# Patient Record
Sex: FEMALE | Race: BLACK OR AFRICAN AMERICAN | NOT HISPANIC OR LATINO | Employment: OTHER | URBAN - METROPOLITAN AREA
[De-identification: names, ages, dates, MRNs, and addresses within clinical notes are randomized per-mention and may not be internally consistent; named-entity substitution may affect disease eponyms.]

---

## 2017-01-10 ENCOUNTER — LAB VISIT (OUTPATIENT)
Dept: LAB | Facility: HOSPITAL | Age: 52
End: 2017-01-10
Attending: INTERNAL MEDICINE
Payer: MEDICARE

## 2017-01-10 DIAGNOSIS — Z94.0 KIDNEY REPLACED BY TRANSPLANT: ICD-10-CM

## 2017-01-10 LAB
ALBUMIN SERPL BCP-MCNC: 3.3 G/DL
ALBUMIN SERPL BCP-MCNC: 3.3 G/DL
ALP SERPL-CCNC: 61 U/L
ALT SERPL W/O P-5'-P-CCNC: 22 U/L
ANION GAP SERPL CALC-SCNC: 7 MMOL/L
AST SERPL-CCNC: 20 U/L
BASOPHILS # BLD AUTO: 0.01 K/UL
BASOPHILS NFR BLD: 0.3 %
BILIRUB DIRECT SERPL-MCNC: 0.1 MG/DL
BILIRUB SERPL-MCNC: 0.3 MG/DL
BUN SERPL-MCNC: 16 MG/DL
CALCIUM SERPL-MCNC: 9.5 MG/DL
CHLORIDE SERPL-SCNC: 104 MMOL/L
CO2 SERPL-SCNC: 24 MMOL/L
CREAT SERPL-MCNC: 1.6 MG/DL
DIFFERENTIAL METHOD: ABNORMAL
EOSINOPHIL # BLD AUTO: 0.1 K/UL
EOSINOPHIL NFR BLD: 3.5 %
ERYTHROCYTE [DISTWIDTH] IN BLOOD BY AUTOMATED COUNT: 14.3 %
EST. GFR  (AFRICAN AMERICAN): 42.1 ML/MIN/1.73 M^2
EST. GFR  (NON AFRICAN AMERICAN): 36.5 ML/MIN/1.73 M^2
GLUCOSE SERPL-MCNC: 174 MG/DL
HCT VFR BLD AUTO: 36.5 %
HGB BLD-MCNC: 11.7 G/DL
LYMPHOCYTES # BLD AUTO: 1.2 K/UL
LYMPHOCYTES NFR BLD: 30.5 %
MAGNESIUM SERPL-MCNC: 1.8 MG/DL
MCH RBC QN AUTO: 27.1 PG
MCHC RBC AUTO-ENTMCNC: 32.1 %
MCV RBC AUTO: 85 FL
MONOCYTES # BLD AUTO: 0.4 K/UL
MONOCYTES NFR BLD: 10.5 %
NEUTROPHILS # BLD AUTO: 2.2 K/UL
NEUTROPHILS NFR BLD: 55.2 %
PHOSPHATE SERPL-MCNC: 3 MG/DL
PLATELET # BLD AUTO: 183 K/UL
PMV BLD AUTO: 13.2 FL
POTASSIUM SERPL-SCNC: 4.4 MMOL/L
PROT SERPL-MCNC: 8.4 G/DL
RBC # BLD AUTO: 4.31 M/UL
SODIUM SERPL-SCNC: 135 MMOL/L
WBC # BLD AUTO: 4 K/UL

## 2017-01-10 PROCEDURE — 80069 RENAL FUNCTION PANEL: CPT

## 2017-01-10 PROCEDURE — 87799 DETECT AGENT NOS DNA QUANT: CPT

## 2017-01-10 PROCEDURE — 36415 COLL VENOUS BLD VENIPUNCTURE: CPT | Mod: PO

## 2017-01-10 PROCEDURE — 80197 ASSAY OF TACROLIMUS: CPT

## 2017-01-10 PROCEDURE — 84075 ASSAY ALKALINE PHOSPHATASE: CPT

## 2017-01-10 PROCEDURE — 85025 COMPLETE CBC W/AUTO DIFF WBC: CPT

## 2017-01-10 PROCEDURE — 83735 ASSAY OF MAGNESIUM: CPT

## 2017-01-11 ENCOUNTER — TELEPHONE (OUTPATIENT)
Dept: TRANSPLANT | Facility: CLINIC | Age: 52
End: 2017-01-11

## 2017-01-11 DIAGNOSIS — R80.9 PROTEINURIA, UNSPECIFIED TYPE: Primary | ICD-10-CM

## 2017-01-11 LAB — TACROLIMUS BLD-MCNC: 5.9 NG/ML

## 2017-01-11 NOTE — TELEPHONE ENCOUNTER
----- Message from Sweta Catalan MD sent at 1/10/2017  4:44 PM CST -----  Proteinuria increased --> please obtain UA with microscopy and repeat UPC; if no infection and persistent proteinuria need to obtain 24 hour urine collection for quantification of proteinuria

## 2017-01-12 ENCOUNTER — LAB VISIT (OUTPATIENT)
Dept: LAB | Facility: HOSPITAL | Age: 52
End: 2017-01-12
Attending: INTERNAL MEDICINE
Payer: MEDICARE

## 2017-01-12 DIAGNOSIS — R80.9 PROTEINURIA, UNSPECIFIED TYPE: ICD-10-CM

## 2017-01-12 LAB
BACTERIA #/AREA URNS AUTO: NORMAL /HPF
BILIRUB UR QL STRIP: NEGATIVE
BK VIRUS DNA PCR, QUANT, BLOOD: <250 COPIES/ML
BK VIRUS DNA, BLOOD: NOT DETECTED
CLARITY UR REFRACT.AUTO: CLEAR
COLOR UR AUTO: ABNORMAL
CREAT UR-MCNC: 38 MG/DL
GLUCOSE UR QL STRIP: ABNORMAL
HGB UR QL STRIP: ABNORMAL
HYALINE CASTS UR QL AUTO: 0 /LPF
KETONES UR QL STRIP: NEGATIVE
LEUKOCYTE ESTERASE UR QL STRIP: NEGATIVE
LOG BKV COPIES/ML: <2.4 LOG (10) COPIES/ML
MICROSCOPIC COMMENT: NORMAL
NITRITE UR QL STRIP: NEGATIVE
PH UR STRIP: 6 [PH] (ref 5–8)
PROT UR QL STRIP: ABNORMAL
PROT UR-MCNC: 70 MG/DL
PROT/CREAT RATIO, UR: 1.84
RBC #/AREA URNS AUTO: 1 /HPF (ref 0–4)
SP GR UR STRIP: 1 (ref 1–1.03)
SQUAMOUS #/AREA URNS AUTO: 1 /HPF
URN SPEC COLLECT METH UR: ABNORMAL
UROBILINOGEN UR STRIP-ACNC: NEGATIVE EU/DL
WBC #/AREA URNS AUTO: 1 /HPF (ref 0–5)

## 2017-01-12 PROCEDURE — 87086 URINE CULTURE/COLONY COUNT: CPT

## 2017-01-12 PROCEDURE — 84156 ASSAY OF PROTEIN URINE: CPT

## 2017-01-12 PROCEDURE — 81001 URINALYSIS AUTO W/SCOPE: CPT

## 2017-01-13 LAB
BACTERIA UR CULT: NORMAL
BACTERIA UR CULT: NORMAL

## 2017-01-13 NOTE — PROGRESS NOTES
Repeat UA and urine culture if she has symptoms of UTI; otherwise no need to repeat urine culture for now

## 2017-01-16 ENCOUNTER — OFFICE VISIT (OUTPATIENT)
Dept: TRANSPLANT | Facility: CLINIC | Age: 52
End: 2017-01-16
Payer: MEDICARE

## 2017-01-16 VITALS
DIASTOLIC BLOOD PRESSURE: 68 MMHG | HEART RATE: 98 BPM | WEIGHT: 220 LBS | TEMPERATURE: 98 F | SYSTOLIC BLOOD PRESSURE: 123 MMHG | OXYGEN SATURATION: 99 % | RESPIRATION RATE: 18 BRPM | HEIGHT: 67 IN | BODY MASS INDEX: 34.53 KG/M2

## 2017-01-16 DIAGNOSIS — Z94.0 KIDNEY TRANSPLANT STATUS, LIVING UNRELATED DONOR: Primary | Chronic | ICD-10-CM

## 2017-01-16 DIAGNOSIS — N18.9 ANEMIA IN CKD (CHRONIC KIDNEY DISEASE): ICD-10-CM

## 2017-01-16 DIAGNOSIS — R80.8 OTHER PROTEINURIA: ICD-10-CM

## 2017-01-16 DIAGNOSIS — I12.9 BENIGN HYPERTENSION WITH CHRONIC KIDNEY DISEASE, STAGE III: ICD-10-CM

## 2017-01-16 DIAGNOSIS — N18.30 BENIGN HYPERTENSION WITH CHRONIC KIDNEY DISEASE, STAGE III: ICD-10-CM

## 2017-01-16 DIAGNOSIS — Z29.89 NEED FOR PROPHYLACTIC IMMUNOTHERAPY: Chronic | ICD-10-CM

## 2017-01-16 DIAGNOSIS — N18.30 CKD (CHRONIC KIDNEY DISEASE), STAGE III: Chronic | ICD-10-CM

## 2017-01-16 DIAGNOSIS — E66.9 OBESITY, UNSPECIFIED OBESITY SEVERITY, UNSPECIFIED OBESITY TYPE: ICD-10-CM

## 2017-01-16 DIAGNOSIS — D63.1 ANEMIA IN CKD (CHRONIC KIDNEY DISEASE): ICD-10-CM

## 2017-01-16 PROBLEM — R80.9 PROTEINURIA: Status: ACTIVE | Noted: 2017-01-16

## 2017-01-16 PROCEDURE — 99214 OFFICE O/P EST MOD 30 MIN: CPT | Mod: PBBFAC | Performed by: NURSE PRACTITIONER

## 2017-01-16 PROCEDURE — 99215 OFFICE O/P EST HI 40 MIN: CPT | Mod: S$GLB,,, | Performed by: NURSE PRACTITIONER

## 2017-01-16 PROCEDURE — 99999 PR PBB SHADOW E&M-EST. PATIENT-LVL IV: CPT | Mod: PBBFAC,,, | Performed by: NURSE PRACTITIONER

## 2017-01-16 NOTE — MR AVS SNAPSHOT
[facility]  [address]  [facility] [address]  Phone: [phone]                  Elizbaeth Maldonado   2017 10:00 AM   Office Visit    Description:  Female : 1965   Provider:  Daya Leon NP   Department:  [facility]           Reason for Visit     Kidney Transplant Evaluation           Diagnoses this Visit        Comments    Kidney transplant status, living unrelated donor    -  Primary     Need for prophylactic immunotherapy         CKD (chronic kidney disease), stage III         Uncontrolled type 2 diabetes mellitus with stage 3 chronic kidney disease, with long-term current use of insulin         Benign hypertension with chronic kidney disease, stage III         Anemia in CKD (chronic kidney disease)         Other proteinuria         Obesity, unspecified obesity severity, unspecified obesity type                To Do List           Future Appointments        Provider Department Dept Phone    2017 2:30 PM MD [facility] - Ophthalmology [phone]    2017 2:30 PM Alison Quinonez MD [facility] [phone]      Goals (5 Years of Data)     None      Follow-Up and Disposition     Return in about 1 year (around 2018), or if symptoms worsen or fail to improve.      [facility]     [facility] -  Assistance  Registered nurses in the [facility] provide clinical advisement, health education, appointment booking, and other advisory services.  Call for this free service at [phone].             Medications           Message regarding Medications     Verify the changes and/or additions to your medication regime listed below are the same as discussed with your clinician today.  If any of these changes or additions are incorrect, please notify your healthcare provider.        STOP taking these medications     amoxicillin (AMOXIL) 500 MG capsule TK 4 CS PO 60 MINUTES PRIOR TO  "APPONTMENT    atorvastatin (LIPITOR) 40 MG tablet Take 1 tablet (40 mg total) by mouth every evening.    dapsone 25 MG Tab Take 100 mg by mouth.    famotidine (PEPCID) 20 MG tablet Take 20 mg by mouth.    fluticasone (FLONASE) 50 mcg/actuation nasal spray 1 spray by Nasal route.    magnesium 30 mg Tab Take 400 mg by mouth.    multivitamin (THERAGRAN) tablet Take 1 tablet by mouth once daily.    sodium bicarbonate 325 MG tablet Take 650 mg by mouth.           Verify that the below list of medications is an accurate representation of the medications you are currently taking.  If none reported, the list may be blank. If incorrect, please contact your healthcare provider. Carry this list with you in case of emergency.           Current Medications     blood sugar diagnostic Strp Checks BG ac/hs    buPROPion (WELLBUTRIN) 75 MG tablet TK 1 T PO 2 TIMES D    ferrous sulfate 325 (65 FE) MG EC tablet Take 325 mg by mouth once daily.     INSULIN DEGLUDEC (TRESIBA FLEXTOUCH U-200 SUBQ) Inject 88 Units into the skin nightly.     insulin syringe-needle U-100 (INSULIN SYRINGE) 1/2 mL 30 x 5/16" Syrg Uses 4 daily    lancets (ONE TOUCH DELICA LANCETS) 33 gauge Misc 1 lancet by Misc.(Non-Drug; Combo Route) route 4 (four) times daily before meals and nightly.    multivit-minerals-ferrous fum 9 mg iron/15 mL Liqd Take 1 tablet by mouth.    mycophenolate (CELLCEPT) 250 mg Cap Take 2 capsules (500 mg total) by mouth 2 (two) times daily.    NOVOLOG FLEXPEN 100 unit/mL InPn pen 23 Units 3 (three) times daily with meals.     SYMLINPEN 120 2,700 mcg/2.7 mL PnIj INJECT 60 MCG UNDER THE SKIN BEFORE MEALS FOR 4 WEEKS THEN INCREASE  MCG    SYRINGE & NEEDLE,INSULIN,1 ML (INSULIN SYRINGE-NEEDLE U-100) 1 mL 29 X 7/16" Syrg     tacrolimus (PROGRAF) 1 MG Cap Take 3 capsules (3 mg total) by mouth every 12 (twelve) hours. Z94.0 Kidney transplant on 12/2/2014.    KETOCONAZOLE ORAL Take 200 mg by mouth.           Clinical Reference Information    " "       Vital Signs - Last Recorded  Most recent update: 1/16/2017 10:21 AM by Malia Rosales MA    BP Pulse Temp Resp Ht Wt    123/68 (BP Location: Right arm, Patient Position: Sitting, BP Method: Automatic) 98 97.8 °F (36.6 °C) (Oral) 18 5' 7" (1.702 m) 99.8 kg (220 lb 0.3 oz)    LMP SpO2 BMI          04/29/2011 (Approximate) 99% 34.46 kg/m2        Blood Pressure          Most Recent Value    BP  123/68      Allergies as of 1/16/2017     Hydrocodone    Iodine And Iodide Containing Products    Shrimp    Oxycodone    Topamax [Topiramate]    Zoloft [Sertraline]      Immunizations Administered on Date of Encounter - 1/16/2017     None      Maintenance Dialysis History     Start End Type Comments Center    1/1/1888  Peritoneal  [facility]            Current Dialysis Center Information     [facility] Phone #:  [phone]    Contact:  N/A LANCE Mishra  [address] Fax #:  [phone]            Transplant Information        Txp Date Organ Coordinator Care Team    12/2/2014 Kidney Syd Cannon, DEEPAK Referring Physician:  Jessica Johnson MD   Current Nephrologist:  Anabelle Milan MD   Surgeon:  Divya Tijerina MD   Assisting Surgeon:  Abraham Cardenas MD         "

## 2017-01-16 NOTE — LETTER
January 16, 2017        Anabelle Milan  [address] [place] [address] [address]  [place] [address]  Phone: [phone]  Fax: [phone]             [facility]  [address]  [facility] [address]  Phone: [phone]   Patient: Elizabeth Maldonado   MR Number: [id number]   YOB: 1965   Date of Visit: 1/16/2017       Dear Dr. Anabelle Milan    Thank you for referring Elizabeth Maldonado to me for evaluation. Attached you will find relevant portions of my assessment and plan of care.    If you have questions, please do not hesitate to call me. I look forward to following Elizabeth Maldonado along with you.    Sincerely,    Daya Leon NP    Enclosure    If you would like to receive this communication electronically, please contact [email or web address] or [phone] to request [employer] access.    [employer] is a tool which provides read-only access to select patient information with whom you have a relationship. Its easy to use and provides real time access to review your patients record including encounter summaries, notes, results, and demographic information.    If you feel you have received this communication in error or would no longer like to receive these types of communications, please e-mail [email or web address]

## 2017-01-16 NOTE — PROGRESS NOTES
Kidney Post-Transplant Assessment    Referring Physician: Jessica Johnson  Current Nephrologist: Anabelle Milan    ORGAN: LEFT KIDNEY  Donor Type: living  PHS Increased Risk: no  Cold Ischemia: 53 mins  Induction Medications: campath - alemtuzumab (anti-cd52)    Subjective:     CC:  Reassessment of renal allograft function and management of chronic immunosuppression.    HPI:  Ms. Maldonado is a 52 y.o. year old Black or  female who received a living kidney transplant on 12/2/14.  She has CKD stage 3 - GFR 30-59 and her baseline creatinine is between  107-2.2, most recent sCr 1.6. She takes mycophenolate mofetil and tacrolimus for maintenance immunosuppression. She denies any recent hospitalizations or ER visits since her previous clinic visit.    Patient states she is having difficulty controlling her blood sugars. She is working closely with her endocrinologist and a dietician. She states she is an emotional eater. She has been exercising 3-4x/week at a gym doing water aerobics.   Overall feels well. No health concerns today.   Denies chest pain, SOB, leg pain, abdominal pain or LUTs.    Pertinent HX:  HX: ESRD secondary to DM/HTN who was on PD for ~4 months prior to receiving LURT from her daughter's friend on 12/2/14. Post-op course complicated by herpes zoster outbreak in mid-Sept 2015 along her left thigh.     Past Medical History   Diagnosis Date    Acidosis 7/1/2014    Allergic rhinitis 7/1/2014    Allergy     Anemia     Anemia in chronic kidney disease 7/1/2014    Anxiety     Chronic immunosuppression with Prograf and MMF 12/3/2014    CKD (chronic kidney disease) stage 5, GFR less than 15 ml/min 7/1/2014    CKD (chronic kidney disease), stage III 3/2/2015    Degenerative disc disease     Depression 7/1/2014    Diabetes mellitus, type 2 since age 18 7/1/2014    ESRD on peritoneal dialysis - august 2014 for 9 hours no peritonitis 11/24/2014    Hyperlipidemia     Hypertension  "7/1/2014    Hypomagnesemia 1/7/2015    Kidney transplant status, living unrelated donor - 12/2/14 12/3/2014    Neutropenia 1/21/2015    NS (nuclear sclerosis) 9/16/2016    Organ transplant candidate 7/1/2014    Pre-op exam 11/24/2014    Proliferative diabetic retinopathy of both eyes without macular edema associated with type 2 diabetes mellitus 9/16/2016    Tendinitis        Review of Systems   Constitutional: Negative for activity change, appetite change, chills, fatigue, fever and unexpected weight change.   HENT: Negative for congestion, facial swelling, postnasal drip, rhinorrhea, sinus pressure, sore throat and trouble swallowing.    Eyes: Negative for pain, redness and visual disturbance.   Respiratory: Negative for cough, chest tightness, shortness of breath and wheezing.    Cardiovascular: Negative.  Negative for chest pain, palpitations and leg swelling.   Gastrointestinal: Negative for abdominal pain, diarrhea, nausea and vomiting.   Genitourinary: Negative for dysuria, flank pain and urgency.   Musculoskeletal: Negative for gait problem, neck pain and neck stiffness.   Skin: Negative for rash.   Allergic/Immunologic: Positive for immunocompromised state. Negative for environmental allergies and food allergies.   Neurological: Negative for dizziness, weakness, light-headedness and headaches.   Psychiatric/Behavioral: Negative for agitation and confusion. The patient is not nervous/anxious.        Objective:     Blood pressure 123/68, pulse 98, temperature 97.8 °F (36.6 °C), temperature source Oral, resp. rate 18, height 5' 7" (1.702 m), weight 99.8 kg (220 lb 0.3 oz), last menstrual period 04/29/2011, SpO2 99 %.body mass index is 34.46 kg/(m^2).    Physical Exam   Constitutional: She is oriented to person, place, and time. She appears well-developed and well-nourished.   HENT:   Head: Normocephalic.   Mouth/Throat: Oropharynx is clear and moist. No oropharyngeal exudate.   Eyes: Conjunctivae and " EOM are normal. Pupils are equal, round, and reactive to light. No scleral icterus.   Neck: Normal range of motion. Neck supple.   Cardiovascular: Normal rate, regular rhythm and normal heart sounds.    Pulmonary/Chest: Effort normal and breath sounds normal. No respiratory distress. She has no wheezes. She has no rales.   Abdominal: Soft. Normal appearance and bowel sounds are normal. She exhibits no distension and no mass. There is no splenomegaly or hepatomegaly. There is no tenderness. There is no rebound, no guarding, no CVA tenderness, no tenderness at McBurney's point and negative Aguiar's sign.       Musculoskeletal: Normal range of motion. She exhibits no edema.   Lymphadenopathy:     She has no cervical adenopathy.   Neurological: She is alert and oriented to person, place, and time. She exhibits normal muscle tone. Coordination normal.   Skin: Skin is warm and dry.   Psychiatric: She has a normal mood and affect. Her behavior is normal.   Vitals reviewed.      Labs:  Lab Results   Component Value Date    WBC 4.00 01/10/2017    HGB 11.7 (L) 01/10/2017    HCT 36.5 (L) 01/10/2017     (L) 01/10/2017    K 4.4 01/10/2017     01/10/2017    CO2 24 01/10/2017    BUN 16 01/10/2017    CREATININE 1.6 (H) 01/10/2017    EGFRNONAA 36.5 (A) 01/10/2017    CALCIUM 9.5 01/10/2017    PHOS 3.0 01/10/2017    MG 1.8 01/10/2017    ALBUMIN 3.3 (L) 01/10/2017    ALBUMIN 3.3 (L) 01/10/2017    AST 20 01/10/2017    ALT 22 01/10/2017    UTPCR 1.84 (H) 01/12/2017    PTH 81.0 (H) 05/05/2016    TACROLIMUS 5.9 01/10/2017       No results found for: EXTANC, EXTWBC, EXTSEGS, EXTPLATELETS, EXTHEMOGLOBI, EXTHEMATOCRI, EXTCREATININ, EXTSODIUM, EXTPOTASSIUM, EXTBUN, EXTCO2, EXTCALCIUM, EXTPHOSPHORU, EXTGLUCOSE, EXTALBUMIN, EXTAST, EXTALT, EXTBILITOTAL, EXTLIPASE, EXTAMYLASE    No results found for: EXTCYCLOSLVL, EXTSIROLIMUS, EXTTACROLVL, EXTPROTCRE, EXTPTHINTACT, EXTPROTEINUA, EXTWBCUA, EXTRBCUA    Labs were reviewed with the  patient.    Assessment:     1. Kidney transplant status, living unrelated donor - 12/2/14    2. Chronic immunosuppression with Prograf    3. CKD (chronic kidney disease), stage III    4. Uncontrolled type 2 diabetes mellitus with stage 3 chronic kidney disease, with long-term current use of insulin    5. Benign hypertension with chronic kidney disease, stage III    6. Anemia in CKD (chronic kidney disease)    7. Other proteinuria    8. Obesity, unspecified obesity severity, unspecified obesity type        Plan:     Proteinuria:   If 24 hour urine confirms proteinuria -->scheduled allograft biopsy     Patient has concerns about how her Insurance plan and funding for her meds will be effected at 3 years post TXP.   She would like to speak with a  to review her plan to endure she has the correct coverage added in the next open enrollment period , p/t 2018.     Follow-up:   1. CKD stage:  3 stable    2. Immunosuppression: No change, Prograf trough 5.9, which is therapeutic. Continue  Prograf 3/2, XJO003 Mg BID,     Will continue to monitor for drug toxicities  TACROLIMUS 5.9 01/10/2017     3. Allograft Function: sCr remains within baseline . Continue good po hydration.       Lab Results   Component Value Date    CREATININE 1.6 (H) 01/10/2017     eGFR if African American >60 mL/min/1.73 m^2 42.1 (A)           01/10/2017       4. Hypertension management: controlled, advise low salt diet and home BP monitoring    No meds at this time     5. Metabolic Bone Disease/Secondary Hyperparathyroidism:stable  6. Electrolytes: Normal calcium. Bicarbonate is normal   (L) 01/10/2017   K 4.4 01/10/2017    01/10/2017   CO2 24 01/10/2017     CALCIUM 9.5 01/10/2017   PHOS 3.0 01/10/2017   MG 1.8 01/10/2017     7. Anemia: resolved. No need for intervention      Lab Results   Component Value Date    WBC 4.00 01/10/2017    HGB 11.7 (L) 01/10/2017    HCT 36.5 (L) 01/10/2017    MCV 85 01/10/2017      01/10/2017         8.  Cytopenias: no significant cytopenias will monitor as per our guidelines. Medicine list reviewed including potential causes of drug-induced cytopenias    9.Proteinuria: continue p/c ratio as per guidelines    UTPCR 1.84 (H) 01/12/2017   UA, culture, 24 hour urine for proteinuria quantification      UA 1/12/2017  Specimen UA  Urine, Clean Catch   Color, UA Yellow, Straw, Ruby Straw   Appearance, UA Clear Clear   pH, UA 5.0 - 8.0 6.0   Specific Gravity, UA 1.005 - 1.030 1.005   Protein, UA Negative 1+ (A)   Comments: Recommend a 24 hour urine protein or a urine   protein/creatinine ratio if globulin induced proteinuria is   clinically suspected.      Glucose, UA Negative Trace (A)   Ketones, UA Negative Negative   Bilirubin (UA) Negative Negative   Occult Blood UA Negative Trace (A)   Nitrite, UA Negative Negative   Urobilinogen, UA <2.0 EU/dL Negative   Leukocytes, UA Negative Negative         10. BK virus infection screening:  Not detected, will continue to monitor/ guidelines  01/10/2017  BK Virus DNA, Blood Not detected Not detected   BK Virus DNA PCR, Quant, Blood <250 Copies/mL <250   Log BKV Copies/mL <2.40 Log (10) Copies/mL <2.40       11. Weight education: provided during the clinic visit   body mass index is 34.46 kg/(m^2).        12.Patient safety education regarding immunosuppression including prophylaxis posttransplant for CMV, PCP : Education provided about vaccination and prevention of infections          Follow-up:   Clinic: return to transplant clinic weekly for the first month after transplant; every 2 weeks during months 2-3; then at 6-, 9-, 12-, 18-, 24-, and 36- months post-transplant to reassess for complications from immunosuppression toxicity and monitor for rejection.  Annually thereafter.    Labs: since patient remains at high risk for rejection and drug-related complications that warrant close monitoring, labs will be ordered as follows: continue twice weekly CBC,  renal panel, and drug level for first month; then same labs once weekly through 3rd month post-transplant.  Urine for UA and protein/creatinine ratio monthly.  Urine BK - PCR at 1-, 3-, 6-, 9-, 12-, 18-, 24-, and 36- months post-transplant.  Hepatic panel at 1-, 2-, 3-, 6-, 9-, 12-, 18-, 24-, and 36- months post-transplant.    Daya Leon NP       Education:   Material provided to the patient.  Patient reminded to call with any health changes since these can be early signs of significant complications.  Also, I advised the patient to be sure any new medications or changes of old medications are discussed with either a pharmacist or physician knowledgeable with transplant to avoid rejection/drug toxicity related to significant drug interactions.    UNOS Patient Status  Functional Status: 80% - Normal activity with effort: some symptoms of disease  Physical Capacity: No Limitations

## 2017-01-17 ENCOUNTER — OFFICE VISIT (OUTPATIENT)
Dept: OPHTHALMOLOGY | Facility: CLINIC | Age: 52
End: 2017-01-17
Payer: MEDICARE

## 2017-01-17 VITALS — SYSTOLIC BLOOD PRESSURE: 123 MMHG | DIASTOLIC BLOOD PRESSURE: 78 MMHG | HEART RATE: 105 BPM

## 2017-01-17 DIAGNOSIS — E11.3593 PROLIFERATIVE DIABETIC RETINOPATHY OF BOTH EYES WITHOUT MACULAR EDEMA ASSOCIATED WITH TYPE 2 DIABETES MELLITUS: Primary | ICD-10-CM

## 2017-01-17 DIAGNOSIS — Z94.0 KIDNEY REPLACED BY TRANSPLANT: Primary | ICD-10-CM

## 2017-01-17 PROCEDURE — 67228 TREATMENT X10SV RETINOPATHY: CPT | Mod: PBBFAC,RT | Performed by: OPHTHALMOLOGY

## 2017-01-17 PROCEDURE — 99499 UNLISTED E&M SERVICE: CPT | Mod: S$GLB,,, | Performed by: OPHTHALMOLOGY

## 2017-01-17 PROCEDURE — 99213 OFFICE O/P EST LOW 20 MIN: CPT | Mod: PBBFAC,25 | Performed by: OPHTHALMOLOGY

## 2017-01-17 PROCEDURE — 67228 TREATMENT X10SV RETINOPATHY: CPT | Mod: RT,S$GLB,, | Performed by: OPHTHALMOLOGY

## 2017-01-17 PROCEDURE — 99999 PR PBB SHADOW E&M-EST. PATIENT-LVL III: CPT | Mod: PBBFAC,,, | Performed by: OPHTHALMOLOGY

## 2017-01-17 NOTE — MR AVS SNAPSHOT
[facility]  [address]  [facility] [address]  Phone: [phone]  Fax: [phone]                  Elizabeth Maldonado   2017 2:30 PM   Office Visit    Description:  Female : 1965   Provider:  TATO Oliveira MD   Department:  [facility]           Reason for Visit     Eye Problem           Diagnoses this Visit        Comments    Proliferative diabetic retinopathy of both eyes without macular edema associated with type 2 diabetes mellitus    -  Primary            To Do List           Future Appointments        Provider Department Dept Phone    2017 2:30 PM Alison Quinonez MD [facility] [phone]    2017 10:00 AM LAB, [place] [facility] [phone]    2017 2:20 PM MD [facility] [phone]      Goals (5 Years of Data)     None      Follow-Up and Disposition     Return in about 2 weeks (around 2017).      [facility]     [facility] - 24/ Assistance  Registered nurses in the [facility] provide clinical advisement, health education, appointment booking, and other advisory services.  Call for this free service at [phone].             Medications           Message regarding Medications     Verify the changes and/or additions to your medication regime listed below are the same as discussed with your clinician today.  If any of these changes or additions are incorrect, please notify your healthcare provider.             Verify that the below list of medications is an accurate representation of the medications you are currently taking.  If none reported, the list may be blank. If incorrect, please contact your healthcare provider. Carry this list with you in case of emergency.           Current Medications     blood sugar diagnostic Strp Checks BG ac/hs    buPROPion (WELLBUTRIN) 75 MG tablet TK 1 T PO 2 TIMES D    ferrous  "sulfate 325 (65 FE) MG EC tablet Take 325 mg by mouth once daily.     INSULIN DEGLUDEC (TRESIBA FLEXTOUCH U-200 SUBQ) Inject 88 Units into the skin nightly.     insulin syringe-needle U-100 (INSULIN SYRINGE) 1/2 mL 30 x 5/16" Syrg Uses 4 daily    KETOCONAZOLE ORAL Take 200 mg by mouth.    lancets (ONE TOUCH DELICA LANCETS) 33 gauge Misc 1 lancet by Misc.(Non-Drug; Combo Route) route 4 (four) times daily before meals and nightly.    multivit-minerals-ferrous fum 9 mg iron/15 mL Liqd Take 1 tablet by mouth.    mycophenolate (CELLCEPT) 250 mg Cap Take 2 capsules (500 mg total) by mouth 2 (two) times daily.    NOVOLOG FLEXPEN 100 unit/mL InPn pen 23 Units 3 (three) times daily with meals.     SYMLINPEN 120 2,700 mcg/2.7 mL PnIj INJECT 60 MCG UNDER THE SKIN BEFORE MEALS FOR 4 WEEKS THEN INCREASE  MCG    SYRINGE & NEEDLE,INSULIN,1 ML (INSULIN SYRINGE-NEEDLE U-100) 1 mL 29 X 7/16" Syrg     tacrolimus (PROGRAF) 1 MG Cap Take 3 capsules (3 mg total) by mouth every 12 (twelve) hours. Z94.0 Kidney transplant on 12/2/2014.           Clinical Reference Information           Vital Signs - Last Recorded  Most recent update: 1/17/2017  2:55 PM by Suze Trevizo    BP Pulse LMP             123/78 (BP Location: Left arm, Patient Position: Sitting, BP Method: Automatic) 105 04/29/2011 (Approximate)         Blood Pressure          Most Recent Value    BP  123/78      Allergies as of 1/17/2017     Hydrocodone    Iodine And Iodide Containing Products    Shrimp    Oxycodone    Topamax [Topiramate]    Zoloft [Sertraline]      Immunizations Administered on Date of Encounter - 1/17/2017     None      Maintenance Dialysis History     Start End Type Comments Center    1/1/1888  Peritoneal  [facility]            Current Dialysis Center Information     [facility] Phone #:  [phone]    Contact:  N/A LANCE Mishra  [address] Fax #:  [phone]            Transplant Information        Txp Date " Organ Coordinator Care Team    12/2/2014 Kidney Syd Cannon, DEEPAK Referring Physician:  Jessica Johnson MD   Current Nephrologist:  Anabelle Milan MD   Surgeon:  Divya Tijerina MD   Assisting Surgeon:  Abraham Cardenas MD

## 2017-01-17 NOTE — PROGRESS NOTES
Prior OCT - No ME OU  NV gabriela on ONH OS      FA:  Shows significant nonperfusion and NV OU    A/P    1. PDR OU  W/o ME, T2    S/p PRP OS   Start PRP OD today  2 weeks for PRP OD # 2    2. HTN Ret OU    BS/BP/chol control    3. NS OU        Risk, benefits, and alternatives of the procedure were discussed in detail with the patient. The patient voiced understanding and wished to proceed with the procedure.    Laser Procedure Note  Dx: PDR OD  Laser: PRP OD  Argon  Spot: 200  Power: 150-180  Dur: 100ms  #:  [id number]  Complications: None  F/U as above            RTC 2 weeks for PRP OD

## 2017-01-18 ENCOUNTER — OFFICE VISIT (OUTPATIENT)
Dept: FAMILY MEDICINE | Facility: CLINIC | Age: 52
End: 2017-01-18
Payer: MEDICARE

## 2017-01-18 VITALS
DIASTOLIC BLOOD PRESSURE: 80 MMHG | SYSTOLIC BLOOD PRESSURE: 120 MMHG | HEART RATE: 94 BPM | WEIGHT: 218.94 LBS | TEMPERATURE: 98 F | OXYGEN SATURATION: 98 % | BODY MASS INDEX: 34.36 KG/M2 | HEIGHT: 67 IN

## 2017-01-18 DIAGNOSIS — N18.30 BENIGN HYPERTENSION WITH CHRONIC KIDNEY DISEASE, STAGE III: ICD-10-CM

## 2017-01-18 DIAGNOSIS — E66.9 OBESITY (BMI 30-39.9): ICD-10-CM

## 2017-01-18 DIAGNOSIS — E11.3593 PROLIFERATIVE DIABETIC RETINOPATHY OF BOTH EYES WITHOUT MACULAR EDEMA ASSOCIATED WITH TYPE 2 DIABETES MELLITUS: Primary | Chronic | ICD-10-CM

## 2017-01-18 DIAGNOSIS — Z94.0 KIDNEY TRANSPLANT STATUS, LIVING UNRELATED DONOR: Chronic | ICD-10-CM

## 2017-01-18 DIAGNOSIS — N25.81 HYPERPARATHYROIDISM, SECONDARY RENAL: ICD-10-CM

## 2017-01-18 DIAGNOSIS — Z29.89 NEED FOR PROPHYLACTIC IMMUNOTHERAPY: Chronic | ICD-10-CM

## 2017-01-18 DIAGNOSIS — G47.00 INSOMNIA, UNSPECIFIED TYPE: ICD-10-CM

## 2017-01-18 DIAGNOSIS — F32.A DEPRESSION, UNSPECIFIED DEPRESSION TYPE: ICD-10-CM

## 2017-01-18 DIAGNOSIS — I12.9 BENIGN HYPERTENSION WITH CHRONIC KIDNEY DISEASE, STAGE III: ICD-10-CM

## 2017-01-18 PROCEDURE — 99213 OFFICE O/P EST LOW 20 MIN: CPT | Mod: PBBFAC,PO | Performed by: FAMILY MEDICINE

## 2017-01-18 PROCEDURE — 99214 OFFICE O/P EST MOD 30 MIN: CPT | Mod: S$GLB,,, | Performed by: FAMILY MEDICINE

## 2017-01-18 PROCEDURE — 99999 PR PBB SHADOW E&M-EST. PATIENT-LVL III: CPT | Mod: PBBFAC,,, | Performed by: FAMILY MEDICINE

## 2017-01-18 RX ORDER — ZOLPIDEM TARTRATE 5 MG/1
5 TABLET ORAL NIGHTLY PRN
Qty: 30 TABLET | Refills: 1 | Status: SHIPPED | OUTPATIENT
Start: 2017-01-18 | End: 2017-08-08 | Stop reason: SDUPTHER

## 2017-01-18 RX ORDER — ZOLPIDEM TARTRATE 5 MG/1
5 TABLET ORAL NIGHTLY PRN
Qty: 30 TABLET | Refills: 1 | Status: SHIPPED | OUTPATIENT
Start: 2017-01-18 | End: 2017-01-18 | Stop reason: SDUPTHER

## 2017-01-18 NOTE — PATIENT INSTRUCTIONS
"  Facts About Dietary Fat     Olive oil is a good source of unsaturated fat.     Eating less saturated and trans fat is one of the best things you can do for your heart. Start by finding out which fats are better to use. Then always try to use as little "bad" fat as you can.  Why eat less fat?  · Cutting down on the fat you eat can lower your blood cholesterol levels. This may help prevent clogged arteries from buildup of plaque.  · A low-fat diet can help you lose excess weight. Doing so can lower your blood pressure and reduce your chances of getting diabetes.  · A low-fat diet reduces your risk for stroke and for some cancers.  Unsaturated fat is most healthy  · When you must add fat, use unsaturated fat.  · Unsaturated fats come from plants. They include olive, canola, peanut, corn, avocado, safflower, and sunflower oils.  · Liquid (squeezable) margarine is also mostly unsaturated fat.  · In moderate amounts, unsaturated fat can even be good for your heart.  Saturated fat is less healthy  · Avoid eating saturated fat because it raises your blood cholesterol levels.  · Most saturated fat comes from animals. Foods such as butter, lard, cheese, cream, whole milk, and fatty cuts of meat are high in saturated fat.  · Some oils, such as palm and coconut oils, are also saturated fats.  Trans fat is least healthy  · Also avoid trans fat whenever possible. Even if it's not listed on the food label, look for it in the ingredients in the form of hydrogenated or partially hydrogenated oils.  · This is found in snack foods, shortening, french fries, and stick margarines.  Add flavor without fat  · Sprinkle herbs on fish, chicken, and meat, and in soups.  · Try herbs, lemon juice, or flavored vinegar on vegetables.  · Add chopped onions, garlic, and peppers to flavor beans and rice.   © 3435-2315 [employer]. [address] [place] [address], [place] [address]. All rights reserved. This information is not intended as a " substitute for professional medical care. Always follow your healthcare professional's instructions.

## 2017-01-18 NOTE — MR AVS SNAPSHOT
[facility]  [address]  [place] [address]  Phone: [phone]  Fax: [phone]                  Elizabeth Maldonado   2017 2:30 PM   Office Visit    Description:  Female : 1965   Provider:  Alison Quinonez MD   Department:  [facility]           Reason for Visit     Insomnia     forms           Diagnoses this Visit        Comments    Proliferative diabetic retinopathy of both eyes without macular edema associated with type 2 diabetes mellitus    -  Primary            To Do List           Future Appointments        Provider Department Dept Phone    2017 10:00 AM LAB, [place] [facility] [phone]    2017 8:30 AM LAB, [place] [facility] [phone]    2017 2:20 PM TATO Oliveira MD [facility] - Ophthalmology [phone]      Goals (5 Years of Data)     None       These Medications        Disp Refills Start End    zolpidem (AMBIEN) 5 MG Tab 30 tablet 1 2017    Take 1 tablet (5 mg total) by mouth nightly as needed. - Oral    Pharmacy: [facility] [address] [place] [address] AT [facility] Ph #: [phone]         [facility]     [facility] Care [facility] -  Assistance  Registered nurses in the [facility] provide clinical advisement, health education, appointment booking, and other advisory services.  Call for this free service at [phone].             Medications           Message regarding Medications     Verify the changes and/or additions to your medication regime listed below are the same as discussed with your clinician today.  If any of these changes or additions are incorrect, please notify your healthcare provider.        START taking these NEW medications        Refills    zolpidem (AMBIEN) 5 MG Tab 1    Sig: Take 1 tablet (5 mg total) by mouth nightly as needed.    Class: Print    Route: Oral          "  Verify that the below list of medications is an accurate representation of the medications you are currently taking.  If none reported, the list may be blank. If incorrect, please contact your healthcare provider. Carry this list with you in case of emergency.           Current Medications     blood sugar diagnostic Strp Checks BG ac/hs    buPROPion (WELLBUTRIN) 75 MG tablet TK 1 T PO 2 TIMES D    ferrous sulfate 325 (65 FE) MG EC tablet Take 325 mg by mouth once daily.     INSULIN DEGLUDEC (TRESIBA FLEXTOUCH U-200 SUBQ) Inject 88 Units into the skin nightly.     insulin syringe-needle U-100 (INSULIN SYRINGE) 1/2 mL 30 x 5/16" Syrg Uses 4 daily    KETOCONAZOLE ORAL Take 200 mg by mouth.    lancets (ONE TOUCH DELICA LANCETS) 33 gauge Misc 1 lancet by Misc.(Non-Drug; Combo Route) route 4 (four) times daily before meals and nightly.    multivit-minerals-ferrous fum 9 mg iron/15 mL Liqd Take 1 tablet by mouth.    mycophenolate (CELLCEPT) 250 mg Cap Take 2 capsules (500 mg total) by mouth 2 (two) times daily.    NOVOLOG FLEXPEN 100 unit/mL InPn pen 23 Units 3 (three) times daily with meals.     SYMLINPEN 120 2,700 mcg/2.7 mL PnIj INJECT 60 MCG UNDER THE SKIN BEFORE MEALS FOR 4 WEEKS THEN INCREASE  MCG    SYRINGE & NEEDLE,INSULIN,1 ML (INSULIN SYRINGE-NEEDLE U-100) 1 mL 29 X 7/16" Syrg     tacrolimus (PROGRAF) 1 MG Cap Take 3 capsules (3 mg total) by mouth every 12 (twelve) hours. Z94.0 Kidney transplant on 12/2/2014.    zolpidem (AMBIEN) 5 MG Tab Take 1 tablet (5 mg total) by mouth nightly as needed.           Clinical Reference Information           Vital Signs - Last Recorded  Most recent update: 1/18/2017  2:40 PM by Manjinder Diggs MA    BP Pulse Temp Ht    120/80 (BP Location: Right arm, Patient Position: Sitting, BP Method: Manual) 94 98 °F (36.7 °C) (Oral) 5' 7" (1.702 m)    Wt LMP SpO2 BMI    99.3 kg (218 lb 14.7 oz) 04/29/2011 (Approximate) 98% 34.29 kg/m2      Blood Pressure          Most Recent " "Value    BP  120/80      Allergies as of 1/18/2017     Hydrocodone    Iodine And Iodide Containing Products    Shrimp    Oxycodone    Topamax [Topiramate]    Zoloft [Sertraline]      Immunizations Administered on Date of Encounter - 1/18/2017     None      Orders Placed During Today's Visit     Future Labs/Procedures Expected by Expires    Hemoglobin A1c  1/18/2017 1/18/2018    Lipid panel  1/18/2017 1/18/2018    TSH  1/18/2017 1/18/2018      Maintenance Dialysis History     Start End Type Comments Center    1/1/1888  Peritoneal  [facility]            Current Dialysis Center Information     [facility] [facility] [address] Phone #:  [phone]    Contact:  [place]  [address] Fax #:  [phone]            Transplant Information        Txp Date Organ Coordinator Care Team    12/2/2014 Kidney Syd Cannon RN Referring Physician:  Jessica Johnson MD   Current Nephrologist:  Anabelle Milan MD   Surgeon:  Divya Tijerina MD   Assisting Surgeon:  Abraham Cardenas MD         Instructions      Facts About Dietary Fat     Olive oil is a good source of unsaturated fat.     Eating less saturated and trans fat is one of the best things you can do for your heart. Start by finding out which fats are better to use. Then always try to use as little "bad" fat as you can.  Why eat less fat?  · Cutting down on the fat you eat can lower your blood cholesterol levels. This may help prevent clogged arteries from buildup of plaque.  · A low-fat diet can help you lose excess weight. Doing so can lower your blood pressure and reduce your chances of getting diabetes.  · A low-fat diet reduces your risk for stroke and for some cancers.  Unsaturated fat is most healthy  · When you must add fat, use unsaturated fat.  · Unsaturated fats come from plants. They include olive, canola, peanut, corn, avocado, safflower, and sunflower oils.  · Liquid (squeezable) margarine is also mostly unsaturated " fat.  · In moderate amounts, unsaturated fat can even be good for your heart.  Saturated fat is less healthy  · Avoid eating saturated fat because it raises your blood cholesterol levels.  · Most saturated fat comes from animals. Foods such as butter, lard, cheese, cream, whole milk, and fatty cuts of meat are high in saturated fat.  · Some oils, such as palm and coconut oils, are also saturated fats.  Trans fat is least healthy  · Also avoid trans fat whenever possible. Even if it's not listed on the food label, look for it in the ingredients in the form of hydrogenated or partially hydrogenated oils.  · This is found in snack foods, shortening, french fries, and stick margarines.  Add flavor without fat  · Sprinkle herbs on fish, chicken, and meat, and in soups.  · Try herbs, lemon juice, or flavored vinegar on vegetables.  · Add chopped onions, garlic, and peppers to flavor beans and rice.   © 4146-6032 [employer] [place]. [address] [place] [address], [place] [address]. All rights reserved. This information is not intended as a substitute for professional medical care. Always follow your healthcare professional's instructions.

## 2017-01-19 NOTE — PROGRESS NOTES
Routine Office Visit    Patient Name: Elizabeth Maldonado    : 1965  MRN: [id number]    Subjective:  Elizabeth is a 52 y.o. female who presents today for     1. Insomnia - chronic condition for patient - pt was previously on ambien - she would like a prescription refill. She tries to go to bed at 10pm each night but is sometimes unable to sleep. She has tried otc sleep aids without relief of symptoms. She has been on ambien in the past and reports no side effect from medication.   2. [employer] form completion - pt needs form completed stating she was previously on dialysis and is now no longer on dialysis      Review of Systems   Constitutional: Negative for chills and fever.   HENT: Negative for congestion.    Eyes: Negative for blurred vision.   Respiratory: Negative for cough.    Cardiovascular: Negative for chest pain.   Gastrointestinal: Negative for abdominal pain, constipation, diarrhea, heartburn, nausea and vomiting.   Genitourinary: Negative for dysuria.   Musculoskeletal: Negative for myalgias.   Skin: Negative for itching and rash.   Neurological: Negative for dizziness and headaches.   Psychiatric/Behavioral: Negative for depression. The patient has insomnia.        Active Problem List  Patient Active Problem List   Diagnosis    Uncontrolled type 2 diabetes mellitus with stage 3 chronic kidney disease    Anemia in CKD (chronic kidney disease)    Allergic rhinitis    Hyperlipidemia    Depression    Hyperparathyroidism, secondary renal    Obesity    Kidney transplant status, living unrelated donor - 14    Chronic immunosuppression with Prograf    Counseling NOS    CKD (chronic kidney disease), stage III    Benign hypertension with chronic kidney disease, stage III    Proliferative diabetic retinopathy of both eyes without macular edema associated with type 2 diabetes mellitus    Hypertensive retinopathy of both eyes    NS (nuclear sclerosis)    Proteinuria       Past Surgical  History  Past Surgical History   Procedure Laterality Date     section       x 2    Rotator cuff repair      Renal biopsy      Bone marrow biopsy N/A     Tubal ligation      Kidney transplant         Family History  Family History   Problem Relation Age of Onset    Diabetes Mother     Hypertension Mother     Diabetes Father     Kidney disease Father     Diabetes Sister     Hypertension Sister     Diabetes Brother     Diabetes Sister     Stroke Maternal Grandmother     Heart disease Maternal Grandmother      pacemaker    Cataracts Maternal Grandmother     No Known Problems Maternal Aunt     No Known Problems Maternal Uncle     No Known Problems Paternal Aunt     No Known Problems Paternal Uncle     No Known Problems Maternal Grandfather     No Known Problems Paternal Grandmother     No Known Problems Paternal Grandfather     Cancer Neg Hx     Amblyopia Neg Hx     Blindness Neg Hx     Glaucoma Neg Hx     Macular degeneration Neg Hx     Retinal detachment Neg Hx     Strabismus Neg Hx     Thyroid disease Neg Hx     Breast cancer Neg Hx     Colon cancer Neg Hx     Ovarian cancer Neg Hx        Social History  Social History     Social History    Marital status:      Spouse name: N/A    Number of children: N/A    Years of education: N/A     Occupational History     Disabled     Social History Main Topics    Smoking status: Former Smoker     Packs/day: 1.00     Years: 20.00     Types: Cigarettes     Quit date: 2013    Smokeless tobacco: Never Used      Comment: quit smoking cigarettes in 2014    Alcohol use No    Drug use: No    Sexual activity: Yes     Partners: Male     Birth control/ protection: Post-menopausal     Other Topics Concern    Not on file     Social History Narrative        Nutrition manager by education    Disabled    2 children    No blood transfusions       Medications and Allergies  Reviewed and updated.       Physical  "Exam  Visit Vitals    /80 (BP Location: Right arm, Patient Position: Sitting, BP Method: Manual)    Pulse 94    Temp 98 °F (36.7 °C) (Oral)    Ht 5' 7" (1.702 m)    Wt 99.3 kg (218 lb 14.7 oz)    LMP 04/29/2011 (Approximate)    SpO2 98%    BMI 34.29 kg/m2     Physical Exam   Constitutional: She is oriented to person, place, and time. She appears well-developed and well-nourished.   HENT:   Head: Normocephalic and atraumatic.   Eyes: Conjunctivae and EOM are normal. Pupils are equal, round, and reactive to light.   Neck: Normal range of motion. Neck supple.   Cardiovascular: Normal rate, regular rhythm and normal heart sounds.  Exam reveals no gallop and no friction rub.    No murmur heard.  Pulmonary/Chest: Breath sounds normal. No respiratory distress.   Abdominal: Soft. Bowel sounds are normal. She exhibits no distension. There is no tenderness.   Musculoskeletal: Normal range of motion.   Lymphadenopathy:     She has no cervical adenopathy.   Neurological: She is alert and oriented to person, place, and time.   Skin: Skin is warm.   Psychiatric: She has a normal mood and affect.         Assessment/Plan:  Elizabeth Maldonado is a 52 y.o. female who presents today for :    Proliferative diabetic retinopathy of both eyes without macular edema associated with type 2 diabetes mellitus / Uncontrolled type 2 diabetes mellitus with stage 3 chronic kidney disease, with long-term current use of insulin  -     Lipid panel; Future; Expected date: 1/18/17  -     Hemoglobin A1c; Future; Expected date: 1/18/17  -     TSH; Future; Expected date: 1/18/17  Followed by endocrine   Discussed importance of diabetes control  The patient is asked to make an attempt to improve diet and exercise patterns to aid in medical management of this problem.    Benign hypertension with chronic kidney disease, stage III  The current medical regimen is effective;  continue present plan and medications.    Depression, unspecified " depression type  The current medical regimen is effective;  continue present plan and medications.    Kidney transplant status, living unrelated donor - 12/2/14 / Hyperparathyroidism, secondary renal / Chronic immunosuppression with Prograf  Followed by transplant team  The current medical regimen is effective;  continue present plan and medications.    Obesity (BMI 30-39.9)  The patient is asked to make an attempt to improve diet and exercise patterns to aid in medical management of this problem.  Diet and exercise planning discussed.  Decrease portion control and carbohydrate consumption.  Recommend 30 minutes of moderate intensity exercise 5 days/week.    Insomnia, unspecified type  -     zolpidem (AMBIEN) 5 MG Tab; Take 1 tablet (5 mg total) by mouth nightly as needed.  Dispense: 30 tablet; Refill: 1  Common side effects of this medication were discussed with the patient. Questions regarding medications were discussed during this visit.   The problem of recurrent insomnia is discussed. Avoidance of caffeine sources is strongly encouraged. Sleep hygiene issues are reviewed. The use of sedative hypnotics for temporary relief is appropriate; we discussed the addictive nature of these drugs      Return in about 3 months (around 4/18/2017).

## 2017-01-23 ENCOUNTER — LAB VISIT (OUTPATIENT)
Dept: LAB | Facility: HOSPITAL | Age: 52
End: 2017-01-23
Attending: INTERNAL MEDICINE
Payer: MEDICARE

## 2017-01-23 DIAGNOSIS — Z94.0 KIDNEY REPLACED BY TRANSPLANT: ICD-10-CM

## 2017-01-23 PROCEDURE — 82575 CREATININE CLEARANCE TEST: CPT

## 2017-01-23 PROCEDURE — 84156 ASSAY OF PROTEIN URINE: CPT

## 2017-01-24 DIAGNOSIS — R80.9 PROTEINURIA, UNSPECIFIED TYPE: Primary | ICD-10-CM

## 2017-01-25 NOTE — PROGRESS NOTES
This does not seem to be a 24 hour collection? Is this a spot UPC? Please arrange for 24 hour collection to quantify proteinuria.

## 2017-01-26 ENCOUNTER — LAB VISIT (OUTPATIENT)
Dept: LAB | Facility: HOSPITAL | Age: 52
End: 2017-01-26
Attending: OPHTHALMOLOGY
Payer: MEDICARE

## 2017-01-26 DIAGNOSIS — Z76.82 ORGAN TRANSPLANT CANDIDATE: ICD-10-CM

## 2017-01-26 DIAGNOSIS — R80.9 PROTEINURIA, UNSPECIFIED TYPE: ICD-10-CM

## 2017-01-26 LAB
CREAT CL/1.73 SQ M 12H UR+SERPL-ARVRAT: 45 ML/MIN
CREAT SERPL-MCNC: 1.7 MG/DL
CREAT SERPL-MCNC: 1.7 MG/DL
CREAT UR-MCNC: 47 MG/DL
CREATININE, URINE (MG/SPEC): 1092.8 MG/SPEC
EST. GFR  (AFRICAN AMERICAN): 38.9 ML/MIN/1.73 M^2
EST. GFR  (NON AFRICAN AMERICAN): 33.7 ML/MIN/1.73 M^2
PROT 24H UR-MRATE: 1628 MG/SPEC
PROT UR-MCNC: 70 MG/DL
URINE COLLECTION DURATION: 24 HR
URINE COLLECTION DURATION: 24 HR
URINE VOLUME: 2325 ML
URINE VOLUME: 2325 ML

## 2017-01-26 PROCEDURE — 82565 ASSAY OF CREATININE: CPT

## 2017-01-26 PROCEDURE — 36415 COLL VENOUS BLD VENIPUNCTURE: CPT | Mod: PO

## 2017-01-26 RX ORDER — TACROLIMUS 1 MG/1
CAPSULE ORAL
Qty: 150 CAPSULE | Refills: 11 | Status: SHIPPED | OUTPATIENT
Start: 2017-01-26 | End: 2018-02-01 | Stop reason: SDUPTHER

## 2017-01-30 ENCOUNTER — TELEPHONE (OUTPATIENT)
Dept: TRANSPLANT | Facility: CLINIC | Age: 52
End: 2017-01-30

## 2017-01-30 NOTE — TELEPHONE ENCOUNTER
----- Message from Sweta Catalan MD sent at 1/27/2017 11:05 AM CST -----  Available results reviewed and require no immediate action.

## 2017-01-30 NOTE — TELEPHONE ENCOUNTER
[facility] Plan Kidney Transplant Biopsy for proteinuria.  _______________  Patient repeated back and voice a understanding of orders.

## 2017-02-02 DIAGNOSIS — Z94.0 KIDNEY REPLACED BY TRANSPLANT: Primary | ICD-10-CM

## 2017-02-10 ENCOUNTER — HOSPITAL ENCOUNTER (EMERGENCY)
Facility: OTHER | Age: 52
Discharge: HOME | End: 2017-02-10
Attending: EMERGENCY MEDICINE
Payer: MEDICARE

## 2017-02-10 VITALS
DIASTOLIC BLOOD PRESSURE: 85 MMHG | TEMPERATURE: 98 F | OXYGEN SATURATION: 100 % | RESPIRATION RATE: 18 BRPM | WEIGHT: 212 LBS | HEIGHT: 67 IN | HEART RATE: 95 BPM | SYSTOLIC BLOOD PRESSURE: 141 MMHG | BODY MASS INDEX: 33.27 KG/M2

## 2017-02-10 DIAGNOSIS — M79.672 LEFT FOOT PAIN: ICD-10-CM

## 2017-02-10 PROCEDURE — 99283 EMERGENCY DEPT VISIT LOW MDM: CPT

## 2017-02-10 RX ORDER — HYDROCODONE BITARTRATE AND ACETAMINOPHEN 5; 325 MG/1; MG/1
1 TABLET ORAL EVERY 6 HOURS PRN
Qty: 12 TABLET | Refills: 0 | Status: SHIPPED | OUTPATIENT
Start: 2017-02-10 | End: 2018-04-16

## 2017-02-10 NOTE — ED NOTES
Back Pain: Patient complains of lumbar spine pain which is described as aching, burning and sharp.  Patient denies numbness/tingling which does not radiate to legs.   She reports that the pain has been present for a few days.  She reports stiffness in with movement . Pain was first noted after excersing.  Symptoms are relieved by nothing.  The back problem is not work related.  She denies fever, bladder or bowel incontinence, or weakness to the hips or legs.

## 2017-02-10 NOTE — ED PROVIDER NOTES
Encounter Date: 2/10/2017       History     Chief Complaint   Patient presents with    Back Pain     lower back pain and left foot pain      Review of patient's allergies indicates:   Allergen Reactions    Hydrocodone Itching    Iodine and iodide containing products Hives    Shrimp Itching    Oxycodone Itching    Topamax [topiramate] Other (See Comments)     Vision changes    Zoloft [sertraline] Palpitations     HPI Comments: 52-year-old female with a history of renal transplant reports she was exercising in a pool the day before yesterday and does not recall any unusual activity or injury, but reports she has lower back pain yesterday which was worse with movement.  No radiation the pain, no numbness tingling burning or other neurologic findings in the legs.  She reports also that today she woke with pain to the left mid foot, worse with bearing weight or palpation.    Patient is a 52 y.o. female presenting with the following complaint: foot injury and back pain. The history is provided by the patient.   Foot Injury    There was no injury mechanism. The incident occurred today. The pain is present in the left foot. The pain is at a severity of 6/10. The pain has been constant since onset. Pertinent negatives include no numbness, no loss of motion, no muscle weakness, no loss of sensation and no tingling. She reports no foreign bodies present. The symptoms are aggravated by activity, bearing weight and palpation.   Back Pain    This is a new problem. The current episode started yesterday. The problem has been rapidly improving. The pain is associated with no known injury. The pain is present in the lumbar spine. The quality of the pain is described as aching. The pain does not radiate. The pain is at a severity of 6/10. The symptoms are aggravated by twisting and bending. Pertinent negatives include no fever, no numbness, no tingling and no weakness.     Past Medical History   Diagnosis Date    Acidosis  2014    Allergic rhinitis 2014    Allergy     Anemia     Anemia in chronic kidney disease 2014    Anxiety     Chronic immunosuppression with Prograf and MMF 12/3/2014    CKD (chronic kidney disease) stage 5, GFR less than 15 ml/min 2014    CKD (chronic kidney disease), stage III 3/2/2015    Degenerative disc disease     Depression 2014    Diabetes mellitus, type 2 since age 18 2014    ESRD on peritoneal dialysis - 2014 for 9 hours no peritonitis 2014    Hyperlipidemia     Hypertension 2014    Hypomagnesemia 2015    Kidney transplant status, living unrelated donor - 12/2/14 12/3/2014    Neutropenia 2015    NS (nuclear sclerosis) 2016    Organ transplant candidate 2014    Pre-op exam 2014    Proliferative diabetic retinopathy of both eyes without macular edema associated with type 2 diabetes mellitus 2016    Tendinitis      Past Medical History Pertinent Negatives   Diagnosis Date Noted    Amblyopia 2016    Glaucoma 2016    Macular degeneration 2016    Retinal detachment 2016    Sickle cell anemia 2016    Sickle cell trait 2016    Strabismus 2016    Uveitis 2016     Past Surgical History   Procedure Laterality Date     section       x 2    Rotator cuff repair      Renal biopsy      Bone marrow biopsy N/A     Tubal ligation      Kidney transplant       Family History   Problem Relation Age of Onset    Diabetes Mother     Hypertension Mother     Diabetes Father     Kidney disease Father     Diabetes Sister     Hypertension Sister     Diabetes Brother     Diabetes Sister     Stroke Maternal Grandmother     Heart disease Maternal Grandmother      pacemaker    Cataracts Maternal Grandmother     No Known Problems Maternal Aunt     No Known Problems Maternal Uncle     No Known Problems Paternal Aunt     No Known Problems Paternal Uncle     No Known Problems  Maternal Grandfather     No Known Problems Paternal Grandmother     No Known Problems Paternal Grandfather     Cancer Neg Hx     Amblyopia Neg Hx     Blindness Neg Hx     Glaucoma Neg Hx     Macular degeneration Neg Hx     Retinal detachment Neg Hx     Strabismus Neg Hx     Thyroid disease Neg Hx     Breast cancer Neg Hx     Colon cancer Neg Hx     Ovarian cancer Neg Hx      Social History   Substance Use Topics    Smoking status: Former Smoker     Packs/day: 1.00     Years: 20.00     Types: Cigarettes     Quit date: 9/1/2013    Smokeless tobacco: Never Used      Comment: quit smoking cigarettes in September 2014    Alcohol use No     Review of Systems   Constitutional: Negative for chills and fever.   Musculoskeletal: Positive for arthralgias, back pain and gait problem. Negative for joint swelling.   Skin: Negative for color change and wound.   Neurological: Negative for tingling, weakness and numbness.       Physical Exam   Initial Vitals   BP Pulse Resp Temp SpO2   02/10/17 1233 02/10/17 1233 02/10/17 1233 02/10/17 1233 02/10/17 1233   141/85 95 18 97.9 °F (36.6 °C) 100 %     Physical Exam    Nursing note and vitals reviewed.  Constitutional: Vital signs are normal. She appears well-developed and well-nourished. She is easily aroused.   HENT:   Head: Normocephalic and atraumatic.   Neck: Neck supple. No spinous process tenderness present.   Cardiovascular: Normal rate, regular rhythm and normal heart sounds.   Pulses:       Dorsalis pedis pulses are 2+ on the right side, and 2+ on the left side.   No pitting edema bilateral lower extremities   Pulmonary/Chest: Effort normal and breath sounds normal.   Musculoskeletal:        Right foot: Normal.        Feet:    Neurological: She is alert, oriented to person, place, and time and easily aroused. She has normal strength. No sensory deficit.   No sensory deficits bilateral lower extremities.  Strength 5 out of 5 bilateral flexors and extensors of  ankles   Skin: Skin is warm, dry and intact. No abrasion and no bruising noted. No erythema.         ED Course   Procedures  Labs Reviewed - No data to display            X-ray of the left foot  - There is mild hallux valgus with soft tissue bunion formation.  Associated degenerative changes of the first MTP joint noted.  Other joint spaces appear intact.  No acute   fracture or dislocation.  Small plantar calcaneal spur noted.  No significant soft tissue swelling.  Signed by: Jeremias Martell MD  2017-02-10 16:57:24                     ED Course     Clinical Impression:   The encounter diagnosis was Left foot pain.    Disposition:   Disposition: Discharged  Condition: Stable       Zahira Randolph MD  02/10/17 1958

## 2017-02-10 NOTE — DISCHARGE INSTRUCTIONS
Norco 5/325 mg 1 by mouth every 6-8 hours as needed for pain.  Take Benadryl 25 mg 1 tablet over-the-counter by mouth before hand or take Claritin 10 mg daily to prevent itching.  Rest ice and elevate foot for the next 1-2 days.  Follow-up the primary care provider if not improving.

## 2017-02-10 NOTE — ED AVS SNAPSHOT
[facility] EMERGENCY DEPARTMENT  [address] [address]               Elizabeth Maldonado   2/10/2017  2:26 PM   ED    Description:  Female : 1965   Department:  [facility] Emergency Department           Your Care was Coordinated By:     Provider Role From To    Zahira Randolph MD Attending Provider 02/10/17 1622 --      Reason for Visit     Back Pain           Diagnoses this Visit        Comments    Left foot pain           ED Disposition     ED Disposition Condition Comment    Discharge             To Do List           Follow-up Information     Schedule an appointment as soon as possible for a visit with Alison Quinonez MD.    Specialty:  Family Medicine    Why:  For recheck if symptoms fail to improve or resolve    Contact information:    [address] [address]  [phone]         These Medications        Disp Refills Start End    hydrocodone-acetaminophen 5-325mg (NORCO) 5-325 mg per tablet 12 tablet 0 2/10/2017     Take 1 tablet by mouth every 6 (six) hours as needed for Pain. - Oral    Pharmacy: [facility] - [place], [place] - [address] Ph #: [phone]         [facility]     [facility] Nurse Care Line -  Assistance  Registered nurses in the [facility] provide clinical advisement, health education, appointment booking, and other advisory services.  Call for this free service at [phone].             Medications           Message regarding Medications     Verify the changes and/or additions to your medication regime listed below are the same as discussed with your clinician today.  If any of these changes or additions are incorrect, please notify your healthcare provider.        START taking these NEW medications        Refills    hydrocodone-acetaminophen 5-325mg (NORCO) 5-325 mg per tablet 0    Sig: Take 1 tablet by mouth every 6 (six) hours as needed for Pain.    Class: Print     "Route: Oral           Verify that the below list of medications is an accurate representation of the medications you are currently taking.  If none reported, the list may be blank. If incorrect, please contact your healthcare provider. Carry this list with you in case of emergency.           Current Medications     blood sugar diagnostic Strp Checks BG ac/hs    buPROPion (WELLBUTRIN) 75 MG tablet TK 1 T PO 2 TIMES D    ferrous sulfate 325 (65 FE) MG EC tablet Take 325 mg by mouth once daily.     hydrocodone-acetaminophen 5-325mg (NORCO) 5-325 mg per tablet Take 1 tablet by mouth every 6 (six) hours as needed for Pain.    INSULIN DEGLUDEC (TRESIBA FLEXTOUCH U-200 SUBQ) Inject 88 Units into the skin nightly.     insulin syringe-needle U-100 (INSULIN SYRINGE) 1/2 mL 30 x 5/16" Syrg Uses 4 daily    KETOCONAZOLE ORAL Take 200 mg by mouth.    lancets (ONE TOUCH DELICA LANCETS) 33 gauge Misc 1 lancet by Misc.(Non-Drug; Combo Route) route 4 (four) times daily before meals and nightly.    multivit-minerals-ferrous fum 9 mg iron/15 mL Liqd Take 1 tablet by mouth.    mycophenolate (CELLCEPT) 250 mg Cap Take 2 capsules (500 mg total) by mouth 2 (two) times daily.    NOVOLOG FLEXPEN 100 unit/mL InPn pen 23 Units 3 (three) times daily with meals.     SYMLINPEN 120 2,700 mcg/2.7 mL PnIj INJECT 60 MCG UNDER THE SKIN BEFORE MEALS FOR 4 WEEKS THEN INCREASE  MCG    SYRINGE & NEEDLE,INSULIN,1 ML (INSULIN SYRINGE-NEEDLE U-100) 1 mL 29 X 7/16" Syrg     tacrolimus (PROGRAF) 1 MG Cap 3mg in AM PO and 2mg in PM PO .Z94.0 Kidney transplant on 12/2/2014.    zolpidem (AMBIEN) 5 MG Tab Take 1 tablet (5 mg total) by mouth nightly as needed.           Clinical Reference Information           Your Vitals Were     BP Pulse Temp Resp Height Weight    141/85 (BP Location: Left arm, Patient Position: Sitting) 95 97.9 °F (36.6 °C) (Temporal) 18 5' 7" (1.702 m) 96.2 kg (212 lb)    Last Period SpO2 BMI          04/29/2011 (Approximate) 100% 33.2 " kg/m2        Allergies as of 2/10/2017        Reactions    Hydrocodone Itching    Iodine And Iodide Containing Products Hives    Shrimp Itching    Oxycodone Itching    Topamax [Topiramate] Other (See Comments)    Vision changes    Zoloft [Sertraline] Palpitations      Immunizations Administered on Date of Encounter - 2/10/2017     None      ED Micro, Lab, POCT     None      ED Imaging Orders     Start Ordered       Status Ordering Provider    02/10/17 1632 02/10/17 1631  X-Ray Foot Complete Left  1 time imaging      Final result         Discharge Instructions       Norco 5/325 mg 1 by mouth every 6-8 hours as needed for pain.  Take Benadryl 25 mg 1 tablet over-the-counter by mouth before hand or take Claritin 10 mg daily to prevent itching.  Rest ice and elevate foot for the next 1-2 days.  Follow-up the primary care provider if not improving.      Your Scheduled Appointments     Feb 14, 2017  2:20 PM CST   Post OP with MD Roger Salgado [facility] - Ophthalmology ([facility] )    [address]  [place] [address] [phone]            Feb 16, 2017  2:45 PM CST   Us Vascu Extremity with [facility] US 11 ALL [facility] ([facility] )    [address] [facility] [address] [phone]              Smoking Cessation     If you would like to quit smoking:   You may be eligible for free services if you are a [place] resident and started smoking cigarettes before September 1, 1988.  Call the Smoking Cessation Trust (SCT) toll free at (986) 626-4974 or (897) 192-5747.   Call 9-128-QUIT-NOW if you do not meet the above criteria.             [facility] Emergency Department complies with applicable Federal civil rights laws and does not discriminate on the basis of race, color, national origin, age, disability, or sex.        Language Assistance Services     ATTENTION: Language assistance services are available, free of charge. Please call [phone].       ATENCIÓN: Si habla español, tiene a cho disposición servicios gratuitos de asistencia lingüística. Llame al [phone].     CHÚ Ý: N?u b?n nói Ti?ng Vi?t, có các d?ch v? h? tr? ngôn ng? mi?n phí dành cho b?n. G?i s? [phone].

## 2017-02-10 NOTE — ED NOTES
Joint/Muscle Pain: Patient complains of arthralgias for which has been present for a few days. Pain is located in foot, is described as aching and shooting, and is constant .  Associated symptoms include: tenderness.  The patient has has had no relief from cold, heat and rest.  Related to injury:   no.

## 2017-02-14 ENCOUNTER — OFFICE VISIT (OUTPATIENT)
Dept: OPHTHALMOLOGY | Facility: CLINIC | Age: 52
End: 2017-02-14
Payer: MEDICARE

## 2017-02-14 ENCOUNTER — TELEPHONE (OUTPATIENT)
Dept: OPHTHALMOLOGY | Facility: CLINIC | Age: 52
End: 2017-02-14

## 2017-02-14 VITALS — DIASTOLIC BLOOD PRESSURE: 84 MMHG | HEART RATE: 107 BPM | SYSTOLIC BLOOD PRESSURE: 143 MMHG

## 2017-02-14 DIAGNOSIS — E11.3593 PROLIFERATIVE DIABETIC RETINOPATHY OF BOTH EYES WITHOUT MACULAR EDEMA ASSOCIATED WITH TYPE 2 DIABETES MELLITUS: Primary | Chronic | ICD-10-CM

## 2017-02-14 PROCEDURE — 99499 UNLISTED E&M SERVICE: CPT | Mod: S$GLB,,, | Performed by: OPHTHALMOLOGY

## 2017-02-14 PROCEDURE — 67228 TREATMENT X10SV RETINOPATHY: CPT | Mod: RT,S$GLB,, | Performed by: OPHTHALMOLOGY

## 2017-02-14 PROCEDURE — 99999 PR PBB SHADOW E&M-EST. PATIENT-LVL II: CPT | Mod: PBBFAC,,, | Performed by: OPHTHALMOLOGY

## 2017-02-14 RX ORDER — FLUTICASONE PROPIONATE 50 MCG
1 SPRAY, SUSPENSION (ML) NASAL
COMMUNITY
Start: 2014-05-20 | End: 2018-04-16

## 2017-02-14 NOTE — PROGRESS NOTES
Prior OCT - No ME OU  NV gabriela on ONH OS      FA:  Shows significant nonperfusion and NV OU    A/P    1. PDR OU  W/o ME, T2    S/p PRP OS   Complete PRP OD today      2. HTN Ret OU    BS/BP/chol control    3. NS OU    RTC 6 weeks OCT    Risk, benefits, and alternatives of the procedure were discussed in detail with the patient. The patient voiced understanding and wished to proceed with the procedure.    Laser Procedure Note  Dx: PDR OD  Laser: PRP OD  Argon  Spot: 200  Power: 150-180  Dur: 100ms  #:  [id number]  Complications: None  F/U as above

## 2017-02-16 ENCOUNTER — HOSPITAL ENCOUNTER (OUTPATIENT)
Dept: RADIOLOGY | Facility: HOSPITAL | Age: 52
Discharge: HOME | End: 2017-02-16
Attending: INTERNAL MEDICINE
Payer: MEDICARE

## 2017-02-16 DIAGNOSIS — Z94.0 KIDNEY REPLACED BY TRANSPLANT: ICD-10-CM

## 2017-02-16 PROCEDURE — 76776 US EXAM K TRANSPL W/DOPPLER: CPT | Mod: TC

## 2017-02-16 PROCEDURE — 76776 US EXAM K TRANSPL W/DOPPLER: CPT | Mod: 26,,, | Performed by: RADIOLOGY

## 2017-02-16 NOTE — PROGRESS NOTES
Patient Elizabeth Maldonado, MRN [id number], was dependent on dialysis (ICD10 Z99.2) at the time of this visit on 2/14/17. This addendum is made to the medical record on 02/16/2017.

## 2017-02-17 DIAGNOSIS — Z94.0 KIDNEY REPLACED BY TRANSPLANT: Primary | ICD-10-CM

## 2017-02-20 DIAGNOSIS — N18.30 CHRONIC KIDNEY DISEASE, STAGE III (MODERATE): Primary | ICD-10-CM

## 2017-02-22 ENCOUNTER — SURGERY (OUTPATIENT)
Age: 52
End: 2017-02-22

## 2017-02-22 ENCOUNTER — HOSPITAL ENCOUNTER (OUTPATIENT)
Facility: HOSPITAL | Age: 52
Discharge: HOME | End: 2017-02-22
Attending: INTERNAL MEDICINE | Admitting: INTERNAL MEDICINE
Payer: MEDICARE

## 2017-02-22 VITALS
SYSTOLIC BLOOD PRESSURE: 142 MMHG | OXYGEN SATURATION: 99 % | HEART RATE: 85 BPM | WEIGHT: 216 LBS | HEIGHT: 67 IN | DIASTOLIC BLOOD PRESSURE: 74 MMHG | RESPIRATION RATE: 18 BRPM | TEMPERATURE: 99 F | BODY MASS INDEX: 33.9 KG/M2

## 2017-02-22 DIAGNOSIS — Z94.0 KIDNEY REPLACED BY TRANSPLANT: ICD-10-CM

## 2017-02-22 DIAGNOSIS — T86.10 COMPLICATION OF KIDNEY TRANSPLANT: ICD-10-CM

## 2017-02-22 LAB
POCT GLUCOSE: 125 MG/DL (ref 70–110)
POCT GLUCOSE: 194 MG/DL (ref 70–110)

## 2017-02-22 PROCEDURE — 82962 GLUCOSE BLOOD TEST: CPT | Mod: 91 | Performed by: INTERNAL MEDICINE

## 2017-02-22 PROCEDURE — 63600175 PHARM REV CODE 636 W HCPCS: Performed by: STUDENT IN AN ORGANIZED HEALTH CARE EDUCATION/TRAINING PROGRAM

## 2017-02-22 PROCEDURE — 25000003 PHARM REV CODE 250: Performed by: STUDENT IN AN ORGANIZED HEALTH CARE EDUCATION/TRAINING PROGRAM

## 2017-02-22 PROCEDURE — 82962 GLUCOSE BLOOD TEST: CPT | Performed by: INTERNAL MEDICINE

## 2017-02-22 RX ADMIN — DESMOPRESSIN ACETATE 14.7 MCG: 4 INJECTION INTRAVENOUS at 01:02

## 2017-02-22 NOTE — IP AVS SNAPSHOT
[facility]  [address]  [facility] [address]  Phone: [phone]           Patient Discharge Instructions     Our goal is to set you up for success. This packet includes information on your condition, medications, and your home care. It will help you to care for yourself so you don't get sicker and need to go back to the hospital.     Please ask your nurse if you have any questions.        There are many details to remember when preparing to leave the hospital. Here is what you will need to do:    1. Take your medicine. If you are prescribed medications, review your Medication List in the following pages. You may have new medications to  at the pharmacy and others that you'll need to stop taking. Review the instructions for how and when to take your medications. Talk with your doctor or nurses if you are unsure of what to do.     2. Go to your follow-up appointments. Specific follow-up information is listed in the following pages. Your may be contacted by a transition nurse or clinical provider about future appointments. Be sure we have all of the phone numbers to reach you, if needed. Please contact your provider's office if you are unable to make an appointment.     3. Watch for warning signs. Your doctor or nurse will give you detailed warning signs to watch for and when to call for assistance. These instructions may also include educational information about your condition. If you experience any of warning signs to your health, call your doctor.               [facility]  Unless otherwise directed by your provider, please contact [facility], our nurse care line that is available for 24/7 assistance.     [phone] (toll-free)    Registered nurses in the [facility] provide clinical advisement, health education, appointment booking, and other advisory services.                    ** Verify the list of medication(s) below is accurate and up  "to date. Carry this with you in case of emergency. If your medications have changed, please notify your healthcare provider.             Medication List      ASK your doctor about these medications        Additional Info                      blood sugar diagnostic Strp   Quantity:  200 strip   Refills:  12    Instructions:  Checks BG ac/hs     Begin Date    AM    Noon    PM    Bedtime       buPROPion 75 MG tablet   Commonly known as:  WELLBUTRIN   Refills:  11    Instructions:  TK 1 T PO 2 TIMES D     Begin Date    AM    Noon    PM    Bedtime       ferrous sulfate 325 (65 FE) MG EC tablet   Refills:  0   Dose:  325 mg    Instructions:  Take 325 mg by mouth once daily.     Begin Date    AM    Noon    PM    Bedtime       fluticasone 50 mcg/actuation nasal spray   Commonly known as:  FLONASE   Refills:  0   Dose:  1 spray    Instructions:  1 spray by Nasal route.     Begin Date    AM    Noon    PM    Bedtime       hydrocodone-acetaminophen 5-325mg 5-325 mg per tablet   Commonly known as:  NORCO   Quantity:  12 tablet   Refills:  0   Dose:  1 tablet    Instructions:  Take 1 tablet by mouth every 6 (six) hours as needed for Pain.     Begin Date    AM    Noon    PM    Bedtime       insulin syringe-needle U-100 1 mL 29 gauge x 7/16" Syrg   Refills:  11      Begin Date    AM    Noon    PM    Bedtime       insulin syringe-needle U-100 1/2 mL 30 gauge x 5/16 Syrg   Commonly known as:  INSULIN SYRINGE   Quantity:  200 each   Refills:  12    Instructions:  Uses 4 daily     Begin Date    AM    Noon    PM    Bedtime       KETOCONAZOLE ORAL   Refills:  0   Dose:  200 mg    Instructions:  Take 200 mg by mouth.     Begin Date    AM    Noon    PM    Bedtime       lancets 33 gauge Misc   Commonly known as:  ONETOUCH DELICA LANCETS   Quantity:  200 each   Refills:  12   Dose:  1 lancet    Instructions:  1 lancet by Misc.(Non-Drug; Combo Route) route 4 (four) times daily before meals and nightly.     Begin Date    AM    Noon    PM    " Bedtime       multivit-minerals-ferrous fum 9 mg iron/15 mL Liqd   Refills:  0   Dose:  1 tablet    Instructions:  Take 1 tablet by mouth.     Begin Date    AM    Noon    PM    Bedtime       mycophenolate 250 mg Cap   Commonly known as:  CELLCEPT   Quantity:  120 capsule   Refills:  11   Dose:  500 mg   Comments:  Kidney Transplant Z 94.0    Instructions:  Take 2 capsules (500 mg total) by mouth 2 (two) times daily.     Begin Date    AM    Noon    PM    Bedtime       NOVOLOG FLEXPEN 100 unit/mL Inpn pen   Refills:  3   Dose:  23 Units   Generic drug:  insulin aspart    Instructions:  23 Units 3 (three) times daily with meals.     Begin Date    AM    Noon    PM    Bedtime       SYMLINPEN 120 2,700 mcg/2.7 mL Pnij   Refills:  5   Generic drug:  pramlintide    Instructions:  INJECT 60 MCG UNDER THE SKIN BEFORE MEALS FOR 4 WEEKS THEN INCREASE  MCG     Begin Date    AM    Noon    PM    Bedtime       tacrolimus 1 MG Cap   Commonly known as:  PROGRAF   Quantity:  150 capsule   Refills:  11   Comments:  DOSE adjustment, please don't fill until requested by patient.    Instructions:  3mg in AM PO and 2mg in PM PO .Z94.0 Kidney transplant on 12/2/2014.     Begin Date    AM    Noon    PM    Bedtime       TRESIBA FLEXTOUCH U-200 SUBQ   Refills:  0   Dose:  88 Units    Instructions:  Inject 88 Units into the skin nightly.     Begin Date    AM    Noon    PM    Bedtime       zolpidem 5 MG Tab   Commonly known as:  AMBIEN   Quantity:  30 tablet   Refills:  1   Dose:  5 mg    Instructions:  Take 1 tablet (5 mg total) by mouth nightly as needed.     Begin Date    AM    Noon    PM    Bedtime                  Please bring to all follow up appointments:    1. A copy of your discharge instructions.  2. All medicines you are currently taking in their original bottles.  3. Identification and insurance card.    Please arrive 15 minutes ahead of scheduled appointment time.    Please call 24 hours in advance if you must reschedule your  appointment and/or time.        Your Scheduled Appointments     Mar 28, 2017  2:00 PM CDT   Post OP with MD Roger Salgado [facility] - Ophthalmology ([facility] )    [address]  [place] [address]   [phone]            Jun 02, 2017 10:15 AM CDT   Non-Fasting Lab with LAB, [place] [facility]    [address]  [place] [address]   [phone]            Jun 02, 2017 10:30 AM CDT   Urine with SPECIMEN, [place] [facility]    [address]  [place] [address]   [phone]            Jun 05, 2017  4:20 PM CDT   Established Patient Visit with MD Roger Porras - Nephrology ([facility] )    [address]  [place] [address]   [phone]                  Discharge Instructions         Discharge Instructions for Kidney Biopsy  You had a procedure called a kidney biopsy. Your doctor used a special needle to remove a small piece of tissue from your kidney to examine it for signs of damage and disease. A kidney biopsy is ordered after other tests have shown that there may be a problem with your kidney. Kidney biopsies are also performed when kidney disease is suspected and to rule out cancer.  Home care  · Rest for 24 hours to 48 hours. Get up only to use the bathroom.  · Dont drive for 24 hours to 48 hours after the procedure.  · Dont shower for 24 hours after the biopsy. If you wish, you may wash yourself with a sponge or washcloth. When you are able to shower, dont scrub the site. Gently wash the area and pat it dry.  · Remove the bandage covering the biopsy site 24 hours to 48 hours after the procedure.  · Dont lift anything heavier than 10 pounds for 3 days to 4 days after the procedure.  · Ask your doctor when you can return to work. Be sure to tell your doctor if your job involves heavy lifting.  · If you normally take blood thinner medications (anticoagulants or antiplatelet  "medications) and you stopped taking them a few days before your procedure, ask your doctor when to start taking them again.  When to seek medical care  Call your doctor right away if you have any of the following:  · Blood in your urine  · Exhaustion or extreme weakness  · Dizziness or lightheadedness  · Sudden or increased shortness of breath  · Sudden chest pain  · Fever of 100.4°F (38°C) or higher, or chills  · Increasing redness, tenderness, or swelling at the biopsy site  · Opening of or drainage or bleeding from the biopsy site  · Increasing pain, with or without activity   Date Last Reviewed: 1/6/2015  © 1212-7004 [employer]. [address] [place] [address]. All rights reserved. This information is not intended as a substitute for professional medical care. Always follow your healthcare professional's instructions.            Admission Information     Date & Time Provider Department CSN    2/22/2017 10:39 AM Sweta Catalan MD [facility] [id number]      Care Providers     Provider Role Specialty Primary office phone    Sweta Catalan MD Attending Provider Transplant [phone]    Sweta Catalan MD Surgeon  Transplant [phone]      Your Vitals Were     BP Pulse Temp Resp Height Weight    131/67 (BP Location: Right arm, BP Method: Automatic) 80 98.6 °F (37 °C) (Temporal) 18 5' 7" (1.702 m) 98 kg (216 lb)    Last Period SpO2 BMI          04/29/2011 (Approximate) 98% 33.83 kg/m2        Recent Lab Values        6/17/2004 8/30/2004 12/16/2004 3/29/2005 7/1/2014 12/2/2014 11/10/2015        12:21 PM  7:23 AM  7:15 AM  7:19 AM  7:55 AM 10:44 AM  8:44 AM     A1C 9.1 (H) 7.9 (H) 7.3 (H) 10.3 (H) 8.3 (H) 8.7 (H) 8.4 (H)           Allergies as of 2/22/2017        Reactions    Hydrocodone Itching    Iodine And Iodide Containing Products Hives    Shrimp Itching    Oxycodone Itching    Topamax [Topiramate] Other (See Comments)    Vision changes    Zoloft [Sertraline] " Palpitations      Advance Directives     An advance directive is a document which, in the event you are no longer able to make decisions for yourself, tells your healthcare team what kind of treatment you do or do not want to receive, or who you would like to make those decisions for you.  If you do not currently have an advance directive, [facility] encourages you to create one.  For more information call:  [phone] ([phone]), [phone] ([phone]),  or log on to [email or web address].        Smoking Cessation     If you would like to quit smoking:   You may be eligible for free services if you are a [place] resident and started smoking cigarettes before September 1, 1988.  Call the Smoking Cessation Trust (SCT) toll free at (199) 119-8267 or (677) 211-9507.   Call 6-678-QUIT-NOW if you do not meet the above criteria.            Language Assistance Services     ATTENTION: Language assistance services are available, free of charge. Please call [phone].      ATENCIÓN: Si habla español, tiene a cho disposición servicios gratuitos de asistencia lingüística. Llame al [phone].     CHÚ Ý: N?u b?n nói Ti?ng Vi?t, có các d?ch v? h? tr? ngôn ng? mi?n phí dành cho b?n. G?i s? [phone].        Chronic Kindey Disease Education             Diabetes Discharge Instructions                                    [facility] complies with applicable Federal civil rights laws and does not discriminate on the basis of race, color, national origin, age, disability, or sex.

## 2017-02-22 NOTE — PROGRESS NOTES
Spoke with Trina in [place]. She states patient can leave piercing's in place. She also states patient can have a small sip of water at this time.    Trina also notified that patient ate breakfast at 0630 this morning

## 2017-02-22 NOTE — H&P
Radiology History & Physical      SUBJECTIVE:     Chief Complaint: elevated Cr    History of Present Illness:  Elizabeth Maldonado is a 52 y.o. female with history of kidney transplant in   who presents for US guided kidney transplant biopsy for elevated Cr.  Past Medical History   Diagnosis Date    Acidosis 2014    Allergic rhinitis 2014    Allergy     Anemia     Anemia in chronic kidney disease 2014    Anxiety     Chronic immunosuppression with Prograf and MMF 12/3/2014    CKD (chronic kidney disease) stage 5, GFR less than 15 ml/min 2014    CKD (chronic kidney disease), stage III 3/2/2015    Degenerative disc disease     Depression 2014    Diabetes mellitus, type 2 since age 18 2014    ESRD on peritoneal dialysis - 2014 for 9 hours no peritonitis 2014    Hyperlipidemia     Hypertension 2014    Hypomagnesemia 2015    Kidney transplant status, living unrelated donor - 12/2/14 12/3/2014    Neutropenia 2015    NS (nuclear sclerosis) 2016    Organ transplant candidate 2014    Pre-op exam 2014    Proliferative diabetic retinopathy of both eyes without macular edema associated with type 2 diabetes mellitus 2016    Tendinitis      Past Surgical History   Procedure Laterality Date     section       x 2    Rotator cuff repair      Renal biopsy      Bone marrow biopsy N/A     Tubal ligation      Kidney transplant         Home Meds:   Prior to Admission medications    Medication Sig Start Date End Date Taking? Authorizing Provider   blood sugar diagnostic Strp Checks BG ac/hs 1/7/15  Yes Salima Acosta, DNP, NP   buPROPion (WELLBUTRIN) 75 MG tablet TK 1 T PO 2 TIMES D 16  Yes Historical Provider, MD   hydrocodone-acetaminophen 5-325mg (NORCO) 5-325 mg per tablet Take 1 tablet by mouth every 6 (six) hours as needed for Pain. 2/10/17  Yes Zahira Randolph MD   INSULIN DEGLUDEC (TRESIBA FLEXTOUCH U-200 SUBQ)  "Inject 88 Units into the skin nightly.    Yes Historical Provider, MD   insulin syringe-needle U-100 (INSULIN SYRINGE) 1/2 mL 30 x 5/16" Syrg Uses 4 daily 1/6/15  Yes Salima Acosta DNP, NP   lancets (ONE TOUCH DELICA LANCETS) 33 gauge Misc 1 lancet by Misc.(Non-Drug; Combo Route) route 4 (four) times daily before meals and nightly. 1/7/15  Yes Salima Acosta DNP, NP   multivit-minerals-ferrous fum 9 mg iron/15 mL Liqd Take 1 tablet by mouth.   Yes Historical Provider, MD   mycophenolate (CELLCEPT) 250 mg Cap Take 2 capsules (500 mg total) by mouth 2 (two) times daily. 10/17/16 10/17/17 Yes Kalpana Grider MD   NOVOLOG FLEXPEN 100 unit/mL InPn pen 23 Units 3 (three) times daily with meals.  12/9/15  Yes Historical Provider, MD   SYMLINPEN 120 2,700 mcg/2.7 mL PnIj INJECT 60 MCG UNDER THE SKIN BEFORE MEALS FOR 4 WEEKS THEN INCREASE  MCG 8/26/16  Yes Historical Provider, MD   SYRINGE & NEEDLE,INSULIN,1 ML (INSULIN SYRINGE-NEEDLE U-100) 1 mL 29 X 7/16" Syrg  3/25/15  Yes Historical Provider, MD   tacrolimus (PROGRAF) 1 MG Cap 3mg in AM PO and 2mg in PM PO .Z94.0 Kidney transplant on 12/2/2014. 1/26/17  Yes Sweta Catalan MD   ferrous sulfate 325 (65 FE) MG EC tablet Take 325 mg by mouth once daily.     Historical Provider, MD   fluticasone (FLONASE) 50 mcg/actuation nasal spray 1 spray by Nasal route. 5/20/14   Historical Provider, MD   KETOCONAZOLE ORAL Take 200 mg by mouth.    Historical Provider, MD   zolpidem (AMBIEN) 5 MG Tab Take 1 tablet (5 mg total) by mouth nightly as needed. 1/18/17 7/19/17  Alison Quinonez MD     Anticoagulants/Antiplatelets: no anticoagulation    Allergies:   Review of patient's allergies indicates:   Allergen Reactions    Hydrocodone Itching    Iodine and iodide containing products Hives    Shrimp Itching    Oxycodone Itching    Topamax [topiramate] Other (See Comments)     Vision changes    Zoloft [sertraline] Palpitations     Sedation History:  no adverse " reactions    Review of Systems:   Hematological: no known coagulopathies  Respiratory: no shortness of breath  Cardiovascular: no chest pain  Gastrointestinal: no abdominal pain  Genito-Urinary: no dysuria  Musculoskeletal: negative  Neurological: no TIA or stroke symptoms         OBJECTIVE:     Vital Signs (Most Recent)  Temp: 97.6 °F (36.4 °C) (02/22/17 1121)  Pulse: 96 (02/22/17 1121)  Resp: 18 (02/22/17 1121)  BP: 138/76 (02/22/17 1121)  SpO2: 99 % (02/22/17 1121)    Physical Exam:  ASA: 2  Mallampati: n/a    General: no acute distress  Mental Status: alert and oriented to person, place and time  HEENT: normocephalic, atraumatic  Chest: unlabored breathing  Heart: regular heart rate  Abdomen: nondistended  Extremity: moves all extremities    Laboratory  Lab Results   Component Value Date    INR 0.9 02/22/2017       Lab Results   Component Value Date    WBC 4.93 02/22/2017    HGB 11.7 (L) 02/22/2017    HCT 36.6 (L) 02/22/2017    MCV 87 02/22/2017     02/22/2017      Lab Results   Component Value Date     (H) 02/22/2017     02/22/2017    K 4.5 02/22/2017     02/22/2017    CO2 24 02/22/2017    BUN 23 (H) 02/22/2017    CREATININE 1.9 (H) 02/22/2017    CALCIUM 9.8 02/22/2017    MG 1.9 02/22/2017    ALT 22 01/10/2017    AST 20 01/10/2017    ALBUMIN 3.3 (L) 02/22/2017    BILITOT 0.3 01/10/2017    BILIDIR 0.1 01/10/2017       ASSESSMENT/PLAN:     Sedation Plan: local only   Patient will undergo US guided biopsy of the transplanted kidney.    Kevin Krishnan  Radiology PGY-3

## 2017-02-22 NOTE — PROCEDURES
Radiology Post-Procedure Note    Pre Op Diagnosis: Renal dysfunction    Post Op Diagnosis: Same    Procedure: Ultrasound guided transplant renal biopsy    Procedure performed by: Elliot Miller MD      Written Informed Consent Obtained: Yes    Specimen Removed: YES 3 cores    Estimated Blood Loss: Minimal    Findings: local anesthesia was used.    A 16-gauge Monopty biopsy device was used to remove 3 specimens from the transplanted kidney under ultrasound guidance.  Tissue was evaluated for adequacy and sent to pathology for further analysis.      The patient tolerated the procedure well and there were no complications.  Please see Imaging report for further details.    Kevin Krishnan  Radiology PGY-3

## 2017-02-22 NOTE — DISCHARGE INSTRUCTIONS
Discharge Instructions for Kidney Biopsy  You had a procedure called a kidney biopsy. Your doctor used a special needle to remove a small piece of tissue from your kidney to examine it for signs of damage and disease. A kidney biopsy is ordered after other tests have shown that there may be a problem with your kidney. Kidney biopsies are also performed when kidney disease is suspected and to rule out cancer.  Home care  · Rest for 24 hours to 48 hours. Get up only to use the bathroom.  · Dont drive for 24 hours to 48 hours after the procedure.  · Dont shower for 24 hours after the biopsy. If you wish, you may wash yourself with a sponge or washcloth. When you are able to shower, dont scrub the site. Gently wash the area and pat it dry.  · Remove the bandage covering the biopsy site 24 hours to 48 hours after the procedure.  · Dont lift anything heavier than 10 pounds for 3 days to 4 days after the procedure.  · Ask your doctor when you can return to work. Be sure to tell your doctor if your job involves heavy lifting.  · If you normally take blood thinner medications (anticoagulants or antiplatelet medications) and you stopped taking them a few days before your procedure, ask your doctor when to start taking them again.  When to seek medical care  Call your doctor right away if you have any of the following:  · Blood in your urine  · Exhaustion or extreme weakness  · Dizziness or lightheadedness  · Sudden or increased shortness of breath  · Sudden chest pain  · Fever of 100.4°F (38°C) or higher, or chills  · Increasing redness, tenderness, or swelling at the biopsy site  · Opening of or drainage or bleeding from the biopsy site  · Increasing pain, with or without activity   Date Last Reviewed: 1/6/2015  © 6225-5122 [employer]. [address] [place] [address], [place] [address]. All rights reserved. This information is not intended as a substitute for professional medical care. Always follow your  healthcare professional's instructions.

## 2017-02-22 NOTE — DISCHARGE SUMMARY
Radiology Discharge Summary      Hospital Course: No complications    Admit Date: 2/22/2017  Discharge Date: 02/22/2017     Instructions Given to Patient: Yes  Diet: Resume prior diet  Activity: activity as tolerated    Description of Condition on Discharge: Stable  Vital Signs (Most Recent): Temp: 98.5 °F (36.9 °C) (02/22/17 1630)  Pulse: 85 (02/22/17 1630)  Resp: 18 (02/22/17 1630)  BP: (!) 142/74 (02/22/17 1630)  SpO2: 99 % (02/22/17 1630)    Discharge Disposition: Home    Discharge Diagnosis: s/p kidney transplant biopsy    Kevin Krishnan  Radiology PGY-3

## 2017-06-02 ENCOUNTER — LAB VISIT (OUTPATIENT)
Dept: LAB | Facility: HOSPITAL | Age: 52
End: 2017-06-02
Attending: INTERNAL MEDICINE
Payer: MEDICARE

## 2017-06-02 DIAGNOSIS — N18.30 CHRONIC KIDNEY DISEASE, STAGE III (MODERATE): ICD-10-CM

## 2017-06-02 LAB
ALBUMIN SERPL BCP-MCNC: 3.2 G/DL
ANION GAP SERPL CALC-SCNC: 9 MMOL/L
BUN SERPL-MCNC: 25 MG/DL
CALCIUM SERPL-MCNC: 9.6 MG/DL
CHLORIDE SERPL-SCNC: 105 MMOL/L
CO2 SERPL-SCNC: 22 MMOL/L
CREAT SERPL-MCNC: 2 MG/DL
EST. GFR  (AFRICAN AMERICAN): 31.9 ML/MIN/1.73 M^2
EST. GFR  (NON AFRICAN AMERICAN): 27.7 ML/MIN/1.73 M^2
GLUCOSE SERPL-MCNC: 224 MG/DL
PHOSPHATE SERPL-MCNC: 3.1 MG/DL
POTASSIUM SERPL-SCNC: 4.9 MMOL/L
SODIUM SERPL-SCNC: 136 MMOL/L

## 2017-06-02 PROCEDURE — 80069 RENAL FUNCTION PANEL: CPT

## 2017-06-02 PROCEDURE — 36415 COLL VENOUS BLD VENIPUNCTURE: CPT | Mod: PO

## 2017-07-26 ENCOUNTER — TELEPHONE (OUTPATIENT)
Dept: TRANSPLANT | Facility: CLINIC | Age: 52
End: 2017-07-26

## 2017-07-26 DIAGNOSIS — Z94.0 STATUS POST KIDNEY TRANSPLANT: ICD-10-CM

## 2017-07-26 DIAGNOSIS — Z48.298 AFTERCARE FOLLOWING ORGAN TRANSPLANT: ICD-10-CM

## 2017-07-26 DIAGNOSIS — R80.9 PROTEINURIA, UNSPECIFIED TYPE: ICD-10-CM

## 2017-07-26 DIAGNOSIS — T86.10 COMPLICATION OF TRANSPLANTED KIDNEY, UNSPECIFIED COMPLICATION: ICD-10-CM

## 2017-07-26 DIAGNOSIS — N18.30 CHRONIC KIDNEY DISEASE, STAGE III (MODERATE): ICD-10-CM

## 2017-07-26 DIAGNOSIS — Z79.899 ENCOUNTER FOR LONG-TERM (CURRENT) USE OF OTHER MEDICATIONS: ICD-10-CM

## 2017-07-26 DIAGNOSIS — N25.81 HYPERPARATHYROIDISM DUE TO RENAL INSUFFICIENCY: ICD-10-CM

## 2017-07-26 DIAGNOSIS — E55.9 VITAMIN D DEFICIENCY: ICD-10-CM

## 2017-07-26 DIAGNOSIS — E83.30 DISORDER OF PHOSPHORUS METABOLISM: ICD-10-CM

## 2017-07-26 NOTE — TELEPHONE ENCOUNTER
Pt is overdue for her June Visit. Orders placed and appointment made for pt to FU in transplant clinic.

## 2017-08-07 ENCOUNTER — OFFICE VISIT (OUTPATIENT)
Dept: NEPHROLOGY | Facility: CLINIC | Age: 52
End: 2017-08-07
Payer: MEDICARE

## 2017-08-07 VITALS
OXYGEN SATURATION: 97 % | HEIGHT: 67 IN | DIASTOLIC BLOOD PRESSURE: 70 MMHG | SYSTOLIC BLOOD PRESSURE: 136 MMHG | WEIGHT: 213.75 LBS | BODY MASS INDEX: 33.55 KG/M2 | HEART RATE: 91 BPM

## 2017-08-07 DIAGNOSIS — Z94.0 KIDNEY TRANSPLANT RECIPIENT: Primary | ICD-10-CM

## 2017-08-07 DIAGNOSIS — N18.30 CHRONIC KIDNEY DISEASE (CKD), STAGE III (MODERATE): ICD-10-CM

## 2017-08-07 DIAGNOSIS — N18.30 ANEMIA OF CHRONIC RENAL FAILURE, STAGE 3 (MODERATE): ICD-10-CM

## 2017-08-07 DIAGNOSIS — E11.21 DIABETIC NEPHROPATHY ASSOCIATED WITH TYPE 2 DIABETES MELLITUS: ICD-10-CM

## 2017-08-07 DIAGNOSIS — D63.1 ANEMIA OF CHRONIC RENAL FAILURE, STAGE 3 (MODERATE): ICD-10-CM

## 2017-08-07 PROCEDURE — 99999 PR PBB SHADOW E&M-EST. PATIENT-LVL III: CPT | Mod: PBBFAC,,, | Performed by: INTERNAL MEDICINE

## 2017-08-07 PROCEDURE — 3066F NEPHROPATHY DOC TX: CPT | Mod: S$GLB,,, | Performed by: INTERNAL MEDICINE

## 2017-08-07 PROCEDURE — 99213 OFFICE O/P EST LOW 20 MIN: CPT | Mod: S$GLB,,, | Performed by: INTERNAL MEDICINE

## 2017-08-07 PROCEDURE — 3075F SYST BP GE 130 - 139MM HG: CPT | Mod: S$GLB,,, | Performed by: INTERNAL MEDICINE

## 2017-08-07 PROCEDURE — 3078F DIAST BP <80 MM HG: CPT | Mod: S$GLB,,, | Performed by: INTERNAL MEDICINE

## 2017-08-07 PROCEDURE — 99499 UNLISTED E&M SERVICE: CPT | Mod: S$GLB,,, | Performed by: INTERNAL MEDICINE

## 2017-08-07 NOTE — PROGRESS NOTES
Patient is here today for follow up evaluation of renal allograft. Last seen in renal office 16. She is s/p  donor kidney tx 14. Current immunosuppression; tacrolimus; mycophenolate. Baseline Cr 1.9-2.2 mg/dl. Her most recent lab (17) Cr 2.0 mg/dl; eGFR 32 ml/min; potassium 4.9 mmol/L There is a hx of diabetes complicated by nephropathy; retinopathy Denies fevers; chills night sweats; chest pains; hemoptysis; orthopnea; PND  Today she has no new complainta    ROS;  Mood and Affect; Appropriate  Otherwise non-contributory    Exam  Chronically ill appearing woman; no acute distress; oriented x 3  HEENT; (+)retinopathy  CHEST; Clear P&A; no rales or rhonchi  HEART; RR; S1&S2 no murmur rub gallop  ABD; BS(+); non-tender; no organomegaly  EXT; (-)Edema    Impression  Renal allograft; Stable   RFP today  Hypertension. Satisfactory control    Plan  Return Visit; 6 mo; pending above

## 2017-08-08 ENCOUNTER — OFFICE VISIT (OUTPATIENT)
Dept: FAMILY MEDICINE | Facility: CLINIC | Age: 52
End: 2017-08-08
Payer: MEDICARE

## 2017-08-08 VITALS
HEART RATE: 93 BPM | WEIGHT: 212.88 LBS | TEMPERATURE: 99 F | BODY MASS INDEX: 33.41 KG/M2 | SYSTOLIC BLOOD PRESSURE: 130 MMHG | DIASTOLIC BLOOD PRESSURE: 80 MMHG | HEIGHT: 67 IN | OXYGEN SATURATION: 98 %

## 2017-08-08 DIAGNOSIS — G47.00 INSOMNIA, UNSPECIFIED TYPE: ICD-10-CM

## 2017-08-08 DIAGNOSIS — I70.0 CALCIFICATION OF AORTA: ICD-10-CM

## 2017-08-08 DIAGNOSIS — Z29.89 NEED FOR PROPHYLACTIC IMMUNOTHERAPY: Chronic | ICD-10-CM

## 2017-08-08 DIAGNOSIS — N18.30 BENIGN HYPERTENSION WITH CHRONIC KIDNEY DISEASE, STAGE III: ICD-10-CM

## 2017-08-08 DIAGNOSIS — Z94.0 KIDNEY TRANSPLANT STATUS, LIVING UNRELATED DONOR: Chronic | ICD-10-CM

## 2017-08-08 DIAGNOSIS — N18.30 CKD (CHRONIC KIDNEY DISEASE), STAGE III: Chronic | ICD-10-CM

## 2017-08-08 DIAGNOSIS — Z00.00 ENCOUNTER FOR PREVENTIVE HEALTH EXAMINATION: Primary | ICD-10-CM

## 2017-08-08 DIAGNOSIS — N25.81 HYPERPARATHYROIDISM, SECONDARY RENAL: ICD-10-CM

## 2017-08-08 DIAGNOSIS — E78.5 HYPERLIPIDEMIA, UNSPECIFIED HYPERLIPIDEMIA TYPE: ICD-10-CM

## 2017-08-08 DIAGNOSIS — E11.3593 PROLIFERATIVE DIABETIC RETINOPATHY OF BOTH EYES WITHOUT MACULAR EDEMA ASSOCIATED WITH TYPE 2 DIABETES MELLITUS: Chronic | ICD-10-CM

## 2017-08-08 DIAGNOSIS — I12.9 BENIGN HYPERTENSION WITH CHRONIC KIDNEY DISEASE, STAGE III: ICD-10-CM

## 2017-08-08 PROCEDURE — G0439 PPPS, SUBSEQ VISIT: HCPCS | Mod: S$GLB,,, | Performed by: NURSE PRACTITIONER

## 2017-08-08 PROCEDURE — 99999 PR PBB SHADOW E&M-EST. PATIENT-LVL V: CPT | Mod: PBBFAC,,, | Performed by: NURSE PRACTITIONER

## 2017-08-08 PROCEDURE — 99499 UNLISTED E&M SERVICE: CPT | Mod: S$GLB,,, | Performed by: NURSE PRACTITIONER

## 2017-08-08 RX ORDER — ZOLPIDEM TARTRATE 5 MG/1
5 TABLET ORAL NIGHTLY PRN
Qty: 30 TABLET | Refills: 0 | Status: SHIPPED | OUTPATIENT
Start: 2017-08-08 | End: 2018-12-31

## 2017-08-08 NOTE — PROGRESS NOTES
"Elizabeth Maldonado presented for a  Medicare AWV and comprehensive Health Risk Assessment today. The following components were reviewed and updated:    · Medical history  · Family History  · Social history  · Allergies and Current Medications  · Health Risk Assessment  · Health Maintenance  · Care Team     ** See Completed Assessments for Annual Wellness Visit within the encounter summary.**       The following assessments were completed:  · Living Situation  · CAGE  · Depression Screening  · Timed Get Up and Go  · Whisper Test  · Cognitive Function Screening  · Nutrition Screening  · ADL Screening  · PAQ Screening    Vitals:    08/08/17 1016   BP: 130/80   Pulse: 93   Temp: 98.8 °F (37.1 °C)   TempSrc: Oral   SpO2: 98%   Weight: 96.6 kg (212 lb 13.7 oz)   Height: 5' 7" (1.702 m)     Body mass index is 33.34 kg/m².  Physical Exam   Constitutional: She is oriented to person, place, and time.   Cardiovascular: Normal rate, regular rhythm and normal heart sounds.    Pulses:       Dorsalis pedis pulses are 2+ on the right side, and 2+ on the left side.        Posterior tibial pulses are 2+ on the right side, and 2+ on the left side.   Pulmonary/Chest: Effort normal and breath sounds normal.   Musculoskeletal: Normal range of motion. She exhibits no edema.        Right foot: There is normal range of motion and no deformity.        Left foot: There is normal range of motion and no deformity.   Feet:   Right Foot:   Protective Sensation: 7 sites tested. 7 sites sensed.   Skin Integrity: Negative for ulcer, blister, skin breakdown, erythema, warmth, callus or dry skin.   Left Foot:   Protective Sensation: 7 sites tested. 7 sites sensed.   Skin Integrity: Negative for ulcer, blister, skin breakdown, erythema, warmth, callus or dry skin.   Neurological: She is alert and oriented to person, place, and time.   Skin: Skin is warm.   Psychiatric: She has a normal mood and affect. Her behavior is normal. Thought content normal.   Vitals " reviewed.        Diagnoses and health risks identified today and associated recommendations/orders:    1. Encounter for preventive health examination  Education provided about preventive health examinations and procedures; addressed and discussed patient's health concerns. Additionally, reviewed medical record for risk factors and documented the results during this encounter.    2. Uncontrolled type 2 diabetes mellitus with stage 3 chronic kidney disease, with long-term current use of insulin  Education provided about diabetes, management of blood glucose with diet and activities, monitoring for worsening effects of diabetes.  Reviewed most recent Ha1c and informed patient of complications associated with uncontrolled diabetes.     3. Proliferative diabetic retinopathy of both eyes without macular edema associated with type 2 diabetes mellitus  Education provided about diabetes, management of blood glucose with diet and activities, monitoring for worsening effects of diabetes.  Reviewed most recent Ha1c and informed patient of complications associated with uncontrolled diabetes.     4. Calcification of aorta  Stable, asymptomatic; monitor.     5. Hyperparathyroidism, secondary renal  Stable, followed by [facility] nephrology dept, denies worsening symptoms; continue as advised.     6. Kidney transplant status, living unrelated donor - 12/2/14  Stable, followed by [facility] transplant and nephrology depts, denies worsening symptoms; continue as advised.     7. Chronic immunosuppression with Prograf  Stable, followed by [facility] transplant dept, denies worsening symptoms; continue as advised.    8. CKD (chronic kidney disease), stage III  Stable, asymptomatic; monitor.     9. Hyperlipidemia, unspecified hyperlipidemia type  Informed of need for updated lipid panel. Advised about fasting lab.   We discussed diet, exercise, ADLs.     10. Benign hypertension with chronic kidney disease, stage III  Stable, asymptomatic;  monitor.     11. Obesity, BMI 30-34.9  She's engaged in structured fitness activities at [employer].   Continue as advised.     Provided Elizabeth with a 5-10 year written screening schedule and personal prevention plan. Recommendations were developed using the USPSTF age appropriate recommendations. Education, counseling, and referrals were provided as needed. After Visit Summary printed and given to patient which includes a list of additional screenings\tests needed.    Return in about 1 year (around 8/8/2018) for Health Risk Assessment .    Lit Hernandez Jr, NP

## 2017-08-08 NOTE — PATIENT INSTRUCTIONS
Counseling and Referral of Other Preventative  (Italic type indicates deductible and co-insurance are waived)    Patient Name: Elizabeth Maldonado  Today's Date: 8/8/2017      SERVICE LIMITATIONS RECOMMENDATION    Vaccines    · Pneumococcal (once after 65)    · Influenza (annually)    · Hepatitis B (if medium/high risk)    · Prevnar 13      Hepatitis B medium/high risk factors:       - End-stage renal disease       - Hemophiliacs who received Factor VII or         IX concentrates       - Clients of institutions for the mentally             retarded       - Persons who live in the same house as          a HepB carrier       - Homosexual men       - Illicit injectable drug abusers     Pneumococcal: Done, no repeat necessary     Influenza: Recommended to patient     Hepatitis B: Done, no repeat necessary     Prevnar 13: Done, no repeat necessary    Mammogram (biennial age 50-74)  Annually (age 40 or over)  Last done 11/2016, recommend to repeat every 1  years    Pap (up to age 70 and after 70 if unknown history or abnormal study last 10 years)      Per patient and PCP, clinical symptoms        Colorectal cancer screening (to age 75)    · Fecal occult blood test (annual)  · Flexible sigmoidoscopy (5y)  · Screening colonoscopy (10y)  · Barium enema   Last done 8/2014, recommend to repeat as advised by gastroenterology.     Diabetes self-management training (no USPSTF recommendations)  Requires referral by treating physician for patient with diabetes or renal disease. 10 hours of initial DSMT sessions of no less than 30 minutes each in a continuous 12-month period. 2 hours of follow-up DSMT in subsequent years.  Requires referral by treating physician for patient with diabetes or renal disease.    Bone mass measurements (age 65 & older, biennial)  Requires diagnosis related to osteoporosis or estrogen deficiency. Biennial benefit unless patient has history of long-term glucocorticoid  N/A    Glaucoma screening (no USPSTF  recommendation)  Diabetes mellitus, family history   , age 50 or over    American, age 65 or over  Done this year, repeat every year    Medical nutrition therapy for diabetes or renal disease (no recommended schedule)  Requires referral by treating physician for patient with diabetes or renal disease or kidney transplant within the past 3 years.  Can be provided in same year as diabetes self-management training (DSMT), and CMS recommends medical nutrition therapy take place after DSMT. Up to 3 hours for initial year and 2 hours in subsequent years.  Requires referral by treating physician for patient with diabetes or renal disease.    Cardiovascular screening blood tests (every 5 years)  · Fasting lipid panel  Order as a panel if possible  Recommended to patient    Diabetes screening tests (at least every 3 years, Medicare covers annually or at 6-month intervals for prediabetic patients)  · Fasting blood sugar (FBS) or glucose tolerance test (GTT)  Patient must be diagnosed with one of the following:       - Hypertension       - Dyslipidemia       - Obesity (BMI 30kg/m2)       - Previous elevated impaired FBS or GTT       ... or any two of the following:       - Overweight (BMI 25 but <30)       - Family history of diabetes       - Age 65 or older       - History of gestational diabetes or birth of baby weighing more than 9 pounds  Recommended to patient    HIV screening (annually for increased risk patients)  · HIV-1 and HIV-2 by EIA, or ELVIA, rapid antibody test or oral mucosa transudate  Patients must be at increased risk for HIV infection per USPSTF guidelines or pregnant. Tests covered annually for patient at increased risk or as requested by the patient. Pregnant patients may receive up to 3 tests during pregnancy.  completed November 2014    Smoking cessation counseling (up to 8 sessions per year)  Patients must be asymptomatic of tobacco-related conditions to receive as a  preventative service.  non-smoker     Subsequent annual wellness visit  At least 12 months since last AWV  Return in one year     The following information is provided to all patients.  This information is to help you find resources for any of the problems found today that may be affecting your health:                Living healthy guide: www.[facility].[place].[facility]      Understanding Diabetes: www.diabetes.org      Eating healthy: www.cdc.gov/healthyweight      CDC home safety checklist: www.cdc.gov/steadi/patient.html      Agency on Aging: www.goea.[place].[facility]      Alcoholics anonymous (AA): www.aa.org      Physical Activity: www.laura.nih.gov/xl4vmqd      Tobacco use: www.quitwithusla.org

## 2017-08-18 ENCOUNTER — LAB VISIT (OUTPATIENT)
Dept: LAB | Facility: HOSPITAL | Age: 52
End: 2017-08-18
Attending: INTERNAL MEDICINE
Payer: MEDICARE

## 2017-08-18 DIAGNOSIS — Z48.298 AFTERCARE FOLLOWING ORGAN TRANSPLANT: ICD-10-CM

## 2017-08-18 DIAGNOSIS — E55.9 VITAMIN D DEFICIENCY: ICD-10-CM

## 2017-08-18 DIAGNOSIS — N25.81 HYPERPARATHYROIDISM DUE TO RENAL INSUFFICIENCY: ICD-10-CM

## 2017-08-18 DIAGNOSIS — Z79.899 ENCOUNTER FOR LONG-TERM (CURRENT) USE OF OTHER MEDICATIONS: ICD-10-CM

## 2017-08-18 DIAGNOSIS — N18.30 CHRONIC KIDNEY DISEASE, STAGE III (MODERATE): ICD-10-CM

## 2017-08-18 DIAGNOSIS — Z94.0 STATUS POST KIDNEY TRANSPLANT: ICD-10-CM

## 2017-08-18 DIAGNOSIS — R80.9 PROTEINURIA, UNSPECIFIED TYPE: ICD-10-CM

## 2017-08-18 LAB
ALBUMIN SERPL BCP-MCNC: 3.1 G/DL
ALBUMIN SERPL BCP-MCNC: 3.1 G/DL
ALP SERPL-CCNC: 62 U/L
ALT SERPL W/O P-5'-P-CCNC: 16 U/L
ANION GAP SERPL CALC-SCNC: 8 MMOL/L
AST SERPL-CCNC: 17 U/L
BASOPHILS # BLD AUTO: 0.01 K/UL
BASOPHILS NFR BLD: 0.3 %
BILIRUB DIRECT SERPL-MCNC: 0.1 MG/DL
BILIRUB SERPL-MCNC: 0.3 MG/DL
BUN SERPL-MCNC: 22 MG/DL
CALCIUM SERPL-MCNC: 9.8 MG/DL
CHLORIDE SERPL-SCNC: 106 MMOL/L
CO2 SERPL-SCNC: 24 MMOL/L
CREAT SERPL-MCNC: 1.8 MG/DL
DIFFERENTIAL METHOD: ABNORMAL
EOSINOPHIL # BLD AUTO: 0.2 K/UL
EOSINOPHIL NFR BLD: 4.3 %
ERYTHROCYTE [DISTWIDTH] IN BLOOD BY AUTOMATED COUNT: 13.9 %
EST. GFR  (AFRICAN AMERICAN): 36.3 ML/MIN/1.73 M^2
EST. GFR  (NON AFRICAN AMERICAN): 31.5 ML/MIN/1.73 M^2
GLUCOSE SERPL-MCNC: 129 MG/DL
HCT VFR BLD AUTO: 36.8 %
HGB BLD-MCNC: 11.8 G/DL
LYMPHOCYTES # BLD AUTO: 1.2 K/UL
LYMPHOCYTES NFR BLD: 29.7 %
MAGNESIUM SERPL-MCNC: 1.7 MG/DL
MCH RBC QN AUTO: 27.1 PG
MCHC RBC AUTO-ENTMCNC: 32.1 G/DL
MCV RBC AUTO: 85 FL
MONOCYTES # BLD AUTO: 0.4 K/UL
MONOCYTES NFR BLD: 10.2 %
NEUTROPHILS # BLD AUTO: 2.2 K/UL
NEUTROPHILS NFR BLD: 55.2 %
PHOSPHATE SERPL-MCNC: 3.9 MG/DL
PLATELET # BLD AUTO: 161 K/UL
PMV BLD AUTO: 12.1 FL
POTASSIUM SERPL-SCNC: 4.5 MMOL/L
PROT SERPL-MCNC: 8.3 G/DL
PTH-INTACT SERPL-MCNC: 111 PG/ML
RBC # BLD AUTO: 4.35 M/UL
SODIUM SERPL-SCNC: 138 MMOL/L
WBC # BLD AUTO: 3.94 K/UL

## 2017-08-18 PROCEDURE — 80069 RENAL FUNCTION PANEL: CPT

## 2017-08-18 PROCEDURE — 80197 ASSAY OF TACROLIMUS: CPT

## 2017-08-18 PROCEDURE — 84075 ASSAY ALKALINE PHOSPHATASE: CPT

## 2017-08-18 PROCEDURE — 87799 DETECT AGENT NOS DNA QUANT: CPT

## 2017-08-18 PROCEDURE — 83970 ASSAY OF PARATHORMONE: CPT

## 2017-08-18 PROCEDURE — 85025 COMPLETE CBC W/AUTO DIFF WBC: CPT

## 2017-08-18 PROCEDURE — 83735 ASSAY OF MAGNESIUM: CPT

## 2017-08-19 LAB — TACROLIMUS BLD-MCNC: 3.9 NG/ML

## 2017-08-21 ENCOUNTER — OFFICE VISIT (OUTPATIENT)
Dept: TRANSPLANT | Facility: CLINIC | Age: 52
End: 2017-08-21
Payer: MEDICARE

## 2017-08-21 VITALS
BODY MASS INDEX: 33.94 KG/M2 | HEART RATE: 104 BPM | TEMPERATURE: 98 F | WEIGHT: 216.25 LBS | HEIGHT: 67 IN | RESPIRATION RATE: 18 BRPM | DIASTOLIC BLOOD PRESSURE: 79 MMHG | SYSTOLIC BLOOD PRESSURE: 140 MMHG | OXYGEN SATURATION: 100 %

## 2017-08-21 DIAGNOSIS — R80.9 PROTEINURIA, UNSPECIFIED TYPE: ICD-10-CM

## 2017-08-21 DIAGNOSIS — N25.81 HYPERPARATHYROIDISM, SECONDARY RENAL: ICD-10-CM

## 2017-08-21 DIAGNOSIS — D63.1 ANEMIA IN STAGE 5 CHRONIC KIDNEY DISEASE, NOT ON CHRONIC DIALYSIS: ICD-10-CM

## 2017-08-21 DIAGNOSIS — Z94.0 KIDNEY TRANSPLANT STATUS, LIVING UNRELATED DONOR: Primary | Chronic | ICD-10-CM

## 2017-08-21 DIAGNOSIS — Z79.899 IMMUNOSUPPRESSIVE MANAGEMENT ENCOUNTER FOLLOWING KIDNEY TRANSPLANT: ICD-10-CM

## 2017-08-21 DIAGNOSIS — Z94.0 IMMUNOSUPPRESSIVE MANAGEMENT ENCOUNTER FOLLOWING KIDNEY TRANSPLANT: ICD-10-CM

## 2017-08-21 DIAGNOSIS — E78.5 HYPERLIPIDEMIA, UNSPECIFIED HYPERLIPIDEMIA TYPE: ICD-10-CM

## 2017-08-21 DIAGNOSIS — N18.5 ANEMIA IN STAGE 5 CHRONIC KIDNEY DISEASE, NOT ON CHRONIC DIALYSIS: ICD-10-CM

## 2017-08-21 LAB
BK VIRUS DNA PCR, QUANT, BLOOD: <125 COPIES/ML
BK VIRUS DNA, BLOOD: NOT DETECTED
LOG BKV COPIES/ML: <2.1 LOG (10) COPIES/ML

## 2017-08-21 PROCEDURE — 3078F DIAST BP <80 MM HG: CPT | Mod: S$GLB,,, | Performed by: INTERNAL MEDICINE

## 2017-08-21 PROCEDURE — 99499 UNLISTED E&M SERVICE: CPT | Mod: S$GLB,,, | Performed by: INTERNAL MEDICINE

## 2017-08-21 PROCEDURE — 99215 OFFICE O/P EST HI 40 MIN: CPT | Mod: S$GLB,,, | Performed by: INTERNAL MEDICINE

## 2017-08-21 PROCEDURE — 3077F SYST BP >= 140 MM HG: CPT | Mod: S$GLB,,, | Performed by: INTERNAL MEDICINE

## 2017-08-21 PROCEDURE — 99999 PR PBB SHADOW E&M-EST. PATIENT-LVL III: CPT | Mod: PBBFAC,,, | Performed by: INTERNAL MEDICINE

## 2017-08-21 PROCEDURE — 3008F BODY MASS INDEX DOCD: CPT | Mod: S$GLB,,, | Performed by: INTERNAL MEDICINE

## 2017-08-21 RX ORDER — PSYLLIUM HUSK 0.4 G
CAPSULE ORAL
Status: ON HOLD | COMMUNITY
End: 2019-10-31 | Stop reason: HOSPADM

## 2017-08-21 NOTE — PROGRESS NOTES
Kidney Post-Transplant Assessment    Referring Physician: Jessica Johnson  Current Nephrologist: Anabelle Milan    ORGAN: LEFT KIDNEY  Donor Type: living  PHS Increased Risk: no  Cold Ischemia: 53 mins  Induction Medications: campath - alemtuzumab (anti-cd52)    Subjective:     CC:  Reassessment of renal allograft function and management of chronic immunosuppression.    Kidney History:  Ms. Maldonado is a 52 y.o. year old AAF with history of ESRD secondary to DM/HTN who was on PD for ~4 months prior to receiving LURT from her daughter's friend (who was in her mid 30s at time of donation, Campath induction, WIT 29 minutes, CIT 53 minutes, donor Hep BcAb positive, CMV D+/R+) on 12/2/14. Post-op course complicated by herpes zoster outbreak in mid-Sept 2015 along her left thigh. She had renal transplant biopsy on 2/22/17 for proteinuria which showed 9 glomeruli; glomerulosclerosis in global and segmental pattern but reported as likely related to endothelial injury and not a primary podocytopathy; no ACR (but suboptimal specimen), no AVR, C4d negative. She has CKD stage 3 - GFR 30-59 and her baseline creatinine has mostly been 1.5 to 2.0. She takes mycophenolate mofetil and tacrolimus for maintenance immunosuppression.     Interval History: Patient last seen in transplant clinic on 1/16/17. Since last visit she denies any hospitalizations or ER visits. She has started going to the gym an is trying to take better care of herself; doing water aerobics and cycling. States she is a stress eater. She checks her BP occasionally and is mostly 100-130s/70s. Blood sugars elevated at times but states they are better than what they used to be. She needs to have dental work     Review of Systems  Constitutional: +fatigue; Negative for fever, appetite change  HENT: Negative for hearing loss, sore throat and mouth sores.   Eyes: Negative for photophobia, pain and visual disturbance.   Respiratory: Negative for cough, chest tightness,  "shortness of breath and wheezing.   Cardiovascular: Negative for chest pain, palpitations and leg swelling.   Gastrointestinal: Negative for nausea, vomiting, abdominal pain, diarrhea, constipation, blood in stool and abdominal distention.   Genitourinary: Negative for dysuria, urgency, frequency, hematuria, decreased urine volume, difficulty urinating  Musculoskeletal: +clavicle strain - going to PT and takes hydrocodone  Skin: Negative for pallor, rash and wound.   Neurological: Negative for dizziness, tremors, syncope, weakness, light-headedness and headaches.   Hematological: Negative for adenopathy. Does not bruise/bleed easily.   Psychiatric/Behavioral: +stressed secondary to daughter's cary - diabetic gastroparesis; +sleep disturbance; +anxiety, "seriously lisa"; no SI/HI; Negative for confusion    Medications:   Current Outpatient Prescriptions   Medication Sig Dispense Refill    ACCU-CHEK PREM Misc USE AS DIRECTED TO CHECK BLOOD GLUCOSE BEFORE MEALS  AND AT BEDTIME 1 each 1    blood sugar diagnostic Strp Checks BG ac/hs 200 strip 7    CINNAMON BARK, BULK, MISC by Misc.(Non-Drug; Combo Route) route.      fluticasone (FLONASE) 50 mcg/actuation nasal spray 1 spray by Nasal route.      hydrocodone-acetaminophen 5-325mg (NORCO) 5-325 mg per tablet Take 1 tablet by mouth every 6 (six) hours as needed for Pain. 12 tablet 0    INSULIN DEGLUDEC (TRESIBA FLEXTOUCH U-200 SUBQ) Inject 88 Units into the skin nightly.       insulin syringe-needle U-100 (INSULIN SYRINGE) 1/2 mL 30 x 5/16" Syrg Uses 4 daily 200 each 12    lancets (ONETOUCH DELICA LANCETS) 33 gauge Misc 1 lancet by Misc.(Non-Drug; Combo Route) route 4 (four) times daily before meals and nightly. 200 each 7    mycophenolate (CELLCEPT) 250 mg Cap Take 2 capsules (500 mg total) by mouth 2 (two) times daily. 120 capsule 11    NOVOLOG FLEXPEN 100 unit/mL InPn pen 23 Units 3 (three) times daily with meals.   3    psyllium husk 0.4 gram Cap Take by " "mouth.      SYRINGE & NEEDLE,INSULIN,1 ML (INSULIN SYRINGE-NEEDLE U-100) 1 mL 29 X 7/16" Syrg   11    tacrolimus (PROGRAF) 1 MG Cap 3mg in AM PO and 2mg in PM PO .Z94.0 Kidney transplant on 12/2/2014. 150 capsule 11    zolpidem (AMBIEN) 5 MG Tab Take 1 tablet (5 mg total) by mouth nightly as needed. 30 tablet 0     No current facility-administered medications for this visit.          Objective:     Blood pressure (!) 140/79, pulse 104, temperature 98 °F (36.7 °C), temperature source Oral, resp. rate 18, height 5' 7" (1.702 m), weight 98.1 kg (216 lb 4.3 oz), last menstrual period 04/29/2011, SpO2 100 %.body mass index is 33.87 kg/m².    Physical Exam  General: No acute distress, well groomed, alert and oriented x 3  HEENT: Normocephalic, atraumatic, EOM's intact bilaterally, external inspection of ears and nose normal, moist mucous membranes, no oral ulcerations/lesions  Neck: Supple, symmetrical, trachea midline, no thyromegaly, no JVD  Respiratory: Clear to auscultation bilaterally, respirations unlabored, no rales/rhonchi/wheezing  Cardiovacular: Regular rate and rhythm, S1, S2 normal, no murmurs, rubs or gallops  Gastrointestinal: Soft, obese, non-tender, bowel sounds normal, no hepatosplenomegaly  Renal allograft exam: No tenderness, no bruits, normal exam  Musculoskeletal: No knee or ankle joint tenderness or swelling.   Extremities: No clubbing or cyanosis of bilateral upper extremities; no lower extremity edema bilaterally, radial pulses 2+ bilaterally, symmetric  Skin: warm and dry; no rash on exposed skin  Neurologic: CN grossly intact    Labs:  Lab Results   Component Value Date    WBC 3.94 08/18/2017    HGB 11.8 (L) 08/18/2017    HCT 36.8 (L) 08/18/2017     08/18/2017    K 4.5 08/18/2017     08/18/2017    CO2 24 08/18/2017    BUN 22 (H) 08/18/2017    CREATININE 1.8 (H) 08/18/2017    EGFRNONAA 31.5 (A) 08/18/2017    CALCIUM 9.8 08/18/2017    PHOS 3.9 08/18/2017    MG 1.7 08/18/2017    " ALBUMIN 3.1 (L) 08/18/2017    ALBUMIN 3.1 (L) 08/18/2017    AST 17 08/18/2017    ALT 16 08/18/2017    UTPCR 1.22 (H) 08/18/2017    .0 (H) 08/18/2017    TACROLIMUS 3.9 (L) 08/18/2017     Labs were reviewed with the patient.    Assessment/Plan:     1. Kidney transplant status, living unrelated donor - 12/2/14    2. Hyperparathyroidism, secondary renal    3. Anemia in stage 5 chronic kidney disease, not on chronic dialysis    4. Hyperlipidemia, unspecified hyperlipidemia type    5. Immunosuppressive management encounter following kidney transplant    6. Proteinuria, unspecified type        Ms. Maldonado is a 52 y.o. female with:       # History of ESRD presumed secondary to DM/HTN s/p LURT from her daughter's friend (who was in her mid 30s at time of donation, Campath induction, WIT 29 minutes, CIT 53 minutes, donor Hep BcAb positive, CMV D+/R+) on 12/2/14: baseline Cr mostly 1.5 to 2.0  - her last Cr is within her baseline at 1.8 from 8/18/17  - she had renal transplant biopsy on 2/22/17 for proteinuria which showed 9 glomeruli; glomerulosclerosis in global and segmental pattern but reported as likely related to endothelial injury and not a primary podocytopathy; no ACR (but suboptimal specimen), no AVR, C4d negative  - last UPC 1.22 gm from 8/18/17 --> unable to start ACE-I/ARB given BPs on lower end at home   - encouraged patient regarding weight loss, optimizing glycemic control  - given donor Hep BcAb positive would check recipient hepatitis panel and hep B s Ab quantitative level     # Immunosuppression:   - continue Prograf 3/2, last FK-506 level low at 3.9 from 8/18/17 but previously stable thus will arrange for repeat Prograf level on Wednesday (8/23/17)  - continue  mg BID   - will monitor closely for toxicities    # Infectious Surveillance:   - last CMV negative from 4/7/15  - last BK serum PCR negative from 8/18/17    # History of Herpes Zoster: patient with herpes zoster outbreak in mid-Sept  2015 along her left thigh  - no residual pain or lesions    # DM: last HbA1c 10.4% from 8/14/17  - management per endocrine/PCP    # Leukopenia: stable; likely drug induced   - continue to monitor     # HTN: BP on higher end in clinic but she reports lower BPs at home  - continue with home blood pressure monitoring  - low salt and healthy life discussed with the patient    # Metabolic Bone Disease/Secondary Hyperparathyroidism: last calcium/phos normal  - will monitor PTH, calcium, and phosphorus as per our center protocol    # Anemia of chronic disease: Hb stable at 11.8  - will continue monitoring as per our center guidelines. No indication for acute intervention today    # Dental Issues: patient needs dental work done on bottom teeth   - she is cleared from renal transplant perspective to have dental work performed   - would recommend penicillin (Amoxicillin) prophylaxis depending on how invasive the dental work is     # Depression/Anxiety: patient with significant stress related to her daughter's health; discussed regarding caregiver burnout and counseled her on making sure to take time for herself   - advised her to speak with her PCP regarding psych referral     Follow-up:   Clinic: return to transplant clinic weekly for the first month after transplant; every 2 weeks during months 2-3; then at 6-, 9-, 12-, 18-, 24-, and 36- months post-transplant to reassess for complications from immunosuppression toxicity and monitor for rejection.  Annually thereafter.    Labs: since patient remains at high risk for rejection and drug-related complications that warrant close monitoring, labs will be ordered as follows: continue twice weekly CBC, renal panel, and drug level for first month; then same labs once weekly through 3rd month post-transplant.  Urine for UA and protein/creatinine ratio monthly.  Serum BK - PCR at 1-, 3-, 6-, 9-, 12-, 18-, 24-, and 36- months post-transplant.  Hepatic panel at 1-, 2-, 3-, 6-, 9-,  12-, 18-, 24-, and 36- months post-transplant.    Sweta Catalan MD       Education:   Material provided to the patient.  Patient reminded to call with any health changes since these can be early signs of significant complications.  Also, I advised the patient to be sure any new medications or changes of old medications are discussed with either a pharmacist or physician knowledgeable with transplant to avoid rejection/drug toxicity related to significant drug interactions.    UNOS Patient Status  Functional Status: 90% - Able to carry on normal activity: minor symptoms of disease  Physical Capacity: No Limitations

## 2017-08-21 NOTE — PATIENT INSTRUCTIONS
1. Ask your PCP to refer you to a psychiatrist   2. Continue staying active   3. Let's repeat your Prograf level on Wednesday (8/23/17)

## 2017-08-21 NOTE — LETTER
August 21, 2017        Anabelle Milan  [address] [place] [address] [address]  [place] [address]  Phone: [phone]  Fax: [phone]             [facility]  [address]  [facility] [address]  Phone: [phone]   Patient: Elizabeth Maldonado   MR Number: [id number]   YOB: 1965   Date of Visit: 8/21/2017       Dear Dr. Anabelle Milan    Thank you for referring Elizabeth Maldonado to me for evaluation. Attached you will find relevant portions of my assessment and plan of care.    If you have questions, please do not hesitate to call me. I look forward to following Elizabeth Maldonado along with you.    Sincerely,    Sweta Catalan MD    Enclosure    If you would like to receive this communication electronically, please contact [email or web address] or [phone] to request [employer] access.    [employer] is a tool which provides read-only access to select patient information with whom you have a relationship. Its easy to use and provides real time access to review your patients record including encounter summaries, notes, results, and demographic information.    If you feel you have received this communication in error or would no longer like to receive these types of communications, please e-mail [email or web address]

## 2017-08-22 ENCOUNTER — PATIENT MESSAGE (OUTPATIENT)
Dept: TRANSPLANT | Facility: CLINIC | Age: 52
End: 2017-08-22

## 2017-08-22 DIAGNOSIS — Z91.89 AT RISK FOR INFECTION TRANSMITTED FROM DONOR: Primary | ICD-10-CM

## 2017-08-22 DIAGNOSIS — Z11.4 ENCOUNTER FOR SCREENING FOR HIV: ICD-10-CM

## 2017-08-23 ENCOUNTER — LAB VISIT (OUTPATIENT)
Dept: LAB | Facility: HOSPITAL | Age: 52
End: 2017-08-23
Attending: INTERNAL MEDICINE
Payer: MEDICARE

## 2017-08-23 DIAGNOSIS — Z48.298 AFTERCARE FOLLOWING ORGAN TRANSPLANT: ICD-10-CM

## 2017-08-23 DIAGNOSIS — Z91.89 AT RISK FOR INFECTION TRANSMITTED FROM DONOR: ICD-10-CM

## 2017-08-23 DIAGNOSIS — Z94.0 STATUS POST KIDNEY TRANSPLANT: ICD-10-CM

## 2017-08-23 DIAGNOSIS — Z79.899 ENCOUNTER FOR LONG-TERM (CURRENT) USE OF OTHER MEDICATIONS: ICD-10-CM

## 2017-08-23 LAB — HBV SURFACE AG SERPL QL IA: NEGATIVE

## 2017-08-23 PROCEDURE — 86706 HEP B SURFACE ANTIBODY: CPT

## 2017-08-23 PROCEDURE — 87517 HEPATITIS B DNA QUANT: CPT

## 2017-08-23 PROCEDURE — 87340 HEPATITIS B SURFACE AG IA: CPT

## 2017-08-23 PROCEDURE — 36415 COLL VENOUS BLD VENIPUNCTURE: CPT | Mod: PO

## 2017-08-23 PROCEDURE — 80197 ASSAY OF TACROLIMUS: CPT

## 2017-08-24 LAB — TACROLIMUS BLD-MCNC: 4.5 NG/ML

## 2017-08-25 LAB
HEP. B SURF AB, QUAL: NEGATIVE
HEP. B SURF AB, QUANT.: <3 MIU/ML
HEPATITIS B VIRAL DNA - QUANTITATIVE: <10 IU/ML
HEPATITIS B VIRUS DNA: NOT DETECTED
LOG HBV IU/ML: <1 LOG (10) IU/ML

## 2017-10-20 DIAGNOSIS — E11.9 DIABETES MELLITUS WITHOUT COMPLICATION: ICD-10-CM

## 2017-11-10 RX ORDER — MYCOPHENOLATE MOFETIL 250 MG/1
CAPSULE ORAL
Qty: 120 CAPSULE | Refills: 2 | Status: SHIPPED | OUTPATIENT
Start: 2017-11-10 | End: 2018-03-22 | Stop reason: SDUPTHER

## 2017-12-07 ENCOUNTER — TELEPHONE (OUTPATIENT)
Dept: TRANSPLANT | Facility: CLINIC | Age: 52
End: 2017-12-07

## 2017-12-07 NOTE — TELEPHONE ENCOUNTER
Pt did not get labs or come to clinic- she has not answered phone calls.  Will try to reschedule pt.

## 2017-12-19 ENCOUNTER — TELEPHONE (OUTPATIENT)
Dept: ENDOCRINOLOGY | Facility: CLINIC | Age: 52
End: 2017-12-19

## 2017-12-19 NOTE — TELEPHONE ENCOUNTER
Left message on patient's voicemail to return call to clinic regarding scheduling Diabetes management appointment with Amina Bishop NP for elevated A1c. Waiting to hear back.

## 2018-02-01 DIAGNOSIS — Z76.82 ORGAN TRANSPLANT CANDIDATE: ICD-10-CM

## 2018-02-01 RX ORDER — TACROLIMUS 1 MG/1
CAPSULE ORAL
Qty: 150 CAPSULE | Refills: 0 | Status: SHIPPED | OUTPATIENT
Start: 2018-02-01 | End: 2018-03-22 | Stop reason: SDUPTHER

## 2018-02-01 NOTE — TELEPHONE ENCOUNTER
Pt is overdue for follow up and has not gotten labs done as requested. I will ask MD if we can send in one month supply until we get pt sorted out.

## 2018-02-01 NOTE — TELEPHONE ENCOUNTER
----- Message from Anne Turner PharmD sent at 2018  1:35 PM CST -----  Regarding: Prograf Rx  Hello,     The Prograf prescription on file is now . Please send a new prescription by e-scribe as soon as you can. Patient is currently due for a refill.     Thanks,   Anne, Outpatient Pharmacy

## 2018-02-02 ENCOUNTER — PATIENT MESSAGE (OUTPATIENT)
Dept: TRANSPLANT | Facility: CLINIC | Age: 53
End: 2018-02-02

## 2018-02-20 DIAGNOSIS — Z76.82 ORGAN TRANSPLANT CANDIDATE: ICD-10-CM

## 2018-02-20 RX ORDER — TACROLIMUS 1 MG/1
CAPSULE ORAL
Qty: 150 CAPSULE | Refills: 11 | OUTPATIENT
Start: 2018-02-20

## 2018-03-14 ENCOUNTER — LAB VISIT (OUTPATIENT)
Dept: LAB | Facility: HOSPITAL | Age: 53
End: 2018-03-14
Attending: INTERNAL MEDICINE
Payer: MEDICAID

## 2018-03-14 DIAGNOSIS — R80.9 PROTEINURIA, UNSPECIFIED TYPE: ICD-10-CM

## 2018-03-14 DIAGNOSIS — N25.81 HYPERPARATHYROIDISM DUE TO RENAL INSUFFICIENCY: ICD-10-CM

## 2018-03-14 DIAGNOSIS — E55.9 VITAMIN D DEFICIENCY: ICD-10-CM

## 2018-03-14 DIAGNOSIS — N18.30 CHRONIC KIDNEY DISEASE, STAGE III (MODERATE): ICD-10-CM

## 2018-03-14 LAB
CREAT UR-MCNC: 63 MG/DL
PROT UR-MCNC: 65 MG/DL
PROT/CREAT RATIO, UR: 1.03

## 2018-03-14 PROCEDURE — 82570 ASSAY OF URINE CREATININE: CPT

## 2018-03-22 DIAGNOSIS — Z76.82 ORGAN TRANSPLANT CANDIDATE: ICD-10-CM

## 2018-03-22 RX ORDER — TACROLIMUS 1 MG/1
CAPSULE ORAL
Qty: 150 CAPSULE | Refills: 5 | Status: SHIPPED | OUTPATIENT
Start: 2018-03-22 | End: 2018-04-16 | Stop reason: SDUPTHER

## 2018-03-24 RX ORDER — MYCOPHENOLATE MOFETIL 250 MG/1
CAPSULE ORAL
Qty: 120 CAPSULE | Refills: 10 | Status: SHIPPED | OUTPATIENT
Start: 2018-03-24 | End: 2018-04-16 | Stop reason: SDUPTHER

## 2018-04-10 ENCOUNTER — TELEPHONE (OUTPATIENT)
Dept: TRANSPLANT | Facility: CLINIC | Age: 53
End: 2018-04-10

## 2018-04-10 NOTE — TELEPHONE ENCOUNTER
Transportation arrived to late to mohit pt to her appt.  She rescheduled appt and refused to get addl labs.

## 2018-04-16 ENCOUNTER — OFFICE VISIT (OUTPATIENT)
Dept: TRANSPLANT | Facility: CLINIC | Age: 53
End: 2018-04-16
Payer: MEDICAID

## 2018-04-16 VITALS
WEIGHT: 208.75 LBS | RESPIRATION RATE: 18 BRPM | BODY MASS INDEX: 32.76 KG/M2 | OXYGEN SATURATION: 97 % | TEMPERATURE: 98 F | HEART RATE: 85 BPM | DIASTOLIC BLOOD PRESSURE: 95 MMHG | HEIGHT: 67 IN | SYSTOLIC BLOOD PRESSURE: 155 MMHG

## 2018-04-16 DIAGNOSIS — I12.9 BENIGN HYPERTENSION WITH CHRONIC KIDNEY DISEASE, STAGE III: ICD-10-CM

## 2018-04-16 DIAGNOSIS — Z94.0 KIDNEY TRANSPLANT STATUS, LIVING UNRELATED DONOR: Primary | Chronic | ICD-10-CM

## 2018-04-16 DIAGNOSIS — E11.22 UNCONTROLLED TYPE 2 DIABETES MELLITUS WITH STAGE 3 CHRONIC KIDNEY DISEASE, UNSPECIFIED LONG TERM INSULIN USE STATUS: Chronic | ICD-10-CM

## 2018-04-16 DIAGNOSIS — E66.9 OBESITY (BMI 30.0-34.9): ICD-10-CM

## 2018-04-16 DIAGNOSIS — N18.30 BENIGN HYPERTENSION WITH CHRONIC KIDNEY DISEASE, STAGE III: ICD-10-CM

## 2018-04-16 DIAGNOSIS — N18.3 UNCONTROLLED TYPE 2 DIABETES MELLITUS WITH STAGE 3 CHRONIC KIDNEY DISEASE, UNSPECIFIED LONG TERM INSULIN USE STATUS: Chronic | ICD-10-CM

## 2018-04-16 DIAGNOSIS — Z76.82 ORGAN TRANSPLANT CANDIDATE: ICD-10-CM

## 2018-04-16 DIAGNOSIS — Z29.89 NEED FOR PROPHYLACTIC IMMUNOTHERAPY: Chronic | ICD-10-CM

## 2018-04-16 DIAGNOSIS — N18.30 CKD (CHRONIC KIDNEY DISEASE), STAGE III: Chronic | ICD-10-CM

## 2018-04-16 DIAGNOSIS — N25.81 HYPERPARATHYROIDISM, SECONDARY RENAL: ICD-10-CM

## 2018-04-16 DIAGNOSIS — E11.65 UNCONTROLLED TYPE 2 DIABETES MELLITUS WITH STAGE 3 CHRONIC KIDNEY DISEASE, UNSPECIFIED LONG TERM INSULIN USE STATUS: Chronic | ICD-10-CM

## 2018-04-16 PROCEDURE — 99999 PR PBB SHADOW E&M-EST. PATIENT-LVL IV: CPT | Mod: PBBFAC,,, | Performed by: NURSE PRACTITIONER

## 2018-04-16 PROCEDURE — 99214 OFFICE O/P EST MOD 30 MIN: CPT | Mod: PBBFAC | Performed by: NURSE PRACTITIONER

## 2018-04-16 PROCEDURE — 99214 OFFICE O/P EST MOD 30 MIN: CPT | Mod: S$PBB,,, | Performed by: NURSE PRACTITIONER

## 2018-04-16 RX ORDER — TACROLIMUS 1 MG/1
CAPSULE ORAL
Qty: 150 CAPSULE | Refills: 11 | Status: SHIPPED | OUTPATIENT
Start: 2018-04-16 | End: 2018-12-28

## 2018-04-16 RX ORDER — MYCOPHENOLATE MOFETIL 250 MG/1
CAPSULE ORAL
Qty: 120 CAPSULE | Refills: 11 | Status: SHIPPED | OUTPATIENT
Start: 2018-04-16 | End: 2019-05-16

## 2018-04-16 NOTE — PROGRESS NOTES
Kidney Post-Transplant Assessment    Referring Physician: Jessica Johnson  Current Nephrologist: Anabelle Milan    ORGAN: LEFT KIDNEY  Donor Type: living  PHS Increased Risk: no  Cold Ischemia: 53 mins  Induction Medications: campath - alemtuzumab (anti-cd52)    Subjective:     CC:  Reassessment of renal allograft function and management of chronic immunosuppression.    HPI:  Ms. Maldonado is a 53 y.o. year old Black or  female who received a living kidney transplant on 12/2/14.  She has CKD stage 3 - GFR 30-59 and her baseline creatinine is between 1.7-2.2. She takes mycophenolate mofetil and tacrolimus for maintenance immunosuppression. She denies any recent hospitalizations or ER visits since her previous clinic visit.    Interval HX:    BP 130s/80s   Walks daily and goes to the gym. She feels it helps with her stress at home.   Drinking adequate amount of water.   States she has been dealing w/ her adult daughter, who  Has been in and out of the [facility] hospital   She also reports weight loss and is trying to get out of the 200s lbs.     Pertinent HX:  HX: ESRD secondary to DM/HTN who was on PD for ~4 months prior to receiving LURT from her daughter's friend on 12/2/14. Post-op course complicated by herpes zoster outbreak in mid-Sept 2015 along her left thigh      Past Medical History:   Diagnosis Date    Acidosis 7/1/2014    Allergic rhinitis 7/1/2014    Allergy     Anemia     Anemia in chronic kidney disease 7/1/2014    Anxiety     Chronic immunosuppression with Prograf and MMF 12/3/2014    CKD (chronic kidney disease) stage 5, GFR less than 15 ml/min 7/1/2014    CKD (chronic kidney disease), stage III 3/2/2015    Degenerative disc disease     Depression 7/1/2014    Diabetes mellitus, type 2 since age 18 7/1/2014    ESRD on peritoneal dialysis - august 2014 for 9 hours no peritonitis 11/24/2014    Hyperlipidemia     Hypertension 7/1/2014    Hypomagnesemia 1/7/2015    Kidney  "transplant status, living unrelated donor - 12/2/14 12/3/2014    Neutropenia 1/21/2015    NS (nuclear sclerosis) 9/16/2016    Obesity     Organ transplant candidate 7/1/2014    Pre-op exam 11/24/2014    Proliferative diabetic retinopathy of both eyes without macular edema associated with type 2 diabetes mellitus 9/16/2016    Renal manifestation of secondary diabetes mellitus     Tendinitis     Trouble in sleeping        Review of Systems   Constitutional: Positive for fatigue. Negative for activity change, appetite change, chills, fever and unexpected weight change.        Weight loss   HENT: Negative for congestion, facial swelling, postnasal drip, rhinorrhea, sinus pressure, sore throat and trouble swallowing.    Eyes: Negative for pain, redness and visual disturbance.   Respiratory: Negative for cough, chest tightness, shortness of breath and wheezing.    Cardiovascular: Negative.  Negative for chest pain, palpitations and leg swelling.   Gastrointestinal: Negative for abdominal pain, diarrhea, nausea and vomiting.   Genitourinary: Negative for dysuria, flank pain and urgency.   Musculoskeletal: Negative for gait problem, neck pain and neck stiffness.   Skin: Negative for rash.   Allergic/Immunologic: Positive for immunocompromised state. Negative for environmental allergies and food allergies.   Neurological: Negative for dizziness, weakness, light-headedness and headaches.   Psychiatric/Behavioral: Negative for agitation and confusion. The patient is not nervous/anxious.        Objective:     Blood pressure (!) 155/95, pulse 85, temperature 98.2 °F (36.8 °C), temperature source Oral, resp. rate 18, height 5' 7" (1.702 m), weight 94.7 kg (208 lb 12.4 oz), last menstrual period 04/29/2011, SpO2 97 %.body mass index is 32.7 kg/m².    Physical Exam   Constitutional: She is oriented to person, place, and time. She appears well-developed and well-nourished.   HENT:   Head: Normocephalic.   Mouth/Throat: " Oropharynx is clear and moist. No oropharyngeal exudate.   Eyes: Conjunctivae and EOM are normal. Pupils are equal, round, and reactive to light. No scleral icterus.   Neck: Normal range of motion. Neck supple.   Cardiovascular: Normal rate, regular rhythm and normal heart sounds.    Pulmonary/Chest: Effort normal and breath sounds normal.   Abdominal: Soft. Normal appearance and bowel sounds are normal. She exhibits no distension and no mass. There is no splenomegaly or hepatomegaly. There is no tenderness. There is no rebound, no guarding, no CVA tenderness, no tenderness at McBurney's point and negative Aguiar's sign.       Musculoskeletal: Normal range of motion. She exhibits no edema.   Lymphadenopathy:     She has no cervical adenopathy.   Neurological: She is alert and oriented to person, place, and time. She exhibits normal muscle tone. Coordination normal.   Skin: Skin is warm and dry.   Psychiatric: She has a normal mood and affect. Her behavior is normal.   Vitals reviewed.      Labs:  Lab Results   Component Value Date    WBC 4.70 03/14/2018    HGB 12.2 03/14/2018    HCT 38.1 03/14/2018     03/14/2018    K 4.6 03/14/2018     03/14/2018    CO2 26 03/14/2018    BUN 29 (H) 03/14/2018    CREATININE 1.7 (H) 03/14/2018    EGFRNONAA 33.5 (A) 03/14/2018    CALCIUM 10.3 03/14/2018    PHOS 3.5 03/14/2018    MG 1.8 03/14/2018    ALBUMIN 3.5 03/14/2018    ALBUMIN 3.5 03/14/2018    AST 16 03/14/2018    ALT 13 03/14/2018    UTPCR 1.03 (H) 03/14/2018    .0 (H) 08/18/2017    TACROLIMUS 4.6 (L) 03/14/2018       No results found for: EXTANC, EXTWBC, EXTSEGS, EXTPLATELETS, EXTHEMOGLOBI, EXTHEMATOCRI, EXTCREATININ, EXTSODIUM, EXTPOTASSIUM, EXTBUN, EXTCO2, EXTCALCIUM, EXTPHOSPHORU, EXTGLUCOSE, EXTALBUMIN, EXTAST, EXTALT, EXTBILITOTAL, EXTLIPASE, EXTAMYLASE    No results found for: EXTCYCLOSLVL, EXTSIROLIMUS, EXTTACROLVL, EXTPROTCRE, EXTPTHINTACT, EXTPROTEINUA, EXTWBCUA, EXTRBCUA    Labs were reviewed with  the patient.    Assessment:     1. Kidney transplant status, living unrelated donor - 12/2/14    2. Chronic immunosuppression with Prograf    3. Uncontrolled type 2 diabetes mellitus with stage 3 chronic kidney disease, unspecified long term insulin use status    4. Obesity (BMI 30.0-34.9)    5. Hyperparathyroidism, secondary renal    6. CKD (chronic kidney disease), stage III    7. Benign hypertension with chronic kidney disease, stage III        Plan:     Follow-up:   1. CKD stage: 3 stable    2. Immunosuppression:   Prograf trough 4.6, which is  therapeutic target 4-6 Continue  Prograf 3/3,  Mg BID. Will continue to monitor for drug toxicities    3. Allograft Function: Stable. Continue good po hydration.       Lab Results   Component Value Date    CREATININE 1.7 (H) 03/14/2018       4. Hypertension management: advise low salt diet and home BP monitoring    No BP meds this time     5. Metabolic Bone Disease/Secondary Hyperparathyroidism:stable  Will monitor PTH, CA and Vit D/guidelines,    Lab Results   Component Value Date    .0 (H) 08/18/2017    CALCIUM 10.3 03/14/2018    PHOS 3.5 03/14/2018       6. Electrolytes:  Will monitor /guidelines  Lab Results   Component Value Date     03/14/2018    K 4.6 03/14/2018     03/14/2018    CO2 26 03/14/2018       7. Anemia: stable. No need for intervention    Will monitor /guidelines     Lab Results   Component Value Date    WBC 4.70 03/14/2018    HGB 12.2 03/14/2018    HCT 38.1 03/14/2018    MCV 85 03/14/2018     03/14/2018         8.  Cytopenias: no significant cytopenias will monitor as per our guidelines. Medicine list reviewed including potential causes of drug-induced cytopenias    9.Proteinuria: continue p/c ratio as per guidelines.   Proteinuria improved, will continue to monitor  UTPCR 1.03 (H) 03/14/2018       10. BK virus infection screening:  will continue to monitor/ guidelines      11. Weight education: provided during the  clinic visit   Body mass index is 32.7 kg/m².       12.Patient safety education regarding immunosuppression including prophylaxis posttransplant for CMV, PCP : Education provided about vaccination and prevention of infections       Follow-up:   Clinic: return to transplant clinic weekly for the first month after transplant; every 2 weeks during months 2-3; then at 6-, 9-, 12-, 18-, 24-, and 36- months post-transplant to reassess for complications from immunosuppression toxicity and monitor for rejection.  Annually thereafter.    Labs: since patient remains at high risk for rejection and drug-related complications that warrant close monitoring, labs will be ordered as follows: continue twice weekly CBC, renal panel, and drug level for first month; then same labs once weekly through 3rd month post-transplant.  Urine for UA and protein/creatinine ratio monthly.  Urine BK - PCR at 1-, 3-, 6-, 9-, 12-, 18-, 24-, and 36- months post-transplant.  Hepatic panel at 1-, 2-, 3-, 6-, 9-, 12-, 18-, 24-, and 36- months post-transplant.    Daya Leon NP       Education:   Material provided to the patient.  Patient reminded to call with any health changes since these can be early signs of significant complications.  Also, I advised the patient to be sure any new medications or changes of old medications are discussed with either a pharmacist or physician knowledgeable with transplant to avoid rejection/drug toxicity related to significant drug interactions.    UNOS Patient Status  Functional Status: 80% - Normal activity with effort: some symptoms of disease  Physical Capacity: No Limitations

## 2018-04-16 NOTE — PROGRESS NOTES
SW met with patient to assess coping. Patient presents as alert and oriented x 4, pleasant, good eye contact, well groomed, recall good, concentration/judgement good, average intelligence, calm, communicative, cooperative, asking and answering questions appropriately and reports being overwhelmed with her daughter at home. Pt reports that her daughter is having her own medical issues and some behavioral issues. Pt reports that she is trying to maintain her composure with her daughter and reports engaging in healthy activities like walking, exercising, taking time away from the home, and setting boundaries with her daughter. SW offered support, reflective listening, and normalization of patient's feelings and concerns. Pt reports that both she and her daughter are seeing Dr. Brown with [facility] and SW encouraged pt to discuss options with the psychiatrist. Patient verbalized understanding and agreement. Pt will continue to be followed for any continuity of care issues, discharge planning, and emotional support. LUCHO remains available at [phone].

## 2018-04-16 NOTE — LETTER
April 16, 2018        Anabelle Milan  [address] [place] [address] [address]  [place] [address]  Phone: [phone]  Fax: [phone]             [facility]  [address]  [facility] [address]  Phone: [phone]   Patient: Elizabeth Maldonado   MR Number: [id number]   YOB: 1965   Date of Visit: 4/16/2018       Dear Dr. Anabelle Milan    Thank you for referring Elizabeth Maldonado to me for evaluation. Attached you will find relevant portions of my assessment and plan of care.    If you have questions, please do not hesitate to call me. I look forward to following Elizabeth Maldonado along with you.    Sincerely,    Daya Leon NP    Enclosure    If you would like to receive this communication electronically, please contact [email or web address] or [phone] to request [employer] access.    [employer] is a tool which provides read-only access to select patient information with whom you have a relationship. Its easy to use and provides real time access to review your patients record including encounter summaries, notes, results, and demographic information.    If you feel you have received this communication in error or would no longer like to receive these types of communications, please e-mail [email or web address]

## 2018-05-04 ENCOUNTER — PES CALL (OUTPATIENT)
Dept: ADMINISTRATIVE | Facility: CLINIC | Age: 53
End: 2018-05-04

## 2018-06-22 DIAGNOSIS — Z12.39 BREAST CANCER SCREENING: ICD-10-CM

## 2018-08-02 ENCOUNTER — TELEPHONE (OUTPATIENT)
Dept: FAMILY MEDICINE | Facility: CLINIC | Age: 53
End: 2018-08-02

## 2018-08-02 NOTE — TELEPHONE ENCOUNTER
Please contact patient.   Patient has not been seen since 1/2017  Pt is known to have diabetes and should be seen every 6 months.    Please schedule patient an appointment. She will need labs prior to appointment, please schedule now and link labs     Please schedule appointment as pt may have difficulty obtaining an appointment     If patient is no longer with [facility], please update chart with new PCP

## 2018-10-23 DIAGNOSIS — Z29.89 NEED FOR PROPHYLACTIC IMMUNOTHERAPY: ICD-10-CM

## 2018-10-23 DIAGNOSIS — Z94.0 STATUS POST KIDNEY TRANSPLANT: ICD-10-CM

## 2018-10-23 DIAGNOSIS — Z48.298 AFTERCARE FOLLOWING ORGAN TRANSPLANT: ICD-10-CM

## 2018-12-27 ENCOUNTER — LAB VISIT (OUTPATIENT)
Dept: LAB | Facility: HOSPITAL | Age: 53
End: 2018-12-27
Attending: INTERNAL MEDICINE
Payer: MEDICAID

## 2018-12-27 DIAGNOSIS — Z29.89 NEED FOR PROPHYLACTIC IMMUNOTHERAPY: ICD-10-CM

## 2018-12-27 DIAGNOSIS — Z48.298 AFTERCARE FOLLOWING ORGAN TRANSPLANT: ICD-10-CM

## 2018-12-27 DIAGNOSIS — Z94.0 STATUS POST KIDNEY TRANSPLANT: ICD-10-CM

## 2018-12-27 LAB
ALBUMIN SERPL BCP-MCNC: 3.2 G/DL
ALBUMIN SERPL BCP-MCNC: 3.2 G/DL
ALP SERPL-CCNC: 65 U/L
ALT SERPL W/O P-5'-P-CCNC: 14 U/L
ANION GAP SERPL CALC-SCNC: 8 MMOL/L
AST SERPL-CCNC: 16 U/L
BASOPHILS # BLD AUTO: 0.01 K/UL
BASOPHILS NFR BLD: 0.2 %
BILIRUB DIRECT SERPL-MCNC: 0.2 MG/DL
BILIRUB SERPL-MCNC: 0.3 MG/DL
BUN SERPL-MCNC: 20 MG/DL
CALCIUM SERPL-MCNC: 9.6 MG/DL
CHLORIDE SERPL-SCNC: 103 MMOL/L
CO2 SERPL-SCNC: 25 MMOL/L
CREAT SERPL-MCNC: 1.7 MG/DL
DIFFERENTIAL METHOD: ABNORMAL
EOSINOPHIL # BLD AUTO: 0.2 K/UL
EOSINOPHIL NFR BLD: 3.7 %
ERYTHROCYTE [DISTWIDTH] IN BLOOD BY AUTOMATED COUNT: 14.4 %
EST. GFR  (AFRICAN AMERICAN): 38.6 ML/MIN/1.73 M^2
EST. GFR  (NON AFRICAN AMERICAN): 33.5 ML/MIN/1.73 M^2
GLUCOSE SERPL-MCNC: 163 MG/DL
HCT VFR BLD AUTO: 36.6 %
HGB BLD-MCNC: 11.4 G/DL
IMM GRANULOCYTES # BLD AUTO: 0.01 K/UL
IMM GRANULOCYTES NFR BLD AUTO: 0.2 %
LYMPHOCYTES # BLD AUTO: 1.5 K/UL
LYMPHOCYTES NFR BLD: 34.2 %
MAGNESIUM SERPL-MCNC: 1.8 MG/DL
MCH RBC QN AUTO: 26.8 PG
MCHC RBC AUTO-ENTMCNC: 31.1 G/DL
MCV RBC AUTO: 86 FL
MONOCYTES # BLD AUTO: 0.4 K/UL
MONOCYTES NFR BLD: 8.9 %
NEUTROPHILS # BLD AUTO: 2.3 K/UL
NEUTROPHILS NFR BLD: 52.8 %
NRBC BLD-RTO: 0 /100 WBC
PHOSPHATE SERPL-MCNC: 3.4 MG/DL
PLATELET # BLD AUTO: 144 K/UL
PMV BLD AUTO: 13.5 FL
POTASSIUM SERPL-SCNC: 4 MMOL/L
PROT SERPL-MCNC: 8.6 G/DL
RBC # BLD AUTO: 4.26 M/UL
SODIUM SERPL-SCNC: 136 MMOL/L
WBC # BLD AUTO: 4.38 K/UL

## 2018-12-27 PROCEDURE — 85025 COMPLETE CBC W/AUTO DIFF WBC: CPT

## 2018-12-27 PROCEDURE — 84155 ASSAY OF PROTEIN SERUM: CPT

## 2018-12-27 PROCEDURE — 87799 DETECT AGENT NOS DNA QUANT: CPT

## 2018-12-27 PROCEDURE — 80069 RENAL FUNCTION PANEL: CPT

## 2018-12-27 PROCEDURE — 83735 ASSAY OF MAGNESIUM: CPT

## 2018-12-27 PROCEDURE — 36415 COLL VENOUS BLD VENIPUNCTURE: CPT | Mod: PO

## 2018-12-27 PROCEDURE — 80197 ASSAY OF TACROLIMUS: CPT

## 2018-12-27 PROCEDURE — 84075 ASSAY ALKALINE PHOSPHATASE: CPT

## 2018-12-27 PROCEDURE — 82247 BILIRUBIN TOTAL: CPT

## 2018-12-28 DIAGNOSIS — Z76.82 ORGAN TRANSPLANT CANDIDATE: ICD-10-CM

## 2018-12-28 LAB — TACROLIMUS BLD-MCNC: 2.8 NG/ML

## 2018-12-28 RX ORDER — TACROLIMUS 1 MG/1
CAPSULE ORAL
Qty: 180 CAPSULE | Refills: 11 | Status: SHIPPED | OUTPATIENT
Start: 2018-12-28 | End: 2020-01-20

## 2018-12-28 NOTE — TELEPHONE ENCOUNTER
----- Message from Sweta Catalan MD sent at 12/28/2018  2:11 PM CST -----  Prograf level lower than goal --> if true 12 hour trough increase Prograf from 3/2 to 3 mg BID. Repeat labs in 7-10 days

## 2018-12-28 NOTE — PROGRESS NOTES
Prograf level lower than goal --> if true 12 hour trough increase Prograf from 3/2 to 3 mg BID. Repeat labs in 7-10 days

## 2018-12-28 NOTE — TELEPHONE ENCOUNTER
I left pt a detailed message to verify whether this is a true trough.  If so, pt will need to increase to 3/3 and repeat level.  Pt is due for fu this MONDAY

## 2018-12-31 ENCOUNTER — OFFICE VISIT (OUTPATIENT)
Dept: TRANSPLANT | Facility: CLINIC | Age: 53
End: 2018-12-31
Payer: MEDICAID

## 2018-12-31 VITALS
DIASTOLIC BLOOD PRESSURE: 71 MMHG | TEMPERATURE: 99 F | WEIGHT: 212.5 LBS | OXYGEN SATURATION: 96 % | RESPIRATION RATE: 18 BRPM | HEART RATE: 84 BPM | HEIGHT: 67 IN | SYSTOLIC BLOOD PRESSURE: 120 MMHG | BODY MASS INDEX: 33.35 KG/M2

## 2018-12-31 DIAGNOSIS — E66.9 OBESITY (BMI 30.0-34.9): ICD-10-CM

## 2018-12-31 DIAGNOSIS — Z94.0 KIDNEY TRANSPLANT STATUS, LIVING UNRELATED DONOR: Primary | Chronic | ICD-10-CM

## 2018-12-31 DIAGNOSIS — D63.1 ANEMIA IN STAGE 5 CHRONIC KIDNEY DISEASE, NOT ON CHRONIC DIALYSIS: ICD-10-CM

## 2018-12-31 DIAGNOSIS — N18.5 ANEMIA IN STAGE 5 CHRONIC KIDNEY DISEASE, NOT ON CHRONIC DIALYSIS: ICD-10-CM

## 2018-12-31 DIAGNOSIS — N25.81 HYPERPARATHYROIDISM, SECONDARY RENAL: ICD-10-CM

## 2018-12-31 DIAGNOSIS — Z29.89 NEED FOR PROPHYLACTIC IMMUNOTHERAPY: Chronic | ICD-10-CM

## 2018-12-31 DIAGNOSIS — N18.30 CKD (CHRONIC KIDNEY DISEASE), STAGE III: Chronic | ICD-10-CM

## 2018-12-31 PROCEDURE — 99999 PR PBB SHADOW E&M-EST. PATIENT-LVL V: CPT | Mod: PBBFAC,,, | Performed by: NURSE PRACTITIONER

## 2018-12-31 PROCEDURE — 99215 OFFICE O/P EST HI 40 MIN: CPT | Mod: S$PBB,,, | Performed by: NURSE PRACTITIONER

## 2018-12-31 PROCEDURE — 99215 OFFICE O/P EST HI 40 MIN: CPT | Mod: PBBFAC | Performed by: NURSE PRACTITIONER

## 2018-12-31 NOTE — LETTER
December 31, 2018        Anabelle Milan  [address] [place] [address] [address]  [place] [address]  Phone: [phone]  Fax: [phone]             [facility]  [address]  [facility] [address]  Phone: [phone]   Patient: Elizabeth Maldonado   MR Number: [id number]   YOB: 1965   Date of Visit: 12/31/2018       Dear Dr. Anabelle Milan    Thank you for referring Elizabeth Maldonado to me for evaluation. Attached you will find relevant portions of my assessment and plan of care.    If you have questions, please do not hesitate to call me. I look forward to following Elizabeth Maldonado along with you.    Sincerely,    Daya Leon NP    Enclosure    If you would like to receive this communication electronically, please contact [email or web address] or [phone] to request [employer] Link access.    [employer] Link is a tool which provides read-only access to select patient information with whom you have a relationship. Its easy to use and provides real time access to review your patients record including encounter summaries, notes, results, and demographic information.    If you feel you have received this communication in error or would no longer like to receive these types of communications, please e-mail [email or web address]

## 2018-12-31 NOTE — PROGRESS NOTES
Kidney Post-Transplant Assessment    Referring Physician: Jessica Johnson  Current Nephrologist: Anabelle Milan    ORGAN: LEFT KIDNEY  Donor Type: living  PHS Increased Risk: no  Cold Ischemia: 53 mins  Induction Medications: campath - alemtuzumab (anti-cd52)    Subjective:     CC:  Reassessment of renal allograft function and management of chronic immunosuppression.    HPI:  Ms. Maldonado is a 53 y.o. year old Black or  female who received a living kidney transplant on 12/2/14.  She has CKD stage 3 - GFR 30-59 and her baseline creatinine is between 1.7-2.2. She takes mycophenolate mofetil and tacrolimus for maintenance immunosuppression. She denies any recent hospitalizations or ER visits since her previous clinic visit.    Interval HX:       Walks daily and goes to the gym. She feels it helps with her stress at home taking care of her daughter.   States she has been dealing w/ her adult daughter, who  Has been in and out of the [facility] hospital     She is Drinking adequate amount of water.     Pertinent HX:  HX: ESRD secondary to DM/HTN who was on PD for ~4 months prior to receiving LURT from her daughter's friend on 12/2/14. Post-op course complicated by herpes zoster outbreak in mid-Sept 2015 along her left thigh      Past Medical History:   Diagnosis Date    Acidosis 7/1/2014    Allergic rhinitis 7/1/2014    Allergy     Anemia     Anemia in chronic kidney disease 7/1/2014    Anxiety     Chronic immunosuppression with Prograf and MMF 12/3/2014    CKD (chronic kidney disease) stage 5, GFR less than 15 ml/min 7/1/2014    CKD (chronic kidney disease), stage III 3/2/2015    Degenerative disc disease     Depression 7/1/2014    Diabetes mellitus, type 2 since age 18 7/1/2014    ESRD on peritoneal dialysis - august 2014 for 9 hours no peritonitis 11/24/2014    Hyperlipidemia     Hypertension 7/1/2014    Hypomagnesemia 1/7/2015    Kidney transplant status, living unrelated donor - 12/2/14  "12/3/2014    Neutropenia 1/21/2015    NS (nuclear sclerosis) 9/16/2016    Obesity     Organ transplant candidate 7/1/2014    Pre-op exam 11/24/2014    Proliferative diabetic retinopathy of both eyes without macular edema associated with type 2 diabetes mellitus 9/16/2016    Renal manifestation of secondary diabetes mellitus     Tendinitis     Trouble in sleeping        Review of Systems   Constitutional: Positive for fatigue. Negative for activity change, appetite change, chills, fever and unexpected weight change.        Weight loss   HENT: Negative for congestion, facial swelling, postnasal drip, rhinorrhea, sinus pressure, sore throat and trouble swallowing.    Eyes: Negative for pain, redness and visual disturbance.   Respiratory: Negative for cough, chest tightness, shortness of breath and wheezing.    Cardiovascular: Negative.  Negative for chest pain, palpitations and leg swelling.   Gastrointestinal: Negative for abdominal pain, diarrhea, nausea and vomiting.   Genitourinary: Negative for dysuria, flank pain and urgency.   Musculoskeletal: Negative for gait problem, neck pain and neck stiffness.   Skin: Negative for rash.   Allergic/Immunologic: Positive for immunocompromised state. Negative for environmental allergies and food allergies.   Neurological: Negative for dizziness, weakness, light-headedness and headaches.   Psychiatric/Behavioral: Negative for agitation and confusion. The patient is not nervous/anxious.        Objective:     Blood pressure 120/71, pulse 84, temperature 99 °F (37.2 °C), temperature source Oral, resp. rate 18, height 5' 6.73" (1.695 m), weight 96.4 kg (212 lb 8.4 oz), last menstrual period 04/29/2011, SpO2 96 %.body mass index is 33.55 kg/m².    Physical Exam   Constitutional: She is oriented to person, place, and time. She appears well-developed and well-nourished.   HENT:   Head: Normocephalic.   Mouth/Throat: Oropharynx is clear and moist. No oropharyngeal exudate. "   Eyes: Conjunctivae and EOM are normal. Pupils are equal, round, and reactive to light. No scleral icterus.   Neck: Normal range of motion. Neck supple.   Cardiovascular: Normal rate, regular rhythm and normal heart sounds.   Pulmonary/Chest: Effort normal and breath sounds normal.   Abdominal: Soft. Normal appearance and bowel sounds are normal. She exhibits no distension and no mass. There is no splenomegaly or hepatomegaly. There is no tenderness. There is no rebound, no guarding, no CVA tenderness, no tenderness at McBurney's point and negative Aguiar's sign.       Musculoskeletal: Normal range of motion. She exhibits no edema.   Lymphadenopathy:     She has no cervical adenopathy.   Neurological: She is alert and oriented to person, place, and time. She exhibits normal muscle tone. Coordination normal.   Skin: Skin is warm and dry.   Psychiatric: She has a normal mood and affect. Her behavior is normal.   Vitals reviewed.      Labs:  Lab Results   Component Value Date    WBC 4.70 03/14/2018    HGB 12.2 03/14/2018    HCT 38.1 03/14/2018     03/14/2018    K 4.6 03/14/2018     03/14/2018    CO2 26 03/14/2018    BUN 29 (H) 03/14/2018    CREATININE 1.7 (H) 03/14/2018    EGFRNONAA 33.5 (A) 03/14/2018    CALCIUM 10.3 03/14/2018    PHOS 3.5 03/14/2018    MG 1.8 03/14/2018    ALBUMIN 3.5 03/14/2018    ALBUMIN 3.5 03/14/2018    AST 16 03/14/2018    ALT 13 03/14/2018    UTPCR 1.03 (H) 03/14/2018    .0 (H) 08/18/2017    TACROLIMUS 4.6 (L) 03/14/2018       No results found for: EXTANC, EXTWBC, EXTSEGS, EXTPLATELETS, EXTHEMOGLOBI, EXTHEMATOCRI, EXTCREATININ, EXTSODIUM, EXTPOTASSIUM, EXTBUN, EXTCO2, EXTCALCIUM, EXTPHOSPHORU, EXTGLUCOSE, EXTALBUMIN, EXTAST, EXTALT, EXTBILITOTAL, EXTLIPASE, EXTAMYLASE    No results found for: EXTCYCLOSLVL, EXTSIROLIMUS, EXTTACROLVL, EXTPROTCRE, EXTPTHINTACT, EXTPROTEINUA, EXTWBCUA, EXTRBCUA    Labs were reviewed with the patient.    Assessment:     1. Kidney transplant  status, living unrelated donor - 12/2/14    2. Chronic immunosuppression with Prograf    3. Anemia in stage 5 chronic kidney disease, not on chronic dialysis    4. Hyperparathyroidism, secondary renal    5. CKD (chronic kidney disease), stage III    6. Uncontrolled type 2 diabetes mellitus with stage 3 chronic kidney disease    7. Obesity (BMI 30.0-34.9)        Plan:   Repeat prograf trough   Encouraged to f/u with a general nephrologist --referral made    Follow-up:   1. CKD stage: 3 stable    2. Immunosuppression:   Prograf trough 2.8, which is  therapeutic target 4-7  Prograf dose increased to 3/3 on 12/27,  Mg BID. Will continue to monitor for drug toxicities    3. Allograft Function: Stable. Continue good po hydration.       Lab Results   Component Value Date    CREATININE 1.7 (H) 12/27/2018 12/27/2018  4yr 0mo   eGFR if African American >60 mL/min/1.73 m^2 38.6 (A)       4. Hypertension management: advise low salt diet and home BP monitoring    No BP meds this time     5. Metabolic Bone Disease/Secondary Hyperparathyroidism:stable  Will monitor PTH, CA and Vit D/guidelines,    Lab Results   Component Value Date    .0 (H) 08/18/2017    CALCIUM 9.6 12/27/2018    PHOS 3.4 12/27/2018 12/27/2018  4yr 0mo   Magnesium 1.6 - 2.6 mg/dL 1.8       6. Electrolytes:  Will monitor /guidelines  Lab Results   Component Value Date     12/27/2018    K 4.0 12/27/2018     12/27/2018    CO2 25 12/27/2018       7. Anemia: stable. No need for intervention    Will monitor /guidelines     Lab Results   Component Value Date    WBC 4.38 12/27/2018    HGB 11.4 (L) 12/27/2018    HCT 36.6 (L) 12/27/2018    MCV 86 12/27/2018     (L) 12/27/2018         8.  Cytopenias: no significant cytopenias will monitor as per our guidelines. Medicine list reviewed including potential causes of drug-induced cytopenias    9.HX Proteinuria: continue p/c ratio as per guidelines.   Proteinuria improved, will  continue to monitor   Encourage lifestyle modifications: diet, exercise, weight loss, low sodium diet/ 2 gms/day or less. Encouraged to f/u with general nephrology for mgmt   12/27/2018  4yr 0mo   Prot/Creat Ratio, Ur 0.00 - 0.20 1.56 (A)     UTPCR 1.03 (H) 03/14/2018       10. BK virus infection screening:  will continue to monitor/ guidelines      11. Weight education: provided during the clinic visit   Body mass index is 33.55 kg/m².       12.Patient safety education regarding immunosuppression including prophylaxis posttransplant for CMV, PCP : Education provided about vaccination and prevention of infections       Follow-up:   Clinic: return to transplant clinic weekly for the first month after transplant; every 2 weeks during months 2-3; then at 6-, 9-, 12-, 18-, 24-, and 36- months post-transplant to reassess for complications from immunosuppression toxicity and monitor for rejection.  Annually thereafter.    Labs: since patient remains at high risk for rejection and drug-related complications that warrant close monitoring, labs will be ordered as follows: continue twice weekly CBC, renal panel, and drug level for first month; then same labs once weekly through 3rd month post-transplant.  Urine for UA and protein/creatinine ratio monthly.  Urine BK - PCR at 1-, 3-, 6-, 9-, 12-, 18-, 24-, and 36- months post-transplant.  Hepatic panel at 1-, 2-, 3-, 6-, 9-, 12-, 18-, 24-, and 36- months post-transplant.    Daya Leon NP       Education:   Material provided to the patient.  Patient reminded to call with any health changes since these can be early signs of significant complications.  Also, I advised the patient to be sure any new medications or changes of old medications are discussed with either a pharmacist or physician knowledgeable with transplant to avoid rejection/drug toxicity related to significant drug interactions.    UNOS Patient Status  Functional Status: 80% - Normal activity with effort: some  symptoms of disease  Physical Capacity: No Limitations

## 2019-01-02 LAB
BKV DNA SERPL NAA+PROBE-ACNC: <125 COPIES/ML
BKV DNA SERPL NAA+PROBE-LOG#: <2.1 LOG (10) COPIES/ML
BKV DNA SERPL QL NAA+PROBE: NOT DETECTED

## 2019-01-03 ENCOUNTER — TELEPHONE (OUTPATIENT)
Dept: TRANSPLANT | Facility: CLINIC | Age: 54
End: 2019-01-03

## 2019-01-03 NOTE — TELEPHONE ENCOUNTER
I spoke with pt and advised that we got an appt for her next week- states that her daughter is very ill and this is not a good date.  I advised that the MD fit her in for this date and that is very important that she make this appt.  She said she will try to make the appt.

## 2019-01-10 ENCOUNTER — LAB VISIT (OUTPATIENT)
Dept: LAB | Facility: HOSPITAL | Age: 54
End: 2019-01-10
Attending: INTERNAL MEDICINE
Payer: MEDICAID

## 2019-01-10 DIAGNOSIS — Z48.298 AFTERCARE FOLLOWING ORGAN TRANSPLANT: ICD-10-CM

## 2019-01-10 DIAGNOSIS — Z29.89 NEED FOR PROPHYLACTIC IMMUNOTHERAPY: ICD-10-CM

## 2019-01-10 DIAGNOSIS — Z94.0 STATUS POST KIDNEY TRANSPLANT: ICD-10-CM

## 2019-01-10 PROCEDURE — 80197 ASSAY OF TACROLIMUS: CPT

## 2019-01-10 PROCEDURE — 36415 COLL VENOUS BLD VENIPUNCTURE: CPT | Mod: PO

## 2019-01-11 LAB — TACROLIMUS BLD-MCNC: 6.9 NG/ML

## 2019-03-15 ENCOUNTER — TELEPHONE (OUTPATIENT)
Dept: NEPHROLOGY | Facility: CLINIC | Age: 54
End: 2019-03-15

## 2019-03-15 DIAGNOSIS — Z94.0 KIDNEY TRANSPLANT RECIPIENT: Primary | ICD-10-CM

## 2019-05-16 DIAGNOSIS — Z94.0 KIDNEY TRANSPLANT STATUS, LIVING UNRELATED DONOR: Chronic | ICD-10-CM

## 2019-05-16 DIAGNOSIS — Z29.89 NEED FOR PROPHYLACTIC IMMUNOTHERAPY: Chronic | ICD-10-CM

## 2019-05-16 RX ORDER — MYCOPHENOLATE MOFETIL 250 MG/1
CAPSULE ORAL
Qty: 120 CAPSULE | Refills: 11 | Status: SHIPPED | OUTPATIENT
Start: 2019-05-16 | End: 2020-06-12 | Stop reason: SDUPTHER

## 2019-05-30 ENCOUNTER — LAB VISIT (OUTPATIENT)
Dept: LAB | Facility: HOSPITAL | Age: 54
End: 2019-05-30
Attending: INTERNAL MEDICINE
Payer: MEDICAID

## 2019-05-30 DIAGNOSIS — Z94.0 KIDNEY TRANSPLANT RECIPIENT: ICD-10-CM

## 2019-05-30 LAB
ANION GAP SERPL CALC-SCNC: 7 MMOL/L (ref 8–16)
BASOPHILS # BLD AUTO: 0.02 K/UL (ref 0–0.2)
BASOPHILS NFR BLD: 0.4 % (ref 0–1.9)
BUN SERPL-MCNC: 25 MG/DL (ref 6–20)
CALCIUM SERPL-MCNC: 9.9 MG/DL (ref 8.7–10.5)
CHLORIDE SERPL-SCNC: 105 MMOL/L (ref 95–110)
CO2 SERPL-SCNC: 25 MMOL/L (ref 23–29)
CREAT SERPL-MCNC: 1.6 MG/DL (ref 0.5–1.4)
DIFFERENTIAL METHOD: ABNORMAL
EOSINOPHIL # BLD AUTO: 0.2 K/UL (ref 0–0.5)
EOSINOPHIL NFR BLD: 3.3 % (ref 0–8)
ERYTHROCYTE [DISTWIDTH] IN BLOOD BY AUTOMATED COUNT: 14.6 % (ref 11.5–14.5)
EST. GFR  (AFRICAN AMERICAN): 41.2 ML/MIN/1.73 M^2
EST. GFR  (NON AFRICAN AMERICAN): 35.8 ML/MIN/1.73 M^2
GLUCOSE SERPL-MCNC: 89 MG/DL (ref 70–110)
HCT VFR BLD AUTO: 37.3 % (ref 37–48.5)
HGB BLD-MCNC: 11.3 G/DL (ref 12–16)
IMM GRANULOCYTES # BLD AUTO: 0.01 K/UL (ref 0–0.04)
IMM GRANULOCYTES NFR BLD AUTO: 0.2 % (ref 0–0.5)
LYMPHOCYTES # BLD AUTO: 1.3 K/UL (ref 1–4.8)
LYMPHOCYTES NFR BLD: 28.9 % (ref 18–48)
MCH RBC QN AUTO: 26.8 PG (ref 27–31)
MCHC RBC AUTO-ENTMCNC: 30.3 G/DL (ref 32–36)
MCV RBC AUTO: 88 FL (ref 82–98)
MONOCYTES # BLD AUTO: 0.6 K/UL (ref 0.3–1)
MONOCYTES NFR BLD: 12.1 % (ref 4–15)
NEUTROPHILS # BLD AUTO: 2.5 K/UL (ref 1.8–7.7)
NEUTROPHILS NFR BLD: 55.1 % (ref 38–73)
NRBC BLD-RTO: 0 /100 WBC
PLATELET # BLD AUTO: 148 K/UL (ref 150–350)
PMV BLD AUTO: 12.7 FL (ref 9.2–12.9)
POTASSIUM SERPL-SCNC: 4.2 MMOL/L (ref 3.5–5.1)
RBC # BLD AUTO: 4.22 M/UL (ref 4–5.4)
SODIUM SERPL-SCNC: 137 MMOL/L (ref 136–145)
WBC # BLD AUTO: 4.61 K/UL (ref 3.9–12.7)

## 2019-05-30 PROCEDURE — 80048 BASIC METABOLIC PNL TOTAL CA: CPT

## 2019-05-30 PROCEDURE — 85025 COMPLETE CBC W/AUTO DIFF WBC: CPT

## 2019-05-30 PROCEDURE — 36415 COLL VENOUS BLD VENIPUNCTURE: CPT | Mod: PO

## 2019-06-05 ENCOUNTER — OFFICE VISIT (OUTPATIENT)
Dept: NEPHROLOGY | Facility: CLINIC | Age: 54
End: 2019-06-05
Payer: MEDICAID

## 2019-06-05 VITALS
DIASTOLIC BLOOD PRESSURE: 80 MMHG | WEIGHT: 211 LBS | HEART RATE: 92 BPM | BODY MASS INDEX: 33.91 KG/M2 | OXYGEN SATURATION: 97 % | SYSTOLIC BLOOD PRESSURE: 120 MMHG | HEIGHT: 66 IN

## 2019-06-05 DIAGNOSIS — N18.30 CKD (CHRONIC KIDNEY DISEASE), STAGE III: Chronic | ICD-10-CM

## 2019-06-05 DIAGNOSIS — Z94.0 KIDNEY TRANSPLANT STATUS, LIVING UNRELATED DONOR: Primary | Chronic | ICD-10-CM

## 2019-06-05 PROCEDURE — 99213 OFFICE O/P EST LOW 20 MIN: CPT | Mod: S$PBB,,, | Performed by: INTERNAL MEDICINE

## 2019-06-05 PROCEDURE — 99499 RISK ADDL DX/OHS AUDIT: ICD-10-PCS | Mod: S$PBB,,, | Performed by: INTERNAL MEDICINE

## 2019-06-05 PROCEDURE — 99999 PR PBB SHADOW E&M-EST. PATIENT-LVL IV: CPT | Mod: PBBFAC,,, | Performed by: INTERNAL MEDICINE

## 2019-06-05 PROCEDURE — 99214 OFFICE O/P EST MOD 30 MIN: CPT | Mod: PBBFAC | Performed by: INTERNAL MEDICINE

## 2019-06-05 PROCEDURE — 99999 PR PBB SHADOW E&M-EST. PATIENT-LVL IV: ICD-10-PCS | Mod: PBBFAC,,, | Performed by: INTERNAL MEDICINE

## 2019-06-05 PROCEDURE — 99499 UNLISTED E&M SERVICE: CPT | Mod: S$PBB,,, | Performed by: INTERNAL MEDICINE

## 2019-06-05 PROCEDURE — 99213 PR OFFICE/OUTPT VISIT, EST, LEVL III, 20-29 MIN: ICD-10-PCS | Mod: S$PBB,,, | Performed by: INTERNAL MEDICINE

## 2019-06-05 RX ORDER — MYCOPHENOLATE MOFETIL 500 MG/1
TABLET ORAL
Status: ON HOLD | COMMUNITY
End: 2019-10-31 | Stop reason: HOSPADM

## 2019-06-05 RX ORDER — ATORVASTATIN CALCIUM 20 MG/1
40 TABLET, FILM COATED ORAL NIGHTLY
COMMUNITY
End: 2022-04-01

## 2019-06-05 RX ORDER — INSULIN LISPRO 100 U/ML
INJECTION, SOLUTION SUBCUTANEOUS
Refills: 2 | Status: ON HOLD | COMMUNITY
Start: 2019-04-30 | End: 2019-10-31 | Stop reason: HOSPADM

## 2019-06-05 RX ORDER — ACETAMINOPHEN 500 MG
TABLET ORAL
Refills: 0 | Status: ON HOLD | COMMUNITY
Start: 2019-03-26 | End: 2019-10-31 | Stop reason: HOSPADM

## 2019-06-05 RX ORDER — INSULIN LISPRO 100 [IU]/ML
INJECTION, SOLUTION INTRAVENOUS; SUBCUTANEOUS
Status: ON HOLD | COMMUNITY
End: 2019-10-31 | Stop reason: HOSPADM

## 2019-06-05 RX ORDER — ZOLPIDEM TARTRATE 5 MG/1
10 TABLET ORAL
Status: ON HOLD | COMMUNITY
End: 2019-10-31 | Stop reason: HOSPADM

## 2019-06-05 RX ORDER — INSULIN GLARGINE 100 [IU]/ML
INJECTION, SOLUTION SUBCUTANEOUS
Refills: 1 | Status: ON HOLD | COMMUNITY
Start: 2019-03-26 | End: 2019-10-31 | Stop reason: HOSPADM

## 2019-06-05 NOTE — PROGRESS NOTES
NEPHROLOGY PROGRESS NOTE    INTERVAL HISTORY  55 yo AAF patient is here today for follow up evaluation of renal allograft. Last seen in renal office by Dr. Baird. She is s/p  donor kidney tx 14. Current immunosuppression; tacrolimus; mycophenolate. Baseline Cr 1.9 - 2.2 mg/dl.  There is a hx of diabetes complicated by nephropathy; retinopathy.  Denies fevers; chills night sweats; chest pains; hemoptysis; orthopnea; PND.  Today she has no new complaints. Reports shoulder pain from rotator cuff. She does not take NSAIDs.     MEDICATIONS reviewed    Physical Exam  /80 mmHg  Chronically ill appearing woman; no acute distress; oriented x 3  HEENT; (+)retinopathy  CHEST; Clear P&A; no rales or rhonchi  HEART; RR; S1&S2 no murmur rub gallop  ABD; BS(+); non-tender; no organomegaly  EXT; (-)Edema    LABS  Serum Cr 1.6 mg/dL  UPCR 1.4 g/g    Impression  1. Renal allograft; Stable. Currently on tacrolimus and MMF. No on prednisone. Mild proteinuria since 2017, not increasing, possible secondary FSGS lesion from CNI.   2. Hypertension. Satisfactory control    Plan  Return Visit; 12 mo

## 2019-10-20 ENCOUNTER — HOSPITAL ENCOUNTER (INPATIENT)
Facility: HOSPITAL | Age: 54
LOS: 11 days | Discharge: NURSING, REHAB OR LONG-TERM CARE | DRG: 872 | End: 2019-10-31
Attending: EMERGENCY MEDICINE | Admitting: EMERGENCY MEDICINE
Payer: MEDICAID

## 2019-10-20 DIAGNOSIS — N18.3 ACUTE RENAL FAILURE SUPERIMPOSED ON STAGE 3 CHRONIC KIDNEY DISEASE, UNSPECIFIED ACUTE RENAL FAILURE TYPE: ICD-10-CM

## 2019-10-20 DIAGNOSIS — A41.9 SEPSIS, DUE TO UNSPECIFIED ORGANISM, UNSPECIFIED WHETHER ACUTE ORGAN DYSFUNCTION PRESENT: ICD-10-CM

## 2019-10-20 DIAGNOSIS — G89.29 CHRONIC HIP PAIN, UNSPECIFIED LATERALITY: Primary | ICD-10-CM

## 2019-10-20 DIAGNOSIS — N17.9 ACUTE RENAL FAILURE SUPERIMPOSED ON STAGE 3 CHRONIC KIDNEY DISEASE, UNSPECIFIED ACUTE RENAL FAILURE TYPE: ICD-10-CM

## 2019-10-20 DIAGNOSIS — R73.9 HYPERGLYCEMIA: ICD-10-CM

## 2019-10-20 DIAGNOSIS — R78.81 BACTEREMIA DUE TO STREPTOCOCCUS: ICD-10-CM

## 2019-10-20 DIAGNOSIS — M25.559 HIP PAIN: ICD-10-CM

## 2019-10-20 DIAGNOSIS — N17.9 ACUTE RENAL FAILURE SUPERIMPOSED ON STAGE 3 CHRONIC KIDNEY DISEASE: ICD-10-CM

## 2019-10-20 DIAGNOSIS — N18.30 ACUTE RENAL FAILURE SUPERIMPOSED ON STAGE 3 CHRONIC KIDNEY DISEASE: ICD-10-CM
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
## 2019-10-20 DIAGNOSIS — R50.9 FEVER: ICD-10-CM

## 2019-10-20 DIAGNOSIS — R78.81 BACTEREMIA DUE TO GROUP B STREPTOCOCCUS: ICD-10-CM

## 2019-10-20 DIAGNOSIS — N18.30 CKD (CHRONIC KIDNEY DISEASE), STAGE III: Chronic | ICD-10-CM

## 2019-10-20 DIAGNOSIS — E86.0 DEHYDRATION: ICD-10-CM

## 2019-10-20 DIAGNOSIS — R53.1 WEAKNESS: ICD-10-CM

## 2019-10-20 DIAGNOSIS — R31.9 HEMATURIA, UNSPECIFIED TYPE: ICD-10-CM

## 2019-10-20 DIAGNOSIS — B95.5 BACTEREMIA DUE TO STREPTOCOCCUS: ICD-10-CM

## 2019-10-20 DIAGNOSIS — M25.559 CHRONIC HIP PAIN, UNSPECIFIED LATERALITY: Primary | ICD-10-CM

## 2019-10-20 DIAGNOSIS — M25.512 CHRONIC LEFT SHOULDER PAIN: ICD-10-CM

## 2019-10-20 DIAGNOSIS — N17.9 ACUTE KIDNEY INJURY: ICD-10-CM

## 2019-10-20 DIAGNOSIS — B95.1 BACTEREMIA DUE TO GROUP B STREPTOCOCCUS: ICD-10-CM

## 2019-10-20 DIAGNOSIS — G89.29 CHRONIC LEFT SHOULDER PAIN: ICD-10-CM

## 2019-10-20 PROBLEM — R80.9 PROTEINURIA: Status: RESOLVED | Noted: 2017-01-16 | Resolved: 2019-10-20

## 2019-10-20 PROBLEM — G93.49 UREMIC ENCEPHALOPATHY: Status: ACTIVE | Noted: 2019-10-20

## 2019-10-20 PROBLEM — T86.10 COMPLICATION OF KIDNEY TRANSPLANT: Status: RESOLVED | Noted: 2017-02-22 | Resolved: 2019-10-20

## 2019-10-20 PROBLEM — N19 UREMIC ENCEPHALOPATHY: Status: ACTIVE | Noted: 2019-10-20

## 2019-10-20 LAB
ALBUMIN SERPL BCP-MCNC: 2.1 G/DL (ref 3.5–5.2)
ALLENS TEST: ABNORMAL
ALP SERPL-CCNC: 126 U/L (ref 55–135)
ALT SERPL W/O P-5'-P-CCNC: 36 U/L (ref 10–44)
AMORPH CRY URNS QL MICRO: ABNORMAL
ANION GAP SERPL CALC-SCNC: 15 MMOL/L (ref 8–16)
AST SERPL-CCNC: 45 U/L (ref 10–40)
B-OH-BUTYR BLD STRIP-SCNC: 0.3 MMOL/L (ref 0–0.5)
BACTERIA #/AREA URNS HPF: ABNORMAL /HPF
BASOPHILS # BLD AUTO: 0.02 K/UL (ref 0–0.2)
BASOPHILS NFR BLD: 0.2 % (ref 0–1.9)
BILIRUB SERPL-MCNC: 0.5 MG/DL (ref 0.1–1)
BILIRUB UR QL STRIP: NEGATIVE
BUN SERPL-MCNC: 44 MG/DL (ref 6–20)
CALCIUM SERPL-MCNC: 10.2 MG/DL (ref 8.7–10.5)
CHLORIDE SERPL-SCNC: 98 MMOL/L (ref 95–110)
CLARITY UR: CLEAR
CO2 SERPL-SCNC: 18 MMOL/L (ref 23–29)
COLOR UR: YELLOW
CREAT SERPL-MCNC: 2.9 MG/DL (ref 0.5–1.4)
DELSYS: ABNORMAL
DIFFERENTIAL METHOD: ABNORMAL
EOSINOPHIL # BLD AUTO: 0 K/UL (ref 0–0.5)
EOSINOPHIL NFR BLD: 0 % (ref 0–8)
ERYTHROCYTE [DISTWIDTH] IN BLOOD BY AUTOMATED COUNT: 14.2 % (ref 11.5–14.5)
EST. GFR  (AFRICAN AMERICAN): 20 ML/MIN/1.73 M^2
EST. GFR  (NON AFRICAN AMERICAN): 17 ML/MIN/1.73 M^2
GLUCOSE SERPL-MCNC: 753 MG/DL (ref 70–110)
GLUCOSE UR QL STRIP: ABNORMAL
HCO3 UR-SCNC: 23.7 MMOL/L (ref 24–28)
HCT VFR BLD AUTO: 36.3 % (ref 37–48.5)
HGB BLD-MCNC: 11.6 G/DL (ref 12–16)
HGB UR QL STRIP: ABNORMAL
HYALINE CASTS #/AREA URNS LPF: 0 /LPF
IMM GRANULOCYTES # BLD AUTO: 0.06 K/UL (ref 0–0.04)
IMM GRANULOCYTES NFR BLD AUTO: 0.6 % (ref 0–0.5)
KETONES UR QL STRIP: NEGATIVE
LACTATE SERPL-SCNC: 2.5 MMOL/L (ref 0.5–2.2)
LACTATE SERPL-SCNC: 3.2 MMOL/L (ref 0.5–2.2)
LEUKOCYTE ESTERASE UR QL STRIP: NEGATIVE
LYMPHOCYTES # BLD AUTO: 0.4 K/UL (ref 1–4.8)
LYMPHOCYTES NFR BLD: 4 % (ref 18–48)
MCH RBC QN AUTO: 26.9 PG (ref 27–31)
MCHC RBC AUTO-ENTMCNC: 32 G/DL (ref 32–36)
MCV RBC AUTO: 84 FL (ref 82–98)
MICROSCOPIC COMMENT: ABNORMAL
MODE: ABNORMAL
MONOCYTES # BLD AUTO: 0.4 K/UL (ref 0.3–1)
MONOCYTES NFR BLD: 4.5 % (ref 4–15)
NEUTROPHILS # BLD AUTO: 8.7 K/UL (ref 1.8–7.7)
NEUTROPHILS NFR BLD: 90.7 % (ref 38–73)
NITRITE UR QL STRIP: NEGATIVE
NRBC BLD-RTO: 0 /100 WBC
PCO2 BLDA: 43.1 MMHG (ref 35–45)
PH SMN: 7.35 [PH] (ref 7.35–7.45)
PH UR STRIP: 6 [PH] (ref 5–8)
PLATELET # BLD AUTO: ABNORMAL K/UL (ref 150–350)
PMV BLD AUTO: ABNORMAL FL (ref 9.2–12.9)
PO2 BLDA: 22 MMHG (ref 40–60)
POC BE: -2 MMOL/L
POC SATURATED O2: 34 % (ref 95–100)
POC TCO2: 25 MMOL/L (ref 24–29)
POCT GLUCOSE: 420 MG/DL (ref 70–110)
POCT GLUCOSE: 468 MG/DL (ref 70–110)
POTASSIUM SERPL-SCNC: 4.8 MMOL/L (ref 3.5–5.1)
PROT SERPL-MCNC: 9.3 G/DL (ref 6–8.4)
PROT UR QL STRIP: ABNORMAL
RBC # BLD AUTO: 4.32 M/UL (ref 4–5.4)
RBC #/AREA URNS HPF: 100 /HPF (ref 0–4)
SAMPLE: ABNORMAL
SITE: ABNORMAL
SODIUM SERPL-SCNC: 131 MMOL/L (ref 136–145)
SP GR UR STRIP: 1.02 (ref 1–1.03)
TROPONIN I SERPL DL<=0.01 NG/ML-MCNC: 0.02 NG/ML (ref 0–0.03)
TSH SERPL DL<=0.005 MIU/L-ACNC: 0.86 UIU/ML (ref 0.4–4)
URN SPEC COLLECT METH UR: ABNORMAL
UROBILINOGEN UR STRIP-ACNC: NEGATIVE EU/DL
WBC # BLD AUTO: 9.64 K/UL (ref 3.9–12.7)
WBC #/AREA URNS HPF: 0 /HPF (ref 0–5)
YEAST URNS QL MICRO: ABNORMAL

## 2019-10-20 PROCEDURE — 81000 URINALYSIS NONAUTO W/SCOPE: CPT

## 2019-10-20 PROCEDURE — 93010 ELECTROCARDIOGRAM REPORT: CPT | Mod: ,,, | Performed by: INTERNAL MEDICINE

## 2019-10-20 PROCEDURE — 96365 THER/PROPH/DIAG IV INF INIT: CPT

## 2019-10-20 PROCEDURE — 63600175 PHARM REV CODE 636 W HCPCS: Performed by: EMERGENCY MEDICINE

## 2019-10-20 PROCEDURE — 99291 CRITICAL CARE FIRST HOUR: CPT | Mod: 25

## 2019-10-20 PROCEDURE — 93005 ELECTROCARDIOGRAM TRACING: CPT

## 2019-10-20 PROCEDURE — 51701 INSERT BLADDER CATHETER: CPT

## 2019-10-20 PROCEDURE — 80053 COMPREHEN METABOLIC PANEL: CPT

## 2019-10-20 PROCEDURE — 80180 DRUG SCRN QUAN MYCOPHENOLATE: CPT

## 2019-10-20 PROCEDURE — 83930 ASSAY OF BLOOD OSMOLALITY: CPT

## 2019-10-20 PROCEDURE — C9399 UNCLASSIFIED DRUGS OR BIOLOG: HCPCS | Performed by: EMERGENCY MEDICINE

## 2019-10-20 PROCEDURE — 82803 BLOOD GASES ANY COMBINATION: CPT

## 2019-10-20 PROCEDURE — 80197 ASSAY OF TACROLIMUS: CPT

## 2019-10-20 PROCEDURE — 93010 EKG 12-LEAD: ICD-10-PCS | Mod: ,,, | Performed by: INTERNAL MEDICINE

## 2019-10-20 PROCEDURE — 96372 THER/PROPH/DIAG INJ SC/IM: CPT | Mod: 59

## 2019-10-20 PROCEDURE — 63600175 PHARM REV CODE 636 W HCPCS: Performed by: INTERNAL MEDICINE

## 2019-10-20 PROCEDURE — 25000003 PHARM REV CODE 250: Performed by: EMERGENCY MEDICINE

## 2019-10-20 PROCEDURE — 99900035 HC TECH TIME PER 15 MIN (STAT)

## 2019-10-20 PROCEDURE — 87186 SC STD MICRODIL/AGAR DIL: CPT

## 2019-10-20 PROCEDURE — 82962 GLUCOSE BLOOD TEST: CPT

## 2019-10-20 PROCEDURE — 11000001 HC ACUTE MED/SURG PRIVATE ROOM

## 2019-10-20 PROCEDURE — 83605 ASSAY OF LACTIC ACID: CPT | Mod: 91

## 2019-10-20 PROCEDURE — 82010 KETONE BODYS QUAN: CPT

## 2019-10-20 PROCEDURE — 84443 ASSAY THYROID STIM HORMONE: CPT

## 2019-10-20 PROCEDURE — 87040 BLOOD CULTURE FOR BACTERIA: CPT | Mod: 59

## 2019-10-20 PROCEDURE — 96361 HYDRATE IV INFUSION ADD-ON: CPT

## 2019-10-20 PROCEDURE — 84484 ASSAY OF TROPONIN QUANT: CPT

## 2019-10-20 PROCEDURE — 85025 COMPLETE CBC W/AUTO DIFF WBC: CPT

## 2019-10-20 PROCEDURE — 87147 CULTURE TYPE IMMUNOLOGIC: CPT | Mod: 59

## 2019-10-20 RX ORDER — ACETAMINOPHEN 500 MG
1000 TABLET ORAL
Status: COMPLETED | OUTPATIENT
Start: 2019-10-20 | End: 2019-10-20

## 2019-10-20 RX ORDER — ACETAMINOPHEN 500 MG
500 TABLET ORAL EVERY 6 HOURS PRN
Status: DISCONTINUED | OUTPATIENT
Start: 2019-10-20 | End: 2019-10-21

## 2019-10-20 RX ORDER — GLUCAGON 1 MG
1 KIT INJECTION
Status: DISCONTINUED | OUTPATIENT
Start: 2019-10-20 | End: 2019-10-31 | Stop reason: HOSPADM

## 2019-10-20 RX ORDER — INSULIN ASPART 100 [IU]/ML
23 INJECTION, SOLUTION INTRAVENOUS; SUBCUTANEOUS
Status: DISCONTINUED | OUTPATIENT
Start: 2019-10-20 | End: 2019-10-20

## 2019-10-20 RX ORDER — SODIUM CHLORIDE 0.9 % (FLUSH) 0.9 %
10 SYRINGE (ML) INJECTION
Status: DISCONTINUED | OUTPATIENT
Start: 2019-10-20 | End: 2019-10-20

## 2019-10-20 RX ORDER — IBUPROFEN 200 MG
16 TABLET ORAL
Status: DISCONTINUED | OUTPATIENT
Start: 2019-10-20 | End: 2019-10-31 | Stop reason: HOSPADM

## 2019-10-20 RX ORDER — INSULIN ASPART 100 [IU]/ML
1-10 INJECTION, SOLUTION INTRAVENOUS; SUBCUTANEOUS
Status: DISCONTINUED | OUTPATIENT
Start: 2019-10-20 | End: 2019-10-31 | Stop reason: HOSPADM

## 2019-10-20 RX ORDER — INSULIN ASPART 100 [IU]/ML
30 INJECTION, SOLUTION INTRAVENOUS; SUBCUTANEOUS
Status: DISCONTINUED | OUTPATIENT
Start: 2019-10-21 | End: 2019-10-21

## 2019-10-20 RX ORDER — HEPARIN SODIUM 5000 [USP'U]/ML
5000 INJECTION, SOLUTION INTRAVENOUS; SUBCUTANEOUS EVERY 12 HOURS
Status: DISCONTINUED | OUTPATIENT
Start: 2019-10-21 | End: 2019-10-31 | Stop reason: HOSPADM

## 2019-10-20 RX ORDER — AMOXICILLIN 250 MG
1 CAPSULE ORAL 2 TIMES DAILY PRN
Status: DISCONTINUED | OUTPATIENT
Start: 2019-10-20 | End: 2019-10-31 | Stop reason: HOSPADM

## 2019-10-20 RX ORDER — RAMELTEON 8 MG/1
8 TABLET ORAL NIGHTLY PRN
Status: DISCONTINUED | OUTPATIENT
Start: 2019-10-20 | End: 2019-10-31 | Stop reason: HOSPADM

## 2019-10-20 RX ORDER — ACETAMINOPHEN 325 MG/1
650 TABLET ORAL EVERY 6 HOURS PRN
Status: DISCONTINUED | OUTPATIENT
Start: 2019-10-20 | End: 2019-10-20

## 2019-10-20 RX ORDER — ATORVASTATIN CALCIUM 10 MG/1
20 TABLET, FILM COATED ORAL DAILY
Status: DISCONTINUED | OUTPATIENT
Start: 2019-10-21 | End: 2019-10-31 | Stop reason: HOSPADM

## 2019-10-20 RX ORDER — INSULIN ASPART 100 [IU]/ML
15 INJECTION, SOLUTION INTRAVENOUS; SUBCUTANEOUS
Status: COMPLETED | OUTPATIENT
Start: 2019-10-20 | End: 2019-10-20

## 2019-10-20 RX ORDER — HEPARIN SODIUM 5000 [USP'U]/ML
5000 INJECTION, SOLUTION INTRAVENOUS; SUBCUTANEOUS EVERY 8 HOURS
Status: DISCONTINUED | OUTPATIENT
Start: 2019-10-20 | End: 2019-10-20

## 2019-10-20 RX ORDER — ONDANSETRON 2 MG/ML
8 INJECTION INTRAMUSCULAR; INTRAVENOUS EVERY 8 HOURS PRN
Status: DISCONTINUED | OUTPATIENT
Start: 2019-10-20 | End: 2019-10-31 | Stop reason: HOSPADM

## 2019-10-20 RX ORDER — AMOXICILLIN 250 MG
1 CAPSULE ORAL 2 TIMES DAILY
Status: DISCONTINUED | OUTPATIENT
Start: 2019-10-20 | End: 2019-10-20

## 2019-10-20 RX ORDER — TACROLIMUS 1 MG/1
3 CAPSULE ORAL 2 TIMES DAILY
Status: DISCONTINUED | OUTPATIENT
Start: 2019-10-20 | End: 2019-10-31 | Stop reason: HOSPADM

## 2019-10-20 RX ORDER — SODIUM CHLORIDE 9 MG/ML
INJECTION, SOLUTION INTRAVENOUS CONTINUOUS
Status: ACTIVE | OUTPATIENT
Start: 2019-10-20 | End: 2019-10-21

## 2019-10-20 RX ORDER — CLONIDINE HYDROCHLORIDE 0.1 MG/1
0.1 TABLET ORAL 3 TIMES DAILY PRN
Status: DISCONTINUED | OUTPATIENT
Start: 2019-10-20 | End: 2019-10-31 | Stop reason: HOSPADM

## 2019-10-20 RX ORDER — IBUPROFEN 200 MG
24 TABLET ORAL
Status: DISCONTINUED | OUTPATIENT
Start: 2019-10-20 | End: 2019-10-31 | Stop reason: HOSPADM

## 2019-10-20 RX ADMIN — ACETAMINOPHEN 1000 MG: 500 TABLET ORAL at 03:10

## 2019-10-20 RX ADMIN — PIPERACILLIN AND TAZOBACTAM 4.5 G: 4; .5 INJECTION, POWDER, LYOPHILIZED, FOR SOLUTION INTRAVENOUS; PARENTERAL at 06:10

## 2019-10-20 RX ADMIN — INSULIN ASPART 15 UNITS: 100 INJECTION, SOLUTION INTRAVENOUS; SUBCUTANEOUS at 05:10

## 2019-10-20 RX ADMIN — SODIUM CHLORIDE, SODIUM LACTATE, POTASSIUM CHLORIDE, AND CALCIUM CHLORIDE 1000 ML: .6; .31; .03; .02 INJECTION, SOLUTION INTRAVENOUS at 04:10

## 2019-10-20 RX ADMIN — VANCOMYCIN HYDROCHLORIDE 1500 MG: 1.5 INJECTION, POWDER, LYOPHILIZED, FOR SOLUTION INTRAVENOUS at 09:10

## 2019-10-20 RX ADMIN — INSULIN DETEMIR 50 UNITS: 100 INJECTION, SOLUTION SUBCUTANEOUS at 09:10

## 2019-10-20 RX ADMIN — TACROLIMUS 3 MG: 1 CAPSULE ORAL at 09:10

## 2019-10-20 RX ADMIN — CEFTRIAXONE 1 G: 1 INJECTION, SOLUTION INTRAVENOUS at 04:10

## 2019-10-20 RX ADMIN — HEPARIN SODIUM 5000 UNITS: 5000 INJECTION, SOLUTION INTRAVENOUS; SUBCUTANEOUS at 09:10

## 2019-10-20 RX ADMIN — SODIUM CHLORIDE: 0.9 INJECTION, SOLUTION INTRAVENOUS at 11:10

## 2019-10-20 RX ADMIN — SODIUM CHLORIDE, SODIUM LACTATE, POTASSIUM CHLORIDE, AND CALCIUM CHLORIDE 1000 ML: .6; .31; .03; .02 INJECTION, SOLUTION INTRAVENOUS at 03:10

## 2019-10-20 RX ADMIN — VANCOMYCIN HYDROCHLORIDE 1500 MG: 1.5 INJECTION, POWDER, LYOPHILIZED, FOR SOLUTION INTRAVENOUS at 07:10

## 2019-10-20 RX ADMIN — INSULIN ASPART 5 UNITS: 100 INJECTION, SOLUTION INTRAVENOUS; SUBCUTANEOUS at 11:10

## 2019-10-20 RX ADMIN — SENNOSIDES, DOCUSATE SODIUM 1 TABLET: 50; 8.6 TABLET, FILM COATED ORAL at 09:10

## 2019-10-20 NOTE — ED TRIAGE NOTES
Pt arrived to ED with c/o l arm and hip pain x 4 days. Pt complains of R finger pain and fatigue. NAD. Pt poor historian. Pt appears lethargic, pt reports taking ambien today. Pt received kidney transplant 5 years ago.

## 2019-10-20 NOTE — ED PROVIDER NOTES
Encounter Date: 10/20/2019    SCRIBE #1 NOTE: I, Maranda Arce, am scribing for, and in the presence of,  Gibson Khan MD. I have scribed the following portions of the note - Other sections scribed: HPI ROS.       History     Chief Complaint   Patient presents with    left arm pain     x 4 dy with arm pain states hx of rotator cuff issue, no hx ; right hand swollen and painful    Hip Pain     left hip pain x4 days     CC: Left Arm and Left Hip Pain    HPI:  This is a 54 y.o. female with a PMHx of type 2 DM, CKD, HTN, and HLD presenting to the Emergency Department with a cc of worsening left arm and hip pain. She fell at work a couple of weeks ago and believes this may be the cause of her pain. Her friend has stayed with her the past couple days and noticed her worsening condition so he decided to take her to the ED. She is also complaining of generalized body weakness and chills. She denies any nausea, vomiting, fever, and a cough. No prior treatments. No alleviating factors. Patient has a known allergy to Iodine.         The history is provided by a friend. No  was used.     Review of patient's allergies indicates:   Allergen Reactions    Iodine and iodide containing products Hives    Shrimp Itching    Topamax [topiramate] Other (See Comments)     Vision changes    Zoloft [sertraline] Palpitations     Past Medical History:   Diagnosis Date    Acidosis 7/1/2014    Allergic rhinitis 7/1/2014    Allergy     Anemia     Anemia in chronic kidney disease 7/1/2014    Anxiety     Chronic immunosuppression with Prograf and MMF 12/3/2014    CKD (chronic kidney disease) stage 5, GFR less than 15 ml/min 7/1/2014    CKD (chronic kidney disease), stage III 3/2/2015    Degenerative disc disease     Depression 7/1/2014    Diabetes mellitus, type 2 since age 18 7/1/2014    ESRD on peritoneal dialysis - august 2014 for 9 hours no peritonitis 11/24/2014    Hyperlipidemia      Hypertension 2014    Hypomagnesemia 2015    Kidney transplant status, living unrelated donor - 12/2/14 12/3/2014    Neutropenia 2015    NS (nuclear sclerosis) 2016    Obesity     Organ transplant candidate 2014    Pre-op exam 2014    Proliferative diabetic retinopathy of both eyes without macular edema associated with type 2 diabetes mellitus 2016    Renal manifestation of secondary diabetes mellitus     Tendinitis     Trouble in sleeping      Past Surgical History:   Procedure Laterality Date    BONE MARROW BIOPSY N/A      SECTION      x 2    KIDNEY TRANSPLANT      RENAL BIOPSY      ROTATOR CUFF REPAIR      TUBAL LIGATION       Family History   Problem Relation Age of Onset    Diabetes Mother     Hypertension Mother     Diabetes Father     Kidney disease Father     Diabetes Sister     Hypertension Sister     Kidney disease Sister     Heart disease Sister     Diabetes Brother     Diabetes Sister     Stroke Maternal Grandmother     Heart disease Maternal Grandmother         pacemaker    Cataracts Maternal Grandmother     No Known Problems Maternal Aunt     No Known Problems Maternal Uncle     No Known Problems Paternal Aunt     No Known Problems Paternal Uncle     No Known Problems Maternal Grandfather     No Known Problems Paternal Grandmother     No Known Problems Paternal Grandfather     Cancer Neg Hx     Amblyopia Neg Hx     Blindness Neg Hx     Glaucoma Neg Hx     Macular degeneration Neg Hx     Retinal detachment Neg Hx     Strabismus Neg Hx     Thyroid disease Neg Hx     Breast cancer Neg Hx     Colon cancer Neg Hx     Ovarian cancer Neg Hx      Social History     Tobacco Use    Smoking status: Former Smoker     Packs/day: 1.00     Years: 20.00     Pack years: 20.00     Types: Cigarettes     Last attempt to quit: 2013     Years since quittin.1    Smokeless tobacco: Never Used    Tobacco comment: quit smoking  cigarettes in September 2014   Substance Use Topics    Alcohol use: No    Drug use: No     Types: Marijuana     Review of Systems   Constitutional: Positive for chills.   Musculoskeletal: Positive for arthralgias (+) Left arm and hip pain.   All other systems reviewed and are negative.      Physical Exam     Initial Vitals [10/20/19 1418]   BP Pulse Resp Temp SpO2   (!) 177/91 (!) 120 (!) 22 99.5 °F (37.5 °C) 97 %      MAP       --         Physical Exam    Nursing note and vitals reviewed.  Constitutional: She is not diaphoretic. No distress.   HENT:   Head: Normocephalic and atraumatic.   Right Ear: External ear normal.   Left Ear: External ear normal.   Nose: Nose normal.   Dry mucous membranes.   Eyes: Conjunctivae and EOM are normal. Pupils are equal, round, and reactive to light. Right eye exhibits no discharge. Left eye exhibits no discharge. No scleral icterus.   Neck: Normal range of motion. Neck supple.   Neck is supple without nuchal rigidity.   Cardiovascular: Regular rhythm, normal heart sounds and intact distal pulses.   No murmur heard.  Tachycardic, regular rhythm.   Pulmonary/Chest: Breath sounds normal. No respiratory distress. She has no wheezes. She has no rhonchi. She has no rales.   Abdominal: Soft. Bowel sounds are normal. She exhibits no distension and no mass. There is no tenderness. There is no rebound and no guarding.   Abdomen is soft and nontender in all quadrants.   Musculoskeletal: Normal range of motion. She exhibits no edema or tenderness.   Neurological: She is oriented to person, place, and time. She has normal strength. No cranial nerve deficit or sensory deficit.   Patient is oriented to person, place, time, events.  She is somewhat lethargic on arrival.   Skin: Skin is warm and dry. No rash noted. No erythema. No pallor.   No evidence of cellulitis or abscess or other skin eruption.         ED Course   Critical Care  Date/Time: 10/20/2019 6:15 PM  Performed by: Gibson FOSS  Erin Vick MD  Authorized by: Gibson Khan Jr., MD   Direct patient critical care time: 10 minutes  Additional history critical care time: 10 minutes  Ordering / reviewing critical care time: 5 minutes  Documentation critical care time: 5 minutes  Consulting other physicians critical care time: 5 minutes  Total critical care time (exclusive of procedural time) : 35 minutes  Critical care was necessary to treat or prevent imminent or life-threatening deterioration of the following conditions: metabolic crisis, endocrine crisis, renal failure and sepsis.  Critical care was time spent personally by me on the following activities: development of treatment plan with patient or surrogate, discussions with consultants, interpretation of cardiac output measurements, evaluation of patient's response to treatment, examination of patient, obtaining history from patient or surrogate, ordering and performing treatments and interventions, ordering and review of laboratory studies, ordering and review of radiographic studies, pulse oximetry, re-evaluation of patient's condition and review of old charts.        Labs Reviewed   CBC W/ AUTO DIFFERENTIAL - Abnormal; Notable for the following components:       Result Value    Hemoglobin 11.6 (*)     Hematocrit 36.3 (*)     Mean Corpuscular Hemoglobin 26.9 (*)     Immature Granulocytes 0.6 (*)     Gran # (ANC) 8.7 (*)     Immature Grans (Abs) 0.06 (*)     Lymph # 0.4 (*)     Gran% 90.7 (*)     Lymph% 4.0 (*)     All other components within normal limits   COMPREHENSIVE METABOLIC PANEL - Abnormal; Notable for the following components:    Sodium 131 (*)     CO2 18 (*)     Glucose 753 (*)     BUN, Bld 44 (*)     Creatinine 2.9 (*)     Total Protein 9.3 (*)     Albumin 2.1 (*)     AST 45 (*)     eGFR if  20 (*)     eGFR if non  17 (*)     All other components within normal limits    Narrative:      Glucose  critical result(s) called and verbal readback  obtained from   Aminah Aguiar. , 10/20/2019 15:43   URINALYSIS, REFLEX TO URINE CULTURE - Abnormal; Notable for the following components:    Protein, UA 3+ (*)     Glucose, UA 3+ (*)     Occult Blood UA 2+ (*)     All other components within normal limits    Narrative:     Preferred Collection Type->Urine, Clean Catch   LACTIC ACID, PLASMA - Abnormal; Notable for the following components:    Lactate (Lactic Acid) 3.2 (*)     All other components within normal limits   URINALYSIS MICROSCOPIC - Abnormal; Notable for the following components:    RBC,  (*)     All other components within normal limits    Narrative:     Preferred Collection Type->Urine, Clean Catch   ISTAT PROCEDURE - Abnormal; Notable for the following components:    POC PH 7.348 (*)     POC PO2 22 (*)     POC HCO3 23.7 (*)     POC SATURATED O2 34 (*)     All other components within normal limits   POCT GLUCOSE - Abnormal; Notable for the following components:    POCT Glucose 468 (*)     All other components within normal limits   CULTURE, BLOOD   CULTURE, BLOOD   BETA - HYDROXYBUTYRATE, SERUM   TSH   TROPONIN I   OSMOLALITY, SERUM   TACROLIMUS LEVEL   MYCOPHENOLIC ACID   LACTIC ACID, PLASMA          Imaging Results          X-Ray Hip 2 View Left (Final result)  Result time 10/20/19 17:24:21    Final result by Shelbie Banks MD (10/20/19 17:24:21)                 Impression:      No definite acute bony abnormality detected.      Electronically signed by: Shelbie Banks  Date:    10/20/2019  Time:    17:24             Narrative:    EXAMINATION:  TWO VIEWS OF THE LEFT HIP    CLINICAL HISTORY:  Pain in unspecified hip    TECHNIQUE:  AP and lateral view of the left hip    COMPARISON:  12/15/2015    FINDINGS:  Evaluation over the sacrum is particularly limited secondary to overlying bowel gas and stool.  Two views of the left hip demonstrate no definite acute fracture or dislocation.  If pain is out of proportion to the radiological findings,  recommend CT or MR.                               X-Ray Shoulder Trauma Left (Final result)  Result time 10/20/19 17:19:28    Final result by Shelbie Banks MD (10/20/19 17:19:28)                 Impression:      No acute bony abnormality detected.      Electronically signed by: Shelbie Banks  Date:    10/20/2019  Time:    17:19             Narrative:    EXAMINATION:  XR SHOULDER TRAUMA 3 VIEW LEFT    CLINICAL HISTORY:  Unspecified fall, initial encounter    TECHNIQUE:  Three views of the left shoulder were performed.    COMPARISON  None.    FINDINGS:  Three views of the left shoulder demonstrate no acute fracture or dislocation.                               X-Ray Chest AP Portable (Final result)  Result time 10/20/19 17:20:17    Final result by Александр Ojeda MD (10/20/19 17:20:17)                 Impression:      No acute process.      Electronically signed by: Александр Ojeda MD  Date:    10/20/2019  Time:    17:20             Narrative:    EXAMINATION:  XR CHEST AP PORTABLE    CLINICAL HISTORY:  Fever, unspecified    TECHNIQUE:  Single frontal view of the chest was performed.    COMPARISON:  12/02/2014.    FINDINGS:  The right-sided internal jugular access catheter has been removed.  Monitoring EKG leads are present.  The trachea is unremarkable.  There are calcifications of the aortic knob.  The cardiomediastinal silhouette is within normal limits.  The hemidiaphragms are unremarkable.  There is no evidence of free air beneath the hemidiaphragms.  There are no pleural effusions.  There is no evidence of a pneumothorax.  There is no evidence of pneumomediastinum.  No airspace opacity is present.  The osseous structures are unremarkable.                               CT Head Without Contrast (Final result)  Result time 10/20/19 16:44:10    Final result by Robinson Richards MD (10/20/19 16:44:10)                 Impression:      No acute intracranial abnormality.      Electronically signed by: Robinson Richards  MD  Date:    10/20/2019  Time:    16:44             Narrative:    EXAMINATION:  CT HEAD WITHOUT CONTRAST    CLINICAL HISTORY:  Confusion/delirium, altered LOC, unexplained;    TECHNIQUE:  Low dose axial images were obtained through the head.  Coronal and sagittal reformations were also performed. Contrast was not administered.    FINDINGS:  The brain has a normal appearance.  The ventricular system is within normal limits of size for age and shows no distortion by mass effect.  There is no intra or extra-axial mass or hemorrhage identified.  The visualized extracranial structures are unremarkable.                              X-Rays:   Independently Interpreted Readings:   Other Readings:  Chest x-ray reveals no evidence of infiltrate or consolidation.  Shoulder and hip x-rays reveal no evidence of bony abnormality.  CT of the head reveals no evidence of intracranial hemorrhage or mass effect.    Medical Decision Making:   ED Management:  This is the emergent evaluation of a 54-year-old female presents emergency department complaining of left-sided body pain from a fall 2 weeks ago.  Patient also complains of 2 days of weakness. Differential diagnosis at the time of initial evaluation included, but was not limited to:  Dehydration, DKA, sepsis, pneumonia, urinary tract infection.  Patient found to have fever long rectal temperature.  She was given acetaminophen and lactate and cultures were sent.  A sepsis alert was called at the time of rectal temperature check.    This patient was initially given a dose of ceftriaxone.  These antibiotics will be expanded since there was not an obvious source at this time.  Patient has 100 red blood cells in her urine but no white blood cells.  Chest x-ray does not reveal definite pneumonia.  She will be tested for influenza.  Abdomen is soft and nontender without any vomiting.  There is no evidence of cellulitis.  Neck is supple and there is no evidence of nuchal rigidity.  Do not  suspect CNS infection.  After IV fluids, patient's blood glucose has significantly decreased to the 400s.  This is more likely HHS given normal beta hydroxybutyrate and no anion gap acidosis.  I suspect lactic acidosis causing the patient is serum bicarbonate level of 18.  PH is 7.34 on VBG.  She does have acute kidney injury.  This is likely prerenal in nature given BUN of 44.  However, I did contact the Renal Transplant Service at [facility].  I spoke with Dr. Catalan who agreed this patient may stay at this hospital for admission and treatment of underlying causes of bump in creatinine including sepsis and dehydration.  Advises holding Cellcept.  Patient's head CT and other imaging studies for also unremarkable.  Patient is still normotensive in tachycardia has decreased though she is still greater than 100 beats per minute.  Case with see discussed with Dr. Rose, the admitting hospitalist, who agrees with the above plan.  I have rediscussed the above with the patient.  At this time, she is much more alert.  Dr. Rose would like to put this patient back on her home insulin regimen.  We will continue IV fluids and antibiotics as above.  Patient is stable at the time of admission.            Scribe Attestation:   Scribe #1: I performed the above scribed service and the documentation accurately describes the services I performed. I attest to the accuracy of the note.               Clinical Impression:       ICD-10-CM ICD-9-CM   1. Chronic hip pain, unspecified laterality M25.559 719.45    G89.29 338.29   2. Hip pain M25.559 719.45   3. Weakness R53.1 780.79   4. Fall W19.XXXA E888.9   5. Fever R50.9 780.60   6. Chronic left shoulder pain M25.512 719.41    G89.29 338.29   7. Sepsis, due to unspecified organism, unspecified whether acute organ dysfunction present A41.9 038.9     995.91   8. Hematuria, unspecified type R31.9 599.70   9. Dehydration E86.0 276.51   10. Acute kidney injury N17.9 584.9   11.  Hyperglycemia R73.9 790.29           I, Gibson Khan MD, personally performed the services described in this documentation. All medical record entries made by the scribe were at my direction and in my presence. I have reviewed the chart and agree that the record reflects my personal performance and is accurate and complete                        Gibson Khan Jr., MD  10/20/19 5626

## 2019-10-21 PROBLEM — N19 UREMIC ENCEPHALOPATHY: Status: RESOLVED | Noted: 2019-10-20 | Resolved: 2019-10-21

## 2019-10-21 PROBLEM — G93.49 UREMIC ENCEPHALOPATHY: Status: RESOLVED | Noted: 2019-10-20 | Resolved: 2019-10-21

## 2019-10-21 PROBLEM — R78.81 GRAM-POSITIVE BACTEREMIA: Status: ACTIVE | Noted: 2019-10-20

## 2019-10-21 LAB
ALBUMIN SERPL BCP-MCNC: 1.7 G/DL (ref 3.5–5.2)
ALP SERPL-CCNC: 101 U/L (ref 55–135)
ALT SERPL W/O P-5'-P-CCNC: 37 U/L (ref 10–44)
AMORPH CRY URNS QL MICRO: ABNORMAL
ANION GAP SERPL CALC-SCNC: 8 MMOL/L (ref 8–16)
AST SERPL-CCNC: 46 U/L (ref 10–40)
BACTERIA #/AREA URNS HPF: ABNORMAL /HPF
BASOPHILS # BLD AUTO: 0.02 K/UL (ref 0–0.2)
BASOPHILS NFR BLD: 0.2 % (ref 0–1.9)
BILIRUB SERPL-MCNC: 0.3 MG/DL (ref 0.1–1)
BILIRUB UR QL STRIP: NEGATIVE
BUN SERPL-MCNC: 34 MG/DL (ref 6–20)
CALCIUM SERPL-MCNC: 9.5 MG/DL (ref 8.7–10.5)
CHLORIDE SERPL-SCNC: 104 MMOL/L (ref 95–110)
CK SERPL-CCNC: 92 U/L (ref 20–180)
CLARITY UR: ABNORMAL
CO2 SERPL-SCNC: 25 MMOL/L (ref 23–29)
COLOR UR: YELLOW
CREAT SERPL-MCNC: 2.2 MG/DL (ref 0.5–1.4)
CREAT UR-MCNC: 113.4 MG/DL (ref 15–325)
DIFFERENTIAL METHOD: ABNORMAL
DOHLE BOD BLD QL SMEAR: PRESENT
EOSINOPHIL # BLD AUTO: 0 K/UL (ref 0–0.5)
EOSINOPHIL NFR BLD: 0.3 % (ref 0–8)
EOSINOPHIL URNS QL WRIGHT STN: NORMAL
ERYTHROCYTE [DISTWIDTH] IN BLOOD BY AUTOMATED COUNT: 14.3 % (ref 11.5–14.5)
EST. GFR  (AFRICAN AMERICAN): 28 ML/MIN/1.73 M^2
EST. GFR  (NON AFRICAN AMERICAN): 24 ML/MIN/1.73 M^2
ESTIMATED AVG GLUCOSE: 252 MG/DL (ref 68–131)
GIANT PLATELETS BLD QL SMEAR: PRESENT
GLUCOSE SERPL-MCNC: 228 MG/DL (ref 70–110)
GLUCOSE UR QL STRIP: ABNORMAL
HBA1C MFR BLD HPLC: 10.4 % (ref 4–5.6)
HCT VFR BLD AUTO: 30.5 % (ref 37–48.5)
HGB BLD-MCNC: 9.8 G/DL (ref 12–16)
HGB UR QL STRIP: ABNORMAL
HYALINE CASTS #/AREA URNS LPF: 1 /LPF
IMM GRANULOCYTES # BLD AUTO: 0.15 K/UL (ref 0–0.04)
IMM GRANULOCYTES NFR BLD AUTO: 1.4 % (ref 0–0.5)
KETONES UR QL STRIP: NEGATIVE
LEUKOCYTE ESTERASE UR QL STRIP: NEGATIVE
LYMPHOCYTES # BLD AUTO: 1 K/UL (ref 1–4.8)
LYMPHOCYTES NFR BLD: 9.2 % (ref 18–48)
MAGNESIUM SERPL-MCNC: 2.3 MG/DL (ref 1.6–2.6)
MCH RBC QN AUTO: 26.7 PG (ref 27–31)
MCHC RBC AUTO-ENTMCNC: 32.1 G/DL (ref 32–36)
MCV RBC AUTO: 83 FL (ref 82–98)
MICROSCOPIC COMMENT: ABNORMAL
MONOCYTES # BLD AUTO: 1.3 K/UL (ref 0.3–1)
MONOCYTES NFR BLD: 12.5 % (ref 4–15)
NEUTROPHILS # BLD AUTO: 8.2 K/UL (ref 1.8–7.7)
NEUTROPHILS NFR BLD: 76.4 % (ref 38–73)
NITRITE UR QL STRIP: NEGATIVE
NRBC BLD-RTO: 0 /100 WBC
OSMOLALITY SERPL: 338 MOSM/KG (ref 275–295)
PH UR STRIP: 5 [PH] (ref 5–8)
PHOSPHATE SERPL-MCNC: 2.4 MG/DL (ref 2.7–4.5)
PLATELET # BLD AUTO: 127 K/UL (ref 150–350)
PLATELET BLD QL SMEAR: ABNORMAL
PMV BLD AUTO: 13.2 FL (ref 9.2–12.9)
POCT GLUCOSE: 117 MG/DL (ref 70–110)
POCT GLUCOSE: 140 MG/DL (ref 70–110)
POCT GLUCOSE: 187 MG/DL (ref 70–110)
POCT GLUCOSE: 210 MG/DL (ref 70–110)
POCT GLUCOSE: 454 MG/DL (ref 70–110)
POCT GLUCOSE: >500 MG/DL (ref 70–110)
POTASSIUM SERPL-SCNC: 3.9 MMOL/L (ref 3.5–5.1)
PROT SERPL-MCNC: 7.7 G/DL (ref 6–8.4)
PROT UR QL STRIP: ABNORMAL
PROT UR-MCNC: 643 MG/DL
PROT/CREAT UR: 5.67 MG/G{CREAT} (ref 0–0.2)
RBC # BLD AUTO: 3.67 M/UL (ref 4–5.4)
RBC #/AREA URNS HPF: 40 /HPF (ref 0–4)
SODIUM SERPL-SCNC: 137 MMOL/L (ref 136–145)
SP GR UR STRIP: 1.02 (ref 1–1.03)
SQUAMOUS #/AREA URNS HPF: 2 /HPF
TACROLIMUS BLD-MCNC: <1.5 NG/ML (ref 5–15)
TOXIC GRANULES BLD QL SMEAR: PRESENT
URN SPEC COLLECT METH UR: ABNORMAL
UROBILINOGEN UR STRIP-ACNC: NEGATIVE EU/DL
VANCOMYCIN SERPL-MCNC: 22.8 UG/ML
WBC # BLD AUTO: 10.69 K/UL (ref 3.9–12.7)
WBC #/AREA URNS HPF: 11 /HPF (ref 0–5)
WBC TOXIC VACUOLES BLD QL SMEAR: PRESENT
YEAST URNS QL MICRO: ABNORMAL

## 2019-10-21 PROCEDURE — 80053 COMPREHEN METABOLIC PANEL: CPT

## 2019-10-21 PROCEDURE — 94761 N-INVAS EAR/PLS OXIMETRY MLT: CPT

## 2019-10-21 PROCEDURE — 87205 SMEAR GRAM STAIN: CPT

## 2019-10-21 PROCEDURE — 80202 ASSAY OF VANCOMYCIN: CPT

## 2019-10-21 PROCEDURE — 25000003 PHARM REV CODE 250: Performed by: INTERNAL MEDICINE

## 2019-10-21 PROCEDURE — 99232 PR SUBSEQUENT HOSPITAL CARE,LEVL II: ICD-10-PCS | Mod: ,,, | Performed by: INTERNAL MEDICINE

## 2019-10-21 PROCEDURE — 63600175 PHARM REV CODE 636 W HCPCS: Performed by: PHYSICIAN ASSISTANT

## 2019-10-21 PROCEDURE — 84100 ASSAY OF PHOSPHORUS: CPT

## 2019-10-21 PROCEDURE — 83036 HEMOGLOBIN GLYCOSYLATED A1C: CPT

## 2019-10-21 PROCEDURE — 63600175 PHARM REV CODE 636 W HCPCS: Performed by: HOSPITALIST

## 2019-10-21 PROCEDURE — 11000001 HC ACUTE MED/SURG PRIVATE ROOM

## 2019-10-21 PROCEDURE — 85025 COMPLETE CBC W/AUTO DIFF WBC: CPT

## 2019-10-21 PROCEDURE — 99232 SBSQ HOSP IP/OBS MODERATE 35: CPT | Mod: ,,, | Performed by: INTERNAL MEDICINE

## 2019-10-21 PROCEDURE — C9399 UNCLASSIFIED DRUGS OR BIOLOG: HCPCS | Performed by: HOSPITALIST

## 2019-10-21 PROCEDURE — 25000003 PHARM REV CODE 250: Performed by: HOSPITALIST

## 2019-10-21 PROCEDURE — 63600175 PHARM REV CODE 636 W HCPCS: Performed by: INTERNAL MEDICINE

## 2019-10-21 PROCEDURE — 36415 COLL VENOUS BLD VENIPUNCTURE: CPT

## 2019-10-21 PROCEDURE — 83735 ASSAY OF MAGNESIUM: CPT

## 2019-10-21 PROCEDURE — 87040 BLOOD CULTURE FOR BACTERIA: CPT

## 2019-10-21 PROCEDURE — 82550 ASSAY OF CK (CPK): CPT

## 2019-10-21 PROCEDURE — 81000 URINALYSIS NONAUTO W/SCOPE: CPT

## 2019-10-21 PROCEDURE — 82570 ASSAY OF URINE CREATININE: CPT

## 2019-10-21 RX ORDER — OXYCODONE HYDROCHLORIDE 5 MG/1
5 TABLET ORAL EVERY 6 HOURS PRN
Status: DISCONTINUED | OUTPATIENT
Start: 2019-10-21 | End: 2019-10-21

## 2019-10-21 RX ORDER — INSULIN ASPART 100 [IU]/ML
15 INJECTION, SOLUTION INTRAVENOUS; SUBCUTANEOUS
Status: DISCONTINUED | OUTPATIENT
Start: 2019-10-21 | End: 2019-10-21

## 2019-10-21 RX ORDER — OXYCODONE AND ACETAMINOPHEN 10; 325 MG/1; MG/1
1 TABLET ORAL EVERY 4 HOURS PRN
Status: DISCONTINUED | OUTPATIENT
Start: 2019-10-21 | End: 2019-10-31 | Stop reason: HOSPADM

## 2019-10-21 RX ORDER — MORPHINE SULFATE 10 MG/ML
2 INJECTION INTRAMUSCULAR; INTRAVENOUS; SUBCUTANEOUS ONCE
Status: COMPLETED | OUTPATIENT
Start: 2019-10-21 | End: 2019-10-21

## 2019-10-21 RX ORDER — INSULIN ASPART 100 [IU]/ML
10 INJECTION, SOLUTION INTRAVENOUS; SUBCUTANEOUS
Status: DISCONTINUED | OUTPATIENT
Start: 2019-10-21 | End: 2019-10-31 | Stop reason: HOSPADM

## 2019-10-21 RX ORDER — ACETAMINOPHEN 325 MG/1
650 TABLET ORAL EVERY 6 HOURS PRN
Status: DISCONTINUED | OUTPATIENT
Start: 2019-10-21 | End: 2019-10-29

## 2019-10-21 RX ADMIN — INSULIN ASPART 10 UNITS: 100 INJECTION, SOLUTION INTRAVENOUS; SUBCUTANEOUS at 05:10

## 2019-10-21 RX ADMIN — CEFTRIAXONE SODIUM 2 G: 2 INJECTION, SOLUTION INTRAVENOUS at 10:10

## 2019-10-21 RX ADMIN — INSULIN ASPART 15 UNITS: 100 INJECTION, SOLUTION INTRAVENOUS; SUBCUTANEOUS at 12:10

## 2019-10-21 RX ADMIN — INSULIN ASPART 4 UNITS: 100 INJECTION, SOLUTION INTRAVENOUS; SUBCUTANEOUS at 08:10

## 2019-10-21 RX ADMIN — ACETAMINOPHEN 500 MG: 500 TABLET ORAL at 05:10

## 2019-10-21 RX ADMIN — TACROLIMUS 3 MG: 1 CAPSULE ORAL at 08:10

## 2019-10-21 RX ADMIN — HEPARIN SODIUM 5000 UNITS: 5000 INJECTION, SOLUTION INTRAVENOUS; SUBCUTANEOUS at 09:10

## 2019-10-21 RX ADMIN — PIPERACILLIN AND TAZOBACTAM 4.5 G: 4; .5 INJECTION, POWDER, FOR SOLUTION INTRAVENOUS at 05:10

## 2019-10-21 RX ADMIN — OXYCODONE HYDROCHLORIDE 5 MG: 5 TABLET ORAL at 06:10

## 2019-10-21 RX ADMIN — OXYCODONE HYDROCHLORIDE 5 MG: 5 TABLET ORAL at 01:10

## 2019-10-21 RX ADMIN — ATORVASTATIN CALCIUM 20 MG: 10 TABLET, FILM COATED ORAL at 08:10

## 2019-10-21 RX ADMIN — OXYCODONE HYDROCHLORIDE AND ACETAMINOPHEN 1 TABLET: 10; 325 TABLET ORAL at 05:10

## 2019-10-21 RX ADMIN — MORPHINE SULFATE 2 MG: 10 INJECTION INTRAVENOUS at 10:10

## 2019-10-21 RX ADMIN — TACROLIMUS 3 MG: 1 CAPSULE ORAL at 05:10

## 2019-10-21 RX ADMIN — SODIUM CHLORIDE: 0.9 INJECTION, SOLUTION INTRAVENOUS at 12:10

## 2019-10-21 RX ADMIN — INSULIN ASPART 30 UNITS: 100 INJECTION, SOLUTION INTRAVENOUS; SUBCUTANEOUS at 08:10

## 2019-10-21 RX ADMIN — INSULIN ASPART 2 UNITS: 100 INJECTION, SOLUTION INTRAVENOUS; SUBCUTANEOUS at 12:10

## 2019-10-21 RX ADMIN — INSULIN DETEMIR 20 UNITS: 100 INJECTION, SOLUTION SUBCUTANEOUS at 09:10

## 2019-10-21 RX ADMIN — CLONIDINE HYDROCHLORIDE 0.1 MG: 0.1 TABLET ORAL at 05:10

## 2019-10-21 RX ADMIN — HEPARIN SODIUM 5000 UNITS: 5000 INJECTION, SOLUTION INTRAVENOUS; SUBCUTANEOUS at 08:10

## 2019-10-21 RX ADMIN — RAMELTEON 8 MG: 8 TABLET ORAL at 09:10

## 2019-10-21 NOTE — SUBJECTIVE & OBJECTIVE
Interval History: Still complaining of pain to left shoulder    Review of Systems   HENT: Negative for ear discharge and ear pain.    Eyes: Negative for pain and itching.   Endocrine: Negative for polyphagia and polyuria.   Neurological: Negative for seizures and syncope.     Objective:     Vital Signs (Most Recent):  Temp: 99.3 °F (37.4 °C) (10/21/19 1616)  Pulse: 97 (10/21/19 1616)  Resp: 18 (10/21/19 1616)  BP: (!) 157/74 (10/21/19 1616)  SpO2: 96 % (10/21/19 1616) Vital Signs (24h Range):  Temp:  [98.4 °F (36.9 °C)-99.6 °F (37.6 °C)] 99.3 °F (37.4 °C)  Pulse:  [] 97  Resp:  [16-20] 18  SpO2:  [94 %-100 %] 96 %  BP: (124-176)/(66-90) 157/74     Weight: 93.8 kg (206 lb 11.2 oz)  Body mass index is 31.9 kg/m².    Intake/Output Summary (Last 24 hours) at 10/21/2019 1828  Last data filed at 10/21/2019 1443  Gross per 24 hour   Intake 1150 ml   Output 350 ml   Net 800 ml      Physical Exam   Constitutional: She is oriented to person, place, and time. She appears well-developed and well-nourished. No distress.   HENT:   Head: Normocephalic and atraumatic.   Right Ear: External ear normal.   Left Ear: External ear normal.   Nose: Nose normal.   Eyes: Right eye exhibits no discharge. Left eye exhibits no discharge.   Neck: Normal range of motion.   Cardiovascular: Normal rate, regular rhythm, normal heart sounds and intact distal pulses. Exam reveals no gallop and no friction rub.   No murmur heard.  Pulmonary/Chest: Effort normal and breath sounds normal. No stridor. No respiratory distress. She has no wheezes. She has no rales. She exhibits no tenderness.   Abdominal: Soft. Bowel sounds are normal. She exhibits no distension. There is no tenderness. There is no rebound and no guarding.   Musculoskeletal:   There is some tenderness to palpation to the left shoulder and left hip without any joint swelling nor erythema   Neurological: She is alert and oriented to person, place, and time.   Skin: Skin is warm and  dry. She is not diaphoretic. No erythema.   Psychiatric: She has a normal mood and affect. Her behavior is normal. Judgment and thought content normal.   Nursing note and vitals reviewed.      Significant Labs:   Blood Culture:   Recent Labs   Lab 10/20/19  1530 10/20/19  1600 10/21/19  1024   LABBLOO Gram stain ciera bottle: Gram positive cocci in chains resembling Strep   Positive results previously called  Gram stain aer bottle: Gram positive cocci in chains resembling Strep   Positive results previously called  STREPTOCOCCUS AGALACTIAE (GROUP B)  Susceptibility pending  Beta-hemolytic streptococci are routinely susceptible to   penicillins,cephalosporins and carbapenems.  * Gram stain ciera bottle: Gram positive cocci in chains resembling Strep   Results called to and read back by: Andrea Ackerman  Gram stain aer bottle: Gram positive cocci in chains resembling Strep   Positive results previously called  STREPTOCOCCUS AGALACTIAE (GROUP B)  Beta-hemolytic streptococci are routinely susceptible to   penicillins,cephalosporins and carbapenems.  * No Growth to date  No Growth to date     BMP:   Recent Labs   Lab 10/21/19  0440   *      K 3.9      CO2 25   BUN 34*   CREATININE 2.2*   CALCIUM 9.5   MG 2.3     CBC:   Recent Labs   Lab 10/20/19  1449 10/21/19  0440   WBC 9.64 10.69   HGB 11.6* 9.8*   HCT 36.3* 30.5*   PLT SEE COMMENT 127*       Significant Imaging: I have reviewed all pertinent imaging results/findings within the past 24 hours.

## 2019-10-21 NOTE — HPI
Ms. 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 In ED, pt febrile to 102.5, tachycardiac to 125, hypertensive 177/91 and tachypneic. W/u significant for PATRICK Cr 2.9, serum glucose 753, Na 131, lactic acid 3.2, VBG showing mild metabolic acidosis, and UA with spec grav 1.025, 3+ protein, 3+ glucose, 2+ occult blood, 100 RBCs. CT head, CXRR, Xray L shoulder and L hip all unremarkable. Pt received 1L LR bolus x3, started on NS infusion 100 ml/hr. Empirically treated for sepsis with vanc, zosyn, ceftriaxone. Dr. Catalan with transplant medicine at [facility] contacted, felt pt could appropriately be treated at [facility], advised to hold cellcept. Noted goal prograf level 4-7. BCx, Prograf and Cellcept levels drawn. Nephrology consulted 10/21/19 for PATRICK in renal transplant pt. Baseline Cr appears ~1.6 -2 with GFR 30-40. Last at baseline, 1.6, 19 on routine outpt labs. Sees nephrology outpt, last saw Dr. Bonilla 2019, no issues noted, Upr Cr 1.4 g at this time. Reports compliance with all home meds including prograf and cellcept. Good PO fluid intake at home, attempts 1 gallon per day, and endorses good UOP; denies foamy/frothy urine, hematuria, dysuria. Also denies recent n/v, rashes, confusion, hemoptysis, edema, polydipsia, polyuria, confusion, SOB. Denies any new meds or med changes. Endorses recent chills, agitation.

## 2019-10-21 NOTE — PROGRESS NOTES
Pharmacokinetic Assessment Follow Up: IV Vancomycin    Vancomycin serum concentration assessment(s):    The random level was drawn correctly and can be used to guide therapy at this time. The measurement is above the desired definitive target range of 10 to 20 mcg/mL.    Vancomycin Regimen Plan:    Re-dose when the random level is less than 20 mcg/mL, next level to be drawn at 0400 on 10/22/2019    Drug levels (last 3 results):  Recent Labs   Lab Result Units 10/21/19  0440   Vancomycin, Random ug/mL 22.8       Pharmacy will continue to follow and monitor vancomycin.    Please contact pharmacy at extension [phone] for questions regarding this assessment.    Thank you for the consult,   Chika Barron       Patient brief summary:  Elizabeth Maldonado is a 54 y.o. female initiated on antimicrobial therapy with IV Vancomycin for treatment of sepsis    Drug Allergies:   Review of patient's allergies indicates:   Allergen Reactions    Iodine and iodide containing products Hives    Shrimp Itching    Topamax [topiramate] Other (See Comments)     Vision changes    Zoloft [sertraline] Palpitations       Actual Body Weight:   93.8 kg    Renal Function:   Estimated Creatinine Clearance: 33.9 mL/min (A) (based on SCr of 2.2 mg/dL (H)).,     Dialysis Method (if applicable):  N/A    CBC (last 72 hours):  Recent Labs   Lab Result Units 10/20/19  1449 10/21/19  0440   WBC K/uL 9.64 10.69   Hemoglobin g/dL 11.6* 9.8*   Hematocrit % 36.3* 30.5*   Platelets K/uL SEE COMMENT 127*   Gran% % 90.7*  --    Lymph% % 4.0*  --    Mono% % 4.5  --    Eosinophil% % 0.0  --    Basophil% % 0.2  --    Differential Method  Automated  --        Metabolic Panel (last 72 hours):  Recent Labs   Lab Result Units 10/20/19  1449 10/20/19  1655 10/21/19  0440   Sodium mmol/L 131*  --  137   Potassium mmol/L 4.8  --  3.9   Chloride mmol/L 98  --  104   CO2 mmol/L 18*  --  25   Glucose mg/dL 753*  --  228*   Glucose, UA   --  3+*  --    BUN, Bld mg/dL 44*  --   34*   Creatinine mg/dL 2.9*  --  2.2*   Albumin g/dL 2.1*  --  1.7*   Total Bilirubin mg/dL 0.5  --  0.3   Alkaline Phosphatase U/L 126  --  101   AST U/L 45*  --  46*   ALT U/L 36  --  37   Magnesium mg/dL  --   --  2.3   Phosphorus mg/dL  --   --  2.4*       Vancomycin Administrations:  vancomycin given in the last 96 hours                   vancomycin 1.5 g in dextrose 5 % 250 mL IVPB (ready to mix) (mg) 1,500 mg New Bag 10/20/19 2134    vancomycin 1.5 g in dextrose 5 % 250 mL IVPB (ready to mix) (mg) 1,500 mg New Bag 10/20/19 1919                Microbiologic Results:  Microbiology Results (last 7 days)     Procedure Component Value Units Date/Time    Blood Culture #2 **CANNOT BE ORDERED STAT** [[id number]] Collected:  10/20/19 1600    Order Status:  Completed Specimen:  Blood from Peripheral, Antecubital, Left Updated:  10/21/19 0327     Blood Culture, Routine Gram stain ciera bottle: Gram positive cocci in chains resembling Strep       Results called to and read back by: Andrea Ackerman      Gram stain aer bottle: Gram positive cocci in chains resembling Strep       Positive results previously called    Blood Culture #1 **CANNOT BE ORDERED STAT** [[id number]] Collected:  10/20/19 1530    Order Status:  Completed Specimen:  Blood from Peripheral, Hand, Right Updated:  10/21/19 0326     Blood Culture, Routine Gram stain ciera bottle: Gram positive cocci in chains resembling Strep       Positive results previously called      Gram stain aer bottle: Gram positive cocci in chains resembling Strep       Positive results previously called

## 2019-10-21 NOTE — NURSING
Pt arrived via stretcher from ER. Pt had family at bedside.  At this time pt was a poor historian due to a sleeping pill she had taken while at home. Pt has been unable to roll or make significant weight changes. Pt has been hyperglycemic and treated per order. Pt is using bedpan due to inability to turn. Pt is normally independent at home but has been completely dependent since admit. Pt has c/o pain 10/10 to left shoulder and hip with no relief reported from po pain med. Bed is locked and low with call light within reach. Side rails are up x 2. Bed alarm is on and audible.

## 2019-10-21 NOTE — ASSESSMENT & PLAN NOTE
Etiology is yet to be determined but is likely uremic.  CT-head was negative for acute intracranial abnormalities.  I have reviewed the chest X-ray and it reveals no infiltrates or consolidations.  Patient is with fevers but no meningismus.  Urinalysis was benign for infection; serum glucose was elevated at 753 mg/dL; electrolytes are benign; and there is evidence of uremia.  There is no evidence of thyroidal disease; skin is  intact; and there are no rashes.  Patient is hemodynamically-stable and there has not been recent medication changes.  Clinical suspicion of CVA or subclinical seizures are low.  Will plan to perform neurological testing every 4 hours with frequent re-orientation.  Will also minimize medications and place fall precautions.

## 2019-10-21 NOTE — HPI
Ms. Elizabeth Maldonado is a 54 y.o. female with type 2 diabetes mellitus (HbA1c 8.7% Dec 2014), hyperlipidemia (.6 Nov 2014), CKD stage 3, anemia of chronic disease, and history of renal transplant who presents to [facility] ED with complaints of left arm and hip pain the past few days.  She reports that the pain is mainly localized to her lower back, left hip, and left shoulder.  She does recall falling at work as she was stepping down a ladder and missed the last step.  She does recall falling on her left side but denies any head trauma.  She denies any fevers, nausea, vomiting, abdominal pain, nor any diarrhea.  She does report that her appetite has been poor in the last few days but she has not lost any weight.  She denies any night sweats but does report some chills.    Of note, she was noted to be febrile in the ER but denies any coughing, dysuria, rashes, headache, nor any neck stiffness.  She denies any dyspnea, hemoptysis, lower extremity pain or swelling, recent travel, nor any sick contacts.

## 2019-10-21 NOTE — CONSULTS
[facility]  Infectious Disease  Consult Note    Patient Name: Elizabeth Maldonado  MRN: [id number]  Admission Date: 10/20/2019  Hospital Length of Stay: 1 days  Attending Physician: Osvaldo Rose MD  Primary Care Provider: Carlo Healy MD     Isolation Status: No active isolations    Patient information was obtained from patient and past medical records.      Inpatient consult to Infectious Diseases  Consult performed by: JOSE Ivory  Consult ordered by: Osvaldo Rose MD        Assessment/Plan:     Gram-positive bacteremia  54 yr old with kidney transplant (on cellcept and prograf), DM, HLD presents with left shoulder, left lower back, and left hip pain as well as decreased appetite and chills. Reports a recent fall off of a ladder at work. Pt states her left hip/back pain worsened causing her to be unable to walk. Her blood cultures returned positive for GPCs in chains resembling strep. ID is consulted for further recs.     Fever resolved. WBC 10K today. On exam, pt with left hip pain, left shoulder pain, and tenderness to palpation left lower back.  Pt with PATRICK- nephrology consulted.    Plan:  1. Continue vancomycin (pharmacy to dose); vanc trough goal: 15-20  2. D/c zosyn; re-start rocephin 2 gram IV q 24 hours  3. Will f/u on ID of cultures and tailor accordingly  4. Recommend consult to orthopedics to determine best imaging; will likely need MRI left hip, back, shoulder?  5. Recommend 2D echo  6. Will follow    Thank you for your consult. I will follow-up with patient. Please contact us if you have any additional questions.  JOSE Alvarado Pager: [phone]  Infectious Disease  [facility]    Subjective:     Principal Problem: Acute renal failure superimposed on stage 3 chronic kidney disease    HPI: This is a 54 year old female female with type 2 diabetes mellitus, HLD, CKD,  anemia of chronic disease, and history of renal transplant 2014 (on cellcept and  prograf) who presents to [facility] ED with complaints of left shoulder and left hip pain the past few days.  She reports that the pain is mainly localized to her left lower back, left hip, and left shoulder.  She does recall falling at work as she was stepping down a ladder and missed the last step.  She does recall falling on her left side but denies any head trauma.   she reports associated poor appetite and chills at home. She denies diarrhea, abd pain, or urinary symptoms.      She was found to have positive blood cultures with GPCs in chains resembling strep. ID consulted for further recs. CT left hip and left shoulder (both done without contrast) non revealing.     Past Medical History:   Diagnosis Date    Acidosis 2014    Allergic rhinitis 2014    Allergy     Anemia     Anemia in chronic kidney disease 2014    Anxiety     Chronic immunosuppression with Prograf and MMF 12/3/2014    CKD (chronic kidney disease) stage 5, GFR less than 15 ml/min 2014    CKD (chronic kidney disease), stage III 3/2/2015    Degenerative disc disease     Depression 2014    Diabetes mellitus, type 2 since age 18 2014    ESRD on peritoneal dialysis - 2014 for 9 hours no peritonitis 2014    Hyperlipidemia     Hypertension 2014    Hypomagnesemia 2015    Kidney transplant status, living unrelated donor - 12/2/14 12/3/2014    Neutropenia 2015    NS (nuclear sclerosis) 2016    Obesity     Organ transplant candidate 2014    Pre-op exam 2014    Proliferative diabetic retinopathy of both eyes without macular edema associated with type 2 diabetes mellitus 2016    Renal manifestation of secondary diabetes mellitus     Tendinitis     Trouble in sleeping        Past Surgical History:   Procedure Laterality Date    BONE MARROW BIOPSY N/A      SECTION      x 2    KIDNEY TRANSPLANT      RENAL BIOPSY      ROTATOR CUFF REPAIR      TUBAL LIGATION    "      Review of patient's allergies indicates:   Allergen Reactions    Iodine and iodide containing products Hives    Shrimp Itching    Topamax [topiramate] Other (See Comments)     Vision changes    Zoloft [sertraline] Palpitations       Medications:  Medications Prior to Admission   Medication Sig    ACCU-CHEK PREM Misc USE AS DIRECTED TO CHECK BLOOD GLUCOSE    acetaminophen (TYLENOL) 500 MG tablet TK 2 CAPLETS PO TID    ADMELOG SOLOSTAR U-100 INSULIN 100 unit/mL pen INJECT 30 UNITS UNDER THE SKIN TID WC    atorvastatin (LIPITOR) 20 MG tablet Take 20 mg by mouth.    BASAGLAR KWIKPEN U-100 INSULIN glargine 100 units/mL (3mL) SubQ pen     blood sugar diagnostic Strp Checks BG ac/hs    INSULIN GLARGINE,HUM.REC.ANLOG (BASAGLAR KWIKPEN U-100 INSULIN SUBQ) Inject 50 Units into the skin nightly.    insulin lispro (ADMELOG SOLOSTAR U-100 INSULIN SUBQ) Inject 100 Units into the skin 3 (three) times daily with meals.    insulin lispro 100 unit/mL injection Inject into the skin.    insulin syringe-needle U-100 (INSULIN SYRINGE) 1/2 mL 30 x 5/16" Syrg Uses 4 daily    lancets (ONETOUCH DELICA LANCETS) 33 gauge Misc 1 lancet by Misc.(Non-Drug; Combo Route) route 4 (four) times daily before meals and nightly.    mycophenolate (CELLCEPT) 250 mg Cap TAKE 2 CAPSULES ( 500 MG TOTAL) BY MOUTH 2 TIMES DAILY    mycophenolate (CELLCEPT) 500 mg Tab Take by mouth.    NOVOLOG FLEXPEN 100 unit/mL InPn pen 23 Units 3 (three) times daily with meals.     psyllium husk 0.4 gram Cap Take by mouth.    SYRINGE & NEEDLE,INSULIN,1 ML (INSULIN SYRINGE-NEEDLE U-100) 1 mL 29 X 7/16" Syrg     tacrolimus (PROGRAF) 1 MG Cap TAKE 3 CAPSULES ( 3 MG TOTAL) BY MOUTH IN THE MORNING & TAKE 3 CAPSULES ( 3 MG TOTAL) IN THE EVENING    zolpidem (AMBIEN) 5 MG Tab Take 10 mg by mouth.     Antibiotics (From admission, onward)    Start     Stop Route Frequency Ordered    10/21/19 1115  cefTRIAXone (ROCEPHIN) 2 g in dextrose 5 % 50 mL IVPB      -- " IV Every 24 hours (non-standard times) 10/21/19 1003        Antifungals (From admission, onward)    None        Antivirals (From admission, onward)    None           Immunization History   Administered Date(s) Administered    Hepatitis A / Hepatitis B 2014, 07/10/2014, 2014, 2015    Influenza - Quadrivalent 10/12/2015    Pneumococcal Conjugate - 13 Valent 2014    Pneumococcal Polysaccharide - 23 Valent 2014    Td (ADULT) 2005    Tdap 2014       Family History     Problem Relation (Age of Onset)    Cataracts Maternal Grandmother    Diabetes Mother, Father, Sister, Brother, Sister    Heart disease Sister, Maternal Grandmother    Hypertension Mother, Sister    Kidney disease Father, Sister    No Known Problems Maternal Aunt, Maternal Uncle, Paternal Aunt, Paternal Uncle, Maternal Grandfather, Paternal Grandmother, Paternal Grandfather    Stroke Maternal Grandmother        Social History     Socioeconomic History    Marital status:      Spouse name: Not on file    Number of children: Not on file    Years of education: Not on file    Highest education level: Not on file   Occupational History     Employer: DISABLED   Social Needs    Financial resource strain: Not on file    Food insecurity:     Worry: Not on file     Inability: Not on file    Transportation needs:     Medical: Not on file     Non-medical: Not on file   Tobacco Use    Smoking status: Former Smoker     Packs/day: 1.00     Years: 20.00     Pack years: 20.00     Types: Cigarettes     Last attempt to quit: 2013     Years since quittin.1    Smokeless tobacco: Never Used    Tobacco comment: quit smoking cigarettes in 2014   Substance and Sexual Activity    Alcohol use: No    Drug use: No     Types: Marijuana    Sexual activity: Yes     Partners: Male     Birth control/protection: Post-menopausal   Lifestyle    Physical activity:     Days per week: Not on file     Minutes per  session: Not on file    Stress: Not on file   Relationships    Social connections:     Talks on phone: Not on file     Gets together: Not on file     Attends Muslim service: Not on file     Active member of club or organization: Not on file     Attends meetings of clubs or organizations: Not on file     Relationship status: Not on file   Other Topics Concern    Not on file   Social History Narrative        Nutrition manager by education    Disabled    2 children    No blood transfusions     Review of Systems   Constitutional: Positive for activity change and chills. Negative for fever.   Respiratory: Negative for cough and shortness of breath.    Cardiovascular: Negative for chest pain and leg swelling.   Gastrointestinal: Negative for abdominal pain, constipation, diarrhea, nausea and vomiting.   Genitourinary: Positive for vaginal bleeding (occasional). Negative for dysuria, frequency and hematuria.   Musculoskeletal: Positive for arthralgias (left shoulder pain, left hip pain), back pain and gait problem. Negative for myalgias.   Skin: Negative for rash and wound.   Neurological: Positive for weakness. Negative for dizziness, light-headedness and headaches.   Psychiatric/Behavioral: Negative for agitation and behavioral problems. The patient is not nervous/anxious.      Objective:     Vital Signs (Most Recent):  Temp: 99.6 °F (37.6 °C) (10/21/19 0729)  Pulse: 88 (10/21/19 0729)  Resp: 18 (10/21/19 0729)  BP: 128/66 (10/21/19 0729)  SpO2: 96 % (10/21/19 0729) Vital Signs (24h Range):  Temp:  [98.4 °F (36.9 °C)-102.5 °F (39.2 °C)] 99.6 °F (37.6 °C)  Pulse:  [] 88  Resp:  [16-22] 18  SpO2:  [94 %-100 %] 96 %  BP: (122-190)/(65-91) 128/66     Weight: 93.8 kg (206 lb 11.2 oz)  Body mass index is 31.9 kg/m².    Estimated Creatinine Clearance: 33.9 mL/min (A) (based on SCr of 2.2 mg/dL (H)).    Physical Exam   Constitutional: She is oriented to person, place, and time. She appears well-developed and  well-nourished. No distress.   Cardiovascular: Normal rate and regular rhythm.   No murmur heard.  Pulmonary/Chest: Effort normal and breath sounds normal. No respiratory distress.   Abdominal: Soft. Bowel sounds are normal. She exhibits no distension.   Musculoskeletal: She exhibits no edema.        Left shoulder: She exhibits decreased range of motion and tenderness.        Left hip: She exhibits tenderness.        Lumbar back: She exhibits tenderness (left lumbosacral).   Neurological: She is alert and oriented to person, place, and time. She has normal strength. No sensory deficit.   Skin: Skin is warm and dry.   Psychiatric: She has a normal mood and affect. Her behavior is normal.       Significant Labs:   Blood Culture:   Recent Labs   Lab 10/20/19  1530 10/20/19  1600   LABBLOO Gram stain ciera bottle: Gram positive cocci in chains resembling Strep   Positive results previously called  Gram stain aer bottle: Gram positive cocci in chains resembling Strep   Positive results previously called Gram stain ciera bottle: Gram positive cocci in chains resembling Strep   Results called to and read back by: Andrea Ackerman  Gram stain aer bottle: Gram positive cocci in chains resembling Strep   Positive results previously called     CBC:   Recent Labs   Lab 10/20/19  1449 10/21/19  0440   WBC 9.64 10.69   HGB 11.6* 9.8*   HCT 36.3* 30.5*   PLT SEE COMMENT 127*     CMP:   Recent Labs   Lab 10/20/19  1449 10/21/19  0440   * 137   K 4.8 3.9   CL 98 104   CO2 18* 25   * 228*   BUN 44* 34*   CREATININE 2.9* 2.2*   CALCIUM 10.2 9.5   PROT 9.3* 7.7   ALBUMIN 2.1* 1.7*   BILITOT 0.5 0.3   ALKPHOS 126 101   AST 45* 46*   ALT 36 37   ANIONGAP 15 8   EGFRNONAA 17* 24*     Urine Culture: No results for input(s): LABURIN in the last 4320 hours.  Urine Studies:   Recent Labs   Lab 05/30/19  0807 10/20/19  1655   COLORU Yellow Yellow   APPEARANCEUA Hazy* Clear   PHUR 6.0 6.0   SPECGRAV 1.015 1.025   PROTEINUA 2+* 3+*    GLUCUA Negative 3+*   KETONESU Negative Negative   BILIRUBINUA Negative Negative   OCCULTUA Negative 2+*   NITRITE Negative Negative   UROBILINOGEN  --  Negative   LEUKOCYTESUR Negative Negative   RBCUA 0 100*   WBCUA 1 0   BACTERIA Rare None   SQUAMEPITHEL 5  --    HYALINECASTS 0 0     Wound Culture: No results for input(s): LABAERO in the last 4320 hours.    Significant Imaging: I have reviewed all pertinent imaging results/findings within the past 24 hours.

## 2019-10-21 NOTE — ASSESSMENT & PLAN NOTE
Patient was noted to have a fever of 102.5° F without a clear source of infection.  She also was tachycardic and tachypneic, all of which has improved.  Given that she is on tacrolimus and mycophenolate with immunosuppression, she was started on empiric antibiotic therapy pending blood cultures.    Note, she does complain of what appears to be musculoskeletal pain. I do not believe this is septic arthritis.  Will obtain a CT-scan of the left shoulder and left hip.

## 2019-10-21 NOTE — ASSESSMENT & PLAN NOTE
54 yr old with kidney transplant (on cellcept and prograf), DM, HLD presents with left shoulder, left lower back, and left hip pain as well as decreased appetite and chills. Reports a recent fall off of a ladder at work. Pt states her left hip/back pain worsened causing her to be unable to walk. Her blood cultures returned positive for GPCs in chains resembling strep. ID is consulted for further recs.     Fever resolved. WBC 10K today. On exam, pt with left hip pain, left shoulder pain, and tenderness to palpation left lower back.  Pt with PATRICK- nephrology consulted.    Plan:  1. Continue vancomycin (pharmacy to dose); vanc trough goal: 15-20  2. D/c zosyn; re-start rocephin 2 gram IV q 24 hours  3. Will f/u on ID of cultures and tailor accordingly  4. Recommend consult to orthopedics to determine best imaging; will likely need MRI left hip, back, shoulder?  5. Recommend 2D echo  6. Will follow

## 2019-10-21 NOTE — PROGRESS NOTES
[facility] Hospital Medicine  Progress Note    Patient Name: Elizabeth Maldonado  MRN: [id number]  Patient Class: IP- Inpatient   Admission Date: 10/20/2019  Length of Stay: 1 days  Attending Physician: Osvaldo Rose MD  Primary Care Provider: Richy Singleton NP        Subjective:     Principal Problem:Acute renal failure superimposed on stage 3 chronic kidney disease        HPI:  Ms. Elizabeth Maldonado is a 54 y.o. female with type 2 diabetes mellitus (HbA1c 8.7% Dec 2014), hyperlipidemia (.6 Nov 2014), CKD stage 3, anemia of chronic disease, and history of renal transplant who presents to [facility] ED with complaints of left arm and hip pain the past few days.  She reports that the pain is mainly localized to her lower back, left hip, and left shoulder.  She does recall falling at work as she was stepping down a ladder and missed the last step.  She does recall falling on her left side but denies any head trauma.  She denies any fevers, nausea, vomiting, abdominal pain, nor any diarrhea.  She does report that her appetite has been poor in the last few days but she has not lost any weight.  She denies any night sweats but does report some chills.    Of note, she was noted to be febrile in the ER but denies any coughing, dysuria, rashes, headache, nor any neck stiffness.  She denies any dyspnea, hemoptysis, lower extremity pain or swelling, recent travel, nor any sick contacts.    Overview/Hospital Course:  55 y/o female with hx of kidney transplant presented with left shoulder and hip pain.  Noted to be febrile upon presentation.  No obvious source of infection.  On immunosuppressive medications and admitted on broad spectrum ABx's.  BCx now positive for Strep.  ID consulted.  Slight ARF on presentation and Nephrology consulted.  Started on IVF hydration.    Interval History: Still complaining of pain to left shoulder    Review of Systems   HENT: Negative for ear discharge and ear pain.     Eyes: Negative for pain and itching.   Endocrine: Negative for polyphagia and polyuria.   Neurological: Negative for seizures and syncope.     Objective:     Vital Signs (Most Recent):  Temp: 99.3 °F (37.4 °C) (10/21/19 1616)  Pulse: 97 (10/21/19 1616)  Resp: 18 (10/21/19 1616)  BP: (!) 157/74 (10/21/19 1616)  SpO2: 96 % (10/21/19 1616) Vital Signs (24h Range):  Temp:  [98.4 °F (36.9 °C)-99.6 °F (37.6 °C)] 99.3 °F (37.4 °C)  Pulse:  [] 97  Resp:  [16-20] 18  SpO2:  [94 %-100 %] 96 %  BP: (124-176)/(66-90) 157/74     Weight: 93.8 kg (206 lb 11.2 oz)  Body mass index is 31.9 kg/m².    Intake/Output Summary (Last 24 hours) at 10/21/2019 1828  Last data filed at 10/21/2019 1443  Gross per 24 hour   Intake 1150 ml   Output 350 ml   Net 800 ml      Physical Exam   Constitutional: She is oriented to person, place, and time. She appears well-developed and well-nourished. No distress.   HENT:   Head: Normocephalic and atraumatic.   Right Ear: External ear normal.   Left Ear: External ear normal.   Nose: Nose normal.   Eyes: Right eye exhibits no discharge. Left eye exhibits no discharge.   Neck: Normal range of motion.   Cardiovascular: Normal rate, regular rhythm, normal heart sounds and intact distal pulses. Exam reveals no gallop and no friction rub.   No murmur heard.  Pulmonary/Chest: Effort normal and breath sounds normal. No stridor. No respiratory distress. She has no wheezes. She has no rales. She exhibits no tenderness.   Abdominal: Soft. Bowel sounds are normal. She exhibits no distension. There is no tenderness. There is no rebound and no guarding.   Musculoskeletal:   There is some tenderness to palpation to the left shoulder and left hip without any joint swelling nor erythema   Neurological: She is alert and oriented to person, place, and time.   Skin: Skin is warm and dry. She is not diaphoretic. No erythema.   Psychiatric: She has a normal mood and affect. Her behavior is normal. Judgment and thought  content normal.   Nursing note and vitals reviewed.      Significant Labs:   Blood Culture:   Recent Labs   Lab 10/20/19  1530 10/20/19  1600 10/21/19  1024   LABBLOO Gram stain ciera bottle: Gram positive cocci in chains resembling Strep   Positive results previously called  Gram stain aer bottle: Gram positive cocci in chains resembling Strep   Positive results previously called  STREPTOCOCCUS AGALACTIAE (GROUP B)  Susceptibility pending  Beta-hemolytic streptococci are routinely susceptible to   penicillins,cephalosporins and carbapenems.  * Gram stain ciera bottle: Gram positive cocci in chains resembling Strep   Results called to and read back by: Andrea Ackerman  Gram stain aer bottle: Gram positive cocci in chains resembling Strep   Positive results previously called  STREPTOCOCCUS AGALACTIAE (GROUP B)  Beta-hemolytic streptococci are routinely susceptible to   penicillins,cephalosporins and carbapenems.  * No Growth to date  No Growth to date     BMP:   Recent Labs   Lab 10/21/19  0440   *      K 3.9      CO2 25   BUN 34*   CREATININE 2.2*   CALCIUM 9.5   MG 2.3     CBC:   Recent Labs   Lab 10/20/19  1449 10/21/19  0440   WBC 9.64 10.69   HGB 11.6* 9.8*   HCT 36.3* 30.5*   PLT SEE COMMENT 127*       Significant Imaging: I have reviewed all pertinent imaging results/findings within the past 24 hours.      Assessment/Plan:      * Acute on CKD stage 3  Patient's baseline creatinine is around 1.6.  2.9 on presentation.  She does have a history of a renal transplant.    Started on IVF's and Nephrology consulted.  Creat improving with IVF's.  Avoid nephrotoxic medications.    Gram-positive bacteremia  Patient was noted to have a fever of 102.5° F without a clear source of infection.  She also was tachycardic and tachypneic, all of which has improved.  Given that she is on tacrolimus and mycophenolate with immunosuppression, she was started on empiric antibiotic therapy.  Now noted to have  Group B Bacteremia.  Consulted ID.  Unsure source.  Patient does complain of left shoulder and hip pain.  Septic joint?  Ortho consult.    CKD (chronic kidney disease), stage III  As addressed above.    Kidney transplant status, living unrelated donor - 12/2/14  As addressed above.    Hyperlipidemia  Poorly controlled; will continue her home regimen of atorvastatin.    Anemia of chronic disease  The patient's H/H is stable and consistent with previous laboratory measurements, and the patient exhibits no signs or symptoms of acute bleeding; there is no indication for transfusion.  Will continue to monitor.    Type 2 diabetes mellitus, uncontrolled, with renal complications  Uncontrolled with hyperglycemia.  Will resume home insulin at lower dose to avoid hypoglycemia.  Diabetic diet and insulin sliding scale.      VTE Risk Mitigation (From admission, onward)         Ordered     heparin (porcine) injection 5,000 Units  Every 12 hours      10/20/19 2208     IP VTE HIGH RISK PATIENT  Once      10/20/19 2017                      Osvaldo Rose MD  Department of Hospital Medicine   [facility]

## 2019-10-21 NOTE — SUBJECTIVE & OBJECTIVE
Past Medical History:   Diagnosis Date    Acidosis 2014    Allergic rhinitis 2014    Allergy     Anemia     Anemia in chronic kidney disease 2014    Anxiety     Chronic immunosuppression with Prograf and MMF 12/3/2014    CKD (chronic kidney disease) stage 5, GFR less than 15 ml/min 2014    CKD (chronic kidney disease), stage III 3/2/2015    Degenerative disc disease     Depression 2014    Diabetes mellitus, type 2 since age 18 2014    ESRD on peritoneal dialysis - 2014 for 9 hours no peritonitis 2014    Hyperlipidemia     Hypertension 2014    Hypomagnesemia 2015    Kidney transplant status, living unrelated donor - 12/2/14 12/3/2014    Neutropenia 2015    NS (nuclear sclerosis) 2016    Obesity     Organ transplant candidate 2014    Pre-op exam 2014    Proliferative diabetic retinopathy of both eyes without macular edema associated with type 2 diabetes mellitus 2016    Renal manifestation of secondary diabetes mellitus     Tendinitis     Trouble in sleeping        Past Surgical History:   Procedure Laterality Date    BONE MARROW BIOPSY N/A      SECTION      x 2    KIDNEY TRANSPLANT      RENAL BIOPSY      ROTATOR CUFF REPAIR      TUBAL LIGATION         Review of patient's allergies indicates:   Allergen Reactions    Iodine and iodide containing products Hives    Shrimp Itching    Topamax [topiramate] Other (See Comments)     Vision changes    Zoloft [sertraline] Palpitations     Current Facility-Administered Medications   Medication Frequency    0.9%  NaCl infusion Continuous    acetaminophen tablet 650 mg Q6H PRN    atorvastatin tablet 20 mg Daily    cefTRIAXone (ROCEPHIN) 2 g in dextrose 5 % 50 mL IVPB Q24H    cloNIDine tablet 0.1 mg TID PRN    dextrose 50% injection 12.5 g PRN    dextrose 50% injection 25 g PRN    glucagon (human recombinant) injection 1 mg PRN    glucose chewable tablet 16 g  PRN    glucose chewable tablet 24 g PRN    heparin (porcine) injection 5,000 Units Q12H    insulin aspart U-100 pen 1-10 Units PRN    insulin aspart U-100 pen 15 Units TID WM    insulin detemir U-100 pen 25 Units QHS    ondansetron injection 8 mg Q8H PRN    oxyCODONE-acetaminophen  mg per tablet 1 tablet Q4H PRN    promethazine (PHENERGAN) 6.25 mg in dextrose 5 % 50 mL IVPB Q6H PRN    ramelteon tablet 8 mg Nightly PRN    senna-docusate 8.6-50 mg per tablet 1 tablet BID PRN    tacrolimus capsule 3 mg BID     Family History     Problem Relation (Age of Onset)    Cataracts Maternal Grandmother    Diabetes Mother, Father, Sister, Brother, Sister    Heart disease Sister, Maternal Grandmother    Hypertension Mother, Sister    Kidney disease Father, Sister    No Known Problems Maternal Aunt, Maternal Uncle, Paternal Aunt, Paternal Uncle, Maternal Grandfather, Paternal Grandmother, Paternal Grandfather    Stroke Maternal Grandmother        Tobacco Use    Smoking status: Former Smoker     Packs/day: 1.00     Years: 20.00     Pack years: 20.00     Types: Cigarettes     Last attempt to quit: 2013     Years since quittin.1    Smokeless tobacco: Never Used    Tobacco comment: quit smoking cigarettes in 2014   Substance and Sexual Activity    Alcohol use: No    Drug use: No     Types: Marijuana    Sexual activity: Yes     Partners: Male     Birth control/protection: Post-menopausal     Review of Systems   Constitutional: Positive for appetite change.   HENT: Negative for congestion and sore throat.    Respiratory: Negative for cough and shortness of breath.    Cardiovascular: Negative for leg swelling.   Gastrointestinal: Negative for abdominal pain, nausea and vomiting.   Endocrine: Negative for polydipsia and polyuria.   Genitourinary: Negative for decreased urine volume, dysuria and hematuria.   Musculoskeletal: Positive for arthralgias and myalgias.   Skin: Negative for rash.    Neurological: Positive for weakness.   Hematological: Negative for adenopathy.   Psychiatric/Behavioral: Positive for agitation. Negative for confusion.   All other systems reviewed and are negative.    Objective:     Vital Signs (Most Recent):  Temp: 99.6 °F (37.6 °C) (10/21/19 0729)  Pulse: 88 (10/21/19 0729)  Resp: 18 (10/21/19 0729)  BP: 128/66 (10/21/19 0729)  SpO2: 96 % (10/21/19 0729)  O2 Device (Oxygen Therapy): room air (10/20/19 1931) Vital Signs (24h Range):  Temp:  [98.4 °F (36.9 °C)-102.5 °F (39.2 °C)] 99.6 °F (37.6 °C)  Pulse:  [] 88  Resp:  [16-22] 18  SpO2:  [94 %-100 %] 96 %  BP: (122-190)/(65-91) 128/66     Weight: 93.8 kg (206 lb 11.2 oz) (10/20/19 2058)  Body mass index is 31.9 kg/m².  Body surface area is 2.11 meters squared.    I/O last 3 completed shifts:  In: 3150 [IV Piggyback:3150]  Out: -     Physical Exam   Constitutional: She is oriented to person, place, and time. She appears well-developed and well-nourished. No distress.   HENT:   Head: Normocephalic and atraumatic.   Mouth/Throat: Oropharynx is clear and moist and mucous membranes are normal.   Eyes: Conjunctivae and EOM are normal.   Neck: Normal range of motion.   Cardiovascular: Normal rate and regular rhythm.   No murmur heard.  Pulmonary/Chest: Effort normal and breath sounds normal.   Abdominal: Soft. She exhibits no distension.   Musculoskeletal: She exhibits no edema or deformity.   Lymphadenopathy:     She has no cervical adenopathy.        Right: No supraclavicular adenopathy present.        Left: No supraclavicular adenopathy present.   Neurological: She is alert and oriented to person, place, and time.   - asterixis   Skin: Skin is warm and dry. No rash noted. She is not diaphoretic.   Psychiatric: She has a normal mood and affect. Her behavior is normal.   Vitals reviewed.      Significant Labs:  CBC:   Recent Labs   Lab 10/21/19  0440   WBC 10.69   RBC 3.67*   HGB 9.8*   HCT 30.5*   *   MCV 83   MCH 26.7*    MCHC 32.1     CMP:   Recent Labs   Lab 10/21/19  0440   *   CALCIUM 9.5   ALBUMIN 1.7*   PROT 7.7      K 3.9   CO2 25      BUN 34*   CREATININE 2.2*   ALKPHOS 101   ALT 37   AST 46*   BILITOT 0.3     Recent Labs   Lab 10/20/19  1655   COLORU Yellow   SPECGRAV 1.025   PHUR 6.0   PROTEINUA 3+*   BACTERIA None   NITRITE Negative   LEUKOCYTESUR Negative   UROBILINOGEN Negative   HYALINECASTS 0     All labs within the past 24 hours have been reviewed.    Significant Imaging:  Labs: Reviewed  X-Ray: Reviewed  CT: Reviewed

## 2019-10-21 NOTE — SUBJECTIVE & OBJECTIVE
Past Medical History:   Diagnosis Date    Acidosis 2014    Allergic rhinitis 2014    Allergy     Anemia     Anemia in chronic kidney disease 2014    Anxiety     Chronic immunosuppression with Prograf and MMF 12/3/2014    CKD (chronic kidney disease) stage 5, GFR less than 15 ml/min 2014    CKD (chronic kidney disease), stage III 3/2/2015    Degenerative disc disease     Depression 2014    Diabetes mellitus, type 2 since age 18 2014    ESRD on peritoneal dialysis - 2014 for 9 hours no peritonitis 2014    Hyperlipidemia     Hypertension 2014    Hypomagnesemia 2015    Kidney transplant status, living unrelated donor - 12/2/14 12/3/2014    Neutropenia 2015    NS (nuclear sclerosis) 2016    Obesity     Organ transplant candidate 2014    Pre-op exam 2014    Proliferative diabetic retinopathy of both eyes without macular edema associated with type 2 diabetes mellitus 2016    Renal manifestation of secondary diabetes mellitus     Tendinitis     Trouble in sleeping        Past Surgical History:   Procedure Laterality Date    BONE MARROW BIOPSY N/A      SECTION      x 2    KIDNEY TRANSPLANT      RENAL BIOPSY      ROTATOR CUFF REPAIR      TUBAL LIGATION         Review of patient's allergies indicates:   Allergen Reactions    Iodine and iodide containing products Hives    Shrimp Itching    Topamax [topiramate] Other (See Comments)     Vision changes    Zoloft [sertraline] Palpitations       Medications:  Medications Prior to Admission   Medication Sig    ACCU-CHEK PREM Misc USE AS DIRECTED TO CHECK BLOOD GLUCOSE    acetaminophen (TYLENOL) 500 MG tablet TK 2 CAPLETS PO TID    ADMELOG SOLOSTAR U-100 INSULIN 100 unit/mL pen INJECT 30 UNITS UNDER THE SKIN TID WC    atorvastatin (LIPITOR) 20 MG tablet Take 20 mg by mouth.    BASAGLAR KWIKPEN U-100 INSULIN glargine 100 units/mL (3mL) SubQ pen     blood sugar  "diagnostic Strp Checks BG ac/hs    INSULIN GLARGINE,HUM.REC.ANLOG (BASAGLAR KWIKPEN U-100 INSULIN SUBQ) Inject 50 Units into the skin nightly.    insulin lispro (ADMELOG SOLOSTAR U-100 INSULIN SUBQ) Inject 100 Units into the skin 3 (three) times daily with meals.    insulin lispro 100 unit/mL injection Inject into the skin.    insulin syringe-needle U-100 (INSULIN SYRINGE) 1/2 mL 30 x 5/16" Syrg Uses 4 daily    lancets (ONETOUCH DELICA LANCETS) 33 gauge Misc 1 lancet by Misc.(Non-Drug; Combo Route) route 4 (four) times daily before meals and nightly.    mycophenolate (CELLCEPT) 250 mg Cap TAKE 2 CAPSULES ( 500 MG TOTAL) BY MOUTH 2 TIMES DAILY    mycophenolate (CELLCEPT) 500 mg Tab Take by mouth.    NOVOLOG FLEXPEN 100 unit/mL InPn pen 23 Units 3 (three) times daily with meals.     psyllium husk 0.4 gram Cap Take by mouth.    SYRINGE & NEEDLE,INSULIN,1 ML (INSULIN SYRINGE-NEEDLE U-100) 1 mL 29 X 7/16" Syrg     tacrolimus (PROGRAF) 1 MG Cap TAKE 3 CAPSULES ( 3 MG TOTAL) BY MOUTH IN THE MORNING & TAKE 3 CAPSULES ( 3 MG TOTAL) IN THE EVENING    zolpidem (AMBIEN) 5 MG Tab Take 10 mg by mouth.     Antibiotics (From admission, onward)    Start     Stop Route Frequency Ordered    10/21/19 1115  cefTRIAXone (ROCEPHIN) 2 g in dextrose 5 % 50 mL IVPB      -- IV Every 24 hours (non-standard times) 10/21/19 1003        Antifungals (From admission, onward)    None        Antivirals (From admission, onward)    None           Immunization History   Administered Date(s) Administered    Hepatitis A / Hepatitis B 07/01/2014, 07/10/2014, 07/29/2014, 07/01/2015    Influenza - Quadrivalent 10/12/2015    Pneumococcal Conjugate - 13 Valent 07/01/2014    Pneumococcal Polysaccharide - 23 Valent 09/03/2014    Td (ADULT) 12/12/2005    Tdap 07/01/2014       Family History     Problem Relation (Age of Onset)    Cataracts Maternal Grandmother    Diabetes Mother, Father, Sister, Brother, Sister    Heart disease Sister, Maternal " Grandmother    Hypertension Mother, Sister    Kidney disease Father, Sister    No Known Problems Maternal Aunt, Maternal Uncle, Paternal Aunt, Paternal Uncle, Maternal Grandfather, Paternal Grandmother, Paternal Grandfather    Stroke Maternal Grandmother        Social History     Socioeconomic History    Marital status:      Spouse name: Not on file    Number of children: Not on file    Years of education: Not on file    Highest education level: Not on file   Occupational History     Employer: DISABLED   Social Needs    Financial resource strain: Not on file    Food insecurity:     Worry: Not on file     Inability: Not on file    Transportation needs:     Medical: Not on file     Non-medical: Not on file   Tobacco Use    Smoking status: Former Smoker     Packs/day: 1.00     Years: 20.00     Pack years: 20.00     Types: Cigarettes     Last attempt to quit: 2013     Years since quittin.1    Smokeless tobacco: Never Used    Tobacco comment: quit smoking cigarettes in 2014   Substance and Sexual Activity    Alcohol use: No    Drug use: No     Types: Marijuana    Sexual activity: Yes     Partners: Male     Birth control/protection: Post-menopausal   Lifestyle    Physical activity:     Days per week: Not on file     Minutes per session: Not on file    Stress: Not on file   Relationships    Social connections:     Talks on phone: Not on file     Gets together: Not on file     Attends Christianity service: Not on file     Active member of club or organization: Not on file     Attends meetings of clubs or organizations: Not on file     Relationship status: Not on file   Other Topics Concern    Not on file   Social History Narrative        Nutrition manager by education    Disabled    2 children    No blood transfusions     Review of Systems   Constitutional: Positive for activity change and chills. Negative for fever.   Respiratory: Negative for cough and shortness of breath.     Cardiovascular: Negative for chest pain and leg swelling.   Gastrointestinal: Negative for abdominal pain, constipation, diarrhea, nausea and vomiting.   Genitourinary: Positive for vaginal bleeding (occasional). Negative for dysuria, frequency and hematuria.   Musculoskeletal: Positive for arthralgias (left shoulder pain, left hip pain), back pain and gait problem. Negative for myalgias.   Skin: Negative for rash and wound.   Neurological: Positive for weakness. Negative for dizziness, light-headedness and headaches.   Psychiatric/Behavioral: Negative for agitation and behavioral problems. The patient is not nervous/anxious.      Objective:     Vital Signs (Most Recent):  Temp: 99.6 °F (37.6 °C) (10/21/19 0729)  Pulse: 88 (10/21/19 0729)  Resp: 18 (10/21/19 0729)  BP: 128/66 (10/21/19 0729)  SpO2: 96 % (10/21/19 0729) Vital Signs (24h Range):  Temp:  [98.4 °F (36.9 °C)-102.5 °F (39.2 °C)] 99.6 °F (37.6 °C)  Pulse:  [] 88  Resp:  [16-22] 18  SpO2:  [94 %-100 %] 96 %  BP: (122-190)/(65-91) 128/66     Weight: 93.8 kg (206 lb 11.2 oz)  Body mass index is 31.9 kg/m².    Estimated Creatinine Clearance: 33.9 mL/min (A) (based on SCr of 2.2 mg/dL (H)).    Physical Exam   Constitutional: She is oriented to person, place, and time. She appears well-developed and well-nourished. No distress.   Cardiovascular: Normal rate and regular rhythm.   No murmur heard.  Pulmonary/Chest: Effort normal and breath sounds normal. No respiratory distress.   Abdominal: Soft. Bowel sounds are normal. She exhibits no distension.   Musculoskeletal: She exhibits no edema.        Left shoulder: She exhibits decreased range of motion and tenderness.        Left hip: She exhibits tenderness.        Lumbar back: She exhibits tenderness (left lumbosacral).   Neurological: She is alert and oriented to person, place, and time. She has normal strength. No sensory deficit.   Skin: Skin is warm and dry.   Psychiatric: She has a normal mood and  affect. Her behavior is normal.       Significant Labs:   Blood Culture:   Recent Labs   Lab 10/20/19  1530 10/20/19  1600   LABBLOO Gram stain ciera bottle: Gram positive cocci in chains resembling Strep   Positive results previously called  Gram stain aer bottle: Gram positive cocci in chains resembling Strep   Positive results previously called Gram stain ciera bottle: Gram positive cocci in chains resembling Strep   Results called to and read back by: Andrea Ackerman  Gram stain aer bottle: Gram positive cocci in chains resembling Strep   Positive results previously called     CBC:   Recent Labs   Lab 10/20/19  1449 10/21/19  0440   WBC 9.64 10.69   HGB 11.6* 9.8*   HCT 36.3* 30.5*   PLT SEE COMMENT 127*     CMP:   Recent Labs   Lab 10/20/19  1449 10/21/19  0440   * 137   K 4.8 3.9   CL 98 104   CO2 18* 25   * 228*   BUN 44* 34*   CREATININE 2.9* 2.2*   CALCIUM 10.2 9.5   PROT 9.3* 7.7   ALBUMIN 2.1* 1.7*   BILITOT 0.5 0.3   ALKPHOS 126 101   AST 45* 46*   ALT 36 37   ANIONGAP 15 8   EGFRNONAA 17* 24*     Urine Culture: No results for input(s): LABURIN in the last 4320 hours.  Urine Studies:   Recent Labs   Lab 05/30/19  0807 10/20/19  1655   COLORU Yellow Yellow   APPEARANCEUA Hazy* Clear   PHUR 6.0 6.0   SPECGRAV 1.015 1.025   PROTEINUA 2+* 3+*   GLUCUA Negative 3+*   KETONESU Negative Negative   BILIRUBINUA Negative Negative   OCCULTUA Negative 2+*   NITRITE Negative Negative   UROBILINOGEN  --  Negative   LEUKOCYTESUR Negative Negative   RBCUA 0 100*   WBCUA 1 0   BACTERIA Rare None   SQUAMEPITHEL 5  --    HYALINECASTS 0 0     Wound Culture: No results for input(s): LABAERO in the last 4320 hours.    Significant Imaging: I have reviewed all pertinent imaging results/findings within the past 24 hours.

## 2019-10-21 NOTE — PROGRESS NOTES
MD made aware of pt's critical Osmolairty of 338 & complaints of pain and bg 210 w/scheduled Novolog of 30 units, new orders given

## 2019-10-21 NOTE — HPI
This is a 54 year old female female with type 2 diabetes mellitus, HLD, CKD,  anemia of chronic disease, and history of renal transplant 2014 (on cellcept and prograf) who presents to [facility] ED with complaints of left shoulder and left hip pain the past few days.  She reports that the pain is mainly localized to her left lower back, left hip, and left shoulder.  She does recall falling at work as she was stepping down a ladder and missed the last step.  She does recall falling on her left side but denies any head trauma.  she reports associated poor appetite and chills at home. She denies diarrhea, abd pain, or urinary symptoms.      She was found to have positive blood cultures with GPCs in chains resembling strep. ID consulted for further recs. CT left hip and left shoulder (both done without contrast) non revealing.

## 2019-10-21 NOTE — ASSESSMENT & PLAN NOTE
Poorly controlled on home regimen basal-prandial insulin therapy; will provide basal-prandial insulin therapy along with insulin sliding scale.

## 2019-10-21 NOTE — SUBJECTIVE & OBJECTIVE
Past Medical History:   Diagnosis Date    Acidosis 2014    Allergic rhinitis 2014    Allergy     Anemia     Anemia in chronic kidney disease 2014    Anxiety     Chronic immunosuppression with Prograf and MMF 12/3/2014    CKD (chronic kidney disease) stage 5, GFR less than 15 ml/min 2014    CKD (chronic kidney disease), stage III 3/2/2015    Degenerative disc disease     Depression 2014    Diabetes mellitus, type 2 since age 18 2014    ESRD on peritoneal dialysis - 2014 for 9 hours no peritonitis 2014    Hyperlipidemia     Hypertension 2014    Hypomagnesemia 2015    Kidney transplant status, living unrelated donor - 12/2/14 12/3/2014    Neutropenia 2015    NS (nuclear sclerosis) 2016    Obesity     Organ transplant candidate 2014    Pre-op exam 2014    Proliferative diabetic retinopathy of both eyes without macular edema associated with type 2 diabetes mellitus 2016    Renal manifestation of secondary diabetes mellitus     Tendinitis     Trouble in sleeping        Past Surgical History:   Procedure Laterality Date    BONE MARROW BIOPSY N/A      SECTION      x 2    KIDNEY TRANSPLANT      RENAL BIOPSY      ROTATOR CUFF REPAIR      TUBAL LIGATION         Review of patient's allergies indicates:   Allergen Reactions    Iodine and iodide containing products Hives    Shrimp Itching    Topamax [topiramate] Other (See Comments)     Vision changes    Zoloft [sertraline] Palpitations       No current facility-administered medications on file prior to encounter.      Current Outpatient Medications on File Prior to Encounter   Medication Sig    ACCU-CHEK PREM Misc USE AS DIRECTED TO CHECK BLOOD GLUCOSE    acetaminophen (TYLENOL) 500 MG tablet TK 2 CAPLETS PO TID    ADMELOG SOLOSTAR U-100 INSULIN 100 unit/mL pen INJECT 30 UNITS UNDER THE SKIN TID WC    atorvastatin (LIPITOR) 20 MG tablet Take 20 mg by mouth.     "BASAGLAR KWIKPEN U-100 INSULIN glargine 100 units/mL (3mL) SubQ pen     blood sugar diagnostic Strp Checks BG ac/hs    INSULIN GLARGINE,HUM.REC.ANLOG (BASAGLAR KWIKPEN U-100 INSULIN SUBQ) Inject 50 Units into the skin nightly.    insulin lispro (ADMELOG SOLOSTAR U-100 INSULIN SUBQ) Inject 100 Units into the skin 3 (three) times daily with meals.    insulin lispro 100 unit/mL injection Inject into the skin.    insulin syringe-needle U-100 (INSULIN SYRINGE) 1/2 mL 30 x 5/16" Syrg Uses 4 daily    lancets (ONETOUCH DELICA LANCETS) 33 gauge Misc 1 lancet by Misc.(Non-Drug; Combo Route) route 4 (four) times daily before meals and nightly.    mycophenolate (CELLCEPT) 250 mg Cap TAKE 2 CAPSULES ( 500 MG TOTAL) BY MOUTH 2 TIMES DAILY    mycophenolate (CELLCEPT) 500 mg Tab Take by mouth.    NOVOLOG FLEXPEN 100 unit/mL InPn pen 23 Units 3 (three) times daily with meals.     psyllium husk 0.4 gram Cap Take by mouth.    SYRINGE & NEEDLE,INSULIN,1 ML (INSULIN SYRINGE-NEEDLE U-100) 1 mL 29 X 7/16" Syrg     tacrolimus (PROGRAF) 1 MG Cap TAKE 3 CAPSULES ( 3 MG TOTAL) BY MOUTH IN THE MORNING & TAKE 3 CAPSULES ( 3 MG TOTAL) IN THE EVENING    zolpidem (AMBIEN) 5 MG Tab Take 10 mg by mouth.     Family History     Problem Relation (Age of Onset)    Cataracts Maternal Grandmother    Diabetes Mother, Father, Sister, Brother, Sister    Heart disease Sister, Maternal Grandmother    Hypertension Mother, Sister    Kidney disease Father, Sister    No Known Problems Maternal Aunt, Maternal Uncle, Paternal Aunt, Paternal Uncle, Maternal Grandfather, Paternal Grandmother, Paternal Grandfather    Stroke Maternal Grandmother        Tobacco Use    Smoking status: Former Smoker     Packs/day: 1.00     Years: 20.00     Pack years: 20.00     Types: Cigarettes     Last attempt to quit: 2013     Years since quittin.1    Smokeless tobacco: Never Used    Tobacco comment: quit smoking cigarettes in 2014   Substance and " Sexual Activity    Alcohol use: No    Drug use: No     Types: Marijuana    Sexual activity: Yes     Partners: Male     Birth control/protection: Post-menopausal     Review of Systems   Constitutional: Positive for activity change, appetite change, chills and fatigue. Negative for diaphoresis, fever and unexpected weight change.   HENT: Negative.    Eyes: Negative.    Respiratory: Negative for cough, chest tightness, shortness of breath and wheezing.    Cardiovascular: Negative for chest pain, palpitations and leg swelling.   Gastrointestinal: Negative for abdominal distention, abdominal pain, blood in stool, constipation, diarrhea, nausea and vomiting.   Genitourinary: Negative for dysuria and hematuria.   Musculoskeletal:        Lower back, left shoulder, left hip pain   Skin: Negative.    Neurological: Positive for weakness. Negative for dizziness, seizures, syncope and light-headedness.   Psychiatric/Behavioral: Negative.      Objective:     Vital Signs (Most Recent):  Temp: 98.7 °F (37.1 °C) (10/20/19 2058)  Pulse: 96 (10/20/19 2058)  Resp: 20 (10/20/19 2058)  BP: (!) 141/73 (10/20/19 2058)  SpO2: 96 % (10/20/19 2058) Vital Signs (24h Range):  Temp:  [98.7 °F (37.1 °C)-102.5 °F (39.2 °C)] 98.7 °F (37.1 °C)  Pulse:  [] 96  Resp:  [20-22] 20  SpO2:  [96 %-100 %] 96 %  BP: (122-190)/(65-91) 141/73     Weight: 95.7 kg (211 lb)  Body mass index is 33.05 kg/m².    Physical Exam   Constitutional: She is oriented to person, place, and time. She appears well-developed and well-nourished. No distress.   HENT:   Head: Normocephalic and atraumatic.   Right Ear: External ear normal.   Left Ear: External ear normal.   Nose: Nose normal.   Eyes: Right eye exhibits no discharge. Left eye exhibits no discharge.   Neck: Normal range of motion.   Cardiovascular: Normal rate, regular rhythm, normal heart sounds and intact distal pulses. Exam reveals no gallop and no friction rub.   No murmur heard.  Pulmonary/Chest: Effort  normal and breath sounds normal. No stridor. No respiratory distress. She has no wheezes. She has no rales. She exhibits no tenderness.   Abdominal: Soft. Bowel sounds are normal. She exhibits no distension. There is no tenderness. There is no rebound and no guarding.   Musculoskeletal:   There is some tenderness to palpation to the left shoulder and left hip without any joint swelling nor erythema   Neurological: She is alert and oriented to person, place, and time.   Skin: Skin is warm and dry. She is not diaphoretic. No erythema.   Psychiatric: She has a normal mood and affect. Her behavior is normal. Judgment and thought content normal.   Nursing note and vitals reviewed.          Significant Labs: All pertinent labs within the past 24 hours have been reviewed.    Significant Imaging: I have reviewed and interpreted all pertinent imaging results/findings within the past 24 hours.

## 2019-10-21 NOTE — PROGRESS NOTES
Pharmacokinetic Initial Assessment: IV Vancomycin    Assessment/Plan:    Initiate intravenous vancomycin with loading dose of 1500 mg once with subsequent doses of 15mg/kg when random concentrations are less than 25 mcg/mL.    Patient with acute renal failure superimposed on stage 3 chronic kidney disease. Patient's baseline creatinine is around 1.6 --> today it is 2.9. Patient with history of kidney transplant.     Desired empiric serum trough concentration is 15 to 20 mcg/mL     Draw vancomycin random level on 10/21/19 at 0400.     Pharmacy will continue to follow and monitor vancomycin.      Please contact pharmacy at extension [phone] with any questions regarding this assessment.     Thank you for the consult,   Kim Bishop       Patient brief summary:  Elizabeth Maldonado is a 54 y.o. female initiated on antimicrobial therapy with IV Vancomycin for treatment of suspected sepsis    Drug Allergies:   Review of patient's allergies indicates:   Allergen Reactions    Iodine and iodide containing products Hives    Shrimp Itching    Topamax [topiramate] Other (See Comments)     Vision changes    Zoloft [sertraline] Palpitations       Actual Body Weight:   95.7 kg     Renal Function:   Estimated Creatinine Clearance: 25.7 mL/min (A) (based on SCr of 2.9 mg/dL (H)).,     Dialysis Method (if applicable):  Nephrology has been consulted for CKD stage 3    CBC (last 72 hours):  Recent Labs   Lab Result Units 10/20/19  1449   WBC K/uL 9.64   Hemoglobin g/dL 11.6*   Hematocrit % 36.3*   Platelets K/uL SEE COMMENT   Gran% % 90.7*   Lymph% % 4.0*   Mono% % 4.5   Eosinophil% % 0.0   Basophil% % 0.2   Differential Method  Automated       Metabolic Panel (last 72 hours):  Recent Labs   Lab Result Units 10/20/19  1449 10/20/19  1655   Sodium mmol/L 131*  --    Potassium mmol/L 4.8  --    Chloride mmol/L 98  --    CO2 mmol/L 18*  --    Glucose mg/dL 753*  --    Glucose, UA   --  3+*   BUN, Bld mg/dL 44*  --    Creatinine mg/dL  2.9*  --    Albumin g/dL 2.1*  --    Total Bilirubin mg/dL 0.5  --    Alkaline Phosphatase U/L 126  --    AST U/L 45*  --    ALT U/L 36  --        Drug levels (last 3 results):  No results for input(s): VANCOMYCINRA, VANCOMYCINPE, VANCOMYCINTR in the last 72 hours.    Microbiologic Results:  Microbiology Results (last 7 days)     Procedure Component Value Units Date/Time    Blood Culture #1 **CANNOT BE ORDERED STAT** [[id number]] Collected:  10/20/19 1530    Order Status:  Completed Specimen:  Blood from Peripheral, Hand, Right Updated:  10/21/19 0252     Blood Culture, Routine Gram stain ciera bottle: Gram positive cocci in chains resembling Strep       Positive results previously called    Blood Culture #2 **CANNOT BE ORDERED STAT** [[id number]] Collected:  10/20/19 1600    Order Status:  Completed Specimen:  Blood from Peripheral, Antecubital, Left Updated:  10/21/19 0250     Blood Culture, Routine Gram stain ciera bottle: Gram positive cocci in chains resembling Strep       Results called to and read back by: Andrea Ackerman

## 2019-10-21 NOTE — ASSESSMENT & PLAN NOTE
Patient's baseline creatinine is around 1.6--today it is 2.9.  She does have a history of a renal transplant.  Her urinalysis is significant for a specific gravity of 1.025, 3+ protein, 3+ glucose, 2+ occult blood, and 100 RBCs.  Urine output has been fair.  Will obtain additional urine studies; provide aggressive IV fluid hydration; monitor the urine output; recheck the renal function in the morning; and avoid nephrotoxins.  Transplant Medicine at [facility] was consulted before admission here and apparently feels that the patient can be cared for at this facility.  Will consult Nephrology for further recommendations.

## 2019-10-21 NOTE — PLAN OF CARE
"SW met with patient to complete discharge planning assessment. Prior to beginning the discharge planning assessment, patient verified name and date of birth. SW educated patient on the discharge process and explained that discharge planning begins at admission. SW reviewed contents of the "[facility] Packet" emphasizing, "Help at home", "Managing your health", and "Your preferences". Patient stated that she has two children one in [facility] and the other in [place]. Patient stated that she suffers from anxiety and is being treated for it with medication.  Patient stated that she sees Dr. Asencio at [facility]. Patient stated SW wrote name and phone number on white communication board. Patient stated that her daughter in [place] is diagnosed with Schizoaffective Disorder, Bipolar type and lives in an independent facility. Patient stated that it really bothers her that she can't take care of her daughter. Patient stated that she had a kidney transplant at [facility] in 2014 and is taking her medication as prescribed daily.  Patient stated that she her sister Gayle, lives in [place] but they aren't close. Patient stated that her friend Justa is her help at home and can do anything she needs her to do to help her recover.     Patient wants rolling walker.        10/21/19 1104   Discharge Assessment   Assessment Type Discharge Planning Assessment   Confirmed/corrected address and phone number on facesheet? Yes   Assessment information obtained from? Patient   Expected Length of Stay (days) 3   Communicated expected length of stay with patient/caregiver yes   Prior to hospitilization cognitive status: Alert/Oriented   Prior to hospitalization functional status: Independent   Current cognitive status: Alert/Oriented   Current Functional Status: Needs Assistance   Facility Arrived From: Home   Lives With alone   Able to Return to Prior Arrangements yes   Is patient able to care for self after discharge? Yes "   Who are your caregiver(s) and their phone number(s)? Justa Mtz (Friend)    Patient's perception of discharge disposition admitted as an inpatient   Readmission Within the Last 30 Days no previous admission in last 30 days   Patient currently being followed by outpatient case management? No   Patient currently receives any other outside agency services? No   Equipment Currently Used at Home none   Do you have any problems affording any of your prescribed medications? No   Is the patient taking medications as prescribed? yes   Does the patient have transportation home? Yes   Transportation Anticipated family or friend will provide   Does the patient receive services at the [facility]? No   Discharge Plan A Home  (PCP)   Discharge Plan B Home  (PCP)   DME Needed Upon Discharge  walker, rolling   Patient/Family in Agreement with Plan yes   Does the patient have transportation to healthcare appointments? Yes   Readmission Questionnaire   Living Arrangements mobile home   Have you felt down, depressed, or hopeless? 0   Have you felt little interest or pleasure in doing things? 0   Richy Singleton NP    [facility] [address] [address]  Phone: [phone] Fax: [phone]    [facility][id number] [address] AT [address] [address]  Phone: [phone] Fax: [phone]    [facility] - [facility] [address] [facility]  [address] [facility] [address]  Phone: [phone] Fax: [phone]

## 2019-10-21 NOTE — CONSULTS
[facility] [place]  Nephrology  Consult Note    Patient Name: Elizabeth Maldonado  MRN: [id number]  Admission Date: 10/20/2019  Hospital Length of Stay: 1 days  Attending Provider: Osvaldo Rose MD   Primary Care Physician: Richy Singleton NP  Principal Problem:Acute renal failure superimposed on stage 3 chronic kidney disease    Inpatient consult to Nephrology  Consult performed by: JOSE Marquez  Consult ordered by: Kristin Chauhan MD  Reason for consult: PATRICK in renal transplant patient        Subjective:     HPI: Ms. 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 In ED, pt febrile to 102.5, tachycardiac to 125, hypertensive 177/91 and tachypneic. W/u significant for PATRICK Cr 2.9, serum glucose 753, Na 131, lactic acid 3.2, VBG showing mild metabolic acidosis, and UA with spec grav 1.025, 3+ protein, 3+ glucose, 2+ occult blood, 100 RBCs. CT head, CXRR, Xray L shoulder and L hip all unremarkable. Pt received 1L LR bolus x3, started on NS infusion 100 ml/hr. Empirically treated for sepsis with vanc, zosyn, ceftriaxone. Dr. Catalan with transplant medicine at [facility] contacted, felt pt could appropriately be treated at [facility], advised to hold cellcept. Noted goal prograf level 4-7. BCx, Prograf and Cellcept levels drawn. Nephrology consulted 10/21/19 for PATRICK in renal transplant pt. Baseline Cr appears ~1.6 -2 with GFR 30-40. Last at baseline, 1.6, 19 on routine outpt labs. Sees nephrology outpt, last saw Dr. Bonilla 2019, no issues noted, Upr Cr 1.4 g at this time. Reports compliance with all home meds including prograf and cellcept. Good PO fluid intake at home, attempts 1 gallon per day, and endorses good UOP; denies foamy/frothy urine, hematuria,  "dysuria. Also denies recent n/v, rashes, confusion, hemoptysis, edema, polydipsia, polyuria, confusion, SOB. Denies any new meds or med changes. Endorses recent chills, agitation.     Cr improved this AM 2.9 --> 2.2. Serum osmol 338 noted. HR improved this AM. UOP 1x/24 hrs (PM shift only). CK 72. This afternoon pt reports feeling "sleepy," and c/o continued L shoulder and hip pain, worse with movement. Reports poor appetite, denies metallic taste. Denies n/v, SOB, abdominal pain, confusion. ID consulted, noted recs.     Past Medical History:   Diagnosis Date    Acidosis 2014    Allergic rhinitis 2014    Allergy     Anemia     Anemia in chronic kidney disease 2014    Anxiety     Chronic immunosuppression with Prograf and MMF 12/3/2014    CKD (chronic kidney disease) stage 5, GFR less than 15 ml/min 2014    CKD (chronic kidney disease), stage III 3/2/2015    Degenerative disc disease     Depression 2014    Diabetes mellitus, type 2 since age 18 2014    ESRD on peritoneal dialysis - 2014 for 9 hours no peritonitis 2014    Hyperlipidemia     Hypertension 2014    Hypomagnesemia 2015    Kidney transplant status, living unrelated donor - 12/2/14 12/3/2014    Neutropenia 2015    NS (nuclear sclerosis) 2016    Obesity     Organ transplant candidate 2014    Pre-op exam 2014    Proliferative diabetic retinopathy of both eyes without macular edema associated with type 2 diabetes mellitus 2016    Renal manifestation of secondary diabetes mellitus     Tendinitis     Trouble in sleeping        Past Surgical History:   Procedure Laterality Date    BONE MARROW BIOPSY N/A      SECTION      x 2    KIDNEY TRANSPLANT      RENAL BIOPSY      ROTATOR CUFF REPAIR      TUBAL LIGATION         Review of patient's allergies indicates:   Allergen Reactions    Iodine and iodide containing products Hives    Shrimp Itching    Topamax " [topiramate] Other (See Comments)     Vision changes    Zoloft [sertraline] Palpitations     Current Facility-Administered Medications   Medication Frequency    0.9%  NaCl infusion Continuous    acetaminophen tablet 650 mg Q6H PRN    atorvastatin tablet 20 mg Daily    cefTRIAXone (ROCEPHIN) 2 g in dextrose 5 % 50 mL IVPB Q24H    cloNIDine tablet 0.1 mg TID PRN    dextrose 50% injection 12.5 g PRN    dextrose 50% injection 25 g PRN    glucagon (human recombinant) injection 1 mg PRN    glucose chewable tablet 16 g PRN    glucose chewable tablet 24 g PRN    heparin (porcine) injection 5,000 Units Q12H    insulin aspart U-100 pen 1-10 Units PRN    insulin aspart U-100 pen 15 Units TID WM    insulin detemir U-100 pen 25 Units QHS    ondansetron injection 8 mg Q8H PRN    oxyCODONE-acetaminophen  mg per tablet 1 tablet Q4H PRN    promethazine (PHENERGAN) 6.25 mg in dextrose 5 % 50 mL IVPB Q6H PRN    ramelteon tablet 8 mg Nightly PRN    senna-docusate 8.6-50 mg per tablet 1 tablet BID PRN    tacrolimus capsule 3 mg BID     Family History     Problem Relation (Age of Onset)    Cataracts Maternal Grandmother    Diabetes Mother, Father, Sister, Brother, Sister    Heart disease Sister, Maternal Grandmother    Hypertension Mother, Sister    Kidney disease Father, Sister    No Known Problems Maternal Aunt, Maternal Uncle, Paternal Aunt, Paternal Uncle, Maternal Grandfather, Paternal Grandmother, Paternal Grandfather    Stroke Maternal Grandmother        Tobacco Use    Smoking status: Former Smoker     Packs/day: 1.00     Years: 20.00     Pack years: 20.00     Types: Cigarettes     Last attempt to quit: 2013     Years since quittin.1    Smokeless tobacco: Never Used    Tobacco comment: quit smoking cigarettes in 2014   Substance and Sexual Activity    Alcohol use: No    Drug use: No     Types: Marijuana    Sexual activity: Yes     Partners: Male     Birth control/protection:  Post-menopausal     Review of Systems   Constitutional: Positive for appetite change.   HENT: Negative for congestion and sore throat.    Respiratory: Negative for cough and shortness of breath.    Cardiovascular: Negative for leg swelling.   Gastrointestinal: Negative for abdominal pain, nausea and vomiting.   Endocrine: Negative for polydipsia and polyuria.   Genitourinary: Negative for decreased urine volume, dysuria and hematuria.   Musculoskeletal: Positive for arthralgias and myalgias.   Skin: Negative for rash.   Neurological: Positive for weakness.   Hematological: Negative for adenopathy.   Psychiatric/Behavioral: Positive for agitation. Negative for confusion.     Objective:     Vital Signs (Most Recent):  Temp: 99.6 °F (37.6 °C) (10/21/19 0729)  Pulse: 88 (10/21/19 0729)  Resp: 18 (10/21/19 0729)  BP: 128/66 (10/21/19 0729)  SpO2: 96 % (10/21/19 0729)  O2 Device (Oxygen Therapy): room air (10/20/19 1931) Vital Signs (24h Range):  Temp:  [98.4 °F (36.9 °C)-102.5 °F (39.2 °C)] 99.6 °F (37.6 °C)  Pulse:  [] 88  Resp:  [16-22] 18  SpO2:  [94 %-100 %] 96 %  BP: (122-190)/(65-91) 128/66     Weight: 93.8 kg (206 lb 11.2 oz) (10/20/19 2058)  Body mass index is 31.9 kg/m².  Body surface area is 2.11 meters squared.    I/O last 3 completed shifts:  In: 3150 [IV Piggyback:3150]  Out: -     Physical Exam   Constitutional: She is oriented to person, place, and time. She appears well-developed and well-nourished. No distress.   HENT:   Head: Normocephalic and atraumatic.   Mouth/Throat: Oropharynx is clear and moist and mucous membranes are normal.   Eyes: Conjunctivae and EOM are normal.   Neck: Normal range of motion.   Cardiovascular: Normal rate and regular rhythm.   No murmur heard.  Pulmonary/Chest: Effort normal and breath sounds normal.   Abdominal: Soft. She exhibits no distension.   Musculoskeletal: She exhibits no edema or deformity.   Lymphadenopathy:     She has no cervical adenopathy.        Right:  No supraclavicular adenopathy present.        Left: No supraclavicular adenopathy present.   Neurological: She is alert and oriented to person, place, and time.   Skin: Skin is warm and dry. No rash noted. She is not diaphoretic.   Psychiatric: She has a normal mood and affect. Her behavior is normal.   Vitals reviewed.      Significant Labs:  CBC:   Recent Labs   Lab 10/21/19  0440   WBC 10.69   RBC 3.67*   HGB 9.8*   HCT 30.5*   *   MCV 83   MCH 26.7*   MCHC 32.1     CMP:   Recent Labs   Lab 10/21/19  0440   *   CALCIUM 9.5   ALBUMIN 1.7*   PROT 7.7      K 3.9   CO2 25      BUN 34*   CREATININE 2.2*   ALKPHOS 101   ALT 37   AST 46*   BILITOT 0.3     Recent Labs   Lab 10/20/19  1655   COLORU Yellow   SPECGRAV 1.025   PHUR 6.0   PROTEINUA 3+*   BACTERIA None   NITRITE Negative   LEUKOCYTESUR Negative   UROBILINOGEN Negative   HYALINECASTS 0     All labs within the past 24 hours have been reviewed.    Significant Imaging:  Labs: Reviewed  X-Ray: Reviewed  CT: Reviewed    Assessment/Plan:     PATRICK on CKD 3  - Baseline Cr 1.6-2 GFR 30-40; last at baseline 5/2019.   - Cr on arrival 2.9, improved to 2.2 this AM with IVF. BUN also improving 44-> 34. Questionable UOP.   - Suspect etiology pre renal in origin, likely 2/2 volume depletion/hyperglycemia. Cr Improving with IVF. Continue IVF  ml/hr. Payne's stain to r/o AIN pending. No urgent need for RRT at this time.   - Will order UPrCr  - Strict Is/Os; will closely monitor UOP  - avoid nephrotoxic medications   - renally dose medications     History of Kidney transplant/ on long term immunosuppression meds  - LURT on 12/2014  - Follows with [facility] at [facility] with Dr. Catalan, who has been made aware of pt's current admission; also follows with Dr. Bonilla, last appt 6/2019- no issues noted at this time.   - Immunosuppressant regimen includes Cellcept 500mg PO BID and prograf 1mg BID; reports compliance  - Prograf level 10/20 severely low  <1.5; dilutional? Goal Prograf level 4-7  - Continue prograf 3mg BID  - Continue to hold Cellcept, per Dr. Catalan recommendation.   - repeat Prograf level daily  - Will continue to monitor for drug toxicities    Metabolic Acidosis   - improving, Bicarb 18 --> 25 today; likely 2/2 lactic acidosis   - Lactic acid also improved after IVF 3.2 --> 2.5   - continue to trend     Proteinuria   - chronic; UprCr 1.6 6/2019; 1.4 12/2018  - UA 3+ protein 10/20  - will order UPrCr    CKD MBD   - No recent PTH on file  - CCa elevated 11.3; avoid Vit D supplements   - Phos low, 2.4; 2/2 urinary phos wasting in setting of prograf therapy, and likely contribution from secondary hyperparathyroid in setting of CKD   - will continue to monitor   - will obtain PTH with AM labs    Hyponatremia   - pseudohyponatremia 2/2 hyperglycemia   - Na normalized this AM with improvement in glucose.   - will continue to trend      JOSE Marquez   Nephrology  [facility]  [facility] [place]

## 2019-10-21 NOTE — H&P
[facility] Medicine  History & Physical    Patient Name: Elizabeth Maldonado  MRN: [id number]  Admission Date: 10/20/2019  Attending Physician: Osvaldo Rose MD   Primary Care Provider: Carlo Healy MD         Patient information was obtained from patient.     Subjective:     Principal Problem:Acute renal failure superimposed on stage 3 chronic kidney disease    Chief Complaint:  Left shoulder and hip pain for the past few days.    HPI: Ms. Elizabeth Maldonado is a 54 y.o. female with type 2 diabetes mellitus (HbA1c 8.7% Dec 2014), hyperlipidemia (.6 Nov 2014), CKD stage 3, anemia of chronic disease, and history of renal transplant who presents to [facility] ED with complaints of left arm and hip pain the past few days.  She reports that the pain is mainly localized to her lower back, left hip, and left shoulder.  She does recall falling at work as she was stepping down a ladder and missed the last step.  She does recall falling on her left side but denies any head trauma.  She denies any fevers, nausea, vomiting, abdominal pain, nor any diarrhea.  She does report that her appetite has been poor in the last few days but she has not lost any weight.  She denies any night sweats but does report some chills.    Of note, she was noted to be febrile in the ER but denies any coughing, dysuria, rashes, headache, nor any neck stiffness.  She denies any dyspnea, hemoptysis, lower extremity pain or swelling, recent travel, nor any sick contacts.    Chart Review:  Previous Hospitalizations  Date Hospital Diagnosis   Feb 2017 [facility] Biopsy of transplanted kidney   Dec 22, 2014 [facility] Biopsy of transplanted kidney   Dec 2, 2014 [facility] Renal transplant     Outpatient Follow-Up  Date of Visit Physician Service   Jun 2019 Aaron Bonilla MD Nephrology   Dec 2018 Daya Leon NP Transplant Medicine   Aug 2017 Cristy Sampson M.D Endocrinology   Mar 2016 Edilma Dallas DPM Podiatry   Oct   Mariel Harrington MD Infectious Diseases   2015 Shelby Noguera MD Gynecology     Past Medical History:   Diagnosis Date    Acidosis 2014    Allergic rhinitis 2014    Allergy     Anemia     Anemia in chronic kidney disease 2014    Anxiety     Chronic immunosuppression with Prograf and MMF 12/3/2014    CKD (chronic kidney disease) stage 5, GFR less than 15 ml/min 2014    CKD (chronic kidney disease), stage III 3/2/2015    Degenerative disc disease     Depression 2014    Diabetes mellitus, type 2 since age 18 2014    ESRD on peritoneal dialysis - 2014 for 9 hours no peritonitis 2014    Hyperlipidemia     Hypertension 2014    Hypomagnesemia 2015    Kidney transplant status, living unrelated donor - 12/2/14 12/3/2014    Neutropenia 2015    NS (nuclear sclerosis) 2016    Obesity     Organ transplant candidate 2014    Pre-op exam 2014    Proliferative diabetic retinopathy of both eyes without macular edema associated with type 2 diabetes mellitus 2016    Renal manifestation of secondary diabetes mellitus     Tendinitis     Trouble in sleeping        Past Surgical History:   Procedure Laterality Date    BONE MARROW BIOPSY N/A      SECTION      x 2    KIDNEY TRANSPLANT      RENAL BIOPSY      ROTATOR CUFF REPAIR      TUBAL LIGATION         Review of patient's allergies indicates:   Allergen Reactions    Iodine and iodide containing products Hives    Shrimp Itching    Topamax [topiramate] Other (See Comments)     Vision changes    Zoloft [sertraline] Palpitations       No current facility-administered medications on file prior to encounter.      Current Outpatient Medications on File Prior to Encounter   Medication Sig    ACCU-CHEK PREM Misc USE AS DIRECTED TO CHECK BLOOD GLUCOSE    acetaminophen (TYLENOL) 500 MG tablet TK 2 CAPLETS PO TID    ADMELOG SOLOSTAR U-100 INSULIN 100 unit/mL pen INJECT 30  "UNITS UNDER THE SKIN TID WC    atorvastatin (LIPITOR) 20 MG tablet Take 20 mg by mouth.    BASAGLAR KWIKPEN U-100 INSULIN glargine 100 units/mL (3mL) SubQ pen     blood sugar diagnostic Strp Checks BG ac/hs    INSULIN GLARGINE,HUM.REC.ANLOG (BASAGLAR KWIKPEN U-100 INSULIN SUBQ) Inject 50 Units into the skin nightly.    insulin lispro (ADMELOG SOLOSTAR U-100 INSULIN SUBQ) Inject 100 Units into the skin 3 (three) times daily with meals.    insulin lispro 100 unit/mL injection Inject into the skin.    insulin syringe-needle U-100 (INSULIN SYRINGE) 1/2 mL 30 x 5/16" Syrg Uses 4 daily    lancets (ONETOUCH DELICA LANCETS) 33 gauge Misc 1 lancet by Misc.(Non-Drug; Combo Route) route 4 (four) times daily before meals and nightly.    mycophenolate (CELLCEPT) 250 mg Cap TAKE 2 CAPSULES ( 500 MG TOTAL) BY MOUTH 2 TIMES DAILY    mycophenolate (CELLCEPT) 500 mg Tab Take by mouth.    NOVOLOG FLEXPEN 100 unit/mL InPn pen 23 Units 3 (three) times daily with meals.     psyllium husk 0.4 gram Cap Take by mouth.    SYRINGE & NEEDLE,INSULIN,1 ML (INSULIN SYRINGE-NEEDLE U-100) 1 mL 29 X 7/16" Syrg     tacrolimus (PROGRAF) 1 MG Cap TAKE 3 CAPSULES ( 3 MG TOTAL) BY MOUTH IN THE MORNING & TAKE 3 CAPSULES ( 3 MG TOTAL) IN THE EVENING    zolpidem (AMBIEN) 5 MG Tab Take 10 mg by mouth.     Family History     Problem Relation (Age of Onset)    Cataracts Maternal Grandmother    Diabetes Mother, Father, Sister, Brother, Sister    Heart disease Sister, Maternal Grandmother    Hypertension Mother, Sister    Kidney disease Father, Sister    No Known Problems Maternal Aunt, Maternal Uncle, Paternal Aunt, Paternal Uncle, Maternal Grandfather, Paternal Grandmother, Paternal Grandfather    Stroke Maternal Grandmother        Tobacco Use    Smoking status: Former Smoker     Packs/day: 1.00     Years: 20.00     Pack years: 20.00     Types: Cigarettes     Last attempt to quit: 2013     Years since quittin.1    Smokeless tobacco: " Never Used    Tobacco comment: quit smoking cigarettes in September 2014   Substance and Sexual Activity    Alcohol use: No    Drug use: No     Types: Marijuana    Sexual activity: Yes     Partners: Male     Birth control/protection: Post-menopausal     Review of Systems   Constitutional: Positive for activity change, appetite change, chills and fatigue. Negative for diaphoresis, fever and unexpected weight change.   HENT: Negative.    Eyes: Negative.    Respiratory: Negative for cough, chest tightness, shortness of breath and wheezing.    Cardiovascular: Negative for chest pain, palpitations and leg swelling.   Gastrointestinal: Negative for abdominal distention, abdominal pain, blood in stool, constipation, diarrhea, nausea and vomiting.   Genitourinary: Negative for dysuria and hematuria.   Musculoskeletal:        Lower back, left shoulder, left hip pain   Skin: Negative.    Neurological: Positive for weakness. Negative for dizziness, seizures, syncope and light-headedness.   Psychiatric/Behavioral: Negative.      Objective:     Vital Signs (Most Recent):  Temp: 98.7 °F (37.1 °C) (10/20/19 2058)  Pulse: 96 (10/20/19 2058)  Resp: 20 (10/20/19 2058)  BP: (!) 141/73 (10/20/19 2058)  SpO2: 96 % (10/20/19 2058) Vital Signs (24h Range):  Temp:  [98.7 °F (37.1 °C)-102.5 °F (39.2 °C)] 98.7 °F (37.1 °C)  Pulse:  [] 96  Resp:  [20-22] 20  SpO2:  [96 %-100 %] 96 %  BP: (122-190)/(65-91) 141/73     Weight: 95.7 kg (211 lb)  Body mass index is 33.05 kg/m².    Physical Exam   Constitutional: She is oriented to person, place, and time. She appears well-developed and well-nourished. No distress.   HENT:   Head: Normocephalic and atraumatic.   Right Ear: External ear normal.   Left Ear: External ear normal.   Nose: Nose normal.   Eyes: Right eye exhibits no discharge. Left eye exhibits no discharge.   Neck: Normal range of motion.   Cardiovascular: Normal rate, regular rhythm, normal heart sounds and intact distal  pulses. Exam reveals no gallop and no friction rub.   No murmur heard.  Pulmonary/Chest: Effort normal and breath sounds normal. No stridor. No respiratory distress. She has no wheezes. She has no rales. She exhibits no tenderness.   Abdominal: Soft. Bowel sounds are normal. She exhibits no distension. There is no tenderness. There is no rebound and no guarding.   Musculoskeletal:   There is some tenderness to palpation to the left shoulder and left hip without any joint swelling nor erythema   Neurological: She is alert and oriented to person, place, and time.   Skin: Skin is warm and dry. She is not diaphoretic. No erythema.   Psychiatric: She has a normal mood and affect. Her behavior is normal. Judgment and thought content normal.   Nursing note and vitals reviewed.          Significant Labs: All pertinent labs within the past 24 hours have been reviewed.    Significant Imaging: I have reviewed and interpreted all pertinent imaging results/findings within the past 24 hours.    Assessment/Plan:     * Acute on CKD stage 3  Patient's baseline creatinine is around 1.6--today it is 2.9.  She does have a history of a renal transplant.  Her urinalysis is significant for a specific gravity of 1.025, 3+ protein, 3+ glucose, 2+ occult blood, and 100 RBCs.  Urine output has been fair.  Will obtain additional urine studies; provide aggressive IV fluid hydration; monitor the urine output; recheck the renal function in the morning; and avoid nephrotoxins.  Transplant Medicine at [facility] was consulted before admission here and apparently feels that the patient can be cared for at this facility.  Will consult Nephrology for further recommendations.    Uremic encephalopathy  Etiology is yet to be determined but is likely uremic.  CT-head was negative for acute intracranial abnormalities.  I have reviewed the chest X-ray and it reveals no infiltrates or consolidations.  Patient is with fevers but no meningismus.  Urinalysis was  benign for infection; serum glucose was elevated at 753 mg/dL; electrolytes are benign; and there is evidence of uremia.  There is no evidence of thyroidal disease; skin is  intact; and there are no rashes.  Patient is hemodynamically-stable and there has not been recent medication changes.  Clinical suspicion of CVA or subclinical seizures are low.  Will plan to perform neurological testing every 4 hours with frequent re-orientation.  Will also minimize medications and place fall precautions.      Fever  Patient was noted to have a fever of 102.5° F without a clear source of infection.  She also was tachycardic and tachypneic, all of which has improved.  Given that she is on tacrolimus and mycophenolate with immunosuppression, she was started on empiric antibiotic therapy pending blood cultures.    Note, she does complain of what appears to be musculoskeletal pain. I do not believe this is septic arthritis.  Will obtain a CT-scan of the left shoulder and left hip.    Type 2 diabetes mellitus, uncontrolled, with renal complications  Poorly controlled on home regimen basal-prandial insulin therapy; will provide basal-prandial insulin therapy along with insulin sliding scale.    Hyperlipidemia  Poorly controlled; will continue her home regimen of atorvastatin.    CKD (chronic kidney disease), stage III  As addressed above.    Anemia of chronic disease  The patient's H/H is stable and consistent with previous laboratory measurements, and the patient exhibits no signs or symptoms of acute bleeding; there is no indication for transfusion.  Will continue to monitor.    Kidney transplant status, living unrelated donor - 12/2/14  As addressed above.    VTE Risk Mitigation (From admission, onward)         Ordered     heparin (porcine) injection 5,000 Units  Every 12 hours      10/20/19 2208     IP VTE HIGH RISK PATIENT  Once      10/20/19 2017                   Kristin Chauhan M.D.  Staff [facility]  Department of [facility]  Medicine  [facility]  Pager: [phone]          N.B.: Portions of this note was dictated using M*Modal Fluency Direct--there may be voice recognition errors occasionally missed on review.

## 2019-10-21 NOTE — PLAN OF CARE
Problem: Fall Injury Risk  Goal: Absence of Fall and Fall-Related Injury  Outcome: Ongoing, Progressing     Problem: Adult Inpatient Plan of Care  Goal: Plan of Care Review  Outcome: Ongoing, Progressing     Problem: Adult Inpatient Plan of Care  Goal: Patient-Specific Goal (Individualization)  Outcome: Ongoing, Progressing   Pt able to address needs, bg monitoring and SSI prn, safety maintained, consults to Ortho & I.D., IVF and IV abx per order, bed low locked and in position, will cont plan of care

## 2019-10-21 NOTE — HOSPITAL COURSE
53 y/o female with hx of kidney transplant presented with left shoulder and hip pain.  Noted to be febrile upon presentation.  No obvious source of infection.  On immunosuppressive medications and admitted on broad spectrum ABx's.  BCx then grew positive for Strep.  ID consulted.  Slight ARF on presentation and Nephrology consulted.  Started on IVF hydration with Creat back to baseline.  MRI CTL spine without evidence of epidural abscess/diskitis/OM; MRI pelvis with nonspecific myositis edematous change of left lumbosacral junction. Patient s/p aspiration of left SC joint 10/25/2019 - no growth on cultures.  Repeat BCXS 10/21 with NG x 5d.  GBS bacteremia source unknown. TTE - no vegetations.  L Greater trochanteric bursitis and L sacroiliitis on exam.  Stable non septic.  Patient was continued on Rocephin during hospital stay.    Nephrology and transplant at [facility] communicating about possible rejection of transplanted kidney - Dr. Catalan does not feel patient needs to be transferred at this time.  Renal function improving with IVF and thought to be volume depleted due to hyperglycemia.   ID recommending 4-6 weeks of IV ABx's.  Nephrology recommending against PICC line.  Will need central line and SNF placement for IV ABx's.  SW consulted.  Patient has remained afebrile and hemodynamically stable.  Tunneled central catheter has been placed and SNF has been arranged.  Patient to complete 4 weeks of IV Rocephin.  She will follow up with ID and PCP.  She will also follow up with transplant doctors post discharge from SNF.  Patient to be discharged to SNF.

## 2019-10-21 NOTE — ASSESSMENT & PLAN NOTE
Patient was noted to have a fever of 102.5° F without a clear source of infection.  She also was tachycardic and tachypneic, all of which has improved.  Given that she is on tacrolimus and mycophenolate with immunosuppression, she was started on empiric antibiotic therapy.  Now noted to have Group B Bacteremia.  Consulted ID.  Unsure source.  Patient does complain of left shoulder and hip pain.  Septic joint?  Ortho consult.

## 2019-10-22 ENCOUNTER — DOCUMENTATION ONLY (OUTPATIENT)
Dept: RADIOLOGY | Facility: HOSPITAL | Age: 54
End: 2019-10-22

## 2019-10-22 PROBLEM — B95.1 BACTEREMIA DUE TO GROUP B STREPTOCOCCUS: Status: ACTIVE | Noted: 2019-10-20

## 2019-10-22 LAB
ALBUMIN SERPL BCP-MCNC: 1.6 G/DL (ref 3.5–5.2)
ANION GAP SERPL CALC-SCNC: 10 MMOL/L (ref 8–16)
AORTIC ROOT ANNULUS: 2.93 CM
AORTIC VALVE CUSP SEPERATION: 1.6 CM
ASCENDING AORTA: 2.57 CM
AV INDEX (PROSTH): 0.68
AV MEAN GRADIENT: 7 MMHG
AV PEAK GRADIENT: 12 MMHG
AV VALVE AREA: 2.15 CM2
AV VELOCITY RATIO: 0.59
BACTERIA BLD CULT: ABNORMAL
BSA FOR ECHO PROCEDURE: 2.11 M2
BUN SERPL-MCNC: 27 MG/DL (ref 6–20)
CALCIUM SERPL-MCNC: 9.4 MG/DL (ref 8.7–10.5)
CHLORIDE SERPL-SCNC: 104 MMOL/L (ref 95–110)
CO2 SERPL-SCNC: 23 MMOL/L (ref 23–29)
CREAT SERPL-MCNC: 2 MG/DL (ref 0.5–1.4)
CRP SERPL-MCNC: 294.1 MG/L (ref 0–8.2)
CV ECHO LV RWT: 0.74 CM
DOP CALC AO PEAK VEL: 1.74 M/S
DOP CALC AO VTI: 32.86 CM
DOP CALC LVOT AREA: 3.2 CM2
DOP CALC LVOT DIAMETER: 2.01 CM
DOP CALC LVOT PEAK VEL: 1.03 M/S
DOP CALC LVOT STROKE VOLUME: 70.53 CM3
DOP CALCLVOT PEAK VEL VTI: 22.24 CM
E WAVE DECELERATION TIME: 217.29 MSEC
E/A RATIO: 1.18
E/E' RATIO: 15.88 M/S
ECHO LV POSTERIOR WALL: 1.45 CM (ref 0.6–1.1)
ERYTHROCYTE [SEDIMENTATION RATE] IN BLOOD BY WESTERGREN METHOD: >140 MM/HR (ref 0–20)
EST. GFR  (AFRICAN AMERICAN): 31 ML/MIN/1.73 M^2
EST. GFR  (NON AFRICAN AMERICAN): 27 ML/MIN/1.73 M^2
FRACTIONAL SHORTENING: 39 % (ref 28–44)
GLUCOSE SERPL-MCNC: 74 MG/DL (ref 70–110)
INTERVENTRICULAR SEPTUM: 1.27 CM (ref 0.6–1.1)
IVRT: 0.07 MSEC
LA MAJOR: 5.85 CM
LA MINOR: 4.75 CM
LA WIDTH: 3.25 CM
LEFT ATRIUM SIZE: 3.63 CM
LEFT ATRIUM VOLUME INDEX: 25.5 ML/M2
LEFT ATRIUM VOLUME: 52.58 CM3
LEFT INTERNAL DIMENSION IN SYSTOLE: 2.4 CM (ref 2.1–4)
LEFT VENTRICLE DIASTOLIC VOLUME INDEX: 32.27 ML/M2
LEFT VENTRICLE DIASTOLIC VOLUME: 66.52 ML
LEFT VENTRICLE MASS INDEX: 94 G/M2
LEFT VENTRICLE SYSTOLIC VOLUME INDEX: 9.8 ML/M2
LEFT VENTRICLE SYSTOLIC VOLUME: 20.19 ML
LEFT VENTRICULAR INTERNAL DIMENSION IN DIASTOLE: 3.92 CM (ref 3.5–6)
LEFT VENTRICULAR MASS: 194.06 G
LV LATERAL E/E' RATIO: 15.88 M/S
LV SEPTAL E/E' RATIO: 15.88 M/S
MV PEAK A VEL: 1.08 M/S
MV PEAK E VEL: 1.27 M/S
MYCOPHENOLATE SERPL-MCNC: <0.5 MCG/ML (ref 1–3.5)
MYCOPHENOLATE-G SERPL-MCNC: 19 MCG/ML (ref 35–100)
PHOSPHATE SERPL-MCNC: 3.5 MG/DL (ref 2.7–4.5)
PISA TR MAX VEL: 2.49 M/S
POCT GLUCOSE: 104 MG/DL (ref 70–110)
POCT GLUCOSE: 139 MG/DL (ref 70–110)
POCT GLUCOSE: 86 MG/DL (ref 70–110)
POTASSIUM SERPL-SCNC: 3.4 MMOL/L (ref 3.5–5.1)
PV PEAK VELOCITY: 1.2 CM/S
RA MAJOR: 5.7 CM
RA PRESSURE: 3 MMHG
RA WIDTH: 3.53 CM
RIGHT VENTRICULAR END-DIASTOLIC DIMENSION: 2.98 CM
RV TISSUE DOPPLER FREE WALL SYSTOLIC VELOCITY 1 (APICAL 4 CHAMBER VIEW): 14 CM/S
SINUS: 2.95 CM
SODIUM SERPL-SCNC: 137 MMOL/L (ref 136–145)
STJ: 2.39 CM
TACROLIMUS BLD-MCNC: 4.5 NG/ML (ref 5–15)
TDI LATERAL: 0.08 M/S
TDI SEPTAL: 0.08 M/S
TDI: 0.08 M/S
TR MAX PG: 25 MMHG
TRICUSPID ANNULAR PLANE SYSTOLIC EXCURSION: 2.72 CM
TV REST PULMONARY ARTERY PRESSURE: 28 MMHG
VANCOMYCIN SERPL-MCNC: 9.2 UG/ML

## 2019-10-22 PROCEDURE — 25000003 PHARM REV CODE 250: Performed by: HOSPITALIST

## 2019-10-22 PROCEDURE — 63600175 PHARM REV CODE 636 W HCPCS: Performed by: HOSPITALIST

## 2019-10-22 PROCEDURE — 80069 RENAL FUNCTION PANEL: CPT

## 2019-10-22 PROCEDURE — 99233 SBSQ HOSP IP/OBS HIGH 50: CPT | Mod: ,,, | Performed by: PHYSICIAN ASSISTANT

## 2019-10-22 PROCEDURE — 63600175 PHARM REV CODE 636 W HCPCS: Performed by: INTERNAL MEDICINE

## 2019-10-22 PROCEDURE — 63600175 PHARM REV CODE 636 W HCPCS: Performed by: PHYSICIAN ASSISTANT

## 2019-10-22 PROCEDURE — 36415 COLL VENOUS BLD VENIPUNCTURE: CPT

## 2019-10-22 PROCEDURE — 87799 DETECT AGENT NOS DNA QUANT: CPT

## 2019-10-22 PROCEDURE — 85652 RBC SED RATE AUTOMATED: CPT

## 2019-10-22 PROCEDURE — 80197 ASSAY OF TACROLIMUS: CPT

## 2019-10-22 PROCEDURE — 86832 HLA CLASS I HIGH DEFIN QUAL: CPT

## 2019-10-22 PROCEDURE — 83970 ASSAY OF PARATHORMONE: CPT

## 2019-10-22 PROCEDURE — 86977 RBC SERUM PRETX INCUBJ/INHIB: CPT | Mod: 59

## 2019-10-22 PROCEDURE — 86140 C-REACTIVE PROTEIN: CPT

## 2019-10-22 PROCEDURE — 11000001 HC ACUTE MED/SURG PRIVATE ROOM

## 2019-10-22 PROCEDURE — 86833 HLA CLASS II HIGH DEFIN QUAL: CPT

## 2019-10-22 PROCEDURE — 99233 PR SUBSEQUENT HOSPITAL CARE,LEVL III: ICD-10-PCS | Mod: ,,, | Performed by: PHYSICIAN ASSISTANT

## 2019-10-22 PROCEDURE — 25000003 PHARM REV CODE 250: Performed by: INTERNAL MEDICINE

## 2019-10-22 PROCEDURE — 80202 ASSAY OF VANCOMYCIN: CPT

## 2019-10-22 RX ADMIN — TACROLIMUS 3 MG: 1 CAPSULE ORAL at 10:10

## 2019-10-22 RX ADMIN — OXYCODONE HYDROCHLORIDE AND ACETAMINOPHEN 1 TABLET: 10; 325 TABLET ORAL at 12:10

## 2019-10-22 RX ADMIN — INSULIN ASPART 10 UNITS: 100 INJECTION, SOLUTION INTRAVENOUS; SUBCUTANEOUS at 10:10

## 2019-10-22 RX ADMIN — HEPARIN SODIUM 5000 UNITS: 5000 INJECTION, SOLUTION INTRAVENOUS; SUBCUTANEOUS at 10:10

## 2019-10-22 RX ADMIN — OXYCODONE HYDROCHLORIDE AND ACETAMINOPHEN 1 TABLET: 10; 325 TABLET ORAL at 09:10

## 2019-10-22 RX ADMIN — INSULIN ASPART 10 UNITS: 100 INJECTION, SOLUTION INTRAVENOUS; SUBCUTANEOUS at 05:10

## 2019-10-22 RX ADMIN — ATORVASTATIN CALCIUM 20 MG: 10 TABLET, FILM COATED ORAL at 10:10

## 2019-10-22 RX ADMIN — INSULIN DETEMIR 20 UNITS: 100 INJECTION, SOLUTION SUBCUTANEOUS at 09:10

## 2019-10-22 RX ADMIN — OXYCODONE HYDROCHLORIDE AND ACETAMINOPHEN 1 TABLET: 10; 325 TABLET ORAL at 01:10

## 2019-10-22 RX ADMIN — OXYCODONE HYDROCHLORIDE AND ACETAMINOPHEN 1 TABLET: 10; 325 TABLET ORAL at 05:10

## 2019-10-22 RX ADMIN — CEFTRIAXONE SODIUM 2 G: 2 INJECTION, SOLUTION INTRAVENOUS at 10:10

## 2019-10-22 RX ADMIN — HEPARIN SODIUM 5000 UNITS: 5000 INJECTION, SOLUTION INTRAVENOUS; SUBCUTANEOUS at 09:10

## 2019-10-22 RX ADMIN — RAMELTEON 8 MG: 8 TABLET ORAL at 09:10

## 2019-10-22 RX ADMIN — TACROLIMUS 3 MG: 1 CAPSULE ORAL at 05:10

## 2019-10-22 NOTE — PHYSICIAN QUERY
PT Name: Elizabeth Maldonado  MR #: [id number]    Physician Query Form -Systemic Infectious Process Clarification     CDS/: Sherie Cano RN               Contact information: [phone]  This form is a permanent document in the medical record.     Query Date: October 22, 2019     By submitting this query, we are merely seeking further clarification of documentation. Please utilize your independent clinical judgment when addressing the question(s) below.    The Medical record contains the following:     Indicators   Supporting Clinical Findings   Location in Medical Record   x HR RR BP Temp Temp= 102.5, 99.5  HR= 120, 126, 125  RR= 22, 22   10/20 Flowsheet   x Lactic Acid             Procalcitonin Lactic acid= 3.2, 2.5   10/20-21 Lab   x WBC                Bands                     CRP WBC= 10.69 10/21 Lab     x Culture(s) STREPTOCOCCUS AGALACTIAE (GROUP B)   Beta-hemolytic streptococci are routinely susceptible to   penicillins,cephalosporins and carbapenems.  10/20 blood culture    AMS, Confusion, LOC, etc.     x Organ Dysfunction / Failure Acute renal failure  Uremic encephalopathy   10/21 HP   x Bacteremia or Sepsis / Septic Gram positive bacteremia  Group B Bacteremia    Sepsis, due to unspecified organism, unspecified whether acute organ dysfunction present   10/21 Hosp med PN      10/20 ED MD PN   x Known or Suspected Source of Infection documented Unsure source.    Patient does complain of left shoulder and hip pain.  Septic joint?  Ortho consult. 10/21 Hosp med PN    (Failed) Outpatient Treatment     x Medication Tylenol 500mg, 1000mg  Ceftriaxone IVPB  Piperacillin IVPB  Vancomycin IVPB  LR 2000ml Bolus  NS @ 100cc/hr   10/20, 10/21 MAR  10/20-22 MAR  10/20-21 MAR  10/20-21 MAR       x Treatment ID consult  CT chest  CXR  Hip xray  Shoulder xray  Cardiac monitor  Pulse oximetry  Blood culture  Urine culture  Lactic acid  Input and output   10/21 NSG orders   x Other hx of kidney transplant  presented with left shoulder and hip pain.  Noted to be febrile upon presentation.  No obvious source of infection.      On immunosuppressive medications and admitted on broad spectrum ABx's.  BCx now positive for Strep 10/21 Hosp med PN     Provider, please specify diagnosis or diagnoses associated with above clinical findings.      [   ] Sepsis     [  x ] Severe Sepsis with Acute Organ Dysfunction/Failure (please specify  organ dysfunction/failure): __renal _________________     [   ] Other Infectious Disease (please specify): _________________________________     [   ] Other: __________________________________     [  ]  Clinically Undetermined         Please document in your progress notes daily for the duration of treatment until resolved and include in your discharge summary.

## 2019-10-22 NOTE — NURSING
"Dr Santana ordered a chest MRI, Natacha called from MRI and stated they did not have what was needed to perform a chest MRI. I called Dr Santana back and relayed the message. Dr Santana then asked for Natacha to make notation of why the MRI could not be done and to Give him other options. Dr Santana is suspecting a septic SC joint. I called and spoke back to Natacha in MRI and relayed the message. She stated that she would leave a note for the oncoming am shift to speak with the radiologist in the am to ask for other options. Natacha stated it was ordered routine and did not meet "call out" criteria. Pts kidney function is also to high for contrast.   "

## 2019-10-22 NOTE — PROGRESS NOTES
MRI unable to scan MRI chest as equipment and software not available to properly scan patient. Patient also cannot have MRI contrast. MRI will protocol exam and exam options in AM on 10/22/19 with radiologist on site. Ordering physician is looking to scan for a possible infected SC joint - need to image accordingly.   MRI on hold until AM on 10/22/19.

## 2019-10-22 NOTE — PHYSICIAN QUERY
PT Name: Elizabeth Maldonado  MR #: [id number]    Physician Query Form - Cause and Effect Relationship Clarification      CDS/: Sherie Cano RN               Contact information: [phone]    This form is a permanent document in the medical record.     Query Date: October 22, 2019    By submitting this query, we are merely seeking further clarification of documentation. Please utilize your independent clinical judgment when addressing the question(s) below.    The Medical record contains the following:  Supporting Clinical Findings   Location in record   Sepsis, due to unspecified organism, unspecified whether acute organ dysfunction                                                                                                                                                                                   10/20 ED MD PN   Gram positive bacteremia                                               Patient was noted to have a fever of 102.5° F without a clear source of infection.  She also was tachycardic and tachypneic                                                                                                                                         10/21 Hospital med PN         Provider, please clarify if there is any correlation between Sepsis  and Gram-positive bacteremia.           Are the conditions:      [x  ] Due to or associated with each other   [  ] Unrelated to each other   [  ] Other (Please Specify): _________________________   [  ] Clinically Undetermined

## 2019-10-22 NOTE — ASSESSMENT & PLAN NOTE
Patient's baseline creatinine is around 1.6.  2.9 on presentation.  She does have a history of a renal transplant.    Started on IVF's and Nephrology consulted.  Creat improving with IVF's.  Avoid nephrotoxic medications.

## 2019-10-22 NOTE — SUBJECTIVE & OBJECTIVE
Interval History: Overnight, no acute events. IVF stopped yesterday PM. UPrCr yesterday with NRP.  mls +3x/24 hrs. Orthopedic surgery consulted, noted plans for CT chest, without contrast, today to evaluate SC joint. Pt doing ok this afternoon, still c/o unchanged L shoulder and L hip pain, worse when lying flat or moving. Reports her appetite is much improved since yesterday. Denies SOB, n/v. ID following for bacteremia, BCx 10/20 grew GBS; repeat Cx 10/21 NGTD. Noted plans to continue rocephin 2g IV QD for now. TTE pending.      Review of patient's allergies indicates:   Allergen Reactions    Iodine and iodide containing products Hives    Shrimp Itching    Topamax [topiramate] Other (See Comments)     Vision changes    Zoloft [sertraline] Palpitations     Current Facility-Administered Medications   Medication Frequency    acetaminophen tablet 650 mg Q6H PRN    atorvastatin tablet 20 mg Daily    cefTRIAXone (ROCEPHIN) 2 g in dextrose 5 % 50 mL IVPB Q24H    cloNIDine tablet 0.1 mg TID PRN    dextrose 50% injection 12.5 g PRN    dextrose 50% injection 25 g PRN    glucagon (human recombinant) injection 1 mg PRN    glucose chewable tablet 16 g PRN    glucose chewable tablet 24 g PRN    heparin (porcine) injection 5,000 Units Q12H    insulin aspart U-100 pen 1-10 Units PRN    insulin aspart U-100 pen 10 Units TID WM    insulin detemir U-100 pen 20 Units QHS    ondansetron injection 8 mg Q8H PRN    oxyCODONE-acetaminophen  mg per tablet 1 tablet Q4H PRN    promethazine (PHENERGAN) 6.25 mg in dextrose 5 % 50 mL IVPB Q6H PRN    ramelteon tablet 8 mg Nightly PRN    senna-docusate 8.6-50 mg per tablet 1 tablet BID PRN    tacrolimus capsule 3 mg BID       Objective:     Vital Signs (Most Recent):  Temp: 98.5 °F (36.9 °C) (10/22/19 0754)  Pulse: 90 (10/22/19 0754)  Resp: 17 (10/22/19 0754)  BP: 134/64 (10/22/19 0754)  SpO2: 95 % (10/22/19 0754)  O2 Device (Oxygen Therapy): room air (10/21/19  2023) Vital Signs (24h Range):  Temp:  [98.5 °F (36.9 °C)-99.9 °F (37.7 °C)] 98.5 °F (36.9 °C)  Pulse:  [90-97] 90  Resp:  [17-18] 17  SpO2:  [95 %-96 %] 95 %  BP: (134-165)/(64-79) 134/64     Weight: 93.8 kg (206 lb 11.2 oz) (10/20/19 2058)  Body mass index is 31.9 kg/m².  Body surface area is 2.11 meters squared.    I/O last 3 completed shifts:  In: 1150 [IV Piggyback:1150]  Out: 350 [Urine:350]    Physical Exam   Constitutional: She is oriented to person, place, and time. She appears well-developed and well-nourished. No distress.   HENT:   Head: Normocephalic and atraumatic.   Mouth/Throat: Mucous membranes are normal.   Eyes: Conjunctivae and EOM are normal.   Cardiovascular: Normal rate and regular rhythm.   No murmur heard.  Pulmonary/Chest: Effort normal and breath sounds normal.   Abdominal: Soft. She exhibits no distension.   Musculoskeletal: She exhibits no edema or deformity.   Neurological: She is alert and oriented to person, place, and time.   Skin: Skin is warm and dry. No rash noted. She is not diaphoretic.   Psychiatric: She has a normal mood and affect. Her behavior is normal.       Significant Labs:  CBC:   Recent Labs   Lab 10/21/19  0440   WBC 10.69   RBC 3.67*   HGB 9.8*   HCT 30.5*   *   MCV 83   MCH 26.7*   MCHC 32.1     CMP:   Recent Labs   Lab 10/21/19  0440 10/22/19  0445   * 74   CALCIUM 9.5 9.4   ALBUMIN 1.7* 1.6*   PROT 7.7  --     137   K 3.9 3.4*   CO2 25 23    104   BUN 34* 27*   CREATININE 2.2* 2.0*   ALKPHOS 101  --    ALT 37  --    AST 46*  --    BILITOT 0.3  --      PTH: No results for input(s): PTH in the last 168 hours.  All labs within the past 24 hours have been reviewed.     Significant Imaging:  Labs: Reviewed  CT: Reviewed

## 2019-10-22 NOTE — PLAN OF CARE
Problem: Fall Injury Risk  Goal: Absence of Fall and Fall-Related Injury  Outcome: Ongoing, Progressing     Problem: Adult Inpatient Plan of Care  Goal: Plan of Care Review  Outcome: Ongoing, Progressing     Problem: Diabetes Comorbidity  Goal: Blood Glucose Level Within Desired Range  Outcome: Ongoing, Progressing     Problem: Skin Injury Risk Increased  Goal: Skin Health and Integrity  Outcome: Ongoing, Progressing

## 2019-10-22 NOTE — ASSESSMENT & PLAN NOTE
54 yr old with kidney transplant (on cellcept and prograf), DM, HLD presents with left shoulder, left lower back, and left hip pain as well as decreased appetite and chills. Reports a recent fall off of a ladder at work. Pt states her left hip/back pain worsened causing her to be unable to walk. Her blood cultures returned positive for GPCs in chains resembling strep. ID is consulted for further recs.     Blood cultures showing Group B strep.  Fever resolved. WBC 10K today. On exam, pt with left hip pain, left shoulder pain, and tenderness to palpation left lower back.  Pt with PATRICK- nephrology consulted.    Plan:  1. D/c vancomycin; continue rocephin 2 gram IV q 24 hours for Group B strep bacteremia  2. F/u repeat blood cultures  3. Recommend 2D echo  4. Recommend MRI of lumbar spine and left hip +/- aspiration of hip to rule out septic joint given severe hip pain and bacteremia; f/u on imaging results of left shoulder/chest  5. Will need long term antibiotic therapy; length to be determine based off further work up and source found

## 2019-10-22 NOTE — PROGRESS NOTES
[facility] Hospital Medicine  Progress Note    Patient Name: Elizabeth Maldonado  MRN: [id number]  Patient Class: IP- Inpatient   Admission Date: 10/20/2019  Length of Stay: 2 days  Attending Physician: Kalee Cat MD  Primary Care Provider: Richy Singleton NP        Subjective:     Principal Problem:Acute renal failure superimposed on stage 3 chronic kidney disease        HPI:  Ms. Elizabeth Maldonado is a 54 y.o. female with type 2 diabetes mellitus (HbA1c 8.7% Dec 2014), hyperlipidemia (.6 Nov 2014), CKD stage 3, anemia of chronic disease, and history of renal transplant who presents to [facility] ED with complaints of left arm and hip pain the past few days.  She reports that the pain is mainly localized to her lower back, left hip, and left shoulder.  She does recall falling at work as she was stepping down a ladder and missed the last step.  She does recall falling on her left side but denies any head trauma.  She denies any fevers, nausea, vomiting, abdominal pain, nor any diarrhea.  She does report that her appetite has been poor in the last few days but she has not lost any weight.  She denies any night sweats but does report some chills.    Of note, she was noted to be febrile in the ER but denies any coughing, dysuria, rashes, headache, nor any neck stiffness.  She denies any dyspnea, hemoptysis, lower extremity pain or swelling, recent travel, nor any sick contacts.    Overview/Hospital Course:  53 y/o female with hx of kidney transplant presented with left shoulder and hip pain.  Noted to be febrile upon presentation.  No obvious source of infection.  On immunosuppressive medications and admitted on broad spectrum ABx's.  BCx now positive for Strep.  ID consulted.  Slight ARF on presentation and Nephrology consulted.  Started on IVF hydration.    Pt is TTP over left shoulder and hip.  CT imaging concern for inflammation?infection SC joint.  Ortho to follow up. ECHO pending.  Continue rocephin. PT/OT eval to assist with dc planning     Interval History: 8/10 pain to left hip and shoulder    Review of Systems   Constitutional: Positive for activity change. Negative for chills and fever.   Respiratory: Negative for cough and shortness of breath.    Cardiovascular: Positive for chest pain. Negative for leg swelling.   Gastrointestinal: Negative for abdominal pain, constipation, diarrhea, nausea and vomiting.   Musculoskeletal: Positive for arthralgias (left shoulder pain, left hip pain), back pain and gait problem. Negative for myalgias.   Skin: Negative for rash and wound.   Neurological: Positive for weakness. Negative for dizziness, light-headedness and headaches.   Psychiatric/Behavioral: Negative for agitation and behavioral problems. The patient is not nervous/anxious.      Objective:     Vital Signs (Most Recent):  Temp: 98.7 °F (37.1 °C) (10/22/19 1242)  Pulse: 98 (10/22/19 1242)  Resp: 20 (10/22/19 1242)  BP: (!) 172/83 (10/22/19 1242)  SpO2: 95 % (10/22/19 1242) Vital Signs (24h Range):  Temp:  [98.5 °F (36.9 °C)-99.9 °F (37.7 °C)] 98.7 °F (37.1 °C)  Pulse:  [90-98] 98  Resp:  [17-20] 20  SpO2:  [95 %-96 %] 95 %  BP: (134-172)/(64-83) 172/83     Weight: 93.8 kg (206 lb 11.2 oz)  Body mass index is 31.9 kg/m².    Intake/Output Summary (Last 24 hours) at 10/22/2019 1435  Last data filed at 10/22/2019 1305  Gross per 24 hour   Intake --   Output 800 ml   Net -800 ml      Physical Exam   Constitutional: She is oriented to person, place, and time. She appears well-developed and well-nourished. No distress.   HENT:   Head: Normocephalic and atraumatic.   Right Ear: External ear normal.   Left Ear: External ear normal.   Nose: Nose normal.   Eyes: Right eye exhibits no discharge. Left eye exhibits no discharge.   Neck: Normal range of motion.   Cardiovascular: Normal rate, regular rhythm, normal heart sounds and intact distal pulses. Exam reveals no gallop and no friction rub.   No  murmur heard.  Pulmonary/Chest: Effort normal and breath sounds normal. No stridor. No respiratory distress. She has no wheezes. She has no rales. She exhibits no tenderness.   Abdominal: Soft. Bowel sounds are normal. She exhibits no distension. There is no tenderness. There is no rebound and no guarding.   Musculoskeletal:   There is some tenderness to palpation to the left shoulder and left hip without any joint swelling nor erythema   Neurological: She is alert and oriented to person, place, and time.   Skin: Skin is warm and dry. She is not diaphoretic. No erythema.   Psychiatric: She has a normal mood and affect. Her behavior is normal. Judgment and thought content normal.   Nursing note and vitals reviewed.      Significant Labs:   A1C:   Recent Labs   Lab 10/21/19  0440   HGBA1C 10.4*     Blood Culture:   Recent Labs   Lab 10/20/19  1530 10/20/19  1600 10/21/19  1024   LABBLOO Gram stain ciera bottle: Gram positive cocci in chains resembling Strep   Positive results previously called  Gram stain aer bottle: Gram positive cocci in chains resembling Strep   Positive results previously called  STREPTOCOCCUS AGALACTIAE (GROUP B)  Beta-hemolytic streptococci are routinely susceptible to   penicillins,cephalosporins and carbapenems.  * Gram stain ciera bottle: Gram positive cocci in chains resembling Strep   Results called to and read back by: Andrea Ackerman  Gram stain aer bottle: Gram positive cocci in chains resembling Strep   Positive results previously called  STREPTOCOCCUS AGALACTIAE (GROUP B)  Beta-hemolytic streptococci are routinely susceptible to   penicillins,cephalosporins and carbapenems.  For susceptibility see order #[id number]  * No Growth to date  No Growth to date  No Growth to date  No Growth to date     BMP:   Recent Labs   Lab 10/21/19  0440 10/22/19  0445   * 74    137   K 3.9 3.4*    104   CO2 25 23   BUN 34* 27*   CREATININE 2.2* 2.0*   CALCIUM 9.5 9.4   MG 2.3  --       CBC:   Recent Labs   Lab 10/20/19  1449 10/21/19  0440   WBC 9.64 10.69   HGB 11.6* 9.8*   HCT 36.3* 30.5*   PLT SEE COMMENT 127*     Lactic Acid:   Recent Labs   Lab 10/20/19  1449 10/20/19  1852   LACTATE 3.2* 2.5*     POCT Glucose:   Recent Labs   Lab 10/21/19  2053 10/22/19  0755 10/22/19  1243   POCTGLUCOSE 117* 86 139*       Significant Imaging:   Impression/Chest CT imaging        Nonspecific scarring versus interstitial edema right clavicular region discussed above.  Changes perhaps relative to previous subclavian vein catheterization for dialysis therapy..  No current active cellulitis phlegmon or abscess.    Nonspecific vague pneumonitis congestion left lower lobe posterior basilar segment CP angle region.           Assessment/Plan:      * Acute on CKD stage 3  Patient's baseline creatinine is around 1.6.  2.9 on presentation.  She does have a history of a renal transplant.    Started on IVF's and Nephrology consulted.  Creat improving with IVF's.  Avoid nephrotoxic medications.    Bacteremia due to group B Streptococcus  Patient was noted to have a fever of 102.5° F without a clear source of infection.  She also was tachycardic and tachypneic, all of which has improved.  Given that she is on tacrolimus and mycophenolate with immunosuppression, she was started on empiric antibiotic therapy.  Now noted to have Group B Bacteremia.  Consulted ID.  Unsure source.  Patient does complain of left shoulder and hip pain.  Septic joint?  Ortho consult.    See CT imaging chest 10/22 - follow up ortho recs  CRP and ESR pending   ECHO pending    CKD (chronic kidney disease), stage III  As addressed above.    Kidney transplant status, living unrelated donor - 12/2/14  As addressed above.    Hyperlipidemia  Poorly controlled; will continue her home regimen of atorvastatin.    Anemia of chronic disease  The patient's H/H is stable and consistent with previous laboratory measurements, and the patient exhibits no signs  or symptoms of acute bleeding; there is no indication for transfusion.  Will continue to monitor.    Type 2 diabetes mellitus, uncontrolled, with renal complications  Uncontrolled with hyperglycemia.  Will resume home insulin at lower dose to avoid hypoglycemia.  Diabetic diet and insulin sliding scale.  A1c 10.4%    Pt reports home insulin regimen: lantus 80u qhs and humalog 30u TID + SSI       VTE Risk Mitigation (From admission, onward)         Ordered     heparin (porcine) injection 5,000 Units  Every 12 hours      10/20/19 2208     IP VTE HIGH RISK PATIENT  Once      10/20/19 2017                      Kalee Cat MD  Department of Hospital Medicine   [facility]

## 2019-10-22 NOTE — PROGRESS NOTES
[facility]  Infectious Disease  Progress Note    Patient Name: Elizabeth Maldonado  MRN: [id number]  Admission Date: 10/20/2019  Length of Stay: 2 days  Attending Physician: Kalee Cat MD  Primary Care Provider: Richy Singleton NP    Isolation Status: No active isolations  Assessment/Plan:      Bacteremia due to group B Streptococcus  54 yr old with kidney transplant (on cellcept and prograf), DM, HLD presents with left shoulder, left lower back, and left hip pain as well as decreased appetite and chills. Reports a recent fall off of a ladder at work. Pt states her left hip/back pain worsened causing her to be unable to walk. Her blood cultures returned positive for GPCs in chains resembling strep. ID is consulted for further recs.     Blood cultures showing Group B strep.  Fever resolved. WBC 10K today. On exam, pt with left hip pain, left shoulder pain, and tenderness to palpation left lower back.  Pt with PATRICK- nephrology consulted.    CT chest ordered; no obvious infectious source seen.    Plan:  1. D/c vancomycin; continue rocephin 2 gram IV q 24 hours for Group B strep bacteremia  2. F/u repeat blood cultures  3. Recommend 2D echo  4. Recommend MRI of lumbar spine and left hip +/- aspiration of hip to rule out septic joint given severe hip pain and bacteremia  5. Will need long term antibiotic therapy; length to be determine based off further work up and source found    Anticipated Disposition: IV antibiotics  Thank you for your consult. I will follow-up with patient. Please contact us if you have any additional questions.  JOSE Alvarado Pager: [phone]    Infectious Disease  [facility]    Subjective:     Principal Problem:Acute renal failure superimposed on stage 3 chronic kidney disease    HPI: This is a 54 year old female female with type 2 diabetes mellitus, HLD, CKD,  anemia of chronic disease, and history of renal transplant 2014 (on cellcept and prograf)  who presents to [facility] ED with complaints of left shoulder and left hip pain the past few days.  She reports that the pain is mainly localized to her left lower back, left hip, and left shoulder.  She does recall falling at work as she was stepping down a ladder and missed the last step.  She does recall falling on her left side but denies any head trauma.   she reports associated poor appetite and chills at home. She denies diarrhea, abd pain, or urinary symptoms.      She was found to have positive blood cultures with GPCs in chains resembling strep. ID consulted for further recs. CT left hip and left shoulder (both done without contrast) non revealing.   Interval History: family at bedside. Blood cultures with Group B strep. Awaiting further imaging studies    Review of Systems   Constitutional: Positive for activity change. Negative for chills and fever.   Respiratory: Negative for cough and shortness of breath.    Cardiovascular: Positive for chest pain. Negative for leg swelling.   Gastrointestinal: Negative for abdominal pain, constipation, diarrhea, nausea and vomiting.   Musculoskeletal: Positive for arthralgias (left shoulder pain, left hip pain), back pain and gait problem. Negative for myalgias.   Skin: Negative for rash and wound.   Neurological: Positive for weakness. Negative for dizziness, light-headedness and headaches.   Psychiatric/Behavioral: Negative for agitation and behavioral problems. The patient is not nervous/anxious.      Objective:     Vital Signs (Most Recent):  Temp: 98.5 °F (36.9 °C) (10/22/19 0754)  Pulse: 90 (10/22/19 0754)  Resp: 17 (10/22/19 0754)  BP: 134/64 (10/22/19 0754)  SpO2: 95 % (10/22/19 0754) Vital Signs (24h Range):  Temp:  [98.5 °F (36.9 °C)-99.9 °F (37.7 °C)] 98.5 °F (36.9 °C)  Pulse:  [90-97] 90  Resp:  [17-18] 17  SpO2:  [95 %-96 %] 95 %  BP: (134-165)/(64-79) 134/64     Weight: 93.8 kg (206 lb 11.2 oz)  Body mass index is 31.9 kg/m².    Estimated Creatinine  Clearance: 37.3 mL/min (A) (based on SCr of 2 mg/dL (H)).    Physical Exam   Constitutional: She is oriented to person, place, and time. She appears well-developed and well-nourished. No distress.   Cardiovascular: Normal rate and regular rhythm.   No murmur heard.  Pulmonary/Chest: Effort normal and breath sounds normal. No respiratory distress.   Abdominal: Soft. Bowel sounds are normal. She exhibits no distension.   Musculoskeletal: She exhibits no edema.        Left shoulder: She exhibits decreased range of motion and tenderness.        Left hip: She exhibits tenderness.        Lumbar back: She exhibits tenderness (left lumbosacral).   Neurological: She is alert and oriented to person, place, and time. She has normal strength. No sensory deficit.   Skin: Skin is warm and dry.   Psychiatric: She has a normal mood and affect. Her behavior is normal.       Significant Labs:   Blood Culture:   Recent Labs   Lab 10/20/19  1530 10/20/19  1600 10/21/19  1024   LABBLOO Gram stain ciera bottle: Gram positive cocci in chains resembling Strep   Positive results previously called  Gram stain aer bottle: Gram positive cocci in chains resembling Strep   Positive results previously called  STREPTOCOCCUS AGALACTIAE (GROUP B)  Beta-hemolytic streptococci are routinely susceptible to   penicillins,cephalosporins and carbapenems.  * Gram stain ciera bottle: Gram positive cocci in chains resembling Strep   Results called to and read back by: Andrea Ackerman  Gram stain aer bottle: Gram positive cocci in chains resembling Strep   Positive results previously called  STREPTOCOCCUS AGALACTIAE (GROUP B)  Beta-hemolytic streptococci are routinely susceptible to   penicillins,cephalosporins and carbapenems.  For susceptibility see order #[id number]  * No Growth to date  No Growth to date     CBC:   Recent Labs   Lab 10/20/19  1449 10/21/19  0440   WBC 9.64 10.69   HGB 11.6* 9.8*   HCT 36.3* 30.5*   PLT SEE COMMENT 127*     CMP:    Recent Labs   Lab 10/20/19  1449 10/21/19  0440 10/22/19  0445   * 137 137   K 4.8 3.9 3.4*   CL 98 104 104   CO2 18* 25 23   * 228* 74   BUN 44* 34* 27*   CREATININE 2.9* 2.2* 2.0*   CALCIUM 10.2 9.5 9.4   PROT 9.3* 7.7  --    ALBUMIN 2.1* 1.7* 1.6*   BILITOT 0.5 0.3  --    ALKPHOS 126 101  --    AST 45* 46*  --    ALT 36 37  --    ANIONGAP 15 8 10   EGFRNONAA 17* 24* 27*       Significant Imaging: I have reviewed all pertinent imaging results/findings within the past 24 hours.

## 2019-10-22 NOTE — PROGRESS NOTES
Pharmacokinetic Assessment Follow Up: IV Vancomycin    Vancomycin serum concentration assessment(s):    The random level was drawn correctly and can be used to guide therapy at this time. The measurement is below the desired definitive target range of 15 to 20 mcg/mL.    Vancomycin Regimen Plan:    Re-dose when the random level is less than 20 mcg/mL, next level to be drawn at 0400 on 10/23/2019     Drug levels (last 3 results):  Recent Labs   Lab Result Units 10/21/19  0440 10/22/19  0445   Vancomycin, Random ug/mL 22.8 9.2       Pharmacy will continue to follow and monitor vancomycin.    Please contact pharmacy at extension [phone] for questions regarding this assessment.    Thank you for the consult,   Shane Smith Jr       Patient brief summary:  Elizabeth Maldonado is a 54 y.o. female initiated on antimicrobial therapy with IV Vancomycin for treatment of bacteremia    The patient's current regimen is to dose based on daily random level.    Drug Allergies:   Review of patient's allergies indicates:   Allergen Reactions    Iodine and iodide containing products Hives    Shrimp Itching    Topamax [topiramate] Other (See Comments)     Vision changes    Zoloft [sertraline] Palpitations       Actual Body Weight:   93.8 kg    Renal Function:   Estimated Creatinine Clearance: 37.3 mL/min (A) (based on SCr of 2 mg/dL (H)).,     Dialysis Method (if applicable):  N/A    CBC (last 72 hours):  Recent Labs   Lab Result Units 10/20/19  1449 10/21/19  0440   WBC K/uL 9.64 10.69   Hemoglobin g/dL 11.6* 9.8*   Hemoglobin A1C %  --  10.4*   Hematocrit % 36.3* 30.5*   Platelets K/uL SEE COMMENT 127*   Gran% % 90.7* 76.4*   Lymph% % 4.0* 9.2*   Mono% % 4.5 12.5   Eosinophil% % 0.0 0.3   Basophil% % 0.2 0.2   Differential Method  Automated Automated       Metabolic Panel (last 72 hours):  Recent Labs   Lab Result Units 10/20/19  1449 10/20/19  1655 10/21/19  0440 10/21/19  1614 10/22/19  0445   Sodium mmol/L 131*  --  137  --  137    Potassium mmol/L 4.8  --  3.9  --  3.4*   Chloride mmol/L 98  --  104  --  104   CO2 mmol/L 18*  --  25  --  23   Glucose mg/dL 753*  --  228*  --  74   Glucose, UA   --  3+*  --  1+*  --    BUN, Bld mg/dL 44*  --  34*  --  27*   Creatinine mg/dL 2.9*  --  2.2*  --  2.0*   Creatinine, Random Ur mg/dL  --   --   --  113.4  --    Albumin g/dL 2.1*  --  1.7*  --  1.6*   Total Bilirubin mg/dL 0.5  --  0.3  --   --    Alkaline Phosphatase U/L 126  --  101  --   --    AST U/L 45*  --  46*  --   --    ALT U/L 36  --  37  --   --    Magnesium mg/dL  --   --  2.3  --   --    Phosphorus mg/dL  --   --  2.4*  --  3.5       Vancomycin Administrations:  vancomycin given in the last 96 hours                     vancomycin 1.5 g in dextrose 5 % 250 mL IVPB (ready to mix) (mg) 1,500 mg New Bag 10/20/19 2134    vancomycin 1.5 g in dextrose 5 % 250 mL IVPB (ready to mix) (mg) 1,500 mg New Bag 10/20/19 1919                      Microbiologic Results:  Microbiology Results (last 7 days)       Procedure Component Value Units Date/Time    Blood culture [[id number]] Collected:  10/21/19 1024    Order Status:  Completed Specimen:  Blood Updated:  10/21/19 1712     Blood Culture, Routine No Growth to date    Narrative:       From 2 different sites 30 minutes apart    Blood culture [[id number]] Collected:  10/21/19 1024    Order Status:  Completed Specimen:  Blood Updated:  10/21/19 1712     Blood Culture, Routine No Growth to date    Narrative:       From 2 different sites 30 minutes apart    Blood Culture #2 **CANNOT BE ORDERED STAT** [[id number]]  (Abnormal) Collected:  10/20/19 1600    Order Status:  Completed Specimen:  Blood from Peripheral, Antecubital, Left Updated:  10/21/19 1154     Blood Culture, Routine Gram stain ciera bottle: Gram positive cocci in chains resembling Strep       Results called to and read back by: Andrea Ackerman      Gram stain aer bottle: Gram positive cocci in chains resembling Strep       Positive results  previously called      STREPTOCOCCUS AGALACTIAE (GROUP B)  Beta-hemolytic streptococci are routinely susceptible to   penicillins,cephalosporins and carbapenems.      Blood Culture #1 **CANNOT BE ORDERED STAT** [[id number]]  (Abnormal) Collected:  10/20/19 1530    Order Status:  Completed Specimen:  Blood from Peripheral, Hand, Right Updated:  10/21/19 1153     Blood Culture, Routine Gram stain ciera bottle: Gram positive cocci in chains resembling Strep       Positive results previously called      Gram stain aer bottle: Gram positive cocci in chains resembling Strep       Positive results previously called      STREPTOCOCCUS AGALACTIAE (GROUP B)  Susceptibility pending  Beta-hemolytic streptococci are routinely susceptible to   penicillins,cephalosporins and carbapenems.

## 2019-10-22 NOTE — SUBJECTIVE & OBJECTIVE
Interval History: 8/10 pain to left hip and shoulder    Review of Systems   Constitutional: Positive for activity change. Negative for chills and fever.   Respiratory: Negative for cough and shortness of breath.    Cardiovascular: Positive for chest pain. Negative for leg swelling.   Gastrointestinal: Negative for abdominal pain, constipation, diarrhea, nausea and vomiting.   Musculoskeletal: Positive for arthralgias (left shoulder pain, left hip pain), back pain and gait problem. Negative for myalgias.   Skin: Negative for rash and wound.   Neurological: Positive for weakness. Negative for dizziness, light-headedness and headaches.   Psychiatric/Behavioral: Negative for agitation and behavioral problems. The patient is not nervous/anxious.      Objective:     Vital Signs (Most Recent):  Temp: 98.7 °F (37.1 °C) (10/22/19 1242)  Pulse: 98 (10/22/19 1242)  Resp: 20 (10/22/19 1242)  BP: (!) 172/83 (10/22/19 1242)  SpO2: 95 % (10/22/19 1242) Vital Signs (24h Range):  Temp:  [98.5 °F (36.9 °C)-99.9 °F (37.7 °C)] 98.7 °F (37.1 °C)  Pulse:  [90-98] 98  Resp:  [17-20] 20  SpO2:  [95 %-96 %] 95 %  BP: (134-172)/(64-83) 172/83     Weight: 93.8 kg (206 lb 11.2 oz)  Body mass index is 31.9 kg/m².    Intake/Output Summary (Last 24 hours) at 10/22/2019 1435  Last data filed at 10/22/2019 1305  Gross per 24 hour   Intake --   Output 800 ml   Net -800 ml      Physical Exam   Constitutional: She is oriented to person, place, and time. She appears well-developed and well-nourished. No distress.   HENT:   Head: Normocephalic and atraumatic.   Right Ear: External ear normal.   Left Ear: External ear normal.   Nose: Nose normal.   Eyes: Right eye exhibits no discharge. Left eye exhibits no discharge.   Neck: Normal range of motion.   Cardiovascular: Normal rate, regular rhythm, normal heart sounds and intact distal pulses. Exam reveals no gallop and no friction rub.   No murmur heard.  Pulmonary/Chest: Effort normal and breath sounds  normal. No stridor. No respiratory distress. She has no wheezes. She has no rales. She exhibits no tenderness.   Abdominal: Soft. Bowel sounds are normal. She exhibits no distension. There is no tenderness. There is no rebound and no guarding.   Musculoskeletal:   There is some tenderness to palpation to the left shoulder and left hip without any joint swelling nor erythema   Neurological: She is alert and oriented to person, place, and time.   Skin: Skin is warm and dry. She is not diaphoretic. No erythema.   Psychiatric: She has a normal mood and affect. Her behavior is normal. Judgment and thought content normal.   Nursing note and vitals reviewed.      Significant Labs:   A1C:   Recent Labs   Lab 10/21/19  0440   HGBA1C 10.4*     Blood Culture:   Recent Labs   Lab 10/20/19  1530 10/20/19  1600 10/21/19  1024   LABBLOO Gram stain ciera bottle: Gram positive cocci in chains resembling Strep   Positive results previously called  Gram stain aer bottle: Gram positive cocci in chains resembling Strep   Positive results previously called  STREPTOCOCCUS AGALACTIAE (GROUP B)  Beta-hemolytic streptococci are routinely susceptible to   penicillins,cephalosporins and carbapenems.  * Gram stain ciera bottle: Gram positive cocci in chains resembling Strep   Results called to and read back by: Andrea Ackerman  Gram stain aer bottle: Gram positive cocci in chains resembling Strep   Positive results previously called  STREPTOCOCCUS AGALACTIAE (GROUP B)  Beta-hemolytic streptococci are routinely susceptible to   penicillins,cephalosporins and carbapenems.  For susceptibility see order #[id number]  * No Growth to date  No Growth to date  No Growth to date  No Growth to date     BMP:   Recent Labs   Lab 10/21/19  0440 10/22/19  0445   * 74    137   K 3.9 3.4*    104   CO2 25 23   BUN 34* 27*   CREATININE 2.2* 2.0*   CALCIUM 9.5 9.4   MG 2.3  --      CBC:   Recent Labs   Lab 10/20/19  1449 10/21/19  0440    WBC 9.64 10.69   HGB 11.6* 9.8*   HCT 36.3* 30.5*   PLT SEE COMMENT 127*     Lactic Acid:   Recent Labs   Lab 10/20/19  1449 10/20/19  1852   LACTATE 3.2* 2.5*     POCT Glucose:   Recent Labs   Lab 10/21/19  2053 10/22/19  0755 10/22/19  1243   POCTGLUCOSE 117* 86 139*       Significant Imaging:   Impression/Chest CT imaging        Nonspecific scarring versus interstitial edema right clavicular region discussed above.  Changes perhaps relative to previous subclavian vein catheterization for dialysis therapy..  No current active cellulitis phlegmon or abscess.    Nonspecific vague pneumonitis congestion left lower lobe posterior basilar segment CP angle region.

## 2019-10-22 NOTE — CONSULTS
C.C. Left sided chest pain at SC joint    HPI: Elizabeth Canseco54 y.o. complaining of left sided chest pain at the SC joint. Pt with swelling and pain at the left SC joint. She denies any IV drug usage. She is immunocompromised and has also suffered a recent fall at work. She has hx of organ transplant. She has not been able to get the pain to go away I the left SC joint    ROS:   Pertinent positives: left SC joint pain       PMH:   Past Medical History:   Diagnosis Date    Acidosis 2014    Allergic rhinitis 2014    Allergy     Anemia     Anemia in chronic kidney disease 2014    Anxiety     Chronic immunosuppression with Prograf and MMF 12/3/2014    CKD (chronic kidney disease) stage 5, GFR less than 15 ml/min 2014    CKD (chronic kidney disease), stage III 3/2/2015    Degenerative disc disease     Depression 2014    Diabetes mellitus, type 2 since age 18 2014    ESRD on peritoneal dialysis - 2014 for 9 hours no peritonitis 2014    Hyperlipidemia     Hypertension 2014    Hypomagnesemia 2015    Kidney transplant status, living unrelated donor - 12/2/14 12/3/2014    Neutropenia 2015    NS (nuclear sclerosis) 2016    Obesity     Organ transplant candidate 2014    Pre-op exam 2014    Proliferative diabetic retinopathy of both eyes without macular edema associated with type 2 diabetes mellitus 2016    Renal manifestation of secondary diabetes mellitus     Tendinitis     Trouble in sleeping        PSH:   Past Surgical History:   Procedure Laterality Date    BONE MARROW BIOPSY N/A      SECTION      x 2    KIDNEY TRANSPLANT      RENAL BIOPSY      ROTATOR CUFF REPAIR      TUBAL LIGATION         Social Hx:   Social History     Occupational History     Employer: DISABLED   Tobacco Use    Smoking status: Former Smoker     Packs/day: 1.00     Years: 20.00     Pack years: 20.00     Types: Cigarettes     Last attempt  "to quit: 2013     Years since quittin.1    Smokeless tobacco: Never Used    Tobacco comment: quit smoking cigarettes in 2014   Substance and Sexual Activity    Alcohol use: No    Drug use: No     Types: Marijuana    Sexual activity: Yes     Partners: Male     Birth control/protection: Post-menopausal       Medications:    No current facility-administered medications on file prior to encounter.      Current Outpatient Medications on File Prior to Encounter   Medication Sig Dispense Refill    ACCU-CHEK PREM Misc USE AS DIRECTED TO CHECK BLOOD GLUCOSE 1 each 1    acetaminophen (TYLENOL) 500 MG tablet TK 2 CAPLETS PO TID  0    ADMELOG SOLOSTAR U-100 INSULIN 100 unit/mL pen INJECT 30 UNITS UNDER THE SKIN TID WC  2    atorvastatin (LIPITOR) 20 MG tablet Take 20 mg by mouth.      BASAGLAR KWIKPEN U-100 INSULIN glargine 100 units/mL (3mL) SubQ pen   1    blood sugar diagnostic Strp Checks BG ac/hs 200 strip 7    INSULIN GLARGINE,HUM.REC.ANLOG (BASAGLAR KWIKPEN U-100 INSULIN SUBQ) Inject 50 Units into the skin nightly.      insulin lispro (ADMELOG SOLOSTAR U-100 INSULIN SUBQ) Inject 100 Units into the skin 3 (three) times daily with meals.      insulin lispro 100 unit/mL injection Inject into the skin.      insulin syringe-needle U-100 (INSULIN SYRINGE) 1/2 mL 30 x 5/16" Syrg Uses 4 daily 200 each 12    lancets (ONETOUCH DELICA LANCETS) 33 gauge Misc 1 lancet by Misc.(Non-Drug; Combo Route) route 4 (four) times daily before meals and nightly. 200 each 7    mycophenolate (CELLCEPT) 250 mg Cap TAKE 2 CAPSULES ( 500 MG TOTAL) BY MOUTH 2 TIMES DAILY 120 capsule 11    mycophenolate (CELLCEPT) 500 mg Tab Take by mouth.      NOVOLOG FLEXPEN 100 unit/mL InPn pen 23 Units 3 (three) times daily with meals.   3    psyllium husk 0.4 gram Cap Take by mouth.      SYRINGE & NEEDLE,INSULIN,1 ML (INSULIN SYRINGE-NEEDLE U-100) 1 mL 29 X 7/16" Syrg   11    tacrolimus (PROGRAF) 1 MG Cap TAKE 3 CAPSULES ( " "3 MG TOTAL) BY MOUTH IN THE MORNING & TAKE 3 CAPSULES ( 3 MG TOTAL) IN THE EVENING 180 capsule 11    zolpidem (AMBIEN) 5 MG Tab Take 10 mg by mouth.           PE:         Vitals:    10/21/19 2023   BP: (!) 141/73   Pulse: 97   Resp: 18   Temp: 99.6 °F (37.6 °C)       Estimated body mass index is 31.9 kg/m² as calculated from the following:    Height as of this encounter: 5' 7.5" (1.715 m).    Weight as of this encounter: 93.8 kg (206 lb 11.2 oz).     General obese, appears older than stated age     Extremity: left shoulder ROM painful with resisted motion  But no severe pain with passive ROM  Warmth. Swelling and pain at the SC joint  No swelling or pain at the left glenohumeral joint  NV intact distally    Labs:    Lab Results   Component Value Date    WBC 10.69 10/21/2019    HGB 9.8 (L) 10/21/2019    HCT 30.5 (L) 10/21/2019    MCV 83 10/21/2019     (L) 10/21/2019           BMP  Lab Results   Component Value Date     10/21/2019    K 3.9 10/21/2019     10/21/2019    CO2 25 10/21/2019    BUN 34 (H) 10/21/2019    CREATININE 2.2 (H) 10/21/2019    CALCIUM 9.5 10/21/2019    ANIONGAP 8 10/21/2019    ESTGFRAFRICA 28 (A) 10/21/2019    EGFRNONAA 24 (A) 10/21/2019       Lab Results   Component Value Date    INR 0.9 02/22/2017    INR 1.0 04/07/2015    INR 1.0 12/01/2014       No results found for: SEDRATE    No results found for: CRP    Radiography:  Film    Interpretation  No osseous abnormality to the left shoulder  Stranding of the tissue in the chest on shoulder CT  Left shoulder without any osseous distruction    A/P  54 y.o.female with left SC joint pain    Will order an MRI of chest to assess the SC joint  Will follow  Does not appear to have any infection att he left glenohumeral joint on exam      Wali Santana MD   "

## 2019-10-22 NOTE — PROGRESS NOTES
[facility] [place]  Nephrology  Progress Note    Patient Name: Elizabeth Maldonado  MRN: [id number]  Admission Date: 10/20/2019  Hospital Length of Stay: 2 days  Attending Provider: Kalee Cat MD   Primary Care Physician: Richy Singleton NP  Principal Problem:Acute renal failure superimposed on stage 3 chronic kidney disease    Subjective:     HPI: Ms. 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 In ED, pt febrile to 102.5, tachycardiac to 125, hypertensive 177/91 and tachypneic. W/u significant for PATRICK Cr 2.9, serum glucose 753, Na 131, lactic acid 3.2, VBG showing mild metabolic acidosis, and UA with spec grav 1.025, 3+ protein, 3+ glucose, 2+ occult blood, 100 RBCs. CT head, CXRR, Xray L shoulder and L hip all unremarkable. Pt received 1L LR bolus x3, started on NS infusion 100 ml/hr. Empirically treated for sepsis with vanc, zosyn, ceftriaxone. Dr. Catalan with transplant medicine at [facility] contacted, felt pt could appropriately be treated at [facility], advised to hold cellcept. Noted goal prograf level 4-7. BCx, Prograf and Cellcept levels drawn. Nephrology consulted 10/21/19 for PATRICK in renal transplant pt. Baseline Cr appears ~1.6 -2 with GFR 30-40. Last at baseline, 1.6, 19 on routine outpt labs. Sees nephrology outpt, last saw Dr. Bonilla 2019, no issues noted, Upr Cr 1.4 g at this time. Reports compliance with all home meds including prograf and cellcept. Good PO fluid intake at home, attempts 1 gallon per day, and endorses good UOP; denies foamy/frothy urine, hematuria, dysuria. Also denies recent n/v, rashes, confusion, hemoptysis, edema, polydipsia, polyuria, confusion, SOB. Denies any new meds or med changes. Endorses recent chills,  "agitation.     Cr improved this AM 2.9 --> 2.2. Serum osmol 338 noted. HR improved this AM. UOP 1x/24 hrs (PM shift only). CK 72. This afternoon pt reports feeling "sleepy," and c/o continued L shoulder and hip pain, worse with movement. Reports poor appetite, denies metallic taste. Denies n/v, SOB, abdominal pain, confusion. ID consulted, noted recs.     Interval History: Overnight, no acute events. IVF stopped yesterday PM. UPrCr yesterday with NRP.  mls +3x/24 hrs. Orthopedic surgery consulted, noted plans for CT chest, without contrast, today to evaluate SC joint. Pt doing ok this afternoon, still c/o unchanged L shoulder and L hip pain, worse when lying flat or moving. Reports her appetite is much improved since yesterday. Denies SOB, n/v. ID following for bacteremia, BCx 10/20 grew GBS; repeat Cx 10/21 NGTD. Noted plans to continue rocephin 2g IV QD for now. TTE pending.      Review of patient's allergies indicates:   Allergen Reactions    Iodine and iodide containing products Hives    Shrimp Itching    Topamax [topiramate] Other (See Comments)     Vision changes    Zoloft [sertraline] Palpitations     Current Facility-Administered Medications   Medication Frequency    acetaminophen tablet 650 mg Q6H PRN    atorvastatin tablet 20 mg Daily    cefTRIAXone (ROCEPHIN) 2 g in dextrose 5 % 50 mL IVPB Q24H    cloNIDine tablet 0.1 mg TID PRN    dextrose 50% injection 12.5 g PRN    dextrose 50% injection 25 g PRN    glucagon (human recombinant) injection 1 mg PRN    glucose chewable tablet 16 g PRN    glucose chewable tablet 24 g PRN    heparin (porcine) injection 5,000 Units Q12H    insulin aspart U-100 pen 1-10 Units PRN    insulin aspart U-100 pen 10 Units TID WM    insulin detemir U-100 pen 20 Units QHS    ondansetron injection 8 mg Q8H PRN    oxyCODONE-acetaminophen  mg per tablet 1 tablet Q4H PRN    promethazine (PHENERGAN) 6.25 mg in dextrose 5 % 50 mL IVPB Q6H PRN    ramelteon " tablet 8 mg Nightly PRN    senna-docusate 8.6-50 mg per tablet 1 tablet BID PRN    tacrolimus capsule 3 mg BID       Objective:     Vital Signs (Most Recent):  Temp: 98.5 °F (36.9 °C) (10/22/19 0754)  Pulse: 90 (10/22/19 0754)  Resp: 17 (10/22/19 0754)  BP: 134/64 (10/22/19 0754)  SpO2: 95 % (10/22/19 0754)  O2 Device (Oxygen Therapy): room air (10/21/19 2023) Vital Signs (24h Range):  Temp:  [98.5 °F (36.9 °C)-99.9 °F (37.7 °C)] 98.5 °F (36.9 °C)  Pulse:  [90-97] 90  Resp:  [17-18] 17  SpO2:  [95 %-96 %] 95 %  BP: (134-165)/(64-79) 134/64     Weight: 93.8 kg (206 lb 11.2 oz) (10/20/19 2058)  Body mass index is 31.9 kg/m².  Body surface area is 2.11 meters squared.    I/O last 3 completed shifts:  In: 1150 [IV Piggyback:1150]  Out: 350 [Urine:350]    Physical Exam   Constitutional: She is oriented to person, place, and time. She appears well-developed and well-nourished. No distress.   HENT:   Head: Normocephalic and atraumatic.   Mouth/Throat: Mucous membranes are normal.   Eyes: Conjunctivae and EOM are normal.   Cardiovascular: Normal rate and regular rhythm.   No murmur heard.  Pulmonary/Chest: Effort normal and breath sounds normal.   Abdominal: Soft. She exhibits no distension.   Musculoskeletal: She exhibits no edema or deformity.   Neurological: She is alert and oriented to person, place, and time.   Skin: Skin is warm and dry. No rash noted. She is not diaphoretic.   Psychiatric: She has a normal mood and affect. Her behavior is normal.       Significant Labs:  CBC:   Recent Labs   Lab 10/21/19  0440   WBC 10.69   RBC 3.67*   HGB 9.8*   HCT 30.5*   *   MCV 83   MCH 26.7*   MCHC 32.1     CMP:   Recent Labs   Lab 10/21/19  0440 10/22/19  0445   * 74   CALCIUM 9.5 9.4   ALBUMIN 1.7* 1.6*   PROT 7.7  --     137   K 3.9 3.4*   CO2 25 23    104   BUN 34* 27*   CREATININE 2.2* 2.0*   ALKPHOS 101  --    ALT 37  --    AST 46*  --    BILITOT 0.3  --      PTH: No results for input(s): PTH  in the last 168 hours.  All labs within the past 24 hours have been reviewed.     Significant Imaging:  Labs: Reviewed  CT: Reviewed    Assessment/Plan:   PATRICK on CKD 3  - Baseline Cr 1.6-2 GFR 30-40; last at baseline 5/2019.   - Cr on arrival 2.9, improved with IVF, Cr 2 this AM, GFR 31; at baseline. BUN continues to improve as well. Non-oligurc.   - Suspect etiology pre renal in origin, likely 2/2 volume depletion/hyperglycemia. Payne's stain negative.   - UPrCr 10/21 showed NRP; will order 24 hr urine collection, see below  - Strict Is/Os; will closely monitor UOP  - avoid nephrotoxic medications   - renally dose medications for current GFR  - Daily RFP     History of Kidney transplant/ on long term immunosuppression meds  - S/p LURT on 12/2014; Follows with [facility] at [facility] main with Dr. Catalan, who has been made aware of pt's current admission. Also follows with Dr. Bonilla, last appt 6/2019- no issues noted at this time.   - Immunosuppressant regimen includes Cellcept 500mg PO BID and prograf 1mg BID; reports compliance  - Prograf trough today 4.5, at goal; Goal prograf level 4-7; Continue prograf 3mg BID  - Continue holding MMF, per Dr. Catalan recommendation. CT chest negative for active cellulitis/abscess. Will restart MMF once repeat BCx clear and TTE resulted and benign.   - Prograf level daily  - Will continue to monitor for drug toxicities      Proteinuria   - chronic; UprCr 1.6 6/2019; 1.4 12/2018  - UPrCr 10/21 showed NRP of 5.7 g  - Ordered HLA DSA and BK virus PCR to r/o rejection of transplant  - 24 hr urine collection starting today 12PM     CKD MBD   - PTH pending  - CCa elevated 11.3; avoid Vit D supplements   - Phos WNL today, 3.5; can expect low phos given prograf therapy and 2/2 HPTH  - will continue to monitor       JOSE Marquez   Nephrology  [facility]  [facility]

## 2019-10-22 NOTE — SUBJECTIVE & OBJECTIVE
Interval History: family at bedside. Blood cultures with Group B strep. Awaiting further imaging studies    Review of Systems   Constitutional: Positive for activity change. Negative for chills and fever.   Respiratory: Negative for cough and shortness of breath.    Cardiovascular: Positive for chest pain. Negative for leg swelling.   Gastrointestinal: Negative for abdominal pain, constipation, diarrhea, nausea and vomiting.   Musculoskeletal: Positive for arthralgias (left shoulder pain, left hip pain), back pain and gait problem. Negative for myalgias.   Skin: Negative for rash and wound.   Neurological: Positive for weakness. Negative for dizziness, light-headedness and headaches.   Psychiatric/Behavioral: Negative for agitation and behavioral problems. The patient is not nervous/anxious.      Objective:     Vital Signs (Most Recent):  Temp: 98.5 °F (36.9 °C) (10/22/19 0754)  Pulse: 90 (10/22/19 0754)  Resp: 17 (10/22/19 0754)  BP: 134/64 (10/22/19 0754)  SpO2: 95 % (10/22/19 0754) Vital Signs (24h Range):  Temp:  [98.5 °F (36.9 °C)-99.9 °F (37.7 °C)] 98.5 °F (36.9 °C)  Pulse:  [90-97] 90  Resp:  [17-18] 17  SpO2:  [95 %-96 %] 95 %  BP: (134-165)/(64-79) 134/64     Weight: 93.8 kg (206 lb 11.2 oz)  Body mass index is 31.9 kg/m².    Estimated Creatinine Clearance: 37.3 mL/min (A) (based on SCr of 2 mg/dL (H)).    Physical Exam   Constitutional: She is oriented to person, place, and time. She appears well-developed and well-nourished. No distress.   Cardiovascular: Normal rate and regular rhythm.   No murmur heard.  Pulmonary/Chest: Effort normal and breath sounds normal. No respiratory distress.   Abdominal: Soft. Bowel sounds are normal. She exhibits no distension.   Musculoskeletal: She exhibits no edema.        Left shoulder: She exhibits decreased range of motion and tenderness.        Left hip: She exhibits tenderness.        Lumbar back: She exhibits tenderness (left lumbosacral).   Neurological: She is  alert and oriented to person, place, and time. She has normal strength. No sensory deficit.   Skin: Skin is warm and dry.   Psychiatric: She has a normal mood and affect. Her behavior is normal.       Significant Labs:   Blood Culture:   Recent Labs   Lab 10/20/19  1530 10/20/19  1600 10/21/19  1024   LABBLOO Gram stain ciera bottle: Gram positive cocci in chains resembling Strep   Positive results previously called  Gram stain aer bottle: Gram positive cocci in chains resembling Strep   Positive results previously called  STREPTOCOCCUS AGALACTIAE (GROUP B)  Beta-hemolytic streptococci are routinely susceptible to   penicillins,cephalosporins and carbapenems.  * Gram stain ciera bottle: Gram positive cocci in chains resembling Strep   Results called to and read back by: Andrea Ackerman  Gram stain aer bottle: Gram positive cocci in chains resembling Strep   Positive results previously called  STREPTOCOCCUS AGALACTIAE (GROUP B)  Beta-hemolytic streptococci are routinely susceptible to   penicillins,cephalosporins and carbapenems.  For susceptibility see order #[id number]  * No Growth to date  No Growth to date     CBC:   Recent Labs   Lab 10/20/19  1449 10/21/19  0440   WBC 9.64 10.69   HGB 11.6* 9.8*   HCT 36.3* 30.5*   PLT SEE COMMENT 127*     CMP:   Recent Labs   Lab 10/20/19  1449 10/21/19  0440 10/22/19  0445   * 137 137   K 4.8 3.9 3.4*   CL 98 104 104   CO2 18* 25 23   * 228* 74   BUN 44* 34* 27*   CREATININE 2.9* 2.2* 2.0*   CALCIUM 10.2 9.5 9.4   PROT 9.3* 7.7  --    ALBUMIN 2.1* 1.7* 1.6*   BILITOT 0.5 0.3  --    ALKPHOS 126 101  --    AST 45* 46*  --    ALT 36 37  --    ANIONGAP 15 8 10   EGFRNONAA 17* 24* 27*       Significant Imaging: I have reviewed all pertinent imaging results/findings within the past 24 hours.

## 2019-10-22 NOTE — PROGRESS NOTES
MRI of Chest ordered by the Orthopedist Dr. Santana.  Spoke to Radiologist Dr. Page who looked at the case and is recommending CT of Chest with and without contrast be done instead of the MRI Chest ordered.  Patient is in renal failure so if contrast is not able to be administered CT Chest without contrast should be done. Spoke to the patients hospitalist Dr. Pickett who is aware of the Radiologists recommendation and will place orders.

## 2019-10-22 NOTE — PHYSICIAN QUERY
PT Name: Elizabeth Maldonado  MR #: [id number]    Physician Query Form - Transplant Condition Clarification        CDS/: Sherie Cano RN              Contact information: [phone]  This form is a permanent document in the medical record.     Query Date: October 22, 2019    By submitting this query, we are merely seeking further clarification of documentation to reflect the severity of illness of your patient. Please utilize your independent clinical judgment when addressing the question(s) below.    The Medical record reflects the following:     Indicators   Supporting Clinical Findings Location in Medical Record   x History of organ transplant Kidney transplant status, living unrelated donor- 12/2/14 10/20 [facility]   x Acute/chronic condition(s) Acute renal failure superimposed on CKD 3  Uremic encephalopathy  Fever  DM 2  Anemia of chronic dx    Gram positive bacteremia   10/20 [facility]            1021 ID consult   x Lab Value(s) Tacrolimus= <1.5, 4.5    Bun/cr= 44/ 2.9   GFR20 10/20, 10/22 Lab    10/20 Lab      Radiology/ US Findings     x Treatment/Medication LR 2000ml Bolus  NS 100cc/hr    Renal consult  Daily BMP  Urinalysis     10/20-21 MAR   x Other . Maintenance IS includes Prograf 3/2 and MMF 500mg BID. Patient has h/o proteinuria s/p transplant biopsy in 2017 that was unremarkable. Will repeat UPCR today. MMF being held per Dr. Catalan; will restart once bacteremia resolved and TTE is benign. Prograf level undetectable; lab error? Will check trough in AM; she is currently receiving Prograf at 6a and 6p; will check lab at 6a prior to AM dose.    10/21 Renal consult       Provider, please specify if the Acute renal failure :      [ x  ] Affected the function of the transplanted organ, and is a complication   [   ] Did not affect the function of the transplanted organ, and is not a complication   [   ] Other explanation (please specify): _____________   [   ]  Clinically Undetermined       Please document in  your progress notes daily for the duration of treatment until resolved, and include in your discharge summary.

## 2019-10-22 NOTE — ASSESSMENT & PLAN NOTE
Uncontrolled with hyperglycemia.  Will resume home insulin at lower dose to avoid hypoglycemia.  Diabetic diet and insulin sliding scale.  A1c 10.4%    Pt reports home insulin regimen: lantus 80u qhs and humalog 30u TID + SSI

## 2019-10-23 LAB
ALBUMIN SERPL BCP-MCNC: 1.5 G/DL (ref 3.5–5.2)
ANION GAP SERPL CALC-SCNC: 11 MMOL/L (ref 8–16)
BUN SERPL-MCNC: 25 MG/DL (ref 6–20)
CALCIUM SERPL-MCNC: 9.2 MG/DL (ref 8.7–10.5)
CHLORIDE SERPL-SCNC: 102 MMOL/L (ref 95–110)
CLASS I ANTIBODY COMMENTS - LUMINEX: NORMAL
CLASS II ANTIBODIES - LUMINEX: NORMAL
CLASS II ANTIBODY COMMENTS - LUMINEX: NORMAL
CO2 SERPL-SCNC: 22 MMOL/L (ref 23–29)
CREAT SERPL-MCNC: 1.8 MG/DL (ref 0.5–1.4)
DSA1 TESTING DATE: NORMAL
DSA12 TESTING DATE: NORMAL
DSA2 TESTING DATE: NORMAL
EST. GFR  (AFRICAN AMERICAN): 36 ML/MIN/1.73 M^2
EST. GFR  (NON AFRICAN AMERICAN): 31 ML/MIN/1.73 M^2
GLUCOSE SERPL-MCNC: 112 MG/DL (ref 70–110)
PHOSPHATE SERPL-MCNC: 3.8 MG/DL (ref 2.7–4.5)
POCT GLUCOSE: 125 MG/DL (ref 70–110)
POCT GLUCOSE: 132 MG/DL (ref 70–110)
POCT GLUCOSE: 144 MG/DL (ref 70–110)
POCT GLUCOSE: 164 MG/DL (ref 70–110)
POCT GLUCOSE: 228 MG/DL (ref 70–110)
POTASSIUM SERPL-SCNC: 3.5 MMOL/L (ref 3.5–5.1)
PROCALCITONIN SERPL IA-MCNC: 2.97 NG/ML
PTH-INTACT SERPL-MCNC: 106.1 PG/ML (ref 9–77)
SERUM COLLECTION DT - LUMINEX CLASS I: NORMAL
SERUM COLLECTION DT - LUMINEX CLASS II: NORMAL
SODIUM SERPL-SCNC: 135 MMOL/L (ref 136–145)
TACROLIMUS BLD-MCNC: 5.9 NG/ML (ref 5–15)
URATE SERPL-MCNC: 6.2 MG/DL (ref 2.4–5.7)

## 2019-10-23 PROCEDURE — 84550 ASSAY OF BLOOD/URIC ACID: CPT

## 2019-10-23 PROCEDURE — 97161 PT EVAL LOW COMPLEX 20 MIN: CPT

## 2019-10-23 PROCEDURE — 63600175 PHARM REV CODE 636 W HCPCS: Performed by: INTERNAL MEDICINE

## 2019-10-23 PROCEDURE — 63600175 PHARM REV CODE 636 W HCPCS: Performed by: HOSPITALIST

## 2019-10-23 PROCEDURE — 97530 THERAPEUTIC ACTIVITIES: CPT

## 2019-10-23 PROCEDURE — 97166 OT EVAL MOD COMPLEX 45 MIN: CPT

## 2019-10-23 PROCEDURE — 25000003 PHARM REV CODE 250: Performed by: INTERNAL MEDICINE

## 2019-10-23 PROCEDURE — 97535 SELF CARE MNGMENT TRAINING: CPT

## 2019-10-23 PROCEDURE — 11000001 HC ACUTE MED/SURG PRIVATE ROOM

## 2019-10-23 PROCEDURE — 84145 PROCALCITONIN (PCT): CPT

## 2019-10-23 PROCEDURE — 80069 RENAL FUNCTION PANEL: CPT

## 2019-10-23 PROCEDURE — 99233 SBSQ HOSP IP/OBS HIGH 50: CPT | Mod: ,,, | Performed by: PHYSICIAN ASSISTANT

## 2019-10-23 PROCEDURE — 25000003 PHARM REV CODE 250: Performed by: PHYSICIAN ASSISTANT

## 2019-10-23 PROCEDURE — 36415 COLL VENOUS BLD VENIPUNCTURE: CPT

## 2019-10-23 PROCEDURE — 25000003 PHARM REV CODE 250: Performed by: HOSPITALIST

## 2019-10-23 PROCEDURE — 80197 ASSAY OF TACROLIMUS: CPT

## 2019-10-23 PROCEDURE — 99233 PR SUBSEQUENT HOSPITAL CARE,LEVL III: ICD-10-PCS | Mod: ,,, | Performed by: PHYSICIAN ASSISTANT

## 2019-10-23 PROCEDURE — 63600175 PHARM REV CODE 636 W HCPCS: Performed by: PHYSICIAN ASSISTANT

## 2019-10-23 PROCEDURE — 94761 N-INVAS EAR/PLS OXIMETRY MLT: CPT

## 2019-10-23 RX ORDER — LORAZEPAM 2 MG/ML
2 INJECTION INTRAMUSCULAR ONCE
Status: COMPLETED | OUTPATIENT
Start: 2019-10-23 | End: 2019-10-23

## 2019-10-23 RX ORDER — MORPHINE SULFATE 10 MG/ML
4 INJECTION INTRAMUSCULAR; INTRAVENOUS; SUBCUTANEOUS EVERY 6 HOURS PRN
Status: DISCONTINUED | OUTPATIENT
Start: 2019-10-23 | End: 2019-10-29

## 2019-10-23 RX ADMIN — CLONIDINE HYDROCHLORIDE 0.1 MG: 0.1 TABLET ORAL at 09:10

## 2019-10-23 RX ADMIN — POTASSIUM BICARBONATE 50 MEQ: 25 TABLET, EFFERVESCENT ORAL at 12:10

## 2019-10-23 RX ADMIN — HEPARIN SODIUM 5000 UNITS: 5000 INJECTION, SOLUTION INTRAVENOUS; SUBCUTANEOUS at 09:10

## 2019-10-23 RX ADMIN — OXYCODONE HYDROCHLORIDE AND ACETAMINOPHEN 1 TABLET: 10; 325 TABLET ORAL at 03:10

## 2019-10-23 RX ADMIN — ATORVASTATIN CALCIUM 20 MG: 10 TABLET, FILM COATED ORAL at 09:10

## 2019-10-23 RX ADMIN — SENNOSIDES, DOCUSATE SODIUM 1 TABLET: 50; 8.6 TABLET, FILM COATED ORAL at 09:10

## 2019-10-23 RX ADMIN — INSULIN ASPART 2 UNITS: 100 INJECTION, SOLUTION INTRAVENOUS; SUBCUTANEOUS at 08:10

## 2019-10-23 RX ADMIN — CEFTRIAXONE SODIUM 2 G: 2 INJECTION, SOLUTION INTRAVENOUS at 12:10

## 2019-10-23 RX ADMIN — INSULIN DETEMIR 20 UNITS: 100 INJECTION, SOLUTION SUBCUTANEOUS at 08:10

## 2019-10-23 RX ADMIN — INSULIN ASPART 10 UNITS: 100 INJECTION, SOLUTION INTRAVENOUS; SUBCUTANEOUS at 06:10

## 2019-10-23 RX ADMIN — LORAZEPAM 2 MG: 2 INJECTION INTRAMUSCULAR; INTRAVENOUS at 03:10

## 2019-10-23 RX ADMIN — INSULIN ASPART 10 UNITS: 100 INJECTION, SOLUTION INTRAVENOUS; SUBCUTANEOUS at 12:10

## 2019-10-23 RX ADMIN — HEPARIN SODIUM 5000 UNITS: 5000 INJECTION, SOLUTION INTRAVENOUS; SUBCUTANEOUS at 08:10

## 2019-10-23 RX ADMIN — OXYCODONE HYDROCHLORIDE AND ACETAMINOPHEN 1 TABLET: 10; 325 TABLET ORAL at 06:10

## 2019-10-23 RX ADMIN — OXYCODONE HYDROCHLORIDE AND ACETAMINOPHEN 1 TABLET: 10; 325 TABLET ORAL at 09:10

## 2019-10-23 RX ADMIN — OXYCODONE HYDROCHLORIDE AND ACETAMINOPHEN 1 TABLET: 10; 325 TABLET ORAL at 02:10

## 2019-10-23 RX ADMIN — TACROLIMUS 3 MG: 1 CAPSULE ORAL at 09:10

## 2019-10-23 RX ADMIN — INSULIN ASPART 10 UNITS: 100 INJECTION, SOLUTION INTRAVENOUS; SUBCUTANEOUS at 09:10

## 2019-10-23 RX ADMIN — TACROLIMUS 3 MG: 1 CAPSULE ORAL at 06:10

## 2019-10-23 NOTE — PT/OT/SLP EVAL
"Physical Therapy Evaluation    Patient Name:  Elizabeth Maldonado   MRN:  [id number]    Recommendations:     Discharge Recommendations:  (ongoing assessment)   Discharge Equipment Recommendations:     Barriers to discharge: Inaccessible home and Decreased caregiver support    Assessment:     Elizabeth Maldonado is a 54 y.o. female admitted with a medical diagnosis of Acute renal failure superimposed on stage 3 chronic kidney disease.  She presents with the following impairments/functional limitations:  impaired endurance, gait instability, impaired functional mobilty, decreased lower extremity function, pain, decreased ROM, impaired joint extensibility . Pt  with functional mobility deficits and should benefit from  PT  to maximize I and safety decrease risk of further decline of injury.    Rehab Prognosis: Good; patient would benefit from acute skilled PT services to address these deficits and reach maximum level of function.    Recent Surgery: * No surgery found *      Plan:     During this hospitalization, patient to be seen 5 x/week to address the identified rehab impairments via gait training, therapeutic activities, therapeutic exercises, neuromuscular re-education and progress toward the following goals:    · Plan of Care Expires:  11/23/19    Subjective     Chief Complaint: hip, shoulder and back pain  Patient/Family Comments/goals: decrease pain  Pain/Comfort:  · Location - Orientation 1: generalized  · Location 1: back(hip and shoulder)  · Pain Addressed 1: Reposition, Distraction, Cessation of Activity  · Pain Rating Post-Intervention 1: 10/10    Patients cultural, spiritual, Muslim conflicts given the current situation: no    Living Environment:  Pt lives in [facility] with [facility] with L HR  Prior to admission, patients level of function was I, working at [employer].  Equipment used at home: use a "patio chair" in walk in shower.  DME owned (not currently used): none.  Upon discharge, patient will have assistance " "from pt reports very limited help; "maybe someone who could check on me a few times a day".    Objective:     Communicated with Divya nurse prior to session.  Patient found supine with cryotherapy, peripheral IV, gresham catheter  upon PT entry to room.    General Precautions: Standard, fall   Orthopedic Precautions:N/A   Braces: N/A     Exams:  · Cognition:   · Patient is oriented x 4.  · Pt follows approximately 100% of multi-step commands.    · Mood: Pleasant and cooperative.   · Musculoskeletal:  · Posture:    · Rounded shoulders, forward head  ·   B LE ROM/Strength: Grossly WFL,  No MMT due to pain  · Neuromuscular:  · Sensation: Intact to light touch bilateral LEs.   · Tone/Reflexes: No impairments identified with functional mobility. No formal testing performed.   · Coordination: WFL        Balance: diminished due to pain, stable with use of RW  · Visual-vestibular: No impairments identified with functional mobility. No formal testing performed.  · Integument: Visible skin intact  · Edema: none noted      Functional Mobility:  · Bed Mobility:     · Supine to Sit: contact guard assistance  · Transfers:     · Sit to Stand:  contact guard assistance with rolling walker  · Bed to Chair: contact guard assistance with  rolling walker  using  Step Transfer  · Gait: 5-6 steps with RW decreased;step length, steppage, knee flexion during swing through and heel strike and toe off./antalgic.      Therapeutic Activities and Exercises:  Pt presented supine in bed, agreeable to PT.  Bed mobility supine>sit EOB with CGA, instruction for sequencing and hand placement. Pt c/o slight dizziness upon sitting, after several minutes pt stated it was resolved enough to stand.  Sit>stand to RW with CGA, instruction for hand placement and wt shift.  Pt took approx. 5-6 steps with RW decreased;step length, steppage, knee flexion during swing through and heel strike and toe off. To sit in BSC with instruction for positioning and hand " placement.    AM-PAC 6 CLICK MOBILITY  Total Score:14     Patient left up in chair with all lines intact, call button in reach and nursing notified.    GOALS:   Multidisciplinary Problems     Physical Therapy Goals        Problem: Physical Therapy Goal    Goal Priority Disciplines Outcome Goal Variances Interventions   Physical Therapy Goal     PT, PT/OT Ongoing, Progressing     Description:  Patient will increase functional independence with mobility by performin. Sit<>stand with SPV with RW/least restrictive device.  2. Gait x 150 feet with SPV with RW/least restrictive device.  3. Ascend/descend 5 step(s), L HR with least restrictive assistive device and SPV  4. Pt to transfer supine>sit EOB with SPV.                       History:     Past Medical History:   Diagnosis Date    Acidosis 2014    Allergic rhinitis 2014    Allergy     Anemia     Anemia in chronic kidney disease 2014    Anxiety     Chronic immunosuppression with Prograf and MMF 12/3/2014    CKD (chronic kidney disease) stage 5, GFR less than 15 ml/min 2014    CKD (chronic kidney disease), stage III 3/2/2015    Degenerative disc disease     Depression 2014    Diabetes mellitus, type 2 since age 18 2014    ESRD on peritoneal dialysis - 2014 for 9 hours no peritonitis 2014    Hyperlipidemia     Hypertension 2014    Hypomagnesemia 2015    Kidney transplant status, living unrelated donor - 12/2/14 12/3/2014    Neutropenia 2015    NS (nuclear sclerosis) 2016    Obesity     Organ transplant candidate 2014    Pre-op exam 2014    Proliferative diabetic retinopathy of both eyes without macular edema associated with type 2 diabetes mellitus 2016    Renal manifestation of secondary diabetes mellitus     Tendinitis     Trouble in sleeping        Past Surgical History:   Procedure Laterality Date    BONE MARROW BIOPSY N/A      SECTION      x 2    KIDNEY  TRANSPLANT      RENAL BIOPSY      ROTATOR CUFF REPAIR      TUBAL LIGATION         Time Tracking:     PT Received On: 10/23/19  PT Start Time: 1415     PT Stop Time: 1440  PT Total Time (min): 25 min     Billable Minutes: Evaluation 17 and Therapeutic Activity 8      Bhavesh Skinner, PT  10/23/2019

## 2019-10-23 NOTE — PROGRESS NOTES
No change from last note.  Pt reports feeling a little better.    Still has tender Left SC joint, no fluctuance.  Pain ROM left hip, no erythema, NVI  Pain left L-spine with palpation and ROM  Will not walk    No change in recs, need MRI to attempt identification and treatment of infection source.  MRIs planned for tonight.

## 2019-10-23 NOTE — SUBJECTIVE & OBJECTIVE
Interval History: 2D echo negative. Pt's bacteremia has cleared. Still with left hip pain, states worse today.     Review of Systems   Constitutional: Positive for activity change. Negative for chills and fever.   Respiratory: Negative for cough and shortness of breath.    Cardiovascular: Negative for chest pain and leg swelling.   Gastrointestinal: Negative for abdominal pain, constipation, diarrhea, nausea and vomiting.   Musculoskeletal: Positive for arthralgias (left shoulder pain, left hip pain (worse), back pain and gait problem. Negative for myalgias.   Skin: Negative for rash and wound.   Neurological: Positive for weakness. Negative for dizziness, light-headedness and headaches.   Psychiatric/Behavioral: Negative for agitation and behavioral problems. The patient is not nervous/anxious.      Objective:     Vital Signs (Most Recent):  Temp: 98.6 °F (37 °C) (10/23/19 0845)  Pulse: 91 (10/23/19 0845)  Resp: 20 (10/23/19 0845)  BP: (!) 180/87 (10/23/19 0845)  SpO2: 95 % (10/23/19 0845) Vital Signs (24h Range):  Temp:  [98.2 °F (36.8 °C)-99 °F (37.2 °C)] 98.6 °F (37 °C)  Pulse:  [91-98] 91  Resp:  [17-20] 20  SpO2:  [95 %-96 %] 95 %  BP: (136-180)/(72-92) 180/87     Weight: 93.8 kg (206 lb 11.2 oz)  Body mass index is 31.9 kg/m².    Estimated Creatinine Clearance: 41.4 mL/min (A) (based on SCr of 1.8 mg/dL (H)).    Physical Exam   Constitutional: She is oriented to person, place, and time. She appears well-developed and well-nourished. No distress.   Cardiovascular: Normal rate and regular rhythm.   No murmur heard.  Pulmonary/Chest: Effort normal and breath sounds normal. No respiratory distress.   Abdominal: Soft. Bowel sounds are normal. She exhibits no distension.   Musculoskeletal: She exhibits no edema.        Left shoulder: She exhibits decreased range of motion and tenderness.        Left hip: She exhibits tenderness.   Neurological: She is alert and oriented to person, place, and time. She has normal  strength. No sensory deficit.   Skin: Skin is warm and dry.   Psychiatric: She has a normal mood and affect. Her behavior is normal.       Significant Labs:   Blood Culture:   Recent Labs   Lab 10/20/19  1530 10/20/19  1600 10/21/19  1024   LABBLOO Gram stain ciera bottle: Gram positive cocci in chains resembling Strep   Positive results previously called  Gram stain aer bottle: Gram positive cocci in chains resembling Strep   Positive results previously called  STREPTOCOCCUS AGALACTIAE (GROUP B)  Beta-hemolytic streptococci are routinely susceptible to   penicillins,cephalosporins and carbapenems.  * Gram stain ciera bottle: Gram positive cocci in chains resembling Strep   Results called to and read back by: Andrea Ackerman  Gram stain aer bottle: Gram positive cocci in chains resembling Strep   Positive results previously called  STREPTOCOCCUS AGALACTIAE (GROUP B)  Beta-hemolytic streptococci are routinely susceptible to   penicillins,cephalosporins and carbapenems.  For susceptibility see order #[id number]  * No Growth to date  No Growth to date  No Growth to date  No Growth to date  No Growth to date  No Growth to date     CBC: No results for input(s): WBC, HGB, HCT, PLT in the last 48 hours.  CMP:   Recent Labs   Lab 10/22/19  0445 10/23/19  0505    135*   K 3.4* 3.5    102   CO2 23 22*   GLU 74 112*   BUN 27* 25*   CREATININE 2.0* 1.8*   CALCIUM 9.4 9.2   ALBUMIN 1.6* 1.5*   ANIONGAP 10 11   EGFRNONAA 27* 31*     Wound Culture: No results for input(s): LABAERO in the last 4320 hours.    Significant Imaging: I have reviewed all pertinent imaging results/findings within the past 24 hours.

## 2019-10-23 NOTE — PROGRESS NOTES
[facility]  Infectious Disease  Progress Note    Patient Name: Elizabeth Maldonado  MRN: [id number]  Admission Date: 10/20/2019  Length of Stay: 3 days  Attending Physician: Kalee Cat MD  Primary Care Provider: Richy Singleton NP    Isolation Status: No active isolations  Assessment/Plan:      Bacteremia due to group B Streptococcus  54 yr old with kidney transplant (on cellcept and prograf), DM, HLD presents with left shoulder, left lower back, and left hip pain as well as decreased appetite and chills. Reports a recent fall off of a ladder at work. Pt states her left hip/back pain worsened causing her to be unable to walk. Her blood cultures returned positive for GPCs in chains resembling strep. ID is consulted for further recs.     Blood cultures with Group B strep; repeat blood cultures cleared.   Fever resolved.  Pt still with left hip/back pain- unable to walk. CT chest negative for infectious source.  PATRICK resolving.  2D echo without vegetation    Still without source? Need to evaluate left hip further given worsening pain.    Plan:  1. Continue rocephin 2 gram IV q 24 hours for Group B strep bacteremia  2. F/u repeat blood cultures  3. Recommend MRI of lumbar spine and left hip +/- aspiration of hip to rule out septic joint given severe hip pain and bacteremia  4. Will need long term antibiotic therapy; length to be determine based off further work up and source found      Anticipated Disposition: IV abx  Thank you for your consult. I will follow-up with patient. Please contact us if you have any additional questions.  JOSE Alvarado Pager: [phone]    Infectious Disease  [facility]    Subjective:     Principal Problem:Acute renal failure superimposed on stage 3 chronic kidney disease    HPI: This is a 54 year old female female with type 2 diabetes mellitus, HLD, CKD,  anemia of chronic disease, and history of renal transplant 2014 (on cellcept and prograf) who  presents to [facility] ED with complaints of left shoulder and left hip pain the past few days.  She reports that the pain is mainly localized to her left lower back, left hip, and left shoulder.  She does recall falling at work as she was stepping down a ladder and missed the last step.  She does recall falling on her left side but denies any head trauma.   she reports associated poor appetite and chills at home. She denies diarrhea, abd pain, or urinary symptoms.      She was found to have positive blood cultures with GPCs in chains resembling strep. ID consulted for further recs. CT left hip and left shoulder (both done without contrast) non revealing.   Interval History: 2D echo negative. Pt's bacteremia has cleared. Still with left hip pain, states worse today.     Review of Systems   Constitutional: Positive for activity change. Negative for chills and fever.   Respiratory: Negative for cough and shortness of breath.    Cardiovascular: Negative for chest pain and leg swelling.   Gastrointestinal: Negative for abdominal pain, constipation, diarrhea, nausea and vomiting.   Musculoskeletal: Positive for arthralgias (left shoulder pain, left hip pain (worse), back pain and gait problem. Negative for myalgias.   Skin: Negative for rash and wound.   Neurological: Positive for weakness. Negative for dizziness, light-headedness and headaches.   Psychiatric/Behavioral: Negative for agitation and behavioral problems. The patient is not nervous/anxious.      Objective:     Vital Signs (Most Recent):  Temp: 98.6 °F (37 °C) (10/23/19 0845)  Pulse: 91 (10/23/19 0845)  Resp: 20 (10/23/19 0845)  BP: (!) 180/87 (10/23/19 0845)  SpO2: 95 % (10/23/19 0845) Vital Signs (24h Range):  Temp:  [98.2 °F (36.8 °C)-99 °F (37.2 °C)] 98.6 °F (37 °C)  Pulse:  [91-98] 91  Resp:  [17-20] 20  SpO2:  [95 %-96 %] 95 %  BP: (136-180)/(72-92) 180/87     Weight: 93.8 kg (206 lb 11.2 oz)  Body mass index is 31.9 kg/m².    Estimated Creatinine  Clearance: 41.4 mL/min (A) (based on SCr of 1.8 mg/dL (H)).    Physical Exam   Constitutional: She is oriented to person, place, and time. She appears well-developed and well-nourished. No distress.   Cardiovascular: Normal rate and regular rhythm.   No murmur heard.  Pulmonary/Chest: Effort normal and breath sounds normal. No respiratory distress.   Abdominal: Soft. Bowel sounds are normal. She exhibits no distension.   Musculoskeletal: She exhibits no edema.        Left shoulder: She exhibits decreased range of motion and tenderness.        Left hip: She exhibits tenderness.   Neurological: She is alert and oriented to person, place, and time. She has normal strength. No sensory deficit.   Skin: Skin is warm and dry.   Psychiatric: She has a normal mood and affect. Her behavior is normal.       Significant Labs:   Blood Culture:   Recent Labs   Lab 10/20/19  1530 10/20/19  1600 10/21/19  1024   LABBLOO Gram stain ciera bottle: Gram positive cocci in chains resembling Strep   Positive results previously called  Gram stain aer bottle: Gram positive cocci in chains resembling Strep   Positive results previously called  STREPTOCOCCUS AGALACTIAE (GROUP B)  Beta-hemolytic streptococci are routinely susceptible to   penicillins,cephalosporins and carbapenems.  * Gram stain ciera bottle: Gram positive cocci in chains resembling Strep   Results called to and read back by: Andrea Ackerman  Gram stain aer bottle: Gram positive cocci in chains resembling Strep   Positive results previously called  STREPTOCOCCUS AGALACTIAE (GROUP B)  Beta-hemolytic streptococci are routinely susceptible to   penicillins,cephalosporins and carbapenems.  For susceptibility see order #[id number]  * No Growth to date  No Growth to date  No Growth to date  No Growth to date  No Growth to date  No Growth to date     CBC: No results for input(s): WBC, HGB, HCT, PLT in the last 48 hours.  CMP:   Recent Labs   Lab 10/22/19  0445 10/23/19  1140     135*   K 3.4* 3.5    102   CO2 23 22*   GLU 74 112*   BUN 27* 25*   CREATININE 2.0* 1.8*   CALCIUM 9.4 9.2   ALBUMIN 1.6* 1.5*   ANIONGAP 10 11   EGFRNONAA 27* 31*     Wound Culture: No results for input(s): LABAERO in the last 4320 hours.    Significant Imaging: I have reviewed all pertinent imaging results/findings within the past 24 hours.

## 2019-10-23 NOTE — SUBJECTIVE & OBJECTIVE
Interval History: pt has new c/o pain in right index finger with swelling and decrease ROM     Review of Systems   Constitutional: Positive for activity change. Negative for chills and fever.   Respiratory: Negative for cough and shortness of breath.    Cardiovascular: Positive for chest pain. Negative for leg swelling.   Gastrointestinal: Negative for abdominal pain, constipation, diarrhea, nausea and vomiting.   Musculoskeletal: Positive for arthralgias (left shoulder pain, left hip pain), back pain and gait problem. Negative for myalgias.   Skin: Negative for rash and wound.   Neurological: Positive for weakness. Negative for dizziness, light-headedness and headaches.   Psychiatric/Behavioral: Negative for agitation and behavioral problems. The patient is not nervous/anxious.      Objective:     Vital Signs (Most Recent):  Temp: 97.2 °F (36.2 °C) (10/23/19 1200)  Pulse: 87 (10/23/19 1200)  Resp: 17 (10/23/19 1200)  BP: (!) 141/74 (10/23/19 1200)  SpO2: 99 % (10/23/19 1200) Vital Signs (24h Range):  Temp:  [97.2 °F (36.2 °C)-99 °F (37.2 °C)] 97.2 °F (36.2 °C)  Pulse:  [87-96] 87  Resp:  [17-20] 17  SpO2:  [95 %-99 %] 99 %  BP: (136-180)/(72-92) 141/74     Weight: 93.8 kg (206 lb 11.2 oz)  Body mass index is 31.9 kg/m².    Intake/Output Summary (Last 24 hours) at 10/23/2019 1422  Last data filed at 10/23/2019 1203  Gross per 24 hour   Intake 360 ml   Output 1250 ml   Net -890 ml      Physical Exam   Constitutional: She is oriented to person, place, and time. She appears well-developed and well-nourished. No distress.   HENT:   Head: Normocephalic and atraumatic.   Right Ear: External ear normal.   Left Ear: External ear normal.   Nose: Nose normal.   Eyes: Right eye exhibits no discharge. Left eye exhibits no discharge.   Neck: Normal range of motion.   Cardiovascular: Normal rate, regular rhythm, normal heart sounds and intact distal pulses. Exam reveals no gallop and no friction rub.   No murmur  heard.  Pulmonary/Chest: Effort normal and breath sounds normal. No stridor. No respiratory distress. She has no wheezes. She has no rales. She exhibits no tenderness.   Abdominal: Soft. Bowel sounds are normal. She exhibits no distension. There is no tenderness. There is no rebound and no guarding.   Musculoskeletal:   There is some tenderness to palpation to the left shoulder and left hip without any joint swelling nor erythema   Neurological: She is alert and oriented to person, place, and time.   Skin: Skin is warm and dry. She is not diaphoretic. No erythema.   Psychiatric: She has a normal mood and affect. Her behavior is normal. Judgment and thought content normal.   Nursing note and vitals reviewed.      Significant Labs: All pertinent labs within the past 24 hours have been reviewed.    Significant Imaging: I have reviewed and interpreted all pertinent imaging results/findings within the past 24 hours.

## 2019-10-23 NOTE — PROGRESS NOTES
Pt has elevated ESR and CRP with positive blood cultures. Source of infection not yet identified.  MRI chest ordered by Dr Yates not done, no good reason seen in this chart, CT done instead. CT definitely not as useful looking for a source of infection.    Pt has SC joint pain, hip pain and lumbar pain. Source of infection can be from any of these sites or all 3 could be infected.  Strongly recommend MRI of all 3 areas. If not possible, rec triple phase bone scan, consider WBC labelled scan.  MRIs ordered again.

## 2019-10-23 NOTE — PT/OT/SLP EVAL
Occupational Therapy   Evaluation/Treatment    Name: Elizabeth Maldonado  MRN: [id number]  Admitting Diagnosis:  Acute renal failure superimposed on stage 3 chronic kidney disease      Recommendations:     Discharge Recommendations: (TBD pending progress)  Discharge Equipment Recommendations:  (TBD)  Barriers to discharge:  Decreased caregiver support    Assessment:     Elizabeth Maldonado is a 54 y.o. female with a medical diagnosis of Acute renal failure superimposed on stage 3 chronic kidney disease.  She presents with self care and functional mobility deficits R/T pain. The patient reports her pain remain a 10/10 but reported feeling a little better with movement. OT recs will be made pending progress in OT. The patient lives alone and does not have assistance available.. Performance deficits affecting function: weakness, impaired endurance, impaired self care skills, gait instability, impaired functional mobilty, impaired balance, decreased upper extremity function, decreased safety awareness, pain, decreased ROM, decreased coordination, decreased lower extremity function.      Rehab Prognosis: Good; patient would benefit from acute skilled OT services to address these deficits and reach maximum level of function.       Plan:     Patient to be seen 5 x/week to address the above listed problems via self-care/home management, therapeutic activities, therapeutic exercises  · Plan of Care Expires: 11/06/19  · Plan of Care Reviewed with: patient    Subjective     Chief Complaint: pain that is releived only 15 min with pain meds  Patient/Family Comments/goals: agreeable to OT eval     Occupational Profile:  Living Environment: The patient lives alone in a HH house with 5 JULIO with a left ascending HR  Previous level of function: (I) self care and amb without AD  Roles and Routines: The patient started working at [employer] in the [employer] dept in the beginning of the month. The patient reports falling from the bottom step of  a ladder at work. The patient drives.   Equipment Used at Home:  none(has a cane)  Assistance upon Discharge: no help reported    Pain/Comfort:  · Pain Rating 1: 10/10  · Location - Side 1: Left  · Location 1: (hip, back and shoulder)  · Pain Addressed 1: Pre-medicate for activity, Cessation of Activity, Distraction, Nurse notified  · Pain Rating Post-Intervention 1: 10/10    Patients cultural, spiritual, Hindu conflicts given the current situation:      Objective:     Communicated with: Cortney barrow prior to session.  Patient found HOB elevated with cryotherapy, peripheral IV, gresham catheter upon OT entry to room.  The patient was agreeable to participate in OT eval after education re: PT/OT role and potential benefits/pain relif with movement.    General Precautions: Standard, fall   Orthopedic Precautions:N/A   Braces: N/A     Occupational Performance:    Bed Mobility:    · Patient completed Scooting/Bridging with stand by assistance  · Patient completed Supine to Sit with contact guard assistance, with leg lift and HOB elevated    Functional Mobility/Transfers:  · Patient completed Sit <> Stand Transfer with contact guard assistance  with  rolling walker   · Patient completed Bed <> Chair Transfer using Step Transfer technique with contact guard assistance with rolling walker  · Functional Mobility: The patient stepped to the chair with CGA, using a RW    Activities of Daily Living:  · Grooming: supervision to brush her teeth while seated in the chair  · Upper Body Dressing: contact guard assistance    · Lower Body Dressing: dependence to don B socks in bed. The patient was able to assist by holding up her RLE while in bed.  · Toileting: Gresham Catheter      Cognitive/Visual Perceptual:  Cognitive/Psychosocial Skills:     -       Oriented to: Person, Place and Situation   -       Follows Commands/attention:Follows two-step commands  -       Communication: clear/fluent  -       Memory: No Deficits noted  -        Safety awareness/insight to disability: impaired   -       Mood/Affect/Coping skills/emotional control: Appropriate to situation and reprts feeling fearful of falling    Physical Exam:  Balance: -       fair  Postural examination/scapula alignment:    -       Forward head  Skin integrity: Visible skin intact  Edema:  None noted  Dominant hand: -       right  Upper Extremity Range of Motion:     -       Right Upper Extremity: WFL  -       Left Upper Extremity: WFL except shldr limited ~90* (Patient reports having a rot cuff tear)  Upper Extremity Strength: -       Right Upper Extremity: WFL  -       Left Upper Extremity: N/T 2* c/o shoulder pain   Strength:    -       Right Upper Extremity: WFL  -       Left Upper Extremity: WFL  Fine Motor Coordination:    -       Intact for B finger to thumb. The patient was able to open toothpaste box and tube.    AMPAC 6 Click ADL:  AMPAC Total Score: 15    Treatment & Education:  The patient participated in OT eval and was educated re: OT role and POC. The patient was able to perform grooming task while seated in the chair. The patient was encouraged to sit in the chair for meals. The patient's whitw board was updated and the patient was encouraged to call the nurse to assist with transfer to the bed.  Education:    Patient left up in chair with all lines intact, call button in reach and nurse notified    GOALS:   Multidisciplinary Problems     Occupational Therapy Goals        Problem: Occupational Therapy Goal    Goal Priority Disciplines Outcome Interventions   Occupational Therapy Goal     OT, PT/OT Ongoing, Progressing    Description:  Goals to be met by: 11/6/19    Patient will increase functional independence with ADLs by performing:    UE Dressing with Modified [place].  LE Dressing with Modified [place].  Grooming while standing at sink with Modified [place] and Supervision.  Toileting from toilet with Modified [place] for hygiene and  clothing management.   Supine to sit with Modified [place] and Supervision.  Step transfer with Modified [place] and Supervision  Toilet transfer to toilet with Stand-by Assistance.  Upper extremity exercise program x15 reps per handout, with assistance as needed.                      History:     Past Medical History:   Diagnosis Date    Acidosis 2014    Allergic rhinitis 2014    Allergy     Anemia     Anemia in chronic kidney disease 2014    Anxiety     Chronic immunosuppression with Prograf and MMF 12/3/2014    CKD (chronic kidney disease) stage 5, GFR less than 15 ml/min 2014    CKD (chronic kidney disease), stage III 3/2/2015    Degenerative disc disease     Depression 2014    Diabetes mellitus, type 2 since age 18 2014    ESRD on peritoneal dialysis - 2014 for 9 hours no peritonitis 2014    Hyperlipidemia     Hypertension 2014    Hypomagnesemia 2015    Kidney transplant status, living unrelated donor - 12/2/14 12/3/2014    Neutropenia 2015    NS (nuclear sclerosis) 2016    Obesity     Organ transplant candidate 2014    Pre-op exam 2014    Proliferative diabetic retinopathy of both eyes without macular edema associated with type 2 diabetes mellitus 2016    Renal manifestation of secondary diabetes mellitus     Tendinitis     Trouble in sleeping        Past Surgical History:   Procedure Laterality Date    BONE MARROW BIOPSY N/A      SECTION      x 2    KIDNEY TRANSPLANT      RENAL BIOPSY      ROTATOR CUFF REPAIR      TUBAL LIGATION         Time Tracking:     OT Date of Treatment: 10/23/19  OT Start Time: 1415  OT Stop Time: 1500  OT Total Time (min): 45 min    Billable Minutes:Evaluation 17 (with PT)  Self Care/Home Management 15  Total Time 45    Selena Meyer OT  10/23/2019

## 2019-10-23 NOTE — PT/OT/SLP PROGRESS
Occupational Therapy      Patient Name:  Elizabeth Maldonado   MRN:  [id number]    Patient not seen today secondary to Other (Comment)(OT eval per nurseCortney 2* patient s/p receiving pain meds and patient is requesting to rest). Will follow-up later.    Selena Meyer OT  10/23/2019

## 2019-10-23 NOTE — ASSESSMENT & PLAN NOTE
54 yr old with kidney transplant (on cellcept and prograf), DM, HLD presents with left shoulder, left lower back, and left hip pain as well as decreased appetite and chills. Reports a recent fall off of a ladder at work. Pt states her left hip/back pain worsened causing her to be unable to walk. Her blood cultures returned positive for GPCs in chains resembling strep. ID is consulted for further recs.     Blood cultures with Group B strep; repeat blood cultures cleared.   Fever resolved.  Pt still with left hip/back pain- unable to walk. CT chest negative for infectious source.  PATRICK resolving.  2D echo without vegetation    Plan:  1. Continue rocephin 2 gram IV q 24 hours for Group B strep bacteremia  2. F/u repeat blood cultures  3. Recommend MRI of lumbar spine and left hip +/- aspiration of hip to rule out septic joint given severe hip pain and bacteremia  4. Will need long term antibiotic therapy; length to be determine based off further work up and source found

## 2019-10-23 NOTE — PROGRESS NOTES
[facility] [place]  Nephrology  Progress Note    Patient Name: Elizabeth Maldonado  MRN: [id number]  Admission Date: 10/20/2019  Hospital Length of Stay: 3 days  Attending Provider: Kalee Cat MD   Primary Care Physician: Richy Singleton NP  Principal Problem:Acute renal failure superimposed on stage 3 chronic kidney disease    Subjective:     HPI: Ms. 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 In ED, pt febrile to 102.5, tachycardiac to 125, hypertensive 177/91 and tachypneic. W/u significant for PATRICK Cr 2.9, serum glucose 753, Na 131, lactic acid 3.2, VBG showing mild metabolic acidosis, and UA with spec grav 1.025, 3+ protein, 3+ glucose, 2+ occult blood, 100 RBCs. CT head, CXRR, Xray L shoulder and L hip all unremarkable. Pt received 1L LR bolus x3, started on NS infusion 100 ml/hr. Empirically treated for sepsis with vanc, zosyn, ceftriaxone. Dr. Catalan with transplant medicine at [facility] contacted, felt pt could appropriately be treated at [facility], advised to hold cellcept. Noted goal prograf level 4-7. BCx, Prograf and Cellcept levels drawn. Nephrology consulted 10/21/19 for PATRICK in renal transplant pt. Baseline Cr appears ~1.6 -2 with GFR 30-40. Last at baseline, 1.6, 19 on routine outpt labs. Sees nephrology outpt, last saw Dr. Bonilla 2019, no issues noted, Upr Cr 1.4 g at this time. Reports compliance with all home meds including prograf and cellcept. Good PO fluid intake at home, attempts 1 gallon per day, and endorses good UOP; denies foamy/frothy urine, hematuria, dysuria. Also denies recent n/v, rashes, confusion, hemoptysis, edema, polydipsia, polyuria, confusion, SOB. Denies any new meds or med changes. Endorses recent chills,  "agitation.     Cr improved this AM 2.9 --> 2.2. Serum osmol 338 noted. HR improved this AM. UOP 1x/24 hrs (PM shift only). CK 72. This afternoon pt reports feeling "sleepy," and c/o continued L shoulder and hip pain, worse with movement. Reports poor appetite, denies metallic taste. Denies n/v, SOB, abdominal pain, confusion. ID consulted, noted recs.     Interval History: Overnight, no acute events. UOP 1.3L +1x / 24 hrs. Meyer placed and 24 hr urine collection restarted last night at 2300 2/2 wasted UOP in bed. Pt doing well this AM, is eating breakfast in bed. +good appetite. Denies SOB, n/v, or diarrhea. Has not had BM since admit, states she received constipation meds this AM. C/o generalized swelling and "throbbing" pain of R 2nd digit worse w flexion/extension x several days, unsure of exact onset. Noted plans for orthopedic surgery to proceed with MRIs as no clear source of infection has been identified and inflammatory markers markedly elevated today. No other complaints at this time.     Review of patient's allergies indicates:   Allergen Reactions    Iodine and iodide containing products Hives    Shrimp Itching    Topamax [topiramate] Other (See Comments)     Vision changes    Zoloft [sertraline] Palpitations     Current Facility-Administered Medications   Medication Frequency    acetaminophen tablet 650 mg Q6H PRN    atorvastatin tablet 20 mg Daily    cefTRIAXone (ROCEPHIN) 2 g in dextrose 5 % 50 mL IVPB Q24H    cloNIDine tablet 0.1 mg TID PRN    dextrose 50% injection 12.5 g PRN    dextrose 50% injection 25 g PRN    glucagon (human recombinant) injection 1 mg PRN    glucose chewable tablet 16 g PRN    glucose chewable tablet 24 g PRN    heparin (porcine) injection 5,000 Units Q12H    insulin aspart U-100 pen 1-10 Units PRN    insulin aspart U-100 pen 10 Units TID WM    insulin detemir U-100 pen 20 Units QHS    ondansetron injection 8 mg Q8H PRN    oxyCODONE-acetaminophen  mg per " tablet 1 tablet Q4H PRN    promethazine (PHENERGAN) 6.25 mg in dextrose 5 % 50 mL IVPB Q6H PRN    ramelteon tablet 8 mg Nightly PRN    senna-docusate 8.6-50 mg per tablet 1 tablet BID PRN    tacrolimus capsule 3 mg BID       Objective:     Vital Signs (Most Recent):  Temp: 98.6 °F (37 °C) (10/23/19 0845)  Pulse: 91 (10/23/19 0845)  Resp: 20 (10/23/19 0845)  BP: (!) 180/87 (10/23/19 0845)  SpO2: 95 % (10/23/19 0845)  O2 Device (Oxygen Therapy): room air (10/21/19 2023) Vital Signs (24h Range):  Temp:  [98.2 °F (36.8 °C)-99 °F (37.2 °C)] 98.6 °F (37 °C)  Pulse:  [91-98] 91  Resp:  [17-20] 20  SpO2:  [95 %-96 %] 95 %  BP: (136-180)/(72-92) 180/87     Weight: 93.8 kg (206 lb 11.2 oz) (10/20/19 2058)  Body mass index is 31.9 kg/m².  Body surface area is 2.11 meters squared.    I/O last 3 completed shifts:  In: 600 [P.O.:600]  Out: 1250 [Urine:1250]    Physical Exam   Constitutional: She is oriented to person, place, and time. She appears well-developed and well-nourished. No distress.   HENT:   Head: Normocephalic and atraumatic.   Mouth/Throat: Mucous membranes are normal.   Eyes: Conjunctivae and EOM are normal.   Cardiovascular: Normal rate and regular rhythm.   No murmur heard.  Pulmonary/Chest: Effort normal and breath sounds normal.   Abdominal: Soft. She exhibits no distension.   Musculoskeletal: She exhibits no edema (no LE edema) or deformity.   Mild edema noted to R 2nd digit, no erythema or increased warmth appreciated. + tenderness and decreased ROM with flexion/extension    Neurological: She is alert and oriented to person, place, and time.   Skin: Skin is warm and dry. No rash noted. She is not diaphoretic.   Psychiatric: She has a normal mood and affect. Her behavior is normal.       Significant Labs:  CBC:   Recent Labs   Lab 10/21/19  0440   WBC 10.69   RBC 3.67*   HGB 9.8*   HCT 30.5*   *   MCV 83   MCH 26.7*   MCHC 32.1     CMP:   Recent Labs   Lab 10/21/19  0440  10/23/19  0505   *    < > 112*   CALCIUM 9.5   < > 9.2   ALBUMIN 1.7*   < > 1.5*   PROT 7.7  --   --       < > 135*   K 3.9   < > 3.5   CO2 25   < > 22*      < > 102   BUN 34*   < > 25*   CREATININE 2.2*   < > 1.8*   ALKPHOS 101  --   --    ALT 37  --   --    AST 46*  --   --    BILITOT 0.3  --   --     < > = values in this interval not displayed.     Microbiology Results (last 7 days)     Procedure Component Value Units Date/Time    Blood culture [[id number]] Collected:  10/21/19 1024    Order Status:  Completed Specimen:  Blood Updated:  10/22/19 1103     Blood Culture, Routine No Growth to date      No Growth to date    Narrative:       From 2 different sites 30 minutes apart    Blood culture [[id number]] Collected:  10/21/19 1024    Order Status:  Completed Specimen:  Blood Updated:  10/22/19 1103     Blood Culture, Routine No Growth to date      No Growth to date    Narrative:       From 2 different sites 30 minutes apart    Blood Culture #2 **CANNOT BE ORDERED STAT** [[id number]]  (Abnormal) Collected:  10/20/19 1600    Order Status:  Completed Specimen:  Blood from Peripheral, Antecubital, Left Updated:  10/22/19 0832     Blood Culture, Routine Gram stain ciera bottle: Gram positive cocci in chains resembling Strep       Results called to and read back by: Andrea Ackerman      Gram stain aer bottle: Gram positive cocci in chains resembling Strep       Positive results previously called      STREPTOCOCCUS AGALACTIAE (GROUP B)  Beta-hemolytic streptococci are routinely susceptible to   penicillins,cephalosporins and carbapenems.  For susceptibility see order #[id number]      Blood Culture #1 **CANNOT BE ORDERED STAT** [[id number]]  (Abnormal)  (Susceptibility) Collected:  10/20/19 1530    Order Status:  Completed Specimen:  Blood from Peripheral, Hand, Right Updated:  10/22/19 0832     Blood Culture, Routine Gram stain ciera bottle: Gram positive cocci in chains resembling Strep       Positive results previously called      Gram  stain aer bottle: Gram positive cocci in chains resembling Strep       Positive results previously called      STREPTOCOCCUS AGALACTIAE (GROUP B)  Beta-hemolytic streptococci are routinely susceptible to   penicillins,cephalosporins and carbapenems.          All labs within the past 24 hours have been reviewed.     Significant Imaging:  Labs: Reviewed  CT: Reviewed    Assessment/Plan:   PATRICK on CKD 3  - Baseline Cr 1.6-2 GFR 30-40; last at baseline 5/2019.   - Cr on arrival 2.9, improved with IVF, Cr 1.8 this AM, GFR 36 at baseline. BUN continues to improve as well. Good UOP 1.3 L +1/24 hrs.   - Etiology pre renal in origin, likely 2/2 volume depletion/hyperglycemia. Payne's stain negative.   - UPrCr 10/21 showed NRP; 24 hr urine collection in process, see below  - Strict Is/Os; will closely monitor UOP  - recommend avoid nephrotoxic medications including IV contrast  - renally dose medications for current GFR  - Daily RFP     History of Kidney transplant/ on long term immunosuppression meds  - S/p LURT on 12/2014; [place] [facility] [place] [facility] [place] Catalan, who has been made aware of pt's current admission. Also follows with Dr. Bonilla, last appt 6/2019- no issues noted at this time.   - Immunosuppressant regimen includes Cellcept 500mg PO BID and prograf 1mg BID; reports compliance  - Yesterdays prograf trough 4.5, at goal; Goal prograf level 4-7; Continue prograf 3mg BID  - Continue holding MMF, per Dr. Catalan recommendation. MRIs ordered today by ortho, as no clear source of bacteremia has been identified and there is concern for possible septic joint. Will hold off on restarting MMF until MRIs resulted. TTE with no evidence vegetation   - Prograf level daily  - Will continue to monitor for drug toxicities      Proteinuria   - chronic; UprCr 1.6 6/2019; 1.4 12/2018  - UPrCr 10/21 showed NRP of 5.7 g  - HLA DSA and BK virus PCR to r/o rejection of transplant - pending  - 24 hr urine collection restarted 10/22  @2300. Will be completed today 2300.  - f/u 24 hr urine results     Hypokalemia  - associated with metabolic acidosis  - Will order K bicarb 40 meq PO x1 today  - f/u rfp tomorrow    CKD MBD   - PTH still pending  - CCa stable but elevated 11.2; avoid Vit D supplements   - Phos WNL today, 3.8; can expect low phos given prograf therapy and 2/2 HPTH  - will continue to monitor     Case discussed with primary team, Dr. Emory MD. Thank you for allowing me to participate in the care of your patient.  Please call with any questions.      JOSE Marquez   Nephrology  [facility]  Office: [phone]  [facility] [place]

## 2019-10-23 NOTE — NURSING
Pt had a 24 hr urine restarted due to wasted output in the bed. Ordered obtained for gresham. Restarted 24 hr urine on 10/22 at 2300. Pt continues to c/o a 10/10 pain to hip and shoulder. Pt has remained free from falls or injuries but has had a low grade temp. Pt is turning better this am. Bed locked and low with call light within reach. Side rails up x 2.

## 2019-10-23 NOTE — SUBJECTIVE & OBJECTIVE
"Interval History: Overnight, no acute events. UOP 1.3L +1x / 24 hrs. Meyer placed and 24 hr urine collection restarted last night at 2300 2/2 wasted UOP in bed. Pt doing well this AM, is eating breakfast in bed. +good appetite. Denies SOB, n/v, or diarrhea. Has not had BM since admit, states she received constipation meds this AM. C/o generalized swelling and "throbbing" pain of R 2nd digit worse w flexion/extension x several days, unsure of exact onset. Noted plans for orthopedic surgery to proceed with MRIs as no clear source of infection has been identified and inflammatory markers markedly elevated today. No other complaints at this time.     Review of patient's allergies indicates:   Allergen Reactions    Iodine and iodide containing products Hives    Shrimp Itching    Topamax [topiramate] Other (See Comments)     Vision changes    Zoloft [sertraline] Palpitations     Current Facility-Administered Medications   Medication Frequency    acetaminophen tablet 650 mg Q6H PRN    atorvastatin tablet 20 mg Daily    cefTRIAXone (ROCEPHIN) 2 g in dextrose 5 % 50 mL IVPB Q24H    cloNIDine tablet 0.1 mg TID PRN    dextrose 50% injection 12.5 g PRN    dextrose 50% injection 25 g PRN    glucagon (human recombinant) injection 1 mg PRN    glucose chewable tablet 16 g PRN    glucose chewable tablet 24 g PRN    heparin (porcine) injection 5,000 Units Q12H    insulin aspart U-100 pen 1-10 Units PRN    insulin aspart U-100 pen 10 Units TID WM    insulin detemir U-100 pen 20 Units QHS    ondansetron injection 8 mg Q8H PRN    oxyCODONE-acetaminophen  mg per tablet 1 tablet Q4H PRN    promethazine (PHENERGAN) 6.25 mg in dextrose 5 % 50 mL IVPB Q6H PRN    ramelteon tablet 8 mg Nightly PRN    senna-docusate 8.6-50 mg per tablet 1 tablet BID PRN    tacrolimus capsule 3 mg BID       Objective:     Vital Signs (Most Recent):  Temp: 98.6 °F (37 °C) (10/23/19 0845)  Pulse: 91 (10/23/19 0845)  Resp: 20 (10/23/19 " 0845)  BP: (!) 180/87 (10/23/19 0845)  SpO2: 95 % (10/23/19 0845)  O2 Device (Oxygen Therapy): room air (10/21/19 2023) Vital Signs (24h Range):  Temp:  [98.2 °F (36.8 °C)-99 °F (37.2 °C)] 98.6 °F (37 °C)  Pulse:  [91-98] 91  Resp:  [17-20] 20  SpO2:  [95 %-96 %] 95 %  BP: (136-180)/(72-92) 180/87     Weight: 93.8 kg (206 lb 11.2 oz) (10/20/19 2058)  Body mass index is 31.9 kg/m².  Body surface area is 2.11 meters squared.    I/O last 3 completed shifts:  In: 600 [P.O.:600]  Out: 1250 [Urine:1250]    Physical Exam   Constitutional: She is oriented to person, place, and time. She appears well-developed and well-nourished. No distress.   HENT:   Head: Normocephalic and atraumatic.   Mouth/Throat: Mucous membranes are normal.   Eyes: Conjunctivae and EOM are normal.   Cardiovascular: Normal rate and regular rhythm.   No murmur heard.  Pulmonary/Chest: Effort normal and breath sounds normal.   Abdominal: Soft. She exhibits no distension.   Musculoskeletal: She exhibits no edema (no LE edema) or deformity.   Mild edema noted to R 2nd digit, no erythema or increased warmth appreciated. + tenderness and decreased ROM with flexion/extension    Neurological: She is alert and oriented to person, place, and time.   Skin: Skin is warm and dry. No rash noted. She is not diaphoretic.   Psychiatric: She has a normal mood and affect. Her behavior is normal.       Significant Labs:  CBC:   Recent Labs   Lab 10/21/19  0440   WBC 10.69   RBC 3.67*   HGB 9.8*   HCT 30.5*   *   MCV 83   MCH 26.7*   MCHC 32.1     CMP:   Recent Labs   Lab 10/21/19  0440  10/23/19  0505   *   < > 112*   CALCIUM 9.5   < > 9.2   ALBUMIN 1.7*   < > 1.5*   PROT 7.7  --   --       < > 135*   K 3.9   < > 3.5   CO2 25   < > 22*      < > 102   BUN 34*   < > 25*   CREATININE 2.2*   < > 1.8*   ALKPHOS 101  --   --    ALT 37  --   --    AST 46*  --   --    BILITOT 0.3  --   --     < > = values in this interval not displayed.     Microbiology  Results (last 7 days)     Procedure Component Value Units Date/Time    Blood culture [[id number]] Collected:  10/21/19 1024    Order Status:  Completed Specimen:  Blood Updated:  10/22/19 1103     Blood Culture, Routine No Growth to date      No Growth to date    Narrative:       From 2 different sites 30 minutes apart    Blood culture [[id number]] Collected:  10/21/19 1024    Order Status:  Completed Specimen:  Blood Updated:  10/22/19 1103     Blood Culture, Routine No Growth to date      No Growth to date    Narrative:       From 2 different sites 30 minutes apart    Blood Culture #2 **CANNOT BE ORDERED STAT** [[id number]]  (Abnormal) Collected:  10/20/19 1600    Order Status:  Completed Specimen:  Blood from Peripheral, Antecubital, Left Updated:  10/22/19 0832     Blood Culture, Routine Gram stain ciera bottle: Gram positive cocci in chains resembling Strep       Results called to and read back by: Andrea Ackerman      Gram stain aer bottle: Gram positive cocci in chains resembling Strep       Positive results previously called      STREPTOCOCCUS AGALACTIAE (GROUP B)  Beta-hemolytic streptococci are routinely susceptible to   penicillins,cephalosporins and carbapenems.  For susceptibility see order #[id number]      Blood Culture #1 **CANNOT BE ORDERED STAT** [[id number]]  (Abnormal)  (Susceptibility) Collected:  10/20/19 1530    Order Status:  Completed Specimen:  Blood from Peripheral, Hand, Right Updated:  10/22/19 0832     Blood Culture, Routine Gram stain ciera bottle: Gram positive cocci in chains resembling Strep       Positive results previously called      Gram stain aer bottle: Gram positive cocci in chains resembling Strep       Positive results previously called      STREPTOCOCCUS AGALACTIAE (GROUP B)  Beta-hemolytic streptococci are routinely susceptible to   penicillins,cephalosporins and carbapenems.          All labs within the past 24 hours have been reviewed.     Significant Imaging:  Labs:  Reviewed  CT: Reviewed

## 2019-10-23 NOTE — NURSING
Pt transferred to MRI via bed. Ativan administered per orders. Pt in no distress. Will cont to monitor.

## 2019-10-23 NOTE — PLAN OF CARE
Problem: Occupational Therapy Goal  Goal: Occupational Therapy Goal  Description  Goals to be met by: 11/6/19    Patient will increase functional independence with ADLs by performing:    UE Dressing with Modified [place].  LE Dressing with Modified [place].  Grooming while standing at sink with Modified [place] and Supervision.  Toileting from toilet with Modified [place] for hygiene and clothing management.   Supine to sit with Modified [place] and Supervision.  Step transfer with Modified [place] and Supervision  Toilet transfer to toilet with Stand-by Assistance.  Upper extremity exercise program x15 reps per handout, with assistance as needed.     Outcome: Ongoing, Progressing   The patient participated in OT eval despite c/o 10/10 pain in left hip, back and left shoulder. The patient was able to step to the chair using a RW and CGA.  D/C recommendations will be made pending progress in OT.

## 2019-10-23 NOTE — PLAN OF CARE
Pt presented supine in bed, agreeable to PT.  Bed mobility supine>sit EOB with CGA, instruction for sequencing and hand placement. Pt c/o slight dizziness upon sitting, after several minutes pt stated it was resolved enough to stand.  Sit>stand to RW with CGA, instruction for hand placement and wt shift.  Pt took approx. 5-6 steps with RW decreased;step length, steppage, knee flexion during swing through and heel strike and toe off. To sit in BSC with instruction for positioning and hand placement.

## 2019-10-23 NOTE — NURSING
Pt had quite a bit of vaginal bleeding prior to catheter insertion. Pt states she has had postmenopausal bleeding for two years. Pt also had a purulent plug that was wiped away while cleaning for cath insertion.

## 2019-10-23 NOTE — PLAN OF CARE
10/23/19 1500   Discharge Reassessment   Assessment Type Final Discharge Note   Discharge Plan A Home Health;Home   Discharge Plan B Rehab   DME Needed Upon Discharge  other (see comments)  (TBD)   Patient choice form signed by patient/caregiver N/A   Anticipated Discharge Disposition Home-Health   Can the patient answer the patient profile reliably? Yes, cognitively intact   How does the patient rate their overall health at the present time? Fair   Describe the patient's ability to walk at the present time. Minor restrictions or changes   How often would a person be available to care for the patient? Often   Number of comorbid conditions (as recorded on the chart) Three   During the past month, has the patient often been bothered by feeling down, depressed or hopeless? No   During the past month, has the patient often been bothered by little interest or pleasure in doing things? No   Post-Acute Status   Post-Acute Authorization Other   Discharge Delays None known at this time

## 2019-10-23 NOTE — PROGRESS NOTES
[facility] Hospital Medicine  Progress Note    Patient Name: Elizabeth Maldonado  MRN: [id number]  Patient Class: IP- Inpatient   Admission Date: 10/20/2019  Length of Stay: 3 days  Attending Physician: Kalee Cat MD  Primary Care Provider: Richy Singleton NP        Subjective:     Principal Problem:Acute renal failure superimposed on stage 3 chronic kidney disease        HPI:  Ms. Elizabeth Maldonado is a 54 y.o. female with type 2 diabetes mellitus (HbA1c 8.7% Dec 2014), hyperlipidemia (.6 Nov 2014), CKD stage 3, anemia of chronic disease, and history of renal transplant who presents to [facility] ED with complaints of left arm and hip pain the past few days.  She reports that the pain is mainly localized to her lower back, left hip, and left shoulder.  She does recall falling at work as she was stepping down a ladder and missed the last step.  She does recall falling on her left side but denies any head trauma.  She denies any fevers, nausea, vomiting, abdominal pain, nor any diarrhea.  She does report that her appetite has been poor in the last few days but she has not lost any weight.  She denies any night sweats but does report some chills.    Of note, she was noted to be febrile in the ER but denies any coughing, dysuria, rashes, headache, nor any neck stiffness.  She denies any dyspnea, hemoptysis, lower extremity pain or swelling, recent travel, nor any sick contacts.    Overview/Hospital Course:  53 y/o female with hx of kidney transplant presented with left shoulder and hip pain.  Noted to be febrile upon presentation.  No obvious source of infection.  On immunosuppressive medications and admitted on broad spectrum ABx's.  BCx now positive for Strep.  ID consulted.  Slight ARF on presentation and Nephrology consulted.  Started on IVF hydration.    Pt is TTP over left shoulder and hip.  CT imaging concern for inflammation?infection SC joint.  Ortho to follow up. ECHO pending.  Continue rocephin. PT/OT eval to assist with dc planning     Of note, initial MRI ordered was not done per Radiology decision. Agree with proceeding to MRI as CT scan inconclusive.   ECHO reviewed - no vegetations       Interval History: pt has new c/o pain in right index finger with swelling and decrease ROM     Review of Systems   Constitutional: Positive for activity change. Negative for chills and fever.   Respiratory: Negative for cough and shortness of breath.    Cardiovascular: Positive for chest pain. Negative for leg swelling.   Gastrointestinal: Negative for abdominal pain, constipation, diarrhea, nausea and vomiting.   Musculoskeletal: Positive for arthralgias (left shoulder pain, left hip pain), back pain and gait problem. Negative for myalgias.   Skin: Negative for rash and wound.   Neurological: Positive for weakness. Negative for dizziness, light-headedness and headaches.   Psychiatric/Behavioral: Negative for agitation and behavioral problems. The patient is not nervous/anxious.      Objective:     Vital Signs (Most Recent):  Temp: 97.2 °F (36.2 °C) (10/23/19 1200)  Pulse: 87 (10/23/19 1200)  Resp: 17 (10/23/19 1200)  BP: (!) 141/74 (10/23/19 1200)  SpO2: 99 % (10/23/19 1200) Vital Signs (24h Range):  Temp:  [97.2 °F (36.2 °C)-99 °F (37.2 °C)] 97.2 °F (36.2 °C)  Pulse:  [87-96] 87  Resp:  [17-20] 17  SpO2:  [95 %-99 %] 99 %  BP: (136-180)/(72-92) 141/74     Weight: 93.8 kg (206 lb 11.2 oz)  Body mass index is 31.9 kg/m².    Intake/Output Summary (Last 24 hours) at 10/23/2019 1422  Last data filed at 10/23/2019 1203  Gross per 24 hour   Intake 360 ml   Output 1250 ml   Net -890 ml      Physical Exam   Constitutional: She is oriented to person, place, and time. She appears well-developed and well-nourished. No distress.   HENT:   Head: Normocephalic and atraumatic.   Right Ear: External ear normal.   Left Ear: External ear normal.   Nose: Nose normal.   Eyes: Right eye exhibits no discharge. Left eye  exhibits no discharge.   Neck: Normal range of motion.   Cardiovascular: Normal rate, regular rhythm, normal heart sounds and intact distal pulses. Exam reveals no gallop and no friction rub.   No murmur heard.  Pulmonary/Chest: Effort normal and breath sounds normal. No stridor. No respiratory distress. She has no wheezes. She has no rales. She exhibits no tenderness.   Abdominal: Soft. Bowel sounds are normal. She exhibits no distension. There is no tenderness. There is no rebound and no guarding.   Musculoskeletal:   There is some tenderness to palpation to the left shoulder and left hip without any joint swelling nor erythema   Neurological: She is alert and oriented to person, place, and time.   Skin: Skin is warm and dry. She is not diaphoretic. No erythema.   Psychiatric: She has a normal mood and affect. Her behavior is normal. Judgment and thought content normal.   Nursing note and vitals reviewed.      Significant Labs: All pertinent labs within the past 24 hours have been reviewed.    Significant Imaging: I have reviewed and interpreted all pertinent imaging results/findings within the past 24 hours.      Assessment/Plan:      * Acute on CKD stage 3  Patient's baseline creatinine is around 1.6.  2.9 on presentation.  She does have a history of a renal transplant.    Started on IVF's and Nephrology consulted.  Creat improving with IVF's.  Avoid nephrotoxic medications.    Bacteremia due to group B Streptococcus  Patient was noted to have a fever of 102.5° F without a clear source of infection.  She also was tachycardic and tachypneic, all of which has improved.  Given that she is on tacrolimus and mycophenolate with immunosuppression, she was started on empiric antibiotic therapy.  Now noted to have Group B Bacteremia.  Consulted ID.  Unsure source.  Patient does complain of left shoulder and hip pain.  Septic joint?  Ortho consult.    See CT imaging chest 10/22 - follow up ortho recs - proceed with MRI    CRP and ESR - elevated   ECHO - reviewed     CKD (chronic kidney disease), stage III  As addressed above.    Kidney transplant status, living unrelated donor - 12/2/14  As addressed above.    Hyperlipidemia  Poorly controlled; will continue her home regimen of atorvastatin.    Anemia of chronic disease  The patient's H/H is stable and consistent with previous laboratory measurements, and the patient exhibits no signs or symptoms of acute bleeding; there is no indication for transfusion.  Will continue to monitor.    Type 2 diabetes mellitus, uncontrolled, with renal complications  Uncontrolled with hyperglycemia.  Will resume home insulin at lower dose to avoid hypoglycemia.  Diabetic diet and insulin sliding scale.  A1c 10.4%    Pt reports home insulin regimen: lantus 80u qhs and humalog 30u TID + SSI       VTE Risk Mitigation (From admission, onward)         Ordered     heparin (porcine) injection 5,000 Units  Every 12 hours      10/20/19 2208     IP VTE HIGH RISK PATIENT  Once      10/20/19 2017                      Kalee Cat MD  Department of Hospital Medicine   [facility]

## 2019-10-23 NOTE — ASSESSMENT & PLAN NOTE
Patient was noted to have a fever of 102.5° F without a clear source of infection.  She also was tachycardic and tachypneic, all of which has improved.  Given that she is on tacrolimus and mycophenolate with immunosuppression, she was started on empiric antibiotic therapy.  Now noted to have Group B Bacteremia.  Consulted ID.  Unsure source.  Patient does complain of left shoulder and hip pain.  Septic joint?  Ortho consult.    See CT imaging chest 10/22 - follow up ortho recs - proceed with MRI   CRP and ESR - elevated   ECHO - reviewed

## 2019-10-24 LAB
ALBUMIN SERPL BCP-MCNC: 1.5 G/DL (ref 3.5–5.2)
ANION GAP SERPL CALC-SCNC: 10 MMOL/L (ref 8–16)
BASOPHILS # BLD AUTO: ABNORMAL K/UL (ref 0–0.2)
BASOPHILS NFR BLD: 0 % (ref 0–1.9)
BKV DNA SERPL NAA+PROBE-ACNC: <125 COPIES/ML
BKV DNA SERPL NAA+PROBE-LOG#: <2.1 LOG (10) COPIES/ML
BKV DNA SERPL QL NAA+PROBE: NOT DETECTED
BUN SERPL-MCNC: 26 MG/DL (ref 6–20)
CALCIUM SERPL-MCNC: 9.5 MG/DL (ref 8.7–10.5)
CHLORIDE SERPL-SCNC: 100 MMOL/L (ref 95–110)
CO2 SERPL-SCNC: 24 MMOL/L (ref 23–29)
CREAT 24H UR-MRATE: 63.3 MG/HR (ref 40–75)
CREAT SERPL-MCNC: 1.8 MG/DL (ref 0.5–1.4)
CREAT UR-MCNC: 79.9 MG/DL (ref 15–325)
CREATININE, URINE (MG/SPEC): 1518.1 MG/SPEC
DIFFERENTIAL METHOD: ABNORMAL
EOSINOPHIL # BLD AUTO: ABNORMAL K/UL (ref 0–0.5)
EOSINOPHIL NFR BLD: 0 % (ref 0–8)
ERYTHROCYTE [DISTWIDTH] IN BLOOD BY AUTOMATED COUNT: 14.6 % (ref 11.5–14.5)
EST. GFR  (AFRICAN AMERICAN): 36 ML/MIN/1.73 M^2
EST. GFR  (NON AFRICAN AMERICAN): 31 ML/MIN/1.73 M^2
GLUCOSE SERPL-MCNC: 179 MG/DL (ref 70–110)
HCT VFR BLD AUTO: 27.9 % (ref 37–48.5)
HGB BLD-MCNC: 9 G/DL (ref 12–16)
IMM GRANULOCYTES # BLD AUTO: ABNORMAL K/UL (ref 0–0.04)
IMM GRANULOCYTES NFR BLD AUTO: ABNORMAL % (ref 0–0.5)
LYMPHOCYTES # BLD AUTO: ABNORMAL K/UL (ref 1–4.8)
LYMPHOCYTES NFR BLD: 17 % (ref 18–48)
MCH RBC QN AUTO: 26.9 PG (ref 27–31)
MCHC RBC AUTO-ENTMCNC: 32.3 G/DL (ref 32–36)
MCV RBC AUTO: 83 FL (ref 82–98)
MONOCYTES # BLD AUTO: ABNORMAL K/UL (ref 0.3–1)
MONOCYTES NFR BLD: 8 % (ref 4–15)
NEUTROPHILS NFR BLD: 69 % (ref 38–73)
NEUTS BAND NFR BLD MANUAL: 6 %
NRBC BLD-RTO: 0 /100 WBC
PHOSPHATE SERPL-MCNC: 3.8 MG/DL (ref 2.7–4.5)
PLATELET # BLD AUTO: 204 K/UL (ref 150–350)
PLATELET BLD QL SMEAR: ABNORMAL
PMV BLD AUTO: 13 FL (ref 9.2–12.9)
POCT GLUCOSE: 157 MG/DL (ref 70–110)
POCT GLUCOSE: 159 MG/DL (ref 70–110)
POCT GLUCOSE: 167 MG/DL (ref 70–110)
POCT GLUCOSE: 256 MG/DL (ref 70–110)
POTASSIUM SERPL-SCNC: 4 MMOL/L (ref 3.5–5.1)
PROT 24H UR-MRATE: 7163 MG/SPEC (ref 0–100)
PROT UR-MCNC: 377 MG/DL (ref 0–15)
RBC # BLD AUTO: 3.35 M/UL (ref 4–5.4)
SODIUM SERPL-SCNC: 134 MMOL/L (ref 136–145)
TACROLIMUS BLD-MCNC: 5.5 NG/ML (ref 5–15)
URINE COLLECTION DURATION: 24 HR
URINE COLLECTION DURATION: 24 HR
URINE VOLUME: 1900 ML
URINE VOLUME: 1900 ML
WBC # BLD AUTO: 10.87 K/UL (ref 3.9–12.7)

## 2019-10-24 PROCEDURE — 94761 N-INVAS EAR/PLS OXIMETRY MLT: CPT

## 2019-10-24 PROCEDURE — 63600175 PHARM REV CODE 636 W HCPCS: Performed by: INTERNAL MEDICINE

## 2019-10-24 PROCEDURE — 97530 THERAPEUTIC ACTIVITIES: CPT

## 2019-10-24 PROCEDURE — 80069 RENAL FUNCTION PANEL: CPT

## 2019-10-24 PROCEDURE — 85027 COMPLETE CBC AUTOMATED: CPT

## 2019-10-24 PROCEDURE — 97535 SELF CARE MNGMENT TRAINING: CPT

## 2019-10-24 PROCEDURE — 85007 BL SMEAR W/DIFF WBC COUNT: CPT

## 2019-10-24 PROCEDURE — 97116 GAIT TRAINING THERAPY: CPT

## 2019-10-24 PROCEDURE — 97110 THERAPEUTIC EXERCISES: CPT

## 2019-10-24 PROCEDURE — 63600175 PHARM REV CODE 636 W HCPCS: Performed by: PHYSICIAN ASSISTANT

## 2019-10-24 PROCEDURE — 82570 ASSAY OF URINE CREATININE: CPT

## 2019-10-24 PROCEDURE — 25000003 PHARM REV CODE 250: Performed by: INTERNAL MEDICINE

## 2019-10-24 PROCEDURE — 99233 PR SUBSEQUENT HOSPITAL CARE,LEVL III: ICD-10-PCS | Mod: ,,, | Performed by: PHYSICIAN ASSISTANT

## 2019-10-24 PROCEDURE — 36415 COLL VENOUS BLD VENIPUNCTURE: CPT

## 2019-10-24 PROCEDURE — 80197 ASSAY OF TACROLIMUS: CPT

## 2019-10-24 PROCEDURE — 11000001 HC ACUTE MED/SURG PRIVATE ROOM

## 2019-10-24 PROCEDURE — 99233 SBSQ HOSP IP/OBS HIGH 50: CPT | Mod: ,,, | Performed by: PHYSICIAN ASSISTANT

## 2019-10-24 PROCEDURE — 63600175 PHARM REV CODE 636 W HCPCS: Performed by: HOSPITALIST

## 2019-10-24 PROCEDURE — 84156 ASSAY OF PROTEIN URINE: CPT

## 2019-10-24 PROCEDURE — 25000003 PHARM REV CODE 250: Performed by: HOSPITALIST

## 2019-10-24 RX ADMIN — CEFTRIAXONE SODIUM 2 G: 2 INJECTION, SOLUTION INTRAVENOUS at 10:10

## 2019-10-24 RX ADMIN — INSULIN ASPART 3 UNITS: 100 INJECTION, SOLUTION INTRAVENOUS; SUBCUTANEOUS at 08:10

## 2019-10-24 RX ADMIN — CLONIDINE HYDROCHLORIDE 0.1 MG: 0.1 TABLET ORAL at 08:10

## 2019-10-24 RX ADMIN — INSULIN ASPART 10 UNITS: 100 INJECTION, SOLUTION INTRAVENOUS; SUBCUTANEOUS at 12:10

## 2019-10-24 RX ADMIN — INSULIN ASPART 10 UNITS: 100 INJECTION, SOLUTION INTRAVENOUS; SUBCUTANEOUS at 08:10

## 2019-10-24 RX ADMIN — OXYCODONE HYDROCHLORIDE AND ACETAMINOPHEN 1 TABLET: 10; 325 TABLET ORAL at 09:10

## 2019-10-24 RX ADMIN — CLONIDINE HYDROCHLORIDE 0.1 MG: 0.1 TABLET ORAL at 12:10

## 2019-10-24 RX ADMIN — HEPARIN SODIUM 5000 UNITS: 5000 INJECTION, SOLUTION INTRAVENOUS; SUBCUTANEOUS at 09:10

## 2019-10-24 RX ADMIN — INSULIN ASPART 2 UNITS: 100 INJECTION, SOLUTION INTRAVENOUS; SUBCUTANEOUS at 08:10

## 2019-10-24 RX ADMIN — TACROLIMUS 3 MG: 1 CAPSULE ORAL at 05:10

## 2019-10-24 RX ADMIN — OXYCODONE HYDROCHLORIDE AND ACETAMINOPHEN 1 TABLET: 10; 325 TABLET ORAL at 04:10

## 2019-10-24 RX ADMIN — OXYCODONE HYDROCHLORIDE AND ACETAMINOPHEN 1 TABLET: 10; 325 TABLET ORAL at 12:10

## 2019-10-24 RX ADMIN — ATORVASTATIN CALCIUM 20 MG: 10 TABLET, FILM COATED ORAL at 08:10

## 2019-10-24 RX ADMIN — INSULIN DETEMIR 20 UNITS: 100 INJECTION, SOLUTION SUBCUTANEOUS at 08:10

## 2019-10-24 RX ADMIN — INSULIN ASPART 2 UNITS: 100 INJECTION, SOLUTION INTRAVENOUS; SUBCUTANEOUS at 04:10

## 2019-10-24 RX ADMIN — MORPHINE SULFATE 4 MG: 10 INJECTION INTRAVENOUS at 10:10

## 2019-10-24 RX ADMIN — MORPHINE SULFATE 4 MG: 10 INJECTION INTRAVENOUS at 07:10

## 2019-10-24 RX ADMIN — TACROLIMUS 3 MG: 1 CAPSULE ORAL at 08:10

## 2019-10-24 RX ADMIN — CLONIDINE HYDROCHLORIDE 0.1 MG: 0.1 TABLET ORAL at 09:10

## 2019-10-24 RX ADMIN — HEPARIN SODIUM 5000 UNITS: 5000 INJECTION, SOLUTION INTRAVENOUS; SUBCUTANEOUS at 08:10

## 2019-10-24 RX ADMIN — INSULIN ASPART 10 UNITS: 100 INJECTION, SOLUTION INTRAVENOUS; SUBCUTANEOUS at 04:10

## 2019-10-24 RX ADMIN — INSULIN ASPART 2 UNITS: 100 INJECTION, SOLUTION INTRAVENOUS; SUBCUTANEOUS at 12:10

## 2019-10-24 RX ADMIN — OXYCODONE HYDROCHLORIDE AND ACETAMINOPHEN 1 TABLET: 10; 325 TABLET ORAL at 08:10

## 2019-10-24 NOTE — PROGRESS NOTES
Ortho Daily Progress Note    Elizabeth Maldonado is a 54 y.o. female admitted on 10/20/2019      Chief Complaint/Reason for admission: left arm pain (x 4 dy with arm pain states hx of rotator cuff issue, no hx ; right hand swollen and painful) and Hip Pain (left hip pain x4 days)       Hospital Day: 4  Post Op Day: * No surgery found *     The patient was seen and examined this morning at the bedside.  Symptoms largely unchanged.   Some pain relief since admission    _______________    Vitals:    10/24/19 0500 10/24/19 0741 10/24/19 1128 10/24/19 1551   BP: (!) 142/78 (!) 170/84 138/71 (!) 123/59   Pulse: 95 95 84 94   Resp: 18 19 18 18   Temp: 97.8 °F (36.6 °C) 99.9 °F (37.7 °C) 96.6 °F (35.9 °C) 98.6 °F (37 °C)   TempSrc: Oral Oral Oral Oral   SpO2: 96% 99% 97% 100%   Weight:       Height:           Vital Signs (Most Recent)  Temp: 98.6 °F (37 °C) (10/24/19 1551)  Pulse: 94 (10/24/19 1551)  Resp: 18 (10/24/19 1551)  BP: (!) 123/59 (10/24/19 1551)  SpO2: 100 % (10/24/19 1551)    Vital Signs Range (Last 24H):  Temp:  [96.6 °F (35.9 °C)-99.9 °F (37.7 °C)]   Pulse:  []   Resp:  [17-19]   BP: (123-170)/(59-85)   SpO2:  [95 %-100 %]       Physical:    AAOX3    Mild pain with ROM of left hip  Pain with palpation over left SC joint        Recent Labs     10/22/19  0445 10/23/19  0505 10/24/19  0452   K 3.4* 3.5 4.0   PHOS 3.5 3.8 3.8   CALCIUM 9.4 9.2 9.5   WBC  --   --  10.87   HGB  --   --  9.0*   HCT  --   --  27.9*   PLT  --   --  204       I/O last 3 completed shifts:  In: 480 [P.O.:480]  Out: 1800 [Urine:1800]          Assessment:  A/P fever of unknown origin with pos blood cx          Plan:    No clear source on MRI  Left SC joint most painful. Recommend IR aspiration of left SC joint         Wali Santana MD  [facility]

## 2019-10-24 NOTE — PROGRESS NOTES
[facility]  Infectious Disease  Progress Note    Patient Name: Elizabeth Maldonado  MRN: [id number]  Admission Date: 10/20/2019  Length of Stay: 4 days  Attending Physician: Kalee Cat MD  Primary Care Provider: Richy Singleton NP    Isolation Status: No active isolations  Assessment/Plan:      Bacteremia due to group B Streptococcus  54 yr old with kidney transplant (on cellcept and prograf), DM, HLD presents with left shoulder, left lower back, and left hip pain as well as decreased appetite and chills. Reports a recent fall off of a ladder at work. Pt states her left hip/back pain worsened causing her to be unable to walk. Her blood cultures returned positive for GPCs in chains resembling strep. ID is consulted for further recs.     Blood cultures with Group B strep; repeat blood cultures cleared.   Fever resolved.  Pt still with left hip/back pain- unable to walk. CT chest negative for infectious source.  PATRICK resolving.  2D echo without vegetation  MRI cervicothoraciclumbar without evidence of epidural abscess/diskitis/OM; awaiting results of MRI pelvis    Pt still with left hip and left shoulder pain, states worse today    Plan:  1. Continue rocephin 2 gram IV q 24 hours for Group B strep bacteremia  2. If concerned for septic shoulder or hip, recommend aspiration sent for cell count, cultures; if there is septic joint, pt will need washout for source control  3. F/u on MRI pelvis  4. Will follow      Anticipated Disposition: IV abx  Thank you for your consult. I will follow-up with patient. Please contact us if you have any additional questions.  JOSE Alvarado Pager: [phone]    Infectious Disease  [facility]    Subjective:     Principal Problem:Acute renal failure superimposed on stage 3 chronic kidney disease    HPI: This is a 54 year old female female with type 2 diabetes mellitus, HLD, CKD,  anemia of chronic disease, and history of renal transplant 2014 (on  cellcept and prograf) who presents to [facility] ED with complaints of left shoulder and left hip pain the past few days.  She reports that the pain is mainly localized to her left lower back, left hip, and left shoulder.  She does recall falling at work as she was stepping down a ladder and missed the last step.  She does recall falling on her left side but denies any head trauma.   she reports associated poor appetite and chills at home. She denies diarrhea, abd pain, or urinary symptoms.      She was found to have positive blood cultures with GPCs in chains resembling strep. ID consulted for further recs. CT left hip and left shoulder (both done without contrast) non revealing.   Interval History: still with left shoulder and left hip pain. Awaiting MRI reads. Ortho following.     Review of Systems   Constitutional: Positive for activity change. Negative for chills and fever.   Respiratory: Negative for cough and shortness of breath.    Cardiovascular: Negative for chest pain and leg swelling.   Gastrointestinal: Negative for abdominal pain, constipation, diarrhea, nausea and vomiting.   Musculoskeletal: Positive for arthralgias (left shoulder pain, left hip pain), back pain and gait problem. Negative for myalgias.   Skin: Negative for rash and wound.   Neurological: Positive for weakness. Negative for dizziness, light-headedness and headaches.   Psychiatric/Behavioral: Negative for agitation and behavioral problems. The patient is not nervous/anxious.      Objective:     Vital Signs (Most Recent):  Temp: 99.9 °F (37.7 °C) (10/24/19 0741)  Pulse: 95 (10/24/19 0741)  Resp: 19 (10/24/19 0741)  BP: (!) 170/84 (10/24/19 0741)  SpO2: 99 % (10/24/19 0741) Vital Signs (24h Range):  Temp:  [97.2 °F (36.2 °C)-99.9 °F (37.7 °C)] 99.9 °F (37.7 °C)  Pulse:  [] 95  Resp:  [17-19] 19  SpO2:  [95 %-99 %] 99 %  BP: (141-170)/(74-85) 170/84     Weight: 93.8 kg (206 lb 11.2 oz)  Body mass index is 31.9 kg/m².    Estimated  Creatinine Clearance: 41.4 mL/min (A) (based on SCr of 1.8 mg/dL (H)).    Physical Exam   Constitutional: She is oriented to person, place, and time. She appears well-developed and well-nourished. No distress.   Cardiovascular: Normal rate and regular rhythm.   No murmur heard.  Pulmonary/Chest: Effort normal and breath sounds normal. No respiratory distress.   Abdominal: Soft. Bowel sounds are normal. She exhibits no distension.   Musculoskeletal: She exhibits no edema.        Left shoulder: She exhibits decreased range of motion and tenderness.        Left hip: She exhibits decreased range of motion.   Neurological: She is alert and oriented to person, place, and time. She has normal strength. No sensory deficit.   Skin: Skin is warm and dry.   Psychiatric: She has a normal mood and affect. Her behavior is normal.       Significant Labs:   Blood Culture:   Recent Labs   Lab 10/20/19  1530 10/20/19  1600 10/21/19  1024   LABBLOO Gram stain ciera bottle: Gram positive cocci in chains resembling Strep   Positive results previously called  Gram stain aer bottle: Gram positive cocci in chains resembling Strep   Positive results previously called  STREPTOCOCCUS AGALACTIAE (GROUP B)  Beta-hemolytic streptococci are routinely susceptible to   penicillins,cephalosporins and carbapenems.  * Gram stain ciera bottle: Gram positive cocci in chains resembling Strep   Results called to and read back by: Andrea Ackerman  Gram stain aer bottle: Gram positive cocci in chains resembling Strep   Positive results previously called  STREPTOCOCCUS AGALACTIAE (GROUP B)  Beta-hemolytic streptococci are routinely susceptible to   penicillins,cephalosporins and carbapenems.  For susceptibility see order #[id number]  * No Growth to date  No Growth to date  No Growth to date  No Growth to date  No Growth to date  No Growth to date  No Growth to date  No Growth to date     CBC:   Recent Labs   Lab 10/24/19  0452   WBC 10.87   HGB  9.0*   HCT 27.9*        CMP:   Recent Labs   Lab 10/23/19  0505 10/24/19  0452   * 134*   K 3.5 4.0    100   CO2 22* 24   * 179*   BUN 25* 26*   CREATININE 1.8* 1.8*   CALCIUM 9.2 9.5   ALBUMIN 1.5* 1.5*   ANIONGAP 11 10   EGFRNONAA 31* 31*       Significant Imaging: I have reviewed all pertinent imaging results/findings within the past 24 hours.

## 2019-10-24 NOTE — SUBJECTIVE & OBJECTIVE
Interval History: Overnight, no acute events. Received Kbicarb 50 mEq PO x1 yesterday. UOP 1.4L/24 hrs. MRI abdomen/pelvis and cerv/thoracic/lumbar spine without contrast performed yesterday PM, official reads pending. Pt doing well this AM, family at bedside, states she just received morphine for L shoulder pain. Good PO intake. Denies n/v, SOB. No other issues or complaints at this time.     Review of patient's allergies indicates:   Allergen Reactions    Iodine and iodide containing products Hives    Shrimp Itching    Topamax [topiramate] Other (See Comments)     Vision changes    Zoloft [sertraline] Palpitations     Current Facility-Administered Medications   Medication Frequency    acetaminophen tablet 650 mg Q6H PRN    atorvastatin tablet 20 mg Daily    cefTRIAXone (ROCEPHIN) 2 g in dextrose 5 % 50 mL IVPB Q24H    cloNIDine tablet 0.1 mg TID PRN    dextrose 50% injection 12.5 g PRN    dextrose 50% injection 25 g PRN    glucagon (human recombinant) injection 1 mg PRN    glucose chewable tablet 16 g PRN    glucose chewable tablet 24 g PRN    heparin (porcine) injection 5,000 Units Q12H    insulin aspart U-100 pen 1-10 Units PRN    insulin aspart U-100 pen 10 Units TID WM    insulin detemir U-100 pen 20 Units QHS    morphine injection 4 mg Q6H PRN    ondansetron injection 8 mg Q8H PRN    oxyCODONE-acetaminophen  mg per tablet 1 tablet Q4H PRN    promethazine (PHENERGAN) 6.25 mg in dextrose 5 % 50 mL IVPB Q6H PRN    ramelteon tablet 8 mg Nightly PRN    senna-docusate 8.6-50 mg per tablet 1 tablet BID PRN    tacrolimus capsule 3 mg BID       Objective:     Vital Signs (Most Recent):  Temp: 96.6 °F (35.9 °C) (10/24/19 1128)  Pulse: 84 (10/24/19 1128)  Resp: 18 (10/24/19 1128)  BP: 138/71 (10/24/19 1128)  SpO2: 97 % (10/24/19 1128)  O2 Device (Oxygen Therapy): room air (10/24/19 0705) Vital Signs (24h Range):  Temp:  [96.6 °F (35.9 °C)-99.9 °F (37.7 °C)] 96.6 °F (35.9 °C)  Pulse:   [] 84  Resp:  [17-19] 18  SpO2:  [95 %-99 %] 97 %  BP: (138-170)/(71-85) 138/71     Weight: 93.8 kg (206 lb 11.2 oz) (10/20/19 2058)  Body mass index is 31.9 kg/m².  Body surface area is 2.11 meters squared.    I/O last 3 completed shifts:  In: 480 [P.O.:480]  Out: 1800 [Urine:1800]    Physical Exam   Constitutional: She is oriented to person, place, and time. She appears well-developed and well-nourished. No distress.   HENT:   Head: Normocephalic and atraumatic.   Mouth/Throat: Mucous membranes are normal.   Eyes: Conjunctivae and EOM are normal.   Cardiovascular: Normal rate and regular rhythm.   No murmur heard.  Pulmonary/Chest: Effort normal and breath sounds normal.   Abdominal: Soft. She exhibits no distension.   Musculoskeletal: She exhibits no edema or deformity.   Neurological: She is alert and oriented to person, place, and time.   Skin: Skin is warm and dry. No rash noted. She is not diaphoretic.   Psychiatric: She has a normal mood and affect. Her behavior is normal.       Significant Labs:  CBC:   Recent Labs   Lab 10/24/19  0452   WBC 10.87   RBC 3.35*   HGB 9.0*   HCT 27.9*      MCV 83   MCH 26.9*   MCHC 32.3     CMP:   Recent Labs   Lab 10/21/19  0440  10/24/19  0452   *   < > 179*   CALCIUM 9.5   < > 9.5   ALBUMIN 1.7*   < > 1.5*   PROT 7.7  --   --       < > 134*   K 3.9   < > 4.0   CO2 25   < > 24      < > 100   BUN 34*   < > 26*   CREATININE 2.2*   < > 1.8*   ALKPHOS 101  --   --    ALT 37  --   --    AST 46*  --   --    BILITOT 0.3  --   --     < > = values in this interval not displayed.     PTH:   Recent Labs   Lab 10/22/19  0445   .1*     All labs within the past 24 hours have been reviewed.     Significant Imaging:  Labs: Reviewed

## 2019-10-24 NOTE — PT/OT/SLP PROGRESS
"Physical Therapy Treatment    Patient Name:  Elizabeth Maldonado   MRN:  [id number]    Recommendations:     Discharge Recommendations:  (ongoing assessment)   Discharge Equipment Recommendations:   TBD   Barriers to discharge: Inaccessible home and Decreased caregiver support    Assessment:     Elizabeth Maldonado is a 54 y.o. female admitted with a medical diagnosis of Acute renal failure superimposed on stage 3 chronic kidney disease.  She presents with the following impairments/functional limitations:  weakness, impaired endurance, impaired balance, gait instability, decreased upper extremity function, decreased lower extremity function, decreased ROM, edema, pain, decreased safety awareness .    Rehab Prognosis: Good; patient would benefit from acute skilled PT services to address these deficits and reach maximum level of function.    Recent Surgery: * No surgery found *      Plan:     During this hospitalization, patient to be seen 5 x/week to address the identified rehab impairments via gait training, therapeutic activities, therapeutic exercises, neuromuscular re-education and progress toward the following goals:    · Plan of Care Expires:  11/23/19    Subjective     Chief Complaint: L hip pain   Patient/Family Comments/goals: " I need to use the bathroom"   Pain/Comfort:  · Pain Rating 1: 10/10  · Location - Side 1: Left  · Location 1: hip  · Pain Addressed 1: Pre-medicate for activity, Nurse notified, Reposition  · Pain Rating Post-Intervention 1: 10/10      Objective:     Communicated with nurse Rivera prior to session.  Patient found up in chair with telemetry, gresham catheter upon PT entry to room.     General Precautions: Standard, fall   Orthopedic Precautions:N/A   Braces: N/A     Functional Mobility: pt with max slow movement required extra time and frequent rest breaks to complete tasks. Pt moans with movements.   · Transfers:     · Sit to Stand: 2 trials from chair and 1 trial from toilet seat  " contact guard assistance and minimum assistance with rolling walker . V/T cues for safety, proper technique and walker management.  · Toilet Transfer: contact guard assistance with  rolling walker  using  Step Transfer (pt ambulated to the bathroom)  · Gait:   Pt ambulated ~ 10 ft x 2 from chair <> the bathroom with RW, CGA. Pt demonstrated an antalgic gait, max slow kellee, decreased step length , decreased weight shifting ability , decreased swing to stance phase.  V/T cues for safety, proper technique and walker management.  Limited with gait training 2* pt with c/o L hip  Pain , nurse Miguel notified.   · Balance: fair        AM-PAC 6 CLICK MOBILITY  Turning over in bed (including adjusting bedclothes, sheets and blankets)?: 4  Sitting down on and standing up from a chair with arms (e.g., wheelchair, bedside commode, etc.): 3  Moving from lying on back to sitting on the side of the bed?: 3  Moving to and from a bed to a chair (including a wheelchair)?: 3  Need to walk in hospital room?: 3  Climbing 3-5 steps with a railing?: 2  Basic Mobility Total Score: 18       Therapeutic Activities and Exercises:   pt performed transfer and gait training as above  Pt able to perform pericare in sitting with S after using the bathroom.  Pt performed seated BLE x 10 reps x 2 : AP (10 reps x 2) , GS, QS . VC's for sequence. Encouraged pt to perform BLE AP, GS ,QS while in bed or chair throughout the day. Pt verbalized understanding.      Patient left up in reclined chair with all lines intact, call button in reach and nurse notified..    GOALS:   Multidisciplinary Problems     Physical Therapy Goals        Problem: Physical Therapy Goal    Goal Priority Disciplines Outcome Goal Variances Interventions   Physical Therapy Goal     PT, PT/OT Ongoing, Progressing     Description:  Patient will increase functional independence with mobility by performin. Sit<>stand with SPV with RW/least restrictive device.  2. Gait x 150  feet with SPV with RW/least restrictive device.  3. Ascend/descend 5 step(s), L HR with least restrictive assistive device and SPV  4. Pt to transfer supine>sit EOB with SPV.                       Time Tracking:     PT Received On: 10/24/19  PT Start Time: 1555     PT Stop Time: 1635  PT Total Time (min): 40 min     Billable Minutes: Gait Training 12 and Therapeutic Activity 28    Treatment Type: Treatment  PT/PTA: PTA     PTA Visit Number: 1     Tram JUAREZ Mabry, PTA  10/24/2019

## 2019-10-24 NOTE — ASSESSMENT & PLAN NOTE
54 yr old with kidney transplant (on cellcept and prograf), DM, HLD presents with left shoulder, left lower back, and left hip pain as well as decreased appetite and chills. Reports a recent fall off of a ladder at work. Pt states her left hip/back pain worsened causing her to be unable to walk. Her blood cultures returned positive for GPCs in chains resembling strep. ID is consulted for further recs.     Blood cultures with Group B strep; repeat blood cultures cleared.   Fever resolved.  Pt still with left hip/back pain- unable to walk. CT chest negative for infectious source.  PATRICK resolving.  2D echo without vegetation  MRI cervicothoraciclumbar without evidence of epidural abscess/diskitis/OM; awaiting results of MRI pelvis    Pt still with left hip and left shoulder pain, states worse today    Plan:  1. Continue rocephin 2 gram IV q 24 hours for Group B strep bacteremia  2. If concerned for septic shoulder or hip, recommend aspiration sent for cell count, cultures; if there is septic joint, pt will need washout for source control  3. F/u on MRI pelvis  4. Will follow

## 2019-10-24 NOTE — PLAN OF CARE
Problem: Occupational Therapy Goal  Goal: Occupational Therapy Goal  Description  Goals to be met by: 11/6/19    Patient will increase functional independence with ADLs by performing:    UE Dressing with Modified [place].  LE Dressing with Modified [place].  Grooming while standing at sink with Modified [place] and Supervision.  Toileting from toilet with Modified [place] for hygiene and clothing management.   Supine to sit with Modified [place] and Supervision.  Step transfer with Modified [place] and Supervision  Toilet transfer to toilet with Stand-by Assistance.  Upper extremity exercise program x15 reps per handout, with assistance as needed.     Outcome: Ongoing, Progressing   The patient c/o 10/10 back and left hip pain. The patient participated in OT with encouragement. The patient required min/mod assist for bed mobility with verbal cues for log roll technique. The patient reports some pain relief while sitting in the chair.

## 2019-10-24 NOTE — PT/OT/SLP PROGRESS
Occupational Therapy   Treatment    Name: Elizabeth Maldonado  MRN: [id number]  Admitting Diagnosis:  Acute renal failure superimposed on stage 3 chronic kidney disease       Recommendations:     Discharge Recommendations: (TBD)  Discharge Equipment Recommendations:  (TBD)  Barriers to discharge:  Decreased caregiver support    Assessment:     Elizabeth Maldonado is a 54 y.o. female with a medical diagnosis of Acute renal failure superimposed on stage 3 chronic kidney disease.  She presents with continued c/o pain, 10/10 in back and left hip. The patient participated in OT with encouragement and was able to amb with RW and CGA ~6' to the sink. The patient was unable to tolerate standing at the sink 2* left leg pain and weakness.    Performance deficits affecting function are weakness, impaired endurance, impaired self care skills, gait instability, impaired functional mobilty, impaired balance, decreased lower extremity function, decreased upper extremity function, pain, decreased ROM, impaired fine motor, edema.     Rehab Prognosis:  Good; patient would benefit from acute skilled OT services to address these deficits and reach maximum level of function.       Plan:     Patient to be seen 5 x/week to address the above listed problems via self-care/home management, therapeutic activities, therapeutic exercises  · Plan of Care Expires: 11/06/19  · Plan of Care Reviewed with: patient    Subjective     Pain/Comfort:  · Pain Rating 1: 10/10  · Location - Side 1: Left  · Location 1: hip(back and shldr)  · Pain Addressed 1: Pre-medicate for activity, Reposition, Cessation of Activity, Distraction, Nurse notified  · Pain Rating Post-Intervention 1: 10/10(but reports some pain relief while seated in the chair )    Objective:      Patient found HOB elevated with peripheral IV, gresham catheter, bed alarm upon OT entry to room.  The patient was found in dark room with c/o back pain and inability to sleep. The patient agreed to  participate in OT with education and encouragement.    General Precautions: Standard, fall   Orthopedic Precautions:N/A   Braces: N/A     Occupational Performance:     Bed Mobility:    · Patient completed Rolling/Turning to Right with contact guard assistance and with side rail  · Patient completed Scooting/Bridging with stand by assistance  · Patient completed Supine to Sit with minimum assistance, with side rail, with leg lift and and verbal cues for log roll technique     Functional Mobility/Transfers:  · Patient completed Sit <> Stand Transfer with contact guard assistance  with  rolling walker and from the elevated bed   · Functional Mobility: The patient amb using a RW ~6' from the bed to the sink. The patient stood briefly at the sink and requested to sit 2* c/o leg weakness and back pain    Activities of Daily Living:  · Grooming: stand by assistance to brush her teeth while seated in the chair  · Upper Body Dressing: moderate assistance to don back gown  · Lower Body Dressing: dependence to don B socks in bed      [facility] 6 Click ADL: 15    Treatment & Education:  The patient was educated re; OT role and POC.  The patient participated in bed mobility and seated grooming tasks. The patient was able to perform AROM to BUE x5 reps while seated in the chair. The patient was educated re: Log roll and use of pillows for side ;michael in bed for back relief. The patient was given a handout re: Log roll. The patient was also educated re: pain schedule and availability of pain meds.     Patient left up in chair with all lines intact, call button in reach and nurseMiguel notifiedEducation:      GOALS:   Multidisciplinary Problems     Occupational Therapy Goals        Problem: Occupational Therapy Goal    Goal Priority Disciplines Outcome Interventions   Occupational Therapy Goal     OT, PT/OT Ongoing, Progressing    Description:  Goals to be met by: 11/6/19    Patient will increase functional independence with ADLs by  performing:    UE Dressing with Modified [place].  LE Dressing with Modified [place].  Grooming while standing at sink with Modified [place] and Supervision.  Toileting from toilet with Modified [place] for hygiene and clothing management.   Supine to sit with Modified [place] and Supervision.  Step transfer with Modified [place] and Supervision  Toilet transfer to toilet with Stand-by Assistance.  Upper extremity exercise program x15 reps per handout, with assistance as needed.                      Time Tracking:     OT Date of Treatment: 10/24/19  OT Start Time: 1413  OT Stop Time: 1452  OT Total Time (min): 39 min    Billable Minutes:Self Care/Home Management 30  Therapeutic Exercise 9  Total Time 39    Selena Meyer OT  10/24/2019

## 2019-10-24 NOTE — PLAN OF CARE
Problem: Physical Therapy Goal  Goal: Physical Therapy Goal  Description  Patient will increase functional independence with mobility by performin. Sit<>stand with SPV with RW/least restrictive device.  2. Gait x 150 feet with SPV with RW/least restrictive device.  3. Ascend/descend 5 step(s), L HR with least restrictive assistive device and SPV  4. Pt to transfer supine>sit EOB with SPV.      Outcome: Ongoing, Progressing   Pt ambulated ~ 10 ft x 2 from chair <> the bathroom with RW, CGA. Limited with gait training 2* pt with c/o pain. Pt will benefit from further therapy in order to return to PLOF.

## 2019-10-24 NOTE — PROGRESS NOTES
[facility]  Nephrology  Progress Note    Patient Name: Elizabeth Maldonado  MRN: [id number]  Admission Date: 10/20/2019  Hospital Length of Stay: 4 days  Attending Provider: Kalee Cat MD   Primary Care Physician: Richy Singleton NP  Principal Problem:Acute renal failure superimposed on stage 3 chronic kidney disease    Subjective:     HPI: Ms. 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 In ED, pt febrile to 102.5, tachycardiac to 125, hypertensive 177/91 and tachypneic. W/u significant for PATRICK Cr 2.9, serum glucose 753, Na 131, lactic acid 3.2, VBG showing mild metabolic acidosis, and UA with spec grav 1.025, 3+ protein, 3+ glucose, 2+ occult blood, 100 RBCs. CT head, CXRR, Xray L shoulder and L hip all unremarkable. Pt received 1L LR bolus x3, started on NS infusion 100 ml/hr. Empirically treated for sepsis with vanc, zosyn, ceftriaxone. Dr. Catalan with transplant medicine at [facility] contacted, felt pt could appropriately be treated at [facility], advised to hold cellcept. Noted goal prograf level 4-7. BCx, Prograf and Cellcept levels drawn. Nephrology consulted 10/21/19 for PATRICK in renal transplant pt. Baseline Cr appears ~1.6 -2 with GFR 30-40. Last at baseline, 1.6, 19 on routine outpt labs. Sees nephrology outpt, last saw Dr. Bonilla 2019, no issues noted, Upr Cr 1.4 g at this time. Reports compliance with all home meds including prograf and cellcept. Good PO fluid intake at home, attempts 1 gallon per day, and endorses good UOP; denies foamy/frothy urine, hematuria, dysuria. Also denies recent n/v, rashes, confusion, hemoptysis, edema, polydipsia, polyuria, confusion, SOB. Denies any new meds or med changes. Endorses recent chills,  "agitation.     Cr improved this AM 2.9 --> 2.2. Serum osmol 338 noted. HR improved this AM. UOP 1x/24 hrs (PM shift only). CK 72. This afternoon pt reports feeling "sleepy," and c/o continued L shoulder and hip pain, worse with movement. Reports poor appetite, denies metallic taste. Denies n/v, SOB, abdominal pain, confusion. ID consulted, noted recs.     Interval History: Overnight, no acute events. Received Kbicarb 50 mEq PO x1 yesterday. UOP 1.4L/24 hrs. MRI abdomen/pelvis and cerv/thoracic/lumbar spine without contrast performed yesterday PM, official reads pending. Pt doing well this AM, family at bedside, states she just received morphine for L shoulder pain. Good PO intake. Denies n/v, SOB. No other issues or complaints at this time.     Review of patient's allergies indicates:   Allergen Reactions    Iodine and iodide containing products Hives    Shrimp Itching    Topamax [topiramate] Other (See Comments)     Vision changes    Zoloft [sertraline] Palpitations     Current Facility-Administered Medications   Medication Frequency    acetaminophen tablet 650 mg Q6H PRN    atorvastatin tablet 20 mg Daily    cefTRIAXone (ROCEPHIN) 2 g in dextrose 5 % 50 mL IVPB Q24H    cloNIDine tablet 0.1 mg TID PRN    dextrose 50% injection 12.5 g PRN    dextrose 50% injection 25 g PRN    glucagon (human recombinant) injection 1 mg PRN    glucose chewable tablet 16 g PRN    glucose chewable tablet 24 g PRN    heparin (porcine) injection 5,000 Units Q12H    insulin aspart U-100 pen 1-10 Units PRN    insulin aspart U-100 pen 10 Units TID WM    insulin detemir U-100 pen 20 Units QHS    morphine injection 4 mg Q6H PRN    ondansetron injection 8 mg Q8H PRN    oxyCODONE-acetaminophen  mg per tablet 1 tablet Q4H PRN    promethazine (PHENERGAN) 6.25 mg in dextrose 5 % 50 mL IVPB Q6H PRN    ramelteon tablet 8 mg Nightly PRN    senna-docusate 8.6-50 mg per tablet 1 tablet BID PRN    tacrolimus capsule 3 mg " BID       Objective:     Vital Signs (Most Recent):  Temp: 96.6 °F (35.9 °C) (10/24/19 1128)  Pulse: 84 (10/24/19 1128)  Resp: 18 (10/24/19 1128)  BP: 138/71 (10/24/19 1128)  SpO2: 97 % (10/24/19 1128)  O2 Device (Oxygen Therapy): room air (10/24/19 0705) Vital Signs (24h Range):  Temp:  [96.6 °F (35.9 °C)-99.9 °F (37.7 °C)] 96.6 °F (35.9 °C)  Pulse:  [] 84  Resp:  [17-19] 18  SpO2:  [95 %-99 %] 97 %  BP: (138-170)/(71-85) 138/71     Weight: 93.8 kg (206 lb 11.2 oz) (10/20/19 2058)  Body mass index is 31.9 kg/m².  Body surface area is 2.11 meters squared.    I/O last 3 completed shifts:  In: 480 [P.O.:480]  Out: 1800 [Urine:1800]    Physical Exam   Constitutional: She is oriented to person, place, and time. She appears well-developed and well-nourished. No distress.   HENT:   Head: Normocephalic and atraumatic.   Mouth/Throat: Mucous membranes are normal.   Eyes: Conjunctivae and EOM are normal.   Cardiovascular: Normal rate and regular rhythm.   No murmur heard.  Pulmonary/Chest: Effort normal and breath sounds normal.   Abdominal: Soft. She exhibits no distension.   Musculoskeletal: She exhibits no edema or deformity.   Neurological: She is alert and oriented to person, place, and time.   Skin: Skin is warm and dry. No rash noted. She is not diaphoretic.   Psychiatric: She has a normal mood and affect. Her behavior is normal.       Significant Labs:  CBC:   Recent Labs   Lab 10/24/19  0452   WBC 10.87   RBC 3.35*   HGB 9.0*   HCT 27.9*      MCV 83   MCH 26.9*   MCHC 32.3     CMP:   Recent Labs   Lab 10/21/19  0440  10/24/19  0452   *   < > 179*   CALCIUM 9.5   < > 9.5   ALBUMIN 1.7*   < > 1.5*   PROT 7.7  --   --       < > 134*   K 3.9   < > 4.0   CO2 25   < > 24      < > 100   BUN 34*   < > 26*   CREATININE 2.2*   < > 1.8*   ALKPHOS 101  --   --    ALT 37  --   --    AST 46*  --   --    BILITOT 0.3  --   --     < > = values in this interval not displayed.     PTH:   Recent Labs    Lab 10/22/19  0445   .1*     All labs within the past 24 hours have been reviewed.     Significant Imaging:  Labs: Reviewed    Assessment/Plan:     PATRICK on CKD 3  - Baseline Cr 1.6-2 GFR 30-40; last at baseline 5/2019.   - Cr on arrival 2.9, improved with IVF. Cr stable, 1.8 this AM with GFR 36; renal function at baseline. BUN stable. Good UOP 1.4 L +1/24 hrs.   - Etiology pre renal in origin, likely 2/2 volume depletion/hyperglycemia. Payne's stain negative.   - 24 hr UPrCr 10/23 confirmed NRP, 4.7 g/g; see below  - Strict Is/Os; will closely monitor UOP  - recommend avoid nephrotoxic medications including IV contrast  - renally dose medications for current GFR  - Daily RFP     History of Kidney transplant/ on long term immunosuppression meds  - S/p LURT on 12/2014; Follows with KTM at [facility] with Dr. Catalan, who has been made aware of pt's current admission. Also follows with Dr. Bonilla, last appt 6/2019- no issues noted at this time.   - Immunosuppressant regimen includes Cellcept 500mg PO BID and prograf 1mg BID; reports compliance  - Todays prograf trough 5.9, at goal; Goal prograf level 4-7; Continue prograf 3mg BID  - Continue holding MMF, per Dr. Catalan recommendation. Concern for possible septic joint, MRIs pending. Will hold off on restarting MMF until MRIs resulted. TTE with no evidence vegetation   - Prograf level daily  - Will continue to monitor for drug toxicities      Proteinuria   - chronic; UprCr 1.6 6/2019; 1.4 12/2018  - UPrCr 10/21 showed NRP of 5.7 g/g  - 24 hr UPrCr 10/23 confirmed NRP, 4.7 g/g; Possibly attributed to diabetic nephropathy as HbA1c recently 10.4   - DSA Class II Ab positive but also present in 2015 and 2014. BK pending. Dr. Nash communicating with KTM at [facility].      Hypokalemia  - stable; resovled  - K and Bicarb improved today s/p K bicarb 40 meq PO x1 yesterday  - Will continue to monitor; replace as needed  - f/u rfp tomorrow     CKD MBD   - PTH 106, stable  compared to  in 2017; Phos stable at 3.8 today as well.  - CCa continues to be elevated, 11.5 today; will consider starting sensipar if CCa increases; Avoid Vit D supplements   - will continue to monitor      Thank you for allowing me to participate in the care of your patient.  Please call with any questions.        JOSE Marquez   Nephrology  [facility]  Office: [phone]  [facility]

## 2019-10-24 NOTE — SUBJECTIVE & OBJECTIVE
Interval History: still with left shoulder and left hip pain. Awaiting MRI reads. Ortho following.     Review of Systems   Constitutional: Positive for activity change. Negative for chills and fever.   Respiratory: Negative for cough and shortness of breath.    Cardiovascular: Negative for chest pain and leg swelling.   Gastrointestinal: Negative for abdominal pain, constipation, diarrhea, nausea and vomiting.   Musculoskeletal: Positive for arthralgias (left shoulder pain, left hip pain), back pain and gait problem. Negative for myalgias.   Skin: Negative for rash and wound.   Neurological: Positive for weakness. Negative for dizziness, light-headedness and headaches.   Psychiatric/Behavioral: Negative for agitation and behavioral problems. The patient is not nervous/anxious.      Objective:     Vital Signs (Most Recent):  Temp: 99.9 °F (37.7 °C) (10/24/19 0741)  Pulse: 95 (10/24/19 0741)  Resp: 19 (10/24/19 0741)  BP: (!) 170/84 (10/24/19 0741)  SpO2: 99 % (10/24/19 0741) Vital Signs (24h Range):  Temp:  [97.2 °F (36.2 °C)-99.9 °F (37.7 °C)] 99.9 °F (37.7 °C)  Pulse:  [] 95  Resp:  [17-19] 19  SpO2:  [95 %-99 %] 99 %  BP: (141-170)/(74-85) 170/84     Weight: 93.8 kg (206 lb 11.2 oz)  Body mass index is 31.9 kg/m².    Estimated Creatinine Clearance: 41.4 mL/min (A) (based on SCr of 1.8 mg/dL (H)).    Physical Exam   Constitutional: She is oriented to person, place, and time. She appears well-developed and well-nourished. No distress.   Cardiovascular: Normal rate and regular rhythm.   No murmur heard.  Pulmonary/Chest: Effort normal and breath sounds normal. No respiratory distress.   Abdominal: Soft. Bowel sounds are normal. She exhibits no distension.   Musculoskeletal: She exhibits no edema.        Left shoulder: She exhibits decreased range of motion and tenderness.        Left hip: She exhibits decreased range of motion.   Neurological: She is alert and oriented to person, place, and time. She has normal  strength. No sensory deficit.   Skin: Skin is warm and dry.   Psychiatric: She has a normal mood and affect. Her behavior is normal.       Significant Labs:   Blood Culture:   Recent Labs   Lab 10/20/19  1530 10/20/19  1600 10/21/19  1024   LABBLOO Gram stain ciera bottle: Gram positive cocci in chains resembling Strep   Positive results previously called  Gram stain aer bottle: Gram positive cocci in chains resembling Strep   Positive results previously called  STREPTOCOCCUS AGALACTIAE (GROUP B)  Beta-hemolytic streptococci are routinely susceptible to   penicillins,cephalosporins and carbapenems.  * Gram stain ciera bottle: Gram positive cocci in chains resembling Strep   Results called to and read back by: Andrea Ackerman  Gram stain aer bottle: Gram positive cocci in chains resembling Strep   Positive results previously called  STREPTOCOCCUS AGALACTIAE (GROUP B)  Beta-hemolytic streptococci are routinely susceptible to   penicillins,cephalosporins and carbapenems.  For susceptibility see order #[id number]  * No Growth to date  No Growth to date  No Growth to date  No Growth to date  No Growth to date  No Growth to date  No Growth to date  No Growth to date     CBC:   Recent Labs   Lab 10/24/19  0452   WBC 10.87   HGB 9.0*   HCT 27.9*        CMP:   Recent Labs   Lab 10/23/19  0505 10/24/19  0452   * 134*   K 3.5 4.0    100   CO2 22* 24   * 179*   BUN 25* 26*   CREATININE 1.8* 1.8*   CALCIUM 9.2 9.5   ALBUMIN 1.5* 1.5*   ANIONGAP 11 10   EGFRNONAA 31* 31*       Significant Imaging: I have reviewed all pertinent imaging results/findings within the past 24 hours.

## 2019-10-24 NOTE — NURSING
Report received from TATO Smith LPN. Patient resting comfortably, but easily aroused. No acute cardiac or respiratory distress noted. Safety measures maintained; wheels locked, bed in lowest position, bed alarm active and engaged, and call light in reach. Will continue to monitor.

## 2019-10-25 PROBLEM — M54.42 CHRONIC BILATERAL LOW BACK PAIN WITH BILATERAL SCIATICA: Status: ACTIVE | Noted: 2019-10-25

## 2019-10-25 PROBLEM — G89.29 CHRONIC BILATERAL LOW BACK PAIN WITH BILATERAL SCIATICA: Status: ACTIVE | Noted: 2019-10-25

## 2019-10-25 PROBLEM — M54.41 CHRONIC BILATERAL LOW BACK PAIN WITH BILATERAL SCIATICA: Status: ACTIVE | Noted: 2019-10-25

## 2019-10-25 PROBLEM — M25.512 PAIN OF LEFT STERNOCLAVICULAR JOINT: Status: ACTIVE | Noted: 2019-10-25

## 2019-10-25 LAB
ALBUMIN SERPL BCP-MCNC: 1.5 G/DL (ref 3.5–5.2)
ANION GAP SERPL CALC-SCNC: 10 MMOL/L (ref 8–16)
BUN SERPL-MCNC: 23 MG/DL (ref 6–20)
CALCIUM SERPL-MCNC: 9.5 MG/DL (ref 8.7–10.5)
CHLORIDE SERPL-SCNC: 98 MMOL/L (ref 95–110)
CO2 SERPL-SCNC: 24 MMOL/L (ref 23–29)
CREAT SERPL-MCNC: 1.8 MG/DL (ref 0.5–1.4)
EST. GFR  (AFRICAN AMERICAN): 36 ML/MIN/1.73 M^2
EST. GFR  (NON AFRICAN AMERICAN): 31 ML/MIN/1.73 M^2
GLUCOSE SERPL-MCNC: 220 MG/DL (ref 70–110)
PHOSPHATE SERPL-MCNC: 3.8 MG/DL (ref 2.7–4.5)
POCT GLUCOSE: 239 MG/DL (ref 70–110)
POCT GLUCOSE: 288 MG/DL (ref 70–110)
POCT GLUCOSE: 289 MG/DL (ref 70–110)
POCT GLUCOSE: 376 MG/DL (ref 70–110)
POTASSIUM SERPL-SCNC: 4.2 MMOL/L (ref 3.5–5.1)
SODIUM SERPL-SCNC: 132 MMOL/L (ref 136–145)

## 2019-10-25 PROCEDURE — 87070 CULTURE OTHR SPECIMN AEROBIC: CPT

## 2019-10-25 PROCEDURE — 80069 RENAL FUNCTION PANEL: CPT

## 2019-10-25 PROCEDURE — 63600175 PHARM REV CODE 636 W HCPCS: Performed by: PHYSICIAN ASSISTANT

## 2019-10-25 PROCEDURE — 63600175 PHARM REV CODE 636 W HCPCS: Performed by: INTERNAL MEDICINE

## 2019-10-25 PROCEDURE — 25000003 PHARM REV CODE 250: Performed by: INTERNAL MEDICINE

## 2019-10-25 PROCEDURE — 99233 PR SUBSEQUENT HOSPITAL CARE,LEVL III: ICD-10-PCS | Mod: ,,, | Performed by: INTERNAL MEDICINE

## 2019-10-25 PROCEDURE — 87205 SMEAR GRAM STAIN: CPT

## 2019-10-25 PROCEDURE — 94761 N-INVAS EAR/PLS OXIMETRY MLT: CPT

## 2019-10-25 PROCEDURE — 63600175 PHARM REV CODE 636 W HCPCS: Performed by: HOSPITALIST

## 2019-10-25 PROCEDURE — 99233 SBSQ HOSP IP/OBS HIGH 50: CPT | Mod: ,,, | Performed by: INTERNAL MEDICINE

## 2019-10-25 PROCEDURE — 11000001 HC ACUTE MED/SURG PRIVATE ROOM

## 2019-10-25 PROCEDURE — 80197 ASSAY OF TACROLIMUS: CPT

## 2019-10-25 PROCEDURE — 87086 URINE CULTURE/COLONY COUNT: CPT

## 2019-10-25 PROCEDURE — 25000003 PHARM REV CODE 250: Performed by: HOSPITALIST

## 2019-10-25 PROCEDURE — 87075 CULTR BACTERIA EXCEPT BLOOD: CPT

## 2019-10-25 RX ORDER — COLCHICINE 0.6 MG/1
0.6 TABLET, FILM COATED ORAL ONCE
Status: COMPLETED | OUTPATIENT
Start: 2019-10-25 | End: 2019-10-25

## 2019-10-25 RX ORDER — CARVEDILOL 6.25 MG/1
6.25 TABLET ORAL 2 TIMES DAILY
Status: DISCONTINUED | OUTPATIENT
Start: 2019-10-25 | End: 2019-10-31 | Stop reason: HOSPADM

## 2019-10-25 RX ADMIN — HEPARIN SODIUM 5000 UNITS: 5000 INJECTION, SOLUTION INTRAVENOUS; SUBCUTANEOUS at 08:10

## 2019-10-25 RX ADMIN — TACROLIMUS 3 MG: 1 CAPSULE ORAL at 08:10

## 2019-10-25 RX ADMIN — CEFTRIAXONE SODIUM 2 G: 2 INJECTION, SOLUTION INTRAVENOUS at 12:10

## 2019-10-25 RX ADMIN — ATORVASTATIN CALCIUM 20 MG: 10 TABLET, FILM COATED ORAL at 08:10

## 2019-10-25 RX ADMIN — OXYCODONE HYDROCHLORIDE AND ACETAMINOPHEN 1 TABLET: 10; 325 TABLET ORAL at 08:10

## 2019-10-25 RX ADMIN — INSULIN ASPART 10 UNITS: 100 INJECTION, SOLUTION INTRAVENOUS; SUBCUTANEOUS at 12:10

## 2019-10-25 RX ADMIN — INSULIN ASPART 10 UNITS: 100 INJECTION, SOLUTION INTRAVENOUS; SUBCUTANEOUS at 05:10

## 2019-10-25 RX ADMIN — OXYCODONE HYDROCHLORIDE AND ACETAMINOPHEN 1 TABLET: 10; 325 TABLET ORAL at 01:10

## 2019-10-25 RX ADMIN — CARVEDILOL 6.25 MG: 6.25 TABLET, FILM COATED ORAL at 08:10

## 2019-10-25 RX ADMIN — COLCHICINE 0.6 MG: 0.6 TABLET, FILM COATED ORAL at 12:10

## 2019-10-25 RX ADMIN — OXYCODONE HYDROCHLORIDE AND ACETAMINOPHEN 1 TABLET: 10; 325 TABLET ORAL at 03:10

## 2019-10-25 RX ADMIN — MORPHINE SULFATE 4 MG: 10 INJECTION INTRAVENOUS at 11:10

## 2019-10-25 RX ADMIN — MORPHINE SULFATE 4 MG: 10 INJECTION INTRAVENOUS at 05:10

## 2019-10-25 RX ADMIN — TACROLIMUS 3 MG: 1 CAPSULE ORAL at 05:10

## 2019-10-25 RX ADMIN — INSULIN ASPART 10 UNITS: 100 INJECTION, SOLUTION INTRAVENOUS; SUBCUTANEOUS at 08:10

## 2019-10-25 RX ADMIN — CLONIDINE HYDROCHLORIDE 0.1 MG: 0.1 TABLET ORAL at 05:10

## 2019-10-25 RX ADMIN — INSULIN ASPART 2 UNITS: 100 INJECTION, SOLUTION INTRAVENOUS; SUBCUTANEOUS at 08:10

## 2019-10-25 RX ADMIN — INSULIN ASPART 4 UNITS: 100 INJECTION, SOLUTION INTRAVENOUS; SUBCUTANEOUS at 08:10

## 2019-10-25 RX ADMIN — INSULIN ASPART 6 UNITS: 100 INJECTION, SOLUTION INTRAVENOUS; SUBCUTANEOUS at 12:10

## 2019-10-25 RX ADMIN — MORPHINE SULFATE 4 MG: 10 INJECTION INTRAVENOUS at 01:10

## 2019-10-25 RX ADMIN — CARVEDILOL 6.25 MG: 6.25 TABLET, FILM COATED ORAL at 12:10

## 2019-10-25 NOTE — PROGRESS NOTES
[facility] Hospital Medicine  Progress Note    Patient Name: Elizabeth Maldonado  MRN: [id number]  Patient Class: IP- Inpatient   Admission Date: 10/20/2019  Length of Stay: 5 days  Attending Physician: Kalee Cat MD  Primary Care Provider: Richy Singleton NP        Subjective:     Principal Problem:Acute renal failure superimposed on stage 3 chronic kidney disease        HPI:  Ms. Elizabeth Maldonado is a 54 y.o. female with type 2 diabetes mellitus (HbA1c 8.7% Dec 2014), hyperlipidemia (.6 Nov 2014), CKD stage 3, anemia of chronic disease, and history of renal transplant who presents to [facility] ED with complaints of left arm and hip pain the past few days.  She reports that the pain is mainly localized to her lower back, left hip, and left shoulder.  She does recall falling at work as she was stepping down a ladder and missed the last step.  She does recall falling on her left side but denies any head trauma.  She denies any fevers, nausea, vomiting, abdominal pain, nor any diarrhea.  She does report that her appetite has been poor in the last few days but she has not lost any weight.  She denies any night sweats but does report some chills.    Of note, she was noted to be febrile in the ER but denies any coughing, dysuria, rashes, headache, nor any neck stiffness.  She denies any dyspnea, hemoptysis, lower extremity pain or swelling, recent travel, nor any sick contacts.    Overview/Hospital Course:  55 y/o female with hx of kidney transplant presented with left shoulder and hip pain.  Noted to be febrile upon presentation.  No obvious source of infection.  On immunosuppressive medications and admitted on broad spectrum ABx's.  BCx now positive for Strep.  ID consulted.  Slight ARF on presentation and Nephrology consulted.  Started on IVF hydration.    Pt is TTP over left shoulder and hip.  CT imaging concern for inflammation?infection SC joint.  Ortho to follow up. ECHO pending.  Continue rocephin. PT/OT eval to assist with dc planning     Of note, initial MRI ordered was not done per Radiology decision. Agree with proceeding to MRI as CT scan inconclusive.   ECHO reviewed - no vegetations   Nephrology and transplant at [facility] [place] communicating about possible rejection of transplanted kidney - Dr. Catalan does not feel patient needs to be transferred at this time. Renal function improving with IVF and thought to be volume depleted due to hyperglycemia.     Plan for aspiration of left SC joint. Await cultures. D/w nephrology- will need kelly cath and not piccline on dc home for IV antibiotics.     Interval History: plan for left SC joint aspiration    Review of Systems   Constitutional: Positive for activity change. Negative for chills and fever.   Respiratory: Negative for cough and shortness of breath.    Cardiovascular: Positive for chest pain. Negative for leg swelling.   Gastrointestinal: Negative for abdominal pain, constipation, diarrhea, nausea and vomiting.   Musculoskeletal: Positive for arthralgias (left shoulder pain, left hip pain), back pain and gait problem. Negative for myalgias.   Skin: Negative for rash and wound.   Neurological: Positive for weakness. Negative for dizziness, light-headedness and headaches.   Psychiatric/Behavioral: Negative for agitation and behavioral problems. The patient is not nervous/anxious.      Objective:     Vital Signs (Most Recent):  Temp: 99 °F (37.2 °C) (10/25/19 1716)  Pulse: 91 (10/25/19 1716)  Resp: 18 (10/25/19 1716)  BP: (!) 151/71 (10/25/19 1716)  SpO2: 99 % (10/25/19 1716) Vital Signs (24h Range):  Temp:  [98.2 °F (36.8 °C)-99.5 °F (37.5 °C)] 99 °F (37.2 °C)  Pulse:  [82-94] 91  Resp:  [17-18] 18  SpO2:  [98 %-100 %] 99 %  BP: (142-179)/(71-90) 151/71     Weight: 99.4 kg (219 lb 1.6 oz)  Body mass index is 33.81 kg/m².    Intake/Output Summary (Last 24 hours) at 10/25/2019 1818  Last data filed at 10/25/2019 1800  Gross per 24 hour   Intake 240  ml   Output 2650 ml   Net -2410 ml      Physical Exam   Constitutional: She appears well-developed and well-nourished. No distress.   HENT:   Head: Normocephalic and atraumatic.   Eyes: Conjunctivae and EOM are normal.   Cardiovascular: Normal rate and regular rhythm.   Pulmonary/Chest: Effort normal and breath sounds normal.   Abdominal: Soft. She exhibits no distension.   Musculoskeletal: She exhibits no edema or deformity.   Skin: Skin is warm and dry. She is not diaphoretic.   Psychiatric: She has a normal mood and affect. Her behavior is normal.       Significant Labs: All pertinent labs within the past 24 hours have been reviewed.    Significant Imaging: I have reviewed and interpreted all pertinent imaging results/findings within the past 24 hours.      Assessment/Plan:      * Acute on CKD stage 3  Patient's baseline creatinine is around 1.6.  2.9 on presentation.  She does have a history of a renal transplant.    Started on IVF's and Nephrology consulted.  Creat improving with IVF's.  Avoid nephrotoxic medications.    Pain of left sternoclavicular joint  S/p aspiration - IR  Await studies        Chronic bilateral low back pain with bilateral sciatica  Pt has l-spine disc disease at L5  Pain control  PT/OT  No signs of infection       Bacteremia due to group B Streptococcus  Patient was noted to have a fever of 102.5° F without a clear source of infection.  She also was tachycardic and tachypneic, all of which has improved.  Given that she is on tacrolimus and mycophenolate with immunosuppression, she was started on empiric antibiotic therapy.  Now noted to have Group B Bacteremia.  Consulted ID.  Unsure source.  Patient does complain of left shoulder and hip pain.  Septic joint?  Ortho consult.    See CT imaging chest 10/22 - follow up ortho recs - proceed with MRI   CRP and ESR - elevated   ECHO - reviewed     CKD (chronic kidney disease), stage III  As addressed above.    Kidney transplant status, living  unrelated donor - 12/2/14  As addressed above.    Hyperlipidemia  Poorly controlled; will continue her home regimen of atorvastatin.    Anemia of chronic disease  The patient's H/H is stable and consistent with previous laboratory measurements, and the patient exhibits no signs or symptoms of acute bleeding; there is no indication for transfusion.  Will continue to monitor.    Type 2 diabetes mellitus, uncontrolled, with renal complications  Uncontrolled with hyperglycemia.  Will resume home insulin at lower dose to avoid hypoglycemia.  Diabetic diet and insulin sliding scale.  A1c 10.4%    Pt reports home insulin regimen: lantus 80u qhs and humalog 30u TID + SSI   Increase basal insulin       VTE Risk Mitigation (From admission, onward)         Ordered     heparin (porcine) injection 5,000 Units  Every 12 hours      10/20/19 2208     IP VTE HIGH RISK PATIENT  Once      10/20/19 2017                      Kalee Cat MD  Department of Hospital Medicine   [facility]

## 2019-10-25 NOTE — PROCEDURES
Radiology Post-Procedure Note    Pre Op Diagnosis: Lt SC jt fluid    Post Op Diagnosis: Same    Procedure: Aspiration    Procedure performed by: Aaron Herrera MD    Written Informed Consent Obtained: Yes  Specimen Removed: YES 0.1 cc of serosanguinous fluid  Estimated Blood Loss: Minimal    Findings:   Under US guidance, an 18 gauge was used to aspirate left SC jt. No complications. Sample sent to lab for analysis.    Patient tolerated procedure well.    Aaron Herrera M.D.  Diagnostic and Interventional Radiologist  Department of Radiology  Pager: [phone]

## 2019-10-25 NOTE — SUBJECTIVE & OBJECTIVE
Interval History: pt reports 8/10 back pain     Review of Systems   Constitutional: Positive for activity change. Negative for chills and fever.   Respiratory: Negative for cough and shortness of breath.    Cardiovascular: Positive for chest pain. Negative for leg swelling.   Gastrointestinal: Negative for abdominal pain, constipation, diarrhea, nausea and vomiting.   Musculoskeletal: Positive for arthralgias (left shoulder pain, left hip pain), back pain and gait problem. Negative for myalgias.   Skin: Negative for rash and wound.   Neurological: Positive for weakness. Negative for dizziness, light-headedness and headaches.   Psychiatric/Behavioral: Negative for agitation and behavioral problems. The patient is not nervous/anxious.      Objective:     Vital Signs (Most Recent):  Temp: 99.5 °F (37.5 °C) (10/24/19 2308)  Pulse: 94 (10/24/19 2308)  Resp: 18 (10/24/19 2308)  BP: (!) 177/90 (10/25/19 0518)  SpO2: 100 % (10/24/19 2308) Vital Signs (24h Range):  Temp:  [96.6 °F (35.9 °C)-99.9 °F (37.7 °C)] 99.5 °F (37.5 °C)  Pulse:  [84-95] 94  Resp:  [18-19] 18  SpO2:  [97 %-100 %] 100 %  BP: (123-179)/(59-90) 177/90     Weight: 99.4 kg (219 lb 1.6 oz)  Body mass index is 33.81 kg/m².    Intake/Output Summary (Last 24 hours) at 10/25/2019 0614  Last data filed at 10/25/2019 0300  Gross per 24 hour   Intake 600 ml   Output 3000 ml   Net -2400 ml      Physical Exam   Constitutional: She is oriented to person, place, and time. She appears well-developed and well-nourished. No distress.   HENT:   Head: Normocephalic and atraumatic.   Right Ear: External ear normal.   Left Ear: External ear normal.   Nose: Nose normal.   Eyes: Right eye exhibits no discharge. Left eye exhibits no discharge.   Neck: Normal range of motion.   Cardiovascular: Normal rate, regular rhythm, normal heart sounds and intact distal pulses. Exam reveals no gallop and no friction rub.   No murmur heard.  Pulmonary/Chest: Effort normal and breath sounds  normal. No stridor. No respiratory distress. She has no wheezes. She has no rales. She exhibits no tenderness.   Abdominal: Soft. Bowel sounds are normal. She exhibits no distension. There is no tenderness. There is no rebound and no guarding.   Musculoskeletal:   There is some tenderness to palpation to the left shoulder and left hip without any joint swelling nor erythema   Neurological: She is alert and oriented to person, place, and time.   Skin: Skin is warm and dry. She is not diaphoretic. No erythema.   Psychiatric: She has a normal mood and affect. Her behavior is normal. Judgment and thought content normal.   Nursing note and vitals reviewed.      Significant Labs: All pertinent labs within the past 24 hours have been reviewed.    Significant Imaging: I have reviewed and interpreted all pertinent imaging results/findings within the past 24 hours.

## 2019-10-25 NOTE — PROGRESS NOTES
[facility] Hospital Medicine  Progress Note    Patient Name: Elizabeth Maldonado  MRN: [id number]  Patient Class: IP- Inpatient   Admission Date: 10/20/2019  Length of Stay: 5 days  Attending Physician: Kalee Cat MD  Primary Care Provider: Richy Singleton NP        Subjective:     Principal Problem:Acute renal failure superimposed on stage 3 chronic kidney disease        HPI:  Ms. Elizabeth aMldonado is a 54 y.o. female with type 2 diabetes mellitus (HbA1c 8.7% Dec 2014), hyperlipidemia (.6 Nov 2014), CKD stage 3, anemia of chronic disease, and history of renal transplant who presents to [facility] ED with complaints of left arm and hip pain the past few days.  She reports that the pain is mainly localized to her lower back, left hip, and left shoulder.  She does recall falling at work as she was stepping down a ladder and missed the last step.  She does recall falling on her left side but denies any head trauma.  She denies any fevers, nausea, vomiting, abdominal pain, nor any diarrhea.  She does report that her appetite has been poor in the last few days but she has not lost any weight.  She denies any night sweats but does report some chills.    Of note, she was noted to be febrile in the ER but denies any coughing, dysuria, rashes, headache, nor any neck stiffness.  She denies any dyspnea, hemoptysis, lower extremity pain or swelling, recent travel, nor any sick contacts.    Overview/Hospital Course:  53 y/o female with hx of kidney transplant presented with left shoulder and hip pain.  Noted to be febrile upon presentation.  No obvious source of infection.  On immunosuppressive medications and admitted on broad spectrum ABx's.  BCx now positive for Strep.  ID consulted.  Slight ARF on presentation and Nephrology consulted.  Started on IVF hydration.    Pt is TTP over left shoulder and hip.  CT imaging concern for inflammation?infection SC joint.  Ortho to follow up. ECHO pending.  Continue rocephin. PT/OT eval to assist with dc planning     Of note, initial MRI ordered was not done per Radiology decision. Agree with proceeding to MRI as CT scan inconclusive.   ECHO reviewed - no vegetations   Nephrology and transplant at [facility] [place] communicating about possible rejection of transplanted kidney - Dr. Catalan does not feel patient needs to be transferred at this time. Renal function improving with IVF and thought to be volume depleted due to hyperglycemia.     Interval History: pt reports 8/10 back pain     Review of Systems   Constitutional: Positive for activity change. Negative for chills and fever.   Respiratory: Negative for cough and shortness of breath.    Cardiovascular: Positive for chest pain. Negative for leg swelling.   Gastrointestinal: Negative for abdominal pain, constipation, diarrhea, nausea and vomiting.   Musculoskeletal: Positive for arthralgias (left shoulder pain, left hip pain), back pain and gait problem. Negative for myalgias.   Skin: Negative for rash and wound.   Neurological: Positive for weakness. Negative for dizziness, light-headedness and headaches.   Psychiatric/Behavioral: Negative for agitation and behavioral problems. The patient is not nervous/anxious.      Objective:     Vital Signs (Most Recent):  Temp: 99.5 °F (37.5 °C) (10/24/19 2308)  Pulse: 94 (10/24/19 2308)  Resp: 18 (10/24/19 2308)  BP: (!) 177/90 (10/25/19 0518)  SpO2: 100 % (10/24/19 2308) Vital Signs (24h Range):  Temp:  [96.6 °F (35.9 °C)-99.9 °F (37.7 °C)] 99.5 °F (37.5 °C)  Pulse:  [84-95] 94  Resp:  [18-19] 18  SpO2:  [97 %-100 %] 100 %  BP: (123-179)/(59-90) 177/90     Weight: 99.4 kg (219 lb 1.6 oz)  Body mass index is 33.81 kg/m².    Intake/Output Summary (Last 24 hours) at 10/25/2019 0614  Last data filed at 10/25/2019 0300  Gross per 24 hour   Intake 600 ml   Output 3000 ml   Net -2400 ml      Physical Exam   Constitutional: She is oriented to person, place, and time. She appears well-developed  and well-nourished. No distress.   HENT:   Head: Normocephalic and atraumatic.   Right Ear: External ear normal.   Left Ear: External ear normal.   Nose: Nose normal.   Eyes: Right eye exhibits no discharge. Left eye exhibits no discharge.   Neck: Normal range of motion.   Cardiovascular: Normal rate, regular rhythm, normal heart sounds and intact distal pulses. Exam reveals no gallop and no friction rub.   No murmur heard.  Pulmonary/Chest: Effort normal and breath sounds normal. No stridor. No respiratory distress. She has no wheezes. She has no rales. She exhibits no tenderness.   Abdominal: Soft. Bowel sounds are normal. She exhibits no distension. There is no tenderness. There is no rebound and no guarding.   Musculoskeletal:   There is some tenderness to palpation to the left shoulder and left hip without any joint swelling nor erythema   Neurological: She is alert and oriented to person, place, and time.   Skin: Skin is warm and dry. She is not diaphoretic. No erythema.   Psychiatric: She has a normal mood and affect. Her behavior is normal. Judgment and thought content normal.   Nursing note and vitals reviewed.      Significant Labs: All pertinent labs within the past 24 hours have been reviewed.    Significant Imaging: I have reviewed and interpreted all pertinent imaging results/findings within the past 24 hours.      Assessment/Plan:      * Acute on CKD stage 3  Patient's baseline creatinine is around 1.6.  2.9 on presentation.  She does have a history of a renal transplant.    Started on IVF's and Nephrology consulted.  Creat improving with IVF's.  Avoid nephrotoxic medications.    Bacteremia due to group B Streptococcus  Patient was noted to have a fever of 102.5° F without a clear source of infection.  She also was tachycardic and tachypneic, all of which has improved.  Given that she is on tacrolimus and mycophenolate with immunosuppression, she was started on empiric antibiotic therapy.  Now noted  to have Group B Bacteremia.  Consulted ID.  Unsure source.  Patient does complain of left shoulder and hip pain.  Septic joint?  Ortho consult.    See CT imaging chest 10/22 - follow up ortho recs - proceed with MRI   CRP and ESR - elevated   ECHO - reviewed     CKD (chronic kidney disease), stage III  As addressed above.    Kidney transplant status, living unrelated donor - 12/2/14  As addressed above.    Hyperlipidemia  Poorly controlled; will continue her home regimen of atorvastatin.    Anemia of chronic disease  The patient's H/H is stable and consistent with previous laboratory measurements, and the patient exhibits no signs or symptoms of acute bleeding; there is no indication for transfusion.  Will continue to monitor.    Type 2 diabetes mellitus, uncontrolled, with renal complications  Uncontrolled with hyperglycemia.  Will resume home insulin at lower dose to avoid hypoglycemia.  Diabetic diet and insulin sliding scale.  A1c 10.4%    Pt reports home insulin regimen: lantus 80u qhs and humalog 30u TID + SSI       VTE Risk Mitigation (From admission, onward)         Ordered     heparin (porcine) injection 5,000 Units  Every 12 hours      10/20/19 2208     IP VTE HIGH RISK PATIENT  Once      10/20/19 2017                      Kalee Cat MD  Department of Hospital Medicine   [facility]

## 2019-10-25 NOTE — PROGRESS NOTES
[facility] [place]  Nephrology  Progress Note    Patient Name: Elizabeth Maldonado  MRN: [id number]  Admission Date: 10/20/2019  Hospital Length of Stay: 5 days  Attending Provider: Kalee Cat MD   Primary Care Physician: Richy Singleton NP  Principal Problem:Acute renal failure superimposed on stage 3 chronic kidney disease    Subjective:     HPI: Ms. 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 In ED, pt febrile to 102.5, tachycardiac to 125, hypertensive 177/91 and tachypneic. W/u significant for PATRICK Cr 2.9, serum glucose 753, Na 131, lactic acid 3.2, VBG showing mild metabolic acidosis, and UA with spec grav 1.025, 3+ protein, 3+ glucose, 2+ occult blood, 100 RBCs. CT head, CXRR, Xray L shoulder and L hip all unremarkable. Pt received 1L LR bolus x3, started on NS infusion 100 ml/hr. Empirically treated for sepsis with vanc, zosyn, ceftriaxone. Dr. Catalan with transplant medicine at [facility] contacted, felt pt could appropriately be treated at [facility], advised to hold cellcept. Noted goal prograf level 4-7. BCx, Prograf and Cellcept levels drawn. Nephrology consulted 10/21/19 for PATRICK in renal transplant pt. Baseline Cr appears ~1.6 -2 with GFR 30-40. Last at baseline, 1.6, 19 on routine outpt labs. Sees nephrology outpt, last saw Dr. Bonilla 2019, no issues noted, Upr Cr 1.4 g at this time. Reports compliance with all home meds including prograf and cellcept. Good PO fluid intake at home, attempts 1 gallon per day, and endorses good UOP; denies foamy/frothy urine, hematuria, dysuria. Also denies recent n/v, rashes, confusion, hemoptysis, edema, polydipsia, polyuria, confusion, SOB. Denies any new meds or med changes. Endorses recent chills,  agitation.     Interval History: no events overnight. IR consulted for L SC joint aspiration. Patient doing okay this morning; remains with shoulder pain. No SOB. UOP 3L yesterday; enjoying the gresham catheter because she doesn't have to get out of bed as often.     Review of patient's allergies indicates:   Allergen Reactions    Iodine and iodide containing products Hives    Shrimp Itching    Topamax [topiramate] Other (See Comments)     Vision changes    Zoloft [sertraline] Palpitations     Current Facility-Administered Medications   Medication Frequency    acetaminophen tablet 650 mg Q6H PRN    atorvastatin tablet 20 mg Daily    carvedilol tablet 6.25 mg BID    cefTRIAXone (ROCEPHIN) 2 g in dextrose 5 % 50 mL IVPB Q24H    cloNIDine tablet 0.1 mg TID PRN    dextrose 50% injection 12.5 g PRN    dextrose 50% injection 25 g PRN    glucagon (human recombinant) injection 1 mg PRN    glucose chewable tablet 16 g PRN    glucose chewable tablet 24 g PRN    heparin (porcine) injection 5,000 Units Q12H    insulin aspart U-100 pen 1-10 Units PRN    insulin aspart U-100 pen 10 Units TID WM    insulin detemir U-100 pen 40 Units QHS    morphine injection 4 mg Q6H PRN    ondansetron injection 8 mg Q8H PRN    oxyCODONE-acetaminophen  mg per tablet 1 tablet Q4H PRN    promethazine (PHENERGAN) 6.25 mg in dextrose 5 % 50 mL IVPB Q6H PRN    ramelteon tablet 8 mg Nightly PRN    senna-docusate 8.6-50 mg per tablet 1 tablet BID PRN    tacrolimus capsule 3 mg BID       Objective:     Vital Signs (Most Recent):  Temp: 98.2 °F (36.8 °C) (10/25/19 1125)  Pulse: 82 (10/25/19 1125)  Resp: 18 (10/25/19 1125)  BP: (!) 142/73 (10/25/19 1125)  SpO2: 98 % (10/25/19 1125)  O2 Device (Oxygen Therapy): room air (10/25/19 0900) Vital Signs (24h Range):  Temp:  [98.2 °F (36.8 °C)-99.5 °F (37.5 °C)] 98.2 °F (36.8 °C)  Pulse:  [82-94] 82  Resp:  [17-18] 18  SpO2:  [98 %-100 %] 98 %  BP: (123-179)/(59-90) 142/73     Weight:  99.4 kg (219 lb 1.6 oz) (10/25/19 0400)  Body mass index is 33.81 kg/m².  Body surface area is 2.18 meters squared.    I/O last 3 completed shifts:  In: 600 [P.O.:600]  Out: 3200 [Urine:3200]    Physical Exam   Constitutional: She appears well-developed and well-nourished. No distress.   HENT:   Head: Normocephalic and atraumatic.   Eyes: Conjunctivae and EOM are normal.   Cardiovascular: Normal rate and regular rhythm.   Pulmonary/Chest: Effort normal and breath sounds normal.   Abdominal: Soft. She exhibits no distension.   Musculoskeletal: She exhibits no edema or deformity.   Skin: Skin is warm and dry. She is not diaphoretic.   Psychiatric: She has a normal mood and affect. Her behavior is normal.       Significant Labs:  CBC:   Recent Labs   Lab 10/24/19  0452   WBC 10.87   RBC 3.35*   HGB 9.0*   HCT 27.9*      MCV 83   MCH 26.9*   MCHC 32.3     CMP:   Recent Labs   Lab 10/21/19  0440  10/25/19  0505   *   < > 220*   CALCIUM 9.5   < > 9.5   ALBUMIN 1.7*   < > 1.5*   PROT 7.7  --   --       < > 132*   K 3.9   < > 4.2   CO2 25   < > 24      < > 98   BUN 34*   < > 23*   CREATININE 2.2*   < > 1.8*   ALKPHOS 101  --   --    ALT 37  --   --    AST 46*  --   --    BILITOT 0.3  --   --     < > = values in this interval not displayed.     All labs within the past 24 hours have been reviewed.     Significant Imaging:  Labs: Reviewed  MRI: Reviewed    Assessment/Plan:     PATRICK on CKD stage 3  - baseline Cr 1.6-2.0 with GFR 30-40s; last at baseline 5/2019. Followed by Dr. Bonilla.   - Cr 2.9 on arrival. Likely pre-renal PATRICK from hyperglycemia/volume depletion. 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   - 24 hour urine  protein: 7.2 grams  - DSA positive but unchanged compared to 2015. BK negative. PATRICK resolved. A1c > 10% since 2017.   - discussed case with KTM. No need for acute intervention at this time; possibly related diabetic nephropathy. Will need close f/u with KTM upon discharge  - continue Prograf as below; avoid nephrotoxic agents  - recommend Endocrine referral upon discharge    H/o kidney transplant; on long term immunosuppressive medications  - s/p LURT 12/2014; followed by Dr. Catalan.   - home regimen includes MMF 500mg BID and Prograf 2/3  - MMF being held due to bacteremia; appreciate ID assistance. Continue holding.  - Prograf trough at goal of 4-7 on current regimen; continue. Continue to trend daily troughs for now given questionable compliance  - will continue to monitor for drug toxicities     Hypokalemia  - improved s/p replacement  - will trend    CKD-MBD  - ; stable compared to 2017  - CCa remains high; stable at 11.5. Will continue to trend. Avoid calcium and vitamin D supplementation  - encouraged PO hydration    Hyperuricemia  - uric acid mildly elevated  - now with possible gout flare  - okay to try colchicine 0.6mg x1. Will f/u response. GFR > 30, okay to dose colchicine normally for gout treatment but will need to avoid prophylactic dosing. Consider starting allopurinol upon resolution of acute symptoms.     Case discussed with Dr. Cat.   Thank you for your consult. I will follow-up with patient. Please contact us if you have any additional questions.    Dana Nash MD  Nephrology  [facility], [facility]  Office: [phone]  [facility]  Date of service: 10/25/2019

## 2019-10-25 NOTE — SUBJECTIVE & OBJECTIVE
Interval History: no events overnight. IR consulted for L SC joint aspiration. Patient doing okay this morning; remains with shoulder pain. No SOB. UOP 3L yesterday; enjoying the gresham catheter because she doesn't have to get out of bed as often.     Review of patient's allergies indicates:   Allergen Reactions    Iodine and iodide containing products Hives    Shrimp Itching    Topamax [topiramate] Other (See Comments)     Vision changes    Zoloft [sertraline] Palpitations     Current Facility-Administered Medications   Medication Frequency    acetaminophen tablet 650 mg Q6H PRN    atorvastatin tablet 20 mg Daily    carvedilol tablet 6.25 mg BID    cefTRIAXone (ROCEPHIN) 2 g in dextrose 5 % 50 mL IVPB Q24H    cloNIDine tablet 0.1 mg TID PRN    dextrose 50% injection 12.5 g PRN    dextrose 50% injection 25 g PRN    glucagon (human recombinant) injection 1 mg PRN    glucose chewable tablet 16 g PRN    glucose chewable tablet 24 g PRN    heparin (porcine) injection 5,000 Units Q12H    insulin aspart U-100 pen 1-10 Units PRN    insulin aspart U-100 pen 10 Units TID WM    insulin detemir U-100 pen 40 Units QHS    morphine injection 4 mg Q6H PRN    ondansetron injection 8 mg Q8H PRN    oxyCODONE-acetaminophen  mg per tablet 1 tablet Q4H PRN    promethazine (PHENERGAN) 6.25 mg in dextrose 5 % 50 mL IVPB Q6H PRN    ramelteon tablet 8 mg Nightly PRN    senna-docusate 8.6-50 mg per tablet 1 tablet BID PRN    tacrolimus capsule 3 mg BID       Objective:     Vital Signs (Most Recent):  Temp: 98.2 °F (36.8 °C) (10/25/19 1125)  Pulse: 82 (10/25/19 1125)  Resp: 18 (10/25/19 1125)  BP: (!) 142/73 (10/25/19 1125)  SpO2: 98 % (10/25/19 1125)  O2 Device (Oxygen Therapy): room air (10/25/19 0900) Vital Signs (24h Range):  Temp:  [98.2 °F (36.8 °C)-99.5 °F (37.5 °C)] 98.2 °F (36.8 °C)  Pulse:  [82-94] 82  Resp:  [17-18] 18  SpO2:  [98 %-100 %] 98 %  BP: (123-179)/(59-90) 142/73     Weight: 99.4 kg (219 lb  1.6 oz) (10/25/19 0400)  Body mass index is 33.81 kg/m².  Body surface area is 2.18 meters squared.    I/O last 3 completed shifts:  In: 600 [P.O.:600]  Out: 3200 [Urine:3200]    Physical Exam   Constitutional: She appears well-developed and well-nourished. No distress.   HENT:   Head: Normocephalic and atraumatic.   Eyes: Conjunctivae and EOM are normal.   Cardiovascular: Normal rate and regular rhythm.   Pulmonary/Chest: Effort normal and breath sounds normal.   Abdominal: Soft. She exhibits no distension.   Musculoskeletal: She exhibits no edema or deformity.   Skin: Skin is warm and dry. She is not diaphoretic.   Psychiatric: She has a normal mood and affect. Her behavior is normal.       Significant Labs:  CBC:   Recent Labs   Lab 10/24/19  0452   WBC 10.87   RBC 3.35*   HGB 9.0*   HCT 27.9*      MCV 83   MCH 26.9*   MCHC 32.3     CMP:   Recent Labs   Lab 10/21/19  0440  10/25/19  0505   *   < > 220*   CALCIUM 9.5   < > 9.5   ALBUMIN 1.7*   < > 1.5*   PROT 7.7  --   --       < > 132*   K 3.9   < > 4.2   CO2 25   < > 24      < > 98   BUN 34*   < > 23*   CREATININE 2.2*   < > 1.8*   ALKPHOS 101  --   --    ALT 37  --   --    AST 46*  --   --    BILITOT 0.3  --   --     < > = values in this interval not displayed.     All labs within the past 24 hours have been reviewed.     Significant Imaging:  Labs: Reviewed  MRI: Reviewed

## 2019-10-25 NOTE — SUBJECTIVE & OBJECTIVE
Interval History: plan for left SC joint aspiration    Review of Systems   Constitutional: Positive for activity change. Negative for chills and fever.   Respiratory: Negative for cough and shortness of breath.    Cardiovascular: Positive for chest pain. Negative for leg swelling.   Gastrointestinal: Negative for abdominal pain, constipation, diarrhea, nausea and vomiting.   Musculoskeletal: Positive for arthralgias (left shoulder pain, left hip pain), back pain and gait problem. Negative for myalgias.   Skin: Negative for rash and wound.   Neurological: Positive for weakness. Negative for dizziness, light-headedness and headaches.   Psychiatric/Behavioral: Negative for agitation and behavioral problems. The patient is not nervous/anxious.      Objective:     Vital Signs (Most Recent):  Temp: 99 °F (37.2 °C) (10/25/19 1716)  Pulse: 91 (10/25/19 1716)  Resp: 18 (10/25/19 1716)  BP: (!) 151/71 (10/25/19 1716)  SpO2: 99 % (10/25/19 1716) Vital Signs (24h Range):  Temp:  [98.2 °F (36.8 °C)-99.5 °F (37.5 °C)] 99 °F (37.2 °C)  Pulse:  [82-94] 91  Resp:  [17-18] 18  SpO2:  [98 %-100 %] 99 %  BP: (142-179)/(71-90) 151/71     Weight: 99.4 kg (219 lb 1.6 oz)  Body mass index is 33.81 kg/m².    Intake/Output Summary (Last 24 hours) at 10/25/2019 1818  Last data filed at 10/25/2019 1800  Gross per 24 hour   Intake 240 ml   Output 2650 ml   Net -2410 ml      Physical Exam   Constitutional: She appears well-developed and well-nourished. No distress.   HENT:   Head: Normocephalic and atraumatic.   Eyes: Conjunctivae and EOM are normal.   Cardiovascular: Normal rate and regular rhythm.   Pulmonary/Chest: Effort normal and breath sounds normal.   Abdominal: Soft. She exhibits no distension.   Musculoskeletal: She exhibits no edema or deformity.   Skin: Skin is warm and dry. She is not diaphoretic.   Psychiatric: She has a normal mood and affect. Her behavior is normal.       Significant Labs: All pertinent labs within the past 24  hours have been reviewed.    Significant Imaging: I have reviewed and interpreted all pertinent imaging results/findings within the past 24 hours.

## 2019-10-25 NOTE — PLAN OF CARE
Problem: Adult Inpatient Plan of Care  Goal: Plan of Care Review  Outcome: Ongoing, Not Progressing  Flowsheets (Taken 10/25/2019 0310)  Plan of Care Reviewed With: patient    Patient remains free from injury and falls. Complains of pain to left shoulder and left hip mostly. Pain rating is a 10/10 throughout shift with minimal relief with controlled current PRN analgesics. Ice packs to left shoulder and hip applied throughout shift. Frequently assisted with repositioning patient for comfort. Meyer catheter patent and draining yellow urine. Current plan of care reviewed with patient and continued.

## 2019-10-25 NOTE — ASSESSMENT & PLAN NOTE
Uncontrolled with hyperglycemia.  Will resume home insulin at lower dose to avoid hypoglycemia.  Diabetic diet and insulin sliding scale.  A1c 10.4%    Pt reports home insulin regimen: lantus 80u qhs and humalog 30u TID + SSI   Increase basal insulin

## 2019-10-25 NOTE — PT/OT/SLP PROGRESS
"Physical Therapy      Patient Name:  Elizabeth Maldonado   MRN:  [id number]    Patient not seen today secondary to Pain, Patient fatigue, Patient unwilling to participate(pt reports she just got up with the nurse to go to the bathroom and is in a great deal of pain.  Pt states " I can't do anything today. This pain is killing me. I need more drugs.  It is not working").  Despite encouragement pt unwilling to perform due to increased pain and fatigue.  Pt's nurse, Savi, notified. Will follow-up as able.    Danii Ceballos PTA    "

## 2019-10-25 NOTE — CONSULTS
Inpatient Radiology Pre-procedure Note    History of Present Illness:  Elizabeth Maldonado is a 54 y.o. female who presents for Left SC jt aspiration.  Admission H&P reviewed.  Past Medical History:   Diagnosis Date    Acidosis 2014    Allergic rhinitis 2014    Allergy     Anemia     Anemia in chronic kidney disease 2014    Anxiety     Chronic immunosuppression with Prograf and MMF 12/3/2014    CKD (chronic kidney disease) stage 5, GFR less than 15 ml/min 2014    CKD (chronic kidney disease), stage III 3/2/2015    Degenerative disc disease     Depression 2014    Diabetes mellitus, type 2 since age 18 2014    ESRD on peritoneal dialysis - 2014 for 9 hours no peritonitis 2014    Hyperlipidemia     Hypertension 2014    Hypomagnesemia 2015    Kidney transplant status, living unrelated donor - 12/2/14 12/3/2014    Neutropenia 2015    NS (nuclear sclerosis) 2016    Obesity     Organ transplant candidate 2014    Pre-op exam 2014    Proliferative diabetic retinopathy of both eyes without macular edema associated with type 2 diabetes mellitus 2016    Renal manifestation of secondary diabetes mellitus     Tendinitis     Trouble in sleeping      Past Surgical History:   Procedure Laterality Date    BONE MARROW BIOPSY N/A      SECTION      x 2    KIDNEY TRANSPLANT      RENAL BIOPSY      ROTATOR CUFF REPAIR      TUBAL LIGATION         Review of Systems:   As documented in primary team H&P    Home Meds:   Prior to Admission medications    Medication Sig Start Date End Date Taking? Authorizing Provider   ACCU-CHEK PREM Misc USE AS DIRECTED TO CHECK BLOOD GLUCOSE 17   Cristy Sampson MD   acetaminophen (TYLENOL) 500 MG tablet TK 2 CAPLETS PO TID 3/26/19   Historical Provider, MD   ADMELOG SOLOSTAR U-100 INSULIN 100 unit/mL pen INJECT 30 UNITS UNDER THE SKIN TID WC 19   Historical Provider, MD   atorvastatin  "(LIPITOR) 20 MG tablet Take 20 mg by mouth.    Historical Provider, MD   BASSANDRA SENIORIKPEN U-100 INSULIN glargine 100 units/mL (3mL) SubQ pen  3/26/19   Historical Provider, MD   blood sugar diagnostic Strp Checks BG ac/hs 6/5/17   Cristy Sampson MD   INSULIN GLARGINE,HUM.REC.ANLOG (BASAGLAR KWIKPEN U-100 INSULIN SUBQ) Inject 50 Units into the skin nightly.    Historical Provider, MD   insulin lispro (ADMELOG SOLOSTAR U-100 INSULIN SUBQ) Inject 100 Units into the skin 3 (three) times daily with meals.    Historical Provider, MD   insulin lispro 100 unit/mL injection Inject into the skin.    Historical Provider, MD   insulin syringe-needle U-100 (INSULIN SYRINGE) 1/2 mL 30 x 5/16" Syrg Uses 4 daily 1/6/15   Salima Acosta, DNP, NP   lancets (ONETOUCH DELICA LANCETS) 33 gauge Misc 1 lancet by Misc.(Non-Drug; Combo Route) route 4 (four) times daily before meals and nightly. 6/5/17   Cristy Sampson MD   mycophenolate (CELLCEPT) 250 mg Cap TAKE 2 CAPSULES ( 500 MG TOTAL) BY MOUTH 2 TIMES DAILY 5/16/19   Kalpana Grider MD   mycophenolate (CELLCEPT) 500 mg Tab Take by mouth.    Historical Provider, MD   NOVOLOG FLEXPEN 100 unit/mL InPn pen 23 Units 3 (three) times daily with meals.  12/9/15   Historical Provider, MD   psyllium husk 0.4 gram Cap Take by mouth.    Historical Provider, MD   SYRINGE & NEEDLE,INSULIN,1 ML (INSULIN SYRINGE-NEEDLE U-100) 1 mL 29 X 7/16" Syrg  3/25/15   Historical Provider, MD   tacrolimus (PROGRAF) 1 MG Cap TAKE 3 CAPSULES ( 3 MG TOTAL) BY MOUTH IN THE MORNING & TAKE 3 CAPSULES ( 3 MG TOTAL) IN THE EVENING 12/28/18   Sweta Catalan MD   zolpidem (AMBIEN) 5 MG Tab Take 10 mg by mouth.    Historical Provider, MD     Scheduled Meds:    atorvastatin  20 mg Oral Daily    carvedilol  6.25 mg Oral BID    cefTRIAXone (ROCEPHIN) IVPB  2 g Intravenous Q24H    heparin (porcine)  5,000 Units Subcutaneous Q12H    insulin aspart U-100  10 Units Subcutaneous TID WM    insulin detemir U-100  " 40 Units Subcutaneous QHS    tacrolimus  3 mg Oral BID     Continuous Infusions:   PRN Meds:acetaminophen, cloNIDine, dextrose 50%, dextrose 50%, glucagon (human recombinant), glucose, glucose, insulin aspart U-100, morphine, ondansetron, oxyCODONE-acetaminophen, promethazine (PHENERGAN) IVPB, ramelteon, senna-docusate 8.6-50 mg  Anticoagulants/Antiplatelets: no anticoagulation    Allergies:   Review of patient's allergies indicates:   Allergen Reactions    Iodine and iodide containing products Hives    Shrimp Itching    Topamax [topiramate] Other (See Comments)     Vision changes    Zoloft [sertraline] Palpitations     Sedation Hx: have not been any systemic reactions    Labs:  No results for input(s): INR in the last 168 hours.    Invalid input(s):  PT,  PTT    Recent Labs   Lab 10/24/19  0452   WBC 10.87   HGB 9.0*   HCT 27.9*   MCV 83         Recent Labs   Lab 10/21/19  0440  10/25/19  0505   *   < > 220*      < > 132*   K 3.9   < > 4.2      < > 98   CO2 25   < > 24   BUN 34*   < > 23*   CREATININE 2.2*   < > 1.8*   CALCIUM 9.5   < > 9.5   MG 2.3  --   --    ALT 37  --   --    AST 46*  --   --    ALBUMIN 1.7*   < > 1.5*   BILITOT 0.3  --   --     < > = values in this interval not displayed.         Vitals:  Temp: 98.2 °F (36.8 °C) (10/25/19 1125)  Pulse: 82 (10/25/19 1125)  Resp: 18 (10/25/19 1125)  BP: (!) 142/73 (10/25/19 1125)  SpO2: 98 % (10/25/19 1125)     Physical Exam:  ASA: 3  Mallampati: 2    General: no acute distress  Mental Status: alert and oriented to person, place and time  HEENT: normocephalic, atraumatic  Chest: unlabored breathing  Heart: regular heart rate  Abdomen: nondistended  Extremity: moves all extremities    Plan: US vs CT guided left SC joint aspiration.  Sedation Plan: Up to moderate    Babak. Sharon, M.D.  Diagnostic and Interventional Radiologist  Department of Radiology  Pager: [phone]

## 2019-10-26 LAB
ALBUMIN SERPL BCP-MCNC: 1.4 G/DL (ref 3.5–5.2)
ANION GAP SERPL CALC-SCNC: 7 MMOL/L (ref 8–16)
BACTERIA BLD CULT: NORMAL
BACTERIA BLD CULT: NORMAL
BUN SERPL-MCNC: 22 MG/DL (ref 6–20)
CALCIUM SERPL-MCNC: 9.7 MG/DL (ref 8.7–10.5)
CHLORIDE SERPL-SCNC: 99 MMOL/L (ref 95–110)
CHOLEST SERPL-MCNC: 114 MG/DL (ref 120–199)
CHOLEST/HDLC SERPL: 12.7 {RATIO} (ref 2–5)
CO2 SERPL-SCNC: 25 MMOL/L (ref 23–29)
CREAT SERPL-MCNC: 1.9 MG/DL (ref 0.5–1.4)
EST. GFR  (AFRICAN AMERICAN): 33 ML/MIN/1.73 M^2
EST. GFR  (NON AFRICAN AMERICAN): 29 ML/MIN/1.73 M^2
GLUCOSE SERPL-MCNC: 177 MG/DL (ref 70–110)
GRAM STN SPEC: NORMAL
GRAM STN SPEC: NORMAL
HDLC SERPL-MCNC: 9 MG/DL (ref 40–75)
HDLC SERPL: 7.9 % (ref 20–50)
LDLC SERPL CALC-MCNC: 65.8 MG/DL (ref 63–159)
NONHDLC SERPL-MCNC: 105 MG/DL
PHOSPHATE SERPL-MCNC: 3.6 MG/DL (ref 2.7–4.5)
POCT GLUCOSE: 115 MG/DL (ref 70–110)
POCT GLUCOSE: 191 MG/DL (ref 70–110)
POCT GLUCOSE: 210 MG/DL (ref 70–110)
POCT GLUCOSE: 234 MG/DL (ref 70–110)
POCT GLUCOSE: 289 MG/DL (ref 70–110)
POTASSIUM SERPL-SCNC: 4.3 MMOL/L (ref 3.5–5.1)
SODIUM SERPL-SCNC: 131 MMOL/L (ref 136–145)
TACROLIMUS BLD-MCNC: 5.8 NG/ML (ref 5–15)
TRIGL SERPL-MCNC: 196 MG/DL (ref 30–150)

## 2019-10-26 PROCEDURE — 94761 N-INVAS EAR/PLS OXIMETRY MLT: CPT

## 2019-10-26 PROCEDURE — 25000003 PHARM REV CODE 250: Performed by: HOSPITALIST

## 2019-10-26 PROCEDURE — 11000001 HC ACUTE MED/SURG PRIVATE ROOM

## 2019-10-26 PROCEDURE — 36415 COLL VENOUS BLD VENIPUNCTURE: CPT

## 2019-10-26 PROCEDURE — 80061 LIPID PANEL: CPT

## 2019-10-26 PROCEDURE — 97530 THERAPEUTIC ACTIVITIES: CPT

## 2019-10-26 PROCEDURE — 97116 GAIT TRAINING THERAPY: CPT

## 2019-10-26 PROCEDURE — 25000003 PHARM REV CODE 250: Performed by: INTERNAL MEDICINE

## 2019-10-26 PROCEDURE — 63600175 PHARM REV CODE 636 W HCPCS: Performed by: INTERNAL MEDICINE

## 2019-10-26 PROCEDURE — 80197 ASSAY OF TACROLIMUS: CPT

## 2019-10-26 PROCEDURE — 63600175 PHARM REV CODE 636 W HCPCS: Performed by: HOSPITALIST

## 2019-10-26 PROCEDURE — 80069 RENAL FUNCTION PANEL: CPT

## 2019-10-26 PROCEDURE — 63600175 PHARM REV CODE 636 W HCPCS: Performed by: PHYSICIAN ASSISTANT

## 2019-10-26 PROCEDURE — 97110 THERAPEUTIC EXERCISES: CPT

## 2019-10-26 RX ADMIN — TACROLIMUS 3 MG: 1 CAPSULE ORAL at 09:10

## 2019-10-26 RX ADMIN — ACETAMINOPHEN 650 MG: 325 TABLET, FILM COATED ORAL at 06:10

## 2019-10-26 RX ADMIN — CEFTRIAXONE SODIUM 2 G: 2 INJECTION, SOLUTION INTRAVENOUS at 12:10

## 2019-10-26 RX ADMIN — MORPHINE SULFATE 4 MG: 10 INJECTION INTRAVENOUS at 08:10

## 2019-10-26 RX ADMIN — OXYCODONE HYDROCHLORIDE AND ACETAMINOPHEN 1 TABLET: 10; 325 TABLET ORAL at 11:10

## 2019-10-26 RX ADMIN — HEPARIN SODIUM 5000 UNITS: 5000 INJECTION, SOLUTION INTRAVENOUS; SUBCUTANEOUS at 08:10

## 2019-10-26 RX ADMIN — CARVEDILOL 6.25 MG: 6.25 TABLET, FILM COATED ORAL at 08:10

## 2019-10-26 RX ADMIN — TACROLIMUS 3 MG: 1 CAPSULE ORAL at 06:10

## 2019-10-26 RX ADMIN — HEPARIN SODIUM 5000 UNITS: 5000 INJECTION, SOLUTION INTRAVENOUS; SUBCUTANEOUS at 09:10

## 2019-10-26 RX ADMIN — INSULIN ASPART 4 UNITS: 100 INJECTION, SOLUTION INTRAVENOUS; SUBCUTANEOUS at 05:10

## 2019-10-26 RX ADMIN — OXYCODONE HYDROCHLORIDE AND ACETAMINOPHEN 1 TABLET: 10; 325 TABLET ORAL at 09:10

## 2019-10-26 RX ADMIN — CARVEDILOL 6.25 MG: 6.25 TABLET, FILM COATED ORAL at 09:10

## 2019-10-26 RX ADMIN — INSULIN ASPART 10 UNITS: 100 INJECTION, SOLUTION INTRAVENOUS; SUBCUTANEOUS at 07:10

## 2019-10-26 RX ADMIN — OXYCODONE HYDROCHLORIDE AND ACETAMINOPHEN 1 TABLET: 10; 325 TABLET ORAL at 06:10

## 2019-10-26 RX ADMIN — OXYCODONE HYDROCHLORIDE AND ACETAMINOPHEN 1 TABLET: 10; 325 TABLET ORAL at 04:10

## 2019-10-26 RX ADMIN — MORPHINE SULFATE 4 MG: 10 INJECTION INTRAVENOUS at 01:10

## 2019-10-26 RX ADMIN — ATORVASTATIN CALCIUM 20 MG: 10 TABLET, FILM COATED ORAL at 09:10

## 2019-10-26 RX ADMIN — INSULIN ASPART 2 UNITS: 100 INJECTION, SOLUTION INTRAVENOUS; SUBCUTANEOUS at 10:10

## 2019-10-26 RX ADMIN — INSULIN ASPART 10 UNITS: 100 INJECTION, SOLUTION INTRAVENOUS; SUBCUTANEOUS at 12:10

## 2019-10-26 RX ADMIN — INSULIN ASPART 10 UNITS: 100 INJECTION, SOLUTION INTRAVENOUS; SUBCUTANEOUS at 05:10

## 2019-10-26 NOTE — PROGRESS NOTES
The patient is in bed she reports no complaints at this time.    Temp:  [98.4 °F (36.9 °C)-100.7 °F (38.2 °C)] 98.5 °F (36.9 °C)  Pulse:  [83-91] 83  Resp:  [17-18] 18  SpO2:  [97 %-100 %] 97 %  BP: (141-167)/(70-86) 141/70    Physical examination:    Sternoclavicular joints have no erythema or drainage.    No results for input(s): WBC, RBC, HGB, HCT, PLT, MCV, MCH, MCHC in the last 24 hours.      Current Facility-Administered Medications:     acetaminophen tablet 650 mg, 650 mg, Oral, Q6H PRN, Osvaldo Rose MD    atorvastatin tablet 20 mg, 20 mg, Oral, Daily, Kristin Chauhan MD, 20 mg at 10/26/19 0910    carvedilol tablet 6.25 mg, 6.25 mg, Oral, BID, Kalee Cat MD, 6.25 mg at 10/26/19 0911    cefTRIAXone (ROCEPHIN) 2 g in dextrose 5 % 50 mL IVPB, 2 g, Intravenous, Q24H, JOSE Ivory, 2 g at 10/26/19 1224    cloNIDine tablet 0.1 mg, 0.1 mg, Oral, TID PRN, Kristin Chauhan MD, 0.1 mg at 10/25/19 0518    dextrose 50% injection 12.5 g, 12.5 g, Intravenous, PRN, Kristin Chauhan MD    dextrose 50% injection 25 g, 25 g, Intravenous, PRN, Kristin Chauhan MD    glucagon (human recombinant) injection 1 mg, 1 mg, Intramuscular, PRN, Kristin Chauhan MD    glucose chewable tablet 16 g, 16 g, Oral, PRN, Kristin Chauhan MD    glucose chewable tablet 24 g, 24 g, Oral, PRN, Kristin Chauhan MD    heparin (porcine) injection 5,000 Units, 5,000 Units, Subcutaneous, Q12H, Kristin Chauhan MD, 5,000 Units at 10/26/19 0910    insulin aspart U-100 pen 1-10 Units, 1-10 Units, Subcutaneous, PRN, Kristin Chauhan MD, 2 Units at 10/25/19 2026    insulin aspart U-100 pen 10 Units, 10 Units, Subcutaneous, TID WM, Osvaldo Rose MD, 10 Units at 10/26/19 1200    insulin detemir U-100 pen 40 Units, 40 Units, Subcutaneous, QHS, Kalee Cat MD, 40 Units at 10/25/19 2026    morphine injection 4 mg, 4 mg, Intravenous, Q6H PRN, Kalee Cat MD, 4 mg at 10/26/19 1355    ondansetron injection 8 mg, 8 mg, Intravenous, Q8H PRN,  Kristin Chauhan MD    oxyCODONE-acetaminophen  mg per tablet 1 tablet, 1 tablet, Oral, Q4H PRN, Osvaldo Rose MD, 1 tablet at 10/26/19 0911    promethazine (PHENERGAN) 6.25 mg in dextrose 5 % 50 mL IVPB, 6.25 mg, Intravenous, Q6H PRN, Kristin Chauhan MD    ramelteon tablet 8 mg, 8 mg, Oral, Nightly PRN, Kristin Chauhan MD, 8 mg at 10/22/19 2142    senna-docusate 8.6-50 mg per tablet 1 tablet, 1 tablet, Oral, BID PRN, Kristin Chauhan MD, 1 tablet at 10/23/19 0906    tacrolimus capsule 3 mg, 3 mg, Oral, BID, Kristin Chauhan MD, 3 mg at 10/26/19 0910    Assessment and Plan:    Status post IR aspiration of SC joint. G stain and cultures no growth so far.  Continue antibiotics.  Will follow cultures.    Jose Caro MD  [facility]  this note has been transcribed with voice recognition software and may contain unrecognized errors

## 2019-10-26 NOTE — PT/OT/SLP PROGRESS
Physical Therapy Treatment    Patient Name:  Elizabeth Maldonado   MRN:  [id number]    Recommendations:     Discharge Recommendations:  (ongoing assessment)   Discharge Equipment Recommendations:     Barriers to discharge: Inaccessible home and Decreased caregiver support    Assessment:     Elizabeth Maldonado is a 54 y.o. female admitted with a medical diagnosis of Acute renal failure superimposed on stage 3 chronic kidney disease.  She presents with the following impairments/functional limitations:  weakness, impaired endurance, gait instability, pain, decreased lower extremity function, decreased upper extremity function, decreased ROM, decreased safety awareness, edema .    Rehab Prognosis: Good; patient would benefit from acute skilled PT services to address these deficits and reach maximum level of function.    Recent Surgery: * No surgery found *      Plan:     During this hospitalization, patient to be seen 5 x/week to address the identified rehab impairments via gait training, therapeutic activities, therapeutic exercises, neuromuscular re-education and progress toward the following goals:    · Plan of Care Expires:  11/23/19    Subjective     Chief Complaint: L shoulder pain and feeling like something wrong with her L ear. Nurse notified    Patient/Family Comments/goals: o  Pain/Comfort:  · Pain Rating 1: 10/10  · Location - Side 1: Right  · Location 1: shoulder  · Pain Addressed 1: Pre-medicate for activity, Cessation of Activity, Nurse notified  · Pain Rating Post-Intervention 1: 10/10      Objective:     Communicated with nurse Matthews prior to session.  Patient found HOB elevated with telemetry, gresham catheter upon PT entry to room.     General Precautions: Standard, fall   Orthopedic Precautions:N/A   Braces: N/A     Functional Mobility:  · Bed Mobility:     · Rolling Right: supervision  · Scooting: supervision and stand by assistance  · Bridging: stand by assistance  · Supine to Sit: stand by  assistance, HOB elevated ,bedside rail   · Transfers:     · Sit to Stand:  stand by assistance with rolling walker. VC's for safety technique and walker management   · Gait: Pt ambulated ~ 10 ft from bed to the bathroom with RW, SBA. Pt demonstrated an antalgic gait, max slow kellee, decreased step length , decreased weight shifting ability , decreased swing to stance phase.  V/T cues for safety, proper technique and walker management.  Limited with gait training 2* pt stated that she will need more time in the bathroom and requested for a sponge bath right after using the bathroom, nurse Cassie and PCT notified.     · Balance: FAIR+        AM-PAC 6 CLICK MOBILITY  Turning over in bed (including adjusting bedclothes, sheets and blankets)?: 4  Sitting down on and standing up from a chair with arms (e.g., wheelchair, bedside commode, etc.): 3  Moving from lying on back to sitting on the side of the bed?: 4  Moving to and from a bed to a chair (including a wheelchair)?: 3  Need to walk in hospital room?: 3  Climbing 3-5 steps with a railing?: 3  Basic Mobility Total Score: 20       Therapeutic Activities and Exercises:   Pt performed seated BLE 10 reps x 2 trials:   AP, LAQ, HS,  Hip abd/add  And modified  fwd fold stretch for lower back in sitting 30 s hold x 2 .  V/T's cues for technique and sequence. Pt tolerated well.   Educated pt on safety awareness with all OOB mobility , transfer and gait training.     Patient left up in toilet seat with all lines intact, call button in reach and nurse and PCT  present..    GOALS:   Multidisciplinary Problems     Physical Therapy Goals        Problem: Physical Therapy Goal    Goal Priority Disciplines Outcome Goal Variances Interventions   Physical Therapy Goal     PT, PT/OT Ongoing, Progressing     Description:  Patient will increase functional independence with mobility by performin. Sit<>stand with SPV with RW/least restrictive device.  2. Gait x 150 feet with SPV  with RW/least restrictive device.  3. Ascend/descend 5 step(s), L HR with least restrictive assistive device and SPV  4. Pt to transfer supine>sit EOB with SPV.                       Time Tracking:     PT Received On: 10/26/19  PT Start Time: 1533     PT Stop Time: 1557  PT Total Time (min): 24 min     Billable Minutes: Therapeutic Activity 12 and Therapeutic Exercise 12    Treatment Type: Treatment  PT/PTA: PTA     PTA Visit Number: 2     Rach Mabry, PTA  10/26/2019

## 2019-10-26 NOTE — PROGRESS NOTES
[facility]  Infectious Disease  Progress Note    Patient Name: Elizabeth Maldonado  MRN: [id number]  Admission Date: 10/20/2019  Length of Stay: 5 days  Attending Physician: Kalee Cat MD  Primary Care Provider: Richy Singleton NP    Isolation Status: No active isolations  Assessment/Plan:      Bacteremia due to group B Streptococcus  54-year-old female with history of kidney transplant (on cellcept and prograf), DM2, HLD, recent fall off ladder, presents with left sternoclavicular joint, left lower back, and left hip pain, found to have group B strep bacteremia.  MRI CTL spine without evidence of epidural abscess/diskitis/OM; MRI pelvis with nonspecific myositis edematous change of left lumbosacral junction. Patient s/p aspiration of left SC joint 10/25/2019.    Recommenations:  - Continue ceftriaxone 2 gram IV q 24 hours for Group B strep bacteremia  - Follow-up joint SC joint fluid cultures          Thank you for your consult. I will follow-up with patient. Please contact us if you have any additional questions.    Katy Mabry MD  Infectious Disease  [facility]    Subjective:     Principal Problem:Acute renal failure superimposed on stage 3 chronic kidney disease    HPI: This is a 54 year old female female with type 2 diabetes mellitus, HLD, CKD,  anemia of chronic disease, and history of renal transplant 2014 (on cellcept and prograf) who presents to [facility] ED with complaints of left shoulder and left hip pain the past few days.  She reports that the pain is mainly localized to her left lower back, left hip, and left shoulder.  She does recall falling at work as she was stepping down a ladder and missed the last step.  She does recall falling on her left side but denies any head trauma.   she reports associated poor appetite and chills at home. She denies diarrhea, abd pain, or urinary symptoms.      She was found to have positive blood cultures with GPCs in chains  resembling strep. ID consulted for further recs. CT left hip and left shoulder (both done without contrast) non revealing.   Interval History:    Patient still complaining of left hip pain    Review of Systems   Constitutional: Positive for activity change. Negative for chills and fever.   Respiratory: Negative for cough and shortness of breath.    Cardiovascular: Negative for chest pain and leg swelling.   Gastrointestinal: Negative for abdominal pain, constipation, diarrhea, nausea and vomiting.   Musculoskeletal: Positive for arthralgias (left shoulder pain, left hip pain), back pain and gait problem. Negative for myalgias.   Skin: Negative for rash and wound.   Neurological: Positive for weakness. Negative for dizziness, light-headedness and headaches.   Psychiatric/Behavioral: Negative for agitation and behavioral problems. The patient is not nervous/anxious.      Objective:     Vital Signs (Most Recent):  Temp: 99.4 °F (37.4 °C) (10/25/19 2012)  Pulse: 91 (10/25/19 2012)  Resp: 18 (10/25/19 2012)  BP: (!) 167/79 (10/25/19 2012)  SpO2: 100 % (10/25/19 2012) Vital Signs (24h Range):  Temp:  [98.2 °F (36.8 °C)-100.7 °F (38.2 °C)] 99.4 °F (37.4 °C)  Pulse:  [82-94] 91  Resp:  [17-18] 18  SpO2:  [98 %-100 %] 100 %  BP: (142-177)/(71-90) 167/79     Weight: 99.4 kg (219 lb 1.6 oz)  Body mass index is 33.81 kg/m².    Estimated Creatinine Clearance: 42.6 mL/min (A) (based on SCr of 1.8 mg/dL (H)).    Physical Exam   Constitutional: She is oriented to person, place, and time. She appears well-developed and well-nourished. No distress.   HENT:   Head: Normocephalic and atraumatic.   Eyes: Conjunctivae and EOM are normal.   Neck: Normal range of motion. Neck supple.   Pulmonary/Chest: Effort normal. No respiratory distress.   Abdominal: Soft. She exhibits no distension.   Musculoskeletal: Normal range of motion. She exhibits no edema.   Neurological: She is alert and oriented to person, place, and time.   Skin: Skin is  warm and dry. No rash noted. She is not diaphoretic. No erythema.   Psychiatric: She has a normal mood and affect. Her behavior is normal.   Vitals reviewed.      Significant Labs:   Blood Culture:   Recent Labs   Lab 10/20/19  1530 10/20/19  1600 10/21/19  1024   LABBLOO Gram stain ciera bottle: Gram positive cocci in chains resembling Strep   Positive results previously called  Gram stain aer bottle: Gram positive cocci in chains resembling Strep   Positive results previously called  STREPTOCOCCUS AGALACTIAE (GROUP B)  Beta-hemolytic streptococci are routinely susceptible to   penicillins,cephalosporins and carbapenems.  * Gram stain ciera bottle: Gram positive cocci in chains resembling Strep   Results called to and read back by: Andrea Ackerman  Gram stain aer bottle: Gram positive cocci in chains resembling Strep   Positive results previously called  STREPTOCOCCUS AGALACTIAE (GROUP B)  Beta-hemolytic streptococci are routinely susceptible to   penicillins,cephalosporins and carbapenems.  For susceptibility see order #[id number]  * No Growth to date  No Growth to date  No Growth to date  No Growth to date  No Growth to date  No Growth to date  No Growth to date  No Growth to date  No Growth to date  No Growth to date     CBC:   Recent Labs   Lab 10/24/19  0452   WBC 10.87   HGB 9.0*   HCT 27.9*        CMP:   Recent Labs   Lab 10/24/19  0452 10/25/19  0505   * 132*   K 4.0 4.2    98   CO2 24 24   * 220*   BUN 26* 23*   CREATININE 1.8* 1.8*   CALCIUM 9.5 9.5   ALBUMIN 1.5* 1.5*   ANIONGAP 10 10   EGFRNONAA 31* 31*       Significant Imaging: I have reviewed all pertinent imaging results/findings within the past 24 hours.

## 2019-10-26 NOTE — PLAN OF CARE
Problem: Physical Therapy Goal  Goal: Physical Therapy Goal  Description  Patient will increase functional independence with mobility by performin. Sit<>stand with SPV with RW/least restrictive device.  2. Gait x 150 feet with SPV with RW/least restrictive device.  3. Ascend/descend 5 step(s), L HR with least restrictive assistive device and SPV  4. Pt to transfer supine>sit EOB with SPV.      Outcome: Ongoing, Progressing   Pt ambulated ~ 10 ft from bed to the bathroom with RW, SBA.

## 2019-10-26 NOTE — ASSESSMENT & PLAN NOTE
54-year-old female with history of kidney transplant (on cellcept and prograf), DM2, HLD, recent fall off ladder, presents with left sternoclavicular joint, left lower back, and left hip pain, found to have group B strep bacteremia.  MRI CTL spine without evidence of epidural abscess/diskitis/OM; MRI pelvis with nonspecific myositis edematous change of left lumbosacral junction. Patient s/p aspiration of left SC joint 10/25/2019.    Recommenations:  - Continue ceftriaxone 2 gram IV q 24 hours for Group B strep bacteremia  - Follow-up joint SC joint fluid cultures

## 2019-10-26 NOTE — NURSING
Patient complained of feeling feverish. Temp 100.7. Room hot and 2 large blankets on patient. Air conditioner adjusted and blankets removed. Temp rechecked 99.6.

## 2019-10-26 NOTE — SUBJECTIVE & OBJECTIVE
Interval History:    Patient still complaining of left hip pain    Review of Systems   Constitutional: Positive for activity change. Negative for chills and fever.   Respiratory: Negative for cough and shortness of breath.    Cardiovascular: Negative for chest pain and leg swelling.   Gastrointestinal: Negative for abdominal pain, constipation, diarrhea, nausea and vomiting.   Musculoskeletal: Positive for arthralgias (left shoulder pain, left hip pain), back pain and gait problem. Negative for myalgias.   Skin: Negative for rash and wound.   Neurological: Positive for weakness. Negative for dizziness, light-headedness and headaches.   Psychiatric/Behavioral: Negative for agitation and behavioral problems. The patient is not nervous/anxious.      Objective:     Vital Signs (Most Recent):  Temp: 99.4 °F (37.4 °C) (10/25/19 2012)  Pulse: 91 (10/25/19 2012)  Resp: 18 (10/25/19 2012)  BP: (!) 167/79 (10/25/19 2012)  SpO2: 100 % (10/25/19 2012) Vital Signs (24h Range):  Temp:  [98.2 °F (36.8 °C)-100.7 °F (38.2 °C)] 99.4 °F (37.4 °C)  Pulse:  [82-94] 91  Resp:  [17-18] 18  SpO2:  [98 %-100 %] 100 %  BP: (142-177)/(71-90) 167/79     Weight: 99.4 kg (219 lb 1.6 oz)  Body mass index is 33.81 kg/m².    Estimated Creatinine Clearance: 42.6 mL/min (A) (based on SCr of 1.8 mg/dL (H)).    Physical Exam   Constitutional: She is oriented to person, place, and time. She appears well-developed and well-nourished. No distress.   HENT:   Head: Normocephalic and atraumatic.   Eyes: Conjunctivae and EOM are normal.   Neck: Normal range of motion. Neck supple.   Pulmonary/Chest: Effort normal. No respiratory distress.   Abdominal: Soft. She exhibits no distension.   Musculoskeletal: Normal range of motion. She exhibits no edema.   Neurological: She is alert and oriented to person, place, and time.   Skin: Skin is warm and dry. No rash noted. She is not diaphoretic. No erythema.   Psychiatric: She has a normal mood and affect. Her behavior  is normal.   Vitals reviewed.      Significant Labs:   Blood Culture:   Recent Labs   Lab 10/20/19  1530 10/20/19  1600 10/21/19  1024   LABBLOO Gram stain ciera bottle: Gram positive cocci in chains resembling Strep   Positive results previously called  Gram stain aer bottle: Gram positive cocci in chains resembling Strep   Positive results previously called  STREPTOCOCCUS AGALACTIAE (GROUP B)  Beta-hemolytic streptococci are routinely susceptible to   penicillins,cephalosporins and carbapenems.  * Gram stain ciera bottle: Gram positive cocci in chains resembling Strep   Results called to and read back by: Andrea Ackerman  Gram stain aer bottle: Gram positive cocci in chains resembling Strep   Positive results previously called  STREPTOCOCCUS AGALACTIAE (GROUP B)  Beta-hemolytic streptococci are routinely susceptible to   penicillins,cephalosporins and carbapenems.  For susceptibility see order #[id number]  * No Growth to date  No Growth to date  No Growth to date  No Growth to date  No Growth to date  No Growth to date  No Growth to date  No Growth to date  No Growth to date  No Growth to date     CBC:   Recent Labs   Lab 10/24/19  0452   WBC 10.87   HGB 9.0*   HCT 27.9*        CMP:   Recent Labs   Lab 10/24/19  0452 10/25/19  0505   * 132*   K 4.0 4.2    98   CO2 24 24   * 220*   BUN 26* 23*   CREATININE 1.8* 1.8*   CALCIUM 9.5 9.5   ALBUMIN 1.5* 1.5*   ANIONGAP 10 10   EGFRNONAA 31* 31*       Significant Imaging: I have reviewed all pertinent imaging results/findings within the past 24 hours.

## 2019-10-26 NOTE — NURSING
Patient up to restroom with rolling walker. Gait but steady. Pain increased. Medication administered.

## 2019-10-26 NOTE — PROGRESS NOTES
[facility]  Nephrology  Progress Note    Patient Name: Elizabeth Maldonado  MRN: [id number]  Admission Date: 10/20/2019  Hospital Length of Stay: 6 days  Attending Provider: Kalee Cat MD   Primary Care Physician: Richy Singleton NP  Principal Problem:Acute renal failure superimposed on stage 3 chronic kidney disease  Date of service 10/26/2019  Consults  Reason for consult: PATRICK  Subjective:     Interval History: underwent SC joint aspiration yesterday.  No difficulty urinating, good uop, good appetite, no n/v, sob    Review of patient's allergies indicates:   Allergen Reactions    Iodine and iodide containing products Hives    Shrimp Itching    Topamax [topiramate] Other (See Comments)     Vision changes    Zoloft [sertraline] Palpitations     Current Facility-Administered Medications   Medication Frequency    acetaminophen tablet 650 mg Q6H PRN    atorvastatin tablet 20 mg Daily    carvedilol tablet 6.25 mg BID    cefTRIAXone (ROCEPHIN) 2 g in dextrose 5 % 50 mL IVPB Q24H    cloNIDine tablet 0.1 mg TID PRN    dextrose 50% injection 12.5 g PRN    dextrose 50% injection 25 g PRN    glucagon (human recombinant) injection 1 mg PRN    glucose chewable tablet 16 g PRN    glucose chewable tablet 24 g PRN    heparin (porcine) injection 5,000 Units Q12H    insulin aspart U-100 pen 1-10 Units PRN    insulin aspart U-100 pen 10 Units TID WM    insulin detemir U-100 pen 40 Units QHS    morphine injection 4 mg Q6H PRN    ondansetron injection 8 mg Q8H PRN    oxyCODONE-acetaminophen  mg per tablet 1 tablet Q4H PRN    promethazine (PHENERGAN) 6.25 mg in dextrose 5 % 50 mL IVPB Q6H PRN    ramelteon tablet 8 mg Nightly PRN    senna-docusate 8.6-50 mg per tablet 1 tablet BID PRN    tacrolimus capsule 3 mg BID       Objective:     Vital Signs (Most Recent):  Temp: 98.5 °F (36.9 °C) (10/26/19 1110)  Pulse: 83 (10/26/19 1110)  Resp: 18 (10/26/19 1110)  BP: (!) 141/70 (10/26/19  1110)  SpO2: 97 % (10/26/19 1110)  O2 Device (Oxygen Therapy): room air (10/26/19 0730) Vital Signs (24h Range):  Temp:  [98.4 °F (36.9 °C)-100.7 °F (38.2 °C)] 98.5 °F (36.9 °C)  Pulse:  [83-91] 83  Resp:  [17-18] 18  SpO2:  [97 %-100 %] 97 %  BP: (141-167)/(70-86) 141/70     Weight: 98.2 kg (216 lb 9.6 oz) (10/26/19 0400)  Body mass index is 33.42 kg/m².  Body surface area is 2.16 meters squared.    I/O last 3 completed shifts:  In: 600 [P.O.:600]  Out: 4850 [Urine:4850]    Physical Exam   Constitutional: She is oriented to person, place, and time. She appears well-developed and well-nourished. No distress.   HENT:   Head: Normocephalic and atraumatic.   Eyes: EOM are normal. No scleral icterus.   Cardiovascular: Normal rate, regular rhythm and normal heart sounds. Exam reveals no friction rub.   No murmur heard.  Pulmonary/Chest: Effort normal and breath sounds normal. No respiratory distress. She has no wheezes. She has no rales.   Abdominal: Soft. She exhibits no distension. There is no tenderness.   Musculoskeletal: She exhibits no edema or deformity.   Neurological: She is alert and oriented to person, place, and time.   Skin: Skin is warm and dry. No rash noted. She is not diaphoretic.   Psychiatric: She has a normal mood and affect. Her behavior is normal.   Nursing note and vitals reviewed.       Significant Labs:  CBC:   Recent Labs   Lab 10/24/19  0452   WBC 10.87   RBC 3.35*   HGB 9.0*   HCT 27.9*      MCV 83   MCH 26.9*   MCHC 32.3     CMP:   Recent Labs   Lab 10/21/19  0440  10/26/19  0409   *   < > 177*   CALCIUM 9.5   < > 9.7   ALBUMIN 1.7*   < > 1.4*   PROT 7.7  --   --       < > 131*   K 3.9   < > 4.3   CO2 25   < > 25      < > 99   BUN 34*   < > 22*   CREATININE 2.2*   < > 1.9*   ALKPHOS 101  --   --    ALT 37  --   --    AST 46*  --   --    BILITOT 0.3  --   --     < > = values in this interval not displayed.     All labs within the past 24 hours have been  reviewed.    Significant Imaging:  MRI: Reviewed    Assessment/Plan:     Active Diagnoses:    Diagnosis Date Noted POA    PRINCIPAL PROBLEM:  Acute on CKD stage 3 [N17.9, N18.3] 10/20/2019 Yes    Chronic bilateral low back pain with bilateral sciatica [M54.42, M54.41, G89.29] 10/25/2019 Yes    Pain of left sternoclavicular joint [M25.512] 10/25/2019 Yes    Bacteremia due to group B Streptococcus [R78.81] 10/20/2019 Yes    CKD (chronic kidney disease), stage III [N18.3] 03/02/2015 Yes     Chronic    Kidney transplant status, living unrelated donor - 12/2/14 [Z94.0] 12/03/2014 Not Applicable     Chronic    Type 2 diabetes mellitus, uncontrolled, with renal complications [E11.29, E11.65] 07/01/2014 Yes     Chronic    Hyperlipidemia [E78.5] 07/01/2014 Yes     Chronic    Anemia of chronic disease [D63.8] 07/01/2014 Yes     Chronic      Problems Resolved During this Admission:    Diagnosis Date Noted Date Resolved POA    Chronic hip pain [M25.559, G89.29] 10/20/2019 10/20/2019 Yes    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 Followed by Dr. Bonilla.   - Cr stable at baseline  - monitor BMP  - renally dose medications    Hyponatremia  - Recommend to convert IVPB to NS when possible, monitor daily levels    H/o kidney transplant; on long term immunosuppressive medications  - s/p LURT 12/2014; followed by Dr. Catalan.   - home regimen includes MMF 500mg BID and Prograf 2/3  - MMF being held due to bacteremia; appreciate ID assistance. Continue holding.  - Prograf trough at goal of 4-7 on current regimen; continue. Continue to trend daily troughs for now given questionable compliance  - will continue to monitor for drug toxicities    CKD-MBD  - hypercalcemia - avoid calcium and vit d supplementation    Hypoalbuminemia  - on novasource, monitor levels    Bacteremia  Joint pain - s/p joint aspiration  DM    Thank you  for allowing me to participate in the care of your patient.  Please call with any questions.    Date of service: 10/26/2019  Emmy Carney MD   Nephrology  [facility]  Office [phone]   [facility]

## 2019-10-26 NOTE — PLAN OF CARE
Patient remains free from injury and falls. Pain improving compared to previous night. Patient moving around a little better.Resting in intervals. Up to restroom with rolling walker. Pain much improved to right index finger. Plan of care continued.

## 2019-10-27 LAB
ALBUMIN SERPL BCP-MCNC: 1.5 G/DL (ref 3.5–5.2)
ANION GAP SERPL CALC-SCNC: 8 MMOL/L (ref 8–16)
BACTERIA UR CULT: NO GROWTH
BUN SERPL-MCNC: 19 MG/DL (ref 6–20)
CALCIUM SERPL-MCNC: 9.7 MG/DL (ref 8.7–10.5)
CHLORIDE SERPL-SCNC: 99 MMOL/L (ref 95–110)
CO2 SERPL-SCNC: 24 MMOL/L (ref 23–29)
CREAT SERPL-MCNC: 1.9 MG/DL (ref 0.5–1.4)
EST. GFR  (AFRICAN AMERICAN): 33 ML/MIN/1.73 M^2
EST. GFR  (NON AFRICAN AMERICAN): 29 ML/MIN/1.73 M^2
GLUCOSE SERPL-MCNC: 196 MG/DL (ref 70–110)
PHOSPHATE SERPL-MCNC: 4.4 MG/DL (ref 2.7–4.5)
POCT GLUCOSE: 158 MG/DL (ref 70–110)
POCT GLUCOSE: 168 MG/DL (ref 70–110)
POCT GLUCOSE: 182 MG/DL (ref 70–110)
POTASSIUM SERPL-SCNC: 4 MMOL/L (ref 3.5–5.1)
SODIUM SERPL-SCNC: 131 MMOL/L (ref 136–145)
TACROLIMUS BLD-MCNC: 6.3 NG/ML (ref 5–15)

## 2019-10-27 PROCEDURE — 63600175 PHARM REV CODE 636 W HCPCS: Performed by: HOSPITALIST

## 2019-10-27 PROCEDURE — 36415 COLL VENOUS BLD VENIPUNCTURE: CPT

## 2019-10-27 PROCEDURE — 63600175 PHARM REV CODE 636 W HCPCS: Performed by: PHYSICIAN ASSISTANT

## 2019-10-27 PROCEDURE — 11000001 HC ACUTE MED/SURG PRIVATE ROOM

## 2019-10-27 PROCEDURE — 94761 N-INVAS EAR/PLS OXIMETRY MLT: CPT

## 2019-10-27 PROCEDURE — 97116 GAIT TRAINING THERAPY: CPT

## 2019-10-27 PROCEDURE — 97110 THERAPEUTIC EXERCISES: CPT

## 2019-10-27 PROCEDURE — 63600175 PHARM REV CODE 636 W HCPCS: Performed by: INTERNAL MEDICINE

## 2019-10-27 PROCEDURE — 25000003 PHARM REV CODE 250: Performed by: HOSPITALIST

## 2019-10-27 PROCEDURE — 25000003 PHARM REV CODE 250: Performed by: INTERNAL MEDICINE

## 2019-10-27 PROCEDURE — 80197 ASSAY OF TACROLIMUS: CPT

## 2019-10-27 PROCEDURE — 80069 RENAL FUNCTION PANEL: CPT

## 2019-10-27 RX ADMIN — MORPHINE SULFATE 4 MG: 10 INJECTION INTRAVENOUS at 11:10

## 2019-10-27 RX ADMIN — OXYCODONE HYDROCHLORIDE AND ACETAMINOPHEN 1 TABLET: 10; 325 TABLET ORAL at 09:10

## 2019-10-27 RX ADMIN — INSULIN ASPART 2 UNITS: 100 INJECTION, SOLUTION INTRAVENOUS; SUBCUTANEOUS at 05:10

## 2019-10-27 RX ADMIN — OXYCODONE HYDROCHLORIDE AND ACETAMINOPHEN 1 TABLET: 10; 325 TABLET ORAL at 05:10

## 2019-10-27 RX ADMIN — TACROLIMUS 3 MG: 1 CAPSULE ORAL at 08:10

## 2019-10-27 RX ADMIN — ATORVASTATIN CALCIUM 20 MG: 10 TABLET, FILM COATED ORAL at 08:10

## 2019-10-27 RX ADMIN — MORPHINE SULFATE 4 MG: 10 INJECTION INTRAVENOUS at 02:10

## 2019-10-27 RX ADMIN — CEFTRIAXONE SODIUM 2 G: 2 INJECTION, SOLUTION INTRAVENOUS at 11:10

## 2019-10-27 RX ADMIN — CARVEDILOL 6.25 MG: 6.25 TABLET, FILM COATED ORAL at 08:10

## 2019-10-27 RX ADMIN — INSULIN ASPART 10 UNITS: 100 INJECTION, SOLUTION INTRAVENOUS; SUBCUTANEOUS at 12:10

## 2019-10-27 RX ADMIN — TACROLIMUS 3 MG: 1 CAPSULE ORAL at 06:10

## 2019-10-27 RX ADMIN — INSULIN ASPART 2 UNITS: 100 INJECTION, SOLUTION INTRAVENOUS; SUBCUTANEOUS at 08:10

## 2019-10-27 RX ADMIN — INSULIN ASPART 10 UNITS: 100 INJECTION, SOLUTION INTRAVENOUS; SUBCUTANEOUS at 08:10

## 2019-10-27 RX ADMIN — INSULIN ASPART 10 UNITS: 100 INJECTION, SOLUTION INTRAVENOUS; SUBCUTANEOUS at 05:10

## 2019-10-27 RX ADMIN — CLONIDINE HYDROCHLORIDE 0.1 MG: 0.1 TABLET ORAL at 05:10

## 2019-10-27 RX ADMIN — HEPARIN SODIUM 5000 UNITS: 5000 INJECTION, SOLUTION INTRAVENOUS; SUBCUTANEOUS at 08:10

## 2019-10-27 RX ADMIN — HEPARIN SODIUM 5000 UNITS: 5000 INJECTION, SOLUTION INTRAVENOUS; SUBCUTANEOUS at 09:10

## 2019-10-27 RX ADMIN — MORPHINE SULFATE 4 MG: 10 INJECTION INTRAVENOUS at 08:10

## 2019-10-27 RX ADMIN — INSULIN ASPART 2 UNITS: 100 INJECTION, SOLUTION INTRAVENOUS; SUBCUTANEOUS at 12:10

## 2019-10-27 NOTE — PLAN OF CARE
Problem: Physical Therapy Goal  Goal: Physical Therapy Goal  Description  Patient will increase functional independence with mobility by performin. Sit<>stand with SPV with RW/least restrictive device.  2. Gait x 150 feet with SPV with RW/least restrictive device.  3. Ascend/descend 5 step(s), L HR with least restrictive assistive device and SPV  4. Pt to transfer supine>sit EOB with SPV.      Outcome: Ongoing, Progressing    Pt ambulated ~80 ft with CGA-SBA using RW.

## 2019-10-27 NOTE — SUBJECTIVE & OBJECTIVE
Interval History: pt not having a good day and disgruntled with being in hospital, no new complaints     Review of Systems   Constitutional: Positive for activity change. Negative for chills and fever.   Respiratory: Negative for cough and shortness of breath.    Cardiovascular: Positive for chest pain. Negative for leg swelling.   Gastrointestinal: Negative for abdominal pain, constipation, diarrhea, nausea and vomiting.   Musculoskeletal: Positive for arthralgias (left shoulder pain, left hip pain), back pain and gait problem. Negative for myalgias.   Skin: Negative for rash and wound.   Neurological: Positive for weakness. Negative for dizziness, light-headedness and headaches.   Psychiatric/Behavioral: Negative for agitation and behavioral problems. The patient is not nervous/anxious.      Objective:     Vital Signs (Most Recent):  Temp: 98.2 °F (36.8 °C) (10/27/19 0439)  Pulse: 86 (10/27/19 0439)  Resp: 18 (10/27/19 0439)  BP: (!) 164/79 (10/27/19 0439)  SpO2: 99 % (10/27/19 0800) Vital Signs (24h Range):  Temp:  [98.2 °F (36.8 °C)-100.2 °F (37.9 °C)] 98.2 °F (36.8 °C)  Pulse:  [76-89] 86  Resp:  [18] 18  SpO2:  [97 %-100 %] 99 %  BP: (141-172)/(70-84) 164/79     Weight: 98.2 kg (216 lb 9.6 oz)  Body mass index is 33.42 kg/m².    Intake/Output Summary (Last 24 hours) at 10/27/2019 0834  Last data filed at 10/27/2019 0500  Gross per 24 hour   Intake 240 ml   Output 3400 ml   Net -3160 ml      Physical Exam   Constitutional: She appears well-developed and well-nourished. No distress.   HENT:   Head: Normocephalic and atraumatic.   Eyes: Conjunctivae and EOM are normal.   Cardiovascular: Normal rate and regular rhythm.   Pulmonary/Chest: Effort normal and breath sounds normal.   Abdominal: Soft. She exhibits no distension.   Musculoskeletal: She exhibits no edema or deformity.   Skin: Skin is warm and dry. She is not diaphoretic.   Psychiatric: She has a normal mood and affect. Her behavior is normal.        Significant Labs: All pertinent labs within the past 24 hours have been reviewed.    Significant Imaging: I have reviewed and interpreted all pertinent imaging results/findings within the past 24 hours.

## 2019-10-27 NOTE — PT/OT/SLP PROGRESS
Physical Therapy Treatment    Patient Name:  Elizabeth Maldonado   MRN:  [id number]    Recommendations:     Discharge Recommendations:  home health PT(with family assistance; Pt requesting PCA services.)   Discharge Equipment Recommendations: walker, rolling, bedside commode, bath bench   Barriers to discharge home: Inaccessible home and Decreased caregiver support    Assessment:     Elizabeth Maldonado is a 54 y.o. female admitted with a medical diagnosis of Acute renal failure superimposed on stage 3 chronic kidney disease.  She presents with the following impairments/functional limitations:  weakness, impaired functional mobilty, impaired balance, gait instability, decreased lower extremity function, decreased upper extremity function, pain.    Rehab Prognosis: Good; patient would benefit from acute skilled PT services to address these deficits and reach maximum level of function.    Recent Surgery: * No surgery found *      Plan:     During this hospitalization, patient to be seen 5 x/week to address the identified rehab impairments via gait training, therapeutic activities, therapeutic exercises, neuromuscular re-education and progress toward the following goals:    · Plan of Care Expires:  11/23/19    Subjective     Chief Complaint: pain  Patient/Family Comments/goals: Pt would like PCA services for home.   Pain/Comfort:  · Pain Rating 1: 10/10  · Location - Side 1: Left  · Location 1: shoulder(hip and back)  · Pain Addressed 1: Pre-medicate for activity      Objective:     Patient found up in chair reclined with gresham catheter, peripheral IV upon PT entry to room.     General Precautions: Standard, fall, diabetic   Orthopedic Precautions:N/A   Braces: N/A     Functional Mobility: Pt found up in recliner upon PT arrival, still with c/o L shoulder/hip and back pain.  Pt was cooperative with therapy, able to ambulate in the hallway today.    · Transfers:     · Sit to Stand:  stand by assistance and contact guard  assistance with rolling walker  · Bed to Chair: stand by assistance and contact guard assistance with  rolling walker  using  Step Transfer  · Gait: Pt ambulated ~80 ft with CGA-SBA using RW.  Pt with decreased step length and kellee, increased L hip pain during ambulation.    · Balance: Pt with fair+ balance.       AM-PAC 6 CLICK MOBILITY  Turning over in bed (including adjusting bedclothes, sheets and blankets)?: 4  Sitting down on and standing up from a chair with arms (e.g., wheelchair, bedside commode, etc.): 4  Moving from lying on back to sitting on the side of the bed?: 4  Moving to and from a bed to a chair (including a wheelchair)?: 3  Need to walk in hospital room?: 3  Climbing 3-5 steps with a railing?: 3  Basic Mobility Total Score: 21       Therapeutic Activities and Exercises:  BLE seated therex 10 reps: hip flex, pillow squeezes, LAQ, and AP    BLE supine therex 10 reps: QS, GS, HS (AAROM LLE 2* pain), and hip abd/add (AAROM LLE 2* pain)    Pt issued HEP for seated/supine LE therex.  Pt encouraged to perform LE therex while in the hospital and at home.  Pt verbalized good understanding.     Patient left up in chair reclined sitting on pillows and BLE elevated on pillow with all lines intact and call button in reach.    GOALS:   Multidisciplinary Problems     Physical Therapy Goals        Problem: Physical Therapy Goal    Goal Priority Disciplines Outcome Goal Variances Interventions   Physical Therapy Goal     PT, PT/OT Ongoing, Progressing     Description:  Patient will increase functional independence with mobility by performin. Sit<>stand with SPV with RW/least restrictive device.  2. Gait x 150 feet with SPV with RW/least restrictive device.  3. Ascend/descend 5 step(s), L HR with least restrictive assistive device and SPV  4. Pt to transfer supine>sit EOB with SPV.                       Time Tracking:     PT Received On: 10/27/19  PT Start Time: 1228     PT Stop Time: 1254  PT Total Time  (min): 26 min     Billable Minutes: Gait Training 16 min and Therapeutic Exercise 10 min    Treatment Type: Treatment  PT/PTA: PT     PTA Visit Number: 0     Argelia Guzmán, PT  10/27/2019

## 2019-10-27 NOTE — PROGRESS NOTES
The patient is in her chair.  She reports that she was feeling better following the aspiration of her SC joint now starting to have some discomfort.soap      Temp:  [98.2 °F (36.8 °C)-100.2 °F (37.9 °C)] 98.3 °F (36.8 °C)  Pulse:  [76-89] 85  Resp:  [18] 18  SpO2:  [96 %-100 %] 96 %  BP: (103-172)/(57-84) 103/59    Physical examination:    Both sternoclavicular joints a little swollen more so on the left.  There is no drainage. There is no erythema.  She does have some tenderness on palpation.      No results for input(s): WBC, RBC, HGB, HCT, PLT, MCV, MCH, MCHC in the last 24 hours.      Current Facility-Administered Medications:     acetaminophen tablet 650 mg, 650 mg, Oral, Q6H PRN, Osvaldo Rose MD, 650 mg at 10/26/19 1828    atorvastatin tablet 20 mg, 20 mg, Oral, Daily, Kristin Chauhan MD, 20 mg at 10/27/19 0823    carvedilol tablet 6.25 mg, 6.25 mg, Oral, BID, Kalee Cat MD, 6.25 mg at 10/27/19 0823    cefTRIAXone (ROCEPHIN) 2 g in dextrose 5 % 50 mL IVPB, 2 g, Intravenous, Q24H, JOSE Ivory, 2 g at 10/27/19 1115    cloNIDine tablet 0.1 mg, 0.1 mg, Oral, TID PRN, Kristin Chauhan MD, 0.1 mg at 10/27/19 0511    dextrose 50% injection 12.5 g, 12.5 g, Intravenous, PRN, Kristin Chauhan MD    dextrose 50% injection 25 g, 25 g, Intravenous, PRN, Kristin Chauhan MD    glucagon (human recombinant) injection 1 mg, 1 mg, Intramuscular, PRN, Kristin Chauhan MD    glucose chewable tablet 16 g, 16 g, Oral, PRN, Kristin Chauhan MD    glucose chewable tablet 24 g, 24 g, Oral, PRN, Kristin Chauhan MD    heparin (porcine) injection 5,000 Units, 5,000 Units, Subcutaneous, Q12H, Kristin Chauhan MD, 5,000 Units at 10/27/19 0900    insulin aspart U-100 pen 1-10 Units, 1-10 Units, Subcutaneous, PRN, Kristin Chauhan MD, 2 Units at 10/27/19 0832    insulin aspart U-100 pen 10 Units, 10 Units, Subcutaneous, TID WM, Osvaldo Rose MD, 10 Units at 10/27/19 0831    insulin detemir U-100 pen 40 Units, 40 Units,  Subcutaneous, QHS, Kalee Cat MD, 40 Units at 10/26/19 2209    morphine injection 4 mg, 4 mg, Intravenous, Q6H PRN, Kalee Cat MD, 4 mg at 10/27/19 1107    ondansetron injection 8 mg, 8 mg, Intravenous, Q8H PRN, Kristin Chauhan MD    oxyCODONE-acetaminophen  mg per tablet 1 tablet, 1 tablet, Oral, Q4H PRN, Osvaldo Rose MD, 1 tablet at 10/27/19 0951    promethazine (PHENERGAN) 6.25 mg in dextrose 5 % 50 mL IVPB, 6.25 mg, Intravenous, Q6H PRN, Kristin Chauhan MD    ramelteon tablet 8 mg, 8 mg, Oral, Nightly PRN, Kristin Chauhan MD, 8 mg at 10/22/19 2142    senna-docusate 8.6-50 mg per tablet 1 tablet, 1 tablet, Oral, BID PRN, Kristin Chauhan MD, 1 tablet at 10/23/19 0906    tacrolimus capsule 3 mg, 3 mg, Oral, BID, Kristin Chauhan MD, 3 mg at 10/27/19 0823    Assessment and Plan:    Fever and bacteremia    SC joint swelling-CT scan demonstrates degenerative changes of both SC joints with spurring and small cystic changes with surrounding sclerosis consistent with chronic degenerative changes or arthritis.    Status post IR aspiration of SC joint. G stain and cultures no growth so far.  Continue antibiotics.  Will follow cultures.    Jose Caro MD  [facility]  this note has been transcribed with voice recognition software and may contain unrecognized errors

## 2019-10-27 NOTE — PROGRESS NOTES
[facility]  Nephrology  Progress Note    Patient Name: Elizabeth Maldonado  MRN: [id number]  Admission Date: 10/20/2019  Hospital Length of Stay: 7 days  Attending Provider: Kalee Cat MD   Primary Care Physician: Richy Singleton NP  Principal Problem:Acute renal failure superimposed on stage 3 chronic kidney disease  Date of service 10/27/2019  Consults    Reason for consult: PATRICK  Subjective:     Interval History: she c/o hip pain, severe, just received pain medication and awaiting relief.  No SOB    Review of patient's allergies indicates:   Allergen Reactions    Iodine and iodide containing products Hives    Shrimp Itching    Topamax [topiramate] Other (See Comments)     Vision changes    Zoloft [sertraline] Palpitations     Current Facility-Administered Medications   Medication Frequency    acetaminophen tablet 650 mg Q6H PRN    atorvastatin tablet 20 mg Daily    carvedilol tablet 6.25 mg BID    cefTRIAXone (ROCEPHIN) 2 g in dextrose 5 % 50 mL IVPB Q24H    cloNIDine tablet 0.1 mg TID PRN    dextrose 50% injection 12.5 g PRN    dextrose 50% injection 25 g PRN    glucagon (human recombinant) injection 1 mg PRN    glucose chewable tablet 16 g PRN    glucose chewable tablet 24 g PRN    heparin (porcine) injection 5,000 Units Q12H    insulin aspart U-100 pen 1-10 Units PRN    insulin aspart U-100 pen 10 Units TID WM    insulin detemir U-100 pen 40 Units QHS    morphine injection 4 mg Q6H PRN    ondansetron injection 8 mg Q8H PRN    oxyCODONE-acetaminophen  mg per tablet 1 tablet Q4H PRN    promethazine (PHENERGAN) 6.25 mg in dextrose 5 % 50 mL IVPB Q6H PRN    ramelteon tablet 8 mg Nightly PRN    senna-docusate 8.6-50 mg per tablet 1 tablet BID PRN    tacrolimus capsule 3 mg BID       Objective:     Vital Signs (Most Recent):  Temp: 99 °F (37.2 °C) (10/27/19 1620)  Pulse: 92 (10/27/19 1620)  Resp: 18 (10/27/19 1620)  BP: 124/61 (10/27/19 1620)  SpO2: 99 %  (10/27/19 1620)  O2 Device (Oxygen Therapy): room air (10/27/19 0800) Vital Signs (24h Range):  Temp:  [98.2 °F (36.8 °C)-100 °F (37.8 °C)] 99 °F (37.2 °C)  Pulse:  [76-92] 92  Resp:  [18] 18  SpO2:  [96 %-100 %] 99 %  BP: (103-164)/(57-81) 124/61     Weight: 98.2 kg (216 lb 9.6 oz) (10/26/19 0400)  Body mass index is 33.42 kg/m².  Body surface area is 2.16 meters squared.    I/O last 3 completed shifts:  In: 600 [P.O.:600]  Out: 4900 [Urine:4900]    Physical Exam   Constitutional: She is oriented to person, place, and time. She appears well-developed and well-nourished. No distress.   HENT:   Head: Normocephalic and atraumatic.   Eyes: EOM are normal. No scleral icterus.   Cardiovascular: Normal rate, regular rhythm and normal heart sounds. Exam reveals no friction rub.   No murmur heard.  Pulmonary/Chest: Effort normal and breath sounds normal. No respiratory distress. She has no wheezes. She has no rales.   Musculoskeletal: She exhibits no edema or deformity.   Neurological: She is alert and oriented to person, place, and time.   Skin: Skin is warm and dry. No rash noted. She is not diaphoretic.   Psychiatric: She has a normal mood and affect. Her behavior is normal.   Nursing note and vitals reviewed.       Significant Labs:  CBC:   Recent Labs   Lab 10/24/19  0452   WBC 10.87   RBC 3.35*   HGB 9.0*   HCT 27.9*      MCV 83   MCH 26.9*   MCHC 32.3     CMP:   Recent Labs   Lab 10/21/19  0440  10/27/19  0411   *   < > 196*   CALCIUM 9.5   < > 9.7   ALBUMIN 1.7*   < > 1.5*   PROT 7.7  --   --       < > 131*   K 3.9   < > 4.0   CO2 25   < > 24      < > 99   BUN 34*   < > 19   CREATININE 2.2*   < > 1.9*   ALKPHOS 101  --   --    ALT 37  --   --    AST 46*  --   --    BILITOT 0.3  --   --     < > = values in this interval not displayed.     All labs within the past 24 hours have been reviewed.    Significant Imaging:  MRI: Reviewed    Assessment/Plan:     Active Diagnoses:    Diagnosis Date  Noted POA    PRINCIPAL PROBLEM:  Acute on CKD stage 3 [N17.9, N18.3] 10/20/2019 Yes    Chronic bilateral low back pain with bilateral sciatica [M54.42, M54.41, G89.29] 10/25/2019 Yes    Pain of left sternoclavicular joint [M25.512] 10/25/2019 Yes    Bacteremia due to group B Streptococcus [R78.81] 10/20/2019 Yes    CKD (chronic kidney disease), stage III [N18.3] 03/02/2015 Yes     Chronic    Kidney transplant status, living unrelated donor - 12/2/14 [Z94.0] 12/03/2014 Not Applicable     Chronic    Type 2 diabetes mellitus, uncontrolled, with renal complications [E11.29, E11.65] 07/01/2014 Yes     Chronic    Hyperlipidemia [E78.5] 07/01/2014 Yes     Chronic    Anemia of chronic disease [D63.8] 07/01/2014 Yes     Chronic      Problems Resolved During this Admission:    Diagnosis Date Noted Date Resolved POA    Chronic hip pain [M25.559, G89.29] 10/20/2019 10/20/2019 Yes    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 Followed by Dr. Bonilla.   - Cr stable at baseline  - monitor BMP  - renally dose medications    Hyponatremia  - stable, Recommend to convert IVPB to NS when possible, monitor daily levels    H/o kidney transplant; on long term immunosuppressive medications  - s/p LURT 12/2014; followed by Dr. Catalan.   - home regimen includes MMF 500mg BID and Prograf 2/3  - MMF being held due to bacteremia; appreciate ID assistance. Continue holding.  - Prograf trough at goal of 4-7 on current regimen; continue. Continue to trend daily troughs for now given questionable compliance  - will continue to monitor for drug toxicities    CKD-MBD  - hypercalcemia - stable, avoid calcium and vit d supplementation    Hypoalbuminemia  - on novasource, monitor levels    Bacteremia  Joint pain - s/p joint aspiration  DM    Thank you for allowing me to participate in the care of your patient.  Please call with any  questions.    Date of service: 10/27/2019  Emmy Carney MD   Nephrology  [facility]  Office [phone]   [facility]

## 2019-10-27 NOTE — PROGRESS NOTES
[facility] Hospital Medicine  Progress Note    Patient Name: Elizabeth Maldonado  MRN: [id number]  Patient Class: IP- Inpatient   Admission Date: 10/20/2019  Length of Stay: 7 days  Attending Physician: Kalee Cat MD  Primary Care Provider: Richy Singleton NP        Subjective:     Principal Problem:Acute renal failure superimposed on stage 3 chronic kidney disease        HPI:  Ms. Elizabeth Maldonado is a 54 y.o. female with type 2 diabetes mellitus (HbA1c 8.7% Dec 2014), hyperlipidemia (.6 Nov 2014), CKD stage 3, anemia of chronic disease, and history of renal transplant who presents to [facility] ED with complaints of left arm and hip pain the past few days.  She reports that the pain is mainly localized to her lower back, left hip, and left shoulder.  She does recall falling at work as she was stepping down a ladder and missed the last step.  She does recall falling on her left side but denies any head trauma.  She denies any fevers, nausea, vomiting, abdominal pain, nor any diarrhea.  She does report that her appetite has been poor in the last few days but she has not lost any weight.  She denies any night sweats but does report some chills.    Of note, she was noted to be febrile in the ER but denies any coughing, dysuria, rashes, headache, nor any neck stiffness.  She denies any dyspnea, hemoptysis, lower extremity pain or swelling, recent travel, nor any sick contacts.    Overview/Hospital Course:  55 y/o female with hx of kidney transplant presented with left shoulder and hip pain.  Noted to be febrile upon presentation.  No obvious source of infection.  On immunosuppressive medications and admitted on broad spectrum ABx's.  BCx now positive for Strep.  ID consulted.  Slight ARF on presentation and Nephrology consulted.  Started on IVF hydration.    Pt is TTP over left shoulder and hip.  CT imaging concern for inflammation?infection SC joint.  Ortho to follow up. ECHO pending.  Continue rocephin. PT/OT eval to assist with dc planning     Of note, initial MRI ordered was not done per Radiology decision. Agree with proceeding to MRI as CT scan inconclusive.   ECHO reviewed - no vegetations   Nephrology and transplant at [facility] communicating about possible rejection of transplanted kidney - Dr. Catalan does not feel patient needs to be transferred at this time. Renal function improving with IVF and thought to be volume depleted due to hyperglycemia.     Plan for aspiration of left SC joint. Await cultures. D/w nephrology- will need kelly cath and not piccline on dc home for IV antibiotics.   Await cultures from aspiration     Interval History: pt not having a good day and disgruntled with being in hospital, no new complaints     Review of Systems   Constitutional: Positive for activity change. Negative for chills and fever.   Respiratory: Negative for cough and shortness of breath.    Cardiovascular: Positive for chest pain. Negative for leg swelling.   Gastrointestinal: Negative for abdominal pain, constipation, diarrhea, nausea and vomiting.   Musculoskeletal: Positive for arthralgias (left shoulder pain, left hip pain), back pain and gait problem. Negative for myalgias.   Skin: Negative for rash and wound.   Neurological: Positive for weakness. Negative for dizziness, light-headedness and headaches.   Psychiatric/Behavioral: Negative for agitation and behavioral problems. The patient is not nervous/anxious.      Objective:     Vital Signs (Most Recent):  Temp: 98.2 °F (36.8 °C) (10/27/19 0439)  Pulse: 86 (10/27/19 0439)  Resp: 18 (10/27/19 0439)  BP: (!) 164/79 (10/27/19 0439)  SpO2: 99 % (10/27/19 0800) Vital Signs (24h Range):  Temp:  [98.2 °F (36.8 °C)-100.2 °F (37.9 °C)] 98.2 °F (36.8 °C)  Pulse:  [76-89] 86  Resp:  [18] 18  SpO2:  [97 %-100 %] 99 %  BP: (141-172)/(70-84) 164/79     Weight: 98.2 kg (216 lb 9.6 oz)  Body mass index is 33.42 kg/m².    Intake/Output Summary (Last 24 hours)  at 10/27/2019 0834  Last data filed at 10/27/2019 0500  Gross per 24 hour   Intake 240 ml   Output 3400 ml   Net -3160 ml      Physical Exam   Constitutional: She appears well-developed and well-nourished. No distress.   HENT:   Head: Normocephalic and atraumatic.   Eyes: Conjunctivae and EOM are normal.   Cardiovascular: Normal rate and regular rhythm.   Pulmonary/Chest: Effort normal and breath sounds normal.   Abdominal: Soft. She exhibits no distension.   Musculoskeletal: She exhibits no edema or deformity.   Skin: Skin is warm and dry. She is not diaphoretic.   Psychiatric: She has a normal mood and affect. Her behavior is normal.       Significant Labs: All pertinent labs within the past 24 hours have been reviewed.    Significant Imaging: I have reviewed and interpreted all pertinent imaging results/findings within the past 24 hours.      Assessment/Plan:      * Acute on CKD stage 3  Patient's baseline creatinine is around 1.6.  2.9 on presentation.  She does have a history of a renal transplant.    Started on IVF's and Nephrology consulted.  Creat improving with IVF's.  Avoid nephrotoxic medications.    Pain of left sternoclavicular joint  S/p aspiration - IR  Await studies        Chronic bilateral low back pain with bilateral sciatica  Pt has l-spine disc disease at L5  Pain control  PT/OT  No signs of infection       Bacteremia due to group B Streptococcus  Patient was noted to have a fever of 102.5° F without a clear source of infection.  She also was tachycardic and tachypneic, all of which has improved.  Given that she is on tacrolimus and mycophenolate with immunosuppression, she was started on empiric antibiotic therapy.  Now noted to have Group B Bacteremia.  Consulted ID.  Unsure source.  Patient does complain of left shoulder and hip pain.  Septic joint?  Ortho consult.    See CT imaging chest 10/22 - follow up ortho recs - proceed with MRI   CRP and ESR - elevated   ECHO - reviewed     CKD  (chronic kidney disease), stage III  As addressed above.    Kidney transplant status, living unrelated donor - 12/2/14  As addressed above.    Hyperlipidemia  Poorly controlled; will continue her home regimen of atorvastatin.    Anemia of chronic disease  The patient's H/H is stable and consistent with previous laboratory measurements, and the patient exhibits no signs or symptoms of acute bleeding; there is no indication for transfusion.  Will continue to monitor.    Type 2 diabetes mellitus, uncontrolled, with renal complications  Uncontrolled with hyperglycemia.  Will resume home insulin at lower dose to avoid hypoglycemia.  Diabetic diet and insulin sliding scale.  A1c 10.4%    Pt reports home insulin regimen: lantus 80u qhs and humalog 30u TID + SSI   Increase basal insulin       VTE Risk Mitigation (From admission, onward)         Ordered     heparin (porcine) injection 5,000 Units  Every 12 hours      10/20/19 2208     IP VTE HIGH RISK PATIENT  Once      10/20/19 2017                      Kalee Cat MD  Department of Hospital Medicine   [facility]

## 2019-10-27 NOTE — PLAN OF CARE
Problem: Adult Inpatient Plan of Care  Goal: Plan of Care Review  Outcome: Ongoing, Progressing    Patient remains free from injury and falls. ROM  improved to left shoulder, but patient now complaining about severe pain to her neck post aspiration on Friday. Pain somewhat more controlled. Still receiving PO and IV medication. Current plan of care continued.

## 2019-10-28 LAB
ALBUMIN SERPL BCP-MCNC: 1.5 G/DL (ref 3.5–5.2)
ANION GAP SERPL CALC-SCNC: 8 MMOL/L (ref 8–16)
BACTERIA SPEC AEROBE CULT: NO GROWTH
BUN SERPL-MCNC: 22 MG/DL (ref 6–20)
CALCIUM SERPL-MCNC: 10.2 MG/DL (ref 8.7–10.5)
CHLORIDE SERPL-SCNC: 102 MMOL/L (ref 95–110)
CO2 SERPL-SCNC: 24 MMOL/L (ref 23–29)
CREAT SERPL-MCNC: 1.8 MG/DL (ref 0.5–1.4)
EST. GFR  (AFRICAN AMERICAN): 36 ML/MIN/1.73 M^2
EST. GFR  (NON AFRICAN AMERICAN): 31 ML/MIN/1.73 M^2
GLUCOSE SERPL-MCNC: 173 MG/DL (ref 70–110)
PHOSPHATE SERPL-MCNC: 4.3 MG/DL (ref 2.7–4.5)
POCT GLUCOSE: 140 MG/DL (ref 70–110)
POCT GLUCOSE: 162 MG/DL (ref 70–110)
POCT GLUCOSE: 170 MG/DL (ref 70–110)
POCT GLUCOSE: 181 MG/DL (ref 70–110)
POCT GLUCOSE: 282 MG/DL (ref 70–110)
POTASSIUM SERPL-SCNC: 4.2 MMOL/L (ref 3.5–5.1)
SODIUM SERPL-SCNC: 134 MMOL/L (ref 136–145)
TACROLIMUS BLD-MCNC: 5.1 NG/ML (ref 5–15)

## 2019-10-28 PROCEDURE — 97110 THERAPEUTIC EXERCISES: CPT

## 2019-10-28 PROCEDURE — 80069 RENAL FUNCTION PANEL: CPT

## 2019-10-28 PROCEDURE — 94761 N-INVAS EAR/PLS OXIMETRY MLT: CPT

## 2019-10-28 PROCEDURE — 25000003 PHARM REV CODE 250: Performed by: HOSPITALIST

## 2019-10-28 PROCEDURE — 63600175 PHARM REV CODE 636 W HCPCS: Performed by: INTERNAL MEDICINE

## 2019-10-28 PROCEDURE — 25000003 PHARM REV CODE 250: Performed by: INTERNAL MEDICINE

## 2019-10-28 PROCEDURE — 97530 THERAPEUTIC ACTIVITIES: CPT

## 2019-10-28 PROCEDURE — 36415 COLL VENOUS BLD VENIPUNCTURE: CPT

## 2019-10-28 PROCEDURE — 63600175 PHARM REV CODE 636 W HCPCS: Performed by: HOSPITALIST

## 2019-10-28 PROCEDURE — 97535 SELF CARE MNGMENT TRAINING: CPT

## 2019-10-28 PROCEDURE — 80197 ASSAY OF TACROLIMUS: CPT

## 2019-10-28 PROCEDURE — 99233 PR SUBSEQUENT HOSPITAL CARE,LEVL III: ICD-10-PCS | Mod: ,,, | Performed by: INTERNAL MEDICINE

## 2019-10-28 PROCEDURE — 99233 SBSQ HOSP IP/OBS HIGH 50: CPT | Mod: ,,, | Performed by: INTERNAL MEDICINE

## 2019-10-28 PROCEDURE — 63600175 PHARM REV CODE 636 W HCPCS: Performed by: PHYSICIAN ASSISTANT

## 2019-10-28 PROCEDURE — 97116 GAIT TRAINING THERAPY: CPT

## 2019-10-28 PROCEDURE — 11000001 HC ACUTE MED/SURG PRIVATE ROOM

## 2019-10-28 RX ORDER — COLCHICINE 0.6 MG/1
0.6 TABLET, FILM COATED ORAL DAILY
Status: COMPLETED | OUTPATIENT
Start: 2019-10-29 | End: 2019-10-29

## 2019-10-28 RX ADMIN — TACROLIMUS 3 MG: 1 CAPSULE ORAL at 06:10

## 2019-10-28 RX ADMIN — MORPHINE SULFATE 4 MG: 10 INJECTION INTRAVENOUS at 02:10

## 2019-10-28 RX ADMIN — CARVEDILOL 6.25 MG: 6.25 TABLET, FILM COATED ORAL at 10:10

## 2019-10-28 RX ADMIN — CEFTRIAXONE SODIUM 2 G: 2 INJECTION, SOLUTION INTRAVENOUS at 10:10

## 2019-10-28 RX ADMIN — OXYCODONE HYDROCHLORIDE AND ACETAMINOPHEN 1 TABLET: 10; 325 TABLET ORAL at 06:10

## 2019-10-28 RX ADMIN — TACROLIMUS 3 MG: 1 CAPSULE ORAL at 09:10

## 2019-10-28 RX ADMIN — OXYCODONE HYDROCHLORIDE AND ACETAMINOPHEN 1 TABLET: 10; 325 TABLET ORAL at 11:10

## 2019-10-28 RX ADMIN — INSULIN ASPART 10 UNITS: 100 INJECTION, SOLUTION INTRAVENOUS; SUBCUTANEOUS at 12:10

## 2019-10-28 RX ADMIN — OXYCODONE HYDROCHLORIDE AND ACETAMINOPHEN 1 TABLET: 10; 325 TABLET ORAL at 10:10

## 2019-10-28 RX ADMIN — INSULIN ASPART 10 UNITS: 100 INJECTION, SOLUTION INTRAVENOUS; SUBCUTANEOUS at 05:10

## 2019-10-28 RX ADMIN — MORPHINE SULFATE 4 MG: 10 INJECTION INTRAVENOUS at 10:10

## 2019-10-28 RX ADMIN — CARVEDILOL 6.25 MG: 6.25 TABLET, FILM COATED ORAL at 09:10

## 2019-10-28 RX ADMIN — HEPARIN SODIUM 5000 UNITS: 5000 INJECTION, SOLUTION INTRAVENOUS; SUBCUTANEOUS at 10:10

## 2019-10-28 RX ADMIN — HEPARIN SODIUM 5000 UNITS: 5000 INJECTION, SOLUTION INTRAVENOUS; SUBCUTANEOUS at 09:10

## 2019-10-28 RX ADMIN — INSULIN ASPART 10 UNITS: 100 INJECTION, SOLUTION INTRAVENOUS; SUBCUTANEOUS at 09:10

## 2019-10-28 RX ADMIN — INSULIN ASPART 3 UNITS: 100 INJECTION, SOLUTION INTRAVENOUS; SUBCUTANEOUS at 10:10

## 2019-10-28 RX ADMIN — ATORVASTATIN CALCIUM 20 MG: 10 TABLET, FILM COATED ORAL at 09:10

## 2019-10-28 NOTE — PROGRESS NOTES
[facility] Hospital Medicine  Progress Note    Patient Name: Elizabeth Maldonado  MRN: [id number]  Patient Class: IP- Inpatient   Admission Date: 10/20/2019  Length of Stay: 8 days  Attending Physician: Kalee Cat MD  Primary Care Provider: Richy Singleton NP        Subjective:     Principal Problem:Acute renal failure superimposed on stage 3 chronic kidney disease        HPI:  Ms. Elizabeth Maldonado is a 54 y.o. female with type 2 diabetes mellitus (HbA1c 8.7% Dec 2014), hyperlipidemia (.6 Nov 2014), CKD stage 3, anemia of chronic disease, and history of renal transplant who presents to [facility] ED with complaints of left arm and hip pain the past few days.  She reports that the pain is mainly localized to her lower back, left hip, and left shoulder.  She does recall falling at work as she was stepping down a ladder and missed the last step.  She does recall falling on her left side but denies any head trauma.  She denies any fevers, nausea, vomiting, abdominal pain, nor any diarrhea.  She does report that her appetite has been poor in the last few days but she has not lost any weight.  She denies any night sweats but does report some chills.    Of note, she was noted to be febrile in the ER but denies any coughing, dysuria, rashes, headache, nor any neck stiffness.  She denies any dyspnea, hemoptysis, lower extremity pain or swelling, recent travel, nor any sick contacts.    Overview/Hospital Course:  55 y/o female with hx of kidney transplant presented with left shoulder and hip pain.  Noted to be febrile upon presentation.  No obvious source of infection.  On immunosuppressive medications and admitted on broad spectrum ABx's.  BCx now positive for Strep.  ID consulted.  Slight ARF on presentation and Nephrology consulted.  Started on IVF hydration.    Pt is TTP over left shoulder and hip.  CT imaging concern for inflammation?infection SC joint.  Ortho to follow up. ECHO pending.  Continue rocephin. PT/OT eval to assist with dc planning     Of note, initial MRI ordered was not done per Radiology decision. Agree with proceeding to MRI as CT scan inconclusive.   ECHO reviewed - no vegetations   Nephrology and transplant at [facility] communicating about possible rejection of transplanted kidney - Dr. Catalan does not feel patient needs to be transferred at this time. Renal function improving with IVF and thought to be volume depleted due to hyperglycemia.     Plan for aspiration of left SC joint. Await cultures. D/w nephrology- will need kelly cath and not piccline on dc home for IV antibiotics.   Await cultures from aspiration     No new subjective & objective note has been filed under this hospital service since the last note was generated.      Assessment/Plan:      * Acute on CKD stage 3  Patient's baseline creatinine is around 1.6.  2.9 on presentation.  She does have a history of a renal transplant.    Started on IVF's and Nephrology consulted.  Creat improving with IVF's.  Avoid nephrotoxic medications.    Pain of left sternoclavicular joint  S/p aspiration - IR  Await studies        Chronic bilateral low back pain with bilateral sciatica  Pt has l-spine disc disease at L5  Pain control  PT/OT  No signs of infection       Bacteremia due to group B Streptococcus  Patient was noted to have a fever of 102.5° F without a clear source of infection.  She also was tachycardic and tachypneic, all of which has improved.  Given that she is on tacrolimus and mycophenolate with immunosuppression, she was started on empiric antibiotic therapy.  Now noted to have Group B Bacteremia.  Consulted ID.  Unsure source.  Patient does complain of left shoulder and hip pain.  Septic joint?  Ortho consult.    See CT imaging chest 10/22 - follow up ortho recs - proceed with MRI   CRP and ESR - elevated   ECHO - reviewed     CKD (chronic kidney disease), stage III  As addressed above.    Kidney transplant status, living  unrelated donor - 12/2/14  As addressed above.    Hyperlipidemia  Poorly controlled; will continue her home regimen of atorvastatin.    Anemia of chronic disease  The patient's H/H is stable and consistent with previous laboratory measurements, and the patient exhibits no signs or symptoms of acute bleeding; there is no indication for transfusion.  Will continue to monitor.    Type 2 diabetes mellitus, uncontrolled, with renal complications  Uncontrolled with hyperglycemia.  Will resume home insulin at lower dose to avoid hypoglycemia.  Diabetic diet and insulin sliding scale.  A1c 10.4%    Pt reports home insulin regimen: lantus 80u qhs and humalog 30u TID + SSI   Increase basal insulin     VTE Risk Mitigation (From admission, onward)         Ordered     heparin (porcine) injection 5,000 Units  Every 12 hours      10/20/19 2208     IP VTE HIGH RISK PATIENT  Once      10/20/19 2017                      Kalee Cat MD  Department of Hospital Medicine   [facility]

## 2019-10-28 NOTE — PROGRESS NOTES
[facility]  Infectious Disease  Progress Note    Patient Name: Elizabeth Maldonado  MRN: [id number]  Admission Date: 10/20/2019  Length of Stay: 8 days  Attending Physician: Kalee Cat MD  Primary Care Provider: Richy Singleton NP    Isolation Status: No active isolations  Assessment/Plan:      Bacteremia due to group B Streptococcus  54-year-old female with history of kidney transplant (on cellcept and prograf), DM2, HLD, recent fall off ladder, presents with left sternoclavicular joint, left lower back, and left hip pain, found to have group B strep bacteremia.  MRI CTL spine without evidence of epidural abscess/diskitis/OM; MRI pelvis with nonspecific myositis edematous change of left lumbosacral junction. Patient s/p aspiration of left SC joint 10/25/2019 - no growth on cultures.  BCXS 10/21 - NG x 5d.  GBS bacteremia - source unknown.  TTE - no vegetations.  L Greater trochanteric bursitis and L sacroiliitis on exam.  Stable non septic.     Recommenations:  - Continue ceftriaxone 2 gram IV q 24 hours for Group B strep bacteremia  - Follow-up joint SC joint fluid cultures  - PICC placement   - plan for definite 4 weeks of ceftriaxone from 10/21 given bacteremia of unkown origin/possbile septic joint but most likely will treat 6 as there is some suspicion this may be bone related infection  - weekly ESR, CRP, CMP, CBC on mondays faxed to ID dept at [phone]  - FU with Dr. Mabry in ID in 2 weeks  -  The patient was encouraged to call the office for further concerns or complaints.           Anticipated Disposition: tbd    Thank you for your consult. I will sign off. Please contact us if you have any additional questions.    JOSE Real  Infectious Disease  [facility]    Subjective:     Principal Problem:Acute renal failure superimposed on stage 3 chronic kidney disease    HPI: This is a 54 year old female female with type 2 diabetes mellitus, HLD, CKD,   anemia of chronic disease, and history of renal transplant 2014 (on cellcept and prograf) who presents to [facility] ED with complaints of left shoulder and left hip pain the past few days.  She reports that the pain is mainly localized to her left lower back, left hip, and left shoulder.  She does recall falling at work as she was stepping down a ladder and missed the last step.  She does recall falling on her left side but denies any head trauma.   she reports associated poor appetite and chills at home. She denies diarrhea, abd pain, or urinary symptoms.      She was found to have positive blood cultures with GPCs in chains resembling strep. ID consulted for further recs. CT left hip and left shoulder (both done without contrast) non revealing.   Interval History: No AEON.  Afebrile.  Patient still complaining of left hip pain and back pain.  Reprots intermittent subjective fever and chills.      Review of Systems   Constitutional: Positive for activity change. Negative for chills and fever.   Respiratory: Negative for cough and shortness of breath.    Cardiovascular: Negative for chest pain and leg swelling.   Gastrointestinal: Negative for abdominal pain, constipation, diarrhea, nausea and vomiting.   Musculoskeletal: Positive for arthralgias (left shoulder pain, left hip pain), back pain and gait problem. Negative for myalgias.   Skin: Negative for rash and wound.   Neurological: Positive for weakness. Negative for dizziness, light-headedness and headaches.     Objective:     Vital Signs (Most Recent):  Temp: 99.2 °F (37.3 °C) (10/28/19 0818)  Pulse: 90 (10/28/19 0818)  Resp: 18 (10/28/19 0818)  BP: (!) 160/81 (10/28/19 0818)  SpO2: 98 % (10/28/19 0818) Vital Signs (24h Range):  Temp:  [98.3 °F (36.8 °C)-99.7 °F (37.6 °C)] 99.2 °F (37.3 °C)  Pulse:  [84-92] 90  Resp:  [18] 18  SpO2:  [96 %-99 %] 98 %  BP: (103-174)/(59-86) 160/81     Weight: 98.2 kg (216 lb 9.6 oz)  Body mass index is 33.42 kg/m².    Estimated  Creatinine Clearance: 42.4 mL/min (A) (based on SCr of 1.8 mg/dL (H)).    Physical Exam   Constitutional: She is oriented to person, place, and time. She appears well-developed and well-nourished. No distress.       HENT:   Head: Normocephalic and atraumatic.   Eyes: Conjunctivae and EOM are normal.   Neck: Normal range of motion. Neck supple.   Cardiovascular: Normal rate, regular rhythm and normal heart sounds.   Pulmonary/Chest: Effort normal. No stridor. No respiratory distress. She has no wheezes. She has no rales.   Abdominal: Soft. She exhibits no distension and no mass. There is no tenderness. There is no guarding.   Musculoskeletal: Normal range of motion. She exhibits no edema.   Neurological: She is alert and oriented to person, place, and time.   Skin: Skin is warm and dry. No rash noted. She is not diaphoretic. No erythema.   Psychiatric: She has a normal mood and affect. Her behavior is normal.   Vitals reviewed.      Significant Labs:   Blood Culture:   Recent Labs   Lab 10/20/19  1530 10/20/19  1600 10/21/19  1024   LABBLOO Gram stain ciera bottle: Gram positive cocci in chains resembling Strep   Positive results previously called  Gram stain aer bottle: Gram positive cocci in chains resembling Strep   Positive results previously called  STREPTOCOCCUS AGALACTIAE (GROUP B)  Beta-hemolytic streptococci are routinely susceptible to   penicillins,cephalosporins and carbapenems.  * Gram stain ciera bottle: Gram positive cocci in chains resembling Strep   Results called to and read back by: Andrea Ackerman  Gram stain aer bottle: Gram positive cocci in chains resembling Strep   Positive results previously called  STREPTOCOCCUS AGALACTIAE (GROUP B)  Beta-hemolytic streptococci are routinely susceptible to   penicillins,cephalosporins and carbapenems.  For susceptibility see order #[id number]  * No growth after 5 days.  No growth after 5 days.     CBC:   No results for input(s): WBC, HGB, HCT, PLT in the  last 48 hours.  CMP:   Recent Labs   Lab 10/27/19  0411 10/28/19  0659   * 134*   K 4.0 4.2   CL 99 102   CO2 24 24   * 173*   BUN 19 22*   CREATININE 1.9* 1.8*   CALCIUM 9.7 10.2   ALBUMIN 1.5* 1.5*   ANIONGAP 8 8   EGFRNONAA 29* 31*       Significant Imaging: I have reviewed all pertinent imaging results/findings within the past 24 hours.   IR Aspiration Injection Large Joint with Fluoro [[id number]] Resulted: 10/25/19 1843   Order Status: Completed Updated: 10/25/19 1845   Narrative:     EXAMINATION:  Fluid collection aspiration    Procedural Personnel    Attending physician(s): Aaron Herrera MD    Fellow physician(s): None    Resident physician(s): None    Advanced practice provider(s): None    Pre-procedure diagnosis: Sepsis    Post-procedure diagnosis: Same    Indication: Leukocytosis with fluid collection    Additional clinical history: None    Complications: No immediate complications.    TECHNIQUE:  - Aspiration of joint under ultrasound guidance    FINDINGS:  Pre-procedure    Consent: Informed consent for the procedure was obtained and time-out was performed prior to the procedure.    Preparation: The site was prepared and draped using maximal sterile barrier technique including cutaneous antisepsis.    Antibiotic administered: Prophylactic dose within 1 hour of procedure start time or 2 hours for vancomycin or fluoroquinolones    Anesthesia/sedation    Level of anesthesia/sedation: Moderate sedation (conscious sedation)    Anesthesia/sedation administered by: Not applicable    Total intra-service sedation time (minutes): 0    Fluid collection aspiration    The patient was positioned supine. Initial imaging was performed. Local anesthesia was administered. The left sternoclavicular joint was accessed using an access needle. Position within the fluid collection was confirmed, and fluid aspiration was performed. All instruments were then removed.    - Initial imaging findings: Heterogeneous  appearance of left SC joint    - Aspiration needle/catheter: 18 gauge needle    - Post-aspiration imaging findings: No significant change    Contrast    Contrast agent: None    Contrast volume (mL): 0    Radiation Dose    CT dose length product ( mGy-cm ):    Fluoroscopy time (  ):    Reference air kerma (  ):    Kerma area product (  ):    Additional Details    Additional description of procedure: None    Equipment details: None    Specimens removed: Aspirated fluid was sent for analysis.    Estimated blood loss (mL): Less than 10    Standardized report: [email or web address]    Attestation    Signer name: Aaron Herrera MD    I attest that I was present for the entire procedure. I reviewed the stored images and agree with the report as written.   Impression:       Percutaneous aspiration of left SC joint, yielding 0.1 mL of serosanguinous fluid. No drainage catheter was left in place.    Plan:    Resume care by clinical team.    ______________________________________________________________________      Electronically signed by: Aaron Herrera MD  Date: 10/25/2019  Time: 18:43   IR Ultrasound Guidance [[id number]] Resulted: 10/25/19 1550   Order Status: Sent Updated: 10/25/19 1551   MRI Abdomen Pelvis Without Contrast (XPD) [[id number]] Resulted: 10/24/19 1246   Order Status: Completed Updated: 10/24/19 1249   Narrative:     EXAMINATION:  MRI ABDOMEN PELVIS WITHOUT CONTRAST (XPD)    CLINICAL HISTORY:  Infection, abdomen-pelvis;    TECHNIQUE:  MRI without contrast of abdomen pelvis.    COMPARISON:  CT abdomen pelvis 2014 renal transplant ultrasound 2017    FINDINGS:  Liver spleen gallbladder adrenal glands pancreas normal.  Limited pleural reaction question left posterior CP angle.    Bilateral small kidneys, chronic renal change with normal-appearing right pelvic renal transplant.    Urinary bladder contains Meyer catheter.  Uterine fundus retroflexed right.  No adnexal mass.  GI tract appears normal on MRI  imaging.  No ascites or adenopathy.  Bony structures appear intact on MRI imaging.    Incidental localized edematous change left L4-L5 level, 2.5 x 2 by 5 cm size involving posterior paraspinal muscles, no definite cyst or abscess or unusual vascularity.   Impression:       1. Renal atrophic chronic change and normal appearing right pelvic transplant.  2. Limited pleural reaction left posterior CP angle question.  3. Nonspecific myositis edematous change left lumbosacral junction discussed above.  4. No corresponding abnormality relative to history of infection of abdomen pelvis and clinical correlation requested.      Electronically signed by: Emmett Page MD  Date: 10/24/2019  Time: 12:46   MRI Spine Cerival-Thoracic-Lumbar Without Contrast (XPD) [[id number]] Resulted: 10/24/19 1050   Order Status: Completed Updated: 10/24/19 1053   Narrative:     EXAMINATION:  MRI SPINE CERVICAL-THORACIC-LUMBAR WITHOUT CONTRAST (XPD)    CLINICAL HISTORY:  Ped, back pain, infection suspected;    TECHNIQUE:  Multiplanar multisequence MRI of the cervical, thoracic and lumbar spine are performed.    COMPARISON:  There are no prior studies for comparison at this time.    FINDINGS:  MRI of the cervical spine:    The study is degraded due to patient motion artifacts.  Grossly there is mild reversal of the normal cervical curvature, felt to be related to spondylotic changes.  Prevertebral soft tissues are within normal limits.  There is no marrow abnormalities to suggest acute fractures or neoplastic processes.  Craniovertebral alignment is within normal limits.  There is no Chiari type malformations.    C2-3: No gross disc herniations or spinal stenosis.  No gross narrowing of the neural foramina.    C3-4: Disc dehydration.  Mild circumferential annular disc bulge without cord compression.  No significant foraminal stenosis.    C4-5: Spondylotic changes with disc space narrowing and marginal anterior spondylotic osteophyte.  There  is a broad-based posterior osteophyte and disc bulge complex with dorsal displacement of the cervical cord.  There is mild central canal spinal stenosis.  Neural foramina are not evaluated well due to patient motion artifacts.    C5-6: Spondylosis with disc space narrowing and marginal anterior spondylotic osteophytes.  There is a broad-based posterior osteophyte and disc bulge complex.  Mild to moderate right foraminal stenosis and mild left foraminal stenosis.    C6-C7: Milder degenerative disc disease with mild circumferential annular disc bulge.  No significant cord compression or spinal stenosis.  Neural foramina are not evaluated well.    C7-T1: There is a right paracentral osteophyte and a disc protrusion complex with effacement of the anterior subarachnoid space.  There is at least a moderate right foraminal stenosis and moderate left foraminal stenosis.    T1-T2: No significant spinal stenosis or cord compression.    MRI of the dorsal spine:    Images are degraded due to patient motion artifacts.  There is normal kyphotic curvature of the dorsal spine.  Mild levo scoliotic curvature of the mid dorsal spine.  Seen best on the sagittal images there is no significant spinal stenosis or cord compression.  Multilevel mild degenerative disc disease noted.  There is no neoplastic processes of the dorsal spine.  Paraspinal soft tissues are unremarkable.  No gross abnormalities of the dorsal cord.    MRI of the lumbar spine:    There is a mild levoscoliosis of the lumbar spine.  No spondylolisthesis or spondylolysis.  There is a hemangioma in the L1 vertebral body.  No acute fractures.    L5-S1: Dehydration of the nucleus pulposis.  There is a broad-based disc protrusion with slight craniad extension behind the inferior endplate of L5 there is ventral compression of the thecal sac.  Mild bilateral foraminal narrowing.  There is a lentiform shaped T1 hypointense and T2 hyperintense intradural approximately 2.8 by 0.9  cm fluid like signal intensity along the dorsal aspect of the thecal sac with anterior displacement of the roots of the cauda equina.  Findings suggestive of a lesion like a arachnoid cyst.  The AP diameter of the free CSF space is significantly compromised.    L4-5: No disc herniations or spinal stenosis or foraminal stenosis.  There is mild facet arthropathy.    L2-3 4: No disc herniations or spinal stenosis or foraminal stenosis.    L2-3: No disc herniations or spinal stenosis or foraminal stenosis.    L1-2: No disc herniations or spinal stenosis or foraminal stenosis.   Impression:       1. Cervical spine: Multilevel spondylotic changes in the cervical spine from C4 through to C7 as above.  2. MRI of the dorsal spine: No significant central canal spinal stenosis or cord compression or significant disc herniations.  3. MRI of the lumbar spine: Broad-based disc protrusion/disc bulge with slight craniad extension behind the inferior endplate of L5 as above.  In addition in the lumbar spine there is an intradural cystic lesion along the dorsal aspect with anterior displacement and compression of the roots of the cauda equina behind the L5 vertebral body, likely representing a lesion like a arachnoid cyst.  The fluid collection has similar signal intensity to the CSF..      Electronically signed by: Juan A Catalan MD  Date: 10/24/2019  Time: 10:50   CT Chest Without Contrast [[id number]] Resulted: 10/22/19 1316   Order Status: Completed Updated: 10/22/19 1319   Narrative:     EXAMINATION:  CT CHEST WITHOUT CONTRAST    CLINICAL HISTORY:  Sepsis;possible SC joint infection, h/o kidney transplant on immunosupressants;    TECHNIQUE:  Low dose axial images, sagittal and coronal reformations were obtained from the thoracic inlet to the lung bases. Contrast was not administered.    COMPARISON:  Chest x-ray 10/20/2019, CT scan left shoulder 10/21/2019 and shoulder clavicle radiographs 10/20/2019    FINDINGS:  Again vague  subcutaneous and supraclavicular adipose tissue scarring and/or edema noted left supraclavicular and infraclavicular regions.  No unusual mediastinal edema, mass or adenopathy.    Limited chronic scarring pararenal space.  Scattered calcified plaque aorta coronary arteries great vessels.    Trachea bronchial tree normal.  Dependent congestion, atelectasis, posterior CP angles particularly on left and lingular segment left upper lobe.  Postop changes right shoulder.   Impression:       Nonspecific scarring versus interstitial edema right clavicular region discussed above.  Changes perhaps relative to previous subclavian vein catheterization for dialysis therapy..  No current active cellulitis phlegmon or abscess.    Nonspecific vague pneumonitis congestion left lower lobe posterior basilar segment CP angle region.      Electronically signed by: Emmett Page MD  Date: 10/22/2019  Time: 13:16   Imaging History     2019  Date Procedure Name PACS Link Status Accession Number Location   10/25/19 03:50 PM IR Aspiration Injection Large Joint with Fluoro  Images Final [id number] [facility]   10/25/19 03:50 PM IR Ultrasound Guidance  Images Exam Ended [id number] [facility]   10/23/19 05:58 PM MRI Abdomen Pelvis Without Contrast (XPD)  Images Final [id number] [facility]   10/23/19 05:29 PM MRI Spine Cerival-Thoracic-Lumbar Without Contrast (XPD)  Images Final [id number] [facility]   10/22/19 11:57 AM CT Chest Without Contrast  Images Final [id number] [facility]   10/21/19 08:29 AM CT Hip Without Contrast Left  Images Final [id number] [facility]   10/21/19 08:29 AM CT Shoulder Without Contrast Left  Images Final [id number] [facility]   10/20/19 05:16 PM X-Ray Hip 2 View Left  Images Final [id number] [facility]   10/20/19 05:16 PM X-Ray Shoulder Trauma Left  Images Final [id number] [facility]   10/20/19 05:16 PM X-Ray Chest AP Portable  Images Final [id number] [facility]   10/20/19 04:40 PM CT Head Without Contrast  [facility] [id number] [facility]   10/20/19 12:00 AM CARDIAC MONITORING STRIPS   Final     10/22/19 12:17 PM Echo Color Flow Doppler? Yes  Final [id number] [facility]

## 2019-10-28 NOTE — PROGRESS NOTES
Elizabeth Maldonado is a 54 y.o. female patient.  Doing well today  Scheduled Meds:   atorvastatin  20 mg Oral Daily    carvedilol  6.25 mg Oral BID    cefTRIAXone (ROCEPHIN) IVPB  2 g Intravenous Q24H    [START ON 10/29/2019] colchicine  0.6 mg Oral Daily    heparin (porcine)  5,000 Units Subcutaneous Q12H    insulin aspart U-100  10 Units Subcutaneous TID WM    insulin detemir U-100  40 Units Subcutaneous QHS    tacrolimus  3 mg Oral BID       Review of patient's allergies indicates:   Allergen Reactions    Iodine and iodide containing products Hives    Shrimp Itching    Topamax [topiramate] Other (See Comments)     Vision changes    Zoloft [sertraline] Palpitations         Vital Signs Range (Last 24H):  Temp:  [98.6 °F (37 °C)-99.7 °F (37.6 °C)]   Pulse:  [84-90]   Resp:  [18]   BP: (145-174)/(76-86)   SpO2:  [96 %-98 %]     I & O (Last 24H):    Intake/Output Summary (Last 24 hours) at 10/28/2019 1722  Last data filed at 10/28/2019 0600  Gross per 24 hour   Intake --   Output 1500 ml   Net -1500 ml           Physical Exam:  Constitutional: She is oriented to person, place, and time. She appears well-developed and well-nourished. No distress.   HENT:   Head: Normocephalic and atraumatic.   Eyes: EOM are normal. No scleral icterus.   Cardiovascular: Normal rate, regular rhythm and normal heart sounds. Exam reveals no friction rub.   No murmur heard.  Pulmonary/Chest: Effort normal and breath sounds normal. No respiratory distress. She has no wheezes. She has no rales.   Musculoskeletal: She exhibits no edema or deformity.   Neurological: She is alert and oriented to person, place, and time.   Skin: Skin is warm and dry. No rash noted. She is not diaphoretic.   Psychiatric: She has a normal mood and affect. Her behavior is normal.   Nursing note and vitals reviewed.         Laboratory:  CBC:   Recent Labs   Lab 10/24/19  0452   WBC 10.87   RBC 3.35*   HGB 9.0*   HCT 27.9*      MCV 83   MCH  26.9*   MCHC 32.3     BMP:   Recent Labs   Lab 10/28/19  0659   *   *   K 4.2      CO2 24   BUN 22*   CREATININE 1.8*   CALCIUM 10.2         Diagnostic Results:    PATRICK on CKD3  - baseline Cr 1.6-2.0 with GFR 30-40s; last at baseline 5/2019. Followed by Dr. Bonilla.   - Cr stable at baseline  - monitor BMP  - renally dose medications     Hyponatremia  - stable, Recommend to convert IVPB to NS when possible, monitor daily levels     H/o kidney transplant; on long term immunosuppressive medications  - s/p LURT 12/2014; followed by Dr. Catalan.   - home regimen includes MMF 500mg BID and Prograf 2/3  - MMF being held due to bacteremia; appreciate ID assistance. Continue holding.  - Prograf trough at goal of 4-7 on current regimen; continue. Continue to trend daily troughs for now given questionable compliance  - will continue to monitor for drug toxicities     CKD-MBD  - hypercalcemia - stable, avoid calcium and vit d supplementation     Hypoalbuminemia  - on novasource, monitor levels     Bacteremia  Joint pain - s/p joint aspiration  DM    If pt needs line for OP abx, avoid PICC line            Ravinder Saeed  10/28/2019

## 2019-10-28 NOTE — PLAN OF CARE
Problem: Fall Injury Risk  Goal: Absence of Fall and Fall-Related Injury  Outcome: Ongoing, Progressing     Problem: Adult Inpatient Plan of Care  Goal: Plan of Care Review  Outcome: Ongoing, Progressing  Goal: Patient-Specific Goal (Individualization)  Outcome: Ongoing, Progressing  Goal: Absence of Hospital-Acquired Illness or Injury  Outcome: Ongoing, Progressing  Goal: Optimal Comfort and Wellbeing  Outcome: Ongoing, Progressing  Goal: Readiness for Transition of Care  Outcome: Ongoing, Progressing  Goal: Rounds/Family Conference  Outcome: Ongoing, Progressing     Problem: Diabetes Comorbidity  Goal: Blood Glucose Level Within Desired Range  Outcome: Ongoing, Progressing     Problem: Skin Injury Risk Increased  Goal: Skin Health and Integrity  Outcome: Ongoing, Progressing     Problem: Infection  Goal: Infection Symptom Resolution  Outcome: Ongoing, Progressing

## 2019-10-28 NOTE — PLAN OF CARE
Problem: Occupational Therapy Goal  Goal: Occupational Therapy Goal  Description  Goals to be met by: 11/6/19    Patient will increase functional independence with ADLs by performing:    UE Dressing with Modified [place].  LE Dressing with Modified [place].  Grooming while standing at sink with Modified [place] and Supervision.  Toileting from toilet with Modified [place] for hygiene and clothing management.   Supine to sit with Modified [place] and Supervision.  Step transfer with Modified [place] and Supervision    Toilet transfer to toilet with Stand-by Assistance. Met  Upper extremity exercise program x15 reps per handout, with assistance as needed.      Outcome: Ongoing, Progressing   The patient was able to amb using a RW and transfer to the toilet with SBA.  The patient was able to perform AROM to B shoulder and elbow x10 reps.

## 2019-10-28 NOTE — PT/OT/SLP PROGRESS
Physical Therapy Treatment    Patient Name:  Elizabeth Maldonado   MRN:  [id number]    Recommendations:     Discharge Recommendations:  home health PT(with family assistance; Pt requesting PCA services.)   Discharge Equipment Recommendations: walker, rolling, bedside commode, bath bench   Barriers to discharge home: Inaccessible home and Decreased caregiver support    Assessment:     Elizabeth Maldonado is a 54 y.o. female admitted with a medical diagnosis of Acute renal failure superimposed on stage 3 chronic kidney disease.  She presents with the following impairments/functional limitations:  weakness, impaired functional mobilty, impaired balance, gait instability, decreased lower extremity function, decreased upper extremity function, pain.    Rehab Prognosis: Good; patient would benefit from acute skilled PT services to address these deficits and reach maximum level of function.    Recent Surgery: * No surgery found *      Plan:     During this hospitalization, patient to be seen 5 x/week to address the identified rehab impairments via gait training, therapeutic activities, therapeutic exercises, neuromuscular re-education and progress toward the following goals:    · Plan of Care Expires:  11/23/19    Subjective     Chief Complaint: L hip pain  Patient/Family Comments/goals: Pt agreeable to therapy with encouragement.   Pain/Comfort:  · Pain Rating 1: (Pt c/o L hip pain during ambulation.)  · Pain Addressed 1: Pre-medicate for activity      Objective:     Patient found HOB elevated with gresham catheter, peripheral IV upon PT entry to room.     General Precautions: Standard, fall, diabetic   Orthopedic Precautions:N/A   Braces: N/A    Functional Mobility:  Pt required extra time to complete tasks 2* pain.    · Bed Mobility:     · Scooting: stand by assistance  · Supine to Sit: stand by assistance with HOB elevated   · Transfers:  Sit to Stand:  stand by assistance and contact guard assistance with rolling  walker  · Bed to Chair: stand by assistance and contact guard assistance with  rolling walker  using  Step Transfer  · Gait: Pt ambulated ~140 ft with CGA-SBA using RW.  Pt with decreased step length and kellee, limited by L hip pain and c/o weakness.    · Balance: Pt with fair+ balance.       AM-PAC 6 CLICK MOBILITY  Turning over in bed (including adjusting bedclothes, sheets and blankets)?: 4  Sitting down on and standing up from a chair with arms (e.g., wheelchair, bedside commode, etc.): 4  Moving from lying on back to sitting on the side of the bed?: 4  Moving to and from a bed to a chair (including a wheelchair)?: 3  Need to walk in hospital room?: 3  Climbing 3-5 steps with a railing?: 3  Basic Mobility Total Score: 21       Therapeutic Activities and Exercises:  BLE seated therex 10 reps: hip flex, LAQ, and AP    Patient left up in chair on pillows with all lines intact and call button in reach.  Tray table in front.      GOALS:   Multidisciplinary Problems     Physical Therapy Goals        Problem: Physical Therapy Goal    Goal Priority Disciplines Outcome Goal Variances Interventions   Physical Therapy Goal     PT, PT/OT Ongoing, Progressing     Description:  Patient will increase functional independence with mobility by performin. Sit<>stand with SPV with RW/least restrictive device.  2. Gait x 150 feet with SPV with RW/least restrictive device.  3. Ascend/descend 5 step(s), L HR with least restrictive assistive device and SPV  4. Pt to transfer supine>sit EOB with SPV.                       Time Tracking:     PT Received On: 10/28/19  PT Start Time: 1112     PT Stop Time: 1138  PT Total Time (min): 26 min     Billable Minutes: Gait Training 16 min and Therapeutic Exercise 10 min    Treatment Type: Treatment  PT/PTA: PT     PTA Visit Number: 0     Argelia Guzmán, PT  10/28/2019

## 2019-10-28 NOTE — PLAN OF CARE
Problem: Physical Therapy Goal  Goal: Physical Therapy Goal  Description  Patient will increase functional independence with mobility by performin. Sit<>stand with SPV with RW/least restrictive device.  2. Gait x 150 feet with SPV with RW/least restrictive device.  3. Ascend/descend 5 step(s), L HR with least restrictive assistive device and SPV  4. Pt to transfer supine>sit EOB with SPV.      Outcome: Ongoing, Progressing     Pt ambulated ~140 ft with CGA-SBA using RW.

## 2019-10-28 NOTE — NURSING
Patient is A&Ox4 with peripheral IV in right arm.  Patient has been complaining of pain and has been getting PRN pain medications upon request as ordered.

## 2019-10-28 NOTE — PT/OT/SLP PROGRESS
"Occupational Therapy      Patient Name:  Elizabeth Maldonado   MRN:  [id number]    Patient not seen today secondary to Pain(The patient reports "more than 10/10" pain. The nurse, Divya was present with pain meds. ). Will follow-up later.    Selena Meyer OT  10/28/2019  "

## 2019-10-28 NOTE — ASSESSMENT & PLAN NOTE
54-year-old female with history of kidney transplant (on cellcept and prograf), DM2, HLD, recent fall off ladder, presents with left sternoclavicular joint, left lower back, and left hip pain, found to have group B strep bacteremia.  MRI CTL spine without evidence of epidural abscess/diskitis/OM; MRI pelvis with nonspecific myositis edematous change of left lumbosacral junction. Patient s/p aspiration of left SC joint 10/25/2019 - no growth on cultures.  BCXS 10/21 - NG x 5d.  GBS bacteremia - source unknown.  TTE - no vegetations.  L Greater trochanteric bursitis and L sacroiliitis on exam.  Stable non septic.     Recommenations:  - Continue ceftriaxone 2 gram IV q 24 hours for Group B strep bacteremia  - Follow-up joint SC joint fluid cultures  - PICC placement   - plan for definite 4 weeks of ceftriaxone from 10/21 given bacteremia of unkown origin/possbile septic joint but most likely will treat 6 as there is some suspicion this may be bone related infection  - weekly ESR, CRP, CMP, CBC on mondays faxed to ID dept at [phone]  - FU with Dr. Mabry in ID in 2 weeks  -  The patient was encouraged to call the office for further concerns or complaints.

## 2019-10-28 NOTE — SUBJECTIVE & OBJECTIVE
Interval History: No AEON.  Afebrile.  Patient still complaining of left hip pain and back pain.  Reprots intermittent subjective fever and chills.      Review of Systems   Constitutional: Positive for activity change. Negative for chills and fever.   Respiratory: Negative for cough and shortness of breath.    Cardiovascular: Negative for chest pain and leg swelling.   Gastrointestinal: Negative for abdominal pain, constipation, diarrhea, nausea and vomiting.   Musculoskeletal: Positive for arthralgias (left shoulder pain, left hip pain), back pain and gait problem. Negative for myalgias.   Skin: Negative for rash and wound.   Neurological: Positive for weakness. Negative for dizziness, light-headedness and headaches.     Objective:     Vital Signs (Most Recent):  Temp: 99.2 °F (37.3 °C) (10/28/19 0818)  Pulse: 90 (10/28/19 0818)  Resp: 18 (10/28/19 0818)  BP: (!) 160/81 (10/28/19 0818)  SpO2: 98 % (10/28/19 0818) Vital Signs (24h Range):  Temp:  [98.3 °F (36.8 °C)-99.7 °F (37.6 °C)] 99.2 °F (37.3 °C)  Pulse:  [84-92] 90  Resp:  [18] 18  SpO2:  [96 %-99 %] 98 %  BP: (103-174)/(59-86) 160/81     Weight: 98.2 kg (216 lb 9.6 oz)  Body mass index is 33.42 kg/m².    Estimated Creatinine Clearance: 42.4 mL/min (A) (based on SCr of 1.8 mg/dL (H)).    Physical Exam   Constitutional: She is oriented to person, place, and time. She appears well-developed and well-nourished. No distress.       HENT:   Head: Normocephalic and atraumatic.   Eyes: Conjunctivae and EOM are normal.   Neck: Normal range of motion. Neck supple.   Cardiovascular: Normal rate, regular rhythm and normal heart sounds.   Pulmonary/Chest: Effort normal. No stridor. No respiratory distress. She has no wheezes. She has no rales.   Abdominal: Soft. She exhibits no distension and no mass. There is no tenderness. There is no guarding.   Musculoskeletal: Normal range of motion. She exhibits no edema.   Neurological: She is alert and oriented to person, place,  and time.   Skin: Skin is warm and dry. No rash noted. She is not diaphoretic. No erythema.   Psychiatric: She has a normal mood and affect. Her behavior is normal.   Vitals reviewed.      Significant Labs:   Blood Culture:   Recent Labs   Lab 10/20/19  1530 10/20/19  1600 10/21/19  1024   LABBLOO Gram stain ciera bottle: Gram positive cocci in chains resembling Strep   Positive results previously called  Gram stain aer bottle: Gram positive cocci in chains resembling Strep   Positive results previously called  STREPTOCOCCUS AGALACTIAE (GROUP B)  Beta-hemolytic streptococci are routinely susceptible to   penicillins,cephalosporins and carbapenems.  * Gram stain ciera bottle: Gram positive cocci in chains resembling Strep   Results called to and read back by: Andrea Ackerman  Gram stain aer bottle: Gram positive cocci in chains resembling Strep   Positive results previously called  STREPTOCOCCUS AGALACTIAE (GROUP B)  Beta-hemolytic streptococci are routinely susceptible to   penicillins,cephalosporins and carbapenems.  For susceptibility see order #[id number]  * No growth after 5 days.  No growth after 5 days.     CBC:   No results for input(s): WBC, HGB, HCT, PLT in the last 48 hours.  CMP:   Recent Labs   Lab 10/27/19  0411 10/28/19  0659   * 134*   K 4.0 4.2   CL 99 102   CO2 24 24   * 173*   BUN 19 22*   CREATININE 1.9* 1.8*   CALCIUM 9.7 10.2   ALBUMIN 1.5* 1.5*   ANIONGAP 8 8   EGFRNONAA 29* 31*       Significant Imaging: I have reviewed all pertinent imaging results/findings within the past 24 hours.   IR Aspiration Injection Large Joint with Fluoro [[id number]] Resulted: 10/25/19 1843   Order Status: Completed Updated: 10/25/19 1845   Narrative:     EXAMINATION:  Fluid collection aspiration    Procedural Personnel    Attending physician(s): Aaron Herrera MD    Fellow physician(s): None    Resident physician(s): None    Advanced practice provider(s): None    Pre-procedure diagnosis:  Sepsis    Post-procedure diagnosis: Same    Indication: Leukocytosis with fluid collection    Additional clinical history: None    Complications: No immediate complications.    TECHNIQUE:  - Aspiration of joint under ultrasound guidance    FINDINGS:  Pre-procedure    Consent: Informed consent for the procedure was obtained and time-out was performed prior to the procedure.    Preparation: The site was prepared and draped using maximal sterile barrier technique including cutaneous antisepsis.    Antibiotic administered: Prophylactic dose within 1 hour of procedure start time or 2 hours for vancomycin or fluoroquinolones    Anesthesia/sedation    Level of anesthesia/sedation: Moderate sedation (conscious sedation)    Anesthesia/sedation administered by: Not applicable    Total intra-service sedation time (minutes): 0    Fluid collection aspiration    The patient was positioned supine. Initial imaging was performed. Local anesthesia was administered. The left sternoclavicular joint was accessed using an access needle. Position within the fluid collection was confirmed, and fluid aspiration was performed. All instruments were then removed.    - Initial imaging findings: Heterogeneous appearance of left SC joint    - Aspiration needle/catheter: 18 gauge needle    - Post-aspiration imaging findings: No significant change    Contrast    Contrast agent: None    Contrast volume (mL): 0    Radiation Dose    CT dose length product ( mGy-cm ):    Fluoroscopy time (  ):    Reference air kerma (  ):    Kerma area product (  ):    Additional Details    Additional description of procedure: None    Equipment details: None    Specimens removed: Aspirated fluid was sent for analysis.    Estimated blood loss (mL): Less than 10    Standardized report: [email or web address]    Attestation    Signer name: Aaron Herrera MD    I attest that I was present for the entire procedure. I reviewed the stored images and agree with the report  as written.   Impression:       Percutaneous aspiration of left SC joint, yielding 0.1 mL of serosanguinous fluid. No drainage catheter was left in place.    Plan:    Resume care by clinical team.    ______________________________________________________________________      Electronically signed by: Aaron Herrera MD  Date: 10/25/2019  Time: 18:43   IR Ultrasound Guidance [[id number]] Resulted: 10/25/19 1550   Order Status: Sent Updated: 10/25/19 1551   MRI Abdomen Pelvis Without Contrast (XPD) [[id number]] Resulted: 10/24/19 1246   Order Status: Completed Updated: 10/24/19 1249   Narrative:     EXAMINATION:  MRI ABDOMEN PELVIS WITHOUT CONTRAST (XPD)    CLINICAL HISTORY:  Infection, abdomen-pelvis;    TECHNIQUE:  MRI without contrast of abdomen pelvis.    COMPARISON:  CT abdomen pelvis 2014 renal transplant ultrasound 2017    FINDINGS:  Liver spleen gallbladder adrenal glands pancreas normal.  Limited pleural reaction question left posterior CP angle.    Bilateral small kidneys, chronic renal change with normal-appearing right pelvic renal transplant.    Urinary bladder contains Meyer catheter.  Uterine fundus retroflexed right.  No adnexal mass.  GI tract appears normal on MRI imaging.  No ascites or adenopathy.  Bony structures appear intact on MRI imaging.    Incidental localized edematous change left L4-L5 level, 2.5 x 2 by 5 cm size involving posterior paraspinal muscles, no definite cyst or abscess or unusual vascularity.   Impression:       1. Renal atrophic chronic change and normal appearing right pelvic transplant.  2. Limited pleural reaction left posterior CP angle question.  3. Nonspecific myositis edematous change left lumbosacral junction discussed above.  4. No corresponding abnormality relative to history of infection of abdomen pelvis and clinical correlation requested.      Electronically signed by: Emmett Page MD  Date: 10/24/2019  Time: 12:46   MRI Spine Cerival-Thoracic-Lumbar Without  Contrast (XPD) [[id number]] Resulted: 10/24/19 1050   Order Status: Completed Updated: 10/24/19 1053   Narrative:     EXAMINATION:  MRI SPINE CERVICAL-THORACIC-LUMBAR WITHOUT CONTRAST (XPD)    CLINICAL HISTORY:  Ped, back pain, infection suspected;    TECHNIQUE:  Multiplanar multisequence MRI of the cervical, thoracic and lumbar spine are performed.    COMPARISON:  There are no prior studies for comparison at this time.    FINDINGS:  MRI of the cervical spine:    The study is degraded due to patient motion artifacts.  Grossly there is mild reversal of the normal cervical curvature, felt to be related to spondylotic changes.  Prevertebral soft tissues are within normal limits.  There is no marrow abnormalities to suggest acute fractures or neoplastic processes.  Craniovertebral alignment is within normal limits.  There is no Chiari type malformations.    C2-3: No gross disc herniations or spinal stenosis.  No gross narrowing of the neural foramina.    C3-4: Disc dehydration.  Mild circumferential annular disc bulge without cord compression.  No significant foraminal stenosis.    C4-5: Spondylotic changes with disc space narrowing and marginal anterior spondylotic osteophyte.  There is a broad-based posterior osteophyte and disc bulge complex with dorsal displacement of the cervical cord.  There is mild central canal spinal stenosis.  Neural foramina are not evaluated well due to patient motion artifacts.    C5-6: Spondylosis with disc space narrowing and marginal anterior spondylotic osteophytes.  There is a broad-based posterior osteophyte and disc bulge complex.  Mild to moderate right foraminal stenosis and mild left foraminal stenosis.    C6-C7: Milder degenerative disc disease with mild circumferential annular disc bulge.  No significant cord compression or spinal stenosis.  Neural foramina are not evaluated well.    C7-T1: There is a right paracentral osteophyte and a disc protrusion complex with effacement of  the anterior subarachnoid space.  There is at least a moderate right foraminal stenosis and moderate left foraminal stenosis.    T1-T2: No significant spinal stenosis or cord compression.    MRI of the dorsal spine:    Images are degraded due to patient motion artifacts.  There is normal kyphotic curvature of the dorsal spine.  Mild levo scoliotic curvature of the mid dorsal spine.  Seen best on the sagittal images there is no significant spinal stenosis or cord compression.  Multilevel mild degenerative disc disease noted.  There is no neoplastic processes of the dorsal spine.  Paraspinal soft tissues are unremarkable.  No gross abnormalities of the dorsal cord.    MRI of the lumbar spine:    There is a mild levoscoliosis of the lumbar spine.  No spondylolisthesis or spondylolysis.  There is a hemangioma in the L1 vertebral body.  No acute fractures.    L5-S1: Dehydration of the nucleus pulposis.  There is a broad-based disc protrusion with slight craniad extension behind the inferior endplate of L5 there is ventral compression of the thecal sac.  Mild bilateral foraminal narrowing.  There is a lentiform shaped T1 hypointense and T2 hyperintense intradural approximately 2.8 by 0.9 cm fluid like signal intensity along the dorsal aspect of the thecal sac with anterior displacement of the roots of the cauda equina.  Findings suggestive of a lesion like a arachnoid cyst.  The AP diameter of the free CSF space is significantly compromised.    L4-5: No disc herniations or spinal stenosis or foraminal stenosis.  There is mild facet arthropathy.    L2-3 4: No disc herniations or spinal stenosis or foraminal stenosis.    L2-3: No disc herniations or spinal stenosis or foraminal stenosis.    L1-2: No disc herniations or spinal stenosis or foraminal stenosis.   Impression:       1. Cervical spine: Multilevel spondylotic changes in the cervical spine from C4 through to C7 as above.  2. MRI of the dorsal spine: No significant  central canal spinal stenosis or cord compression or significant disc herniations.  3. MRI of the lumbar spine: Broad-based disc protrusion/disc bulge with slight craniad extension behind the inferior endplate of L5 as above.  In addition in the lumbar spine there is an intradural cystic lesion along the dorsal aspect with anterior displacement and compression of the roots of the cauda equina behind the L5 vertebral body, likely representing a lesion like a arachnoid cyst.  The fluid collection has similar signal intensity to the CSF..      Electronically signed by: Juan A Catalan MD  Date: 10/24/2019  Time: 10:50   CT Chest Without Contrast [[id number]] Resulted: 10/22/19 1316   Order Status: Completed Updated: 10/22/19 1319   Narrative:     EXAMINATION:  CT CHEST WITHOUT CONTRAST    CLINICAL HISTORY:  Sepsis;possible SC joint infection, h/o kidney transplant on immunosupressants;    TECHNIQUE:  Low dose axial images, sagittal and coronal reformations were obtained from the thoracic inlet to the lung bases. Contrast was not administered.    COMPARISON:  Chest x-ray 10/20/2019, CT scan left shoulder 10/21/2019 and shoulder clavicle radiographs 10/20/2019    FINDINGS:  Again vague subcutaneous and supraclavicular adipose tissue scarring and/or edema noted left supraclavicular and infraclavicular regions.  No unusual mediastinal edema, mass or adenopathy.    Limited chronic scarring pararenal space.  Scattered calcified plaque aorta coronary arteries great vessels.    Trachea bronchial tree normal.  Dependent congestion, atelectasis, posterior CP angles particularly on left and lingular segment left upper lobe.  Postop changes right shoulder.   Impression:       Nonspecific scarring versus interstitial edema right clavicular region discussed above.  Changes perhaps relative to previous subclavian vein catheterization for dialysis therapy..  No current active cellulitis phlegmon or abscess.    Nonspecific vague pneumonitis  congestion left lower lobe posterior basilar segment CP angle region.      Electronically signed by: Emmett Page MD  Date: 10/22/2019  Time: 13:16   Imaging History     2019  Date Procedure Name PACS Link Status Accession Number Location   10/25/19 03:50 PM IR Aspiration Injection Large Joint with Fluoro  Images Final [id number]   10/25/19 03:50 PM IR Ultrasound Guidance  Images Exam Ended [id number]   10/23/19 05:58 PM MRI Abdomen Pelvis Without Contrast (XPD)  Images Final [id number] [id number]   10/23/19 05:29 PM MRI Spine Cerival-Thoracic-Lumbar Without Contrast (XPD)  Images Final [id number] [facility]   10/22/19 11:57 AM CT Chest Without Contrast  Images Final [id number] [facility]   10/21/19 08:29 AM CT Hip Without Contrast Left  Images Final [id number] [facility]   10/21/19 08:29 AM CT Shoulder Without Contrast Left  Images Final [id number] [facility]   10/20/19 05:16 PM X-Ray Hip 2 View Left  Images Final [id number] [facility]   10/20/19 05:16 PM X-Ray Shoulder Trauma Left  Images Final [id number] [facility]   10/20/19 05:16 PM X-Ray Chest AP Portable  Images Final [id number] [facility]   10/20/19 04:40 PM CT Head Without Contrast  Images Final [id number] [facility]   10/20/19 12:00 AM CARDIAC MONITORING STRIPS  Final     10/22/19 12:17 PM Echo Color Flow Doppler? Yes  Final [id number] [facility]

## 2019-10-28 NOTE — PT/OT/SLP PROGRESS
"Occupational Therapy   Treatment    Name: Elizabeth Maldonado  MRN: [id number]  Admitting Diagnosis:  Acute renal failure superimposed on stage 3 chronic kidney disease       Recommendations:     Discharge Recommendations: home health OT(with family assist)  Discharge Equipment Recommendations:  walker, rolling, bedside commode, bath bench  Barriers to discharge:  Decreased caregiver support    Assessment:     Elizabeth Maldonado is a 54 y.o. female with a medical diagnosis of Acute renal failure superimposed on stage 3 chronic kidney disease. The patient was able to amb using a RW to the toilet, sink and bed with SBA. The patient tolerated AROM to BUE with minimal c/o pain. The patient [id number] minutes of functional activity with minimal c./o pain, able to smile and laugh during OT but requested Morphine at the end of treatment. The patient is progressing in OT. Performance deficits affecting function are weakness, impaired endurance, impaired self care skills, gait instability, impaired functional mobilty, decreased upper extremity function, impaired balance, decreased lower extremity function, edema.     Rehab Prognosis:  Good; patient would benefit from acute skilled OT services to address these deficits and reach maximum level of function.       Plan:     Patient to be seen 5 x/week to address the above listed problems via self-care/home management, therapeutic activities, therapeutic exercises  · Plan of Care Expires: 11/06/19  · Plan of Care Reviewed with: patient    Subjective     Pain/Comfort:  · Pain Rating 1: (c/o back pain and shoulder pain but did not rate)  · Pain Addressed 1: Cessation of Activity, Distraction, Reposition, Nurse notified  · Pain Rating Post-Intervention 1: (The patient requested "Morphine" at the end of tX)    Objective:     Communicated with: nurse prior to session.  Patient found up in chair, reclined with gresham catheter, peripheral IV upon OT entry to room.    General Precautions: " Standard, fall, diabetic   Orthopedic Precautions:N/A   Braces: N/A     Occupational Performance:     Bed Mobility:    · Patient completed Scooting/Bridging with stand by assistance  · Patient completed Sit to Supine with stand by assistance     Functional Mobility/Transfers:  · Patient completed Sit <> Stand Transfer with stand by assistance  with  rolling walker   · Patient completed Bed <> Chair Transfer using Step Transfer technique with stand by assistance with rolling walker  · Patient completed Toilet Transfer Step Transfer technique with modified independence and stand by assistance with  rolling walker The patient sat on the toilet with SBA but got up from the toilet Mod I.  · Functional Mobility: The patient amb using a RW from the chair to the toilet, sink > bed with SBA. The patient sat on the EOB ~10 min to clean off her tray table and night stand.  The patient was able to reach forward/dynamic reaching to retrieve items from the table with no c/o pain. The patient amb using a RW around the bed to get into bed with SBA. The patient was able to manage the bathroom door and RW. OT managed the Meyer catheter,.    Activities of Daily Living:  · Grooming: supervision and stand by assistance to stand at the sink to wash her hands and face    · Upper Body Dressing: contact guard assistance to doff back gown while standing   · Toileting: modified independence to wipe self with wipes and use femminine spray      [facility] 6 Click ADL: 19    Treatment & Education:  The patient participated in toileting and grooming tasks. The patient was educated re: need to reposition in the chair 2* patient was found sliding down in the chair. The patient was able to perform AROM to B shoulder flexion and abduction and B elbow flexion and ext x10 reps.    Patient left HOB elevated with all lines intact and call button in reachEducation:   The patient used her call light to request pain meds.    GOALS:   Multidisciplinary Problems      Occupational Therapy Goals        Problem: Occupational Therapy Goal    Goal Priority Disciplines Outcome Interventions   Occupational Therapy Goal     OT, PT/OT Ongoing, Progressing    Description:  Goals to be met by: 11/6/19    Patient will increase functional independence with ADLs by performing:    UE Dressing with Modified [place].  LE Dressing with Modified [place].  Grooming while standing at sink with Modified [place] and Supervision.  Toileting from toilet with Modified [place] for hygiene and clothing management.   Supine to sit with Modified [place] and Supervision.  Step transfer with Modified [place] and Supervision    Toilet transfer to toilet with Stand-by Assistance. Met  Upper extremity exercise program x15 reps per handout, with assistance as needed.                       Time Tracking:     OT Date of Treatment: 10/28/19  OT Start Time: 1339  OT Stop Time: 1422  OT Total Time (min): 43 min    Billable Minutes:Self Care/Home Management 20  Therapeutic Activity 13  Therapeutic Exercise 10  Total Time 43    Selena Meyer OT  10/28/2019

## 2019-10-28 NOTE — PLAN OF CARE
Problem: Adult Inpatient Plan of Care  Goal: Plan of Care Review  Outcome: Ongoing, Not Progressing  Flowsheets (Taken 10/28/2019 0430)  Plan of Care Reviewed With: patient    Patient remains free from injury and falls. Requesting pain medication more frequently then the night before. Patient requires assistance with most activities. Plan of care continued.

## 2019-10-29 LAB
ALBUMIN SERPL BCP-MCNC: 1.5 G/DL (ref 3.5–5.2)
ANION GAP SERPL CALC-SCNC: 7 MMOL/L (ref 8–16)
BACTERIA SPEC ANAEROBE CULT: NORMAL
BUN SERPL-MCNC: 24 MG/DL (ref 6–20)
CALCIUM SERPL-MCNC: 10 MG/DL (ref 8.7–10.5)
CHLORIDE SERPL-SCNC: 101 MMOL/L (ref 95–110)
CO2 SERPL-SCNC: 25 MMOL/L (ref 23–29)
CREAT SERPL-MCNC: 1.7 MG/DL (ref 0.5–1.4)
EST. GFR  (AFRICAN AMERICAN): 38 ML/MIN/1.73 M^2
EST. GFR  (NON AFRICAN AMERICAN): 33 ML/MIN/1.73 M^2
GLUCOSE SERPL-MCNC: 230 MG/DL (ref 70–110)
PHOSPHATE SERPL-MCNC: 4.3 MG/DL (ref 2.7–4.5)
POCT GLUCOSE: 210 MG/DL (ref 70–110)
POCT GLUCOSE: 223 MG/DL (ref 70–110)
POCT GLUCOSE: 279 MG/DL (ref 70–110)
POTASSIUM SERPL-SCNC: 4.4 MMOL/L (ref 3.5–5.1)
SODIUM SERPL-SCNC: 133 MMOL/L (ref 136–145)
TACROLIMUS BLD-MCNC: 4.5 NG/ML (ref 5–15)
TACROLIMUS BLD-MCNC: 5.3 NG/ML (ref 5–15)

## 2019-10-29 PROCEDURE — 63600175 PHARM REV CODE 636 W HCPCS: Performed by: INTERNAL MEDICINE

## 2019-10-29 PROCEDURE — 36415 COLL VENOUS BLD VENIPUNCTURE: CPT

## 2019-10-29 PROCEDURE — 94761 N-INVAS EAR/PLS OXIMETRY MLT: CPT

## 2019-10-29 PROCEDURE — 97530 THERAPEUTIC ACTIVITIES: CPT

## 2019-10-29 PROCEDURE — 97802 MEDICAL NUTRITION INDIV IN: CPT

## 2019-10-29 PROCEDURE — 25000003 PHARM REV CODE 250: Performed by: HOSPITALIST

## 2019-10-29 PROCEDURE — 63600175 PHARM REV CODE 636 W HCPCS: Performed by: HOSPITALIST

## 2019-10-29 PROCEDURE — 11000001 HC ACUTE MED/SURG PRIVATE ROOM

## 2019-10-29 PROCEDURE — 97116 GAIT TRAINING THERAPY: CPT

## 2019-10-29 PROCEDURE — 86580 TB INTRADERMAL TEST: CPT | Performed by: HOSPITALIST

## 2019-10-29 PROCEDURE — 30200315 PPD INTRADERMAL TEST REV CODE 302: Performed by: HOSPITALIST

## 2019-10-29 PROCEDURE — 63600175 PHARM REV CODE 636 W HCPCS: Performed by: PHYSICIAN ASSISTANT

## 2019-10-29 PROCEDURE — 80069 RENAL FUNCTION PANEL: CPT

## 2019-10-29 PROCEDURE — 80197 ASSAY OF TACROLIMUS: CPT

## 2019-10-29 PROCEDURE — 25000003 PHARM REV CODE 250: Performed by: INTERNAL MEDICINE

## 2019-10-29 RX ORDER — MORPHINE SULFATE 10 MG/ML
4 INJECTION INTRAMUSCULAR; INTRAVENOUS; SUBCUTANEOUS EVERY 4 HOURS PRN
Status: DISCONTINUED | OUTPATIENT
Start: 2019-10-29 | End: 2019-10-31 | Stop reason: HOSPADM

## 2019-10-29 RX ORDER — ACETAMINOPHEN 325 MG/1
650 TABLET ORAL EVERY 4 HOURS PRN
Status: DISCONTINUED | OUTPATIENT
Start: 2019-10-29 | End: 2019-10-31 | Stop reason: HOSPADM

## 2019-10-29 RX ADMIN — INSULIN ASPART 6 UNITS: 100 INJECTION, SOLUTION INTRAVENOUS; SUBCUTANEOUS at 11:10

## 2019-10-29 RX ADMIN — HEPARIN SODIUM 5000 UNITS: 5000 INJECTION, SOLUTION INTRAVENOUS; SUBCUTANEOUS at 09:10

## 2019-10-29 RX ADMIN — TUBERCULIN PURIFIED PROTEIN DERIVATIVE 5 UNITS: 5 INJECTION, SOLUTION INTRADERMAL at 12:10

## 2019-10-29 RX ADMIN — MORPHINE SULFATE 4 MG: 10 INJECTION INTRAVENOUS at 02:10

## 2019-10-29 RX ADMIN — TACROLIMUS 3 MG: 1 CAPSULE ORAL at 05:10

## 2019-10-29 RX ADMIN — INSULIN ASPART 4 UNITS: 100 INJECTION, SOLUTION INTRAVENOUS; SUBCUTANEOUS at 05:10

## 2019-10-29 RX ADMIN — INSULIN ASPART 10 UNITS: 100 INJECTION, SOLUTION INTRAVENOUS; SUBCUTANEOUS at 05:10

## 2019-10-29 RX ADMIN — CARVEDILOL 6.25 MG: 6.25 TABLET, FILM COATED ORAL at 08:10

## 2019-10-29 RX ADMIN — ATORVASTATIN CALCIUM 20 MG: 10 TABLET, FILM COATED ORAL at 08:10

## 2019-10-29 RX ADMIN — MORPHINE SULFATE 4 MG: 10 INJECTION INTRAVENOUS at 07:10

## 2019-10-29 RX ADMIN — INSULIN ASPART 10 UNITS: 100 INJECTION, SOLUTION INTRAVENOUS; SUBCUTANEOUS at 08:10

## 2019-10-29 RX ADMIN — CEFTRIAXONE SODIUM 2 G: 2 INJECTION, SOLUTION INTRAVENOUS at 11:10

## 2019-10-29 RX ADMIN — TACROLIMUS 3 MG: 1 CAPSULE ORAL at 08:10

## 2019-10-29 RX ADMIN — INSULIN ASPART 1 UNITS: 100 INJECTION, SOLUTION INTRAVENOUS; SUBCUTANEOUS at 09:10

## 2019-10-29 RX ADMIN — OXYCODONE HYDROCHLORIDE AND ACETAMINOPHEN 1 TABLET: 10; 325 TABLET ORAL at 09:10

## 2019-10-29 RX ADMIN — INSULIN ASPART 10 UNITS: 100 INJECTION, SOLUTION INTRAVENOUS; SUBCUTANEOUS at 11:10

## 2019-10-29 RX ADMIN — HEPARIN SODIUM 5000 UNITS: 5000 INJECTION, SOLUTION INTRAVENOUS; SUBCUTANEOUS at 08:10

## 2019-10-29 RX ADMIN — COLCHICINE 0.6 MG: 0.6 TABLET, FILM COATED ORAL at 08:10

## 2019-10-29 RX ADMIN — MORPHINE SULFATE 4 MG: 10 INJECTION INTRAVENOUS at 06:10

## 2019-10-29 RX ADMIN — OXYCODONE HYDROCHLORIDE AND ACETAMINOPHEN 1 TABLET: 10; 325 TABLET ORAL at 11:10

## 2019-10-29 RX ADMIN — INSULIN ASPART 4 UNITS: 100 INJECTION, SOLUTION INTRAVENOUS; SUBCUTANEOUS at 08:10

## 2019-10-29 RX ADMIN — CARVEDILOL 6.25 MG: 6.25 TABLET, FILM COATED ORAL at 09:10

## 2019-10-29 NOTE — PROGRESS NOTES
[facility] Hospital Medicine  Progress Note    Patient Name: Elizabeth Maldonado  MRN: [id number]  Patient Class: IP- Inpatient   Admission Date: 10/20/2019  Length of Stay: 9 days  Attending Physician: Osvaldo Rose MD  Primary Care Provider: Richy Singleton NP        Subjective:     Principal Problem:Acute renal failure superimposed on stage 3 chronic kidney disease        HPI:  Ms. Elizabeth Maldonado is a 54 y.o. female with type 2 diabetes mellitus (HbA1c 8.7% Dec 2014), hyperlipidemia (.6 Nov 2014), CKD stage 3, anemia of chronic disease, and history of renal transplant who presents to [facility] ED with complaints of left arm and hip pain the past few days.  She reports that the pain is mainly localized to her lower back, left hip, and left shoulder.  She does recall falling at work as she was stepping down a ladder and missed the last step.  She does recall falling on her left side but denies any head trauma.  She denies any fevers, nausea, vomiting, abdominal pain, nor any diarrhea.  She does report that her appetite has been poor in the last few days but she has not lost any weight.  She denies any night sweats but does report some chills.    Of note, she was noted to be febrile in the ER but denies any coughing, dysuria, rashes, headache, nor any neck stiffness.  She denies any dyspnea, hemoptysis, lower extremity pain or swelling, recent travel, nor any sick contacts.    Overview/Hospital Course:  55 y/o female with hx of kidney transplant presented with left shoulder and hip pain.  Noted to be febrile upon presentation.  No obvious source of infection.  On immunosuppressive medications and admitted on broad spectrum ABx's.  BCx now positive for Strep.  ID consulted.  Slight ARF on presentation and Nephrology consulted.  Started on IVF hydration.    Pt is TTP over left shoulder and hip.  CT imaging concern for inflammation?infection SC joint.  Ortho to follow up. ECHO pending.  Continue rocephin. PT/OT eval to assist with dc planning     Of note, initial MRI ordered was not done per Radiology decision. Agree with proceeding to MRI as CT scan inconclusive.   ECHO reviewed - no vegetations   Nephrology and transplant at [facility] communicating about possible rejection of transplanted kidney - Dr. Catalan does not feel patient needs to be transferred at this time. Renal function improving with IVF and thought to be volume depleted due to hyperglycemia.     Plan for aspiration of left SC joint. Await cultures. D/w nephrology- will need kelly cath and not piccline on dc home for IV antibiotics.   Await cultures from aspiration - negative to date    Plan for IV abx x 4-6 weeks  No picc line - ?kelly- will need placement    Interval History: Still complaining of left hip pain.    Review of Systems   HENT: Negative for ear discharge and ear pain.    Eyes: Negative for pain and itching.   Endocrine: Negative for polyphagia and polyuria.   Neurological: Negative for seizures and syncope.     Objective:     Vital Signs (Most Recent):  Temp: 98.6 °F (37 °C) (10/29/19 1116)  Pulse: 93 (10/29/19 1116)  Resp: 18 (10/29/19 1116)  BP: 132/70 (10/29/19 1116)  SpO2: 98 % (10/29/19 1116) Vital Signs (24h Range):  Temp:  [98.6 °F (37 °C)-99.2 °F (37.3 °C)] 98.6 °F (37 °C)  Pulse:  [85-93] 93  Resp:  [18-19] 18  SpO2:  [97 %-98 %] 98 %  BP: (132-166)/(70-82) 132/70     Weight: 98.2 kg (216 lb 9.6 oz)  Body mass index is 33.42 kg/m².    Intake/Output Summary (Last 24 hours) at 10/29/2019 1339  Last data filed at 10/29/2019 0500  Gross per 24 hour   Intake 240 ml   Output 3900 ml   Net -3660 ml      Physical Exam   Constitutional: She appears well-developed and well-nourished. No distress.   HENT:   Head: Normocephalic and atraumatic.   Eyes: Conjunctivae and EOM are normal.   Cardiovascular: Normal rate and regular rhythm.   Pulmonary/Chest: Effort normal and breath sounds normal.   Abdominal: Soft. She exhibits no  distension.   Musculoskeletal: She exhibits no edema or deformity.   Skin: Skin is warm and dry. She is not diaphoretic.   Psychiatric: She has a normal mood and affect. Her behavior is normal.       Significant Labs:   BMP:   Recent Labs   Lab 10/29/19  0427   *   *   K 4.4      CO2 25   BUN 24*   CREATININE 1.7*   CALCIUM 10.0     CBC: No results for input(s): WBC, HGB, HCT, PLT in the last 48 hours.    Significant Imaging: I have reviewed all pertinent imaging results/findings within the past 24 hours.      Assessment/Plan:      * Acute on CKD stage 3  Patient's baseline creatinine is around 1.6.  2.9 on presentation.  She does have a history of a renal transplant.    Started on IVF's and Nephrology consulted.  Creat improving with IVF's.  Avoid nephrotoxic medications.    Bacteremia due to group B Streptococcus  Patient was noted to have a fever of 102.5° F without a clear source of infection.  She also was tachycardic and tachypneic, all of which has improved.  Given that she is on tacrolimus and mycophenolate with immunosuppression, she was started on empiric antibiotic therapy.  Now noted to have Group B Bacteremia.  Consulted ID.  Unsure source.  Patient does complain of left shoulder and hip pain.  Septic joint?  Ortho consult.  CRP and ESR - elevated   ECHO - reviewed   Plan 4-6 weeks on rocephin -kelly line placement - pt will need placement, patient cannot use herself     Pain of left sternoclavicular joint  S/p aspiration - IR  Studies negative.        Chronic bilateral low back pain with bilateral sciatica  Pt has l-spine disc disease at L5  Pain control  PT/OT  No signs of infection       CKD (chronic kidney disease), stage III  As addressed above.    Kidney transplant status, living unrelated donor - 12/2/14  As addressed above.    Hyperlipidemia  Poorly controlled; will continue her home regimen of atorvastatin.    Anemia of chronic disease  The patient's H/H is stable and  consistent with previous laboratory measurements, and the patient exhibits no signs or symptoms of acute bleeding; there is no indication for transfusion.  Will continue to monitor.    Type 2 diabetes mellitus, uncontrolled, with renal complications  Uncontrolled with hyperglycemia.  Will resume home insulin at lower dose to avoid hypoglycemia.  Diabetic diet and insulin sliding scale.  A1c 10.4%  Pt reports home insulin regimen: lantus 80u qhs and humalog 30u TID + SSI   Increase insulin dosage      VTE Risk Mitigation (From admission, onward)         Ordered     heparin (porcine) injection 5,000 Units  Every 12 hours      10/20/19 2208     IP VTE HIGH RISK PATIENT  Once      10/20/19 2017                      Osvaldo Rose MD  Department of Hospital Medicine   [facility]

## 2019-10-29 NOTE — PLAN OF CARE
Jules, admissions from [facility] informed SW that patient is approved financially and an auth will be submitted.      10/29/19 1522   Post-Acute Status   Post-Acute Authorization Placement  (SNF)   Post-Acute Placement Status Pending Payor Review

## 2019-10-29 NOTE — SUBJECTIVE & OBJECTIVE
Interval History: Still complaining of left hip pain.    Review of Systems   HENT: Negative for ear discharge and ear pain.    Eyes: Negative for pain and itching.   Endocrine: Negative for polyphagia and polyuria.   Neurological: Negative for seizures and syncope.     Objective:     Vital Signs (Most Recent):  Temp: 98.6 °F (37 °C) (10/29/19 1116)  Pulse: 93 (10/29/19 1116)  Resp: 18 (10/29/19 1116)  BP: 132/70 (10/29/19 1116)  SpO2: 98 % (10/29/19 1116) Vital Signs (24h Range):  Temp:  [98.6 °F (37 °C)-99.2 °F (37.3 °C)] 98.6 °F (37 °C)  Pulse:  [85-93] 93  Resp:  [18-19] 18  SpO2:  [97 %-98 %] 98 %  BP: (132-166)/(70-82) 132/70     Weight: 98.2 kg (216 lb 9.6 oz)  Body mass index is 33.42 kg/m².    Intake/Output Summary (Last 24 hours) at 10/29/2019 1339  Last data filed at 10/29/2019 0500  Gross per 24 hour   Intake 240 ml   Output 3900 ml   Net -3660 ml      Physical Exam   Constitutional: She appears well-developed and well-nourished. No distress.   HENT:   Head: Normocephalic and atraumatic.   Eyes: Conjunctivae and EOM are normal.   Cardiovascular: Normal rate and regular rhythm.   Pulmonary/Chest: Effort normal and breath sounds normal.   Abdominal: Soft. She exhibits no distension.   Musculoskeletal: She exhibits no edema or deformity.   Skin: Skin is warm and dry. She is not diaphoretic.   Psychiatric: She has a normal mood and affect. Her behavior is normal.       Significant Labs:   BMP:   Recent Labs   Lab 10/29/19  0427   *   *   K 4.4      CO2 25   BUN 24*   CREATININE 1.7*   CALCIUM 10.0     CBC: No results for input(s): WBC, HGB, HCT, PLT in the last 48 hours.    Significant Imaging: I have reviewed all pertinent imaging results/findings within the past 24 hours.

## 2019-10-29 NOTE — ASSESSMENT & PLAN NOTE
Patient was noted to have a fever of 102.5° F without a clear source of infection.  She also was tachycardic and tachypneic, all of which has improved.  Given that she is on tacrolimus and mycophenolate with immunosuppression, she was started on empiric antibiotic therapy.  Now noted to have Group B Bacteremia.  Consulted ID.  Unsure source.  Patient does complain of left shoulder and hip pain.  Septic joint?  Ortho consult.  CRP and ESR - elevated   ECHO - reviewed   Plan 4-6 weeks on rocephin -kelly line placement - pt will need placement, patient cannot use herself

## 2019-10-29 NOTE — PROGRESS NOTES
"  [facility]  Adult Nutrition  Consult Note    SUMMARY     Recommendations    Recommendation/Intervention:   1. Discontinue Novasource renal and provide Boost Glucose Strawberry TID since P&K within range and pt continues with flutuating appetite. Pt will not drink Novasource Renal anymore.  2. Encourage DM diet compliance upon d/c.     Goals: PO intake > 50% EEN, EPN by next RD visit  Nutrition Goal Status: new  Communication of RD Recs: other (comment)(POC)    Reason for Assessment    Reason For Assessment: length of stay  Diagnosis: renal disease  Relevant Medical History: hx of renal transplant (2014), HLD, DM2, CKD3  Interdisciplinary Rounds: did not attend  General Information Comments: Pt admitted for acute CKD3; pt reports fluctuations in appetite. Pt ate ~ 25% of her lunch but yesterday she ate 100% of lunch. Pt does not like the Novasource Renal. Phosphorus and Potassium within range: pt would like to try Boost Glucose Strawberry to supplement her meals when she does not have an appetite. Wt stable since 2018. Pt states at home she tries to follow a DM diet but has difficulty at times. Pt has been educated on diet before. Provided reinforcement education on cardiac/diabtetic diet and encouraged pt to continue moderate protein and low sodium restrictions. Denies N/V/D/C. NFPE complete 10/29, no s/s of malnutrition.   Nutrition Discharge Planning: d/c on diabetic diet     Nutrition Risk Screen    Nutrition Risk Screen: no indicators present    Nutrition/Diet History    Patient Reported Diet/Restrictions/Preferences: diabetic diet, other (see comments)(moderate protein )  Spiritual, Cultural Beliefs, Amish Practices, Values that Affect Care: no  Factors Affecting Nutritional Intake: decreased appetite    Anthropometrics    Temp: 98.6 °F (37 °C)  Height Method: Measured  Height: 5' 7.5" (171.5 cm)  Height (inches): 67.5 in  Weight Method: Bed Scale  Weight: 98.2 kg (216 lb 9.6 " oz)  Weight (lb): 216.6 lb  Ideal Body Weight (IBW), Female: 137.5 lb  % Ideal Body Weight, Female (lb): 150.33 lb  BMI (Calculated): 32  BMI Grade: 30 - 34.9- obesity - grade I       Lab/Procedures/Meds    Pertinent Labs Reviewed: reviewed  Pertinent Labs Comments: Na 133, BUN 24, Crt 1.7, Glu 230, A1C 10.4   Pertinent Medications Reviewed: reviewed  Pertinent Medications Comments:   Scheduled Meds:  atorvastatin   carvedilol   heparin (porcine)   insulin aspart U-100   insulin detemir U-100     Estimated/Assessed Needs    Weight Used For Calorie Calculations: 98.2 kg (216 lb 7.9 oz)  Energy Calorie Requirements (kcal): 1934 kcal/day  Energy Need Method: [place]-St Jeor(PAL 1.2)  Protein Requirements: 78 g/day(0.8 g/kg)  Weight Used For Protein Calculations: 98.2 kg (216 lb 7.9 oz)  Fluid Requirements (mL): 1 mL/kcal or PER MD   Estimated Fluid Requirement Method: RDA Method  RDA Method (mL): 1934  CHO Requirement: 284 g/day      Nutrition Prescription Ordered    Current Diet Order: diabetic 2000kcal  Oral Nutrition Supplement: Novasource Renal TID    Evaluation of Received Nutrient/Fluid Intake    I/O: 480/3900  Energy Calories Required: not meeting needs  Protein Required: not meeting needs  Fluid Required: meeting needs  Comments: LBM: 10/28  Tolerance: tolerating  % Intake of Estimated Energy Needs: 25 - 50 %   % Meal Intake: 25 - 50 %     Nutrition Risk    Level of Risk/Frequency of Follow-up: high(1x/week)     Assessment and Plan    Nutrition Problem  Inadequate energy intake     Related to (etiology):   Poor appetite     Signs and Symptoms (as evidenced by):   PO intake < 25 % of meals this AM & PM    Interventions/Recommendations (treatment strategy):  Collaboration of care with other providers  [employer] - Boost Glucose Strawberry TID to provide calories and protein     Nutrition Diagnosis Status:   New    Monitor and Evaluation    Food and Nutrient Intake: energy intake, food and beverage  intake  Food and Nutrient Adminstration: diet order  Anthropometric Measurements: weight, weight change, body mass index  Biochemical Data, Medical Tests and Procedures: electrolyte and renal panel, gastrointestinal profile, glucose/endocrine profile, inflammatory profile, lipid profile  Nutrition-Focused Physical Findings: overall appearance     Malnutrition Assessment    Subcutaneous Fat Loss (Final Summary): well nourished  Muscle Loss Evaluation (Final Summary): well nourished       Nutrition Follow-Up    RD Follow-up?: Yes

## 2019-10-29 NOTE — PLAN OF CARE
Patient accepted at [facility] pending financials. LUCHO sent message to Jules at [facility] informing that someone from facility may need to come to hospital to complete paperwork as patient does not have close family. LUCHO waiting for response.      10/29/19 1240   Discharge Reassessment   Assessment Type Discharge Planning Reassessment   Provided patient/caregiver education on the expected discharge date and the discharge plan Yes   Discharge Plan A Skilled Nursing Facility   Discharge Plan B Home   DME Needed Upon Discharge  none   Patient choice form signed by patient/caregiver N/A   Anticipated Discharge Disposition SNF   Can the patient answer the patient profile reliably? Yes, cognitively intact   How does the patient rate their overall health at the present time? Fair   Describe the patient's ability to walk at the present time. No restrictions   How often would a person be available to care for the patient? Occasionally   Number of comorbid conditions (as recorded on the chart) Three   During the past month, has the patient often been bothered by feeling down, depressed or hopeless? No   During the past month, has the patient often been bothered by little interest or pleasure in doing things? No   Post-Acute Status   Post-Acute Authorization Placement  (SNF)   Post-Acute Placement Status Pending Payor Review   Discharge Delays None known at this time

## 2019-10-29 NOTE — PLAN OF CARE
Patient was declined by [facility]. [facility] informed SW that patient is better suited for SNF. LUCHO sent referral to [facility] and [facility] which are all in network with patient's insurance. Patient informed LUCHO that she does not want to go to [facility] because she used to work there.      10/29/19 1134   Post-Acute Status   Post-Acute Authorization Placement  (SNF)   Post-Acute Placement Status Referrals Sent

## 2019-10-29 NOTE — NURSING
Patient refusing to have gresham removed tonight, states she will allow staff to remove it in the morning. Patient states when we do remove gresham, she will not be drinking a lot due to having to get up and the pain she has. Patient educated infection risk with gresham catheter. Patient verbalizes understanding and is aware.

## 2019-10-29 NOTE — PROGRESS NOTES
[facility] Hospital Medicine  Progress Note    Patient Name: Elizabeth Maldonado  MRN: [id number]  Patient Class: IP- Inpatient   Admission Date: 10/20/2019  Length of Stay: 8 days  Attending Physician: Kalee Cat MD  Primary Care Provider: Richy Singleton NP        Subjective:     Principal Problem:Acute renal failure superimposed on stage 3 chronic kidney disease        HPI:  Ms. Elizabeth Maldonado is a 54 y.o. female with type 2 diabetes mellitus (HbA1c 8.7% Dec 2014), hyperlipidemia (.6 Nov 2014), CKD stage 3, anemia of chronic disease, and history of renal transplant who presents to [facility] ED with complaints of left arm and hip pain the past few days.  She reports that the pain is mainly localized to her lower back, left hip, and left shoulder.  She does recall falling at work as she was stepping down a ladder and missed the last step.  She does recall falling on her left side but denies any head trauma.  She denies any fevers, nausea, vomiting, abdominal pain, nor any diarrhea.  She does report that her appetite has been poor in the last few days but she has not lost any weight.  She denies any night sweats but does report some chills.    Of note, she was noted to be febrile in the ER but denies any coughing, dysuria, rashes, headache, nor any neck stiffness.  She denies any dyspnea, hemoptysis, lower extremity pain or swelling, recent travel, nor any sick contacts.    Overview/Hospital Course:  55 y/o female with hx of kidney transplant presented with left shoulder and hip pain.  Noted to be febrile upon presentation.  No obvious source of infection.  On immunosuppressive medications and admitted on broad spectrum ABx's.  BCx now positive for Strep.  ID consulted.  Slight ARF on presentation and Nephrology consulted.  Started on IVF hydration.    Pt is TTP over left shoulder and hip.  CT imaging concern for inflammation?infection SC joint.  Ortho to follow up. ECHO pending.  Continue rocephin. PT/OT eval to assist with dc planning     Of note, initial MRI ordered was not done per Radiology decision. Agree with proceeding to MRI as CT scan inconclusive.   ECHO reviewed - no vegetations   Nephrology and transplant at [facility] communicating about possible rejection of transplanted kidney - Dr. Catalan does not feel patient needs to be transferred at this time. Renal function improving with IVF and thought to be volume depleted due to hyperglycemia.     Plan for aspiration of left SC joint. Await cultures. D/w nephrology- will need kelly cath and not piccline on dc home for IV antibiotics.   Await cultures from aspiration - negative to date    Plan for IV abx x 4-6 weeks  No picc line - ?kelly- will need placement    Interval History: refusing gresham removal, not happy about placement for IV abx - may be difficult due to insurance   Pt cannot use kelly herself at home     Review of Systems   Constitutional: Positive for activity change. Negative for chills and fever.   Respiratory: Negative for cough and shortness of breath.    Cardiovascular: Positive for chest pain. Negative for leg swelling.   Gastrointestinal: Negative for abdominal pain, constipation, diarrhea, nausea and vomiting.   Musculoskeletal: Positive for arthralgias (left shoulder pain, left hip pain), back pain and gait problem. Negative for myalgias.   Skin: Negative for rash and wound.   Neurological: Positive for weakness. Negative for dizziness, light-headedness and headaches.   Psychiatric/Behavioral: Negative for agitation and behavioral problems. The patient is not nervous/anxious.      Objective:     Vital Signs (Most Recent):  Temp: 99.2 °F (37.3 °C) (10/28/19 1918)  Pulse: 87 (10/28/19 1918)  Resp: 19 (10/28/19 1918)  BP: (!) 166/81 (10/28/19 1918)  SpO2: 98 % (10/28/19 1918) Vital Signs (24h Range):  Temp:  [98.6 °F (37 °C)-99.2 °F (37.3 °C)] 99.2 °F (37.3 °C)  Pulse:  [84-90] 87  Resp:  [18-19] 19  SpO2:  [96 %-98  %] 98 %  BP: (145-173)/(76-86) 166/81     Weight: 98.2 kg (216 lb 9.6 oz)  Body mass index is 33.42 kg/m².    Intake/Output Summary (Last 24 hours) at 10/28/2019 2123  Last data filed at 10/28/2019 1826  Gross per 24 hour   Intake 480 ml   Output 4000 ml   Net -3520 ml      Physical Exam   Constitutional: She appears well-developed and well-nourished. No distress.   HENT:   Head: Normocephalic and atraumatic.   Eyes: Conjunctivae and EOM are normal.   Cardiovascular: Normal rate and regular rhythm.   Pulmonary/Chest: Effort normal and breath sounds normal.   Abdominal: Soft. She exhibits no distension.   Musculoskeletal: She exhibits no edema or deformity.   Skin: Skin is warm and dry. She is not diaphoretic.   Psychiatric: She has a normal mood and affect. Her behavior is normal.       Significant Labs: All pertinent labs within the past 24 hours have been reviewed.    Significant Imaging:   I have reviewed and interpreted all pertinent imaging results/findings within the past 24 hours.      Assessment/Plan:      * Acute on CKD stage 3  Patient's baseline creatinine is around 1.6.  2.9 on presentation.  She does have a history of a renal transplant.    Started on IVF's and Nephrology consulted.  Creat improving with IVF's.  Avoid nephrotoxic medications.    Pain of left sternoclavicular joint  S/p aspiration - IR  Await studies        Chronic bilateral low back pain with bilateral sciatica  Pt has l-spine disc disease at L5  Pain control  PT/OT  No signs of infection       Bacteremia due to group B Streptococcus  Patient was noted to have a fever of 102.5° F without a clear source of infection.  She also was tachycardic and tachypneic, all of which has improved.  Given that she is on tacrolimus and mycophenolate with immunosuppression, she was started on empiric antibiotic therapy.  Now noted to have Group B Bacteremia.  Consulted ID.  Unsure source.  Patient does complain of left shoulder and hip pain.  Septic  joint?  Ortho consult.    See CT imaging chest 10/22 - follow up ortho recs - proceed with MRI   CRP and ESR - elevated   ECHO - reviewed   Plan 4-6 weeks on rocephin -kelly line placement - pt will need placement, patient cannot use herself     CKD (chronic kidney disease), stage III  As addressed above.    Kidney transplant status, living unrelated donor - 12/2/14  As addressed above.    Hyperlipidemia  Poorly controlled; will continue her home regimen of atorvastatin.    Anemia of chronic disease  The patient's H/H is stable and consistent with previous laboratory measurements, and the patient exhibits no signs or symptoms of acute bleeding; there is no indication for transfusion.  Will continue to monitor.    Type 2 diabetes mellitus, uncontrolled, with renal complications  Uncontrolled with hyperglycemia.  Will resume home insulin at lower dose to avoid hypoglycemia.  Diabetic diet and insulin sliding scale.  A1c 10.4%    Pt reports home insulin regimen: lantus 80u qhs and humalog 30u TID + SSI   Increase basal insulin       VTE Risk Mitigation (From admission, onward)         Ordered     heparin (porcine) injection 5,000 Units  Every 12 hours      10/20/19 2208     IP VTE HIGH RISK PATIENT  Once      10/20/19 2017                      Kalee Cat MD  Department of Hospital Medicine   [facility]

## 2019-10-29 NOTE — PLAN OF CARE
Problem: Physical Therapy Goal  Goal: Physical Therapy Goal  Description  Patient will increase functional independence with mobility by performin. Sit<>stand with SPV with RW/least restrictive device.  2. Gait x 150 feet with SPV with RW/least restrictive device.  3. Ascend/descend 5 step(s), L HR with least restrictive assistive device and SPV  4. Pt to transfer supine>sit EOB with SPV.      Outcome: Ongoing, Progressing   Pt ambulated ~ 144 ft with RW, SBA.

## 2019-10-29 NOTE — PLAN OF CARE
Problem: Adult Inpatient Plan of Care  Goal: Plan of Care Review  Outcome: Ongoing, Progressing  Flowsheets (Taken 10/29/2019 0450)  Plan of Care Reviewed With: patient   Patient remains free from injury and falls. Rates pain 8/10. Patient resting in intervals not as restless as previous shifts. Medicated for pain as needed. Refused gresham to be removed last night. Patient remains afebrile this shift. Plan of care continued.

## 2019-10-29 NOTE — PT/OT/SLP PROGRESS
Physical Therapy Treatment    Patient Name:  Elizabeth Maldonado   MRN:  [id number]    Recommendations:     Discharge Recommendations:  home health PT(with family assistance; Pt requesting PCA services.)   Discharge Equipment Recommendations: walker, rolling, bedside commode, bath bench   Barriers to discharge: Decreased caregiver support    Assessment:     Elizabeth Maldonado is a 54 y.o. female admitted with a medical diagnosis of Acute renal failure superimposed on stage 3 chronic kidney disease.  She presents with the following impairments/functional limitations:  weakness, impaired endurance, impaired self care skills, gait instability, impaired balance, decreased upper extremity function, decreased lower extremity function, decreased ROM, pain, edema, decreased safety awareness .    Rehab Prognosis: Good; patient would benefit from acute skilled PT services to address these deficits and reach maximum level of function.    Recent Surgery: * No surgery found *      Plan:     During this hospitalization, patient to be seen 5 x/week to address the identified rehab impairments via gait training, therapeutic activities, therapeutic exercises, neuromuscular re-education and progress toward the following goals:    · Plan of Care Expires:  11/23/19    Subjective     Chief Complaint: L hip pain and the oral pain med is not helping . Nurse notified   Patient/Family Comments/goals: pt agreeable to treatment   Pain/Comfort:  · Pain Rating 1: 10/10  · Location - Side 1: Left  · Location 1: hip  · Pain Rating Post-Intervention 1: 10/10      Objective:     Communicated with nurse  prior to session.  Patient found up in chair with telemetry, gresham catheter upon PT entry to room.     General Precautions: Standard, fall   Orthopedic Precautions:N/A   Braces: N/A     Functional Mobility:  · Bed Mobility:     · Scooting: stand by assistance  · Bridging: stand by assistance  · Sit to Supine: stand by assistance and contact guard  assistance  · Transfers:     · Sit to Stand:  contact guard assistance and minimum assistance with rolling walker  · Gait:Pt ambulated ~ 144 ft with RW, SBA. Pt demonstrated an antalgic gait, max slow kellee, decreased step length , decreased weight shifting ability , decreased swing to stance phase.  V/T cues for safety, proper technique and walker management.  · Balance:  Fair +       AM-PAC 6 CLICK MOBILITY  Turning over in bed (including adjusting bedclothes, sheets and blankets)?: 4  Sitting down on and standing up from a chair with arms (e.g., wheelchair, bedside commode, etc.): 3  Moving from lying on back to sitting on the side of the bed?: 4  Moving to and from a bed to a chair (including a wheelchair)?: 3  Need to walk in hospital room?: 3  Climbing 3-5 steps with a railing?: 3  Basic Mobility Total Score: 20       Therapeutic Activities and Exercises:   pt performed supine BLE x 10 reps : AP. Encouraged pt to perform BLE AP, QS ,GS while in bed or chair throughout the day. Pt verbalized understanding.     Patient left HOB elevated with all lines intact, call button in reach and nurse notified..    GOALS:   Multidisciplinary Problems     Physical Therapy Goals        Problem: Physical Therapy Goal    Goal Priority Disciplines Outcome Goal Variances Interventions   Physical Therapy Goal     PT, PT/OT Ongoing, Progressing     Description:  Patient will increase functional independence with mobility by performin. Sit<>stand with SPV with RW/least restrictive device.  2. Gait x 150 feet with SPV with RW/least restrictive device.  3. Ascend/descend 5 step(s), L HR with least restrictive assistive device and SPV  4. Pt to transfer supine>sit EOB with SPV.                       Time Tracking:     PT Received On: 10/29/19  PT Start Time: 1250     PT Stop Time: 1314  PT Total Time (min): 24 min     Billable Minutes: Gait Training 15 and Therapeutic Activity 9    Treatment Type: Treatment  PT/PTA: PTA     PTA  Visit Number: 1     Rach Mabry, PTA  10/29/2019

## 2019-10-29 NOTE — ASSESSMENT & PLAN NOTE
Patient was noted to have a fever of 102.5° F without a clear source of infection.  She also was tachycardic and tachypneic, all of which has improved.  Given that she is on tacrolimus and mycophenolate with immunosuppression, she was started on empiric antibiotic therapy.  Now noted to have Group B Bacteremia.  Consulted ID.  Unsure source.  Patient does complain of left shoulder and hip pain.  Septic joint?  Ortho consult.    See CT imaging chest 10/22 - follow up ortho recs - proceed with MRI   CRP and ESR - elevated   ECHO - reviewed   Plan 4-6 weeks on rocephin -kelly line placement - pt will need placement, patient cannot use herself

## 2019-10-29 NOTE — ASSESSMENT & PLAN NOTE
Uncontrolled with hyperglycemia.  Will resume home insulin at lower dose to avoid hypoglycemia.  Diabetic diet and insulin sliding scale.  A1c 10.4%  Pt reports home insulin regimen: lantus 80u qhs and humalog 30u TID + SSI   Increase insulin dosage

## 2019-10-29 NOTE — PROGRESS NOTES
[facility]  Nephrology  Progress Note    Patient Name: Elizabeth Maldonado  MRN: [id number]  Admission Date: 10/20/2019  Hospital Length of Stay: 9 days  Attending Provider: Osvaldo Rose MD   Primary Care Physician: Richy Singleton NP  Principal Problem:Acute renal failure superimposed on stage 3 chronic kidney disease  Date of service 10/29/2019  Consults    Reason for consult: PATRICK  Subjective:     Interval History: she c/o L hip pain, severe, poorly relieved with pain medications.  No SOB, no n/v/d, good uop    Review of patient's allergies indicates:   Allergen Reactions    Iodine and iodide containing products Hives    Shrimp Itching    Topamax [topiramate] Other (See Comments)     Vision changes    Zoloft [sertraline] Palpitations     Current Facility-Administered Medications   Medication Frequency    acetaminophen tablet 650 mg Q4H PRN    atorvastatin tablet 20 mg Daily    carvedilol tablet 6.25 mg BID    cefTRIAXone (ROCEPHIN) 2 g in dextrose 5 % 50 mL IVPB Q24H    cloNIDine tablet 0.1 mg TID PRN    dextrose 50% injection 12.5 g PRN    dextrose 50% injection 25 g PRN    glucagon (human recombinant) injection 1 mg PRN    glucose chewable tablet 16 g PRN    glucose chewable tablet 24 g PRN    heparin (porcine) injection 5,000 Units Q12H    insulin aspart U-100 pen 1-10 Units PRN    insulin aspart U-100 pen 10 Units TID WM    insulin detemir U-100 pen 50 Units QHS    morphine injection 4 mg Q4H PRN    ondansetron injection 8 mg Q8H PRN    oxyCODONE-acetaminophen  mg per tablet 1 tablet Q4H PRN    promethazine (PHENERGAN) 6.25 mg in dextrose 5 % 50 mL IVPB Q6H PRN    ramelteon tablet 8 mg Nightly PRN    senna-docusate 8.6-50 mg per tablet 1 tablet BID PRN    tacrolimus capsule 3 mg BID       Objective:     Vital Signs (Most Recent):  Temp: 98.6 °F (37 °C) (10/29/19 1116)  Pulse: 93 (10/29/19 1116)  Resp: 18 (10/29/19 1116)  BP: 132/70 (10/29/19 1116)  SpO2: 98  % (10/29/19 1116)  O2 Device (Oxygen Therapy): room air (10/28/19 0805) Vital Signs (24h Range):  Temp:  [98.6 °F (37 °C)-99.2 °F (37.3 °C)] 98.6 °F (37 °C)  Pulse:  [85-93] 93  Resp:  [18-19] 18  SpO2:  [97 %-98 %] 98 %  BP: (132-166)/(70-82) 132/70     Weight: 98.2 kg (216 lb 9.6 oz) (10/26/19 0400)  Body mass index is 33.42 kg/m².  Body surface area is 2.16 meters squared.    I/O last 3 completed shifts:  In: 480 [P.O.:480]  Out: 5400 [Urine:5400]    Physical Exam   Constitutional: She is oriented to person, place, and time. She appears well-developed and well-nourished. No distress.   HENT:   Head: Normocephalic and atraumatic.   Eyes: EOM are normal. No scleral icterus.   Cardiovascular: Normal rate, regular rhythm and normal heart sounds. Exam reveals no friction rub.   No murmur heard.  Pulmonary/Chest: Effort normal and breath sounds normal. No respiratory distress. She has no wheezes. She has no rales.   Musculoskeletal: She exhibits no edema or deformity.   Neurological: She is alert and oriented to person, place, and time.   Skin: Skin is warm and dry. No rash noted. She is not diaphoretic.   Psychiatric: She has a normal mood and affect. Her behavior is normal.   Nursing note and vitals reviewed.       Significant Labs:  CBC:   Recent Labs   Lab 10/24/19  0452   WBC 10.87   RBC 3.35*   HGB 9.0*   HCT 27.9*      MCV 83   MCH 26.9*   MCHC 32.3     CMP:   Recent Labs   Lab 10/29/19  0427   *   CALCIUM 10.0   ALBUMIN 1.5*   *   K 4.4   CO2 25      BUN 24*   CREATININE 1.7*     All labs within the past 24 hours have been reviewed.    Significant Imaging:  MRI: Reviewed    Assessment/Plan:     Active Diagnoses:    Diagnosis Date Noted POA    PRINCIPAL PROBLEM:  Acute on CKD stage 3 [N17.9, N18.3] 10/20/2019 Yes    Chronic bilateral low back pain with bilateral sciatica [M54.42, M54.41, G89.29] 10/25/2019 Yes    Pain of left sternoclavicular joint [M25.512] 10/25/2019 Yes     Bacteremia due to group B Streptococcus [R78.81] 10/20/2019 Yes    CKD (chronic kidney disease), stage III [N18.3] 03/02/2015 Yes     Chronic    Kidney transplant status, living unrelated donor - 12/2/14 [Z94.0] 12/03/2014 Not Applicable     Chronic    Type 2 diabetes mellitus, uncontrolled, with renal complications [E11.29, E11.65] 07/01/2014 Yes     Chronic    Hyperlipidemia [E78.5] 07/01/2014 Yes     Chronic    Anemia of chronic disease [D63.8] 07/01/2014 Yes     Chronic      Problems Resolved During this Admission:    Diagnosis Date Noted Date Resolved POA    Chronic hip pain [M25.559, G89.29] 10/20/2019 10/20/2019 Yes    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 Followed by Dr. Bonilla.   - Cr stable at baseline  - monitor BMP  - renally dose medications  - recommend avoiding PICC/midlines    Hyponatremia  - stable, Recommend to convert IVPB to NS when possible, monitor daily levels    H/o kidney transplant; on long term immunosuppressive medications  - s/p LURT 12/2014; followed by Dr. Catalan.   - home regimen includes MMF 500mg BID and Prograf 2/3  - MMF being held due to bacteremia; appreciate ID assistance. Continue holding.  - Prograf trough at goal of 4-7 on current regimen; continue. Continue to trend daily troughs for now given questionable compliance  - will continue to monitor for drug toxicities    CKD-MBD  - hypercalcemia - stable, avoid calcium and vit d supplementation    Hypoalbuminemia  - on novasource, monitor levels    Bacteremia  Joint pain - s/p joint aspiration  DM    Thank you for allowing me to participate in the care of your patient.  Please call with any questions.    Date of service: 10/29/2019  Emmy Carney MD   Nephrology  [facility]  Office [phone]   [facility]

## 2019-10-29 NOTE — PT/OT/SLP PROGRESS
"Occupational Therapy      Patient Name:  Elizabeth Maldonado   MRN:  [id number]    Patient not seen today secondary to Patient unwilling to participate(The patient refused to participate in OT stating "I just got my Morphine" ). Will follow-up later as able.    Selena Meyer, OT  10/29/2019  "

## 2019-10-29 NOTE — PLAN OF CARE
SW spoke with patient about SNF and LTAC placement. SW explained that patient could go to SNF if LTAC was not available. [facility] sent referral to [facility] and [facility].      10/29/19 1030   Post-Acute Status   Post-Acute Authorization Placement  (LTAC)   Post-Acute Placement Status Referrals Sent

## 2019-10-29 NOTE — PLAN OF CARE
Recommendations    Recommendation/Intervention:   1. Discontinue Novasource renal and provide Boost Glucose Strawberry TID since P&K within range and pt continues with flutuating appetite. Pt will not drink Novasource Renal anymore.  2. Encourage DM diet compliance upon d/c.     Goals: PO intake > 50% EEN, EPN by next RD visit  Nutrition Goal Status: new  Communication of RD Recs: other (comment)(POC)

## 2019-10-29 NOTE — SUBJECTIVE & OBJECTIVE
Interval History: refusing gresham removal, not happy about placement for IV abx - may be difficult due to insurance   Pt cannot use kelly herself at home     Review of Systems   Constitutional: Positive for activity change. Negative for chills and fever.   Respiratory: Negative for cough and shortness of breath.    Cardiovascular: Positive for chest pain. Negative for leg swelling.   Gastrointestinal: Negative for abdominal pain, constipation, diarrhea, nausea and vomiting.   Musculoskeletal: Positive for arthralgias (left shoulder pain, left hip pain), back pain and gait problem. Negative for myalgias.   Skin: Negative for rash and wound.   Neurological: Positive for weakness. Negative for dizziness, light-headedness and headaches.   Psychiatric/Behavioral: Negative for agitation and behavioral problems. The patient is not nervous/anxious.      Objective:     Vital Signs (Most Recent):  Temp: 99.2 °F (37.3 °C) (10/28/19 1918)  Pulse: 87 (10/28/19 1918)  Resp: 19 (10/28/19 1918)  BP: (!) 166/81 (10/28/19 1918)  SpO2: 98 % (10/28/19 1918) Vital Signs (24h Range):  Temp:  [98.6 °F (37 °C)-99.2 °F (37.3 °C)] 99.2 °F (37.3 °C)  Pulse:  [84-90] 87  Resp:  [18-19] 19  SpO2:  [96 %-98 %] 98 %  BP: (145-173)/(76-86) 166/81     Weight: 98.2 kg (216 lb 9.6 oz)  Body mass index is 33.42 kg/m².    Intake/Output Summary (Last 24 hours) at 10/28/2019 2123  Last data filed at 10/28/2019 1826  Gross per 24 hour   Intake 480 ml   Output 4000 ml   Net -3520 ml      Physical Exam   Constitutional: She appears well-developed and well-nourished. No distress.   HENT:   Head: Normocephalic and atraumatic.   Eyes: Conjunctivae and EOM are normal.   Cardiovascular: Normal rate and regular rhythm.   Pulmonary/Chest: Effort normal and breath sounds normal.   Abdominal: Soft. She exhibits no distension.   Musculoskeletal: She exhibits no edema or deformity.   Skin: Skin is warm and dry. She is not diaphoretic.   Psychiatric: She has a normal  mood and affect. Her behavior is normal.       Significant Labs: All pertinent labs within the past 24 hours have been reviewed.    Significant Imaging:   I have reviewed and interpreted all pertinent imaging results/findings within the past 24 hours.

## 2019-10-30 LAB
ALBUMIN SERPL BCP-MCNC: 1.5 G/DL (ref 3.5–5.2)
ANION GAP SERPL CALC-SCNC: 8 MMOL/L (ref 8–16)
BUN SERPL-MCNC: 25 MG/DL (ref 6–20)
CALCIUM SERPL-MCNC: 10.1 MG/DL (ref 8.7–10.5)
CHLORIDE SERPL-SCNC: 101 MMOL/L (ref 95–110)
CO2 SERPL-SCNC: 24 MMOL/L (ref 23–29)
CREAT SERPL-MCNC: 1.9 MG/DL (ref 0.5–1.4)
EST. GFR  (AFRICAN AMERICAN): 33 ML/MIN/1.73 M^2
EST. GFR  (NON AFRICAN AMERICAN): 29 ML/MIN/1.73 M^2
GLUCOSE SERPL-MCNC: 196 MG/DL (ref 70–110)
PHOSPHATE SERPL-MCNC: 4.4 MG/DL (ref 2.7–4.5)
POCT GLUCOSE: 132 MG/DL (ref 70–110)
POCT GLUCOSE: 162 MG/DL (ref 70–110)
POCT GLUCOSE: 186 MG/DL (ref 70–110)
POCT GLUCOSE: 188 MG/DL (ref 70–110)
POCT GLUCOSE: 227 MG/DL (ref 70–110)
POTASSIUM SERPL-SCNC: 4.4 MMOL/L (ref 3.5–5.1)
SODIUM SERPL-SCNC: 133 MMOL/L (ref 136–145)

## 2019-10-30 PROCEDURE — 11000001 HC ACUTE MED/SURG PRIVATE ROOM

## 2019-10-30 PROCEDURE — 63600175 PHARM REV CODE 636 W HCPCS: Performed by: PHYSICIAN ASSISTANT

## 2019-10-30 PROCEDURE — 80069 RENAL FUNCTION PANEL: CPT

## 2019-10-30 PROCEDURE — 80197 ASSAY OF TACROLIMUS: CPT

## 2019-10-30 PROCEDURE — 25000003 PHARM REV CODE 250: Performed by: HOSPITALIST

## 2019-10-30 PROCEDURE — 63600175 PHARM REV CODE 636 W HCPCS: Performed by: HOSPITALIST

## 2019-10-30 PROCEDURE — 25000003 PHARM REV CODE 250: Performed by: INTERNAL MEDICINE

## 2019-10-30 PROCEDURE — 97116 GAIT TRAINING THERAPY: CPT

## 2019-10-30 PROCEDURE — 63600175 PHARM REV CODE 636 W HCPCS: Performed by: INTERNAL MEDICINE

## 2019-10-30 PROCEDURE — 36415 COLL VENOUS BLD VENIPUNCTURE: CPT

## 2019-10-30 PROCEDURE — 97110 THERAPEUTIC EXERCISES: CPT

## 2019-10-30 RX ORDER — GABAPENTIN 100 MG/1
100 CAPSULE ORAL 3 TIMES DAILY
Status: DISCONTINUED | OUTPATIENT
Start: 2019-10-30 | End: 2019-10-31 | Stop reason: HOSPADM

## 2019-10-30 RX ADMIN — INSULIN ASPART 2 UNITS: 100 INJECTION, SOLUTION INTRAVENOUS; SUBCUTANEOUS at 12:10

## 2019-10-30 RX ADMIN — MORPHINE SULFATE 4 MG: 10 INJECTION INTRAVENOUS at 04:10

## 2019-10-30 RX ADMIN — HEPARIN SODIUM 5000 UNITS: 5000 INJECTION, SOLUTION INTRAVENOUS; SUBCUTANEOUS at 08:10

## 2019-10-30 RX ADMIN — TACROLIMUS 3 MG: 1 CAPSULE ORAL at 09:10

## 2019-10-30 RX ADMIN — INSULIN ASPART 10 UNITS: 100 INJECTION, SOLUTION INTRAVENOUS; SUBCUTANEOUS at 12:10

## 2019-10-30 RX ADMIN — CEFTRIAXONE SODIUM 2 G: 2 INJECTION, SOLUTION INTRAVENOUS at 11:10

## 2019-10-30 RX ADMIN — CARVEDILOL 6.25 MG: 6.25 TABLET, FILM COATED ORAL at 08:10

## 2019-10-30 RX ADMIN — GABAPENTIN 100 MG: 100 CAPSULE ORAL at 08:10

## 2019-10-30 RX ADMIN — OXYCODONE HYDROCHLORIDE AND ACETAMINOPHEN 1 TABLET: 10; 325 TABLET ORAL at 06:10

## 2019-10-30 RX ADMIN — OXYCODONE HYDROCHLORIDE AND ACETAMINOPHEN 1 TABLET: 10; 325 TABLET ORAL at 02:10

## 2019-10-30 RX ADMIN — INSULIN ASPART 2 UNITS: 100 INJECTION, SOLUTION INTRAVENOUS; SUBCUTANEOUS at 08:10

## 2019-10-30 RX ADMIN — MORPHINE SULFATE 4 MG: 10 INJECTION INTRAVENOUS at 10:10

## 2019-10-30 RX ADMIN — OXYCODONE HYDROCHLORIDE AND ACETAMINOPHEN 1 TABLET: 10; 325 TABLET ORAL at 01:10

## 2019-10-30 RX ADMIN — HEPARIN SODIUM 5000 UNITS: 5000 INJECTION, SOLUTION INTRAVENOUS; SUBCUTANEOUS at 09:10

## 2019-10-30 RX ADMIN — CARVEDILOL 6.25 MG: 6.25 TABLET, FILM COATED ORAL at 09:10

## 2019-10-30 RX ADMIN — INSULIN ASPART 10 UNITS: 100 INJECTION, SOLUTION INTRAVENOUS; SUBCUTANEOUS at 07:10

## 2019-10-30 RX ADMIN — INSULIN ASPART 10 UNITS: 100 INJECTION, SOLUTION INTRAVENOUS; SUBCUTANEOUS at 05:10

## 2019-10-30 RX ADMIN — OXYCODONE HYDROCHLORIDE AND ACETAMINOPHEN 1 TABLET: 10; 325 TABLET ORAL at 09:10

## 2019-10-30 RX ADMIN — ATORVASTATIN CALCIUM 20 MG: 10 TABLET, FILM COATED ORAL at 09:10

## 2019-10-30 RX ADMIN — TACROLIMUS 3 MG: 1 CAPSULE ORAL at 05:10

## 2019-10-30 NOTE — PROGRESS NOTES
[facility]  Nephrology  Progress Note    Patient Name: Elizabeth Maldonado  MRN: [id number]  Admission Date: 10/20/2019  Hospital Length of Stay: 10 days  Attending Provider: Osvaldo Rose MD   Primary Care Physician: Richy Singleton NP  Principal Problem:Acute renal failure superimposed on stage 3 chronic kidney disease  Date of service 10/30/2019  Consults    Reason for consult: PATRICK  Subjective:     Interval History: noted potential discharge to SNF soon.  No c/o at this time.  Excellent UOP    Review of patient's allergies indicates:   Allergen Reactions    Iodine and iodide containing products Hives    Shrimp Itching    Topamax [topiramate] Other (See Comments)     Vision changes    Zoloft [sertraline] Palpitations     Current Facility-Administered Medications   Medication Frequency    acetaminophen tablet 650 mg Q4H PRN    atorvastatin tablet 20 mg Daily    carvedilol tablet 6.25 mg BID    cefTRIAXone (ROCEPHIN) 2 g in dextrose 5 % 50 mL IVPB Q24H    cloNIDine tablet 0.1 mg TID PRN    dextrose 50% injection 12.5 g PRN    dextrose 50% injection 25 g PRN    glucagon (human recombinant) injection 1 mg PRN    glucose chewable tablet 16 g PRN    glucose chewable tablet 24 g PRN    heparin (porcine) injection 5,000 Units Q12H    insulin aspart U-100 pen 1-10 Units PRN    insulin aspart U-100 pen 10 Units TID WM    insulin detemir U-100 pen 50 Units QHS    morphine injection 4 mg Q4H PRN    ondansetron injection 8 mg Q8H PRN    oxyCODONE-acetaminophen  mg per tablet 1 tablet Q4H PRN    promethazine (PHENERGAN) 6.25 mg in dextrose 5 % 50 mL IVPB Q6H PRN    ramelteon tablet 8 mg Nightly PRN    senna-docusate 8.6-50 mg per tablet 1 tablet BID PRN    tacrolimus capsule 3 mg BID       Objective:     Vital Signs (Most Recent):  Temp: 98.3 °F (36.8 °C) (10/30/19 1640)  Pulse: 86 (10/30/19 1640)  Resp: 18 (10/30/19 1640)  BP: (!) 140/67 (10/30/19 1640)  SpO2: 98 % (10/30/19  1640)  O2 Device (Oxygen Therapy): room air (10/30/19 0800) Vital Signs (24h Range):  Temp:  [98.2 °F (36.8 °C)-99.2 °F (37.3 °C)] 98.3 °F (36.8 °C)  Pulse:  [86-91] 86  Resp:  [16-18] 18  SpO2:  [98 %-100 %] 98 %  BP: (135-166)/(64-88) 140/67     Weight: 98.2 kg (216 lb 9.6 oz) (10/26/19 0400)  Body mass index is 33.42 kg/m².  Body surface area is 2.16 meters squared.    I/O last 3 completed shifts:  In: 1010 [P.O.:960; IV Piggyback:50]  Out: 4650 [Urine:4650]    Physical Exam   Constitutional: She is oriented to person, place, and time. She appears well-developed and well-nourished. No distress.   HENT:   Head: Normocephalic and atraumatic.   Eyes: EOM are normal. No scleral icterus.   Cardiovascular: Normal rate, regular rhythm and normal heart sounds. Exam reveals no friction rub.   No murmur heard.  Pulmonary/Chest: Effort normal and breath sounds normal. No respiratory distress. She has no wheezes. She has no rales.   Musculoskeletal: She exhibits no edema or deformity.   Neurological: She is alert and oriented to person, place, and time.   Skin: Skin is warm and dry. No rash noted. She is not diaphoretic.   Psychiatric: She has a normal mood and affect. Her behavior is normal.   Nursing note and vitals reviewed.       Significant Labs:  CBC:   Recent Labs   Lab 10/24/19  0452   WBC 10.87   RBC 3.35*   HGB 9.0*   HCT 27.9*      MCV 83   MCH 26.9*   MCHC 32.3     CMP:   Recent Labs   Lab 10/30/19  0411   *   CALCIUM 10.1   ALBUMIN 1.5*   *   K 4.4   CO2 24      BUN 25*   CREATININE 1.9*     All labs within the past 24 hours have been reviewed.    Significant Imaging:  MRI: Reviewed    Assessment/Plan:     Active Diagnoses:    Diagnosis Date Noted POA    PRINCIPAL PROBLEM:  Acute on CKD stage 3 [N17.9, N18.3] 10/20/2019 Yes    Chronic bilateral low back pain with bilateral sciatica [M54.42, M54.41, G89.29] 10/25/2019 Yes    Pain of left sternoclavicular joint [M25.512] 10/25/2019 Yes     Bacteremia due to group B Streptococcus [R78.81] 10/20/2019 Yes    CKD (chronic kidney disease), stage III [N18.3] 03/02/2015 Yes     Chronic    Kidney transplant status, living unrelated donor - 12/2/14 [Z94.0] 12/03/2014 Not Applicable     Chronic    Type 2 diabetes mellitus, uncontrolled, with renal complications [E11.29, E11.65] 07/01/2014 Yes     Chronic    Hyperlipidemia [E78.5] 07/01/2014 Yes     Chronic    Anemia of chronic disease [D63.8] 07/01/2014 Yes     Chronic      Problems Resolved During this Admission:    Diagnosis Date Noted Date Resolved POA    Chronic hip pain [M25.559, G89.29] 10/20/2019 10/20/2019 Yes    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 Followed by Dr. Bonilla.   - Cr stable at baseline  - monitor BMP  - renally dose medications  - recommend avoiding PICC/midlines    Hyponatremia  - stable, Recommend to convert IVPB to NS when possible, monitor daily levels    H/o kidney transplant; on long term immunosuppressive medications  - s/p LURT 12/2014; followed by Dr. Catalan.   - home regimen includes MMF 500mg BID and Prograf 2/3  - MMF being held due to bacteremia; appreciate ID assistance. Continue holding.  - Prograf trough at goal of 4-7 on current regimen; continue. Continue to trend daily troughs for now given questionable compliance  - will continue to monitor for drug toxicities    CKD-MBD  - hypercalcemia - stable, avoid calcium and vit d supplementation    Hypoalbuminemia  - on novasource, monitor levels    Bacteremia  Joint pain - s/p joint aspiration  DM    Thank you for allowing me to participate in the care of your patient.  Please call with any questions.    Date of service: 10/30/2019  Emmy Carney MD   Nephrology  [facility]  Office [phone]   [facility]

## 2019-10-30 NOTE — PLAN OF CARE
SW received message from Jules, bart from [facility] via [facility] stating that a confirmation was faxed to insurance company stating that all documentation is received and should receive auth today.     10/30/19 1010   Post-Acute Status   Post-Acute Authorization Placement  (SNF)   Post-Acute Placement Status Pending Payor Review

## 2019-10-30 NOTE — ASSESSMENT & PLAN NOTE
Patient was noted to have a fever of 102.5° F without a clear source of infection.  She also was tachycardic and tachypneic, all of which has improved.  Given that she is on tacrolimus and mycophenolate with immunosuppression, she was started on empiric antibiotic therapy.  Now noted to have Group B Bacteremia.  Consulted ID.  Unsure source.  Patient does complain of left shoulder and hip pain.  Septic joint?  Ortho consult.  CRP and ESR - elevated   ECHO - reviewed   Plan 4-6 weeks on rocephin -kelly line placement - pt will need placement, patient cannot use herself   SW working on SNF placement.

## 2019-10-30 NOTE — PLAN OF CARE
Problem: Physical Therapy Goal  Goal: Physical Therapy Goal  Description  Patient will increase functional independence with mobility by performin. Sit<>stand with SPV with RW/least restrictive device.  2. Gait x 150 feet with SPV with RW/least restrictive device.  3. Ascend/descend 5 step(s), L HR with least restrictive assistive device and SPV  4. Pt to transfer supine>sit EOB with SPV.      Outcome: Ongoing, Progressing   Pt ambulated ~ 200 ft with RW, SBA/S.

## 2019-10-30 NOTE — PLAN OF CARE
Problem: Fall Injury Risk  Goal: Absence of Fall and Fall-Related Injury  Outcome: Ongoing, Progressing  Intervention: Identify and Manage Contributors to Fall Injury Risk  Flowsheets (Taken 10/30/2019 0412)  Self-Care Promotion: BADL personal objects within reach  Medication Review/Management: medications reviewed; high risk medications identified     Problem: Adult Inpatient Plan of Care  Goal: Plan of Care Review  Outcome: Ongoing, Progressing  Goal: Optimal Comfort and Wellbeing  Outcome: Ongoing, Progressing     Pt with no injuries or complaints this shift. PRN pain medication provided per pt request. Meyer in place draining clear, yellow urine to be removed this AM. Bed in locked and low position with bed alarm set and call bell within reach, will continue with current plan of care.

## 2019-10-30 NOTE — PLAN OF CARE
Problem: Occupational Therapy Goal  Goal: Occupational Therapy Goal  Description  Goals to be met by: 11/6/19    Patient will increase functional independence with ADLs by performing:    UE Dressing with Modified [place].  LE Dressing with Modified [place].  Grooming while standing at sink with Modified [place] and Supervision.  met  Toileting from toilet with Modified [place] for hygiene and clothing management.   Supine to sit with Modified [place] and Supervision.  met  Step transfer with Modified [place] and Supervision  met  Toilet transfer to toilet with Stand-by Assistance. Met  Upper extremity exercise program x15 reps per handout, with assistance as needed.  met       Outcome: Ongoing, Progressing   The patient is progressing in OT. The patient is able to perform functional mobility with SBA. The patient tolerated UE therex using red theraband x15 reps.

## 2019-10-30 NOTE — PLAN OF CARE
LUCHO contacted Jules, admissions at [facility] to inquire if they would accept patient with a central line instead of picc line for IV abx. Jules stated that they would accept the patient with central line and it wouldn't be a problem. LUCHO  Informed that central line has not been placed and ID still has not given an end date for IV abx. LUCHO informed Jules that she will update her when catheter is placed and end date is given. LUCHO notified Dr. Rose via secure chat.      10/30/19 1112   Post-Acute Status   Post-Acute Authorization Placement  (SNF )   Post-Acute Placement Status Pending Payor Medical Review   Discharge Delays None known at this time

## 2019-10-30 NOTE — SUBJECTIVE & OBJECTIVE
Interval History: Complaining of pain to clavicle area.    Review of Systems   HENT: Negative for ear discharge and ear pain.    Eyes: Negative for pain and itching.   Endocrine: Negative for polyphagia and polyuria.   Neurological: Negative for seizures and syncope.     Objective:     Vital Signs (Most Recent):  Temp: 98.3 °F (36.8 °C) (10/30/19 1640)  Pulse: 86 (10/30/19 1640)  Resp: 18 (10/30/19 1640)  BP: (!) 140/67 (10/30/19 1640)  SpO2: 98 % (10/30/19 1640) Vital Signs (24h Range):  Temp:  [98.2 °F (36.8 °C)-99.2 °F (37.3 °C)] 98.3 °F (36.8 °C)  Pulse:  [86-91] 86  Resp:  [16-18] 18  SpO2:  [98 %-100 %] 98 %  BP: (135-166)/(64-88) 140/67     Weight: 98.2 kg (216 lb 9.6 oz)  Body mass index is 33.42 kg/m².    Intake/Output Summary (Last 24 hours) at 10/30/2019 1816  Last data filed at 10/30/2019 0816  Gross per 24 hour   Intake 720 ml   Output 3250 ml   Net -2530 ml      Physical Exam   Constitutional: She appears well-developed and well-nourished. No distress.   HENT:   Head: Normocephalic and atraumatic.   Eyes: Conjunctivae and EOM are normal.   Cardiovascular: Normal rate and regular rhythm.   Pulmonary/Chest: Effort normal and breath sounds normal.   Abdominal: Soft. She exhibits no distension.   Musculoskeletal: She exhibits no edema or deformity.   Skin: Skin is warm and dry. She is not diaphoretic.   Psychiatric: She has a normal mood and affect. Her behavior is normal.       Significant Labs:   BMP:   Recent Labs   Lab 10/30/19  0411   *   *   K 4.4      CO2 24   BUN 25*   CREATININE 1.9*   CALCIUM 10.1     CBC: No results for input(s): WBC, HGB, HCT, PLT in the last 48 hours.    Significant Imaging: I have reviewed all pertinent imaging results/findings within the past 24 hours.

## 2019-10-30 NOTE — PT/OT/SLP PROGRESS
Physical Therapy Treatment    Patient Name:  Elizabeth Maldonado   MRN:  [id number]    Recommendations:     Discharge Recommendations:  home health PT(with family assistance; Pt requesting PCA services.)   Discharge Equipment Recommendations: walker, rolling, bedside commode, bath bench   Barriers to discharge: Decreased caregiver support    Assessment:     Elizabeth Maldonado is a 54 y.o. female admitted with a medical diagnosis of Acute renal failure superimposed on stage 3 chronic kidney disease.  She presents with the following impairments/functional limitations:  weakness, impaired endurance, gait instability, impaired balance, decreased lower extremity function, decreased upper extremity function, pain, decreased safety awareness, decreased ROM .    Rehab Prognosis: Good; patient would benefit from acute skilled PT services to address these deficits and reach maximum level of function.    Recent Surgery: * No surgery found *      Plan:     During this hospitalization, patient to be seen 5 x/week to address the identified rehab impairments via gait training, therapeutic activities, therapeutic exercises, neuromuscular re-education and progress toward the following goals:    · Plan of Care Expires:  11/23/19    Subjective     Chief Complaint: L hip pain   Patient/Family Comments/goals: to return to PLOF.   Pain/Comfort:  · Pain Rating 1: 10/10  · Location - Side 1: Left  · Location 1: hip  · Pain Addressed 1: Pre-medicate for activity, Cessation of Activity, Reposition, Nurse notified  · Pain Rating Post-Intervention 1: 10/10      Objective:     Communicated with nurse Edge  prior to session.  Patient found up in chair with telemetry, gresham catheter upon PT entry to room.     General Precautions: Standard, fall   Orthopedic Precautions:N/A   Braces:  n/a     Functional Mobility: pt with max slow movement,  required extra time to complete tasks.   · Transfers:     · Sit to Stand:  stand by assistance with  rolling walker  · Gait:  Patient ambulated ~  200 ft on level tile with Rolling Walker with SBA/S .  Pt with max decreased kellee, increased time in double stance, decreased velocity of limb motion, decreased step length, decreased swing-to-stance ratio,  and decreased weight-shifting ability.Impairments contributing to gait deviations include impaired balance, decreased flexibility, pain,decreased ROM and decreased strength. Pt required 2 standing rest breaks during gait training 2* c/o L hip pain .  V/T cues for safety technique and walker management.   · Balance:  Fair +      AM-PAC 6 CLICK MOBILITY  Turning over in bed (including adjusting bedclothes, sheets and blankets)?: 4  Sitting down on and standing up from a chair with arms (e.g., wheelchair, bedside commode, etc.): 4  Moving from lying on back to sitting on the side of the bed?: 4  Moving to and from a bed to a chair (including a wheelchair)?: 3  Need to walk in hospital room?: 3  Climbing 3-5 steps with a railing?: 3  Basic Mobility Total Score: 21       Therapeutic Activities and Exercises:   Pt performed seated BLE in reclined chair 10 rep x 2 : AP, GS, QS and seated BLE 10 reps x 2 trials:   AP, LAQ, HS,  Hip abd/add with pillow , Hip flexion. V/T's cues for technique and sequence. Pt tolerated well.   Educated pt on safety awareness with all OOB mobility , transfer and gait training.     Patient left up in chair with on green air cushion  all lines intact, call button in reach, nurse notified and MD present..    GOALS:   Multidisciplinary Problems     Physical Therapy Goals        Problem: Physical Therapy Goal    Goal Priority Disciplines Outcome Goal Variances Interventions   Physical Therapy Goal     PT, PT/OT Ongoing, Progressing     Description:  Patient will increase functional independence with mobility by performin. Sit<>stand with SPV with RW/least restrictive device.  2. Gait x 150 feet with SPV with RW/least restrictive device.  3.  Ascend/descend 5 step(s), L HR with least restrictive assistive device and SPV  4. Pt to transfer supine>sit EOB with SPV.                       Time Tracking:     PT Received On: 10/30/19  PT Start Time: 1349     PT Stop Time: 1427  PT Total Time (min): 38 min     Billable Minutes: Gait Training 23 and Therapeutic Exercise 15    Treatment Type: Treatment  PT/PTA: PTA     PTA Visit Number: 2     Stuart JUAREZ Millerh, PTA  10/30/2019

## 2019-10-30 NOTE — PROGRESS NOTES
Pt requesting I leave gresham catheter in place r/t weakness, education provided on effects of continuous catheter use

## 2019-10-30 NOTE — PT/OT/SLP PROGRESS
Occupational Therapy   Treatment    Name: Elizabeth Maldonado  MRN: [id number]  Admitting Diagnosis:  Acute renal failure superimposed on stage 3 chronic kidney disease       Recommendations:     Discharge Recommendations: (Per chart, SNF for abx. )  Discharge Equipment Recommendations:  walker, rolling, bedside commode, bath bench  Barriers to discharge:  Decreased caregiver support    Assessment:     Elizabeth Maldonado is a 54 y.o. female with a medical diagnosis of Acute renal failure superimposed on stage 3 chronic kidney disease.  The patient is able to perform functional mobility with SBA/(S). The patient is progressing in OT. Performance deficits affecting function are impaired endurance, impaired self care skills, impaired balance, gait instability, impaired functional mobilty, pain, decreased upper extremity function, decreased lower extremity function, impaired cardiopulmonary response to activity.     Rehab Prognosis:  Good; patient would benefit from acute skilled OT services to address these deficits and reach maximum level of function.       Plan:     Patient to be seen 3 x/week to address the above listed problems via self-care/home management, therapeutic activities, therapeutic exercises  · Plan of Care Expires: 11/06/19  · Plan of Care Reviewed with: patient    Subjective     Pain/Comfort:  · Pain Rating 1: (yes-did not rate)  · Location - Side 1: Left  · Location - Orientation 1: generalized  · Location 1: (back, shoulders and hip)  · Pain Addressed 1: Pre-medicate for activity, Reposition, Distraction, Cessation of Activity    Objective:     Communicated with: nurse prior to session.  Patient found HOB elevated with telemetry upon OT entry to room.    General Precautions: Standard, fall   Orthopedic Precautions:N/A   Braces: N/A     Occupational Performance:     Bed Mobility:    · Patient completed Scooting/Bridging with supervision and stand by assistance  · Patient completed Supine to Sit with  supervision and stand by assistance     Functional Mobility/Transfers:  · Patient completed Sit <> Stand Transfer with stand by assistance  with  rolling walker   · Patient completed Toilet Transfer Step Transfer technique with modified independence and supervision with  rolling walker and grab bars  · Functional Mobility: The patient amb using a RW with SBA/(S) from the bed to the toilet>sink>chair with no LOB or c/o pain.    Activities of Daily Living:  · Grooming: modified independence and supervision to stand at the sink to wash her hands  · Upper Body Dressing: modified independence to don the back gown  · Toileting: modified independence        [facility] 6 Click ADL: 21    Treatment & Education:  The patient participated in toileting and grooming at the sink. The patient was able to perform UE therex using red theraband, x15 reps per handout with demo.    Patient left up in chair with all lines intact, call button in reach and with soft air cushionEducation:      GOALS:   Multidisciplinary Problems     Occupational Therapy Goals        Problem: Occupational Therapy Goal    Goal Priority Disciplines Outcome Interventions   Occupational Therapy Goal     OT, PT/OT Ongoing, Progressing    Description:  Goals to be met by: 11/6/19    Patient will increase functional independence with ADLs by performing:    UE Dressing with Modified Holden.  LE Dressing with Modified Holden.  Grooming while standing at sink with Modified Holden and Supervision.  met  Toileting from toilet with Modified Holden for hygiene and clothing management.   Supine to sit with Modified Holden and Supervision.  met  Step transfer with Modified Holden and Supervision  met  Toilet transfer to toilet with Stand-by Assistance. Met  Upper extremity exercise program x15 reps per handout, with assistance as needed.  met                        Time Tracking:     OT Date of Treatment: 10/30/19  OT Start Time: 1325  OT  Stop Time: 1344  OT Total Time (min): 19 min    Billable Minutes:Therapeutic Exercise 19    Selena Meyer OT  10/30/2019

## 2019-10-30 NOTE — PROGRESS NOTES
[facility] Hospital Medicine  Progress Note    Patient Name: Elizabeth Maldonado  MRN: [id number]  Patient Class: IP- Inpatient   Admission Date: 10/20/2019  Length of Stay: 10 days  Attending Physician: Osvaldo Rose MD  Primary Care Provider: Richy Singleton NP        Subjective:     Principal Problem:Acute renal failure superimposed on stage 3 chronic kidney disease        HPI:  Ms. Elizabeth Maldonado is a 54 y.o. female with type 2 diabetes mellitus (HbA1c 8.7% Dec 2014), hyperlipidemia (.6 Nov 2014), CKD stage 3, anemia of chronic disease, and history of renal transplant who presents to [facility] ED with complaints of left arm and hip pain the past few days.  She reports that the pain is mainly localized to her lower back, left hip, and left shoulder.  She does recall falling at work as she was stepping down a ladder and missed the last step.  She does recall falling on her left side but denies any head trauma.  She denies any fevers, nausea, vomiting, abdominal pain, nor any diarrhea.  She does report that her appetite has been poor in the last few days but she has not lost any weight.  She denies any night sweats but does report some chills.    Of note, she was noted to be febrile in the ER but denies any coughing, dysuria, rashes, headache, nor any neck stiffness.  She denies any dyspnea, hemoptysis, lower extremity pain or swelling, recent travel, nor any sick contacts.    Overview/Hospital Course:  53 y/o female with hx of kidney transplant presented with left shoulder and hip pain.  Noted to be febrile upon presentation.  No obvious source of infection.  On immunosuppressive medications and admitted on broad spectrum ABx's.  BCx now positive for Strep.  ID consulted.  Slight ARF on presentation and Nephrology consulted.  Started on IVF hydration.    Pt is TTP over left shoulder and hip.  CT imaging concern for inflammation?infection SC joint.  Ortho to follow up. ECHO pending.  Continue rocephin. PT/OT eval to assist with dc planning     Of note, initial MRI ordered was not done per Radiology decision. Agree with proceeding to MRI as CT scan inconclusive.   ECHO reviewed - no vegetations   Nephrology and transplant at [facility] communicating about possible rejection of transplanted kidney - Dr. Catalan does not feel patient needs to be transferred at this time. Renal function improving with IVF and thought to be volume depleted due to hyperglycemia.     Plan for aspiration of left SC joint. Await cultures. D/w nephrology- will need kelly cath and not piccline on dc home for IV antibiotics.   Await cultures from aspiration - negative to date    Plan for IV abx x 4-6 weeks  No picc line - ?kelly- will need placement.  SW working on SNF.    Interval History: Complaining of pain to clavicle area.    Review of Systems   HENT: Negative for ear discharge and ear pain.    Eyes: Negative for pain and itching.   Endocrine: Negative for polyphagia and polyuria.   Neurological: Negative for seizures and syncope.     Objective:     Vital Signs (Most Recent):  Temp: 98.3 °F (36.8 °C) (10/30/19 1640)  Pulse: 86 (10/30/19 1640)  Resp: 18 (10/30/19 1640)  BP: (!) 140/67 (10/30/19 1640)  SpO2: 98 % (10/30/19 1640) Vital Signs (24h Range):  Temp:  [98.2 °F (36.8 °C)-99.2 °F (37.3 °C)] 98.3 °F (36.8 °C)  Pulse:  [86-91] 86  Resp:  [16-18] 18  SpO2:  [98 %-100 %] 98 %  BP: (135-166)/(64-88) 140/67     Weight: 98.2 kg (216 lb 9.6 oz)  Body mass index is 33.42 kg/m².    Intake/Output Summary (Last 24 hours) at 10/30/2019 1816  Last data filed at 10/30/2019 0816  Gross per 24 hour   Intake 720 ml   Output 3250 ml   Net -2530 ml      Physical Exam   Constitutional: She appears well-developed and well-nourished. No distress.   HENT:   Head: Normocephalic and atraumatic.   Eyes: Conjunctivae and EOM are normal.   Cardiovascular: Normal rate and regular rhythm.   Pulmonary/Chest: Effort normal and breath sounds normal.    Abdominal: Soft. She exhibits no distension.   Musculoskeletal: She exhibits no edema or deformity.   Skin: Skin is warm and dry. She is not diaphoretic.   Psychiatric: She has a normal mood and affect. Her behavior is normal.       Significant Labs:   BMP:   Recent Labs   Lab 10/30/19  0411   *   *   K 4.4      CO2 24   BUN 25*   CREATININE 1.9*   CALCIUM 10.1     CBC: No results for input(s): WBC, HGB, HCT, PLT in the last 48 hours.    Significant Imaging: I have reviewed all pertinent imaging results/findings within the past 24 hours.      Assessment/Plan:      * Acute on CKD stage 3  Patient's baseline creatinine is around 1.6.  2.9 on presentation.  She does have a history of a renal transplant.    Started on IVF's and Nephrology consulted.  Creat improving with IVF's.  Avoid nephrotoxic medications.    Bacteremia due to group B Streptococcus  Patient was noted to have a fever of 102.5° F without a clear source of infection.  She also was tachycardic and tachypneic, all of which has improved.  Given that she is on tacrolimus and mycophenolate with immunosuppression, she was started on empiric antibiotic therapy.  Now noted to have Group B Bacteremia.  Consulted ID.  Unsure source.  Patient does complain of left shoulder and hip pain.  Septic joint?  Ortho consult.  CRP and ESR - elevated   ECHO - reviewed   Plan 4-6 weeks on rocephin -kelly line placement - pt will need placement, patient cannot use herself   SW working on SNF placement.    Pain of left sternoclavicular joint  S/p aspiration - IR  Studies negative.        Chronic bilateral low back pain with bilateral sciatica  Pt has l-spine disc disease at L5  Pain control  PT/OT  No signs of infection       CKD (chronic kidney disease), stage III  As addressed above.    Kidney transplant status, living unrelated donor - 12/2/14  As addressed above.    Hyperlipidemia  Poorly controlled; will continue her home regimen of  atorvastatin.    Anemia of chronic disease  The patient's H/H is stable and consistent with previous laboratory measurements, and the patient exhibits no signs or symptoms of acute bleeding; there is no indication for transfusion.  Will continue to monitor.    Type 2 diabetes mellitus, uncontrolled, with renal complications  Uncontrolled with hyperglycemia.  Will resume home insulin at lower dose to avoid hypoglycemia.  Diabetic diet and insulin sliding scale.  A1c 10.4%  Pt reports home insulin regimen: lantus 80u qhs and humalog 30u TID + SSI   Increase insulin dosage      VTE Risk Mitigation (From admission, onward)         Ordered     heparin (porcine) injection 5,000 Units  Every 12 hours      10/20/19 2208     IP VTE HIGH RISK PATIENT  Once      10/20/19 2017                      Osvaldo Rose MD  Department of Hospital Medicine   [facility]

## 2019-10-30 NOTE — PLAN OF CARE
LUCHO contacted bart Vicente at [facility] to inquire if patient can be admitted for SNF for IV abx with a central line. There was no answer. LUCHO left a message.      10/30/19 1016   Post-Acute Status   Post-Acute Authorization Placement  (SNF)   Post-Acute Placement Status Pending Payor Medical Review

## 2019-10-31 VITALS
BODY MASS INDEX: 32.83 KG/M2 | TEMPERATURE: 98 F | RESPIRATION RATE: 18 BRPM | SYSTOLIC BLOOD PRESSURE: 137 MMHG | OXYGEN SATURATION: 97 % | HEIGHT: 68 IN | HEART RATE: 91 BPM | WEIGHT: 216.63 LBS | DIASTOLIC BLOOD PRESSURE: 76 MMHG

## 2019-10-31 PROBLEM — N18.30 ACUTE RENAL FAILURE SUPERIMPOSED ON STAGE 3 CHRONIC KIDNEY DISEASE: Status: RESOLVED | Noted: 2019-10-20 | Resolved: 2019-10-31

## 2019-10-31 PROBLEM — N17.9 ACUTE RENAL FAILURE SUPERIMPOSED ON STAGE 3 CHRONIC KIDNEY DISEASE: Status: RESOLVED | Noted: 2019-10-20 | Resolved: 2019-10-31

## 2019-10-31 LAB
INR PPP: 1.1 (ref 0.8–1.2)
POCT GLUCOSE: 102 MG/DL (ref 70–110)
POCT GLUCOSE: 115 MG/DL (ref 70–110)
POCT GLUCOSE: 116 MG/DL (ref 70–110)
POCT GLUCOSE: 226 MG/DL (ref 70–110)
PROTHROMBIN TIME: 11.1 SEC (ref 9–12.5)
TACROLIMUS BLD-MCNC: 4.7 NG/ML (ref 5–15)

## 2019-10-31 PROCEDURE — 25000003 PHARM REV CODE 250: Performed by: HOSPITALIST

## 2019-10-31 PROCEDURE — 97110 THERAPEUTIC EXERCISES: CPT

## 2019-10-31 PROCEDURE — 63600175 PHARM REV CODE 636 W HCPCS: Performed by: RADIOLOGY

## 2019-10-31 PROCEDURE — 63600175 PHARM REV CODE 636 W HCPCS: Performed by: INTERNAL MEDICINE

## 2019-10-31 PROCEDURE — 25000003 PHARM REV CODE 250: Performed by: INTERNAL MEDICINE

## 2019-10-31 PROCEDURE — 85610 PROTHROMBIN TIME: CPT

## 2019-10-31 PROCEDURE — 63600175 PHARM REV CODE 636 W HCPCS: Performed by: PHYSICIAN ASSISTANT

## 2019-10-31 PROCEDURE — 36415 COLL VENOUS BLD VENIPUNCTURE: CPT

## 2019-10-31 RX ORDER — RAMELTEON 8 MG/1
8 TABLET ORAL NIGHTLY PRN
Start: 2019-10-31 | End: 2021-05-03

## 2019-10-31 RX ORDER — GABAPENTIN 100 MG/1
100 CAPSULE ORAL 3 TIMES DAILY
Qty: 90 CAPSULE | Refills: 11 | Status: ON HOLD
Start: 2019-10-31 | End: 2020-02-26 | Stop reason: CLARIF

## 2019-10-31 RX ORDER — INSULIN ASPART 100 [IU]/ML
10 INJECTION, SOLUTION INTRAVENOUS; SUBCUTANEOUS
Refills: 3 | Status: ON HOLD
Start: 2019-10-31 | End: 2020-02-26 | Stop reason: CLARIF

## 2019-10-31 RX ORDER — ACETAMINOPHEN 325 MG/1
650 TABLET ORAL EVERY 4 HOURS PRN
Refills: 0 | COMMUNITY
Start: 2019-10-31 | End: 2021-05-03

## 2019-10-31 RX ORDER — MIDAZOLAM HYDROCHLORIDE 1 MG/ML
INJECTION INTRAMUSCULAR; INTRAVENOUS CODE/TRAUMA/SEDATION MEDICATION
Status: COMPLETED | OUTPATIENT
Start: 2019-10-31 | End: 2019-10-31

## 2019-10-31 RX ORDER — OXYCODONE AND ACETAMINOPHEN 10; 325 MG/1; MG/1
1 TABLET ORAL EVERY 8 HOURS PRN
Qty: 15 TABLET | Refills: 0 | Status: SHIPPED | OUTPATIENT
Start: 2019-10-31 | End: 2021-05-03

## 2019-10-31 RX ORDER — FENTANYL CITRATE 50 UG/ML
INJECTION, SOLUTION INTRAMUSCULAR; INTRAVENOUS CODE/TRAUMA/SEDATION MEDICATION
Status: COMPLETED | OUTPATIENT
Start: 2019-10-31 | End: 2019-10-31

## 2019-10-31 RX ORDER — CARVEDILOL 6.25 MG/1
6.25 TABLET ORAL 2 TIMES DAILY
Qty: 60 TABLET | Refills: 11
Start: 2019-10-31 | End: 2021-04-22

## 2019-10-31 RX ADMIN — GABAPENTIN 100 MG: 100 CAPSULE ORAL at 08:10

## 2019-10-31 RX ADMIN — TACROLIMUS 3 MG: 1 CAPSULE ORAL at 09:10

## 2019-10-31 RX ADMIN — ATORVASTATIN CALCIUM 20 MG: 10 TABLET, FILM COATED ORAL at 09:10

## 2019-10-31 RX ADMIN — CEFTRIAXONE SODIUM 2 G: 2 INJECTION, SOLUTION INTRAVENOUS at 01:10

## 2019-10-31 RX ADMIN — OXYCODONE HYDROCHLORIDE AND ACETAMINOPHEN 1 TABLET: 10; 325 TABLET ORAL at 05:10

## 2019-10-31 RX ADMIN — GABAPENTIN 100 MG: 100 CAPSULE ORAL at 05:10

## 2019-10-31 RX ADMIN — TACROLIMUS 3 MG: 1 CAPSULE ORAL at 05:10

## 2019-10-31 RX ADMIN — OXYCODONE HYDROCHLORIDE AND ACETAMINOPHEN 1 TABLET: 10; 325 TABLET ORAL at 07:10

## 2019-10-31 RX ADMIN — CARVEDILOL 6.25 MG: 6.25 TABLET, FILM COATED ORAL at 08:10

## 2019-10-31 RX ADMIN — HEPARIN SODIUM 5000 UNITS: 5000 INJECTION, SOLUTION INTRAVENOUS; SUBCUTANEOUS at 08:10

## 2019-10-31 RX ADMIN — GABAPENTIN 100 MG: 100 CAPSULE ORAL at 09:10

## 2019-10-31 RX ADMIN — CARVEDILOL 6.25 MG: 6.25 TABLET, FILM COATED ORAL at 09:10

## 2019-10-31 RX ADMIN — MIDAZOLAM HYDROCHLORIDE 1 MG: 1 INJECTION, SOLUTION INTRAMUSCULAR; INTRAVENOUS at 03:10

## 2019-10-31 RX ADMIN — FENTANYL CITRATE 50 MCG: 50 INJECTION INTRAMUSCULAR; INTRAVENOUS at 03:10

## 2019-10-31 RX ADMIN — INSULIN ASPART 10 UNITS: 100 INJECTION, SOLUTION INTRAVENOUS; SUBCUTANEOUS at 05:10

## 2019-10-31 NOTE — SEDATION DOCUMENTATION
Report called to DEEPAK Bills. Tunnel cath placed to R IJ. Site dressed with biopatch, gauze and tegaderm, CDI; no redness, swelling or hematoma noted. MARCELA TRAN.

## 2019-10-31 NOTE — PT/OT/SLP PROGRESS
Occupational Therapy   Treatment/Discharge    Name: Elizabeth Maldonado  MRN: [id number]  Admitting Diagnosis:  Acute renal failure superimposed on stage 3 chronic kidney disease       Recommendations:     Discharge Recommendations: nursing facility, skilled  Discharge Equipment Recommendations:  bath bench, walker, rolling, bedside commode  Barriers to discharge:  Decreased caregiver support    Assessment:     Elizabeth Maldonado is a 54 y.o. female with a medical diagnosis of Acute renal failure superimposed on stage 3 chronic kidney disease. The patient participated in OT with minimal c/o left shoulder pain.The patient will benefit from continued OT at SNF.. Performance deficits affecting function are weakness, impaired self care skills, impaired functional mobilty, impaired balance, gait instability, decreased upper extremity function, pain.     Rehab Prognosis:  Good; patient would benefit from acute skilled OT services to address these deficits and reach maximum level of function.       Plan:     Patient to be seen 3 x/week to address the above listed problems via self-care/home management, therapeutic exercises, therapeutic activities  · Plan of Care Expires: 11/06/19  · Plan of Care Reviewed with: patient    Subjective     Pain/Comfort:  · Pain Rating 1: (yes-did not rate)  · Location - Side 1: Left  · Location 1: shoulder(and back)  · Pain Addressed 1: Pre-medicate for activity, Cessation of Activity    Objective:     Communicated with: patient prior to session.  Patient found HOB elevated with telemetry upon OT entry to room.    General Precautions: Standard, fall(NPO for procedure)   Orthopedic Precautions:N/A   Braces: N/A     Occupational Performance:     Bed Mobility:    · Patient completed Scooting/Bridging with stand by assistance  · Patient completed Supine to Sit with stand by assistance and HOB elevated  · Patient completed Sit to Supine with contact guard assistance and with leg lift     Functional  Mobility/Transfers:  · Patient completed Sit <> Stand Transfer with stand by assistance  with  rolling walker   · Functional Mobility: The patient amb using a RW from the chair, around the room and then to the bed with SBA.    Activities of Daily Living:  · N/T      [facility] 6 Click ADL: 21    Treatment & Education:  The patient tolerated UE therex using red theraband x10 reps per handout. The patient c/o left shoulder pain but was able to perform therex. The patient was educated re: ROM to neck and shoulders.    Patient left HOB elevated with all lines intact and call button in reachEducation:      GOALS:   Multidisciplinary Problems     Occupational Therapy Goals        Problem: Occupational Therapy Goal    Goal Priority Disciplines Outcome Interventions   Occupational Therapy Goal     OT, PT/OT Ongoing, Progressing    Description:  Goals to be met by: 11/6/19    Patient will increase functional independence with ADLs by performing:    UE Dressing with Modified Sebastian.  LE Dressing with Modified Sebastian.  Grooming while standing at sink with Modified Sebastian and Supervision.  met  Toileting from toilet with Modified Sebastian for hygiene and clothing management.   Supine to sit with Modified Sebastian and Supervision.  met  Step transfer with Modified Sebastian and Supervision  met  Toilet transfer to toilet with Stand-by Assistance. Met  Upper extremity exercise program x15 reps per handout, with assistance as needed.  met                        Time Tracking:     OT Date of Treatment: 10/31/19  OT Start Time: 1221  OT Stop Time: 1239  OT Total Time (min): 18 min    Billable Minutes:Therapeutic Exercise 18    Selena Meyer OT  10/31/2019

## 2019-10-31 NOTE — DISCHARGE SUMMARY
[facility] Hospital Medicine  Discharge Summary      Patient Name: Elizabeth Maldonado  MRN: [id number]  Admission Date: 10/20/2019  Hospital Length of Stay: 11 days  Discharge Date and Time:  10/31/2019 3:54 PM  Attending Physician: Osvaldo Rose MD   Discharging Provider: Osvaldo Rose MD  Primary Care Provider: Richy Singleton NP      HPI:   Ms. Elizabeth Maldonado is a 54 y.o. female with type 2 diabetes mellitus (HbA1c 8.7% Dec 2014), hyperlipidemia (.6 Nov 2014), CKD stage 3, anemia of chronic disease, and history of renal transplant who presents to [facility] ED with complaints of left arm and hip pain the past few days.  She reports that the pain is mainly localized to her lower back, left hip, and left shoulder.  She does recall falling at work as she was stepping down a ladder and missed the last step.  She does recall falling on her left side but denies any head trauma.  She denies any fevers, nausea, vomiting, abdominal pain, nor any diarrhea.  She does report that her appetite has been poor in the last few days but she has not lost any weight.  She denies any night sweats but does report some chills.    Of note, she was noted to be febrile in the ER but denies any coughing, dysuria, rashes, headache, nor any neck stiffness.  She denies any dyspnea, hemoptysis, lower extremity pain or swelling, recent travel, nor any sick contacts.    * No surgery found *      Hospital Course:   53 y/o female with hx of kidney transplant presented with left shoulder and hip pain.  Noted to be febrile upon presentation.  No obvious source of infection.  On immunosuppressive medications and admitted on broad spectrum ABx's.  BCx then grew positive for Strep.  ID consulted.  Slight ARF on presentation and Nephrology consulted.  Started on IVF hydration with Creat back to baseline.  MRI CTL spine without evidence of epidural abscess/diskitis/OM; MRI pelvis with nonspecific myositis edematous  change of left lumbosacral junction. Patient s/p aspiration of left SC joint 10/25/2019 - no growth on cultures.  Repeat BCXS 10/21 with NG x 5d.  GBS bacteremia source unknown. TTE - no vegetations.  L Greater trochanteric bursitis and L sacroiliitis on exam.  Stable non septic.  Patient was continued on Rocephin during hospital stay.    Nephrology and transplant at [facility] communicating about possible rejection of transplanted kidney - Dr. Catalan does not feel patient needs to be transferred at this time.  Renal function improving with IVF and thought to be volume depleted due to hyperglycemia.   ID recommending 4-6 weeks of IV ABx's.  Nephrology recommending against PICC line.  Will need central line and SNF placement for IV ABx's.  SW consulted.  Patient has remained afebrile and hemodynamically stable.  Tunneled central catheter has been placed and SNF has been arranged.  Patient to complete 4 weeks of IV Rocephin.  She will follow up with ID and PCP.  She will also follow up with transplant doctors post discharge from SNF.  Patient to be discharged to SNF.     Consults:   Consults (From admission, onward)        Status Ordering Provider     Inpatient consult to Infectious Diseases  Once     Provider:  Bk Colin MD    Completed STACY STYLES     Inpatient consult to Interventional Radiology  Once     Provider:  Aaron Herrera MD    Completed WARREN PACKER     Inpatient consult to Interventional Radiology  Once     Provider:  Ralph Pablo MD    Completed STACY STYLES     Inpatient consult to Nephrology  Once     Provider:  Emmy Carney MD    Completed JUSTIN EVANS     Inpatient consult to Orthopedic Surgery  Once     Provider:  Nikky Sousa MD    Completed STACY STYLES          No new Assessment & Plan notes have been filed under this hospital service since the last note was generated.  Service: Hospital Medicine    Final Active Diagnoses:    Diagnosis Date Noted POA     Bacteremia due to group B Streptococcus [R78.81] 10/20/2019 Yes    Chronic bilateral low back pain with bilateral sciatica [M54.42, M54.41, G89.29] 10/25/2019 Yes    Pain of left sternoclavicular joint [M25.512] 10/25/2019 Yes    CKD (chronic kidney disease), stage III [N18.3] 03/02/2015 Yes     Chronic    Kidney transplant status, living unrelated donor - 12/2/14 [Z94.0] 12/03/2014 Not Applicable     Chronic    Type 2 diabetes mellitus, uncontrolled, with renal complications [E11.29, E11.65] 07/01/2014 Yes     Chronic    Hyperlipidemia [E78.5] 07/01/2014 Yes     Chronic    Anemia of chronic disease [D63.8] 07/01/2014 Yes     Chronic      Problems Resolved During this Admission:    Diagnosis Date Noted Date Resolved POA    PRINCIPAL PROBLEM:  Acute on CKD stage 3 [N17.9, N18.3] 10/20/2019 10/31/2019 Yes    Chronic hip pain [M25.559, G89.29] 10/20/2019 10/20/2019 Yes    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    Specialty:  Infectious Diseases  Contact information:  [address] [facility] [address] [phone]             Richy Singleton NP In 3 weeks.    Specialty:  Family Medicine  Contact information:  [address]  [address] [address] [phone]                 Patient Instructions:      Diet diabetic     Diet Cardiac     Notify your health care provider if you experience any of the following:  temperature >100.4     Notify your health care provider if you experience any of the following:  persistent nausea and vomiting or diarrhea     Notify your health care provider if you experience any of the following:  severe uncontrolled pain     Notify your health care provider if you experience any of the following:  difficulty breathing or increased cough     Notify your health care provider if you experience  any of the following:  persistent dizziness, light-headedness, or visual disturbances     Notify your health care provider if you experience any of the following:  increased confusion or weakness     Activity as tolerated       Pending Diagnostic Studies:     Procedure Component Value Units Date/Time    IR Fluoroscopy Guided Central Venous Access Device Placement [[id number]] Resulted:  10/31/19 1456    Order Status:  Sent Lab Status:  In process Updated:  10/31/19 1531    IR Tunneled Catheter Insert w/o Port [[id number]] Resulted:  10/31/19 1456    Order Status:  Sent Lab Status:  In process Updated:  10/31/19 1531    IR Ultrasound Guidance [[id number]] Resulted:  10/31/19 1456    Order Status:  Sent Lab Status:  In process Updated:  10/31/19 1531    IR Ultrasound Guidance [[id number]] Resulted:  10/25/19 1550    Order Status:  Sent Lab Status:  In process Updated:  10/25/19 1551         Medications:  Reconciled Home Medications:      Medication List      START taking these medications    carvedilol 6.25 MG tablet  Commonly known as:  COREG  Take 1 tablet (6.25 mg total) by mouth 2 (two) times daily.     cefTRIAXone 2 g in dextrose 5 % 50 mL 2 g/50 mL Pgbk IVPB  Commonly known as:  ROCEPHIN  Inject 50 mLs (2 g total) into the vein once daily.     gabapentin 100 MG capsule  Commonly known as:  NEURONTIN  Take 1 capsule (100 mg total) by mouth 3 (three) times daily.     oxyCODONE-acetaminophen  mg per tablet  Commonly known as:  PERCOCET  Take 1 tablet by mouth every 8 (eight) hours as needed for Pain.     ramelteon 8 mg tablet  Commonly known as:  ROZEREM  Take 1 tablet (8 mg total) by mouth nightly as needed for Insomnia.        CHANGE how you take these medications    acetaminophen 325 MG tablet  Commonly known as:  TYLENOL  Take 2 tablets (650 mg total) by mouth every 4 (four) hours as needed.  What changed:    · medication strength  · See the new instructions.     BASAGLAR KWIKPEN U-100 INSULIN SUBQ  Inject 50  "Units into the skin nightly.  What changed:  Another medication with the same name was removed. Continue taking this medication, and follow the directions you see here.     mycophenolate 250 mg Cap  Commonly known as:  CELLCEPT  TAKE 2 CAPSULES ( 500 MG TOTAL) BY MOUTH 2 TIMES DAILY  What changed:  Another medication with the same name was removed. Continue taking this medication, and follow the directions you see here.     NovoLOG Flexpen U-100 Insulin 100 unit/mL (3 mL) Inpn pen  Generic drug:  insulin aspart U-100  Inject 10 Units into the skin 3 (three) times daily with meals.  What changed:    · how much to take  · how to take this        CONTINUE taking these medications    ACCU-CHEK PREM Misc  Generic drug:  blood-glucose meter  USE AS DIRECTED TO CHECK BLOOD GLUCOSE     atorvastatin 20 MG tablet  Commonly known as:  LIPITOR  Take 20 mg by mouth.     blood sugar diagnostic Strp  Checks BG ac/hs     insulin syringe-needle U-100 0.5 mL 30 gauge x 5/16" Syrg  Commonly known as:  INSULIN SYRINGE  Uses 4 daily     insulin syringe-needle U-100 1 mL 29 gauge x 7/16" Syrg     lancets 33 gauge Misc  Commonly known as:  ONETOUCH DELICA LANCETS  1 lancet by Misc.(Non-Drug; Combo Route) route 4 (four) times daily before meals and nightly.     tacrolimus 1 MG Cap  Commonly known as:  PROGRAF  TAKE 3 CAPSULES ( 3 MG TOTAL) BY MOUTH IN THE MORNING & TAKE 3 CAPSULES ( 3 MG TOTAL) IN THE EVENING        STOP taking these medications    ADMELOG SOLOSTAR U-100 INSULIN 100 unit/mL pen  Generic drug:  insulin lispro     ADMELOG SOLOSTAR U-100 INSULIN SUBQ     insulin lispro 100 unit/mL injection     psyllium husk 0.4 gram Cap     zolpidem 5 MG Tab  Commonly known as:  AMBIEN            Indwelling Lines/Drains at time of discharge:   Lines/Drains/Airways     Central Venous Catheter Line                 Percutaneous Central Line Insertion/Assessment - double lumen  10/31/19 1515 right internal jugular less than 1 day          "       Time spent on the discharge of patient: >30 minutes  Patient was seen and examined on the date of discharge and determined to be suitable for discharge.         Osvaldo Rose MD  Department of Hospital Medicine  [facility]

## 2019-10-31 NOTE — NURSING
Transportation center/[facility] called to say [employer] will pick patient up via wheelchair between 9:30pm -10:00pm

## 2019-10-31 NOTE — PLAN OF CARE
[facility] [address] [place] [address]  [phone]        Facility Transfer Orders                        10/31/2019    Admit to: SNF    Diagnoses:  Active Hospital Problems    Diagnosis  POA    *Acute on CKD stage 3 [N17.9, N18.3]  Yes     Priority: 1 - High    Bacteremia due to group B Streptococcus [R78.81]  Yes     Priority: 2     Chronic bilateral low back pain with bilateral sciatica [M54.42, M54.41, G89.29]  Yes    Pain of left sternoclavicular joint [M25.512]  Yes    CKD (chronic kidney disease), stage III [N18.3]  Yes     Chronic    Kidney transplant status, living unrelated donor - 12/2/14 [Z94.0]  Not Applicable     Chronic     Induction with campath 30mg and IV solumedrol to total 875mg      Type 2 diabetes mellitus, uncontrolled, with renal complications [E11.29, E11.65]  Yes     Chronic    Hyperlipidemia [E78.5]  Yes     Chronic    Anemia of chronic disease [D63.8]  Yes     Chronic      Resolved Hospital Problems    Diagnosis Date Resolved POA    Chronic hip pain [M25.559, G89.29] 10/20/2019 Yes    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.41, 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] 10/21/2019 Yes       Allergies:  Review of patient's allergies indicates:   Allergen Reactions    Iodine and iodide containing products Hives    Shrimp Itching    Topamax [topiramate] Other (See Comments)     Vision changes    Zoloft [sertraline] Palpitations       Vitals:     Every shift (Skilled Nursing patients)    Diet: 2000 qi ADA Cardiac diet    Activity:    - Up in a chair each morning as tolerated   - Ambulate with assistance to bathroom   - May ambulate independently    Nursing Precautions:   - Aspiration precautions:             -  Upright 90 degrees befor during and after meals    - Fall precautions   - Decubitus precautions:        -  for positioning   - Pressure reducing foam mattress   - Turn patient every two hours. Use wedge pillows to anchor patient          CONSULTS:     PT to evaluate and  treat - five times a week     OT to evaluate and treat - five times a week         LABS:  weekly ESR, CRP, CMP, CBC on mondays faxed to ID dept at [phone]        DIABETES CARE:      Check blood sugar: Fingerstick blood sugar AC and HS        Report CBG < 60 or > 400 to physician.                                          Insulin Sliding Scale          Glucose  Novolog Insulin Subcutaneous        0 - 60   Orange juice or glucose tablet, hold insulin      No insulin   201-250  2 units   251-300  4 units   301-350  6 units   351-400  8 units   >400   10 units then call physician      Medications: Discontinue all previous medication orders, if any. See new list below.       Elizabeth Maldonado   Home Medication Instructions NIKKI:[id number]    Printed on:10/31/19 4225   Medication Information                                   acetaminophen (TYLENOL) 325 MG tablet  Take 2 tablets (650 mg total) by mouth every 4 (four) hours as needed for pain and temp>100             atorvastatin (LIPITOR) 20 MG tablet  Take 20 mg by mouth daily                        carvedilol (COREG) 6.25 MG tablet  Take 1 tablet (6.25 mg total) by mouth 2 (two) times daily.             cefTRIAXone 2 g in dextrose 5 % 50 mL (ROCEPHIN) 2 g/50 mL PgBk IVPB  Inject 50 mLs (2 g total) into the vein once daily for 28 days.             gabapentin (NEURONTIN) 100 MG capsule  Take 1 capsule (100 mg total) by mouth 3 (three) times daily.             INSULIN GLARGINE,HUM.REC.ANLOG (BASAGLAR KWIKPEN U-100 INSULIN SUBQ)  Inject 50 Units into the skin nightly.                                   mycophenolate (CELLCEPT) 250 mg Cap  TAKE 2 CAPSULES ( 500 MG TOTAL) BY MOUTH 2 TIMES DAILY             NOVOLOG FLEXPEN U-100 INSULIN 100 unit/mL (3 mL) InPn pen  Inject 10 Units into the skin 3 (three) times daily with meals.             oxyCODONE-acetaminophen (PERCOCET)  mg per tablet  Take 1 tablet by mouth every 8 (eight) hours as needed for Pain.              ramelteon (ROZEREM) 8 mg tablet  Take 1 tablet (8 mg total) by mouth nightly as needed for Insomnia.                          tacrolimus (PROGRAF) 1 MG Cap  TAKE 3 CAPSULES ( 3 MG TOTAL) BY MOUTH IN THE MORNING & TAKE 3 CAPSULES ( 3 MG TOTAL) IN THE EVENING                       _________________________________  Osvaldo Rose MD  10/31/2019

## 2019-10-31 NOTE — CONSULTS
Inpatient Radiology Pre-procedure Note    History of Present Illness:  Elizabeth Maldonado is a 54 y.o. female with group B Strep bacteremia with recommendations for 4-6 wk IV Abx therapy as outpatient requiring long-term central venous access.     A new inpatient IR consult placed for image-guided palcement of a jugular approach TCVC.      Admission H&P reviewed.  Past Medical History:   Diagnosis Date    Acidosis 2014    Allergic rhinitis 2014    Allergy     Anemia     Anemia in chronic kidney disease 2014    Anxiety     Chronic bilateral low back pain with bilateral sciatica 10/25/2019    Chronic immunosuppression with Prograf and MMF 12/3/2014    CKD (chronic kidney disease) stage 5, GFR less than 15 ml/min 2014    CKD (chronic kidney disease), stage III 3/2/2015    Degenerative disc disease     Depression 2014    Diabetes mellitus, type 2 since age 18 2014    ESRD on peritoneal dialysis - 2014 for 9 hours no peritonitis 2014    Hyperlipidemia     Hypertension 2014    Hypomagnesemia 2015    Kidney transplant status, living unrelated donor - 12/2/14 12/3/2014    Neutropenia 2015    NS (nuclear sclerosis) 2016    Obesity     Organ transplant candidate 2014    Pre-op exam 2014    Proliferative diabetic retinopathy of both eyes without macular edema associated with type 2 diabetes mellitus 2016    Renal manifestation of secondary diabetes mellitus     Tendinitis     Trouble in sleeping      Past Surgical History:   Procedure Laterality Date    BONE MARROW BIOPSY N/A      SECTION      x 2    KIDNEY TRANSPLANT      RENAL BIOPSY      ROTATOR CUFF REPAIR      TUBAL LIGATION         Review of Systems:   As documented in primary team H&P    Home Meds:   Prior to Admission medications    Medication Sig Start Date End Date Taking? Authorizing Provider   ACCU-CHEK PREM Misc USE AS DIRECTED TO CHECK BLOOD GLUCOSE  "12/21/17   Cristy Sampson MD   acetaminophen (TYLENOL) 500 MG tablet TK 2 CAPLETS PO TID 3/26/19   Historical Provider, MD RODRIGUEZOG SOLOSTAR U-100 INSULIN 100 unit/mL pen INJECT 30 UNITS UNDER THE SKIN TID WC 4/30/19   Historical Provider, MD   atorvastatin (LIPITOR) 20 MG tablet Take 20 mg by mouth.    Historical Provider, MD   BASAGLAR KWIKPEN U-100 INSULIN glargine 100 units/mL (3mL) SubQ pen  3/26/19   Historical Provider, MD   blood sugar diagnostic Strp Checks BG ac/hs 6/5/17   Cristy Sampson MD   INSULIN GLARGINE,HUM.REC.ANLOG (BASAGLAR KWIKPEN U-100 INSULIN SUBQ) Inject 50 Units into the skin nightly.    Historical Provider, MD   insulin lispro (ADMELOG SOLOSTAR U-100 INSULIN SUBQ) Inject 100 Units into the skin 3 (three) times daily with meals.    Historical Provider, MD   insulin lispro 100 unit/mL injection Inject into the skin.    Historical Provider, MD   insulin syringe-needle U-100 (INSULIN SYRINGE) 1/2 mL 30 x 5/16" Syrg Uses 4 daily 1/6/15   Salima Acosta, KARO, NP   lancets (ONETOUCH DELICA LANCETS) 33 gauge Misc 1 lancet by Misc.(Non-Drug; Combo Route) route 4 (four) times daily before meals and nightly. 6/5/17   Cristy aSmpson MD   mycophenolate (CELLCEPT) 250 mg Cap TAKE 2 CAPSULES ( 500 MG TOTAL) BY MOUTH 2 TIMES DAILY 5/16/19   Kalpana Grider MD   mycophenolate (CELLCEPT) 500 mg Tab Take by mouth.    Historical Provider, MD   NOVOLOG FLEXPEN 100 unit/mL InPn pen 23 Units 3 (three) times daily with meals.  12/9/15   Historical Provider, MD   psyllium husk 0.4 gram Cap Take by mouth.    Historical Provider, MD   SYRINGE & NEEDLE,INSULIN,1 ML (INSULIN SYRINGE-NEEDLE U-100) 1 mL 29 X 7/16" Syrg  3/25/15   Historical Provider, MD   tacrolimus (PROGRAF) 1 MG Cap TAKE 3 CAPSULES ( 3 MG TOTAL) BY MOUTH IN THE MORNING & TAKE 3 CAPSULES ( 3 MG TOTAL) IN THE EVENING 12/28/18   Sweta Catalan MD   zolpidem (AMBIEN) 5 MG Tab Take 10 mg by mouth.    Historical Provider, MD     Scheduled " Meds:    atorvastatin  20 mg Oral Daily    carvedilol  6.25 mg Oral BID    cefTRIAXone (ROCEPHIN) IVPB  2 g Intravenous Q24H    gabapentin  100 mg Oral TID    heparin (porcine)  5,000 Units Subcutaneous Q12H    insulin aspart U-100  10 Units Subcutaneous TID WM    insulin detemir U-100  50 Units Subcutaneous QHS    tacrolimus  3 mg Oral BID     Continuous Infusions:   PRN Meds:acetaminophen, cloNIDine, dextrose 50%, dextrose 50%, glucagon (human recombinant), glucose, glucose, insulin aspart U-100, morphine, ondansetron, oxyCODONE-acetaminophen, promethazine (PHENERGAN) IVPB, ramelteon, senna-docusate 8.6-50 mg  Anticoagulants/Antiplatelets: no anticoagulation    Allergies:   Review of patient's allergies indicates:   Allergen Reactions    Iodine and iodide containing products Hives    Shrimp Itching    Topamax [topiramate] Other (See Comments)     Vision changes    Zoloft [sertraline] Palpitations     Sedation Hx: have not been any systemic reactions    Labs:  Recent Labs   Lab 10/31/19  0753   INR 1.1     No results for input(s): WBC, HGB, HCT, MCV, PLT in the last 168 hours.   Recent Labs   Lab 10/30/19  0411   *   *   K 4.4      CO2 24   BUN 25*   CREATININE 1.9*   CALCIUM 10.1   ALBUMIN 1.5*     Vitals:  Temp: 99 °F (37.2 °C) (10/31/19 0725)  Pulse: 88 (10/31/19 0725)  Resp: 18 (10/31/19 0725)  BP: (!) 161/81 (10/31/19 0725)  SpO2: 98 % (10/31/19 0725)     Physical Exam:  ASA: II  Mallampati: III    General: no acute distress  Mental Status: alert and oriented to person, place and time  HEENT: normocephalic, atraumatic  Chest: unlabored breathing  Heart: regular heart rate  Abdomen: nondistended  Extremity: moves all extremities      A/P:  54 y.o. female with group B Strep bacteremia with recommendations for 4-6 wk IV Abx therapy as outpatient requiring long-term central venous access.     1. Group B Strep bacteremia - Will attempt US and fluoroscopic-guided palcement of a jugular  approach TCVC under moderate conscious sedation assuming jugular veins are patent.    Risks (including, but not limited to, pain, bleeding, infection, damage to nearby structures, failure to obtain sufficient material for a diagnosis, the need for additional procedures, and death), benefits, and alternatives were discussed with the patient. All questions were answered to the best of my abilities. The patient wishes to proceed with the procedure. Written informed consent was obtained.    Thank you for considering IR for the care of your patient.     Ralph Pablo MD  Interventional Radiology

## 2019-10-31 NOTE — PLAN OF CARE
Problem: Occupational Therapy Goal  Goal: Occupational Therapy Goal  Description  Goals to be met by: 11/6/19    Patient will increase functional independence with ADLs by performing:    UE Dressing with Modified [place].  LE Dressing with Modified [place].  Grooming while standing at sink with Modified [place] and Supervision.  met  Toileting from toilet with Modified [place] for hygiene and clothing management.   Supine to sit with Modified [place] and Supervision.  met  Step transfer with Modified [place] and Supervision  met  Toilet transfer to toilet with Stand-by Assistance. Met  Upper extremity exercise program x15 reps per handout, with assistance as needed.  met       Outcome: Ongoing, Progressing   The patient was able to transfer to the chair with (S). The patient tolerated UE therex using red theraband x10 reps with minimal c/o left shoulder pain.

## 2019-10-31 NOTE — NURSING
Pt remained free from fall/injury. Amb to bathroom w/walker. Complaint of pain, prn pain med given. Scheduled meds given w/out difficulty. Accu check monitored. Safety measures maintained will cont to monitor.

## 2019-10-31 NOTE — PT/OT/SLP PROGRESS
Physical Therapy      Patient Name:  Elizabeth Maldonado   MRN:  [id number]    Patient not seen today secondary to Patient unwilling to participate, pt stated she is hungry and has been waiting for her test all day . Will follow-up as able .    Rach Mabry, PTA

## 2019-10-31 NOTE — NURSING
Called report to Kadi Stoddard/[phone]/Mabel Calderon. Patient going to room 147, transportation between 9:30p-10:00p. Verbalized understanding.

## 2019-10-31 NOTE — PLAN OF CARE
Julse, admissions for [facility], contacted [facility] and informed that authorization was received today. Jules stated authorization was dated for yesterday and if patient does not discharge today, she will have to resubmit for authorization. SW informed Jules that patient is getting catheter placed today and should be able to discharge after. SW also informed that updated clinicals will be sent.        10/31/19 1006   Post-Acute Status   Post-Acute Authorization Placement  (SNF)   Post-Acute Placement Status Authorization Obtained

## 2019-10-31 NOTE — PLAN OF CARE
LUCHO contacted Jules, admissions at [facility] to inform that patient had catheter placed at 2:30 pm and will be ready for discharge.  informed LUCHO that Jules was gone for the day. LUCHO asked to speak with nurse Lindsay. LUCHO explained to Lindsay that patient had catheter placed but will not be able to make it facility by 4:00pm. Lindsay informed that a nurse won't be available from 6:00 pm to 10:00 pm. Jules stated that Jules had arranged for someone to stay until 4:00 pm to check patient in. LUCHO notified Perri, Director of Case Management of the issue and explained that if patient did not discharge to [facility] today, the authorization will have to resubmitted. Perri instructed LUCHO to schedule transportation for patient at 9:30 pm. LUCHO informed Lindsay that transportation will be set for 9:30 pm. LUCHO informed nurse Bills that patient will be discharging and time of transportation . LUCHO also informed Yanet, Director of Utilization.     ADT 30 order placed for  Transportation.  Requested  time: 9:30 pm (Wheelchair)  If transportation does not arrive at ETA time nurse will be instructed to follow protocol for transportation below:   How can I get in touch directly with dispatch, if needed?                 Non-emergent dispatch: [phone]                                    Escalation Needs (PFC Lead): [phone]     10/31/19 1556   Final Note   Assessment Type Final Discharge Note   Anticipated Discharge Disposition SNF   Hospital Follow Up  Appt(s) scheduled? No   Discharge plans and expectations educations in teach back method with documentation complete? No   Right Care Referral Info   Post Acute Recommendation SNF / Sub-Acute Rehab   Referral Type SNF   Facility Name [place]

## 2019-10-31 NOTE — PROCEDURES
Radiology Post-Procedure Note    Pre Op Diagnosis: Group B Strep bacteremia   Post Op Diagnosis: Same    Procedure: US and fluoroscopic-guided placement of a RIJV-approach TCVC trimmed to 25-cm    Procedure performed by: Ralph Pablo MD    Written Informed Consent Obtained: Yes  Specimen Removed: NO  Estimated Blood Loss: none    Findings:   Successful placement of a RIJV-approach TCVC trimmed to 25-cm under moderate conscious sedation. Patient tolerated the procedure well. No immediate post-procedural complications noted.     Thank you for considering IR for the care of your patient.     Ralph Pablo MD  Interventional Radiology

## 2019-11-22 ENCOUNTER — OFFICE VISIT (OUTPATIENT)
Dept: INFECTIOUS DISEASES | Facility: CLINIC | Age: 54
End: 2019-11-22
Payer: MEDICAID

## 2019-11-22 VITALS
HEART RATE: 90 BPM | HEIGHT: 68 IN | BODY MASS INDEX: 33.42 KG/M2 | SYSTOLIC BLOOD PRESSURE: 124 MMHG | TEMPERATURE: 99 F | DIASTOLIC BLOOD PRESSURE: 74 MMHG

## 2019-11-22 DIAGNOSIS — Z94.0 KIDNEY TRANSPLANT STATUS, LIVING UNRELATED DONOR: ICD-10-CM

## 2019-11-22 DIAGNOSIS — R78.81 BACTEREMIA DUE TO GROUP B STREPTOCOCCUS: Primary | ICD-10-CM

## 2019-11-22 DIAGNOSIS — Z29.89 NEED FOR PROPHYLACTIC IMMUNOTHERAPY: ICD-10-CM

## 2019-11-22 DIAGNOSIS — B95.1 BACTEREMIA DUE TO GROUP B STREPTOCOCCUS: Primary | ICD-10-CM

## 2019-11-22 DIAGNOSIS — M46.37 PYOGENIC INFECTION OF LUMBOSACRAL INTERVERTEBRAL DISC: ICD-10-CM

## 2019-11-22 PROCEDURE — 99215 OFFICE O/P EST HI 40 MIN: CPT | Mod: S$PBB,,, | Performed by: INTERNAL MEDICINE

## 2019-11-22 PROCEDURE — 99213 OFFICE O/P EST LOW 20 MIN: CPT | Mod: PBBFAC | Performed by: INTERNAL MEDICINE

## 2019-11-22 PROCEDURE — 99215 PR OFFICE/OUTPT VISIT, EST, LEVL V, 40-54 MIN: ICD-10-PCS | Mod: S$PBB,,, | Performed by: INTERNAL MEDICINE

## 2019-11-22 PROCEDURE — 99999 PR PBB SHADOW E&M-EST. PATIENT-LVL III: ICD-10-PCS | Mod: PBBFAC,,, | Performed by: INTERNAL MEDICINE

## 2019-11-22 PROCEDURE — 99999 PR PBB SHADOW E&M-EST. PATIENT-LVL III: CPT | Mod: PBBFAC,,, | Performed by: INTERNAL MEDICINE

## 2019-11-22 RX ORDER — AMITRIPTYLINE HYDROCHLORIDE 25 MG/1
25 TABLET, FILM COATED ORAL NIGHTLY PRN
Status: ON HOLD | COMMUNITY
End: 2020-02-26 | Stop reason: CLARIF

## 2019-11-22 NOTE — PROGRESS NOTES
Subjective:      Patient ID: Elizabeth Maldonado is a 54 y.o. female.    Chief Complaint: Follow-up for osteomyelitis of pelvis    History of Present Illness  54-year-old female with IDDM2, HLD, renal transplant 2014 (on MMF and tacro) presents for follow-up of osteomyelitis of pelvis.     Patient presented to [facility] ED with left lower back, left hip, and left shoulder pain.  She had a recent at work as she was stepping down a ladder and missed the last step.  She had associated poor appetite and chills at home. She denied diarrhea, abd pain, or urinary symptoms. Blood cultures were positive for GBS.  MRI CTL spine without evidence of epidural abscess/diskitis/OM; MRI pelvis with nonspecific myositis edematous change of left lumbosacral junction. Patient s/p aspiration of left SC joint 10/25/2019, cultures with no growth.  Patient was discharged on SNF on 4 week course of ceftriaxone.  Patient reports resolution of left hip pain.  Notes ongoing left shoulder pain.  She feels back to her baseline.       Review of Systems   Constitution: Negative for chills, decreased appetite, fever, malaise/fatigue, night sweats, weight gain and weight loss.   HENT: Negative for congestion, ear pain, hearing loss, hoarse voice, sore throat and tinnitus.    Eyes: Negative for blurred vision, redness and visual disturbance.   Cardiovascular: Negative for chest pain, leg swelling and palpitations.   Respiratory: Negative for cough, hemoptysis, shortness of breath and sputum production.    Hematologic/Lymphatic: Negative for adenopathy. Does not bruise/bleed easily.   Skin: Negative for dry skin, itching, rash and suspicious lesions.   Musculoskeletal: Positive for joint pain and neck pain. Negative for back pain and myalgias.   Gastrointestinal: Negative for abdominal pain, constipation, diarrhea, heartburn, nausea and vomiting.   Genitourinary: Negative for dysuria, flank pain, frequency, hematuria, hesitancy and urgency.    Neurological: Positive for focal weakness, headaches and weakness. Negative for dizziness, numbness and paresthesias.   Psychiatric/Behavioral: Negative for depression and memory loss. The patient does not have insomnia and is not nervous/anxious.      Objective:   Physical Exam   Constitutional: She is oriented to person, place, and time. She appears well-developed and well-nourished. No distress.   HENT:   Head: Normocephalic and atraumatic.   Eyes: Conjunctivae and EOM are normal.   Neck: Normal range of motion. Neck supple.   Pulmonary/Chest: Effort normal. No respiratory distress.   Abdominal: Soft. She exhibits no distension.   Musculoskeletal: Normal range of motion. She exhibits no edema.   Neurological: She is alert and oriented to person, place, and time.   Skin: Skin is warm and dry. No rash noted. She is not diaphoretic. No erythema.   Psychiatric: She has a normal mood and affect. Her behavior is normal.   Vitals reviewed.                Assessment:   54-year-old female with history of kidney transplant (on cellcept and prograf), DM2, HLD, recent fall off ladder, presents with left sternoclavicular joint, left lower back, and left hip pain, found to have group B strep bacteremia.  MRI CTL spine without evidence of epidural abscess/diskitis/OM; MRI pelvis with nonspecific myositis edematous change of left lumbosacral junction.     Patient s/p aspiration of left SC joint 10/25/2019, cultures with no growth.  Patient was treated empirically for GBS infection of lumbosacral junction, now clinically improved after 4 weeks of ceftriaxone.      Plan:   - Discontinue ceftriaxone  - Remove tunneled line  - RTC prn    Katy Mabry MD MPH  Infectious Diseases [facility]

## 2019-11-25 ENCOUNTER — HOSPITAL ENCOUNTER (OUTPATIENT)
Dept: PREADMISSION TESTING | Facility: HOSPITAL | Age: 54
Discharge: HOME | End: 2019-11-25
Attending: UROLOGY
Payer: MEDICAID

## 2019-11-25 ENCOUNTER — HOSPITAL ENCOUNTER (OUTPATIENT)
Dept: INTERVENTIONAL RADIOLOGY/VASCULAR | Facility: HOSPITAL | Age: 54
Discharge: HOME | End: 2019-11-25
Attending: INTERNAL MEDICINE
Payer: MEDICAID

## 2019-11-25 ENCOUNTER — HOSPITAL ENCOUNTER (OUTPATIENT)
Facility: HOSPITAL | Age: 54
Discharge: HOME | End: 2019-11-25
Attending: RADIOLOGY | Admitting: RADIOLOGY
Payer: MEDICAID

## 2019-11-25 VITALS
HEART RATE: 87 BPM | RESPIRATION RATE: 18 BRPM | OXYGEN SATURATION: 100 % | SYSTOLIC BLOOD PRESSURE: 127 MMHG | DIASTOLIC BLOOD PRESSURE: 73 MMHG | TEMPERATURE: 98 F

## 2019-11-25 VITALS
HEART RATE: 86 BPM | SYSTOLIC BLOOD PRESSURE: 123 MMHG | BODY MASS INDEX: 31 KG/M2 | TEMPERATURE: 99 F | HEIGHT: 68 IN | RESPIRATION RATE: 17 BRPM | DIASTOLIC BLOOD PRESSURE: 74 MMHG | OXYGEN SATURATION: 100 % | WEIGHT: 204.56 LBS

## 2019-11-25 DIAGNOSIS — B95.1 BACTEREMIA DUE TO GROUP B STREPTOCOCCUS: ICD-10-CM

## 2019-11-25 DIAGNOSIS — R78.81 BACTEREMIA DUE TO GROUP B STREPTOCOCCUS: ICD-10-CM

## 2019-11-25 PROCEDURE — 36589 REMOVAL TUNNELED CV CATH: CPT | Mod: RT,,, | Performed by: RADIOLOGY

## 2019-11-25 PROCEDURE — 36589 IR TUNNELED CATH REMOVAL W/O PORT: ICD-10-PCS | Mod: RT,,, | Performed by: RADIOLOGY

## 2019-11-25 PROCEDURE — 82962 GLUCOSE BLOOD TEST: CPT

## 2019-11-25 PROCEDURE — 36589 REMOVAL TUNNELED CV CATH: CPT

## 2019-11-25 NOTE — PROCEDURES
Radiology Post-Procedure Note    Pre Op Diagnosis: GBS bacteremia, resolved  Post Op Diagnosis: Same    Procedure: Fluoroscopic-guided removal of a RIJV-approach TCVC    Procedure performed by: Ralph Pablo MD    Written Informed Consent Obtained: Yes  Specimen Removed: YES, TCVC trimmed to 25-cm  Estimated Blood Loss: none    Findings:   Successful removal of RIJV-approach TCVC with local anesthetic only. Patient tolerated the procedure well. No immediate post-procedural complications noted.     Thank you for considering IR for the care of your patient.     Ralph Pablo MD  Interventional Radiology

## 2019-11-25 NOTE — PROGRESS NOTES
Report called to DEEPAK Watts in [facility]. Tunneled central line removed. No difficulties & catheter tip intact. Pressure held to site post removal. Site dressed with gauze and tegaderm, CDI; no redness, swelling or hematoma noted. Pt tolerated procedure well. Per MD, ok to discharge once back in [facility]. NADN. Transported to [facility] per RN.

## 2019-11-25 NOTE — DISCHARGE INSTRUCTIONS
BATHING:  ? You may shower tomorrow.  DRESSING:  ? Remove dressing tomorrow.        ACTIVITY LEVEL: If you have received sedation or an anesthetic, you may feel sleepy for several hours. Rest until you are more awake. Gradually resume your normal activities    Do not drive, drink alcohol, or sign legal documents for 24 hours, or if taking narcotic pain medication.      DIET: You may resume your home diet. If nausea is present, increase your diet gradually with fluids and bland foods.    Medications: Pain medication should be taken only if needed and as directed. If antibiotics are prescribed, the medication should be taken until completed. You will be given an updated list of you medications.  ? No driving, alcoholic beverages or signing legal documents for next 24 hours if you have had sedation, or while taking pain medication    CALL THE DOCTOR:   For any obvious bleeding (some dried blood over the incision is normal).     Redness, swelling, foul smell around incision or fever over 101.  Shortness of breath.  Persistent pain or nausea not relieved by medication.  Call  [phone]     to speak with an Interventional Radiologist    If any unusual problems or difficulties occur contact your doctor. If you cannot contact your doctor but feel your signs and symptoms warrant a physicians attention return to the emergency room.    Fall Prevention  Millions of people fall every year and injure themselves. You may have had anesthesia or sedation which may increase your risk of falling. You may have health issues that put you at an increased risk of falling.     Here are ways to reduce your risk of falling.  ·   · Make your home safe by keeping walkways clear of objects you may trip over.  · Use non-slip pads under rugs. Do not use area rugs or small throw rugs.  · Use non-slip mats in bathtubs and showers.  · Install handrails and lights on staircases.  · Do not walk in poorly lit areas.  · Do not stand on chairs or wobbly  ladders.  · Use caution when reaching overhead or looking upward. This position can cause a loss of balance.  · Be sure your shoes fit properly, have non-slip bottoms and are in good condition.   · Wear shoes both inside and out. Avoid going barefoot or wearing slippers.  · Be cautious when going up and down stairs, curbs, and when walking on uneven sidewalks.  · If your balance is poor, consider using a cane or walker.  · If your fall was related to alcohol use, stop or limit alcohol intake.   · If your fall was related to use of sleeping medicines, talk to your doctor about this. You may need to reduce your dosage at bedtime if you awaken during the night to go to the bathroom.    · To reduce the need for nighttime bathroom trips:  ¨ Avoid drinking fluids for several hours before going to bed  ¨ Empty your bladder before going to bed  ¨ Men can keep a urinal at the bedside  · Stay as active as you can. Balance, flexibility, strength, and endurance all come from exercise. They all play a role in preventing falls. Ask your healthcare provider which types of activity are right for you.  · Get your vision checked on a regular basis.  · If you have pets, know where they are before you stand up or walk so you don't trip over them.  · Use night lights.

## 2019-11-25 NOTE — DISCHARGE SUMMARY
Radiology Discharge Summary      Hospital Course: No complications    Admit Date: 11/25/2019  Discharge Date: 11/25/2019     Instructions Given to Patient: Yes  Diet: Resume prior diet  Activity: activity as tolerated    Description of Condition on Discharge: Stable  Vital Signs (Most Recent):      Discharge Disposition: Home    Discharge Diagnosis:   55 y/o F with h/o GBS bacteremia s/p completion of recommended regiment of IV Abx. Pt is now s/p removal of RIJV-approach TCVC with local anesthetic only. Patient tolerated the procedure well. No immediate post-procedural complications noted.     Thank you for considering IR for the care of your patient.     Ralph Pablo MD  Interventional Radiology

## 2019-11-25 NOTE — H&P
Radiology History & Physical      SUBJECTIVE:     Chief Complaint: GBS bacteremia with outpatient IV Abx therapy completed    History of Present Illness:  Elizabeth Maldonado is a 54 y.o. female with group B Strep bacteremia with recommendations for 4-6 wk IV Abx therapy as outpatient s/p placement of RIJV-approach TCVC and completion of recommended duration of IV Abx, no longer requiring RIJV-approach TCVC.    A new outpatient IR consult placed for removal of the RIJV-approach TCVC.       Past Medical History:   Diagnosis Date    Acidosis 2014    Allergic rhinitis 2014    Allergy     Anemia     Anemia in chronic kidney disease 2014    Anxiety     Chronic bilateral low back pain with bilateral sciatica 10/25/2019    Chronic immunosuppression with Prograf and MMF 12/3/2014    CKD (chronic kidney disease) stage 5, GFR less than 15 ml/min 2014    CKD (chronic kidney disease), stage III 3/2/2015    Degenerative disc disease     Depression 2014    Diabetes mellitus, type 2 since age 18 2014    ESRD on peritoneal dialysis - 2014 for 9 hours no peritonitis 2014    Hyperlipidemia     Hypertension 2014    Hypomagnesemia 2015    Kidney transplant status, living unrelated donor - 12/2/14 12/3/2014    Neutropenia 2015    NS (nuclear sclerosis) 2016    Obesity     Organ transplant candidate 2014    Pre-op exam 2014    Proliferative diabetic retinopathy of both eyes without macular edema associated with type 2 diabetes mellitus 2016    Renal manifestation of secondary diabetes mellitus     Tendinitis     Trouble in sleeping      Past Surgical History:   Procedure Laterality Date    BONE MARROW BIOPSY N/A      SECTION      x 2    KIDNEY TRANSPLANT      RENAL BIOPSY      ROTATOR CUFF REPAIR      TUBAL LIGATION         Home Meds:   Prior to Admission medications    Medication Sig Start Date End Date Taking? Authorizing  "Provider   ACCU-CHEK PREM [facility] USE AS DIRECTED TO CHECK BLOOD GLUCOSE 12/21/17   Cristy Sampson MD   acetaminophen (TYLENOL) 325 MG tablet Take 2 tablets (650 mg total) by mouth every 4 (four) hours as needed. 10/31/19   Osvaldo Rose MD   amitriptyline (ELAVIL) 25 MG tablet Take 25 mg by mouth nightly as needed for Insomnia.    Historical Provider, MD   atorvastatin (LIPITOR) 20 MG tablet Take 20 mg by mouth.    Historical Provider, MD   blood sugar diagnostic Strp Checks BG ac/hs 6/5/17   Cristy Sampson MD   carvedilol (COREG) 6.25 MG tablet Take 1 tablet (6.25 mg total) by mouth 2 (two) times daily. 10/31/19 10/30/20  Osvaldo Rose MD   gabapentin (NEURONTIN) 100 MG capsule Take 1 capsule (100 mg total) by mouth 3 (three) times daily. 10/31/19 10/30/20  Osvaldo Rose MD   INSULIN GLARGINE,HUM.REC.ANLOG (BASAGLAR KWIKPEN U-100 INSULIN SUBQ) Inject 50 Units into the skin nightly.    Historical Provider, MD   insulin syringe-needle U-100 (INSULIN SYRINGE) 1/2 mL 30 x 5/16" Syrg Uses 4 daily 1/6/15   Salima Acosta, DNP, NP   lancets (ONETOUCH DELICA LANCETS) 33 gauge Misc 1 lancet by Misc.(Non-Drug; Combo Route) route 4 (four) times daily before meals and nightly. 6/5/17   Cristy Sampson MD   mycophenolate (CELLCEPT) 250 mg Cap TAKE 2 CAPSULES ( 500 MG TOTAL) BY MOUTH 2 TIMES DAILY 5/16/19   Kalpana Grider MD   NOVOLOG FLEXPEN U-100 INSULIN 100 unit/mL (3 mL) InPn pen Inject 10 Units into the skin 3 (three) times daily with meals. 10/31/19   Osvaldo Rose MD   oxyCODONE-acetaminophen (PERCOCET)  mg per tablet Take 1 tablet by mouth every 8 (eight) hours as needed for Pain. 10/31/19   Osvaldo Rose MD   ramelteon (ROZEREM) 8 mg tablet Take 1 tablet (8 mg total) by mouth nightly as needed for Insomnia. 10/31/19   Osvaldo Rose MD   SYRINGE & NEEDLE,INSULIN,1 ML (INSULIN SYRINGE-NEEDLE U-100) 1 mL 29 X 7/16" Syrg  3/25/15   Historical Provider, MD   tacrolimus (PROGRAF) " 1 MG Cap TAKE 3 CAPSULES ( 3 MG TOTAL) BY MOUTH IN THE MORNING & TAKE 3 CAPSULES ( 3 MG TOTAL) IN THE EVENING 12/28/18   Sweta Catalan MD   cefTRIAXone 2 g in dextrose 5 % 50 mL (ROCEPHIN) 2 g/50 mL PgBk IVPB Inject 50 mLs (2 g total) into the vein once daily. 10/31/19 11/25/19  Osvaldo Rose MD     Anticoagulants/Antiplatelets: no anticoagulation    Allergies:   Review of patient's allergies indicates:   Allergen Reactions    Iodine and iodide containing products Hives    Shrimp Itching    Topamax [topiramate] Other (See Comments)     Vision changes    Zoloft [sertraline] Palpitations     Sedation History:  no adverse reactions    Review of Systems:   Hematological: no known coagulopathies  Respiratory: no cough, shortness of breath, or wheezing  Cardiovascular: no chest pain or dyspnea on exertion  Gastrointestinal: no abdominal pain, change in bowel habits, or black or bloody stools  Genito-Urinary: no dysuria, trouble voiding, or hematuria  Musculoskeletal: negative  Neurological: no TIA or stroke symptoms         OBJECTIVE:     Vital Signs (Most Recent)       Physical Exam:  ASA: II  Mallampati: III    General: no acute distress  Mental Status: alert and oriented to person, place and time  HEENT: normocephalic, atraumatic  Chest: unlabored breathing  Heart: regular heart rate  Abdomen: nondistended  Extremity: moves all extremities    Laboratory  Lab Results   Component Value Date    INR 1.1 10/31/2019       Lab Results   Component Value Date    WBC 10.87 10/24/2019    HGB 9.0 (L) 10/24/2019    HCT 27.9 (L) 10/24/2019    MCV 83 10/24/2019     10/24/2019      Lab Results   Component Value Date     (H) 10/30/2019     (L) 10/30/2019    K 4.4 10/30/2019     10/30/2019    CO2 24 10/30/2019    BUN 25 (H) 10/30/2019    CREATININE 1.9 (H) 10/30/2019    CALCIUM 10.1 10/30/2019    MG 2.3 10/21/2019    ALT 37 10/21/2019    AST 46 (H) 10/21/2019    ALBUMIN 1.5 (L) 10/30/2019    BILITOT 0.3  10/21/2019    BILIDIR 0.2 12/27/2018       ASSESSMENT/PLAN:     54 y.o. female with group B Strep bacteremia with recommendations for 4-6 wk IV Abx therapy as outpatient s/p placement of RIJV-approach TCVC and completion of recommended duration of IV Abx, no longer requiring RIJV-approach TCVC.    1. GBS bacteremia, resolved - Will attempt removal of the RIJV-approach TCVC with local anesthetic only.     Risks (including, but not limited to, pain, bleeding, infection, damage to nearby structures, failure to obtain sufficient material for a diagnosis, the need for additional procedures, and death), benefits, and alternatives were discussed with the patient. All questions were answered to the best of my abilities. The patient wishes to proceed with the procedure. Written informed consent was obtained.    Thank you for considering IR for the care of your patient.     Ralph Pablo MD  Interventional Radiology

## 2019-12-23 ENCOUNTER — TELEPHONE (OUTPATIENT)
Dept: TRANSPLANT | Facility: CLINIC | Age: 54
End: 2019-12-23

## 2019-12-23 NOTE — TELEPHONE ENCOUNTER
Followed up with pt as requested by Dr. Lester Sims.  Txp wanting her follow up with transplant doctors for proteinuria and positive DSA's s/p DC from [facility].    Pt states that she has been D/C'd from [facility] and is done with Abx and line was removed.   Requested pt to have labs for us and to get her in with Dr. Catalan.  Pt states that she is available to go have labs done on January 10 th at [facility] and can do 10 AM with Dr. Catalan on 01/24/20.

## 2020-01-15 ENCOUNTER — LAB VISIT (OUTPATIENT)
Dept: LAB | Facility: HOSPITAL | Age: 55
End: 2020-01-15
Attending: INTERNAL MEDICINE
Payer: MEDICAID

## 2020-01-15 DIAGNOSIS — Z48.298 AFTERCARE FOLLOWING ORGAN TRANSPLANT: ICD-10-CM

## 2020-01-15 DIAGNOSIS — Z94.0 STATUS POST KIDNEY TRANSPLANT: ICD-10-CM

## 2020-01-15 DIAGNOSIS — Z29.89 NEED FOR PROPHYLACTIC IMMUNOTHERAPY: ICD-10-CM

## 2020-01-15 LAB
ALBUMIN SERPL BCP-MCNC: 3 G/DL (ref 3.5–5.2)
ALBUMIN SERPL BCP-MCNC: 3 G/DL (ref 3.5–5.2)
ALP SERPL-CCNC: 84 U/L (ref 55–135)
ALT SERPL W/O P-5'-P-CCNC: <5 U/L (ref 10–44)
ANION GAP SERPL CALC-SCNC: 9 MMOL/L (ref 8–16)
AST SERPL-CCNC: 13 U/L (ref 10–40)
BASOPHILS # BLD AUTO: 0.02 K/UL (ref 0–0.2)
BASOPHILS NFR BLD: 0.4 % (ref 0–1.9)
BILIRUB DIRECT SERPL-MCNC: 0.1 MG/DL (ref 0.1–0.3)
BILIRUB SERPL-MCNC: 0.2 MG/DL (ref 0.1–1)
BUN SERPL-MCNC: 30 MG/DL (ref 6–20)
CALCIUM SERPL-MCNC: 9.6 MG/DL (ref 8.7–10.5)
CHLORIDE SERPL-SCNC: 110 MMOL/L (ref 95–110)
CO2 SERPL-SCNC: 23 MMOL/L (ref 23–29)
CREAT SERPL-MCNC: 2 MG/DL (ref 0.5–1.4)
DIFFERENTIAL METHOD: ABNORMAL
EOSINOPHIL # BLD AUTO: 0.3 K/UL (ref 0–0.5)
EOSINOPHIL NFR BLD: 5.6 % (ref 0–8)
ERYTHROCYTE [DISTWIDTH] IN BLOOD BY AUTOMATED COUNT: 17.1 % (ref 11.5–14.5)
EST. GFR  (AFRICAN AMERICAN): 31.3 ML/MIN/1.73 M^2
EST. GFR  (NON AFRICAN AMERICAN): 27.1 ML/MIN/1.73 M^2
GLUCOSE SERPL-MCNC: 69 MG/DL (ref 70–110)
HCT VFR BLD AUTO: 36.1 % (ref 37–48.5)
HGB BLD-MCNC: 10.4 G/DL (ref 12–16)
IMM GRANULOCYTES # BLD AUTO: 0.02 K/UL (ref 0–0.04)
IMM GRANULOCYTES NFR BLD AUTO: 0.4 % (ref 0–0.5)
LYMPHOCYTES # BLD AUTO: 2.1 K/UL (ref 1–4.8)
LYMPHOCYTES NFR BLD: 41.4 % (ref 18–48)
MAGNESIUM SERPL-MCNC: 2 MG/DL (ref 1.6–2.6)
MCH RBC QN AUTO: 25.2 PG (ref 27–31)
MCHC RBC AUTO-ENTMCNC: 28.8 G/DL (ref 32–36)
MCV RBC AUTO: 88 FL (ref 82–98)
MONOCYTES # BLD AUTO: 0.5 K/UL (ref 0.3–1)
MONOCYTES NFR BLD: 9.1 % (ref 4–15)
NEUTROPHILS # BLD AUTO: 2.2 K/UL (ref 1.8–7.7)
NEUTROPHILS NFR BLD: 43.1 % (ref 38–73)
NRBC BLD-RTO: 0 /100 WBC
PHOSPHATE SERPL-MCNC: 3.7 MG/DL (ref 2.7–4.5)
PLATELET # BLD AUTO: 200 K/UL (ref 150–350)
PMV BLD AUTO: 12.8 FL (ref 9.2–12.9)
POTASSIUM SERPL-SCNC: 4.3 MMOL/L (ref 3.5–5.1)
PROT SERPL-MCNC: 8.2 G/DL (ref 6–8.4)
RBC # BLD AUTO: 4.12 M/UL (ref 4–5.4)
SODIUM SERPL-SCNC: 142 MMOL/L (ref 136–145)
WBC # BLD AUTO: 5.17 K/UL (ref 3.9–12.7)

## 2020-01-15 PROCEDURE — 80197 ASSAY OF TACROLIMUS: CPT

## 2020-01-15 PROCEDURE — 86833 HLA CLASS II HIGH DEFIN QUAL: CPT | Mod: PO

## 2020-01-15 PROCEDURE — 86832 HLA CLASS I HIGH DEFIN QUAL: CPT | Mod: PO

## 2020-01-15 PROCEDURE — 36415 COLL VENOUS BLD VENIPUNCTURE: CPT | Mod: PO

## 2020-01-15 PROCEDURE — 85025 COMPLETE CBC W/AUTO DIFF WBC: CPT

## 2020-01-15 PROCEDURE — 87799 DETECT AGENT NOS DNA QUANT: CPT

## 2020-01-15 PROCEDURE — 82247 BILIRUBIN TOTAL: CPT

## 2020-01-15 PROCEDURE — 86977 RBC SERUM PRETX INCUBJ/INHIB: CPT | Mod: PO

## 2020-01-15 PROCEDURE — 80069 RENAL FUNCTION PANEL: CPT

## 2020-01-15 PROCEDURE — 83735 ASSAY OF MAGNESIUM: CPT

## 2020-01-15 PROCEDURE — 84075 ASSAY ALKALINE PHOSPHATASE: CPT

## 2020-01-16 LAB — TACROLIMUS BLD-MCNC: 5.5 NG/ML (ref 5–15)

## 2020-01-17 DIAGNOSIS — Z94.0 KIDNEY REPLACED BY TRANSPLANT: Primary | ICD-10-CM

## 2020-01-17 DIAGNOSIS — R80.9 PROTEINURIA, UNSPECIFIED TYPE: ICD-10-CM

## 2020-01-17 LAB
CLASS I ANTIBODY COMMENTS - LUMINEX: NORMAL
CLASS II ANTIBODIES - LUMINEX: NORMAL
CLASS II ANTIBODY COMMENTS - LUMINEX: NORMAL
DSA1 TESTING DATE: NORMAL
DSA12 TESTING DATE: NORMAL
DSA2 TESTING DATE: NORMAL
SERUM COLLECTION DT - LUMINEX CLASS I: NORMAL
SERUM COLLECTION DT - LUMINEX CLASS II: NORMAL

## 2020-01-20 DIAGNOSIS — Z76.82 ORGAN TRANSPLANT CANDIDATE: ICD-10-CM

## 2020-01-20 RX ORDER — TACROLIMUS 1 MG/1
CAPSULE ORAL
Qty: 180 CAPSULE | Refills: 11 | Status: SHIPPED | OUTPATIENT
Start: 2020-01-20 | End: 2021-01-26

## 2020-01-20 NOTE — PROGRESS NOTES
Positive DSA - will need to assess the patient and discuss suitability to perform biopsy at time of clinic visit

## 2020-01-24 ENCOUNTER — OFFICE VISIT (OUTPATIENT)
Dept: TRANSPLANT | Facility: CLINIC | Age: 55
End: 2020-01-24
Payer: MEDICAID

## 2020-01-24 VITALS
TEMPERATURE: 98 F | OXYGEN SATURATION: 100 % | WEIGHT: 192.69 LBS | RESPIRATION RATE: 16 BRPM | HEIGHT: 67 IN | SYSTOLIC BLOOD PRESSURE: 107 MMHG | BODY MASS INDEX: 30.24 KG/M2 | HEART RATE: 83 BPM | DIASTOLIC BLOOD PRESSURE: 63 MMHG

## 2020-01-24 DIAGNOSIS — Z94.0 KIDNEY TRANSPLANT STATUS, LIVING UNRELATED DONOR: Primary | Chronic | ICD-10-CM

## 2020-01-24 DIAGNOSIS — E78.5 HYPERLIPIDEMIA, UNSPECIFIED HYPERLIPIDEMIA TYPE: Chronic | ICD-10-CM

## 2020-01-24 DIAGNOSIS — Z29.89 NEED FOR PROPHYLACTIC IMMUNOTHERAPY: Chronic | ICD-10-CM

## 2020-01-24 DIAGNOSIS — D63.8 ANEMIA OF CHRONIC DISEASE: Chronic | ICD-10-CM

## 2020-01-24 DIAGNOSIS — N18.30 CKD (CHRONIC KIDNEY DISEASE), STAGE III: Chronic | ICD-10-CM

## 2020-01-24 DIAGNOSIS — N25.81 HYPERPARATHYROIDISM, SECONDARY RENAL: ICD-10-CM

## 2020-01-24 PROCEDURE — 99215 PR OFFICE/OUTPT VISIT, EST, LEVL V, 40-54 MIN: ICD-10-PCS | Mod: S$PBB,,, | Performed by: INTERNAL MEDICINE

## 2020-01-24 PROCEDURE — 99999 PR PBB SHADOW E&M-EST. PATIENT-LVL IV: ICD-10-PCS | Mod: PBBFAC,,, | Performed by: INTERNAL MEDICINE

## 2020-01-24 PROCEDURE — 99999 PR PBB SHADOW E&M-EST. PATIENT-LVL IV: CPT | Mod: PBBFAC,,, | Performed by: INTERNAL MEDICINE

## 2020-01-24 PROCEDURE — 99215 OFFICE O/P EST HI 40 MIN: CPT | Mod: S$PBB,,, | Performed by: INTERNAL MEDICINE

## 2020-01-24 PROCEDURE — 99214 OFFICE O/P EST MOD 30 MIN: CPT | Mod: PBBFAC | Performed by: INTERNAL MEDICINE

## 2020-01-24 NOTE — PROGRESS NOTES
LUCHO met with pt at the request of MD Chucky due to pt's daughter recently passing away (11/29/2019).    SW assessed pt's frame of mind and pt seems to be experiencing normal grief, however reports that she feels she has been managing pretty well. She reports that sometime she is doing really well and other times she cries. Pt reports that when she feels sad she allows herself to cry until she is able to feel better. Pt reports that she will often think of funny times or experiences that they have had together to pull her out of her sadness. Pt reports that she still gets out, but at times just wants to be alone. Pt reports future plans and wants to visit her son in [place] later this year. Pt did not endorse suicidal ideations. Pt reports knowing how to contact transplant team if necessary. LUCHO remains available to pt, pt's family, and transplant team at [phone].

## 2020-01-24 NOTE — LETTER
January 24, 2020        Anabelle Milan  [address] [place] [address] [address]  [place] [address]  Phone: [phone]  Fax: [phone]             [facility]  [address]  [facility] [address]  Phone: [phone]   Patient: Elizabeth Maldonado   MR Number: [id number]   YOB: 1965   Date of Visit: 1/24/2020       Dear Dr. Anabelle Milan    Thank you for referring Elizabeth Maldonado to me for evaluation. Attached you will find relevant portions of my assessment and plan of care.    If you have questions, please do not hesitate to call me. I look forward to following Elizabeth Maldonado along with you.    Sincerely,    Sweta Catalan MD    Enclosure    If you would like to receive this communication electronically, please contact [email or web address] or [phone] to request [employer] access.    [employer] is a tool which provides read-only access to select patient information with whom you have a relationship. Its easy to use and provides real time access to review your patients record including encounter summaries, notes, results, and demographic information.    If you feel you have received this communication in error or would no longer like to receive these types of communications, please e-mail [email or web address]       
need to admit

## 2020-01-24 NOTE — PROGRESS NOTES
"   Kidney Post-Transplant Assessment    Referring Physician: Jessica Johnson  Current Nephrologist: Anabelle Milan    ORGAN: LEFT KIDNEY  Donor Type: living  PHS Increased Risk: no  Cold Ischemia: 53 mins  Induction Medications: campath - alemtuzumab (anti-cd52)    Subjective:     CC:  Reassessment of renal allograft function and management of chronic immunosuppression.    Kidney History:  Ms. Maldonado is a 55 y.o. year old Black or  female with history of ESRD secondary to DM/HTN who was on PD for ~4 months prior to receiving LURT from her daughter's friend (who was in her mid 30s at time of donation, Campath induction, WIT 29 minutes, CIT 53 minutes, donor Hep BcAb positive, CMV D+/R+) on 12/2/14. Post-op course complicated by herpes zoster outbreak in mid-Sept 2015 along her left thigh. She had renal transplant biopsy on 2/22/17 for proteinuria which showed 9 glomeruli; glomerulosclerosis in global and segmental pattern but reported as likely related to endothelial injury and not a primary podocytopathy; no ACR (but suboptimal specimen), no AVR, C4d negative.  She has CKD stage 3 - GFR 30-59 and her baseline creatinine is between 1.6 to 2.0. She takes mycophenolate mofetil and tacrolimus for maintenance immunosuppression.     Interval History: Patient last seen in transplant clinic on 12/31/18. Since last visit she was hospitalized from 10/20/19 to 10/31/19 with left shoulder and hip pain found to have GBS bacteremia. Per Transplant ID note from Dr. Mabry dated 11/22/19: "MRI CTL spine without evidence of epidural abscess/diskitis/OM; MRI pelvis with nonspecific myositis edematous change of left lumbosacral junction. Patient s/p aspiration of left SC joint 10/25/2019, cultures with no growth.  Patient was discharged on SNF on 4 week course of ceftriaxone" She had PICC line removed on 11/25/19. She lost 20 lbs since last transplant nephrology clinic visit. She is here at clinic alone. She states she is " not doing so good. She shared with this provider that her daughter passed away on 11/29/19! This writer offered emotional support. She admits to feeling dizziness/lightheadedness thus not taking Coreg as prescribed - takes it only once a day. She states she lives alone. States her appetite is improving. States she is trying to drink more water. States she hasn't had to use the cane since beginning of Dec 2019.     Review of Systems  Constitutional: +appetite improving; Negative for fever and fatigue.   HENT: Negative for hearing loss, sore throat and mouth sores.   Eyes: Negative for photophobia, pain and visual disturbance.   Respiratory: Negative for cough, chest tightness, shortness of breath and wheezing.   Cardiovascular: Negative for chest pain, palpitations and leg swelling.   Gastrointestinal: Negative for nausea, vomiting, abdominal pain, diarrhea, constipation, blood in stool and abdominal distention.   Genitourinary: +occasional foamy urine; Negative for dysuria, urgency, frequency, hematuria, decreased urine volume, difficulty urinating.   Musculoskeletal: +hip, neck, shoulder pain but states things are much improved; states she feels it in the cold; Denies back pain  Skin: Negative for pallor, rash and wound.   Neurological: +occasional dizziness/lightheadedness - maybe occurring once a week; states she is taking Coreg 6.25 mg once a day; Negative for tremors, syncope, and headaches.   Hematological: Negative for adenopathy. Does not bruise/bleed easily.   Psychiatric/Behavioral: +dysphoric mood/depression - denies SI/HI; +anxiety    Medications:  Current Outpatient Medications   Medication Sig Dispense Refill    ACCU-CHEK PREM [facility] USE AS DIRECTED TO CHECK BLOOD GLUCOSE 1 each 1    acetaminophen (TYLENOL) 325 MG tablet Take 2 tablets (650 mg total) by mouth every 4 (four) hours as needed.  0    atorvastatin (LIPITOR) 20 MG tablet Take 20 mg by mouth.      blood sugar diagnostic Strp Checks BG ac/hs  "200 strip 7    carvedilol (COREG) 6.25 MG tablet Take 1 tablet (6.25 mg total) by mouth 2 (two) times daily. 60 tablet 11    insulin syringe-needle U-100 (INSULIN SYRINGE) 1/2 mL 30 x 5/16" Syrg Uses 4 daily 200 each 12    lancets (ONETOUCH DELICA LANCETS) 33 gauge Misc 1 lancet by Misc.(Non-Drug; Combo Route) route 4 (four) times daily before meals and nightly. 200 each 7    mycophenolate (CELLCEPT) 250 mg Cap TAKE 2 CAPSULES ( 500 MG TOTAL) BY MOUTH 2 TIMES DAILY 120 capsule 11    NOVOLOG FLEXPEN U-100 INSULIN 100 unit/mL (3 mL) InPn pen Inject 10 Units into the skin 3 (three) times daily with meals.  3    oxyCODONE-acetaminophen (PERCOCET)  mg per tablet Take 1 tablet by mouth every 8 (eight) hours as needed for Pain. 15 tablet 0    ramelteon (ROZEREM) 8 mg tablet Take 1 tablet (8 mg total) by mouth nightly as needed for Insomnia.      SYRINGE & NEEDLE,INSULIN,1 ML (INSULIN SYRINGE-NEEDLE U-100) 1 mL 29 X 7/16" Syrg   11    tacrolimus (PROGRAF) 1 MG Cap TAKE 3 CAPSULES ( 3 MG TOTAL) BY MOUTH IN THE MORNING & TAKE 3 CAPSULES ( 3 MG TOTAL) IN THE EVENING 180 capsule 11    amitriptyline (ELAVIL) 25 MG tablet Take 25 mg by mouth nightly as needed for Insomnia.      gabapentin (NEURONTIN) 100 MG capsule Take 1 capsule (100 mg total) by mouth 3 (three) times daily. (Patient not taking: Reported on 1/24/2020) 90 capsule 11    INSULIN GLARGINE,HUM.REC.ANLOG (BASAGLAR KWIKPEN U-100 INSULIN SUBQ) Inject 50 Units into the skin nightly.       No current facility-administered medications for this visit.          Objective:   Blood pressure 107/63, pulse 83, temperature 97.6 °F (36.4 °C), temperature source Oral, resp. rate 16, height 5' 7" (1.702 m), weight 87.4 kg (192 lb 10.9 oz), last menstrual period 04/29/2011, SpO2 100 %.body mass index is 30.18 kg/m².    Physical Exam  General: No acute distress, well groomed, alert and oriented x 3  HEENT: Normocephalic, atraumatic, EOM's intact bilaterally, external " inspection of ears and nose normal, moist mucous membranes, no oral ulcerations/lesions  Neck: Supple, symmetrical, trachea midline, no thyromegaly, no JVD  Respiratory: Clear to auscultation bilaterally, respirations unlabored, no rales/rhonchi/wheezing  Cardiovacular: Regular rate and rhythm, S1, S2 normal, no murmurs, rubs or gallops  Gastrointestinal: Soft, non-tender, bowel sounds normal, no hepatosplenomegaly  Renal allograft exam: No tenderness, no bruits, normal exam  Musculoskeletal: No knee or ankle joint tenderness or swelling.   Extremities: No clubbing or cyanosis of bilateral upper extremities; no lower extremity edema bilaterally, radial pulses 2+ bilaterally, symmetric  Skin: warm and dry; no rash on exposed skin  Neurologic: CN grossly intact      Labs:  Lab Results   Component Value Date    WBC 5.17 01/15/2020    HGB 10.4 (L) 01/15/2020    HCT 36.1 (L) 01/15/2020     01/15/2020    K 4.3 01/15/2020     01/15/2020    CO2 23 01/15/2020    BUN 30 (H) 01/15/2020    CREATININE 2.0 (H) 01/15/2020    EGFRNONAA 27.1 (A) 01/15/2020    CALCIUM 9.6 01/15/2020    PHOS 3.7 01/15/2020    MG 2.0 01/15/2020    ALBUMIN 3.0 (L) 01/15/2020    ALBUMIN 3.0 (L) 01/15/2020    AST 13 01/15/2020    ALT <5 (L) 01/15/2020       No results found for: EXTANC, EXTWBC, EXTSEGS, EXTPLATELETS, EXTHEMOGLOBI, EXTHEMATOCRI, EXTCREATININ, EXTSODIUM, EXTPOTASSIUM, EXTBUN, EXTCO2, EXTCALCIUM, EXTPHOSPHORU, EXTGLUCOSE, EXTALBUMIN, EXTAST, EXTALT, EXTBILITOTAL, EXTLIPASE, EXTAMYLASE    No results found for: EXTCYCLOSLVL, EXTSIROLIMUS, EXTTACROLVL, EXTPROTCRE, EXTPTHINTACT, EXTPROTEINUA, EXTWBCUA, EXTRBCUA    Labs were reviewed with the patient.    Assessment/Plan:     1. Kidney transplant status, living unrelated donor - 12/2/14    2. Hyperparathyroidism, secondary renal    3. Chronic immunosuppression with Prograf    4. Anemia of chronic disease    5. CKD (chronic kidney disease), stage III    6. Hyperlipidemia, unspecified  hyperlipidemia type        Ms. Maldonado is a 55 y.o. female with:     # History of ESRD presumed secondary to DM/HTN s/p LURT from her daughter's friend (who was in her mid 30s at time of donation, Campath induction, WIT 29 minutes, CIT 53 minutes, donor Hep BcAb positive, CMV D+/R+) on 12/2/14: baseline Cr mostly 1.6 to 2.0  - her last Cr is within her baseline at 2.0 from 1/15/20  - she had renal transplant biopsy on 2/22/17 for proteinuria which showed 9 glomeruli; glomerulosclerosis in global and segmental pattern but reported as likely related to endothelial injury and not a primary podocytopathy; no ACR (but suboptimal specimen), no AVR, C4d negative  - last UPC 2.41 from 1/15/20 --> unable to start ACE-I/ARB given BPs on lower end and patient symptomatic  - 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 with MFI trend of 11,548 (10/22/19) --> 12,281 (1/15/20)  - discussed with patient at length regarding the concern for rise in proteinuria above her previous 1-1.5 gm proteinuria as well as the presence of the DSAs and how we would want to perform renal allograft biopsy to determine if there was anything treatable   - she has recovered medically from her hospitalization in Oct 2019 and I feel that if indicated she would be able to tolerate rejection treatment   - given she is still in the process of grieving from the passing away of her daughter on 11/29/19 and requested not to have biopsy scheduled for another month - will ask transplant coordinator to arrange for biopsy at the end of Feb 2020   - explained to patient that the longer rejection is left untreated the less effective subsequently trying to treat it may be and she verbalized understanding and will notify transplant coordinator with any questions/concerns/changes in plan   - briefly went over the different possibilities and treatments that we could offer her depending on what the biopsy shows     # Immunosuppression:   - continue Prograf 3 mg BID, last  FK-506 level 5.5 from 1/15/20  - continue  mg BID - advised patient to look at the size of the pills she has at home since she is taking 1 pill twice a day but unsure if the pills are 250 mg or 500 mg each - counseled her on need to be on 500 mg BID   - continue to monitor for toxicities from immunosuppressive medications     # Infectious Surveillance:   - last CMV negative from 4/7/15  - last BK serum PCR negative from 1/15/20     # History of Herpes Zoster: patient with herpes zoster outbreak in mid-Sept 2015 along her left thigh  - no residual pain or lesions     # DM: last HbA1c 10.4% from 10/21/19  - management per endocrine/PCP     # Depression/Bereavement  - this writer offered her condolences and will have SW speak with patient as well    # HTN: BP on lower end in clinic and patient reports dizziness  - advised her to check and record home BP monitoring   - advised her to decrease Coreg from 6.25 mg BID to 3.125 mg BID with hold parameters for SBP <120  - low salt and healthy life discussed with the patient     # Metabolic Bone Disease/Secondary Hyperparathyroidism: last calcium/phos normal  - will monitor PTH, calcium, and phosphorus as per our center protocol     # Anemia of chronic disease: Hb stable at 10.4 from 1/15/20  - will continue monitoring as per our center guidelines. No indication for acute intervention today     # Dental Issues: patient needs dental work done on bottom teeth   - she is cleared from renal transplant perspective to have dental work performed   - would recommend penicillin (Amoxicillin) prophylaxis depending on how invasive the dental work is      Follow-up:   Annual follow-up with kidney transplant clinic with repeat labs, including renal function panel with UA, urine protein/creatinine ratio, and drug trough level q3 months.  Patient also reminded to follow-up with general nephrologist.    Sweta Catalan MD       Education:   Material provided to the patient.  Patient  reminded to call with any health changes since these can be early signs of significant complications.  Also, I advised the patient to be sure any new medications or changes of old medications are discussed with either a pharmacist or physician knowledgeable with transplant to avoid rejection/drug toxicity related to significant drug interactions.    UNOS Patient Status  Functional Status: 100% - Normal, no complaints, no evidence of disease  Physical Capacity: No Limitations

## 2020-01-30 ENCOUNTER — TELEPHONE (OUTPATIENT)
Dept: TRANSPLANT | Facility: CLINIC | Age: 55
End: 2020-01-30

## 2020-01-30 NOTE — TELEPHONE ENCOUNTER
Message left on pt VM requesting that pt call me back tomorrow to let me know a day and time of day that would work best for her to have a kidney biopsy. Also informed her that Dr. Catalan would like to get a 24 hour P/C ratio. Awaiting pt call return.

## 2020-02-13 ENCOUNTER — TELEPHONE (OUTPATIENT)
Dept: TRANSPLANT | Facility: CLINIC | Age: 55
End: 2020-02-13

## 2020-02-13 NOTE — TELEPHONE ENCOUNTER
"Contacted pt to see if would be up for having a kidney biopsy at the end of the month as discussed with Dr. Catalan at her recent clinic appt. Pt stated, "probably so".   Also asked pt when she could do a 24 hour urine collection. Pt states that she could do it next week. .  Will see if [facility] has materials that pt needs to do collection and if she could pick it up from there instead of coming here to main campus.  Will also reach out to biopsy office to see availability at end of month.  Of note- pt did sound either depressed or tired.    ----- Message from Sweta Catalan MD sent at 1/16/2020  3:19 PM CST -----  Proteinuria improved but still higher than previous. Please arrange for 24 hour urine protein quantification.    "

## 2020-02-19 ENCOUNTER — TELEPHONE (OUTPATIENT)
Dept: TRANSPLANT | Facility: CLINIC | Age: 55
End: 2020-02-19

## 2020-02-19 DIAGNOSIS — Z94.0 KIDNEY REPLACED BY TRANSPLANT: Primary | ICD-10-CM

## 2020-02-19 NOTE — TELEPHONE ENCOUNTER
Received call from Bx dept with available biopsy day/time for next wed jan 26   labs @10am   us @10:15am  dosc @ 11am  1pm biopsy    VM left for pt to call me back ASAP to let me know if appt time/day would work for her before they give appt away to someone else.

## 2020-02-20 ENCOUNTER — TELEPHONE (OUTPATIENT)
Dept: TRANSPLANT | Facility: CLINIC | Age: 55
End: 2020-02-20

## 2020-02-20 NOTE — LETTER
February 20, 2020    Elizabeth Maldonado  [address] [place] [address] [address] [address]             [facility]- [facility]  [address]  [facility] [address]  Phone: [phone] Dear Mrs. Maldonado:    Your kidney transplant biopsy is scheduled for Wednesday 2/26/20.    Appointment times are as follows:  10 AM Labs ( 2nd floor )  10:15 AM Kidney Ultra Sound ( 2nd floor )  11 AM Day of Surgery ([facility]OS[facility]) check in  1 PM Biopsy    They will want to keep you for a good bit of time after biopsy to monitor you before letting you go home.    I am told that you can check in for both labs and ultra sound at the same desk at the same time. Lab check in desk is located on the second floor when you get off of the escalator.     On the morning of your biopsy you may eat a light meal and take your medications as you normally would.    Be sure to have someone with you to drive you home.        If you have any questions or concerns, please don't hesitate to call.    Sincerely,      Valeria Rocha RN  Post Kidney Transplant Coordinator  [phone]

## 2020-02-20 NOTE — TELEPHONE ENCOUNTER
Attempted to call pt again to see if appt day/time for biopsy would work for her but she again did not answer.   Left VM this time informing her that since I had not heard from her that I will have to let them give that appt day/time away to someone else. Requested that she call me back ASAP to let me now when would be good for her to have bx.

## 2020-02-20 NOTE — TELEPHONE ENCOUNTER
Pt agreed to have biopsy wed 26.  Informed her of check in times and locations.  Will type up letter with detailed instructions and send in the mail to pt.

## 2020-02-26 ENCOUNTER — HOSPITAL ENCOUNTER (OUTPATIENT)
Facility: HOSPITAL | Age: 55
Discharge: HOME | End: 2020-02-26
Attending: INTERNAL MEDICINE | Admitting: INTERNAL MEDICINE
Payer: MEDICAID

## 2020-02-26 ENCOUNTER — HOSPITAL ENCOUNTER (OUTPATIENT)
Dept: RADIOLOGY | Facility: HOSPITAL | Age: 55
Discharge: HOME | End: 2020-02-26
Attending: INTERNAL MEDICINE | Admitting: INTERNAL MEDICINE
Payer: MEDICAID

## 2020-02-26 VITALS
HEART RATE: 75 BPM | DIASTOLIC BLOOD PRESSURE: 80 MMHG | BODY MASS INDEX: 30.18 KG/M2 | TEMPERATURE: 99 F | SYSTOLIC BLOOD PRESSURE: 145 MMHG | RESPIRATION RATE: 18 BRPM | WEIGHT: 192.69 LBS | OXYGEN SATURATION: 98 %

## 2020-02-26 DIAGNOSIS — Z94.0 KIDNEY REPLACED BY TRANSPLANT: ICD-10-CM

## 2020-02-26 DIAGNOSIS — N28.9 KIDNEY DYSFUNCTION: ICD-10-CM

## 2020-02-26 LAB — POCT GLUCOSE: 202 MG/DL (ref 70–110)

## 2020-02-26 PROCEDURE — 88305 TISSUE EXAM BY PATHOLOGIST: CPT | Performed by: PATHOLOGY

## 2020-02-26 PROCEDURE — 76776 US EXAM K TRANSPL W/DOPPLER: CPT | Mod: TC

## 2020-02-26 PROCEDURE — 88313 SPECIAL STAINS GROUP 2: CPT | Performed by: PATHOLOGY

## 2020-02-26 PROCEDURE — 76776 US TRANSPLANT KIDNEY WITH DOPPLER: ICD-10-PCS | Mod: 26,,, | Performed by: RADIOLOGY

## 2020-02-26 PROCEDURE — 88346 IMFLUOR 1ST 1ANTB STAIN PX: CPT | Performed by: PATHOLOGY

## 2020-02-26 PROCEDURE — 63600175 PHARM REV CODE 636 W HCPCS: Mod: JG | Performed by: STUDENT IN AN ORGANIZED HEALTH CARE EDUCATION/TRAINING PROGRAM

## 2020-02-26 PROCEDURE — 76776 US EXAM K TRANSPL W/DOPPLER: CPT | Mod: 26,,, | Performed by: RADIOLOGY

## 2020-02-26 PROCEDURE — 88350 IMFLUOR EA ADDL 1ANTB STN PX: CPT | Mod: 59 | Performed by: PATHOLOGY

## 2020-02-26 PROCEDURE — 82962 GLUCOSE BLOOD TEST: CPT | Performed by: INTERNAL MEDICINE

## 2020-02-26 RX ORDER — LIDOCAINE HYDROCHLORIDE 10 MG/ML
1 INJECTION, SOLUTION EPIDURAL; INFILTRATION; INTRACAUDAL; PERINEURAL ONCE
Status: DISCONTINUED | OUTPATIENT
Start: 2020-02-26 | End: 2020-02-26 | Stop reason: HOSPADM

## 2020-02-26 RX ORDER — SODIUM CHLORIDE 0.9 % (FLUSH) 0.9 %
3 SYRINGE (ML) INJECTION
Status: DISCONTINUED | OUTPATIENT
Start: 2020-02-26 | End: 2020-02-26 | Stop reason: HOSPADM

## 2020-02-26 RX ORDER — HYDROCODONE BITARTRATE AND ACETAMINOPHEN 5; 325 MG/1; MG/1
1 TABLET ORAL EVERY 4 HOURS PRN
Status: DISCONTINUED | OUTPATIENT
Start: 2020-02-26 | End: 2020-02-26 | Stop reason: HOSPADM

## 2020-02-26 RX ADMIN — DESMOPRESSIN ACETATE 13.05 MCG: 4 SOLUTION INTRAVENOUS at 12:02

## 2020-02-26 NOTE — DISCHARGE SUMMARY
Radiology Discharge Summary      Hospital Course: No complications    Admit Date: 2/26/2020  Discharge Date: 02/26/2020     Instructions Given to Patient: Yes  Diet: Resume prior diet  Activity: activity as tolerated    Description of Condition on Discharge: Stable  Vital Signs (Most Recent): Temp: 98.6 °F (37 °C) (02/26/20 1419)  Pulse: 73 (02/26/20 1419)  Resp: 18 (02/26/20 1419)  BP: (!) 144/78 (02/26/20 1419)  SpO2: 99 % (02/26/20 1419)    Discharge Disposition: Home    Discharge Diagnosis: kidney dysfunction     Follow-up: with referring provider    Mack Pierce MD  PGY-II Radiology

## 2020-02-26 NOTE — PLAN OF CARE
Pt discharged to home.  Discharge instructions given, pt stated understanding.  Dressing to abdomen dry and intact, IV removed.  Pt left via wheelchair with friend to home.

## 2020-02-26 NOTE — DISCHARGE INSTRUCTIONS
Discharge Instructions for Kidney Biopsy  You had a procedure called a kidney biopsy. Your healthcare provider used a special needle to remove a small piece of tissue from your kidney to examine it for signs of damage and disease. A kidney biopsy is ordered after other tests have shown that there may be a problem with your kidney. Kidney biopsies are also performed when kidney disease is suspected and to rule out cancer.  Home care  · Rest for 24 hours to 48 hours. Get up only to use the bathroom.  · Dont drive for 24 hours to 48 hours after the procedure.  · Dont shower for 24 hours after the biopsy. If you wish, you may wash yourself with a sponge or washcloth. When you are able to shower, dont scrub the site. Gently wash the area and pat it dry.  · Remove the bandage covering the biopsy site 24 hours to 48 hours after the procedure.  · Dont lift anything heavier than 10 pounds for 3 days to 4 days after the procedure.  · Ask your healthcare provider when you can return to work. Be sure to tell your healthcare provider if your job involves heavy lifting.  · If you normally take blood thinner medicines (anticoagulants or antiplatelet medicines) and you stopped taking them a few days before your procedure, ask your healthcare provider when to start taking them again.  When to seek medical care  Call your healthcare provider right away if you have any of the following:  · Blood in your urine  · Exhaustion or extreme weakness  · Dizziness or lightheadedness  · Sudden or increased shortness of breath  · Sudden chest pain  · Fever of 100.4°F (38°C) or higher, or chills  · Increasing redness, tenderness, or swelling at the biopsy site  · Opening of or drainage or bleeding from the biopsy site  · Increasing pain, with or without activity   Date Last Reviewed: 2/1/2017  © 7546-7522 [employer]. [address] [place] [address], [place] [address]. All rights reserved. This information is not intended as a  substitute for professional medical care. Always follow your healthcare professional's instructions.      For scheduling: Call Stephenie at [phone]    For questions or concerns call: JENNY MON-FRI 8 AM- 5PM [phone]. Radiology resident on call [phone].    For immediate concerns that are not emergent, you may call our radiology clinic at: [phone]

## 2020-02-26 NOTE — PROCEDURES
Radiology Post-Procedure Note    Pre Op Diagnosis: Renal dysfunction    Post Op Diagnosis: Same    Procedure: Ultrasound guided renal biopsy    Procedure performed by:   MD Marito Felix MD    Written Informed Consent Obtained: Yes    Specimen Removed: YES     Estimated Blood Loss: Minimal    Findings: Moderate sedation and local anesthesia were used.    A 18-gauge Bard MIssion device was used to remove 3 specimens from the allograft kidney under ultrasound guidance.  Tissue was evaluated for adequacy and sent to pathology for further analysis.      The patient tolerated the procedure well and there were no complications.  Please see Imaging report for further details.    Mack Pierce MD  PGY-II Radiology

## 2020-02-26 NOTE — H&P
Radiology History & Physical      SUBJECTIVE:     Chief Complaint: kidney dysfunction    History of Present Illness:  Elizabeth Maldonado is a 55 y.o. female who presents for US guided biopsy of kidney allograft.    Past Medical History:   Diagnosis Date    Acidosis 2014    Allergic rhinitis 2014    Allergy     Anemia     Anemia in chronic kidney disease 2014    Anxiety     Chronic bilateral low back pain with bilateral sciatica 10/25/2019    Chronic immunosuppression with Prograf and MMF 12/3/2014    CKD (chronic kidney disease) stage 5, GFR less than 15 ml/min 2014    CKD (chronic kidney disease), stage III 3/2/2015    Degenerative disc disease     Depression 2014    Diabetes mellitus, type 2 since age 18 2014    ESRD on peritoneal dialysis - 2014 for 9 hours no peritonitis 2014    Hyperlipidemia     Hypertension 2014    Hypomagnesemia 2015    Kidney transplant status, living unrelated donor - 12/2/14 12/3/2014    Neutropenia 2015    NS (nuclear sclerosis) 2016    Obesity     Organ transplant candidate 2014    Pre-op exam 2014    Proliferative diabetic retinopathy of both eyes without macular edema associated with type 2 diabetes mellitus 2016    Renal manifestation of secondary diabetes mellitus     Tendinitis     Trouble in sleeping      Past Surgical History:   Procedure Laterality Date    BONE MARROW BIOPSY N/A      SECTION      x 2    KIDNEY TRANSPLANT      MEDIPORT REMOVAL N/A 2019    Procedure: REMOVAL, CATHETER, CENTRAL VENOUS, TUNNELED, WITH PORT;  Surgeon: Eusebia Diagnostic Provider;  Location: [facility];  Service: Radiology;  Laterality: N/A;    RENAL BIOPSY      ROTATOR CUFF REPAIR      TUBAL LIGATION         Home Meds:   Prior to Admission medications    Medication Sig Start Date End Date Taking? Authorizing Provider   ACCU-CHEK PREM Misc USE AS DIRECTED TO CHECK BLOOD GLUCOSE 17    "Cristy Sampson MD   acetaminophen (TYLENOL) 325 MG tablet Take 2 tablets (650 mg total) by mouth every 4 (four) hours as needed. 10/31/19   Osvaldo Rose MD   amitriptyline (ELAVIL) 25 MG tablet Take 25 mg by mouth nightly as needed for Insomnia.    Historical Provider, MD   atorvastatin (LIPITOR) 20 MG tablet Take 20 mg by mouth.    Historical Provider, MD   blood sugar diagnostic Strp Checks BG ac/hs 6/5/17   Cristy Sampson MD   carvedilol (COREG) 6.25 MG tablet Take 1 tablet (6.25 mg total) by mouth 2 (two) times daily. 10/31/19 10/30/20  Osvaldo Rose MD   gabapentin (NEURONTIN) 100 MG capsule Take 1 capsule (100 mg total) by mouth 3 (three) times daily.  Patient not taking: Reported on 1/24/2020 10/31/19 10/30/20  Osvaldo Rose MD   INSULIN GLARGINE,HUM.REC.ANLOG (BASAGLAR KWIKPEN U-100 INSULIN SUBQ) Inject 50 Units into the skin nightly.    Historical Provider, MD   insulin syringe-needle U-100 (INSULIN SYRINGE) 1/2 mL 30 x 5/16" Syrg Uses 4 daily 1/6/15   Salima Acosta, KARO, NP   lancets (ONETOUCH DELICA LANCETS) 33 gauge Misc 1 lancet by Misc.(Non-Drug; Combo Route) route 4 (four) times daily before meals and nightly. 6/5/17   Cristy Sampson MD   mycophenolate (CELLCEPT) 250 mg Cap TAKE 2 CAPSULES ( 500 MG TOTAL) BY MOUTH 2 TIMES DAILY 5/16/19   Kalpana Grider MD   NOVOLOG FLEXPEN U-100 INSULIN 100 unit/mL (3 mL) InPn pen Inject 10 Units into the skin 3 (three) times daily with meals. 10/31/19   Osvaldo Rose MD   oxyCODONE-acetaminophen (PERCOCET)  mg per tablet Take 1 tablet by mouth every 8 (eight) hours as needed for Pain. 10/31/19   Osvaldo Rose MD   ramelteon (ROZEREM) 8 mg tablet Take 1 tablet (8 mg total) by mouth nightly as needed for Insomnia. 10/31/19   Osvaldo Rose MD   SYRINGE & NEEDLE,INSULIN,1 ML (INSULIN SYRINGE-NEEDLE U-100) 1 mL 29 X 7/16" Syrg  3/25/15   Historical Provider, MD   tacrolimus (PROGRAF) 1 MG Cap TAKE 3 CAPSULES ( 3 MG " TOTAL) BY MOUTH IN THE MORNING & TAKE 3 CAPSULES ( 3 MG TOTAL) IN THE EVENING 1/20/20   Swtea Catalan MD     Anticoagulants/Antiplatelets: no anticoagulation    Allergies:   Review of patient's allergies indicates:   Allergen Reactions    Iodine and iodide containing products Hives    Shrimp Itching    Topamax [topiramate] Other (See Comments)     Vision changes    Zoloft [sertraline] Palpitations     Sedation History:  no adverse reactions    Review of Systems:   Hematological: no known coagulopathies  Respiratory: no shortness of breath  Cardiovascular: no chest pain  Gastrointestinal: no abdominal pain  Genito-Urinary: no dysuria  Musculoskeletal: negative  Neurological: no TIA or stroke symptoms         OBJECTIVE:     Vital Signs (Most Recent)       Physical Exam:  ASA: 2  Mallampati: 2    General: no acute distress  Mental Status: alert and oriented to person, place and time  HEENT: normocephalic, atraumatic  Chest: unlabored breathing  Heart: regular heart rate  Abdomen: nondistended  Extremity: moves all extremities    Laboratory  Lab Results   Component Value Date    INR 1.0 02/26/2020       Lab Results   Component Value Date    WBC 5.08 02/26/2020    HGB 11.5 (L) 02/26/2020    HCT 39.4 02/26/2020    MCV 87 02/26/2020     02/26/2020      Lab Results   Component Value Date     (H) 02/26/2020     02/26/2020    K 4.9 02/26/2020     02/26/2020    CO2 23 02/26/2020    BUN 32 (H) 02/26/2020    CREATININE 2.0 (H) 02/26/2020    CALCIUM 9.5 02/26/2020    MG 2.0 01/15/2020    ALT 8 (L) 02/26/2020    AST 13 02/26/2020    ALBUMIN 3.2 (L) 02/26/2020    BILITOT 0.3 02/26/2020    BILIDIR 0.1 01/15/2020       ASSESSMENT/PLAN:     Sedation Plan: local  Patient will undergo US guided biopsy of kidney allograft.    Mack Pierce M.D.  PGY-II Radiology  Pager: [phone]

## 2020-03-02 ENCOUNTER — TELEPHONE (OUTPATIENT)
Dept: TRANSPLANT | Facility: CLINIC | Age: 55
End: 2020-03-02

## 2020-03-02 NOTE — TELEPHONE ENCOUNTER
Pt call returned. Informed her that bx showed no rejection.   Had bone marrow biopsy [facility] before starting dialysis and was dx MGUS- protein in the blood.  Wondering if could have significance to her proteinuria.   Going to email copy of path report.     ----- Message from Leandro Mccall sent at 3/2/2020 10:10 AM CST -----  Contact: Pt  Pt was calling to speak with nurse about biopsy.    Pt# [phone]

## 2020-03-06 ENCOUNTER — CLINICAL SUPPORT (OUTPATIENT)
Dept: REHABILITATION | Facility: HOSPITAL | Age: 55
End: 2020-03-06
Attending: NURSE PRACTITIONER
Payer: MEDICAID

## 2020-03-06 DIAGNOSIS — R26.9 GAIT ABNORMALITY: ICD-10-CM

## 2020-03-06 DIAGNOSIS — R29.898 DECREASED STRENGTH OF LOWER EXTREMITY: ICD-10-CM

## 2020-03-06 DIAGNOSIS — M25.662 DECREASED RANGE OF MOTION OF LEFT LOWER EXTREMITY: ICD-10-CM

## 2020-03-06 DIAGNOSIS — R68.89 DECREASED FUNCTIONAL ACTIVITY TOLERANCE: ICD-10-CM

## 2020-03-06 PROCEDURE — 97161 PT EVAL LOW COMPLEX 20 MIN: CPT | Mod: PN

## 2020-03-06 PROCEDURE — 97110 THERAPEUTIC EXERCISES: CPT | Mod: PN

## 2020-03-06 NOTE — PROGRESS NOTES
[facility]  Physical Therapy Initial Evaluation    Name: Elizabeth Maldonado  Clinic Number: [id number]    Therapy Diagnosis:   Encounter Diagnoses   Name Primary?    Decreased strength of lower extremity     Decreased range of motion of left lower extremity     Gait abnormality     Decreased functional activity tolerance      Physician: Beryl Lugo CRNP    Physician Orders: PT Eval and Treat   Medical Diagnosis from Referral: M25.559 (ICD-10-CM) - Hip pain  Evaluation Date: 3/6/2020  Authorization Period Expiration: 3/8/2020  Plan of Care Expiration: 6/6/2020  Visit # / Visits authorized: 1/ 1    Time In: 15:36  Time Out: 16:16  Total Billable Time: 40 minutes    Precautions: Standard, Diabetes and s/p Kidney transplant    Subjective   Date of onset: 10/2019   History of current condition - Elizabeth reports: Woke up in October, couldn't walk and went to the ER. Gave her patches and sent her home. Then she was in hospital for 3 weeks, 4 weeks for inpatient rehab. They stated it was septic arthritis in her hip.Pain in her hip has been bearable over the last 2 weeks. Before then, I could barely walk. States that she was walking with her rollator after leaving rehab, presents today without any AD. I'm afraid to get up sometimes, need something to hold onto. More stiff in the morning. If I do too much, I have increased pain.      Medical History:   Past Medical History:   Diagnosis Date    Acidosis 7/1/2014    Allergic rhinitis 7/1/2014    Allergy     Anemia     Anemia in chronic kidney disease 7/1/2014    Anxiety     Chronic bilateral low back pain with bilateral sciatica 10/25/2019    Chronic immunosuppression with Prograf and MMF 12/3/2014    CKD (chronic kidney disease) stage 5, GFR less than 15 ml/min 7/1/2014    CKD (chronic kidney disease), stage III 3/2/2015    Degenerative disc disease     Depression 7/1/2014    Diabetes mellitus, type 2 since age 18 7/1/2014     "ESRD on peritoneal dialysis - 2014 for 9 hours no peritonitis 2014    Hyperlipidemia     Hypertension 2014    Hypomagnesemia 2015    Kidney transplant status, living unrelated donor - 12/2/14 12/3/2014    Neutropenia 2015    NS (nuclear sclerosis) 2016    Obesity     Organ transplant candidate 2014    Pre-op exam 2014    Proliferative diabetic retinopathy of both eyes without macular edema associated with type 2 diabetes mellitus 2016    Renal manifestation of secondary diabetes mellitus     Tendinitis     Trouble in sleeping        Surgical History:   Elizabeth Maldonado  has a past surgical history that includes  section; Rotator cuff repair; Renal biopsy; Bone marrow biopsy (N/A); Tubal ligation; Kidney transplant; Mediport removal (N/A, 2019); and Biopsy (N/A, 2020).    Medications:   Elizabeth has a current medication list which includes the following prescription(s): accu-chek elza, acetaminophen, atorvastatin, blood sugar diagnostic, carvedilol, insulin glargine,hum.rec.anlog, insulin syringe-needle u-100, lancets, mycophenolate, oxycodone-acetaminophen, ramelteon, insulin syringe-needle u-100, and tacrolimus.    Allergies:   Review of patient's allergies indicates:   Allergen Reactions    Iodine and iodide containing products Hives    Shrimp Itching    Topamax [topiramate] Other (See Comments)     Vision changes    Zoloft [sertraline] Palpitations        Imaging, CT scan films: L Hip 10/21/2019      Prior Therapy: yes, shoulder  Social History:  lives alone  Occupation: retired  Prior Level of Function: not limited due to pain in L hip   Current Level of Function: cant walk like I used to, I cant put too much weight on it    Pain:  Current 2/10, worst 10/10, best 0/10   Location: left hip   Description: Deep and Sharp  Aggravating Factors: walking,   Easing Factors: acetemenophin    Pts goals: "I just want it to be better, more " "consistency of relief and deal with it better"    Objective     Observation: antalgic gait on L LE, slow gait speed    Posture Alignment: no significant postural deviations    GAIT DEVIATIONS: Elizabeth displays antalgic gait on L LE, decreased weight bearing on R LE, decreased step length R LE, slow gait speed    Hip Range of Motion:   Left active Left Passive Right active  Right Passive   Flexion 90 95 WFL WFL   Abduction NT NT NT NT   Extension WFL WFL WFL WFL   Ext. Rotation 20 30 30 35   Int. Rotation 30 30 30 30       Lower Extremity Strength  Right LE  Left LE    Knee extension: 5/5 Knee extension: 5/5   Knee flexion: 4+/5 Knee flexion: 4+/5   Hip flexion: 4+/5 Hip flexion: 3/5   Hip Internal Rotation:  4-/5    Hip Internal Rotation: 4-/5      Hip External Rotation: 5/5    Hip External Rotation: 3+/5      Hip extension:  4-/5 Hip extension: 3/5   Hip abduction: 3+/5 Hip abduction: 3/5   Hip adduction: 3+/5 Hip adduction: 3+/5   Ankle dorsiflexion: 5/5 Ankle dorsiflexion: 5/5   Ankle plantarflexion: 5/5 Ankle plantarflexion: 5/5     Special Tests:  Bridge Test:negative  BRINA: negative  FADIR: positive  Scour: positive    Flexibility: Decreased flexibility to L hip flexor   Ely's test: R = positive ; L = positive, greater than R   Hamstring length 90/90: R = 35 degrees ; L = 35 degrees    Palpation:        TREATMENT   Treatment Time In: 16:06  Treatment Time Out: 16:16  Total Treatment time separate from Evaluation: 10 minutes    Elizabeth received therapeutic exercises to develop strength, endurance, ROM and flexibility for 10 minutes including:  +Hamstring Stretch supine 3x30 L  +Prone quadriceps stretch 3x30 ea    Home Exercises and Patient Education Provided    Education provided:   - role of PT, plan of care, goals, insurance limitations, scheduling/cancellation policies    Written Home Exercises Provided: yes.  Exercises were reviewed and Elizabeth was able to demonstrate them prior to the end of the session.  Elizabeth " demonstrated good  understanding of the education provided.     See EMR under Patient Instructions for exercises provided 3/6/2020.    Assessment   Elizabeth is a 55 y.o. female referred to outpatient Physical Therapy with a medical diagnosis of hip pain. Pt presents with signs and symptoms consistent with medical diagnoses. Pt with tests and measures during evaluation pointing to pain of intraarticular origin. Pt with positive FADIR and Scour testing. Pt with decreased flexibility noted to L hip quadriceps, hip flexors, and hamstrings. Pt also with decreased strength of L hip girdle musculature and posterior chain musculature. Pt would benefit from skilled PT services in order to address listed deficits, establish HEP, improve endurance, improve tolerance to activities, and maximize return to PLOF. Pt is motivated to participate in therapy and is in agreement with POC.     Pt prognosis is Good.   Pt will benefit from skilled outpatient Physical Therapy to address the deficits stated above and in the chart below, provide pt/family education, and to maximize pt's level of independence.     Plan of care discussed with patient: Yes  Pt's spiritual, cultural and educational needs considered and patient is agreeable to the plan of care and goals as stated below:     Anticipated Barriers for therapy: none    Medical Necessity is demonstrated by the following  History  Co-morbidities and personal factors that may impact the plan of care Co-morbidities:   depression, T2DM, diabetic retinopathy, CKD, Kidney transplant    Personal Factors:   no deficits     low   Examination  Body Structures and Functions, activity limitations and participation restrictions that may impact the plan of care Body Regions:   lower extremities    Body Systems:    ROM  strength  gross coordinated movement  balance  gait  transfers  motor control    Participation Restrictions:   Ability to walk in community confidently    Activity limitations:    Learning and applying knowledge  no deficits    General Tasks and Commands  no deficits    Communication  no deficits    Mobility  lifting and carrying objects  walking    Self care  no deficits    Domestic Life  shopping  cooking  doing house work (cleaning house, washing dishes, laundry)  assisting others    Interactions/Relationships  no deficits    Life Areas  no deficits    Community and Social Life  no deficits         Complexity: low  See FOTO outcome assessment    Clinical Presentation stable and uncomplicated low   Decision Making/ Complexity Score: low     GOALS: Short Term Goals:  2-3 weeks  1.Report decreased in pain at worse less than <   / =  7 /10  to increase tolerance for functional activities. On going  2. Pt to improve L hip range of motion by 25% to allow for improved functional mobility to allow for improvement in IADLs. On going  3. Increased L LE  MMT 1/2  to increase tolerance for ADL and work activities.On going  4. Pt to demonstrate ability to walk for 10 minutes with symptoms < / = 5/10 in order to improve tolerance to daily activities.  5. Pt to tolerate HEP to improve ROM and independence with ADL's.On going    Long Term Goals:  6-8 weeks  1.Report decreased in pain at worse less than  <   / =  3  /10  to increase tolerance for functional mobility  2.Pt to improve range of motion by 75% to allow for improved functional mobility to allow for improvement in IADLs.   3.Increased L LE MMT 1 grade  to increase tolerance for ADL and work activities.  4. Pt will scores report at CJ level (20-40% impaired) on LEFS  to demonstrate increase in LE function with every day tasks.   5. Pt to be Independent with HEP to improve ROM and independence with ADL's  6. Pt to demonstrate ability to walk for 10 minutes with pain < / = 3/10 in order to improve tolerance to activities.     Plan   Plan of care Certification: 3/6/2020 to 6/6/2020.    Outpatient Physical Therapy 2 times weekly for 8 weeks to  include the following interventions: Gait Training, Manual Therapy, Moist Heat/ Ice, Neuromuscular Re-ed, Patient Education, Self Care, Therapeutic Activites and Therapeutic Exercise.       Page Murillo, PT, DPT  3/6/2020

## 2020-03-06 NOTE — PROGRESS NOTES
Chronic changes on biopsy. Would encourage her to follow-up with her general nephrologist to optimize proteinuria management as well as CKD care.

## 2020-03-08 PROBLEM — R26.9 GAIT ABNORMALITY: Status: ACTIVE | Noted: 2020-03-08

## 2020-03-08 PROBLEM — R29.898 DECREASED STRENGTH OF LOWER EXTREMITY: Status: ACTIVE | Noted: 2020-03-08

## 2020-03-08 PROBLEM — M25.662 DECREASED RANGE OF MOTION OF LEFT LOWER EXTREMITY: Status: ACTIVE | Noted: 2020-03-08

## 2020-03-08 PROBLEM — R68.89 DECREASED FUNCTIONAL ACTIVITY TOLERANCE: Status: ACTIVE | Noted: 2020-03-08

## 2020-03-08 NOTE — PLAN OF CARE
[facility]  Physical Therapy Initial Evaluation    Name: Elizabeth Maldonado  Clinic Number: [id number]    Therapy Diagnosis:   Encounter Diagnoses   Name Primary?    Decreased strength of lower extremity     Decreased range of motion of left lower extremity     Gait abnormality     Decreased functional activity tolerance      Physician: Beryl Lugo CRNP    Physician Orders: PT Eval and Treat   Medical Diagnosis from Referral: M25.559 (ICD-10-CM) - Hip pain  Evaluation Date: 3/6/2020  Authorization Period Expiration: 3/8/2020  Plan of Care Expiration: 6/6/2020  Visit # / Visits authorized: 1/ 1    Time In: 15:36  Time Out: 16:16  Total Billable Time: 40 minutes    Precautions: Standard, Diabetes and s/p Kidney transplant    Subjective   Date of onset: 10/2019   History of current condition - Elizabeth reports: Woke up in October, couldn't walk and went to the ER. Gave her patches and sent her home. Then she was in hospital for 3 weeks, 4 weeks for inpatient rehab. They stated it was septic arthritis in her hip.Pain in her hip has been bearable over the last 2 weeks. Before then, I could barely walk. States that she was walking with her rollator after leaving rehab, presents today without any AD. I'm afraid to get up sometimes, need something to hold onto. More stiff in the morning. If I do too much, I have increased pain.      Medical History:   Past Medical History:   Diagnosis Date    Acidosis 7/1/2014    Allergic rhinitis 7/1/2014    Allergy     Anemia     Anemia in chronic kidney disease 7/1/2014    Anxiety     Chronic bilateral low back pain with bilateral sciatica 10/25/2019    Chronic immunosuppression with Prograf and MMF 12/3/2014    CKD (chronic kidney disease) stage 5, GFR less than 15 ml/min 7/1/2014    CKD (chronic kidney disease), stage III 3/2/2015    Degenerative disc disease     Depression 7/1/2014    Diabetes mellitus, type 2 since age 18 7/1/2014     "ESRD on peritoneal dialysis - 2014 for 9 hours no peritonitis 2014    Hyperlipidemia     Hypertension 2014    Hypomagnesemia 2015    Kidney transplant status, living unrelated donor - 12/2/14 12/3/2014    Neutropenia 2015    NS (nuclear sclerosis) 2016    Obesity     Organ transplant candidate 2014    Pre-op exam 2014    Proliferative diabetic retinopathy of both eyes without macular edema associated with type 2 diabetes mellitus 2016    Renal manifestation of secondary diabetes mellitus     Tendinitis     Trouble in sleeping        Surgical History:   Elizabeth Maldonado  has a past surgical history that includes  section; Rotator cuff repair; Renal biopsy; Bone marrow biopsy (N/A); Tubal ligation; Kidney transplant; Mediport removal (N/A, 2019); and Biopsy (N/A, 2020).    Medications:   Elizabeth has a current medication list which includes the following prescription(s): accu-chek elza, acetaminophen, atorvastatin, blood sugar diagnostic, carvedilol, insulin glargine,hum.rec.anlog, insulin syringe-needle u-100, lancets, mycophenolate, oxycodone-acetaminophen, ramelteon, insulin syringe-needle u-100, and tacrolimus.    Allergies:   Review of patient's allergies indicates:   Allergen Reactions    Iodine and iodide containing products Hives    Shrimp Itching    Topamax [topiramate] Other (See Comments)     Vision changes    Zoloft [sertraline] Palpitations        Imaging, CT scan films: L Hip 10/21/2019      Prior Therapy: yes, shoulder  Social History:  lives alone  Occupation: retired  Prior Level of Function: not limited due to pain in L hip   Current Level of Function: cant walk like I used to, I cant put too much weight on it    Pain:  Current 2/10, worst 10/10, best 0/10   Location: left hip   Description: Deep and Sharp  Aggravating Factors: walking,   Easing Factors: acetemenophin    Pts goals: "I just want it to be better, more " "consistency of relief and deal with it better"    Objective     Observation: antalgic gait on L LE, slow gait speed    Posture Alignment: no significant postural deviations    GAIT DEVIATIONS: Elizabeth displays antalgic gait on L LE, decreased weight bearing on R LE, decreased step length R LE, slow gait speed    Hip Range of Motion:   Left active Left Passive Right active  Right Passive   Flexion 90 95 WFL WFL   Abduction NT NT NT NT   Extension WFL WFL WFL WFL   Ext. Rotation 20 30 30 35   Int. Rotation 30 30 30 30       Lower Extremity Strength  Right LE  Left LE    Knee extension: 5/5 Knee extension: 5/5   Knee flexion: 4+/5 Knee flexion: 4+/5   Hip flexion: 4+/5 Hip flexion: 3/5   Hip Internal Rotation:  4-/5    Hip Internal Rotation: 4-/5      Hip External Rotation: 5/5    Hip External Rotation: 3+/5      Hip extension:  4-/5 Hip extension: 3/5   Hip abduction: 3+/5 Hip abduction: 3/5   Hip adduction: 3+/5 Hip adduction: 3+/5   Ankle dorsiflexion: 5/5 Ankle dorsiflexion: 5/5   Ankle plantarflexion: 5/5 Ankle plantarflexion: 5/5     Special Tests:  Bridge Test:negative  BRINA: negative  FADIR: positive  Scour: positive    Flexibility: Decreased flexibility to L hip flexor   Ely's test: R = positive ; L = positive, greater than R   Hamstring length 90/90: R = 35 degrees ; L = 35 degrees    Palpation:        TREATMENT   Treatment Time In: 16:06  Treatment Time Out: 16:16  Total Treatment time separate from Evaluation: 10 minutes    Elizabeth received therapeutic exercises to develop strength, endurance, ROM and flexibility for 10 minutes including:  +Hamstring Stretch supine 3x30 L  +Prone quadriceps stretch 3x30 ea    Home Exercises and Patient Education Provided    Education provided:   - role of PT, plan of care, goals, insurance limitations, scheduling/cancellation policies    Written Home Exercises Provided: yes.  Exercises were reviewed and Elizabeth was able to demonstrate them prior to the end of the session.  Elizabeth " demonstrated good  understanding of the education provided.     See EMR under Patient Instructions for exercises provided 3/6/2020.    Assessment   Elizabeth is a 55 y.o. female referred to outpatient Physical Therapy with a medical diagnosis of hip pain. Pt presents with signs and symptoms consistent with medical diagnoses. Pt with tests and measures during evaluation pointing to pain of intraarticular origin. Pt with positive FADIR and Scour testing. Pt with decreased flexibility noted to L hip quadriceps, hip flexors, and hamstrings. Pt also with decreased strength of L hip girdle musculature and posterior chain musculature. Pt would benefit from skilled PT services in order to address listed deficits, establish HEP, improve endurance, improve tolerance to activities, and maximize return to PLOF. Pt is motivated to participate in therapy and is in agreement with POC.     Pt prognosis is Good.   Pt will benefit from skilled outpatient Physical Therapy to address the deficits stated above and in the chart below, provide pt/family education, and to maximize pt's level of independence.     Plan of care discussed with patient: Yes  Pt's spiritual, cultural and educational needs considered and patient is agreeable to the plan of care and goals as stated below:     Anticipated Barriers for therapy: none    Medical Necessity is demonstrated by the following  History  Co-morbidities and personal factors that may impact the plan of care Co-morbidities:   depression, T2DM, diabetic retinopathy, CKD, Kidney transplant    Personal Factors:   no deficits     low   Examination  Body Structures and Functions, activity limitations and participation restrictions that may impact the plan of care Body Regions:   lower extremities    Body Systems:    ROM  strength  gross coordinated movement  balance  gait  transfers  motor control    Participation Restrictions:   Ability to walk in community confidently    Activity limitations:    Learning and applying knowledge  no deficits    General Tasks and Commands  no deficits    Communication  no deficits    Mobility  lifting and carrying objects  walking    Self care  no deficits    Domestic Life  shopping  cooking  doing house work (cleaning house, washing dishes, laundry)  assisting others    Interactions/Relationships  no deficits    Life Areas  no deficits    Community and Social Life  no deficits         Complexity: low  See FOTO outcome assessment    Clinical Presentation stable and uncomplicated low   Decision Making/ Complexity Score: low     GOALS: Short Term Goals:  2-3 weeks  1.Report decreased in pain at worse less than <   / =  7 /10  to increase tolerance for functional activities. On going  2. Pt to improve L hip range of motion by 25% to allow for improved functional mobility to allow for improvement in IADLs. On going  3. Increased L LE  MMT 1/2  to increase tolerance for ADL and work activities.On going  4. Pt to demonstrate ability to walk for 10 minutes with symptoms < / = 5/10 in order to improve tolerance to daily activities.  5. Pt to tolerate HEP to improve ROM and independence with ADL's.On going    Long Term Goals:  6-8 weeks  1.Report decreased in pain at worse less than  <   / =  3  /10  to increase tolerance for functional mobility  2.Pt to improve range of motion by 75% to allow for improved functional mobility to allow for improvement in IADLs.   3.Increased L LE MMT 1 grade  to increase tolerance for ADL and work activities.  4. Pt will scores report at CJ level (20-40% impaired) on LEFS  to demonstrate increase in LE function with every day tasks.   5. Pt to be Independent with HEP to improve ROM and independence with ADL's  6. Pt to demonstrate ability to walk for 10 minutes with pain < / = 3/10 in order to improve tolerance to activities.     Plan   Plan of care Certification: 3/6/2020 to 6/6/2020.    Outpatient Physical Therapy 2 times weekly for 8 weeks to  include the following interventions: Gait Training, Manual Therapy, Moist Heat/ Ice, Neuromuscular Re-ed, Patient Education, Self Care, Therapeutic Activites and Therapeutic Exercise.       Page Murillo, PT, DPT  3/6/2020

## 2020-03-19 ENCOUNTER — DOCUMENTATION ONLY (OUTPATIENT)
Dept: REHABILITATION | Facility: HOSPITAL | Age: 55
End: 2020-03-19

## 2020-03-19 NOTE — PROGRESS NOTES
Patient: Elizabeth Maldonado  Date: 3/19/2020  Diagnosis:   MRN: [id number]    Spoke with patient due to therapy following updates regarding COVID-19 closely and taking every precaution to ensure the safety of our patients, staff and community.  In an abundance of caution and in an effort to help reduce risk and limit community spread, we have decided to temporarily postpone appointments for patients who may be at increased risk to attend in-person therapy or where therapy is not critically needed at this time. Plan of care and home exercise program were reviewed, exercises added to HEP and emailed to patient. Patient has what they need to continue therapy at home. All patient questions were answered. Also stated to patient that we are exploring virtual methods of providing care and will be in touch over the next few weeks. Patient verbalized understanding to all.    E-mail to Elizabeth Maldonado containing updated exercises at ([email or web address])    Chloe Johnson is an updated home program containing some exercises for strengthening of the hip. You can complete all of these on both legs. No more than once a day. If you have any further questions, feel free to email me here or call our clinic at [phone].     Page Shelton, PT            Page Murillo, PT, DPT  3/19/2020

## 2020-03-26 PROBLEM — R26.9 GAIT ABNORMALITY: Status: RESOLVED | Noted: 2020-03-08 | Resolved: 2020-03-26

## 2020-03-26 PROBLEM — R29.898 DECREASED STRENGTH OF LOWER EXTREMITY: Status: RESOLVED | Noted: 2020-03-08 | Resolved: 2020-03-26

## 2020-03-26 PROBLEM — R68.89 DECREASED FUNCTIONAL ACTIVITY TOLERANCE: Status: RESOLVED | Noted: 2020-03-08 | Resolved: 2020-03-26

## 2020-03-26 PROBLEM — M25.662 DECREASED RANGE OF MOTION OF LEFT LOWER EXTREMITY: Status: RESOLVED | Noted: 2020-03-08 | Resolved: 2020-03-26

## 2020-06-08 ENCOUNTER — TELEPHONE (OUTPATIENT)
Dept: NEPHROLOGY | Facility: CLINIC | Age: 55
End: 2020-06-08

## 2020-06-10 DIAGNOSIS — Z29.89 NEED FOR PROPHYLACTIC IMMUNOTHERAPY: Chronic | ICD-10-CM

## 2020-06-10 DIAGNOSIS — Z94.0 KIDNEY TRANSPLANT STATUS, LIVING UNRELATED DONOR: Chronic | ICD-10-CM

## 2020-06-10 RX ORDER — MYCOPHENOLATE MOFETIL 250 MG/1
CAPSULE ORAL
Qty: 120 CAPSULE | Refills: 11 | Status: CANCELLED | OUTPATIENT
Start: 2020-06-10

## 2020-06-11 DIAGNOSIS — Z94.0 KIDNEY TRANSPLANT STATUS, LIVING UNRELATED DONOR: Chronic | ICD-10-CM

## 2020-06-11 DIAGNOSIS — Z29.89 NEED FOR PROPHYLACTIC IMMUNOTHERAPY: Chronic | ICD-10-CM

## 2020-06-12 DIAGNOSIS — Z94.0 KIDNEY TRANSPLANT STATUS, LIVING UNRELATED DONOR: Chronic | ICD-10-CM

## 2020-06-12 DIAGNOSIS — Z29.89 NEED FOR PROPHYLACTIC IMMUNOTHERAPY: Chronic | ICD-10-CM

## 2020-06-12 RX ORDER — MYCOPHENOLATE MOFETIL 250 MG/1
CAPSULE ORAL
Qty: 120 CAPSULE | Refills: 11 | Status: SHIPPED | OUTPATIENT
Start: 2020-06-12 | End: 2021-07-16 | Stop reason: SDUPTHER

## 2020-06-12 RX ORDER — MYCOPHENOLATE MOFETIL 250 MG/1
CAPSULE ORAL
Qty: 120 CAPSULE | Refills: 11 | Status: CANCELLED | OUTPATIENT
Start: 2020-06-12

## 2020-06-16 ENCOUNTER — TELEPHONE (OUTPATIENT)
Dept: NEPHROLOGY | Facility: CLINIC | Age: 55
End: 2020-06-16

## 2020-06-16 DIAGNOSIS — Z94.0 KIDNEY TRANSPLANT RECIPIENT: Primary | ICD-10-CM

## 2020-07-09 ENCOUNTER — LAB VISIT (OUTPATIENT)
Dept: LAB | Facility: HOSPITAL | Age: 55
End: 2020-07-09
Attending: INTERNAL MEDICINE
Payer: MEDICAID

## 2020-07-09 DIAGNOSIS — N18.30 CKD (CHRONIC KIDNEY DISEASE), STAGE III: Chronic | ICD-10-CM

## 2020-07-09 LAB
ANION GAP SERPL CALC-SCNC: 9 MMOL/L (ref 8–16)
BASOPHILS # BLD AUTO: 0.02 K/UL (ref 0–0.2)
BASOPHILS NFR BLD: 0.3 % (ref 0–1.9)
BUN SERPL-MCNC: 32 MG/DL (ref 6–20)
CALCIUM SERPL-MCNC: 9.2 MG/DL (ref 8.7–10.5)
CHLORIDE SERPL-SCNC: 109 MMOL/L (ref 95–110)
CO2 SERPL-SCNC: 20 MMOL/L (ref 23–29)
CREAT SERPL-MCNC: 2.4 MG/DL (ref 0.5–1.4)
DIFFERENTIAL METHOD: ABNORMAL
EOSINOPHIL # BLD AUTO: 0.1 K/UL (ref 0–0.5)
EOSINOPHIL NFR BLD: 2.3 % (ref 0–8)
ERYTHROCYTE [DISTWIDTH] IN BLOOD BY AUTOMATED COUNT: 14.1 % (ref 11.5–14.5)
EST. GFR  (AFRICAN AMERICAN): 25.1 ML/MIN/1.73 M^2
EST. GFR  (NON AFRICAN AMERICAN): 21.8 ML/MIN/1.73 M^2
GLUCOSE SERPL-MCNC: 152 MG/DL (ref 70–110)
HCT VFR BLD AUTO: 33.6 % (ref 37–48.5)
HGB BLD-MCNC: 10.5 G/DL (ref 12–16)
IMM GRANULOCYTES # BLD AUTO: 0.04 K/UL (ref 0–0.04)
IMM GRANULOCYTES NFR BLD AUTO: 0.7 % (ref 0–0.5)
LYMPHOCYTES # BLD AUTO: 1.7 K/UL (ref 1–4.8)
LYMPHOCYTES NFR BLD: 29 % (ref 18–48)
MCH RBC QN AUTO: 26.7 PG (ref 27–31)
MCHC RBC AUTO-ENTMCNC: 31.3 G/DL (ref 32–36)
MCV RBC AUTO: 86 FL (ref 82–98)
MONOCYTES # BLD AUTO: 0.5 K/UL (ref 0.3–1)
MONOCYTES NFR BLD: 9 % (ref 4–15)
NEUTROPHILS # BLD AUTO: 3.5 K/UL (ref 1.8–7.7)
NEUTROPHILS NFR BLD: 58.7 % (ref 38–73)
NRBC BLD-RTO: 0 /100 WBC
PLATELET # BLD AUTO: 135 K/UL (ref 150–350)
PMV BLD AUTO: 13.5 FL (ref 9.2–12.9)
POTASSIUM SERPL-SCNC: 4.5 MMOL/L (ref 3.5–5.1)
RBC # BLD AUTO: 3.93 M/UL (ref 4–5.4)
SODIUM SERPL-SCNC: 138 MMOL/L (ref 136–145)
WBC # BLD AUTO: 5.99 K/UL (ref 3.9–12.7)

## 2020-07-09 PROCEDURE — 85025 COMPLETE CBC W/AUTO DIFF WBC: CPT

## 2020-07-09 PROCEDURE — 80048 BASIC METABOLIC PNL TOTAL CA: CPT

## 2020-07-09 PROCEDURE — 36415 COLL VENOUS BLD VENIPUNCTURE: CPT | Mod: PO

## 2020-07-16 ENCOUNTER — OFFICE VISIT (OUTPATIENT)
Dept: NEPHROLOGY | Facility: CLINIC | Age: 55
End: 2020-07-16
Payer: MEDICAID

## 2020-07-16 ENCOUNTER — TELEPHONE (OUTPATIENT)
Dept: NEPHROLOGY | Facility: CLINIC | Age: 55
End: 2020-07-16

## 2020-07-16 DIAGNOSIS — R80.9 PROTEINURIA, UNSPECIFIED TYPE: ICD-10-CM

## 2020-07-16 DIAGNOSIS — N28.9 KIDNEY DYSFUNCTION: ICD-10-CM

## 2020-07-16 DIAGNOSIS — N18.30 CKD (CHRONIC KIDNEY DISEASE), STAGE III: Chronic | ICD-10-CM

## 2020-07-16 DIAGNOSIS — Z94.0 KIDNEY TRANSPLANT STATUS, LIVING UNRELATED DONOR: Primary | Chronic | ICD-10-CM

## 2020-07-16 PROCEDURE — 99214 PR OFFICE/OUTPT VISIT, EST, LEVL IV, 30-39 MIN: ICD-10-PCS | Mod: 95,,, | Performed by: INTERNAL MEDICINE

## 2020-07-16 PROCEDURE — 99214 OFFICE O/P EST MOD 30 MIN: CPT | Mod: 95,,, | Performed by: INTERNAL MEDICINE

## 2020-07-16 NOTE — PROGRESS NOTES
VIRTUAL VISIT - NEPHROLOGY PROGRESS NOTE    The patient location is: HOME  The chief complaint leading to consultation is: CKD    Visit type: AUDIOVISUAL    Face to Face time with patient: 16 minutes of total time spent on the encounter, which includes face to face time and non-face to face time preparing to see the patient (eg, review of tests), Obtaining and/or reviewing separately obtained history, Documenting clinical information in the electronic or other health record, Independently interpreting results (not separately reported) and communicating results to the patient/family/caregiver, or Care coordination (not separately reported).         Each patient to whom he or she provides medical services by telemedicine is:  (1) informed of the relationship between the physician and patient and the respective role of any other health care provider with respect to management of the patient; and (2) notified that he or she may decline to receive medical services by telemedicine and may withdraw from such care at any time.    Notes:     NEPHROLOGY PROGRESS NOTE    INTERVAL HISTORY  55 yo AAF patient is here today for follow up evaluation of renal allograft. Last seen in renal office by Dr. Baird. She is s/p  donor kidney tx 14. Current immunosuppression; tacrolimus; mycophenolate. Baseline Cr 1.9 - 2.2 mg/dl.  There is a hx of diabetes complicated by nephropathy; retinopathy.  Denies fevers; chills night sweats; chest pains; hemoptysis; orthopnea; PND.  Today she has no new complaints. Reports shoulder pain from rotator cuff. She does not take NSAIDs.     MEDICATIONS reviewed    Physical Exam  TELEMEDICINE-BASED PHYSICAL EXAM  Pt alert and oriented x3, no apparent distress, no visible abnormality (neither observed nor pointed out by the patient)  Otherwise limited by nature of visit    Previously:  /80 mmHg  Chronically ill appearing woman; no acute distress; oriented x 3  HEENT;  (+)retinopathy  CHEST; Clear P&A; no rales or rhonchi  HEART; RR; S1&S2 no murmur rub gallop  ABD; BS(+); non-tender; no organomegaly  EXT; (-)Edema    LABS  Serum Cr 2.4 mg/dL  UPCR 1.5 g/g    Impression  1. 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 Not on prednisone. 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 Cr slightly increasing. Has h/o MGUS at [facility]. Will order fLC and SPEP. Moderate risk for allograft failure. Will discuss with transplant nephrology  2. Hypertension. Satisfactory control    Plan  Return Visit; 4-6 mo

## 2020-07-16 NOTE — TELEPHONE ENCOUNTER
Spoke to pt, clarified appt info      ----- Message from Jordi Conway sent at 7/16/2020  9:51 AM CDT -----  Regarding: Appointment Adjustment  Pt called stating she received a missed call from Stephenie Solano ) in regards to appointment scheduled today for 1pm.     [phone] (home)

## 2020-07-21 ENCOUNTER — LAB VISIT (OUTPATIENT)
Dept: LAB | Facility: HOSPITAL | Age: 55
End: 2020-07-21
Attending: INTERNAL MEDICINE
Payer: MEDICAID

## 2020-07-21 DIAGNOSIS — Z94.0 KIDNEY TRANSPLANT STATUS, LIVING UNRELATED DONOR: Chronic | ICD-10-CM

## 2020-07-21 LAB
ALBUMIN SERPL BCP-MCNC: 3.3 G/DL (ref 3.5–5.2)
ANION GAP SERPL CALC-SCNC: 8 MMOL/L (ref 8–16)
BASOPHILS # BLD AUTO: 0.02 K/UL (ref 0–0.2)
BASOPHILS NFR BLD: 0.4 % (ref 0–1.9)
BUN SERPL-MCNC: 39 MG/DL (ref 6–20)
CALCIUM SERPL-MCNC: 9.1 MG/DL (ref 8.7–10.5)
CHLORIDE SERPL-SCNC: 108 MMOL/L (ref 95–110)
CO2 SERPL-SCNC: 21 MMOL/L (ref 23–29)
CREAT SERPL-MCNC: 2.2 MG/DL (ref 0.5–1.4)
DIFFERENTIAL METHOD: ABNORMAL
EOSINOPHIL # BLD AUTO: 0.1 K/UL (ref 0–0.5)
EOSINOPHIL NFR BLD: 2.4 % (ref 0–8)
ERYTHROCYTE [DISTWIDTH] IN BLOOD BY AUTOMATED COUNT: 14 % (ref 11.5–14.5)
EST. GFR  (AFRICAN AMERICAN): 27.9 ML/MIN/1.73 M^2
EST. GFR  (NON AFRICAN AMERICAN): 24.2 ML/MIN/1.73 M^2
GLUCOSE SERPL-MCNC: 262 MG/DL (ref 70–110)
HCT VFR BLD AUTO: 36.6 % (ref 37–48.5)
HGB BLD-MCNC: 11 G/DL (ref 12–16)
IMM GRANULOCYTES # BLD AUTO: 0.02 K/UL (ref 0–0.04)
IMM GRANULOCYTES NFR BLD AUTO: 0.4 % (ref 0–0.5)
LYMPHOCYTES # BLD AUTO: 1.6 K/UL (ref 1–4.8)
LYMPHOCYTES NFR BLD: 30.6 % (ref 18–48)
MCH RBC QN AUTO: 26.4 PG (ref 27–31)
MCHC RBC AUTO-ENTMCNC: 30.1 G/DL (ref 32–36)
MCV RBC AUTO: 88 FL (ref 82–98)
MONOCYTES # BLD AUTO: 0.5 K/UL (ref 0.3–1)
MONOCYTES NFR BLD: 10.2 % (ref 4–15)
NEUTROPHILS # BLD AUTO: 2.9 K/UL (ref 1.8–7.7)
NEUTROPHILS NFR BLD: 56 % (ref 38–73)
NRBC BLD-RTO: 0 /100 WBC
PHOSPHATE SERPL-MCNC: 3.3 MG/DL (ref 2.7–4.5)
PLATELET # BLD AUTO: 132 K/UL (ref 150–350)
PMV BLD AUTO: 13.3 FL (ref 9.2–12.9)
POTASSIUM SERPL-SCNC: 4.6 MMOL/L (ref 3.5–5.1)
PTH-INTACT SERPL-MCNC: 301 PG/ML (ref 9–77)
RBC # BLD AUTO: 4.16 M/UL (ref 4–5.4)
SODIUM SERPL-SCNC: 137 MMOL/L (ref 136–145)
WBC # BLD AUTO: 5.1 K/UL (ref 3.9–12.7)

## 2020-07-21 PROCEDURE — 83970 ASSAY OF PARATHORMONE: CPT

## 2020-07-21 PROCEDURE — 84165 PATHOLOGIST INTERPRETATION SPE: ICD-10-PCS | Mod: 26,,, | Performed by: PATHOLOGY

## 2020-07-21 PROCEDURE — 85025 COMPLETE CBC W/AUTO DIFF WBC: CPT

## 2020-07-21 PROCEDURE — 80069 RENAL FUNCTION PANEL: CPT

## 2020-07-21 PROCEDURE — 84165 PROTEIN E-PHORESIS SERUM: CPT

## 2020-07-21 PROCEDURE — 36415 COLL VENOUS BLD VENIPUNCTURE: CPT | Mod: PO

## 2020-07-21 PROCEDURE — 84165 PROTEIN E-PHORESIS SERUM: CPT | Mod: 26,,, | Performed by: PATHOLOGY

## 2020-07-21 PROCEDURE — 80197 ASSAY OF TACROLIMUS: CPT

## 2020-07-21 PROCEDURE — 83520 IMMUNOASSAY QUANT NOS NONAB: CPT | Mod: 59

## 2020-07-22 ENCOUNTER — HOSPITAL ENCOUNTER (EMERGENCY)
Facility: HOSPITAL | Age: 55
Discharge: HOME | End: 2020-07-22
Attending: EMERGENCY MEDICINE
Payer: MEDICAID

## 2020-07-22 VITALS
HEART RATE: 89 BPM | DIASTOLIC BLOOD PRESSURE: 83 MMHG | HEIGHT: 67 IN | SYSTOLIC BLOOD PRESSURE: 170 MMHG | WEIGHT: 203 LBS | OXYGEN SATURATION: 100 % | RESPIRATION RATE: 16 BRPM | BODY MASS INDEX: 31.86 KG/M2 | TEMPERATURE: 98 F

## 2020-07-22 DIAGNOSIS — K02.9 DENTAL CARIES: Primary | ICD-10-CM

## 2020-07-22 DIAGNOSIS — G89.29 CHRONIC DENTAL PAIN: ICD-10-CM

## 2020-07-22 DIAGNOSIS — K08.9 CHRONIC DENTAL PAIN: ICD-10-CM

## 2020-07-22 LAB
ALBUMIN SERPL ELPH-MCNC: 3.56 G/DL (ref 3.35–5.55)
ALPHA1 GLOB SERPL ELPH-MCNC: 0.33 G/DL (ref 0.17–0.41)
ALPHA2 GLOB SERPL ELPH-MCNC: 1.13 G/DL (ref 0.43–0.99)
B-GLOBULIN SERPL ELPH-MCNC: 0.6 G/DL (ref 0.5–1.1)
GAMMA GLOB SERPL ELPH-MCNC: 1.68 G/DL (ref 0.67–1.58)
KAPPA LC SER QL IA: 6.79 MG/DL (ref 0.33–1.94)
KAPPA LC/LAMBDA SER IA: 3.26 (ref 0.26–1.65)
LAMBDA LC SER QL IA: 2.08 MG/DL (ref 0.57–2.63)
PATHOLOGIST INTERPRETATION SPE: NORMAL
PROT SERPL-MCNC: 7.3 G/DL (ref 6–8.4)
TACROLIMUS BLD-MCNC: 13.4 NG/ML (ref 5–15)

## 2020-07-22 PROCEDURE — 99283 EMERGENCY DEPT VISIT LOW MDM: CPT | Mod: ER

## 2020-07-22 RX ORDER — CLINDAMYCIN HYDROCHLORIDE 150 MG/1
150 CAPSULE ORAL 4 TIMES DAILY
Qty: 28 CAPSULE | Refills: 0 | Status: SHIPPED | OUTPATIENT
Start: 2020-07-22 | End: 2020-07-29

## 2020-07-22 NOTE — ED PROVIDER NOTES
Encounter Date: 7/22/2020       History     Chief Complaint   Patient presents with    Dental Pain     complains of lower front dental pain for 1 month. reports loose feeling in teeth.     This patient presents to emergency department chronic recurrent dental pain secondary to extremely poor dentition which patient has ignored for several years.  She presents requesting antibiotic therapy.    The history is provided by the patient.     Review of patient's allergies indicates:   Allergen Reactions    Iodine and iodide containing products Hives    Shrimp Itching    Topamax [topiramate] Other (See Comments)     Vision changes    Zoloft [sertraline] Palpitations     Past Medical History:   Diagnosis Date    Acidosis 7/1/2014    Allergic rhinitis 7/1/2014    Allergy     Anemia     Anemia in chronic kidney disease 7/1/2014    Anxiety     Chronic bilateral low back pain with bilateral sciatica 10/25/2019    Chronic immunosuppression with Prograf and MMF 12/3/2014    CKD (chronic kidney disease) stage 5, GFR less than 15 ml/min 7/1/2014    CKD (chronic kidney disease), stage III 3/2/2015    Degenerative disc disease     Depression 7/1/2014    Diabetes mellitus, type 2 since age 18 7/1/2014    ESRD on peritoneal dialysis - august 2014 for 9 hours no peritonitis 11/24/2014    Hyperlipidemia     Hypertension 7/1/2014    Hypomagnesemia 1/7/2015    Kidney transplant status, living unrelated donor - 12/2/14 12/3/2014    Neutropenia 1/21/2015    NS (nuclear sclerosis) 9/16/2016    Obesity     Organ transplant candidate 7/1/2014    Pre-op exam 11/24/2014    Proliferative diabetic retinopathy of both eyes without macular edema associated with type 2 diabetes mellitus 9/16/2016    Renal manifestation of secondary diabetes mellitus     Tendinitis     Trouble in sleeping      Past Surgical History:   Procedure Laterality Date    BIOPSY N/A 2/26/2020    Procedure: Biopsy;  Surgeon: Sweta Catalan MD;   [facility] OR [facility] FLR;  Service: Transplant;  Laterality: N/A;    BONE MARROW BIOPSY N/A      SECTION      x 2    KIDNEY TRANSPLANT      MEDIPORT REMOVAL N/A 2019    Procedure: REMOVAL, CATHETER, CENTRAL VENOUS, TUNNELED, WITH PORT;  Surgeon: Eusebia Diagnostic Provider;  Location: [facility] OR;  Service: Radiology;  Laterality: N/A;    RENAL BIOPSY      ROTATOR CUFF REPAIR      TUBAL LIGATION       Family History   Problem Relation Age of Onset    Diabetes Mother     Hypertension Mother     Diabetes Father     Kidney disease Father     Diabetes Sister     Hypertension Sister     Kidney disease Sister     Heart disease Sister     Diabetes Brother     Diabetes Sister     Stroke Maternal Grandmother     Heart disease Maternal Grandmother         pacemaker    Cataracts Maternal Grandmother     No Known Problems Maternal Aunt     No Known Problems Maternal Uncle     No Known Problems Paternal Aunt     No Known Problems Paternal Uncle     No Known Problems Maternal Grandfather     No Known Problems Paternal Grandmother     No Known Problems Paternal Grandfather     Cancer Neg Hx     Amblyopia Neg Hx     Blindness Neg Hx     Glaucoma Neg Hx     Macular degeneration Neg Hx     Retinal detachment Neg Hx     Strabismus Neg Hx     Thyroid disease Neg Hx     Breast cancer Neg Hx     Colon cancer Neg Hx     Ovarian cancer Neg Hx      Social History     Tobacco Use    Smoking status: Former Smoker     Packs/day: 1.00     Years: 20.00     Pack years: 20.00     Types: Cigarettes     Quit date: 2013     Years since quittin.8    Smokeless tobacco: Never Used    Tobacco comment: quit smoking cigarettes in 2014   Substance Use Topics    Alcohol use: No    Drug use: No     Review of Systems   Constitutional: Negative.    HENT: Positive for dental problem.    Eyes: Negative.    Respiratory: Negative.    Cardiovascular: Negative.    Gastrointestinal: Negative.     Endocrine: Negative.    Genitourinary: Negative.    Musculoskeletal: Negative.    Skin: Negative.    Allergic/Immunologic: Negative.    Neurological: Negative.    Hematological: Negative.    Psychiatric/Behavioral: Negative.    All other systems reviewed and are negative.      Physical Exam     Initial Vitals [07/22/20 0341]   BP Pulse Resp Temp SpO2   (!) 170/83 89 16 97.8 °F (36.6 °C) 100 %      MAP       --         Physical Exam    Nursing note and vitals reviewed.  Constitutional: Vital signs are normal. She appears well-developed. She is active and cooperative.   HENT:   Head: Normocephalic and atraumatic.   Mouth/Throat: Oropharynx is clear and moist and mucous membranes are normal. No trismus in the jaw. Dental caries present.       Eyes: Conjunctivae, EOM and lids are normal. Pupils are equal, round, and reactive to light.   Neck: Trachea normal, normal range of motion, full passive range of motion without pain and phonation normal. Neck supple. No thyroid mass present. No spinous process tenderness and no muscular tenderness present. No edema, no erythema and normal range of motion present. No neck rigidity.   Cardiovascular: Normal rate, regular rhythm, S1 normal, S2 normal, normal heart sounds, intact distal pulses and normal pulses.   Pulmonary/Chest: Effort normal and breath sounds normal.   Abdominal: Soft. Normal appearance, normal aorta and bowel sounds are normal.   Musculoskeletal: Normal range of motion.   Lymphadenopathy:     She has no axillary adenopathy.   Neurological: She is alert and oriented to person, place, and time.   Skin: Skin is warm, dry and intact.   Psychiatric: She has a normal mood and affect. Her speech is normal and behavior is normal. Judgment and thought content normal. Cognition and memory are normal.         ED Course   Procedures  Labs Reviewed - No data to display       Imaging Results    None                                          Clinical Impression:        ICD-10-CM ICD-9-CM   1. Dental caries  K02.9 521.00   2. Chronic dental pain  K08.9 525.9    G89.29 338.29             ED Disposition Condition    Discharge Stable        ED Prescriptions     Medication Sig Dispense Start Date End Date Auth. Provider    clindamycin (CLEOCIN) 150 MG capsule Take 1 capsule (150 mg total) by mouth 4 (four) times daily. for 7 days 28 capsule 7/22/2020 7/29/2020 Carlo Castillo MD        Follow-up Information     Follow up With Specialties Details Why Contact Info    Dentist ASAP  Schedule an appointment as soon as possible for a visit in 1 day                                       Carlo Castillo MD  07/22/20 0656

## 2020-07-22 NOTE — PATIENT INSTRUCTIONS
1. Make sure you are taking Cellcept 500 mg twice a day - look at what size pills you have at home   2. Would take Coreg 3.125 mg twice a day but hold for SBP (top blood pressure number) <120  3. Stay active   4. Stay well hydrated   5. Make sure to check your blood pressure at home   6. We will have your coordinator set you up for a kidney biopsy in ~1 month as per your request - if anything changes please contact us  7. Continue following up with your PCP and general nephrologist    

## 2021-01-08 ENCOUNTER — PATIENT MESSAGE (OUTPATIENT)
Dept: TRANSPLANT | Facility: CLINIC | Age: 56
End: 2021-01-08

## 2021-01-26 ENCOUNTER — TELEPHONE (OUTPATIENT)
Dept: NEPHROLOGY | Facility: CLINIC | Age: 56
End: 2021-01-26

## 2021-03-22 ENCOUNTER — TELEPHONE (OUTPATIENT)
Dept: NEPHROLOGY | Facility: CLINIC | Age: 56
End: 2021-03-22

## 2021-03-24 ENCOUNTER — TELEPHONE (OUTPATIENT)
Dept: NEPHROLOGY | Facility: CLINIC | Age: 56
End: 2021-03-24

## 2021-03-24 DIAGNOSIS — N18.30 STAGE 3 CHRONIC KIDNEY DISEASE, UNSPECIFIED WHETHER STAGE 3A OR 3B CKD: ICD-10-CM

## 2021-03-24 DIAGNOSIS — Z94.0 KIDNEY TRANSPLANT STATUS, LIVING UNRELATED DONOR: Primary | ICD-10-CM

## 2021-04-14 DIAGNOSIS — Z94.0 KIDNEY REPLACED BY TRANSPLANT: Primary | ICD-10-CM

## 2021-04-16 ENCOUNTER — TELEPHONE (OUTPATIENT)
Dept: TRANSPLANT | Facility: CLINIC | Age: 56
End: 2021-04-16

## 2021-04-16 ENCOUNTER — LAB VISIT (OUTPATIENT)
Dept: LAB | Facility: HOSPITAL | Age: 56
End: 2021-04-16
Attending: INTERNAL MEDICINE
Payer: MEDICAID

## 2021-04-16 DIAGNOSIS — N18.30 STAGE 3 CHRONIC KIDNEY DISEASE, UNSPECIFIED WHETHER STAGE 3A OR 3B CKD: ICD-10-CM

## 2021-04-16 DIAGNOSIS — Z94.0 KIDNEY TRANSPLANT STATUS, LIVING UNRELATED DONOR: ICD-10-CM

## 2021-04-16 DIAGNOSIS — Z94.0 KIDNEY REPLACED BY TRANSPLANT: ICD-10-CM

## 2021-04-16 LAB
ALBUMIN SERPL BCP-MCNC: 3.2 G/DL (ref 3.5–5.2)
ALBUMIN SERPL BCP-MCNC: 3.2 G/DL (ref 3.5–5.2)
ALP SERPL-CCNC: 83 U/L (ref 55–135)
ALT SERPL W/O P-5'-P-CCNC: 11 U/L (ref 10–44)
ANION GAP SERPL CALC-SCNC: 7 MMOL/L (ref 8–16)
ANION GAP SERPL CALC-SCNC: 7 MMOL/L (ref 8–16)
AST SERPL-CCNC: 15 U/L (ref 10–40)
BASOPHILS # BLD AUTO: 0.02 K/UL (ref 0–0.2)
BASOPHILS # BLD AUTO: 0.02 K/UL (ref 0–0.2)
BASOPHILS NFR BLD: 0.4 % (ref 0–1.9)
BASOPHILS NFR BLD: 0.4 % (ref 0–1.9)
BILIRUB DIRECT SERPL-MCNC: 0.1 MG/DL (ref 0.1–0.3)
BILIRUB SERPL-MCNC: 0.3 MG/DL (ref 0.1–1)
BUN SERPL-MCNC: 31 MG/DL (ref 6–20)
BUN SERPL-MCNC: 31 MG/DL (ref 6–20)
CALCIUM SERPL-MCNC: 9.5 MG/DL (ref 8.7–10.5)
CALCIUM SERPL-MCNC: 9.5 MG/DL (ref 8.7–10.5)
CHLORIDE SERPL-SCNC: 111 MMOL/L (ref 95–110)
CHLORIDE SERPL-SCNC: 111 MMOL/L (ref 95–110)
CO2 SERPL-SCNC: 22 MMOL/L (ref 23–29)
CO2 SERPL-SCNC: 22 MMOL/L (ref 23–29)
CREAT SERPL-MCNC: 2.8 MG/DL (ref 0.5–1.4)
CREAT SERPL-MCNC: 2.8 MG/DL (ref 0.5–1.4)
DIFFERENTIAL METHOD: ABNORMAL
DIFFERENTIAL METHOD: ABNORMAL
EOSINOPHIL # BLD AUTO: 0.1 K/UL (ref 0–0.5)
EOSINOPHIL # BLD AUTO: 0.1 K/UL (ref 0–0.5)
EOSINOPHIL NFR BLD: 2.6 % (ref 0–8)
EOSINOPHIL NFR BLD: 2.6 % (ref 0–8)
ERYTHROCYTE [DISTWIDTH] IN BLOOD BY AUTOMATED COUNT: 14.1 % (ref 11.5–14.5)
ERYTHROCYTE [DISTWIDTH] IN BLOOD BY AUTOMATED COUNT: 14.1 % (ref 11.5–14.5)
EST. GFR  (AFRICAN AMERICAN): 20.7 ML/MIN/1.73 M^2
EST. GFR  (AFRICAN AMERICAN): 20.7 ML/MIN/1.73 M^2
EST. GFR  (NON AFRICAN AMERICAN): 17.9 ML/MIN/1.73 M^2
EST. GFR  (NON AFRICAN AMERICAN): 17.9 ML/MIN/1.73 M^2
GLUCOSE SERPL-MCNC: 188 MG/DL (ref 70–110)
GLUCOSE SERPL-MCNC: 188 MG/DL (ref 70–110)
HCT VFR BLD AUTO: 32.8 % (ref 37–48.5)
HCT VFR BLD AUTO: 32.8 % (ref 37–48.5)
HGB BLD-MCNC: 10 G/DL (ref 12–16)
HGB BLD-MCNC: 10 G/DL (ref 12–16)
IMM GRANULOCYTES # BLD AUTO: 0.02 K/UL (ref 0–0.04)
IMM GRANULOCYTES # BLD AUTO: 0.02 K/UL (ref 0–0.04)
IMM GRANULOCYTES NFR BLD AUTO: 0.4 % (ref 0–0.5)
IMM GRANULOCYTES NFR BLD AUTO: 0.4 % (ref 0–0.5)
LYMPHOCYTES # BLD AUTO: 1.3 K/UL (ref 1–4.8)
LYMPHOCYTES # BLD AUTO: 1.3 K/UL (ref 1–4.8)
LYMPHOCYTES NFR BLD: 25.6 % (ref 18–48)
LYMPHOCYTES NFR BLD: 25.6 % (ref 18–48)
MAGNESIUM SERPL-MCNC: 1.7 MG/DL (ref 1.6–2.6)
MCH RBC QN AUTO: 26 PG (ref 27–31)
MCH RBC QN AUTO: 26 PG (ref 27–31)
MCHC RBC AUTO-ENTMCNC: 30.5 G/DL (ref 32–36)
MCHC RBC AUTO-ENTMCNC: 30.5 G/DL (ref 32–36)
MCV RBC AUTO: 85 FL (ref 82–98)
MCV RBC AUTO: 85 FL (ref 82–98)
MONOCYTES # BLD AUTO: 0.6 K/UL (ref 0.3–1)
MONOCYTES # BLD AUTO: 0.6 K/UL (ref 0.3–1)
MONOCYTES NFR BLD: 12 % (ref 4–15)
MONOCYTES NFR BLD: 12 % (ref 4–15)
NEUTROPHILS # BLD AUTO: 3 K/UL (ref 1.8–7.7)
NEUTROPHILS # BLD AUTO: 3 K/UL (ref 1.8–7.7)
NEUTROPHILS NFR BLD: 59 % (ref 38–73)
NEUTROPHILS NFR BLD: 59 % (ref 38–73)
NRBC BLD-RTO: 0 /100 WBC
NRBC BLD-RTO: 0 /100 WBC
PHOSPHATE SERPL-MCNC: 2.9 MG/DL (ref 2.7–4.5)
PLATELET # BLD AUTO: 138 K/UL (ref 150–450)
PLATELET # BLD AUTO: 138 K/UL (ref 150–450)
PMV BLD AUTO: 13.4 FL (ref 9.2–12.9)
PMV BLD AUTO: 13.4 FL (ref 9.2–12.9)
POTASSIUM SERPL-SCNC: 4.8 MMOL/L (ref 3.5–5.1)
POTASSIUM SERPL-SCNC: 4.8 MMOL/L (ref 3.5–5.1)
PROT SERPL-MCNC: 8 G/DL (ref 6–8.4)
RBC # BLD AUTO: 3.84 M/UL (ref 4–5.4)
RBC # BLD AUTO: 3.84 M/UL (ref 4–5.4)
SODIUM SERPL-SCNC: 140 MMOL/L (ref 136–145)
SODIUM SERPL-SCNC: 140 MMOL/L (ref 136–145)
WBC # BLD AUTO: 5.08 K/UL (ref 3.9–12.7)
WBC # BLD AUTO: 5.08 K/UL (ref 3.9–12.7)

## 2021-04-16 PROCEDURE — 85025 COMPLETE CBC W/AUTO DIFF WBC: CPT | Performed by: INTERNAL MEDICINE

## 2021-04-16 PROCEDURE — 84460 ALANINE AMINO (ALT) (SGPT): CPT | Performed by: INTERNAL MEDICINE

## 2021-04-16 PROCEDURE — 80069 RENAL FUNCTION PANEL: CPT | Performed by: INTERNAL MEDICINE

## 2021-04-16 PROCEDURE — 80197 ASSAY OF TACROLIMUS: CPT | Performed by: INTERNAL MEDICINE

## 2021-04-16 PROCEDURE — 82247 BILIRUBIN TOTAL: CPT | Performed by: INTERNAL MEDICINE

## 2021-04-16 PROCEDURE — 36415 COLL VENOUS BLD VENIPUNCTURE: CPT | Mod: PO | Performed by: INTERNAL MEDICINE

## 2021-04-16 PROCEDURE — 83735 ASSAY OF MAGNESIUM: CPT | Performed by: INTERNAL MEDICINE

## 2021-04-16 PROCEDURE — 84075 ASSAY ALKALINE PHOSPHATASE: CPT | Performed by: INTERNAL MEDICINE

## 2021-04-17 LAB — TACROLIMUS BLD-MCNC: 10.1 NG/ML (ref 5–15)

## 2021-04-19 ENCOUNTER — TELEPHONE (OUTPATIENT)
Dept: TRANSPLANT | Facility: CLINIC | Age: 56
End: 2021-04-19

## 2021-04-20 ENCOUNTER — LAB VISIT (OUTPATIENT)
Dept: LAB | Facility: HOSPITAL | Age: 56
End: 2021-04-20
Attending: INTERNAL MEDICINE
Payer: MEDICAID

## 2021-04-20 DIAGNOSIS — Z94.0 KIDNEY REPLACED BY TRANSPLANT: ICD-10-CM

## 2021-04-20 PROCEDURE — 80197 ASSAY OF TACROLIMUS: CPT | Performed by: INTERNAL MEDICINE

## 2021-04-20 PROCEDURE — 36415 COLL VENOUS BLD VENIPUNCTURE: CPT | Mod: PO | Performed by: INTERNAL MEDICINE

## 2021-04-21 LAB — TACROLIMUS BLD-MCNC: 8 NG/ML (ref 5–15)

## 2021-04-22 ENCOUNTER — OFFICE VISIT (OUTPATIENT)
Dept: NEPHROLOGY | Facility: CLINIC | Age: 56
End: 2021-04-22
Payer: MEDICAID

## 2021-04-22 VITALS
BODY MASS INDEX: 32.28 KG/M2 | HEART RATE: 88 BPM | HEIGHT: 67 IN | OXYGEN SATURATION: 99 % | DIASTOLIC BLOOD PRESSURE: 70 MMHG | WEIGHT: 205.69 LBS | SYSTOLIC BLOOD PRESSURE: 112 MMHG

## 2021-04-22 DIAGNOSIS — R80.9 PROTEINURIA, UNSPECIFIED TYPE: ICD-10-CM

## 2021-04-22 DIAGNOSIS — N18.4 CKD (CHRONIC KIDNEY DISEASE) STAGE 4, GFR 15-29 ML/MIN: ICD-10-CM

## 2021-04-22 DIAGNOSIS — Z94.0 KIDNEY TRANSPLANT STATUS, LIVING UNRELATED DONOR: Primary | Chronic | ICD-10-CM

## 2021-04-22 PROCEDURE — 99999 PR PBB SHADOW E&M-EST. PATIENT-LVL III: CPT | Mod: PBBFAC,,, | Performed by: INTERNAL MEDICINE

## 2021-04-22 PROCEDURE — 99999 PR PBB SHADOW E&M-EST. PATIENT-LVL III: ICD-10-PCS | Mod: PBBFAC,,, | Performed by: INTERNAL MEDICINE

## 2021-04-22 PROCEDURE — 99213 OFFICE O/P EST LOW 20 MIN: CPT | Mod: PBBFAC | Performed by: INTERNAL MEDICINE

## 2021-04-22 PROCEDURE — 99214 OFFICE O/P EST MOD 30 MIN: CPT | Mod: S$PBB,,, | Performed by: INTERNAL MEDICINE

## 2021-04-22 PROCEDURE — 99214 PR OFFICE/OUTPT VISIT, EST, LEVL IV, 30-39 MIN: ICD-10-PCS | Mod: S$PBB,,, | Performed by: INTERNAL MEDICINE

## 2021-05-03 ENCOUNTER — OFFICE VISIT (OUTPATIENT)
Dept: TRANSPLANT | Facility: CLINIC | Age: 56
End: 2021-05-03
Payer: MEDICAID

## 2021-05-03 VITALS
OXYGEN SATURATION: 99 % | SYSTOLIC BLOOD PRESSURE: 138 MMHG | RESPIRATION RATE: 16 BRPM | TEMPERATURE: 99 F | HEIGHT: 67 IN | BODY MASS INDEX: 32.15 KG/M2 | WEIGHT: 204.81 LBS | DIASTOLIC BLOOD PRESSURE: 83 MMHG | HEART RATE: 77 BPM

## 2021-05-03 DIAGNOSIS — R80.8 OTHER PROTEINURIA: ICD-10-CM

## 2021-05-03 DIAGNOSIS — D47.2 MGUS (MONOCLONAL GAMMOPATHY OF UNKNOWN SIGNIFICANCE): Chronic | ICD-10-CM

## 2021-05-03 DIAGNOSIS — Z76.82 ORGAN TRANSPLANT CANDIDATE: ICD-10-CM

## 2021-05-03 DIAGNOSIS — Z94.0 KIDNEY TRANSPLANT STATUS, LIVING UNRELATED DONOR: Primary | Chronic | ICD-10-CM

## 2021-05-03 DIAGNOSIS — I15.0 RENOVASCULAR HYPERTENSION: ICD-10-CM

## 2021-05-03 DIAGNOSIS — Z29.89 NEED FOR PROPHYLACTIC IMMUNOTHERAPY: Chronic | ICD-10-CM

## 2021-05-03 DIAGNOSIS — D63.8 ANEMIA OF CHRONIC DISEASE: Chronic | ICD-10-CM

## 2021-05-03 PROCEDURE — 99215 PR OFFICE/OUTPT VISIT, EST, LEVL V, 40-54 MIN: ICD-10-PCS | Mod: S$PBB,,, | Performed by: NURSE PRACTITIONER

## 2021-05-03 PROCEDURE — 99999 PR PBB SHADOW E&M-EST. PATIENT-LVL V: CPT | Mod: PBBFAC,,, | Performed by: NURSE PRACTITIONER

## 2021-05-03 PROCEDURE — 99215 OFFICE O/P EST HI 40 MIN: CPT | Mod: S$PBB,,, | Performed by: NURSE PRACTITIONER

## 2021-05-03 PROCEDURE — 99999 PR PBB SHADOW E&M-EST. PATIENT-LVL V: ICD-10-PCS | Mod: PBBFAC,,, | Performed by: NURSE PRACTITIONER

## 2021-05-03 PROCEDURE — 99215 OFFICE O/P EST HI 40 MIN: CPT | Mod: PBBFAC | Performed by: NURSE PRACTITIONER

## 2021-05-03 RX ORDER — TACROLIMUS 1 MG/1
CAPSULE ORAL
Qty: 180 CAPSULE | Refills: 11 | Status: SHIPPED | OUTPATIENT
Start: 2021-05-03 | End: 2021-05-07 | Stop reason: SDUPTHER

## 2021-05-07 DIAGNOSIS — Z76.82 ORGAN TRANSPLANT CANDIDATE: ICD-10-CM

## 2021-05-07 DIAGNOSIS — Z94.0 KIDNEY REPLACED BY TRANSPLANT: Primary | ICD-10-CM

## 2021-05-10 RX ORDER — TACROLIMUS 1 MG/1
CAPSULE ORAL
Qty: 150 CAPSULE | Refills: 11 | Status: SHIPPED | OUTPATIENT
Start: 2021-05-10 | End: 2021-12-15 | Stop reason: SDUPTHER

## 2021-05-18 ENCOUNTER — TELEPHONE (OUTPATIENT)
Dept: TRANSPLANT | Facility: CLINIC | Age: 56
End: 2021-05-18

## 2021-05-19 ENCOUNTER — LAB VISIT (OUTPATIENT)
Dept: LAB | Facility: HOSPITAL | Age: 56
End: 2021-05-19
Attending: INTERNAL MEDICINE
Payer: MEDICAID

## 2021-05-19 DIAGNOSIS — Z94.0 KIDNEY REPLACED BY TRANSPLANT: ICD-10-CM

## 2021-05-19 LAB
TACROLIMUS BLD-MCNC: 4.6 NG/ML (ref 5–15)
TACROLIMUS, NORMALIZED: 4.3 NG/ML (ref 5–15)

## 2021-05-19 PROCEDURE — 80197 ASSAY OF TACROLIMUS: CPT | Performed by: INTERNAL MEDICINE

## 2021-05-19 PROCEDURE — 36415 COLL VENOUS BLD VENIPUNCTURE: CPT | Mod: PO | Performed by: INTERNAL MEDICINE

## 2021-07-16 DIAGNOSIS — Z94.0 KIDNEY TRANSPLANT STATUS, LIVING UNRELATED DONOR: Chronic | ICD-10-CM

## 2021-07-16 DIAGNOSIS — Z29.89 NEED FOR PROPHYLACTIC IMMUNOTHERAPY: Chronic | ICD-10-CM

## 2021-07-16 RX ORDER — MYCOPHENOLATE MOFETIL 250 MG/1
CAPSULE ORAL
Qty: 120 CAPSULE | Refills: 11 | Status: SHIPPED | OUTPATIENT
Start: 2021-07-16 | End: 2021-12-15 | Stop reason: SDUPTHER

## 2021-08-24 ENCOUNTER — TELEPHONE (OUTPATIENT)
Dept: NEPHROLOGY | Facility: CLINIC | Age: 56
End: 2021-08-24

## 2021-08-24 DIAGNOSIS — Z94.0 KIDNEY TRANSPLANT RECIPIENT: Primary | ICD-10-CM

## 2021-09-15 ENCOUNTER — TELEPHONE (OUTPATIENT)
Dept: NEPHROLOGY | Facility: CLINIC | Age: 56
End: 2021-09-15

## 2021-09-15 ENCOUNTER — PATIENT MESSAGE (OUTPATIENT)
Dept: NEPHROLOGY | Facility: CLINIC | Age: 56
End: 2021-09-15

## 2021-10-01 ENCOUNTER — LAB VISIT (OUTPATIENT)
Dept: LAB | Facility: HOSPITAL | Age: 56
End: 2021-10-01
Payer: MEDICAID

## 2021-10-01 DIAGNOSIS — Z94.0 KIDNEY TRANSPLANT RECIPIENT: ICD-10-CM

## 2021-10-01 LAB
ANION GAP SERPL CALC-SCNC: 13 MMOL/L (ref 8–16)
BASOPHILS # BLD AUTO: 0.02 K/UL (ref 0–0.2)
BASOPHILS NFR BLD: 0.5 % (ref 0–1.9)
BUN SERPL-MCNC: 69 MG/DL (ref 6–20)
CALCIUM SERPL-MCNC: 9.6 MG/DL (ref 8.7–10.5)
CHLORIDE SERPL-SCNC: 107 MMOL/L (ref 95–110)
CO2 SERPL-SCNC: 16 MMOL/L (ref 23–29)
CREAT SERPL-MCNC: 4.4 MG/DL (ref 0.5–1.4)
DIFFERENTIAL METHOD: ABNORMAL
EOSINOPHIL # BLD AUTO: 0.1 K/UL (ref 0–0.5)
EOSINOPHIL NFR BLD: 3 % (ref 0–8)
ERYTHROCYTE [DISTWIDTH] IN BLOOD BY AUTOMATED COUNT: 13.6 % (ref 11.5–14.5)
EST. GFR  (AFRICAN AMERICAN): 12 ML/MIN/1.73 M^2
EST. GFR  (NON AFRICAN AMERICAN): 10.4 ML/MIN/1.73 M^2
GLUCOSE SERPL-MCNC: 137 MG/DL (ref 70–110)
HCT VFR BLD AUTO: 29 % (ref 37–48.5)
HGB BLD-MCNC: 8.8 G/DL (ref 12–16)
IMM GRANULOCYTES # BLD AUTO: 0.06 K/UL (ref 0–0.04)
IMM GRANULOCYTES NFR BLD AUTO: 1.4 % (ref 0–0.5)
LYMPHOCYTES # BLD AUTO: 0.8 K/UL (ref 1–4.8)
LYMPHOCYTES NFR BLD: 19.4 % (ref 18–48)
MAGNESIUM SERPL-MCNC: 1.8 MG/DL (ref 1.6–2.6)
MCH RBC QN AUTO: 26.3 PG (ref 27–31)
MCHC RBC AUTO-ENTMCNC: 30.3 G/DL (ref 32–36)
MCV RBC AUTO: 87 FL (ref 82–98)
MONOCYTES # BLD AUTO: 0.4 K/UL (ref 0.3–1)
MONOCYTES NFR BLD: 9.8 % (ref 4–15)
NEUTROPHILS # BLD AUTO: 2.8 K/UL (ref 1.8–7.7)
NEUTROPHILS NFR BLD: 65.9 % (ref 38–73)
NRBC BLD-RTO: 0 /100 WBC
PLATELET # BLD AUTO: 258 K/UL (ref 150–450)
PMV BLD AUTO: 11.6 FL (ref 9.2–12.9)
POTASSIUM SERPL-SCNC: 4.9 MMOL/L (ref 3.5–5.1)
PTH-INTACT SERPL-MCNC: 454 PG/ML (ref 9–77)
RBC # BLD AUTO: 3.34 M/UL (ref 4–5.4)
SODIUM SERPL-SCNC: 136 MMOL/L (ref 136–145)
WBC # BLD AUTO: 4.28 K/UL (ref 3.9–12.7)

## 2021-10-01 PROCEDURE — 83735 ASSAY OF MAGNESIUM: CPT | Performed by: INTERNAL MEDICINE

## 2021-10-01 PROCEDURE — 85025 COMPLETE CBC W/AUTO DIFF WBC: CPT | Performed by: INTERNAL MEDICINE

## 2021-10-01 PROCEDURE — 80197 ASSAY OF TACROLIMUS: CPT | Performed by: INTERNAL MEDICINE

## 2021-10-01 PROCEDURE — 36415 COLL VENOUS BLD VENIPUNCTURE: CPT | Mod: PO | Performed by: INTERNAL MEDICINE

## 2021-10-01 PROCEDURE — 80048 BASIC METABOLIC PNL TOTAL CA: CPT | Performed by: INTERNAL MEDICINE

## 2021-10-01 PROCEDURE — 83970 ASSAY OF PARATHORMONE: CPT | Performed by: INTERNAL MEDICINE

## 2021-10-02 LAB
TACROLIMUS BLD-MCNC: 3.8 NG/ML (ref 5–15)
TACROLIMUS, NORMALIZED: 3.4 NG/ML (ref 5–15)

## 2021-10-13 ENCOUNTER — OFFICE VISIT (OUTPATIENT)
Dept: NEPHROLOGY | Facility: CLINIC | Age: 56
End: 2021-10-13
Payer: MEDICAID

## 2021-10-13 DIAGNOSIS — Z94.0 KIDNEY TRANSPLANT STATUS, LIVING UNRELATED DONOR: Primary | Chronic | ICD-10-CM

## 2021-10-13 DIAGNOSIS — N18.4 CKD (CHRONIC KIDNEY DISEASE), STAGE IV: Chronic | ICD-10-CM

## 2021-10-13 DIAGNOSIS — N39.0 UTI (URINARY TRACT INFECTION), UNCOMPLICATED: ICD-10-CM

## 2021-10-13 DIAGNOSIS — I15.0 RENOVASCULAR HYPERTENSION: ICD-10-CM

## 2021-10-13 PROCEDURE — 99214 PR OFFICE/OUTPT VISIT, EST, LEVL IV, 30-39 MIN: ICD-10-PCS | Mod: 95,,, | Performed by: INTERNAL MEDICINE

## 2021-10-13 PROCEDURE — 99214 OFFICE O/P EST MOD 30 MIN: CPT | Mod: 95,,, | Performed by: INTERNAL MEDICINE

## 2021-10-14 ENCOUNTER — LAB VISIT (OUTPATIENT)
Dept: LAB | Facility: HOSPITAL | Age: 56
End: 2021-10-14
Attending: INTERNAL MEDICINE
Payer: MEDICAID

## 2021-10-14 DIAGNOSIS — N39.0 UTI (URINARY TRACT INFECTION), UNCOMPLICATED: ICD-10-CM

## 2021-10-14 DIAGNOSIS — Z94.0 KIDNEY TRANSPLANT STATUS, LIVING UNRELATED DONOR: Chronic | ICD-10-CM

## 2021-10-14 LAB
ANION GAP SERPL CALC-SCNC: 13 MMOL/L (ref 8–16)
BUN SERPL-MCNC: 59 MG/DL (ref 6–20)
CALCIUM SERPL-MCNC: 9.7 MG/DL (ref 8.7–10.5)
CHLORIDE SERPL-SCNC: 108 MMOL/L (ref 95–110)
CO2 SERPL-SCNC: 14 MMOL/L (ref 23–29)
CREAT SERPL-MCNC: 3.3 MG/DL (ref 0.5–1.4)
EST. GFR  (AFRICAN AMERICAN): 16.9 ML/MIN/1.73 M^2
EST. GFR  (NON AFRICAN AMERICAN): 14.7 ML/MIN/1.73 M^2
GLUCOSE SERPL-MCNC: 127 MG/DL (ref 70–110)
POTASSIUM SERPL-SCNC: 5.2 MMOL/L (ref 3.5–5.1)
SODIUM SERPL-SCNC: 135 MMOL/L (ref 136–145)

## 2021-10-14 PROCEDURE — 36415 COLL VENOUS BLD VENIPUNCTURE: CPT | Mod: PO | Performed by: INTERNAL MEDICINE

## 2021-10-14 PROCEDURE — 80048 BASIC METABOLIC PNL TOTAL CA: CPT | Performed by: INTERNAL MEDICINE

## 2021-10-18 ENCOUNTER — PATIENT MESSAGE (OUTPATIENT)
Dept: NEPHROLOGY | Facility: CLINIC | Age: 56
End: 2021-10-18
Payer: MEDICAID

## 2021-10-18 RX ORDER — CEFDINIR 300 MG/1
300 CAPSULE ORAL 2 TIMES DAILY
Qty: 20 CAPSULE | Refills: 0 | Status: SHIPPED | OUTPATIENT
Start: 2021-10-18 | End: 2021-10-28

## 2021-10-26 ENCOUNTER — TELEPHONE (OUTPATIENT)
Dept: TRANSPLANT | Facility: CLINIC | Age: 56
End: 2021-10-26
Payer: MEDICAID

## 2021-10-28 ENCOUNTER — PATIENT MESSAGE (OUTPATIENT)
Dept: TRANSPLANT | Facility: CLINIC | Age: 56
End: 2021-10-28
Payer: MEDICAID

## 2021-10-28 DIAGNOSIS — Z76.82 ORGAN TRANSPLANT CANDIDATE: Primary | ICD-10-CM

## 2021-10-29 ENCOUNTER — HOSPITAL ENCOUNTER (OUTPATIENT)
Dept: RADIOLOGY | Facility: HOSPITAL | Age: 56
Discharge: HOME | End: 2021-10-29
Attending: NURSE PRACTITIONER
Payer: MEDICAID

## 2021-10-29 DIAGNOSIS — Z76.82 ORGAN TRANSPLANT CANDIDATE: ICD-10-CM

## 2021-10-29 PROCEDURE — 71046 XR CHEST PA AND LATERAL: ICD-10-PCS | Mod: 26,TXP,, | Performed by: RADIOLOGY

## 2021-10-29 PROCEDURE — 72170 XR PELVIS ROUTINE AP: ICD-10-PCS | Mod: 26,TXP,, | Performed by: RADIOLOGY

## 2021-10-29 PROCEDURE — 71046 X-RAY EXAM CHEST 2 VIEWS: CPT | Mod: TC,FY,PO,TXP

## 2021-10-29 PROCEDURE — 72170 X-RAY EXAM OF PELVIS: CPT | Mod: TC,FY,PO,TXP

## 2021-10-29 PROCEDURE — 72170 X-RAY EXAM OF PELVIS: CPT | Mod: 26,TXP,, | Performed by: RADIOLOGY

## 2021-10-29 PROCEDURE — 71046 X-RAY EXAM CHEST 2 VIEWS: CPT | Mod: 26,TXP,, | Performed by: RADIOLOGY

## 2021-11-01 ENCOUNTER — TELEPHONE (OUTPATIENT)
Dept: TRANSPLANT | Facility: CLINIC | Age: 56
End: 2021-11-01
Payer: MEDICAID

## 2021-11-04 ENCOUNTER — TELEPHONE (OUTPATIENT)
Dept: TRANSPLANT | Facility: CLINIC | Age: 56
End: 2021-11-04
Payer: MEDICAID

## 2021-11-10 ENCOUNTER — TELEPHONE (OUTPATIENT)
Dept: TRANSPLANT | Facility: CLINIC | Age: 56
End: 2021-11-10
Payer: MEDICAID

## 2021-11-11 DIAGNOSIS — Z76.82 ORGAN TRANSPLANT CANDIDATE: Primary | ICD-10-CM

## 2021-11-15 DIAGNOSIS — Z94.0 KIDNEY REPLACED BY TRANSPLANT: Primary | ICD-10-CM

## 2021-11-24 NOTE — ASSESSMENT & PLAN NOTE
Abdomen , soft, nontender, nondistended , no guarding or rigidity , no masses palpable , normal bowel sounds , Liver and Spleen , no hepatomegaly present , no hepatosplenomegaly , liver nontender , spleen not palpable , Abdomen , soft, nontender, nondistended , no guarding or rigidity , no masses palpable , normal bowel sounds , Liver and Spleen , no hepatomegaly present , no hepatosplenomegaly , liver nontender , spleen not palpable Patient's baseline creatinine is around 1.6.  2.9 on presentation.  She does have a history of a renal transplant.    Started on IVF's and Nephrology consulted.  Creat improving with IVF's.  Avoid nephrotoxic medications.

## 2021-12-01 ENCOUNTER — HOSPITAL ENCOUNTER (INPATIENT)
Facility: HOSPITAL | Age: 56
LOS: 7 days | Discharge: HOME | DRG: 699 | End: 2021-12-08
Attending: EMERGENCY MEDICINE | Admitting: STUDENT IN AN ORGANIZED HEALTH CARE EDUCATION/TRAINING PROGRAM
Payer: MEDICAID

## 2021-12-01 DIAGNOSIS — N17.9 ACUTE KIDNEY INJURY: ICD-10-CM

## 2021-12-01 DIAGNOSIS — D64.9 SYMPTOMATIC ANEMIA: ICD-10-CM

## 2021-12-01 DIAGNOSIS — E87.5 HYPERKALEMIA: ICD-10-CM

## 2021-12-01 DIAGNOSIS — E87.1 HYPONATREMIA: ICD-10-CM

## 2021-12-01 DIAGNOSIS — N30.01 ACUTE CYSTITIS WITH HEMATURIA: Primary | ICD-10-CM

## 2021-12-01 DIAGNOSIS — E86.0 DEHYDRATION: ICD-10-CM

## 2021-12-01 DIAGNOSIS — N18.9 CHRONIC RENAL FAILURE, UNSPECIFIED CKD STAGE: ICD-10-CM

## 2021-12-01 PROCEDURE — 87086 URINE CULTURE/COLONY COUNT: CPT | Mod: NTX | Performed by: EMERGENCY MEDICINE

## 2021-12-01 PROCEDURE — 87077 CULTURE AEROBIC IDENTIFY: CPT | Mod: NTX | Performed by: EMERGENCY MEDICINE

## 2021-12-01 PROCEDURE — 80053 COMPREHEN METABOLIC PANEL: CPT | Mod: NTX | Performed by: EMERGENCY MEDICINE

## 2021-12-01 PROCEDURE — 81000 URINALYSIS NONAUTO W/SCOPE: CPT | Mod: NTX | Performed by: EMERGENCY MEDICINE

## 2021-12-01 PROCEDURE — 96365 THER/PROPH/DIAG IV INF INIT: CPT | Mod: NTX

## 2021-12-01 PROCEDURE — 87088 URINE BACTERIA CULTURE: CPT | Mod: NTX | Performed by: EMERGENCY MEDICINE

## 2021-12-01 PROCEDURE — 87186 SC STD MICRODIL/AGAR DIL: CPT | Mod: NTX | Performed by: EMERGENCY MEDICINE

## 2021-12-01 PROCEDURE — 12000002 HC ACUTE/MED SURGE SEMI-PRIVATE ROOM: Mod: NTX

## 2021-12-01 PROCEDURE — 99291 CRITICAL CARE FIRST HOUR: CPT | Mod: 25,NTX

## 2021-12-01 PROCEDURE — 85025 COMPLETE CBC W/AUTO DIFF WBC: CPT | Mod: NTX | Performed by: EMERGENCY MEDICINE

## 2021-12-01 RX ORDER — CARVEDILOL 3.12 MG/1
3.12 TABLET ORAL 2 TIMES DAILY WITH MEALS
Status: ON HOLD | COMMUNITY
End: 2022-04-05 | Stop reason: HOSPADM

## 2021-12-01 RX ORDER — INSULIN GLARGINE 100 [IU]/ML
INJECTION, SOLUTION SUBCUTANEOUS
Status: ON HOLD | COMMUNITY
Start: 2021-11-08 | End: 2021-12-21 | Stop reason: SDUPTHER

## 2021-12-01 RX ORDER — ALPRAZOLAM 0.25 MG/1
0.25 TABLET ORAL 2 TIMES DAILY PRN
COMMUNITY
Start: 2021-10-10 | End: 2022-04-01

## 2021-12-01 RX ORDER — DULOXETIN HYDROCHLORIDE 20 MG/1
40 CAPSULE, DELAYED RELEASE ORAL
Status: ON HOLD | COMMUNITY
End: 2021-12-08 | Stop reason: HOSPADM

## 2021-12-01 RX ORDER — DULAGLUTIDE 0.75 MG/.5ML
0.75 INJECTION, SOLUTION SUBCUTANEOUS
COMMUNITY
Start: 2021-11-19 | End: 2022-05-03

## 2021-12-01 RX ORDER — INSULIN LISPRO 100 [IU]/ML
3 INJECTION, SOLUTION INTRAVENOUS; SUBCUTANEOUS
Status: ON HOLD | COMMUNITY
Start: 2021-10-13 | End: 2023-03-13 | Stop reason: SDUPTHER

## 2021-12-01 RX ORDER — ZOLPIDEM TARTRATE 10 MG/1
10 TABLET ORAL NIGHTLY PRN
Status: ON HOLD | COMMUNITY
Start: 2021-08-15 | End: 2023-03-13 | Stop reason: HOSPADM

## 2021-12-01 RX ORDER — TIMOLOL MALEATE 5 MG/ML
SOLUTION/ DROPS OPHTHALMIC
Status: ON HOLD | COMMUNITY
End: 2023-03-13 | Stop reason: SDUPTHER

## 2021-12-02 PROBLEM — E87.6 HYPOKALEMIA: Status: ACTIVE | Noted: 2021-12-02

## 2021-12-02 PROBLEM — N30.01 ACUTE CYSTITIS WITH HEMATURIA: Status: ACTIVE | Noted: 2021-12-02

## 2021-12-02 PROBLEM — E87.1 HYPONATREMIA: Status: ACTIVE | Noted: 2021-12-02

## 2021-12-02 LAB
ABO + RH BLD: NORMAL
ALBUMIN SERPL BCP-MCNC: 2.5 G/DL (ref 3.5–5.2)
ALP SERPL-CCNC: 185 U/L (ref 55–135)
ALT SERPL W/O P-5'-P-CCNC: 47 U/L (ref 10–44)
ANION GAP SERPL CALC-SCNC: 14 MMOL/L (ref 8–16)
ANION GAP SERPL CALC-SCNC: 15 MMOL/L (ref 8–16)
ANION GAP SERPL CALC-SCNC: 15 MMOL/L (ref 8–16)
ANION GAP SERPL CALC-SCNC: 16 MMOL/L (ref 8–16)
ANION GAP SERPL CALC-SCNC: 17 MMOL/L (ref 8–16)
ANION GAP SERPL CALC-SCNC: 17 MMOL/L (ref 8–16)
AST SERPL-CCNC: 29 U/L (ref 10–40)
BACTERIA #/AREA URNS HPF: ABNORMAL /HPF
BASOPHILS # BLD AUTO: 0.01 K/UL (ref 0–0.2)
BASOPHILS NFR BLD: 0.1 % (ref 0–1.9)
BILIRUB SERPL-MCNC: 0.4 MG/DL (ref 0.1–1)
BILIRUB UR QL STRIP: NEGATIVE
BLD GP AB SCN CELLS X3 SERPL QL: NORMAL
BUN SERPL-MCNC: 123 MG/DL (ref 6–20)
BUN SERPL-MCNC: 125 MG/DL (ref 6–20)
BUN SERPL-MCNC: 126 MG/DL (ref 6–20)
BUN SERPL-MCNC: 127 MG/DL (ref 6–20)
BUN SERPL-MCNC: 128 MG/DL (ref 6–20)
BUN SERPL-MCNC: 132 MG/DL (ref 6–20)
CALCIUM SERPL-MCNC: 8.6 MG/DL (ref 8.7–10.5)
CALCIUM SERPL-MCNC: 9.1 MG/DL (ref 8.7–10.5)
CALCIUM SERPL-MCNC: 9.5 MG/DL (ref 8.7–10.5)
CALCIUM SERPL-MCNC: 9.7 MG/DL (ref 8.7–10.5)
CALCIUM SERPL-MCNC: 9.8 MG/DL (ref 8.7–10.5)
CALCIUM SERPL-MCNC: 9.8 MG/DL (ref 8.7–10.5)
CHLORIDE SERPL-SCNC: 100 MMOL/L (ref 95–110)
CHLORIDE SERPL-SCNC: 100 MMOL/L (ref 95–110)
CHLORIDE SERPL-SCNC: 101 MMOL/L (ref 95–110)
CHLORIDE SERPL-SCNC: 101 MMOL/L (ref 95–110)
CHLORIDE SERPL-SCNC: 98 MMOL/L (ref 95–110)
CHLORIDE SERPL-SCNC: 99 MMOL/L (ref 95–110)
CHOLEST SERPL-MCNC: 191 MG/DL (ref 120–199)
CHOLEST/HDLC SERPL: 10.6 {RATIO} (ref 2–5)
CK SERPL-CCNC: 20 U/L (ref 20–180)
CLARITY UR: ABNORMAL
CO2 SERPL-SCNC: 10 MMOL/L (ref 23–29)
CO2 SERPL-SCNC: 10 MMOL/L (ref 23–29)
CO2 SERPL-SCNC: 11 MMOL/L (ref 23–29)
CO2 SERPL-SCNC: 11 MMOL/L (ref 23–29)
CO2 SERPL-SCNC: 12 MMOL/L (ref 23–29)
CO2 SERPL-SCNC: 12 MMOL/L (ref 23–29)
COLOR UR: ABNORMAL
CREAT SERPL-MCNC: 8.8 MG/DL (ref 0.5–1.4)
CREAT SERPL-MCNC: 8.9 MG/DL (ref 0.5–1.4)
CREAT SERPL-MCNC: 9.2 MG/DL (ref 0.5–1.4)
CREAT SERPL-MCNC: 9.3 MG/DL (ref 0.5–1.4)
CREAT SERPL-MCNC: 9.7 MG/DL (ref 0.5–1.4)
CREAT SERPL-MCNC: 9.8 MG/DL (ref 0.5–1.4)
CREAT UR-MCNC: 79.4 MG/DL (ref 15–325)
CRP SERPL-MCNC: 263.8 MG/L (ref 0–8.2)
CTP QC/QA: YES
DIFFERENTIAL METHOD: ABNORMAL
EOSINOPHIL # BLD AUTO: 0 K/UL (ref 0–0.5)
EOSINOPHIL NFR BLD: 0.2 % (ref 0–8)
EOSINOPHIL URNS QL WRIGHT STN: ABNORMAL
ERYTHROCYTE [DISTWIDTH] IN BLOOD BY AUTOMATED COUNT: 14.6 % (ref 11.5–14.5)
ERYTHROCYTE [SEDIMENTATION RATE] IN BLOOD BY WESTERGREN METHOD: >140 MM/HR (ref 0–20)
EST. GFR  (AFRICAN AMERICAN): 5 ML/MIN/1.73 M^2
EST. GFR  (NON AFRICAN AMERICAN): 4 ML/MIN/1.73 M^2
ESTIMATED AVG GLUCOSE: 183 MG/DL (ref 68–131)
FERRITIN SERPL-MCNC: 4529 NG/ML (ref 20–300)
FOLATE SERPL-MCNC: 9 NG/ML (ref 4–24)
GLUCOSE SERPL-MCNC: 179 MG/DL (ref 70–110)
GLUCOSE SERPL-MCNC: 201 MG/DL (ref 70–110)
GLUCOSE SERPL-MCNC: 217 MG/DL (ref 70–110)
GLUCOSE SERPL-MCNC: 235 MG/DL (ref 70–110)
GLUCOSE SERPL-MCNC: 239 MG/DL (ref 70–110)
GLUCOSE SERPL-MCNC: 319 MG/DL (ref 70–110)
GLUCOSE UR QL STRIP: NEGATIVE
HBA1C MFR BLD: 8 % (ref 4–5.6)
HCT VFR BLD AUTO: 22.7 % (ref 37–48.5)
HDLC SERPL-MCNC: 18 MG/DL (ref 40–75)
HDLC SERPL: 9.4 % (ref 20–50)
HGB BLD-MCNC: 7.1 G/DL (ref 12–16)
HGB UR QL STRIP: ABNORMAL
HYALINE CASTS #/AREA URNS LPF: 0 /LPF
IMM GRANULOCYTES # BLD AUTO: 0.09 K/UL (ref 0–0.04)
IMM GRANULOCYTES NFR BLD AUTO: 1 % (ref 0–0.5)
IRON SERPL-MCNC: 27 UG/DL (ref 30–160)
KETONES UR QL STRIP: NEGATIVE
LACTATE SERPL-SCNC: 0.7 MMOL/L (ref 0.5–2.2)
LDLC SERPL CALC-MCNC: 129 MG/DL (ref 63–159)
LEUKOCYTE ESTERASE UR QL STRIP: ABNORMAL
LYMPHOCYTES # BLD AUTO: 0.7 K/UL (ref 1–4.8)
LYMPHOCYTES NFR BLD: 7 % (ref 18–48)
MAGNESIUM SERPL-MCNC: 2 MG/DL (ref 1.6–2.6)
MCH RBC QN AUTO: 26.1 PG (ref 27–31)
MCHC RBC AUTO-ENTMCNC: 31.3 G/DL (ref 32–36)
MCV RBC AUTO: 84 FL (ref 82–98)
MICROSCOPIC COMMENT: ABNORMAL
MONOCYTES # BLD AUTO: 0.6 K/UL (ref 0.3–1)
MONOCYTES NFR BLD: 6.6 % (ref 4–15)
NEUTROPHILS # BLD AUTO: 8 K/UL (ref 1.8–7.7)
NEUTROPHILS NFR BLD: 85.1 % (ref 38–73)
NITRITE UR QL STRIP: NEGATIVE
NONHDLC SERPL-MCNC: 173 MG/DL
NRBC BLD-RTO: 0 /100 WBC
OSMOLALITY SERPL: 332 MOSM/KG (ref 275–295)
OSMOLALITY UR: 294 MOSM/KG (ref 50–1200)
PH UR STRIP: 6 [PH] (ref 5–8)
PHOSPHATE SERPL-MCNC: 8.2 MG/DL (ref 2.7–4.5)
PLATELET # BLD AUTO: 270 K/UL (ref 150–450)
PMV BLD AUTO: 11.5 FL (ref 9.2–12.9)
POCT GLUCOSE: 199 MG/DL (ref 70–110)
POCT GLUCOSE: 226 MG/DL (ref 70–110)
POCT GLUCOSE: 264 MG/DL (ref 70–110)
POCT GLUCOSE: 272 MG/DL (ref 70–110)
POCT GLUCOSE: 278 MG/DL (ref 70–110)
POCT GLUCOSE: 337 MG/DL (ref 70–110)
POTASSIUM SERPL-SCNC: 5 MMOL/L (ref 3.5–5.1)
POTASSIUM SERPL-SCNC: 5 MMOL/L (ref 3.5–5.1)
POTASSIUM SERPL-SCNC: 5.1 MMOL/L (ref 3.5–5.1)
POTASSIUM SERPL-SCNC: 5.2 MMOL/L (ref 3.5–5.1)
POTASSIUM SERPL-SCNC: 5.3 MMOL/L (ref 3.5–5.1)
POTASSIUM SERPL-SCNC: 6.2 MMOL/L (ref 3.5–5.1)
PROCALCITONIN SERPL IA-MCNC: 4.73 NG/ML
PROT SERPL-MCNC: 8.5 G/DL (ref 6–8.4)
PROT UR QL STRIP: ABNORMAL
PTH-INTACT SERPL-MCNC: 317.6 PG/ML (ref 9–77)
RBC # BLD AUTO: 2.72 M/UL (ref 4–5.4)
RBC #/AREA URNS HPF: >100 /HPF (ref 0–4)
SARS-COV-2 RDRP RESP QL NAA+PROBE: NEGATIVE
SATURATED IRON: 16 % (ref 20–50)
SODIUM SERPL-SCNC: 126 MMOL/L (ref 136–145)
SODIUM SERPL-SCNC: 127 MMOL/L (ref 136–145)
SODIUM SERPL-SCNC: 128 MMOL/L (ref 136–145)
SODIUM UR-SCNC: 58 MMOL/L (ref 20–250)
SP GR UR STRIP: 1.01 (ref 1–1.03)
SQUAMOUS #/AREA URNS HPF: 5 /HPF
TOTAL IRON BINDING CAPACITY: 170 UG/DL (ref 250–450)
TRANSFERRIN SERPL-MCNC: 115 MG/DL (ref 200–375)
TRANSFERRIN SERPL-MCNC: 115 MG/DL (ref 200–375)
TRIGL SERPL-MCNC: 220 MG/DL (ref 30–150)
TSH SERPL DL<=0.005 MIU/L-ACNC: 1.08 UIU/ML (ref 0.4–4)
URN SPEC COLLECT METH UR: ABNORMAL
UROBILINOGEN UR STRIP-ACNC: NEGATIVE EU/DL
VIT B12 SERPL-MCNC: 1216 PG/ML (ref 210–950)
WBC # BLD AUTO: 9.38 K/UL (ref 3.9–12.7)
WBC #/AREA URNS HPF: >100 /HPF (ref 0–5)
YEAST URNS QL MICRO: ABNORMAL

## 2021-12-02 PROCEDURE — 36415 COLL VENOUS BLD VENIPUNCTURE: CPT | Mod: NTX | Performed by: EMERGENCY MEDICINE

## 2021-12-02 PROCEDURE — 83935 ASSAY OF URINE OSMOLALITY: CPT | Mod: NTX | Performed by: STUDENT IN AN ORGANIZED HEALTH CARE EDUCATION/TRAINING PROGRAM

## 2021-12-02 PROCEDURE — 94761 N-INVAS EAR/PLS OXIMETRY MLT: CPT | Mod: NTX

## 2021-12-02 PROCEDURE — 82607 VITAMIN B-12: CPT | Mod: NTX | Performed by: HOSPITALIST

## 2021-12-02 PROCEDURE — 87040 BLOOD CULTURE FOR BACTERIA: CPT | Mod: 59,NTX | Performed by: EMERGENCY MEDICINE

## 2021-12-02 PROCEDURE — 25000003 PHARM REV CODE 250: Mod: NTX | Performed by: EMERGENCY MEDICINE

## 2021-12-02 PROCEDURE — 82728 ASSAY OF FERRITIN: CPT | Mod: NTX | Performed by: HOSPITALIST

## 2021-12-02 PROCEDURE — 94644 CONT INHLJ TX 1ST HOUR: CPT | Mod: NTX

## 2021-12-02 PROCEDURE — 84466 ASSAY OF TRANSFERRIN: CPT | Mod: NTX | Performed by: HOSPITALIST

## 2021-12-02 PROCEDURE — 82746 ASSAY OF FOLIC ACID SERUM: CPT | Mod: NTX | Performed by: HOSPITALIST

## 2021-12-02 PROCEDURE — C9399 UNCLASSIFIED DRUGS OR BIOLOG: HCPCS | Mod: NTX | Performed by: HOSPITALIST

## 2021-12-02 PROCEDURE — 80061 LIPID PANEL: CPT | Mod: NTX | Performed by: HOSPITALIST

## 2021-12-02 PROCEDURE — 86920 COMPATIBILITY TEST SPIN: CPT | Mod: NTX | Performed by: EMERGENCY MEDICINE

## 2021-12-02 PROCEDURE — 80197 ASSAY OF TACROLIMUS: CPT | Mod: NTX | Performed by: HOSPITALIST

## 2021-12-02 PROCEDURE — 94640 AIRWAY INHALATION TREATMENT: CPT | Mod: NTX

## 2021-12-02 PROCEDURE — 83036 HEMOGLOBIN GLYCOSYLATED A1C: CPT | Mod: NTX | Performed by: INTERNAL MEDICINE

## 2021-12-02 PROCEDURE — U0002 COVID-19 LAB TEST NON-CDC: HCPCS | Mod: NTX | Performed by: EMERGENCY MEDICINE

## 2021-12-02 PROCEDURE — 82570 ASSAY OF URINE CREATININE: CPT | Mod: NTX | Performed by: STUDENT IN AN ORGANIZED HEALTH CARE EDUCATION/TRAINING PROGRAM

## 2021-12-02 PROCEDURE — 63600175 PHARM REV CODE 636 W HCPCS: Mod: NTX | Performed by: HOSPITALIST

## 2021-12-02 PROCEDURE — 25000003 PHARM REV CODE 250: Mod: NTX | Performed by: HOSPITALIST

## 2021-12-02 PROCEDURE — 86900 BLOOD TYPING SEROLOGIC ABO: CPT | Mod: NTX | Performed by: EMERGENCY MEDICINE

## 2021-12-02 PROCEDURE — 84300 ASSAY OF URINE SODIUM: CPT | Mod: NTX | Performed by: STUDENT IN AN ORGANIZED HEALTH CARE EDUCATION/TRAINING PROGRAM

## 2021-12-02 PROCEDURE — A4217 STERILE WATER/SALINE, 500 ML: HCPCS | Mod: NTX | Performed by: INTERNAL MEDICINE

## 2021-12-02 PROCEDURE — 63600175 PHARM REV CODE 636 W HCPCS: Mod: NTX | Performed by: EMERGENCY MEDICINE

## 2021-12-02 PROCEDURE — 87205 SMEAR GRAM STAIN: CPT | Mod: NTX | Performed by: INTERNAL MEDICINE

## 2021-12-02 PROCEDURE — 25000003 PHARM REV CODE 250: Mod: NTX | Performed by: INTERNAL MEDICINE

## 2021-12-02 PROCEDURE — 25000242 PHARM REV CODE 250 ALT 637 W/ HCPCS: Mod: NTX | Performed by: EMERGENCY MEDICINE

## 2021-12-02 PROCEDURE — 82550 ASSAY OF CK (CPK): CPT | Mod: NTX | Performed by: INTERNAL MEDICINE

## 2021-12-02 PROCEDURE — 11000001 HC ACUTE MED/SURG PRIVATE ROOM: Mod: NTX

## 2021-12-02 PROCEDURE — 36415 COLL VENOUS BLD VENIPUNCTURE: CPT | Mod: NTX | Performed by: HOSPITALIST

## 2021-12-02 PROCEDURE — 86140 C-REACTIVE PROTEIN: CPT | Mod: NTX | Performed by: HOSPITALIST

## 2021-12-02 PROCEDURE — 85652 RBC SED RATE AUTOMATED: CPT | Mod: NTX | Performed by: HOSPITALIST

## 2021-12-02 PROCEDURE — 93010 EKG 12-LEAD: ICD-10-PCS | Mod: NTX,,, | Performed by: INTERNAL MEDICINE

## 2021-12-02 PROCEDURE — 83970 ASSAY OF PARATHORMONE: CPT | Mod: NTX | Performed by: INTERNAL MEDICINE

## 2021-12-02 PROCEDURE — 84443 ASSAY THYROID STIM HORMONE: CPT | Mod: NTX | Performed by: HOSPITALIST

## 2021-12-02 PROCEDURE — 84100 ASSAY OF PHOSPHORUS: CPT | Mod: NTX | Performed by: HOSPITALIST

## 2021-12-02 PROCEDURE — 63600175 PHARM REV CODE 636 W HCPCS: Mod: NTX | Performed by: INTERNAL MEDICINE

## 2021-12-02 PROCEDURE — 83605 ASSAY OF LACTIC ACID: CPT | Mod: NTX | Performed by: EMERGENCY MEDICINE

## 2021-12-02 PROCEDURE — 84145 PROCALCITONIN (PCT): CPT | Mod: NTX | Performed by: HOSPITALIST

## 2021-12-02 PROCEDURE — 25500020 PHARM REV CODE 255: Mod: NTX | Performed by: HOSPITALIST

## 2021-12-02 PROCEDURE — 63600175 PHARM REV CODE 636 W HCPCS: Mod: JG,NTX | Performed by: INTERNAL MEDICINE

## 2021-12-02 PROCEDURE — 80048 BASIC METABOLIC PNL TOTAL CA: CPT | Mod: 91,NTX | Performed by: EMERGENCY MEDICINE

## 2021-12-02 PROCEDURE — 93010 ELECTROCARDIOGRAM REPORT: CPT | Mod: NTX,,, | Performed by: INTERNAL MEDICINE

## 2021-12-02 PROCEDURE — 93005 ELECTROCARDIOGRAM TRACING: CPT | Mod: NTX

## 2021-12-02 PROCEDURE — 83735 ASSAY OF MAGNESIUM: CPT | Mod: NTX | Performed by: HOSPITALIST

## 2021-12-02 PROCEDURE — 83930 ASSAY OF BLOOD OSMOLALITY: CPT | Mod: NTX | Performed by: HOSPITALIST

## 2021-12-02 RX ORDER — DEXTROSE 50 % IN WATER (D50W) INTRAVENOUS SYRINGE
25
Status: COMPLETED | OUTPATIENT
Start: 2021-12-02 | End: 2021-12-02

## 2021-12-02 RX ORDER — TACROLIMUS 1 MG/1
2 CAPSULE ORAL EVERY EVENING
Status: DISCONTINUED | OUTPATIENT
Start: 2021-12-02 | End: 2021-12-08 | Stop reason: HOSPADM

## 2021-12-02 RX ORDER — OXYCODONE AND ACETAMINOPHEN 5; 325 MG/1; MG/1
1 TABLET ORAL EVERY 6 HOURS PRN
Status: DISCONTINUED | OUTPATIENT
Start: 2021-12-02 | End: 2021-12-08 | Stop reason: HOSPADM

## 2021-12-02 RX ORDER — TACROLIMUS 1 MG/1
3 CAPSULE ORAL EVERY MORNING
Status: DISCONTINUED | OUTPATIENT
Start: 2021-12-02 | End: 2021-12-08 | Stop reason: HOSPADM

## 2021-12-02 RX ORDER — CALCITRIOL 0.25 UG/1
0.5 CAPSULE ORAL DAILY
Status: DISCONTINUED | OUTPATIENT
Start: 2021-12-02 | End: 2021-12-08 | Stop reason: HOSPADM

## 2021-12-02 RX ORDER — GLUCAGON 1 MG
1 KIT INJECTION
Status: DISCONTINUED | OUTPATIENT
Start: 2021-12-02 | End: 2021-12-08 | Stop reason: HOSPADM

## 2021-12-02 RX ORDER — SODIUM CHLORIDE 0.9 % (FLUSH) 0.9 %
10 SYRINGE (ML) INJECTION
Status: DISCONTINUED | OUTPATIENT
Start: 2021-12-02 | End: 2021-12-08 | Stop reason: HOSPADM

## 2021-12-02 RX ORDER — ONDANSETRON 2 MG/ML
4 INJECTION INTRAMUSCULAR; INTRAVENOUS EVERY 8 HOURS PRN
Status: DISCONTINUED | OUTPATIENT
Start: 2021-12-02 | End: 2021-12-08 | Stop reason: HOSPADM

## 2021-12-02 RX ORDER — TALC
6 POWDER (GRAM) TOPICAL NIGHTLY PRN
Status: DISCONTINUED | OUTPATIENT
Start: 2021-12-02 | End: 2021-12-08 | Stop reason: HOSPADM

## 2021-12-02 RX ORDER — INSULIN ASPART 100 [IU]/ML
0-5 INJECTION, SOLUTION INTRAVENOUS; SUBCUTANEOUS EVERY 6 HOURS PRN
Status: DISCONTINUED | OUTPATIENT
Start: 2021-12-02 | End: 2021-12-08 | Stop reason: HOSPADM

## 2021-12-02 RX ORDER — MYCOPHENOLATE MOFETIL 250 MG/1
500 CAPSULE ORAL 2 TIMES DAILY
Status: DISCONTINUED | OUTPATIENT
Start: 2021-12-02 | End: 2021-12-08 | Stop reason: HOSPADM

## 2021-12-02 RX ORDER — SODIUM BICARBONATE 325 MG/1
650 TABLET ORAL 2 TIMES DAILY
Status: COMPLETED | OUTPATIENT
Start: 2021-12-02 | End: 2021-12-04

## 2021-12-02 RX ORDER — ALBUTEROL SULFATE 2.5 MG/.5ML
10 SOLUTION RESPIRATORY (INHALATION)
Status: COMPLETED | OUTPATIENT
Start: 2021-12-02 | End: 2021-12-02

## 2021-12-02 RX ORDER — SODIUM CHLORIDE 9 MG/ML
INJECTION, SOLUTION INTRAVENOUS CONTINUOUS
Status: DISCONTINUED | OUTPATIENT
Start: 2021-12-02 | End: 2021-12-02

## 2021-12-02 RX ORDER — SEVELAMER CARBONATE 800 MG/1
1600 TABLET, FILM COATED ORAL
Status: DISCONTINUED | OUTPATIENT
Start: 2021-12-02 | End: 2021-12-08 | Stop reason: HOSPADM

## 2021-12-02 RX ORDER — HYDROCODONE BITARTRATE AND ACETAMINOPHEN 500; 5 MG/1; MG/1
TABLET ORAL
Status: DISCONTINUED | OUTPATIENT
Start: 2021-12-02 | End: 2021-12-08 | Stop reason: HOSPADM

## 2021-12-02 RX ORDER — FLUCONAZOLE 150 MG/1
150 TABLET ORAL ONCE
Status: COMPLETED | OUTPATIENT
Start: 2021-12-02 | End: 2021-12-02

## 2021-12-02 RX ORDER — HEPARIN SODIUM 5000 [USP'U]/ML
5000 INJECTION, SOLUTION INTRAVENOUS; SUBCUTANEOUS EVERY 8 HOURS
Status: DISCONTINUED | OUTPATIENT
Start: 2021-12-02 | End: 2021-12-08 | Stop reason: HOSPADM

## 2021-12-02 RX ADMIN — INSULIN HUMAN 10 UNITS: 100 INJECTION, SOLUTION PARENTERAL at 01:12

## 2021-12-02 RX ADMIN — TACROLIMUS 2 MG: 1 CAPSULE ORAL at 05:12

## 2021-12-02 RX ADMIN — DEXTROSE MONOHYDRATE 25 G: 25 INJECTION, SOLUTION INTRAVENOUS at 01:12

## 2021-12-02 RX ADMIN — SODIUM BICARBONATE 650 MG: 325 TABLET ORAL at 08:12

## 2021-12-02 RX ADMIN — ALBUTEROL SULFATE 10 MG: 2.5 SOLUTION RESPIRATORY (INHALATION) at 01:12

## 2021-12-02 RX ADMIN — IOHEXOL 15 ML: 300 INJECTION, SOLUTION INTRAVENOUS at 01:12

## 2021-12-02 RX ADMIN — MYCOPHENOLATE MOFETIL 500 MG: 250 CAPSULE ORAL at 08:12

## 2021-12-02 RX ADMIN — INSULIN ASPART 4 UNITS: 100 INJECTION, SOLUTION INTRAVENOUS; SUBCUTANEOUS at 04:12

## 2021-12-02 RX ADMIN — FLUCONAZOLE 150 MG: 150 TABLET ORAL at 08:12

## 2021-12-02 RX ADMIN — SODIUM BICARBONATE: 84 INJECTION, SOLUTION INTRAVENOUS at 05:12

## 2021-12-02 RX ADMIN — SODIUM BICARBONATE: 84 INJECTION, SOLUTION INTRAVENOUS at 02:12

## 2021-12-02 RX ADMIN — CALCIUM GLUCONATE 1 G: 98 INJECTION, SOLUTION INTRAVENOUS at 02:12

## 2021-12-02 RX ADMIN — HEPARIN SODIUM 5000 UNITS: 5000 INJECTION INTRAVENOUS; SUBCUTANEOUS at 09:12

## 2021-12-02 RX ADMIN — EPOETIN ALFA-EPBX 10000 UNITS: 10000 INJECTION, SOLUTION INTRAVENOUS; SUBCUTANEOUS at 01:12

## 2021-12-02 RX ADMIN — CALCITRIOL CAPSULES 0.25 MCG 0.5 MCG: 0.25 CAPSULE ORAL at 01:12

## 2021-12-02 RX ADMIN — INSULIN ASPART 1 UNITS: 100 INJECTION, SOLUTION INTRAVENOUS; SUBCUTANEOUS at 08:12

## 2021-12-02 RX ADMIN — SEVELAMER CARBONATE 1600 MG: 800 TABLET, FILM COATED ORAL at 05:12

## 2021-12-02 RX ADMIN — INSULIN ASPART 2 UNITS: 100 INJECTION, SOLUTION INTRAVENOUS; SUBCUTANEOUS at 01:12

## 2021-12-02 RX ADMIN — TACROLIMUS 3 MG: 1 CAPSULE ORAL at 08:12

## 2021-12-02 RX ADMIN — CEFTRIAXONE 1 G: 1 INJECTION, SOLUTION INTRAVENOUS at 01:12

## 2021-12-02 RX ADMIN — CEFTRIAXONE 1 G: 1 INJECTION, SOLUTION INTRAVENOUS at 10:12

## 2021-12-02 RX ADMIN — INSULIN DETEMIR 25 UNITS: 100 INJECTION, SOLUTION SUBCUTANEOUS at 08:12

## 2021-12-02 RX ADMIN — SODIUM CHLORIDE 500 ML: 0.9 INJECTION, SOLUTION INTRAVENOUS at 05:12

## 2021-12-02 RX ADMIN — SEVELAMER CARBONATE 1600 MG: 800 TABLET, FILM COATED ORAL at 12:12

## 2021-12-02 RX ADMIN — SODIUM ZIRCONIUM CYCLOSILICATE 10 G: 10 POWDER, FOR SUSPENSION ORAL at 02:12

## 2021-12-02 RX ADMIN — SODIUM CHLORIDE 500 ML: 0.9 INJECTION, SOLUTION INTRAVENOUS at 12:12

## 2021-12-02 RX ADMIN — SODIUM CHLORIDE: 0.9 INJECTION, SOLUTION INTRAVENOUS at 08:12

## 2021-12-02 RX ADMIN — INSULIN ASPART 3 UNITS: 100 INJECTION, SOLUTION INTRAVENOUS; SUBCUTANEOUS at 09:12

## 2021-12-02 RX ADMIN — SODIUM BICARBONATE 650 MG: 325 TABLET ORAL at 12:12

## 2021-12-03 PROBLEM — N10 ACUTE PYELONEPHRITIS: Status: ACTIVE | Noted: 2021-12-02

## 2021-12-03 PROBLEM — E87.5 HYPERKALEMIA: Status: ACTIVE | Noted: 2021-12-02

## 2021-12-03 LAB
25(OH)D3+25(OH)D2 SERPL-MCNC: 50 NG/ML (ref 30–96)
ANION GAP SERPL CALC-SCNC: 14 MMOL/L (ref 8–16)
ANION GAP SERPL CALC-SCNC: 17 MMOL/L (ref 8–16)
BACTERIA UR CULT: ABNORMAL
BUN SERPL-MCNC: 114 MG/DL (ref 6–20)
BUN SERPL-MCNC: 120 MG/DL (ref 6–20)
CALCIUM SERPL-MCNC: 9.1 MG/DL (ref 8.7–10.5)
CALCIUM SERPL-MCNC: 9.6 MG/DL (ref 8.7–10.5)
CHLORIDE SERPL-SCNC: 97 MMOL/L (ref 95–110)
CHLORIDE SERPL-SCNC: 99 MMOL/L (ref 95–110)
CO2 SERPL-SCNC: 14 MMOL/L (ref 23–29)
CO2 SERPL-SCNC: 17 MMOL/L (ref 23–29)
CREAT SERPL-MCNC: 8.6 MG/DL (ref 0.5–1.4)
CREAT SERPL-MCNC: 8.7 MG/DL (ref 0.5–1.4)
EST. GFR  (AFRICAN AMERICAN): 5 ML/MIN/1.73 M^2
EST. GFR  (AFRICAN AMERICAN): 5 ML/MIN/1.73 M^2
EST. GFR  (NON AFRICAN AMERICAN): 5 ML/MIN/1.73 M^2
EST. GFR  (NON AFRICAN AMERICAN): 5 ML/MIN/1.73 M^2
GLUCOSE SERPL-MCNC: 218 MG/DL (ref 70–110)
GLUCOSE SERPL-MCNC: 245 MG/DL (ref 70–110)
POCT GLUCOSE: 173 MG/DL (ref 70–110)
POCT GLUCOSE: 215 MG/DL (ref 70–110)
POCT GLUCOSE: 218 MG/DL (ref 70–110)
POCT GLUCOSE: 275 MG/DL (ref 70–110)
POTASSIUM SERPL-SCNC: 4.5 MMOL/L (ref 3.5–5.1)
POTASSIUM SERPL-SCNC: 4.7 MMOL/L (ref 3.5–5.1)
SODIUM SERPL-SCNC: 128 MMOL/L (ref 136–145)
SODIUM SERPL-SCNC: 130 MMOL/L (ref 136–145)
TACROLIMUS BLD-MCNC: 4.8 NG/ML (ref 5–15)

## 2021-12-03 PROCEDURE — 25000003 PHARM REV CODE 250: Mod: NTX | Performed by: HOSPITALIST

## 2021-12-03 PROCEDURE — 82306 VITAMIN D 25 HYDROXY: CPT | Mod: NTX | Performed by: INTERNAL MEDICINE

## 2021-12-03 PROCEDURE — 25000003 PHARM REV CODE 250: Mod: NTX | Performed by: INTERNAL MEDICINE

## 2021-12-03 PROCEDURE — 11000001 HC ACUTE MED/SURG PRIVATE ROOM: Mod: NTX

## 2021-12-03 PROCEDURE — 80048 BASIC METABOLIC PNL TOTAL CA: CPT | Mod: NTX | Performed by: EMERGENCY MEDICINE

## 2021-12-03 PROCEDURE — 63600175 PHARM REV CODE 636 W HCPCS: Mod: NTX | Performed by: HOSPITALIST

## 2021-12-03 RX ORDER — CARVEDILOL 3.12 MG/1
3.12 TABLET ORAL 2 TIMES DAILY
Status: DISCONTINUED | OUTPATIENT
Start: 2021-12-03 | End: 2021-12-08 | Stop reason: HOSPADM

## 2021-12-03 RX ORDER — ACETAMINOPHEN 325 MG/1
650 TABLET ORAL EVERY 6 HOURS PRN
Status: DISCONTINUED | OUTPATIENT
Start: 2021-12-03 | End: 2021-12-08 | Stop reason: HOSPADM

## 2021-12-03 RX ADMIN — MYCOPHENOLATE MOFETIL 500 MG: 250 CAPSULE ORAL at 09:12

## 2021-12-03 RX ADMIN — TACROLIMUS 2 MG: 1 CAPSULE ORAL at 05:12

## 2021-12-03 RX ADMIN — TACROLIMUS 3 MG: 1 CAPSULE ORAL at 09:12

## 2021-12-03 RX ADMIN — SODIUM BICARBONATE 650 MG: 325 TABLET ORAL at 08:12

## 2021-12-03 RX ADMIN — HEPARIN SODIUM 5000 UNITS: 5000 INJECTION INTRAVENOUS; SUBCUTANEOUS at 06:12

## 2021-12-03 RX ADMIN — CARVEDILOL 3.12 MG: 3.12 TABLET, FILM COATED ORAL at 08:12

## 2021-12-03 RX ADMIN — CEFTRIAXONE 1 G: 1 INJECTION, SOLUTION INTRAVENOUS at 08:12

## 2021-12-03 RX ADMIN — SEVELAMER CARBONATE 1600 MG: 800 TABLET, FILM COATED ORAL at 05:12

## 2021-12-03 RX ADMIN — INSULIN ASPART 2 UNITS: 100 INJECTION, SOLUTION INTRAVENOUS; SUBCUTANEOUS at 07:12

## 2021-12-03 RX ADMIN — CALCITRIOL CAPSULES 0.25 MCG 0.5 MCG: 0.25 CAPSULE ORAL at 09:12

## 2021-12-03 RX ADMIN — SODIUM BICARBONATE 650 MG: 325 TABLET ORAL at 09:12

## 2021-12-03 RX ADMIN — SEVELAMER CARBONATE 1600 MG: 800 TABLET, FILM COATED ORAL at 09:12

## 2021-12-03 RX ADMIN — SODIUM BICARBONATE: 84 INJECTION, SOLUTION INTRAVENOUS at 08:12

## 2021-12-03 RX ADMIN — SEVELAMER CARBONATE 1600 MG: 800 TABLET, FILM COATED ORAL at 12:12

## 2021-12-03 RX ADMIN — HEPARIN SODIUM 5000 UNITS: 5000 INJECTION INTRAVENOUS; SUBCUTANEOUS at 08:12

## 2021-12-03 RX ADMIN — MYCOPHENOLATE MOFETIL 500 MG: 250 CAPSULE ORAL at 08:12

## 2021-12-03 RX ADMIN — INSULIN ASPART 3 UNITS: 100 INJECTION, SOLUTION INTRAVENOUS; SUBCUTANEOUS at 12:12

## 2021-12-03 RX ADMIN — HEPARIN SODIUM 5000 UNITS: 5000 INJECTION INTRAVENOUS; SUBCUTANEOUS at 03:12

## 2021-12-04 LAB
ANION GAP SERPL CALC-SCNC: 15 MMOL/L (ref 8–16)
BUN SERPL-MCNC: 111 MG/DL (ref 6–20)
CALCIUM SERPL-MCNC: 8.1 MG/DL (ref 8.7–10.5)
CHLORIDE SERPL-SCNC: 93 MMOL/L (ref 95–110)
CO2 SERPL-SCNC: 24 MMOL/L (ref 23–29)
CREAT SERPL-MCNC: 7.3 MG/DL (ref 0.5–1.4)
EST. GFR  (AFRICAN AMERICAN): 6 ML/MIN/1.73 M^2
EST. GFR  (NON AFRICAN AMERICAN): 6 ML/MIN/1.73 M^2
GLUCOSE SERPL-MCNC: 201 MG/DL (ref 70–110)
POCT GLUCOSE: 198 MG/DL (ref 70–110)
POCT GLUCOSE: 233 MG/DL (ref 70–110)
POCT GLUCOSE: 239 MG/DL (ref 70–110)
POCT GLUCOSE: 263 MG/DL (ref 70–110)
POTASSIUM SERPL-SCNC: 4.6 MMOL/L (ref 3.5–5.1)
SODIUM SERPL-SCNC: 132 MMOL/L (ref 136–145)

## 2021-12-04 PROCEDURE — 63600175 PHARM REV CODE 636 W HCPCS: Mod: JG,NTX | Performed by: INTERNAL MEDICINE

## 2021-12-04 PROCEDURE — 63600175 PHARM REV CODE 636 W HCPCS: Mod: NTX | Performed by: HOSPITALIST

## 2021-12-04 PROCEDURE — 36415 COLL VENOUS BLD VENIPUNCTURE: CPT | Mod: NTX | Performed by: HOSPITALIST

## 2021-12-04 PROCEDURE — 25000003 PHARM REV CODE 250: Mod: NTX | Performed by: EMERGENCY MEDICINE

## 2021-12-04 PROCEDURE — 25000003 PHARM REV CODE 250: Mod: NTX | Performed by: HOSPITALIST

## 2021-12-04 PROCEDURE — 25000003 PHARM REV CODE 250: Mod: NTX | Performed by: INTERNAL MEDICINE

## 2021-12-04 PROCEDURE — 11000001 HC ACUTE MED/SURG PRIVATE ROOM: Mod: NTX

## 2021-12-04 PROCEDURE — 80048 BASIC METABOLIC PNL TOTAL CA: CPT | Mod: NTX | Performed by: HOSPITALIST

## 2021-12-04 RX ORDER — SODIUM CHLORIDE 9 MG/ML
INJECTION, SOLUTION INTRAVENOUS CONTINUOUS
Status: DISCONTINUED | OUTPATIENT
Start: 2021-12-04 | End: 2021-12-08 | Stop reason: HOSPADM

## 2021-12-04 RX ADMIN — CALCITRIOL CAPSULES 0.25 MCG 0.5 MCG: 0.25 CAPSULE ORAL at 09:12

## 2021-12-04 RX ADMIN — Medication 6 MG: at 09:12

## 2021-12-04 RX ADMIN — SEVELAMER CARBONATE 1600 MG: 800 TABLET, FILM COATED ORAL at 09:12

## 2021-12-04 RX ADMIN — SEVELAMER CARBONATE 1600 MG: 800 TABLET, FILM COATED ORAL at 06:12

## 2021-12-04 RX ADMIN — MYCOPHENOLATE MOFETIL 500 MG: 250 CAPSULE ORAL at 09:12

## 2021-12-04 RX ADMIN — SEVELAMER CARBONATE 1600 MG: 800 TABLET, FILM COATED ORAL at 02:12

## 2021-12-04 RX ADMIN — SODIUM BICARBONATE 650 MG: 325 TABLET ORAL at 09:12

## 2021-12-04 RX ADMIN — Medication 6 MG: at 01:12

## 2021-12-04 RX ADMIN — HEPARIN SODIUM 5000 UNITS: 5000 INJECTION INTRAVENOUS; SUBCUTANEOUS at 09:12

## 2021-12-04 RX ADMIN — HEPARIN SODIUM 5000 UNITS: 5000 INJECTION INTRAVENOUS; SUBCUTANEOUS at 02:12

## 2021-12-04 RX ADMIN — TACROLIMUS 3 MG: 1 CAPSULE ORAL at 09:12

## 2021-12-04 RX ADMIN — CEFTRIAXONE 1 G: 1 INJECTION, SOLUTION INTRAVENOUS at 09:12

## 2021-12-04 RX ADMIN — TACROLIMUS 2 MG: 1 CAPSULE ORAL at 06:12

## 2021-12-04 RX ADMIN — EPOETIN ALFA-EPBX 10000 UNITS: 10000 INJECTION, SOLUTION INTRAVENOUS; SUBCUTANEOUS at 09:12

## 2021-12-04 RX ADMIN — HEPARIN SODIUM 5000 UNITS: 5000 INJECTION INTRAVENOUS; SUBCUTANEOUS at 04:12

## 2021-12-04 RX ADMIN — CARVEDILOL 3.12 MG: 3.12 TABLET, FILM COATED ORAL at 09:12

## 2021-12-04 RX ADMIN — SODIUM CHLORIDE: 0.9 INJECTION, SOLUTION INTRAVENOUS at 09:12

## 2021-12-05 LAB
ANION GAP SERPL CALC-SCNC: 14 MMOL/L (ref 8–16)
BUN SERPL-MCNC: 100 MG/DL (ref 6–20)
CALCIUM SERPL-MCNC: 8.4 MG/DL (ref 8.7–10.5)
CHLORIDE SERPL-SCNC: 93 MMOL/L (ref 95–110)
CO2 SERPL-SCNC: 25 MMOL/L (ref 23–29)
CREAT SERPL-MCNC: 6.9 MG/DL (ref 0.5–1.4)
EST. GFR  (AFRICAN AMERICAN): 7 ML/MIN/1.73 M^2
EST. GFR  (NON AFRICAN AMERICAN): 6 ML/MIN/1.73 M^2
GLUCOSE SERPL-MCNC: 198 MG/DL (ref 70–110)
POCT GLUCOSE: 169 MG/DL (ref 70–110)
POCT GLUCOSE: 185 MG/DL (ref 70–110)
POCT GLUCOSE: 210 MG/DL (ref 70–110)
POCT GLUCOSE: 223 MG/DL (ref 70–110)
POCT GLUCOSE: 277 MG/DL (ref 70–110)
POTASSIUM SERPL-SCNC: 4.4 MMOL/L (ref 3.5–5.1)
SODIUM SERPL-SCNC: 132 MMOL/L (ref 136–145)

## 2021-12-05 PROCEDURE — 25000003 PHARM REV CODE 250: Mod: NTX | Performed by: INTERNAL MEDICINE

## 2021-12-05 PROCEDURE — 11000001 HC ACUTE MED/SURG PRIVATE ROOM: Mod: NTX

## 2021-12-05 PROCEDURE — 25000003 PHARM REV CODE 250: Mod: NTX | Performed by: HOSPITALIST

## 2021-12-05 PROCEDURE — 80048 BASIC METABOLIC PNL TOTAL CA: CPT | Mod: NTX | Performed by: HOSPITALIST

## 2021-12-05 PROCEDURE — 63600175 PHARM REV CODE 636 W HCPCS: Mod: NTX | Performed by: HOSPITALIST

## 2021-12-05 PROCEDURE — 36415 COLL VENOUS BLD VENIPUNCTURE: CPT | Mod: NTX | Performed by: HOSPITALIST

## 2021-12-05 RX ADMIN — MYCOPHENOLATE MOFETIL 500 MG: 250 CAPSULE ORAL at 08:12

## 2021-12-05 RX ADMIN — SEVELAMER CARBONATE 1600 MG: 800 TABLET, FILM COATED ORAL at 08:12

## 2021-12-05 RX ADMIN — SODIUM CHLORIDE: 0.9 INJECTION, SOLUTION INTRAVENOUS at 11:12

## 2021-12-05 RX ADMIN — SEVELAMER CARBONATE 1600 MG: 800 TABLET, FILM COATED ORAL at 12:12

## 2021-12-05 RX ADMIN — CARVEDILOL 3.12 MG: 3.12 TABLET, FILM COATED ORAL at 08:12

## 2021-12-05 RX ADMIN — HEPARIN SODIUM 5000 UNITS: 5000 INJECTION INTRAVENOUS; SUBCUTANEOUS at 06:12

## 2021-12-05 RX ADMIN — ACETAMINOPHEN 650 MG: 325 TABLET ORAL at 03:12

## 2021-12-05 RX ADMIN — CALCITRIOL CAPSULES 0.25 MCG 0.5 MCG: 0.25 CAPSULE ORAL at 08:12

## 2021-12-05 RX ADMIN — SEVELAMER CARBONATE 1600 MG: 800 TABLET, FILM COATED ORAL at 05:12

## 2021-12-05 RX ADMIN — HEPARIN SODIUM 5000 UNITS: 5000 INJECTION INTRAVENOUS; SUBCUTANEOUS at 09:12

## 2021-12-05 RX ADMIN — HEPARIN SODIUM 5000 UNITS: 5000 INJECTION INTRAVENOUS; SUBCUTANEOUS at 03:12

## 2021-12-05 RX ADMIN — INSULIN ASPART 2 UNITS: 100 INJECTION, SOLUTION INTRAVENOUS; SUBCUTANEOUS at 09:12

## 2021-12-05 RX ADMIN — CEFTRIAXONE 1 G: 1 INJECTION, SOLUTION INTRAVENOUS at 09:12

## 2021-12-05 RX ADMIN — TACROLIMUS 3 MG: 1 CAPSULE ORAL at 08:12

## 2021-12-05 RX ADMIN — MYCOPHENOLATE MOFETIL 500 MG: 250 CAPSULE ORAL at 09:12

## 2021-12-05 RX ADMIN — TACROLIMUS 2 MG: 1 CAPSULE ORAL at 05:12

## 2021-12-05 RX ADMIN — CARVEDILOL 3.12 MG: 3.12 TABLET, FILM COATED ORAL at 09:12

## 2021-12-06 LAB
ANION GAP SERPL CALC-SCNC: 14 MMOL/L (ref 8–16)
BACTERIA BLD CULT: NORMAL
BACTERIA BLD CULT: NORMAL
BLD PROD TYP BPU: NORMAL
BLD PROD TYP BPU: NORMAL
BLOOD UNIT EXPIRATION DATE: NORMAL
BLOOD UNIT EXPIRATION DATE: NORMAL
BLOOD UNIT TYPE CODE: 6200
BLOOD UNIT TYPE CODE: 6200
BLOOD UNIT TYPE: NORMAL
BLOOD UNIT TYPE: NORMAL
BUN SERPL-MCNC: 88 MG/DL (ref 6–20)
CALCIUM SERPL-MCNC: 8.4 MG/DL (ref 8.7–10.5)
CHLORIDE SERPL-SCNC: 99 MMOL/L (ref 95–110)
CO2 SERPL-SCNC: 23 MMOL/L (ref 23–29)
CODING SYSTEM: NORMAL
CODING SYSTEM: NORMAL
CREAT SERPL-MCNC: 6.1 MG/DL (ref 0.5–1.4)
DISPENSE STATUS: NORMAL
DISPENSE STATUS: NORMAL
EST. GFR  (AFRICAN AMERICAN): 8 ML/MIN/1.73 M^2
EST. GFR  (NON AFRICAN AMERICAN): 7 ML/MIN/1.73 M^2
GLUCOSE SERPL-MCNC: 139 MG/DL (ref 70–110)
POCT GLUCOSE: 141 MG/DL (ref 70–110)
POCT GLUCOSE: 219 MG/DL (ref 70–110)
POCT GLUCOSE: 248 MG/DL (ref 70–110)
POCT GLUCOSE: 302 MG/DL (ref 70–110)
POTASSIUM SERPL-SCNC: 4.8 MMOL/L (ref 3.5–5.1)
SODIUM SERPL-SCNC: 136 MMOL/L (ref 136–145)
TRANS ERYTHROCYTES VOL PATIENT: NORMAL ML
TRANS ERYTHROCYTES VOL PATIENT: NORMAL ML

## 2021-12-06 PROCEDURE — 25000003 PHARM REV CODE 250: Mod: NTX | Performed by: INTERNAL MEDICINE

## 2021-12-06 PROCEDURE — 63600175 PHARM REV CODE 636 W HCPCS: Mod: NTX | Performed by: HOSPITALIST

## 2021-12-06 PROCEDURE — 99223 PR INITIAL HOSPITAL CARE,LEVL III: ICD-10-PCS | Mod: NTX,,, | Performed by: INTERNAL MEDICINE

## 2021-12-06 PROCEDURE — 36415 COLL VENOUS BLD VENIPUNCTURE: CPT | Mod: NTX | Performed by: HOSPITALIST

## 2021-12-06 PROCEDURE — 11000001 HC ACUTE MED/SURG PRIVATE ROOM: Mod: NTX

## 2021-12-06 PROCEDURE — 99223 1ST HOSP IP/OBS HIGH 75: CPT | Mod: NTX,,, | Performed by: INTERNAL MEDICINE

## 2021-12-06 PROCEDURE — 80048 BASIC METABOLIC PNL TOTAL CA: CPT | Mod: NTX | Performed by: HOSPITALIST

## 2021-12-06 PROCEDURE — 25000003 PHARM REV CODE 250: Mod: NTX | Performed by: HOSPITALIST

## 2021-12-06 RX ORDER — AMLODIPINE BESYLATE 5 MG/1
10 TABLET ORAL DAILY
Status: DISCONTINUED | OUTPATIENT
Start: 2021-12-06 | End: 2021-12-08 | Stop reason: HOSPADM

## 2021-12-06 RX ADMIN — INSULIN ASPART 5 UNITS: 100 INJECTION, SOLUTION INTRAVENOUS; SUBCUTANEOUS at 06:12

## 2021-12-06 RX ADMIN — CARVEDILOL 3.12 MG: 3.12 TABLET, FILM COATED ORAL at 09:12

## 2021-12-06 RX ADMIN — SODIUM CHLORIDE: 0.9 INJECTION, SOLUTION INTRAVENOUS at 03:12

## 2021-12-06 RX ADMIN — TACROLIMUS 2 MG: 1 CAPSULE ORAL at 06:12

## 2021-12-06 RX ADMIN — HEPARIN SODIUM 5000 UNITS: 5000 INJECTION INTRAVENOUS; SUBCUTANEOUS at 01:12

## 2021-12-06 RX ADMIN — MYCOPHENOLATE MOFETIL 500 MG: 250 CAPSULE ORAL at 09:12

## 2021-12-06 RX ADMIN — SEVELAMER CARBONATE 1600 MG: 800 TABLET, FILM COATED ORAL at 12:12

## 2021-12-06 RX ADMIN — SEVELAMER CARBONATE 1600 MG: 800 TABLET, FILM COATED ORAL at 04:12

## 2021-12-06 RX ADMIN — SEVELAMER CARBONATE 1600 MG: 800 TABLET, FILM COATED ORAL at 09:12

## 2021-12-06 RX ADMIN — SODIUM CHLORIDE: 0.9 INJECTION, SOLUTION INTRAVENOUS at 07:12

## 2021-12-06 RX ADMIN — HEPARIN SODIUM 5000 UNITS: 5000 INJECTION INTRAVENOUS; SUBCUTANEOUS at 06:12

## 2021-12-06 RX ADMIN — TACROLIMUS 3 MG: 1 CAPSULE ORAL at 09:12

## 2021-12-06 RX ADMIN — INSULIN ASPART 1 UNITS: 100 INJECTION, SOLUTION INTRAVENOUS; SUBCUTANEOUS at 09:12

## 2021-12-06 RX ADMIN — CALCITRIOL CAPSULES 0.25 MCG 0.5 MCG: 0.25 CAPSULE ORAL at 09:12

## 2021-12-06 RX ADMIN — AMLODIPINE BESYLATE 10 MG: 5 TABLET ORAL at 12:12

## 2021-12-07 LAB
ANION GAP SERPL CALC-SCNC: 9 MMOL/L (ref 8–16)
BUN SERPL-MCNC: 75 MG/DL (ref 6–20)
CALCIUM SERPL-MCNC: 8.7 MG/DL (ref 8.7–10.5)
CHLORIDE SERPL-SCNC: 103 MMOL/L (ref 95–110)
CO2 SERPL-SCNC: 22 MMOL/L (ref 23–29)
CREAT SERPL-MCNC: 5.5 MG/DL (ref 0.5–1.4)
EST. GFR  (AFRICAN AMERICAN): 9 ML/MIN/1.73 M^2
EST. GFR  (NON AFRICAN AMERICAN): 8 ML/MIN/1.73 M^2
GLUCOSE SERPL-MCNC: 175 MG/DL (ref 70–110)
POCT GLUCOSE: 200 MG/DL (ref 70–110)
POCT GLUCOSE: 222 MG/DL (ref 70–110)
POCT GLUCOSE: 277 MG/DL (ref 70–110)
POCT GLUCOSE: 299 MG/DL (ref 70–110)
POTASSIUM SERPL-SCNC: 4.7 MMOL/L (ref 3.5–5.1)
SODIUM SERPL-SCNC: 134 MMOL/L (ref 136–145)

## 2021-12-07 PROCEDURE — 80048 BASIC METABOLIC PNL TOTAL CA: CPT | Mod: NTX | Performed by: HOSPITALIST

## 2021-12-07 PROCEDURE — 36415 COLL VENOUS BLD VENIPUNCTURE: CPT | Mod: NTX | Performed by: HOSPITALIST

## 2021-12-07 PROCEDURE — 25000003 PHARM REV CODE 250: Mod: NTX | Performed by: HOSPITALIST

## 2021-12-07 PROCEDURE — 63600175 PHARM REV CODE 636 W HCPCS: Mod: JG,NTX | Performed by: INTERNAL MEDICINE

## 2021-12-07 PROCEDURE — 63600175 PHARM REV CODE 636 W HCPCS: Mod: NTX | Performed by: HOSPITALIST

## 2021-12-07 PROCEDURE — 11000001 HC ACUTE MED/SURG PRIVATE ROOM: Mod: NTX

## 2021-12-07 PROCEDURE — 25000003 PHARM REV CODE 250: Mod: NTX | Performed by: INTERNAL MEDICINE

## 2021-12-07 RX ADMIN — HEPARIN SODIUM 5000 UNITS: 5000 INJECTION INTRAVENOUS; SUBCUTANEOUS at 12:12

## 2021-12-07 RX ADMIN — CALCITRIOL CAPSULES 0.25 MCG 0.5 MCG: 0.25 CAPSULE ORAL at 08:12

## 2021-12-07 RX ADMIN — CEFTRIAXONE 1 G: 1 INJECTION, SOLUTION INTRAVENOUS at 09:12

## 2021-12-07 RX ADMIN — SEVELAMER CARBONATE 1600 MG: 800 TABLET, FILM COATED ORAL at 12:12

## 2021-12-07 RX ADMIN — AMLODIPINE BESYLATE 10 MG: 5 TABLET ORAL at 08:12

## 2021-12-07 RX ADMIN — SEVELAMER CARBONATE 1600 MG: 800 TABLET, FILM COATED ORAL at 08:12

## 2021-12-07 RX ADMIN — CARVEDILOL 3.12 MG: 3.12 TABLET, FILM COATED ORAL at 08:12

## 2021-12-07 RX ADMIN — INSULIN ASPART 1 UNITS: 100 INJECTION, SOLUTION INTRAVENOUS; SUBCUTANEOUS at 08:12

## 2021-12-07 RX ADMIN — SEVELAMER CARBONATE 1600 MG: 800 TABLET, FILM COATED ORAL at 04:12

## 2021-12-07 RX ADMIN — MYCOPHENOLATE MOFETIL 500 MG: 250 CAPSULE ORAL at 08:12

## 2021-12-07 RX ADMIN — HEPARIN SODIUM 5000 UNITS: 5000 INJECTION INTRAVENOUS; SUBCUTANEOUS at 06:12

## 2021-12-07 RX ADMIN — MYCOPHENOLATE MOFETIL 500 MG: 250 CAPSULE ORAL at 10:12

## 2021-12-07 RX ADMIN — HEPARIN SODIUM 5000 UNITS: 5000 INJECTION INTRAVENOUS; SUBCUTANEOUS at 04:12

## 2021-12-07 RX ADMIN — CEFTRIAXONE 1 G: 1 INJECTION, SOLUTION INTRAVENOUS at 12:12

## 2021-12-07 RX ADMIN — EPOETIN ALFA-EPBX 10000 UNITS: 10000 INJECTION, SOLUTION INTRAVENOUS; SUBCUTANEOUS at 08:12

## 2021-12-07 RX ADMIN — INSULIN ASPART 3 UNITS: 100 INJECTION, SOLUTION INTRAVENOUS; SUBCUTANEOUS at 05:12

## 2021-12-07 RX ADMIN — TACROLIMUS 2 MG: 1 CAPSULE ORAL at 10:12

## 2021-12-07 RX ADMIN — TACROLIMUS 3 MG: 1 CAPSULE ORAL at 08:12

## 2021-12-08 VITALS
HEIGHT: 67 IN | OXYGEN SATURATION: 99 % | DIASTOLIC BLOOD PRESSURE: 72 MMHG | SYSTOLIC BLOOD PRESSURE: 144 MMHG | TEMPERATURE: 98 F | BODY MASS INDEX: 28.96 KG/M2 | WEIGHT: 184.5 LBS | RESPIRATION RATE: 20 BRPM | HEART RATE: 95 BPM

## 2021-12-08 LAB
ANION GAP SERPL CALC-SCNC: 10 MMOL/L (ref 8–16)
BUN SERPL-MCNC: 63 MG/DL (ref 6–20)
CALCIUM SERPL-MCNC: 8.6 MG/DL (ref 8.7–10.5)
CHLORIDE SERPL-SCNC: 104 MMOL/L (ref 95–110)
CO2 SERPL-SCNC: 21 MMOL/L (ref 23–29)
CREAT SERPL-MCNC: 5.3 MG/DL (ref 0.5–1.4)
EST. GFR  (AFRICAN AMERICAN): 10 ML/MIN/1.73 M^2
EST. GFR  (NON AFRICAN AMERICAN): 8 ML/MIN/1.73 M^2
GLUCOSE SERPL-MCNC: 230 MG/DL (ref 70–110)
POCT GLUCOSE: 191 MG/DL (ref 70–110)
POCT GLUCOSE: 283 MG/DL (ref 70–110)
POTASSIUM SERPL-SCNC: 4.8 MMOL/L (ref 3.5–5.1)
SODIUM SERPL-SCNC: 135 MMOL/L (ref 136–145)

## 2021-12-08 PROCEDURE — 36415 COLL VENOUS BLD VENIPUNCTURE: CPT | Mod: NTX | Performed by: HOSPITALIST

## 2021-12-08 PROCEDURE — 63600175 PHARM REV CODE 636 W HCPCS: Mod: NTX | Performed by: HOSPITALIST

## 2021-12-08 PROCEDURE — 25000003 PHARM REV CODE 250: Mod: NTX | Performed by: INTERNAL MEDICINE

## 2021-12-08 PROCEDURE — 25000003 PHARM REV CODE 250: Mod: NTX | Performed by: HOSPITALIST

## 2021-12-08 PROCEDURE — 80048 BASIC METABOLIC PNL TOTAL CA: CPT | Mod: NTX | Performed by: HOSPITALIST

## 2021-12-08 RX ADMIN — SEVELAMER CARBONATE 1600 MG: 800 TABLET, FILM COATED ORAL at 11:12

## 2021-12-08 RX ADMIN — SEVELAMER CARBONATE 1600 MG: 800 TABLET, FILM COATED ORAL at 08:12

## 2021-12-08 RX ADMIN — CARVEDILOL 3.12 MG: 3.12 TABLET, FILM COATED ORAL at 08:12

## 2021-12-08 RX ADMIN — HEPARIN SODIUM 5000 UNITS: 5000 INJECTION INTRAVENOUS; SUBCUTANEOUS at 01:12

## 2021-12-08 RX ADMIN — TACROLIMUS 3 MG: 1 CAPSULE ORAL at 08:12

## 2021-12-08 RX ADMIN — INSULIN ASPART 3 UNITS: 100 INJECTION, SOLUTION INTRAVENOUS; SUBCUTANEOUS at 11:12

## 2021-12-08 RX ADMIN — CALCITRIOL CAPSULES 0.25 MCG 0.5 MCG: 0.25 CAPSULE ORAL at 08:12

## 2021-12-08 RX ADMIN — MYCOPHENOLATE MOFETIL 500 MG: 250 CAPSULE ORAL at 08:12

## 2021-12-08 RX ADMIN — AMLODIPINE BESYLATE 10 MG: 5 TABLET ORAL at 08:12

## 2021-12-15 ENCOUNTER — OFFICE VISIT (OUTPATIENT)
Dept: TRANSPLANT | Facility: CLINIC | Age: 56
End: 2021-12-15
Payer: MEDICAID

## 2021-12-15 VITALS
TEMPERATURE: 97 F | DIASTOLIC BLOOD PRESSURE: 64 MMHG | BODY MASS INDEX: 28.89 KG/M2 | OXYGEN SATURATION: 100 % | SYSTOLIC BLOOD PRESSURE: 133 MMHG | WEIGHT: 184.06 LBS | HEIGHT: 67 IN | HEART RATE: 94 BPM | RESPIRATION RATE: 16 BRPM

## 2021-12-15 DIAGNOSIS — E66.9 OBESITY (BMI 30.0-34.9): ICD-10-CM

## 2021-12-15 DIAGNOSIS — D47.2 MGUS (MONOCLONAL GAMMOPATHY OF UNKNOWN SIGNIFICANCE): Chronic | ICD-10-CM

## 2021-12-15 DIAGNOSIS — N18.4 ACUTE RENAL FAILURE SUPERIMPOSED ON STAGE 4 CHRONIC KIDNEY DISEASE, UNSPECIFIED ACUTE RENAL FAILURE TYPE: ICD-10-CM

## 2021-12-15 DIAGNOSIS — Z94.0 KIDNEY TRANSPLANT STATUS, LIVING UNRELATED DONOR: Primary | Chronic | ICD-10-CM

## 2021-12-15 DIAGNOSIS — Z94.0 KIDNEY REPLACED BY TRANSPLANT: ICD-10-CM

## 2021-12-15 DIAGNOSIS — Z29.89 NEED FOR PROPHYLACTIC IMMUNOTHERAPY: Chronic | ICD-10-CM

## 2021-12-15 DIAGNOSIS — D63.8 ANEMIA OF CHRONIC DISEASE: Chronic | ICD-10-CM

## 2021-12-15 DIAGNOSIS — N17.9 ACUTE RENAL FAILURE SUPERIMPOSED ON STAGE 4 CHRONIC KIDNEY DISEASE, UNSPECIFIED ACUTE RENAL FAILURE TYPE: ICD-10-CM

## 2021-12-15 PROCEDURE — 99999 PR PBB SHADOW E&M-EST. PATIENT-LVL IV: CPT | Mod: PBBFAC,TXP,, | Performed by: INTERNAL MEDICINE

## 2021-12-15 PROCEDURE — 3066F PR DOCUMENTATION OF TREATMENT FOR NEPHROPATHY: ICD-10-PCS | Mod: CPTII,NTX,, | Performed by: INTERNAL MEDICINE

## 2021-12-15 PROCEDURE — 99215 OFFICE O/P EST HI 40 MIN: CPT | Mod: S$PBB,NTX,, | Performed by: INTERNAL MEDICINE

## 2021-12-15 PROCEDURE — 3066F NEPHROPATHY DOC TX: CPT | Mod: CPTII,NTX,, | Performed by: INTERNAL MEDICINE

## 2021-12-15 PROCEDURE — 99214 OFFICE O/P EST MOD 30 MIN: CPT | Mod: PBBFAC,TXP | Performed by: INTERNAL MEDICINE

## 2021-12-15 PROCEDURE — 99999 PR PBB SHADOW E&M-EST. PATIENT-LVL IV: ICD-10-PCS | Mod: PBBFAC,TXP,, | Performed by: INTERNAL MEDICINE

## 2021-12-15 PROCEDURE — 99215 PR OFFICE/OUTPT VISIT, EST, LEVL V, 40-54 MIN: ICD-10-PCS | Mod: S$PBB,NTX,, | Performed by: INTERNAL MEDICINE

## 2021-12-15 RX ORDER — TACROLIMUS 1 MG/1
CAPSULE ORAL
Qty: 150 CAPSULE | Refills: 11 | Status: SHIPPED | OUTPATIENT
Start: 2021-12-15 | End: 2022-04-26 | Stop reason: DRUGHIGH

## 2021-12-15 RX ORDER — MYCOPHENOLATE MOFETIL 250 MG/1
CAPSULE ORAL
Qty: 120 CAPSULE | Refills: 11 | Status: SHIPPED | OUTPATIENT
Start: 2021-12-15 | End: 2022-04-26 | Stop reason: ALTCHOICE

## 2021-12-21 ENCOUNTER — HOSPITAL ENCOUNTER (OUTPATIENT)
Facility: HOSPITAL | Age: 56
Discharge: HOME | End: 2021-12-21
Attending: EMERGENCY MEDICINE | Admitting: EMERGENCY MEDICINE
Payer: MEDICAID

## 2021-12-21 VITALS
WEIGHT: 181 LBS | BODY MASS INDEX: 28.41 KG/M2 | SYSTOLIC BLOOD PRESSURE: 160 MMHG | DIASTOLIC BLOOD PRESSURE: 78 MMHG | HEART RATE: 93 BPM | RESPIRATION RATE: 18 BRPM | HEIGHT: 67 IN | OXYGEN SATURATION: 100 % | TEMPERATURE: 98 F

## 2021-12-21 DIAGNOSIS — D64.9 SYMPTOMATIC ANEMIA: ICD-10-CM

## 2021-12-21 DIAGNOSIS — R53.83 FATIGUE: ICD-10-CM

## 2021-12-21 LAB
ABO GROUP BLD: NORMAL
ALBUMIN SERPL BCP-MCNC: 3.1 G/DL (ref 3.5–5.2)
ALP SERPL-CCNC: 67 U/L (ref 55–135)
ALT SERPL W/O P-5'-P-CCNC: 8 U/L (ref 10–44)
ANION GAP SERPL CALC-SCNC: 8 MMOL/L (ref 8–16)
APTT BLDCRRT: 22.8 SEC (ref 21–32)
AST SERPL-CCNC: 12 U/L (ref 10–40)
BASOPHILS # BLD AUTO: 0.01 K/UL (ref 0–0.2)
BASOPHILS # BLD AUTO: 0.03 K/UL (ref 0–0.2)
BASOPHILS NFR BLD: 0.3 % (ref 0–1.9)
BASOPHILS NFR BLD: 0.3 % (ref 0–1.9)
BILIRUB SERPL-MCNC: 0.3 MG/DL (ref 0.1–1)
BLD GP AB SCN CELLS X3 SERPL QL: NORMAL
BLD PROD TYP BPU: NORMAL
BLOOD UNIT EXPIRATION DATE: NORMAL
BLOOD UNIT TYPE CODE: 8400
BLOOD UNIT TYPE: NORMAL
BNP SERPL-MCNC: 59 PG/ML (ref 0–99)
BUN SERPL-MCNC: 50 MG/DL (ref 6–20)
CALCIUM SERPL-MCNC: 9.1 MG/DL (ref 8.7–10.5)
CHLORIDE SERPL-SCNC: 116 MMOL/L (ref 95–110)
CO2 SERPL-SCNC: 17 MMOL/L (ref 23–29)
CODING SYSTEM: NORMAL
CREAT SERPL-MCNC: 4.8 MG/DL (ref 0.5–1.4)
CTP QC/QA: YES
DIFFERENTIAL METHOD: ABNORMAL
DIFFERENTIAL METHOD: ABNORMAL
DISPENSE STATUS: NORMAL
EOSINOPHIL # BLD AUTO: 0.1 K/UL (ref 0–0.5)
EOSINOPHIL # BLD AUTO: 0.1 K/UL (ref 0–0.5)
EOSINOPHIL NFR BLD: 1.2 % (ref 0–8)
EOSINOPHIL NFR BLD: 1.9 % (ref 0–8)
ERYTHROCYTE [DISTWIDTH] IN BLOOD BY AUTOMATED COUNT: 15.8 % (ref 11.5–14.5)
ERYTHROCYTE [DISTWIDTH] IN BLOOD BY AUTOMATED COUNT: 16.1 % (ref 11.5–14.5)
EST. GFR  (AFRICAN AMERICAN): 11 ML/MIN/1.73 M^2
EST. GFR  (NON AFRICAN AMERICAN): 9 ML/MIN/1.73 M^2
FOLATE SERPL-MCNC: 7.1 NG/ML (ref 4–24)
GLUCOSE SERPL-MCNC: 102 MG/DL (ref 70–110)
HCT VFR BLD AUTO: 22.6 % (ref 37–48.5)
HCT VFR BLD AUTO: 24.4 % (ref 37–48.5)
HGB BLD-MCNC: 6.4 G/DL (ref 12–16)
HGB BLD-MCNC: 7.2 G/DL (ref 12–16)
IMM GRANULOCYTES # BLD AUTO: 0.02 K/UL (ref 0–0.04)
IMM GRANULOCYTES # BLD AUTO: 0.03 K/UL (ref 0–0.04)
IMM GRANULOCYTES NFR BLD AUTO: 0.3 % (ref 0–0.5)
IMM GRANULOCYTES NFR BLD AUTO: 0.5 % (ref 0–0.5)
INR PPP: 1.6 (ref 0.8–1.2)
IRON SERPL-MCNC: 75 UG/DL (ref 30–160)
LYMPHOCYTES # BLD AUTO: 1 K/UL (ref 1–4.8)
LYMPHOCYTES # BLD AUTO: 1.3 K/UL (ref 1–4.8)
LYMPHOCYTES NFR BLD: 15.4 % (ref 18–48)
LYMPHOCYTES NFR BLD: 27.3 % (ref 18–48)
MCH RBC QN AUTO: 25.8 PG (ref 27–31)
MCH RBC QN AUTO: 26.7 PG (ref 27–31)
MCHC RBC AUTO-ENTMCNC: 28.3 G/DL (ref 32–36)
MCHC RBC AUTO-ENTMCNC: 29.5 G/DL (ref 32–36)
MCV RBC AUTO: 90 FL (ref 82–98)
MCV RBC AUTO: 91 FL (ref 82–98)
MONOCYTES # BLD AUTO: 0.4 K/UL (ref 0.3–1)
MONOCYTES # BLD AUTO: 0.7 K/UL (ref 0.3–1)
MONOCYTES NFR BLD: 11.7 % (ref 4–15)
MONOCYTES NFR BLD: 8.4 % (ref 4–15)
NEUTROPHILS # BLD AUTO: 2.1 K/UL (ref 1.8–7.7)
NEUTROPHILS # BLD AUTO: 6.4 K/UL (ref 1.8–7.7)
NEUTROPHILS NFR BLD: 58.3 % (ref 38–73)
NEUTROPHILS NFR BLD: 74.4 % (ref 38–73)
NRBC BLD-RTO: 0 /100 WBC
NRBC BLD-RTO: 0 /100 WBC
PLATELET # BLD AUTO: 220 K/UL (ref 150–450)
PLATELET # BLD AUTO: 232 K/UL (ref 150–450)
PMV BLD AUTO: 10.5 FL (ref 9.2–12.9)
PMV BLD AUTO: 10.8 FL (ref 9.2–12.9)
POCT GLUCOSE: 118 MG/DL (ref 70–110)
POTASSIUM SERPL-SCNC: 5.4 MMOL/L (ref 3.5–5.1)
PROT SERPL-MCNC: 8.7 G/DL (ref 6–8.4)
PROTHROMBIN TIME: 11.4 SEC (ref 9–12.5)
RBC # BLD AUTO: 2.48 M/UL (ref 4–5.4)
RBC # BLD AUTO: 2.7 M/UL (ref 4–5.4)
RETICS/RBC NFR AUTO: 2.5 % (ref 0.5–2.5)
RH BLD: NORMAL
SARS-COV-2 RDRP RESP QL NAA+PROBE: NEGATIVE
SATURATED IRON: 25 % (ref 20–50)
SODIUM SERPL-SCNC: 141 MMOL/L (ref 136–145)
TOTAL IRON BINDING CAPACITY: 303 UG/DL (ref 250–450)
TRANS ERYTHROCYTES VOL PATIENT: NORMAL ML
TRANSFERRIN SERPL-MCNC: 205 MG/DL (ref 200–375)
TROPONIN I SERPL DL<=0.01 NG/ML-MCNC: 0.01 NG/ML (ref 0–0.03)
TROPONIN I SERPL DL<=0.01 NG/ML-MCNC: 0.03 NG/ML (ref 0–0.03)
VIT B12 SERPL-MCNC: 836 PG/ML (ref 210–950)
WBC # BLD AUTO: 3.66 K/UL (ref 3.9–12.7)
WBC # BLD AUTO: 8.59 K/UL (ref 3.9–12.7)

## 2021-12-21 PROCEDURE — 85045 AUTOMATED RETICULOCYTE COUNT: CPT | Mod: NTX | Performed by: EMERGENCY MEDICINE

## 2021-12-21 PROCEDURE — 86901 BLOOD TYPING SEROLOGIC RH(D): CPT | Mod: NTX | Performed by: EMERGENCY MEDICINE

## 2021-12-21 PROCEDURE — 86850 RBC ANTIBODY SCREEN: CPT | Mod: NTX | Performed by: EMERGENCY MEDICINE

## 2021-12-21 PROCEDURE — 82746 ASSAY OF FOLIC ACID SERUM: CPT | Mod: NTX | Performed by: EMERGENCY MEDICINE

## 2021-12-21 PROCEDURE — 86900 BLOOD TYPING SEROLOGIC ABO: CPT | Mod: NTX | Performed by: EMERGENCY MEDICINE

## 2021-12-21 PROCEDURE — 93010 EKG 12-LEAD: ICD-10-PCS | Mod: NTX,,, | Performed by: INTERNAL MEDICINE

## 2021-12-21 PROCEDURE — 85025 COMPLETE CBC W/AUTO DIFF WBC: CPT | Mod: 91,NTX | Performed by: STUDENT IN AN ORGANIZED HEALTH CARE EDUCATION/TRAINING PROGRAM

## 2021-12-21 PROCEDURE — U0002 COVID-19 LAB TEST NON-CDC: HCPCS | Mod: NTX | Performed by: EMERGENCY MEDICINE

## 2021-12-21 PROCEDURE — 83880 ASSAY OF NATRIURETIC PEPTIDE: CPT | Mod: NTX | Performed by: EMERGENCY MEDICINE

## 2021-12-21 PROCEDURE — 63600175 PHARM REV CODE 636 W HCPCS: Mod: NTX | Performed by: NURSE PRACTITIONER

## 2021-12-21 PROCEDURE — 93005 ELECTROCARDIOGRAM TRACING: CPT | Mod: NTX

## 2021-12-21 PROCEDURE — 84466 ASSAY OF TRANSFERRIN: CPT | Mod: NTX | Performed by: EMERGENCY MEDICINE

## 2021-12-21 PROCEDURE — 85730 THROMBOPLASTIN TIME PARTIAL: CPT | Mod: NTX | Performed by: EMERGENCY MEDICINE

## 2021-12-21 PROCEDURE — 27201040 HC RC 50 FILTER: Mod: NTX | Performed by: EMERGENCY MEDICINE

## 2021-12-21 PROCEDURE — G0378 HOSPITAL OBSERVATION PER HR: HCPCS | Mod: NTX

## 2021-12-21 PROCEDURE — 99285 EMERGENCY DEPT VISIT HI MDM: CPT | Mod: 25,NTX

## 2021-12-21 PROCEDURE — 36415 COLL VENOUS BLD VENIPUNCTURE: CPT | Mod: NTX | Performed by: STUDENT IN AN ORGANIZED HEALTH CARE EDUCATION/TRAINING PROGRAM

## 2021-12-21 PROCEDURE — 85610 PROTHROMBIN TIME: CPT | Mod: NTX | Performed by: EMERGENCY MEDICINE

## 2021-12-21 PROCEDURE — 82607 VITAMIN B-12: CPT | Mod: NTX | Performed by: EMERGENCY MEDICINE

## 2021-12-21 PROCEDURE — 80053 COMPREHEN METABOLIC PANEL: CPT | Mod: NTX | Performed by: EMERGENCY MEDICINE

## 2021-12-21 PROCEDURE — 86920 COMPATIBILITY TEST SPIN: CPT | Mod: NTX | Performed by: EMERGENCY MEDICINE

## 2021-12-21 PROCEDURE — 25000003 PHARM REV CODE 250: Mod: NTX | Performed by: NURSE PRACTITIONER

## 2021-12-21 PROCEDURE — P9021 RED BLOOD CELLS UNIT: HCPCS | Mod: NTX | Performed by: EMERGENCY MEDICINE

## 2021-12-21 PROCEDURE — 80197 ASSAY OF TACROLIMUS: CPT | Mod: NTX | Performed by: EMERGENCY MEDICINE

## 2021-12-21 PROCEDURE — 84484 ASSAY OF TROPONIN QUANT: CPT | Mod: NTX | Performed by: EMERGENCY MEDICINE

## 2021-12-21 PROCEDURE — 85025 COMPLETE CBC W/AUTO DIFF WBC: CPT | Mod: NTX | Performed by: EMERGENCY MEDICINE

## 2021-12-21 PROCEDURE — 93010 ELECTROCARDIOGRAM REPORT: CPT | Mod: NTX,,, | Performed by: INTERNAL MEDICINE

## 2021-12-21 RX ORDER — ALPRAZOLAM 0.25 MG/1
0.25 TABLET ORAL 2 TIMES DAILY PRN
Status: DISCONTINUED | OUTPATIENT
Start: 2021-12-21 | End: 2021-12-21 | Stop reason: HOSPADM

## 2021-12-21 RX ORDER — MYCOPHENOLATE MOFETIL 250 MG/1
500 CAPSULE ORAL 2 TIMES DAILY
Status: DISCONTINUED | OUTPATIENT
Start: 2021-12-21 | End: 2021-12-21 | Stop reason: HOSPADM

## 2021-12-21 RX ORDER — ATORVASTATIN CALCIUM 10 MG/1
20 TABLET, FILM COATED ORAL NIGHTLY
Status: DISCONTINUED | OUTPATIENT
Start: 2021-12-21 | End: 2021-12-21 | Stop reason: HOSPADM

## 2021-12-21 RX ORDER — INSULIN GLARGINE 100 [IU]/ML
50 INJECTION, SOLUTION SUBCUTANEOUS DAILY
Refills: 0 | Status: ON HOLD
Start: 2021-12-21 | End: 2023-03-13 | Stop reason: SDUPTHER

## 2021-12-21 RX ORDER — HYDROCODONE BITARTRATE AND ACETAMINOPHEN 500; 5 MG/1; MG/1
TABLET ORAL
Status: DISCONTINUED | OUTPATIENT
Start: 2021-12-21 | End: 2021-12-21 | Stop reason: HOSPADM

## 2021-12-21 RX ORDER — ACETAMINOPHEN 500 MG
500 TABLET ORAL EVERY 6 HOURS PRN
COMMUNITY
End: 2022-10-26

## 2021-12-21 RX ORDER — TACROLIMUS 1 MG/1
2 CAPSULE ORAL EVERY EVENING
Status: DISCONTINUED | OUTPATIENT
Start: 2021-12-21 | End: 2021-12-21 | Stop reason: HOSPADM

## 2021-12-21 RX ORDER — TACROLIMUS 1 MG/1
3 CAPSULE ORAL EVERY MORNING
Status: DISCONTINUED | OUTPATIENT
Start: 2021-12-22 | End: 2021-12-21 | Stop reason: HOSPADM

## 2021-12-21 RX ORDER — CARVEDILOL 3.12 MG/1
3.12 TABLET ORAL 2 TIMES DAILY
Status: DISCONTINUED | OUTPATIENT
Start: 2021-12-21 | End: 2021-12-21 | Stop reason: HOSPADM

## 2021-12-21 RX ADMIN — MYCOPHENOLATE MOFETIL 500 MG: 250 CAPSULE ORAL at 08:12

## 2021-12-21 RX ADMIN — TACROLIMUS 2 MG: 1 CAPSULE ORAL at 07:12

## 2021-12-21 RX ADMIN — ATORVASTATIN CALCIUM 20 MG: 10 TABLET, FILM COATED ORAL at 08:12

## 2021-12-21 RX ADMIN — CARVEDILOL 3.12 MG: 3.12 TABLET, FILM COATED ORAL at 08:12

## 2021-12-22 LAB — TACROLIMUS BLD-MCNC: 22.4 NG/ML (ref 5–15)

## 2021-12-23 ENCOUNTER — HOSPITAL ENCOUNTER (OUTPATIENT)
Dept: RADIOLOGY | Facility: HOSPITAL | Age: 56
Discharge: HOME | End: 2021-12-23
Attending: NURSE PRACTITIONER
Payer: MEDICAID

## 2021-12-23 ENCOUNTER — OFFICE VISIT (OUTPATIENT)
Dept: TRANSPLANT | Facility: CLINIC | Age: 56
End: 2021-12-23
Payer: MEDICAID

## 2021-12-23 ENCOUNTER — DOCUMENTATION ONLY (OUTPATIENT)
Dept: TRANSPLANT | Facility: CLINIC | Age: 56
End: 2021-12-23

## 2021-12-23 VITALS
RESPIRATION RATE: 16 BRPM | SYSTOLIC BLOOD PRESSURE: 171 MMHG | HEIGHT: 65 IN | OXYGEN SATURATION: 99 % | TEMPERATURE: 98 F | HEART RATE: 95 BPM | DIASTOLIC BLOOD PRESSURE: 83 MMHG | BODY MASS INDEX: 30.42 KG/M2 | WEIGHT: 182.56 LBS

## 2021-12-23 DIAGNOSIS — Z76.82 ORGAN TRANSPLANT CANDIDATE: ICD-10-CM

## 2021-12-23 DIAGNOSIS — D47.2 MGUS (MONOCLONAL GAMMOPATHY OF UNKNOWN SIGNIFICANCE): Chronic | ICD-10-CM

## 2021-12-23 DIAGNOSIS — N18.5 CKD (CHRONIC KIDNEY DISEASE) STAGE 5, GFR LESS THAN 15 ML/MIN: ICD-10-CM

## 2021-12-23 DIAGNOSIS — Z01.818 PRE-TRANSPLANT EVALUATION FOR KIDNEY TRANSPLANT: Primary | ICD-10-CM

## 2021-12-23 DIAGNOSIS — Z94.0 KIDNEY TRANSPLANT STATUS, LIVING UNRELATED DONOR: Primary | ICD-10-CM

## 2021-12-23 DIAGNOSIS — E87.5 HYPERKALEMIA: Primary | ICD-10-CM

## 2021-12-23 DIAGNOSIS — I15.0 RENOVASCULAR HYPERTENSION: ICD-10-CM

## 2021-12-23 DIAGNOSIS — Z94.0 KIDNEY REPLACED BY TRANSPLANT: ICD-10-CM

## 2021-12-23 DIAGNOSIS — N25.81 HYPERPARATHYROIDISM, SECONDARY RENAL: ICD-10-CM

## 2021-12-23 PROCEDURE — 99214 OFFICE O/P EST MOD 30 MIN: CPT | Mod: PBBFAC,TXP,25 | Performed by: NURSE PRACTITIONER

## 2021-12-23 PROCEDURE — 97802 MEDICAL NUTRITION INDIV IN: CPT | Mod: PBBFAC,TXP | Performed by: DIETITIAN, REGISTERED

## 2021-12-23 PROCEDURE — 99205 OFFICE O/P NEW HI 60 MIN: CPT | Mod: S$PBB,TXP,, | Performed by: NURSE PRACTITIONER

## 2021-12-23 PROCEDURE — 3051F PR MOST RECENT HEMOGLOBIN A1C LEVEL 7.0 - < 8.0%: ICD-10-PCS | Mod: CPTII,TXP,, | Performed by: NURSE PRACTITIONER

## 2021-12-23 PROCEDURE — 3051F HG A1C>EQUAL 7.0%<8.0%: CPT | Mod: CPTII,TXP,, | Performed by: NURSE PRACTITIONER

## 2021-12-23 PROCEDURE — 99205 PR OFFICE/OUTPT VISIT, NEW, LEVL V, 60-74 MIN: ICD-10-PCS | Mod: S$PBB,TXP,, | Performed by: NURSE PRACTITIONER

## 2021-12-23 PROCEDURE — 93978 US DOPP ILIACS BILATERAL: ICD-10-PCS | Mod: 26,TXP,, | Performed by: STUDENT IN AN ORGANIZED HEALTH CARE EDUCATION/TRAINING PROGRAM

## 2021-12-23 PROCEDURE — 76770 US RETROPERITONEAL COMPLETE: ICD-10-PCS | Mod: 26,TXP,, | Performed by: STUDENT IN AN ORGANIZED HEALTH CARE EDUCATION/TRAINING PROGRAM

## 2021-12-23 PROCEDURE — 99203 PR OFFICE/OUTPT VISIT, NEW, LEVL III, 30-44 MIN: ICD-10-PCS | Mod: S$PBB,TXP,, | Performed by: TRANSPLANT SURGERY

## 2021-12-23 PROCEDURE — 99999 PR PBB SHADOW E&M-EST. PATIENT-LVL IV: ICD-10-PCS | Mod: PBBFAC,TXP,, | Performed by: NURSE PRACTITIONER

## 2021-12-23 PROCEDURE — 99203 OFFICE O/P NEW LOW 30 MIN: CPT | Mod: S$PBB,TXP,, | Performed by: TRANSPLANT SURGERY

## 2021-12-23 PROCEDURE — 93978 VASCULAR STUDY: CPT | Mod: TC,TXP

## 2021-12-23 PROCEDURE — 76776 US EXAM K TRANSPL W/DOPPLER: CPT | Mod: TC,TXP

## 2021-12-23 PROCEDURE — 76776 US TRANSPLANT KIDNEY WITH DOPPLER: ICD-10-PCS | Mod: 26,TXP,, | Performed by: STUDENT IN AN ORGANIZED HEALTH CARE EDUCATION/TRAINING PROGRAM

## 2021-12-23 PROCEDURE — 76770 US EXAM ABDO BACK WALL COMP: CPT | Mod: TC,TXP

## 2021-12-23 PROCEDURE — 1111F PR DISCHARGE MEDS RECONCILED W/ CURRENT OUTPATIENT MED LIST: ICD-10-PCS | Mod: CPTII,TXP,, | Performed by: NURSE PRACTITIONER

## 2021-12-23 PROCEDURE — 93978 VASCULAR STUDY: CPT | Mod: 26,TXP,, | Performed by: STUDENT IN AN ORGANIZED HEALTH CARE EDUCATION/TRAINING PROGRAM

## 2021-12-23 PROCEDURE — 76770 US EXAM ABDO BACK WALL COMP: CPT | Mod: 26,TXP,, | Performed by: STUDENT IN AN ORGANIZED HEALTH CARE EDUCATION/TRAINING PROGRAM

## 2021-12-23 PROCEDURE — 99999 PR PBB SHADOW E&M-EST. PATIENT-LVL IV: CPT | Mod: PBBFAC,TXP,, | Performed by: NURSE PRACTITIONER

## 2021-12-23 PROCEDURE — 76776 US EXAM K TRANSPL W/DOPPLER: CPT | Mod: 26,TXP,, | Performed by: STUDENT IN AN ORGANIZED HEALTH CARE EDUCATION/TRAINING PROGRAM

## 2021-12-23 PROCEDURE — 1111F DSCHRG MED/CURRENT MED MERGE: CPT | Mod: CPTII,TXP,, | Performed by: NURSE PRACTITIONER

## 2021-12-23 RX ORDER — SODIUM BICARBONATE 650 MG/1
1300 TABLET ORAL 3 TIMES DAILY
Qty: 180 TABLET | Refills: 5 | Status: SHIPPED | OUTPATIENT
Start: 2021-12-23 | End: 2022-03-07 | Stop reason: CLARIF

## 2021-12-23 RX ORDER — SODIUM POLYSTYRENE SULFONATE 4.1 MEQ/G
15 POWDER, FOR SUSPENSION ORAL; RECTAL 2 TIMES DAILY
Qty: 60 G | Refills: 0 | Status: SHIPPED | OUTPATIENT
Start: 2021-12-23 | End: 2021-12-26

## 2021-12-23 NOTE — TELEPHONE ENCOUNTER
Spoke to patient and explained the need for sodium bicarb and kayexalate.  Instructions reviewed and will send to [facility] on [address].  Patient will have labs on Monday.  Patient verbalized understanding and did not have any questions.     ----- Message from Cristi Benitez MD sent at 12/23/2021 12:57 PM CST -----  Lets advise low K diet  Start sodium bicarbonate 1300 tid.  Proceed with lokelma 10 gm po bid for 2 days and then 5 gm po bid for 2 days. If lokelma is not a choice due to pre auth. Will do Kayexalate 15 gm po bid x 2 doses today and tomorrow  Repeat labs next week. (Monday) Will cc Dr Bonilla for follow up.

## 2021-12-28 ENCOUNTER — TELEPHONE (OUTPATIENT)
Dept: TRANSPLANT | Facility: CLINIC | Age: 56
End: 2021-12-28
Payer: MEDICAID

## 2021-12-30 ENCOUNTER — LAB VISIT (OUTPATIENT)
Dept: LAB | Facility: HOSPITAL | Age: 56
End: 2021-12-30
Attending: INTERNAL MEDICINE
Payer: MEDICAID

## 2021-12-30 ENCOUNTER — DOCUMENTATION ONLY (OUTPATIENT)
Dept: TRANSPLANT | Facility: CLINIC | Age: 56
End: 2021-12-30
Payer: MEDICAID

## 2021-12-30 DIAGNOSIS — Z94.0 KIDNEY REPLACED BY TRANSPLANT: ICD-10-CM

## 2021-12-30 DIAGNOSIS — Z76.82 ORGAN TRANSPLANT CANDIDATE: Primary | ICD-10-CM

## 2021-12-30 LAB
ALBUMIN SERPL BCP-MCNC: 2.8 G/DL (ref 3.5–5.2)
ANION GAP SERPL CALC-SCNC: 10 MMOL/L (ref 8–16)
BASOPHILS # BLD AUTO: 0.02 K/UL (ref 0–0.2)
BASOPHILS NFR BLD: 0.2 % (ref 0–1.9)
BUN SERPL-MCNC: 54 MG/DL (ref 6–20)
CALCIUM SERPL-MCNC: 9 MG/DL (ref 8.7–10.5)
CHLORIDE SERPL-SCNC: 110 MMOL/L (ref 95–110)
CO2 SERPL-SCNC: 18 MMOL/L (ref 23–29)
CREAT SERPL-MCNC: 4.9 MG/DL (ref 0.5–1.4)
DIFFERENTIAL METHOD: ABNORMAL
EOSINOPHIL # BLD AUTO: 0.1 K/UL (ref 0–0.5)
EOSINOPHIL NFR BLD: 0.9 % (ref 0–8)
ERYTHROCYTE [DISTWIDTH] IN BLOOD BY AUTOMATED COUNT: 14.9 % (ref 11.5–14.5)
EST. GFR  (AFRICAN AMERICAN): 10.5 ML/MIN/1.73 M^2
EST. GFR  (NON AFRICAN AMERICAN): 9.1 ML/MIN/1.73 M^2
GLUCOSE SERPL-MCNC: 119 MG/DL (ref 70–110)
HCT VFR BLD AUTO: 24.8 % (ref 37–48.5)
HGB BLD-MCNC: 7.3 G/DL (ref 12–16)
IMM GRANULOCYTES # BLD AUTO: 0.08 K/UL (ref 0–0.04)
IMM GRANULOCYTES NFR BLD AUTO: 0.9 % (ref 0–0.5)
LYMPHOCYTES # BLD AUTO: 1.4 K/UL (ref 1–4.8)
LYMPHOCYTES NFR BLD: 16.3 % (ref 18–48)
MCH RBC QN AUTO: 26.7 PG (ref 27–31)
MCHC RBC AUTO-ENTMCNC: 29.4 G/DL (ref 32–36)
MCV RBC AUTO: 91 FL (ref 82–98)
MONOCYTES # BLD AUTO: 0.8 K/UL (ref 0.3–1)
MONOCYTES NFR BLD: 9.5 % (ref 4–15)
NEUTROPHILS # BLD AUTO: 6.2 K/UL (ref 1.8–7.7)
NEUTROPHILS NFR BLD: 72.2 % (ref 38–73)
NRBC BLD-RTO: 0 /100 WBC
PHOSPHATE SERPL-MCNC: 3.6 MG/DL (ref 2.7–4.5)
PLATELET # BLD AUTO: 182 K/UL (ref 150–450)
PMV BLD AUTO: 13 FL (ref 9.2–12.9)
POTASSIUM SERPL-SCNC: 5.1 MMOL/L (ref 3.5–5.1)
RBC # BLD AUTO: 2.73 M/UL (ref 4–5.4)
SODIUM SERPL-SCNC: 138 MMOL/L (ref 136–145)
WBC # BLD AUTO: 8.53 K/UL (ref 3.9–12.7)

## 2021-12-30 PROCEDURE — 85025 COMPLETE CBC W/AUTO DIFF WBC: CPT | Mod: NTX | Performed by: INTERNAL MEDICINE

## 2021-12-30 PROCEDURE — 36415 COLL VENOUS BLD VENIPUNCTURE: CPT | Mod: PO,NTX | Performed by: INTERNAL MEDICINE

## 2021-12-30 PROCEDURE — 80069 RENAL FUNCTION PANEL: CPT | Mod: TXP | Performed by: INTERNAL MEDICINE

## 2022-01-03 ENCOUNTER — TELEPHONE (OUTPATIENT)
Dept: TRANSPLANT | Facility: CLINIC | Age: 57
End: 2022-01-03
Payer: MEDICAID

## 2022-01-03 ENCOUNTER — TELEPHONE (OUTPATIENT)
Dept: CARDIOLOGY | Facility: HOSPITAL | Age: 57
End: 2022-01-03
Payer: MEDICAID

## 2022-01-03 DIAGNOSIS — Z01.818 PRE-TRANSPLANT EVALUATION FOR KIDNEY TRANSPLANT: Primary | ICD-10-CM

## 2022-01-03 NOTE — TELEPHONE ENCOUNTER
----- Message from Samina Xavier LCSW sent at 1/3/2022  8:36 AM CST -----  Regarding: Needs Psych Clearance  Good Morning Jodie and Happy New Year,    I saw Ms Maldonado last week and believe she should be cleared by psych.  Apparently I forgot to ask for an order.  Could you please order this so we can get it scheduled?      Samina

## 2022-01-04 ENCOUNTER — HOSPITAL ENCOUNTER (OUTPATIENT)
Dept: CARDIOLOGY | Facility: HOSPITAL | Age: 57
Discharge: HOME | End: 2022-01-04
Attending: NURSE PRACTITIONER
Payer: MEDICAID

## 2022-01-04 ENCOUNTER — OFFICE VISIT (OUTPATIENT)
Dept: CARDIOLOGY | Facility: CLINIC | Age: 57
End: 2022-01-04
Payer: MEDICAID

## 2022-01-04 VITALS
SYSTOLIC BLOOD PRESSURE: 151 MMHG | BODY MASS INDEX: 29.27 KG/M2 | HEART RATE: 99 BPM | WEIGHT: 186.5 LBS | HEIGHT: 67 IN | DIASTOLIC BLOOD PRESSURE: 72 MMHG | SYSTOLIC BLOOD PRESSURE: 128 MMHG | HEART RATE: 84 BPM | BODY MASS INDEX: 31.24 KG/M2 | DIASTOLIC BLOOD PRESSURE: 64 MMHG | WEIGHT: 183 LBS | HEIGHT: 64 IN

## 2022-01-04 VITALS
HEIGHT: 64 IN | BODY MASS INDEX: 31.24 KG/M2 | SYSTOLIC BLOOD PRESSURE: 127 MMHG | DIASTOLIC BLOOD PRESSURE: 64 MMHG | HEART RATE: 95 BPM | WEIGHT: 183 LBS

## 2022-01-04 DIAGNOSIS — Z01.818 PRE-TRANSPLANT EVALUATION FOR KIDNEY TRANSPLANT: Primary | ICD-10-CM

## 2022-01-04 DIAGNOSIS — E78.5 HYPERLIPIDEMIA, UNSPECIFIED HYPERLIPIDEMIA TYPE: Chronic | ICD-10-CM

## 2022-01-04 DIAGNOSIS — Z76.82 ORGAN TRANSPLANT CANDIDATE: ICD-10-CM

## 2022-01-04 DIAGNOSIS — I15.0 RENOVASCULAR HYPERTENSION: ICD-10-CM

## 2022-01-04 DIAGNOSIS — N18.5 CKD (CHRONIC KIDNEY DISEASE) STAGE 5, GFR LESS THAN 15 ML/MIN: ICD-10-CM

## 2022-01-04 DIAGNOSIS — I70.0 CALCIFICATION OF AORTA: ICD-10-CM

## 2022-01-04 DIAGNOSIS — Z94.0 KIDNEY TRANSPLANT STATUS, LIVING UNRELATED DONOR: Chronic | ICD-10-CM

## 2022-01-04 LAB
ASCENDING AORTA: 2.58 CM
AV INDEX (PROSTH): 0.7
AV MEAN GRADIENT: 6 MMHG
AV PEAK GRADIENT: 11 MMHG
AV VALVE AREA: 2.21 CM2
AV VELOCITY RATIO: 0.64
BSA FOR ECHO PROCEDURE: 1.94 M2
CV ECHO LV RWT: 0.6 CM
CV PHARM DOSE: 0.4 MG
CV STRESS BASE HR: 88 BPM
DIASTOLIC BLOOD PRESSURE: 77 MMHG
DOP CALC AO PEAK VEL: 1.69 M/S
DOP CALC AO VTI: 33.81 CM
DOP CALC LVOT AREA: 3.1 CM2
DOP CALC LVOT DIAMETER: 2 CM
DOP CALC LVOT PEAK VEL: 1.09 M/S
DOP CALC LVOT STROKE VOLUME: 74.61 CM3
DOP CALCLVOT PEAK VEL VTI: 23.76 CM
E WAVE DECELERATION TIME: 100.02 MSEC
E/A RATIO: 0.78
E/E' RATIO: 14.73 M/S
ECHO LV POSTERIOR WALL: 1.22 CM (ref 0.6–1.1)
EJECTION FRACTION- HIGH: 65 %
EJECTION FRACTION: 70 %
END DIASTOLIC INDEX-HIGH: 153 ML/M2
END DIASTOLIC INDEX-LOW: 93 ML/M2
END SYSTOLIC INDEX-HIGH: 71 ML/M2
END SYSTOLIC INDEX-LOW: 31 ML/M2
FRACTIONAL SHORTENING: 37 % (ref 28–44)
HR MV ECHO: 93 BPM
INTERVENTRICULAR SEPTUM: 1.27 CM (ref 0.6–1.1)
IVRT: 91.34 MSEC
LA MAJOR: 5.44 CM
LA MINOR: 5.05 CM
LA WIDTH: 3.93 CM
LEFT ATRIUM SIZE: 3.67 CM
LEFT ATRIUM VOLUME INDEX MOD: 34 ML/M2
LEFT ATRIUM VOLUME INDEX: 34.2 ML/M2
LEFT ATRIUM VOLUME MOD: 63.95 CM3
LEFT ATRIUM VOLUME: 64.21 CM3
LEFT INTERNAL DIMENSION IN SYSTOLE: 2.6 CM (ref 2.1–4)
LEFT VENTRICLE DIASTOLIC VOLUME INDEX: 30.5 ML/M2
LEFT VENTRICLE DIASTOLIC VOLUME: 57.34 ML
LEFT VENTRICLE MASS INDEX: 96 G/M2
LEFT VENTRICLE SYSTOLIC VOLUME INDEX: 10.5 ML/M2
LEFT VENTRICLE SYSTOLIC VOLUME: 19.7 ML
LEFT VENTRICULAR INTERNAL DIMENSION IN DIASTOLE: 4.1 CM (ref 3.5–6)
LEFT VENTRICULAR MASS: 181.37 G
LV LATERAL E/E' RATIO: 13.5 M/S
LV SEPTAL E/E' RATIO: 16.2 M/S
MV A" WAVE DURATION": 7.14 MSEC
MV MEAN GRADIENT: 3 MMHG
MV PEAK A VEL: 1.04 M/S
MV PEAK E VEL: 0.81 M/S
MV STENOSIS PRESSURE HALF TIME: 29.01 MS
MV VALVE AREA P 1/2 METHOD: 7.58 CM2
NUC REST DIASTOLIC VOLUME INDEX: 78
NUC REST EJECTION FRACTION: 68
NUC REST SYSTOLIC VOLUME INDEX: 25
NUC STRESS DIASTOLIC VOLUME INDEX: 75
NUC STRESS EJECTION FRACTION: 64 %
NUC STRESS SYSTOLIC VOLUME INDEX: 27
OHS CV CPX 85 PERCENT MAX PREDICTED HEART RATE MALE: 132
OHS CV CPX MAX PREDICTED HEART RATE: 155
OHS CV CPX PATIENT IS FEMALE: 1
OHS CV CPX PATIENT IS MALE: 0
OHS CV CPX PEAK DIASTOLIC BLOOD PRESSURE: 69 MMHG
OHS CV CPX PEAK HEAR RATE: 95 BPM
OHS CV CPX PEAK RATE PRESSURE PRODUCT: NORMAL
OHS CV CPX PEAK SYSTOLIC BLOOD PRESSURE: 146 MMHG
OHS CV CPX PERCENT MAX PREDICTED HEART RATE ACHIEVED: 61
OHS CV CPX RATE PRESSURE PRODUCT PRESENTING: NORMAL
PISA TR MAX VEL: 2.16 M/S
PULM VEIN S/D RATIO: 1.28
PV PEAK D VEL: 0.4 M/S
PV PEAK S VEL: 0.51 M/S
RA MAJOR: 4.19 CM
RA PRESSURE: 3 MMHG
RA WIDTH: 3.74 CM
RETIRED EF AND QEF - SEE NOTES: 53 %
RIGHT VENTRICULAR END-DIASTOLIC DIMENSION: 3.51 CM
RV TISSUE DOPPLER FREE WALL SYSTOLIC VELOCITY 1 (APICAL 4 CHAMBER VIEW): 15.85 CM/S
SINUS: 3.04 CM
STJ: 2.15 CM
SYSTOLIC BLOOD PRESSURE: 141 MMHG
TDI LATERAL: 0.06 M/S
TDI SEPTAL: 0.05 M/S
TDI: 0.06 M/S
TR MAX PG: 19 MMHG
TRICUSPID ANNULAR PLANE SYSTOLIC EXCURSION: 2.24 CM
TV REST PULMONARY ARTERY PRESSURE: 22 MMHG

## 2022-01-04 PROCEDURE — 1160F PR REVIEW ALL MEDS BY PRESCRIBER/CLIN PHARMACIST DOCUMENTED: ICD-10-PCS | Mod: CPTII,TXP,, | Performed by: INTERNAL MEDICINE

## 2022-01-04 PROCEDURE — 3008F BODY MASS INDEX DOCD: CPT | Mod: CPTII,TXP,, | Performed by: INTERNAL MEDICINE

## 2022-01-04 PROCEDURE — 1111F DSCHRG MED/CURRENT MED MERGE: CPT | Mod: CPTII,TXP,, | Performed by: INTERNAL MEDICINE

## 2022-01-04 PROCEDURE — 3051F PR MOST RECENT HEMOGLOBIN A1C LEVEL 7.0 - < 8.0%: ICD-10-PCS | Mod: CPTII,TXP,, | Performed by: INTERNAL MEDICINE

## 2022-01-04 PROCEDURE — 93306 TTE W/DOPPLER COMPLETE: CPT | Mod: TXP

## 2022-01-04 PROCEDURE — 93306 ECHO (CUPID ONLY): ICD-10-PCS | Mod: 26,TXP,, | Performed by: INTERNAL MEDICINE

## 2022-01-04 PROCEDURE — 93306 TTE W/DOPPLER COMPLETE: CPT | Mod: 26,TXP,, | Performed by: INTERNAL MEDICINE

## 2022-01-04 PROCEDURE — 1159F PR MEDICATION LIST DOCUMENTED IN MEDICAL RECORD: ICD-10-PCS | Mod: CPTII,TXP,, | Performed by: INTERNAL MEDICINE

## 2022-01-04 PROCEDURE — A9502 TC99M TETROFOSMIN: HCPCS | Mod: TXP

## 2022-01-04 PROCEDURE — 3078F DIAST BP <80 MM HG: CPT | Mod: CPTII,TXP,, | Performed by: INTERNAL MEDICINE

## 2022-01-04 PROCEDURE — 99214 OFFICE O/P EST MOD 30 MIN: CPT | Mod: PBBFAC,25,TXP | Performed by: INTERNAL MEDICINE

## 2022-01-04 PROCEDURE — 1111F PR DISCHARGE MEDS RECONCILED W/ CURRENT OUTPATIENT MED LIST: ICD-10-PCS | Mod: CPTII,TXP,, | Performed by: INTERNAL MEDICINE

## 2022-01-04 PROCEDURE — 3077F PR MOST RECENT SYSTOLIC BLOOD PRESSURE >= 140 MM HG: ICD-10-PCS | Mod: CPTII,TXP,, | Performed by: INTERNAL MEDICINE

## 2022-01-04 PROCEDURE — 1160F RVW MEDS BY RX/DR IN RCRD: CPT | Mod: CPTII,TXP,, | Performed by: INTERNAL MEDICINE

## 2022-01-04 PROCEDURE — 78452 STRESS TEST WITH MYOCARDIAL PERFUSION (CUPID ONLY): ICD-10-PCS | Mod: 26,TXP,, | Performed by: INTERNAL MEDICINE

## 2022-01-04 PROCEDURE — 78452 HT MUSCLE IMAGE SPECT MULT: CPT | Mod: 26,TXP,, | Performed by: INTERNAL MEDICINE

## 2022-01-04 PROCEDURE — 3078F PR MOST RECENT DIASTOLIC BLOOD PRESSURE < 80 MM HG: ICD-10-PCS | Mod: CPTII,TXP,, | Performed by: INTERNAL MEDICINE

## 2022-01-04 PROCEDURE — 93018 CV STRESS TEST I&R ONLY: CPT | Mod: TXP,,, | Performed by: INTERNAL MEDICINE

## 2022-01-04 PROCEDURE — 3008F PR BODY MASS INDEX (BMI) DOCUMENTED: ICD-10-PCS | Mod: CPTII,TXP,, | Performed by: INTERNAL MEDICINE

## 2022-01-04 PROCEDURE — 63600175 PHARM REV CODE 636 W HCPCS: Mod: TXP | Performed by: NURSE PRACTITIONER

## 2022-01-04 PROCEDURE — 1159F MED LIST DOCD IN RCRD: CPT | Mod: CPTII,TXP,, | Performed by: INTERNAL MEDICINE

## 2022-01-04 PROCEDURE — 93016 STRESS TEST WITH MYOCARDIAL PERFUSION (CUPID ONLY): ICD-10-PCS | Mod: TXP,,, | Performed by: INTERNAL MEDICINE

## 2022-01-04 PROCEDURE — 99999 PR PBB SHADOW E&M-EST. PATIENT-LVL IV: ICD-10-PCS | Mod: PBBFAC,TXP,, | Performed by: INTERNAL MEDICINE

## 2022-01-04 PROCEDURE — 3051F HG A1C>EQUAL 7.0%<8.0%: CPT | Mod: CPTII,TXP,, | Performed by: INTERNAL MEDICINE

## 2022-01-04 PROCEDURE — 99204 PR OFFICE/OUTPT VISIT, NEW, LEVL IV, 45-59 MIN: ICD-10-PCS | Mod: S$PBB,TXP,, | Performed by: INTERNAL MEDICINE

## 2022-01-04 PROCEDURE — 93016 CV STRESS TEST SUPVJ ONLY: CPT | Mod: TXP,,, | Performed by: INTERNAL MEDICINE

## 2022-01-04 PROCEDURE — 93018 STRESS TEST WITH MYOCARDIAL PERFUSION (CUPID ONLY): ICD-10-PCS | Mod: TXP,,, | Performed by: INTERNAL MEDICINE

## 2022-01-04 PROCEDURE — 3077F SYST BP >= 140 MM HG: CPT | Mod: CPTII,TXP,, | Performed by: INTERNAL MEDICINE

## 2022-01-04 PROCEDURE — 99204 OFFICE O/P NEW MOD 45 MIN: CPT | Mod: S$PBB,TXP,, | Performed by: INTERNAL MEDICINE

## 2022-01-04 PROCEDURE — 99999 PR PBB SHADOW E&M-EST. PATIENT-LVL IV: CPT | Mod: PBBFAC,TXP,, | Performed by: INTERNAL MEDICINE

## 2022-01-04 RX ORDER — REGADENOSON 0.08 MG/ML
0.4 INJECTION, SOLUTION INTRAVENOUS ONCE
Status: COMPLETED | OUTPATIENT
Start: 2022-01-04 | End: 2022-01-04

## 2022-01-04 RX ADMIN — REGADENOSON 0.4 MG: 0.08 INJECTION, SOLUTION INTRAVENOUS at 10:01

## 2022-01-05 ENCOUNTER — OFFICE VISIT (OUTPATIENT)
Dept: NEPHROLOGY | Facility: CLINIC | Age: 57
End: 2022-01-05
Payer: MEDICAID

## 2022-01-05 VITALS
DIASTOLIC BLOOD PRESSURE: 78 MMHG | BODY MASS INDEX: 29.13 KG/M2 | WEIGHT: 185.63 LBS | HEIGHT: 67 IN | SYSTOLIC BLOOD PRESSURE: 130 MMHG | HEART RATE: 94 BPM | OXYGEN SATURATION: 100 %

## 2022-01-05 DIAGNOSIS — N18.5 CKD (CHRONIC KIDNEY DISEASE) STAGE 5, GFR LESS THAN 15 ML/MIN: Primary | ICD-10-CM

## 2022-01-05 DIAGNOSIS — Z94.0 KIDNEY TRANSPLANT STATUS, LIVING UNRELATED DONOR: Chronic | ICD-10-CM

## 2022-01-05 DIAGNOSIS — E11.3593 PROLIFERATIVE DIABETIC RETINOPATHY OF BOTH EYES WITHOUT MACULAR EDEMA ASSOCIATED WITH TYPE 2 DIABETES MELLITUS: Chronic | ICD-10-CM

## 2022-01-05 DIAGNOSIS — E87.5 HYPERKALEMIA: ICD-10-CM

## 2022-01-05 DIAGNOSIS — D64.9 SYMPTOMATIC ANEMIA: ICD-10-CM

## 2022-01-05 DIAGNOSIS — I15.0 RENOVASCULAR HYPERTENSION: ICD-10-CM

## 2022-01-05 DIAGNOSIS — Z29.89 NEED FOR PROPHYLACTIC IMMUNOTHERAPY: Chronic | ICD-10-CM

## 2022-01-05 DIAGNOSIS — R80.8 OTHER PROTEINURIA: ICD-10-CM

## 2022-01-05 DIAGNOSIS — D47.2 MGUS (MONOCLONAL GAMMOPATHY OF UNKNOWN SIGNIFICANCE): Chronic | ICD-10-CM

## 2022-01-05 PROCEDURE — 3008F BODY MASS INDEX DOCD: CPT | Mod: CPTII,,, | Performed by: INTERNAL MEDICINE

## 2022-01-05 PROCEDURE — 1160F PR REVIEW ALL MEDS BY PRESCRIBER/CLIN PHARMACIST DOCUMENTED: ICD-10-PCS | Mod: CPTII,,, | Performed by: INTERNAL MEDICINE

## 2022-01-05 PROCEDURE — 99215 OFFICE O/P EST HI 40 MIN: CPT | Mod: S$PBB,,, | Performed by: INTERNAL MEDICINE

## 2022-01-05 PROCEDURE — 99215 PR OFFICE/OUTPT VISIT, EST, LEVL V, 40-54 MIN: ICD-10-PCS | Mod: S$PBB,,, | Performed by: INTERNAL MEDICINE

## 2022-01-05 PROCEDURE — 1111F PR DISCHARGE MEDS RECONCILED W/ CURRENT OUTPATIENT MED LIST: ICD-10-PCS | Mod: CPTII,,, | Performed by: INTERNAL MEDICINE

## 2022-01-05 PROCEDURE — 3051F PR MOST RECENT HEMOGLOBIN A1C LEVEL 7.0 - < 8.0%: ICD-10-PCS | Mod: CPTII,,, | Performed by: INTERNAL MEDICINE

## 2022-01-05 PROCEDURE — 99999 PR PBB SHADOW E&M-EST. PATIENT-LVL IV: CPT | Mod: PBBFAC,,, | Performed by: INTERNAL MEDICINE

## 2022-01-05 PROCEDURE — 1159F PR MEDICATION LIST DOCUMENTED IN MEDICAL RECORD: ICD-10-PCS | Mod: CPTII,,, | Performed by: INTERNAL MEDICINE

## 2022-01-05 PROCEDURE — 1160F RVW MEDS BY RX/DR IN RCRD: CPT | Mod: CPTII,,, | Performed by: INTERNAL MEDICINE

## 2022-01-05 PROCEDURE — 3051F HG A1C>EQUAL 7.0%<8.0%: CPT | Mod: CPTII,,, | Performed by: INTERNAL MEDICINE

## 2022-01-05 PROCEDURE — 3075F PR MOST RECENT SYSTOLIC BLOOD PRESS GE 130-139MM HG: ICD-10-PCS | Mod: CPTII,,, | Performed by: INTERNAL MEDICINE

## 2022-01-05 PROCEDURE — 3078F PR MOST RECENT DIASTOLIC BLOOD PRESSURE < 80 MM HG: ICD-10-PCS | Mod: CPTII,,, | Performed by: INTERNAL MEDICINE

## 2022-01-05 PROCEDURE — 3066F PR DOCUMENTATION OF TREATMENT FOR NEPHROPATHY: ICD-10-PCS | Mod: CPTII,,, | Performed by: INTERNAL MEDICINE

## 2022-01-05 PROCEDURE — 3075F SYST BP GE 130 - 139MM HG: CPT | Mod: CPTII,,, | Performed by: INTERNAL MEDICINE

## 2022-01-05 PROCEDURE — 99214 OFFICE O/P EST MOD 30 MIN: CPT | Mod: PBBFAC | Performed by: INTERNAL MEDICINE

## 2022-01-05 PROCEDURE — 1159F MED LIST DOCD IN RCRD: CPT | Mod: CPTII,,, | Performed by: INTERNAL MEDICINE

## 2022-01-05 PROCEDURE — 3078F DIAST BP <80 MM HG: CPT | Mod: CPTII,,, | Performed by: INTERNAL MEDICINE

## 2022-01-05 PROCEDURE — 3008F PR BODY MASS INDEX (BMI) DOCUMENTED: ICD-10-PCS | Mod: CPTII,,, | Performed by: INTERNAL MEDICINE

## 2022-01-05 PROCEDURE — 99999 PR PBB SHADOW E&M-EST. PATIENT-LVL IV: ICD-10-PCS | Mod: PBBFAC,,, | Performed by: INTERNAL MEDICINE

## 2022-01-05 PROCEDURE — 1111F DSCHRG MED/CURRENT MED MERGE: CPT | Mod: CPTII,,, | Performed by: INTERNAL MEDICINE

## 2022-01-05 PROCEDURE — 3066F NEPHROPATHY DOC TX: CPT | Mod: CPTII,,, | Performed by: INTERNAL MEDICINE

## 2022-01-05 RX ORDER — CHLORTHALIDONE 25 MG/1
25 TABLET ORAL DAILY
Qty: 90 TABLET | Refills: 3 | Status: ON HOLD | OUTPATIENT
Start: 2022-01-05 | End: 2022-01-27 | Stop reason: HOSPADM

## 2022-01-05 NOTE — PROGRESS NOTES
NEPHROLOGY PROGRESS NOTE    INTERVAL HISTORY  57 yo AAF patient is here today for follow up evaluation of renal allograft. Last seen in renal office by Dr. Biard. She is s/p  donor kidney tx 14. Current immunosuppression; tacrolimus; mycophenolate. Baseline Cr 1.9 - 2.2 mg/dl.  There is a hx of diabetes complicated by nephropathy; retinopathy.  Denies fevers; chills night sweats; chest pains; hemoptysis; orthopnea; PND.      Today she has no new complaints. Being evaluated for a re-transplant. She does not take NSAIDs. Received Lokelma for 2 days for hyperkalemia.    MEDICATIONS reviewed    Last Physical Exam  /80 mmHg  Chronically ill appearing woman; no acute distress; oriented x 3  HEENT; (+)retinopathy  CHEST; Clear P&A; no rales or rhonchi  HEART; RR; S1&S2 no murmur rub gallop  ABD; BS(+); non-tender; no organomegaly  EXT; (-)Edema    LABS  Serum Cr 4.4 mg/dL (from 4.9)  UPCR 2.2 - 3.0 g/g  K 5.1 mmol/L  Hgb 7.3 g/dL    Impression  1. Kidney transplant status / Renal allograft / CKD stage 4, eGFR 11 ml/min. Currently on tacrolimus and MMF. Tacrolimus level adequate. Not on prednisone. 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 Concerning that sCr is progressively rising, being evaluated for a second kidney transplant. Reminded to avoid NSAIDs/Bactrim/IV dye.   2. Hypertension. Satisfactory control on carvedilol.   3. 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 Will add chlorthalidone.   4. Type 2 diabetes mellitus, on insulin, managed by PCP  5. Anemia. 2/2 CKD, MGUS and iron deficiency. Followed by Hematology-Oncology. Asked to discuss EPO Rx with them.     Plan  Continue MMF/   Start chlorthalidone; continue NaHCO3  F/u with KT reg re-transplant  F/u with Hem-Onc re anemia  Return Visit in 2 mo

## 2022-01-10 ENCOUNTER — TELEPHONE (OUTPATIENT)
Dept: TRANSPLANT | Facility: HOSPITAL | Age: 57
End: 2022-01-10
Payer: MEDICAID

## 2022-01-13 ENCOUNTER — SOCIAL WORK (OUTPATIENT)
Dept: TRANSPLANT | Facility: CLINIC | Age: 57
End: 2022-01-13
Payer: MEDICAID

## 2022-01-26 ENCOUNTER — HOSPITAL ENCOUNTER (OUTPATIENT)
Facility: HOSPITAL | Age: 57
Discharge: HOME | End: 2022-01-27
Attending: EMERGENCY MEDICINE | Admitting: EMERGENCY MEDICINE
Payer: MEDICAID

## 2022-01-26 DIAGNOSIS — D64.9 ANEMIA: ICD-10-CM

## 2022-01-26 DIAGNOSIS — R07.9 CHEST PAIN: ICD-10-CM

## 2022-01-26 DIAGNOSIS — N18.5 CKD (CHRONIC KIDNEY DISEASE) STAGE 5, GFR LESS THAN 15 ML/MIN: ICD-10-CM

## 2022-01-26 DIAGNOSIS — R53.1 WEAKNESS: ICD-10-CM

## 2022-01-26 DIAGNOSIS — D64.9 SYMPTOMATIC ANEMIA: Primary | ICD-10-CM

## 2022-01-26 LAB
ABO + RH BLD: NORMAL
ALBUMIN SERPL BCP-MCNC: 3 G/DL (ref 3.5–5.2)
ALP SERPL-CCNC: 64 U/L (ref 55–135)
ALT SERPL W/O P-5'-P-CCNC: 7 U/L (ref 10–44)
ANION GAP SERPL CALC-SCNC: 15 MMOL/L (ref 8–16)
AST SERPL-CCNC: 8 U/L (ref 10–40)
BASOPHILS # BLD AUTO: 0.02 K/UL (ref 0–0.2)
BASOPHILS NFR BLD: 0.2 % (ref 0–1.9)
BILIRUB SERPL-MCNC: 0.3 MG/DL (ref 0.1–1)
BLD GP AB SCN CELLS X3 SERPL QL: NORMAL
BLD PROD TYP BPU: NORMAL
BLOOD UNIT EXPIRATION DATE: NORMAL
BLOOD UNIT TYPE CODE: 6200
BLOOD UNIT TYPE: NORMAL
BUN SERPL-MCNC: 101 MG/DL (ref 6–20)
CALCIUM SERPL-MCNC: 9.6 MG/DL (ref 8.7–10.5)
CHLORIDE SERPL-SCNC: 100 MMOL/L (ref 95–110)
CO2 SERPL-SCNC: 17 MMOL/L (ref 23–29)
CODING SYSTEM: NORMAL
CREAT SERPL-MCNC: 6.5 MG/DL (ref 0.5–1.4)
CTP QC/QA: YES
DIFFERENTIAL METHOD: ABNORMAL
DISPENSE STATUS: NORMAL
EOSINOPHIL # BLD AUTO: 0 K/UL (ref 0–0.5)
EOSINOPHIL NFR BLD: 0.2 % (ref 0–8)
ERYTHROCYTE [DISTWIDTH] IN BLOOD BY AUTOMATED COUNT: 13.9 % (ref 11.5–14.5)
EST. GFR  (AFRICAN AMERICAN): 7 ML/MIN/1.73 M^2
EST. GFR  (NON AFRICAN AMERICAN): 6 ML/MIN/1.73 M^2
GLUCOSE SERPL-MCNC: 71 MG/DL (ref 70–110)
HCT VFR BLD AUTO: 21.4 % (ref 37–48.5)
HGB BLD-MCNC: 6.6 G/DL (ref 12–16)
IMM GRANULOCYTES # BLD AUTO: 0.08 K/UL (ref 0–0.04)
IMM GRANULOCYTES NFR BLD AUTO: 0.7 % (ref 0–0.5)
LYMPHOCYTES # BLD AUTO: 1.1 K/UL (ref 1–4.8)
LYMPHOCYTES NFR BLD: 8.7 % (ref 18–48)
MAGNESIUM SERPL-MCNC: 1.4 MG/DL (ref 1.6–2.6)
MCH RBC QN AUTO: 26.7 PG (ref 27–31)
MCHC RBC AUTO-ENTMCNC: 30.8 G/DL (ref 32–36)
MCV RBC AUTO: 87 FL (ref 82–98)
MONOCYTES # BLD AUTO: 0.7 K/UL (ref 0.3–1)
MONOCYTES NFR BLD: 5.8 % (ref 4–15)
NEUTROPHILS # BLD AUTO: 10.2 K/UL (ref 1.8–7.7)
NEUTROPHILS NFR BLD: 84.4 % (ref 38–73)
NRBC BLD-RTO: 0 /100 WBC
NUM UNITS TRANS PACKED RBC: NORMAL
PHOSPHATE SERPL-MCNC: 6.2 MG/DL (ref 2.7–4.5)
PLATELET # BLD AUTO: 210 K/UL (ref 150–450)
PMV BLD AUTO: 11.4 FL (ref 9.2–12.9)
POCT GLUCOSE: 177 MG/DL (ref 70–110)
POCT GLUCOSE: 191 MG/DL (ref 70–110)
POTASSIUM SERPL-SCNC: 4 MMOL/L (ref 3.5–5.1)
PROT SERPL-MCNC: 10 G/DL (ref 6–8.4)
RBC # BLD AUTO: 2.47 M/UL (ref 4–5.4)
SARS-COV-2 RDRP RESP QL NAA+PROBE: NEGATIVE
SODIUM SERPL-SCNC: 132 MMOL/L (ref 136–145)
WBC # BLD AUTO: 12.08 K/UL (ref 3.9–12.7)

## 2022-01-26 PROCEDURE — G0378 HOSPITAL OBSERVATION PER HR: HCPCS | Mod: NTX

## 2022-01-26 PROCEDURE — 25000003 PHARM REV CODE 250: Mod: NTX | Performed by: EMERGENCY MEDICINE

## 2022-01-26 PROCEDURE — 93010 ELECTROCARDIOGRAM REPORT: CPT | Mod: NTX,,, | Performed by: INTERNAL MEDICINE

## 2022-01-26 PROCEDURE — 93010 EKG 12-LEAD: ICD-10-PCS | Mod: NTX,,, | Performed by: INTERNAL MEDICINE

## 2022-01-26 PROCEDURE — 86901 BLOOD TYPING SEROLOGIC RH(D): CPT | Mod: NTX | Performed by: EMERGENCY MEDICINE

## 2022-01-26 PROCEDURE — 86920 COMPATIBILITY TEST SPIN: CPT | Mod: NTX | Performed by: PHYSICIAN ASSISTANT

## 2022-01-26 PROCEDURE — 83735 ASSAY OF MAGNESIUM: CPT | Mod: NTX | Performed by: EMERGENCY MEDICINE

## 2022-01-26 PROCEDURE — 63600175 PHARM REV CODE 636 W HCPCS: Mod: NTX | Performed by: PHYSICIAN ASSISTANT

## 2022-01-26 PROCEDURE — 96365 THER/PROPH/DIAG IV INF INIT: CPT | Mod: NTX

## 2022-01-26 PROCEDURE — 84100 ASSAY OF PHOSPHORUS: CPT | Mod: NTX | Performed by: EMERGENCY MEDICINE

## 2022-01-26 PROCEDURE — 96361 HYDRATE IV INFUSION ADD-ON: CPT | Mod: NTX

## 2022-01-26 PROCEDURE — 93005 ELECTROCARDIOGRAM TRACING: CPT | Mod: NTX

## 2022-01-26 PROCEDURE — 82962 GLUCOSE BLOOD TEST: CPT | Mod: NTX

## 2022-01-26 PROCEDURE — 25000003 PHARM REV CODE 250: Mod: NTX | Performed by: PHYSICIAN ASSISTANT

## 2022-01-26 PROCEDURE — 36430 TRANSFUSION BLD/BLD COMPNT: CPT | Mod: NTX

## 2022-01-26 PROCEDURE — 99285 EMERGENCY DEPT VISIT HI MDM: CPT | Mod: 25,NTX

## 2022-01-26 PROCEDURE — 86900 BLOOD TYPING SEROLOGIC ABO: CPT | Mod: NTX | Performed by: EMERGENCY MEDICINE

## 2022-01-26 PROCEDURE — U0002 COVID-19 LAB TEST NON-CDC: HCPCS | Mod: NTX | Performed by: EMERGENCY MEDICINE

## 2022-01-26 PROCEDURE — 85025 COMPLETE CBC W/AUTO DIFF WBC: CPT | Mod: NTX | Performed by: EMERGENCY MEDICINE

## 2022-01-26 PROCEDURE — 80053 COMPREHEN METABOLIC PANEL: CPT | Mod: NTX | Performed by: EMERGENCY MEDICINE

## 2022-01-26 PROCEDURE — 63600175 PHARM REV CODE 636 W HCPCS: Mod: NTX | Performed by: NURSE PRACTITIONER

## 2022-01-26 PROCEDURE — P9016 RBC LEUKOCYTES REDUCED: HCPCS | Mod: NTX | Performed by: PHYSICIAN ASSISTANT

## 2022-01-26 RX ORDER — NALOXONE HCL 0.4 MG/ML
0.02 VIAL (ML) INJECTION
Status: DISCONTINUED | OUTPATIENT
Start: 2022-01-26 | End: 2022-01-27 | Stop reason: HOSPADM

## 2022-01-26 RX ORDER — SODIUM CHLORIDE, SODIUM LACTATE, POTASSIUM CHLORIDE, CALCIUM CHLORIDE 600; 310; 30; 20 MG/100ML; MG/100ML; MG/100ML; MG/100ML
INJECTION, SOLUTION INTRAVENOUS CONTINUOUS
Status: DISCONTINUED | OUTPATIENT
Start: 2022-01-26 | End: 2022-01-27 | Stop reason: HOSPADM

## 2022-01-26 RX ORDER — AMOXICILLIN 250 MG
1 CAPSULE ORAL DAILY PRN
Status: DISCONTINUED | OUTPATIENT
Start: 2022-01-26 | End: 2022-01-27 | Stop reason: HOSPADM

## 2022-01-26 RX ORDER — SODIUM CHLORIDE 0.9 % (FLUSH) 0.9 %
10 SYRINGE (ML) INJECTION
Status: DISCONTINUED | OUTPATIENT
Start: 2022-01-26 | End: 2022-01-27 | Stop reason: HOSPADM

## 2022-01-26 RX ORDER — SODIUM BICARBONATE 325 MG/1
1300 TABLET ORAL 3 TIMES DAILY
Status: DISCONTINUED | OUTPATIENT
Start: 2022-01-26 | End: 2022-01-27 | Stop reason: HOSPADM

## 2022-01-26 RX ORDER — LANOLIN ALCOHOL/MO/W.PET/CERES
800 CREAM (GRAM) TOPICAL
Status: DISCONTINUED | OUTPATIENT
Start: 2022-01-26 | End: 2022-01-27 | Stop reason: HOSPADM

## 2022-01-26 RX ORDER — TACROLIMUS 1 MG/1
3 CAPSULE ORAL EVERY MORNING
Status: DISCONTINUED | OUTPATIENT
Start: 2022-01-27 | End: 2022-01-27 | Stop reason: HOSPADM

## 2022-01-26 RX ORDER — INSULIN ASPART 100 [IU]/ML
0-5 INJECTION, SOLUTION INTRAVENOUS; SUBCUTANEOUS
Status: DISCONTINUED | OUTPATIENT
Start: 2022-01-26 | End: 2022-01-27 | Stop reason: HOSPADM

## 2022-01-26 RX ORDER — ACETAMINOPHEN 325 MG/1
650 TABLET ORAL EVERY 4 HOURS PRN
Status: DISCONTINUED | OUTPATIENT
Start: 2022-01-26 | End: 2022-01-27 | Stop reason: HOSPADM

## 2022-01-26 RX ORDER — ATORVASTATIN CALCIUM 10 MG/1
20 TABLET, FILM COATED ORAL NIGHTLY
Status: DISCONTINUED | OUTPATIENT
Start: 2022-01-26 | End: 2022-01-27 | Stop reason: HOSPADM

## 2022-01-26 RX ORDER — TACROLIMUS 1 MG/1
2 CAPSULE ORAL EVERY EVENING
Status: DISCONTINUED | OUTPATIENT
Start: 2022-01-26 | End: 2022-01-27 | Stop reason: HOSPADM

## 2022-01-26 RX ORDER — MAGNESIUM SULFATE 1 G/100ML
1 INJECTION INTRAVENOUS ONCE
Status: COMPLETED | OUTPATIENT
Start: 2022-01-26 | End: 2022-01-26

## 2022-01-26 RX ORDER — GLUCAGON 1 MG
1 KIT INJECTION
Status: DISCONTINUED | OUTPATIENT
Start: 2022-01-26 | End: 2022-01-27 | Stop reason: HOSPADM

## 2022-01-26 RX ORDER — CARVEDILOL 3.12 MG/1
3.12 TABLET ORAL 2 TIMES DAILY WITH MEALS
Status: DISCONTINUED | OUTPATIENT
Start: 2022-01-26 | End: 2022-01-27 | Stop reason: HOSPADM

## 2022-01-26 RX ORDER — SODIUM CHLORIDE 0.9 % (FLUSH) 0.9 %
10 SYRINGE (ML) INJECTION EVERY 8 HOURS
Status: DISCONTINUED | OUTPATIENT
Start: 2022-01-26 | End: 2022-01-26

## 2022-01-26 RX ORDER — MYCOPHENOLATE MOFETIL 250 MG/1
500 CAPSULE ORAL 2 TIMES DAILY
Status: DISCONTINUED | OUTPATIENT
Start: 2022-01-26 | End: 2022-01-27 | Stop reason: HOSPADM

## 2022-01-26 RX ORDER — TALC
6 POWDER (GRAM) TOPICAL NIGHTLY PRN
Status: DISCONTINUED | OUTPATIENT
Start: 2022-01-26 | End: 2022-01-27 | Stop reason: HOSPADM

## 2022-01-26 RX ORDER — IBUPROFEN 200 MG
16 TABLET ORAL
Status: DISCONTINUED | OUTPATIENT
Start: 2022-01-26 | End: 2022-01-27 | Stop reason: HOSPADM

## 2022-01-26 RX ORDER — IBUPROFEN 200 MG
24 TABLET ORAL
Status: DISCONTINUED | OUTPATIENT
Start: 2022-01-26 | End: 2022-01-27 | Stop reason: HOSPADM

## 2022-01-26 RX ORDER — HYDROCODONE BITARTRATE AND ACETAMINOPHEN 500; 5 MG/1; MG/1
TABLET ORAL
Status: DISCONTINUED | OUTPATIENT
Start: 2022-01-26 | End: 2022-01-27 | Stop reason: HOSPADM

## 2022-01-26 RX ADMIN — SODIUM CHLORIDE, SODIUM LACTATE, POTASSIUM CHLORIDE, AND CALCIUM CHLORIDE: .6; .31; .03; .02 INJECTION, SOLUTION INTRAVENOUS at 02:01

## 2022-01-26 RX ADMIN — SODIUM BICARBONATE 1300 MG: 325 TABLET ORAL at 05:01

## 2022-01-26 RX ADMIN — CARVEDILOL 3.12 MG: 3.12 TABLET, FILM COATED ORAL at 08:01

## 2022-01-26 RX ADMIN — TACROLIMUS 2 MG: 1 CAPSULE ORAL at 07:01

## 2022-01-26 RX ADMIN — SODIUM CHLORIDE 1000 ML: 0.9 INJECTION, SOLUTION INTRAVENOUS at 11:01

## 2022-01-26 RX ADMIN — MAGNESIUM SULFATE 1 G: 1 INJECTION INTRAVENOUS at 06:01

## 2022-01-26 NOTE — SUBJECTIVE & OBJECTIVE
Past Medical History:   Diagnosis Date    Acidosis 2014    Allergic rhinitis 2014    Allergy     Anemia     Anemia in chronic kidney disease 2014    Anxiety     Cataract     Chronic bilateral low back pain with bilateral sciatica 10/25/2019    Chronic immunosuppression with Prograf and MMF 12/3/2014    CKD (chronic kidney disease) stage 5, GFR less than 15 ml/min 2014    CKD (chronic kidney disease), stage III 3/2/2015    Degenerative disc disease     Depression 2014    Diabetes mellitus, type 2 since age 18 2014    ESRD on peritoneal dialysis - 2014 for 9 hours no peritonitis 2014    Hyperlipidemia     Hypertension 2014    Hypomagnesemia 2015    Kidney transplant status, living unrelated donor - 12/2/14 12/3/2014    Neutropenia 2015    NS (nuclear sclerosis) 2016    Obesity     Organ transplant candidate 2014    Pre-op exam 2014    Proliferative diabetic retinopathy of both eyes without macular edema associated with type 2 diabetes mellitus 2016    Renal manifestation of secondary diabetes mellitus     Tendinitis     Trouble in sleeping        Past Surgical History:   Procedure Laterality Date    BIOPSY N/A 2020    Procedure: Biopsy;  Surgeon: Sweta Catalan MD;  Location: [address];  Service: Transplant;  Laterality: N/A;    BONE MARROW BIOPSY N/A      SECTION      x 2    KIDNEY TRANSPLANT  2014    MEDIPORT REMOVAL N/A 2019    Procedure: REMOVAL, CATHETER, CENTRAL VENOUS, TUNNELED, WITH PORT;  Surgeon: Eusebia Diagnostic Provider;  Location: [facility] OR;  Service: Radiology;  Laterality: N/A;    RENAL BIOPSY      ROTATOR CUFF REPAIR      TUBAL LIGATION         Review of patient's allergies indicates:   Allergen Reactions    Shrimp Hives    Topamax [topiramate] Other (See Comments)     Vision changes    Zoloft [sertraline] Palpitations       No current facility-administered medications on  "file prior to encounter.     Current Outpatient Medications on File Prior to Encounter   Medication Sig    acetaminophen (TYLENOL) 500 MG tablet Take 500 mg by mouth every 6 (six) hours as needed for Pain.    atorvastatin (LIPITOR) 20 MG tablet Take 20 mg by mouth every evening.    carvediloL (COREG) 3.125 MG tablet Take 3.125 mg by mouth 2 (two) times daily with meals.    chlorthalidone (HYGROTEN) 25 MG Tab Take 1 tablet (25 mg total) by mouth once daily.    HUMALOG KWIKPEN INSULIN 100 unit/mL pen Inject 10 Units into the skin 3 (three) times daily with meals.    LANTUS SOLOSTAR U-100 INSULIN glargine 100 units/mL (3mL) SubQ pen Inject 50 Units into the skin once daily. (Patient taking differently: Inject 50 Units into the skin every evening.)    mycophenolate (CELLCEPT) 250 mg Cap TAKE 2 CAPSULES ( 500 MG TOTAL) BY MOUTH 2 TIMES DAILY    sodium bicarbonate 650 MG tablet Take 2 tablets (1,300 mg total) by mouth 3 (three) times daily.    tacrolimus (PROGRAF) 1 MG Cap Take 3 capsules (3 mg total) by mouth every morning AND 2 capsules (2 mg total) every evening. Z94.0.    timolol maleate 0.5% (TIMOPTIC) 0.5 % Drop Place 1 drop into both eyes once daily.    TRULICITY 0.75 mg/0.5 mL pen injector Inject 0.75 mg into the skin every 7 days. Wednesday    zolpidem (AMBIEN) 10 mg Tab Take 10 mg by mouth nightly as needed.    ALPRAZolam (XANAX) 0.25 MG tablet Take 0.25 mg by mouth 2 (two) times daily as needed.    blood sugar diagnostic Strp Checks BG ac/hs    insulin syringe-needle U-100 (INSULIN SYRINGE) 1/2 mL 30 x 5/16" Syrg Uses 4 daily    lancets (ONETOUCH DELICA LANCETS) 33 gauge Misc 1 lancet by Misc.(Non-Drug; Combo Route) route 4 (four) times daily before meals and nightly.    SYRINGE & NEEDLE,INSULIN,1 ML (INSULIN SYRINGE-NEEDLE U-100) 1 mL 29 X 7/16" Syrg      Family History     Problem Relation (Age of Onset)    Cataracts Maternal Grandmother    Diabetes Mother, Father, Sister, Brother, Sister    " Heart disease Sister, Maternal Grandmother    Heart failure Sister    Hypertension Mother, Sister    Kidney disease Father, Sister    No Known Problems Maternal Aunt, Maternal Uncle, Paternal Aunt, Paternal Uncle, Maternal Grandfather, Paternal Grandmother, Paternal Grandfather    Stroke Maternal Grandmother        Tobacco Use    Smoking status: Former Smoker     Packs/day: 1.00     Years: 20.00     Pack years: 20.00     Types: Cigarettes     Quit date: 2013     Years since quittin.4    Smokeless tobacco: Never Used    Tobacco comment: quit smoking cigarettes in 2014   Substance and Sexual Activity    Alcohol use: No    Drug use: No    Sexual activity: Yes     Partners: Male     Birth control/protection: Post-menopausal     Review of Systems   Constitutional: Positive for fatigue. Negative for chills and fever.   HENT: Negative for nosebleeds and tinnitus.    Eyes: Negative for photophobia and visual disturbance.   Respiratory: Positive for shortness of breath (with exertion). Negative for wheezing.    Cardiovascular: Negative for chest pain, palpitations and leg swelling.   Gastrointestinal: Negative for abdominal distention, nausea and vomiting.   Genitourinary: Negative for dysuria, flank pain and hematuria.   Musculoskeletal: Negative for gait problem and joint swelling.   Skin: Negative for rash and wound.   Neurological: Positive for weakness. Negative for seizures and syncope.     Objective:     Vital Signs (Most Recent):  Temp: 99.4 °F (37.4 °C) (22 0925)  Pulse: 92 (22 1403)  Resp: 20 (22 1403)  BP: (!) 110/59 (22 1403)  SpO2: 100 % (22 1403) Vital Signs (24h Range):  Temp:  [99.4 °F (37.4 °C)] 99.4 °F (37.4 °C)  Pulse:  [] 92  Resp:  [16-20] 20  SpO2:  [100 %] 100 %  BP: (108-125)/(59-69) 110/59     Weight: 81.6 kg (180 lb)  Body mass index is 28.19 kg/m².    Physical Exam  Vitals and nursing note reviewed.   Constitutional:       General: She is  not in acute distress.     Appearance: She is well-developed and well-nourished. She is not diaphoretic.   HENT:      Head: Normocephalic and atraumatic.      Right Ear: External ear normal.      Left Ear: External ear normal.   Eyes:      General:         Right eye: No discharge.         Left eye: No discharge.      Extraocular Movements: EOM normal.      Conjunctiva/sclera: Conjunctivae normal.   Neck:      Thyroid: No thyromegaly.   Cardiovascular:      Rate and Rhythm: Normal rate and regular rhythm.      Heart sounds: No murmur heard.      Pulmonary:      Effort: Pulmonary effort is normal. No respiratory distress.      Breath sounds: Normal breath sounds.      Comments: On RA  Abdominal:      General: Bowel sounds are normal. There is no distension.      Palpations: Abdomen is soft. There is no mass.      Tenderness: There is no abdominal tenderness.   Musculoskeletal:         General: No deformity or edema.      Cervical back: Normal range of motion and neck supple.      Right lower leg: No edema.      Left lower leg: No edema.   Skin:     General: Skin is warm and dry.      Coloration: Skin is pale.   Neurological:      Mental Status: She is alert and oriented to person, place, and time.   Psychiatric:         Mood and Affect: Mood and affect and mood normal.         Behavior: Behavior normal.           CRANIAL NERVES     CN III, IV, VI   Extraocular motions are normal.        Significant Labs:   CBC:   Recent Labs   Lab 01/26/22  1007   WBC 12.08   HGB 6.6*   HCT 21.4*        CMP:   Recent Labs   Lab 01/26/22  1007   *   K 4.0      CO2 17*   GLU 71   *   CREATININE 6.5*   CALCIUM 9.6   PROT 10.0*   ALBUMIN 3.0*   BILITOT 0.3   ALKPHOS 64   AST 8*   ALT 7*   ANIONGAP 15   EGFRNONAA 6*       Significant Imaging:   Imaging Results          X-Ray Chest AP Portable (Final result)  Result time 01/26/22 10:32:58    Final result by Elliot Cesar MD (01/26/22 10:32:58)                  Impression:      No convincing evidence of acute cardiopulmonary disease.      Electronically signed by: Elliot Cesar  Date:    01/26/2022  Time:    10:32             Narrative:    EXAMINATION:  XR CHEST AP PORTABLE    CLINICAL HISTORY:  Weakness    TECHNIQUE:  Single frontal view of the chest was performed.    COMPARISON:  Chest radiograph performed 12/21/2021    FINDINGS:  Monitoring leads overlie the chest.  The cardiomediastinal contour appears grossly unchanged atherosclerotic calcification of the aorta, as before.  Lungs appear grossly clear.  No definite pneumothorax or pleural effusion.  No acute findings are identified in the visualized abdomen.  Osseous and soft tissue structures appear without definite acute change.  Suture anchor again projects over the right humeral head.

## 2022-01-26 NOTE — ED NOTES
Pt a&ox3, calm and cooperative, denies any medical discomfort at the time. Respirations even/unlabored, NAD noted. Sinus on cardiac monitor

## 2022-01-26 NOTE — ASSESSMENT & PLAN NOTE
Baseline Cr of 4.7-4.9 though variable. Cr today of 6.5. started on chlorthalidone 3 weeks ago. Denies NSAID or antibiotic use.   Continue home sodium bicarb  Will check UA, Fena unreliable in diuretic use  Started on IVF  Nephrology consulted  Home chlorthalidone held

## 2022-01-26 NOTE — ED PROVIDER NOTES
Encounter Date: 1/26/2022       History     Chief Complaint   Patient presents with    Abnormal Lab     Patient reports that she was by her PCP and had blood work done. The provider called her this morning and told her that her blood count was low and to go to the ED for a transfusion. Denies blood in urine or stool. Denies use of blood thinners. Reports hx of anemia.      Patient is a 57-year-old woman who presented to the emergency department today after her primary care physician called her stating that her blood counts were low.  She states that she has had previous blood transfusions for known anemia for which she has had a significant workup in the past.  She states that she is anemic but denies any bloody bowel movements, dark stools, or any other concerning symptoms.  She does state that she feels fatigued at this time but denies any other symptoms.  She states that the last time that her blood counts were low she was sent to the emergency department where she received a blood transfusion then was discharged home.        Review of patient's allergies indicates:   Allergen Reactions    Shrimp Hives    Topamax [topiramate] Other (See Comments)     Vision changes    Zoloft [sertraline] Palpitations     Past Medical History:   Diagnosis Date    Acidosis 7/1/2014    Allergic rhinitis 7/1/2014    Allergy     Anemia     Anemia in chronic kidney disease 7/1/2014    Anxiety     Cataract     Chronic bilateral low back pain with bilateral sciatica 10/25/2019    Chronic immunosuppression with Prograf and MMF 12/3/2014    CKD (chronic kidney disease) stage 5, GFR less than 15 ml/min 7/1/2014    CKD (chronic kidney disease), stage III 3/2/2015    Degenerative disc disease     Depression 7/1/2014    Diabetes mellitus, type 2 since age 18 7/1/2014    ESRD on peritoneal dialysis - august 2014 for 9 hours no peritonitis 11/24/2014    Hyperlipidemia     Hypertension 7/1/2014    Hypomagnesemia 1/7/2015     Kidney transplant status, living unrelated donor - 12/2/14 12/3/2014    Neutropenia 2015    NS (nuclear sclerosis) 2016    Obesity     Organ transplant candidate 2014    Pre-op exam 2014    Proliferative diabetic retinopathy of both eyes without macular edema associated with type 2 diabetes mellitus 2016    Renal manifestation of secondary diabetes mellitus     Tendinitis     Trouble in sleeping      Past Surgical History:   Procedure Laterality Date    BIOPSY N/A 2020    Procedure: Biopsy;  Surgeon: Sweta Catalan MD;  Location: [facility] OR [address] [place] [address];  Service: Transplant;  Laterality: N/A;    BONE MARROW BIOPSY N/A      SECTION      x 2    KIDNEY TRANSPLANT  2014    MEDIPORT REMOVAL N/A 2019    Procedure: REMOVAL, CATHETER, CENTRAL VENOUS, TUNNELED, WITH PORT;  Surgeon: Eusebia Diagnostic Provider;  Location: [facility] OR;  Service: Radiology;  Laterality: N/A;    RENAL BIOPSY      ROTATOR CUFF REPAIR      TUBAL LIGATION       Family History   Problem Relation Age of Onset    Diabetes Mother     Hypertension Mother     Diabetes Father     Kidney disease Father     Diabetes Sister     Hypertension Sister     Kidney disease Sister     Heart disease Sister     Heart failure Sister     Diabetes Brother     Diabetes Sister     Stroke Maternal Grandmother     Heart disease Maternal Grandmother         pacemaker    Cataracts Maternal Grandmother     No Known Problems Maternal Aunt     No Known Problems Maternal Uncle     No Known Problems Paternal Aunt     No Known Problems Paternal Uncle     No Known Problems Maternal Grandfather     No Known Problems Paternal Grandmother     No Known Problems Paternal Grandfather     Cancer Neg Hx     Amblyopia Neg Hx     Blindness Neg Hx     Glaucoma Neg Hx     Macular degeneration Neg Hx     Retinal detachment Neg Hx     Strabismus Neg Hx     Thyroid disease Neg Hx     Breast cancer Neg Hx     Colon cancer  Neg Hx     Ovarian cancer Neg Hx      Social History     Tobacco Use    Smoking status: Former Smoker     Packs/day: 1.00     Years: 20.00     Pack years: 20.00     Types: Cigarettes     Quit date: 2013     Years since quittin.4    Smokeless tobacco: Never Used    Tobacco comment: quit smoking cigarettes in 2014   Substance Use Topics    Alcohol use: No    Drug use: No     Review of Systems   Constitutional: Positive for fatigue. Negative for chills and fever.   HENT: Negative for congestion.    Eyes: Negative for pain.   Respiratory: Negative for shortness of breath.    Cardiovascular: Negative for chest pain.   Gastrointestinal: Negative for abdominal pain and blood in stool.   Genitourinary: Negative for flank pain.   Musculoskeletal: Negative for myalgias.   Skin: Negative for color change.   Neurological: Negative for headaches.   Hematological: Does not bruise/bleed easily.   All other systems reviewed and are negative.      Physical Exam     Initial Vitals [22 0925]   BP Pulse Resp Temp SpO2   122/68 100 16 99.4 °F (37.4 °C) 100 %      MAP       --         Physical Exam    Nursing note and vitals reviewed.  Constitutional: She appears well-developed.   HENT:   Head: Normocephalic.   Mouth/Throat: Oropharynx is clear and moist.   Eyes: Conjunctivae are normal.   Neck:   Normal range of motion.  Cardiovascular: Regular rhythm. Exam reveals no decreased pulses.    No murmur heard.  Pulmonary/Chest: No respiratory distress.   Abdominal: Abdomen is soft. She exhibits no distension.   Musculoskeletal:         General: Normal range of motion.      Cervical back: Normal range of motion.     Neurological: She is alert.   Skin: Skin is warm and dry. There is pallor.   Psychiatric: She has a normal mood and affect.         ED Course   Procedures  Labs Reviewed   CBC W/ AUTO DIFFERENTIAL - Abnormal; Notable for the following components:       Result Value    RBC 2.47 (*)     Hemoglobin 6.6  (*)     Hematocrit 21.4 (*)     MCH 26.7 (*)     MCHC 30.8 (*)     Immature Granulocytes 0.7 (*)     Gran # (ANC) 10.2 (*)     Immature Grans (Abs) 0.08 (*)     Gran % 84.4 (*)     Lymph % 8.7 (*)     All other components within normal limits   COMPREHENSIVE METABOLIC PANEL - Abnormal; Notable for the following components:    Sodium 132 (*)     CO2 17 (*)      (*)     Creatinine 6.5 (*)     Total Protein 10.0 (*)     Albumin 3.0 (*)     AST 8 (*)     ALT 7 (*)     eGFR if  7 (*)     eGFR if non  6 (*)     All other components within normal limits   MAGNESIUM - Abnormal; Notable for the following components:    Magnesium 1.4 (*)     All other components within normal limits   PHOSPHORUS - Abnormal; Notable for the following components:    Phosphorus 6.2 (*)     All other components within normal limits   URINALYSIS   URINALYSIS, REFLEX TO URINE CULTURE   SARS-COV-2 RDRP GENE    Narrative:     This test utilizes isothermal nucleic acid amplification   technology to detect the SARS-CoV-2 RdRp nucleic acid segment.   The analytical sensitivity (limit of detection) is 125 genome   equivalents/mL.   A POSITIVE result implies infection with the SARS-CoV-2 virus;   the patient is presumed to be contagious.     A NEGATIVE result means that SARS-CoV-2 nucleic acids are not   present above the limit of detection. A NEGATIVE result should be   treated as presumptive. It does not rule out the possibility of   COVID-19 and should not be the sole basis for treatment decisions.   If COVID-19 is strongly suspected based on clinical and exposure   history, re-testing using an alternate molecular assay should be   considered.   This test is only for use under the Food and Drug   Administration s Emergency Use Authorization (EUA).   Commercial kits are provided by [employer].   Performance characteristics of the EUA have been independently   verified by [facility] Department of    Pathology and Laboratory Medicine.   _________________________________________________________________   The authorized Fact Sheet for Healthcare Providers and the authorized Fact   Sheet for Patients of the ID NOW COVID-19 are available on the FDA   website:     https://www.fda.gov/media/525908/download  https://www.fda.gov/media/484356/download       TYPE & SCREEN   POCT GLUCOSE MONITORING CONTINUOUS   PREPARE RBC SOFT     EKG Readings: (Independently Interpreted)   Initial Reading: No STEMI. Rhythm: Normal Sinus Rhythm. Heart Rate: 93. Ectopy: No Ectopy.       Imaging Results          X-Ray Chest AP Portable (Final result)  Result time 01/26/22 10:32:58    Final result by Elliot Cesar MD (01/26/22 10:32:58)                 Impression:      No convincing evidence of acute cardiopulmonary disease.      Electronically signed by: Elliot Cesar  Date:    01/26/2022  Time:    10:32             Narrative:    EXAMINATION:  XR CHEST AP PORTABLE    CLINICAL HISTORY:  Weakness    TECHNIQUE:  Single frontal view of the chest was performed.    COMPARISON:  Chest radiograph performed 12/21/2021    FINDINGS:  Monitoring leads overlie the chest.  The cardiomediastinal contour appears grossly unchanged atherosclerotic calcification of the aorta, as before.  Lungs appear grossly clear.  No definite pneumothorax or pleural effusion.  No acute findings are identified in the visualized abdomen.  Osseous and soft tissue structures appear without definite acute change.  Suture anchor again projects over the right humeral head.                                 Medications   lactated ringers infusion ( Intravenous New Bag 1/26/22 1424)   magnesium sulfate in dextrose IVPB (premix) 1 g (has no administration in time range)   0.9%  NaCl infusion (for blood administration) (has no administration in time range)   glucose chewable tablet 16 g (has no administration in time range)   glucose chewable tablet 24 g (has no administration  in time range)   dextrose 50% injection 12.5 g (has no administration in time range)   dextrose 50% injection 25 g (has no administration in time range)   glucagon (human recombinant) injection 1 mg (has no administration in time range)   insulin aspart U-100 pen 0-5 Units (has no administration in time range)   melatonin tablet 6 mg (has no administration in time range)   senna-docusate 8.6-50 mg per tablet 1 tablet (has no administration in time range)   acetaminophen tablet 650 mg (has no administration in time range)   naloxone 0.4 mg/mL injection 0.02 mg (has no administration in time range)   magnesium oxide tablet 800 mg (has no administration in time range)   magnesium oxide tablet 800 mg (has no administration in time range)   sodium chloride 0.9% flush 10 mL (has no administration in time range)   atorvastatin tablet 20 mg (has no administration in time range)   carvediloL tablet 3.125 mg (has no administration in time range)   mycophenolate capsule 500 mg (has no administration in time range)   sodium bicarbonate tablet 1,300 mg (has no administration in time range)   tacrolimus capsule 3 mg (has no administration in time range)   tacrolimus capsule 2 mg (has no administration in time range)   sodium chloride 0.9% bolus 1,000 mL (0 mLs Intravenous Stopped 1/26/22 2985)     Medical Decision Making:   History:   Old Medical Records: I decided to obtain old medical records.  Initial Assessment:   This is an emergent evaluation of a 57-year-old woman who presented to the emergency department today secondary to low blood counts.  Differential Diagnosis:   Worsening anemia, acute on chronic kidney failure, amongst others  Clinical Tests:   Lab Tests: Ordered  Radiological Study: Ordered  ED Management:  On physical examination, patient is in no acute distress however does appear fatigued and has pale conjunctiva.  Vital signs however show a pulse that is mildly tachycardic but otherwise within normal limits.   Lung sounds are clear to auscultation bilaterally.  I will obtain repeat labs then transfuse if necessary.  Disposition is pending.    Sara Salcedo MD  9:47 AM  1/26/2022    Labs returned and were concerning for not only a low H&H but also worsening kidney function.  I consulted the renal transplant service at [facility] and discussed her care with them.  They advised hydration and blood transfusion as well as Nephrology recommendations.  I have consulted our hospitalist team to admit her at this time and have ordered IV fluids.  I will defer blood transfusion to them.    Sara Salcedo MD  3:07 PM  1/26/2022                         Clinical Impression:   Final diagnoses:  [D64.9] Anemia  [R53.1] Weakness  [D64.9] Symptomatic anemia (Primary)  [R07.9] Chest pain          ED Disposition Condition    Observation               Sara Salcedo MD  01/26/22 1507

## 2022-01-26 NOTE — HPI
Elizabeth Maldonado 57 y.o. female with CKD S/P transplant, HTN, HLD, DM, chronic immuosuppression had presents to hospital with a chief complaint of abnormal lab.  She reports she has been feeling weak and fatigued with dyspnea on exertion for the last 4 days.  Her labs were checked outpatient, and she was found to have low hemoglobin and direct the emergency room.  She reports she has received blood transfusion in the past.  She is followed by Nephrology and under consideration for possible repeat renal transplant.  She reports her normal p.o. intake at home, she has not had no recent NSAID use no antibiotic use or seek ED T contrast exposure.  She was started on chlorthalidone by her outpatient nephrologist 2 weeks ago.  She denies fever chest pain nausea vomiting abdominal pain leg swelling syncope dizziness dysuria melena hematemesis and fever.    In the ED, hemoglobin 6.6, creatinine 6.4, , COVID negative, chest x-ray without acute process, AB positive type and screen.

## 2022-01-26 NOTE — ASSESSMENT & PLAN NOTE
Lab Results   Component Value Date    HGBA1C 7.2 (H) 12/23/2021     Will start sliding scale insulin, diabetic/renal diet, goal -180

## 2022-01-26 NOTE — ED NOTES
Pt a&ox3, calm and cooperative, respirations even/unlabred, NAD noted. Pt educated of s&s of blood transfusion, verbalizes understanding. Pt states to have had previous blood transfusions. Consent signed and uploaded in chart.

## 2022-01-26 NOTE — H&P
[facility]  [facility]  History & Physical    Patient Name: Elizabeth Maldonado  MRN: [id number]  Patient Class: OP- Observation  Admission Date: 1/26/2022  Attending Physician: Jeremias Aguiar MD   Primary Care Provider: Richy Singleton NP         Patient information was obtained from patient, past medical records and ER records.     Subjective:     Principal Problem:Symptomatic anemia    Chief Complaint:   Chief Complaint   Patient presents with    Abnormal Lab     Patient reports that she was by her PCP and had blood work done. The provider called her this morning and told her that her blood count was low and to go to the ED for a transfusion. Denies blood in urine or stool. Denies use of blood thinners. Reports hx of anemia.         HPI: Elizabeth Maldonado 57 y.o. female with CKD S/P transplant, HTN, HLD, DM, chronic immuosuppression had presents to hospital with a chief complaint of abnormal lab.  She reports she has been feeling weak and fatigued with dyspnea on exertion for the last 4 days.  Her labs were checked outpatient, and she was found to have low hemoglobin and direct the emergency room.  She reports she has received blood transfusion in the past.  She is followed by Nephrology and under consideration for possible repeat renal transplant.  She reports her normal p.o. intake at home, she has not had no recent NSAID use no antibiotic use or seek ED T contrast exposure.  She was started on chlorthalidone by her outpatient nephrologist 2 weeks ago.  She denies fever chest pain nausea vomiting abdominal pain leg swelling syncope dizziness dysuria melena hematemesis and fever.    In the ED, hemoglobin 6.6, creatinine 6.4, , COVID negative, chest x-ray without acute process, AB positive type and screen.      Past Medical History:   Diagnosis Date    Acidosis 7/1/2014    Allergic rhinitis 7/1/2014    Allergy     Anemia     Anemia in chronic kidney disease 7/1/2014     Anxiety     Cataract     Chronic bilateral low back pain with bilateral sciatica 10/25/2019    Chronic immunosuppression with Prograf and MMF 12/3/2014    CKD (chronic kidney disease) stage 5, GFR less than 15 ml/min 2014    CKD (chronic kidney disease), stage III 3/2/2015    Degenerative disc disease     Depression 2014    Diabetes mellitus, type 2 since age 18 2014    ESRD on peritoneal dialysis - 2014 for 9 hours no peritonitis 2014    Hyperlipidemia     Hypertension 2014    Hypomagnesemia 2015    Kidney transplant status, living unrelated donor - 12/2/14 12/3/2014    Neutropenia 2015    NS (nuclear sclerosis) 2016    Obesity     Organ transplant candidate 2014    Pre-op exam 2014    Proliferative diabetic retinopathy of both eyes without macular edema associated with type 2 diabetes mellitus 2016    Renal manifestation of secondary diabetes mellitus     Tendinitis     Trouble in sleeping        Past Surgical History:   Procedure Laterality Date    BIOPSY N/A 2020    Procedure: Biopsy;  Surgeon: Sweta Catalan MD;  Location: [address];  Service: Transplant;  Laterality: N/A;    BONE MARROW BIOPSY N/A      SECTION      x 2    KIDNEY TRANSPLANT  2014    MEDIPORT REMOVAL N/A 2019    Procedure: REMOVAL, CATHETER, CENTRAL VENOUS, TUNNELED, WITH PORT;  Surgeon: Eusebia Diagnostic Provider;  [facility];  Service: Radiology;  Laterality: N/A;    RENAL BIOPSY      ROTATOR CUFF REPAIR      TUBAL LIGATION         Review of patient's allergies indicates:   Allergen Reactions    Shrimp Hives    Topamax [topiramate] Other (See Comments)     Vision changes    Zoloft [sertraline] Palpitations       No current facility-administered medications on file prior to encounter.     Current Outpatient Medications on File Prior to Encounter   Medication Sig    acetaminophen (TYLENOL) 500 MG tablet Take 500 mg by mouth  "every 6 (six) hours as needed for Pain.    atorvastatin (LIPITOR) 20 MG tablet Take 20 mg by mouth every evening.    carvediloL (COREG) 3.125 MG tablet Take 3.125 mg by mouth 2 (two) times daily with meals.    chlorthalidone (HYGROTEN) 25 MG Tab Take 1 tablet (25 mg total) by mouth once daily.    HUMALOG KWIKPEN INSULIN 100 unit/mL pen Inject 10 Units into the skin 3 (three) times daily with meals.    LANTUS SOLOSTAR U-100 INSULIN glargine 100 units/mL (3mL) SubQ pen Inject 50 Units into the skin once daily. (Patient taking differently: Inject 50 Units into the skin every evening.)    mycophenolate (CELLCEPT) 250 mg Cap TAKE 2 CAPSULES ( 500 MG TOTAL) BY MOUTH 2 TIMES DAILY    sodium bicarbonate 650 MG tablet Take 2 tablets (1,300 mg total) by mouth 3 (three) times daily.    tacrolimus (PROGRAF) 1 MG Cap Take 3 capsules (3 mg total) by mouth every morning AND 2 capsules (2 mg total) every evening. Z94.0.    timolol maleate 0.5% (TIMOPTIC) 0.5 % Drop Place 1 drop into both eyes once daily.    TRULICITY 0.75 mg/0.5 mL pen injector Inject 0.75 mg into the skin every 7 days. Wednesday    zolpidem (AMBIEN) 10 mg Tab Take 10 mg by mouth nightly as needed.    ALPRAZolam (XANAX) 0.25 MG tablet Take 0.25 mg by mouth 2 (two) times daily as needed.    blood sugar diagnostic Strp Checks BG ac/hs    insulin syringe-needle U-100 (INSULIN SYRINGE) 1/2 mL 30 x 5/16" Syrg Uses 4 daily    lancets (ONETOUCH DELICA LANCETS) 33 gauge Misc 1 lancet by Misc.(Non-Drug; Combo Route) route 4 (four) times daily before meals and nightly.    SYRINGE & NEEDLE,INSULIN,1 ML (INSULIN SYRINGE-NEEDLE U-100) 1 mL 29 X 7/16" Syrg      Family History     Problem Relation (Age of Onset)    Cataracts Maternal Grandmother    Diabetes Mother, Father, Sister, Brother, Sister    Heart disease Sister, Maternal Grandmother    Heart failure Sister    Hypertension Mother, Sister    Kidney disease Father, Sister    No Known Problems Maternal Aunt, " Maternal Uncle, Paternal Aunt, Paternal Uncle, Maternal Grandfather, Paternal Grandmother, Paternal Grandfather    Stroke Maternal Grandmother        Tobacco Use    Smoking status: Former Smoker     Packs/day: 1.00     Years: 20.00     Pack years: 20.00     Types: Cigarettes     Quit date: 2013     Years since quittin.4    Smokeless tobacco: Never Used    Tobacco comment: quit smoking cigarettes in 2014   Substance and Sexual Activity    Alcohol use: No    Drug use: No    Sexual activity: Yes     Partners: Male     Birth control/protection: Post-menopausal     Review of Systems   Constitutional: Positive for fatigue. Negative for chills and fever.   HENT: Negative for nosebleeds and tinnitus.    Eyes: Negative for photophobia and visual disturbance.   Respiratory: Positive for shortness of breath (with exertion). Negative for wheezing.    Cardiovascular: Negative for chest pain, palpitations and leg swelling.   Gastrointestinal: Negative for abdominal distention, nausea and vomiting.   Genitourinary: Negative for dysuria, flank pain and hematuria.   Musculoskeletal: Negative for gait problem and joint swelling.   Skin: Negative for rash and wound.   Neurological: Positive for weakness. Negative for seizures and syncope.     Objective:     Vital Signs (Most Recent):  Temp: 99.4 °F (37.4 °C) (22 0925)  Pulse: 92 (22 1403)  Resp: 20 (22 1403)  BP: (!) 110/59 (22 1403)  SpO2: 100 % (22 1403) Vital Signs (24h Range):  Temp:  [99.4 °F (37.4 °C)] 99.4 °F (37.4 °C)  Pulse:  [] 92  Resp:  [16-20] 20  SpO2:  [100 %] 100 %  BP: (108-125)/(59-69) 110/59     Weight: 81.6 kg (180 lb)  Body mass index is 28.19 kg/m².    Physical Exam  Vitals and nursing note reviewed.   Constitutional:       General: She is not in acute distress.     Appearance: She is well-developed and well-nourished. She is not diaphoretic.   HENT:      Head: Normocephalic and atraumatic.      Right  Ear: External ear normal.      Left Ear: External ear normal.   Eyes:      General:         Right eye: No discharge.         Left eye: No discharge.      Extraocular Movements: EOM normal.      Conjunctiva/sclera: Conjunctivae normal.   Neck:      Thyroid: No thyromegaly.   Cardiovascular:      Rate and Rhythm: Normal rate and regular rhythm.      Heart sounds: No murmur heard.      Pulmonary:      Effort: Pulmonary effort is normal. No respiratory distress.      Breath sounds: Normal breath sounds.      Comments: On RA  Abdominal:      General: Bowel sounds are normal. There is no distension.      Palpations: Abdomen is soft. There is no mass.      Tenderness: There is no abdominal tenderness.   Musculoskeletal:         General: No deformity or edema.      Cervical back: Normal range of motion and neck supple.      Right lower leg: No edema.      Left lower leg: No edema.   Skin:     General: Skin is warm and dry.      Coloration: Skin is pale.   Neurological:      Mental Status: She is alert and oriented to person, place, and time.   Psychiatric:         Mood and Affect: Mood and affect and mood normal.         Behavior: Behavior normal.           CRANIAL NERVES     CN III, IV, VI   Extraocular motions are normal.        Significant Labs:   CBC:   Recent Labs   Lab 01/26/22  1007   WBC 12.08   HGB 6.6*   HCT 21.4*        CMP:   Recent Labs   Lab 01/26/22  1007   *   K 4.0      CO2 17*   GLU 71   *   CREATININE 6.5*   CALCIUM 9.6   PROT 10.0*   ALBUMIN 3.0*   BILITOT 0.3   ALKPHOS 64   AST 8*   ALT 7*   ANIONGAP 15   EGFRNONAA 6*       Significant Imaging:   Imaging Results          X-Ray Chest AP Portable (Final result)  Result time 01/26/22 10:32:58    Final result by Elliot Cesar MD (01/26/22 10:32:58)                 Impression:      No convincing evidence of acute cardiopulmonary disease.      Electronically signed by: Elliot Cesar  Date:    01/26/2022  Time:    10:32              Narrative:    EXAMINATION:  XR CHEST AP PORTABLE    CLINICAL HISTORY:  Weakness    TECHNIQUE:  Single frontal view of the chest was performed.    COMPARISON:  Chest radiograph performed 12/21/2021    FINDINGS:  Monitoring leads overlie the chest.  The cardiomediastinal contour appears grossly unchanged atherosclerotic calcification of the aorta, as before.  Lungs appear grossly clear.  No definite pneumothorax or pleural effusion.  No acute findings are identified in the visualized abdomen.  Osseous and soft tissue structures appear without definite acute change.  Suture anchor again projects over the right humeral head.                                  Assessment/Plan:     * Symptomatic anemia  hemoglobin of 6.6 denies any active bleeding. Suspect related to CKD, MGUS and iron deficiency anemia. Has been receiving Epo with Heme/Onc per outpatient nephrology note. With symptoms of SOB and weakness.   Type and screen  Transfuse 1 unit  Repeat CBC    Acute renal failure superimposed on stage 4 chronic kidney disease  Baseline Cr of 4.7-4.9 though variable. Cr today of 6.5. started on chlorthalidone 3 weeks ago. Denies NSAID or antibiotic use.   Continue home sodium bicarb  Will check UA, Fena unreliable in diuretic use  Started on IVF  Nephrology consulted  Home chlorthalidone held    Chronic immunosuppression with Prograf  Continue home prograf    Kidney transplant status, living unrelated donor - 12/2/14  See above. Continue home cellcept and prograf  Nephrology consulted    Hyperlipidemia  Continue home statin    Type 2 diabetes mellitus, uncontrolled, with renal complications  Lab Results   Component Value Date    HGBA1C 7.2 (H) 12/23/2021     Will start sliding scale insulin, diabetic/renal diet, goal -180      VTE Risk Mitigation (From admission, onward)         Ordered     IP VTE HIGH RISK PATIENT  Once         01/26/22 1437     Place sequential compression device  Until discontinued         01/26/22  1437              VTE: SCD  Code: Full  Diet: diabetic/renal  Dispo: pending repeat H&H and nephrology eval  As clarification, on 1/26/2022, patient should be admitted for hospital observation services under my care in collaboration with Jeremias Aguiar MD. Venkatesh Smith PA-C  Department of Hospital Medicine   [facility] - Emergency Dept

## 2022-01-26 NOTE — ASSESSMENT & PLAN NOTE
hemoglobin of 6.6 denies any active bleeding. Suspect related to CKD, MGUS and iron deficiency anemia. Has been receiving Epo with Heme/Onc per outpatient nephrology note. With symptoms of SOB and weakness.   Type and screen  Transfuse 1 unit  Repeat CBC

## 2022-01-26 NOTE — PHARMACY MED REC
"Admission Medication History     The home medication history was taken by Мария Chavez CPhT.      You may go to "Admission" then "Reconcile Home Medications" tabs to review and/or act upon these items.      The home medication list has been updated by the Pharmacy department.    Please read ALL comments highlighted in yellow.    Please address this information as you see fit.     Feel free to contact us if you have any questions or require assistance.      The medications listed below were removed from the home medication list. Please reorder if appropriate:  Patient reports no longer taking the following medication(s):   Xanax                 Мария Chavez CPhT.  [phone]                    .          "

## 2022-01-26 NOTE — ED NOTES
Pt moved from ER room #4 to EDX room #26.  Awaiting  admit bed.  AAOx3 eating meal served w c/o of food being served cold.  Blood transfusion in process.  No distress noted CB in easy reach

## 2022-01-26 NOTE — ED TRIAGE NOTES
Patient presents to ED from home after receiving a call from her PCP stating she had low blood count and needs to be evaluated in the ED. Patient has a history of anemia.

## 2022-01-27 ENCOUNTER — TELEPHONE (OUTPATIENT)
Dept: NEPHROLOGY | Facility: CLINIC | Age: 57
End: 2022-01-27
Payer: MEDICAID

## 2022-01-27 VITALS
SYSTOLIC BLOOD PRESSURE: 126 MMHG | DIASTOLIC BLOOD PRESSURE: 72 MMHG | HEIGHT: 67 IN | BODY MASS INDEX: 28.25 KG/M2 | HEART RATE: 90 BPM | TEMPERATURE: 98 F | OXYGEN SATURATION: 99 % | WEIGHT: 180 LBS | RESPIRATION RATE: 18 BRPM

## 2022-01-27 DIAGNOSIS — N18.5 CKD (CHRONIC KIDNEY DISEASE) STAGE 5, GFR LESS THAN 15 ML/MIN: Primary | ICD-10-CM

## 2022-01-27 LAB
ALBUMIN SERPL BCP-MCNC: 2.5 G/DL (ref 3.5–5.2)
ALP SERPL-CCNC: 57 U/L (ref 55–135)
ALT SERPL W/O P-5'-P-CCNC: 9 U/L (ref 10–44)
ANION GAP SERPL CALC-SCNC: 13 MMOL/L (ref 8–16)
AST SERPL-CCNC: 8 U/L (ref 10–40)
BASOPHILS # BLD AUTO: 0.01 K/UL (ref 0–0.2)
BASOPHILS NFR BLD: 0.1 % (ref 0–1.9)
BILIRUB SERPL-MCNC: 0.5 MG/DL (ref 0.1–1)
BUN SERPL-MCNC: 97 MG/DL (ref 6–20)
CALCIUM SERPL-MCNC: 9.2 MG/DL (ref 8.7–10.5)
CHLORIDE SERPL-SCNC: 108 MMOL/L (ref 95–110)
CO2 SERPL-SCNC: 17 MMOL/L (ref 23–29)
CREAT SERPL-MCNC: 5.9 MG/DL (ref 0.5–1.4)
DIFFERENTIAL METHOD: ABNORMAL
EOSINOPHIL # BLD AUTO: 0.1 K/UL (ref 0–0.5)
EOSINOPHIL NFR BLD: 0.6 % (ref 0–8)
ERYTHROCYTE [DISTWIDTH] IN BLOOD BY AUTOMATED COUNT: 13.9 % (ref 11.5–14.5)
EST. GFR  (AFRICAN AMERICAN): 8 ML/MIN/1.73 M^2
EST. GFR  (NON AFRICAN AMERICAN): 7 ML/MIN/1.73 M^2
GLUCOSE SERPL-MCNC: 88 MG/DL (ref 70–110)
HCT VFR BLD AUTO: 22.5 % (ref 37–48.5)
HGB BLD-MCNC: 7.3 G/DL (ref 12–16)
IMM GRANULOCYTES # BLD AUTO: 0.06 K/UL (ref 0–0.04)
IMM GRANULOCYTES NFR BLD AUTO: 0.7 % (ref 0–0.5)
LYMPHOCYTES # BLD AUTO: 1 K/UL (ref 1–4.8)
LYMPHOCYTES NFR BLD: 12.1 % (ref 18–48)
MAGNESIUM SERPL-MCNC: 1.7 MG/DL (ref 1.6–2.6)
MCH RBC QN AUTO: 27.1 PG (ref 27–31)
MCHC RBC AUTO-ENTMCNC: 32.4 G/DL (ref 32–36)
MCV RBC AUTO: 84 FL (ref 82–98)
MONOCYTES # BLD AUTO: 0.6 K/UL (ref 0.3–1)
MONOCYTES NFR BLD: 6.7 % (ref 4–15)
NEUTROPHILS # BLD AUTO: 6.7 K/UL (ref 1.8–7.7)
NEUTROPHILS NFR BLD: 79.8 % (ref 38–73)
NRBC BLD-RTO: 0 /100 WBC
PLATELET # BLD AUTO: 192 K/UL (ref 150–450)
PMV BLD AUTO: 10.7 FL (ref 9.2–12.9)
POCT GLUCOSE: 185 MG/DL (ref 70–110)
POCT GLUCOSE: 94 MG/DL (ref 70–110)
POTASSIUM SERPL-SCNC: 4 MMOL/L (ref 3.5–5.1)
PROT SERPL-MCNC: 8.5 G/DL (ref 6–8.4)
RBC # BLD AUTO: 2.69 M/UL (ref 4–5.4)
SODIUM SERPL-SCNC: 138 MMOL/L (ref 136–145)
WBC # BLD AUTO: 8.42 K/UL (ref 3.9–12.7)

## 2022-01-27 PROCEDURE — 63600175 PHARM REV CODE 636 W HCPCS: Mod: NTX | Performed by: NURSE PRACTITIONER

## 2022-01-27 PROCEDURE — 36415 COLL VENOUS BLD VENIPUNCTURE: CPT | Mod: NTX | Performed by: PHYSICIAN ASSISTANT

## 2022-01-27 PROCEDURE — 85025 COMPLETE CBC W/AUTO DIFF WBC: CPT | Mod: NTX | Performed by: PHYSICIAN ASSISTANT

## 2022-01-27 PROCEDURE — 63600175 PHARM REV CODE 636 W HCPCS: Mod: NTX | Performed by: PHYSICIAN ASSISTANT

## 2022-01-27 PROCEDURE — 80053 COMPREHEN METABOLIC PANEL: CPT | Mod: NTX | Performed by: PHYSICIAN ASSISTANT

## 2022-01-27 PROCEDURE — 80197 ASSAY OF TACROLIMUS: CPT | Mod: NTX | Performed by: NURSE PRACTITIONER

## 2022-01-27 PROCEDURE — 83735 ASSAY OF MAGNESIUM: CPT | Mod: NTX | Performed by: PHYSICIAN ASSISTANT

## 2022-01-27 PROCEDURE — 96361 HYDRATE IV INFUSION ADD-ON: CPT

## 2022-01-27 PROCEDURE — 25000003 PHARM REV CODE 250: Mod: NTX | Performed by: INTERNAL MEDICINE

## 2022-01-27 PROCEDURE — 25000003 PHARM REV CODE 250: Mod: NTX | Performed by: PHYSICIAN ASSISTANT

## 2022-01-27 PROCEDURE — G0378 HOSPITAL OBSERVATION PER HR: HCPCS | Mod: NTX

## 2022-01-27 RX ORDER — SEVELAMER CARBONATE 800 MG/1
800 TABLET, FILM COATED ORAL
Status: DISCONTINUED | OUTPATIENT
Start: 2022-01-27 | End: 2022-01-27 | Stop reason: HOSPADM

## 2022-01-27 RX ORDER — SEVELAMER CARBONATE 800 MG/1
800 TABLET, FILM COATED ORAL
Qty: 90 TABLET | Refills: 3 | Status: ON HOLD | OUTPATIENT
Start: 2022-01-27 | End: 2023-03-13 | Stop reason: HOSPADM

## 2022-01-27 RX ADMIN — TACROLIMUS 3 MG: 1 CAPSULE ORAL at 09:01

## 2022-01-27 RX ADMIN — SODIUM CHLORIDE, SODIUM LACTATE, POTASSIUM CHLORIDE, AND CALCIUM CHLORIDE: .6; .31; .03; .02 INJECTION, SOLUTION INTRAVENOUS at 10:01

## 2022-01-27 RX ADMIN — SODIUM BICARBONATE 1300 MG: 325 TABLET ORAL at 02:01

## 2022-01-27 RX ADMIN — MYCOPHENOLATE MOFETIL 500 MG: 250 CAPSULE ORAL at 02:01

## 2022-01-27 RX ADMIN — SODIUM CHLORIDE, SODIUM LACTATE, POTASSIUM CHLORIDE, AND CALCIUM CHLORIDE: .6; .31; .03; .02 INJECTION, SOLUTION INTRAVENOUS at 12:01

## 2022-01-27 RX ADMIN — CARVEDILOL 3.12 MG: 3.12 TABLET, FILM COATED ORAL at 09:01

## 2022-01-27 RX ADMIN — SEVELAMER CARBONATE 800 MG: 800 TABLET, FILM COATED ORAL at 11:01

## 2022-01-27 RX ADMIN — ATORVASTATIN CALCIUM 20 MG: 10 TABLET, FILM COATED ORAL at 02:01

## 2022-01-27 RX ADMIN — MYCOPHENOLATE MOFETIL 500 MG: 250 CAPSULE ORAL at 09:01

## 2022-01-27 RX ADMIN — SODIUM BICARBONATE 1300 MG: 325 TABLET ORAL at 09:01

## 2022-01-27 NOTE — HOSPITAL COURSE
Placed in observation for symptomatic anemia-fatigue and dyspnea on exertion time 4 days.  Patient received 1 unit of blood last night hemoglobin 6.6-7.4.  Previous iron studies showed ferritin greater than and 4000. Recent folic acid and vitamin B12 normal range.  This a.m. on day of discharge patient reports feeling back to baseline and denies headache dizziness or shortness of breath.  Serum creatinine 6.4-5.9.  Patient is making plenty of urine.  Continue to hold chlorthalidone at this time.  Lungs are clear and no peripheral edema.  Nephrologist discussed with patient dialysis however patient would like to follow-up with her primary nephrologist.  Patient wished for PD.  Patient hemodynamically stable for discharge home with close follow-up with Hematology and Nephrology.  Repeat lab CBC and renal function panel on Monday January 31, 2022.

## 2022-01-27 NOTE — PROGRESS NOTES
Message sent to Dr Bonilla staff, pts nephrologist to request an appointment in 1-2 weeks post hospital discharge. Provided TN name and contact information. Requested that the pt be contacted with followup appointment

## 2022-01-27 NOTE — PLAN OF CARE
01/27/22 1109   Discharge Planning   Assessment Type Discharge Planning Brief Assessment   Resource/Environmental Concerns none   Support Systems Parent   Equipment Currently Used at Home glucometer   Current Living Arrangements home/apartment/condo   Patient/Family Anticipates Transition to home with family   Patient/Family Anticipated Services at Transition none   DME Needed Upon Discharge  none   Discharge Plan A Home with family  (with follow up)     [facility]  [address]  [facility] [address]  Phone: [phone] Fax: [phone]    [id number]  [place] [address] AT [address]  [place] [address]  Phone: [phone] Fax: [phone]

## 2022-01-27 NOTE — NURSING
Patient remained free from falls, trauma, and injuries throughout shift. No complaints of pain, nausea, or vomiting. IV fluids and medications administered as prescribed. Patient ambulated to and from bathroom independently. IV removed; catheter intact. Discharge instructions, future appointments and prescriptions given to patient. Educated on reasons to return to hospital and provided handouts on anemia and blood transfusion reaction; patient verbalized understanding. Awaiting transport for discharge.

## 2022-01-27 NOTE — DISCHARGE SUMMARY
[facility] [place] [facility] Medicine  Discharge Summary      Patient Name: Elizabeth Maldonado  MRN: [id number]  Patient Class: OP- Observation  Admission Date: 1/26/2022  Hospital Length of Stay: 0 days  Discharge Date and Time:  01/27/2022 11:15 AM  Attending Physician: Jeremias Aguiar MD   Discharging Provider: Noemí Bishop NP  Primary Care Provider: Richy Singleton NP      HPI:   Elizabeth Maldonado 57 y.o. female with CKD S/P transplant, HTN, HLD, DM, chronic immuosuppression had presents to hospital with a chief complaint of abnormal lab.  She reports she has been feeling weak and fatigued with dyspnea on exertion for the last 4 days.  Her labs were checked outpatient, and she was found to have low hemoglobin and direct the emergency room.  She reports she has received blood transfusion in the past.  She is followed by Nephrology and under consideration for possible repeat renal transplant.  She reports her normal p.o. intake at home, she has not had no recent NSAID use no antibiotic use or seek ED T contrast exposure.  She was started on chlorthalidone by her outpatient nephrologist 2 weeks ago.  She denies fever chest pain nausea vomiting abdominal pain leg swelling syncope dizziness dysuria melena hematemesis and fever.    In the ED, hemoglobin 6.6, creatinine 6.4, , COVID negative, chest x-ray without acute process, AB positive type and screen.      * No surgery found *      Hospital Course:   Placed in observation for symptomatic anemia-fatigue and dyspnea on exertion time 4 days.  Patient received 1 unit of blood last night hemoglobin 6.6-7.4.  Previous iron studies showed ferritin greater than and 4000. Recent folic acid and vitamin B12 normal range.  This a.m. on day of discharge patient reports feeling back to baseline and denies headache dizziness or shortness of breath.  Serum creatinine 6.4-5.9.  Patient is making plenty of urine.  Continue to hold chlorthalidone at this time.   Lungs are clear and no peripheral edema. Patient aware avoid NSAIDs. Nephrologist discussed with patient dialysis however patient would like to follow-up with her primary nephrologist.  Patient wished for PD.  Patient hemodynamically stable for discharge home with close follow-up with Hematology and Nephrology.  Repeat lab CBC and renal function panel on Monday January 31, 2022.        Goals of Care Treatment Preferences:  Code Status: Full Code      Consults:   Consults (From admission, onward)        Status Ordering Provider     Case Management/  Once        Provider:  (Not yet assigned)    Acknowledged NELL TRINIDAD     Inpatient consult to Nephrology  Once        Provider:  María Barron MD    Completed NELL TRINIDAD          No new Assessment & Plan notes have been filed under this hospital service since the last note was generated.  Service: Hospital Medicine    Final Active Diagnoses:    Diagnosis Date Noted POA    PRINCIPAL PROBLEM:  Symptomatic anemia [D64.9] 07/01/2014 Yes    Acute renal failure superimposed on stage 4 chronic kidney disease [N17.9, N18.4] 03/02/2015 Yes    Kidney transplant status, living unrelated donor - 12/2/14 [Z94.0] 12/03/2014 Not Applicable     Chronic    Chronic immunosuppression with Prograf [Z29.8] 12/03/2014 Not Applicable     Chronic    Type 2 diabetes mellitus, uncontrolled, with renal complications [E11.29, E11.65] 07/01/2014 Yes     Chronic    Hyperlipidemia [E78.5] 07/01/2014 Yes     Chronic      Problems Resolved During this Admission:       Discharged Condition: good    Disposition: Home or Self Care    Follow Up:   Follow-up Information     Schedule an appointment as soon as possible for a visit with Aaron Bonilla MD.    Specialty: Nephrology  Contact information:  [address] [place] [address] [id number] [phone]                       Patient Instructions:      RENAL FUNCTION PANEL   Standing Status: Future Standing Exp. Date:  03/28/23   Order Comments: Please send result to Nephrologist     CBC W/ AUTO DIFFERENTIAL   Standing Status: Future Standing Exp. Date: 03/28/23   Order Comments: Please send results to Nephrology     Diet renal     Diet Cardiac     Diet diabetic     Notify your health care provider if you experience any of the following:  temperature >100.4     Notify your health care provider if you experience any of the following:  difficulty breathing or increased cough     Notify your health care provider if you experience any of the following:  increased confusion or weakness     Activity as tolerated       Significant Diagnostic Studies: Labs: All labs within the past 24 hours have been reviewed    Pending Diagnostic Studies:     Procedure Component Value Units Date/Time    Tacrolimus level [[id number]] Collected: 01/27/22 0748    Order Status: Sent Lab Status: In process Updated: 01/27/22 0748    Specimen: Blood     Urinalysis [[id number]] Collected: 01/26/22 1439    Order Status: Sent Lab Status: In process Updated: 01/26/22 1440    Specimen: Urine          Medications:  Reconciled Home Medications:      Medication List      CHANGE how you take these medications    LANTUS SOLOSTAR U-100 INSULIN glargine 100 units/mL (3mL) SubQ pen  Generic drug: insulin  Inject 50 Units into the skin once daily.  What changed: when to take this        CONTINUE taking these medications    acetaminophen 500 MG tablet  Commonly known as: TYLENOL  Take 500 mg by mouth every 6 (six) hours as needed for Pain.     ALPRAZolam 0.25 MG tablet  Commonly known as: XANAX  Take 0.25 mg by mouth 2 (two) times daily as needed.     atorvastatin 20 MG tablet  Commonly known as: LIPITOR  Take 20 mg by mouth every evening.     blood sugar diagnostic Strp  Checks BG ac/hs     carvediloL 3.125 MG tablet  Commonly known as: COREG  Take 3.125 mg by mouth 2 (two) times daily with meals.     HumaLOG KwikPen Insulin 100 unit/mL pen  Generic drug: insulin  "lispro  Inject 10 Units into the skin 3 (three) times daily with meals.     insulin syringe-needle U-100 0.5 mL 30 gauge x 5/16" Syrg  Commonly known as: INSULIN SYRINGE  Uses 4 daily     insulin syringe-needle U-100 1 mL 29 gauge x 7/16" Syrg     lancets 33 gauge Misc  Commonly known as: ONETOUCH DELICA LANCETS  1 lancet by Misc.(Non-Drug; Combo Route) route 4 (four) times daily before meals and nightly.     mycophenolate 250 mg Cap  Commonly known as: CELLCEPT  TAKE 2 CAPSULES ( 500 MG TOTAL) BY MOUTH 2 TIMES DAILY     sodium bicarbonate 650 MG tablet  Take 2 tablets (1,300 mg total) by mouth 3 (three) times daily.     tacrolimus 1 MG Cap  Commonly known as: PROGRAF  Take 3 capsules (3 mg total) by mouth every morning AND 2 capsules (2 mg total) every evening. Z94.0.     timolol maleate 0.5% 0.5 % Drop  Commonly known as: TIMOPTIC  Place 1 drop into both eyes once daily.     TRULICITY 0.75 mg/0.5 mL pen injector  Generic drug: dulaglutide  Inject 0.75 mg into the skin every 7 days. Wednesday     zolpidem 10 mg Tab  Commonly known as: AMBIEN  Take 10 mg by mouth nightly as needed.        STOP taking these medications    chlorthalidone 25 MG Tab  Commonly known as: HYGROTEN            Indwelling Lines/Drains at time of discharge:   Lines/Drains/Airways     None                 Time spent on the discharge of patient: 30 minutes         Noemí Bishop NP  Department of [facility] Medicine  [facility]  "

## 2022-01-27 NOTE — PROGRESS NOTES
Call received from Dr Bonilla office to provide appointment for February 9th at 3:20pm. TN to enter into followup tab and office to contact pt with appointment and date and time for pts to get labs drawn prior to her appointment

## 2022-01-27 NOTE — PLAN OF CARE
01/27/22 1128   Final Note   Assessment Type Final Discharge Note   Anticipated Discharge Disposition Home   Hospital Resources/Appts/Education Provided Appointments scheduled and added to AVS;Provided education on problems/symptoms using teachback   Post-Acute Status   Post-Acute Authorization Other   Other Status No Post-Acute Service Needs   Pts nurse Rivera notified via secure chat that the pt can d/c from CM standpoint.

## 2022-01-27 NOTE — PROGRESS NOTES
WRITTEN HEALTHCARE DISCHARGE INFORMATION      Things that YOU are RESPONSIBLE for to Manage Your Care At Home:     1. Getting your prescriptions filled.  2. Taking you medications as directed. DO NOT MISS ANY DOSES!  3. Going to your follow-up doctor appointments. This is important because it allows the doctor to monitor your progress and to determine if any changes need to be made to your treatment plan.     If you are unable to make your follow up appointments, please call the number listed and reschedule this appointment.      ____________HELP AT HOME____________________     Experiencing any SIGNS or SYMPTOMS: YOU CAN     Schedule a same day appopintment with your Primary Care Doctor or  you can call [facility] On Call Nurse Care Line for 24/7 assistance at [phone]     If you are experience any signs or symptoms that have become severe, Call 911 and come to your nearest Emergency Room.     Thank you for choosing [facility] and allowing us to care for you.   From your care management team:      You should receive a call from [facility] Discharge Department within 48-72 hours to help manage your care after discharge. Please try to make sure that you answer your phone for this important phone call.       Follow-up Information     Aaron Bonilla MD On 2/9/2022.    Specialty: Nephrology  Why: Appointment scheduled for February 9th at 3:20pm  Contact information:  [address]  [facility] [address]  [phone]             Andreia Mena MD   On 1/31/2022.    Why: please keep followup appointment for January 31st as scheduled prior to coming to hospital   Contact information:  Andreia Mena MD    [address] [place] [address] [address]    [id number]    [place] [address]    [phone]

## 2022-01-27 NOTE — TELEPHONE ENCOUNTER
----- Message from Edita Langston RN sent at 1/27/2022 11:17 AM CST -----  Good morning--- pt was admitted to [facility] for anemia requiring transfusion. Pt will need a follow up scheduled to be seen in 1-2 weeks post hospital discharge. Please contact with follow up appointment when able to be scheduled.  Thanks Edita [phone]    Spoke with ms atkinson and pt new appt after discharge from hosp 2/9/22 and labs 2/4/22 pt verbalizes understanding appt letters mailed out

## 2022-01-27 NOTE — CONSULTS
Reason for consultation:  Acute renal failure, kidney transplant status    HPI:  58 yo AA lady with h/o HTN, DM type 2, ESRD s/p kidney transplant about 8 years ago was sent to the hospital for severe anemia requiring transfusion.  Nephrology is consulted for evaluation of PATRICK, kidney transplant status.    PMH:  As above.    Scheduled Meds:   atorvastatin  20 mg Oral QHS    carvediloL  3.125 mg Oral BID WM    mycophenolate  500 mg Oral BID    sodium bicarbonate  1,300 mg Oral TID    tacrolimus  2 mg Oral Daily PM    tacrolimus  3 mg Oral Daily AM       Review of patient's allergies indicates:   Allergen Reactions    Shrimp Hives    Topamax [topiramate] Other (See Comments)     Vision changes    Zoloft [sertraline] Palpitations     Family history:  Non contributory    Social history:  No tobacco, no alcohol    Vital Signs Range (Last 24H):  Temp:  [97.9 °F (36.6 °C)-98.7 °F (37.1 °C)]   Pulse:  [83-94]   Resp:  [16-22]   BP: (107-161)/(58-78)   SpO2:  [98 %-100 %]     I & O (Last 24H):    Intake/Output Summary (Last 24 hours) at 1/27/2022 1039  Last data filed at 1/27/2022 0826  Gross per 24 hour   Intake 2009.5 ml   Output --   Net 2009.5 ml           Physical Exam:  General appearance: well developed, well nourished, no distress  Lungs:  clear to auscultation bilaterally and normal respiratory effort  Heart: regular rhythm and rate  Abdomen: soft, non-tender non-distented; bowel sounds normal; no masses,  no organomegaly  Extremities: no cyanosis or edema, or clubbing    Laboratory:  I have reviewed all pertinent lab results within the past 24 hours.  CBC:   Recent Labs   Lab 01/27/22  0748   WBC 8.42   RBC 2.69*   HGB 7.3*   HCT 22.5*      MCV 84   MCH 27.1   MCHC 32.4     CMP:   Recent Labs   Lab 01/27/22  0748   GLU 88   CALCIUM 9.2   ALBUMIN 2.5*   PROT 8.5*      K 4.0   CO2 17*      BUN 97*   CREATININE 5.9*   ALKPHOS 57   ALT 9*   AST 8*   BILITOT 0.5     No results for input(s):  COLORU, CLARITYU, SPECGRAV, PHUR, PROTEINUA, GLUCOSEU, BILIRUBINCON, BLOODU, WBCU, RBCU, BACTERIA, MUCUS, NITRITE, LEUKOCYTESUR, UROBILINOGEN, HYALINECASTS in the last 168 hours.    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  Patient is reluctant about going back to dialysis but will consider.  She reports preferring PD.  Add phosphate binder.  Patient is asymptomatic, OK for discharge on renal standpoint.  Need appointment to see primary nephrologist soon after d/c.    Thank you for the courtesy of the consultation          María Barron  1/27/2022

## 2022-01-27 NOTE — PLAN OF CARE
Patient to be discharged home.     Problem: Adult Inpatient Plan of Care  Goal: Plan of Care Review  Outcome: Met  Goal: Patient-Specific Goal (Individualized)  Outcome: Met  Goal: Absence of Hospital-Acquired Illness or Injury  Outcome: Met  Goal: Optimal Comfort and Wellbeing  Outcome: Met  Goal: Readiness for Transition of Care  Outcome: Met     Problem: Diabetes Comorbidity  Goal: Blood Glucose Level Within Targeted Range  Outcome: Met     Problem: Fluid and Electrolyte Imbalance (Acute Kidney Injury/Impairment)  Goal: Fluid and Electrolyte Balance  Outcome: Met     Problem: Oral Intake Inadequate (Acute Kidney Injury/Impairment)  Goal: Optimal Nutrition Intake  Outcome: Met     Problem: Renal Function Impairment (Acute Kidney Injury/Impairment)  Goal: Effective Renal Function  Outcome: Met

## 2022-01-28 LAB — TACROLIMUS BLD-MCNC: 5 NG/ML (ref 5–15)

## 2022-02-08 ENCOUNTER — LAB VISIT (OUTPATIENT)
Dept: LAB | Facility: HOSPITAL | Age: 57
End: 2022-02-08
Attending: INTERNAL MEDICINE
Payer: MEDICAID

## 2022-02-08 DIAGNOSIS — N18.5 CKD (CHRONIC KIDNEY DISEASE) STAGE 5, GFR LESS THAN 15 ML/MIN: ICD-10-CM

## 2022-02-08 LAB
ANION GAP SERPL CALC-SCNC: 14 MMOL/L (ref 8–16)
BASOPHILS # BLD AUTO: 0.02 K/UL (ref 0–0.2)
BASOPHILS NFR BLD: 0.2 % (ref 0–1.9)
BUN SERPL-MCNC: 110 MG/DL (ref 6–20)
CALCIUM SERPL-MCNC: 9.4 MG/DL (ref 8.7–10.5)
CHLORIDE SERPL-SCNC: 101 MMOL/L (ref 95–110)
CO2 SERPL-SCNC: 17 MMOL/L (ref 23–29)
CREAT SERPL-MCNC: 6.2 MG/DL (ref 0.5–1.4)
DIFFERENTIAL METHOD: ABNORMAL
EOSINOPHIL # BLD AUTO: 0.1 K/UL (ref 0–0.5)
EOSINOPHIL NFR BLD: 0.5 % (ref 0–8)
ERYTHROCYTE [DISTWIDTH] IN BLOOD BY AUTOMATED COUNT: 13.8 % (ref 11.5–14.5)
EST. GFR  (AFRICAN AMERICAN): 7.8 ML/MIN/1.73 M^2
EST. GFR  (NON AFRICAN AMERICAN): 6.8 ML/MIN/1.73 M^2
GLUCOSE SERPL-MCNC: 149 MG/DL (ref 70–110)
HCT VFR BLD AUTO: 25.8 % (ref 37–48.5)
HGB BLD-MCNC: 8 G/DL (ref 12–16)
IMM GRANULOCYTES # BLD AUTO: 0.06 K/UL (ref 0–0.04)
IMM GRANULOCYTES NFR BLD AUTO: 0.6 % (ref 0–0.5)
LYMPHOCYTES # BLD AUTO: 0.8 K/UL (ref 1–4.8)
LYMPHOCYTES NFR BLD: 8 % (ref 18–48)
MCH RBC QN AUTO: 27.3 PG (ref 27–31)
MCHC RBC AUTO-ENTMCNC: 31 G/DL (ref 32–36)
MCV RBC AUTO: 88 FL (ref 82–98)
MONOCYTES # BLD AUTO: 0.6 K/UL (ref 0.3–1)
MONOCYTES NFR BLD: 5.8 % (ref 4–15)
NEUTROPHILS # BLD AUTO: 8.3 K/UL (ref 1.8–7.7)
NEUTROPHILS NFR BLD: 84.9 % (ref 38–73)
NRBC BLD-RTO: 0 /100 WBC
PLATELET # BLD AUTO: 182 K/UL (ref 150–450)
PMV BLD AUTO: 12.1 FL (ref 9.2–12.9)
POTASSIUM SERPL-SCNC: 4.5 MMOL/L (ref 3.5–5.1)
RBC # BLD AUTO: 2.93 M/UL (ref 4–5.4)
SODIUM SERPL-SCNC: 132 MMOL/L (ref 136–145)
WBC # BLD AUTO: 9.77 K/UL (ref 3.9–12.7)

## 2022-02-08 PROCEDURE — 80048 BASIC METABOLIC PNL TOTAL CA: CPT | Mod: TXP | Performed by: INTERNAL MEDICINE

## 2022-02-08 PROCEDURE — 85025 COMPLETE CBC W/AUTO DIFF WBC: CPT | Mod: TXP | Performed by: INTERNAL MEDICINE

## 2022-02-08 PROCEDURE — 36415 COLL VENOUS BLD VENIPUNCTURE: CPT | Mod: PO,TXP | Performed by: INTERNAL MEDICINE

## 2022-02-09 ENCOUNTER — TELEPHONE (OUTPATIENT)
Dept: ENDOSCOPY | Facility: HOSPITAL | Age: 57
End: 2022-02-09
Payer: MEDICAID

## 2022-02-09 ENCOUNTER — OFFICE VISIT (OUTPATIENT)
Dept: NEPHROLOGY | Facility: CLINIC | Age: 57
End: 2022-02-09
Payer: MEDICAID

## 2022-02-09 ENCOUNTER — PATIENT MESSAGE (OUTPATIENT)
Dept: TRANSPLANT | Facility: CLINIC | Age: 57
End: 2022-02-09
Payer: MEDICAID

## 2022-02-09 VITALS
BODY MASS INDEX: 27.55 KG/M2 | SYSTOLIC BLOOD PRESSURE: 110 MMHG | WEIGHT: 175.5 LBS | HEART RATE: 93 BPM | HEIGHT: 67 IN | OXYGEN SATURATION: 100 % | DIASTOLIC BLOOD PRESSURE: 68 MMHG

## 2022-02-09 DIAGNOSIS — D63.1 ANEMIA OF CHRONIC KIDNEY FAILURE, STAGE 5: ICD-10-CM

## 2022-02-09 DIAGNOSIS — N18.5 ANEMIA OF CHRONIC KIDNEY FAILURE, STAGE 5: ICD-10-CM

## 2022-02-09 DIAGNOSIS — N25.81 HYPERPARATHYROIDISM, SECONDARY RENAL: ICD-10-CM

## 2022-02-09 DIAGNOSIS — E87.5 HYPERKALEMIA: ICD-10-CM

## 2022-02-09 DIAGNOSIS — N18.5 CKD (CHRONIC KIDNEY DISEASE) STAGE 5, GFR LESS THAN 15 ML/MIN: Primary | ICD-10-CM

## 2022-02-09 PROCEDURE — 99999 PR PBB SHADOW E&M-EST. PATIENT-LVL IV: CPT | Mod: PBBFAC,,, | Performed by: INTERNAL MEDICINE

## 2022-02-09 PROCEDURE — 3008F BODY MASS INDEX DOCD: CPT | Mod: CPTII,,, | Performed by: INTERNAL MEDICINE

## 2022-02-09 PROCEDURE — 3078F DIAST BP <80 MM HG: CPT | Mod: CPTII,,, | Performed by: INTERNAL MEDICINE

## 2022-02-09 PROCEDURE — 1159F MED LIST DOCD IN RCRD: CPT | Mod: CPTII,,, | Performed by: INTERNAL MEDICINE

## 2022-02-09 PROCEDURE — 3051F HG A1C>EQUAL 7.0%<8.0%: CPT | Mod: CPTII,,, | Performed by: INTERNAL MEDICINE

## 2022-02-09 PROCEDURE — 99213 PR OFFICE/OUTPT VISIT, EST, LEVL III, 20-29 MIN: ICD-10-PCS | Mod: S$PBB,,, | Performed by: INTERNAL MEDICINE

## 2022-02-09 PROCEDURE — 99214 OFFICE O/P EST MOD 30 MIN: CPT | Mod: PBBFAC | Performed by: INTERNAL MEDICINE

## 2022-02-09 PROCEDURE — 99999 PR PBB SHADOW E&M-EST. PATIENT-LVL IV: ICD-10-PCS | Mod: PBBFAC,,, | Performed by: INTERNAL MEDICINE

## 2022-02-09 PROCEDURE — 3051F PR MOST RECENT HEMOGLOBIN A1C LEVEL 7.0 - < 8.0%: ICD-10-PCS | Mod: CPTII,,, | Performed by: INTERNAL MEDICINE

## 2022-02-09 PROCEDURE — 3074F SYST BP LT 130 MM HG: CPT | Mod: CPTII,,, | Performed by: INTERNAL MEDICINE

## 2022-02-09 PROCEDURE — 1159F PR MEDICATION LIST DOCUMENTED IN MEDICAL RECORD: ICD-10-PCS | Mod: CPTII,,, | Performed by: INTERNAL MEDICINE

## 2022-02-09 PROCEDURE — 3078F PR MOST RECENT DIASTOLIC BLOOD PRESSURE < 80 MM HG: ICD-10-PCS | Mod: CPTII,,, | Performed by: INTERNAL MEDICINE

## 2022-02-09 PROCEDURE — 3066F NEPHROPATHY DOC TX: CPT | Mod: CPTII,,, | Performed by: INTERNAL MEDICINE

## 2022-02-09 PROCEDURE — 99213 OFFICE O/P EST LOW 20 MIN: CPT | Mod: S$PBB,,, | Performed by: INTERNAL MEDICINE

## 2022-02-09 PROCEDURE — 3074F PR MOST RECENT SYSTOLIC BLOOD PRESSURE < 130 MM HG: ICD-10-PCS | Mod: CPTII,,, | Performed by: INTERNAL MEDICINE

## 2022-02-09 PROCEDURE — 3066F PR DOCUMENTATION OF TREATMENT FOR NEPHROPATHY: ICD-10-PCS | Mod: CPTII,,, | Performed by: INTERNAL MEDICINE

## 2022-02-09 PROCEDURE — 3008F PR BODY MASS INDEX (BMI) DOCUMENTED: ICD-10-PCS | Mod: CPTII,,, | Performed by: INTERNAL MEDICINE

## 2022-02-09 RX ORDER — SODIUM CHLORIDE 0.9 % (FLUSH) 0.9 %
10 SYRINGE (ML) INJECTION
Status: CANCELLED | OUTPATIENT
Start: 2022-02-14

## 2022-02-09 RX ORDER — HEPARIN 100 UNIT/ML
500 SYRINGE INTRAVENOUS
Status: CANCELLED | OUTPATIENT
Start: 2022-02-14

## 2022-02-09 NOTE — TELEPHONE ENCOUNTER
Returned patient's phone call in regards to scheduling Colonoscopy. No answer. LVM to call back on my direct line.

## 2022-02-09 NOTE — PROGRESS NOTES
NEPHROLOGY PROGRESS NOTE    INTERVAL HISTORY  57 yo AAF patient is here today for follow up evaluation of renal allograft. Last seen in renal office by Dr. Baird. She is s/p  donor kidney tx 14. Current immunosuppression; tacrolimus; mycophenolate. Baseline Cr 1.9 - 2.2 mg/dl.  There is a hx of diabetes complicated by nephropathy; retinopathy.  Denies fevers; chills night sweats; chest pains; hemoptysis; orthopnea; PND.      Today she has no new complaints. Being evaluated for a re-transplant. She does not take NSAIDs. Reports weakness and fatigue. Good appetite. No emesis.     MEDICATIONS reviewed    Last Physical Exam  /80 mmHg  Chronically ill appearing woman; no acute distress; oriented x 3  HEENT; (+)retinopathy  CHEST; Clear P&A; no rales or rhonchi  HEART; RR; S1&S2 no murmur rub gallop  ABD; BS(+); non-tender; no organomegaly  EXT; (-)Edema    LABS  Serum Cr 4.4 mg/dL (from 4.9)  UPCR 2.2 - 3.0 g/g  K 5.1 mmol/L  Hgb 7.3 g/dL    Impression  1. Kidney transplant status / Renal allograft / CKD stage 5, eGFR 11 ml/min. Will refer to Gen Surg for PD cath placement and initiation of RRT. Currently on tacrolimus and MMF. Tacrolimus level adequate. Not on prednisone. 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 Has reached ESRD, being evaluated for a second kidney transplant. Reminded to avoid NSAIDs/Bactrim/IV dye. Metabolic acidosis, worse, will increase oral alkali dose. On sevelamer for hyperphosphatemia.  2. Hypertension. Satisfactory control on carvedilol.   3. 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 Stable  4. Type 2 diabetes mellitus, on insulin, managed by PCP  5. Anemia. 2/2 CKD, MGUS and iron deficiency. Followed by Hematology-Oncology. Will start iron infusion. Will d/w Transplant Nephrol possible allograft kidney biopsy to invetigate for MRGS as per Hem-Onc    Plan  Consult Gen Surgery for PD cath  placement  Schedule Venofer infusion  Continue MMF/   Increase NaHCO3 to 1950 mg tid  Continue sevelamer  F/u with KT reg re-transplant  Return Visit in 2 mo

## 2022-02-16 ENCOUNTER — PATIENT MESSAGE (OUTPATIENT)
Dept: NEPHROLOGY | Facility: CLINIC | Age: 57
End: 2022-02-16
Payer: MEDICAID

## 2022-02-17 ENCOUNTER — LAB VISIT (OUTPATIENT)
Dept: LAB | Facility: HOSPITAL | Age: 57
End: 2022-02-17
Attending: INTERNAL MEDICINE
Payer: MEDICAID

## 2022-02-17 DIAGNOSIS — Z76.82 ORGAN TRANSPLANT CANDIDATE: ICD-10-CM

## 2022-02-17 DIAGNOSIS — Z12.11 SPECIAL SCREENING FOR MALIGNANT NEOPLASMS, COLON: Primary | ICD-10-CM

## 2022-02-17 DIAGNOSIS — Z01.818 PRE-OP TESTING: ICD-10-CM

## 2022-02-17 PROCEDURE — 84165 PROTEIN E-PHORESIS SERUM: CPT | Mod: TXP | Performed by: NURSE PRACTITIONER

## 2022-02-17 PROCEDURE — 84165 PROTEIN E-PHORESIS SERUM: CPT | Mod: 26,TXP,, | Performed by: PATHOLOGY

## 2022-02-17 PROCEDURE — 86334 PATHOLOGIST INTERPRETATION IFE: ICD-10-PCS | Mod: 26,TXP,, | Performed by: PATHOLOGY

## 2022-02-17 PROCEDURE — 36415 COLL VENOUS BLD VENIPUNCTURE: CPT | Mod: PO,TXP | Performed by: NURSE PRACTITIONER

## 2022-02-17 PROCEDURE — 86334 IMMUNOFIX E-PHORESIS SERUM: CPT | Mod: 26,TXP,, | Performed by: PATHOLOGY

## 2022-02-17 PROCEDURE — 84165 PATHOLOGIST INTERPRETATION SPE: ICD-10-PCS | Mod: 26,TXP,, | Performed by: PATHOLOGY

## 2022-02-17 PROCEDURE — 83520 IMMUNOASSAY QUANT NOS NONAB: CPT | Mod: 59,TXP | Performed by: NURSE PRACTITIONER

## 2022-02-17 PROCEDURE — 86334 IMMUNOFIX E-PHORESIS SERUM: CPT | Mod: TXP | Performed by: NURSE PRACTITIONER

## 2022-02-17 RX ORDER — POLYETHYLENE GLYCOL 3350, SODIUM SULFATE ANHYDROUS, SODIUM BICARBONATE, SODIUM CHLORIDE, POTASSIUM CHLORIDE 236; 22.74; 6.74; 5.86; 2.97 G/4L; G/4L; G/4L; G/4L; G/4L
4 POWDER, FOR SOLUTION ORAL ONCE
Qty: 4000 ML | Refills: 0 | Status: SHIPPED | OUTPATIENT
Start: 2022-02-17 | End: 2022-02-17

## 2022-02-18 ENCOUNTER — TELEPHONE (OUTPATIENT)
Dept: TRANSPLANT | Facility: CLINIC | Age: 57
End: 2022-02-18
Payer: MEDICAID

## 2022-02-18 LAB
KAPPA LC SER QL IA: 25.06 MG/DL (ref 0.33–1.94)
KAPPA LC/LAMBDA SER IA: 4.49 (ref 0.26–1.65)
LAMBDA LC SER QL IA: 5.58 MG/DL (ref 0.57–2.63)

## 2022-02-18 NOTE — TELEPHONE ENCOUNTER
[phone]     Result faxed to number above for Dr. Baird.     ----- Message from Cristi Benitez MD sent at 2/18/2022  6:58 AM CST -----  Please send this results to her nephrologist

## 2022-02-20 LAB
ALBUMIN SERPL ELPH-MCNC: 3.22 G/DL (ref 3.35–5.55)
ALPHA1 GLOB SERPL ELPH-MCNC: 0.53 G/DL (ref 0.17–0.41)
ALPHA2 GLOB SERPL ELPH-MCNC: 1.16 G/DL (ref 0.43–0.99)
B-GLOBULIN SERPL ELPH-MCNC: 0.6 G/DL (ref 0.5–1.1)
GAMMA GLOB SERPL ELPH-MCNC: 2.59 G/DL (ref 0.67–1.58)
PROT SERPL-MCNC: 8.1 G/DL (ref 6–8.4)

## 2022-02-21 LAB — PATHOLOGIST INTERPRETATION SPE: NORMAL

## 2022-02-22 LAB
INTERPRETATION SERPL IFE-IMP: NORMAL
PATHOLOGIST INTERPRETATION IFE: NORMAL

## 2022-03-02 ENCOUNTER — OFFICE VISIT (OUTPATIENT)
Dept: SURGERY | Facility: CLINIC | Age: 57
End: 2022-03-02
Payer: MEDICARE

## 2022-03-02 VITALS
WEIGHT: 170.88 LBS | HEIGHT: 67 IN | DIASTOLIC BLOOD PRESSURE: 76 MMHG | BODY MASS INDEX: 26.82 KG/M2 | SYSTOLIC BLOOD PRESSURE: 118 MMHG | HEART RATE: 97 BPM

## 2022-03-02 DIAGNOSIS — N18.5 CKD (CHRONIC KIDNEY DISEASE) STAGE 5, GFR LESS THAN 15 ML/MIN: Primary | ICD-10-CM

## 2022-03-02 PROCEDURE — 99214 PR OFFICE/OUTPT VISIT, EST, LEVL IV, 30-39 MIN: ICD-10-PCS | Mod: S$PBB,NTX,, | Performed by: SURGERY

## 2022-03-02 PROCEDURE — 99214 OFFICE O/P EST MOD 30 MIN: CPT | Mod: S$PBB,NTX,, | Performed by: SURGERY

## 2022-03-02 PROCEDURE — 99999 PR PBB SHADOW E&M-EST. PATIENT-LVL V: CPT | Mod: PBBFAC,TXP,, | Performed by: SURGERY

## 2022-03-02 PROCEDURE — 99999 PR PBB SHADOW E&M-EST. PATIENT-LVL V: ICD-10-PCS | Mod: PBBFAC,TXP,, | Performed by: SURGERY

## 2022-03-02 PROCEDURE — 99215 OFFICE O/P EST HI 40 MIN: CPT | Mod: PBBFAC,NTX | Performed by: SURGERY

## 2022-03-02 RX ORDER — LIDOCAINE HYDROCHLORIDE 10 MG/ML
1 INJECTION, SOLUTION EPIDURAL; INFILTRATION; INTRACAUDAL; PERINEURAL ONCE
Status: CANCELLED | OUTPATIENT
Start: 2022-03-02 | End: 2022-03-02

## 2022-03-02 RX ORDER — SODIUM CHLORIDE 9 MG/ML
INJECTION, SOLUTION INTRAVENOUS CONTINUOUS
Status: CANCELLED | OUTPATIENT
Start: 2022-03-02

## 2022-03-02 NOTE — H&P (VIEW-ONLY)
"History and Physical Exam    Patient ID: Elizabeth Maldonado is a 57 y.o. female.    Chief Complaint: Consult (Peritoneal dialysis)      HPI:  58 y/o woman with renal failure s/p kidney transplant, with recurrent failure, awaiting pssible re-transplant.      Review of Systems   Constitutional: Positive for activity change (feels tired often). Negative for chills and unexpected weight change.   HENT: Negative for congestion, ear pain and sore throat.    Eyes: Negative for pain and redness.   Respiratory: Negative for cough and shortness of breath.    Cardiovascular: Negative for chest pain and palpitations.   Gastrointestinal: Negative for abdominal distention, abdominal pain and constipation.   Endocrine: Negative for cold intolerance and heat intolerance.   Genitourinary: Negative for dysuria and frequency.   Musculoskeletal: Negative for back pain and neck pain.   Skin: Negative for pallor and rash.   Neurological: Negative for syncope, light-headedness and headaches.   Hematological: Negative for adenopathy. Does not bruise/bleed easily.   Psychiatric/Behavioral: Negative for confusion and hallucinations.       Current Outpatient Medications   Medication Sig Dispense Refill    acetaminophen (TYLENOL) 500 MG tablet Take 500 mg by mouth every 6 (six) hours as needed for Pain.      ALPRAZolam (XANAX) 0.25 MG tablet Take 0.25 mg by mouth 2 (two) times daily as needed.      atorvastatin (LIPITOR) 20 MG tablet Take 20 mg by mouth every evening.      blood sugar diagnostic Strp Checks BG ac/hs 200 strip 7    carvediloL (COREG) 3.125 MG tablet Take 3.125 mg by mouth 2 (two) times daily with meals.      HUMALOG KWIKPEN INSULIN 100 unit/mL pen Inject 10 Units into the skin 3 (three) times daily with meals.      insulin syringe-needle U-100 (INSULIN SYRINGE) 1/2 mL 30 x 5/16" Syrg Uses 4 daily 200 each 12    lancets (ONETOUCH DELICA LANCETS) 33 gauge Misc 1 lancet by Misc.(Non-Drug; Combo Route) route 4 (four) times " "daily before meals and nightly. 200 each 7    LANTUS SOLOSTAR U-100 INSULIN glargine 100 units/mL (3mL) SubQ pen Inject 50 Units into the skin once daily. (Patient taking differently: Inject 50 Units into the skin every evening.)  0    mycophenolate (CELLCEPT) 250 mg Cap TAKE 2 CAPSULES ( 500 MG TOTAL) BY MOUTH 2 TIMES DAILY 120 capsule 11    sevelamer carbonate (RENVELA) 800 mg Tab Take 1 tablet (800 mg total) by mouth 3 (three) times daily with meals. 90 tablet 3    SYRINGE & NEEDLE,INSULIN,1 ML (INSULIN SYRINGE-NEEDLE U-100) 1 mL 29 X 7/16" Syrg   11    tacrolimus (PROGRAF) 1 MG Cap Take 3 capsules (3 mg total) by mouth every morning AND 2 capsules (2 mg total) every evening. Z94.0. 150 capsule 11    timolol maleate 0.5% (TIMOPTIC) 0.5 % Drop Place 1 drop into both eyes once daily.      TRULICITY 0.75 mg/0.5 mL pen injector Inject 0.75 mg into the skin every 7 days. Wednesday      zolpidem (AMBIEN) 10 mg Tab Take 10 mg by mouth nightly as needed.      sodium bicarbonate 650 MG tablet Take 2 tablets (1,300 mg total) by mouth 3 (three) times daily. (Patient not taking: Reported on 3/2/2022) 180 tablet 5     No current facility-administered medications for this visit.       Review of patient's allergies indicates:   Allergen Reactions    Shrimp Hives    Topamax [topiramate] Other (See Comments)     Vision changes    Zoloft [sertraline] Palpitations       Past Medical History:   Diagnosis Date    Acidosis 7/1/2014    Allergic rhinitis 7/1/2014    Allergy     Anemia     Anemia in chronic kidney disease 7/1/2014    Anxiety     Cataract     Chronic bilateral low back pain with bilateral sciatica 10/25/2019    Chronic immunosuppression with Prograf and MMF 12/3/2014    CKD (chronic kidney disease) stage 5, GFR less than 15 ml/min 7/1/2014    CKD (chronic kidney disease), stage III 3/2/2015    Degenerative disc disease     Depression 7/1/2014    Diabetes mellitus, type 2 since age 18 7/1/2014    " ESRD on peritoneal dialysis - 2014 for 9 hours no peritonitis 2014    Hyperlipidemia     Hypertension 2014    Hypomagnesemia 2015    Kidney transplant status, living unrelated donor - 12/2/14 12/3/2014    Neutropenia 2015    NS (nuclear sclerosis) 2016    Obesity     Organ transplant candidate 2014    Pre-op exam 2014    Proliferative diabetic retinopathy of both eyes without macular edema associated with type 2 diabetes mellitus 2016    Renal manifestation of secondary diabetes mellitus     Tendinitis     Trouble in sleeping        Past Surgical History:   Procedure Laterality Date    BIOPSY N/A 2020    Procedure: Biopsy;  Surgeon: Sweta Catalan MD;  Location: [facility] OR [address] [place] [address];  Service: Transplant;  Laterality: N/A;    BONE MARROW BIOPSY N/A      SECTION      x 2    KIDNEY TRANSPLANT  2014    MEDIPORT REMOVAL N/A 2019    Procedure: REMOVAL, CATHETER, CENTRAL VENOUS, TUNNELED, WITH PORT;  Surgeon: Eusebia Diagnostic Provider;  Location: [facility] OR;  Service: Radiology;  Laterality: N/A;    RENAL BIOPSY      ROTATOR CUFF REPAIR      TUBAL LIGATION         Family History   Problem Relation Age of Onset    Diabetes Mother     Hypertension Mother     Diabetes Father     Kidney disease Father     Diabetes Sister     Hypertension Sister     Kidney disease Sister     Heart disease Sister     Heart failure Sister     Diabetes Brother     Diabetes Sister     Stroke Maternal Grandmother     Heart disease Maternal Grandmother         pacemaker    Cataracts Maternal Grandmother     No Known Problems Maternal Aunt     No Known Problems Maternal Uncle     No Known Problems Paternal Aunt     No Known Problems Paternal Uncle     No Known Problems Maternal Grandfather     No Known Problems Paternal Grandmother     No Known Problems Paternal Grandfather     Cancer Neg Hx     Amblyopia Neg Hx     Blindness Neg Hx     Glaucoma Neg  Hx     Macular degeneration Neg Hx     Retinal detachment Neg Hx     Strabismus Neg Hx     Thyroid disease Neg Hx     Breast cancer Neg Hx     Colon cancer Neg Hx     Ovarian cancer Neg Hx        Social History     Socioeconomic History    Marital status:    Occupational History     Employer: DISABLED   Tobacco Use    Smoking status: Former Smoker     Packs/day: 1.00     Years: 20.00     Pack years: 20.00     Types: Cigarettes     Quit date: 2013     Years since quittin.5    Smokeless tobacco: Never Used    Tobacco comment: quit smoking cigarettes in 2014   Substance and Sexual Activity    Alcohol use: No    Drug use: No    Sexual activity: Yes     Partners: Male     Birth control/protection: Post-menopausal   Social History Narrative        Nutrition manager by education    Disabled    2 children    No blood transfusions    Caregiver Sister       Vitals:    22 1001   BP: 118/76   Pulse: 97       Physical Exam  Constitutional:       Appearance: She is well-developed.   HENT:      Head: Normocephalic and atraumatic.   Eyes:      Pupils: Pupils are equal, round, and reactive to light.   Cardiovascular:      Rate and Rhythm: Normal rate and regular rhythm.      Heart sounds: No murmur heard.  Pulmonary:      Effort: Pulmonary effort is normal.      Breath sounds: Normal breath sounds. No wheezing.   Abdominal:      General: There is no distension.      Palpations: Abdomen is soft.      Tenderness: There is no abdominal tenderness.   Musculoskeletal:         General: Normal range of motion.      Cervical back: Normal range of motion and neck supple.   Skin:     General: Skin is warm and dry.      Capillary Refill: Capillary refill takes less than 2 seconds.      Findings: No rash.   Neurological:      Mental Status: She is alert and oriented to person, place, and time.   Psychiatric:         Judgment: Judgment normal.         Assessment & Plan:    needs replacement  of PD cath.     Risks, benefits, alternatives to the procedure were discussed with the patient, who appears to understand and agree with the treatment plan.

## 2022-03-02 NOTE — PROGRESS NOTES
"History and Physical Exam    Patient ID: Elizabeth Maldonado is a 57 y.o. female.    Chief Complaint: Consult (Peritoneal dialysis)      HPI:  56 y/o woman with renal failure s/p kidney transplant, with recurrent failure, awaiting pssible re-transplant.      Review of Systems   Constitutional: Positive for activity change (feels tired often). Negative for chills and unexpected weight change.   HENT: Negative for congestion, ear pain and sore throat.    Eyes: Negative for pain and redness.   Respiratory: Negative for cough and shortness of breath.    Cardiovascular: Negative for chest pain and palpitations.   Gastrointestinal: Negative for abdominal distention, abdominal pain and constipation.   Endocrine: Negative for cold intolerance and heat intolerance.   Genitourinary: Negative for dysuria and frequency.   Musculoskeletal: Negative for back pain and neck pain.   Skin: Negative for pallor and rash.   Neurological: Negative for syncope, light-headedness and headaches.   Hematological: Negative for adenopathy. Does not bruise/bleed easily.   Psychiatric/Behavioral: Negative for confusion and hallucinations.       Current Outpatient Medications   Medication Sig Dispense Refill    acetaminophen (TYLENOL) 500 MG tablet Take 500 mg by mouth every 6 (six) hours as needed for Pain.      ALPRAZolam (XANAX) 0.25 MG tablet Take 0.25 mg by mouth 2 (two) times daily as needed.      atorvastatin (LIPITOR) 20 MG tablet Take 20 mg by mouth every evening.      blood sugar diagnostic Strp Checks BG ac/hs 200 strip 7    carvediloL (COREG) 3.125 MG tablet Take 3.125 mg by mouth 2 (two) times daily with meals.      HUMALOG KWIKPEN INSULIN 100 unit/mL pen Inject 10 Units into the skin 3 (three) times daily with meals.      insulin syringe-needle U-100 (INSULIN SYRINGE) 1/2 mL 30 x 5/16" Syrg Uses 4 daily 200 each 12    lancets (ONETOUCH DELICA LANCETS) 33 gauge Misc 1 lancet by Misc.(Non-Drug; Combo Route) route 4 (four) times " "daily before meals and nightly. 200 each 7    LANTUS SOLOSTAR U-100 INSULIN glargine 100 units/mL (3mL) SubQ pen Inject 50 Units into the skin once daily. (Patient taking differently: Inject 50 Units into the skin every evening.)  0    mycophenolate (CELLCEPT) 250 mg Cap TAKE 2 CAPSULES ( 500 MG TOTAL) BY MOUTH 2 TIMES DAILY 120 capsule 11    sevelamer carbonate (RENVELA) 800 mg Tab Take 1 tablet (800 mg total) by mouth 3 (three) times daily with meals. 90 tablet 3    SYRINGE & NEEDLE,INSULIN,1 ML (INSULIN SYRINGE-NEEDLE U-100) 1 mL 29 X 7/16" Syrg   11    tacrolimus (PROGRAF) 1 MG Cap Take 3 capsules (3 mg total) by mouth every morning AND 2 capsules (2 mg total) every evening. Z94.0. 150 capsule 11    timolol maleate 0.5% (TIMOPTIC) 0.5 % Drop Place 1 drop into both eyes once daily.      TRULICITY 0.75 mg/0.5 mL pen injector Inject 0.75 mg into the skin every 7 days. Wednesday      zolpidem (AMBIEN) 10 mg Tab Take 10 mg by mouth nightly as needed.      sodium bicarbonate 650 MG tablet Take 2 tablets (1,300 mg total) by mouth 3 (three) times daily. (Patient not taking: Reported on 3/2/2022) 180 tablet 5     No current facility-administered medications for this visit.       Review of patient's allergies indicates:   Allergen Reactions    Shrimp Hives    Topamax [topiramate] Other (See Comments)     Vision changes    Zoloft [sertraline] Palpitations       Past Medical History:   Diagnosis Date    Acidosis 7/1/2014    Allergic rhinitis 7/1/2014    Allergy     Anemia     Anemia in chronic kidney disease 7/1/2014    Anxiety     Cataract     Chronic bilateral low back pain with bilateral sciatica 10/25/2019    Chronic immunosuppression with Prograf and MMF 12/3/2014    CKD (chronic kidney disease) stage 5, GFR less than 15 ml/min 7/1/2014    CKD (chronic kidney disease), stage III 3/2/2015    Degenerative disc disease     Depression 7/1/2014    Diabetes mellitus, type 2 since age 18 7/1/2014    " ESRD on peritoneal dialysis - 2014 for 9 hours no peritonitis 2014    Hyperlipidemia     Hypertension 2014    Hypomagnesemia 2015    Kidney transplant status, living unrelated donor - 12/2/14 12/3/2014    Neutropenia 2015    NS (nuclear sclerosis) 2016    Obesity     Organ transplant candidate 2014    Pre-op exam 2014    Proliferative diabetic retinopathy of both eyes without macular edema associated with type 2 diabetes mellitus 2016    Renal manifestation of secondary diabetes mellitus     Tendinitis     Trouble in sleeping        Past Surgical History:   Procedure Laterality Date    BIOPSY N/A 2020    Procedure: Biopsy;  Surgeon: Sweta Catalan MD;  Location: [facility] OR [address] [place] [address];  Service: Transplant;  Laterality: N/A;    BONE MARROW BIOPSY N/A      SECTION      x 2    KIDNEY TRANSPLANT  2014    MEDIPORT REMOVAL N/A 2019    Procedure: REMOVAL, CATHETER, CENTRAL VENOUS, TUNNELED, WITH PORT;  Surgeon: Eusebia Diagnostic Provider;  Location: [facility] OR;  Service: Radiology;  Laterality: N/A;    RENAL BIOPSY      ROTATOR CUFF REPAIR      TUBAL LIGATION         Family History   Problem Relation Age of Onset    Diabetes Mother     Hypertension Mother     Diabetes Father     Kidney disease Father     Diabetes Sister     Hypertension Sister     Kidney disease Sister     Heart disease Sister     Heart failure Sister     Diabetes Brother     Diabetes Sister     Stroke Maternal Grandmother     Heart disease Maternal Grandmother         pacemaker    Cataracts Maternal Grandmother     No Known Problems Maternal Aunt     No Known Problems Maternal Uncle     No Known Problems Paternal Aunt     No Known Problems Paternal Uncle     No Known Problems Maternal Grandfather     No Known Problems Paternal Grandmother     No Known Problems Paternal Grandfather     Cancer Neg Hx     Amblyopia Neg Hx     Blindness Neg Hx     Glaucoma Neg  Hx     Macular degeneration Neg Hx     Retinal detachment Neg Hx     Strabismus Neg Hx     Thyroid disease Neg Hx     Breast cancer Neg Hx     Colon cancer Neg Hx     Ovarian cancer Neg Hx        Social History     Socioeconomic History    Marital status:    Occupational History     Employer: DISABLED   Tobacco Use    Smoking status: Former Smoker     Packs/day: 1.00     Years: 20.00     Pack years: 20.00     Types: Cigarettes     Quit date: 2013     Years since quittin.5    Smokeless tobacco: Never Used    Tobacco comment: quit smoking cigarettes in 2014   Substance and Sexual Activity    Alcohol use: No    Drug use: No    Sexual activity: Yes     Partners: Male     Birth control/protection: Post-menopausal   Social History Narrative        Nutrition manager by education    Disabled    2 children    No blood transfusions    Caregiver Sister       Vitals:    22 1001   BP: 118/76   Pulse: 97       Physical Exam  Constitutional:       Appearance: She is well-developed.   HENT:      Head: Normocephalic and atraumatic.   Eyes:      Pupils: Pupils are equal, round, and reactive to light.   Cardiovascular:      Rate and Rhythm: Normal rate and regular rhythm.      Heart sounds: No murmur heard.  Pulmonary:      Effort: Pulmonary effort is normal.      Breath sounds: Normal breath sounds. No wheezing.   Abdominal:      General: There is no distension.      Palpations: Abdomen is soft.      Tenderness: There is no abdominal tenderness.   Musculoskeletal:         General: Normal range of motion.      Cervical back: Normal range of motion and neck supple.   Skin:     General: Skin is warm and dry.      Capillary Refill: Capillary refill takes less than 2 seconds.      Findings: No rash.   Neurological:      Mental Status: She is alert and oriented to person, place, and time.   Psychiatric:         Judgment: Judgment normal.         Assessment & Plan:    needs replacement  of PD cath.     Risks, benefits, alternatives to the procedure were discussed with the patient, who appears to understand and agree with the treatment plan.

## 2022-03-03 ENCOUNTER — PATIENT MESSAGE (OUTPATIENT)
Dept: NEPHROLOGY | Facility: CLINIC | Age: 57
End: 2022-03-03
Payer: MEDICAID

## 2022-03-03 DIAGNOSIS — N18.5 CKD (CHRONIC KIDNEY DISEASE) STAGE 5, GFR LESS THAN 15 ML/MIN: Primary | ICD-10-CM

## 2022-03-07 ENCOUNTER — INFUSION (OUTPATIENT)
Dept: INFECTIOUS DISEASES | Facility: HOSPITAL | Age: 57
End: 2022-03-07
Attending: INTERNAL MEDICINE
Payer: MEDICARE

## 2022-03-07 ENCOUNTER — HOSPITAL ENCOUNTER (OUTPATIENT)
Dept: PREADMISSION TESTING | Facility: HOSPITAL | Age: 57
Discharge: HOME | End: 2022-03-07
Attending: SURGERY
Payer: MEDICARE

## 2022-03-07 ENCOUNTER — ANESTHESIA EVENT (OUTPATIENT)
Dept: SURGERY | Facility: HOSPITAL | Age: 57
End: 2022-03-07
Payer: MEDICAID

## 2022-03-07 VITALS
HEIGHT: 67 IN | HEART RATE: 94 BPM | SYSTOLIC BLOOD PRESSURE: 131 MMHG | BODY MASS INDEX: 26.64 KG/M2 | TEMPERATURE: 98 F | RESPIRATION RATE: 17 BRPM | WEIGHT: 169.75 LBS | OXYGEN SATURATION: 100 % | DIASTOLIC BLOOD PRESSURE: 79 MMHG

## 2022-03-07 VITALS
RESPIRATION RATE: 18 BRPM | BODY MASS INDEX: 26.41 KG/M2 | HEART RATE: 92 BPM | TEMPERATURE: 98 F | SYSTOLIC BLOOD PRESSURE: 123 MMHG | DIASTOLIC BLOOD PRESSURE: 75 MMHG | OXYGEN SATURATION: 97 % | WEIGHT: 168.63 LBS

## 2022-03-07 DIAGNOSIS — N18.5 CKD (CHRONIC KIDNEY DISEASE) STAGE 5, GFR LESS THAN 15 ML/MIN: Primary | ICD-10-CM

## 2022-03-07 DIAGNOSIS — D63.1 ANEMIA OF CHRONIC KIDNEY FAILURE, STAGE 5: Primary | ICD-10-CM

## 2022-03-07 DIAGNOSIS — N18.5 CKD (CHRONIC KIDNEY DISEASE) STAGE 5, GFR LESS THAN 15 ML/MIN: ICD-10-CM

## 2022-03-07 DIAGNOSIS — N18.5 ANEMIA OF CHRONIC KIDNEY FAILURE, STAGE 5: Primary | ICD-10-CM

## 2022-03-07 LAB
AMORPH CRY URNS QL MICRO: ABNORMAL
ANION GAP SERPL CALC-SCNC: 10 MMOL/L (ref 8–16)
BACTERIA #/AREA URNS HPF: ABNORMAL /HPF
BASOPHILS # BLD AUTO: 0.02 K/UL (ref 0–0.2)
BASOPHILS NFR BLD: 0.3 % (ref 0–1.9)
BILIRUB UR QL STRIP: NEGATIVE
BUN SERPL-MCNC: 81 MG/DL (ref 6–20)
CALCIUM SERPL-MCNC: 9.4 MG/DL (ref 8.7–10.5)
CHLORIDE SERPL-SCNC: 115 MMOL/L (ref 95–110)
CLARITY UR: ABNORMAL
CO2 SERPL-SCNC: 11 MMOL/L (ref 23–29)
COLOR UR: YELLOW
CREAT SERPL-MCNC: 6.4 MG/DL (ref 0.5–1.4)
CREAT UR-MCNC: 121.7 MG/DL (ref 15–325)
DIFFERENTIAL METHOD: ABNORMAL
EOSINOPHIL # BLD AUTO: 0 K/UL (ref 0–0.5)
EOSINOPHIL NFR BLD: 0.6 % (ref 0–8)
ERYTHROCYTE [DISTWIDTH] IN BLOOD BY AUTOMATED COUNT: 15.1 % (ref 11.5–14.5)
EST. GFR  (AFRICAN AMERICAN): 8 ML/MIN/1.73 M^2
EST. GFR  (NON AFRICAN AMERICAN): 7 ML/MIN/1.73 M^2
GLUCOSE SERPL-MCNC: 121 MG/DL (ref 70–110)
GLUCOSE UR QL STRIP: NEGATIVE
HCT VFR BLD AUTO: 24.6 % (ref 37–48.5)
HGB BLD-MCNC: 7.4 G/DL (ref 12–16)
HGB UR QL STRIP: ABNORMAL
HYALINE CASTS #/AREA URNS LPF: 2 /LPF
IMM GRANULOCYTES # BLD AUTO: 0.06 K/UL (ref 0–0.04)
IMM GRANULOCYTES NFR BLD AUTO: 0.9 % (ref 0–0.5)
KETONES UR QL STRIP: NEGATIVE
LEUKOCYTE ESTERASE UR QL STRIP: ABNORMAL
LYMPHOCYTES # BLD AUTO: 1.3 K/UL (ref 1–4.8)
LYMPHOCYTES NFR BLD: 20.8 % (ref 18–48)
MCH RBC QN AUTO: 26.8 PG (ref 27–31)
MCHC RBC AUTO-ENTMCNC: 30.1 G/DL (ref 32–36)
MCV RBC AUTO: 89 FL (ref 82–98)
MICROSCOPIC COMMENT: ABNORMAL
MONOCYTES # BLD AUTO: 0.6 K/UL (ref 0.3–1)
MONOCYTES NFR BLD: 9.1 % (ref 4–15)
NEUTROPHILS # BLD AUTO: 4.3 K/UL (ref 1.8–7.7)
NEUTROPHILS NFR BLD: 68.3 % (ref 38–73)
NITRITE UR QL STRIP: NEGATIVE
NON-SQ EPI CELLS #/AREA URNS HPF: 0 /HPF
NRBC BLD-RTO: 0 /100 WBC
PH UR STRIP: 6 [PH] (ref 5–8)
PLATELET # BLD AUTO: 180 K/UL (ref 150–450)
PMV BLD AUTO: 11.5 FL (ref 9.2–12.9)
POTASSIUM SERPL-SCNC: 5 MMOL/L (ref 3.5–5.1)
PROT UR QL STRIP: ABNORMAL
PROT UR-MCNC: 190 MG/DL
PROT/CREAT UR: 1.56 MG/G{CREAT} (ref 0–0.2)
RBC # BLD AUTO: 2.76 M/UL (ref 4–5.4)
RBC #/AREA URNS HPF: 7 /HPF (ref 0–4)
SODIUM SERPL-SCNC: 136 MMOL/L (ref 136–145)
SP GR UR STRIP: 1.01 (ref 1–1.03)
SQUAMOUS #/AREA URNS HPF: 11 /HPF
URN SPEC COLLECT METH UR: ABNORMAL
UROBILINOGEN UR STRIP-ACNC: NEGATIVE EU/DL
WBC # BLD AUTO: 6.36 K/UL (ref 3.9–12.7)
WBC #/AREA URNS HPF: 21 /HPF (ref 0–5)
WBC CLUMPS URNS QL MICRO: ABNORMAL

## 2022-03-07 PROCEDURE — 63600175 PHARM REV CODE 636 W HCPCS: Mod: NTX | Performed by: INTERNAL MEDICINE

## 2022-03-07 PROCEDURE — 25000003 PHARM REV CODE 250: Mod: NTX | Performed by: INTERNAL MEDICINE

## 2022-03-07 PROCEDURE — 96365 THER/PROPH/DIAG IV INF INIT: CPT | Mod: TXP

## 2022-03-07 PROCEDURE — 85025 COMPLETE CBC W/AUTO DIFF WBC: CPT | Mod: NTX | Performed by: SURGERY

## 2022-03-07 PROCEDURE — 80048 BASIC METABOLIC PNL TOTAL CA: CPT | Mod: NTX | Performed by: SURGERY

## 2022-03-07 PROCEDURE — 84156 ASSAY OF PROTEIN URINE: CPT | Mod: NTX | Performed by: SURGERY

## 2022-03-07 PROCEDURE — 87086 URINE CULTURE/COLONY COUNT: CPT | Mod: NTX | Performed by: SURGERY

## 2022-03-07 PROCEDURE — 81000 URINALYSIS NONAUTO W/SCOPE: CPT | Mod: NTX | Performed by: SURGERY

## 2022-03-07 RX ORDER — HEPARIN 100 UNIT/ML
500 SYRINGE INTRAVENOUS
Status: CANCELLED | OUTPATIENT
Start: 2022-03-14

## 2022-03-07 RX ORDER — SODIUM CHLORIDE 0.9 % (FLUSH) 0.9 %
10 SYRINGE (ML) INJECTION
Status: DISCONTINUED | OUTPATIENT
Start: 2022-03-07 | End: 2022-03-07 | Stop reason: HOSPADM

## 2022-03-07 RX ORDER — HEPARIN 100 UNIT/ML
500 SYRINGE INTRAVENOUS
Status: DISCONTINUED | OUTPATIENT
Start: 2022-03-07 | End: 2022-03-07 | Stop reason: HOSPADM

## 2022-03-07 RX ORDER — SODIUM CHLORIDE 0.9 % (FLUSH) 0.9 %
10 SYRINGE (ML) INJECTION
Status: CANCELLED | OUTPATIENT
Start: 2022-03-14

## 2022-03-07 RX ADMIN — IRON SUCROSE 100 MG: 20 INJECTION, SOLUTION INTRAVENOUS at 11:03

## 2022-03-07 RX ADMIN — SODIUM CHLORIDE: 0.9 INJECTION, SOLUTION INTRAVENOUS at 11:03

## 2022-03-07 NOTE — DISCHARGE INSTRUCTIONS
Your surgery is scheduled for _Friday March 11, 2022_.    Call [phone] between 2 p.m. and 5 p.m. on   __Thursday_ to find out your arrival time for the day of your surgery.      Please report to SAME DAY SURGERY UNIT on the 2nd FLOOR at _______ a.m.  Use front door entrance. The doors open at 0530 am.          INSTRUCTIONS IMPORTANT!!!  ¨ Do not eat  after 12 midnight-. OK to brush teeth, no   gum, candy or mints!    MAY DRINK WATER UNTIL HOSPITAL ARRIVAL TIME    ¨ Take only these medicines with a small swallow of water-morning of surgery.  Take med checked on MED LIST      _x___  Return to Hospital Lab on Thursday March 10, 2022 at 8:20 am_for Covid test.    __x__  Prep instructions  SHOWER     __x__  Please shower using Hibiclens soap the night before AND  the morning of your surgery/procedure. Do not use Hibiclens on your face or genitals               If your surgery is around your belly button (Navel) be sure to wash inside your belly button also. Rinse hibiclens off completely.  __x__  No shaving of procedural area at least 4-5 days before surgery due to increased risk of skin irritation and/or possible infection.  __x__  Do not wear makeup, including mascara. WEARING EYE MAKEUP MAY  LEAD TO SERIOUS EYE INJURY during surgery.  __x__  No powder, lotions or creams to your body.  __x__  You may wear only deodorant on the day of surgery.  __x__  Please remove all jewelry, including piercings and leave at home.  __x__  No money or valuables needed. Please leave at home.  You may bring your cell phone.  ____  Please bring any documents given by your doctor.  __x__  If going home the same day, arrange for a ride home. You will not be able to   drive if Anesthesia was used.  ____  Children under 18 years require a parent / guardian present the entire time   they are in surgery / recovery.  __x__  Wear loose fitting clothing. Allow for dressings, bandages.  _x___  Stop Aspirin, Ibuprofen, Motrin and Aleve at least  3-5 days before surgery, unless otherwise instructed by your doctor, or the nurse.              You MAY use Tylenol/acetaminophen until day       of surgery.  __x__  If you take diabetic medication, do not take am of surgery   __x__  Call MD for temperature above 101 degrees.        __x__ Stop taking any Fish Oil supplement or                   Vitamin E at least 5 days prior to surgery.          I have read or had read and explained to me, and understand the above information.  Additional comments or instructions:Please call   [phone] if you have any questions regarding the instructions above.

## 2022-03-07 NOTE — ANESTHESIA PREPROCEDURE EVALUATION
2022  Elizabeth Maldonado is a 57 y.o., female scheduled for INSERTION, CATHETER, DIALYSIS, PERITONEAL, LAPAROSCOPIC (N/A Abdomen) on 3/11/2022.      Past Medical History:   Diagnosis Date    Acidosis 2014    Allergic rhinitis 2014    Allergy     Anemia     Anemia in chronic kidney disease 2014    Anxiety     Cataract     Chronic bilateral low back pain with bilateral sciatica 10/25/2019    Chronic immunosuppression with Prograf and MMF 12/3/2014    CKD (chronic kidney disease) stage 5, GFR less than 15 ml/min 2014    CKD (chronic kidney disease), stage III 3/2/2015    Degenerative disc disease     Depression 2014    Diabetes mellitus, type 2 since age 18 2014    ESRD on peritoneal dialysis - 2014 for 9 hours no peritonitis 2014    Hyperlipidemia     Hypertension 2014    Hypomagnesemia 2015    Kidney transplant status, living unrelated donor - 12/2/14 12/3/2014    Neutropenia 2015    NS (nuclear sclerosis) 2016    Obesity     Organ transplant candidate 2014    Pre-op exam 2014    Proliferative diabetic retinopathy of both eyes without macular edema associated with type 2 diabetes mellitus 2016    Renal manifestation of secondary diabetes mellitus     Tendinitis     Trouble in sleeping        Past Surgical History:   Procedure Laterality Date    BIOPSY N/A 2020    Procedure: Biopsy;  Surgeon: Sweta Catalan MD;  Location: [address];  Service: Transplant;  Laterality: N/A;    BONE MARROW BIOPSY N/A      SECTION      x 2    KIDNEY TRANSPLANT  2014    MEDIPORT REMOVAL N/A 2019    Procedure: REMOVAL, CATHETER, CENTRAL VENOUS, TUNNELED, WITH PORT;  Surgeon: Eusebia Diagnostic Provider;  Location: [facility] OR;  Service: Radiology;  Laterality: N/A;    RENAL BIOPSY      ROTATOR CUFF REPAIR       TUBAL LIGATION           CXR 1/26/2022:  Impression:     No convincing evidence of acute cardiopulmonary disease.        Electronically signed by: Elliot Cesar  Date:                                            01/26/2022  Time:                                           10:32    Pre-op Assessment    I have reviewed the Patient Summary Reports.     I have reviewed the Nursing Notes. I have reviewed the NPO Status.   I have reviewed the Medications.     Review of Systems  Anesthesia Hx:  No problems with previous Anesthesia  Denies Family Hx of Anesthesia complications.   Denies Personal Hx of Anesthesia complications.   Social:  No Alcohol Use, Former Smoker    Hematology/Oncology:     Oncology Normal    -- Anemia:   EENT/Dental:EENT/Dental Normal   Cardiovascular:   Hypertension hyperlipidemia EKG 1/26/22:  Normal sinus rhythm   Normal ECG   When compared with ECG of 04-JAN-2022 10:21,   No significant change was found   Confirmed by Lamberto Glasgow MD ([id number]) on 1/26/2022 6:21:35 PM  Functional Capacity good / => 4 METS    Pulmonary:  Pulmonary Normal    Renal/:   Chronic Renal Disease, CRI    Hepatic/GI:  Hepatic/GI Normal    Musculoskeletal:   Arthritis     Neurological:   Neuromuscular Disease,    Endocrine:   Diabetes, type 2    Dermatological:  Skin Normal        Physical Exam  General: Well nourished, Cooperative and Alert    Airway:  Mallampati: III   Mouth Opening: Normal  TM Distance: Normal  Tongue: Normal  Neck ROM: Normal ROM        Anesthesia Plan  Type of Anesthesia, risks & benefits discussed:    Anesthesia Type: Gen ETT  Intra-op Monitoring Plan: Standard ASA Monitors  Post Op Pain Control Plan: multimodal analgesia  Induction:  IV  Informed Consent: Informed consent signed with the Patient and all parties understand the risks and agree with anesthesia plan.  All questions answered.   ASA Score: 3  Day of Surgery Review of History & Physical: H&P Update referred to the  surgeon/provider.    Ready For Surgery From Anesthesia Perspective.     .

## 2022-03-07 NOTE — PROGRESS NOTES
Patient arrived for Venofer 1/10 infusion. Observed patient for one hour post infusion. No signs/symptoms of adverse reaction. Tolerated without difficulty and left unit in NAD.

## 2022-03-08 ENCOUNTER — PATIENT MESSAGE (OUTPATIENT)
Dept: NEPHROLOGY | Facility: CLINIC | Age: 57
End: 2022-03-08
Payer: MEDICAID

## 2022-03-09 ENCOUNTER — OFFICE VISIT (OUTPATIENT)
Dept: NEPHROLOGY | Facility: CLINIC | Age: 57
End: 2022-03-09
Payer: MEDICARE

## 2022-03-09 DIAGNOSIS — I15.0 RENOVASCULAR HYPERTENSION: ICD-10-CM

## 2022-03-09 DIAGNOSIS — R80.8 OTHER PROTEINURIA: ICD-10-CM

## 2022-03-09 DIAGNOSIS — N18.5 CKD (CHRONIC KIDNEY DISEASE) STAGE 5, GFR LESS THAN 15 ML/MIN: Primary | ICD-10-CM

## 2022-03-09 DIAGNOSIS — E87.20 METABOLIC ACIDOSIS: ICD-10-CM

## 2022-03-09 DIAGNOSIS — Z94.0 KIDNEY TRANSPLANT STATUS, LIVING UNRELATED DONOR: Chronic | ICD-10-CM

## 2022-03-09 LAB — BACTERIA UR CULT: NORMAL

## 2022-03-09 PROCEDURE — 99215 PR OFFICE/OUTPT VISIT, EST, LEVL V, 40-54 MIN: ICD-10-PCS | Mod: 95,,, | Performed by: INTERNAL MEDICINE

## 2022-03-09 PROCEDURE — 99215 OFFICE O/P EST HI 40 MIN: CPT | Mod: 95,,, | Performed by: INTERNAL MEDICINE

## 2022-03-09 NOTE — PROGRESS NOTES
The patient location is: home  The chief complaint leading to consultation is: CKD    Visit type: audiovisual    Face to Face time with patient: 25 minutes of total time spent on the encounter, which includes face to face time and non-face to face time preparing to see the patient (eg, review of tests), Obtaining and/or reviewing separately obtained history, Documenting clinical information in the electronic or other health record, Independently interpreting results (not separately reported) and communicating results to the patient/family/caregiver, or Care coordination (not separately reported).         Each patient to whom he or she provides medical services by telemedicine is:  (1) informed of the relationship between the physician and patient and the respective role of any other health care provider with respect to management of the patient; and (2) notified that he or she may decline to receive medical services by telemedicine and may withdraw from such care at any time.    Notes:   NEPHROLOGY PROGRESS NOTE    INTERVAL HISTORY  55 yo AAF patient is here today for follow up evaluation of renal allograft. Last seen in renal office by Dr. Baird. She is s/p  donor kidney tx 14. Current immunosuppression; tacrolimus; mycophenolate. Baseline Cr 1.9 - 2.2 mg/dl.  There is a hx of diabetes complicated by nephropathy; retinopathy.  Denies fevers; chills night sweats; chest pains; hemoptysis; orthopnea; PND.      Today she has no new complaints. Being evaluated for a re-transplant. She does not take NSAIDs. Reports weakness and fatigue. Good appetite. No emesis. Scheduled.     MEDICATIONS reviewed    Last Physical Exam  /80 mmHg  Chronically ill appearing woman; no acute distress; oriented x 3  HEENT; (+)retinopathy  CHEST; Clear P&A; no rales or rhonchi  HEART; RR; S1&S2 no murmur rub gallop  ABD; BS(+); non-tender; no organomegaly  EXT; (-)Edema    LABS  Serum Cr 4.4 mg/dL (from 4.9)  UPCR 2.2 - 3.0  g/g  K 5.1 mmol/L  Hgb 7.3 g/dL    Impression  1. Kidney transplant status / Renal allograft / CKD stage 5, eGFR 11 ml/min. Will see Gen Surg for PD cath placement and initiation of RRT next week. Will contact our SW. Currently on tacrolimus and MMF. Tacrolimus level adequate. Not on prednisone. 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 Has reached ESRD, being evaluated for a second kidney transplant. Reminded to avoid NSAIDs/Bactrim/IV dye. Metabolic acidosis, worse, will restart oral alkali. On sevelamer for hyperphosphatemia.  2. Hypertension. Satisfactory control on carvedilol.   3. 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 As above  4. Type 2 diabetes mellitus, on insulin, managed by PCP  5. Anemia. 2/2 CKD, MGUS and iron deficiency. Followed by Hematology-Oncology. Just started iron infusions. Will d/w Transplant Nephrol possible allograft kidney biopsy to invetigate for MRGS as per Hem-Onc    Plan  Visit with Gen Surgery for PD cath placement  Initiate PD  Continue Venofer infusion  Continue MMF/   Resume NaHCO3 to 1950 mg tid  Continue sevelamer  F/u with KT reg re-transplant  Return Visit in 2 mo

## 2022-03-10 ENCOUNTER — HOSPITAL ENCOUNTER (OUTPATIENT)
Dept: PREADMISSION TESTING | Facility: HOSPITAL | Age: 57
Discharge: HOME | End: 2022-03-10
Attending: INTERNAL MEDICINE
Payer: MEDICARE

## 2022-03-10 DIAGNOSIS — Z01.812 ENCOUNTER FOR PREOPERATIVE SCREENING LABORATORY TESTING FOR COVID-19 VIRUS: ICD-10-CM

## 2022-03-10 DIAGNOSIS — Z11.52 ENCOUNTER FOR PREOPERATIVE SCREENING LABORATORY TESTING FOR COVID-19 VIRUS: ICD-10-CM

## 2022-03-10 LAB — SARS-COV-2 RDRP RESP QL NAA+PROBE: NEGATIVE

## 2022-03-10 PROCEDURE — U0002 COVID-19 LAB TEST NON-CDC: HCPCS | Mod: TXP | Performed by: SURGERY

## 2022-03-10 RX ORDER — LIDOCAINE HYDROCHLORIDE 10 MG/ML
1 INJECTION, SOLUTION EPIDURAL; INFILTRATION; INTRACAUDAL; PERINEURAL ONCE
Status: CANCELLED | OUTPATIENT
Start: 2022-03-10 | End: 2022-03-10

## 2022-03-10 RX ORDER — SODIUM CHLORIDE 9 MG/ML
INJECTION, SOLUTION INTRAVENOUS CONTINUOUS
Status: CANCELLED | OUTPATIENT
Start: 2022-03-10

## 2022-03-10 RX ORDER — ACETAMINOPHEN 500 MG
1000 TABLET ORAL
Status: CANCELLED | OUTPATIENT
Start: 2022-03-10 | End: 2022-03-10

## 2022-03-11 ENCOUNTER — HOSPITAL ENCOUNTER (OUTPATIENT)
Facility: HOSPITAL | Age: 57
Discharge: HOME | End: 2022-03-11
Attending: SURGERY | Admitting: SURGERY
Payer: MEDICAID

## 2022-03-11 ENCOUNTER — ANESTHESIA (OUTPATIENT)
Dept: SURGERY | Facility: HOSPITAL | Age: 57
End: 2022-03-11
Payer: MEDICAID

## 2022-03-11 VITALS
BODY MASS INDEX: 26.59 KG/M2 | TEMPERATURE: 98 F | WEIGHT: 169.75 LBS | DIASTOLIC BLOOD PRESSURE: 83 MMHG | OXYGEN SATURATION: 100 % | SYSTOLIC BLOOD PRESSURE: 164 MMHG | RESPIRATION RATE: 18 BRPM | HEART RATE: 95 BPM

## 2022-03-11 DIAGNOSIS — Z01.812 ENCOUNTER FOR PREOPERATIVE SCREENING LABORATORY TESTING FOR COVID-19 VIRUS: Primary | ICD-10-CM

## 2022-03-11 DIAGNOSIS — Z11.52 ENCOUNTER FOR PREOPERATIVE SCREENING LABORATORY TESTING FOR COVID-19 VIRUS: Primary | ICD-10-CM

## 2022-03-11 DIAGNOSIS — N18.5 CKD (CHRONIC KIDNEY DISEASE) STAGE 5, GFR LESS THAN 15 ML/MIN: ICD-10-CM

## 2022-03-11 LAB
ABO + RH BLD: NORMAL
BLD GP AB SCN CELLS X3 SERPL QL: NORMAL
POCT GLUCOSE: 103 MG/DL (ref 70–110)
POCT GLUCOSE: 147 MG/DL (ref 70–110)

## 2022-03-11 PROCEDURE — 36415 COLL VENOUS BLD VENIPUNCTURE: CPT | Mod: TXP | Performed by: ANESTHESIOLOGY

## 2022-03-11 PROCEDURE — 25000003 PHARM REV CODE 250: Mod: NTX | Performed by: REGISTERED NURSE

## 2022-03-11 PROCEDURE — 00840 ANES IPER PX LOWER ABD NOS: CPT | Mod: NTX | Performed by: SURGERY

## 2022-03-11 PROCEDURE — 25000003 PHARM REV CODE 250: Mod: TXP | Performed by: REGISTERED NURSE

## 2022-03-11 PROCEDURE — D9220A PRA ANESTHESIA: ICD-10-PCS | Mod: CRNA,NTX,, | Performed by: REGISTERED NURSE

## 2022-03-11 PROCEDURE — 86850 RBC ANTIBODY SCREEN: CPT | Mod: TXP | Performed by: ANESTHESIOLOGY

## 2022-03-11 PROCEDURE — 36000708 HC OR TIME LEV III 1ST 15 MIN: Mod: TXP | Performed by: SURGERY

## 2022-03-11 PROCEDURE — 82962 GLUCOSE BLOOD TEST: CPT | Mod: TXP | Performed by: SURGERY

## 2022-03-11 PROCEDURE — D9220A PRA ANESTHESIA: ICD-10-PCS | Mod: ANES,NTX,, | Performed by: ANESTHESIOLOGY

## 2022-03-11 PROCEDURE — 63600175 PHARM REV CODE 636 W HCPCS: Mod: TXP | Performed by: REGISTERED NURSE

## 2022-03-11 PROCEDURE — 37000008 HC ANESTHESIA 1ST 15 MINUTES: Mod: NTX | Performed by: SURGERY

## 2022-03-11 PROCEDURE — 71000015 HC POSTOP RECOV 1ST HR: Mod: TXP | Performed by: SURGERY

## 2022-03-11 PROCEDURE — 49324 PR LAP INSERTION TUNNELED INTRAPERITONEAL CATHETER: ICD-10-PCS | Mod: NTX,,, | Performed by: SURGERY

## 2022-03-11 PROCEDURE — 36000709 HC OR TIME LEV III EA ADD 15 MIN: Mod: NTX | Performed by: SURGERY

## 2022-03-11 PROCEDURE — 63600175 PHARM REV CODE 636 W HCPCS: Mod: NTX | Performed by: REGISTERED NURSE

## 2022-03-11 PROCEDURE — D9220A PRA ANESTHESIA: Mod: CRNA,NTX,, | Performed by: REGISTERED NURSE

## 2022-03-11 PROCEDURE — 25000003 PHARM REV CODE 250: Mod: TXP | Performed by: SURGERY

## 2022-03-11 PROCEDURE — 71000016 HC POSTOP RECOV ADDL HR: Mod: NTX | Performed by: SURGERY

## 2022-03-11 PROCEDURE — D9220A PRA ANESTHESIA: Mod: ANES,NTX,, | Performed by: ANESTHESIOLOGY

## 2022-03-11 PROCEDURE — 37000009 HC ANESTHESIA EA ADD 15 MINS: Mod: TXP | Performed by: SURGERY

## 2022-03-11 PROCEDURE — C1750 CATH, HEMODIALYSIS,LONG-TERM: HCPCS | Mod: TXP | Performed by: SURGERY

## 2022-03-11 PROCEDURE — 49324 LAP INSERT TUNNEL IP CATH: CPT | Mod: NTX,,, | Performed by: SURGERY

## 2022-03-11 PROCEDURE — 71000033 HC RECOVERY, INTIAL HOUR: Mod: TXP | Performed by: SURGERY

## 2022-03-11 DEVICE — KIT PERITONEAL DIALYS 62.0CM: Type: IMPLANTABLE DEVICE | Site: ABDOMEN | Status: FUNCTIONAL

## 2022-03-11 RX ORDER — ROCURONIUM BROMIDE 10 MG/ML
INJECTION, SOLUTION INTRAVENOUS
Status: DISCONTINUED | OUTPATIENT
Start: 2022-03-11 | End: 2022-03-11

## 2022-03-11 RX ORDER — DEXAMETHASONE SODIUM PHOSPHATE 4 MG/ML
INJECTION, SOLUTION INTRA-ARTICULAR; INTRALESIONAL; INTRAMUSCULAR; INTRAVENOUS; SOFT TISSUE
Status: DISCONTINUED | OUTPATIENT
Start: 2022-03-11 | End: 2022-03-11

## 2022-03-11 RX ORDER — HYDROMORPHONE HYDROCHLORIDE 2 MG/ML
0.2 INJECTION, SOLUTION INTRAMUSCULAR; INTRAVENOUS; SUBCUTANEOUS EVERY 5 MIN PRN
Status: DISCONTINUED | OUTPATIENT
Start: 2022-03-11 | End: 2022-03-11 | Stop reason: HOSPADM

## 2022-03-11 RX ORDER — LIDOCAINE HYDROCHLORIDE 20 MG/ML
INJECTION INTRAVENOUS
Status: DISCONTINUED | OUTPATIENT
Start: 2022-03-11 | End: 2022-03-11

## 2022-03-11 RX ORDER — PHENYLEPHRINE HYDROCHLORIDE 10 MG/ML
INJECTION INTRAVENOUS
Status: DISCONTINUED | OUTPATIENT
Start: 2022-03-11 | End: 2022-03-11

## 2022-03-11 RX ORDER — OXYCODONE AND ACETAMINOPHEN 5; 325 MG/1; MG/1
1 TABLET ORAL ONCE
Status: COMPLETED | OUTPATIENT
Start: 2022-03-11 | End: 2022-03-11

## 2022-03-11 RX ORDER — SODIUM CHLORIDE 0.9 % (FLUSH) 0.9 %
3 SYRINGE (ML) INJECTION
Status: DISCONTINUED | OUTPATIENT
Start: 2022-03-11 | End: 2022-03-11 | Stop reason: HOSPADM

## 2022-03-11 RX ORDER — ONDANSETRON 2 MG/ML
INJECTION INTRAMUSCULAR; INTRAVENOUS
Status: DISCONTINUED | OUTPATIENT
Start: 2022-03-11 | End: 2022-03-11

## 2022-03-11 RX ORDER — CEFAZOLIN SODIUM 1 G/3ML
INJECTION, POWDER, FOR SOLUTION INTRAMUSCULAR; INTRAVENOUS
Status: DISCONTINUED | OUTPATIENT
Start: 2022-03-11 | End: 2022-03-11

## 2022-03-11 RX ORDER — PROPOFOL 10 MG/ML
VIAL (ML) INTRAVENOUS
Status: DISCONTINUED | OUTPATIENT
Start: 2022-03-11 | End: 2022-03-11

## 2022-03-11 RX ORDER — FENTANYL CITRATE 50 UG/ML
INJECTION, SOLUTION INTRAMUSCULAR; INTRAVENOUS
Status: DISCONTINUED | OUTPATIENT
Start: 2022-03-11 | End: 2022-03-11

## 2022-03-11 RX ORDER — FENTANYL CITRATE 50 UG/ML
25 INJECTION, SOLUTION INTRAMUSCULAR; INTRAVENOUS EVERY 5 MIN PRN
Status: DISCONTINUED | OUTPATIENT
Start: 2022-03-11 | End: 2022-03-11 | Stop reason: HOSPADM

## 2022-03-11 RX ORDER — BUPIVACAINE HYDROCHLORIDE 2.5 MG/ML
INJECTION, SOLUTION INFILTRATION; PERINEURAL
Status: DISCONTINUED | OUTPATIENT
Start: 2022-03-11 | End: 2022-03-11 | Stop reason: HOSPADM

## 2022-03-11 RX ORDER — OXYCODONE AND ACETAMINOPHEN 5; 325 MG/1; MG/1
1 TABLET ORAL EVERY 4 HOURS PRN
Qty: 30 TABLET | Refills: 0 | Status: SHIPPED | OUTPATIENT
Start: 2022-03-11 | End: 2022-04-01

## 2022-03-11 RX ADMIN — LIDOCAINE HYDROCHLORIDE 100 MG: 20 INJECTION, SOLUTION INTRAVENOUS at 12:03

## 2022-03-11 RX ADMIN — FENTANYL CITRATE 25 MCG: 50 INJECTION, SOLUTION INTRAMUSCULAR; INTRAVENOUS at 12:03

## 2022-03-11 RX ADMIN — DEXAMETHASONE SODIUM PHOSPHATE 4 MG: 4 INJECTION, SOLUTION INTRAMUSCULAR; INTRAVENOUS at 12:03

## 2022-03-11 RX ADMIN — OXYCODONE HYDROCHLORIDE AND ACETAMINOPHEN 1 TABLET: 5; 325 TABLET ORAL at 03:03

## 2022-03-11 RX ADMIN — PHENYLEPHRINE HYDROCHLORIDE 200 MCG: 10 INJECTION INTRAVENOUS at 12:03

## 2022-03-11 RX ADMIN — ROCURONIUM BROMIDE 30 MG: 10 INJECTION, SOLUTION INTRAVENOUS at 12:03

## 2022-03-11 RX ADMIN — ONDANSETRON 4 MG: 2 INJECTION, SOLUTION INTRAMUSCULAR; INTRAVENOUS at 12:03

## 2022-03-11 RX ADMIN — PROPOFOL 120 MG: 10 INJECTION, EMULSION INTRAVENOUS at 12:03

## 2022-03-11 RX ADMIN — PHENYLEPHRINE HYDROCHLORIDE 100 MCG: 10 INJECTION INTRAVENOUS at 12:03

## 2022-03-11 RX ADMIN — SODIUM CHLORIDE: 0.9 INJECTION, SOLUTION INTRAVENOUS at 11:03

## 2022-03-11 RX ADMIN — SUGAMMADEX 150 MG: 100 INJECTION, SOLUTION INTRAVENOUS at 12:03

## 2022-03-11 RX ADMIN — CEFAZOLIN SODIUM 2 G: 1 POWDER, FOR SOLUTION INTRAMUSCULAR; INTRAVENOUS at 12:03

## 2022-03-11 NOTE — TRANSFER OF CARE
Anesthesia Transfer of Care Note    Patient: Elizabeth Maldonado    Procedure(s) Performed: Procedure(s) (LRB):  INSERTION, CATHETER, DIALYSIS, PERITONEAL, LAPAROSCOPIC (N/A)    Patient location: PACU    Anesthesia Type: general    Transport from OR: Transported from OR on 6-10 L/min O2 by face mask with adequate spontaneous ventilation    Post pain: adequate analgesia    Post assessment: no apparent anesthetic complications and tolerated procedure well    Post vital signs: stable    Level of consciousness: awake, alert and oriented    Nausea/Vomiting: no nausea/vomiting    Complications: none    Transfer of care protocol was followed      Last vitals:   Visit Vitals  BP (!) 159/77 (BP Location: Right arm, Patient Position: Lying)   Pulse 93   Temp 36.7 °C (98 °F) (Oral)   Resp 16   Wt 77 kg (169 lb 12 oz)   LMP 04/29/2011 (Approximate)   SpO2 100%   Breastfeeding No   BMI 26.59 kg/m²

## 2022-03-11 NOTE — ANESTHESIA PROCEDURE NOTES
Intubation    Date/Time: 3/11/2022 12:17 PM  Performed by: Emmie Camejo CRNA  Authorized by: Renny Alex MD     Intubation:     Induction:  Intravenous    Intubated:  Postinduction    Mask Ventilation:  Easy mask    Attempts:  1    Attempted By:  CRNA    Method of Intubation:  Direct    Blade:  Bertha 3    Laryngeal View Grade: Grade I - full view of cords      Difficult Airway Encountered?: No      Complications:  None    Airway Device:  Oral endotracheal tube    Airway Device Size:  7.0    Style/Cuff Inflation:  Cuffed (inflated to minimal occlusive pressure)    Tube secured:  20    Secured at:  The lips    Placement Verified By:  Capnometry    Complicating Factors:  None    Findings Post-Intubation:  BS equal bilateral and atraumatic/condition of teeth unchanged

## 2022-03-11 NOTE — DISCHARGE INSTRUCTIONS
[facility]  Office # [phone]    Discharge Instructions for Same Day Surgery     Call the office for and appointment if one has not already been made.     Diet: Drink plenty of fluids the first 48 hours and you may resume your   usual diet.     Activity: No heavy lifting (over 10 pounds), pushing or pulling until your   post op visit. Your doctor's office may have told you to limit your lifting to less weight, or even no weight.  Be sure to follow those instructions.    Note: You may ride in a car and you may drive when comfortable.     Do not drive, drink alcohol, or sign legal documents for 24 hours, or if taking narcotic pain medication. Last narcotic pain pain was given at 3:05pm.    Dressings: Remove the dressing 24 hours after surgery. You may shower  24 hours after surgery and you may wash your hair.     If you have steri strips ( appears to be strips of white tape) on   your incision, leave them on. In 5-7 days they will begin to fall off    Medical: Call the doctor for any of the following problems: fever above 101,   severe pain, bleeding, or abdominal distention (swelling).   If constipated you may take any stool softener you choose.     Occasionally small areas of skin numbness or an unpleasant skin sensation can result. Also, you may find that your incision is swollen and tender for a few days.  Some redness around sutures and staples is a normal reaction, but if the discomfort persists or worsens, call you doctor.             Fall Prevention  Millions of people fall every year and injure themselves. You may have had anesthesia or sedation which may increase your risk of falling. You may have health issues that put you at an increased risk of falling.     Here are ways to reduce your risk of falling.    Make your home safe by keeping walkways clear of objects you may trip over.  Use non-slip pads under rugs. Do not use area rugs or small throw rugs.  Use non-slip mats in  bathtubs and showers.  Install handrails and lights on staircases.  Do not walk in poorly lit areas.  Do not stand on chairs or wobbly ladders.  Use caution when reaching overhead or looking upward. This position can cause a loss of balance.  Be sure your shoes fit properly, have non-slip bottoms and are in good condition.   Wear shoes both inside and out. Avoid going barefoot or wearing slippers.  Be cautious when going up and down stairs, curbs, and when walking on uneven sidewalks.  If your balance is poor, consider using a cane or walker.  If your fall was related to alcohol use, stop or limit alcohol intake.   If your fall was related to use of sleeping medicines, talk to your doctor about this. You may need to reduce your dosage at bedtime if you awaken during the night to go to the bathroom.    To reduce the need for nighttime bathroom trips:  Avoid drinking fluids for several hours before going to bed  Empty your bladder before going to bed  Men can keep a urinal at the bedside  Stay as active as you can. Balance, flexibility, strength, and endurance all come from exercise. They all play a role in preventing falls. Ask your healthcare provider which types of activity are right for you.  Get your vision checked on a regular basis.  If you have pets, know where they are before you stand up or walk so you don't trip over them.  Use night lights.

## 2022-03-11 NOTE — DISCHARGE SUMMARY
[facility] [place] - Surgery  Discharge Note  Short Stay    Procedure(s) (LRB):  INSERTION, CATHETER, DIALYSIS, PERITONEAL, LAPAROSCOPIC (N/A)    OUTCOME: Patient tolerated treatment/procedure well without complication and is now ready for discharge.    DISPOSITION: Home or Self Care    FINAL DIAGNOSIS: renal failure  FOLLOWUP: In clinic    DISCHARGE INSTRUCTIONS:    Discharge Procedure Orders   Diet Adult Regular     Other restrictions (specify):   Order Comments: Continue walking  Avoid lifting anything heavier than 10lbs until further discussed at your follow up appointment  Do not get your PD catheter wet until further discussed in clinic  OK to sponge bath     Notify your health care provider if you experience any of the following:  temperature >100.4     Notify your health care provider if you experience any of the following:  persistent nausea and vomiting or diarrhea     Notify your health care provider if you experience any of the following:  severe uncontrolled pain     Notify your health care provider if you experience any of the following:  redness, tenderness, or signs of infection (pain, swelling, redness, odor or green/yellow discharge around incision site)     Notify your health care provider if you experience any of the following:  difficulty breathing or increased cough     Leave dressing on - Keep it clean, dry, and intact until clinic visit        TIME SPENT ON DISCHARGE: 3 minutes

## 2022-03-11 NOTE — PLAN OF CARE
Eric 10/10. VSS per flow sheet. Abdominal incision Pain level 3/10.   Dressings to abdomen gauze and island with small ss drainage to dialysis catheter dressing.  Post op CBG = 147.  No n/v present. See chart for full assessment.   Hand off report to DEEPAK Rayo.

## 2022-03-11 NOTE — OP NOTE
.  [facility] - Surgery  General Surgery  Operative Note    SUMMARY     Date of Procedure: 3/11/2022     Procedure: Procedure(s) (LRB):  INSERTION, CATHETER, DIALYSIS, PERITONEAL, LAPAROSCOPIC (N/A)       Surgeon(s) and Role:     * Richy Guerrero MD - Primary    Assisting Surgeon: Dr. Marquise Mohr    Pre-Operative Diagnosis: CKD (chronic kidney disease) stage 5, GFR less than 15 ml/min [N18.5]    Post-Operative Diagnosis: Post-Op Diagnosis Codes:     * CKD (chronic kidney disease) stage 5, GFR less than 15 ml/min [N18.5]    Anesthesia: Choice    Indications for Procedure: 58 y/o woman with hisotry of renal failure and need for dialysis    Procedure in Detail:  After informed consent was obtained, the patient was taken to the operating room and laid supine on the operating room table.  After induction of anesthesia, the patient was prepped and draped in standard surgical fashion.  After a preprocedure pause was performed correctly identify the patient and the procedure to be performed, incision was made above the patient's umbilicus.  A Veress needle was then placed through the incision and gas was insufflated establishing pneumoperitoneum.  Once 15 mm of pressure-achieved the Veress needle was removed and a Optiview trocar was used to access the patient's abdomen.  A 30 degree 5 mm scope was placed through the trocar abdominal cavity was inspected.  There was no evidence of injury upon entry.  A 2nd trocar was placed to allow for placement of the peritoneal dialysis catheter.  Catheter was placed the cuff was then tunneled into the rectus sheath.  And the secondary cuff was tunneled to just inside the patient's skin.  Fluid was seen to flow freely into the catheter and had returned.  Trocars removed under direct visualization skin edges reapproximated using 4-0 Vicryl subcuticular stitches.  Sterile dressing was applied.  Patient tolerated the procedure well transferred to recovery room in good  condition.    Significant Surgical Tasks Conducted by the Assistant(s), if Applicable: skin closure    Estimated Blood Loss (EBL): min           Implants:   Implant Name Type Inv. Item Serial No.  Lot No. LRB No. Used Action   KIT PERITONEAL DIALYS 62.0CM - [id number]  KIT PERITONEAL DIALYS 62.0CM  [employer]  N/A 1 Implanted       Specimens:   Specimen (24h ago, onward)            None                  Condition: Good    Disposition: PACU - hemodynamically stable.    Attestation: I was present and scrubbed for the entire procedure.

## 2022-03-11 NOTE — PLAN OF CARE
Spoke with Dr Guerrero's resident and was informed that its okay for patient to be discharged without voiding .

## 2022-03-14 ENCOUNTER — PATIENT MESSAGE (OUTPATIENT)
Dept: NEPHROLOGY | Facility: CLINIC | Age: 57
End: 2022-03-14
Payer: MEDICAID

## 2022-03-14 ENCOUNTER — PATIENT MESSAGE (OUTPATIENT)
Dept: SURGERY | Facility: CLINIC | Age: 57
End: 2022-03-14
Payer: MEDICAID

## 2022-03-14 ENCOUNTER — INFUSION (OUTPATIENT)
Dept: INFECTIOUS DISEASES | Facility: HOSPITAL | Age: 57
End: 2022-03-14
Attending: INTERNAL MEDICINE
Payer: MEDICARE

## 2022-03-14 VITALS
WEIGHT: 168.13 LBS | RESPIRATION RATE: 18 BRPM | OXYGEN SATURATION: 100 % | BODY MASS INDEX: 26.33 KG/M2 | DIASTOLIC BLOOD PRESSURE: 70 MMHG | TEMPERATURE: 97 F | SYSTOLIC BLOOD PRESSURE: 147 MMHG | HEART RATE: 93 BPM

## 2022-03-14 DIAGNOSIS — D63.1 ANEMIA OF CHRONIC KIDNEY FAILURE, STAGE 5: Primary | ICD-10-CM

## 2022-03-14 DIAGNOSIS — N18.5 ANEMIA OF CHRONIC KIDNEY FAILURE, STAGE 5: Primary | ICD-10-CM

## 2022-03-14 DIAGNOSIS — N18.5 CHRONIC KIDNEY DISEASE, STAGE V: Primary | ICD-10-CM

## 2022-03-14 DIAGNOSIS — N18.5 CKD (CHRONIC KIDNEY DISEASE) STAGE 5, GFR LESS THAN 15 ML/MIN: ICD-10-CM

## 2022-03-14 LAB
FINAL PATHOLOGIC DIAGNOSIS: NORMAL
GROSS: NORMAL
SUPPLEMENTAL DIAGNOSIS: NORMAL

## 2022-03-14 PROCEDURE — 25000003 PHARM REV CODE 250: Mod: TXP | Performed by: INTERNAL MEDICINE

## 2022-03-14 PROCEDURE — 63600175 PHARM REV CODE 636 W HCPCS: Mod: NTX | Performed by: INTERNAL MEDICINE

## 2022-03-14 PROCEDURE — 96365 THER/PROPH/DIAG IV INF INIT: CPT | Mod: NTX

## 2022-03-14 RX ORDER — SODIUM CHLORIDE 0.9 % (FLUSH) 0.9 %
10 SYRINGE (ML) INJECTION
Status: CANCELLED | OUTPATIENT
Start: 2022-03-21

## 2022-03-14 RX ORDER — HEPARIN 100 UNIT/ML
500 SYRINGE INTRAVENOUS
Status: DISCONTINUED | OUTPATIENT
Start: 2022-03-14 | End: 2022-03-14 | Stop reason: HOSPADM

## 2022-03-14 RX ORDER — HEPARIN 100 UNIT/ML
500 SYRINGE INTRAVENOUS
Status: CANCELLED | OUTPATIENT
Start: 2022-03-21

## 2022-03-14 RX ORDER — SODIUM CHLORIDE 0.9 % (FLUSH) 0.9 %
10 SYRINGE (ML) INJECTION
Status: DISCONTINUED | OUTPATIENT
Start: 2022-03-14 | End: 2022-03-14 | Stop reason: HOSPADM

## 2022-03-14 RX ADMIN — IRON SUCROSE 100 MG: 20 INJECTION, SOLUTION INTRAVENOUS at 12:03

## 2022-03-14 RX ADMIN — SODIUM CHLORIDE: 0.9 INJECTION, SOLUTION INTRAVENOUS at 11:03

## 2022-03-14 NOTE — PROGRESS NOTES
Patient arrived for Venofer 2/10 infusion. Observed patient for 30 mins post infusion. No signs/symptoms of adverse reaction. Tolerated without difficulty and left unit in NAD.

## 2022-03-18 ENCOUNTER — LAB VISIT (OUTPATIENT)
Dept: LAB | Facility: HOSPITAL | Age: 57
End: 2022-03-18
Attending: INTERNAL MEDICINE
Payer: MEDICARE

## 2022-03-18 DIAGNOSIS — N18.5 CHRONIC KIDNEY DISEASE, STAGE V: ICD-10-CM

## 2022-03-18 DIAGNOSIS — N18.5 CKD (CHRONIC KIDNEY DISEASE) STAGE 5, GFR LESS THAN 15 ML/MIN: ICD-10-CM

## 2022-03-18 LAB
ALBUMIN SERPL BCP-MCNC: 3 G/DL (ref 3.5–5.2)
ALP SERPL-CCNC: 53 U/L (ref 55–135)
ALT SERPL W/O P-5'-P-CCNC: <5 U/L (ref 10–44)
ANION GAP SERPL CALC-SCNC: 10 MMOL/L (ref 8–16)
AST SERPL-CCNC: 11 U/L (ref 10–40)
BASOPHILS # BLD AUTO: 0.02 K/UL (ref 0–0.2)
BASOPHILS NFR BLD: 0.3 % (ref 0–1.9)
BILIRUB DIRECT SERPL-MCNC: 0.1 MG/DL (ref 0.1–0.3)
BILIRUB SERPL-MCNC: 0.3 MG/DL (ref 0.1–1)
BUN SERPL-MCNC: 56 MG/DL (ref 6–20)
CALCIUM SERPL-MCNC: 8.7 MG/DL (ref 8.7–10.5)
CHLORIDE SERPL-SCNC: 114 MMOL/L (ref 95–110)
CO2 SERPL-SCNC: 17 MMOL/L (ref 23–29)
CREAT SERPL-MCNC: 4.6 MG/DL (ref 0.5–1.4)
DIFFERENTIAL METHOD: ABNORMAL
EOSINOPHIL # BLD AUTO: 0.1 K/UL (ref 0–0.5)
EOSINOPHIL NFR BLD: 1.7 % (ref 0–8)
ERYTHROCYTE [DISTWIDTH] IN BLOOD BY AUTOMATED COUNT: 15.5 % (ref 11.5–14.5)
EST. GFR  (AFRICAN AMERICAN): 11.3 ML/MIN/1.73 M^2
EST. GFR  (NON AFRICAN AMERICAN): 9.8 ML/MIN/1.73 M^2
GLUCOSE SERPL-MCNC: 52 MG/DL (ref 70–110)
HCT VFR BLD AUTO: 22 % (ref 37–48.5)
HGB BLD-MCNC: 6.3 G/DL (ref 12–16)
IMM GRANULOCYTES # BLD AUTO: 0.07 K/UL (ref 0–0.04)
IMM GRANULOCYTES NFR BLD AUTO: 1.2 % (ref 0–0.5)
LYMPHOCYTES # BLD AUTO: 1.3 K/UL (ref 1–4.8)
LYMPHOCYTES NFR BLD: 22.4 % (ref 18–48)
MCH RBC QN AUTO: 26.7 PG (ref 27–31)
MCHC RBC AUTO-ENTMCNC: 28.6 G/DL (ref 32–36)
MCV RBC AUTO: 93 FL (ref 82–98)
MONOCYTES # BLD AUTO: 0.6 K/UL (ref 0.3–1)
MONOCYTES NFR BLD: 10.8 % (ref 4–15)
NEUTROPHILS # BLD AUTO: 3.7 K/UL (ref 1.8–7.7)
NEUTROPHILS NFR BLD: 63.6 % (ref 38–73)
NRBC BLD-RTO: 0 /100 WBC
PLATELET # BLD AUTO: 129 K/UL (ref 150–450)
PMV BLD AUTO: 12.7 FL (ref 9.2–12.9)
POTASSIUM SERPL-SCNC: 4.1 MMOL/L (ref 3.5–5.1)
PROT SERPL-MCNC: 7.9 G/DL (ref 6–8.4)
RBC # BLD AUTO: 2.36 M/UL (ref 4–5.4)
SODIUM SERPL-SCNC: 141 MMOL/L (ref 136–145)
WBC # BLD AUTO: 5.75 K/UL (ref 3.9–12.7)

## 2022-03-18 PROCEDURE — 87340 HEPATITIS B SURFACE AG IA: CPT | Mod: NTX | Performed by: INTERNAL MEDICINE

## 2022-03-18 PROCEDURE — 85025 COMPLETE CBC W/AUTO DIFF WBC: CPT | Mod: TXP | Performed by: INTERNAL MEDICINE

## 2022-03-18 PROCEDURE — 86706 HEP B SURFACE ANTIBODY: CPT | Mod: NTX | Performed by: INTERNAL MEDICINE

## 2022-03-18 PROCEDURE — 80076 HEPATIC FUNCTION PANEL: CPT | Mod: TXP | Performed by: INTERNAL MEDICINE

## 2022-03-18 PROCEDURE — 80048 BASIC METABOLIC PNL TOTAL CA: CPT | Mod: NTX | Performed by: INTERNAL MEDICINE

## 2022-03-18 PROCEDURE — 86803 HEPATITIS C AB TEST: CPT | Mod: NTX | Performed by: INTERNAL MEDICINE

## 2022-03-18 PROCEDURE — 36415 COLL VENOUS BLD VENIPUNCTURE: CPT | Mod: PO,TXP | Performed by: INTERNAL MEDICINE

## 2022-03-18 PROCEDURE — 86704 HEP B CORE ANTIBODY TOTAL: CPT | Mod: NTX | Performed by: INTERNAL MEDICINE

## 2022-03-21 LAB
HBV CORE AB SERPL QL IA: NEGATIVE
HBV SURFACE AB SER-ACNC: NEGATIVE M[IU]/ML
HBV SURFACE AG SERPL QL IA: NEGATIVE
HCV AB SERPL QL IA: NEGATIVE

## 2022-03-23 ENCOUNTER — OFFICE VISIT (OUTPATIENT)
Dept: SURGERY | Facility: CLINIC | Age: 57
End: 2022-03-23
Payer: MEDICAID

## 2022-03-23 ENCOUNTER — TELEPHONE (OUTPATIENT)
Dept: TRANSPLANT | Facility: CLINIC | Age: 57
End: 2022-03-23
Payer: MEDICAID

## 2022-03-23 VITALS
DIASTOLIC BLOOD PRESSURE: 72 MMHG | BODY MASS INDEX: 26.37 KG/M2 | WEIGHT: 168 LBS | HEART RATE: 98 BPM | HEIGHT: 67 IN | SYSTOLIC BLOOD PRESSURE: 129 MMHG

## 2022-03-23 DIAGNOSIS — N18.5 CKD (CHRONIC KIDNEY DISEASE) STAGE 5, GFR LESS THAN 15 ML/MIN: Primary | ICD-10-CM

## 2022-03-23 PROCEDURE — 99213 OFFICE O/P EST LOW 20 MIN: CPT | Mod: PBBFAC,TXP | Performed by: SURGERY

## 2022-03-23 PROCEDURE — 3074F SYST BP LT 130 MM HG: CPT | Mod: CPTII,NTX,, | Performed by: SURGERY

## 2022-03-23 PROCEDURE — 1159F MED LIST DOCD IN RCRD: CPT | Mod: CPTII,NTX,, | Performed by: SURGERY

## 2022-03-23 PROCEDURE — 3074F PR MOST RECENT SYSTOLIC BLOOD PRESSURE < 130 MM HG: ICD-10-PCS | Mod: CPTII,NTX,, | Performed by: SURGERY

## 2022-03-23 PROCEDURE — 3078F DIAST BP <80 MM HG: CPT | Mod: CPTII,NTX,, | Performed by: SURGERY

## 2022-03-23 PROCEDURE — 3008F BODY MASS INDEX DOCD: CPT | Mod: CPTII,NTX,, | Performed by: SURGERY

## 2022-03-23 PROCEDURE — 1159F PR MEDICATION LIST DOCUMENTED IN MEDICAL RECORD: ICD-10-PCS | Mod: CPTII,NTX,, | Performed by: SURGERY

## 2022-03-23 PROCEDURE — 3066F NEPHROPATHY DOC TX: CPT | Mod: CPTII,NTX,, | Performed by: SURGERY

## 2022-03-23 PROCEDURE — 99999 PR PBB SHADOW E&M-EST. PATIENT-LVL III: CPT | Mod: PBBFAC,TXP,, | Performed by: SURGERY

## 2022-03-23 PROCEDURE — 99999 PR PBB SHADOW E&M-EST. PATIENT-LVL III: ICD-10-PCS | Mod: PBBFAC,TXP,, | Performed by: SURGERY

## 2022-03-23 PROCEDURE — 3008F PR BODY MASS INDEX (BMI) DOCUMENTED: ICD-10-PCS | Mod: CPTII,NTX,, | Performed by: SURGERY

## 2022-03-23 PROCEDURE — 99024 POSTOP FOLLOW-UP VISIT: CPT | Mod: NTX,,, | Performed by: SURGERY

## 2022-03-23 PROCEDURE — 3078F PR MOST RECENT DIASTOLIC BLOOD PRESSURE < 80 MM HG: ICD-10-PCS | Mod: CPTII,NTX,, | Performed by: SURGERY

## 2022-03-23 PROCEDURE — 99024 PR POST-OP FOLLOW-UP VISIT: ICD-10-PCS | Mod: NTX,,, | Performed by: SURGERY

## 2022-03-23 PROCEDURE — 3066F PR DOCUMENTATION OF TREATMENT FOR NEPHROPATHY: ICD-10-PCS | Mod: CPTII,NTX,, | Performed by: SURGERY

## 2022-03-23 NOTE — PROGRESS NOTES
53 y/o woman with history of lap peritoneal dialysis catheter placement, now about 2 weeks out.  No complaints this am, reports feeling well.    PE: incision c/d/i no evidence of infection or hernia    Impression: post op, doing well    Plan: gradually resume normal activity, normal diet, and follow up as needed.

## 2022-03-23 NOTE — TELEPHONE ENCOUNTER
Chart reviewed:  Patient pending psych clearance (4/5/22), colonoscopy (4/4/22), colposcopy (4/1/22 local) and formal clearance from [facility] (local), letter resent.   Patient informed of the above and voiced understanding.

## 2022-03-24 ENCOUNTER — TELEPHONE (OUTPATIENT)
Dept: SURGERY | Facility: CLINIC | Age: 57
End: 2022-03-24
Payer: MEDICAID

## 2022-03-24 NOTE — TELEPHONE ENCOUNTER
Spoke with Nikky in regards to pt Ms Maldonado, scheduled her to see Dr. Guerrero for 3/28 for 8:30 to get suture removed to start dialysis.      ----- Message from Joao Hess sent at 3/24/2022 11:57 AM CDT -----  Regarding: Nikky from [facility]  Type: Patient Call Back    Who called:Nikky from [facility] in [place]     What is the request in detail:Nikky from [facility] in [place] is requesting a call back from the nurse in regards to the patient's PD catheter. Please return call at earliest convenience.    Can the clinic reply by [facility]?no     Would the patient rather a call back or a response [facility]? Call back     Best call back number: [phone]

## 2022-03-27 NOTE — ANESTHESIA POSTPROCEDURE EVALUATION
Anesthesia Post Evaluation    Patient: Elizabeth Maldonado    Procedure(s) Performed: Procedure(s) (LRB):  INSERTION, CATHETER, DIALYSIS, PERITONEAL, LAPAROSCOPIC (N/A)    Final Anesthesia Type: general      Patient location during evaluation: PACU  Patient participation: Yes- Able to Participate  Level of consciousness: awake and alert  Post-procedure vital signs: reviewed and stable  Pain management: adequate  Airway patency: patent    PONV status at discharge: No PONV  Anesthetic complications: no      Cardiovascular status: blood pressure returned to baseline and hemodynamically stable  Respiratory status: unassisted and spontaneous ventilation  Hydration status: euvolemic  Follow-up not needed.          Vitals Value Taken Time   /83 03/11/22 1554   Temp 36.6 °C (97.8 °F) 03/11/22 1554   Pulse 95 03/11/22 1554   Resp 18 03/11/22 1554   SpO2 100 % 03/11/22 1554         Event Time   Out of Recovery 13:55:07         Pain/Eric Score: No data recorded

## 2022-03-28 ENCOUNTER — TELEPHONE (OUTPATIENT)
Dept: NEPHROLOGY | Facility: CLINIC | Age: 57
End: 2022-03-28
Payer: MEDICAID

## 2022-03-28 ENCOUNTER — OFFICE VISIT (OUTPATIENT)
Dept: SURGERY | Facility: CLINIC | Age: 57
End: 2022-03-28
Payer: MEDICARE

## 2022-03-28 ENCOUNTER — LAB VISIT (OUTPATIENT)
Dept: LAB | Facility: HOSPITAL | Age: 57
End: 2022-03-28
Attending: INTERNAL MEDICINE
Payer: MEDICAID

## 2022-03-28 VITALS
BODY MASS INDEX: 26.37 KG/M2 | SYSTOLIC BLOOD PRESSURE: 126 MMHG | HEIGHT: 67 IN | HEART RATE: 103 BPM | DIASTOLIC BLOOD PRESSURE: 77 MMHG | WEIGHT: 168 LBS

## 2022-03-28 DIAGNOSIS — N18.6 ESRD (END STAGE RENAL DISEASE): ICD-10-CM

## 2022-03-28 DIAGNOSIS — N18.6 ESRD (END STAGE RENAL DISEASE): Primary | ICD-10-CM

## 2022-03-28 DIAGNOSIS — N18.5 CKD (CHRONIC KIDNEY DISEASE) STAGE 5, GFR LESS THAN 15 ML/MIN: Primary | ICD-10-CM

## 2022-03-28 LAB
ALBUMIN SERPL BCP-MCNC: 3.1 G/DL (ref 3.5–5.2)
ANION GAP SERPL CALC-SCNC: 12 MMOL/L (ref 8–16)
BASOPHILS # BLD AUTO: 0.01 K/UL (ref 0–0.2)
BASOPHILS NFR BLD: 0.2 % (ref 0–1.9)
BUN SERPL-MCNC: 51 MG/DL (ref 6–20)
CALCIUM SERPL-MCNC: 9.2 MG/DL (ref 8.7–10.5)
CHLORIDE SERPL-SCNC: 115 MMOL/L (ref 95–110)
CO2 SERPL-SCNC: 14 MMOL/L (ref 23–29)
CREAT SERPL-MCNC: 5.2 MG/DL (ref 0.5–1.4)
DIFFERENTIAL METHOD: ABNORMAL
EOSINOPHIL # BLD AUTO: 0.1 K/UL (ref 0–0.5)
EOSINOPHIL NFR BLD: 2.5 % (ref 0–8)
ERYTHROCYTE [DISTWIDTH] IN BLOOD BY AUTOMATED COUNT: 14.6 % (ref 11.5–14.5)
EST. GFR  (AFRICAN AMERICAN): 9.7 ML/MIN/1.73 M^2
EST. GFR  (NON AFRICAN AMERICAN): 8.4 ML/MIN/1.73 M^2
FERRITIN SERPL-MCNC: 1473 NG/ML (ref 20–300)
GLUCOSE SERPL-MCNC: 108 MG/DL (ref 70–110)
HCT VFR BLD AUTO: 23.2 % (ref 37–48.5)
HGB BLD-MCNC: 6.7 G/DL (ref 12–16)
IMM GRANULOCYTES # BLD AUTO: 0.04 K/UL (ref 0–0.04)
IMM GRANULOCYTES NFR BLD AUTO: 0.9 % (ref 0–0.5)
IRON SERPL-MCNC: 95 UG/DL (ref 30–160)
LYMPHOCYTES # BLD AUTO: 1 K/UL (ref 1–4.8)
LYMPHOCYTES NFR BLD: 23.1 % (ref 18–48)
MCH RBC QN AUTO: 26.9 PG (ref 27–31)
MCHC RBC AUTO-ENTMCNC: 28.9 G/DL (ref 32–36)
MCV RBC AUTO: 93 FL (ref 82–98)
MONOCYTES # BLD AUTO: 0.6 K/UL (ref 0.3–1)
MONOCYTES NFR BLD: 12.7 % (ref 4–15)
NEUTROPHILS # BLD AUTO: 2.6 K/UL (ref 1.8–7.7)
NEUTROPHILS NFR BLD: 60.6 % (ref 38–73)
NRBC BLD-RTO: 0 /100 WBC
PHOSPHATE SERPL-MCNC: 4.2 MG/DL (ref 2.7–4.5)
PLATELET # BLD AUTO: 158 K/UL (ref 150–450)
PMV BLD AUTO: 12 FL (ref 9.2–12.9)
POTASSIUM SERPL-SCNC: 5 MMOL/L (ref 3.5–5.1)
RBC # BLD AUTO: 2.49 M/UL (ref 4–5.4)
SATURATED IRON: 38 % (ref 20–50)
SODIUM SERPL-SCNC: 141 MMOL/L (ref 136–145)
TOTAL IRON BINDING CAPACITY: 249 UG/DL (ref 250–450)
TRANSFERRIN SERPL-MCNC: 168 MG/DL (ref 200–375)
WBC # BLD AUTO: 4.33 K/UL (ref 3.9–12.7)

## 2022-03-28 PROCEDURE — 3066F NEPHROPATHY DOC TX: CPT | Mod: CPTII,NTX,, | Performed by: SURGERY

## 2022-03-28 PROCEDURE — 3008F BODY MASS INDEX DOCD: CPT | Mod: CPTII,NTX,, | Performed by: SURGERY

## 2022-03-28 PROCEDURE — 3074F SYST BP LT 130 MM HG: CPT | Mod: CPTII,NTX,, | Performed by: SURGERY

## 2022-03-28 PROCEDURE — 84466 ASSAY OF TRANSFERRIN: CPT | Mod: NTX | Performed by: INTERNAL MEDICINE

## 2022-03-28 PROCEDURE — 3008F PR BODY MASS INDEX (BMI) DOCUMENTED: ICD-10-PCS | Mod: CPTII,NTX,, | Performed by: SURGERY

## 2022-03-28 PROCEDURE — 99024 PR POST-OP FOLLOW-UP VISIT: ICD-10-PCS | Mod: NTX,,, | Performed by: SURGERY

## 2022-03-28 PROCEDURE — 99999 PR PBB SHADOW E&M-EST. PATIENT-LVL II: CPT | Mod: PBBFAC,TXP,, | Performed by: SURGERY

## 2022-03-28 PROCEDURE — 85025 COMPLETE CBC W/AUTO DIFF WBC: CPT | Mod: NTX | Performed by: INTERNAL MEDICINE

## 2022-03-28 PROCEDURE — 3078F PR MOST RECENT DIASTOLIC BLOOD PRESSURE < 80 MM HG: ICD-10-PCS | Mod: CPTII,NTX,, | Performed by: SURGERY

## 2022-03-28 PROCEDURE — 80069 RENAL FUNCTION PANEL: CPT | Mod: NTX | Performed by: INTERNAL MEDICINE

## 2022-03-28 PROCEDURE — 99999 PR PBB SHADOW E&M-EST. PATIENT-LVL II: ICD-10-PCS | Mod: PBBFAC,TXP,, | Performed by: SURGERY

## 2022-03-28 PROCEDURE — 3074F PR MOST RECENT SYSTOLIC BLOOD PRESSURE < 130 MM HG: ICD-10-PCS | Mod: CPTII,NTX,, | Performed by: SURGERY

## 2022-03-28 PROCEDURE — 82728 ASSAY OF FERRITIN: CPT | Mod: NTX | Performed by: INTERNAL MEDICINE

## 2022-03-28 PROCEDURE — 36415 COLL VENOUS BLD VENIPUNCTURE: CPT | Mod: PO,NTX | Performed by: INTERNAL MEDICINE

## 2022-03-28 PROCEDURE — 3078F DIAST BP <80 MM HG: CPT | Mod: CPTII,NTX,, | Performed by: SURGERY

## 2022-03-28 PROCEDURE — 99024 POSTOP FOLLOW-UP VISIT: CPT | Mod: NTX,,, | Performed by: SURGERY

## 2022-03-28 PROCEDURE — 99212 OFFICE O/P EST SF 10 MIN: CPT | Mod: PBBFAC,NTX | Performed by: SURGERY

## 2022-03-28 PROCEDURE — 3066F PR DOCUMENTATION OF TREATMENT FOR NEPHROPATHY: ICD-10-PCS | Mod: CPTII,NTX,, | Performed by: SURGERY

## 2022-03-28 NOTE — PROGRESS NOTES
Suture removed.    Suture does not have an affect on dialysis and removing suture does not impact when she can have dialysis.

## 2022-03-29 ENCOUNTER — HOSPITAL ENCOUNTER (EMERGENCY)
Facility: HOSPITAL | Age: 57
Discharge: HOME | End: 2022-03-29
Attending: EMERGENCY MEDICINE
Payer: MEDICARE

## 2022-03-29 VITALS
DIASTOLIC BLOOD PRESSURE: 61 MMHG | SYSTOLIC BLOOD PRESSURE: 141 MMHG | TEMPERATURE: 99 F | OXYGEN SATURATION: 100 % | RESPIRATION RATE: 17 BRPM | BODY MASS INDEX: 26.78 KG/M2 | HEART RATE: 91 BPM | WEIGHT: 171 LBS

## 2022-03-29 DIAGNOSIS — R42 LIGHT HEADEDNESS: Primary | ICD-10-CM

## 2022-03-29 LAB
ALBUMIN SERPL-MCNC: 3 G/DL (ref 3.3–5.5)
ALP SERPL-CCNC: 56 U/L (ref 42–141)
BILIRUB SERPL-MCNC: 0.4 MG/DL (ref 0.2–1.6)
BUN SERPL-MCNC: 42 MG/DL (ref 7–22)
CALCIUM SERPL-MCNC: 9.5 MG/DL (ref 8–10.3)
CHLORIDE SERPL-SCNC: 111 MMOL/L (ref 98–108)
CREAT SERPL-MCNC: 4.8 MG/DL (ref 0.6–1.2)
GLUCOSE SERPL-MCNC: 122 MG/DL (ref 73–118)
POC ALT (SGPT): 7 U/L (ref 10–47)
POC AST (SGOT): 19 U/L (ref 11–38)
POC CARDIAC TROPONIN I: 0.02 NG/ML
POC TCO2: 20 MMOL/L (ref 18–33)
POCT GLUCOSE: 134 MG/DL (ref 70–110)
POTASSIUM BLD-SCNC: 4.3 MMOL/L (ref 3.6–5.1)
PROTEIN, POC: 8.3 G/DL (ref 6.4–8.1)
SAMPLE: NORMAL
SODIUM BLD-SCNC: 142 MMOL/L (ref 128–145)

## 2022-03-29 PROCEDURE — 80053 COMPREHEN METABOLIC PANEL: CPT | Mod: ER,NTX

## 2022-03-29 PROCEDURE — 93010 ELECTROCARDIOGRAM REPORT: CPT | Mod: NTX,,, | Performed by: INTERNAL MEDICINE

## 2022-03-29 PROCEDURE — 25000003 PHARM REV CODE 250: Mod: ER,NTX | Performed by: PHYSICIAN ASSISTANT

## 2022-03-29 PROCEDURE — 93005 ELECTROCARDIOGRAM TRACING: CPT | Mod: ER,NTX

## 2022-03-29 PROCEDURE — 82803 BLOOD GASES ANY COMBINATION: CPT | Mod: ER,NTX

## 2022-03-29 PROCEDURE — 82962 GLUCOSE BLOOD TEST: CPT | Mod: ER,NTX

## 2022-03-29 PROCEDURE — 93010 EKG 12-LEAD: ICD-10-PCS | Mod: NTX,,, | Performed by: INTERNAL MEDICINE

## 2022-03-29 PROCEDURE — 85025 COMPLETE CBC W/AUTO DIFF WBC: CPT | Mod: ER,NTX

## 2022-03-29 PROCEDURE — 99285 EMERGENCY DEPT VISIT HI MDM: CPT | Mod: 25,ER,NTX

## 2022-03-29 PROCEDURE — 84484 ASSAY OF TROPONIN QUANT: CPT | Mod: ER,NTX

## 2022-03-29 PROCEDURE — 96360 HYDRATION IV INFUSION INIT: CPT | Mod: ER,NTX

## 2022-03-29 RX ADMIN — SODIUM CHLORIDE 1000 ML: 0.9 INJECTION, SOLUTION INTRAVENOUS at 05:03

## 2022-03-29 NOTE — FIRST PROVIDER EVALUATION
" Emergency Department TeleTriage Encounter Note      CHIEF COMPLAINT    Chief Complaint   Patient presents with    Dizziness     SENT FROM NEPHROLOGIST STATING SHE IS "ORTHOSTATIC AND NEEDS FLUIDS"; ORTHOSTATIC VITALS AT APPT X 45 MIN AGO (SITTING 137/70 HR 97, STANDING 101/92 HR 89); PT STATES SHE IS DIZZY AND TIRED; DENIES SOB AND CHEST PAIN        VITAL SIGNS   Initial Vitals [03/29/22 1723]   BP Pulse Resp Temp SpO2   128/78 95 18 98.8 °F (37.1 °C) 100 %      MAP       --            ALLERGIES    Review of patient's allergies indicates:   Allergen Reactions    Shrimp Hives    Topamax [topiramate] Other (See Comments)     Vision changes    Zoloft [sertraline] Palpitations       PROVIDER TRIAGE NOTE  This is a teletriage evaluation of a 57 y.o. female presenting to the ED complaining of dizziness and fatigue.  She was sent from nephrology for IV fluids.      Initial orders will be placed and care will be transferred to an alternate provider when patient is roomed for a full evaluation. Any additional orders and the final disposition will be determined by that provider.           ORDERS  Labs Reviewed   POCT GLUCOSE, HAND-HELD DEVICE   POCT CBC   POCT CMP       ED Orders (720h ago, onward)    Start Ordered     Status Ordering Provider    03/29/22 1745 03/29/22 1732  sodium chloride 0.9% bolus 1,000 mL  ED 1 Time         Acknowledged NIKOLAS QUINONEZ    03/29/22 1733 03/29/22 1732  Saline lock IV  Once         Acknowledged NIKOLAS QUINONEZ    03/29/22 1733 03/29/22 1732  POCT CBC  Once         Acknowledged NIKOLAS QUINONEZ    03/29/22 1733 03/29/22 1732  POCT CMP  Once         Acknowledged NIKOLAS QUINONEZ    03/29/22 1726 03/29/22 1725  POCT Glucose, Hand-Held Device  Once         Acknowledged THIAGO MURRAY            Virtual Visit Note: The provider triage portion of this emergency department evaluation and documentation was performed via [email or web address], a HIPAA-compliant telemedicine " application, in concert with a tele-presenter in the room. A face to face patient evaluation with one of my colleagues will occur once the patient is placed in an emergency department room.      DISCLAIMER: This note was prepared with [employer] voice recognition transcription software. Garbled syntax, mangled pronouns, and other bizarre constructions may be attributed to that software system.

## 2022-03-29 NOTE — ED PROVIDER NOTES
"Encounter Date: 3/29/2022       History     Chief Complaint   Patient presents with    Dizziness     SENT FROM NEPHROLOGIST STATING SHE IS "ORTHOSTATIC AND NEEDS FLUIDS"; ORTHOSTATIC VITALS AT APPT X 45 MIN AGO (SITTING 137/70 HR 97, STANDING 101/92 HR 89); PT STATES SHE IS DIZZY AND TIRED; DENIES SOB AND CHEST PAIN      57-year-old  female presents to the emergency department due to lightheadedness.  Patient states that she was feeling lightheaded when she was at her nephrologist office earlier, she had just finished peritoneal dialysis this was her 1st time getting dialysis and after getting it she started feeling some lightheadedness, she was noted to be hypotensive and so she is recommended to come to the emergency department for evaluation.  She states that she feels better now.  Denies any chest pain, shortness of breath, nausea, vomiting, lightheadedness or any dizziness at this current time.  She states that she never had room spinning sensation but did feel fatigued and lightheaded.        Review of patient's allergies indicates:   Allergen Reactions    Shrimp Hives    Topamax [topiramate] Other (See Comments)     Vision changes    Zoloft [sertraline] Palpitations     Past Medical History:   Diagnosis Date    Acidosis 7/1/2014    Allergic rhinitis 7/1/2014    Allergy     Anemia     Anemia in chronic kidney disease 7/1/2014    Anxiety     Cataract     Chronic bilateral low back pain with bilateral sciatica 10/25/2019    Chronic immunosuppression with Prograf and MMF 12/3/2014    CKD (chronic kidney disease) stage 5, GFR less than 15 ml/min 7/1/2014    CKD (chronic kidney disease), stage III 3/2/2015    Degenerative disc disease     Depression 7/1/2014    Diabetes mellitus, type 2 since age 18 7/1/2014    ESRD on peritoneal dialysis - august 2014 for 9 hours no peritonitis 11/24/2014    Hyperlipidemia     Hypertension 7/1/2014    Hypomagnesemia 1/7/2015    Kidney " transplant status, living unrelated donor - 12/2/14 12/3/2014    Neutropenia 2015    NS (nuclear sclerosis) 2016    Obesity     Organ transplant candidate 2014    Pre-op exam 2014    Proliferative diabetic retinopathy of both eyes without macular edema associated with type 2 diabetes mellitus 2016    Renal manifestation of secondary diabetes mellitus     Tendinitis     Trouble in sleeping      Past Surgical History:   Procedure Laterality Date    BIOPSY N/A 2020    Procedure: Biopsy;  Surgeon: Sweta Catalan MD;  Location: [facility] OR [address] [place] [address];  Service: Transplant;  Laterality: N/A;    BONE MARROW BIOPSY N/A      SECTION      x 2    KIDNEY TRANSPLANT  2014    MEDIPORT REMOVAL N/A 2019    Procedure: REMOVAL, CATHETER, CENTRAL VENOUS, TUNNELED, WITH PORT;  Surgeon: Eusebia Diagnostic Provider;  Location: [facility] OR;  Service: Radiology;  Laterality: N/A;    RENAL BIOPSY      ROTATOR CUFF REPAIR      TUBAL LIGATION       Family History   Problem Relation Age of Onset    Diabetes Mother     Hypertension Mother     Diabetes Father     Kidney disease Father     Diabetes Sister     Hypertension Sister     Kidney disease Sister     Heart disease Sister     Heart failure Sister     Diabetes Brother     Diabetes Sister     Stroke Maternal Grandmother     Heart disease Maternal Grandmother         pacemaker    Cataracts Maternal Grandmother     No Known Problems Maternal Aunt     No Known Problems Maternal Uncle     No Known Problems Paternal Aunt     No Known Problems Paternal Uncle     No Known Problems Maternal Grandfather     No Known Problems Paternal Grandmother     No Known Problems Paternal Grandfather     Cancer Neg Hx     Amblyopia Neg Hx     Blindness Neg Hx     Glaucoma Neg Hx     Macular degeneration Neg Hx     Retinal detachment Neg Hx     Strabismus Neg Hx     Thyroid disease Neg Hx     Breast cancer Neg Hx     Colon cancer Neg Hx      Ovarian cancer Neg Hx      Social History     Tobacco Use    Smoking status: Former Smoker     Packs/day: 1.00     Years: 20.00     Pack years: 20.00     Types: Cigarettes     Quit date: 2013     Years since quittin.5    Smokeless tobacco: Never Used    Tobacco comment: quit smoking cigarettes in 2014   Substance Use Topics    Alcohol use: No    Drug use: No     Review of Systems   Constitutional: Positive for fatigue. Negative for activity change and appetite change.   HENT: Negative for congestion and ear pain.    Eyes: Negative for pain and redness.   Respiratory: Negative for cough and shortness of breath.    Cardiovascular: Negative for chest pain and leg swelling.   Gastrointestinal: Negative for abdominal distention and abdominal pain.   Genitourinary: Negative for difficulty urinating and dysuria.   Musculoskeletal: Negative for arthralgias and back pain.   Skin: Negative for color change and pallor.   Neurological: Positive for light-headedness. Negative for headaches.   All other systems reviewed and are negative.      Physical Exam     Initial Vitals [22 1723]   BP Pulse Resp Temp SpO2   128/78 95 18 98.8 °F (37.1 °C) 100 %      MAP       --         Physical Exam    Nursing note and vitals reviewed.  Constitutional: She appears well-developed and well-nourished. She is not diaphoretic. No distress.   HENT:   Head: Normocephalic and atraumatic.   Right Ear: External ear normal.   Left Ear: External ear normal.   Mouth/Throat: No oropharyngeal exudate.   Eyes: Conjunctivae and EOM are normal. Pupils are equal, round, and reactive to light.   Neck: Neck supple. No JVD present.   Normal range of motion.  Cardiovascular: Normal rate, regular rhythm, normal heart sounds and intact distal pulses. Exam reveals no friction rub.    No murmur heard.  Pulmonary/Chest: Breath sounds normal. No stridor. No respiratory distress. She has no wheezes. She has no rales.   Abdominal: Abdomen  is soft. She exhibits no distension. There is no abdominal tenderness. There is no rebound.   Musculoskeletal:         General: No tenderness or edema. Normal range of motion.      Cervical back: Normal range of motion and neck supple.     Neurological: She is alert and oriented to person, place, and time. She has normal strength.   Skin: No rash and no abscess noted. No erythema. No pallor.         ED Course   Procedures  Labs Reviewed   POCT GLUCOSE - Abnormal; Notable for the following components:       Result Value    POCT Glucose 134 (*)     All other components within normal limits   POCT CMP - Abnormal; Notable for the following components:    ALT (SGPT), POC 7 (*)     POC BUN 42 (*)     POC Chloride 111 (*)     POC Creatinine 4.8 (*)     POC Glucose 122 (*)     Protein, POC 8.3 (*)     All other components within normal limits   TROPONIN ISTAT   POCT CBC   POCT GLUCOSE, HAND-HELD DEVICE   POCT CMP   POCT TROPONIN     EKG Readings: (Independently Interpreted)   Initial Reading: No STEMI.   EKG has been independently interpreted without the assistance of a cardiologist, EKG shows no signs of any acute STEMI.  No signs of any ST elevation, depression, or ischemic changes, sinus rhythm at a rate 80 beats per minute, , QRS 70, .        Imaging Results          CT Head Without Contrast (Final result)  Result time 03/29/22 18:35:45    Final result by Shelbie Banks MD (03/29/22 18:35:45)                 Impression:      No acute intracranial abnormality detected.  No significant change.      Electronically signed by: Shelbie Banks  Date:    03/29/2022  Time:    18:35             Narrative:    EXAMINATION:  CT OF THE HEAD WITHOUT    CLINICAL HISTORY:  Dizziness and tired.    TECHNIQUE:  5 mm unenhanced axial images were obtained from the skull base to the vertex.    COMPARISON:  10/20/2019    FINDINGS:  The ventricles, basal cisterns, and cortical sulci are within normal limits for patient's  stated age.  The right lateral ventricle is slightly more prominent than the left lateral ventricle, which is unchanged and normal in overall size.  There is no acute intracranial hemorrhage, territorial infarct or mass effect, or midline shift. The visualized paranasal sinuses and mastoid air cells are clear. There is hyperostosis frontalis.  Prominent calcifications are seen along the falx.                                 Medications   sodium chloride 0.9% bolus 1,000 mL (1,000 mLs Intravenous New Bag 3/29/22 7939)     Medical Decision Making:   Initial Assessment:   57-year-old with lightheadedness.  Differential diagnosis includes but is not limited to anemia, electrolyte abnormality, intracranial abnormality.  Workup-CBC, CMP, troponin, CT head.  Results thus far significant for no leukocytosis, H/H 7.2/22.1, this is improved from previous yesterday H/H 6.7/23.2.  Creatinine 4.8, improved from previous yesterday as well.  Currently awaiting completion of lab workup and CT scan.  Will continue monitor.  [facility]  03/29/2022 6:16 PM    Update-  CT head shows no signs of any acute intracranial abnormalities, troponin within normal limits.  Patient feels improved after fluids.  No longer having any symptoms.  Will discharge with return precautions and PCP follow-up.  [facility] Emergency Medicine  03/29/2022 6:55 PM                        Clinical Impression:   Final diagnoses:  [R42] Light headedness (Primary)          ED Disposition Condition    Discharge Stable        ED Prescriptions     None        Follow-up Information     Follow up With Specialties Details Why Contact Info    Richy Singleton NP Family Medicine Schedule an appointment as soon as possible for a visit  continued care [address] [address]  [phone]      [facility] ED Emergency Medicine Go to  If symptoms worsen [address] [facility]  [address]  [phone]           Jason Bauer MD  03/29/22 5526

## 2022-03-31 ENCOUNTER — HOSPITAL ENCOUNTER (OUTPATIENT)
Facility: HOSPITAL | Age: 57
Discharge: HOME | End: 2022-04-05
Attending: EMERGENCY MEDICINE | Admitting: HOSPITALIST
Payer: MEDICARE

## 2022-03-31 DIAGNOSIS — R07.9 CHEST PAIN: ICD-10-CM

## 2022-03-31 DIAGNOSIS — R10.9 ABDOMINAL PAIN: ICD-10-CM

## 2022-03-31 DIAGNOSIS — R74.8 ELEVATED LIPASE: ICD-10-CM

## 2022-03-31 DIAGNOSIS — N18.6 ESRD ON PERITONEAL DIALYSIS: ICD-10-CM

## 2022-03-31 DIAGNOSIS — T82.41XA HEMODIALYSIS CATHETER DYSFUNCTION, INITIAL ENCOUNTER: ICD-10-CM

## 2022-03-31 DIAGNOSIS — D64.9 ANEMIA, UNSPECIFIED TYPE: ICD-10-CM

## 2022-03-31 DIAGNOSIS — N18.6 ESRD (END STAGE RENAL DISEASE): Primary | ICD-10-CM

## 2022-03-31 DIAGNOSIS — R10.9 ABDOMINAL PAIN, UNSPECIFIED ABDOMINAL LOCATION: ICD-10-CM

## 2022-03-31 DIAGNOSIS — R68.83 CHILLS (WITHOUT FEVER): ICD-10-CM

## 2022-03-31 DIAGNOSIS — T80.90XA COMPLICATION OF PERITONEAL DIALYSIS, INITIAL ENCOUNTER: Primary | ICD-10-CM

## 2022-03-31 DIAGNOSIS — T82.41XA HEMODIALYSIS CATHETER MALFUNCTION: ICD-10-CM

## 2022-03-31 DIAGNOSIS — Z79.899 LONG TERM CURRENT USE OF IMMUNOSUPPRESSIVE DRUG: ICD-10-CM

## 2022-03-31 DIAGNOSIS — Z99.2 ESRD ON PERITONEAL DIALYSIS: ICD-10-CM

## 2022-03-31 LAB
25(OH)D3 SERPL-MCNC: 37 NG/ML (ref 30–100)
ABO + RH BLD: NORMAL
ALBUMIN SERPL BCP-MCNC: 3.2 G/DL (ref 3.5–5.2)
ALBUMIN SERPL-MCNC: 4 G/DL (ref 3.5–5.2)
ALP SERPL-CCNC: 61 U/L (ref 35–104)
ALP SERPL-CCNC: 66 U/L (ref 55–135)
ALT SERPL W P-5'-P-CCNC: 8 U/L (ref 7–52)
ALT SERPL W/O P-5'-P-CCNC: 5 U/L (ref 10–44)
ANION GAP SERPL CALC-SCNC: 10 MMOL/L (ref 8–16)
ANISOCYTOSIS BLD QL SMEAR: SLIGHT
AST SERPL-CCNC: 12 U/L (ref 10–40)
B-OH-BUTYR BLD STRIP-SCNC: 0 MMOL/L (ref 0–0.5)
BACTERIA #/AREA URNS AUTO: ABNORMAL /HPF
BASOPHILS NFR BLD: 0 % (ref 0–1.9)
BASOPHILS NFR BLD: 0.6 % (ref 0–1.5)
BICARBONATE: 18 MEQ/L (ref 20–31)
BILIRUB SERPL-MCNC: 0.3 MG/DL (ref 0.1–1)
BILIRUB UR QL STRIP: NEGATIVE
BLD GP AB SCN CELLS X3 SERPL QL: NORMAL
BUN SERPL-MCNC: 36 MG/DL (ref 6–20)
BUN, PRE: 50 MG/DL (ref 6–19)
BUN/CREAT SERPL: 10.7 (ref 10–20)
CALCIUM PHOS PRODUCT: 41 (ref 0–54)
CALCIUM SERPL-MCNC: 8.6 MG/DL (ref 8.7–10.4)
CALCIUM SERPL-MCNC: 8.9 MG/DL (ref 8.7–10.5)
CHLORIDE SERPL-SCNC: 108 MMOL/L (ref 95–110)
CHLORIDE: 113 MEQ/L (ref 96–108)
CHOLEST SERPL-MSCNC: 139 MG/DL (ref 0–199)
CLARITY UR REFRACT.AUTO: ABNORMAL
CO2 SERPL-SCNC: 19 MMOL/L (ref 23–29)
COLOR UR AUTO: YELLOW
CREAT SERPL-MCNC: 4.5 MG/DL (ref 0.5–1.4)
CREAT SERPL-MCNC: 4.68 MG/DL (ref 0.6–1.3)
DIFFERENTIAL METHOD: ABNORMAL
EOSINOPHIL NFR BLD: 2.5 % (ref 0–7)
EOSINOPHIL NFR BLD: 5 % (ref 0–8)
ERYTHROCYTE [DISTWIDTH] IN BLOOD BY AUTOMATED COUNT: 14.7 % (ref 11.5–14.5)
ERYTHROCYTE [DISTWIDTH] IN BLOOD BY AUTOMATED COUNT: 15.7 % (ref 11.5–14.5)
EST. GFR  (AFRICAN AMERICAN): 11.6 ML/MIN/1.73 M^2
EST. GFR  (NON AFRICAN AMERICAN): 10 ML/MIN/1.73 M^2
FERRITIN SERPL-MCNC: 890 NG/ML (ref 10–291)
FOLATE: 6.1 NG/ML
GLUCOSE SERPL-MCNC: 129 MG/DL (ref 70–100)
GLUCOSE SERPL-MCNC: 161 MG/DL (ref 70–110)
GLUCOSE UR QL STRIP: NEGATIVE
HBA1C MFR BLD: 6.5 % (ref 4.8–5.9)
HBV SURFACE AB SER-ACNC: <10 MIU/ML
HBV SURFACE AG SERPL QL IA: NEGATIVE
HCT VFR BLD AUTO: 20.7 % (ref 37–48.5)
HCT VFR BLD AUTO: 22.1 % (ref 37–47)
HCV AB SERPL QL IA: NONREACTIVE
HCV S/CO RATIO(INDEX): 0.07 (ref 0–0.79)
HDL/CHOLESTEROL RATIO: 4.8 (ref 0–4.5)
HDLC SERPL-MCNC: 29 MG/DL
HEMOGLOBIN X 3: 18.9 % (ref 36–48)
HGB BLD-MCNC: 6.1 G/DL (ref 12–16)
HGB BLD-MCNC: 6.3 G/DL (ref 12–16)
HGB UR QL STRIP: ABNORMAL
HYALINE CASTS UR QL AUTO: 0 /LPF
HYPOCHROMIA BLD QL SMEAR: ABNORMAL
IMM GRANULOCYTES # BLD AUTO: ABNORMAL K/UL (ref 0–0.04)
IMM GRANULOCYTES NFR BLD AUTO: ABNORMAL % (ref 0–0.5)
IRON: 71 MCG/DL (ref 30–160)
KETONES UR QL STRIP: NEGATIVE
LACTATE SERPL-SCNC: 1.2 MMOL/L (ref 0.5–2.2)
LDLC SERPL CALC-MCNC: 80 MG/DL (ref 0–99)
LEUKOCYTE ESTERASE UR QL STRIP: ABNORMAL
LIPASE SERPL-CCNC: 178 U/L (ref 4–60)
LUC: 2.1 % (ref 0–4)
LYMPH%: 15 % (ref 19–48)
LYMPHOCYTES NFR BLD: 20 % (ref 18–48)
MAGNESIUM SERPL-MCNC: 1.3 MG/DL (ref 1.6–2.6)
MCH RBC QN AUTO: 26.3 PG (ref 27–31)
MCH RBC QN AUTO: 27.1 PG (ref 27–31)
MCHC RBC AUTO-ENTMCNC: 28.6 G/DL (ref 30–36)
MCHC RBC AUTO-ENTMCNC: 29.5 G/DL (ref 32–36)
MCV RBC AUTO: 92 FL (ref 80–100)
MCV RBC AUTO: 92 FL (ref 82–98)
MICROSCOPIC COMMENT: ABNORMAL
MONO%: 8.6 % (ref 3–10)
MONOCYTES NFR BLD: 12 % (ref 4–15)
NEUTROPHILS NFR BLD: 62 % (ref 38–73)
NEUTROPHILS NFR BLD: 71.3 % (ref 40–75)
NEUTS BAND NFR BLD MANUAL: 1 %
NITRITE UR QL STRIP: NEGATIVE
NRBC BLD-RTO: 0 /100 WBC
OVALOCYTES BLD QL SMEAR: ABNORMAL
PH UR STRIP: 6 [PH] (ref 5–8)
PHOSPHATE FLD-MCNC: 4.8 MG/DL (ref 2.6–4.5)
PHOSPHATE SERPL-MCNC: 3.8 MG/DL (ref 2.7–4.5)
PLATELET # BLD AUTO: 152 1000/MCL (ref 130–400)
PLATELET # BLD AUTO: 166 K/UL (ref 150–450)
PLATELET BLD QL SMEAR: ABNORMAL
PMV BLD AUTO: 12.4 FL (ref 9.2–12.9)
POIKILOCYTOSIS BLD QL SMEAR: SLIGHT
POTASSIUM SERPL-SCNC: 4.3 MMOL/L (ref 3.5–5.1)
POTASSIUM SERPL-SCNC: 4.6 MEQ/L (ref 3.5–5.1)
PROCALCITONIN SERPL IA-MCNC: 0.1 NG/ML
PROT SERPL-MCNC: 8.4 G/DL (ref 6–8.4)
PROT UR QL STRIP: ABNORMAL
PTH, INTACT: 466 PG/ML (ref 16–80)
RBC # BLD AUTO: 2.25 M/UL (ref 4–5.4)
RBC # BLD AUTO: 2.41 MILL/MCL (ref 4.2–5.4)
RBC #/AREA URNS AUTO: 28 /HPF (ref 0–4)
SODIUM BLD-SCNC: 140 MEQ/L (ref 136–145)
SODIUM SERPL-SCNC: 137 MMOL/L (ref 136–145)
SP GR UR STRIP: 1.01 (ref 1–1.03)
SQUAMOUS #/AREA URNS AUTO: 26 /HPF
TOTAL IRON BINDING CAPACITY: 236 MCG/DL (ref 185–515)
TRANSFERRIN SATURATION: 30 % (ref 20–55)
TRIGL SERPL-MCNC: 149 MG/DL (ref 0–149)
UIBC SERPL-MCNC: 165 MCG/DL (ref 155–355)
URN SPEC COLLECT METH UR: ABNORMAL
VLDL CHOLESTEROL: 30 MG/DL (ref 10–30)
WBC # BLD AUTO: 3.53 1000/MCL (ref 4.8–10.8)
WBC # BLD AUTO: 3.66 K/UL (ref 3.9–12.7)
WBC #/AREA URNS AUTO: >100 /HPF (ref 0–5)
WBC CLUMPS UR QL AUTO: ABNORMAL

## 2022-03-31 PROCEDURE — 86920 COMPATIBILITY TEST SPIN: CPT | Mod: NTX | Performed by: PHYSICIAN ASSISTANT

## 2022-03-31 PROCEDURE — 87040 BLOOD CULTURE FOR BACTERIA: CPT | Mod: NTX | Performed by: PHYSICIAN ASSISTANT

## 2022-03-31 PROCEDURE — 81001 URINALYSIS AUTO W/SCOPE: CPT | Mod: NTX | Performed by: NURSE PRACTITIONER

## 2022-03-31 PROCEDURE — 84145 PROCALCITONIN (PCT): CPT | Mod: NTX | Performed by: PHYSICIAN ASSISTANT

## 2022-03-31 PROCEDURE — 80053 COMPREHEN METABOLIC PANEL: CPT | Mod: NTX | Performed by: NURSE PRACTITIONER

## 2022-03-31 PROCEDURE — 86850 RBC ANTIBODY SCREEN: CPT | Mod: NTX | Performed by: PHYSICIAN ASSISTANT

## 2022-03-31 PROCEDURE — 83735 ASSAY OF MAGNESIUM: CPT | Mod: NTX | Performed by: PHYSICIAN ASSISTANT

## 2022-03-31 PROCEDURE — 99285 PR EMERGENCY DEPT VISIT,LEVEL V: ICD-10-PCS | Mod: NTX,,, | Performed by: EMERGENCY MEDICINE

## 2022-03-31 PROCEDURE — 99285 EMERGENCY DEPT VISIT HI MDM: CPT | Mod: NTX,,, | Performed by: EMERGENCY MEDICINE

## 2022-03-31 PROCEDURE — 82010 KETONE BODYS QUAN: CPT | Mod: NTX | Performed by: PHYSICIAN ASSISTANT

## 2022-03-31 PROCEDURE — 85007 BL SMEAR W/DIFF WBC COUNT: CPT | Mod: NTX | Performed by: NURSE PRACTITIONER

## 2022-03-31 PROCEDURE — G0378 HOSPITAL OBSERVATION PER HR: HCPCS | Mod: NTX

## 2022-03-31 PROCEDURE — 87077 CULTURE AEROBIC IDENTIFY: CPT | Mod: 59,NTX | Performed by: NURSE PRACTITIONER

## 2022-03-31 PROCEDURE — 27201040 HC RC 50 FILTER: Mod: NTX | Performed by: PHYSICIAN ASSISTANT

## 2022-03-31 PROCEDURE — 63600175 PHARM REV CODE 636 W HCPCS: Mod: NTX | Performed by: STUDENT IN AN ORGANIZED HEALTH CARE EDUCATION/TRAINING PROGRAM

## 2022-03-31 PROCEDURE — 25000003 PHARM REV CODE 250: Mod: NTX | Performed by: STUDENT IN AN ORGANIZED HEALTH CARE EDUCATION/TRAINING PROGRAM

## 2022-03-31 PROCEDURE — 83690 ASSAY OF LIPASE: CPT | Mod: NTX | Performed by: NURSE PRACTITIONER

## 2022-03-31 PROCEDURE — 87086 URINE CULTURE/COLONY COUNT: CPT | Mod: NTX | Performed by: NURSE PRACTITIONER

## 2022-03-31 PROCEDURE — 87186 SC STD MICRODIL/AGAR DIL: CPT | Mod: NTX | Performed by: NURSE PRACTITIONER

## 2022-03-31 PROCEDURE — 85027 COMPLETE CBC AUTOMATED: CPT | Mod: NTX | Performed by: NURSE PRACTITIONER

## 2022-03-31 PROCEDURE — 80197 ASSAY OF TACROLIMUS: CPT | Mod: NTX | Performed by: NURSE PRACTITIONER

## 2022-03-31 PROCEDURE — 99285 EMERGENCY DEPT VISIT HI MDM: CPT | Mod: 25,NTX

## 2022-03-31 PROCEDURE — 87088 URINE BACTERIA CULTURE: CPT | Mod: NTX | Performed by: NURSE PRACTITIONER

## 2022-03-31 PROCEDURE — 84100 ASSAY OF PHOSPHORUS: CPT | Mod: NTX | Performed by: PHYSICIAN ASSISTANT

## 2022-03-31 PROCEDURE — 83605 ASSAY OF LACTIC ACID: CPT | Mod: NTX | Performed by: PHYSICIAN ASSISTANT

## 2022-03-31 RX ORDER — TACROLIMUS 1 MG/1
3 CAPSULE ORAL EVERY MORNING
Status: DISCONTINUED | OUTPATIENT
Start: 2022-04-01 | End: 2022-04-05 | Stop reason: HOSPADM

## 2022-03-31 RX ORDER — TACROLIMUS 1 MG/1
2 CAPSULE ORAL EVERY EVENING
Status: DISCONTINUED | OUTPATIENT
Start: 2022-04-01 | End: 2022-04-05 | Stop reason: HOSPADM

## 2022-03-31 RX ORDER — MYCOPHENOLATE MOFETIL 250 MG/1
500 CAPSULE ORAL 2 TIMES DAILY
Status: DISCONTINUED | OUTPATIENT
Start: 2022-03-31 | End: 2022-04-05 | Stop reason: HOSPADM

## 2022-03-31 RX ORDER — LANOLIN ALCOHOL/MO/W.PET/CERES
800 CREAM (GRAM) TOPICAL 2 TIMES DAILY
Status: DISCONTINUED | OUTPATIENT
Start: 2022-03-31 | End: 2022-04-05 | Stop reason: HOSPADM

## 2022-03-31 RX ADMIN — Medication 800 MG: at 11:03

## 2022-03-31 RX ADMIN — MYCOPHENOLATE MOFETIL 500 MG: 250 CAPSULE ORAL at 11:03

## 2022-03-31 NOTE — FIRST PROVIDER EVALUATION
Emergency Department TeleTriage Encounter Note      CHIEF COMPLAINT    Chief Complaint   Patient presents with    Abdominal Pain     Started peritoneal dialysis training and not able to drain it all the way , having cramping and pain       VITAL SIGNS   Initial Vitals [03/31/22 1617]   BP Pulse Resp Temp SpO2   137/68 100 18 98.6 °F (37 °C) 100 %      MAP       --            ALLERGIES    Review of patient's allergies indicates:   Allergen Reactions    Shrimp Hives    Topamax [topiramate] Other (See Comments)     Vision changes    Zoloft [sertraline] Palpitations       PROVIDER TRIAGE NOTE  This is a teletriage evaluation of a 57 y.o. female presenting to the ED with c/o abdominal pain/cramping with instilling and removing fluid from PD. Not able to drain all the fluid. Reports no abdominal pain at present time. Limited physical exam via telehealth: The patient is awake, alert, answering questions appropriately and is not in respiratory distress. Initial orders will be placed and care will be transferred to an alternate provider when patient is roomed for a full evaluation. Any additional orders and the final disposition will be determined by that provider.         ORDERS  Labs Reviewed - No data to display    ED Orders (720h ago, onward)    Start Ordered     Status Ordering Provider    03/31/22 1704 03/31/22 1703  Vital signs  Every 2 hours         Ordered ESTRELLA WATSON    03/31/22 1704 03/31/22 1703  Tacrolimus level  Once         Ordered ESTRELLA WATSON    03/31/22 1703 03/31/22 1703  Diet NPO  Diet effective now         Ordered ESTRELLA WATSON    03/31/22 1703 03/31/22 1703  Insert peripheral IV  Once         Ordered ESTRELLA WATSON    03/31/22 1703 03/31/22 1703  CBC W/ AUTO DIFFERENTIAL  STAT         Ordered ESTRELLA WATSON    03/31/22 1703 03/31/22 1703  Comp. Metabolic Panel  STAT         Ordered ESTRELLA WATSON    03/31/22 1703 03/31/22 1703  Lipase  STAT         Ordered ESTRELLA WATSON     03/31/22 1703 03/31/22 1703  Urinalysis, Reflex to Urine Culture Urine, Clean Catch  STAT         Ordered ESTRELLA WATSON            Virtual Visit Note: The provider triage portion of this emergency department evaluation and documentation was performed via Table[employer], a HIPAA-compliant telemedicine application, in concert with a tele-presenter in the room. A face to face patient evaluation with one of my colleagues will occur once the patient is placed in an emergency department room.      DISCLAIMER: This note was prepared with [employer] voice recognition transcription software. Garbled syntax, mangled pronouns, and other bizarre constructions may be attributed to that software system.

## 2022-04-01 PROBLEM — N30.01 ACUTE CYSTITIS WITH HEMATURIA: Status: ACTIVE | Noted: 2022-04-01

## 2022-04-01 PROBLEM — R10.9 ABDOMINAL PAIN: Status: ACTIVE | Noted: 2022-04-01

## 2022-04-01 LAB
ALBUMIN SERPL BCP-MCNC: 2.9 G/DL (ref 3.5–5.2)
ALP SERPL-CCNC: 59 U/L (ref 55–135)
ALT SERPL W/O P-5'-P-CCNC: <5 U/L (ref 10–44)
ALUMINUM SERPL-MCNC: <5 MCG/L (ref 0–10)
ANION GAP SERPL CALC-SCNC: 12 MMOL/L (ref 8–16)
ANISOCYTOSIS BLD QL SMEAR: SLIGHT
APPEARANCE FLD: CLEAR
AST SERPL-CCNC: 10 U/L (ref 10–40)
BACTERIA #/AREA URNS AUTO: ABNORMAL /HPF
BASOPHILS # BLD AUTO: 0.01 K/UL (ref 0–0.2)
BASOPHILS NFR BLD: 0 % (ref 0–1.9)
BASOPHILS NFR BLD: 0.2 % (ref 0–1.9)
BILIRUB SERPL-MCNC: 0.3 MG/DL (ref 0.1–1)
BILIRUB UR QL STRIP: NEGATIVE
BODY FLD TYPE: NORMAL
BUN SERPL-MCNC: 33 MG/DL (ref 6–20)
CALCIUM SERPL-MCNC: 8.8 MG/DL (ref 8.7–10.5)
CHLORIDE SERPL-SCNC: 111 MMOL/L (ref 95–110)
CLARITY UR REFRACT.AUTO: ABNORMAL
CO2 SERPL-SCNC: 18 MMOL/L (ref 23–29)
COLOR FLD: COLORLESS
COLOR UR AUTO: YELLOW
CREAT SERPL-MCNC: 4.4 MG/DL (ref 0.5–1.4)
DACRYOCYTES BLD QL SMEAR: ABNORMAL
DIFFERENTIAL METHOD: ABNORMAL
DIFFERENTIAL METHOD: ABNORMAL
EOSINOPHIL # BLD AUTO: 0.1 K/UL (ref 0–0.5)
EOSINOPHIL NFR BLD: 2 % (ref 0–8)
EOSINOPHIL NFR BLD: 6 % (ref 0–8)
ERYTHROCYTE [DISTWIDTH] IN BLOOD BY AUTOMATED COUNT: 14.3 % (ref 11.5–14.5)
ERYTHROCYTE [DISTWIDTH] IN BLOOD BY AUTOMATED COUNT: 14.6 % (ref 11.5–14.5)
EST. GFR  (AFRICAN AMERICAN): 11.9 ML/MIN/1.73 M^2
EST. GFR  (NON AFRICAN AMERICAN): 10.3 ML/MIN/1.73 M^2
FIBRINOGEN PPP-MCNC: 399 MG/DL (ref 182–400)
GLUCOSE SERPL-MCNC: 122 MG/DL (ref 70–110)
GLUCOSE UR QL STRIP: NEGATIVE
GRAM STN SPEC: NORMAL
GRAM STN SPEC: NORMAL
HAPTOGLOB SERPL-MCNC: 316 MG/DL (ref 30–250)
HCT VFR BLD AUTO: 20.8 % (ref 37–48.5)
HCT VFR BLD AUTO: 21.3 % (ref 37–48.5)
HGB BLD-MCNC: 5.9 G/DL (ref 12–16)
HGB BLD-MCNC: 6.1 G/DL (ref 12–16)
HGB UR QL STRIP: ABNORMAL
HYALINE CASTS UR QL AUTO: 0 /LPF
HYPOCHROMIA BLD QL SMEAR: ABNORMAL
IMM GRANULOCYTES # BLD AUTO: 0.08 K/UL (ref 0–0.04)
IMM GRANULOCYTES # BLD AUTO: ABNORMAL K/UL (ref 0–0.04)
IMM GRANULOCYTES NFR BLD AUTO: 2 % (ref 0–0.5)
IMM GRANULOCYTES NFR BLD AUTO: ABNORMAL % (ref 0–0.5)
KETONES UR QL STRIP: NEGATIVE
LDH SERPL L TO P-CCNC: 325 U/L (ref 110–260)
LEUKOCYTE ESTERASE UR QL STRIP: ABNORMAL
LYMPHOCYTES # BLD AUTO: 0.9 K/UL (ref 1–4.8)
LYMPHOCYTES NFR BLD: 20 % (ref 18–48)
LYMPHOCYTES NFR BLD: 21.3 % (ref 18–48)
LYMPHOCYTES NFR FLD MANUAL: 42 %
MAGNESIUM SERPL-MCNC: 1.2 MG/DL (ref 1.6–2.6)
MCH RBC QN AUTO: 26.1 PG (ref 27–31)
MCH RBC QN AUTO: 27 PG (ref 27–31)
MCHC RBC AUTO-ENTMCNC: 27.7 G/DL (ref 32–36)
MCHC RBC AUTO-ENTMCNC: 29.3 G/DL (ref 32–36)
MCV RBC AUTO: 92 FL (ref 82–98)
MCV RBC AUTO: 94 FL (ref 82–98)
MICROSCOPIC COMMENT: ABNORMAL
MONOCYTES # BLD AUTO: 0.5 K/UL (ref 0.3–1)
MONOCYTES NFR BLD: 11 % (ref 4–15)
MONOCYTES NFR BLD: 13.2 % (ref 4–15)
MONOS+MACROS NFR FLD MANUAL: 46 %
NEUTROPHILS # BLD AUTO: 2.5 K/UL (ref 1.8–7.7)
NEUTROPHILS NFR BLD: 61.3 % (ref 38–73)
NEUTROPHILS NFR BLD: 63 % (ref 38–73)
NEUTROPHILS NFR FLD MANUAL: 12 %
NITRITE UR QL STRIP: NEGATIVE
NRBC BLD-RTO: 0 /100 WBC
NRBC BLD-RTO: 0 /100 WBC
PH UR STRIP: 6 [PH] (ref 5–8)
PHOSPHATE SERPL-MCNC: 3.8 MG/DL (ref 2.7–4.5)
PLATELET # BLD AUTO: 161 K/UL (ref 150–450)
PLATELET # BLD AUTO: 164 K/UL (ref 150–450)
PLATELET BLD QL SMEAR: ABNORMAL
PLATELET BLD QL SMEAR: ABNORMAL
PMV BLD AUTO: 11 FL (ref 9.2–12.9)
PMV BLD AUTO: 11.2 FL (ref 9.2–12.9)
POCT GLUCOSE: 126 MG/DL (ref 70–110)
POCT GLUCOSE: 188 MG/DL (ref 70–110)
POCT GLUCOSE: 219 MG/DL (ref 70–110)
POCT GLUCOSE: 95 MG/DL (ref 70–110)
POIKILOCYTOSIS BLD QL SMEAR: SLIGHT
POTASSIUM SERPL-SCNC: 3.7 MMOL/L (ref 3.5–5.1)
PROT SERPL-MCNC: 7.5 G/DL (ref 6–8.4)
PROT UR QL STRIP: ABNORMAL
RBC # BLD AUTO: 2.26 M/UL (ref 4–5.4)
RBC # BLD AUTO: 2.26 M/UL (ref 4–5.4)
RBC #/AREA URNS AUTO: 46 /HPF (ref 0–4)
RETICS/RBC NFR AUTO: 2.1 % (ref 0.5–2.5)
SODIUM SERPL-SCNC: 141 MMOL/L (ref 136–145)
SP GR UR STRIP: 1 (ref 1–1.03)
SQUAMOUS #/AREA URNS AUTO: 7 /HPF
TACROLIMUS BLD-MCNC: 3.3 NG/ML (ref 5–15)
TACROLIMUS BLD-MCNC: 6.3 NG/ML (ref 5–15)
URN SPEC COLLECT METH UR: ABNORMAL
WBC # BLD AUTO: 3.69 K/UL (ref 3.9–12.7)
WBC # BLD AUTO: 4.03 K/UL (ref 3.9–12.7)
WBC #/AREA URNS AUTO: >100 /HPF (ref 0–5)
WBC CLUMPS UR QL AUTO: ABNORMAL

## 2022-04-01 PROCEDURE — 87088 URINE BACTERIA CULTURE: CPT | Mod: NTX | Performed by: NURSE PRACTITIONER

## 2022-04-01 PROCEDURE — 63600175 PHARM REV CODE 636 W HCPCS: Mod: NTX | Performed by: NURSE PRACTITIONER

## 2022-04-01 PROCEDURE — 89051 BODY FLUID CELL COUNT: CPT | Mod: NTX | Performed by: NURSE PRACTITIONER

## 2022-04-01 PROCEDURE — 87070 CULTURE OTHR SPECIMN AEROBIC: CPT | Mod: NTX,59 | Performed by: NURSE PRACTITIONER

## 2022-04-01 PROCEDURE — 25000003 PHARM REV CODE 250: Mod: NTX | Performed by: NURSE PRACTITIONER

## 2022-04-01 PROCEDURE — 86334 IMMUNOFIX E-PHORESIS SERUM: CPT | Mod: 26,NTX,, | Performed by: PATHOLOGY

## 2022-04-01 PROCEDURE — 99499 UNLISTED E&M SERVICE: CPT | Mod: NTX,,, | Performed by: INTERNAL MEDICINE

## 2022-04-01 PROCEDURE — 87086 URINE CULTURE/COLONY COUNT: CPT | Mod: NTX | Performed by: NURSE PRACTITIONER

## 2022-04-01 PROCEDURE — 27200950 HC CAPD SUPPORT: Mod: NTX

## 2022-04-01 PROCEDURE — G0378 HOSPITAL OBSERVATION PER HR: HCPCS | Mod: NTX

## 2022-04-01 PROCEDURE — 96367 TX/PROPH/DG ADDL SEQ IV INF: CPT | Mod: NTX

## 2022-04-01 PROCEDURE — 99218 PR INITIAL OBSERVATION CARE,LEVL I: ICD-10-PCS | Mod: NTX,,, | Performed by: NURSE PRACTITIONER

## 2022-04-01 PROCEDURE — 96372 THER/PROPH/DIAG INJ SC/IM: CPT | Performed by: NURSE PRACTITIONER

## 2022-04-01 PROCEDURE — 81001 URINALYSIS AUTO W/SCOPE: CPT | Mod: NTX | Performed by: NURSE PRACTITIONER

## 2022-04-01 PROCEDURE — 83010 ASSAY OF HAPTOGLOBIN QUANT: CPT | Mod: NTX | Performed by: STUDENT IN AN ORGANIZED HEALTH CARE EDUCATION/TRAINING PROGRAM

## 2022-04-01 PROCEDURE — 84165 PROTEIN E-PHORESIS SERUM: CPT | Mod: NTX | Performed by: STUDENT IN AN ORGANIZED HEALTH CARE EDUCATION/TRAINING PROGRAM

## 2022-04-01 PROCEDURE — 84165 PROTEIN E-PHORESIS SERUM: CPT | Mod: 26,NTX,, | Performed by: PATHOLOGY

## 2022-04-01 PROCEDURE — 87077 CULTURE AEROBIC IDENTIFY: CPT | Mod: 59,NTX | Performed by: NURSE PRACTITIONER

## 2022-04-01 PROCEDURE — 87205 SMEAR GRAM STAIN: CPT | Mod: NTX | Performed by: NURSE PRACTITIONER

## 2022-04-01 PROCEDURE — 85025 COMPLETE CBC W/AUTO DIFF WBC: CPT | Mod: NTX | Performed by: NURSE PRACTITIONER

## 2022-04-01 PROCEDURE — 83690 ASSAY OF LIPASE: CPT | Mod: NTX | Performed by: NURSE PRACTITIONER

## 2022-04-01 PROCEDURE — 85027 COMPLETE CBC AUTOMATED: CPT | Mod: NTX | Performed by: STUDENT IN AN ORGANIZED HEALTH CARE EDUCATION/TRAINING PROGRAM

## 2022-04-01 PROCEDURE — 96365 THER/PROPH/DIAG IV INF INIT: CPT | Mod: NTX,59

## 2022-04-01 PROCEDURE — 99218 PR INITIAL OBSERVATION CARE,LEVL I: CPT | Mod: NTX,,, | Performed by: NURSE PRACTITIONER

## 2022-04-01 PROCEDURE — 90945 DIALYSIS ONE EVALUATION: CPT | Mod: NTX

## 2022-04-01 PROCEDURE — 87102 FUNGUS ISOLATION CULTURE: CPT | Mod: NTX | Performed by: NURSE PRACTITIONER

## 2022-04-01 PROCEDURE — 85045 AUTOMATED RETICULOCYTE COUNT: CPT | Mod: NTX | Performed by: STUDENT IN AN ORGANIZED HEALTH CARE EDUCATION/TRAINING PROGRAM

## 2022-04-01 PROCEDURE — 82525 ASSAY OF COPPER: CPT | Mod: NTX | Performed by: STUDENT IN AN ORGANIZED HEALTH CARE EDUCATION/TRAINING PROGRAM

## 2022-04-01 PROCEDURE — 87075 CULTR BACTERIA EXCEPT BLOOD: CPT | Mod: NTX,59 | Performed by: NURSE PRACTITIONER

## 2022-04-01 PROCEDURE — 99499 NO LOS: ICD-10-PCS | Mod: NTX,,, | Performed by: INTERNAL MEDICINE

## 2022-04-01 PROCEDURE — 83520 IMMUNOASSAY QUANT NOS NONAB: CPT | Mod: NTX | Performed by: STUDENT IN AN ORGANIZED HEALTH CARE EDUCATION/TRAINING PROGRAM

## 2022-04-01 PROCEDURE — 87186 SC STD MICRODIL/AGAR DIL: CPT | Mod: NTX | Performed by: NURSE PRACTITIONER

## 2022-04-01 PROCEDURE — 85384 FIBRINOGEN ACTIVITY: CPT | Mod: NTX | Performed by: STUDENT IN AN ORGANIZED HEALTH CARE EDUCATION/TRAINING PROGRAM

## 2022-04-01 PROCEDURE — 84165 PATHOLOGIST INTERPRETATION SPE: ICD-10-PCS | Mod: 26,NTX,, | Performed by: PATHOLOGY

## 2022-04-01 PROCEDURE — 99214 PR OFFICE/OUTPT VISIT, EST, LEVL IV, 30-39 MIN: ICD-10-PCS | Mod: NTX,,, | Performed by: INTERNAL MEDICINE

## 2022-04-01 PROCEDURE — 83735 ASSAY OF MAGNESIUM: CPT | Mod: NTX | Performed by: NURSE PRACTITIONER

## 2022-04-01 PROCEDURE — 86334 IMMUNOFIX E-PHORESIS SERUM: CPT | Mod: NTX | Performed by: STUDENT IN AN ORGANIZED HEALTH CARE EDUCATION/TRAINING PROGRAM

## 2022-04-01 PROCEDURE — 99214 OFFICE O/P EST MOD 30 MIN: CPT | Mod: NTX,,, | Performed by: INTERNAL MEDICINE

## 2022-04-01 PROCEDURE — 84100 ASSAY OF PHOSPHORUS: CPT | Mod: NTX | Performed by: NURSE PRACTITIONER

## 2022-04-01 PROCEDURE — 86334 PATHOLOGIST INTERPRETATION IFE: ICD-10-PCS | Mod: 26,NTX,, | Performed by: PATHOLOGY

## 2022-04-01 PROCEDURE — 83615 LACTATE (LD) (LDH) ENZYME: CPT | Mod: NTX | Performed by: STUDENT IN AN ORGANIZED HEALTH CARE EDUCATION/TRAINING PROGRAM

## 2022-04-01 PROCEDURE — 63600175 PHARM REV CODE 636 W HCPCS: Mod: NTX

## 2022-04-01 PROCEDURE — 85007 BL SMEAR W/DIFF WBC COUNT: CPT | Mod: NTX | Performed by: STUDENT IN AN ORGANIZED HEALTH CARE EDUCATION/TRAINING PROGRAM

## 2022-04-01 PROCEDURE — C9399 UNCLASSIFIED DRUGS OR BIOLOG: HCPCS | Mod: NTX | Performed by: NURSE PRACTITIONER

## 2022-04-01 PROCEDURE — 80197 ASSAY OF TACROLIMUS: CPT | Mod: NTX | Performed by: NURSE PRACTITIONER

## 2022-04-01 PROCEDURE — 82962 GLUCOSE BLOOD TEST: CPT | Mod: NTX

## 2022-04-01 PROCEDURE — 80053 COMPREHEN METABOLIC PANEL: CPT | Mod: NTX | Performed by: NURSE PRACTITIONER

## 2022-04-01 PROCEDURE — 96366 THER/PROPH/DIAG IV INF ADDON: CPT | Mod: NTX,59

## 2022-04-01 RX ORDER — SEVELAMER CARBONATE 800 MG/1
800 TABLET, FILM COATED ORAL
Status: DISCONTINUED | OUTPATIENT
Start: 2022-04-01 | End: 2022-04-05 | Stop reason: HOSPADM

## 2022-04-01 RX ORDER — INSULIN ASPART 100 [IU]/ML
0-5 INJECTION, SOLUTION INTRAVENOUS; SUBCUTANEOUS
Status: DISCONTINUED | OUTPATIENT
Start: 2022-04-01 | End: 2022-04-05 | Stop reason: HOSPADM

## 2022-04-01 RX ORDER — ZOLPIDEM TARTRATE 5 MG/1
5 TABLET ORAL NIGHTLY PRN
Status: DISCONTINUED | OUTPATIENT
Start: 2022-04-01 | End: 2022-04-05 | Stop reason: HOSPADM

## 2022-04-01 RX ORDER — IBUPROFEN 200 MG
24 TABLET ORAL
Status: DISCONTINUED | OUTPATIENT
Start: 2022-04-01 | End: 2022-04-05 | Stop reason: HOSPADM

## 2022-04-01 RX ORDER — CARVEDILOL 6.25 MG/1
6.25 TABLET ORAL EVERY MORNING
Status: DISCONTINUED | OUTPATIENT
Start: 2022-04-01 | End: 2022-04-05 | Stop reason: HOSPADM

## 2022-04-01 RX ORDER — ATORVASTATIN CALCIUM 40 MG/1
40 TABLET, FILM COATED ORAL NIGHTLY
Status: ON HOLD | COMMUNITY
End: 2023-03-13 | Stop reason: SDUPTHER

## 2022-04-01 RX ORDER — GLUCAGON 1 MG
1 KIT INJECTION
Status: DISCONTINUED | OUTPATIENT
Start: 2022-04-01 | End: 2022-04-05 | Stop reason: HOSPADM

## 2022-04-01 RX ORDER — SODIUM BICARBONATE 650 MG/1
TABLET ORAL
COMMUNITY
End: 2022-08-10 | Stop reason: ALTCHOICE

## 2022-04-01 RX ORDER — MAGNESIUM SULFATE HEPTAHYDRATE 40 MG/ML
2 INJECTION, SOLUTION INTRAVENOUS ONCE
Status: COMPLETED | OUTPATIENT
Start: 2022-04-01 | End: 2022-04-01

## 2022-04-01 RX ORDER — SODIUM CHLORIDE 0.9 % (FLUSH) 0.9 %
10 SYRINGE (ML) INJECTION EVERY 12 HOURS PRN
Status: DISCONTINUED | OUTPATIENT
Start: 2022-04-01 | End: 2022-04-05 | Stop reason: HOSPADM

## 2022-04-01 RX ORDER — IBUPROFEN 200 MG
16 TABLET ORAL
Status: DISCONTINUED | OUTPATIENT
Start: 2022-04-01 | End: 2022-04-05 | Stop reason: HOSPADM

## 2022-04-01 RX ORDER — NALOXONE HCL 0.4 MG/ML
0.02 VIAL (ML) INJECTION
Status: DISCONTINUED | OUTPATIENT
Start: 2022-04-01 | End: 2022-04-05 | Stop reason: HOSPADM

## 2022-04-01 RX ORDER — TIMOLOL MALEATE 5 MG/ML
1 SOLUTION/ DROPS OPHTHALMIC DAILY
Status: DISCONTINUED | OUTPATIENT
Start: 2022-04-01 | End: 2022-04-05 | Stop reason: HOSPADM

## 2022-04-01 RX ORDER — ONDANSETRON 2 MG/ML
4 INJECTION INTRAMUSCULAR; INTRAVENOUS EVERY 8 HOURS PRN
Status: DISCONTINUED | OUTPATIENT
Start: 2022-04-01 | End: 2022-04-05 | Stop reason: HOSPADM

## 2022-04-01 RX ORDER — ATORVASTATIN CALCIUM 20 MG/1
20 TABLET, FILM COATED ORAL NIGHTLY
Status: DISCONTINUED | OUTPATIENT
Start: 2022-04-01 | End: 2022-04-05 | Stop reason: HOSPADM

## 2022-04-01 RX ORDER — ACETAMINOPHEN 325 MG/1
650 TABLET ORAL EVERY 6 HOURS PRN
Status: DISCONTINUED | OUTPATIENT
Start: 2022-04-01 | End: 2022-04-05 | Stop reason: HOSPADM

## 2022-04-01 RX ORDER — CARVEDILOL 3.12 MG/1
3.12 TABLET ORAL NIGHTLY
Status: DISCONTINUED | OUTPATIENT
Start: 2022-04-01 | End: 2022-04-05 | Stop reason: HOSPADM

## 2022-04-01 RX ADMIN — SEVELAMER CARBONATE 800 MG: 800 TABLET, FILM COATED ORAL at 12:04

## 2022-04-01 RX ADMIN — MAGNESIUM SULFATE 2 G: 2 INJECTION INTRAVENOUS at 08:04

## 2022-04-01 RX ADMIN — Medication 800 MG: at 08:04

## 2022-04-01 RX ADMIN — ZOLPIDEM TARTRATE 5 MG: 5 TABLET ORAL at 01:04

## 2022-04-01 RX ADMIN — TACROLIMUS 2 MG: 1 CAPSULE ORAL at 06:04

## 2022-04-01 RX ADMIN — CEFTRIAXONE 1 G: 1 INJECTION, SOLUTION INTRAVENOUS at 04:04

## 2022-04-01 RX ADMIN — TIMOLOL MALEATE 1 DROP: 5 SOLUTION/ DROPS OPHTHALMIC at 08:04

## 2022-04-01 RX ADMIN — ZOLPIDEM TARTRATE 5 MG: 5 TABLET ORAL at 09:04

## 2022-04-01 RX ADMIN — TACROLIMUS 3 MG: 1 CAPSULE ORAL at 08:04

## 2022-04-01 RX ADMIN — ATORVASTATIN CALCIUM 20 MG: 20 TABLET, FILM COATED ORAL at 01:04

## 2022-04-01 RX ADMIN — ATORVASTATIN CALCIUM 20 MG: 20 TABLET, FILM COATED ORAL at 08:04

## 2022-04-01 RX ADMIN — MYCOPHENOLATE MOFETIL 500 MG: 250 CAPSULE ORAL at 08:04

## 2022-04-01 RX ADMIN — CARVEDILOL 3.12 MG: 3.12 TABLET, FILM COATED ORAL at 08:04

## 2022-04-01 RX ADMIN — SEVELAMER CARBONATE 800 MG: 800 TABLET, FILM COATED ORAL at 08:04

## 2022-04-01 RX ADMIN — INSULIN ASPART 1 UNITS: 100 INJECTION, SOLUTION INTRAVENOUS; SUBCUTANEOUS at 08:04

## 2022-04-01 RX ADMIN — INSULIN DETEMIR 16 UNITS: 100 INJECTION, SOLUTION SUBCUTANEOUS at 08:04

## 2022-04-01 RX ADMIN — SEVELAMER CARBONATE 800 MG: 800 TABLET, FILM COATED ORAL at 06:04

## 2022-04-01 RX ADMIN — CARVEDILOL 6.25 MG: 6.25 TABLET, FILM COATED ORAL at 08:04

## 2022-04-01 NOTE — CONSULTS
[facility] - Emergency Dept  Nephrology  Consult Note    Patient Name: Elizabeth Maldonado  MRN: [id number]  Admission Date: 3/31/2022  Hospital Length of Stay: 0 days  Attending Provider: Sari Byrd MD   Primary Care Physician: Richy Singleton NP  Principal Problem:<principal problem not specified>    Inpatient consult to Nephrology  Consult performed by: Vincent Meraz MD  Consult ordered by: Johnathon Hassan PA-C  Reason for consult: pd catheter pain  Assessment/Recommendations: Ct, pd labs        Subjective:     HPI: 57 year old with ESKD and failed Ktx now on PD who just started 3 days ago and had abdominal pain with filling and drainage during last session. Sent here for evaluation. No other complaints.  Nephrology consulted for ESKD      Past Medical History:   Diagnosis Date    Acidosis 7/1/2014    Allergic rhinitis 7/1/2014    Allergy     Anemia     Anemia in chronic kidney disease 7/1/2014    Anxiety     Cataract     Chronic bilateral low back pain with bilateral sciatica 10/25/2019    Chronic immunosuppression with Prograf and MMF 12/3/2014    CKD (chronic kidney disease) stage 5, GFR less than 15 ml/min 7/1/2014    CKD (chronic kidney disease), stage III 3/2/2015    Degenerative disc disease     Depression 7/1/2014    Diabetes mellitus, type 2 since age 18 7/1/2014    ESRD on peritoneal dialysis - august 2014 for 9 hours no peritonitis 11/24/2014    Hyperlipidemia     Hypertension 7/1/2014    Hypomagnesemia 1/7/2015    Kidney transplant status, living unrelated donor - 12/2/14 12/3/2014    Neutropenia 1/21/2015    NS (nuclear sclerosis) 9/16/2016    Obesity     Organ transplant candidate 7/1/2014    Pre-op exam 11/24/2014    Proliferative diabetic retinopathy of both eyes without macular edema associated with type 2 diabetes mellitus 9/16/2016    Renal manifestation of secondary diabetes mellitus     Tendinitis     Trouble in sleeping        Past Surgical  "History:   Procedure Laterality Date    BIOPSY N/A 2020    Procedure: Biopsy;  Surgeon: Sweta Catalan MD;  Location: [facility] OR [address] [place] [address];  Service: Transplant;  Laterality: N/A;    BONE MARROW BIOPSY N/A      SECTION      x 2    KIDNEY TRANSPLANT  2014    MEDIPORT REMOVAL N/A 2019    Procedure: REMOVAL, CATHETER, CENTRAL VENOUS, TUNNELED, WITH PORT;  Surgeon: Eusebia Diagnostic Provider;  Location: [facility] OR;  Service: Radiology;  Laterality: N/A;    RENAL BIOPSY      ROTATOR CUFF REPAIR      TUBAL LIGATION         Review of patient's allergies indicates:   Allergen Reactions    Shrimp Hives    Topamax [topiramate] Other (See Comments)     Vision changes    Zoloft [sertraline] Palpitations     No current facility-administered medications for this encounter.     Current Outpatient Medications   Medication    acetaminophen (TYLENOL) 500 MG tablet    ALPRAZolam (XANAX) 0.25 MG tablet    atorvastatin (LIPITOR) 20 MG tablet    blood sugar diagnostic Strp    carvediloL (COREG) 3.125 MG tablet    HUMALOG KWIKPEN INSULIN 100 unit/mL pen    insulin syringe-needle U-100 (INSULIN SYRINGE) 1/2 mL 30 x 5/16" Syrg    lancets (ONETOUCH DELICA LANCETS) 33 gauge Misc    LANTUS SOLOSTAR U-100 INSULIN glargine 100 units/mL (3mL) SubQ pen    mycophenolate (CELLCEPT) 250 mg Cap    oxyCODONE-acetaminophen (PERCOCET) 5-325 mg per tablet    sevelamer carbonate (RENVELA) 800 mg Tab    SYRINGE & NEEDLE,INSULIN,1 ML (INSULIN SYRINGE-NEEDLE U-100) 1 mL 29 X 7/16" Syrg    tacrolimus (PROGRAF) 1 MG Cap    timolol maleate 0.5% (TIMOPTIC) 0.5 % Drop    TRULICITY 0.75 mg/0.5 mL pen injector    zolpidem (AMBIEN) 10 mg Tab     Family History       Problem Relation (Age of Onset)    Cataracts Maternal Grandmother    Diabetes Mother, Father, Sister, Brother, Sister    Heart disease Sister, Maternal Grandmother    Heart failure Sister    Hypertension Mother, Sister    Kidney disease Father, Sister    No Known " Problems Maternal Aunt, Maternal Uncle, Paternal Aunt, Paternal Uncle, Maternal Grandfather, Paternal Grandmother, Paternal Grandfather    Stroke Maternal Grandmother          Tobacco Use    Smoking status: Former Smoker     Packs/day: 1.00     Years: 20.00     Pack years: 20.00     Types: Cigarettes     Quit date: 2013     Years since quittin.5    Smokeless tobacco: Never Used    Tobacco comment: quit smoking cigarettes in 2014   Substance and Sexual Activity    Alcohol use: No    Drug use: No    Sexual activity: Yes     Partners: Male     Birth control/protection: Post-menopausal     Review of Systems   Constitutional: Negative.    HENT: Negative.     Eyes: Negative.    Respiratory: Negative.     Cardiovascular: Negative.    Gastrointestinal: Negative.    Endocrine: Negative.    Genitourinary: Negative.    Musculoskeletal: Negative.    Skin: Negative.    Neurological: Negative.    Psychiatric/Behavioral: Negative.     Objective:     Vital Signs (Most Recent):  Temp: 98.6 °F (37 °C) (22)  Pulse: 100 (22)  Resp: 18 (22)  BP: 137/68 (22)  SpO2: 100 % (22)  O2 Device (Oxygen Therapy): room air (22) Vital Signs (24h Range):  Temp:  [98.4 °F (36.9 °C)-98.6 °F (37 °C)] 98.6 °F (37 °C)  Pulse:  [] 100  Resp:  [18] 18  SpO2:  [100 %] 100 %  BP: (123-150)/(66-80) 137/68     Weight: 78.9 kg (174 lb) (22)  Body mass index is 28.08 kg/m².  Body surface area is 1.92 meters squared.    No intake/output data recorded.    Physical Exam  Constitutional:       General: She is not in acute distress.     Appearance: She is obese.   HENT:      Mouth/Throat:      Mouth: Mucous membranes are moist.   Eyes:      General: No scleral icterus.     Extraocular Movements: Extraocular movements intact.      Pupils: Pupils are equal, round, and reactive to light.   Cardiovascular:      Rate and Rhythm: Normal rate and regular rhythm.    Pulmonary:      Effort: Pulmonary effort is normal. No respiratory distress.   Abdominal:      General: There is no distension.      Palpations: Abdomen is soft.   Musculoskeletal:         General: Deformity (PD catheter with clean exit site, no purulence or erythema and no tenderness of tunnel) present.      Right lower leg: No edema.      Left lower leg: No edema.   Skin:     Coloration: Skin is not jaundiced.   Neurological:      General: No focal deficit present.      Mental Status: She is alert.       Significant Labs:  All labs within the past 24 hours have been reviewed.    Significant Imaging:  Labs: Reviewed    Assessment/Plan:     Anemia of chronic kidney failure, stage 5  -ferritin elevated so IV iron not ideal also patient has smoldering myeloma which makes epo not ideal  -in addition patient is being evaluated for kidney transplant so would not recommend giving blood  -her degree of anemia is significant for ESKD and chronic kidney disease  -recommend evaluation for alternate causes of anemia    End stage kidney disease  -just starting PD and PD catheter placed 3/11/22 per patient, urinates 1/2 coke can per day  -here for evaluation of pain with filling and draining of dialysate  -no signs of infection or abdominal pain currently  -has 2 or more soft BMs per day  -recommend CT AP to assess for catheter location  -ordered PD labs to assess for infection  -hold ABX for now    Chronic immunosuppression with Prograf  -am tacro trough    Kidney transplant status, living unrelated donor - 12/2/14  -now failed  -continue home tacro 3/2 and mycophenolate 500/500  -am tacro trough    Chronic kidney disease-mineral and bone disorder  -continue home sevelamer 800 tid wm          Vincent Meraz MD  Nephrology  [facility] - Emergency Dept

## 2022-04-01 NOTE — ASSESSMENT & PLAN NOTE
-ferritin elevated so IV iron not ideal also patient has smoldering myeloma which makes epo not ideal  -in addition patient is being evaluated for kidney transplant so would not recommend giving blood  -her degree of anemia is significant for ESKD and chronic kidney disease  -recommend evaluation for alternate causes of anemia

## 2022-04-01 NOTE — HPI
Elizabeth Maldonado is a 57 y.o. female with a PMHx of failed renal transplant, ESRD on PD ( placed on 3/11), HTN, HLD, and MGUS who presents to the ED with complaints of lower abdominal cramping. The patient resumed PD on 3/29/22. Today was day #3 of PD. She was sent to the ER from PD clinic for an emergent evaluation due to pt c/o chills and diffuse lower abdominal cramping with filling and draining of dialysate. She reports the dialysate was draining very slowly, and she felt like it wasn't drained completely before filling again. The patient states the pain resolved by the time she arrived to the ED and she has had no additional episodes. The patient denies any fever. She does report noting urinary urgency, frequency, and mild dysuria x2 days. She also reports some ongoing fatigue. The patient denies any nausea, vomiting, chest pain, shortness of breath, cough, lightheadedness, or dizziness.    In the ED, pt mildly tachycardic but otherwise VSSAF. CBC with WBC 3.66 and H/H 6.1/20.7. CMP with BUN/Cr 36/4.5, consistent with ESRD. Magnesium 1.3. Glucose 161. Lipase 178. Procal WNL. Lactate WNL. Blood cxs in process. UA 3+ leukocytes, >100 WBCs, many bacteria, and 26 squam- will repeat UA. CT abd/pelvis with presence of peritoneal dialysis catheter and small volume abdominopelvic free fluid.  Minimal pneumoperitoneum in the upper abdomen could be related to peritoneal dialysis catheter, though correlation with physical exam findings is recommended. Right lower quadrant renal transplant with ureteral wall thickening and mild diffuse urinary bladder wall thickening.  Similar findings were present on the prior study in 2021. Per ED provider note general surgery reviewed imaging and found small amount of pneumoperitoneum to be expected given postoperative period. The patient was evaluated in the ED by nephrology and PD was ordered along with PD fluid samples.

## 2022-04-01 NOTE — ASSESSMENT & PLAN NOTE
Patient reports abdominal cramping with filling and draining of dialysate  -Resolved once patient arrived to the ED and has not re occurred.   -CT abd/pelvis reviewed.  -Nephrology following. Ordered PD labs but holding off on abx for now.

## 2022-04-01 NOTE — ASSESSMENT & PLAN NOTE
Patient's FSGs are controlled on current hypoglycemics.   Last A1c reviewed-   Lab Results   Component Value Date    HGBA1C 7.2 (H) 12/23/2021     Most recent fingerstick glucose reviewed- No results for input(s): POCTGLUCOSE in the last 24 hours.  Current correctional scale  Low  Maintain anti-hyperglycemic dose as follows-   Antihyperglycemics (From admission, onward)            Start     Stop Route Frequency Ordered    04/01/22 2100  insulin detemir U-100 pen 16 Units         -- SubQ Nightly 04/01/22 0214    04/01/22 0103  insulin aspart U-100 pen 0-5 Units         -- SubQ Before meals & nightly PRN 04/01/22 0005      -Will start low dose sliding scale insulin, decrease long acting insulin to 16U qhs.   -Diabetic/renal diet, goal -180  -Accuchecks AC/HS

## 2022-04-01 NOTE — HPI
"Ms. Elizabeth Maldonado is a 57 year old female with ESRD on PD, MGUS, HTN, failed kidney transplant and still on immunosuppression. She was admitted for abdominal pain and chills.     Ms. Maldonado recently had PD catheter placed on 3/11 and has started peritoneal dialysis. She presented to the ED last night from PD clinic with complaints of chills and lower abdominal cramping. There was concern for possible peritonitis. Her hemoglobin was also noted to be 5.9 (previous baseline 7-8). She denied any bloody or black stools. She has not noted any other sources of bleeding.     She follows with Dr. Lau for her MGUS. Most recently, she had IgG kappa M spike of 2.04 (which increased from 1.13 at last check). She previously had no lesions on PET scan, and bone marrow biopsy only noted "mild kappa-skewed plasmacytosis" but was an insufficient core sample. Recent anemia workup included ferritin of >800, normal B12 and folate, reticulocyte count 2.5%.   "

## 2022-04-01 NOTE — PROVIDER PROGRESS NOTES - EMERGENCY DEPT.
Encounter Date: 3/31/2022    ED Physician Progress Notes        Physician Note:   ED Physician Hand-off Note:    ED Course: I assumed care of patient from off-going ED physician team. Briefly, Patient is a 57 F hx of failed renal transplant now on PD ( placed on 3/11) here for abdominal pain and chills during PD today.     Labs reviewed, chronic anemia, acute pancreatitis, leukopenia.      Nephrology consulted and seen at bedside, will collect peritoneal sample.       At the time of signout plan was pending CT ab/pelvis  Medications given in the ED:    Medications  tacrolimus capsule 3 mg (has no administration in time range)    And  tacrolimus capsule 2 mg (has no administration in time range)  mycophenolate capsule 500 mg (has no administration in time range)  magnesium oxide tablet 800 mg (has no administration in time range)    11:00 PM  CT scan reviewed with no evidence of catheter migration.  There is a small amount of pneumoperitoneum which is expected given postoperative period.  This was discussed with [facility] surgery who agrees.  Patient will be placed in observation under hospital medicine.    Disposition: observation    Patient comfortable with plan. Patient counseled regarding exam, results, diagnosis, treatment, and plan.    Impression: abdominal pain, recent PD catheter placement    JAQUELINE Martinez MD

## 2022-04-01 NOTE — ASSESSMENT & PLAN NOTE
Current CBC reviewed-   Lab Results   Component Value Date    HGB 6.1 (L) 03/31/2022    HCT 20.7 (L) 03/31/2022     -ferritin elevated so IV iron not ideal also patient has smoldering myeloma which makes epo not ideal  -in addition patient is being evaluated for kidney transplant so would not recommend giving blood  -her degree of anemia is significant for ESKD and chronic kidney disease  -Nephrology recommends evaluation for alternate causes of anemia  -Consult hematology, appreciate recs.

## 2022-04-01 NOTE — ASSESSMENT & PLAN NOTE
"- she is followed for her monoclonal gammopathy of uncertain significance at [facility] by Dr. Lau. She states she had an appointment with Dr. Lau in January 2022  -Per hematology note 1/20/22:  "- MGUS labs overall appear stable (M-spike historically had ranged between 1.4-1.8 with recent downtrend, IgG without large increase) with the exception of kappa and lambda free light chains which have increased but this is likely due to renal insufficiency. LDH wnl, B2M elevated, IgM low, IgA/M wnl. B2M can be elevated in the setting of renal insufficiency.  - BM asp/bx notes mild kappa-skewed plasmacytosis but unfortunately was insufficient core sample. No abnormalities detected by FISH, cytogen wnl.  - Will need 24 hour UPEP  - PET/CT without evidence for lytic lesions  - Continues on immunosuppressives post renal transplant. Discussed with her nephrologist Dr. Bonilla the possibility of renal biopsy, he will pass along the information to his transplant colleagues."  "

## 2022-04-01 NOTE — SUBJECTIVE & OBJECTIVE
Oncology Treatment Plan:   [Could not find a treatment plan. This SmartLink may be configured incorrectly. Contact a  for help.]    Medications:  Continuous Infusions:  Scheduled Meds:   atorvastatin  20 mg Oral QHS    carvediloL  6.25 mg Oral QAM    And    carvediloL  3.125 mg Oral QHS    cefTRIAXone (ROCEPHIN) IVPB  1 g Intravenous Q24H    insulin detemir U-100  16 Units Subcutaneous QHS    magnesium oxide  800 mg Oral BID    mycophenolate  500 mg Oral BID    sevelamer carbonate  800 mg Oral TID WM    tacrolimus  3 mg Oral Daily AM    And    tacrolimus  2 mg Oral Daily PM    timolol maleate 0.5%  1 drop Both Eyes Daily     PRN Meds:acetaminophen, dextrose 10%, dextrose 10%, glucagon (human recombinant), glucose, glucose, insulin aspart U-100, naloxone, ondansetron, sodium chloride 0.9%, zolpidem     Review of patient's allergies indicates:   Allergen Reactions    Shrimp Hives    Topamax [topiramate] Other (See Comments)     Vision changes    Zoloft [sertraline] Palpitations        Past Medical History:   Diagnosis Date    Acidosis 7/1/2014    Allergic rhinitis 7/1/2014    Allergy     Anemia     Anemia in chronic kidney disease 7/1/2014    Anxiety     Cataract     Chronic bilateral low back pain with bilateral sciatica 10/25/2019    Chronic immunosuppression with Prograf and MMF 12/3/2014    CKD (chronic kidney disease) stage 5, GFR less than 15 ml/min 7/1/2014    CKD (chronic kidney disease), stage III 3/2/2015    Degenerative disc disease     Depression 7/1/2014    Diabetes mellitus, type 2 since age 18 7/1/2014    ESRD on peritoneal dialysis - august 2014 for 9 hours no peritonitis 11/24/2014    Hyperlipidemia     Hypertension 7/1/2014    Hypomagnesemia 1/7/2015    Kidney transplant status, living unrelated donor - 12/2/14 12/3/2014    Neutropenia 1/21/2015    NS (nuclear sclerosis) 9/16/2016    Obesity     Organ transplant candidate 7/1/2014    Pre-op exam 11/24/2014    Proliferative  diabetic retinopathy of both eyes without macular edema associated with type 2 diabetes mellitus 2016    Renal manifestation of secondary diabetes mellitus     Tendinitis     Trouble in sleeping      Past Surgical History:   Procedure Laterality Date    BIOPSY N/A 2020    Procedure: Biopsy;  Surgeon: Sweta Catalan MD;  Location: [facility] OR [address] [place] [address];  Service: Transplant;  Laterality: N/A;    BONE MARROW BIOPSY N/A      SECTION      x 2    KIDNEY TRANSPLANT  2014    MEDIPORT REMOVAL N/A 2019    Procedure: REMOVAL, CATHETER, CENTRAL VENOUS, TUNNELED, WITH PORT;  Surgeon: Eusebia Diagnostic Provider;  Location: [facility] OR;  Service: Radiology;  Laterality: N/A;    RENAL BIOPSY      ROTATOR CUFF REPAIR      TUBAL LIGATION       Family History       Problem Relation (Age of Onset)    Cataracts Maternal Grandmother    Diabetes Mother, Father, Sister, Brother, Sister    Heart disease Sister, Maternal Grandmother    Heart failure Sister    Hypertension Mother, Sister    Kidney disease Father, Sister    No Known Problems Maternal Aunt, Maternal Uncle, Paternal Aunt, Paternal Uncle, Maternal Grandfather, Paternal Grandmother, Paternal Grandfather    Stroke Maternal Grandmother          Tobacco Use    Smoking status: Former Smoker     Packs/day: 1.00     Years: 20.00     Pack years: 20.00     Types: Cigarettes     Quit date: 2013     Years since quittin.5    Smokeless tobacco: Never Used    Tobacco comment: quit smoking cigarettes in 2014   Substance and Sexual Activity    Alcohol use: No    Drug use: No    Sexual activity: Yes     Partners: Male     Birth control/protection: Post-menopausal       Review of Systems   Constitutional:  Positive for chills and fatigue. Negative for fever.   HENT:  Negative for rhinorrhea and sore throat.    Eyes:  Negative for pain and redness.   Respiratory:  Negative for cough and shortness of breath.    Cardiovascular:  Negative for chest pain, palpitations  and leg swelling.   Gastrointestinal:  Positive for abdominal pain. Negative for constipation, diarrhea, nausea and vomiting.   Endocrine: Negative for polydipsia and polyuria.   Genitourinary:  Negative for dysuria and hematuria.   Musculoskeletal:  Negative for back pain and neck pain.   Skin:  Negative for color change and rash.   Neurological:  Negative for syncope, light-headedness and headaches.   Hematological:  Negative for adenopathy. Does not bruise/bleed easily.   Psychiatric/Behavioral:  Negative for confusion. The patient is not nervous/anxious.    Objective:     Vital Signs (Most Recent):  Temp: 98.3 °F (36.8 °C) (04/01/22 1236)  Pulse: 97 (04/01/22 1236)  Resp: 16 (04/01/22 1236)  BP: (!) 152/79 (04/01/22 1236)  SpO2: 98 % (04/01/22 1236)   Vital Signs (24h Range):  Temp:  [98.3 °F (36.8 °C)-99.6 °F (37.6 °C)] 98.3 °F (36.8 °C)  Pulse:  [] 97  Resp:  [15-20] 16  SpO2:  [98 %-100 %] 98 %  BP: (137-156)/(68-81) 152/79     Weight: 78.9 kg (174 lb)  Body mass index is 28.08 kg/m².  Body surface area is 1.92 meters squared.      Intake/Output Summary (Last 24 hours) at 4/1/2022 1340  Last data filed at 4/1/2022 1112  Gross per 24 hour   Intake 100 ml   Output --   Net 100 ml       Physical Exam  Constitutional:       General: She is not in acute distress.     Appearance: She is well-developed. She is not diaphoretic.   HENT:      Head: Normocephalic and atraumatic.   Eyes:      General: No scleral icterus.     Conjunctiva/sclera: Conjunctivae normal.   Cardiovascular:      Rate and Rhythm: Normal rate and regular rhythm.      Heart sounds: Normal heart sounds. No murmur heard.    No friction rub. No gallop.   Pulmonary:      Effort: Pulmonary effort is normal. No respiratory distress.      Breath sounds: Normal breath sounds. No wheezing or rales.   Abdominal:      General: Bowel sounds are normal. There is no distension.      Palpations: Abdomen is soft.      Tenderness: There is no abdominal  tenderness. There is no guarding.      Comments: PD catheter in place   Musculoskeletal:         General: No tenderness. Normal range of motion.      Cervical back: Normal range of motion and neck supple.   Skin:     General: Skin is warm and dry.      Findings: No rash.   Neurological:      Mental Status: She is alert and oriented to person, place, and time.   Psychiatric:         Behavior: Behavior normal.       Significant Labs:   CBC:   Recent Labs   Lab 03/31/22  1726 04/01/22  0323   WBC 3.66* 4.03   HGB 6.1* 5.9*   HCT 20.7* 21.3*    164    and CMP:   Recent Labs   Lab 03/31/22  1726 04/01/22  0323    141   K 4.3 3.7    111*   CO2 19* 18*   * 122*   BUN 36* 33*   CREATININE 4.5* 4.4*   CALCIUM 8.9 8.8   PROT 8.4 7.5   ALBUMIN 3.2* 2.9*   BILITOT 0.3 0.3   ALKPHOS 66 59   AST 12 10   ALT 5* <5*   ANIONGAP 10 12   EGFRNONAA 10.0* 10.3*       Diagnostic Results:  I have reviewed all pertinent imaging results/findings within the past 24 hours.

## 2022-04-01 NOTE — ASSESSMENT & PLAN NOTE
"Ms. Elizabeth Maldonado is a 57 year old female with failed renal transplant now on PD, as well as IgG kappa MGUS. She follows with Dr. Lau for her MGUS. She has high-intermediate risk MGUS but has not yet met [facility] CRAB criteria for multiple myeloma. Bone marrow biopsy noted "mild kappa-skewed plasmacytosis" with 5.5% plasma cells but did not have a sufficient core biopsy.     Hematology was consulted for worsened anemia. Recent anemia workup notable as in HPI.     - copper ordered to complete nutritional workup  - LDH, haptoglobin, fibrinogen ordered and not concerning for hemolytic anemia  - will repeat myeloma labs. Noted that her M spike has been increasing and she may be progressing to multiple myeloma which would require repeat bone marrow biopsy  - procrit may be given at the discretion of the nephrology service. There is no contraindication for procrit from our perspective.   - recommend usual transfusion goals of hemoglobin <7   "

## 2022-04-01 NOTE — H&P
[facility] - Emergency Dept  [facility]  History & Physical    Patient Name: Elizabeth Maldonado  MRN: [id number]  Patient Class: OP- Observation  Admission Date: 3/31/2022  Attending Physician: Jamaal Steinberg MD   Primary Care Provider: Richy Singleton NP         Patient information was obtained from patient, past medical records and ER records.     Subjective:     Principal Problem:Abdominal pain    Chief Complaint:   Chief Complaint   Patient presents with    Abdominal Pain     Started peritoneal dialysis training and not able to drain it all the way , having cramping and pain        HPI: Elizabeth Maldonado is a 57 y.o. female with a PMHx of failed renal transplant, ESRD on PD ( placed on 3/11), HTN, HLD, and MGUS who presents to the ED with complaints of lower abdominal cramping. The patient resumed PD on 3/29/22. Today was day #3 of PD. She was sent to the ER from PD clinic for an emergent evaluation due to pt c/o chills and diffuse lower abdominal cramping with filling and draining of dialysate. She reports the dialysate was draining very slowly, and she felt like it wasn't drained completely before filling again. The patient states the pain resolved by the time she arrived to the ED and she has had no additional episodes. The patient denies any fever. She does report noting urinary urgency, frequency, and mild dysuria x2 days. She also reports some ongoing fatigue. The patient denies any nausea, vomiting, chest pain, shortness of breath, cough, lightheadedness, or dizziness.    In the ED, pt mildly tachycardic but otherwise VSSAF. CBC with WBC 3.66 and H/H 6.1/20.7. CMP with BUN/Cr 36/4.5, consistent with ESRD. Magnesium 1.3. Glucose 161. Lipase 178. Procal WNL. Lactate WNL. Blood cxs in process. UA 3+ leukocytes, >100 WBCs, many bacteria, and 26 squam- will repeat UA. CT abd/pelvis with presence of peritoneal dialysis catheter and small volume abdominopelvic free fluid.  Minimal pneumoperitoneum in  the upper abdomen could be related to peritoneal dialysis catheter, though correlation with physical exam findings is recommended. Right lower quadrant renal transplant with ureteral wall thickening and mild diffuse urinary bladder wall thickening.  Similar findings were present on the prior study in . Per ED provider note general surgery reviewed imaging and found small amount of pneumoperitoneum to be expected given postoperative period. The patient was evaluated in the ED by nephrology and PD was ordered along with PD fluid samples.       Past Medical History:   Diagnosis Date    Acidosis 2014    Allergic rhinitis 2014    Allergy     Anemia     Anemia in chronic kidney disease 2014    Anxiety     Cataract     Chronic bilateral low back pain with bilateral sciatica 10/25/2019    Chronic immunosuppression with Prograf and MMF 12/3/2014    CKD (chronic kidney disease) stage 5, GFR less than 15 ml/min 2014    CKD (chronic kidney disease), stage III 3/2/2015    Degenerative disc disease     Depression 2014    Diabetes mellitus, type 2 since age 18 2014    ESRD on peritoneal dialysis - 2014 for 9 hours no peritonitis 2014    Hyperlipidemia     Hypertension 2014    Hypomagnesemia 2015    Kidney transplant status, living unrelated donor - 12/2/14 12/3/2014    Neutropenia 2015    NS (nuclear sclerosis) 2016    Obesity     Organ transplant candidate 2014    Pre-op exam 2014    Proliferative diabetic retinopathy of both eyes without macular edema associated with type 2 diabetes mellitus 2016    Renal manifestation of secondary diabetes mellitus     Tendinitis     Trouble in sleeping        Past Surgical History:   Procedure Laterality Date    BIOPSY N/A 2020    Procedure: Biopsy;  Surgeon: Sweta Catalan MD;  Location: [facility] OR [address] [place] [address];  Service: Transplant;  Laterality: N/A;    BONE MARROW BIOPSY N/A       "SECTION      x 2    KIDNEY TRANSPLANT  02/2014    MEDIPORT REMOVAL N/A 11/25/2019    Procedure: REMOVAL, CATHETER, CENTRAL VENOUS, TUNNELED, WITH PORT;  Surgeon: Eusebia Diagnostic Provider;  Location: [facility] OR;  Service: Radiology;  Laterality: N/A;    RENAL BIOPSY      ROTATOR CUFF REPAIR      TUBAL LIGATION         Review of patient's allergies indicates:   Allergen Reactions    Shrimp Hives    Topamax [topiramate] Other (See Comments)     Vision changes    Zoloft [sertraline] Palpitations       No current facility-administered medications on file prior to encounter.     Current Outpatient Medications on File Prior to Encounter   Medication Sig    acetaminophen (TYLENOL) 500 MG tablet Take 500 mg by mouth every 6 (six) hours as needed for Pain.    ALPRAZolam (XANAX) 0.25 MG tablet Take 0.25 mg by mouth 2 (two) times daily as needed.    atorvastatin (LIPITOR) 20 MG tablet Take 20 mg by mouth every evening.    blood sugar diagnostic Strp Checks BG ac/hs    carvediloL (COREG) 3.125 MG tablet Take 3.125 mg by mouth 2 (two) times daily with meals.    HUMALOG KWIKPEN INSULIN 100 unit/mL pen Inject 10 Units into the skin 3 (three) times daily with meals.    insulin syringe-needle U-100 (INSULIN SYRINGE) 1/2 mL 30 x 5/16" Syrg Uses 4 daily    lancets (ONETOUCH DELICA LANCETS) 33 gauge Misc 1 lancet by Misc.(Non-Drug; Combo Route) route 4 (four) times daily before meals and nightly.    LANTUS SOLOSTAR U-100 INSULIN glargine 100 units/mL (3mL) SubQ pen Inject 50 Units into the skin once daily. (Patient taking differently: Inject 50 Units into the skin every evening.)    mycophenolate (CELLCEPT) 250 mg Cap TAKE 2 CAPSULES ( 500 MG TOTAL) BY MOUTH 2 TIMES DAILY    oxyCODONE-acetaminophen (PERCOCET) 5-325 mg per tablet Take 1 tablet by mouth every 4 (four) hours as needed for Pain. (Patient not taking: Reported on 3/23/2022)    sevelamer carbonate (RENVELA) 800 mg Tab Take 1 tablet (800 mg total) by mouth 3 " "(three) times daily with meals.    SYRINGE & NEEDLE,INSULIN,1 ML (INSULIN SYRINGE-NEEDLE U-100) 1 mL 29 X 7/16" Syrg     tacrolimus (PROGRAF) 1 MG Cap Take 3 capsules (3 mg total) by mouth every morning AND 2 capsules (2 mg total) every evening. Z94.0.    timolol maleate 0.5% (TIMOPTIC) 0.5 % Drop Place 1 drop into both eyes once daily.    TRULICITY 0.75 mg/0.5 mL pen injector Inject 0.75 mg into the skin every 7 days. Wednesday    zolpidem (AMBIEN) 10 mg Tab Take 10 mg by mouth nightly as needed.     Family History       Problem Relation (Age of Onset)    Cataracts Maternal Grandmother    Diabetes Mother, Father, Sister, Brother, Sister    Heart disease Sister, Maternal Grandmother    Heart failure Sister    Hypertension Mother, Sister    Kidney disease Father, Sister    No Known Problems Maternal Aunt, Maternal Uncle, Paternal Aunt, Paternal Uncle, Maternal Grandfather, Paternal Grandmother, Paternal Grandfather    Stroke Maternal Grandmother          Tobacco Use    Smoking status: Former Smoker     Packs/day: 1.00     Years: 20.00     Pack years: 20.00     Types: Cigarettes     Quit date: 2013     Years since quittin.5    Smokeless tobacco: Never Used    Tobacco comment: quit smoking cigarettes in 2014   Substance and Sexual Activity    Alcohol use: No    Drug use: No    Sexual activity: Yes     Partners: Male     Birth control/protection: Post-menopausal     Review of Systems   Constitutional:  Positive for chills and fatigue. Negative for appetite change and fever.   HENT:  Negative for congestion, rhinorrhea, sneezing and sore throat.    Eyes:  Negative for photophobia and visual disturbance.   Respiratory:  Negative for cough, shortness of breath and wheezing.    Cardiovascular:  Negative for chest pain and leg swelling.   Gastrointestinal:  Positive for abdominal pain (with PD, now resolved) and diarrhea (intermittent). Negative for nausea and vomiting.   Genitourinary:  Positive " for dysuria (mild), frequency and urgency. Negative for difficulty urinating and flank pain.   Musculoskeletal:  Negative for arthralgias, back pain, myalgias and neck pain.   Skin:  Negative for color change, pallor, rash and wound.   Allergic/Immunologic: Positive for immunocompromised state.   Neurological:  Negative for dizziness, syncope, weakness, light-headedness and headaches.   Psychiatric/Behavioral:  Negative for agitation, confusion and hallucinations. The patient is not nervous/anxious.    Objective:     Vital Signs (Most Recent):  Temp: 98.6 °F (37 °C) (03/31/22 1617)  Pulse: 97 (03/31/22 2252)  Resp: 18 (03/31/22 2252)  BP: (!) 156/81 (03/31/22 2252)  SpO2: 100 % (03/31/22 2252)   Vital Signs (24h Range):  Temp:  [98.4 °F (36.9 °C)-98.6 °F (37 °C)] 98.6 °F (37 °C)  Pulse:  [] 97  Resp:  [18] 18  SpO2:  [100 %] 100 %  BP: (123-156)/(66-81) 156/81     Weight: 78.9 kg (174 lb)  Body mass index is 28.08 kg/m².    Physical Exam  Vitals and nursing note reviewed.   Constitutional:       General: She is not in acute distress.     Appearance: She is obese. She is not ill-appearing, toxic-appearing or diaphoretic.   HENT:      Head: Normocephalic and atraumatic.      Nose: Nose normal.      Mouth/Throat:      Mouth: Mucous membranes are moist.   Eyes:      Pupils: Pupils are equal, round, and reactive to light.   Cardiovascular:      Rate and Rhythm: Normal rate and regular rhythm.      Heart sounds: No murmur heard.  Pulmonary:      Effort: Pulmonary effort is normal. No respiratory distress.      Breath sounds: No stridor. No wheezing or rales.      Comments: Currently on room air  Abdominal:      General: Bowel sounds are normal. There is no distension.      Palpations: Abdomen is soft.      Tenderness: There is no abdominal tenderness. There is no guarding.      Comments: PD catheter noted, no drainage, erythema, or tenderness on palpation.   Genitourinary:     Comments: deferred  Musculoskeletal:          General: No swelling, tenderness or deformity. Normal range of motion.      Cervical back: Normal range of motion. No tenderness.   Skin:     General: Skin is warm and dry.      Capillary Refill: Capillary refill takes less than 2 seconds.   Neurological:      General: No focal deficit present.      Mental Status: She is alert and oriented to person, place, and time.      Sensory: No sensory deficit.      Motor: No weakness.   Psychiatric:         Mood and Affect: Mood normal.         Behavior: Behavior normal.         Thought Content: Thought content normal.         Judgment: Judgment normal.         CRANIAL NERVES     CN III, IV, VI   Pupils are equal, round, and reactive to light.     Significant Labs: All pertinent labs within the past 24 hours have been reviewed.  CBC:   Recent Labs   Lab 03/31/22  1726   WBC 3.66*   HGB 6.1*   HCT 20.7*        CMP:   Recent Labs   Lab 03/31/22  1726      K 4.3      CO2 19*   *   BUN 36*   CREATININE 4.5*   CALCIUM 8.9   PROT 8.4   ALBUMIN 3.2*   BILITOT 0.3   ALKPHOS 66   AST 12   ALT 5*   ANIONGAP 10   EGFRNONAA 10.0*     Lactic Acid:   Recent Labs   Lab 03/31/22  1840   LACTATE 1.2     Urine Studies:   Recent Labs   Lab 03/31/22  2134   COLORU Yellow   APPEARANCEUA Cloudy*   PHUR 6.0   SPECGRAV 1.010   PROTEINUA 2+*   GLUCUA Negative   KETONESU Negative   BILIRUBINUA Negative   OCCULTUA 1+*   NITRITE Negative   LEUKOCYTESUR 3+*   RBCUA 28*   WBCUA >100*   BACTERIA Many*   SQUAMEPITHEL 26   HYALINECASTS 0       Significant Imaging: I have reviewed all pertinent imaging results/findings within the past 24 hours.    Imaging Results               CT Abdomen Pelvis  Without Contrast (Final result)  Result time 03/31/22 22:55:27      Final result by Rubin Craig MD (03/31/22 22:55:27)                   Impression:      Presence of peritoneal dialysis catheter and small volume abdominopelvic free fluid.  Minimal pneumoperitoneum in the upper  abdomen could be related to peritoneal dialysis catheter, though correlation with physical exam findings is recommended.    Right lower quadrant renal transplant with ureteral wall thickening and mild diffuse urinary bladder wall thickening.  Similar findings were present on the prior study in 2021.  Suggest correlation with urinalysis if there is concern for urinary tract infection.    Additional findings discussed in the body of the report.    This report was flagged in Epic as abnormal.      Electronically signed by: Rubin Craig MD  Date:    03/31/2022  Time:    22:55               Narrative:    EXAMINATION:  CT ABDOMEN PELVIS WITHOUT CONTRAST    CLINICAL HISTORY:  Peritonitis or perforation suspected;Increased abd pain/swelling s/p recent PD;    TECHNIQUE:  Low dose axial images, sagittal and coronal reformations were obtained from the lung bases to the pubic symphysis.  Oral contrast was not administered.    COMPARISON:  CT without contrast, 12/02/2021.    FINDINGS:  Lower chest: Heart size is normal. Lung bases are essentially clear. No pleural or pericardial effusion.    Abdomen:    Evaluation of the solid abdominal organs and bowel is limited in the absence of IV contrast.    Liver is normal in size and contour without focal contour deforming lesion. Probable biliary sludge in the gallbladder.  No calcified gallstones.  No intra-or extrahepatic biliary ductal dilatation.    Spleen is not enlarged.  Adrenal glands and pancreas are stable in appearance.    Severe native renal atrophy.  No hydronephrosis.  No renal or ureteral stones.    No small bowel obstruction.  Evaluation for bowel inflammation is limited by presence of ascites and mesenteric edema.  Peritoneal dialysis catheter is present.  The appendix is normal.  There is small volume abdominopelvic free fluid.  Trace pneumoperitoneum in the upper abdomen.    Abdominal aorta is normal in course and caliber with moderate calcific  atherosclerosis.    No bulky retroperitoneal lymphadenopathy.  There are grossly stable mildly enlarged lymph nodes adjacent to the right lower quadrant renal transplant.    Pelvis: Mild urinary bladder wall prominence, potentially exaggerated by decompressed state.  There is a right lower quadrant renal transplant with mild ureteral wall thickening.  Say probable simple cyst in the superior pole of the renal transplant.  Evaluation for inflammation is limited by the presence of ascites.  Uterus is unremarkable.  Small to moderate volume pelvic free fluid.    Bones and soft tissues: No aggressive osseous lesions.  There are mild degenerative changes in the spine.  Peritoneal dialysis catheter is present.  No significant fat stranding surrounding the catheter tract.  There is mild body wall edema.  Minimal subcutaneous emphysema in the left abdominal wall.  Diastasis recti.                                        Assessment/Plan:     * Abdominal pain  Patient reports abdominal cramping with filling and draining of dialysate  -Resolved once patient arrived to the ED and has not re occurred.   -CT abd/pelvis reviewed.  -Nephrology following. Ordered PD labs but holding off on abx for now.    End stage kidney disease  -Nephrology consulted, appreciate recs.  -just starting PD and PD catheter placed 3/11/22 per patient  -here for evaluation of pain with filling and draining of dialysate  -no abdominal pain currently  -has 2 or more soft BMs per day  -CT AP with no evidence of catheter migration. There is a small amount of pneumoperitoneum which is expected given postoperative period. This was discussed with General surgery by ED provider, who agrees that this is an expected finding.  -PD labs to assess for infection  -Hold off on abx per nephrology recs.    Acute cystitis with hematuria  UA with 26 squams, will repeat. Patient c/o of urinary urgency, frequency, and mild dysuria.   -Will treat with rocephin while awaiting  "repeat.  -2/4 SIRS (WBC 3.66, )  -Follow urine cx.    Anemia of chronic kidney failure, stage 5  Current CBC reviewed-   Lab Results   Component Value Date    HGB 6.1 (L) 03/31/2022    HCT 20.7 (L) 03/31/2022     -ferritin elevated so IV iron not ideal also patient has smoldering myeloma which makes epo not ideal  -in addition patient is being evaluated for kidney transplant so would not recommend giving blood  -her degree of anemia is significant for ESKD and chronic kidney disease  -Nephrology recommends evaluation for alternate causes of anemia  -Consult hematology, appreciate recs.    MGUS (monoclonal gammopathy of unknown significance)  - she is followed for her monoclonal gammopathy of uncertain significance at [facility] by Dr. Lau. She states she had an appointment with Dr. Lau in January 2022  -Per hematology note 1/20/22:  "- MGUS labs overall appear stable (M-spike historically had ranged between 1.4-1.8 with recent downtrend, IgG without large increase) with the exception of kappa and lambda free light chains which have increased but this is likely due to renal insufficiency. LDH wnl, B2M elevated, IgM low, IgA/M wnl. B2M can be elevated in the setting of renal insufficiency.  - BM asp/bx notes mild kappa-skewed plasmacytosis but unfortunately was insufficient core sample. No abnormalities detected by FISH, cytogen wnl.  - Will need 24 hour UPEP  - PET/CT without evidence for lytic lesions  - Continues on immunosuppressives post renal transplant. Discussed with her nephrologist Dr. Bonilla the possibility of renal biopsy, he will pass along the information to his transplant colleagues."    Hypomagnesemia  Magnesium reviewed- Recent Labs   Lab 03/31/22  1840   MG 1.3*    Will replace electrolytes and continue to monitor closely.    Chronic immunosuppression with Prograf  -am tacro trough  -Continue home dosage    Kidney transplant status, living unrelated donor - 12/2/14  -now " failed  -continue home tacro 3/2 and mycophenolate 500/500  -am tacro trough    Obesity (BMI 30.0-34.9)  Body mass index is 28.08 kg/m². Obesity complicates all aspects of disease management from diagnostic modalities to treatment.     Chronic kidney disease-mineral and bone disorder  -continue home sevelamer 800 tid wm    Hyperlipidemia   Patient is chronically on statin.will continue for now. Monitor clinically. Last LDL was   Lab Results   Component Value Date    LDLCALC 118.4 12/23/2021       Type 2 diabetes mellitus, uncontrolled, with renal complications  Patient's FSGs are controlled on current hypoglycemics.   Last A1c reviewed-   Lab Results   Component Value Date    HGBA1C 7.2 (H) 12/23/2021     Most recent fingerstick glucose reviewed- No results for input(s): POCTGLUCOSE in the last 24 hours.  Current correctional scale  Low  Maintain anti-hyperglycemic dose as follows-   Antihyperglycemics (From admission, onward)            Start     Stop Route Frequency Ordered    04/01/22 2100  insulin detemir U-100 pen 16 Units         -- SubQ Nightly 04/01/22 0214    04/01/22 0103  insulin aspart U-100 pen 0-5 Units         -- SubQ Before meals & nightly PRN 04/01/22 0005      -Will start low dose sliding scale insulin, decrease long acting insulin to 16U qhs.   -Diabetic/renal diet, goal -180  -Accuchecks AC/HS    VTE Risk Mitigation (From admission, onward)         Ordered     Place sequential compression device  Until discontinued         04/01/22 0005     IP VTE HIGH RISK PATIENT  Once         04/01/22 0005                   Veda Tierney NP  Department of Hospital Medicine   [facility] - Emergency Dept

## 2022-04-01 NOTE — ED NOTES
Pt arrives to [facility] from ED via stretcher. Pt ambulatory to bed, provide warm blankets. Call light within reach.

## 2022-04-01 NOTE — CONSULTS
"[facility] - [facility] Dept  Hematology/Oncology  Consult Note    Patient Name: Elizabeth Maldonado  MRN: [id number]  Admission Date: 3/31/2022  Hospital Length of Stay: 0 days  Code Status: Full Code   Attending Provider: Jamaal Steinberg MD  Consulting Provider: Jordyn Ignacio DO  Primary Care Physician: Richy Singleton NP  Principal Problem:Abdominal pain    Inpatient consult to Hematology/Oncology  Consult performed by: Jordyn Ignacio DO  Consult ordered by: Veda Tierney NP        Subjective:     HPI:  Ms. Elizabeth Maldonado is a 57 year old female with ESRD on PD, MGUS, HTN, failed kidney transplant and still on immunosuppression. She was admitted for abdominal pain and chills.     Ms. Maldonado recently had PD catheter placed on 3/11 and has started peritoneal dialysis. She presented to the ED last night from PD clinic with complaints of chills and lower abdominal cramping. There was concern for possible peritonitis. Her hemoglobin was also noted to be 5.9 (previous baseline 7-8). She denied any bloody or black stools. She has not noted any other sources of bleeding.     She follows with Dr. Lau for her MGUS. Most recently, she had IgG kappa M spike of 2.04 (which increased from 1.13 at last check). She previously had no lesions on PET scan, and bone marrow biopsy only noted "mild kappa-skewed plasmacytosis" but was an insufficient core sample. Recent anemia workup included ferritin of >800, normal B12 and folate, reticulocyte count 2.5%.       Oncology Treatment Plan:   [Could not find a treatment plan. This SmartLink may be configured incorrectly. Contact a  for help.]    Medications:  Continuous Infusions:  Scheduled Meds:   atorvastatin  20 mg Oral QHS    carvediloL  6.25 mg Oral QAM    And    carvediloL  3.125 mg Oral QHS    cefTRIAXone (ROCEPHIN) IVPB  1 g Intravenous Q24H    insulin detemir U-100  16 Units Subcutaneous QHS    magnesium oxide  800 mg Oral BID    mycophenolate  " 500 mg Oral BID    sevelamer carbonate  800 mg Oral TID WM    tacrolimus  3 mg Oral Daily AM    And    tacrolimus  2 mg Oral Daily PM    timolol maleate 0.5%  1 drop Both Eyes Daily     PRN Meds:acetaminophen, dextrose 10%, dextrose 10%, glucagon (human recombinant), glucose, glucose, insulin aspart U-100, naloxone, ondansetron, sodium chloride 0.9%, zolpidem     Review of patient's allergies indicates:   Allergen Reactions    Shrimp Hives    Topamax [topiramate] Other (See Comments)     Vision changes    Zoloft [sertraline] Palpitations        Past Medical History:   Diagnosis Date    Acidosis 2014    Allergic rhinitis 2014    Allergy     Anemia     Anemia in chronic kidney disease 2014    Anxiety     Cataract     Chronic bilateral low back pain with bilateral sciatica 10/25/2019    Chronic immunosuppression with Prograf and MMF 12/3/2014    CKD (chronic kidney disease) stage 5, GFR less than 15 ml/min 2014    CKD (chronic kidney disease), stage III 3/2/2015    Degenerative disc disease     Depression 2014    Diabetes mellitus, type 2 since age 18 2014    ESRD on peritoneal dialysis - 2014 for 9 hours no peritonitis 2014    Hyperlipidemia     Hypertension 2014    Hypomagnesemia 2015    Kidney transplant status, living unrelated donor - 12/2/14 12/3/2014    Neutropenia 2015    NS (nuclear sclerosis) 2016    Obesity     Organ transplant candidate 2014    Pre-op exam 2014    Proliferative diabetic retinopathy of both eyes without macular edema associated with type 2 diabetes mellitus 2016    Renal manifestation of secondary diabetes mellitus     Tendinitis     Trouble in sleeping      Past Surgical History:   Procedure Laterality Date    BIOPSY N/A 2020    Procedure: Biopsy;  Surgeon: Sweta Catalan MD;  Location: [facility] OR [address] [place] [address];  Service: Transplant;  Laterality: N/A;    BONE MARROW BIOPSY N/A       SECTION      x 2    KIDNEY TRANSPLANT  2014    MEDIPORT REMOVAL N/A 2019    Procedure: REMOVAL, CATHETER, CENTRAL VENOUS, TUNNELED, WITH PORT;  Surgeon: Eusebia Diagnostic Provider;  Location: [facility] OR;  Service: Radiology;  Laterality: N/A;    RENAL BIOPSY      ROTATOR CUFF REPAIR      TUBAL LIGATION       Family History       Problem Relation (Age of Onset)    Cataracts Maternal Grandmother    Diabetes Mother, Father, Sister, Brother, Sister    Heart disease Sister, Maternal Grandmother    Heart failure Sister    Hypertension Mother, Sister    Kidney disease Father, Sister    No Known Problems Maternal Aunt, Maternal Uncle, Paternal Aunt, Paternal Uncle, Maternal Grandfather, Paternal Grandmother, Paternal Grandfather    Stroke Maternal Grandmother          Tobacco Use    Smoking status: Former Smoker     Packs/day: 1.00     Years: 20.00     Pack years: 20.00     Types: Cigarettes     Quit date: 2013     Years since quittin.5    Smokeless tobacco: Never Used    Tobacco comment: quit smoking cigarettes in 2014   Substance and Sexual Activity    Alcohol use: No    Drug use: No    Sexual activity: Yes     Partners: Male     Birth control/protection: Post-menopausal       Review of Systems   Constitutional:  Positive for chills and fatigue. Negative for fever.   HENT:  Negative for rhinorrhea and sore throat.    Eyes:  Negative for pain and redness.   Respiratory:  Negative for cough and shortness of breath.    Cardiovascular:  Negative for chest pain, palpitations and leg swelling.   Gastrointestinal:  Positive for abdominal pain. Negative for constipation, diarrhea, nausea and vomiting.   Endocrine: Negative for polydipsia and polyuria.   Genitourinary:  Negative for dysuria and hematuria.   Musculoskeletal:  Negative for back pain and neck pain.   Skin:  Negative for color change and rash.   Neurological:  Negative for syncope, light-headedness and headaches.   Hematological:   Negative for adenopathy. Does not bruise/bleed easily.   Psychiatric/Behavioral:  Negative for confusion. The patient is not nervous/anxious.    Objective:     Vital Signs (Most Recent):  Temp: 98.3 °F (36.8 °C) (04/01/22 1236)  Pulse: 97 (04/01/22 1236)  Resp: 16 (04/01/22 1236)  BP: (!) 152/79 (04/01/22 1236)  SpO2: 98 % (04/01/22 1236)   Vital Signs (24h Range):  Temp:  [98.3 °F (36.8 °C)-99.6 °F (37.6 °C)] 98.3 °F (36.8 °C)  Pulse:  [] 97  Resp:  [15-20] 16  SpO2:  [98 %-100 %] 98 %  BP: (137-156)/(68-81) 152/79     Weight: 78.9 kg (174 lb)  Body mass index is 28.08 kg/m².  Body surface area is 1.92 meters squared.      Intake/Output Summary (Last 24 hours) at 4/1/2022 1340  Last data filed at 4/1/2022 1112  Gross per 24 hour   Intake 100 ml   Output --   Net 100 ml       Physical Exam  Constitutional:       General: She is not in acute distress.     Appearance: She is well-developed. She is not diaphoretic.   HENT:      Head: Normocephalic and atraumatic.   Eyes:      General: No scleral icterus.     Conjunctiva/sclera: Conjunctivae normal.   Cardiovascular:      Rate and Rhythm: Normal rate and regular rhythm.      Heart sounds: Normal heart sounds. No murmur heard.    No friction rub. No gallop.   Pulmonary:      Effort: Pulmonary effort is normal. No respiratory distress.      Breath sounds: Normal breath sounds. No wheezing or rales.   Abdominal:      General: Bowel sounds are normal. There is no distension.      Palpations: Abdomen is soft.      Tenderness: There is no abdominal tenderness. There is no guarding.      Comments: PD catheter in place   Musculoskeletal:         General: No tenderness. Normal range of motion.      Cervical back: Normal range of motion and neck supple.   Skin:     General: Skin is warm and dry.      Findings: No rash.   Neurological:      Mental Status: She is alert and oriented to person, place, and time.   Psychiatric:         Behavior: Behavior normal.       Significant  "Labs:   CBC:   Recent Labs   Lab 03/31/22  1726 04/01/22  0323   WBC 3.66* 4.03   HGB 6.1* 5.9*   HCT 20.7* 21.3*    164    and CMP:   Recent Labs   Lab 03/31/22  1726 04/01/22  0323    141   K 4.3 3.7    111*   CO2 19* 18*   * 122*   BUN 36* 33*   CREATININE 4.5* 4.4*   CALCIUM 8.9 8.8   PROT 8.4 7.5   ALBUMIN 3.2* 2.9*   BILITOT 0.3 0.3   ALKPHOS 66 59   AST 12 10   ALT 5* <5*   ANIONGAP 10 12   EGFRNONAA 10.0* 10.3*       Diagnostic Results:  I have reviewed all pertinent imaging results/findings within the past 24 hours.    Assessment/Plan:     MGUS (monoclonal gammopathy of unknown significance)  Ms. Elizabeth Maldonado is a 57 year old female with failed renal transplant now on PD, as well as IgG kappa MGUS. She follows with Dr. Lau for her MGUS. She has high-intermediate risk MGUS but has not yet met SLiM CRAB criteria for multiple myeloma. Bone marrow biopsy noted "mild kappa-skewed plasmacytosis" with 5.5% plasma cells but did not have a sufficient core biopsy.     Hematology was consulted for worsened anemia. Recent anemia workup notable as in HPI.     - copper ordered to complete nutritional workup  - LDH, haptoglobin, fibrinogen ordered and not concerning for hemolytic anemia  - will repeat myeloma labs. Noted that her M spike has been increasing and she may be progressing to multiple myeloma which would require repeat bone marrow biopsy  - procrit may be given at the discretion of the nephrology service. There is no contraindication for procrit from our perspective.   - recommend usual transfusion goals of hemoglobin <7         Thank you for your consult. I will follow-up with patient. Please contact us if you have any additional questions.    Jordyn Ignacio,   Hematology/Oncology  [facility]    Agree with dr. Ignacio's  history, physical, assessment, and plan with no further  addendums.      Silvino Barron MD  Hematology & Stem Cell Transplant     "

## 2022-04-01 NOTE — ASSESSMENT & PLAN NOTE
Ferritin elevated so IV iron not ideal also patient has smoldering myeloma which makes epo not ideal  Her degree of anemia is significant for ESKD and chronic kidney disease  Recommend evaluation for alternate causes of anemia

## 2022-04-01 NOTE — ASSESSMENT & PLAN NOTE
UA with 26 squams, will repeat. Patient c/o of urinary urgency, frequency, and mild dysuria.   -Will treat with rocephin while awaiting repeat.  -2/4 SIRS (WBC 3.66, )  -Follow urine cx.

## 2022-04-01 NOTE — ASSESSMENT & PLAN NOTE
Patient is chronically on statin.will continue for now. Monitor clinically. Last LDL was   Lab Results   Component Value Date    LDLCALC 118.4 12/23/2021

## 2022-04-01 NOTE — SUBJECTIVE & OBJECTIVE
Past Medical History:   Diagnosis Date    Acidosis 2014    Allergic rhinitis 2014    Allergy     Anemia     Anemia in chronic kidney disease 2014    Anxiety     Cataract     Chronic bilateral low back pain with bilateral sciatica 10/25/2019    Chronic immunosuppression with Prograf and MMF 12/3/2014    CKD (chronic kidney disease) stage 5, GFR less than 15 ml/min 2014    CKD (chronic kidney disease), stage III 3/2/2015    Degenerative disc disease     Depression 2014    Diabetes mellitus, type 2 since age 18 2014    ESRD on peritoneal dialysis - 2014 for 9 hours no peritonitis 2014    Hyperlipidemia     Hypertension 2014    Hypomagnesemia 2015    Kidney transplant status, living unrelated donor - 12/2/14 12/3/2014    Neutropenia 2015    NS (nuclear sclerosis) 2016    Obesity     Organ transplant candidate 2014    Pre-op exam 2014    Proliferative diabetic retinopathy of both eyes without macular edema associated with type 2 diabetes mellitus 2016    Renal manifestation of secondary diabetes mellitus     Tendinitis     Trouble in sleeping        Past Surgical History:   Procedure Laterality Date    BIOPSY N/A 2020    Procedure: Biopsy;  Surgeon: Sweta Catalan MD;  Location: [address];  Service: Transplant;  Laterality: N/A;    BONE MARROW BIOPSY N/A      SECTION      x 2    KIDNEY TRANSPLANT  2014    MEDIPORT REMOVAL N/A 2019    Procedure: REMOVAL, CATHETER, CENTRAL VENOUS, TUNNELED, WITH PORT;  Surgeon: Eusebia Diagnostic Provider;  Location: [facility] OR;  Service: Radiology;  Laterality: N/A;    RENAL BIOPSY      ROTATOR CUFF REPAIR      TUBAL LIGATION         Review of patient's allergies indicates:   Allergen Reactions    Shrimp Hives    Topamax [topiramate] Other (See Comments)     Vision changes    Zoloft [sertraline] Palpitations       No current facility-administered medications on file prior to encounter.     Current  "Outpatient Medications on File Prior to Encounter   Medication Sig    acetaminophen (TYLENOL) 500 MG tablet Take 500 mg by mouth every 6 (six) hours as needed for Pain.    ALPRAZolam (XANAX) 0.25 MG tablet Take 0.25 mg by mouth 2 (two) times daily as needed.    atorvastatin (LIPITOR) 20 MG tablet Take 20 mg by mouth every evening.    blood sugar diagnostic Strp Checks BG ac/hs    carvediloL (COREG) 3.125 MG tablet Take 3.125 mg by mouth 2 (two) times daily with meals.    HUMALOG KWIKPEN INSULIN 100 unit/mL pen Inject 10 Units into the skin 3 (three) times daily with meals.    insulin syringe-needle U-100 (INSULIN SYRINGE) 1/2 mL 30 x 5/16" Syrg Uses 4 daily    lancets (ONETOUCH DELICA LANCETS) 33 gauge Misc 1 lancet by Misc.(Non-Drug; Combo Route) route 4 (four) times daily before meals and nightly.    LANTUS SOLOSTAR U-100 INSULIN glargine 100 units/mL (3mL) SubQ pen Inject 50 Units into the skin once daily. (Patient taking differently: Inject 50 Units into the skin every evening.)    mycophenolate (CELLCEPT) 250 mg Cap TAKE 2 CAPSULES ( 500 MG TOTAL) BY MOUTH 2 TIMES DAILY    oxyCODONE-acetaminophen (PERCOCET) 5-325 mg per tablet Take 1 tablet by mouth every 4 (four) hours as needed for Pain. (Patient not taking: Reported on 3/23/2022)    sevelamer carbonate (RENVELA) 800 mg Tab Take 1 tablet (800 mg total) by mouth 3 (three) times daily with meals.    SYRINGE & NEEDLE,INSULIN,1 ML (INSULIN SYRINGE-NEEDLE U-100) 1 mL 29 X 7/16" Syrg     tacrolimus (PROGRAF) 1 MG Cap Take 3 capsules (3 mg total) by mouth every morning AND 2 capsules (2 mg total) every evening. Z94.0.    timolol maleate 0.5% (TIMOPTIC) 0.5 % Drop Place 1 drop into both eyes once daily.    TRULICITY 0.75 mg/0.5 mL pen injector Inject 0.75 mg into the skin every 7 days. Wednesday    zolpidem (AMBIEN) 10 mg Tab Take 10 mg by mouth nightly as needed.     Family History       Problem Relation (Age of Onset)    Cataracts Maternal Grandmother    Diabetes " Mother, Father, Sister, Brother, Sister    Heart disease Sister, Maternal Grandmother    Heart failure Sister    Hypertension Mother, Sister    Kidney disease Father, Sister    No Known Problems Maternal Aunt, Maternal Uncle, Paternal Aunt, Paternal Uncle, Maternal Grandfather, Paternal Grandmother, Paternal Grandfather    Stroke Maternal Grandmother          Tobacco Use    Smoking status: Former Smoker     Packs/day: 1.00     Years: 20.00     Pack years: 20.00     Types: Cigarettes     Quit date: 2013     Years since quittin.5    Smokeless tobacco: Never Used    Tobacco comment: quit smoking cigarettes in 2014   Substance and Sexual Activity    Alcohol use: No    Drug use: No    Sexual activity: Yes     Partners: Male     Birth control/protection: Post-menopausal     Review of Systems   Constitutional:  Positive for chills and fatigue. Negative for appetite change and fever.   HENT:  Negative for congestion, rhinorrhea, sneezing and sore throat.    Eyes:  Negative for photophobia and visual disturbance.   Respiratory:  Negative for cough, shortness of breath and wheezing.    Cardiovascular:  Negative for chest pain and leg swelling.   Gastrointestinal:  Positive for abdominal pain (with PD, now resolved) and diarrhea (intermittent). Negative for nausea and vomiting.   Genitourinary:  Positive for dysuria (mild), frequency and urgency. Negative for difficulty urinating and flank pain.   Musculoskeletal:  Negative for arthralgias, back pain, myalgias and neck pain.   Skin:  Negative for color change, pallor, rash and wound.   Allergic/Immunologic: Positive for immunocompromised state.   Neurological:  Negative for dizziness, syncope, weakness, light-headedness and headaches.   Psychiatric/Behavioral:  Negative for agitation, confusion and hallucinations. The patient is not nervous/anxious.    Objective:     Vital Signs (Most Recent):  Temp: 98.6 °F (37 °C) (22 1617)  Pulse: 97 (22  2252)  Resp: 18 (03/31/22 2252)  BP: (!) 156/81 (03/31/22 2252)  SpO2: 100 % (03/31/22 2252)   Vital Signs (24h Range):  Temp:  [98.4 °F (36.9 °C)-98.6 °F (37 °C)] 98.6 °F (37 °C)  Pulse:  [] 97  Resp:  [18] 18  SpO2:  [100 %] 100 %  BP: (123-156)/(66-81) 156/81     Weight: 78.9 kg (174 lb)  Body mass index is 28.08 kg/m².    Physical Exam  Vitals and nursing note reviewed.   Constitutional:       General: She is not in acute distress.     Appearance: She is obese. She is not ill-appearing, toxic-appearing or diaphoretic.   HENT:      Head: Normocephalic and atraumatic.      Nose: Nose normal.      Mouth/Throat:      Mouth: Mucous membranes are moist.   Eyes:      Pupils: Pupils are equal, round, and reactive to light.   Cardiovascular:      Rate and Rhythm: Normal rate and regular rhythm.      Heart sounds: No murmur heard.  Pulmonary:      Effort: Pulmonary effort is normal. No respiratory distress.      Breath sounds: No stridor. No wheezing or rales.      Comments: Currently on room air  Abdominal:      General: Bowel sounds are normal. There is no distension.      Palpations: Abdomen is soft.      Tenderness: There is no abdominal tenderness. There is no guarding.      Comments: PD catheter noted, no drainage, erythema, or tenderness on palpation.   Genitourinary:     Comments: deferred  Musculoskeletal:         General: No swelling, tenderness or deformity. Normal range of motion.      Cervical back: Normal range of motion. No tenderness.   Skin:     General: Skin is warm and dry.      Capillary Refill: Capillary refill takes less than 2 seconds.   Neurological:      General: No focal deficit present.      Mental Status: She is alert and oriented to person, place, and time.      Sensory: No sensory deficit.      Motor: No weakness.   Psychiatric:         Mood and Affect: Mood normal.         Behavior: Behavior normal.         Thought Content: Thought content normal.         Judgment: Judgment normal.          CRANIAL NERVES     CN III, IV, VI   Pupils are equal, round, and reactive to light.     Significant Labs: All pertinent labs within the past 24 hours have been reviewed.  CBC:   Recent Labs   Lab 03/31/22  1726   WBC 3.66*   HGB 6.1*   HCT 20.7*        CMP:   Recent Labs   Lab 03/31/22  1726      K 4.3      CO2 19*   *   BUN 36*   CREATININE 4.5*   CALCIUM 8.9   PROT 8.4   ALBUMIN 3.2*   BILITOT 0.3   ALKPHOS 66   AST 12   ALT 5*   ANIONGAP 10   EGFRNONAA 10.0*     Lactic Acid:   Recent Labs   Lab 03/31/22  1840   LACTATE 1.2     Urine Studies:   Recent Labs   Lab 03/31/22  2134   COLORU Yellow   APPEARANCEUA Cloudy*   PHUR 6.0   SPECGRAV 1.010   PROTEINUA 2+*   GLUCUA Negative   KETONESU Negative   BILIRUBINUA Negative   OCCULTUA 1+*   NITRITE Negative   LEUKOCYTESUR 3+*   RBCUA 28*   WBCUA >100*   BACTERIA Many*   SQUAMEPITHEL 26   HYALINECASTS 0       Significant Imaging: I have reviewed all pertinent imaging results/findings within the past 24 hours.    Imaging Results               CT Abdomen Pelvis  Without Contrast (Final result)  Result time 03/31/22 22:55:27      Final result by Rubin Craig MD (03/31/22 22:55:27)                   Impression:      Presence of peritoneal dialysis catheter and small volume abdominopelvic free fluid.  Minimal pneumoperitoneum in the upper abdomen could be related to peritoneal dialysis catheter, though correlation with physical exam findings is recommended.    Right lower quadrant renal transplant with ureteral wall thickening and mild diffuse urinary bladder wall thickening.  Similar findings were present on the prior study in 2021.  Suggest correlation with urinalysis if there is concern for urinary tract infection.    Additional findings discussed in the body of the report.    This report was flagged in Epic as abnormal.      Electronically signed by: Rubin Craig MD  Date:    03/31/2022  Time:    22:55               Narrative:     EXAMINATION:  CT ABDOMEN PELVIS WITHOUT CONTRAST    CLINICAL HISTORY:  Peritonitis or perforation suspected;Increased abd pain/swelling s/p recent PD;    TECHNIQUE:  Low dose axial images, sagittal and coronal reformations were obtained from the lung bases to the pubic symphysis.  Oral contrast was not administered.    COMPARISON:  CT without contrast, 12/02/2021.    FINDINGS:  Lower chest: Heart size is normal. Lung bases are essentially clear. No pleural or pericardial effusion.    Abdomen:    Evaluation of the solid abdominal organs and bowel is limited in the absence of IV contrast.    Liver is normal in size and contour without focal contour deforming lesion. Probable biliary sludge in the gallbladder.  No calcified gallstones.  No intra-or extrahepatic biliary ductal dilatation.    Spleen is not enlarged.  Adrenal glands and pancreas are stable in appearance.    Severe native renal atrophy.  No hydronephrosis.  No renal or ureteral stones.    No small bowel obstruction.  Evaluation for bowel inflammation is limited by presence of ascites and mesenteric edema.  Peritoneal dialysis catheter is present.  The appendix is normal.  There is small volume abdominopelvic free fluid.  Trace pneumoperitoneum in the upper abdomen.    Abdominal aorta is normal in course and caliber with moderate calcific atherosclerosis.    No bulky retroperitoneal lymphadenopathy.  There are grossly stable mildly enlarged lymph nodes adjacent to the right lower quadrant renal transplant.    Pelvis: Mild urinary bladder wall prominence, potentially exaggerated by decompressed state.  There is a right lower quadrant renal transplant with mild ureteral wall thickening.  Say probable simple cyst in the superior pole of the renal transplant.  Evaluation for inflammation is limited by the presence of ascites.  Uterus is unremarkable.  Small to moderate volume pelvic free fluid.    Bones and soft tissues: No aggressive osseous lesions.  There  are mild degenerative changes in the spine.  Peritoneal dialysis catheter is present.  No significant fat stranding surrounding the catheter tract.  There is mild body wall edema.  Minimal subcutaneous emphysema in the left abdominal wall.  Diastasis recti.

## 2022-04-01 NOTE — SUBJECTIVE & OBJECTIVE
Past Medical History:   Diagnosis Date    Acidosis 2014    Allergic rhinitis 2014    Allergy     Anemia     Anemia in chronic kidney disease 2014    Anxiety     Cataract     Chronic bilateral low back pain with bilateral sciatica 10/25/2019    Chronic immunosuppression with Prograf and MMF 12/3/2014    CKD (chronic kidney disease) stage 5, GFR less than 15 ml/min 2014    CKD (chronic kidney disease), stage III 3/2/2015    Degenerative disc disease     Depression 2014    Diabetes mellitus, type 2 since age 18 2014    ESRD on peritoneal dialysis - 2014 for 9 hours no peritonitis 2014    Hyperlipidemia     Hypertension 2014    Hypomagnesemia 2015    Kidney transplant status, living unrelated donor - 12/2/14 12/3/2014    Neutropenia 2015    NS (nuclear sclerosis) 2016    Obesity     Organ transplant candidate 2014    Pre-op exam 2014    Proliferative diabetic retinopathy of both eyes without macular edema associated with type 2 diabetes mellitus 2016    Renal manifestation of secondary diabetes mellitus     Tendinitis     Trouble in sleeping        Past Surgical History:   Procedure Laterality Date    BIOPSY N/A 2020    Procedure: Biopsy;  Surgeon: Sweta Catalan MD;  Location: [address];  Service: Transplant;  Laterality: N/A;    BONE MARROW BIOPSY N/A      SECTION      x 2    KIDNEY TRANSPLANT  2014    MEDIPORT REMOVAL N/A 2019    Procedure: REMOVAL, CATHETER, CENTRAL VENOUS, TUNNELED, WITH PORT;  Surgeon: Eusebia Diagnostic Provider;  Location: [facility] OR;  Service: Radiology;  Laterality: N/A;    RENAL BIOPSY      ROTATOR CUFF REPAIR      TUBAL LIGATION         Review of patient's allergies indicates:   Allergen Reactions    Shrimp Hives    Topamax [topiramate] Other (See Comments)     Vision changes    Zoloft [sertraline] Palpitations     No current facility-administered medications for this encounter.     Current Outpatient  "Medications   Medication    acetaminophen (TYLENOL) 500 MG tablet    ALPRAZolam (XANAX) 0.25 MG tablet    atorvastatin (LIPITOR) 20 MG tablet    blood sugar diagnostic Strp    carvediloL (COREG) 3.125 MG tablet    HUMALOG KWIKPEN INSULIN 100 unit/mL pen    insulin syringe-needle U-100 (INSULIN SYRINGE) 1/2 mL 30 x 5/16" Syrg    lancets (ONETOUCH DELICA LANCETS) 33 gauge Misc    LANTUS SOLOSTAR U-100 INSULIN glargine 100 units/mL (3mL) SubQ pen    mycophenolate (CELLCEPT) 250 mg Cap    oxyCODONE-acetaminophen (PERCOCET) 5-325 mg per tablet    sevelamer carbonate (RENVELA) 800 mg Tab    SYRINGE & NEEDLE,INSULIN,1 ML (INSULIN SYRINGE-NEEDLE U-100) 1 mL 29 X 7/16" Syrg    tacrolimus (PROGRAF) 1 MG Cap    timolol maleate 0.5% (TIMOPTIC) 0.5 % Drop    TRULICITY 0.75 mg/0.5 mL pen injector    zolpidem (AMBIEN) 10 mg Tab     Family History       Problem Relation (Age of Onset)    Cataracts Maternal Grandmother    Diabetes Mother, Father, Sister, Brother, Sister    Heart disease Sister, Maternal Grandmother    Heart failure Sister    Hypertension Mother, Sister    Kidney disease Father, Sister    No Known Problems Maternal Aunt, Maternal Uncle, Paternal Aunt, Paternal Uncle, Maternal Grandfather, Paternal Grandmother, Paternal Grandfather    Stroke Maternal Grandmother          Tobacco Use    Smoking status: Former Smoker     Packs/day: 1.00     Years: 20.00     Pack years: 20.00     Types: Cigarettes     Quit date: 2013     Years since quittin.5    Smokeless tobacco: Never Used    Tobacco comment: quit smoking cigarettes in 2014   Substance and Sexual Activity    Alcohol use: No    Drug use: No    Sexual activity: Yes     Partners: Male     Birth control/protection: Post-menopausal     Review of Systems   Constitutional: Negative.    HENT: Negative.     Eyes: Negative.    Respiratory: Negative.     Cardiovascular: Negative.    Gastrointestinal: Negative.    Endocrine: Negative.    Genitourinary: Negative.  "   Musculoskeletal: Negative.    Skin: Negative.    Neurological: Negative.    Psychiatric/Behavioral: Negative.     Objective:     Vital Signs (Most Recent):  Temp: 98.6 °F (37 °C) (03/31/22 1617)  Pulse: 100 (03/31/22 1617)  Resp: 18 (03/31/22 1617)  BP: 137/68 (03/31/22 1617)  SpO2: 100 % (03/31/22 1617)  O2 Device (Oxygen Therapy): room air (03/31/22 1617) Vital Signs (24h Range):  Temp:  [98.4 °F (36.9 °C)-98.6 °F (37 °C)] 98.6 °F (37 °C)  Pulse:  [] 100  Resp:  [18] 18  SpO2:  [100 %] 100 %  BP: (123-150)/(66-80) 137/68     Weight: 78.9 kg (174 lb) (03/31/22 1617)  Body mass index is 28.08 kg/m².  Body surface area is 1.92 meters squared.    No intake/output data recorded.    Physical Exam  Constitutional:       General: She is not in acute distress.     Appearance: She is obese.   HENT:      Mouth/Throat:      Mouth: Mucous membranes are moist.   Eyes:      General: No scleral icterus.     Extraocular Movements: Extraocular movements intact.      Pupils: Pupils are equal, round, and reactive to light.   Cardiovascular:      Rate and Rhythm: Normal rate and regular rhythm.   Pulmonary:      Effort: Pulmonary effort is normal. No respiratory distress.   Abdominal:      General: There is no distension.      Palpations: Abdomen is soft.   Musculoskeletal:         General: Deformity (PD catheter with clean exit site, no purulence or erythema and no tenderness of tunnel) present.      Right lower leg: No edema.      Left lower leg: No edema.   Skin:     Coloration: Skin is not jaundiced.   Neurological:      General: No focal deficit present.      Mental Status: She is alert.       Significant Labs:  All labs within the past 24 hours have been reviewed.    Significant Imaging:  Labs: Reviewed

## 2022-04-01 NOTE — ASSESSMENT & PLAN NOTE
Magnesium reviewed- Recent Labs   Lab 03/31/22  1840   MG 1.3*    Will replace electrolytes and continue to monitor closely.

## 2022-04-01 NOTE — PHARMACY MED REC
"    Admission Medication History     The home medication history was taken by Magalie Phillip.    You may go to "Admission" then "Reconcile Home Medications" tabs to review and/or act upon these items.      The home medication list has been updated by the Pharmacy department.    Please read ALL comments highlighted in yellow.    Please address this information as you see fit.     Feel free to contact us if you have any questions or require assistance.      The medications listed below were removed from the home medication list. Please reorder if appropriate:  Patient reports no longer taking the following medication(s):   ALPRAZOLAM 0.25MG   OXYCODONE-ACETAMINOPHEN 5-325MG    Medications listed below were obtained from: Patient/family    Medication Sig    acetaminophen (TYLENOL) 500 MG tablet   Take 500 mg by mouth every 6 (six) hours as needed for Pain.    atorvastatin (LIPITOR) 40 MG tablet   Take 40 mg by mouth once daily.    carvediloL (COREG) 3.125 MG tablet   Take 3.125 mg by mouth 2 (two) times daily with meals.    HUMALOG KWIKPEN INSULIN 100 unit/mL pen   Inject 10 Units into the skin 3 (three) times daily with meals.    LANTUS SOLOSTAR U-100 INSULIN glargine 100 units/mL (3mL) SubQ pen   Inject 50 Units into the skin every evening.    mycophenolate (CELLCEPT) 250 mg Cap   TAKE 2 CAPSULES ( 500 MG TOTAL) BY MOUTH 2 TIMES DAILY    sevelamer carbonate (RENVELA) 800 mg Tab   Take 1 tablet (800 mg total) by mouth 3 (three) times daily with meals.    sodium bicarbonate 650 MG tablet   Take 2 tablets by mouth three times a day    tacrolimus (PROGRAF) 1 MG Cap   Take 3 capsules (3 mg total) by mouth every morning AND 2 capsules (2 mg total) every evening.     timolol maleate 0.5% (TIMOPTIC) 0.5 % Drop   Place 2 drops in both eyes twice daily    TRULICITY 0.75 mg/0.5 mL pen injector   Inject 0.75 mg into the skin every 7 days. Wednesday    zolpidem (AMBIEN) 10 mg Tab   Take 10 mg by mouth nightly as " "needed.    blood sugar diagnostic Strp   Checks BG ac/hs    insulin syringe-needle U-100 (INSULIN SYRINGE) 1/2 mL 30 x 5/16" Syrg   Uses 4 daily    lancets (ONETOUCH DELICA LANCETS) 33 gauge Misc   1 lancet by Misc.(Non-Drug; Combo Route) route 4 (four) times daily before meals and nightly.    SYRINGE & NEEDLE,INSULIN,1 ML (INSULIN SYRINGE-NEEDLE U-100) 1 mL 29 X 7/16" Filiberto Phillip  EXT [id number]                .          "

## 2022-04-01 NOTE — SUBJECTIVE & OBJECTIVE
Interval History:   No acute events overnight.   Tolerated manual exchange last night with no issues.     Review of patient's allergies indicates:   Allergen Reactions    Shrimp Hives    Topamax [topiramate] Other (See Comments)     Vision changes    Zoloft [sertraline] Palpitations     Current Facility-Administered Medications   Medication Frequency    acetaminophen tablet 650 mg Q6H PRN    atorvastatin tablet 20 mg QHS    carvediloL tablet 6.25 mg QAM    And    carvediloL tablet 3.125 mg QHS    cefTRIAXone (ROCEPHIN) 1 g/50 mL D5W IVPB Q24H    dextrose 10% bolus 125 mL PRN    dextrose 10% bolus 250 mL PRN    glucagon (human recombinant) injection 1 mg PRN    glucose chewable tablet 16 g PRN    glucose chewable tablet 24 g PRN    insulin aspart U-100 pen 0-5 Units QID (AC + HS) PRN    insulin detemir U-100 pen 16 Units QHS    magnesium oxide tablet 800 mg BID    magnesium sulfate 2g in water 50mL IVPB (premix) Once    mycophenolate capsule 500 mg BID    naloxone 0.4 mg/mL injection 0.02 mg PRN    ondansetron injection 4 mg Q8H PRN    sevelamer carbonate tablet 800 mg TID WM    sodium chloride 0.9% flush 10 mL Q12H PRN    tacrolimus capsule 3 mg Daily AM    And    tacrolimus capsule 2 mg Daily PM    timolol maleate 0.5% ophthalmic solution 1 drop Daily    zolpidem tablet 5 mg Nightly PRN     Current Outpatient Medications   Medication    acetaminophen (TYLENOL) 500 MG tablet    atorvastatin (LIPITOR) 40 MG tablet    carvediloL (COREG) 3.125 MG tablet    HUMALOG KWIKPEN INSULIN 100 unit/mL pen    LANTUS SOLOSTAR U-100 INSULIN glargine 100 units/mL (3mL) SubQ pen    mycophenolate (CELLCEPT) 250 mg Cap    sevelamer carbonate (RENVELA) 800 mg Tab    sodium bicarbonate 650 MG tablet    tacrolimus (PROGRAF) 1 MG Cap    timolol maleate 0.5% (TIMOPTIC) 0.5 % Drop    TRULICITY 0.75 mg/0.5 mL pen injector    zolpidem (AMBIEN) 10 mg Tab    blood sugar diagnostic Strp    insulin syringe-needle U-100 (INSULIN SYRINGE) 1/2 mL 30  "x 5/16" Syrg    lancets (ONETOUCH DELICA LANCETS) 33 gauge Misc    SYRINGE & NEEDLE,INSULIN,1 ML (INSULIN SYRINGE-NEEDLE U-100) 1 mL 29 X 7/16" Syrg       Objective:     Vital Signs (Most Recent):  Temp: 99.6 °F (37.6 °C) (04/01/22 0726)  Pulse: 101 (04/01/22 0726)  Resp: 16 (04/01/22 0726)  BP: (!) 149/81 (04/01/22 0726)  SpO2: 100 % (04/01/22 0726)  O2 Device (Oxygen Therapy): room air (04/01/22 0726)   Vital Signs (24h Range):  Temp:  [98.6 °F (37 °C)-99.6 °F (37.6 °C)] 99.6 °F (37.6 °C)  Pulse:  [] 101  Resp:  [15-20] 16  SpO2:  [100 %] 100 %  BP: (137-156)/(68-81) 149/81     Weight: 78.9 kg (174 lb) (03/31/22 1617)  Body mass index is 28.08 kg/m².  Body surface area is 1.92 meters squared.    I/O last 3 completed shifts:  In: 50 [IV Piggyback:50]  Out: -     Physical Exam  Constitutional:       General: She is not in acute distress.     Appearance: She is obese.   HENT:      Mouth/Throat:      Mouth: Mucous membranes are moist.   Eyes:      General: No scleral icterus.     Extraocular Movements: Extraocular movements intact.      Pupils: Pupils are equal, round, and reactive to light.   Cardiovascular:      Rate and Rhythm: Normal rate and regular rhythm.   Pulmonary:      Effort: Pulmonary effort is normal. No respiratory distress.   Abdominal:      General: There is no distension.      Palpations: Abdomen is soft.   Musculoskeletal:      Right lower leg: No edema.      Left lower leg: No edema.      Comments: PD catheter with clean exit site, no purulence or erythema and no tenderness of tunnel   Skin:     Coloration: Skin is not jaundiced.   Neurological:      General: No focal deficit present.      Mental Status: She is alert.       Significant Labs:  CBC:   Recent Labs   Lab 04/01/22  0323   WBC 4.03   RBC 2.26*   HGB 5.9*   HCT 21.3*      MCV 94   MCH 26.1*   MCHC 27.7*     CMP:   Recent Labs   Lab 04/01/22  0323   *   CALCIUM 8.8   ALBUMIN 2.9*   PROT 7.5      K 3.7   CO2 18* "   *   BUN 33*   CREATININE 4.4*   ALKPHOS 59   ALT <5*   AST 10   BILITOT 0.3     All labs within the past 24 hours have been reviewed.

## 2022-04-01 NOTE — ASSESSMENT & PLAN NOTE
-just starting PD and PD catheter placed 3/11/22 per patient, urinates 1/2 coke can per day  -here for evaluation of pain with filling and draining of dialysate  -no signs of infection or abdominal pain currently  -has 2 or more soft BMs per day  -recommend CT AP to assess for catheter location  -ordered PD labs to assess for infection

## 2022-04-01 NOTE — PROGRESS NOTES
[facility] - Emergency Dept  Nephrology  Progress Note    Patient Name: Elizabeth Maldonado  MRN: [id number]  Admission Date: 3/31/2022  Hospital Length of Stay: 0 days  Attending Provider: Jamaal Steinberg MD   Primary Care Physician: Richy Singleton NP  Principal Problem:Abdominal pain    Subjective:     HPI: 57 year old with ESKD and failed Ktx now on PD who just started 3 days ago and had abdominal pain with filling and drainage during last session. Sent here for evaluation. No other complaints.  Nephrology consulted for ESKD      Interval History:   No acute events overnight.   Tolerated manual exchange last night with no issues.     Review of patient's allergies indicates:   Allergen Reactions    Shrimp Hives    Topamax [topiramate] Other (See Comments)     Vision changes    Zoloft [sertraline] Palpitations     Current Facility-Administered Medications   Medication Frequency    acetaminophen tablet 650 mg Q6H PRN    atorvastatin tablet 20 mg QHS    carvediloL tablet 6.25 mg QAM    And    carvediloL tablet 3.125 mg QHS    cefTRIAXone (ROCEPHIN) 1 g/50 mL D5W IVPB Q24H    dextrose 10% bolus 125 mL PRN    dextrose 10% bolus 250 mL PRN    glucagon (human recombinant) injection 1 mg PRN    glucose chewable tablet 16 g PRN    glucose chewable tablet 24 g PRN    insulin aspart U-100 pen 0-5 Units QID (AC + HS) PRN    insulin detemir U-100 pen 16 Units QHS    magnesium oxide tablet 800 mg BID    magnesium sulfate 2g in water 50mL IVPB (premix) Once    mycophenolate capsule 500 mg BID    naloxone 0.4 mg/mL injection 0.02 mg PRN    ondansetron injection 4 mg Q8H PRN    sevelamer carbonate tablet 800 mg TID WM    sodium chloride 0.9% flush 10 mL Q12H PRN    tacrolimus capsule 3 mg Daily AM    And    tacrolimus capsule 2 mg Daily PM    timolol maleate 0.5% ophthalmic solution 1 drop Daily    zolpidem tablet 5 mg Nightly PRN     Current Outpatient Medications   Medication    acetaminophen (TYLENOL)  "500 MG tablet    atorvastatin (LIPITOR) 40 MG tablet    carvediloL (COREG) 3.125 MG tablet    HUMALOG KWIKPEN INSULIN 100 unit/mL pen    LANTUS SOLOSTAR U-100 INSULIN glargine 100 units/mL (3mL) SubQ pen    mycophenolate (CELLCEPT) 250 mg Cap    sevelamer carbonate (RENVELA) 800 mg Tab    sodium bicarbonate 650 MG tablet    tacrolimus (PROGRAF) 1 MG Cap    timolol maleate 0.5% (TIMOPTIC) 0.5 % Drop    TRULICITY 0.75 mg/0.5 mL pen injector    zolpidem (AMBIEN) 10 mg Tab    blood sugar diagnostic Strp    insulin syringe-needle U-100 (INSULIN SYRINGE) 1/2 mL 30 x 5/16" Syrg    lancets (ONETOUCH DELICA LANCETS) 33 gauge Misc    SYRINGE & NEEDLE,INSULIN,1 ML (INSULIN SYRINGE-NEEDLE U-100) 1 mL 29 X 7/16" Syrg       Objective:     Vital Signs (Most Recent):  Temp: 99.6 °F (37.6 °C) (04/01/22 0726)  Pulse: 101 (04/01/22 0726)  Resp: 16 (04/01/22 0726)  BP: (!) 149/81 (04/01/22 0726)  SpO2: 100 % (04/01/22 0726)  O2 Device (Oxygen Therapy): room air (04/01/22 0726)   Vital Signs (24h Range):  Temp:  [98.6 °F (37 °C)-99.6 °F (37.6 °C)] 99.6 °F (37.6 °C)  Pulse:  [] 101  Resp:  [15-20] 16  SpO2:  [100 %] 100 %  BP: (137-156)/(68-81) 149/81     Weight: 78.9 kg (174 lb) (03/31/22 1617)  Body mass index is 28.08 kg/m².  Body surface area is 1.92 meters squared.    I/O last 3 completed shifts:  In: 50 [IV Piggyback:50]  Out: -     Physical Exam  Constitutional:       General: She is not in acute distress.     Appearance: She is obese.   HENT:      Mouth/Throat:      Mouth: Mucous membranes are moist.   Eyes:      General: No scleral icterus.     Extraocular Movements: Extraocular movements intact.      Pupils: Pupils are equal, round, and reactive to light.   Cardiovascular:      Rate and Rhythm: Normal rate and regular rhythm.   Pulmonary:      Effort: Pulmonary effort is normal. No respiratory distress.   Abdominal:      General: There is no distension.      Palpations: Abdomen is soft.   Musculoskeletal: "      Right lower leg: No edema.      Left lower leg: No edema.      Comments: PD catheter with clean exit site, no purulence or erythema and no tenderness of tunnel   Skin:     Coloration: Skin is not jaundiced.   Neurological:      General: No focal deficit present.      Mental Status: She is alert.       Significant Labs:  CBC:   Recent Labs   Lab 04/01/22  0323   WBC 4.03   RBC 2.26*   HGB 5.9*   HCT 21.3*      MCV 94   MCH 26.1*   MCHC 27.7*     CMP:   Recent Labs   Lab 04/01/22  0323   *   CALCIUM 8.8   ALBUMIN 2.9*   PROT 7.5      K 3.7   CO2 18*   *   BUN 33*   CREATININE 4.4*   ALKPHOS 59   ALT <5*   AST 10   BILITOT 0.3     All labs within the past 24 hours have been reviewed.         Assessment/Plan:     Anemia of chronic kidney failure, stage 5  Ferritin elevated so IV iron not ideal also patient has smoldering myeloma which makes epo not ideal  Her degree of anemia is significant for ESKD and chronic kidney disease  Recommend evaluation for alternate causes of anemia    End stage kidney disease  Recently started on PD  PD catheter placed 3/11/22 per patient, urinates 1/2 coke can per day  Here for evaluation of pain with filling and draining of dialysate  No signs of infection or abdominal pain currently; peritoneal fluid negative for infection   No constipation; has 2 or more soft BMs per day  Tolerated manual exchange last night with no issues   CT scan noted     Chronic immunosuppression with Prograf  Daily tacro trough at 0600    Kidney transplant status, living unrelated donor - 12/2/14  Now failed  Continue home tacro 3mg in AM and 2mg in PM  Mycophenolate 500mg PO BID       Chronic kidney disease-mineral and bone disorder  Continue home sevelamer 800 tid         Rico Serrano MD  Nephrology  [facility] - Emergency Dept

## 2022-04-01 NOTE — NURSING
Patient is awake and oriented X4.  She stated that this is her 3rd day doing PD training.  There was pain in the abdomen while PD training and she was told that the dialysis catheter may be positioned wrong. It is position in the lower right quadrant of the abdomen.

## 2022-04-01 NOTE — ED PROVIDER NOTES
"Encounter Date: 3/31/2022       History     Chief Complaint   Patient presents with    Abdominal Pain     Started peritoneal dialysis training and not able to drain it all the way , having cramping and pain     The patient is a 57 year old female, who has a past medical history significant for renal failure s/p kidney transplant with recurrent failure awaiting possible re-transplant who is s/p laparoscopic peritoneal dialysis catheter insertion on 3/11/22. She resumed PD on 3/29/22. Today was day #3 of PD. She was sent to the ER from PD clinic for an emergent evaluation due to pt c/o chills and diffuse lower abdominal pain. From dialysis RN note: "Spoke w/ Dr Taylor via telephone to notify of the above. Per MD, send pt to [facility] ED for eval to R/O catheter migration vs. Peritoneal leak vs. Tunnel/Peritonitis Infection. MD reports he will pace order for abd xray, but pt would likely benefit from CT of abdomen and consult by PD cath insertion specialist w/ Gen Surgery"           Review of patient's allergies indicates:   Allergen Reactions    Shrimp Hives    Topamax [topiramate] Other (See Comments)     Vision changes    Zoloft [sertraline] Palpitations     Past Medical History:   Diagnosis Date    Acidosis 7/1/2014    Allergic rhinitis 7/1/2014    Allergy     Anemia     Anemia in chronic kidney disease 7/1/2014    Anxiety     Cataract     Chronic bilateral low back pain with bilateral sciatica 10/25/2019    Chronic immunosuppression with Prograf and MMF 12/3/2014    CKD (chronic kidney disease) stage 5, GFR less than 15 ml/min 7/1/2014    CKD (chronic kidney disease), stage III 3/2/2015    Degenerative disc disease     Depression 7/1/2014    Diabetes mellitus, type 2 since age 18 7/1/2014    ESRD on peritoneal dialysis - august 2014 for 9 hours no peritonitis 11/24/2014    Hyperlipidemia     Hypertension 7/1/2014    Hypomagnesemia 1/7/2015    Kidney transplant status, living " unrelated donor - 12/2/14 12/3/2014    Neutropenia 2015    NS (nuclear sclerosis) 2016    Obesity     Organ transplant candidate 2014    Pre-op exam 2014    Proliferative diabetic retinopathy of both eyes without macular edema associated with type 2 diabetes mellitus 2016    Renal manifestation of secondary diabetes mellitus     Tendinitis     Trouble in sleeping      Past Surgical History:   Procedure Laterality Date    BIOPSY N/A 2020    Procedure: Biopsy;  Surgeon: Sweta Catalan MD;  Location: [facility] OR [address] [place] [address];  Service: Transplant;  Laterality: N/A;    BONE MARROW BIOPSY N/A      SECTION      x 2    KIDNEY TRANSPLANT  2014    MEDIPORT REMOVAL N/A 2019    Procedure: REMOVAL, CATHETER, CENTRAL VENOUS, TUNNELED, WITH PORT;  Surgeon: Eusebia Diagnostic Provider;  Location: [facility] OR;  Service: Radiology;  Laterality: N/A;    RENAL BIOPSY      ROTATOR CUFF REPAIR      TUBAL LIGATION       Family History   Problem Relation Age of Onset    Diabetes Mother     Hypertension Mother     Diabetes Father     Kidney disease Father     Diabetes Sister     Hypertension Sister     Kidney disease Sister     Heart disease Sister     Heart failure Sister     Diabetes Brother     Diabetes Sister     Stroke Maternal Grandmother     Heart disease Maternal Grandmother         pacemaker    Cataracts Maternal Grandmother     No Known Problems Maternal Aunt     No Known Problems Maternal Uncle     No Known Problems Paternal Aunt     No Known Problems Paternal Uncle     No Known Problems Maternal Grandfather     No Known Problems Paternal Grandmother     No Known Problems Paternal Grandfather     Cancer Neg Hx     Amblyopia Neg Hx     Blindness Neg Hx     Glaucoma Neg Hx     Macular degeneration Neg Hx     Retinal detachment Neg Hx     Strabismus Neg Hx     Thyroid disease Neg Hx     Breast cancer Neg Hx     Colon cancer Neg Hx     Ovarian cancer Neg  Hx      Social History     Tobacco Use    Smoking status: Former Smoker     Packs/day: 1.00     Years: 20.00     Pack years: 20.00     Types: Cigarettes     Quit date: 2013     Years since quittin.5    Smokeless tobacco: Never Used    Tobacco comment: quit smoking cigarettes in 2014   Substance Use Topics    Alcohol use: No    Drug use: No     Review of Systems   Constitutional: Positive for chills. Negative for fever.   HENT: Negative for congestion, rhinorrhea and sore throat.    Eyes: Negative for visual disturbance.   Respiratory: Negative for cough and shortness of breath.    Cardiovascular: Negative for chest pain and palpitations.   Gastrointestinal: Positive for abdominal pain. Negative for diarrhea, nausea and vomiting.   Musculoskeletal: Negative for gait problem and myalgias.   Skin: Negative for rash.   Allergic/Immunologic: Positive for immunocompromised state.   Neurological: Negative for dizziness, syncope, weakness, light-headedness and headaches.   Psychiatric/Behavioral: Negative for confusion.       Physical Exam     Initial Vitals [22 1617]   BP Pulse Resp Temp SpO2   137/68 100 18 98.6 °F (37 °C) 100 %      MAP       --         Physical Exam    Nursing note and vitals reviewed.  Constitutional: She appears well-developed and well-nourished. She is not diaphoretic. No distress.   She is alert and ambulatory. She is well appearing. She is comfortable appearing.    HENT:   Moist mucous membranes    Eyes: Conjunctivae are normal. No scleral icterus.   Neck: Neck supple.   Cardiovascular: Normal rate.   Pulmonary/Chest: No respiratory distress.   Abdominal: Abdomen is soft. There is no abdominal tenderness.   Soft and non-tender. No erythema, induration, or pain to palpation. Pt states that she is not having any abdominal pain presently.  There is no rebound and no guarding.   Musculoskeletal:         General: Normal range of motion.      Cervical back: Neck supple.      Neurological: She is alert and oriented to person, place, and time. She has normal strength. No sensory deficit.   Skin: Skin is warm and dry.   Psychiatric: She has a normal mood and affect. Her behavior is normal.         ED Course   Procedures  Labs Reviewed   CBC W/ AUTO DIFFERENTIAL - Abnormal; Notable for the following components:       Result Value    WBC 3.66 (*)     RBC 2.25 (*)     Hemoglobin 6.1 (*)     Hematocrit 20.7 (*)     MCHC 29.5 (*)     RDW 14.7 (*)     All other components within normal limits   COMPREHENSIVE METABOLIC PANEL - Abnormal; Notable for the following components:    CO2 19 (*)     Glucose 161 (*)     BUN 36 (*)     Creatinine 4.5 (*)     Albumin 3.2 (*)     ALT 5 (*)     eGFR if  11.6 (*)     eGFR if non  10.0 (*)     All other components within normal limits   LIPASE - Abnormal; Notable for the following components:    Lipase 178 (*)     All other components within normal limits   MAGNESIUM - Abnormal; Notable for the following components:    Magnesium 1.3 (*)     All other components within normal limits   CULTURE, BLOOD   CULTURE, BLOOD   LACTIC ACID, PLASMA   PHOSPHORUS   PROCALCITONIN   BETA - HYDROXYBUTYRATE, SERUM   URINALYSIS, REFLEX TO URINE CULTURE   TACROLIMUS LEVEL   TYPE & SCREEN     Results for orders placed or performed during the hospital encounter of 03/31/22   CBC W/ AUTO DIFFERENTIAL   Result Value Ref Range    WBC 3.66 (L) 3.90 - 12.70 K/uL    RBC 2.25 (L) 4.00 - 5.40 M/uL    Hemoglobin 6.1 (L) 12.0 - 16.0 g/dL    Hematocrit 20.7 (L) 37.0 - 48.5 %    MCV 92 82 - 98 fL    MCH 27.1 27.0 - 31.0 pg    MCHC 29.5 (L) 32.0 - 36.0 g/dL    RDW 14.7 (H) 11.5 - 14.5 %    Platelets 166 150 - 450 K/uL    MPV 12.4 9.2 - 12.9 fL    Immature Granulocytes CANCELED 0.0 - 0.5 %    Immature Grans (Abs) CANCELED 0.00 - 0.04 K/uL    nRBC 0 0 /100 WBC    Gran % 62.0 38.0 - 73.0 %    Lymph % 20.0 18.0 - 48.0 %    Mono % 12.0 4.0 - 15.0 %    Eosinophil %  5.0 0.0 - 8.0 %    Basophil % 0.0 0.0 - 1.9 %    Bands 1.0 %    Platelet Estimate Appears normal     Aniso Slight     Poik Slight     Hypo Occasional     Ovalocytes Occasional     Differential Method Manual    Comp. Metabolic Panel   Result Value Ref Range    Sodium 137 136 - 145 mmol/L    Potassium 4.3 3.5 - 5.1 mmol/L    Chloride 108 95 - 110 mmol/L    CO2 19 (L) 23 - 29 mmol/L    Glucose 161 (H) 70 - 110 mg/dL    BUN 36 (H) 6 - 20 mg/dL    Creatinine 4.5 (H) 0.5 - 1.4 mg/dL    Calcium 8.9 8.7 - 10.5 mg/dL    Total Protein 8.4 6.0 - 8.4 g/dL    Albumin 3.2 (L) 3.5 - 5.2 g/dL    Total Bilirubin 0.3 0.1 - 1.0 mg/dL    Alkaline Phosphatase 66 55 - 135 U/L    AST 12 10 - 40 U/L    ALT 5 (L) 10 - 44 U/L    Anion Gap 10 8 - 16 mmol/L    eGFR if African American 11.6 (A) >60 mL/min/1.73 m^2    eGFR if non African American 10.0 (A) >60 mL/min/1.73 m^2   Lipase   Result Value Ref Range    Lipase 178 (H) 4 - 60 U/L   Lactic acid, plasma   Result Value Ref Range    Lactate (Lactic Acid) 1.2 0.5 - 2.2 mmol/L   Magnesium   Result Value Ref Range    Magnesium 1.3 (L) 1.6 - 2.6 mg/dL   Phosphorus   Result Value Ref Range    Phosphorus 3.8 2.7 - 4.5 mg/dL   Procalcitonin   Result Value Ref Range    Procalcitonin 0.10 <0.25 ng/mL   Beta - Hydroxybutyrate, Serum   Result Value Ref Range    Beta-Hydroxybutyrate 0.0 0.0 - 0.5 mmol/L     *Note: Due to a large number of results and/or encounters for the requested time period, some results have not been displayed. A complete set of results can be found in Results Review.            Imaging Results    None          Medications - No data to display  Medical Decision Making:   History:   Old Medical Records: I decided to obtain old medical records.  Initial Assessment:   58 yo female, hx of failed renal transplant, on immunosuppressant meds, s/p PD cath placement on 3/11, who resumed PD x 3 days, sent from clinicto the ER for an emergent evaluation due to patient complaint of chills and  diffuse lower abdominal pain during PD today. Currently pt denies any abdominal pain.   Differential Diagnosis:   Peritoneal leak, PD Catheter migraition, SBP, Tunnel infection, etc   Clinical Tests:   Lab Tests: Ordered and Reviewed  Radiological Study: Ordered  ED Management:  Vital signs reviewed   Records reviewed   Lipase elevated at 178  H & H down trending, lower today at 6 & 20   Pt afebrile in the ER and currently denies having any abdominal pain. Her abdomen is soft and non-tender on exam.   Nephrology consulted - will see pt in the ER - will collect & send peritoneal fluid - will address anemia    I discussed the case in detail with the ER attending physician, who will follow due to pending CT and pending nephrology consult                 Attending Attestation:     Physician Attestation Statement for NP/PA:   I discussed this assessment and plan of this patient with the NP/PA, but I did not personally examine the patient. The face to face encounter was performed by the NP/PA.                       Clinical Impression:   Final diagnoses:  [T80.90XA] Complication of peritoneal dialysis, initial encounter (Primary)       Anemia          Sari Byrd MD  04/01/22 5738

## 2022-04-01 NOTE — ASSESSMENT & PLAN NOTE
Body mass index is 28.08 kg/m². Obesity complicates all aspects of disease management from diagnostic modalities to treatment.

## 2022-04-01 NOTE — ASSESSMENT & PLAN NOTE
-Nephrology consulted, appreciate recs.  -just starting PD and PD catheter placed 3/11/22 per patient  -here for evaluation of pain with filling and draining of dialysate  -no abdominal pain currently  -has 2 or more soft BMs per day  -CT AP with no evidence of catheter migration. There is a small amount of pneumoperitoneum which is expected given postoperative period. This was discussed with General surgery by ED provider, who agrees that this is an expected finding.  -PD labs to assess for infection  -Hold off on abx per nephrology recs.

## 2022-04-01 NOTE — HOSPITAL COURSE
Ms. Maldonado was placed in observation for abdominal pain following an issue with her PD catheter. Nephrology consulted, CT abdomen ordered. Based on CT interpretation, nephrology concerned for malposition of the PD catheter. General surgery consulted, revision done 4/4. PD fluid gram stain with no organisms seen, peritoneal fluid cultures NGTD. Blood cultures NGTD.  assistance provided for organizing follow up with dialysis clinic 4/6. Patient also with significant asymptomatic chronic anemia on admit, Hgb 5.9.  Heme onc consulted for workup of worsening anemia, recommend addition of daily folate and follow up in clinic. Patient received 1U PRBC on 4/2 per heme/onc recs, with Hgb improved and stable at 7.1 -> 7.5. UA infectious. Urine culture >100K pan-sensitive klebsiella. Patient given 1 dose of rocephin in the ED, no further abx admin per nephro recs.     Discharge home with dialysis clinic follow up, heme/onc clinic follow up.

## 2022-04-01 NOTE — CARE UPDATE
Ms. Maldonado was placed in observation for abdominal pain with\ filling and drainage during last PD session. Nephrology consulted, PD sample obtained. PD fluid gram stain with no organisms seen. Fluid cultures pending. CT abdomen ordered. Based on CT interpretation, nephrology concerned for malposition of the PD catheter. General surgery consulted, appreciate recs. Labs reveal significant asymptomatic chronic anemia with Hgb 5.9. Anemia workup consistent with anemia of chronic disease, but patient is not a candidate for intervention at this time, per nephrology. UA infectious. Patient given 1 dose of rocephin in the ED, will hold off on continuing abx for now per nephro recs. Magnesium 1.2, replaced. Daily renal function panel.

## 2022-04-01 NOTE — PROGRESS NOTES
2L of Dialysate 2.5 Dextrose and low calcium instilled on patient after initial drain. Samples to be obtained in 2 hours.

## 2022-04-01 NOTE — PROGRESS NOTES
04/01/22 1812   Peritoneal Dialysis   Exchange Type Manual   Peritoneal Treatment Status Started   Dianeal Solution Dextrose 2.5% in 2000 mL       Manual PD tx started, to be drained 2030. Pt tolerated instilling of 2L fluid with no pain.

## 2022-04-01 NOTE — HPI
57 year old with ESKD and failed Ktx now on PD who just started 3 days ago and had abdominal pain with filling and drainage during last session. Sent here for evaluation. No other complaints.  Nephrology consulted for ESKD

## 2022-04-01 NOTE — ASSESSMENT & PLAN NOTE
Recently started on PD  PD catheter placed 3/11/22 per patient, urinates 1/2 coke can per day  Here for evaluation of pain with filling and draining of dialysate  No signs of infection or abdominal pain currently; peritoneal fluid negative for infection   No constipation; has 2 or more soft BMs per day  Tolerated manual exchange last night with no issues   CT scan noted; PD catheter high in the abdomen; please obtain general surgery consult for evaluation of mispositioned catheter

## 2022-04-01 NOTE — CONSULTS
[facility] - Telemetry Stepdown ([facility]-)  General Surgery  Consult Note    Consults  Subjective:     Chief Complaint/Reason for Admission: PD issues    History of Present Illness: Ms. Maldonado is our 58yo female with [place] [facility] [place] with rejection, HLD who presented to the ED with complaints of abdominal pain with PD use.  She began using the PD catheter recently with teachings.  On the trial before coming to the ED she was having abdominal cramping/pain with the infusion of the dialysate and states that the PD was also draining very slowly.  She is not having any fevers or chills.  Denies cloudy output from the PD and no skin changes.  No nausea or vomiting.  No diarrhea but having some mild constipation.  Labs in the ED didn't show a leukocytosis.  UA consistent with a UTI.  Analysis of peritoneal fluid without concerns for peritoneal infection.  CT abd/pel however showing the tip of the PD catheter not in the correct position in the RUQ of her abdomen near the liver and gallbladder.    She states that since being admitted she had another run of PD and states that it went smoothly.  Had no pain with infusion and that it drained easily.    No current facility-administered medications on file prior to encounter.     Current Outpatient Medications on File Prior to Encounter   Medication Sig    acetaminophen (TYLENOL) 500 MG tablet Take 500 mg by mouth every 6 (six) hours as needed for Pain.    atorvastatin (LIPITOR) 40 MG tablet Take 40 mg by mouth once daily.    carvediloL (COREG) 3.125 MG tablet Take 3.125 mg by mouth 2 (two) times daily with meals.    HUMALOG KWIKPEN INSULIN 100 unit/mL pen Inject 10 Units into the skin 3 (three) times daily with meals.    LANTUS SOLOSTAR U-100 INSULIN glargine 100 units/mL (3mL) SubQ pen Inject 50 Units into the skin once daily. (Patient taking differently: Inject 50 Units into the skin every evening.)    mycophenolate  "(CELLCEPT) 250 mg Cap TAKE 2 CAPSULES ( 500 MG TOTAL) BY MOUTH 2 TIMES DAILY    sevelamer carbonate (RENVELA) 800 mg Tab Take 1 tablet (800 mg total) by mouth 3 (three) times daily with meals.    sodium bicarbonate 650 MG tablet Take 2 tablets by mouth three times a day    tacrolimus (PROGRAF) 1 MG Cap Take 3 capsules (3 mg total) by mouth every morning AND 2 capsules (2 mg total) every evening. Z94.0.    timolol maleate 0.5% (TIMOPTIC) 0.5 % Drop Place 2 drops in both eyes twice daily    TRULICITY 0.75 mg/0.5 mL pen injector Inject 0.75 mg into the skin every 7 days. Wednesday    zolpidem (AMBIEN) 10 mg Tab Take 10 mg by mouth nightly as needed.    [DISCONTINUED] atorvastatin (LIPITOR) 20 MG tablet Take 40 mg by mouth every evening.    blood sugar diagnostic Strp Checks BG ac/hs    insulin syringe-needle U-100 (INSULIN SYRINGE) 1/2 mL 30 x 5/16" Syrg Uses 4 daily    lancets (ONETOUCH DELICA LANCETS) 33 gauge Misc 1 lancet by Misc.(Non-Drug; Combo Route) route 4 (four) times daily before meals and nightly.    SYRINGE & NEEDLE,INSULIN,1 ML (INSULIN SYRINGE-NEEDLE U-100) 1 mL 29 X 7/16" Syrg     [DISCONTINUED] ALPRAZolam (XANAX) 0.25 MG tablet Take 0.25 mg by mouth 2 (two) times daily as needed.    [DISCONTINUED] oxyCODONE-acetaminophen (PERCOCET) 5-325 mg per tablet Take 1 tablet by mouth every 4 (four) hours as needed for Pain. (Patient not taking: Reported on 3/23/2022)       Review of patient's allergies indicates:   Allergen Reactions    Shrimp Hives    Topamax [topiramate] Other (See Comments)     Vision changes    Zoloft [sertraline] Palpitations       Past Medical History:   Diagnosis Date    Acidosis 7/1/2014    Allergic rhinitis 7/1/2014    Allergy     Anemia     Anemia in chronic kidney disease 7/1/2014    Anxiety     Cataract     Chronic bilateral low back pain with bilateral sciatica 10/25/2019    Chronic immunosuppression with Prograf and MMF 12/3/2014    CKD (chronic kidney " disease) stage 5, GFR less than 15 ml/min 2014    CKD (chronic kidney disease), stage III 3/2/2015    Degenerative disc disease     Depression 2014    Diabetes mellitus, type 2 since age 18 2014    ESRD on peritoneal dialysis - 2014 for 9 hours no peritonitis 2014    Hyperlipidemia     Hypertension 2014    Hypomagnesemia 2015    Kidney transplant status, living unrelated donor - 12/2/14 12/3/2014    Neutropenia 2015    NS (nuclear sclerosis) 2016    Obesity     Organ transplant candidate 2014    Pre-op exam 2014    Proliferative diabetic retinopathy of both eyes without macular edema associated with type 2 diabetes mellitus 2016    Renal manifestation of secondary diabetes mellitus     Tendinitis     Trouble in sleeping      Past Surgical History:   Procedure Laterality Date    BIOPSY N/A 2020    Procedure: Biopsy;  Surgeon: Sweta Catalan MD;  Location: [facility] OR [address] [place] [address];  Service: Transplant;  Laterality: N/A;    BONE MARROW BIOPSY N/A      SECTION      x 2    KIDNEY TRANSPLANT  2014    MEDIPORT REMOVAL N/A 2019    Procedure: REMOVAL, CATHETER, CENTRAL VENOUS, TUNNELED, WITH PORT;  Surgeon: Eusebia Diagnostic Provider;  Location: [facility] OR;  Service: Radiology;  Laterality: N/A;    RENAL BIOPSY      ROTATOR CUFF REPAIR      TUBAL LIGATION       Family History     Problem Relation (Age of Onset)    Cataracts Maternal Grandmother    Diabetes Mother, Father, Sister, Brother, Sister    Heart disease Sister, Maternal Grandmother    Heart failure Sister    Hypertension Mother, Sister    Kidney disease Father, Sister    No Known Problems Maternal Aunt, Maternal Uncle, Paternal Aunt, Paternal Uncle, Maternal Grandfather, Paternal Grandmother, Paternal Grandfather    Stroke Maternal Grandmother        Tobacco Use    Smoking status: Former Smoker     Packs/day: 1.00     Years: 20.00     Pack years: 20.00     Types: Cigarettes      Quit date: 2013     Years since quittin.5    Smokeless tobacco: Never Used    Tobacco comment: quit smoking cigarettes in 2014   Substance and Sexual Activity    Alcohol use: No    Drug use: No    Sexual activity: Yes     Partners: Male     Birth control/protection: Post-menopausal     Review of Systems   Constitutional: Negative for chills and fever.   HENT: Negative.    Respiratory: Negative for cough and shortness of breath.    Cardiovascular: Negative for chest pain and palpitations.   Gastrointestinal: Positive for abdominal pain and constipation. Negative for diarrhea, nausea and vomiting.   Genitourinary: Positive for dysuria. Negative for hematuria.   Musculoskeletal: Negative.    Skin: Negative for color change and wound.   Neurological: Negative.    Psychiatric/Behavioral: Negative for confusion.     Objective:     Vital Signs (Most Recent):  Temp: 98.5 °F (36.9 °C) (22)  Pulse: 100 (22)  Resp: 16 (22)  BP: (!) 161/78 (22)  SpO2: 100 % (22) Vital Signs (24h Range):  Temp:  [98.3 °F (36.8 °C)-99.6 °F (37.6 °C)] 98.5 °F (36.9 °C)  Pulse:  [] 100  Resp:  [15-20] 16  SpO2:  [98 %-100 %] 100 %  BP: (149-161)/(76-81) 161/78     Weight: 78.9 kg (174 lb)  Body mass index is 28.08 kg/m².      Intake/Output Summary (Last 24 hours) at 2022 1742  Last data filed at 2022 1112  Gross per 24 hour   Intake 100 ml   Output --   Net 100 ml       Physical Exam  Constitutional:       General: She is not in acute distress.     Appearance: Normal appearance. She is not ill-appearing or toxic-appearing.   HENT:      Head: Normocephalic and atraumatic.   Eyes:      Extraocular Movements: Extraocular movements intact.   Cardiovascular:      Rate and Rhythm: Normal rate and regular rhythm.   Pulmonary:      Effort: Pulmonary effort is normal. No respiratory distress.   Abdominal:      General: There is no distension.      Palpations:  Abdomen is soft.      Tenderness: There is no abdominal tenderness.      Comments: PD catheter exiting in RLQ of abdomen, sterile dressing in place   Musculoskeletal:         General: Normal range of motion.   Skin:     General: Skin is warm and dry.   Neurological:      General: No focal deficit present.      Mental Status: She is alert and oriented to person, place, and time.   Psychiatric:         Mood and Affect: Mood normal.         Behavior: Behavior normal.         Significant Labs:  Recent Lab Results  (Last 5 results in the past 24 hours)      04/01/22  1726   04/01/22  1411   04/01/22  1229   04/01/22  0756   04/01/22  0323        Albumin         2.9       Alkaline Phosphatase         59       ALT         <5       Anion Gap         12       Aniso   Slight             AST         10       Baso #         0.01       Basophil %   0.0       0.2       BILIRUBIN TOTAL         0.3  Comment: For infants and newborns, interpretation of results should be based  on gestational age, weight and in agreement with clinical  observations.    Premature Infant recommended reference ranges:  Up to 24 hours.............<8.0 mg/dL  Up to 48 hours............<12.0 mg/dL  3-5 days..................<15.0 mg/dL  6-29 days.................<15.0 mg/dL         BUN         33       Calcium         8.8       Chloride         111       CO2         18       Creatinine         4.4       Differential Method   Manual       Automated       eGFR if          11.9       eGFR if non          10.3  Comment: Calculation used to obtain the estimated glomerular filtration  rate (eGFR) is the CKD-EPI equation.          Eos #         0.1       Eosinophil %   6.0       2.0       Fibrinogen   399             Glucose         122       Gram Stain Result               Gran # (ANC)         2.5       Gran %   63.0       61.3       Hematocrit   20.8       21.3       Hemoglobin   6.1       5.9  Comment: HGB  critical result(s)  called and verbal readback obtained from   ALPHONSE SUMI ER RN by [email or web address] 04/01/2022 04:11         Hypo   Occasional             Immature Grans (Abs)   CANCELED  Comment: Mild elevation in immature granulocytes is non specific and   can be seen in a variety of conditions including stress response,   acute inflammation, trauma and pregnancy. Correlation with other   laboratory and clinical findings is essential.    Result canceled by the ancillary.         0.08  Comment: Mild elevation in immature granulocytes is non specific and   can be seen in a variety of conditions including stress response,   acute inflammation, trauma and pregnancy. Correlation with other   laboratory and clinical findings is essential.         Immature Granulocytes   CANCELED  Comment: Result canceled by the ancillary.       2.0       LD   325  Comment: Results are increased in hemolyzed samples.             Lymph #         0.9       Lymph %   20.0       21.3       Magnesium         1.2       MCH   27.0       26.1       MCHC   29.3       27.7       MCV   92       94       Mono #         0.5       Mono %   11.0       13.2       MPV   11.2       11.0       nRBC   0       0       Phosphorus         3.8       Platelet Estimate   Appears normal       Appears normal       Platelets   161       164       POCT Glucose 188     126   95         Poik   Slight             Potassium         3.7       PROTEIN TOTAL         7.5       RBC   2.26       2.26       RDW   14.6       14.3       Retic   2.1             Sodium         141       Tacrolimus Lvl         3.3  Comment: Testing performed by a chemiluminescent microparticle   immunoassay on the [employer] i System.         Tear Drop Cells   Occasional             WBC   3.69       4.03                            Significant Diagnostics:  CT: I have reviewed all pertinent results/findings within the past 24 hours. Malpositioned PD catheter with tip in RUQ of abdomen    Assessment/Plan:     Active  Diagnoses:    Diagnosis Date Noted POA    PRINCIPAL PROBLEM:  Abdominal pain [R10.9] 04/01/2022 Yes    Acute cystitis with hematuria [N30.01] 04/01/2022 Yes    Anemia of chronic kidney failure, stage 5 [N18.5, D63.1] 02/09/2022 Yes    End stage kidney disease [N18.6] 12/23/2021 Yes    MGUS (monoclonal gammopathy of unknown significance) [D47.2] 05/03/2021 Yes     Chronic    Hypomagnesemia [E83.42] 01/07/2015 Yes    Kidney transplant status, living unrelated donor - 12/2/14 [Z94.0] 12/03/2014 Not Applicable     Chronic    Chronic immunosuppression with Prograf [Z29.8] 12/03/2014 Not Applicable     Chronic    Obesity (BMI 30.0-34.9) [E66.9] 12/03/2014 Yes    Chronic kidney disease-mineral and bone disorder [N18.9, E83.9, M89.9] 07/01/2014 Yes    Hyperlipidemia [E78.5] 07/01/2014 Yes     Chronic    Type 2 diabetes mellitus, uncontrolled, with renal complications [E11.29, E11.65] 07/01/2014 Yes     Chronic      Problems Resolved During this Admission:     Ms. Maldonado is our 56yo female with ESRD s/p failed kidney transplant on peritoneal dialysis with a misplaced PD catheter.    - Will plan to take to the OR on Monday for peritoneal dialysis catheter revision vs. Removal vs. Replacement  - Will obtain consent before then  - Discussed with radiology about attempting wire adjustment of PD cath however they did not have the equipment necessary to do it  - Ok to use PD catheter in the interim so long as patient is tolerating it  - NPO at midnight Sunday in preparation for OR    Thank you for your consult. I will follow-up with patient. Please contact us if you have any additional questions.    Arthur Traore MD  General Surgery  [facility] - Telemetry Stepdown ([place]-)

## 2022-04-02 PROBLEM — T82.41XA HEMODIALYSIS CATHETER MALFUNCTION: Status: ACTIVE | Noted: 2022-04-02

## 2022-04-02 LAB
ALBUMIN SERPL BCP-MCNC: 2.7 G/DL (ref 3.5–5.2)
ALP SERPL-CCNC: 58 U/L (ref 55–135)
ALT SERPL W/O P-5'-P-CCNC: <5 U/L (ref 10–44)
ANION GAP SERPL CALC-SCNC: 8 MMOL/L (ref 8–16)
AST SERPL-CCNC: 11 U/L (ref 10–40)
BASOPHILS # BLD AUTO: 0.02 K/UL (ref 0–0.2)
BASOPHILS NFR BLD: 0.5 % (ref 0–1.9)
BILIRUB SERPL-MCNC: 0.3 MG/DL (ref 0.1–1)
BLD PROD TYP BPU: NORMAL
BLOOD UNIT EXPIRATION DATE: NORMAL
BLOOD UNIT TYPE CODE: 1700
BLOOD UNIT TYPE: NORMAL
BUN SERPL-MCNC: 33 MG/DL (ref 6–20)
CALCIUM SERPL-MCNC: 8.7 MG/DL (ref 8.7–10.5)
CHLORIDE SERPL-SCNC: 110 MMOL/L (ref 95–110)
CO2 SERPL-SCNC: 21 MMOL/L (ref 23–29)
CODING SYSTEM: NORMAL
CREAT SERPL-MCNC: 4.4 MG/DL (ref 0.5–1.4)
DIFFERENTIAL METHOD: ABNORMAL
DISPENSE STATUS: NORMAL
EOSINOPHIL # BLD AUTO: 0.1 K/UL (ref 0–0.5)
EOSINOPHIL NFR BLD: 1.8 % (ref 0–8)
ERYTHROCYTE [DISTWIDTH] IN BLOOD BY AUTOMATED COUNT: 14.6 % (ref 11.5–14.5)
EST. GFR  (AFRICAN AMERICAN): 11.9 ML/MIN/1.73 M^2
EST. GFR  (NON AFRICAN AMERICAN): 10.3 ML/MIN/1.73 M^2
FERRITIN SERPL-MCNC: 1655 NG/ML (ref 20–300)
FOLATE SERPL-MCNC: 4.2 NG/ML (ref 4–24)
GLUCOSE SERPL-MCNC: 110 MG/DL (ref 70–110)
HCT VFR BLD AUTO: 20.5 % (ref 37–48.5)
HGB BLD-MCNC: 6 G/DL (ref 12–16)
IGA SERPL-MCNC: 59 MG/DL (ref 40–350)
IGG SERPL-MCNC: 2316 MG/DL (ref 650–1600)
IGM SERPL-MCNC: 9 MG/DL (ref 50–300)
IMM GRANULOCYTES # BLD AUTO: 0.06 K/UL (ref 0–0.04)
IMM GRANULOCYTES NFR BLD AUTO: 1.6 % (ref 0–0.5)
IRON SERPL-MCNC: 193 UG/DL (ref 30–160)
LIPASE FLD-CCNC: 4 U/L
LYMPHOCYTES # BLD AUTO: 0.9 K/UL (ref 1–4.8)
LYMPHOCYTES NFR BLD: 23.6 % (ref 18–48)
MAGNESIUM SERPL-MCNC: 1.8 MG/DL (ref 1.6–2.6)
MCH RBC QN AUTO: 26.7 PG (ref 27–31)
MCHC RBC AUTO-ENTMCNC: 29.3 G/DL (ref 32–36)
MCV RBC AUTO: 91 FL (ref 82–98)
MONOCYTES # BLD AUTO: 0.6 K/UL (ref 0.3–1)
MONOCYTES NFR BLD: 14.8 % (ref 4–15)
NEUTROPHILS # BLD AUTO: 2.2 K/UL (ref 1.8–7.7)
NEUTROPHILS NFR BLD: 57.7 % (ref 38–73)
NRBC BLD-RTO: 0 /100 WBC
PHOSPHATE SERPL-MCNC: 3.6 MG/DL (ref 2.7–4.5)
PLATELET # BLD AUTO: 163 K/UL (ref 150–450)
PMV BLD AUTO: 11.4 FL (ref 9.2–12.9)
POCT GLUCOSE: 114 MG/DL (ref 70–110)
POCT GLUCOSE: 189 MG/DL (ref 70–110)
POTASSIUM SERPL-SCNC: 3.8 MMOL/L (ref 3.5–5.1)
PROT SERPL-MCNC: 7.2 G/DL (ref 6–8.4)
RBC # BLD AUTO: 2.25 M/UL (ref 4–5.4)
SATURATED IRON: 89 % (ref 20–50)
SODIUM SERPL-SCNC: 139 MMOL/L (ref 136–145)
TOTAL IRON BINDING CAPACITY: 216 UG/DL (ref 250–450)
TRANS ERYTHROCYTES VOL PATIENT: NORMAL ML
TRANSFERRIN SERPL-MCNC: 146 MG/DL (ref 200–375)
VIT B12 SERPL-MCNC: 1145 PG/ML (ref 210–950)
WBC # BLD AUTO: 3.85 K/UL (ref 3.9–12.7)

## 2022-04-02 PROCEDURE — 84100 ASSAY OF PHOSPHORUS: CPT | Mod: NTX | Performed by: NURSE PRACTITIONER

## 2022-04-02 PROCEDURE — P9021 RED BLOOD CELLS UNIT: HCPCS | Mod: NTX | Performed by: PHYSICIAN ASSISTANT

## 2022-04-02 PROCEDURE — 99226 PR SUBSEQUENT OBSERVATION CARE,LEVEL III: ICD-10-PCS | Mod: NTX,,, | Performed by: PHYSICIAN ASSISTANT

## 2022-04-02 PROCEDURE — 82607 VITAMIN B-12: CPT | Mod: NTX | Performed by: PHYSICIAN ASSISTANT

## 2022-04-02 PROCEDURE — 82784 ASSAY IGA/IGD/IGG/IGM EACH: CPT | Mod: NTX | Performed by: NURSE PRACTITIONER

## 2022-04-02 PROCEDURE — 82746 ASSAY OF FOLIC ACID SERUM: CPT | Mod: NTX | Performed by: PHYSICIAN ASSISTANT

## 2022-04-02 PROCEDURE — 36430 TRANSFUSION BLD/BLD COMPNT: CPT

## 2022-04-02 PROCEDURE — 80053 COMPREHEN METABOLIC PANEL: CPT | Mod: NTX | Performed by: NURSE PRACTITIONER

## 2022-04-02 PROCEDURE — 99226 PR SUBSEQUENT OBSERVATION CARE,LEVEL III: CPT | Mod: NTX,,, | Performed by: PHYSICIAN ASSISTANT

## 2022-04-02 PROCEDURE — 25000003 PHARM REV CODE 250: Mod: NTX | Performed by: NURSE PRACTITIONER

## 2022-04-02 PROCEDURE — 83735 ASSAY OF MAGNESIUM: CPT | Mod: NTX | Performed by: NURSE PRACTITIONER

## 2022-04-02 PROCEDURE — 82728 ASSAY OF FERRITIN: CPT | Mod: NTX | Performed by: PHYSICIAN ASSISTANT

## 2022-04-02 PROCEDURE — 63600175 PHARM REV CODE 636 W HCPCS: Mod: NTX | Performed by: NURSE PRACTITIONER

## 2022-04-02 PROCEDURE — 99215 OFFICE O/P EST HI 40 MIN: CPT | Mod: NTX,,, | Performed by: NURSE PRACTITIONER

## 2022-04-02 PROCEDURE — G0378 HOSPITAL OBSERVATION PER HR: HCPCS | Mod: NTX

## 2022-04-02 PROCEDURE — 90945 DIALYSIS ONE EVALUATION: CPT | Mod: NTX

## 2022-04-02 PROCEDURE — 99215 PR OFFICE/OUTPT VISIT, EST, LEVL V, 40-54 MIN: ICD-10-PCS | Mod: NTX,,, | Performed by: NURSE PRACTITIONER

## 2022-04-02 PROCEDURE — 84466 ASSAY OF TRANSFERRIN: CPT | Mod: NTX | Performed by: PHYSICIAN ASSISTANT

## 2022-04-02 PROCEDURE — 85025 COMPLETE CBC W/AUTO DIFF WBC: CPT | Mod: NTX | Performed by: NURSE PRACTITIONER

## 2022-04-02 PROCEDURE — 36415 COLL VENOUS BLD VENIPUNCTURE: CPT | Mod: NTX | Performed by: NURSE PRACTITIONER

## 2022-04-02 PROCEDURE — 36415 COLL VENOUS BLD VENIPUNCTURE: CPT | Mod: NTX | Performed by: PHYSICIAN ASSISTANT

## 2022-04-02 PROCEDURE — 27200950 HC CAPD SUPPORT: Mod: NTX

## 2022-04-02 RX ORDER — HYDROCODONE BITARTRATE AND ACETAMINOPHEN 500; 5 MG/1; MG/1
TABLET ORAL
Status: DISCONTINUED | OUTPATIENT
Start: 2022-04-02 | End: 2022-04-05 | Stop reason: HOSPADM

## 2022-04-02 RX ADMIN — INSULIN DETEMIR 16 UNITS: 100 INJECTION, SOLUTION SUBCUTANEOUS at 10:04

## 2022-04-02 RX ADMIN — SEVELAMER CARBONATE 800 MG: 800 TABLET, FILM COATED ORAL at 08:04

## 2022-04-02 RX ADMIN — MYCOPHENOLATE MOFETIL 500 MG: 250 CAPSULE ORAL at 08:04

## 2022-04-02 RX ADMIN — SEVELAMER CARBONATE 800 MG: 800 TABLET, FILM COATED ORAL at 04:04

## 2022-04-02 RX ADMIN — Medication 800 MG: at 08:04

## 2022-04-02 RX ADMIN — MYCOPHENOLATE MOFETIL 500 MG: 250 CAPSULE ORAL at 09:04

## 2022-04-02 RX ADMIN — TACROLIMUS 2 MG: 1 CAPSULE ORAL at 04:04

## 2022-04-02 RX ADMIN — Medication 800 MG: at 09:04

## 2022-04-02 RX ADMIN — TACROLIMUS 3 MG: 1 CAPSULE ORAL at 08:04

## 2022-04-02 RX ADMIN — ATORVASTATIN CALCIUM 20 MG: 20 TABLET, FILM COATED ORAL at 09:04

## 2022-04-02 RX ADMIN — ZOLPIDEM TARTRATE 5 MG: 5 TABLET ORAL at 09:04

## 2022-04-02 RX ADMIN — SEVELAMER CARBONATE 800 MG: 800 TABLET, FILM COATED ORAL at 12:04

## 2022-04-02 RX ADMIN — CARVEDILOL 6.25 MG: 6.25 TABLET, FILM COATED ORAL at 08:04

## 2022-04-02 RX ADMIN — CARVEDILOL 3.12 MG: 3.12 TABLET, FILM COATED ORAL at 09:04

## 2022-04-02 NOTE — PROGRESS NOTES
Peritoneal Dialysis:  Manual exchange performed using aseptic technique with 2000 ml of 2.5% Dianeal solution instilled. Patient denied any complaints of pain or discomfort upon filling.

## 2022-04-02 NOTE — SUBJECTIVE & OBJECTIVE
Interval History: No acute events overnight. Tolerated manual exchange overnight.     Review of patient's allergies indicates:   Allergen Reactions    Shrimp Hives    Topamax [topiramate] Other (See Comments)     Vision changes    Zoloft [sertraline] Palpitations     Current Facility-Administered Medications   Medication Frequency    0.9%  NaCl infusion (for blood administration) Q24H PRN    acetaminophen tablet 650 mg Q6H PRN    atorvastatin tablet 20 mg QHS    carvediloL tablet 6.25 mg QAM    And    carvediloL tablet 3.125 mg QHS    dextrose 10% bolus 125 mL PRN    dextrose 10% bolus 250 mL PRN    glucagon (human recombinant) injection 1 mg PRN    glucose chewable tablet 16 g PRN    glucose chewable tablet 24 g PRN    insulin aspart U-100 pen 0-5 Units QID (AC + HS) PRN    insulin detemir U-100 pen 16 Units QHS    magnesium oxide tablet 800 mg BID    mycophenolate capsule 500 mg BID    naloxone 0.4 mg/mL injection 0.02 mg PRN    ondansetron injection 4 mg Q8H PRN    sevelamer carbonate tablet 800 mg TID WM    sodium chloride 0.9% flush 10 mL Q12H PRN    tacrolimus capsule 3 mg Daily AM    And    tacrolimus capsule 2 mg Daily PM    timolol maleate 0.5% ophthalmic solution 1 drop Daily    zolpidem tablet 5 mg Nightly PRN       Objective:     Vital Signs (Most Recent):  Temp: 98.7 °F (37.1 °C) (04/02/22 1334)  Pulse: 91 (04/02/22 1334)  Resp: 14 (04/02/22 1334)  BP: (!) 145/72 (04/02/22 1334)  SpO2: 100 % (04/02/22 1334)  O2 Device (Oxygen Therapy): room air (04/01/22 0726)   Vital Signs (24h Range):  Temp:  [98.2 °F (36.8 °C)-98.9 °F (37.2 °C)] 98.7 °F (37.1 °C)  Pulse:  [] 91  Resp:  [12-17] 14  SpO2:  [98 %-100 %] 100 %  BP: (145-171)/(72-88) 145/72     Weight: 78 kg (171 lb 15.3 oz) (04/01/22 1700)  Body mass index is 27.75 kg/m².  Body surface area is 1.91 meters squared.    I/O last 3 completed shifts:  In: 100 [I.V.:50; IV Piggyback:50]  Out: -     Physical Exam  Vitals reviewed.   HENT:       Mouth/Throat:      Pharynx: Oropharynx is clear.   Eyes:      Conjunctiva/sclera: Conjunctivae normal.   Cardiovascular:      Rate and Rhythm: Normal rate.   Pulmonary:      Effort: Pulmonary effort is normal.   Abdominal:      Palpations: Abdomen is soft.      Comments: PD cath without erythema, swelling, or drainage around site    Musculoskeletal:      Cervical back: Neck supple.      Right lower leg: No edema.      Left lower leg: No edema.   Neurological:      Mental Status: She is alert and oriented to person, place, and time.   Psychiatric:         Mood and Affect: Mood normal.       Significant Labs:  CBC:   Recent Labs   Lab 04/02/22  0546   WBC 3.85*   RBC 2.25*   HGB 6.0*   HCT 20.5*      MCV 91   MCH 26.7*   MCHC 29.3*     CMP:   Recent Labs   Lab 04/02/22  0546      CALCIUM 8.7   ALBUMIN 2.7*   PROT 7.2      K 3.8   CO2 21*      BUN 33*   CREATININE 4.4*   ALKPHOS 58   ALT <5*   AST 11   BILITOT 0.3     All labs within the past 24 hours have been reviewed.     Significant Imaging:

## 2022-04-02 NOTE — PROGRESS NOTES
Drained patient after a 2L manual exchange. PD fluid clear yellow. Patient tolerated well manual exchange and do not report any discomfort or pain.

## 2022-04-02 NOTE — PLAN OF CARE
Patient alert and orient x 4. Underwent peritoneal dialysis without distress.  Denies pain , SOB or dizziness.  Awake until 0200 then slept without distress    Problem: Diabetes Comorbidity  Goal: Blood Glucose Level Within Targeted Range  Outcome: Ongoing, Progressing     Problem: Adult Inpatient Plan of Care  Goal: Optimal Comfort and Wellbeing  Outcome: Ongoing, Progressing

## 2022-04-02 NOTE — ASSESSMENT & PLAN NOTE
Recently started on PD  PD catheter placed 3/11/22 per patient, urinates 1/2 coke can per day  Here for evaluation of pain with filling and draining of dialysate  No signs of infection or abdominal pain currently; peritoneal fluid negative for infection   No constipation; has 2 or more soft BMs per day  Tolerated manual exchange last night with no issues   CT scan noted; PD catheter high in the abdomen; plan for OR on Monday for PD catheter revision.   Plan for one cycle every night with 2 Li, 1.5%, 2 hrs every night over the weekend.

## 2022-04-02 NOTE — SUBJECTIVE & OBJECTIVE
Interval History: NAEON. Patient without complaints. Hem/onc recommending transfusion per standard guidelines, patient agreeable. General surgery to take patient to OR on Monday for PD catheter displacement.     Review of Systems   Constitutional:  Positive for chills and fatigue. Negative for appetite change and fever.   HENT:  Negative for congestion, rhinorrhea, sneezing and sore throat.    Eyes:  Negative for photophobia and visual disturbance.   Respiratory:  Negative for cough, shortness of breath and wheezing.    Cardiovascular:  Negative for chest pain and leg swelling.   Gastrointestinal:  Positive for abdominal pain (with PD, now resolved) and diarrhea (intermittent). Negative for nausea and vomiting.   Genitourinary:  Positive for dysuria (mild), frequency and urgency. Negative for difficulty urinating and flank pain.   Musculoskeletal:  Negative for arthralgias, back pain, myalgias and neck pain.   Skin:  Negative for color change, pallor, rash and wound.   Allergic/Immunologic: Positive for immunocompromised state.   Neurological:  Negative for dizziness, syncope, weakness, light-headedness and headaches.   Psychiatric/Behavioral:  Negative for agitation, confusion and hallucinations. The patient is not nervous/anxious.    Objective:     Vital Signs (Most Recent):  Temp: 98.7 °F (37.1 °C) (04/02/22 1334)  Pulse: 91 (04/02/22 1334)  Resp: 14 (04/02/22 1334)  BP: (!) 145/72 (04/02/22 1334)  SpO2: 100 % (04/02/22 1334)   Vital Signs (24h Range):  Temp:  [98.2 °F (36.8 °C)-98.9 °F (37.2 °C)] 98.7 °F (37.1 °C)  Pulse:  [] 91  Resp:  [12-17] 14  SpO2:  [98 %-100 %] 100 %  BP: (145-171)/(72-88) 145/72     Weight: 78 kg (171 lb 15.3 oz)  Body mass index is 27.75 kg/m².  No intake or output data in the 24 hours ending 04/02/22 1540   Physical Exam  Vitals and nursing note reviewed.   Constitutional:       General: She is not in acute distress.     Appearance: She is obese.   HENT:      Mouth/Throat:       Mouth: Mucous membranes are moist.   Eyes:      General: No scleral icterus.     Extraocular Movements: Extraocular movements intact.      Pupils: Pupils are equal, round, and reactive to light.   Cardiovascular:      Rate and Rhythm: Normal rate and regular rhythm.   Pulmonary:      Effort: Pulmonary effort is normal. No respiratory distress.   Abdominal:      General: There is no distension.      Palpations: Abdomen is soft.   Musculoskeletal:      Right lower leg: No edema.      Left lower leg: No edema.      Comments: PD catheter with clean exit site, no purulence or erythema and no tenderness of tunnel   Skin:     Coloration: Skin is not jaundiced.   Neurological:      General: No focal deficit present.      Mental Status: She is alert.   Psychiatric:         Mood and Affect: Affect is flat.       Significant Labs: All pertinent labs within the past 24 hours have been reviewed.    Significant Imaging: I have reviewed all pertinent imaging results/findings within the past 24 hours.

## 2022-04-02 NOTE — PROGRESS NOTES
[facility] - Telemetry Stepdown ([place] [address])  Nephrology  Progress Note    Patient Name: Elizabeth Maldonado  MRN: [id number]  Admission Date: 3/31/2022  Hospital Length of Stay: 0 days  Attending Provider: Jamaal Steinberg MD   Primary Care Physician: Richy Singleton NP  Principal Problem:Abdominal pain    Subjective:     HPI: 57 year old with ESKD and failed Ktx now on PD who just started 3 days ago and had abdominal pain with filling and drainage during last session. Sent here for evaluation. No other complaints.  Nephrology consulted for ESKD      Interval History: No acute events overnight. Tolerated manual exchange overnight.     Review of patient's allergies indicates:   Allergen Reactions    Shrimp Hives    Topamax [topiramate] Other (See Comments)     Vision changes    Zoloft [sertraline] Palpitations     Current Facility-Administered Medications   Medication Frequency    0.9%  NaCl infusion (for blood administration) Q24H PRN    acetaminophen tablet 650 mg Q6H PRN    atorvastatin tablet 20 mg QHS    carvediloL tablet 6.25 mg QAM    And    carvediloL tablet 3.125 mg QHS    dextrose 10% bolus 125 mL PRN    dextrose 10% bolus 250 mL PRN    glucagon (human recombinant) injection 1 mg PRN    glucose chewable tablet 16 g PRN    glucose chewable tablet 24 g PRN    insulin aspart U-100 pen 0-5 Units QID (AC + HS) PRN    insulin detemir U-100 pen 16 Units QHS    magnesium oxide tablet 800 mg BID    mycophenolate capsule 500 mg BID    naloxone 0.4 mg/mL injection 0.02 mg PRN    ondansetron injection 4 mg Q8H PRN    sevelamer carbonate tablet 800 mg TID WM    sodium chloride 0.9% flush 10 mL Q12H PRN    tacrolimus capsule 3 mg Daily AM    And    tacrolimus capsule 2 mg Daily PM    timolol maleate 0.5% ophthalmic solution 1 drop Daily    zolpidem tablet 5 mg Nightly PRN       Objective:     Vital Signs (Most Recent):  Temp: 98.7 °F (37.1 °C) (04/02/22 1334)  Pulse: 91 (04/02/22 1334)  Resp: 14  (04/02/22 1334)  BP: (!) 145/72 (04/02/22 1334)  SpO2: 100 % (04/02/22 1334)  O2 Device (Oxygen Therapy): room air (04/01/22 0726)   Vital Signs (24h Range):  Temp:  [98.2 °F (36.8 °C)-98.9 °F (37.2 °C)] 98.7 °F (37.1 °C)  Pulse:  [] 91  Resp:  [12-17] 14  SpO2:  [98 %-100 %] 100 %  BP: (145-171)/(72-88) 145/72     Weight: 78 kg (171 lb 15.3 oz) (04/01/22 1700)  Body mass index is 27.75 kg/m².  Body surface area is 1.91 meters squared.    I/O last 3 completed shifts:  In: 100 [I.V.:50; IV Piggyback:50]  Out: -     Physical Exam  Vitals reviewed.   HENT:      Mouth/Throat:      Pharynx: Oropharynx is clear.   Eyes:      Conjunctiva/sclera: Conjunctivae normal.   Cardiovascular:      Rate and Rhythm: Normal rate.   Pulmonary:      Effort: Pulmonary effort is normal.   Abdominal:      Palpations: Abdomen is soft.      Comments: PD cath without erythema, swelling, or drainage around site    Musculoskeletal:      Cervical back: Neck supple.      Right lower leg: No edema.      Left lower leg: No edema.   Neurological:      Mental Status: She is alert and oriented to person, place, and time.   Psychiatric:         Mood and Affect: Mood normal.       Significant Labs:  CBC:   Recent Labs   Lab 04/02/22  0546   WBC 3.85*   RBC 2.25*   HGB 6.0*   HCT 20.5*      MCV 91   MCH 26.7*   MCHC 29.3*     CMP:   Recent Labs   Lab 04/02/22  0546      CALCIUM 8.7   ALBUMIN 2.7*   PROT 7.2      K 3.8   CO2 21*      BUN 33*   CREATININE 4.4*   ALKPHOS 58   ALT <5*   AST 11   BILITOT 0.3     All labs within the past 24 hours have been reviewed.     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  Her degree of anemia is significant for ESKD and chronic kidney disease  Recommend evaluation for alternate causes of anemia    End stage kidney disease  Recently started on  PD  PD catheter placed 3/11/22 per patient, urinates 1/2 coke can per day  Here for evaluation of pain with filling and draining of dialysate  No signs of infection or abdominal pain currently; peritoneal fluid negative for infection   No constipation; has 2 or more soft BMs per day  Tolerated manual exchange last night with no issues   CT scan noted; PD catheter high in the abdomen; plan for OR on Monday for PD catheter revision.   Plan for one cycle every night with 2 Li, 1.5%, 2 hrs every night over the weekend.         Chronic immunosuppression with Prograf  Daily tacro trough at 0600    Kidney transplant status, living unrelated donor - 12/2/14  Now failed  Continue home tacro 3mg in AM and 2mg in PM  Mycophenolate 500mg PO BID       Chronic kidney disease-mineral and bone disorder  Continue home sevelamer 800 tid wm        Thank you for your consult. I will follow-up with patient. Please contact us if you have any additional questions.    Thu Kennedy, KARO  Nephrology  [facility] - Tele[facility] ([place])

## 2022-04-02 NOTE — PROGRESS NOTES
[facility] [address]  [facility]  Progress Note    Patient Name: Elizabeth Maldonado  MRN: [id number]  Patient Class: OP- Observation   Admission Date: 3/31/2022  Length of Stay: 0 days  Attending Physician: Jamaal Steinberg MD  Primary Care Provider: Richy Singleton NP        Subjective:     Principal Problem:Abdominal pain        HPI:  Elizabeth Maldonado is a 57 y.o. female with a PMHx of failed renal transplant, ESRD on PD ( placed on 3/11), HTN, HLD, and MGUS who presents to the ED with complaints of lower abdominal cramping. The patient resumed PD on 3/29/22. Today was day #3 of PD. She was sent to the ER from PD clinic for an emergent evaluation due to pt c/o chills and diffuse lower abdominal cramping with filling and draining of dialysate. She reports the dialysate was draining very slowly, and she felt like it wasn't drained completely before filling again. The patient states the pain resolved by the time she arrived to the ED and she has had no additional episodes. The patient denies any fever. She does report noting urinary urgency, frequency, and mild dysuria x2 days. She also reports some ongoing fatigue. The patient denies any nausea, vomiting, chest pain, shortness of breath, cough, lightheadedness, or dizziness.    In the ED, pt mildly tachycardic but otherwise VSSAF. CBC with WBC 3.66 and H/H 6.1/20.7. CMP with BUN/Cr 36/4.5, consistent with ESRD. Magnesium 1.3. Glucose 161. Lipase 178. Procal WNL. Lactate WNL. Blood cxs in process. UA 3+ leukocytes, >100 WBCs, many bacteria, and 26 squam- will repeat UA. CT abd/pelvis with presence of peritoneal dialysis catheter and small volume abdominopelvic free fluid.  Minimal pneumoperitoneum in the upper abdomen could be related to peritoneal dialysis catheter, though correlation with physical exam findings is recommended. Right lower quadrant renal transplant with ureteral wall thickening and mild diffuse urinary bladder wall  thickening.  Similar findings were present on the prior study in 2021. Per ED provider note general surgery reviewed imaging and found small amount of pneumoperitoneum to be expected given postoperative period. The patient was evaluated in the ED by nephrology and PD was ordered along with PD fluid samples.       Overview/Hospital Course:  Ms. Maldonado was placed in observation for abdominal pain following an issue with her PD catheter. Nephrology consulted, CT abdomen ordered. Based on CT interpretation, nephrology concerned for malposition of the PD catheter. General surgery consulted, appreciate recs. PD fluid gram stain with no organisms seen. Fluid cultures pending. Significant asymptomatic chronic anemia with Hgb 5.9, though intervention not appropriate per nephrology -- will monitor. UA infectious, but patient is not c/o symptoms at this time. Patient given 1 dose of rocephin in the ED, will hold for now per nephro recs.        Interval History: NAEON. Patient without complaints. Hem/onc recommending transfusion per standard guidelines, patient agreeable. General surgery to take patient to OR on Monday for PD catheter displacement.     Review of Systems   Constitutional:  Positive for chills and fatigue. Negative for appetite change and fever.   HENT:  Negative for congestion, rhinorrhea, sneezing and sore throat.    Eyes:  Negative for photophobia and visual disturbance.   Respiratory:  Negative for cough, shortness of breath and wheezing.    Cardiovascular:  Negative for chest pain and leg swelling.   Gastrointestinal:  Positive for abdominal pain (with PD, now resolved) and diarrhea (intermittent). Negative for nausea and vomiting.   Genitourinary:  Positive for dysuria (mild), frequency and urgency. Negative for difficulty urinating and flank pain.   Musculoskeletal:  Negative for arthralgias, back pain, myalgias and neck pain.   Skin:  Negative for color change, pallor, rash and wound.    Allergic/Immunologic: Positive for immunocompromised state.   Neurological:  Negative for dizziness, syncope, weakness, light-headedness and headaches.   Psychiatric/Behavioral:  Negative for agitation, confusion and hallucinations. The patient is not nervous/anxious.    Objective:     Vital Signs (Most Recent):  Temp: 98.7 °F (37.1 °C) (04/02/22 1334)  Pulse: 91 (04/02/22 1334)  Resp: 14 (04/02/22 1334)  BP: (!) 145/72 (04/02/22 1334)  SpO2: 100 % (04/02/22 1334)   Vital Signs (24h Range):  Temp:  [98.2 °F (36.8 °C)-98.9 °F (37.2 °C)] 98.7 °F (37.1 °C)  Pulse:  [] 91  Resp:  [12-17] 14  SpO2:  [98 %-100 %] 100 %  BP: (145-171)/(72-88) 145/72     Weight: 78 kg (171 lb 15.3 oz)  Body mass index is 27.75 kg/m².  No intake or output data in the 24 hours ending 04/02/22 1540   Physical Exam  Vitals and nursing note reviewed.   Constitutional:       General: She is not in acute distress.     Appearance: She is obese.   HENT:      Mouth/Throat:      Mouth: Mucous membranes are moist.   Eyes:      General: No scleral icterus.     Extraocular Movements: Extraocular movements intact.      Pupils: Pupils are equal, round, and reactive to light.   Cardiovascular:      Rate and Rhythm: Normal rate and regular rhythm.   Pulmonary:      Effort: Pulmonary effort is normal. No respiratory distress.   Abdominal:      General: There is no distension.      Palpations: Abdomen is soft.   Musculoskeletal:      Right lower leg: No edema.      Left lower leg: No edema.      Comments: PD catheter with clean exit site, no purulence or erythema and no tenderness of tunnel   Skin:     Coloration: Skin is not jaundiced.   Neurological:      General: No focal deficit present.      Mental Status: She is alert.   Psychiatric:         Mood and Affect: Affect is flat.       Significant Labs: All pertinent labs within the past 24 hours have been reviewed.    Significant Imaging: I have reviewed all pertinent imaging results/findings within  "the past 24 hours.      Assessment/Plan:      * Abdominal pain  Patient reports abdominal cramping with filling and draining of dialysate  -Resolved once patient arrived to the ED and has not re occurred.   -CT abd/pelvis reviewed.  -Nephrology following. Ordered PD labs but holding off on abx for now.    Acute cystitis with hematuria  UA with 26 squams, will repeat. Patient c/o of urinary urgency, frequency, and mild dysuria.   -Will treat with rocephin while awaiting repeat.  -2/4 SIRS (WBC 3.66, )  -Follow urine cx.    Anemia of chronic kidney failure, stage 5  Current CBC reviewed-   Lab Results   Component Value Date    HGB 6.1 (L) 03/31/2022    HCT 20.7 (L) 03/31/2022     -ferritin elevated so IV iron not ideal also patient has smoldering myeloma which makes epo not ideal  -in addition patient is being evaluated for kidney transplant so would not recommend giving blood  -her degree of anemia is significant for ESKD and chronic kidney disease  -Nephrology recommends evaluation for alternate causes of anemia  -Consult hematology, appreciate recs.    End stage kidney disease  -Nephrology consulted, appreciate recs.  -just starting PD and PD catheter placed 3/11/22 per patient  -here for evaluation of pain with filling and draining of dialysate  -no abdominal pain currently  -has 2 or more soft BMs per day  -CT AP with no evidence of catheter migration. There is a small amount of pneumoperitoneum which is expected given postoperative period. This was discussed with General surgery by ED provider, who agrees that this is an expected finding.  -PD labs to assess for infection  -Hold off on abx per nephrology recs.    MGUS (monoclonal gammopathy of unknown significance)  - she is followed for her monoclonal gammopathy of uncertain significance at [facility] by Dr. Lau. She states she had an appointment with Dr. Lau in January 2022  -Per hematology note 1/20/22:  "- MGUS labs overall " "appear stable (M-spike historically had ranged between 1.4-1.8 with recent downtrend, IgG without large increase) with the exception of kappa and lambda free light chains which have increased but this is likely due to renal insufficiency. LDH wnl, B2M elevated, IgM low, IgA/M wnl. B2M can be elevated in the setting of renal insufficiency.  - BM asp/bx notes mild kappa-skewed plasmacytosis but unfortunately was insufficient core sample. No abnormalities detected by FISH, cytogen wnl.  - Will need 24 hour UPEP  - PET/CT without evidence for lytic lesions  - Continues on immunosuppressives post renal transplant. Discussed with her nephrologist Dr. Bonilla the possibility of renal biopsy, he will pass along the information to his transplant colleagues."    Hypomagnesemia  Magnesium reviewed- Recent Labs   Lab 03/31/22  1840   MG 1.3*    Will replace electrolytes and continue to monitor closely.    Chronic immunosuppression with Prograf  -am tacro trough  -Continue home dosage    Kidney transplant status, living unrelated donor - 12/2/14  -now failed  -continue home tacro 3/2 and mycophenolate 500/500  -am tacro trough    Obesity (BMI 30.0-34.9)  Body mass index is 28.08 kg/m². Obesity complicates all aspects of disease management from diagnostic modalities to treatment.     Chronic kidney disease-mineral and bone disorder  -continue home sevelamer 800 tid wm    Hyperlipidemia   Patient is chronically on statin.will continue for now. Monitor clinically. Last LDL was   Lab Results   Component Value Date    LDLCALC 118.4 12/23/2021       Type 2 diabetes mellitus, uncontrolled, with renal complications  Patient's FSGs are controlled on current hypoglycemics.   Last A1c reviewed-   Lab Results   Component Value Date    HGBA1C 7.2 (H) 12/23/2021     Most recent fingerstick glucose reviewed- No results for input(s): POCTGLUCOSE in the last 24 hours.  Current correctional scale  Low  Maintain anti-hyperglycemic dose as follows- "   Antihyperglycemics (From admission, onward)            Start     Stop Route Frequency Ordered    04/01/22 2100  insulin detemir U-100 pen 16 Units         -- SubQ Nightly 04/01/22 0214    04/01/22 0103  insulin aspart U-100 pen 0-5 Units         -- SubQ Before meals & nightly PRN 04/01/22 0005      -Will start low dose sliding scale insulin, decrease long acting insulin to 16U qhs.   -Diabetic/renal diet, goal -180  -Accuchecks AC/HS      VTE Risk Mitigation (From admission, onward)         Ordered     Place sequential compression device  Until discontinued         04/01/22 0005     IP VTE HIGH RISK PATIENT  Once         04/01/22 0005                Discharge Planning   KRISTEN: 4/5/2022     Code Status: Full Code   Is the patient medically ready for discharge?: No    Reason for patient still in hospital (select all that apply): Patient trending condition, Consult recommendations and Pending disposition                     Cliff Arroyo PA-C  Department of Hospital Medicine   [facility] - Telemetry Stepdown ([place] [address])

## 2022-04-03 ENCOUNTER — ANESTHESIA EVENT (OUTPATIENT)
Dept: SURGERY | Facility: HOSPITAL | Age: 57
End: 2022-04-03
Payer: MEDICAID

## 2022-04-03 PROBLEM — T82.41XA HEMODIALYSIS CATHETER MALFUNCTION: Status: ACTIVE | Noted: 2022-04-01

## 2022-04-03 LAB
ALBUMIN SERPL BCP-MCNC: 2.8 G/DL (ref 3.5–5.2)
ALP SERPL-CCNC: 61 U/L (ref 55–135)
ALT SERPL W/O P-5'-P-CCNC: 5 U/L (ref 10–44)
ANION GAP SERPL CALC-SCNC: 12 MMOL/L (ref 8–16)
AST SERPL-CCNC: 11 U/L (ref 10–40)
BACTERIA UR CULT: ABNORMAL
BASOPHILS # BLD AUTO: 0.01 K/UL (ref 0–0.2)
BASOPHILS NFR BLD: 0.2 % (ref 0–1.9)
BILIRUB SERPL-MCNC: 0.5 MG/DL (ref 0.1–1)
BUN SERPL-MCNC: 37 MG/DL (ref 6–20)
CALCIUM SERPL-MCNC: 8.9 MG/DL (ref 8.7–10.5)
CHLORIDE SERPL-SCNC: 109 MMOL/L (ref 95–110)
CO2 SERPL-SCNC: 21 MMOL/L (ref 23–29)
CREAT SERPL-MCNC: 4.3 MG/DL (ref 0.5–1.4)
DIFFERENTIAL METHOD: ABNORMAL
EOSINOPHIL # BLD AUTO: 0.1 K/UL (ref 0–0.5)
EOSINOPHIL NFR BLD: 2 % (ref 0–8)
ERYTHROCYTE [DISTWIDTH] IN BLOOD BY AUTOMATED COUNT: 14.4 % (ref 11.5–14.5)
EST. GFR  (AFRICAN AMERICAN): 12.2 ML/MIN/1.73 M^2
EST. GFR  (NON AFRICAN AMERICAN): 10.6 ML/MIN/1.73 M^2
GLUCOSE SERPL-MCNC: 113 MG/DL (ref 70–110)
HCT VFR BLD AUTO: 24.4 % (ref 37–48.5)
HGB BLD-MCNC: 7.1 G/DL (ref 12–16)
IMM GRANULOCYTES # BLD AUTO: 0.05 K/UL (ref 0–0.04)
IMM GRANULOCYTES NFR BLD AUTO: 1.1 % (ref 0–0.5)
LYMPHOCYTES # BLD AUTO: 1.1 K/UL (ref 1–4.8)
LYMPHOCYTES NFR BLD: 24.3 % (ref 18–48)
MAGNESIUM SERPL-MCNC: 1.8 MG/DL (ref 1.6–2.6)
MCH RBC QN AUTO: 26.4 PG (ref 27–31)
MCHC RBC AUTO-ENTMCNC: 29.1 G/DL (ref 32–36)
MCV RBC AUTO: 91 FL (ref 82–98)
MONOCYTES # BLD AUTO: 0.5 K/UL (ref 0.3–1)
MONOCYTES NFR BLD: 11.5 % (ref 4–15)
NEUTROPHILS # BLD AUTO: 2.8 K/UL (ref 1.8–7.7)
NEUTROPHILS NFR BLD: 60.9 % (ref 38–73)
NRBC BLD-RTO: 0 /100 WBC
PHOSPHATE SERPL-MCNC: 3.7 MG/DL (ref 2.7–4.5)
PLATELET # BLD AUTO: 185 K/UL (ref 150–450)
PMV BLD AUTO: 11.3 FL (ref 9.2–12.9)
POCT GLUCOSE: 115 MG/DL (ref 70–110)
POCT GLUCOSE: 212 MG/DL (ref 70–110)
POCT GLUCOSE: 267 MG/DL (ref 70–110)
POTASSIUM SERPL-SCNC: 4 MMOL/L (ref 3.5–5.1)
PROT SERPL-MCNC: 7.4 G/DL (ref 6–8.4)
RBC # BLD AUTO: 2.69 M/UL (ref 4–5.4)
SARS-COV-2 RNA RESP QL NAA+PROBE: NOT DETECTED
SODIUM SERPL-SCNC: 142 MMOL/L (ref 136–145)
WBC # BLD AUTO: 4.6 K/UL (ref 3.9–12.7)

## 2022-04-03 PROCEDURE — U0003 INFECTIOUS AGENT DETECTION BY NUCLEIC ACID (DNA OR RNA); SEVERE ACUTE RESPIRATORY SYNDROME CORONAVIRUS 2 (SARS-COV-2) (CORONAVIRUS DISEASE [COVID-19]), AMPLIFIED PROBE TECHNIQUE, MAKING USE OF HIGH THROUGHPUT TECHNOLOGIES AS DESCRIBED BY CMS-2020-01-R: HCPCS | Mod: NTX

## 2022-04-03 PROCEDURE — 99226 PR SUBSEQUENT OBSERVATION CARE,LEVEL III: CPT | Mod: NTX,,, | Performed by: PHYSICIAN ASSISTANT

## 2022-04-03 PROCEDURE — U0005 INFEC AGEN DETEC AMPLI PROBE: HCPCS

## 2022-04-03 PROCEDURE — 90945 DIALYSIS ONE EVALUATION: CPT | Mod: NTX

## 2022-04-03 PROCEDURE — 83735 ASSAY OF MAGNESIUM: CPT | Mod: NTX | Performed by: NURSE PRACTITIONER

## 2022-04-03 PROCEDURE — 84100 ASSAY OF PHOSPHORUS: CPT | Mod: NTX | Performed by: NURSE PRACTITIONER

## 2022-04-03 PROCEDURE — 63600175 PHARM REV CODE 636 W HCPCS: Mod: NTX | Performed by: NURSE PRACTITIONER

## 2022-04-03 PROCEDURE — 85025 COMPLETE CBC W/AUTO DIFF WBC: CPT | Mod: NTX | Performed by: NURSE PRACTITIONER

## 2022-04-03 PROCEDURE — 25000003 PHARM REV CODE 250: Mod: NTX | Performed by: STUDENT IN AN ORGANIZED HEALTH CARE EDUCATION/TRAINING PROGRAM

## 2022-04-03 PROCEDURE — 99214 PR OFFICE/OUTPT VISIT, EST, LEVL IV, 30-39 MIN: ICD-10-PCS | Mod: NTX,,, | Performed by: INTERNAL MEDICINE

## 2022-04-03 PROCEDURE — 25000003 PHARM REV CODE 250: Mod: NTX | Performed by: NURSE PRACTITIONER

## 2022-04-03 PROCEDURE — 63600175 PHARM REV CODE 636 W HCPCS: Mod: NTX | Performed by: STUDENT IN AN ORGANIZED HEALTH CARE EDUCATION/TRAINING PROGRAM

## 2022-04-03 PROCEDURE — 80053 COMPREHEN METABOLIC PANEL: CPT | Mod: NTX | Performed by: NURSE PRACTITIONER

## 2022-04-03 PROCEDURE — 99226 PR SUBSEQUENT OBSERVATION CARE,LEVEL III: ICD-10-PCS | Mod: NTX,,, | Performed by: PHYSICIAN ASSISTANT

## 2022-04-03 PROCEDURE — 99214 OFFICE O/P EST MOD 30 MIN: CPT | Mod: NTX,,, | Performed by: INTERNAL MEDICINE

## 2022-04-03 PROCEDURE — G0378 HOSPITAL OBSERVATION PER HR: HCPCS | Mod: NTX

## 2022-04-03 PROCEDURE — 36415 COLL VENOUS BLD VENIPUNCTURE: CPT | Mod: NTX | Performed by: NURSE PRACTITIONER

## 2022-04-03 PROCEDURE — 96366 THER/PROPH/DIAG IV INF ADDON: CPT | Mod: NTX,59

## 2022-04-03 RX ORDER — AMOXICILLIN 250 MG
1 CAPSULE ORAL 2 TIMES DAILY PRN
Status: DISCONTINUED | OUTPATIENT
Start: 2022-04-03 | End: 2022-04-05 | Stop reason: HOSPADM

## 2022-04-03 RX ADMIN — SEVELAMER CARBONATE 800 MG: 800 TABLET, FILM COATED ORAL at 08:04

## 2022-04-03 RX ADMIN — MYCOPHENOLATE MOFETIL 500 MG: 250 CAPSULE ORAL at 09:04

## 2022-04-03 RX ADMIN — TIMOLOL MALEATE 1 DROP: 5 SOLUTION/ DROPS OPHTHALMIC at 09:04

## 2022-04-03 RX ADMIN — INSULIN DETEMIR 16 UNITS: 100 INJECTION, SOLUTION SUBCUTANEOUS at 08:04

## 2022-04-03 RX ADMIN — TACROLIMUS 2 MG: 1 CAPSULE ORAL at 05:04

## 2022-04-03 RX ADMIN — TACROLIMUS 3 MG: 1 CAPSULE ORAL at 08:04

## 2022-04-03 RX ADMIN — INSULIN ASPART 1 UNITS: 100 INJECTION, SOLUTION INTRAVENOUS; SUBCUTANEOUS at 08:04

## 2022-04-03 RX ADMIN — Medication 800 MG: at 08:04

## 2022-04-03 RX ADMIN — SEVELAMER CARBONATE 800 MG: 800 TABLET, FILM COATED ORAL at 11:04

## 2022-04-03 RX ADMIN — SEVELAMER CARBONATE 800 MG: 800 TABLET, FILM COATED ORAL at 05:04

## 2022-04-03 RX ADMIN — CARVEDILOL 3.12 MG: 3.12 TABLET, FILM COATED ORAL at 08:04

## 2022-04-03 RX ADMIN — MYCOPHENOLATE MOFETIL 500 MG: 250 CAPSULE ORAL at 08:04

## 2022-04-03 RX ADMIN — ATORVASTATIN CALCIUM 20 MG: 20 TABLET, FILM COATED ORAL at 08:04

## 2022-04-03 RX ADMIN — Medication 800 MG: at 09:04

## 2022-04-03 RX ADMIN — INSULIN ASPART 2 UNITS: 100 INJECTION, SOLUTION INTRAVENOUS; SUBCUTANEOUS at 11:04

## 2022-04-03 RX ADMIN — CARVEDILOL 6.25 MG: 6.25 TABLET, FILM COATED ORAL at 07:04

## 2022-04-03 RX ADMIN — DEXTROSE 3 G: 50 INJECTION, SOLUTION INTRAVENOUS at 11:04

## 2022-04-03 RX ADMIN — SENNOSIDES AND DOCUSATE SODIUM 1 TABLET: 50; 8.6 TABLET ORAL at 10:04

## 2022-04-03 RX ADMIN — ZOLPIDEM TARTRATE 5 MG: 5 TABLET ORAL at 09:04

## 2022-04-03 NOTE — PROGRESS NOTES
Peritoneal Dialysis: Manual exchange performed using aseptic technique with 2000 ml fill of 2.5% Dianeal solution. Tolerated well with no complaints of pain or discomfort.

## 2022-04-03 NOTE — SUBJECTIVE & OBJECTIVE
Interval History: no acute events overnight    Review of patient's allergies indicates:   Allergen Reactions    Shrimp Hives    Topamax [topiramate] Other (See Comments)     Vision changes    Zoloft [sertraline] Palpitations     Current Facility-Administered Medications   Medication Frequency    0.9%  NaCl infusion (for blood administration) Q24H PRN    acetaminophen tablet 650 mg Q6H PRN    atorvastatin tablet 20 mg QHS    carvediloL tablet 6.25 mg QAM    And    carvediloL tablet 3.125 mg QHS    ceFAZolin (ANCEF) 3 g in dextrose 5 % 100 mL IVPB Once Pre-Op    dextrose 10% bolus 125 mL PRN    dextrose 10% bolus 250 mL PRN    glucagon (human recombinant) injection 1 mg PRN    glucose chewable tablet 16 g PRN    glucose chewable tablet 24 g PRN    insulin aspart U-100 pen 0-5 Units QID (AC + HS) PRN    insulin detemir U-100 pen 16 Units QHS    magnesium oxide tablet 800 mg BID    mycophenolate capsule 500 mg BID    naloxone 0.4 mg/mL injection 0.02 mg PRN    ondansetron injection 4 mg Q8H PRN    sevelamer carbonate tablet 800 mg TID WM    sodium chloride 0.9% flush 10 mL Q12H PRN    tacrolimus capsule 3 mg Daily AM    And    tacrolimus capsule 2 mg Daily PM    timolol maleate 0.5% ophthalmic solution 1 drop Daily    zolpidem tablet 5 mg Nightly PRN       Objective:     Vital Signs (Most Recent):  Temp: 98.1 °F (36.7 °C) (04/03/22 1153)  Pulse: 91 (04/03/22 1153)  Resp: 17 (04/03/22 1153)  BP: (!) 141/81 (04/03/22 1153)  SpO2: 99 % (04/03/22 1153)  O2 Device (Oxygen Therapy): room air (04/02/22 1954)   Vital Signs (24h Range):  Temp:  [98.1 °F (36.7 °C)-98.7 °F (37.1 °C)] 98.1 °F (36.7 °C)  Pulse:  [84-91] 91  Resp:  [12-18] 17  SpO2:  [98 %-100 %] 99 %  BP: (139-172)/(67-89) 141/81     Weight: 76.5 kg (168 lb 10.4 oz) (04/03/22 0548)  Body mass index is 27.22 kg/m².  Body surface area is 1.89 meters squared.    I/O last 3 completed shifts:  In: 840 [P.O.:360; Blood:280; Other:200]  Out: 1400 [Urine:1400]    Physical  Exam  Constitutional:       Appearance: She is well-developed.   HENT:      Head: Normocephalic and atraumatic.   Eyes:      Pupils: Pupils are equal, round, and reactive to light.   Cardiovascular:      Rate and Rhythm: Normal rate and regular rhythm.      Heart sounds: Normal heart sounds.   Pulmonary:      Effort: Pulmonary effort is normal.      Breath sounds: Normal breath sounds.   Abdominal:      General: Bowel sounds are normal.      Palpations: Abdomen is soft.      Tenderness: There is no abdominal tenderness.   Musculoskeletal:         General: Normal range of motion.      Cervical back: Normal range of motion and neck supple.   Skin:     General: Skin is warm and dry.      Capillary Refill: Capillary refill takes less than 2 seconds.   Neurological:      Mental Status: She is alert and oriented to person, place, and time.       Significant Labs:  All labs within the past 24 hours have been reviewed.     Significant Imaging:  Labs: Reviewed

## 2022-04-03 NOTE — SUBJECTIVE & OBJECTIVE
Interval History: patient without complaints, awaiting procedure tomorrow.     Review of Systems   Constitutional:  Positive for chills and fatigue. Negative for appetite change and fever.   HENT:  Negative for congestion, rhinorrhea, sneezing and sore throat.    Eyes:  Negative for photophobia and visual disturbance.   Respiratory:  Negative for cough, shortness of breath and wheezing.    Cardiovascular:  Negative for chest pain and leg swelling.   Gastrointestinal:  Positive for abdominal pain (with PD, now resolved) and diarrhea (intermittent). Negative for nausea and vomiting.   Genitourinary:  Positive for dysuria (mild), frequency and urgency. Negative for difficulty urinating and flank pain.   Musculoskeletal:  Negative for arthralgias, back pain, myalgias and neck pain.   Skin:  Negative for color change, pallor, rash and wound.   Allergic/Immunologic: Positive for immunocompromised state.   Neurological:  Negative for dizziness, syncope, weakness, light-headedness and headaches.   Psychiatric/Behavioral:  Negative for agitation, confusion and hallucinations. The patient is not nervous/anxious.    Objective:     Vital Signs (Most Recent):  Temp: 98.1 °F (36.7 °C) (04/03/22 1153)  Pulse: 91 (04/03/22 1153)  Resp: 17 (04/03/22 1153)  BP: (!) 141/81 (04/03/22 1153)  SpO2: 99 % (04/03/22 1153)   Vital Signs (24h Range):  Temp:  [98.1 °F (36.7 °C)-98.7 °F (37.1 °C)] 98.1 °F (36.7 °C)  Pulse:  [84-91] 91  Resp:  [14-18] 17  SpO2:  [98 %-100 %] 99 %  BP: (139-172)/(67-89) 141/81     Weight: 76.5 kg (168 lb 10.4 oz)  Body mass index is 27.22 kg/m².    Intake/Output Summary (Last 24 hours) at 4/3/2022 1319  Last data filed at 4/3/2022 0500  Gross per 24 hour   Intake 840 ml   Output 1400 ml   Net -560 ml      Physical Exam  Vitals and nursing note reviewed.   Constitutional:       General: She is not in acute distress.     Appearance: She is obese.   HENT:      Mouth/Throat:      Mouth: Mucous membranes are moist.    Eyes:      General: No scleral icterus.     Extraocular Movements: Extraocular movements intact.      Pupils: Pupils are equal, round, and reactive to light.   Cardiovascular:      Rate and Rhythm: Normal rate and regular rhythm.   Pulmonary:      Effort: Pulmonary effort is normal. No respiratory distress.   Abdominal:      General: There is no distension.      Palpations: Abdomen is soft.   Musculoskeletal:      Right lower leg: No edema.      Left lower leg: No edema.      Comments: PD catheter with clean exit site, no purulence or erythema and no tenderness of tunnel   Skin:     Coloration: Skin is not jaundiced.   Neurological:      General: No focal deficit present.      Mental Status: She is alert.   Psychiatric:         Mood and Affect: Affect is flat.       Significant Labs: All pertinent labs within the past 24 hours have been reviewed.    Significant Imaging: I have reviewed all pertinent imaging results/findings within the past 24 hours.

## 2022-04-03 NOTE — ASSESSMENT & PLAN NOTE
Recently started on PD  PD catheter placed 3/11/22 per patient, urinates 1/2 coke can per day  Here for evaluation of pain with filling and draining of dialysate  No signs of infection or abdominal pain currently; peritoneal fluid negative for infection   No constipation; has 2 or more soft BMs per day  Tolerated manual exchange last night with no issues   CT scan noted; PD catheter high in the abdomen; plan for OR on Monday for PD catheter revision.     Plan for cath replacement by surgery 4/4

## 2022-04-03 NOTE — PROGRESS NOTES
Peritoneal Dialysis: Manual exchange completed after 2 hour dwell with 1700 ml of clear, yellow effluent drained. Peritoneal catheter capped with sterile betadine cap and secured to abdomen with tape.

## 2022-04-03 NOTE — ASSESSMENT & PLAN NOTE
Patient reports abdominal cramping with filling and draining of dialysate. Resolved once patient arrived to the ED and has not re occurred.     OR tomorrow for PD catheter revision vs. Replacement vs. Removal  - general surgery consulted  - Patient currently getting output PD training, presented to the hospital for pain during fill and drain. PD fluid analysis did not show any evidence of infection. CT scan showed that the PD catheter is in the right upper quadrant. General surgery planning to take her to OR on Monday for PD catheter revision.    - Nephrology following. Ordered PD labs but holding off on abx for now.   - Will do one cycle every night with 2 Li, 1.5%, 2 hrs every night over the weekend.   - CT abd/pelvis reviewed.

## 2022-04-03 NOTE — ASSESSMENT & PLAN NOTE
UA with 26 squams, will repeat. Patient c/o of urinary urgency, frequency, and mild dysuria.   - Will treat with rocephin while awaiting repeat.  - 2/4 SIRS (WBC 3.66, )  - Follow urine cx -- GRAM NEGATIVE KERA

## 2022-04-03 NOTE — PROGRESS NOTES
Roger Whittaker - [facility] ([place] [address])  [facility] Medicine  Progress Note    Patient Name: Elizabeth Maldonado  MRN: [id number]  Patient Class: OP- Observation   Admission Date: 3/31/2022  Length of Stay: 0 days  Attending Physician: Jamaal Steinberg MD  Primary Care Provider: Richy Singleton NP        Subjective:     Principal Problem:Abdominal pain        HPI:  Elizabeth Maldonado is a 57 y.o. female with a PMHx of failed renal transplant, ESRD on PD ( placed on 3/11), HTN, HLD, and MGUS who presents to the ED with complaints of lower abdominal cramping. The patient resumed PD on 3/29/22. Today was day #3 of PD. She was sent to the ER from PD clinic for an emergent evaluation due to pt c/o chills and diffuse lower abdominal cramping with filling and draining of dialysate. She reports the dialysate was draining very slowly, and she felt like it wasn't drained completely before filling again. The patient states the pain resolved by the time she arrived to the ED and she has had no additional episodes. The patient denies any fever. She does report noting urinary urgency, frequency, and mild dysuria x2 days. She also reports some ongoing fatigue. The patient denies any nausea, vomiting, chest pain, shortness of breath, cough, lightheadedness, or dizziness.    In the ED, pt mildly tachycardic but otherwise VSSAF. CBC with WBC 3.66 and H/H 6.1/20.7. CMP with BUN/Cr 36/4.5, consistent with ESRD. Magnesium 1.3. Glucose 161. Lipase 178. Procal WNL. Lactate WNL. Blood cxs in process. UA 3+ leukocytes, >100 WBCs, many bacteria, and 26 squam- will repeat UA. CT abd/pelvis with presence of peritoneal dialysis catheter and small volume abdominopelvic free fluid.  Minimal pneumoperitoneum in the upper abdomen could be related to peritoneal dialysis catheter, though correlation with physical exam findings is recommended. Right lower quadrant renal transplant with ureteral wall thickening and mild diffuse urinary bladder wall  thickening.  Similar findings were present on the prior study in 2021. Per ED provider note general surgery reviewed imaging and found small amount of pneumoperitoneum to be expected given postoperative period. The patient was evaluated in the ED by nephrology and PD was ordered along with PD fluid samples.       Overview/Hospital Course:  Ms. Maldonado was placed in observation for abdominal pain following an issue with her PD catheter. Nephrology consulted, CT abdomen ordered. Based on CT interpretation, nephrology concerned for malposition of the PD catheter. General surgery consulted, appreciate recs. PD fluid gram stain with no organisms seen. Fluid cultures pending. Significant asymptomatic chronic anemia with Hgb 5.9, though intervention not appropriate per nephrology -- will monitor. UA infectious, but patient is not c/o symptoms at this time. Patient given 1 dose of rocephin in the ED, will hold for now per nephro recs.        Interval History: patient without complaints, awaiting procedure tomorrow.     Review of Systems   Constitutional:  Positive for chills and fatigue. Negative for appetite change and fever.   HENT:  Negative for congestion, rhinorrhea, sneezing and sore throat.    Eyes:  Negative for photophobia and visual disturbance.   Respiratory:  Negative for cough, shortness of breath and wheezing.    Cardiovascular:  Negative for chest pain and leg swelling.   Gastrointestinal:  Positive for abdominal pain (with PD, now resolved) and diarrhea (intermittent). Negative for nausea and vomiting.   Genitourinary:  Positive for dysuria (mild), frequency and urgency. Negative for difficulty urinating and flank pain.   Musculoskeletal:  Negative for arthralgias, back pain, myalgias and neck pain.   Skin:  Negative for color change, pallor, rash and wound.   Allergic/Immunologic: Positive for immunocompromised state.   Neurological:  Negative for dizziness, syncope, weakness, light-headedness and headaches.    Psychiatric/Behavioral:  Negative for agitation, confusion and hallucinations. The patient is not nervous/anxious.    Objective:     Vital Signs (Most Recent):  Temp: 98.1 °F (36.7 °C) (04/03/22 1153)  Pulse: 91 (04/03/22 1153)  Resp: 17 (04/03/22 1153)  BP: (!) 141/81 (04/03/22 1153)  SpO2: 99 % (04/03/22 1153)   Vital Signs (24h Range):  Temp:  [98.1 °F (36.7 °C)-98.7 °F (37.1 °C)] 98.1 °F (36.7 °C)  Pulse:  [84-91] 91  Resp:  [14-18] 17  SpO2:  [98 %-100 %] 99 %  BP: (139-172)/(67-89) 141/81     Weight: 76.5 kg (168 lb 10.4 oz)  Body mass index is 27.22 kg/m².    Intake/Output Summary (Last 24 hours) at 4/3/2022 1319  Last data filed at 4/3/2022 0500  Gross per 24 hour   Intake 840 ml   Output 1400 ml   Net -560 ml      Physical Exam  Vitals and nursing note reviewed.   Constitutional:       General: She is not in acute distress.     Appearance: She is obese.   HENT:      Mouth/Throat:      Mouth: Mucous membranes are moist.   Eyes:      General: No scleral icterus.     Extraocular Movements: Extraocular movements intact.      Pupils: Pupils are equal, round, and reactive to light.   Cardiovascular:      Rate and Rhythm: Normal rate and regular rhythm.   Pulmonary:      Effort: Pulmonary effort is normal. No respiratory distress.   Abdominal:      General: There is no distension.      Palpations: Abdomen is soft.   Musculoskeletal:      Right lower leg: No edema.      Left lower leg: No edema.      Comments: PD catheter with clean exit site, no purulence or erythema and no tenderness of tunnel   Skin:     Coloration: Skin is not jaundiced.   Neurological:      General: No focal deficit present.      Mental Status: She is alert.   Psychiatric:         Mood and Affect: Affect is flat.       Significant Labs: All pertinent labs within the past 24 hours have been reviewed.    Significant Imaging: I have reviewed all pertinent imaging results/findings within the past 24 hours.      Assessment/Plan:      *  Hemodialysis catheter malfunction  Patient reports abdominal cramping with filling and draining of dialysate. Resolved once patient arrived to the ED and has not re occurred.     OR tomorrow for PD catheter revision vs. Replacement vs. Removal  - general surgery consulted  - Patient currently getting output PD training, presented to the hospital for pain during fill and drain. PD fluid analysis did not show any evidence of infection. CT scan showed that the PD catheter is in the right upper quadrant. General surgery planning to take her to OR on Monday for PD catheter revision.    - Nephrology following. Ordered PD labs but holding off on abx for now.   - Will do one cycle every night with 2 Li, 1.5%, 2 hrs every night over the weekend.   - CT abd/pelvis reviewed.    Acute cystitis with hematuria  UA with 26 squams, will repeat. Patient c/o of urinary urgency, frequency, and mild dysuria.   - Will treat with rocephin while awaiting repeat.  - 2/4 SIRS (WBC 3.66, )  - Follow urine cx -- GRAM NEGATIVE KERA     Anemia of chronic kidney failure, stage 5  Current CBC reviewed-   Lab Results   Component Value Date    HGB 6.1 (L) 03/31/2022    HCT 20.7 (L) 03/31/2022     -ferritin elevated so IV iron not ideal also patient has smoldering myeloma which makes epo not ideal  -in addition patient is being evaluated for kidney transplant so would not recommend giving blood  -her degree of anemia is significant for ESKD and chronic kidney disease  -Nephrology recommends evaluation for alternate causes of anemia  -Consult hematology, appreciate recs.    End stage kidney disease  -Nephrology consulted, appreciate recs.  -just starting PD and PD catheter placed 3/11/22 per patient  -here for evaluation of pain with filling and draining of dialysate  -no abdominal pain currently  -has 2 or more soft BMs per day  -CT AP with no evidence of catheter migration. There is a small amount of pneumoperitoneum which is expected given  "postoperative period. This was discussed with General surgery by ED provider, who agrees that this is an expected finding.  -PD labs to assess for infection  -Hold off on abx per nephrology recs.    MGUS (monoclonal gammopathy of unknown significance)  - she is followed for her monoclonal gammopathy of uncertain significance at [facility] by Dr. Lau. She states she had an appointment with Dr. Lau in January 2022  -Per hematology note 1/20/22:  "- MGUS labs overall appear stable (M-spike historically had ranged between 1.4-1.8 with recent downtrend, IgG without large increase) with the exception of kappa and lambda free light chains which have increased but this is likely due to renal insufficiency. LDH wnl, B2M elevated, IgM low, IgA/M wnl. B2M can be elevated in the setting of renal insufficiency.  - BM asp/bx notes mild kappa-skewed plasmacytosis but unfortunately was insufficient core sample. No abnormalities detected by FISH, cytogen wnl.  - Will need 24 hour UPEP  - PET/CT without evidence for lytic lesions  - Continues on immunosuppressives post renal transplant. Discussed with her nephrologist Dr. Bonilla the possibility of renal biopsy, he will pass along the information to his transplant colleagues."    Hypomagnesemia  Magnesium reviewed- Recent Labs   Lab 03/31/22  1840   MG 1.3*    Will replace electrolytes and continue to monitor closely.    Chronic immunosuppression with Prograf  -am tacro trough  -Continue home dosage    Kidney transplant status, living unrelated donor - 12/2/14  -now failed  -continue home tacro 3/2 and mycophenolate 500/500  -am tacro trough    Obesity (BMI 30.0-34.9)  Body mass index is 28.08 kg/m². Obesity complicates all aspects of disease management from diagnostic modalities to treatment.     Chronic kidney disease-mineral and bone disorder  -continue home sevelamer 800 tid wm    Hyperlipidemia   Patient is chronically on statin.will continue for now. " Monitor clinically. Last LDL was   Lab Results   Component Value Date    LDLCALC 118.4 12/23/2021       Type 2 diabetes mellitus, uncontrolled, with renal complications  Patient's FSGs are controlled on current hypoglycemics.   Last A1c reviewed-   Lab Results   Component Value Date    HGBA1C 7.2 (H) 12/23/2021     Most recent fingerstick glucose reviewed- No results for input(s): POCTGLUCOSE in the last 24 hours.  Current correctional scale  Low  Maintain anti-hyperglycemic dose as follows-   Antihyperglycemics (From admission, onward)            Start     Stop Route Frequency Ordered    04/01/22 2100  insulin detemir U-100 pen 16 Units         -- SubQ Nightly 04/01/22 0214    04/01/22 0103  insulin aspart U-100 pen 0-5 Units         -- SubQ Before meals & nightly PRN 04/01/22 0005      -Will start low dose sliding scale insulin, decrease long acting insulin to 16U qhs.   -Diabetic/renal diet, goal -180  -Accuchecks AC/HS    VTE Risk Mitigation (From admission, onward)         Ordered     Place sequential compression device  Until discontinued         04/01/22 0005     IP VTE HIGH RISK PATIENT  Once         04/01/22 0005                Discharge Planning   KRISTEN: 4/5/2022     Code Status: Full Code   Is the patient medically ready for discharge?: No    Reason for patient still in hospital (select all that apply): Treatment and Consult recommendations                     Cliff Arroyo PA-C  Department of Hospital Medicine   Roger Whittaker - Telemetry Stepdown ([place]-[facility])

## 2022-04-03 NOTE — PLAN OF CARE
Problem: Adult Inpatient Plan of Care  Goal: Plan of Care Review  Outcome: Ongoing, Not Progressing     Problem: Diabetes Comorbidity  Goal: Blood Glucose Level Within Targeted Range  Outcome: Ongoing, Not Progressing     Problem: Renal Function Impairment (Acute Kidney Injury/Impairment)  Goal: Effective Renal Function  Outcome: Ongoing, Not Progressing     Problem: Fall Injury Risk  Goal: Absence of Fall and Fall-Related Injury  Outcome: Ongoing, Not Progressing     Pt remained free from falls or injuries this shift. Pt independent in repositioning. No skin breakdown noticed. Pt rested well through the night. Refused midnight VS. PD completed yesterday evening. Accuchecks ac//hs. No complaints

## 2022-04-03 NOTE — ANESTHESIA PREPROCEDURE EVALUATION
[facility]  Anesthesia Pre-Operative Evaluation         Patient Name: Elizabeth Maldonado  YOB: 1965  MRN: [id number]    SUBJECTIVE:     Pre-operative evaluation for Procedure(s) (LRB):  REVISION OF PROCEDURE INVOLVING PERITONEAL DIALYSIS CATHETER, LAPAROSCOPIC (N/A)     04/03/2022    Elizabeth Maldonado is a 57 y.o. female w/ a significant PMHx of renal failure 2/2 T2DM, renovascular HTN, s/p failed kidney transplant (on chronic immunosuppression), now ESRD on PD (placed 3/11, resumed PD 3/29). Patient presented to ED from PD clinic with c/o chills and diffuse lower abdominal cramping with filling/draining of dialysate. CT AP with no evidence of catheter migration; small amount of pneumoperitoneum seen and expected post-op. Continues with slow drainage of dialysate during PD session.      Patient now presents for the above procedure(s).      LDA:        Peripheral IV - Single Lumen 03/31/22 1724 20 G Right Antecubital (Active)   Site Assessment Clean;Dry;Intact;No redness;No swelling 04/03/22 0000   Extremity Assessment Distal to IV No warmth;No swelling;No redness;No abnormal discoloration 04/02/22 1954   Line Status Saline locked 04/02/22 1954   Dressing Status Clean;Dry;Intact 04/02/22 1954   Number of days: 2        Prev airway:   Placement Date: 03/11/22; Placement Time: 1217 (created via procedure documentation); Method of Intubation: Direct laryngoscopy; Mask Ventilation: Easy; Intubated: Postinduction; Blade: Bertha #3; Airway Device Size: 7.0; Cuff Inflation: Minimal occlusive pressure; Placement Verified By: Capnometry; Complicating Factors: None; Intubation Findings: Bilateral breath sounds, Atraumatic/Condition of teeth unchanged; Securment: Lips; Complications: None; Removal Date: 03/11/22;  Removal Time: 1300      Drips: None documented.       Patient Active Problem List    Diagnosis    Type 2 diabetes mellitus, uncontrolled, with renal complications    Symptomatic anemia    Allergic rhinitis    Hyperlipidemia    Depression    Chronic kidney disease-mineral and bone disorder    Obesity (BMI 30.0-34.9)    Kidney transplant status, living unrelated donor - 12/2/14    Chronic immunosuppression with Prograf    Hypomagnesemia    Acute renal failure superimposed on stage 4 chronic kidney disease    Proliferative diabetic retinopathy of both eyes without macular edema associated with type 2 diabetes mellitus    Calcification of aorta    Bacteremia due to group B Streptococcus    Chronic bilateral low back pain with bilateral sciatica    Pain of left sternoclavicular joint    Kidney dysfunction    Other proteinuria    MGUS (monoclonal gammopathy of unknown significance)    Renovascular hypertension    Acute pyelonephritis    Hyponatremia    Hyperkalemia    Pre-transplant evaluation for kidney transplant    End stage kidney disease    Anemia of chronic kidney failure, stage 5    Abdominal pain    Acute cystitis with hematuria    Hemodialysis catheter malfunction       Review of patient's allergies indicates:   Allergen Reactions    Shrimp Hives    Topamax [topiramate] Other (See Comments)     Vision changes    Zoloft [sertraline] Palpitations       Current Outpatient Medications:    Current Facility-Administered Medications:     0.9%  NaCl infusion (for blood administration), , Intravenous, Q24H PRN, Cliff Arroyo PA-C    acetaminophen tablet 650 mg, 650 mg, Oral, Q6H PRN, Veda Tierney NP    atorvastatin tablet 20 mg, 20 mg, Oral, QHS, Veda Tierney NP, 20 mg at 04/02/22 2159    carvediloL tablet 6.25 mg, 6.25 mg, Oral, QAM, 6.25 mg at 04/03/22 0709 **AND** carvediloL tablet 3.125 mg, 3.125 mg, Oral, QHS, Veda Tierney NP, 3.125 mg at 04/02/22 2159    dextrose 10% bolus 125 mL, 12.5 g, Intravenous, PRN, Veda Tierney NP     dextrose 10% bolus 250 mL, 25 g, Intravenous, PRN, Veda Tierney NP    glucagon (human recombinant) injection 1 mg, 1 mg, Intramuscular, PRN, Veda Tierney NP    glucose chewable tablet 16 g, 16 g, Oral, PRN, Veda Tierney NP    glucose chewable tablet 24 g, 24 g, Oral, PRN, Veda Tierney NP    insulin aspart U-100 pen 0-5 Units, 0-5 Units, Subcutaneous, QID (AC + HS) PRN, Veda Tierney NP, 1 Units at 22    insulin detemir U-100 pen 16 Units, 16 Units, Subcutaneous, QHS, Veda Tierney NP, 16 Units at 22    magnesium oxide tablet 800 mg, 800 mg, Oral, BID, Veda Tierney NP, 800 mg at 22 0926    mycophenolate capsule 500 mg, 500 mg, Oral, BID, Veda Tierney NP, 500 mg at 22 0926    naloxone 0.4 mg/mL injection 0.02 mg, 0.02 mg, Intravenous, PRN, Veda Tierney NP    ondansetron injection 4 mg, 4 mg, Intravenous, Q8H PRN, Veda Tierney NP    sevelamer carbonate tablet 800 mg, 800 mg, Oral, TID WM, Veda Tierney NP, 800 mg at 22 0810    sodium chloride 0.9% flush 10 mL, 10 mL, Intravenous, Q12H PRN, Veda Tierney NP    tacrolimus capsule 3 mg, 3 mg, Oral, Daily AM, 3 mg at 22 0810 **AND** tacrolimus capsule 2 mg, 2 mg, Oral, Daily PM, Veda Tierney NP, 2 mg at 22 1658    timolol maleate 0.5% ophthalmic solution 1 drop, 1 drop, Both Eyes, Daily, Veda Tierney NP, 1 drop at 22 09    zolpidem tablet 5 mg, 5 mg, Oral, Nightly PRN, Veda Tierney NP, 5 mg at 22 215    Past Surgical History:   Procedure Laterality Date    BIOPSY N/A 2020    Procedure: Biopsy;  Surgeon: Sweta Catalan MD;  Location: [facility] OR [address] [place] [address];  Service: Transplant;  Laterality: N/A;    BONE MARROW BIOPSY N/A      SECTION      x 2    KIDNEY TRANSPLANT  2014    MEDIPORT REMOVAL N/A 2019    Procedure: REMOVAL, CATHETER, CENTRAL VENOUS, TUNNELED, WITH PORT;  Surgeon: Eusebia Farrell  Provider;  Location: [facility];  Service: Radiology;  Laterality: N/A;    RENAL BIOPSY      ROTATOR CUFF REPAIR      TUBAL LIGATION         Social History     Socioeconomic History    Marital status:    Occupational History     Employer: DISABLED   Tobacco Use    Smoking status: Former Smoker     Packs/day: 1.00     Years: 20.00     Pack years: 20.00     Types: Cigarettes     Quit date: 2013     Years since quittin.5    Smokeless tobacco: Never Used    Tobacco comment: quit smoking cigarettes in 2014   Substance and Sexual Activity    Alcohol use: No    Drug use: No    Sexual activity: Yes     Partners: Male     Birth control/protection: Post-menopausal   Social History Narrative        Nutrition manager by education    Disabled    2 children    No blood transfusions    Caregiver Sister       OBJECTIVE:     Vital Signs Range (Last 24H):  Temp:  [36.7 °C (98.1 °F)-37.1 °C (98.7 °F)]   Pulse:  [84-91]   Resp:  [12-18]   BP: (139-172)/(67-89)   SpO2:  [98 %-100 %]       Significant Labs:  Lab Results   Component Value Date    WBC 4.60 2022    HGB 7.1 (L) 2022    HCT 24.4 (L) 2022     2022    CHOL 139 2022    TRIG 149 2022    HDL 29 2022    ALT 5 (L) 2022    AST 11 2022     2022    K 4.0 2022     2022    CREATININE 4.3 (H) 2022    BUN 37 (H) 2022    CO2 21 (L) 2022    TSH 1.079 2021    INR 1.0 2021    GLUF 185 (H) 2004    HGBA1C 6.5 (H) 2022       Microbiology Results (last 7 days)     Procedure Component Value Units Date/Time    Urine culture [[id number]]  (Abnormal)  (Susceptibility) Collected: 22    Order Status: Completed Specimen: Urine Updated: 22 0235     Urine Culture, Routine KLEBSIELLA PNEUMONIAE  > 100,000 cfu/ml      Narrative:      Specimen Source->Urine    Blood Culture #1 **CANNOT BE ORDERED STAT** [[id number]]  Collected: 03/31/22 1839    Order Status: Completed Specimen: Blood from Peripheral, Antecubital, Left Updated: 04/02/22 2022     Blood Culture, Routine No Growth to date      No Growth to date      No Growth to date    Blood Culture #2 **CANNOT BE ORDERED STAT** [[id number]] Collected: 03/31/22 1839    Order Status: Completed Specimen: Blood from Peripheral, Antecubital, Right Updated: 04/02/22 2022     Blood Culture, Routine No Growth to date      No Growth to date      No Growth to date    Aerobic culture [[id number]] Collected: 04/01/22 0231    Order Status: Completed Specimen: Peritoneal Fluid Updated: 04/02/22 1305     Aerobic Bacterial Culture No growth    Culture, Anaerobe [[id number]] Collected: 04/01/22 0231    Order Status: Completed Specimen: Peritoneal Fluid Updated: 04/02/22 1233     Anaerobic Culture Culture in progress    Urine culture [[id number]]  (Abnormal) Collected: 04/01/22 0200    Order Status: Completed Specimen: Urine Updated: 04/02/22 0739     Urine Culture, Routine GRAM NEGATIVE KERA  >100,000 cfu/ml  Identification and susceptibility pending      Narrative:      Specimen Source->Urine    Gram stain [[id number]] Collected: 04/01/22 0231    Order Status: Completed Specimen: Peritoneal Fluid Updated: 04/01/22 0831     Gram Stain Result No WBC's      No organisms seen    Fungus culture [[id number]] Collected: 04/01/22 0231    Order Status: Sent Specimen: Peritoneal Fluid Updated: 04/01/22 0243    Aerobic culture [[id number]]     Order Status: Canceled Specimen: Peritoneal Fluid           Diagnostic Studies:    EKG:   Results for orders placed or performed during the hospital encounter of 03/29/22   EKG 12-lead    Collection Time: 03/29/22  5:55 PM    Narrative    Test Reason : R42,    Vent. Rate : 088 BPM     Atrial Rate : 088 BPM     P-R Int : 140 ms          QRS Dur : 070 ms      QT Int : 372 ms       P-R-T Axes : 055 008 052 degrees     QTc Int : 450 ms    Normal sinus rhythm  Cannot rule out  "Anterior infarct ,age undetermined  Abnormal ECG  When compared with ECG of 26-JAN-2022 09:50,  No significant change was found  Confirmed by Carlo Torres MD ([id number]) on 3/30/2022 8:55:38 PM    Referred By: [email or web address]   SELF           Confirmed By:Carlo Torres MD       2D ECHO:  TTE:  Results for orders placed or performed during the hospital encounter of 01/04/22   Echo   Result Value Ref Range    Ascending aorta 2.58 cm    STJ 2.15 cm    AV mean gradient 6 mmHg    Ao peak laura 1.69 m/s    Ao VTI 33.81 cm    IVRT 91.34 msec    IVS 1.27 (A) 0.6 - 1.1 cm    LA size 3.67 cm    Left Atrium Major Axis 5.44 cm    Left Atrium Minor Axis 5.05 cm    LVIDd 4.10 3.5 - 6.0 cm    LVIDs 2.60 2.1 - 4.0 cm    LVOT diameter 2.00 cm    LVOT peak VTI 23.76 cm    Posterior Wall 1.22 (A) 0.6 - 1.1 cm    MV Peak A Laura 1.04 m/s    E wave deceleration time 100.02 msec    MV Peak E Laura 0.81 m/s    PV Peak D Laura 0.40 m/s    PV Peak S Laura 0.51 m/s    RA Major Axis 4.19 cm    RA Width 3.74 cm    RVDD 3.51 cm    Sinus 3.04 cm    TAPSE 2.24 cm    TR Max Laura 2.16 m/s    TDI LATERAL 0.06 m/s    TDI SEPTAL 0.05 m/s    LA WIDTH 3.93 cm    MV stenosis pressure 1/2 time 29.01 ms    LV Diastolic Volume 57.34 mL    LV Systolic Volume 19.70 mL    RV S' 15.85 cm/s    LVOT peak laura 1.09 m/s    LA volume (mod) 63.95 cm3    MV "A" wave duration 7.14 msec    LV LATERAL E/E' RATIO 13.50 m/s    LV SEPTAL E/E' RATIO 16.20 m/s    FS 37 %    LA volume 64.21 cm3    LV mass 181.37 g    Left Ventricle Relative Wall Thickness 0.60 cm    AV valve area 2.21 cm2    AV Velocity Ratio 0.64     AV index (prosthetic) 0.70     MV valve area p 1/2 method 7.58 cm2    E/A ratio 0.78     Mean e' 0.06 m/s    Pulm vein S/D ratio 1.28     LVOT area 3.1 cm2    LVOT stroke volume 74.61 cm3    AV peak gradient 11 mmHg    E/E' ratio 14.73 m/s    LV Systolic Volume Index 10.5 mL/m2    LV Diastolic Volume Index 30.50 mL/m2    LA Volume Index 34.2 mL/m2    LV Mass Index 96 g/m2    " Triscuspid Valve Regurgitation Peak Gradient 19 mmHg    LA Volume Index (Mod) 34.0 mL/m2    BSA 1.94 m2    Right Atrial Pressure (from IVC) 3 mmHg    EF 70 %    MV mean gradient 3 mmHg    TV rest pulmonary artery pressure 22 mmHg    Mitral Valve Heart Rate 93 bpm    Narrative    · The left ventricle is normal in size with concentric hypertrophy and   normal systolic function.  · The estimated ejection fraction is 65-70%.  · Grade I left ventricular diastolic dysfunction.  · There is significant mitral annular calcification. Mean gradient 3mmHg   at heart rate 93bpm.  · Normal right ventricular size with normal right ventricular systolic   function.  · The estimated PA systolic pressure is 22 mmHg.  · Normal central venous pressure (3 mmHg).  · Small circumferential pericardial effusion.          MEHRDAD:  No results found. However, due to the size of the patient record, not all encounters were searched. Please check Results Review for a complete set of results.    ASSESSMENT/PLAN:           Pre-op Assessment    I have reviewed the Patient Summary Reports.     I have reviewed the Nursing Notes. I have reviewed the NPO Status.   I have reviewed the Medications.     Review of Systems  Anesthesia Hx:  No problems with previous Anesthesia  History of prior surgery of interest to airway management or planning: Denies Family Hx of Anesthesia complications.   Denies Personal Hx of Anesthesia complications.   Social:  No Alcohol Use, Former Smoker    Hematology/Oncology:     Oncology Normal    -- Anemia:   EENT/Dental:EENT/Dental Normal   Cardiovascular:   Hypertension hyperlipidemia EKG 1/26/22:  Normal sinus rhythm   Normal ECG   When compared with ECG of 04-JAN-2022 10:21,   No significant change was found   Confirmed by Lamberto Glasgow MD ([id number]) on 1/26/2022 6:21:35 PM  Functional Capacity good / => 4 METS    Pulmonary:  Pulmonary Normal  Denies Shortness of breath.  Denies Recent URI.    Renal/:   Chronic Renal Disease,  ESRD, Dialysis, renal hypertension on PD   Hepatic/GI:  Hepatic/GI Normal    Musculoskeletal:   Arthritis     Neurological:  Neurology Normal    Endocrine:   Diabetes, type 2    Dermatological:  Skin Normal    Psych:   depression          Physical Exam  General: Well nourished, Cooperative, Alert and Oriented    Airway:  Mallampati: III   Mouth Opening: Normal  TM Distance: Normal  Tongue: Normal  Neck ROM: Normal ROM    Dental:  Dentures    Chest/Lungs:  Normal Respiratory Rate    Heart:  Rate: Normal  Rhythm: Regular Rhythm        Anesthesia Plan  Type of Anesthesia, risks & benefits discussed:    Anesthesia Type: Gen ETT  Intra-op Monitoring Plan: Standard ASA Monitors  Post Op Pain Control Plan: multimodal analgesia and IV/PO Opioids PRN  Induction:  IV  Airway Plan: Direct, Post-Induction  Informed Consent: Informed consent signed with the Patient and all parties understand the risks and agree with anesthesia plan.  All questions answered.   ASA Score: 3  Day of Surgery Review of History & Physical: H&P Update referred to the surgeon/provider.    Ready For Surgery From Anesthesia Perspective.     .

## 2022-04-03 NOTE — PLAN OF CARE
Problem: Adult Inpatient Plan of Care  Goal: Plan of Care Review  Outcome: Ongoing, Progressing     Problem: Adult Inpatient Plan of Care  Goal: Absence of Hospital-Acquired Illness or Injury  Outcome: Ongoing, Progressing     Problem: Diabetes Comorbidity  Goal: Blood Glucose Level Within Targeted Range  Outcome: Ongoing, Progressing     Pt. AAOX4, VSS, no falls or injuries during shift, safety maintained. Call light in reach, bed locked and in lowest position, side rails up x2. Blood glucose monitored and controlled, no acute events. COVID screen negative, NPO at midnight for procedure tomorrow patient is aware. Will continue to monitor.

## 2022-04-03 NOTE — PROGRESS NOTES
[facility] ([place] [address])  Nephrology  Progress Note    Patient Name: Elizabeth Maldonado  MRN: [id number]  Admission Date: 3/31/2022  Hospital Length of Stay: 0 days  Attending Provider: Jamaal Steinberg MD   Primary Care Physician: Richy Singleton NP  Principal Problem:Abdominal pain    Subjective:     HPI: 57 year old with ESKD and failed Ktx now on PD who just started 3 days ago and had abdominal pain with filling and drainage during last session. Sent here for evaluation. No other complaints.  Nephrology consulted for ESKD      Interval History: no acute events overnight    Review of patient's allergies indicates:   Allergen Reactions    Shrimp Hives    Topamax [topiramate] Other (See Comments)     Vision changes    Zoloft [sertraline] Palpitations     Current Facility-Administered Medications   Medication Frequency    0.9%  NaCl infusion (for blood administration) Q24H PRN    acetaminophen tablet 650 mg Q6H PRN    atorvastatin tablet 20 mg QHS    carvediloL tablet 6.25 mg QAM    And    carvediloL tablet 3.125 mg QHS    ceFAZolin (ANCEF) 3 g in dextrose 5 % 100 mL IVPB Once Pre-Op    dextrose 10% bolus 125 mL PRN    dextrose 10% bolus 250 mL PRN    glucagon (human recombinant) injection 1 mg PRN    glucose chewable tablet 16 g PRN    glucose chewable tablet 24 g PRN    insulin aspart U-100 pen 0-5 Units QID (AC + HS) PRN    insulin detemir U-100 pen 16 Units QHS    magnesium oxide tablet 800 mg BID    mycophenolate capsule 500 mg BID    naloxone 0.4 mg/mL injection 0.02 mg PRN    ondansetron injection 4 mg Q8H PRN    sevelamer carbonate tablet 800 mg TID WM    sodium chloride 0.9% flush 10 mL Q12H PRN    tacrolimus capsule 3 mg Daily AM    And    tacrolimus capsule 2 mg Daily PM    timolol maleate 0.5% ophthalmic solution 1 drop Daily    zolpidem tablet 5 mg Nightly PRN       Objective:     Vital Signs (Most Recent):  Temp: 98.1 °F (36.7 °C) (04/03/22 1153)  Pulse: 91  (04/03/22 1153)  Resp: 17 (04/03/22 1153)  BP: (!) 141/81 (04/03/22 1153)  SpO2: 99 % (04/03/22 1153)  O2 Device (Oxygen Therapy): room air (04/02/22 1954)   Vital Signs (24h Range):  Temp:  [98.1 °F (36.7 °C)-98.7 °F (37.1 °C)] 98.1 °F (36.7 °C)  Pulse:  [84-91] 91  Resp:  [12-18] 17  SpO2:  [98 %-100 %] 99 %  BP: (139-172)/(67-89) 141/81     Weight: 76.5 kg (168 lb 10.4 oz) (04/03/22 0548)  Body mass index is 27.22 kg/m².  Body surface area is 1.89 meters squared.    I/O last 3 completed shifts:  In: 840 [P.O.:360; Blood:280; Other:200]  Out: 1400 [Urine:1400]    Physical Exam  Constitutional:       Appearance: She is well-developed.   HENT:      Head: Normocephalic and atraumatic.   Eyes:      Pupils: Pupils are equal, round, and reactive to light.   Cardiovascular:      Rate and Rhythm: Normal rate and regular rhythm.      Heart sounds: Normal heart sounds.   Pulmonary:      Effort: Pulmonary effort is normal.      Breath sounds: Normal breath sounds.   Abdominal:      General: Bowel sounds are normal.      Palpations: Abdomen is soft.      Tenderness: There is no abdominal tenderness.   Musculoskeletal:         General: Normal range of motion.      Cervical back: Normal range of motion and neck supple.   Skin:     General: Skin is warm and dry.      Capillary Refill: Capillary refill takes less than 2 seconds.   Neurological:      Mental Status: She is alert and oriented to person, place, and time.       Significant Labs:  All labs within the past 24 hours have been reviewed.     Significant Imaging:  Labs: Reviewed    Assessment/Plan:     End stage kidney disease  Recently started on PD  PD catheter placed 3/11/22 per patient, urinates 1/2 coke can per day  Here for evaluation of pain with filling and draining of dialysate  No signs of infection or abdominal pain currently; peritoneal fluid negative for infection   No constipation; has 2 or more soft BMs per day  Tolerated manual exchange last night with no  issues   CT scan noted; PD catheter high in the abdomen; plan for OR on Monday for PD catheter revision.     Plan for cath replacement by surgery 4/4    Chronic immunosuppression with Prograf  Daily tacro trough at 0600    Kidney transplant status, living unrelated donor - 12/2/14  Now failed  Continue home tacro 3mg in AM and 2mg in PM  Mycophenolate 500mg PO BID       Chronic kidney disease-mineral and bone disorder  Continue home sevelamer 800 tid wm        Thank you for your consult. I will follow-up with patient. Please contact us if you have any additional questions.    Jose Eduardo Mace MD  Nephrology  [facility] - [facility] ([place])

## 2022-04-03 NOTE — PROGRESS NOTES
Patient doing well on exam this morning.  Continues to tolerate peritoneal dialysis session.  States is having some slow drainage of dialysate.    Plan:  - To OR tomorrow for PD catheter revision vs. Replacement vs. Removal  - Discussed plans with the patient and she is in agreement  - Consent obtained this AM, discussed risks of the operation including but not limited to bleeding, infection, scarring, damage to surrounding organs/structures, inability to salvage PD catheter, need for replacement, or possible need for removal  - Patient understands risks and had no further questions or concerns this morning  - Keep patient NPO at midnight  - Will need COVID test Monday Morning    Arthur Traore, PGY 3  General Surgery

## 2022-04-04 ENCOUNTER — ANESTHESIA (OUTPATIENT)
Dept: SURGERY | Facility: HOSPITAL | Age: 57
End: 2022-04-04
Payer: MEDICARE

## 2022-04-04 LAB
ALBUMIN SERPL ELPH-MCNC: 3.32 G/DL (ref 3.35–5.55)
ALPHA1 GLOB SERPL ELPH-MCNC: 0.41 G/DL (ref 0.17–0.41)
ALPHA2 GLOB SERPL ELPH-MCNC: 1.13 G/DL (ref 0.43–0.99)
ANION GAP SERPL CALC-SCNC: 10 MMOL/L (ref 8–16)
B-GLOBULIN SERPL ELPH-MCNC: 0.58 G/DL (ref 0.5–1.1)
BACTERIA SPEC AEROBE CULT: NO GROWTH
BACTERIA UR CULT: ABNORMAL
BASOPHILS # BLD AUTO: 0.01 K/UL (ref 0–0.2)
BASOPHILS NFR BLD: 0.2 % (ref 0–1.9)
BUN SERPL-MCNC: 39 MG/DL (ref 6–20)
CALCIUM SERPL-MCNC: 8.8 MG/DL (ref 8.7–10.5)
CHLORIDE SERPL-SCNC: 109 MMOL/L (ref 95–110)
CO2 SERPL-SCNC: 22 MMOL/L (ref 23–29)
COPPER SERPL-MCNC: 1134 UG/L (ref 810–1990)
CREAT SERPL-MCNC: 4.3 MG/DL (ref 0.5–1.4)
DIFFERENTIAL METHOD: ABNORMAL
EOSINOPHIL # BLD AUTO: 0.1 K/UL (ref 0–0.5)
EOSINOPHIL NFR BLD: 1.8 % (ref 0–8)
ERYTHROCYTE [DISTWIDTH] IN BLOOD BY AUTOMATED COUNT: 14.4 % (ref 11.5–14.5)
EST. GFR  (AFRICAN AMERICAN): 12.2 ML/MIN/1.73 M^2
EST. GFR  (NON AFRICAN AMERICAN): 10.6 ML/MIN/1.73 M^2
GAMMA GLOB SERPL ELPH-MCNC: 2.08 G/DL (ref 0.67–1.58)
GLUCOSE SERPL-MCNC: 103 MG/DL (ref 70–110)
HCT VFR BLD AUTO: 24.8 % (ref 37–48.5)
HGB BLD-MCNC: 7.5 G/DL (ref 12–16)
IMM GRANULOCYTES # BLD AUTO: 0.06 K/UL (ref 0–0.04)
IMM GRANULOCYTES NFR BLD AUTO: 1.4 % (ref 0–0.5)
INTERPRETATION SERPL IFE-IMP: NORMAL
KAPPA LC SER QL IA: 16.34 MG/DL (ref 0.33–1.94)
KAPPA LC/LAMBDA SER IA: 4.04 (ref 0.26–1.65)
LAMBDA LC SER QL IA: 4.04 MG/DL (ref 0.57–2.63)
LYMPHOCYTES # BLD AUTO: 1 K/UL (ref 1–4.8)
LYMPHOCYTES NFR BLD: 23.7 % (ref 18–48)
MCH RBC QN AUTO: 26.5 PG (ref 27–31)
MCHC RBC AUTO-ENTMCNC: 30.2 G/DL (ref 32–36)
MCV RBC AUTO: 88 FL (ref 82–98)
MONOCYTES # BLD AUTO: 0.6 K/UL (ref 0.3–1)
MONOCYTES NFR BLD: 13.4 % (ref 4–15)
NEUTROPHILS # BLD AUTO: 2.6 K/UL (ref 1.8–7.7)
NEUTROPHILS NFR BLD: 59.5 % (ref 38–73)
NRBC BLD-RTO: 0 /100 WBC
PLATELET # BLD AUTO: 182 K/UL (ref 150–450)
PMV BLD AUTO: 11.3 FL (ref 9.2–12.9)
POCT GLUCOSE: 108 MG/DL (ref 70–110)
POCT GLUCOSE: 154 MG/DL (ref 70–110)
POCT GLUCOSE: 269 MG/DL (ref 70–110)
POTASSIUM SERPL-SCNC: 4.5 MMOL/L (ref 3.5–5.1)
PROT SERPL-MCNC: 7.5 G/DL (ref 6–8.4)
RBC # BLD AUTO: 2.83 M/UL (ref 4–5.4)
SODIUM SERPL-SCNC: 141 MMOL/L (ref 136–145)
WBC # BLD AUTO: 4.39 K/UL (ref 3.9–12.7)

## 2022-04-04 PROCEDURE — 63600175 PHARM REV CODE 636 W HCPCS: Mod: NTX | Performed by: NURSE PRACTITIONER

## 2022-04-04 PROCEDURE — 36000708 HC OR TIME LEV III 1ST 15 MIN: Mod: NTX | Performed by: SURGERY

## 2022-04-04 PROCEDURE — 85025 COMPLETE CBC W/AUTO DIFF WBC: CPT | Mod: NTX

## 2022-04-04 PROCEDURE — 94799 UNLISTED PULMONARY SVC/PX: CPT | Mod: NTX

## 2022-04-04 PROCEDURE — 82962 GLUCOSE BLOOD TEST: CPT | Mod: NTX | Performed by: SURGERY

## 2022-04-04 PROCEDURE — 00840 ANES IPER PX LOWER ABD NOS: CPT | Mod: NTX | Performed by: SURGERY

## 2022-04-04 PROCEDURE — 99226 PR SUBSEQUENT OBSERVATION CARE,LEVEL III: CPT | Mod: NTX,,,

## 2022-04-04 PROCEDURE — 99214 PR OFFICE/OUTPT VISIT, EST, LEVL IV, 30-39 MIN: ICD-10-PCS | Mod: NTX,,, | Performed by: INTERNAL MEDICINE

## 2022-04-04 PROCEDURE — 49325 LAP REVISION PERM IP CATH: CPT | Mod: NTX,,, | Performed by: SURGERY

## 2022-04-04 PROCEDURE — 25000003 PHARM REV CODE 250: Mod: NTX

## 2022-04-04 PROCEDURE — 63600175 PHARM REV CODE 636 W HCPCS: Mod: NTX | Performed by: STUDENT IN AN ORGANIZED HEALTH CARE EDUCATION/TRAINING PROGRAM

## 2022-04-04 PROCEDURE — 49325 PR LAP REVISE INTRAPERITONEAL CATHETER: ICD-10-PCS | Mod: NTX,,, | Performed by: SURGERY

## 2022-04-04 PROCEDURE — 71000015 HC POSTOP RECOV 1ST HR: Mod: NTX | Performed by: SURGERY

## 2022-04-04 PROCEDURE — 25000003 PHARM REV CODE 250: Mod: NTX | Performed by: NURSE PRACTITIONER

## 2022-04-04 PROCEDURE — 99214 OFFICE O/P EST MOD 30 MIN: CPT | Mod: 25,NTX,, | Performed by: SURGERY

## 2022-04-04 PROCEDURE — G0378 HOSPITAL OBSERVATION PER HR: HCPCS | Mod: NTX

## 2022-04-04 PROCEDURE — 37000009 HC ANESTHESIA EA ADD 15 MINS: Mod: NTX | Performed by: SURGERY

## 2022-04-04 PROCEDURE — 25000003 PHARM REV CODE 250: Mod: NTX | Performed by: STUDENT IN AN ORGANIZED HEALTH CARE EDUCATION/TRAINING PROGRAM

## 2022-04-04 PROCEDURE — D9220A PRA ANESTHESIA: ICD-10-PCS | Mod: NTX,,, | Performed by: ANESTHESIOLOGY

## 2022-04-04 PROCEDURE — 25000003 PHARM REV CODE 250: Mod: NTX | Performed by: SURGERY

## 2022-04-04 PROCEDURE — 99226 PR SUBSEQUENT OBSERVATION CARE,LEVEL III: ICD-10-PCS | Mod: NTX,,,

## 2022-04-04 PROCEDURE — 99900035 HC TECH TIME PER 15 MIN (STAT): Mod: NTX

## 2022-04-04 PROCEDURE — 94761 N-INVAS EAR/PLS OXIMETRY MLT: CPT | Mod: NTX

## 2022-04-04 PROCEDURE — 36415 COLL VENOUS BLD VENIPUNCTURE: CPT | Mod: NTX

## 2022-04-04 PROCEDURE — 80048 BASIC METABOLIC PNL TOTAL CA: CPT | Mod: NTX

## 2022-04-04 PROCEDURE — 99214 PR OFFICE/OUTPT VISIT, EST, LEVL IV, 30-39 MIN: ICD-10-PCS | Mod: 25,NTX,, | Performed by: SURGERY

## 2022-04-04 PROCEDURE — 25000003 PHARM REV CODE 250: Mod: NTX | Performed by: ANESTHESIOLOGY

## 2022-04-04 PROCEDURE — 36000709 HC OR TIME LEV III EA ADD 15 MIN: Mod: NTX | Performed by: SURGERY

## 2022-04-04 PROCEDURE — D9220A PRA ANESTHESIA: Mod: NTX,,, | Performed by: ANESTHESIOLOGY

## 2022-04-04 PROCEDURE — 37000008 HC ANESTHESIA 1ST 15 MINUTES: Mod: NTX | Performed by: SURGERY

## 2022-04-04 PROCEDURE — 71000033 HC RECOVERY, INTIAL HOUR: Mod: NTX | Performed by: SURGERY

## 2022-04-04 PROCEDURE — 99214 OFFICE O/P EST MOD 30 MIN: CPT | Mod: NTX,,, | Performed by: INTERNAL MEDICINE

## 2022-04-04 RX ORDER — KETAMINE HCL IN 0.9 % NACL 50 MG/5 ML
SYRINGE (ML) INTRAVENOUS
Status: DISCONTINUED | OUTPATIENT
Start: 2022-04-04 | End: 2022-04-04

## 2022-04-04 RX ORDER — ACETAMINOPHEN 325 MG/1
650 TABLET ORAL EVERY 6 HOURS PRN
Status: DISCONTINUED | OUTPATIENT
Start: 2022-04-04 | End: 2022-04-04 | Stop reason: HOSPADM

## 2022-04-04 RX ORDER — MORPHINE SULFATE 2 MG/ML
2 INJECTION, SOLUTION INTRAMUSCULAR; INTRAVENOUS ONCE
Status: DISCONTINUED | OUTPATIENT
Start: 2022-04-04 | End: 2022-04-05 | Stop reason: HOSPADM

## 2022-04-04 RX ORDER — SODIUM CHLORIDE 9 MG/ML
INJECTION, SOLUTION INTRAVENOUS CONTINUOUS PRN
Status: DISCONTINUED | OUTPATIENT
Start: 2022-04-04 | End: 2022-04-04

## 2022-04-04 RX ORDER — DEXAMETHASONE SODIUM PHOSPHATE 4 MG/ML
INJECTION, SOLUTION INTRA-ARTICULAR; INTRALESIONAL; INTRAMUSCULAR; INTRAVENOUS; SOFT TISSUE
Status: DISCONTINUED | OUTPATIENT
Start: 2022-04-04 | End: 2022-04-04

## 2022-04-04 RX ORDER — NEOSTIGMINE METHYLSULFATE 0.5 MG/ML
INJECTION, SOLUTION INTRAVENOUS
Status: DISCONTINUED | OUTPATIENT
Start: 2022-04-04 | End: 2022-04-04

## 2022-04-04 RX ORDER — LIDOCAINE HCL/PF 100 MG/5ML
SYRINGE (ML) INTRAVENOUS
Status: DISCONTINUED | OUTPATIENT
Start: 2022-04-04 | End: 2022-04-04

## 2022-04-04 RX ORDER — PROPOFOL 10 MG/ML
VIAL (ML) INTRAVENOUS
Status: DISCONTINUED | OUTPATIENT
Start: 2022-04-04 | End: 2022-04-04

## 2022-04-04 RX ORDER — SODIUM CHLORIDE 9 MG/ML
INJECTION, SOLUTION INTRAVENOUS CONTINUOUS
Status: CANCELLED | OUTPATIENT
Start: 2022-04-04

## 2022-04-04 RX ORDER — MIDAZOLAM HYDROCHLORIDE 1 MG/ML
INJECTION INTRAMUSCULAR; INTRAVENOUS
Status: DISCONTINUED | OUTPATIENT
Start: 2022-04-04 | End: 2022-04-04

## 2022-04-04 RX ORDER — BUPIVACAINE HYDROCHLORIDE 2.5 MG/ML
INJECTION, SOLUTION EPIDURAL; INFILTRATION; INTRACAUDAL
Status: DISCONTINUED | OUTPATIENT
Start: 2022-04-04 | End: 2022-04-04 | Stop reason: HOSPADM

## 2022-04-04 RX ORDER — SIMETHICONE 80 MG
2 TABLET,CHEWABLE ORAL 3 TIMES DAILY
Status: DISCONTINUED | OUTPATIENT
Start: 2022-04-04 | End: 2022-04-05 | Stop reason: HOSPADM

## 2022-04-04 RX ORDER — OXYCODONE HYDROCHLORIDE 5 MG/1
5 TABLET ORAL EVERY 6 HOURS PRN
Status: DISCONTINUED | OUTPATIENT
Start: 2022-04-04 | End: 2022-04-05 | Stop reason: HOSPADM

## 2022-04-04 RX ORDER — CEFAZOLIN SODIUM/WATER 2 G/20 ML
SYRINGE (ML) INTRAVENOUS
Status: DISCONTINUED | OUTPATIENT
Start: 2022-04-04 | End: 2022-04-04

## 2022-04-04 RX ORDER — MUPIROCIN 20 MG/G
1 OINTMENT TOPICAL 2 TIMES DAILY
Status: CANCELLED | OUTPATIENT
Start: 2022-04-04 | End: 2022-04-09

## 2022-04-04 RX ORDER — FOLIC ACID 1 MG/1
1 TABLET ORAL DAILY
Status: DISCONTINUED | OUTPATIENT
Start: 2022-04-04 | End: 2022-04-05 | Stop reason: HOSPADM

## 2022-04-04 RX ORDER — PHENYLEPHRINE HCL IN 0.9% NACL 1 MG/10 ML
SYRINGE (ML) INTRAVENOUS
Status: DISCONTINUED | OUTPATIENT
Start: 2022-04-04 | End: 2022-04-04

## 2022-04-04 RX ORDER — HYDROCODONE BITARTRATE AND ACETAMINOPHEN 5; 325 MG/1; MG/1
1 TABLET ORAL EVERY 4 HOURS PRN
Status: CANCELLED | OUTPATIENT
Start: 2022-04-04

## 2022-04-04 RX ORDER — ROCURONIUM BROMIDE 10 MG/ML
INJECTION, SOLUTION INTRAVENOUS
Status: DISCONTINUED | OUTPATIENT
Start: 2022-04-04 | End: 2022-04-04

## 2022-04-04 RX ADMIN — ATORVASTATIN CALCIUM 20 MG: 20 TABLET, FILM COATED ORAL at 08:04

## 2022-04-04 RX ADMIN — NEOSTIGMINE METHYLSULFATE 4 MG: 0.5 INJECTION, SOLUTION INTRAVENOUS at 11:04

## 2022-04-04 RX ADMIN — OXYCODONE 5 MG: 5 TABLET ORAL at 11:04

## 2022-04-04 RX ADMIN — SEVELAMER CARBONATE 800 MG: 800 TABLET, FILM COATED ORAL at 05:04

## 2022-04-04 RX ADMIN — CARVEDILOL 3.12 MG: 3.12 TABLET, FILM COATED ORAL at 08:04

## 2022-04-04 RX ADMIN — CARVEDILOL 6.25 MG: 6.25 TABLET, FILM COATED ORAL at 06:04

## 2022-04-04 RX ADMIN — INSULIN ASPART 1 UNITS: 100 INJECTION, SOLUTION INTRAVENOUS; SUBCUTANEOUS at 09:04

## 2022-04-04 RX ADMIN — TIMOLOL MALEATE 1 DROP: 5 SOLUTION/ DROPS OPHTHALMIC at 08:04

## 2022-04-04 RX ADMIN — SEVELAMER CARBONATE 800 MG: 800 TABLET, FILM COATED ORAL at 01:04

## 2022-04-04 RX ADMIN — Medication 3 G: at 10:04

## 2022-04-04 RX ADMIN — GLYCOPYRROLATE 0.8 MG: 0.2 INJECTION INTRAMUSCULAR; INTRAVENOUS at 10:04

## 2022-04-04 RX ADMIN — LIDOCAINE HYDROCHLORIDE 60 MG: 20 INJECTION, SOLUTION INTRAVENOUS at 10:04

## 2022-04-04 RX ADMIN — FOLIC ACID 1 MG: 1 TABLET ORAL at 03:04

## 2022-04-04 RX ADMIN — ROCURONIUM BROMIDE 10 MG: 10 INJECTION, SOLUTION INTRAVENOUS at 10:04

## 2022-04-04 RX ADMIN — SIMETHICONE 160 MG: 80 TABLET, CHEWABLE ORAL at 08:04

## 2022-04-04 RX ADMIN — Medication 20 MG: at 10:04

## 2022-04-04 RX ADMIN — Medication 800 MG: at 08:04

## 2022-04-04 RX ADMIN — ZOLPIDEM TARTRATE 5 MG: 5 TABLET ORAL at 10:04

## 2022-04-04 RX ADMIN — Medication 100 MCG: at 10:04

## 2022-04-04 RX ADMIN — ROCURONIUM BROMIDE 30 MG: 10 INJECTION, SOLUTION INTRAVENOUS at 10:04

## 2022-04-04 RX ADMIN — SIMETHICONE 160 MG: 80 TABLET, CHEWABLE ORAL at 03:04

## 2022-04-04 RX ADMIN — MIDAZOLAM HYDROCHLORIDE 2 MG: 1 INJECTION, SOLUTION INTRAMUSCULAR; INTRAVENOUS at 10:04

## 2022-04-04 RX ADMIN — INSULIN DETEMIR 16 UNITS: 100 INJECTION, SOLUTION SUBCUTANEOUS at 09:04

## 2022-04-04 RX ADMIN — TACROLIMUS 2 MG: 1 CAPSULE ORAL at 05:04

## 2022-04-04 RX ADMIN — INSULIN ASPART 2 UNITS: 100 INJECTION, SOLUTION INTRAVENOUS; SUBCUTANEOUS at 05:04

## 2022-04-04 RX ADMIN — ACETAMINOPHEN 650 MG: 325 TABLET ORAL at 11:04

## 2022-04-04 RX ADMIN — SODIUM CHLORIDE: 9 INJECTION, SOLUTION INTRAVENOUS at 10:04

## 2022-04-04 RX ADMIN — ONDANSETRON 4 MG: 2 INJECTION INTRAMUSCULAR; INTRAVENOUS at 10:04

## 2022-04-04 RX ADMIN — TACROLIMUS 3 MG: 1 CAPSULE ORAL at 08:04

## 2022-04-04 RX ADMIN — PROPOFOL 150 MG: 10 INJECTION, EMULSION INTRAVENOUS at 10:04

## 2022-04-04 RX ADMIN — MYCOPHENOLATE MOFETIL 500 MG: 250 CAPSULE ORAL at 08:04

## 2022-04-04 RX ADMIN — DEXAMETHASONE SODIUM PHOSPHATE 4 MG: 4 INJECTION INTRA-ARTICULAR; INTRALESIONAL; INTRAMUSCULAR; INTRAVENOUS; SOFT TISSUE at 10:04

## 2022-04-04 NOTE — PLAN OF CARE
Problem: Adult Inpatient Plan of Care  Goal: Plan of Care Review  Outcome: Ongoing, Progressing     Problem: Diabetes Comorbidity  Goal: Blood Glucose Level Within Targeted Range  Outcome: Ongoing, Progressing     Problem: Renal Function Impairment (Acute Kidney Injury/Impairment)  Goal: Effective Renal Function  Outcome: Ongoing, Progressing     Problem: Fall Injury Risk  Goal: Absence of Fall and Fall-Related Injury  Outcome: Ongoing, Progressing   POC reviewed with pt who verbalized understanding. AAOx4. VSS. Remains free of falls and injury. PD cath in place. No complaints of pain or nausea. Up independently to bathroom. NPO since 0000. BG monitored at bedtime with no coverage needed per MAR. Resting well between care. Call light in reach and rounds made for safety.  Will continue to monitor.

## 2022-04-04 NOTE — SUBJECTIVE & OBJECTIVE
Interval History: NAEON. AFVSS. General surgery completed PD catheter revision today. Patient c/o post op abdominal cramping, likely gas s/p intraabdominal procedure. Will schedule simethicone. Folate added per heme onc recs. Monitor overnight, defer to nephrology for dialysis planning.      Review of Systems   Constitutional:  Positive for chills and fatigue. Negative for appetite change and fever.   HENT:  Negative for congestion, rhinorrhea, sneezing and sore throat.    Eyes:  Negative for photophobia and visual disturbance.   Respiratory:  Negative for cough, shortness of breath and wheezing.    Cardiovascular:  Negative for chest pain and leg swelling.   Gastrointestinal:  Positive for abdominal pain (post-op LLQ cramping). Negative for diarrhea, nausea and vomiting.   Genitourinary:  Positive for dysuria (mild), frequency and urgency. Negative for difficulty urinating and flank pain.   Musculoskeletal:  Negative for arthralgias, back pain, myalgias and neck pain.   Skin:  Negative for color change, pallor, rash and wound.   Allergic/Immunologic: Positive for immunocompromised state.   Neurological:  Negative for dizziness, syncope, weakness, light-headedness and headaches.   Psychiatric/Behavioral:  Negative for agitation, confusion and hallucinations. The patient is not nervous/anxious.    Objective:     Vital Signs (Most Recent):  Temp: 98.3 °F (36.8 °C) (04/04/22 1238)  Pulse: 79 (04/04/22 1238)  Resp: 18 (04/04/22 1238)  BP: (!) 177/89 (04/04/22 1238)  SpO2: 98 % (04/04/22 1238)   Vital Signs (24h Range):  Temp:  [97.5 °F (36.4 °C)-99.1 °F (37.3 °C)] 98.3 °F (36.8 °C)  Pulse:  [72-92] 79  Resp:  [10-21] 18  SpO2:  [98 %-100 %] 98 %  BP: (141-203)/() 177/89     Weight: 76.5 kg (168 lb 10.4 oz)  Body mass index is 27.22 kg/m².    Intake/Output Summary (Last 24 hours) at 4/4/2022 1438  Last data filed at 4/4/2022 1102  Gross per 24 hour   Intake 600 ml   Output 0 ml   Net 600 ml      Physical  Exam  Vitals and nursing note reviewed.   Constitutional:       General: She is not in acute distress.     Appearance: She is obese.   HENT:      Mouth/Throat:      Mouth: Mucous membranes are moist.   Eyes:      General: No scleral icterus.     Extraocular Movements: Extraocular movements intact.      Pupils: Pupils are equal, round, and reactive to light.   Cardiovascular:      Rate and Rhythm: Normal rate and regular rhythm.   Pulmonary:      Effort: Pulmonary effort is normal. No respiratory distress.   Abdominal:      General: There is no distension.      Palpations: Abdomen is soft.   Musculoskeletal:      Right lower leg: No edema.      Left lower leg: No edema.      Comments: PD catheter with clean exit site, no purulence or erythema and no tenderness of tunnel   Skin:     Coloration: Skin is not jaundiced.   Neurological:      General: No focal deficit present.      Mental Status: She is alert.   Psychiatric:         Mood and Affect: Affect is flat.       Significant Labs: All pertinent labs within the past 24 hours have been reviewed.  BMP:   Recent Labs   Lab 04/03/22  0507 04/04/22  0815   * 103    141   K 4.0 4.5    109   CO2 21* 22*   BUN 37* 39*   CREATININE 4.3* 4.3*   CALCIUM 8.9 8.8   MG 1.8  --      CBC:   Recent Labs   Lab 04/03/22  0507 04/04/22  0815   WBC 4.60 4.39   HGB 7.1* 7.5*   HCT 24.4* 24.8*    182     Urine Culture: No results for input(s): LABURIN in the last 48 hours.    Significant Imaging: I have reviewed all pertinent imaging results/findings within the past 24 hours.

## 2022-04-04 NOTE — ASSESSMENT & PLAN NOTE
Recently started on PD  PD catheter placed 3/11/22 per patient, urinates 1/2 coke can per day  Here for evaluation of pain with filling and draining of dialysate  No signs of infection or abdominal pain currently; peritoneal fluid negative for infection   No constipation; has 2 or more soft BMs per day  Tolerated manual exchange last night with no issues   CT scan noted; PD catheter high in the abdomen; plan for OR on Monday for PD catheter revision.   S/p OR today for catheter repositioning; will schedule for manual exchange tomorrow

## 2022-04-04 NOTE — NURSING TRANSFER
Nursing Transfer Note      4/4/2022     Reason patient is being transferred: Post Op    Transfer To: [id number]--update given to DEEPAK Davalos    Transfer via stretcher    Transfer with none    Transported by PCT    Medicines sent: none    Any special needs or follow-up needed:     Chart send with patient: Yes    Notified: sister--updated on the phone    Patient reassessed at: 4-4-22

## 2022-04-04 NOTE — SUBJECTIVE & OBJECTIVE
Interval History: NAEON, patient NPO since midnight, AF, HDS    Medications:  Continuous Infusions:  Scheduled Meds:   atorvastatin  20 mg Oral QHS    carvediloL  6.25 mg Oral QAM    And    carvediloL  3.125 mg Oral QHS    insulin detemir U-100  16 Units Subcutaneous QHS    magnesium oxide  800 mg Oral BID    mycophenolate  500 mg Oral BID    sevelamer carbonate  800 mg Oral TID WM    tacrolimus  3 mg Oral Daily AM    And    tacrolimus  2 mg Oral Daily PM    timolol maleate 0.5%  1 drop Both Eyes Daily     PRN Meds:sodium chloride, acetaminophen, dextrose 10%, dextrose 10%, glucagon (human recombinant), glucose, glucose, insulin aspart U-100, naloxone, ondansetron, senna-docusate 8.6-50 mg, sodium chloride 0.9%, zolpidem     Review of patient's allergies indicates:   Allergen Reactions    Shrimp Hives    Topamax [topiramate] Other (See Comments)     Vision changes    Zoloft [sertraline] Palpitations     Objective:     Vital Signs (Most Recent):  Temp: 98.9 °F (37.2 °C) (04/04/22 0448)  Pulse: 92 (04/04/22 0448)  Resp: 16 (04/04/22 0448)  BP: (!) 141/67 (04/04/22 0448)  SpO2: 99 % (04/04/22 0448)   Vital Signs (24h Range):  Temp:  [98.1 °F (36.7 °C)-98.9 °F (37.2 °C)] 98.9 °F (37.2 °C)  Pulse:  [84-92] 92  Resp:  [16-18] 16  SpO2:  [98 %-99 %] 99 %  BP: (141-172)/(67-88) 141/67     Weight: 76.5 kg (168 lb 10.4 oz)  Body mass index is 27.22 kg/m².    Intake/Output - Last 3 Shifts         04/02 0700  04/03 0659 04/03 0700  04/04 0659    P.O. 360     Blood 280     Other 200 200    Total Intake(mL/kg) 840 (11) 200 (2.6)    Urine (mL/kg/hr) 1400 (0.8)     Other  0    Total Output 1400 0    Net -560 +200                  Physical Exam  Vitals and nursing note reviewed.   Constitutional:       General: She is not in acute distress.     Appearance: She is well-developed. She is not diaphoretic.   HENT:      Head: Normocephalic and atraumatic.   Eyes:      Pupils: Pupils are equal, round, and reactive to light.    Cardiovascular:      Rate and Rhythm: Normal rate and regular rhythm.   Pulmonary:      Effort: Pulmonary effort is normal. No respiratory distress.   Abdominal:      General: There is no distension.      Palpations: Abdomen is soft.      Tenderness: There is no abdominal tenderness. There is no guarding.      Comments: PD cath in place in RLQ   Musculoskeletal:         General: Normal range of motion.      Cervical back: Normal range of motion and neck supple.   Skin:     General: Skin is warm and dry.   Neurological:      Mental Status: She is alert and oriented to person, place, and time.   Psychiatric:         Behavior: Behavior normal.         Thought Content: Thought content normal.         Judgment: Judgment normal.       Significant Labs:  CBC:   Recent Labs   Lab 04/03/22  0507   WBC 4.60   RBC 2.69*   HGB 7.1*   HCT 24.4*      MCV 91   MCH 26.4*   MCHC 29.1*     CMP:   Recent Labs   Lab 04/03/22  0507   *   CALCIUM 8.9   ALBUMIN 2.8*   PROT 7.4      K 4.0   CO2 21*      BUN 37*   CREATININE 4.3*   ALKPHOS 61   ALT 5*   AST 11   BILITOT 0.5       Significant Diagnostics:  I have reviewed all pertinent imaging results/findings within the past 24 hours.

## 2022-04-04 NOTE — ASSESSMENT & PLAN NOTE
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 Kaiser

## 2022-04-04 NOTE — PROGRESS NOTES
[facility] [id number]  [facility]  [facility]    Patient Name: Elizabeth Maldonado  MRN: [id number]  Patient Class: OP- Observation   Admission Date: 3/31/2022  Length of Stay: 0 days  Attending Physician: Jamaal Steinberg MD  Primary Care Provider: Richy Singleton NP        Subjective:     Principal Problem:Hemodialysis catheter malfunction        HPI:  Elizabeth Maldonado is a 57 y.o. female with a PMHx of failed renal transplant, ESRD on PD ( placed on 3/11), HTN, HLD, and MGUS who presents to the ED with complaints of lower abdominal cramping. The patient resumed PD on 3/29/22. Today was day #3 of PD. She was sent to the ER from PD clinic for an emergent evaluation due to pt c/o chills and diffuse lower abdominal cramping with filling and draining of dialysate. She reports the dialysate was draining very slowly, and she felt like it wasn't drained completely before filling again. The patient states the pain resolved by the time she arrived to the ED and she has had no additional episodes. The patient denies any fever. She does report noting urinary urgency, frequency, and mild dysuria x2 days. She also reports some ongoing fatigue. The patient denies any nausea, vomiting, chest pain, shortness of breath, cough, lightheadedness, or dizziness.    In the ED, pt mildly tachycardic but otherwise VSSAF. CBC with WBC 3.66 and H/H 6.1/20.7. CMP with BUN/Cr 36/4.5, consistent with ESRD. Magnesium 1.3. Glucose 161. Lipase 178. Procal WNL. Lactate WNL. Blood cxs in process. UA 3+ leukocytes, >100 WBCs, many bacteria, and 26 squam- will repeat UA. CT abd/pelvis with presence of peritoneal dialysis catheter and small volume abdominopelvic free fluid.  Minimal pneumoperitoneum in the upper abdomen could be related to peritoneal dialysis catheter, though correlation with physical exam findings is recommended. Right lower quadrant renal transplant with ureteral wall thickening and mild diffuse  urinary bladder wall thickening.  Similar findings were present on the prior study in 2021. Per ED provider note general surgery reviewed imaging and found small amount of pneumoperitoneum to be expected given postoperative period. The patient was evaluated in the ED by nephrology and PD was ordered along with PD fluid samples.       Overview/Hospital Course:  Ms. Maldonado was placed in observation for abdominal pain following an issue with her PD catheter. Nephrology consulted, CT abdomen ordered. Based on CT interpretation, nephrology concerned for malposition of the PD catheter. General surgery consulted, revision done 4/4. PD fluid gram stain with no organisms seen, peritoneal fluid cultures NGTD. Blood cultures NGTD. Significant asymptomatic chronic anemia with Hgb 5.9, though intervention not appropriate per nephrology -- will monitor. Heme onc consulted for workup of worsening anemia, recommend addition of daily folate and follow up in clinic. UA infectious, but patient is not c/o symptoms at this time. Urine culture >100K pan-sensitive klebsiella. Patient given 1 dose of rocephin in the ED, will hold for now per nephro recs.       Interval History: NAEON. AFVSS. General surgery completed PD catheter revision today. Patient c/o post op abdominal cramping, likely gas s/p intraabdominal procedure. Will schedule simethicone. Folate added per heme onc recs. Monitor overnight, defer to nephrology for dialysis planning.      Review of Systems   Constitutional:  Positive for chills and fatigue. Negative for appetite change and fever.   HENT:  Negative for congestion, rhinorrhea, sneezing and sore throat.    Eyes:  Negative for photophobia and visual disturbance.   Respiratory:  Negative for cough, shortness of breath and wheezing.    Cardiovascular:  Negative for chest pain and leg swelling.   Gastrointestinal:  Positive for abdominal pain (post-op LLQ cramping). Negative for diarrhea, nausea and vomiting.    Genitourinary:  Positive for dysuria (mild), frequency and urgency. Negative for difficulty urinating and flank pain.   Musculoskeletal:  Negative for arthralgias, back pain, myalgias and neck pain.   Skin:  Negative for color change, pallor, rash and wound.   Allergic/Immunologic: Positive for immunocompromised state.   Neurological:  Negative for dizziness, syncope, weakness, light-headedness and headaches.   Psychiatric/Behavioral:  Negative for agitation, confusion and hallucinations. The patient is not nervous/anxious.    Objective:     Vital Signs (Most Recent):  Temp: 98.3 °F (36.8 °C) (04/04/22 1238)  Pulse: 79 (04/04/22 1238)  Resp: 18 (04/04/22 1238)  BP: (!) 177/89 (04/04/22 1238)  SpO2: 98 % (04/04/22 1238)   Vital Signs (24h Range):  Temp:  [97.5 °F (36.4 °C)-99.1 °F (37.3 °C)] 98.3 °F (36.8 °C)  Pulse:  [72-92] 79  Resp:  [10-21] 18  SpO2:  [98 %-100 %] 98 %  BP: (141-203)/() 177/89     Weight: 76.5 kg (168 lb 10.4 oz)  Body mass index is 27.22 kg/m².    Intake/Output Summary (Last 24 hours) at 4/4/2022 1438  Last data filed at 4/4/2022 1102  Gross per 24 hour   Intake 600 ml   Output 0 ml   Net 600 ml      Physical Exam  Vitals and nursing note reviewed.   Constitutional:       General: She is not in acute distress.     Appearance: She is obese.   HENT:      Mouth/Throat:      Mouth: Mucous membranes are moist.   Eyes:      General: No scleral icterus.     Extraocular Movements: Extraocular movements intact.      Pupils: Pupils are equal, round, and reactive to light.   Cardiovascular:      Rate and Rhythm: Normal rate and regular rhythm.   Pulmonary:      Effort: Pulmonary effort is normal. No respiratory distress.   Abdominal:      General: There is no distension.      Palpations: Abdomen is soft.   Musculoskeletal:      Right lower leg: No edema.      Left lower leg: No edema.      Comments: PD catheter with clean exit site, no purulence or erythema and no tenderness of tunnel   Skin:      Coloration: Skin is not jaundiced.   Neurological:      General: No focal deficit present.      Mental Status: She is alert.   Psychiatric:         Mood and Affect: Affect is flat.       Significant Labs: All pertinent labs within the past 24 hours have been reviewed.  BMP:   Recent Labs   Lab 04/03/22  0507 04/04/22  0815   * 103    141   K 4.0 4.5    109   CO2 21* 22*   BUN 37* 39*   CREATININE 4.3* 4.3*   CALCIUM 8.9 8.8   MG 1.8  --      CBC:   Recent Labs   Lab 04/03/22  0507 04/04/22  0815   WBC 4.60 4.39   HGB 7.1* 7.5*   HCT 24.4* 24.8*    182     Urine Culture: No results for input(s): LABURIN in the last 48 hours.    Significant Imaging: I have reviewed all pertinent imaging results/findings within the past 24 hours.      Assessment/Plan:      * Hemodialysis catheter malfunction  Patient reports abdominal cramping with filling and draining of dialysate. Resolved once patient arrived to the ED and has not re occurred.     OR tomorrow for PD catheter revision vs. Replacement vs. Removal  - CT abd/pelvis reviewed.  - general surgery consulted  - Patient currently getting output PD training, presented to the hospital for pain during fill and drain. PD fluid analysis did not show any evidence of infection. CT scan showed that the PD catheter is in the right upper quadrant. General surgery completed PD revision 4/4.   - Nephrology following. Ordered PD labs but holding off on abx for now.   - Will do one cycle every night with 2 Li, 1.5%, 2 hrs every night over the weekend.   - Defer to nephrology for next PD session    Acute cystitis with hematuria  UA with 26 squams, will repeat. Patient c/o of urinary urgency, frequency, and mild dysuria.   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 Kaiser     End stage kidney disease  -Nephrology consulted, appreciate recs.  -just starting PD and PD catheter placed 3/11/22 per patient  -here for evaluation of pain with filling and draining of dialysate  -CT AP suggestive of PD malposition per nephro interpretation. There is a small amount of pneumoperitoneum which is expected given postoperative period.   - General surgery consulted for possible PD revision, done 4/4.   -PD fluid collected to assess for infection, cultures and gram stain without growth   - blood cultures NGTD  -Hold off on abx per nephrology recs.    MGUS (monoclonal gammopathy of unknown significance)  - she is followed for her monoclonal gammopathy of uncertain significance at [facility] by Dr. Lau. She states she had an appointment with Dr. Lau in January 2022  -Per hematology note 1/20/22:  "- MGUS labs overall appear stable (M-spike historically had ranged between 1.4-1.8 with recent downtrend, IgG without large increase) with the exception of kappa and lambda free light chains which have increased but this is likely due to renal insufficiency. LDH wnl, B2M elevated, IgM low, IgA/M wnl. B2M can be elevated in the setting of renal insufficiency.  - BM asp/bx notes mild kappa-skewed plasmacytosis but unfortunately was insufficient core sample. No abnormalities detected by FISH, cytogen wnl.  - Will need 24 hour UPEP  - PET/CT without evidence for lytic lesions  - " "Continues on immunosuppressives post renal transplant. Discussed with her nephrologist Dr. Bonilla the possibility of renal biopsy, he will pass along the information to his transplant colleagues."  - follow up with Dr. Lau     Hypomagnesemia  Magnesium reviewed-   Recent Labs   Lab 04/03/22  0507   MG 1.8    Will replace electrolytes and continue to monitor closely. Stable s/p repletion.     Chronic immunosuppression with Prograf  -am tacro trough  -Continue home dosage    Kidney transplant status, living unrelated donor - 12/2/14  -now failed  -continue home tacro 3/2 and mycophenolate 500/500  -am tacro trough    Obesity (BMI 30.0-34.9)  Body mass index is 28.08 kg/m². Obesity complicates all aspects of disease management from diagnostic modalities to treatment.     Chronic kidney disease-mineral and bone disorder  -continue home sevelamer 800 tid wm    Hyperlipidemia   Patient is chronically on statin.will continue for now. Monitor clinically. Last LDL was   Lab Results   Component Value Date    LDLCALC 118.4 12/23/2021       Type 2 diabetes mellitus, uncontrolled, with renal complications  Patient's FSGs are controlled on current hypoglycemics.   Last A1c reviewed-   Lab Results   Component Value Date    HGBA1C 7.2 (H) 12/23/2021     Most recent fingerstick glucose reviewed- No results for input(s): POCTGLUCOSE in the last 24 hours.  Current correctional scale  Low  Maintain anti-hyperglycemic dose as follows-   Antihyperglycemics (From admission, onward)            Start     Stop Route Frequency Ordered    04/01/22 2100  insulin detemir U-100 pen 16 Units         -- SubQ Nightly 04/01/22 0214    04/01/22 0103  insulin aspart U-100 pen 0-5 Units         -- SubQ Before meals & nightly PRN 04/01/22 0005      -Will start low dose sliding scale insulin, decrease long acting insulin to 16U qhs.   -Diabetic/renal diet, goal -180  -Accuchecks AC/HS      VTE Risk Mitigation (From admission, onward)         " Ordered     IP VTE HIGH RISK PATIENT  Once         04/04/22 1148     Place sequential compression device  Until discontinued         04/04/22 1148     Place sequential compression device  Until discontinued         04/01/22 0005                Discharge Planning   KRISTEN: 4/5/2022     Code Status: Full Code   Is the patient medically ready for discharge?: No    Reason for patient still in hospital (select all that apply): Patient trending condition, Laboratory test, Treatment and Consult recommendations                     Lorna Healy PA-C  Department of Hospital Medicine   [facility] - Telemetry Stepdown ([place]

## 2022-04-04 NOTE — ANESTHESIA POSTPROCEDURE EVALUATION
Anesthesia Post Evaluation    Patient: Elizabeth Maldonado    Procedure(s) Performed: Procedure(s) (LRB):  REVISION OF PROCEDURE INVOLVING PERITONEAL DIALYSIS CATHETER, LAPAROSCOPIC (N/A)    Final Anesthesia Type: general      Patient location during evaluation: [facility] [place]  Patient participation: Yes- Able to Participate  Level of consciousness: awake and alert and oriented  Post-procedure vital signs: reviewed and stable  Pain management: adequate  Airway patency: patent    PONV status at discharge: No PONV  Anesthetic complications: no      Cardiovascular status: blood pressure returned to baseline  Respiratory status: unassisted, spontaneous ventilation and room air  Hydration status: euvolemic  Follow-up not needed.          Vitals Value Taken Time   /87 04/04/22 1217   Temp 37 °C (98.6 °F) 04/04/22 1215   Pulse 71 04/04/22 1222   Resp 20 04/04/22 1222   SpO2 100 % 04/04/22 1222   Vitals shown include unvalidated device data.      Event Time   Out of Recovery 04/04/2022 11:45:00         Pain/Eric Score: Pain Rating Prior to Med Admin: 7 (4/4/2022 11:53 AM)  Eric Score: 9 (4/4/2022 11:30 AM)

## 2022-04-04 NOTE — ASSESSMENT & PLAN NOTE
Patient is 58 yo F with hx of ESRD with a malfunctioning PD catheter    Will plan for PD catheter revision today  Consent obtained  Please keep NPO until after surgery. Ok for diet afterwards as tolerated

## 2022-04-04 NOTE — PLAN OF CARE
[facility] - Telemetry Stepdown ([facility])  Discharge Assessment    Primary Care Provider: Richy Singleton NP     Discharge Assessment (most recent)     BRIEF DISCHARGE ASSESSMENT - 04/04/22 1608        Discharge Planning    Assessment Type Discharge Planning Brief Assessment     Resource/Environmental Concerns none     Support Systems Family members     Equipment Currently Used at Home glucometer     Current Living Arrangements home/apartment/condo     Patient/Family Anticipates Transition to home     Patient/Family Anticipated Services at Transition none     DME Needed Upon Discharge  none     Discharge Plan A Assisted Living     Discharge Plan B Assisted Living               Pt is a 57 y.o. female admitted with HD cath malfunction and she has a PMH of ESRD w/ PD and DM. She lives alone in a trailer with 5 steps to enter. She states she is independent in all activities and drives. [facility] Discharge Packet given to patient and/or family with understanding verbalized.   name and number and estimated discharge date written on white board in patient's room with request to call for any questions or concerns.  Will continue to follow for needs.  Scott Miranda RN,BSN

## 2022-04-04 NOTE — ASSESSMENT & PLAN NOTE
Patient reports abdominal cramping with filling and draining of dialysate. Resolved once patient arrived to the ED and has not re occurred.     OR tomorrow for PD catheter revision vs. Replacement vs. Removal  - CT abd/pelvis reviewed.  - general surgery consulted  - Patient currently getting output PD training, presented to the hospital for pain during fill and drain. PD fluid analysis did not show any evidence of infection. CT scan showed that the PD catheter is in the right upper quadrant. General surgery completed PD revision 4/4.   - Nephrology following. Ordered PD labs but holding off on abx for now.   - Will do one cycle every night with 2 Li, 1.5%, 2 hrs every night over the weekend.   - Defer to nephrology for next PD session

## 2022-04-04 NOTE — PROGRESS NOTES
[facility] - Tele[facility] ([place] [address])  Nephrology  Progress Note    Patient Name: Elizabeth Maldonado  MRN: [id number]  Admission Date: 3/31/2022  Hospital Length of Stay: 0 days  Attending Provider: Jamaal Steinberg MD   Primary Care Physician: Richy Singleton NP  Principal Problem:Hemodialysis catheter malfunction    Subjective:     HPI: 57 year old with ESKD and failed Ktx now on PD who just started 3 days ago and had abdominal pain with filling and drainage during last session. Sent here for evaluation. No other complaints.  Nephrology consulted for ESKD      Interval History:   S/p PD catheter repositioning in the OR today.    Review of patient's allergies indicates:   Allergen Reactions    Shrimp Hives    Topamax [topiramate] Other (See Comments)     Vision changes    Zoloft [sertraline] Palpitations     Current Facility-Administered Medications   Medication Frequency    0.9%  NaCl infusion (for blood administration) Q24H PRN    acetaminophen tablet 650 mg Q6H PRN    atorvastatin tablet 20 mg QHS    carvediloL tablet 6.25 mg QAM    And    carvediloL tablet 3.125 mg QHS    dextrose 10% bolus 125 mL PRN    dextrose 10% bolus 250 mL PRN    glucagon (human recombinant) injection 1 mg PRN    glucose chewable tablet 16 g PRN    glucose chewable tablet 24 g PRN    insulin aspart U-100 pen 0-5 Units QID (AC + HS) PRN    insulin detemir U-100 pen 16 Units QHS    magnesium oxide tablet 800 mg BID    mycophenolate capsule 500 mg BID    naloxone 0.4 mg/mL injection 0.02 mg PRN    ondansetron injection 4 mg Q8H PRN    senna-docusate 8.6-50 mg per tablet 1 tablet BID PRN    sevelamer carbonate tablet 800 mg TID WM    sodium chloride 0.9% flush 10 mL Q12H PRN    tacrolimus capsule 3 mg Daily AM    And    tacrolimus capsule 2 mg Daily PM    timolol maleate 0.5% ophthalmic solution 1 drop Daily    zolpidem tablet 5 mg Nightly PRN       Objective:     Vital Signs (Most Recent):  Temp: 99.1 °F  (37.3 °C) (04/04/22 0842)  Pulse: 85 (04/04/22 0842)  Resp: 16 (04/04/22 0842)  BP: (!) 157/80 (04/04/22 0842)  SpO2: 100 % (04/04/22 0842)  O2 Device (Oxygen Therapy): room air (04/04/22 0829)   Vital Signs (24h Range):  Temp:  [98.1 °F (36.7 °C)-99.1 °F (37.3 °C)] 99.1 °F (37.3 °C)  Pulse:  [84-92] 85  Resp:  [16-18] 16  SpO2:  [98 %-100 %] 100 %  BP: (141-161)/(67-84) 157/80     Weight: 76.5 kg (168 lb 10.4 oz) (04/04/22 0842)  Body mass index is 27.22 kg/m².  Body surface area is 1.89 meters squared.    I/O last 3 completed shifts:  In: 560 [P.O.:360; Other:200]  Out: 1400 [Urine:1400]    Physical Exam  Constitutional:       Appearance: She is well-developed.   HENT:      Head: Normocephalic and atraumatic.   Eyes:      Pupils: Pupils are equal, round, and reactive to light.   Cardiovascular:      Rate and Rhythm: Normal rate and regular rhythm.      Heart sounds: Normal heart sounds.   Pulmonary:      Effort: Pulmonary effort is normal.      Breath sounds: Normal breath sounds.   Abdominal:      General: Bowel sounds are normal.      Palpations: Abdomen is soft.      Tenderness: There is no abdominal tenderness.   Musculoskeletal:         General: Normal range of motion.      Cervical back: Normal range of motion and neck supple.   Skin:     General: Skin is warm and dry.      Capillary Refill: Capillary refill takes less than 2 seconds.   Neurological:      Mental Status: She is alert and oriented to person, place, and time.       Significant Labs:  CBC:   Recent Labs   Lab 04/03/22  0507   WBC 4.60   RBC 2.69*   HGB 7.1*   HCT 24.4*      MCV 91   MCH 26.4*   MCHC 29.1*     CMP:   Recent Labs   Lab 04/03/22  0507   *   CALCIUM 8.9   ALBUMIN 2.8*   PROT 7.4      K 4.0   CO2 21*      BUN 37*   CREATININE 4.3*   ALKPHOS 61   ALT 5*   AST 11   BILITOT 0.5     All labs within the past 24 hours have been reviewed.         Assessment/Plan:     End stage kidney disease  Recently started on  PD  PD catheter placed 3/11/22 per patient, urinates 1/2 coke can per day  Here for evaluation of pain with filling and draining of dialysate  No signs of infection or abdominal pain currently; peritoneal fluid negative for infection   No constipation; has 2 or more soft BMs per day  Tolerated manual exchange last night with no issues   CT scan noted; PD catheter high in the abdomen; plan for OR on Monday for PD catheter revision.   S/p OR today for catheter repositioning; will schedule for manual exchange tomorrow        Chronic immunosuppression with Prograf  Daily tacro trough at 0600    Kidney transplant status, living unrelated donor - 12/2/14  Now failed  Continue home tacro 3mg in AM and 2mg in PM  Mycophenolate 500mg PO BID       Chronic kidney disease-mineral and bone disorder  Continue home sevelamer 800 tid with meals            Rico Serrano MD  Nephrology  [facility] - Tele[facility] ([place])

## 2022-04-04 NOTE — PROGRESS NOTES
04/03/22 2002   Vitals   BP (!) 158/83   Temp 98.6 °F (37 °C)   Temp src Oral   Pulse 84   Resp 18   SpO2 98 %   Manual Peritoneal Dialysis   Effluent Appearance Clear   Manual Treatment Comments Patient exit site care cleaned guaze and tape applied CDI, connect to drainage bag, patient advised it take approx 40 minute to drain completely.

## 2022-04-04 NOTE — ASSESSMENT & PLAN NOTE
"- she is followed for her monoclonal gammopathy of uncertain significance at [facility] by Dr. Lau. She states she had an appointment with Dr. Lau in January 2022  -Per hematology note 1/20/22:  "- MGUS labs overall appear stable (M-spike historically had ranged between 1.4-1.8 with recent downtrend, IgG without large increase) with the exception of kappa and lambda free light chains which have increased but this is likely due to renal insufficiency. LDH wnl, B2M elevated, IgM low, IgA/M wnl. B2M can be elevated in the setting of renal insufficiency.  - BM asp/bx notes mild kappa-skewed plasmacytosis but unfortunately was insufficient core sample. No abnormalities detected by FISH, cytogen wnl.  - Will need 24 hour UPEP  - PET/CT without evidence for lytic lesions  - Continues on immunosuppressives post renal transplant. Discussed with her nephrologist Dr. Bonilla the possibility of renal biopsy, he will pass along the information to his transplant colleagues."  - follow up with Dr. Lau   "

## 2022-04-04 NOTE — SUBJECTIVE & OBJECTIVE
Interval History:   S/p PD catheter repositioning in the OR today.    Review of patient's allergies indicates:   Allergen Reactions    Shrimp Hives    Topamax [topiramate] Other (See Comments)     Vision changes    Zoloft [sertraline] Palpitations     Current Facility-Administered Medications   Medication Frequency    0.9%  NaCl infusion (for blood administration) Q24H PRN    acetaminophen tablet 650 mg Q6H PRN    atorvastatin tablet 20 mg QHS    carvediloL tablet 6.25 mg QAM    And    carvediloL tablet 3.125 mg QHS    dextrose 10% bolus 125 mL PRN    dextrose 10% bolus 250 mL PRN    glucagon (human recombinant) injection 1 mg PRN    glucose chewable tablet 16 g PRN    glucose chewable tablet 24 g PRN    insulin aspart U-100 pen 0-5 Units QID (AC + HS) PRN    insulin detemir U-100 pen 16 Units QHS    magnesium oxide tablet 800 mg BID    mycophenolate capsule 500 mg BID    naloxone 0.4 mg/mL injection 0.02 mg PRN    ondansetron injection 4 mg Q8H PRN    senna-docusate 8.6-50 mg per tablet 1 tablet BID PRN    sevelamer carbonate tablet 800 mg TID WM    sodium chloride 0.9% flush 10 mL Q12H PRN    tacrolimus capsule 3 mg Daily AM    And    tacrolimus capsule 2 mg Daily PM    timolol maleate 0.5% ophthalmic solution 1 drop Daily    zolpidem tablet 5 mg Nightly PRN       Objective:     Vital Signs (Most Recent):  Temp: 99.1 °F (37.3 °C) (04/04/22 0842)  Pulse: 85 (04/04/22 0842)  Resp: 16 (04/04/22 0842)  BP: (!) 157/80 (04/04/22 0842)  SpO2: 100 % (04/04/22 0842)  O2 Device (Oxygen Therapy): room air (04/04/22 0829)   Vital Signs (24h Range):  Temp:  [98.1 °F (36.7 °C)-99.1 °F (37.3 °C)] 99.1 °F (37.3 °C)  Pulse:  [84-92] 85  Resp:  [16-18] 16  SpO2:  [98 %-100 %] 100 %  BP: (141-161)/(67-84) 157/80     Weight: 76.5 kg (168 lb 10.4 oz) (04/04/22 0842)  Body mass index is 27.22 kg/m².  Body surface area is 1.89 meters squared.    I/O last 3 completed shifts:  In: 560 [P.O.:360; Other:200]  Out: 1400  [Urine:1400]    Physical Exam  Constitutional:       Appearance: She is well-developed.   HENT:      Head: Normocephalic and atraumatic.   Eyes:      Pupils: Pupils are equal, round, and reactive to light.   Cardiovascular:      Rate and Rhythm: Normal rate and regular rhythm.      Heart sounds: Normal heart sounds.   Pulmonary:      Effort: Pulmonary effort is normal.      Breath sounds: Normal breath sounds.   Abdominal:      General: Bowel sounds are normal.      Palpations: Abdomen is soft.      Tenderness: There is no abdominal tenderness.   Musculoskeletal:         General: Normal range of motion.      Cervical back: Normal range of motion and neck supple.   Skin:     General: Skin is warm and dry.      Capillary Refill: Capillary refill takes less than 2 seconds.   Neurological:      Mental Status: She is alert and oriented to person, place, and time.       Significant Labs:  CBC:   Recent Labs   Lab 04/03/22  0507   WBC 4.60   RBC 2.69*   HGB 7.1*   HCT 24.4*      MCV 91   MCH 26.4*   MCHC 29.1*     CMP:   Recent Labs   Lab 04/03/22  0507   *   CALCIUM 8.9   ALBUMIN 2.8*   PROT 7.4      K 4.0   CO2 21*      BUN 37*   CREATININE 4.3*   ALKPHOS 61   ALT 5*   AST 11   BILITOT 0.5     All labs within the past 24 hours have been reviewed.

## 2022-04-04 NOTE — BRIEF OP NOTE
[facility] - Surgery (2nd Fl)  Brief Operative Note    SUMMARY     Surgery Date: 4/4/2022     Surgeon(s) and Role:     * Franklin Barber MD - Primary    Assisting Surgeon: None    Pre-op Diagnosis:  Complication of peritoneal dialysis, initial encounter [T80.90XA]    Post-op Diagnosis:  Post-Op Diagnosis Codes:     * Complication of peritoneal dialysis, initial encounter [T80.90XA]    Procedure(s) (LRB):  REVISION OF PROCEDURE INVOLVING PERITONEAL DIALYSIS CATHETER, LAPAROSCOPIC (N/A)    Anesthesia: General    Operative Findings: Revision of peritoneal dialysis catheter with placement and pexy into the RLQ. Dialysis catheter flushed without resistance at termination of procedure.     Estimated Blood Loss: * No values recorded between 4/4/2022 10:39 AM and 4/4/2022 11:08 AM *    Estimated Blood Loss has not been documented. EBL = Minimal.         Specimens:   Specimen (24h ago, onward)            None          [id number]

## 2022-04-04 NOTE — PLAN OF CARE
57 year old female with ESRD on PD, MGUS, HTN, failed kidney transplant and still on immunosuppression admitted for PD catheter malfunction. Underwent revision today without complication. Heme/onc following for history of MGUS with anemia. Pt has recently had a bone marrow biopsy and PET without signs of progression. FLC consistent with CKD. Would add folic acid daily considering low/normal folate and transfuse for hgb <7. Will defer addition of erythropoiesis-stimulating agent to nephrology. Can follow up with Dr. Lau after discharge.    Recs:  -- Add folic acid daily for low/normal flate  -- Can follow up with Dr. Lau after discharge  -- Defer addition of erythropoiesis-stimulating agent to nephrology    Case discussed with Dr. Beatrice Chavarria, PGY- V  Hematology/Oncology Fellow

## 2022-04-04 NOTE — ANESTHESIA PROCEDURE NOTES
Intubation    Date/Time: 4/4/2022 10:24 AM  Performed by: Manny Mendez MD  Authorized by: Diogenes Galvan MD     Intubation:     Induction:  Intravenous    Intubated:  Postinduction    Mask Ventilation:  Easy mask    Attempts:  1    Attempted By:  Resident anesthesiologist    Method of Intubation:  Direct    Blade:  Maldonado 2    Laryngeal View Grade: Grade I - full view of cords      Difficult Airway Encountered?: No      Complications:  None    Airway Device:  Oral endotracheal tube    Airway Device Size:  7.0    Tube secured:  20    Secured at:  The teeth    Placement Verified By:  Capnometry    Complicating Factors:  None    Findings Post-Intubation:  BS equal bilateral and atraumatic/condition of teeth unchanged

## 2022-04-04 NOTE — PROGRESS NOTES
04/03/22 2200   Manual Peritoneal Dialysis   Manual Drain Volume (mL) 1800 mL   Net Postive (mL) 200 mL   Manual PD Total UF (mL) 0 mL   Manual Treatment Comments Patient disconnected from manual drainage bag, catheter capped and secured to abdomen.

## 2022-04-04 NOTE — TRANSFER OF CARE
"Anesthesia Transfer of Care Note    Patient: Elizabeth Maldonado    Procedure(s) Performed: Procedure(s) (LRB):  REVISION OF PROCEDURE INVOLVING PERITONEAL DIALYSIS CATHETER, LAPAROSCOPIC (N/A)    Patient location: PACU    Anesthesia Type: general    Transport from OR: Transported from OR on 6-10 L/min O2 by face mask with adequate spontaneous ventilation    Post pain: adequate analgesia    Post assessment: no apparent anesthetic complications and tolerated procedure well    Post vital signs: stable    Level of consciousness: awake and responds to stimulation    Nausea/Vomiting: no nausea/vomiting    Complications: none    Transfer of care protocol was followed      Last vitals:   Visit Vitals  BP (!) 201/104   Pulse 91   Temp 36.4 °C (97.5 °F) (Temporal)   Resp (!) 21   Ht 5' 6" (1.676 m)   Wt 76.5 kg (168 lb 10.4 oz)   LMP 04/29/2011 (Approximate)   SpO2 100%   Breastfeeding No   BMI 27.22 kg/m²     "

## 2022-04-04 NOTE — ASSESSMENT & PLAN NOTE
-Nephrology consulted, appreciate recs.  -just starting PD and PD catheter placed 3/11/22 per patient  -here for evaluation of pain with filling and draining of dialysate  -CT AP suggestive of PD malposition per nephro interpretation. There is a small amount of pneumoperitoneum which is expected given postoperative period.   - General surgery consulted for possible PD revision, done 4/4.   -PD fluid collected to assess for infection, cultures and gram stain without growth   - blood cultures NGTD  -Hold off on abx per nephrology recs.

## 2022-04-04 NOTE — ASSESSMENT & PLAN NOTE
UA with 26 squams, will repeat. Patient c/o of urinary urgency, frequency, and mild dysuria.   - Given 1g CTX in the ED, will hold off on continuing abx per nephro recs   - 2/4 SIRS: WBC 3.66 (resolved),  (resolved)  - Follow urine cx -- >100K pan-sensitive Klebsiella

## 2022-04-04 NOTE — ASSESSMENT & PLAN NOTE
Magnesium reviewed-   Recent Labs   Lab 04/03/22  0507   MG 1.8    Will replace electrolytes and continue to monitor closely. Stable s/p repletion.

## 2022-04-04 NOTE — PROGRESS NOTES
[facility] - Telemetry Stepdown ([place] [id number])  General Surgery  Progress Note    Subjective:     History of Present Illness:  No notes on file    Post-Op Info:  Procedure(s) (LRB):  REVISION OF PROCEDURE INVOLVING PERITONEAL DIALYSIS CATHETER, LAPAROSCOPIC (N/A)   Day of Surgery     Interval History: NAEON, patient NPO since midnight, AF, HDS    Medications:  Continuous Infusions:  Scheduled Meds:   atorvastatin  20 mg Oral QHS    carvediloL  6.25 mg Oral QAM    And    carvediloL  3.125 mg Oral QHS    insulin detemir U-100  16 Units Subcutaneous QHS    magnesium oxide  800 mg Oral BID    mycophenolate  500 mg Oral BID    sevelamer carbonate  800 mg Oral TID WM    tacrolimus  3 mg Oral Daily AM    And    tacrolimus  2 mg Oral Daily PM    timolol maleate 0.5%  1 drop Both Eyes Daily     PRN Meds:sodium chloride, acetaminophen, dextrose 10%, dextrose 10%, glucagon (human recombinant), glucose, glucose, insulin aspart U-100, naloxone, ondansetron, senna-docusate 8.6-50 mg, sodium chloride 0.9%, zolpidem     Review of patient's allergies indicates:   Allergen Reactions    Shrimp Hives    Topamax [topiramate] Other (See Comments)     Vision changes    Zoloft [sertraline] Palpitations     Objective:     Vital Signs (Most Recent):  Temp: 98.9 °F (37.2 °C) (04/04/22 0448)  Pulse: 92 (04/04/22 0448)  Resp: 16 (04/04/22 0448)  BP: (!) 141/67 (04/04/22 0448)  SpO2: 99 % (04/04/22 0448)   Vital Signs (24h Range):  Temp:  [98.1 °F (36.7 °C)-98.9 °F (37.2 °C)] 98.9 °F (37.2 °C)  Pulse:  [84-92] 92  Resp:  [16-18] 16  SpO2:  [98 %-99 %] 99 %  BP: (141-172)/(67-88) 141/67     Weight: 76.5 kg (168 lb 10.4 oz)  Body mass index is 27.22 kg/m².    Intake/Output - Last 3 Shifts         04/02 0700  04/03 0659 04/03 0700  04/04 0659    P.O. 360     Blood 280     Other 200 200    Total Intake(mL/kg) 840 (11) 200 (2.6)    Urine (mL/kg/hr) 1400 (0.8)     Other  0    Total Output 1400 0    Net -560 +200                  Physical  Exam  Vitals and nursing note reviewed.   Constitutional:       General: She is not in acute distress.     Appearance: She is well-developed. She is not diaphoretic.   HENT:      Head: Normocephalic and atraumatic.   Eyes:      Pupils: Pupils are equal, round, and reactive to light.   Cardiovascular:      Rate and Rhythm: Normal rate and regular rhythm.   Pulmonary:      Effort: Pulmonary effort is normal. No respiratory distress.   Abdominal:      General: There is no distension.      Palpations: Abdomen is soft.      Tenderness: There is no abdominal tenderness. There is no guarding.      Comments: PD cath in place in RLQ   Musculoskeletal:         General: Normal range of motion.      Cervical back: Normal range of motion and neck supple.   Skin:     General: Skin is warm and dry.   Neurological:      Mental Status: She is alert and oriented to person, place, and time.   Psychiatric:         Behavior: Behavior normal.         Thought Content: Thought content normal.         Judgment: Judgment normal.       Significant Labs:  CBC:   Recent Labs   Lab 04/03/22  0507   WBC 4.60   RBC 2.69*   HGB 7.1*   HCT 24.4*      MCV 91   MCH 26.4*   MCHC 29.1*     CMP:   Recent Labs   Lab 04/03/22  0507   *   CALCIUM 8.9   ALBUMIN 2.8*   PROT 7.4      K 4.0   CO2 21*      BUN 37*   CREATININE 4.3*   ALKPHOS 61   ALT 5*   AST 11   BILITOT 0.5       Significant Diagnostics:  I have reviewed all pertinent imaging results/findings within the past 24 hours.    Assessment/Plan:     * Hemodialysis catheter malfunction  Patient is 56 yo F with hx of ESRD with a malfunctioning PD catheter    Will plan for PD catheter revision today  Consent obtained  Please keep NPO until after surgery. Ok for diet afterwards as tolerated        John Greco MD  General Surgery  [facility] - Telemetry Stepdown ([place]

## 2022-04-04 NOTE — OP NOTE
DATE OF PROCEDURE: 4/4/2022    PRE OP DIAGNOSIS: Complication of peritoneal dialysis, initial encounter [T80.90XA]    POST OP DIAGNOSIS: Complication of peritoneal dialysis, initial encounter [T80.90XA]    PROCEDURE: Procedure(s) (LRB):  REVISION OF PROCEDURE INVOLVING PERITONEAL DIALYSIS CATHETER, LAPAROSCOPIC (N/A)    Surgeon(s) and Role:     * Franklin Barber MD - Primary    ANESTHESIA: General.     Procedure:    Patient was placed under general anesthesia and the abdomen prepped and draped in usual manner.  An IO band drape was placed.  Access peritoneum was gained through thomas's point using a 5 mm Optiview trocar under direct vision and then pneumoperitoneum to 15 mmHg CO2 gas was obtained.  There was some omental adhesions to a prior left mid abdominal scar.  There were no other apparent abdominal adhesions.  A 2nd 5 mm trocar was placed under direct vision in the left lower abdomen.  We did see that the catheter was in the right mid abdomen.  It appeared that was not much tunneling of the catheter in the abdominal wall.  There was moved to the pelvis and sutured in place with a whipstitch.  The fascia on both 5 mm trocar sites were closed with figure-of-eight 0 Vicryl transfascially.  Skin incisions were closed with 4 plain catgut and reinforced with Dermabond.  We then accessed the catheter and fluid freely flowed into and out of the catheter.  Heparinized saline was then placed in the catheter and the catheter was locked in a new cap placed.  Patient tolerated procedure well was brought her room stable condition.  Sponge needle counts were correct at the end of the case.  Blood loss is minimal, complications none and pathology none.    This dictation was done with voice recognition software.

## 2022-04-04 NOTE — BRIEF OP NOTE
Operative Note       Surgery Date: 4/4/2022     Surgeon(s) and Role:     * Franklin Barber MD - Primary    Pre-op Diagnosis:  Complication of peritoneal dialysis, initial encounter [T80.90XA]    Post-op Diagnosis:  Complication of peritoneal dialysis, initial encounter [T80.90XA]    Procedure(s) (LRB):  REVISION OF PROCEDURE INVOLVING PERITONEAL DIALYSIS CATHETER, LAPAROSCOPIC (N/A)    Anesthesia: General    Procedure in Detail/Findings:  Pd revision without apparent complication.    Estimated Blood Loss: Minimal           Specimens (From admission, onward)    None        Implants: * No implants in log *           Disposition: PACU - hemodynamically stable.           Condition: Good    Attestation:  I was present and scrubbed for the entire procedure.

## 2022-04-05 VITALS
HEART RATE: 87 BPM | HEIGHT: 66 IN | WEIGHT: 168.63 LBS | TEMPERATURE: 98 F | DIASTOLIC BLOOD PRESSURE: 74 MMHG | OXYGEN SATURATION: 97 % | SYSTOLIC BLOOD PRESSURE: 138 MMHG | BODY MASS INDEX: 27.1 KG/M2 | RESPIRATION RATE: 18 BRPM

## 2022-04-05 LAB
BACTERIA BLD CULT: NORMAL
BACTERIA BLD CULT: NORMAL
PATHOLOGIST INTERPRETATION IFE: NORMAL
PATHOLOGIST INTERPRETATION SPE: NORMAL
POCT GLUCOSE: 160 MG/DL (ref 70–110)
POCT GLUCOSE: 206 MG/DL (ref 70–110)
POCT GLUCOSE: 222 MG/DL (ref 70–110)

## 2022-04-05 PROCEDURE — 99225 PR SUBSEQUENT OBSERVATION CARE,LEVEL II: ICD-10-PCS | Mod: NTX,,,

## 2022-04-05 PROCEDURE — G0378 HOSPITAL OBSERVATION PER HR: HCPCS | Mod: NTX

## 2022-04-05 PROCEDURE — 63600175 PHARM REV CODE 636 W HCPCS: Mod: NTX | Performed by: NURSE PRACTITIONER

## 2022-04-05 PROCEDURE — 25000003 PHARM REV CODE 250: Mod: NTX | Performed by: NURSE PRACTITIONER

## 2022-04-05 PROCEDURE — 99214 PR OFFICE/OUTPT VISIT, EST, LEVL IV, 30-39 MIN: ICD-10-PCS | Mod: NTX,,, | Performed by: INTERNAL MEDICINE

## 2022-04-05 PROCEDURE — 99225 PR SUBSEQUENT OBSERVATION CARE,LEVEL II: CPT | Mod: NTX,,,

## 2022-04-05 PROCEDURE — 99214 OFFICE O/P EST MOD 30 MIN: CPT | Mod: NTX,,, | Performed by: INTERNAL MEDICINE

## 2022-04-05 PROCEDURE — 25000003 PHARM REV CODE 250: Mod: NTX

## 2022-04-05 RX ORDER — LANOLIN ALCOHOL/MO/W.PET/CERES
800 CREAM (GRAM) TOPICAL 2 TIMES DAILY
Qty: 120 TABLET | Refills: 2 | Status: SHIPPED | OUTPATIENT
Start: 2022-04-05 | End: 2022-07-07

## 2022-04-05 RX ORDER — SIMETHICONE 80 MG
160 TABLET,CHEWABLE ORAL 3 TIMES DAILY
Qty: 42 TABLET | Refills: 0 | Status: SHIPPED | OUTPATIENT
Start: 2022-04-05 | End: 2022-04-12

## 2022-04-05 RX ORDER — CARVEDILOL 3.12 MG/1
3.12 TABLET ORAL NIGHTLY
Qty: 30 TABLET | Refills: 11 | Status: SHIPPED | OUTPATIENT
Start: 2022-04-05 | End: 2022-08-11 | Stop reason: DRUGHIGH

## 2022-04-05 RX ORDER — FOLIC ACID 1 MG/1
1 TABLET ORAL DAILY
Qty: 30 TABLET | Refills: 11 | Status: ON HOLD | OUTPATIENT
Start: 2022-04-06 | End: 2023-03-13 | Stop reason: HOSPADM

## 2022-04-05 RX ORDER — CARVEDILOL 6.25 MG/1
6.25 TABLET ORAL EVERY MORNING
Qty: 30 TABLET | Refills: 11 | Status: SHIPPED | OUTPATIENT
Start: 2022-04-05 | End: 2022-08-11 | Stop reason: DRUGHIGH

## 2022-04-05 RX ADMIN — TACROLIMUS 3 MG: 1 CAPSULE ORAL at 08:04

## 2022-04-05 RX ADMIN — SEVELAMER CARBONATE 800 MG: 800 TABLET, FILM COATED ORAL at 12:04

## 2022-04-05 RX ADMIN — Medication 800 MG: at 08:04

## 2022-04-05 RX ADMIN — TIMOLOL MALEATE 1 DROP: 5 SOLUTION/ DROPS OPHTHALMIC at 08:04

## 2022-04-05 RX ADMIN — INSULIN ASPART 2 UNITS: 100 INJECTION, SOLUTION INTRAVENOUS; SUBCUTANEOUS at 12:04

## 2022-04-05 RX ADMIN — SIMETHICONE 160 MG: 80 TABLET, CHEWABLE ORAL at 08:04

## 2022-04-05 RX ADMIN — SEVELAMER CARBONATE 800 MG: 800 TABLET, FILM COATED ORAL at 08:04

## 2022-04-05 RX ADMIN — MYCOPHENOLATE MOFETIL 500 MG: 250 CAPSULE ORAL at 08:04

## 2022-04-05 RX ADMIN — CARVEDILOL 6.25 MG: 6.25 TABLET, FILM COATED ORAL at 05:04

## 2022-04-05 RX ADMIN — FOLIC ACID 1 MG: 1 TABLET ORAL at 08:04

## 2022-04-05 NOTE — ASSESSMENT & PLAN NOTE
Patient is 56 yo F with hx of ESRD with a malfunctioning PD catheter, now sp revision 4/4    Ok to use PD catheter starting tomorrow  Ok for discharge per surgery  Rest of care per primary    Please call with any questions or concerns

## 2022-04-05 NOTE — PROGRESS NOTES
Discharge instructions reviewed with patient. Questions encouraged. All questions answered. VSS. IV removed. Patient in room awaiting medications to be delivered to room.

## 2022-04-05 NOTE — SUBJECTIVE & OBJECTIVE
Interval History:   No acute events overnight.   S/p PD catheter repositioning yesterday.     Review of patient's allergies indicates:   Allergen Reactions    Shrimp Hives    Topamax [topiramate] Other (See Comments)     Vision changes    Zoloft [sertraline] Palpitations     Current Facility-Administered Medications   Medication Frequency    0.9%  NaCl infusion (for blood administration) Q24H PRN    acetaminophen tablet 650 mg Q6H PRN    atorvastatin tablet 20 mg QHS    carvediloL tablet 6.25 mg QAM    And    carvediloL tablet 3.125 mg QHS    dextrose 10% bolus 125 mL PRN    dextrose 10% bolus 250 mL PRN    folic acid tablet 1 mg Daily    glucagon (human recombinant) injection 1 mg PRN    glucose chewable tablet 16 g PRN    glucose chewable tablet 24 g PRN    insulin aspart U-100 pen 0-5 Units QID (AC + HS) PRN    insulin detemir U-100 pen 16 Units QHS    magnesium oxide tablet 800 mg BID    morphine injection 2 mg Once    mycophenolate capsule 500 mg BID    naloxone 0.4 mg/mL injection 0.02 mg PRN    ondansetron injection 4 mg Q8H PRN    oxyCODONE immediate release tablet 5 mg Q6H PRN    senna-docusate 8.6-50 mg per tablet 1 tablet BID PRN    sevelamer carbonate tablet 800 mg TID WM    simethicone chewable tablet 160 mg TID    sodium chloride 0.9% flush 10 mL Q12H PRN    tacrolimus capsule 3 mg Daily AM    And    tacrolimus capsule 2 mg Daily PM    timolol maleate 0.5% ophthalmic solution 1 drop Daily    zolpidem tablet 5 mg Nightly PRN       Objective:     Vital Signs (Most Recent):  Temp: 98 °F (36.7 °C) (04/05/22 0757)  Pulse: 93 (04/05/22 0757)  Resp: 18 (04/05/22 0757)  BP: 134/79 (04/05/22 0757)  SpO2: 98 % (04/05/22 0757)  O2 Device (Oxygen Therapy): room air (04/05/22 0503) Vital Signs (24h Range):  Temp:  [97.5 °F (36.4 °C)-98.6 °F (37 °C)] 98 °F (36.7 °C)  Pulse:  [] 93  Resp:  [10-21] 18  SpO2:  [97 %-100 %] 98 %  BP: (134-203)/() 134/79     Weight: 76.5 kg (168 lb 10.4 oz) (04/05/22  0503)  Body mass index is 27.22 kg/m².  Body surface area is 1.89 meters squared.    I/O last 3 completed shifts:  In: 600 [I.V.:400; Other:200]  Out: 0     Physical Exam  Constitutional:       Appearance: She is well-developed.   HENT:      Head: Normocephalic and atraumatic.   Eyes:      Pupils: Pupils are equal, round, and reactive to light.   Cardiovascular:      Rate and Rhythm: Normal rate and regular rhythm.      Heart sounds: Normal heart sounds.   Pulmonary:      Effort: Pulmonary effort is normal.      Breath sounds: Normal breath sounds.   Abdominal:      General: Bowel sounds are normal.      Palpations: Abdomen is soft.      Tenderness: There is no abdominal tenderness.   Musculoskeletal:         General: Normal range of motion.      Cervical back: Normal range of motion and neck supple.   Skin:     General: Skin is warm and dry.      Capillary Refill: Capillary refill takes less than 2 seconds.   Neurological:      Mental Status: She is alert and oriented to person, place, and time.       Significant Labs:  CBC:   Recent Labs   Lab 04/04/22  0815   WBC 4.39   RBC 2.83*   HGB 7.5*   HCT 24.8*      MCV 88   MCH 26.5*   MCHC 30.2*     CMP:   Recent Labs   Lab 04/03/22  0507 04/04/22  0815   * 103   CALCIUM 8.9 8.8   ALBUMIN 2.8*  --    PROT 7.4  --     141   K 4.0 4.5   CO2 21* 22*    109   BUN 37* 39*   CREATININE 4.3* 4.3*   ALKPHOS 61  --    ALT 5*  --    AST 11  --    BILITOT 0.5  --

## 2022-04-05 NOTE — PROGRESS NOTES
[facility] Tele[facility] ([place] [id number])  Nephrology  Progress Note    Patient Name: Elizabeth Maldonado  MRN: [id number]  Admission Date: 3/31/2022  Hospital Length of Stay: 0 days  Attending Provider: Jamaal Steinberg MD   Primary Care Physician: Richy Singleton NP  Principal Problem:Hemodialysis catheter malfunction    Subjective:     HPI: 57 year old with ESKD and failed Ktx now on PD who just started 3 days ago and had abdominal pain with filling and drainage during last session. Sent here for evaluation. No other complaints.  Nephrology consulted for ESKD      Interval History:   No acute events overnight.   S/p PD catheter repositioning yesterday.     Review of patient's allergies indicates:   Allergen Reactions    Shrimp Hives    Topamax [topiramate] Other (See Comments)     Vision changes    Zoloft [sertraline] Palpitations     Current Facility-Administered Medications   Medication Frequency    0.9%  NaCl infusion (for blood administration) Q24H PRN    acetaminophen tablet 650 mg Q6H PRN    atorvastatin tablet 20 mg QHS    carvediloL tablet 6.25 mg QAM    And    carvediloL tablet 3.125 mg QHS    dextrose 10% bolus 125 mL PRN    dextrose 10% bolus 250 mL PRN    folic acid tablet 1 mg Daily    glucagon (human recombinant) injection 1 mg PRN    glucose chewable tablet 16 g PRN    glucose chewable tablet 24 g PRN    insulin aspart U-100 pen 0-5 Units QID (AC + HS) PRN    insulin detemir U-100 pen 16 Units QHS    magnesium oxide tablet 800 mg BID    morphine injection 2 mg Once    mycophenolate capsule 500 mg BID    naloxone 0.4 mg/mL injection 0.02 mg PRN    ondansetron injection 4 mg Q8H PRN    oxyCODONE immediate release tablet 5 mg Q6H PRN    senna-docusate 8.6-50 mg per tablet 1 tablet BID PRN    sevelamer carbonate tablet 800 mg TID WM    simethicone chewable tablet 160 mg TID    sodium chloride 0.9% flush 10 mL Q12H PRN    tacrolimus capsule 3 mg Daily AM    And     tacrolimus capsule 2 mg Daily PM    timolol maleate 0.5% ophthalmic solution 1 drop Daily    zolpidem tablet 5 mg Nightly PRN       Objective:     Vital Signs (Most Recent):  Temp: 98 °F (36.7 °C) (04/05/22 0757)  Pulse: 93 (04/05/22 0757)  Resp: 18 (04/05/22 0757)  BP: 134/79 (04/05/22 0757)  SpO2: 98 % (04/05/22 0757)  O2 Device (Oxygen Therapy): room air (04/05/22 0503) Vital Signs (24h Range):  Temp:  [97.5 °F (36.4 °C)-98.6 °F (37 °C)] 98 °F (36.7 °C)  Pulse:  [] 93  Resp:  [10-21] 18  SpO2:  [97 %-100 %] 98 %  BP: (134-203)/() 134/79     Weight: 76.5 kg (168 lb 10.4 oz) (04/05/22 0503)  Body mass index is 27.22 kg/m².  Body surface area is 1.89 meters squared.    I/O last 3 completed shifts:  In: 600 [I.V.:400; Other:200]  Out: 0     Physical Exam  Constitutional:       Appearance: She is well-developed.   HENT:      Head: Normocephalic and atraumatic.   Eyes:      Pupils: Pupils are equal, round, and reactive to light.   Cardiovascular:      Rate and Rhythm: Normal rate and regular rhythm.      Heart sounds: Normal heart sounds.   Pulmonary:      Effort: Pulmonary effort is normal.      Breath sounds: Normal breath sounds.   Abdominal:      General: Bowel sounds are normal.      Palpations: Abdomen is soft.      Tenderness: There is no abdominal tenderness.   Musculoskeletal:         General: Normal range of motion.      Cervical back: Normal range of motion and neck supple.   Skin:     General: Skin is warm and dry.      Capillary Refill: Capillary refill takes less than 2 seconds.   Neurological:      Mental Status: She is alert and oriented to person, place, and time.       Significant Labs:  CBC:   Recent Labs   Lab 04/04/22  0815   WBC 4.39   RBC 2.83*   HGB 7.5*   HCT 24.8*      MCV 88   MCH 26.5*   MCHC 30.2*     CMP:   Recent Labs   Lab 04/03/22  0507 04/04/22  0815   * 103   CALCIUM 8.9 8.8   ALBUMIN 2.8*  --    PROT 7.4  --     141   K 4.0 4.5   CO2 21* 22*     109   BUN 37* 39*   CREATININE 4.3* 4.3*   ALKPHOS 61  --    ALT 5*  --    AST 11  --    BILITOT 0.5  --      Assessment/Plan:     End stage kidney disease  Recently started on PD  PD catheter placed 3/11/22 per patient, urinates 1/2 coke can per day  Here for evaluation of pain with filling and draining of dialysate  No signs of infection or abdominal pain currently; peritoneal fluid negative for infection   No constipation; has 2 or more soft BMs per day  Tolerated manual exchange last night with no issues   CT scan noted; PD catheter high in the abdomen; repositioned in the OR yesterday by General surgery   Ok to use catheter starting tomorrow per surgery  If pt is discharged today she is to follow with her dialysis unit tomorrow to resume PD      Chronic immunosuppression with Prograf  Daily tacro trough at 0600    Kidney transplant status, living unrelated donor - 12/2/14  Now failed  Continue home tacro 3mg in AM and 2mg in PM  Mycophenolate 500mg PO BID       Chronic kidney disease-mineral and bone disorder  Continue home sevelamer 800 tid with meals          Rico Serrano MD  Nephrology  [facility] ([place]-)

## 2022-04-05 NOTE — NURSING
No acute events during shift. Patient c/o some abd pain. Medicated per MAR. VSS. Fall and safety precautions maintained. POC discussed with patient. Will continue to monitor.

## 2022-04-05 NOTE — CARE UPDATE
[facility] reached out to [facility] and spoke with home therapies nurse Nikky. Per Nikky, pt should return to clinic tomorrow for PD cath site eval, lab work and epo, and cath flush. Pt is to arrive at the clinic on 4/6/22 at 9AM. Per Nikky, pt may need to resume PD training on Monday, 4/11/22. LUCHO communicated plan with IP TX team.     Gertrude De Leon  Nephrology [facility]  Ext. [phone]

## 2022-04-05 NOTE — ASSESSMENT & PLAN NOTE
Recently started on PD  PD catheter placed 3/11/22 per patient, urinates 1/2 coke can per day  Here for evaluation of pain with filling and draining of dialysate  No signs of infection or abdominal pain currently; peritoneal fluid negative for infection   No constipation; has 2 or more soft BMs per day  Tolerated manual exchange last night with no issues   CT scan noted; PD catheter high in the abdomen; repositioned in the OR yesterday by General surgery   Ok to use catheter starting tomorrow per surgery  If pt is discharged today she is to follow with her dialysis unit tomorrow to resume PD

## 2022-04-05 NOTE — PLAN OF CARE
[facility] Telemetry Stepdown ([facility]  Discharge Final Note    Primary Care Provider: Richy Singleton NP     Future Appointments   Date Time Provider Department Center   4/5/2022  3:00 PM Magdy López MD [facility] [address]   4/6/2022  9:00 AM CHAIR 11, [place] [facility]   4/7/2022  9:00 AM [facility]IR 11[facility] [place] [facility]   4/8/2022  9:00 AM CHAIR 11, [place] [facility]   4/11/2022 11:00 AM INFUSION, CHAIR 1 [facility]   4/18/2022 11:00 AM INFUSION, CHAIR 4 [facility]   4/25/2022 11:00 AM INFUSION, CHAIR 8 [facility]   5/2/2022 11:00 AM INFUSION, CHAIR 4 [facility]   5/9/2022 11:00 AM INFUSION, CHAIR 4 [facility]   5/16/2022 11:00 AM INFUSION, CHAIR 2 [facility]         Expected Discharge Date: 4/5/2022    Final Discharge Note (most recent)     Final Note - 04/05/22 1233        Final Note    Assessment Type Final Discharge Note     Anticipated Discharge Disposition Home or Self Care     Hospital Resources/Appts/Education Provided Provided patient/caregiver with written discharge plan information;Appointments scheduled and added to AVS        Post-Acute Status    Discharge Delays None known at this time                 Important Message from Medicare             Contact Info     PD Clinic        Next Steps: Follow up on 4/5/2022    Instructions: Per the PD nurse Nikky, pt needs to go to the clinic on tomorrow at 9am for cath flush, epo administration, lab work, and PD cath site eval. Pt's training will likely resume on Monday, 4/11/22 per Nikky.

## 2022-04-05 NOTE — PROGRESS NOTES
[facility] - Tele[facility]down ([place] [address])  General Surgery  Progress Note    Subjective:     History of Present Illness:  No notes on file    Post-Op Info:  Procedure(s) (LRB):  REVISION OF PROCEDURE INVOLVING PERITONEAL DIALYSIS CATHETER, LAPAROSCOPIC (N/A)   1 Day Post-Op     Interval History: NAEON, pain well controlled, AF, HDS    Medications:  Continuous Infusions:  Scheduled Meds:   atorvastatin  20 mg Oral QHS    carvediloL  6.25 mg Oral QAM    And    carvediloL  3.125 mg Oral QHS    folic acid  1 mg Oral Daily    insulin detemir U-100  16 Units Subcutaneous QHS    magnesium oxide  800 mg Oral BID    morphine  2 mg Intravenous Once    mycophenolate  500 mg Oral BID    sevelamer carbonate  800 mg Oral TID WM    simethicone  2 tablet Oral TID    tacrolimus  3 mg Oral Daily AM    And    tacrolimus  2 mg Oral Daily PM    timolol maleate 0.5%  1 drop Both Eyes Daily     PRN Meds:sodium chloride, acetaminophen, dextrose 10%, dextrose 10%, glucagon (human recombinant), glucose, glucose, insulin aspart U-100, naloxone, ondansetron, oxyCODONE, senna-docusate 8.6-50 mg, sodium chloride 0.9%, zolpidem     Review of patient's allergies indicates:   Allergen Reactions    Shrimp Hives    Topamax [topiramate] Other (See Comments)     Vision changes    Zoloft [sertraline] Palpitations     Objective:     Vital Signs (Most Recent):  Temp: 98.4 °F (36.9 °C) (04/05/22 0503)  Pulse: 91 (04/05/22 0503)  Resp: 18 (04/05/22 0503)  BP: 136/81 (04/05/22 0503)  SpO2: 99 % (04/05/22 0503) Vital Signs (24h Range):  Temp:  [97.5 °F (36.4 °C)-99.1 °F (37.3 °C)] 98.4 °F (36.9 °C)  Pulse:  [] 91  Resp:  [10-21] 18  SpO2:  [97 %-100 %] 99 %  BP: (136-203)/() 136/81     Weight: 76.5 kg (168 lb 10.4 oz)  Body mass index is 27.22 kg/m².    Intake/Output - Last 3 Shifts         04/03 0700 04/04 0659 04/04 0700 04/05 0659 04/05 0700 04/06 0659    P.O.       I.V. (mL/kg)  400 (5.2)     Blood       Other 200       Total Intake(mL/kg) 200 (2.6) 400 (5.2)     Urine (mL/kg/hr)       Other 0      Total Output 0      Net +200 +400            Urine Occurrence  1 x             Physical Exam  Vitals and nursing note reviewed.   Constitutional:       General: She is not in acute distress.     Appearance: She is well-developed. She is not diaphoretic.   HENT:      Head: Normocephalic and atraumatic.   Eyes:      Pupils: Pupils are equal, round, and reactive to light.   Cardiovascular:      Rate and Rhythm: Normal rate and regular rhythm.   Pulmonary:      Effort: Pulmonary effort is normal. No respiratory distress.   Abdominal:      General: There is no distension.      Palpations: Abdomen is soft.      Tenderness: There is abdominal tenderness. There is no guarding.      Comments: PD cath in place, Incisions CDI, abd soft, appropriately tender   Musculoskeletal:         General: Normal range of motion.      Cervical back: Normal range of motion and neck supple.   Skin:     General: Skin is warm and dry.   Neurological:      Mental Status: She is alert and oriented to person, place, and time.   Psychiatric:         Behavior: Behavior normal.         Thought Content: Thought content normal.         Judgment: Judgment normal.       Significant Labs:  CBC:   Recent Labs   Lab 04/04/22  0815   WBC 4.39   RBC 2.83*   HGB 7.5*   HCT 24.8*      MCV 88   MCH 26.5*   MCHC 30.2*     CMP:   Recent Labs   Lab 04/03/22  0507 04/04/22  0815   * 103   CALCIUM 8.9 8.8   ALBUMIN 2.8*  --    PROT 7.4  --     141   K 4.0 4.5   CO2 21* 22*    109   BUN 37* 39*   CREATININE 4.3* 4.3*   ALKPHOS 61  --    ALT 5*  --    AST 11  --    BILITOT 0.5  --        Significant Diagnostics:  I have reviewed all pertinent imaging results/findings within the past 24 hours.    Assessment/Plan:     * Hemodialysis catheter malfunction  Patient is 56 yo F with hx of ESRD with a malfunctioning PD catheter, now sp revision 4/4    Ok to use PD catheter  starting tomorrow  Ok for discharge per surgery  Rest of care per primary    Please call with any questions or concerns        John Greco MD  General Surgery  [facility] - Telemetry Stepdown ([place]-[facility])

## 2022-04-05 NOTE — PLAN OF CARE
Problem: Adult Inpatient Plan of Care  Goal: Plan of Care Review  Outcome: Ongoing, Progressing     Problem: Diabetes Comorbidity  Goal: Blood Glucose Level Within Targeted Range  Outcome: Ongoing, Progressing     Problem: Renal Function Impairment (Acute Kidney Injury/Impairment)  Goal: Effective Renal Function  Outcome: Ongoing, Progressing   POC reviewed with pt who verbalized understanding. AAOx4. VSS. Remains free of falls and injury. PD cath in place. No complaints of pain or nausea. Up independently to bathroom. BG monitored at bedtime with coverage given per MAR. Resting well between care. Call light in reach and rounds made for safety.  Will continue to monitor

## 2022-04-06 NOTE — ASSESSMENT & PLAN NOTE
Patient reports abdominal cramping with filling and draining of dialysate. Resolved once patient arrived to the ED and has not re occurred.     OR tomorrow for PD catheter revision vs. Replacement vs. Removal  - CT abd/pelvis reviewed.  - general surgery consulted  - Patient currently getting output PD training, presented to the hospital for pain during fill and drain. PD fluid analysis did not show any evidence of infection. CT scan showed that the PD catheter is in the right upper quadrant. General surgery completed PD revision 4/4.   - Nephrology following. Ordered PD labs but holding off on abx for now.   - Will do one cycle every night with 2 Li, 1.5%, 2 hrs every night over the weekend.   - Defer to nephrology for next PD session, dialysis clinic f/u on 4/6

## 2022-04-06 NOTE — ASSESSMENT & PLAN NOTE
"- she is followed for her monoclonal gammopathy of uncertain significance at [facility] by Dr. Lau. She states she had an appointment with Dr. Lau in January 2022  -Per hematology note 1/20/22:  "- MGUS labs overall appear stable (M-spike historically had ranged between 1.4-1.8 with recent downtrend, IgG without large increase) with the exception of kappa and lambda free light chains which have increased but this is likely due to renal insufficiency. LDH wnl, B2M elevated, IgM low, IgA/M wnl. B2M can be elevated in the setting of renal insufficiency.  - BM asp/bx notes mild kappa-skewed plasmacytosis but unfortunately was insufficient core sample. No abnormalities detected by FISH, cytogen wnl.  - Will need 24 hour UPEP  - PET/CT without evidence for lytic lesions  - Continues on immunosuppressives post renal transplant. Discussed with her nephrologist Dr. Bonilla the possibility of renal biopsy, he will pass along the information to his transplant colleagues."  - follow up with Dr. Lau   "

## 2022-04-06 NOTE — ASSESSMENT & PLAN NOTE
Body mass index is 27.22 kg/m². Obesity complicates all aspects of disease management from diagnostic modalities to treatment.

## 2022-04-06 NOTE — ASSESSMENT & PLAN NOTE
Patient's FSGs are controlled on current hypoglycemics.   Last A1c reviewed-   Lab Results   Component Value Date    HGBA1C 6.5 (H) 03/29/2022     Most recent fingerstick glucose reviewed- No results for input(s): POCTGLUCOSE in the last 24 hours.  Current correctional scale  Low  Maintain anti-hyperglycemic dose as follows-   Antihyperglycemics (From admission, onward)            None      -Will start low dose sliding scale insulin, decrease long acting insulin to 16U qhs.   -Diabetic/renal diet, goal -180  -Accuchecks AC/HS

## 2022-04-06 NOTE — ASSESSMENT & PLAN NOTE
Magnesium reviewed-   No results for input(s): MG in the last 48 hours. Will replace electrolytes and continue to monitor closely. Stable s/p repletion.

## 2022-04-06 NOTE — DISCHARGE SUMMARY
[facility] ([place] [address])  [facility] Medicine  Discharge Summary      Patient Name: Elizabeth Maldonado  MRN: [id number]  Patient Class: OP- Observation  Admission Date: 3/31/2022  Hospital Length of Stay: 0 days  Discharge Date and Time:  04/06/2022 1:04 PM  Attending Physician: No att. providers found   Discharging Provider: Lorna Healy PA-C  Primary Care Provider: Richy Singleton NP  Hospital Medicine Team: [facility] HOSP MED E Lorna Healy PA-C    HPI:   Elizabeth Maldonado is a 57 y.o. female with a PMHx of failed renal transplant, ESRD on PD ( placed on 3/11), HTN, HLD, and MGUS who presents to the ED with complaints of lower abdominal cramping. The patient resumed PD on 3/29/22. Today was day #3 of PD. She was sent to the ER from PD clinic for an emergent evaluation due to pt c/o chills and diffuse lower abdominal cramping with filling and draining of dialysate. She reports the dialysate was draining very slowly, and she felt like it wasn't drained completely before filling again. The patient states the pain resolved by the time she arrived to the ED and she has had no additional episodes. The patient denies any fever. She does report noting urinary urgency, frequency, and mild dysuria x2 days. She also reports some ongoing fatigue. The patient denies any nausea, vomiting, chest pain, shortness of breath, cough, lightheadedness, or dizziness.    In the ED, pt mildly tachycardic but otherwise VSSAF. CBC with WBC 3.66 and H/H 6.1/20.7. CMP with BUN/Cr 36/4.5, consistent with ESRD. Magnesium 1.3. Glucose 161. Lipase 178. Procal WNL. Lactate WNL. Blood cxs in process. UA 3+ leukocytes, >100 WBCs, many bacteria, and 26 squam- will repeat UA. CT abd/pelvis with presence of peritoneal dialysis catheter and small volume abdominopelvic free fluid.  Minimal pneumoperitoneum in the upper abdomen could be related to peritoneal dialysis catheter, though correlation with physical exam findings is  recommended. Right lower quadrant renal transplant with ureteral wall thickening and mild diffuse urinary bladder wall thickening.  Similar findings were present on the prior study in 2021. Per ED provider note general surgery reviewed imaging and found small amount of pneumoperitoneum to be expected given postoperative period. The patient was evaluated in the ED by nephrology and PD was ordered along with PD fluid samples.       Procedure(s) (LRB):  REVISION OF PROCEDURE INVOLVING PERITONEAL DIALYSIS CATHETER, LAPAROSCOPIC (N/A)      Hospital Course:   Ms. Maldonado was placed in observation for abdominal pain following an issue with her PD catheter. Nephrology consulted, CT abdomen ordered. Based on CT interpretation, nephrology concerned for malposition of the PD catheter. General surgery consulted, revision done 4/4. PD fluid gram stain with no organisms seen, peritoneal fluid cultures NGTD. Blood cultures NGTD. CM assistance provided for organizing follow up with dialysis clinic 4/6. Patient also with significant asymptomatic chronic anemia on admit, Hgb 5.9.  Heme onc consulted for workup of worsening anemia, recommend addition of daily folate and follow up in clinic. Patient received 1U PRBC on 4/2 per heme/onc recs, with Hgb improved and stable at 7.1 -> 7.5. UA infectious. Urine culture >100K pan-sensitive klebsiella. Patient given 1 dose of rocephin in the ED, no further abx admin per nephro recs.     Discharge home with dialysis clinic follow up, heme/onc clinic follow up.        Goals of Care Treatment Preferences:  Code Status: Full Code      Consults:   Consults (From admission, onward)        Status Ordering Provider     Inpatient consult to Hematology/Oncology  Once        Provider:  (Not yet assigned)    Completed GLEN HERNDON     Inpatient consult to Nephrology  Once        Provider:  (Not yet assigned)    Completed MORENITA PAYTON          * Hemodialysis catheter malfunction  Patient  reports abdominal cramping with filling and draining of dialysate. Resolved once patient arrived to the ED and has not re occurred.     OR tomorrow for PD catheter revision vs. Replacement vs. Removal  - CT abd/pelvis reviewed.  - general surgery consulted  - Patient currently getting output PD training, presented to the hospital for pain during fill and drain. PD fluid analysis did not show any evidence of infection. CT scan showed that the PD catheter is in the right upper quadrant. General surgery completed PD revision 4/4.   - Nephrology following. Ordered PD labs but holding off on abx for now.   - Will do one cycle every night with 2 Li, 1.5%, 2 hrs every night over the weekend.   - Defer to nephrology for next PD session, dialysis clinic f/u on 4/6    Acute cystitis with hematuria  UA with 26 squams, will repeat. Patient c/o of urinary urgency, frequency, and mild dysuria.   - Given 1g CTX in the ED, will hold off on continuing abx per nephro recs   - 2/4 SIRS: WBC 3.66 (resolved),  (resolved)  - Follow urine cx -- >100K pan-sensitive Klebsiella     Anemia of chronic kidney failure, stage 5  Current CBC reviewed-   Lab Results   Component Value Date    HGB 7.5 (L) 04/04/2022    HCT 24.8 (L) 04/04/2022     -ferritin elevated so IV iron not ideal also patient has smoldering myeloma which makes epo not ideal  -her degree of anemia is significant for ESKD and chronic kidney disease  -Nephrology recommends evaluation for alternate causes of anemia  -1U PRBC transfused 4/2 for Hgb <7 per heme/onc recs   - Consult hematology, recommending daily folate and blood transfusion for Hgb <7  - Follow up with Dr. Kaiser     End stage kidney disease  -Nephrology consulted, appreciate recs.  -just starting PD and PD catheter placed 3/11/22 per patient  -here for evaluation of pain with filling and draining of dialysate  -CT AP suggestive of PD malposition per nephro interpretation. There is a small amount of  "pneumoperitoneum which is expected given postoperative period.   - General surgery consulted for possible PD revision, done 4/4.   -PD fluid collected to assess for infection, cultures and gram stain without growth   - blood cultures NGTD  -Hold off on abx per nephrology recs.  -Follow up at dialysis clinic 4/6    MGUS (monoclonal gammopathy of unknown significance)  - she is followed for her monoclonal gammopathy of uncertain significance at [facility] by Dr. Lau. She states she had an appointment with Dr. Lau in January 2022  -Per hematology note 1/20/22:  "- MGUS labs overall appear stable (M-spike historically had ranged between 1.4-1.8 with recent downtrend, IgG without large increase) with the exception of kappa and lambda free light chains which have increased but this is likely due to renal insufficiency. LDH wnl, B2M elevated, IgM low, IgA/M wnl. B2M can be elevated in the setting of renal insufficiency.  - BM asp/bx notes mild kappa-skewed plasmacytosis but unfortunately was insufficient core sample. No abnormalities detected by FISH, cytogen wnl.  - Will need 24 hour UPEP  - PET/CT without evidence for lytic lesions  - Continues on immunosuppressives post renal transplant. Discussed with her nephrologist Dr. Bonilla the possibility of renal biopsy, he will pass along the information to his transplant colleagues."  - follow up with Dr. Lau     Hypomagnesemia  Magnesium reviewed-   No results for input(s): MG in the last 48 hours. Will replace electrolytes and continue to monitor closely. Stable s/p repletion.     Chronic immunosuppression with Prograf  -am tacro trough  -Continue home dosage    Kidney transplant status, living unrelated donor - 12/2/14  -now failed  -continue home tacro 3/2 and mycophenolate 500/500  -am tacro trough    Obesity (BMI 30.0-34.9)  Body mass index is 27.22 kg/m². Obesity complicates all aspects of disease management from diagnostic modalities to " treatment.     Chronic kidney disease-mineral and bone disorder  -continue home sevelamer 800 tid wm    Hyperlipidemia   Patient is chronically on statin.will continue for now. Monitor clinically. Last LDL was   Lab Results   Component Value Date    LDLCALC 80 03/29/2022       Symptomatic anemia        Type 2 diabetes mellitus, uncontrolled, with renal complications  Patient's FSGs are controlled on current hypoglycemics.   Last A1c reviewed-   Lab Results   Component Value Date    HGBA1C 6.5 (H) 03/29/2022     Most recent fingerstick glucose reviewed- No results for input(s): POCTGLUCOSE in the last 24 hours.  Current correctional scale  Low  Maintain anti-hyperglycemic dose as follows-   Antihyperglycemics (From admission, onward)            None      -Will start low dose sliding scale insulin, decrease long acting insulin to 16U qhs.   -Diabetic/renal diet, goal -180  -Accuchecks AC/HS      Final Active Diagnoses:    Diagnosis Date Noted POA    PRINCIPAL PROBLEM:  Hemodialysis catheter malfunction [T82.41XA] 04/01/2022 Yes    Acute cystitis with hematuria [N30.01] 04/01/2022 Yes    Anemia of chronic kidney failure, stage 5 [N18.5, D63.1] 02/09/2022 Yes    End stage kidney disease [N18.6] 12/23/2021 Yes    MGUS (monoclonal gammopathy of unknown significance) [D47.2] 05/03/2021 Yes     Chronic    Hypomagnesemia [E83.42] 01/07/2015 Yes    Kidney transplant status, living unrelated donor - 12/2/14 [Z94.0] 12/03/2014 Not Applicable     Chronic    Chronic immunosuppression with Prograf [Z29.8] 12/03/2014 Not Applicable     Chronic    Obesity (BMI 30.0-34.9) [E66.9] 12/03/2014 Yes    Chronic kidney disease-mineral and bone disorder [N18.9, E83.9, M89.9] 07/01/2014 Yes    Hyperlipidemia [E78.5] 07/01/2014 Yes     Chronic    Type 2 diabetes mellitus, uncontrolled, with renal complications [E11.29, E11.65] 07/01/2014 Yes     Chronic    Symptomatic anemia [D64.9] 07/01/2014 Yes      Problems Resolved  During this Admission:       Discharged Condition: stable    Disposition: Home or Self Care    Follow Up:   Follow-up Information     PD Clinic Follow up on 4/5/2022.    Why: Per the PD nurse Nikky, pt needs to go to the clinic on tomorrow at 9am for cath flush, epo administration, lab work, and PD cath site eval. Pt's training will likely resume on Monday, 4/11/22 per Nikky.                     Patient Instructions:      Diet renal     Notify your health care provider if you experience any of the following:  persistent nausea and vomiting or diarrhea     Notify your health care provider if you experience any of the following:  severe uncontrolled pain     Notify your health care provider if you experience any of the following:  redness, tenderness, or signs of infection (pain, swelling, redness, odor or green/yellow discharge around incision site)     Notify your health care provider if you experience any of the following:  temperature >100.4     Notify your health care provider if you experience any of the following:  increased confusion or weakness     Activity as tolerated       Pending Diagnostic Studies:     None         Medications:  Reconciled Home Medications:      Medication List      START taking these medications    folic acid 1 MG tablet  Commonly known as: FOLVITE  Take 1 tablet (1 mg total) by mouth once daily.     magnesium oxide 400 mg (241.3 mg magnesium) tablet  Commonly known as: MAG-OX  Take 2 tablets (800 mg total) by mouth 2 (two) times daily.     simethicone 80 MG chewable tablet  Commonly known as: MYLICON  Chew and swallow 2 tablets (160 mg total) by mouth 3 (three) times daily. for 7 days        CHANGE how you take these medications    * carvediloL 6.25 MG tablet  Commonly known as: COREG  Take 1 tablet (6.25 mg total) by mouth every morning.  What changed:   · medication strength  · how much to take  · when to take this     * carvediloL 3.125 MG tablet  Commonly known as: COREG  Take  "1 tablet (3.125 mg total) by mouth every evening.  What changed: You were already taking a medication with the same name, and this prescription was added. Make sure you understand how and when to take each.     LANTUS SOLOSTAR U-100 INSULIN glargine 100 units/mL (3mL) SubQ pen  Generic drug: insulin  Inject 50 Units into the skin once daily.  What changed: when to take this         * This list has 2 medication(s) that are the same as other medications prescribed for you. Read the directions carefully, and ask your doctor or other care provider to review them with you.            CONTINUE taking these medications    acetaminophen 500 MG tablet  Commonly known as: TYLENOL  Take 500 mg by mouth every 6 (six) hours as needed for Pain.     atorvastatin 40 MG tablet  Commonly known as: LIPITOR  Take 40 mg by mouth once daily.     blood sugar diagnostic Strp  Checks BG ac/hs     HumaLOG KwikPen Insulin 100 unit/mL pen  Generic drug: insulin lispro  Inject 10 Units into the skin 3 (three) times daily with meals.     insulin syringe-needle U-100 0.5 mL 30 gauge x 5/16" Syrg  Commonly known as: INSULIN SYRINGE  Uses 4 daily     insulin syringe-needle U-100 1 mL 29 gauge x 7/16" Syrg     lancets 33 gauge Misc  Commonly known as: ONETOUCH DELICA LANCETS  1 lancet by Misc.(Non-Drug; Combo Route) route 4 (four) times daily before meals and nightly.     mycophenolate 250 mg Cap  Commonly known as: CELLCEPT  TAKE 2 CAPSULES ( 500 MG TOTAL) BY MOUTH 2 TIMES DAILY     RENVELA 800 mg Tab  Generic drug: sevelamer carbonate  Take 1 tablet (800 mg total) by mouth 3 (three) times daily with meals.     sodium bicarbonate 650 MG tablet  Take 2 tablets by mouth three times a day     tacrolimus 1 MG Cap  Commonly known as: PROGRAF  Take 3 capsules (3 mg total) by mouth every morning AND 2 capsules (2 mg total) every evening. Z94.0.     timolol maleate 0.5% 0.5 % Drop  Commonly known as: TIMOPTIC  Place 2 drops in both eyes twice daily   "   TRULICITY 0.75 mg/0.5 mL pen injector  Generic drug: dulaglutide  Inject 0.75 mg into the skin every 7 days. Wednesday     zolpidem 10 mg Tab  Commonly known as: AMBIEN  Take 10 mg by mouth nightly as needed.            Indwelling Lines/Drains at time of discharge:   Lines/Drains/Airways     None                 Time spent on the discharge of patient: 36 minutes         Lorna Healy PA-C  Department of Hospital Medicine  [facility] - Telemetry Stepdown ([place])

## 2022-04-06 NOTE — ASSESSMENT & PLAN NOTE
-Nephrology consulted, appreciate recs.  -just starting PD and PD catheter placed 3/11/22 per patient  -here for evaluation of pain with filling and draining of dialysate  -CT AP suggestive of PD malposition per nephro interpretation. There is a small amount of pneumoperitoneum which is expected given postoperative period.   - General surgery consulted for possible PD revision, done 4/4.   -PD fluid collected to assess for infection, cultures and gram stain without growth   - blood cultures NGTD  -Hold off on abx per nephrology recs.  -Follow up at dialysis clinic 4/6

## 2022-04-07 LAB
25(OH)D3 SERPL-MCNC: 37.9 NG/ML (ref 30–100)
ALBUMIN SERPL-MCNC: 4.1 G/DL (ref 3.5–5.2)
ALP SERPL-CCNC: 72 U/L (ref 35–104)
ALT SERPL W P-5'-P-CCNC: <7 U/L (ref 7–52)
BASOPHILS NFR BLD: 0.2 % (ref 0–1.5)
BICARBONATE: 25 MEQ/L (ref 20–31)
BUN, PRE: 51 MG/DL (ref 6–19)
BUN/CREAT SERPL: 10.5 (ref 10–20)
CALCIUM PHOS PRODUCT: 29 (ref 0–54)
CALCIUM SERPL-MCNC: 9.3 MG/DL (ref 8.7–10.4)
CHLORIDE: 101 MEQ/L (ref 96–108)
CHOLEST SERPL-MSCNC: 144 MG/DL (ref 0–199)
CREAT SERPL-MCNC: 4.84 MG/DL (ref 0.6–1.3)
EOSINOPHIL NFR BLD: 2 % (ref 0–7)
ERYTHROCYTE [DISTWIDTH] IN BLOOD BY AUTOMATED COUNT: 15.8 % (ref 11.5–14.5)
FERRITIN SERPL-MCNC: 1262 NG/ML (ref 10–291)
FOLATE: 15.6 NG/ML
GLUCOSE SERPL-MCNC: 223 MG/DL (ref 70–100)
HBA1C MFR BLD: 6.3 % (ref 4.8–5.9)
HBV SURFACE AG SERPL QL IA: NEGATIVE
HCT VFR BLD AUTO: 18.9 % (ref 37–47)
HCV AB SERPL QL IA: NONREACTIVE
HCV S/CO RATIO(INDEX): 0.02 (ref 0–0.79)
HDLC SERPL-MCNC: 25 MG/DL
HEMOGLOBIN X 3: 17.4 % (ref 36–48)
HEPATITIS B CORE TOTAL ANTIBODY: NEGATIVE
HGB BLD-MCNC: 5.8 G/DL (ref 12–16)
IRON: 41 MCG/DL (ref 30–160)
LDLC SERPL CALC-MCNC: 71 MG/DL (ref 0–99)
LUC: 1.2 % (ref 0–4)
LYMPH%: 12.7 % (ref 19–48)
MAGNESIUM SERPL-MCNC: 2 MG/DL (ref 1.6–2.6)
MCH RBC QN AUTO: 27.1 PG (ref 27–31)
MCHC RBC AUTO-ENTMCNC: 31 G/DL (ref 30–36)
MCV RBC AUTO: 88 FL (ref 80–100)
MONO%: 6.7 % (ref 3–10)
NEUTROPHILS NFR BLD: 77.1 % (ref 40–75)
PHOSPHATE FLD-MCNC: 3.1 MG/DL (ref 2.6–4.5)
PLATELET # BLD AUTO: 269 1000/MCL (ref 130–400)
POTASSIUM SERPL-SCNC: 4.3 MEQ/L (ref 3.5–5.1)
PTH, INTACT: 417 PG/ML (ref 16–80)
RBC # BLD AUTO: 2.15 MILL/MCL (ref 4.2–5.4)
SODIUM BLD-SCNC: 135 MEQ/L (ref 136–145)
TOTAL IRON BINDING CAPACITY: 236 MCG/DL (ref 185–515)
TRANSFERRIN SATURATION: 17 % (ref 20–55)
TRIGL SERPL-MCNC: 238 MG/DL (ref 0–149)
UIBC SERPL-MCNC: 195 MCG/DL (ref 155–355)
VLDL CHOLESTEROL: 48 MG/DL (ref 10–30)
WBC # BLD AUTO: 7.15 1000/MCL (ref 4.8–10.8)

## 2022-04-08 ENCOUNTER — HOSPITAL ENCOUNTER (EMERGENCY)
Facility: HOSPITAL | Age: 57
Discharge: HOME | End: 2022-04-08
Attending: EMERGENCY MEDICINE
Payer: MEDICARE

## 2022-04-08 VITALS
WEIGHT: 170 LBS | DIASTOLIC BLOOD PRESSURE: 74 MMHG | SYSTOLIC BLOOD PRESSURE: 138 MMHG | OXYGEN SATURATION: 100 % | RESPIRATION RATE: 16 BRPM | HEIGHT: 66 IN | HEART RATE: 88 BPM | TEMPERATURE: 99 F | BODY MASS INDEX: 27.32 KG/M2

## 2022-04-08 DIAGNOSIS — D64.9 ANEMIA, UNSPECIFIED TYPE: Primary | ICD-10-CM

## 2022-04-08 DIAGNOSIS — M25.519 SHOULDER PAIN: ICD-10-CM

## 2022-04-08 LAB
ABO + RH BLD: NORMAL
ALBUMIN SERPL BCP-MCNC: 3.1 G/DL (ref 3.5–5.2)
ALP SERPL-CCNC: 63 U/L (ref 55–135)
ALT SERPL W/O P-5'-P-CCNC: <5 U/L (ref 10–44)
ALUMINUM SERPL-MCNC: <5 MCG/L (ref 0–10)
ANION GAP SERPL CALC-SCNC: 12 MMOL/L (ref 8–16)
AST SERPL-CCNC: 13 U/L (ref 10–40)
BACTERIA SPEC ANAEROBE CULT: NORMAL
BASOPHILS # BLD AUTO: 0.01 K/UL (ref 0–0.2)
BASOPHILS NFR BLD: 0.2 % (ref 0–1.9)
BILIRUB SERPL-MCNC: 0.2 MG/DL (ref 0.1–1)
BLD GP AB SCN CELLS X3 SERPL QL: NORMAL
BUN SERPL-MCNC: 50 MG/DL (ref 6–20)
CALCIUM SERPL-MCNC: 8.8 MG/DL (ref 8.7–10.5)
CHLORIDE SERPL-SCNC: 104 MMOL/L (ref 95–110)
CO2 SERPL-SCNC: 21 MMOL/L (ref 23–29)
CREAT SERPL-MCNC: 5.3 MG/DL (ref 0.5–1.4)
DIFFERENTIAL METHOD: ABNORMAL
EOSINOPHIL # BLD AUTO: 0.1 K/UL (ref 0–0.5)
EOSINOPHIL NFR BLD: 2.1 % (ref 0–8)
ERYTHROCYTE [DISTWIDTH] IN BLOOD BY AUTOMATED COUNT: 14.5 % (ref 11.5–14.5)
EST. GFR  (AFRICAN AMERICAN): 9 ML/MIN/1.73 M^2
EST. GFR  (NON AFRICAN AMERICAN): 8 ML/MIN/1.73 M^2
GLUCOSE SERPL-MCNC: 248 MG/DL (ref 70–110)
HCT VFR BLD AUTO: 25.3 % (ref 37–48.5)
HGB BLD-MCNC: 7.5 G/DL (ref 12–16)
IMM GRANULOCYTES # BLD AUTO: 0.17 K/UL (ref 0–0.04)
IMM GRANULOCYTES NFR BLD AUTO: 3.3 % (ref 0–0.5)
LYMPHOCYTES # BLD AUTO: 0.8 K/UL (ref 1–4.8)
LYMPHOCYTES NFR BLD: 15.7 % (ref 18–48)
MAGNESIUM SERPL-MCNC: 2.4 MG/DL (ref 1.6–2.6)
MCH RBC QN AUTO: 27.4 PG (ref 27–31)
MCHC RBC AUTO-ENTMCNC: 29.6 G/DL (ref 32–36)
MCV RBC AUTO: 92 FL (ref 82–98)
MONOCYTES # BLD AUTO: 0.6 K/UL (ref 0.3–1)
MONOCYTES NFR BLD: 11 % (ref 4–15)
NEUTROPHILS # BLD AUTO: 3.5 K/UL (ref 1.8–7.7)
NEUTROPHILS NFR BLD: 67.7 % (ref 38–73)
NRBC BLD-RTO: 0 /100 WBC
PHOSPHATE SERPL-MCNC: 3.2 MG/DL (ref 2.7–4.5)
PLATELET # BLD AUTO: 177 K/UL (ref 150–450)
PMV BLD AUTO: 11.1 FL (ref 9.2–12.9)
POTASSIUM SERPL-SCNC: 4 MMOL/L (ref 3.5–5.1)
PROT SERPL-MCNC: 8.1 G/DL (ref 6–8.4)
RBC # BLD AUTO: 2.74 M/UL (ref 4–5.4)
SODIUM SERPL-SCNC: 137 MMOL/L (ref 136–145)
WBC # BLD AUTO: 5.16 K/UL (ref 3.9–12.7)

## 2022-04-08 PROCEDURE — 93005 ELECTROCARDIOGRAM TRACING: CPT | Mod: NTX

## 2022-04-08 PROCEDURE — 84100 ASSAY OF PHOSPHORUS: CPT | Mod: NTX | Performed by: EMERGENCY MEDICINE

## 2022-04-08 PROCEDURE — 93010 ELECTROCARDIOGRAM REPORT: CPT | Mod: NTX,,, | Performed by: INTERNAL MEDICINE

## 2022-04-08 PROCEDURE — 99284 EMERGENCY DEPT VISIT MOD MDM: CPT | Mod: NTX

## 2022-04-08 PROCEDURE — 85025 COMPLETE CBC W/AUTO DIFF WBC: CPT | Mod: NTX | Performed by: EMERGENCY MEDICINE

## 2022-04-08 PROCEDURE — 83735 ASSAY OF MAGNESIUM: CPT | Mod: NTX | Performed by: EMERGENCY MEDICINE

## 2022-04-08 PROCEDURE — 93010 EKG 12-LEAD: ICD-10-PCS | Mod: NTX,,, | Performed by: INTERNAL MEDICINE

## 2022-04-08 PROCEDURE — 80053 COMPREHEN METABOLIC PANEL: CPT | Mod: NTX | Performed by: EMERGENCY MEDICINE

## 2022-04-08 PROCEDURE — 86901 BLOOD TYPING SEROLOGIC RH(D): CPT | Mod: NTX | Performed by: EMERGENCY MEDICINE

## 2022-04-08 NOTE — ED TRIAGE NOTES
Patient reports to ED via personal vehicle after being told that her H/H was low and she should go to the ED. Reports starting peritoneal dialysis 1 wk ago. States that her Nephrologist is Dr. Pineda but have not met them yet, she originally established with DR. Taylor who is now out of [place]. Reports Pedrito shoulder pain 3 out 10 on the pain scale.

## 2022-04-08 NOTE — ED PROVIDER NOTES
Encounter Date: 4/8/2022    SCRIBE #1 NOTE: I, Al Schreiber, am scribing for, and in the presence of, Mariel Vazquez MD.       History     Chief Complaint   Patient presents with    Abnormal labs     Pt was sent by her MD for abnormal labs. Pt was told her H&H was critically low and she may need a blood transfusion. Pt states she is starting to feel a little weak as well.     Elizabeth Maldonado is a 57 y.o. female with a PMHx of CKD, ESRD on peritoneal dialysis, kidney transplant (2014) s/p failure, DM, HTN, anemia, depression, HLD, and anxiety who presents to the Emergency Department for evaluation of a hemoglobin of 5.8 that was taken earlier today while at her peritoneal dialysis center. Patient explains she was advised to visit the Emergency Department for further evaluation. She endorses some generalized weakness, fatigue, and shortness of breath that began today after getting results. Otherwise has been feeling at her baseline. Patient is also complaining of bilateral shoulder and trapezius pain which she attributes to stress and was told was secondary to her recent laparoscopic surgery. Denies fever, worsening abdominal pain, difficulty with PD, cloudy PD fluid. She denies any black/bloody stool, abdominal pain, fever, known sources of bleeding, lightheadedness, palpitations, or other associated symptoms. Patient explains she was discharged from the hospital on 4/5 for problems with her peritoneal dialysis catheter s/p revision. She reports she received a blood transfusion during this hospitalization for anemia with a hemoglobin of 5.9 that improved to 7.5 following the transfusion. Patient states she has a history of MGUS with her most recent bone marrow biopsy occurring in January of this year. She is followed by heme/onc for this. She has graduated PD clinic and now is able to do PD at home. She denies any recent medication changes. Patient does not endorse any alcohol, tobacco, or other illicit drug  usage. Surgical history includes laparoscopic revision of PD catheter, kidney transplant, , tubal ligation, and rotator cuff repair.    The history is provided by the patient.     Review of patient's allergies indicates:   Allergen Reactions    Shrimp Hives    Topamax [topiramate] Other (See Comments)     Vision changes    Zoloft [sertraline] Palpitations     Past Medical History:   Diagnosis Date    Acidosis 2014    Allergic rhinitis 2014    Allergy     Anemia     Anemia in chronic kidney disease 2014    Anxiety     Cataract     Chronic bilateral low back pain with bilateral sciatica 10/25/2019    Chronic immunosuppression with Prograf and MMF 12/3/2014    CKD (chronic kidney disease) stage 5, GFR less than 15 ml/min 2014    CKD (chronic kidney disease), stage III 3/2/2015    Degenerative disc disease     Depression 2014    Diabetes mellitus, type 2 since age 18 2014    ESRD on peritoneal dialysis - 2014 for 9 hours no peritonitis 2014    Hyperlipidemia     Hypertension 2014    Hypomagnesemia 2015    Kidney transplant status, living unrelated donor - 12/2/14 12/3/2014    Neutropenia 2015    NS (nuclear sclerosis) 2016    Obesity     Organ transplant candidate 2014    Pre-op exam 2014    Proliferative diabetic retinopathy of both eyes without macular edema associated with type 2 diabetes mellitus 2016    Renal manifestation of secondary diabetes mellitus     Tendinitis     Trouble in sleeping      Past Surgical History:   Procedure Laterality Date    BIOPSY N/A 2020    Procedure: Biopsy;  Surgeon: Sweta Catalan MD;  Location: [facility] OR [address] [place] [address];  Service: Transplant;  Laterality: N/A;    BONE MARROW BIOPSY N/A      SECTION      x 2    KIDNEY TRANSPLANT  2014    LAPAROSCOPIC REVISION OF PROCEDURE INVOLVING PERITONEAL DIALYSIS CATHETER N/A 2022    Procedure: REVISION OF PROCEDURE INVOLVING  PERITONEAL DIALYSIS CATHETER, LAPAROSCOPIC;  Surgeon: Franklin Barber MD;  [facility];  Service: General;  Laterality: N/A;    MEDIPORT REMOVAL N/A 2019    Procedure: REMOVAL, CATHETER, CENTRAL VENOUS, TUNNELED, WITH PORT;  Surgeon: Eusebia Diagnostic Provider;  Location: [facility] OR;  Service: Radiology;  Laterality: N/A;    RENAL BIOPSY      ROTATOR CUFF REPAIR      TUBAL LIGATION       Family History   Problem Relation Age of Onset    Diabetes Mother     Hypertension Mother     Diabetes Father     Kidney disease Father     Diabetes Sister     Hypertension Sister     Kidney disease Sister     Heart disease Sister     Heart failure Sister     Diabetes Brother     Diabetes Sister     Stroke Maternal Grandmother     Heart disease Maternal Grandmother         pacemaker    Cataracts Maternal Grandmother     No Known Problems Maternal Aunt     No Known Problems Maternal Uncle     No Known Problems Paternal Aunt     No Known Problems Paternal Uncle     No Known Problems Maternal Grandfather     No Known Problems Paternal Grandmother     No Known Problems Paternal Grandfather     Cancer Neg Hx     Amblyopia Neg Hx     Blindness Neg Hx     Glaucoma Neg Hx     Macular degeneration Neg Hx     Retinal detachment Neg Hx     Strabismus Neg Hx     Thyroid disease Neg Hx     Breast cancer Neg Hx     Colon cancer Neg Hx     Ovarian cancer Neg Hx      Social History     Tobacco Use    Smoking status: Former Smoker     Packs/day: 1.00     Years: 20.00     Pack years: 20.00     Types: Cigarettes     Quit date: 2013     Years since quittin.6    Smokeless tobacco: Never Used    Tobacco comment: quit smoking cigarettes in 2014   Substance Use Topics    Alcohol use: No    Drug use: No     Review of Systems   Constitutional: Positive for fatigue. Negative for chills, diaphoresis and fever.   Eyes: Negative for photophobia and visual disturbance.   Respiratory:  Negative for cough and shortness of breath.    Cardiovascular: Negative for chest pain and leg swelling.   Gastrointestinal: Negative for abdominal pain, anal bleeding, blood in stool, constipation, diarrhea, nausea and vomiting.   Genitourinary: Negative for dysuria, flank pain, frequency, hematuria and urgency.   Musculoskeletal: Positive for arthralgias and myalgias. Negative for neck pain and neck stiffness.   Skin: Negative for rash and wound.   Neurological: Positive for weakness. Negative for light-headedness, numbness and headaches.   Psychiatric/Behavioral: Negative for confusion and suicidal ideas.   All other systems reviewed and are negative.      Physical Exam     Initial Vitals [04/08/22 1630]   BP Pulse Resp Temp SpO2   (!) 147/77 93 16 98 °F (36.7 °C) 99 %      MAP       --         Physical Exam    Nursing note and vitals reviewed.  Constitutional: She appears well-developed and well-nourished. She is not diaphoretic. No distress.   HENT:   Head: Normocephalic and atraumatic.   Right Ear: External ear normal.   Left Ear: External ear normal.   Mouth/Throat: Oropharynx is clear and moist. No oropharyngeal exudate.   Eyes: Conjunctivae and EOM are normal. Pupils are equal, round, and reactive to light. Right eye exhibits no discharge. Left eye exhibits no discharge.   Subconjunctival pallor.   Neck: Neck supple. No JVD present.   Normal range of motion.  Cardiovascular: Normal rate, regular rhythm, normal heart sounds and intact distal pulses. Exam reveals no gallop and no friction rub.    No murmur heard.  Pulmonary/Chest: Breath sounds normal. No respiratory distress. She has no wheezes. She has no rhonchi. She has no rales.   Abdominal: Abdomen is soft. Bowel sounds are normal. She exhibits no distension. There is abdominal tenderness.   Two recent PD catheter incisions that are clean, cry, and intact with minimal tenderness at the incision sites. There is no rebound and no guarding.    Musculoskeletal:         General: Tenderness (bilateral trapezius, mild, no midline neck tendernes to palpation ) present. No edema. Normal range of motion.      Cervical back: Normal range of motion and neck supple.     Lymphadenopathy:     She has no cervical adenopathy.   Neurological: She is alert and oriented to person, place, and time. She has normal strength. No cranial nerve deficit or sensory deficit. GCS score is 15. GCS eye subscore is 4. GCS verbal subscore is 5. GCS motor subscore is 6.   Moves all extremities and carries on conversation. CN- II: PERRL; III/IV/VI: EOMI w/out evidence of nystagmus; V: no deficits appreciated to light touch bilateral face; VII: no facial weakness, no facial asymmetry. Eyebrow raise symmetric. Smile symmetric; IX/X: palate midline, and raises symmetrically; XI: shoulder shrug 5/5 bilaterally; XII: tongue is midline w/out asymmetry. Strength 5/5 to bilateral upper and lower extremities, sensation intact to light touch.   Skin: Skin is warm and dry. Capillary refill takes less than 2 seconds.   Psychiatric: She has a normal mood and affect. Thought content normal.         ED Course   Procedures  Labs Reviewed   CBC W/ AUTO DIFFERENTIAL - Abnormal; Notable for the following components:       Result Value    RBC 2.74 (*)     Hemoglobin 7.5 (*)     Hematocrit 25.3 (*)     MCHC 29.6 (*)     Immature Granulocytes 3.3 (*)     Immature Grans (Abs) 0.17 (*)     Lymph # 0.8 (*)     Lymph % 15.7 (*)     All other components within normal limits   COMPREHENSIVE METABOLIC PANEL - Abnormal; Notable for the following components:    CO2 21 (*)     Glucose 248 (*)     BUN 50 (*)     Creatinine 5.3 (*)     Albumin 3.1 (*)     ALT <5 (*)     eGFR if  9 (*)     eGFR if non  8 (*)     All other components within normal limits   MAGNESIUM   PHOSPHORUS   TYPE & SCREEN     EKG Readings: (Independently Interpreted)   Normal sinus rhythm at 92. No ST elevation or  depression.  T-wave flattening in V1.  Normal axis.  QTC is 464.         Imaging Results    None          Medications - No data to display  Medical Decision Making:   History:   Old Medical Records: I decided to obtain old medical records.  Old Records Summarized: other records.       <> Summary of Records: Per chart review, patient was admitted to the hospital on 1/26/2022 for symptomatic anemia with a hemoglobin of 6.6 requiring a blood transfusion. Patient was admitted to the hospital on 4/4/2022 for complications of the peritoneal dialysis catheter. Patient was found to be anemic with a hemoglobin of 5.9 requiring a blood transfusion.  Clinical Tests:   Lab Tests: Ordered and Reviewed  Medical Tests: Reviewed and Ordered  MDM  58 yo female with PMHx of MGUS and ESRD on PD presents with reported anemia at dialysis clinic today to 5.8. Reports mild fatigue and chronic SOB. Recent transfusion and baseline hgb 7.5 at discharge. Denies acute blood loss. DDx includes but is not limited to symptomatic anemia, anemia of chronic disease, MGUS, acute blood loss, lab error, metabolic derangement. Patient recheck hgb is 7.5, which is at her baseline at discharge. Discussed with patient, she is happy to be discharged home and will follow up with PCP on Monday to have recheck of hgb. Strict return precautions for s/s of anemia and all questions answered.         Scribe Attestation:   Scribe #1: I performed the above scribed service and the documentation accurately describes the services I performed. I attest to the accuracy of the note.                 Clinical Impression:   Final diagnoses:  [M25.519] Shoulder pain  [D64.9] Anemia, unspecified type (Primary)          ED Disposition Condition    Discharge Stable        ED Prescriptions     None        Follow-up Information     Follow up With Specialties Details Why Contact Info    Richy Singleton NP Family Medicine Schedule an appointment as soon as possible for a visit in  2 days to discuss recent ED visit, to establish primary doctor [address] [id number]  [phone]      [facility] Emergency Dept Emergency Medicine  As needed, If symptoms worsen [address] [place] [address] [phone]         I, Mariel Vazquez, personally performed the services described in this documentation. All medical record entries made by the scribe were at my direction and in my presence.  I have reviewed the chart and agree that the record reflects my personal performance and is accurate and complete.     Mariel Vazquez MD  04/09/22 0150

## 2022-04-09 NOTE — DISCHARGE INSTRUCTIONS
Please return to the ED for chest pain, shortness of breath, palpations, weakness/fatigue or any other concerns. Please have your blood levels rechecked at latest on Monday, sooner if symptoms present.     Thank you for coming to our Emergency Department today. As we discussed, it is important to remember that some problems are difficult to diagnose and may not be found during your Emergency Department visit. Be sure to follow up with your primary care doctor and review all labs/imaging/tests that were performed during this visit with them. Some labs/tests may be outside of the normal range and require non-emergent follow-up and further investigation to help diagnose/exclude/prevent complications or other medical conditions.    If you do not have a primary care doctor, you may contact the one listed on your discharge paperwork or you may also call the [facility] Appointment Desk at [phone] to schedule an appointment and establish care with one. It is important to your health that you have a primary care doctor.    Please take all medications as directed. All medications may potentially have side-effects and it is impossible to predict which medications may give you side-effects or what side-effects (if any) they will give you.. If you feel that you are having a negative effect or side-effect of any medication you should immediately stop taking them and seek medical attention. If you feel that you are having a life-threatening reaction call 911.    Return to the ER with any questions/concerns, new/concerning symptoms, worsening or failure to improve.     Do not drive, swim, climb to height, take a bath or make any important decisions for 24 hours if you have received any pain medications, sedatives or mood altering drugs during your ER visit.

## 2022-04-11 ENCOUNTER — TELEPHONE (OUTPATIENT)
Dept: INFECTIOUS DISEASES | Facility: HOSPITAL | Age: 57
End: 2022-04-11
Payer: MEDICAID

## 2022-04-11 NOTE — TELEPHONE ENCOUNTER
"Pt was no call/no show today for iron infusion. Called Pt to reschedule and Pt stated that "Dr Kidd said she doesn't need iron infusions anymore" However, Dr Bonilla ordered the iron infusions. Pt to reach out to Dr Bonilla about not wanting iron infusions anymore. Will cancel all appointments for now per patient request  "

## 2022-04-14 LAB
HEMOGLOBIN X 3: 24 % (ref 36–48)
HGB BLD-MCNC: 8 G/DL (ref 12–16)

## 2022-04-20 ENCOUNTER — PATIENT MESSAGE (OUTPATIENT)
Dept: ENDOSCOPY | Facility: HOSPITAL | Age: 57
End: 2022-04-20
Payer: MEDICAID

## 2022-04-20 DIAGNOSIS — Z12.11 SPECIAL SCREENING FOR MALIGNANT NEOPLASMS, COLON: Primary | ICD-10-CM

## 2022-04-20 DIAGNOSIS — Z99.2 DIALYSIS PATIENT: ICD-10-CM

## 2022-04-22 LAB
BSA (DUBOIS): 1.87 SQ. M.
BUN, PRE: 31 MG/DL (ref 6–19)
BUN/CREAT SERPL: 6.5 (ref 10–20)
CREAT 24H UR-MRATE: 0.9 G/24 HR (ref 0.5–1.6)
CREAT CLEAR, PDF NORM WKLY: 15 L/WK
CREAT CLEAR, PDF WKLY: 16 L/WK
CREAT CLEAR, TOT NORM WKLY: 134 L/WK
CREAT CLEAR, TOT WKLY: 145 L/WK
CREAT CLEAR, URINE NOR WKLY: 119 L/WK
CREAT CLEAR, URINE NORM: 11.8 ML/MIN (ref 77–94)
CREAT CLEAR, URINE WKLY: 129 L/WK
CREAT SERPL-MCNC: 4.8 MG/DL (ref 0.6–1.3)
CREAT, PDF OVERNIGHT COR: 1.5 MG/DL
CREAT, PDF OVERNIGHT UNCOR: 1.8 MG/DL
CREATININE CLEAR, PDF NORM: 1.5 ML/MIN
CREATININE CLEAR, PDF: 1.6 ML/MIN
CREATININE, PDF 24 HR: 111.8 MG/24 HR
CREATININE, PDF TIMED COR: 1.6 MG/DL
CREATININE, PDF TIMED UNCOR: 1.8 MG/DL
CREATININE, TIMED URINE: 63 MG/DL
GLUCOSE SERPL-MCNC: 135 MG/DL (ref 70–100)
GLUCOSE, PDF OVERNIGHT: 1259 MG/DL
GLUCOSE, PDF TIMED: 1233 MG/DL
HEMOGLOBIN X 3: 25.2 % (ref 36–48)
HGB BLD-MCNC: 8.4 G/DL (ref 12–16)
KT/V, PERITONEAL: 0.98
KT/V, RESIDUAL: 1.44
KT/V, TOTAL: 2.42
PNA, NORMALIZED: 0.71 G/KG/DAY
PNA: 45 G/DAY
UREA CLEAR, PDF NORM WKLY: 36 L/WK
UREA CLEAR, TOT NORM WKLY: 89 L/WK
UREA CLEAR, URINE NORM WKLY: 53 L/WK
UREA CLEAR, URINE NORM: 4.9 ML/MIN (ref 64–99)
UREA CLEARANCE, PD FLUID: 3.6 ML/MIN
UREA CLEARANCE, PDF NORM: 3.3 ML/MIN
UREA NITROGEN, BODY FLUID: 23 MG/DL
UREA NITROGEN, PDF 24 HR: 1607.5 MG/24 HR
UREA NITROGEN,TIMED URINE: 169 MG/DL
UREA VOL DIST (HUME): 36.7 L
URINE CREATININE CLEARANCE: 12.8 ML/MIN
URINE UREA CLEARANCE: 5.3 ML/MIN (ref 64–99)
UUN 24H UR-MRATE: 2.4 G/24 HR (ref 12–20)

## 2022-04-26 ENCOUNTER — PATIENT MESSAGE (OUTPATIENT)
Dept: TRANSPLANT | Facility: CLINIC | Age: 57
End: 2022-04-26
Payer: MEDICAID

## 2022-04-26 DIAGNOSIS — Z94.0 KIDNEY REPLACED BY TRANSPLANT: Primary | ICD-10-CM

## 2022-04-26 RX ORDER — TACROLIMUS 1 MG/1
CAPSULE ORAL
Qty: 180 CAPSULE | Refills: 0 | Status: SHIPPED | OUTPATIENT
Start: 2022-04-26 | End: 2022-08-10

## 2022-04-26 NOTE — TELEPHONE ENCOUNTER
Called patient regarding orders below. Pt verbalized understanding.           4/26/22: D/c MMF   4/26-5/26: Lower prograf to 2 mg po bid for 1 month   5/26-6/26: Then lower prograf to 2/1 for 1 month   6/26-7/26: Then lower prograf to 1 mg po bid 1 month   7/26-8/26: Then 1 mg daily for 1 month   Then d/c prograf

## 2022-04-27 ENCOUNTER — HOSPITAL ENCOUNTER (OUTPATIENT)
Facility: HOSPITAL | Age: 57
Discharge: HOME | End: 2022-04-27
Attending: STUDENT IN AN ORGANIZED HEALTH CARE EDUCATION/TRAINING PROGRAM | Admitting: STUDENT IN AN ORGANIZED HEALTH CARE EDUCATION/TRAINING PROGRAM
Payer: MEDICAID

## 2022-04-27 ENCOUNTER — ANESTHESIA EVENT (OUTPATIENT)
Dept: ENDOSCOPY | Facility: HOSPITAL | Age: 57
End: 2022-04-27
Payer: MEDICAID

## 2022-04-27 ENCOUNTER — ANESTHESIA (OUTPATIENT)
Dept: ENDOSCOPY | Facility: HOSPITAL | Age: 57
End: 2022-04-27
Payer: MEDICAID

## 2022-04-27 VITALS
BODY MASS INDEX: 26.68 KG/M2 | DIASTOLIC BLOOD PRESSURE: 79 MMHG | RESPIRATION RATE: 18 BRPM | HEIGHT: 67 IN | WEIGHT: 170 LBS | TEMPERATURE: 98 F | SYSTOLIC BLOOD PRESSURE: 139 MMHG | HEART RATE: 80 BPM | OXYGEN SATURATION: 93 %

## 2022-04-27 DIAGNOSIS — Z86.010 ENCOUNTER FOR COLONOSCOPY DUE TO HISTORY OF COLONIC POLYP: Primary | ICD-10-CM

## 2022-04-27 DIAGNOSIS — N30.01 ACUTE CYSTITIS WITH HEMATURIA: ICD-10-CM

## 2022-04-27 DIAGNOSIS — Z12.11 ENCOUNTER FOR COLONOSCOPY DUE TO HISTORY OF COLONIC POLYP: Primary | ICD-10-CM

## 2022-04-27 DIAGNOSIS — Z12.11 SCREENING FOR MALIGNANT NEOPLASM OF COLON: ICD-10-CM

## 2022-04-27 PROBLEM — Z86.0100 ENCOUNTER FOR COLONOSCOPY DUE TO HISTORY OF COLONIC POLYP: Status: ACTIVE | Noted: 2022-04-27

## 2022-04-27 LAB — POCT GLUCOSE: 78 MG/DL (ref 70–110)

## 2022-04-27 PROCEDURE — 37000009 HC ANESTHESIA EA ADD 15 MINS: Mod: TXP | Performed by: STUDENT IN AN ORGANIZED HEALTH CARE EDUCATION/TRAINING PROGRAM

## 2022-04-27 PROCEDURE — 45380 COLONOSCOPY AND BIOPSY: CPT | Mod: TXP,,, | Performed by: STUDENT IN AN ORGANIZED HEALTH CARE EDUCATION/TRAINING PROGRAM

## 2022-04-27 PROCEDURE — 37000008 HC ANESTHESIA 1ST 15 MINUTES: Mod: TXP | Performed by: STUDENT IN AN ORGANIZED HEALTH CARE EDUCATION/TRAINING PROGRAM

## 2022-04-27 PROCEDURE — 45380 PR COLONOSCOPY,BIOPSY: ICD-10-PCS | Mod: TXP,,, | Performed by: STUDENT IN AN ORGANIZED HEALTH CARE EDUCATION/TRAINING PROGRAM

## 2022-04-27 PROCEDURE — 82962 GLUCOSE BLOOD TEST: CPT | Mod: TXP | Performed by: STUDENT IN AN ORGANIZED HEALTH CARE EDUCATION/TRAINING PROGRAM

## 2022-04-27 PROCEDURE — 63600175 PHARM REV CODE 636 W HCPCS: Mod: TXP | Performed by: NURSE ANESTHETIST, CERTIFIED REGISTERED

## 2022-04-27 PROCEDURE — 25000003 PHARM REV CODE 250: Mod: TXP | Performed by: NURSE ANESTHETIST, CERTIFIED REGISTERED

## 2022-04-27 PROCEDURE — 88305 TISSUE EXAM BY PATHOLOGIST: CPT | Mod: TXP | Performed by: STUDENT IN AN ORGANIZED HEALTH CARE EDUCATION/TRAINING PROGRAM

## 2022-04-27 PROCEDURE — 27201012 HC FORCEPS, HOT/COLD, DISP: Mod: TXP | Performed by: STUDENT IN AN ORGANIZED HEALTH CARE EDUCATION/TRAINING PROGRAM

## 2022-04-27 PROCEDURE — 88305 TISSUE EXAM BY PATHOLOGIST: ICD-10-PCS | Mod: 26,TXP,, | Performed by: STUDENT IN AN ORGANIZED HEALTH CARE EDUCATION/TRAINING PROGRAM

## 2022-04-27 PROCEDURE — 88305 TISSUE EXAM BY PATHOLOGIST: CPT | Mod: 26,TXP,, | Performed by: STUDENT IN AN ORGANIZED HEALTH CARE EDUCATION/TRAINING PROGRAM

## 2022-04-27 PROCEDURE — 25000003 PHARM REV CODE 250: Mod: TXP | Performed by: STUDENT IN AN ORGANIZED HEALTH CARE EDUCATION/TRAINING PROGRAM

## 2022-04-27 PROCEDURE — 45380 COLONOSCOPY AND BIOPSY: CPT | Mod: TXP | Performed by: STUDENT IN AN ORGANIZED HEALTH CARE EDUCATION/TRAINING PROGRAM

## 2022-04-27 RX ORDER — LIDOCAINE HYDROCHLORIDE 20 MG/ML
INJECTION INTRAVENOUS
Status: DISCONTINUED | OUTPATIENT
Start: 2022-04-27 | End: 2022-04-27

## 2022-04-27 RX ORDER — PHENYLEPHRINE HYDROCHLORIDE 10 MG/ML
INJECTION INTRAVENOUS
Status: DISCONTINUED | OUTPATIENT
Start: 2022-04-27 | End: 2022-04-27

## 2022-04-27 RX ORDER — PROPOFOL 10 MG/ML
VIAL (ML) INTRAVENOUS CONTINUOUS PRN
Status: DISCONTINUED | OUTPATIENT
Start: 2022-04-27 | End: 2022-04-27

## 2022-04-27 RX ORDER — SODIUM CHLORIDE 9 MG/ML
INJECTION, SOLUTION INTRAVENOUS CONTINUOUS
Status: DISCONTINUED | OUTPATIENT
Start: 2022-04-27 | End: 2022-04-27 | Stop reason: HOSPADM

## 2022-04-27 RX ORDER — PROPOFOL 10 MG/ML
VIAL (ML) INTRAVENOUS
Status: DISCONTINUED | OUTPATIENT
Start: 2022-04-27 | End: 2022-04-27

## 2022-04-27 RX ADMIN — SODIUM CHLORIDE: 0.9 INJECTION, SOLUTION INTRAVENOUS at 09:04

## 2022-04-27 RX ADMIN — LIDOCAINE HYDROCHLORIDE 60 MG: 20 INJECTION, SOLUTION INTRAVENOUS at 11:04

## 2022-04-27 RX ADMIN — Medication 150 MCG/KG/MIN: at 11:04

## 2022-04-27 RX ADMIN — PROPOFOL 60 MG: 10 INJECTION, EMULSION INTRAVENOUS at 11:04

## 2022-04-27 RX ADMIN — PHENYLEPHRINE HYDROCHLORIDE 200 MCG: 10 INJECTION INTRAVENOUS at 11:04

## 2022-04-27 RX ADMIN — PROPOFOL 30 MG: 10 INJECTION, EMULSION INTRAVENOUS at 11:04

## 2022-04-27 NOTE — TRANSFER OF CARE
"Anesthesia Transfer of Care Note    Patient: Elizabeth Maldonado    Procedure(s) Performed: Procedure(s) (LRB):  COLONOSCOPY (N/A)    Patient location: GI    Anesthesia Type: general    Transport from OR: Transported from OR on room air with adequate spontaneous ventilation    Post pain: adequate analgesia    Post assessment: no apparent anesthetic complications    Post vital signs: stable    Level of consciousness: awake    Nausea/Vomiting: no nausea/vomiting    Complications: none    Transfer of care protocol was followed      Last vitals:   Visit Vitals  /60 (BP Location: Left arm, Patient Position: Lying)   Pulse 84   Temp 36.7 °C (98.1 °F) (Temporal)   Resp 16   Ht 5' 6.5" (1.689 m)   Wt 77.1 kg (170 lb)   LMP 04/29/2011 (Approximate)   SpO2 99%   Breastfeeding No   BMI 27.03 kg/m²     "

## 2022-04-27 NOTE — PROVATION PATIENT INSTRUCTIONS
Discharge Summary/Instructions after an Endoscopic Procedure  Patient Name: Elizabeth Maldonado  Patient MRN: [id number]  Patient YOB: 1965 Wednesday, April 27, 2022  Franklin Gan MD  Dear patient,  As a result of recent federal legislation (The Federal Cures Act), you may   receive lab or pathology results from your procedure in your [facility]   account before your physician is able to contact you. Your physician or   their representative will relay the results to you with their   recommendations at their soonest availability.  Thank you,  RESTRICTIONS:  During your procedure today, you received medications for sedation.  These   medications may affect your judgment, balance and coordination.  Therefore,   for 24 hours, you have the following restrictions:   - DO NOT drive a car, operate machinery, make legal/financial decisions,   sign important papers or drink alcohol.    ACTIVITY:  Today: no heavy lifting, straining or running due to procedural   sedation/anesthesia.  The following day: return to full activity including work.  DIET:  Eat and drink normally unless instructed otherwise.     TREATMENT FOR COMMON SIDE EFFECTS:  - Mild abdominal pain, nausea, belching, bloating or excessive gas:  rest,   eat lightly and use a heating pad.  - Sore Throat: treat with throat lozenges and/or gargle with warm salt   water.  - Because air was used during the procedure, expelling large amounts of air   from your rectum or belching is normal.  - If a bowel prep was taken, you may not have a bowel movement for 1-3 days.    This is normal.  SYMPTOMS TO WATCH FOR AND REPORT TO YOUR PHYSICIAN:  1. Abdominal pain or bloating, other than gas cramps.  2. Chest pain.  3. Back pain.  4. Signs of infection such as: chills or fever occurring within 24 hours   after the procedure.  5. Rectal bleeding, which would show as bright red, maroon, or black stools.   (A tablespoon of blood from the rectum is not serious, especially  if   hemorrhoids are present.)  6. Vomiting.  7. Weakness or dizziness.  GO DIRECTLY TO THE NEAREST EMERGENCY ROOM IF YOU HAVE ANY OF THE FOLLOWING:      Difficulty breathing              Chills and/or fever over 101 F   Persistent vomiting and/or vomiting blood   Severe abdominal pain   Severe chest pain   Black, tarry stools   Bleeding- more than one tablespoon   Any other symptom or condition that you feel may need urgent attention  Your doctor recommends these additional instructions:  If any biopsies were taken, your doctors clinic will contact you in 1 to 2   weeks with any results.  - Discharge patient to home.   - Resume previous diet.   - Continue present medications.   - Await pathology results.   - Repeat colonoscopy in 5 years for surveillance.   - Return to referring physician as previously scheduled.  For questions, problems or results please call your physician - Franklin Gan MD at Work:  [phone].  [facility] EMERGENCY ROOM PHONE NUMBER: [phone]  IF A COMPLICATION OR EMERGENCY SITUATION ARISES AND YOU ARE UNABLE TO REACH   YOUR PHYSICIAN - GO DIRECTLY TO THE EMERGENCY ROOM.  MD Franklin Mccollum MD  4/27/2022 11:56:31 AM  This report has been verified and signed electronically.  Dear patient,  As a result of recent federal legislation (The Federal Cures Act), you may   receive lab or pathology results from your procedure in your [facility]   account before your physician is able to contact you. Your physician or   their representative will relay the results to you with their   recommendations at their soonest availability.  Thank you,  PROVATION

## 2022-04-27 NOTE — ANESTHESIA PREPROCEDURE EVALUATION
04/27/2022  Elizabeth Maldonado is a 57 y.o., female.      Pre-op Assessment    I have reviewed the Patient Summary Reports.       I have reviewed the Medications.     Review of Systems  Anesthesia Hx:  No problems with previous Anesthesia  History of prior surgery of interest to airway management or planning: Previous anesthesia: General   Social:  Former Smoker, No Alcohol Use 20 pack years   Hematology/Oncology:  Hematology Normal   Oncology Normal     EENT/Dental:EENT/Dental Normal   Cardiovascular:   Exercise tolerance: good Hypertension, well controlled    Pulmonary:  Pulmonary Normal    Renal/:   Chronic Renal Disease, CRI    Musculoskeletal:   Arthritis     Neurological:   Neuromuscular Disease,    Endocrine:   Diabetes, well controlled, type 2    Dermatological:  Skin Normal    Psych:   Psychiatric History          Physical Exam  General: Well nourished and Cooperative    Airway:  Mallampati: II   Mouth Opening: Normal  TM Distance: Normal  Tongue: Normal  Neck ROM: Normal ROM    Dental:  Intact        Anesthesia Plan  Type of Anesthesia, risks & benefits discussed:    Anesthesia Type: Gen Natural Airway  Intra-op Monitoring Plan: Standard ASA Monitors  Post Op Pain Control Plan: multimodal analgesia and IV/PO Opioids PRN  Induction:  IV  Airway Plan: , Post-Induction  Informed Consent: Informed consent signed with the Patient and all parties understand the risks and agree with anesthesia plan.  All questions answered.   ASA Score: 3    Ready For Surgery From Anesthesia Perspective.     .

## 2022-04-27 NOTE — DISCHARGE SUMMARY
[facility] 4[facility]  Discharge Note  Short Stay    Procedure(s) (LRB):  COLONOSCOPY (N/A)    OUTCOME: Patient tolerated treatment/procedure well without complication and is now ready for discharge.    DISPOSITION: Home or Self Care    FINAL DIAGNOSIS:  Encounter for colonoscopy due to history of colonic polyp    FOLLOWUP: In clinic    DISCHARGE INSTRUCTIONS:    Discharge Procedure Orders   Notify your health care provider if you experience any of the following:  temperature >100.4     Notify your health care provider if you experience any of the following:  persistent nausea and vomiting or diarrhea     Notify your health care provider if you experience any of the following:  severe uncontrolled pain     Activity as tolerated         Clinical Reference Documents Added to Patient Instructions       Document    COLONOSCOPY (ENGLISH)

## 2022-04-27 NOTE — H&P
Endoscopy H&P    Procedure : Colonoscopy      Pre-transplant evaluation    Last scope 2014 incomplete, could not reach cecum   - Two small polyps - resected and retrieved.                        - Non-bleeding hemorrhoids.                        - Incomplete colonoscopy - cecum could not be                        intubated despite position changes and gentle                        pressure   FHX negative for colon or rectal cancer     Past Medical History:   Diagnosis Date    Acidosis 7/1/2014    Allergic rhinitis 7/1/2014    Allergy     Anemia     Anemia in chronic kidney disease 7/1/2014    Anxiety     Cataract     Chronic bilateral low back pain with bilateral sciatica 10/25/2019    Chronic immunosuppression with Prograf and MMF 12/3/2014    CKD (chronic kidney disease) stage 5, GFR less than 15 ml/min 7/1/2014    CKD (chronic kidney disease), stage III 3/2/2015    Degenerative disc disease     Depression 7/1/2014    Diabetes mellitus, type 2 since age 18 7/1/2014    ESRD on peritoneal dialysis - august 2014 for 9 hours no peritonitis 11/24/2014    Hyperlipidemia     Hypertension 7/1/2014    Hypomagnesemia 1/7/2015    Kidney transplant status, living unrelated donor - 12/2/14 12/3/2014    Neutropenia 1/21/2015    NS (nuclear sclerosis) 9/16/2016    Obesity     Organ transplant candidate 7/1/2014    Pre-op exam 11/24/2014    Proliferative diabetic retinopathy of both eyes without macular edema associated with type 2 diabetes mellitus 9/16/2016    Renal manifestation of secondary diabetes mellitus     Tendinitis     Trouble in sleeping              Review of Systems -ROS:  GENERAL: No fever, chills, fatigability or weight loss.  CHEST: Denies CARPENTER, cyanosis, wheezing, cough and sputum production.  CARDIOVASCULAR: Denies chest pain, PND, orthopnea or reduced exercise tolerance.   Musculoskeletal ROS: negative for - gait disturbance or joint pain  Neurological ROS: negative for - confusion  or memory loss        Physical Exam:  General: well developed, well nourished, no distress  Head: normocephalic  Neck: supple, symmetrical, trachea midline  Lungs:  clear to auscultation bilaterally and normal respiratory effort  Heart: regular rate and rhythm, S1, S2 normal, no murmur, rub or gallop and regular rate and rhythm  Abdomen: soft, non-tender non-distented; bowel sounds normal; no masses,  no organomegaly  Extremities: no cyanosis or edema, or clubbing       Moderate Sedation (choice): Mallampati Score 1    ASA : II    IMP: most recent endoscopic exam 2014 and pre-transplant evaluation     Plan: Colonoscopy with Moderate sedation.  I have explained the procedure including indications, alternatives, expected outcomes and potential complications. The patient appears to understand and gives informed consent. The patient is medically ready for surgery.

## 2022-04-28 NOTE — ANESTHESIA POSTPROCEDURE EVALUATION
Anesthesia Post Evaluation    Patient: Elizabeth Maldonado    Procedure(s) Performed: Procedure(s) (LRB):  COLONOSCOPY (N/A)    Final Anesthesia Type: general      Patient location during evaluation: PACU  Patient participation: Yes- Able to Participate  Level of consciousness: awake and alert  Post-procedure vital signs: reviewed and stable  Pain management: adequate  Airway patency: patent    PONV status at discharge: No PONV  Anesthetic complications: no      Cardiovascular status: blood pressure returned to baseline  Respiratory status: unassisted  Hydration status: euvolemic  Follow-up not needed.          Vitals Value Taken Time   /79 04/27/22 1228   Temp 36.7 °C (98.1 °F) 04/27/22 1200   Pulse 80 04/27/22 1228   Resp 18 04/27/22 1228   SpO2 93 % 04/27/22 1228         Event Time   Out of Recovery 12:37:56         Pain/Eric Score: Eric Score: 10 (4/27/2022 12:29 PM)

## 2022-05-02 LAB — FUNGUS SPEC CULT: NORMAL

## 2022-05-03 LAB
FINAL PATHOLOGIC DIAGNOSIS: NORMAL
Lab: NORMAL

## 2022-05-04 LAB
ALBUMIN SERPL-MCNC: 3.8 G/DL (ref 3.5–5.2)
ALP SERPL-CCNC: 66 U/L (ref 35–104)
ALT SERPL W P-5'-P-CCNC: <7 U/L (ref 7–52)
BASOPHILS NFR BLD: 0.4 % (ref 0–1.5)
BICARBONATE: 22 MEQ/L (ref 20–31)
BUN, PRE: 32 MG/DL (ref 6–19)
BUN/CREAT SERPL: 6.8 (ref 10–20)
CALCIUM PHOS PRODUCT, COR: 44 (ref 0–54)
CALCIUM PHOS PRODUCT: 43 (ref 0–54)
CALCIUM SERPL-MCNC: 8.9 MG/DL (ref 8.7–10.4)
CALCIUM, CORRECTED: 9.1 MG/DL (ref 8.7–10.4)
CHLORIDE: 108 MEQ/L (ref 96–108)
CREAT SERPL-MCNC: 4.69 MG/DL (ref 0.6–1.3)
EOSINOPHIL NFR BLD: 2.6 % (ref 0–7)
ERYTHROCYTE [DISTWIDTH] IN BLOOD BY AUTOMATED COUNT: 15.5 % (ref 11.5–14.5)
GLUCOSE SERPL-MCNC: 156 MG/DL (ref 70–100)
HBV SURFACE AG SERPL QL IA: NEGATIVE
HCT VFR BLD AUTO: 31.9 % (ref 37–47)
HEMOGLOBIN X 3: 29.1 % (ref 36–48)
HGB BLD-MCNC: 9.7 G/DL (ref 12–16)
IRON: 49 MCG/DL (ref 30–160)
LUC: 3.5 % (ref 0–4)
LYMPH%: 25.5 % (ref 19–48)
MAGNESIUM SERPL-MCNC: 2 MG/DL (ref 1.6–2.6)
MCH RBC QN AUTO: 28.7 PG (ref 27–31)
MCHC RBC AUTO-ENTMCNC: 30.3 G/DL (ref 30–36)
MCV RBC AUTO: 95 FL (ref 80–100)
MONO%: 8.7 % (ref 3–10)
NEUTROPHILS NFR BLD: 59.3 % (ref 40–75)
PHOSPHATE FLD-MCNC: 4.8 MG/DL (ref 2.6–4.5)
PLATELET # BLD AUTO: 190 1000/MCL (ref 130–400)
POTASSIUM SERPL-SCNC: 4.1 MEQ/L (ref 3.5–5.1)
RBC # BLD AUTO: 3.36 MILL/MCL (ref 4.2–5.4)
SODIUM BLD-SCNC: 139 MEQ/L (ref 136–145)
TOTAL IRON BINDING CAPACITY: 218 MCG/DL (ref 185–515)
TRANSFERRIN SATURATION: 22 % (ref 20–55)
UIBC SERPL-MCNC: 169 MCG/DL (ref 155–355)
WBC # BLD AUTO: 3.41 1000/MCL (ref 4.8–10.8)

## 2022-05-05 ENCOUNTER — LAB VISIT (OUTPATIENT)
Dept: LAB | Facility: HOSPITAL | Age: 57
End: 2022-05-05
Attending: INTERNAL MEDICINE
Payer: MEDICARE

## 2022-05-05 DIAGNOSIS — R30.0 DYSURIA: ICD-10-CM

## 2022-05-05 LAB
BACTERIA #/AREA URNS AUTO: ABNORMAL /HPF
BILIRUB UR QL STRIP: NEGATIVE
CLARITY UR REFRACT.AUTO: ABNORMAL
COLOR UR AUTO: YELLOW
GLUCOSE UR QL STRIP: NEGATIVE
HGB UR QL STRIP: ABNORMAL
HYALINE CASTS UR QL AUTO: 0 /LPF
KETONES UR QL STRIP: NEGATIVE
LEUKOCYTE ESTERASE UR QL STRIP: ABNORMAL
MICROSCOPIC COMMENT: ABNORMAL
NITRITE UR QL STRIP: NEGATIVE
PH UR STRIP: 6 [PH] (ref 5–8)
PROT UR QL STRIP: ABNORMAL
RBC #/AREA URNS AUTO: >100 /HPF (ref 0–4)
SP GR UR STRIP: 1.01 (ref 1–1.03)
URN SPEC COLLECT METH UR: ABNORMAL
WBC #/AREA URNS AUTO: >100 /HPF (ref 0–5)
WBC CLUMPS UR QL AUTO: ABNORMAL

## 2022-05-05 PROCEDURE — 87077 CULTURE AEROBIC IDENTIFY: CPT | Mod: TXP | Performed by: INTERNAL MEDICINE

## 2022-05-05 PROCEDURE — 87088 URINE BACTERIA CULTURE: CPT | Mod: TXP | Performed by: INTERNAL MEDICINE

## 2022-05-05 PROCEDURE — 87186 SC STD MICRODIL/AGAR DIL: CPT | Mod: TXP | Performed by: INTERNAL MEDICINE

## 2022-05-05 PROCEDURE — 81001 URINALYSIS AUTO W/SCOPE: CPT | Mod: TXP | Performed by: INTERNAL MEDICINE

## 2022-05-05 PROCEDURE — 87086 URINE CULTURE/COLONY COUNT: CPT | Mod: TXP | Performed by: INTERNAL MEDICINE

## 2022-05-06 DIAGNOSIS — N39.0 RECURRENT UTI: Primary | ICD-10-CM

## 2022-05-06 RX ORDER — CIPROFLOXACIN 250 MG/1
250 TABLET, FILM COATED ORAL DAILY
Qty: 7 TABLET | Refills: 0 | Status: SHIPPED | OUTPATIENT
Start: 2022-05-06 | End: 2022-05-25 | Stop reason: SDUPTHER

## 2022-05-06 NOTE — PROGRESS NOTES
Urinalysis is abnormal and showing pyuria, wbc clumps. Urine culture awaited. Prior culture data showing klebsiella species on multiple reports, susceptible to Cipro. Pt has reported urinary symptoms to indicate UTI.   Plan  Empirically start Cipro, renal dosing while culture data awaited  Urology referral given episodes of recurrent UTI

## 2022-05-08 LAB — BACTERIA UR CULT: ABNORMAL

## 2022-05-11 ENCOUNTER — PATIENT MESSAGE (OUTPATIENT)
Dept: RESEARCH | Facility: CLINIC | Age: 57
End: 2022-05-11
Payer: MEDICAID

## 2022-05-26 ENCOUNTER — PATIENT MESSAGE (OUTPATIENT)
Dept: RESEARCH | Facility: CLINIC | Age: 57
End: 2022-05-26
Payer: MEDICAID

## 2022-05-26 ENCOUNTER — OFFICE VISIT (OUTPATIENT)
Dept: SURGERY | Facility: CLINIC | Age: 57
End: 2022-05-26
Payer: MEDICARE

## 2022-05-26 VITALS
HEIGHT: 67 IN | DIASTOLIC BLOOD PRESSURE: 75 MMHG | SYSTOLIC BLOOD PRESSURE: 136 MMHG | WEIGHT: 170 LBS | HEART RATE: 91 BPM | BODY MASS INDEX: 26.68 KG/M2

## 2022-05-26 DIAGNOSIS — T82.41XA HEMODIALYSIS CATHETER DYSFUNCTION, INITIAL ENCOUNTER: Primary | ICD-10-CM

## 2022-05-26 PROCEDURE — 99999 PR PBB SHADOW E&M-EST. PATIENT-LVL III: CPT | Mod: PBBFAC,TXP,, | Performed by: SURGERY

## 2022-05-26 PROCEDURE — 99213 OFFICE O/P EST LOW 20 MIN: CPT | Mod: PBBFAC,TXP | Performed by: SURGERY

## 2022-05-26 PROCEDURE — 99999 PR PBB SHADOW E&M-EST. PATIENT-LVL III: ICD-10-PCS | Mod: PBBFAC,TXP,, | Performed by: SURGERY

## 2022-05-26 PROCEDURE — 99024 POSTOP FOLLOW-UP VISIT: CPT | Mod: NTX,,, | Performed by: SURGERY

## 2022-05-26 PROCEDURE — 99024 PR POST-OP FOLLOW-UP VISIT: ICD-10-PCS | Mod: NTX,,, | Performed by: SURGERY

## 2022-05-26 NOTE — PROGRESS NOTES
[facility]  Post Op Visit    SUBJECTIVE:  Patient is a 57 y.o. female with Hx of CKD on PD s/p PD catheter revision on 04/04/22 (was having cramping and slow drainage and found to have malpositioned PD cath). Was having pain around the PD cath yesterday. No redness around the site. Just superficial pain. Drained serous fluid no pus.  Did cultures. Started on Abx; and drops for aroudn the tube. Has now resolved. Last dialysed last night without issue.     OBJECTIVE:  Vitals:    05/26/22 1532   BP: 136/75   Pulse: 91       General: female in NAD. Not toxic appearing.   HENT: EOM intact.   Pulmonary: No respiratory distress. Effort normal.  Cardiovascular: Regular rate.   Abdomen: soft, non-tender, non-distended; PD cath in place in the LLQ. No erythema at exit site. No TTP.         ASSESSMENT/PLAN:    Elizabeth Maldonado is a 57 y.o. y/o female who presents to clinic today for trouble with her PD cath.     -- continue cipro   -- continue gentamycin drops   -- RTC prn

## 2022-05-26 NOTE — LETTER
[facility]  [address]  [facility] [address]  Phone: [phone]             May 26, 2022      Patient: Elizabeth Maldonado   MR Number: [id number]   YOB: 1965   Date of Visit: 5/26/2022     Graciela Pineda MD  [address]  [facility] [address]    Dear Dr. Pineda:    Thank you for referring Elizabeth Maldonado to me for evaluation. Attached you will find relevant portions of my assessment and plan of care.    If you have questions, please do not hesitate to call me. I look forward to following Elizabeth Maldonado along with you.      Sincerely,          Franklin Barber MD        CC  Richy Singleton NP    [facility]

## 2022-05-26 NOTE — PROGRESS NOTES
I have seen the patient, reviewed the Resident's history and physical, assessment and plan. I have personally interviewed and examined the patient at bedside and: agree with the findings.     S/p pd and revision.  Had some pain at exit site but this has responded to antibiotics.  She is dialyzing well.  PE site looks ok.  No further treatment needed at this time.

## 2022-06-02 ENCOUNTER — OFFICE VISIT (OUTPATIENT)
Dept: SURGERY | Facility: CLINIC | Age: 57
End: 2022-06-02
Payer: MEDICAID

## 2022-06-02 VITALS
BODY MASS INDEX: 27.37 KG/M2 | DIASTOLIC BLOOD PRESSURE: 89 MMHG | HEIGHT: 67 IN | HEART RATE: 89 BPM | SYSTOLIC BLOOD PRESSURE: 165 MMHG | WEIGHT: 174.38 LBS

## 2022-06-02 DIAGNOSIS — N18.5 ANEMIA OF CHRONIC KIDNEY FAILURE, STAGE 5: Primary | ICD-10-CM

## 2022-06-02 DIAGNOSIS — D63.1 ANEMIA OF CHRONIC KIDNEY FAILURE, STAGE 5: Primary | ICD-10-CM

## 2022-06-02 LAB
ALBUMIN SERPL-MCNC: 3.7 G/DL (ref 3.5–5.2)
ALP SERPL-CCNC: 85 U/L (ref 35–104)
ALT SERPL W P-5'-P-CCNC: 11 U/L (ref 7–52)
BASOPHILS NFR BLD: 0.4 % (ref 0–1.5)
BICARBONATE: 24 MEQ/L (ref 20–31)
BUN, PRE: 34 MG/DL (ref 6–19)
BUN/CREAT SERPL: 7.3 (ref 10–20)
CALCIUM PHOS PRODUCT, COR: 39 (ref 0–54)
CALCIUM PHOS PRODUCT: 38 (ref 0–54)
CALCIUM SERPL-MCNC: 8.6 MG/DL (ref 8.7–10.4)
CALCIUM, CORRECTED: 8.8 MG/DL (ref 8.7–10.4)
CHLORIDE: 106 MEQ/L (ref 96–108)
CREAT SERPL-MCNC: 4.63 MG/DL (ref 0.6–1.3)
EOSINOPHIL NFR BLD: 3.1 % (ref 0–7)
ERYTHROCYTE [DISTWIDTH] IN BLOOD BY AUTOMATED COUNT: 15.2 % (ref 11.5–14.5)
GLUCOSE SERPL-MCNC: 194 MG/DL (ref 70–100)
HBV SURFACE AG SERPL QL IA: NEGATIVE
HCT VFR BLD AUTO: 35.3 % (ref 37–47)
HEMOGLOBIN X 3: 31.2 % (ref 36–48)
HGB BLD-MCNC: 10.4 G/DL (ref 12–16)
IRON: 56 MCG/DL (ref 30–160)
LUC: 2.5 % (ref 0–4)
LYMPH%: 28.4 % (ref 19–48)
MCH RBC QN AUTO: 27.6 PG (ref 27–31)
MCHC RBC AUTO-ENTMCNC: 29.6 G/DL (ref 30–36)
MCV RBC AUTO: 93 FL (ref 80–100)
MONO%: 6.1 % (ref 3–10)
NEUTROPHILS NFR BLD: 59.6 % (ref 40–75)
PHOSPHATE FLD-MCNC: 4.4 MG/DL (ref 2.6–4.5)
PLATELET # BLD AUTO: 129 1000/MCL (ref 130–400)
POTASSIUM SERPL-SCNC: 4.7 MEQ/L (ref 3.5–5.1)
RBC # BLD AUTO: 3.78 MILL/MCL (ref 4.2–5.4)
SODIUM BLD-SCNC: 138 MEQ/L (ref 136–145)
TOTAL IRON BINDING CAPACITY: 223 MCG/DL (ref 185–515)
TRANSFERRIN SATURATION: 25 % (ref 20–55)
UIBC SERPL-MCNC: 167 MCG/DL (ref 155–355)
WBC # BLD AUTO: 4.54 1000/MCL (ref 4.8–10.8)

## 2022-06-02 PROCEDURE — 3066F NEPHROPATHY DOC TX: CPT | Mod: CPTII,NTX,, | Performed by: SURGERY

## 2022-06-02 PROCEDURE — 3077F PR MOST RECENT SYSTOLIC BLOOD PRESSURE >= 140 MM HG: ICD-10-PCS | Mod: CPTII,NTX,, | Performed by: SURGERY

## 2022-06-02 PROCEDURE — 1160F RVW MEDS BY RX/DR IN RCRD: CPT | Mod: CPTII,NTX,, | Performed by: SURGERY

## 2022-06-02 PROCEDURE — 3008F PR BODY MASS INDEX (BMI) DOCUMENTED: ICD-10-PCS | Mod: CPTII,NTX,, | Performed by: SURGERY

## 2022-06-02 PROCEDURE — 1159F PR MEDICATION LIST DOCUMENTED IN MEDICAL RECORD: ICD-10-PCS | Mod: CPTII,NTX,, | Performed by: SURGERY

## 2022-06-02 PROCEDURE — 99024 PR POST-OP FOLLOW-UP VISIT: ICD-10-PCS | Mod: NTX,,, | Performed by: SURGERY

## 2022-06-02 PROCEDURE — 3079F PR MOST RECENT DIASTOLIC BLOOD PRESSURE 80-89 MM HG: ICD-10-PCS | Mod: CPTII,NTX,, | Performed by: SURGERY

## 2022-06-02 PROCEDURE — 3077F SYST BP >= 140 MM HG: CPT | Mod: CPTII,NTX,, | Performed by: SURGERY

## 2022-06-02 PROCEDURE — 3044F HG A1C LEVEL LT 7.0%: CPT | Mod: CPTII,NTX,, | Performed by: SURGERY

## 2022-06-02 PROCEDURE — 99999 PR PBB SHADOW E&M-EST. PATIENT-LVL IV: CPT | Mod: PBBFAC,TXP,, | Performed by: SURGERY

## 2022-06-02 PROCEDURE — 99214 OFFICE O/P EST MOD 30 MIN: CPT | Mod: PBBFAC,NTX | Performed by: SURGERY

## 2022-06-02 PROCEDURE — 99999 PR PBB SHADOW E&M-EST. PATIENT-LVL IV: ICD-10-PCS | Mod: PBBFAC,TXP,, | Performed by: SURGERY

## 2022-06-02 PROCEDURE — 3079F DIAST BP 80-89 MM HG: CPT | Mod: CPTII,NTX,, | Performed by: SURGERY

## 2022-06-02 PROCEDURE — 1159F MED LIST DOCD IN RCRD: CPT | Mod: CPTII,NTX,, | Performed by: SURGERY

## 2022-06-02 PROCEDURE — 1160F PR REVIEW ALL MEDS BY PRESCRIBER/CLIN PHARMACIST DOCUMENTED: ICD-10-PCS | Mod: CPTII,NTX,, | Performed by: SURGERY

## 2022-06-02 PROCEDURE — 3044F PR MOST RECENT HEMOGLOBIN A1C LEVEL <7.0%: ICD-10-PCS | Mod: CPTII,NTX,, | Performed by: SURGERY

## 2022-06-02 PROCEDURE — 3008F BODY MASS INDEX DOCD: CPT | Mod: CPTII,NTX,, | Performed by: SURGERY

## 2022-06-02 PROCEDURE — 99024 POSTOP FOLLOW-UP VISIT: CPT | Mod: NTX,,, | Performed by: SURGERY

## 2022-06-02 PROCEDURE — 3066F PR DOCUMENTATION OF TREATMENT FOR NEPHROPATHY: ICD-10-PCS | Mod: CPTII,NTX,, | Performed by: SURGERY

## 2022-06-02 RX ORDER — GENTAMICIN SULFATE 3 MG/ML
1 SOLUTION/ DROPS OPHTHALMIC 3 TIMES DAILY
Qty: 15 ML | Refills: 0 | Status: ON HOLD | OUTPATIENT
Start: 2022-06-02 | End: 2022-10-11 | Stop reason: HOSPADM

## 2022-06-02 NOTE — LETTER
[facility]  [address]  [facility] [address]  Phone: [phone]             June 2, 2022      Patient: Elizabeth Maldonado   MR Number: [id number]   YOB: 1965   Date of Visit: 6/2/2022     Richy Singleton, GENIE  [address]  [facility] [address]    Dear Dr. Singleton:    Thank you for referring Elizabeth Maldonado to me for evaluation. Attached you will find relevant portions of my assessment and plan of care.    If you have questions, please do not hesitate to call me. I look forward to following Elizabeth Maldonado along with you.      Sincerely,          MD DAMION Young MD Hui Jin Kim, MD Enclosure

## 2022-06-02 NOTE — PROGRESS NOTES
I have seen the patient, reviewed the Resident's history and physical, assessment and plan. I have personally interviewed and examined the patient at bedside and: agree with the findings.     S/p pd revision with fluid coming out of cath exit site.  On PE there is serous fluid coming from the PD wound and the cuff is inside but adjacent to the skin.  I am not sure whether her cuff is infected by being too close to the skin or whether she is leaking PD fluid.  Obtain ct with contrast in PD (may need pd nurse to help with this).

## 2022-06-02 NOTE — PROGRESS NOTES
[facility]  Post Op Visit    SUBJECTIVE:  Patient is a 57 y.o. female with Hx of CKD on PD s/p PD catheter revision on 04/04/22 (was having cramping and slow drainage and found to have malpositioned PD cath). Was having pain around the PD cath yesterday. No redness around the site. Just superficial pain. Drained serous fluid no pus.  Did cultures. Started on Abx; and drops for aroudn the tube. Has now resolved. Last dialysed last night without issue.     Interval History 6/2/22: She presents for evaluation of PD cath. Her dialysis nurse noticed leakage of clear fluid from around the tube. Only occurs when manipulating tubing to the left. She keeps it dressed and has not had a saturated dressing. Able to complete dialysis without issue. Her infection has resolved.     OBJECTIVE:  Vitals:    06/02/22 1541   BP: (!) 165/89   Pulse: 89       General: female in NAD. Not toxic appearing.   HENT: EOM intact.   Pulmonary: No respiratory distress. Effort normal.  Cardiovascular: Regular rate.   Abdomen: soft, non-tender, non-distended; PD cath in place in the LLQ. No erythema at exit site. No TTP.  When manipulated to patient's left, drainage of clear fluid seen from tube exit site. Cuff right adjacent to exit site.         ASSESSMENT/PLAN:    Elizabeth Maldonado is a 57 y.o. y/o female who presents to clinic today for trouble with her PD cath.     -- Order CT abd/pelvis with contrast administered through catheter to eval for leak  -- Will reorder gentamicin drops for PD cath, continue use QID  -- F/u will be arranged after CT  -- Can continue dialysis in the meantime  -- Please call with questions      Robinson Fischer MD  General Surgery Resident - PGY3  Pager: [phone]

## 2022-06-06 ENCOUNTER — TELEPHONE (OUTPATIENT)
Dept: SURGERY | Facility: CLINIC | Age: 57
End: 2022-06-06
Payer: MEDICAID

## 2022-06-06 DIAGNOSIS — N18.5 CKD (CHRONIC KIDNEY DISEASE) STAGE 5, GFR LESS THAN 15 ML/MIN: Primary | ICD-10-CM

## 2022-06-08 ENCOUNTER — TELEPHONE (OUTPATIENT)
Dept: SURGERY | Facility: CLINIC | Age: 57
End: 2022-06-08
Payer: MEDICAID

## 2022-06-08 LAB
ALBUMIN SERPL-MCNC: 3.8 G/DL (ref 3.5–5.2)
BICARBONATE: 20 MEQ/L (ref 20–31)
BUN, PRE: 47 MG/DL (ref 6–19)
BUN/CREAT SERPL: 9.3 (ref 10–20)
CALCIUM PHOS PRODUCT, COR: 52 (ref 0–54)
CALCIUM PHOS PRODUCT: 51 (ref 0–54)
CALCIUM SERPL-MCNC: 9 MG/DL (ref 8.7–10.4)
CALCIUM, CORRECTED: 9.2 MG/DL (ref 8.7–10.4)
CHLORIDE: 109 MEQ/L (ref 96–108)
CREAT SERPL-MCNC: 5.08 MG/DL (ref 0.6–1.3)
GLUCOSE SERPL-MCNC: 110 MG/DL (ref 70–100)
HEMOGLOBIN X 3: 32.7 % (ref 36–48)
HGB BLD-MCNC: 10.9 G/DL (ref 12–16)
PHOSPHATE FLD-MCNC: 5.7 MG/DL (ref 2.6–4.5)
POTASSIUM SERPL-SCNC: 5.1 MEQ/L (ref 3.5–5.1)

## 2022-06-13 ENCOUNTER — TELEPHONE (OUTPATIENT)
Dept: TRANSPLANT | Facility: CLINIC | Age: 57
End: 2022-06-13
Payer: MEDICAID

## 2022-06-13 NOTE — TELEPHONE ENCOUNTER
Chart reviewed: patient is scheduled for psych 6/21/22 at [facility]. Patient also had colp but pathology noted COLPOSCOPY EXAM: no visible lesions, Not adequate. Recs are to repeat PAP in 8/22.

## 2022-06-18 LAB
ALBUMIN SERPL-MCNC: 3.9 G/DL (ref 3.5–5.2)
BICARBONATE: 22 MEQ/L (ref 20–31)
BUN, PRE: 32 MG/DL (ref 6–19)
BUN/CREAT SERPL: 7 (ref 10–20)
CALCIUM PHOS PRODUCT, COR: 47 (ref 0–54)
CALCIUM PHOS PRODUCT: 46 (ref 0–54)
CALCIUM SERPL-MCNC: 9.4 MG/DL (ref 8.7–10.4)
CALCIUM, CORRECTED: 9.5 MG/DL (ref 8.7–10.4)
CHLORIDE: 108 MEQ/L (ref 96–108)
CREAT SERPL-MCNC: 4.54 MG/DL (ref 0.6–1.3)
PHOSPHATE FLD-MCNC: 4.9 MG/DL (ref 2.6–4.5)
POTASSIUM SERPL-SCNC: 5.1 MEQ/L (ref 3.5–5.1)
SODIUM BLD-SCNC: 141 MEQ/L (ref 136–145)

## 2022-06-21 ENCOUNTER — OFFICE VISIT (OUTPATIENT)
Dept: PSYCHIATRY | Facility: CLINIC | Age: 57
End: 2022-06-21
Payer: MEDICARE

## 2022-06-21 DIAGNOSIS — Z01.818 PRE-TRANSPLANT EVALUATION FOR KIDNEY TRANSPLANT: ICD-10-CM

## 2022-06-21 DIAGNOSIS — F41.9 ANXIETY DISORDER, UNSPECIFIED TYPE: ICD-10-CM

## 2022-06-21 DIAGNOSIS — F33.0 MDD (MAJOR DEPRESSIVE DISORDER), RECURRENT EPISODE, MILD: Primary | ICD-10-CM

## 2022-06-21 PROCEDURE — 3066F NEPHROPATHY DOC TX: CPT | Mod: AF,HB,CPTII,TXP | Performed by: PSYCHIATRY & NEUROLOGY

## 2022-06-21 PROCEDURE — 3044F PR MOST RECENT HEMOGLOBIN A1C LEVEL <7.0%: ICD-10-PCS | Mod: AF,HB,CPTII,TXP | Performed by: PSYCHIATRY & NEUROLOGY

## 2022-06-21 PROCEDURE — 99999 PR PBB SHADOW E&M-EST. PATIENT-LVL II: ICD-10-PCS | Mod: PBBFAC,AF,HB,TXP | Performed by: PSYCHIATRY & NEUROLOGY

## 2022-06-21 PROCEDURE — 1159F MED LIST DOCD IN RCRD: CPT | Mod: AF,HB,CPTII,TXP | Performed by: PSYCHIATRY & NEUROLOGY

## 2022-06-21 PROCEDURE — 3044F HG A1C LEVEL LT 7.0%: CPT | Mod: AF,HB,CPTII,TXP | Performed by: PSYCHIATRY & NEUROLOGY

## 2022-06-21 PROCEDURE — 99999 PR PBB SHADOW E&M-EST. PATIENT-LVL II: CPT | Mod: PBBFAC,AF,HB,TXP | Performed by: PSYCHIATRY & NEUROLOGY

## 2022-06-21 PROCEDURE — 99212 OFFICE O/P EST SF 10 MIN: CPT | Mod: PBBFAC,TXP | Performed by: PSYCHIATRY & NEUROLOGY

## 2022-06-21 PROCEDURE — 1160F PR REVIEW ALL MEDS BY PRESCRIBER/CLIN PHARMACIST DOCUMENTED: ICD-10-PCS | Mod: AF,HB,CPTII,TXP | Performed by: PSYCHIATRY & NEUROLOGY

## 2022-06-21 PROCEDURE — 90792 PR PSYCHIATRIC DIAGNOSTIC EVALUATION W/MEDICAL SERVICES: ICD-10-PCS | Mod: AF,HB,TXP, | Performed by: PSYCHIATRY & NEUROLOGY

## 2022-06-21 PROCEDURE — 90792 PSYCH DIAG EVAL W/MED SRVCS: CPT | Mod: AF,HB,TXP, | Performed by: PSYCHIATRY & NEUROLOGY

## 2022-06-21 PROCEDURE — 1160F RVW MEDS BY RX/DR IN RCRD: CPT | Mod: AF,HB,CPTII,TXP | Performed by: PSYCHIATRY & NEUROLOGY

## 2022-06-21 PROCEDURE — 3066F PR DOCUMENTATION OF TREATMENT FOR NEPHROPATHY: ICD-10-PCS | Mod: AF,HB,CPTII,TXP | Performed by: PSYCHIATRY & NEUROLOGY

## 2022-06-21 PROCEDURE — 1159F PR MEDICATION LIST DOCUMENTED IN MEDICAL RECORD: ICD-10-PCS | Mod: AF,HB,CPTII,TXP | Performed by: PSYCHIATRY & NEUROLOGY

## 2022-06-21 NOTE — PROGRESS NOTES
"Outpatient Psychiatry Initial Visit (MD/NP)    6/21/2022    Elizabeth Maldonado, a 57 y.o. female, presenting for initial evaluation visit. Met with patient.    Reason for Encounter: Consult from kidney transplant service . Patient complains of I'm trying to get on the transplant list..    History of Present Illness:    The following was documented by Samina Xavier LCSW on 12/23/2021:    Psychiatric History:    Mental Health: "Stinking thinking".  Pt would not elaborate on what this entails, however she stated there have been times she is "disgusted" and has not taken good care of herself.  Psychiatrist/Counselor: Dr. Patricia Asencio - States she has not seen Dr. Asencio in awhile, but planning to go back next month.    Medications:  "I was on cymbalta, but they took me off because of the kidney..I don't want anything for it.  I can do it myself."  Suicide/Homicide Issues: Pt denies current or past suicidal/homicidal ideation.   Safety at home: Pt denies any home safety concerns.    Currently being treated for depression.  Has a lot of aniety dealing with PD at home.  "I hate it" and thinaks God for it at the same time.  Sometimes its all she thinks about.  Looking at the time.  Thinkings he has to get home and prepare for it.  Its not jen to drain aenought, lookig at it, messing what the tubes.  Usually awake for the first drainLooking to see f it darains properly.  Has had more than one surgery for the catherter that did not drain right.  Has been doing it since march but did it 8 years ago but now lives alone.  Admits to other worry but does not believe it is excessive but sometimes inapprorpitat.  No one says sh worries to o much and not awake worrying except.  No panic attacks.  No frequent problems relaxing.      Depressed overthinking things she has little control over na her present situation.   Sometimes isolates from others, sometimes crankyS.  he used to eat more when depressed.  Never prefers death.  " Denies excessive crying.  Looking forward to seeing her grandchild soon.  Enjoys her plants. Sometimes engages in enjoyable activities but sometimes does not.  Reading bt sometimes does nto have the desire to pick them up.   More often and frequently dpessed but it does not last.      On ambien only for sleep but takes every other night. Cuts the 10 mg in half.  Sees Zuleika Asencio and plans to speak to her more about medications.  Used to see  Dr. Brown for therapy but she left.      Had a prior transplant after she was on PD for a couple of months and daughter's girlfriend gave her a kidney that lasted 8 yrs.    prozac - took while had anemia but felt worse with the prozac, thought she would die took 2 days  cymbalta - may have help but was taken off with kidney failure  Xanax - had me really dopey      Review of Systems   Constitutional: Negative for chills, fever, malaise/fatigue and weight loss.   HENT: Negative for hearing loss, sore throat and tinnitus.    Eyes: Negative for blurred vision and double vision.   Respiratory: Negative for cough, shortness of breath and wheezing.    Cardiovascular: Negative for chest pain and palpitations.   Gastrointestinal: Negative for abdominal pain, blood in stool, diarrhea and vomiting.   Genitourinary: Negative for dysuria, frequency and hematuria.   Musculoskeletal: Negative for falls and joint pain.   Skin: Positive for itching. Negative for rash.   Neurological: Negative for tremors, seizures and headaches.   Endo/Heme/Allergies: Negative for polydipsia. Does not bruise/bleed easily.         Current Evaluation:     Nutritional Screening: Considering the patient's height and weight, medications, medical history and preferences, should a referral be made to the dietitian? no    Constitutional  Vitals:  Most recent vital signs, dated less than 90 days prior to this appointment, were reviewed.    There were no vitals filed for this visit.     General:  age appropriate,  casually dressed, neatly groomed, overweight     Musculoskeletal  Muscle Strength/Tone:  no dyskinesia, no tremor   Gait & Station:  non-ataxic     Psychiatric  Speech:  no latency; no press   Mood:   Affect:  mildly depressed  appropriate, full   Thought Process:  normal and logical   Associations:  intact   Thought Content:  normal, no suicidality, no homicidality, delusions, or paranoia   Insight:  has awareness of illness   Judgement: behavior is adequate to circumstances   Orientation:  grossly intact   Memory: intact for content of interview   Language: grossly intact   Attention Span & Concentration:  able to focus   Fund of Knowledge:  intact and appropriate to age and level of education       Relevant Elements of Neurological Exam: normal gait    Functioning in Relationships:  Spouse/partner: none  Peers: has friends, not people she can count on though  Employers: N/A    Laboratory Data  Orders Only on 06/17/2022   Component Date Value Ref Range Status    BUN 06/17/2022 32 (A) 6 - 19 mg/dL Final    Creatinine 06/17/2022 4.54 (A) 0.60 - 1.30 mg/dL Final    BUN/Creatinine Ratio 06/17/2022 7.0 (A) 10.0 - 20.0 Final    Sodium 06/17/2022 141  136 - 145 mEq/L Final    Potassium 06/17/2022 5.1  3.5 - 5.1 mEq/L Final    Chloride 06/17/2022 108  96 - 108 mEq/L Final    Bicarbonate 06/17/2022 22  20 - 31 mEq/L Final    Calcium 06/17/2022 9.4  8.7 - 10.4 mg/dL Final    Phosphorus 06/17/2022 4.9 (A) 2.6 - 4.5 mg/dL Final    Calcium Phos Product 06/17/2022 46  0 - 54 Final    Albumin 06/17/2022 3.9  3.5 - 5.2 g/dL Final    Calcium, Corrected 06/17/2022 9.5  8.7 - 10.4 mg/dL Final    Calcium Phos Product, Cor 06/17/2022 47  0 - 54 Final   Orders Only on 06/07/2022   Component Date Value Ref Range Status    BUN 06/07/2022 47 (A) 6 - 19 mg/dL Final    Creatinine 06/07/2022 5.08 (A) 0.60 - 1.30 mg/dL Final    BUN/Creatinine Ratio 06/07/2022 9.3 (A) 10.0 - 20.0 Final    Potassium 06/07/2022 5.1  3.5 - 5.1  mEq/L Final    Chloride 06/07/2022 109 (A) 96 - 108 mEq/L Final    Bicarbonate 06/07/2022 20  20 - 31 mEq/L Final    Calcium 06/07/2022 9.0  8.7 - 10.4 mg/dL Final    Phosphorus 06/07/2022 5.7 (A) 2.6 - 4.5 mg/dL Final    Calcium Phos Product 06/07/2022 51  0 - 54 Final    Albumin 06/07/2022 3.8  3.5 - 5.2 g/dL Final    Calcium, Corrected 06/07/2022 9.2  8.7 - 10.4 mg/dL Final    Calcium Phos Product, Cor 06/07/2022 52  0 - 54 Final    Glucose 06/07/2022 110 (A) 70 - 100 mg/dL Final   Orders Only on 06/07/2022   Component Date Value Ref Range Status    Hemoglobin 06/07/2022 10.9 (A) 12.0 - 16.0 g/dL Final    Hemoglobin x 3 06/07/2022 32.7 (A) 36.0 - 48.0 % Final   Orders Only on 06/01/2022   Component Date Value Ref Range Status    BUN 06/01/2022 34 (A) 6 - 19 mg/dL Final    Creatinine 06/01/2022 4.63 (A) 0.60 - 1.30 mg/dL Final    BUN/Creatinine Ratio 06/01/2022 7.3 (A) 10.0 - 20.0 Final    Sodium 06/01/2022 138  136 - 145 mEq/L Final    Potassium 06/01/2022 4.7  3.5 - 5.1 mEq/L Final    Chloride 06/01/2022 106  96 - 108 mEq/L Final    Bicarbonate 06/01/2022 24  20 - 31 mEq/L Final    Calcium 06/01/2022 8.6 (A) 8.7 - 10.4 mg/dL Final    Phosphorus 06/01/2022 4.4  2.6 - 4.5 mg/dL Final    Calcium Phos Product 06/01/2022 38  0 - 54 Final    Alkaline Phosphatase 06/01/2022 85  35 - 104 U/L Final    ALT 06/01/2022 11  7 - 52 U/L Final    Albumin 06/01/2022 3.7  3.5 - 5.2 g/dL Final    Calcium, Corrected 06/01/2022 8.8  8.7 - 10.4 mg/dL Final    Calcium Phos Product, Cor 06/01/2022 39  0 - 54 Final    Iron 06/01/2022 56  30 - 160 mcg/dL Final    UIBC 06/01/2022 167  155 - 355 mcg/dL Final    TIBC 06/01/2022 223  185 - 515 mcg/dL Final    Transferrin Saturation 06/01/2022 25  20 - 55 % Final    Glucose 06/01/2022 194 (A) 70 - 100 mg/dL Final   Orders Only on 06/01/2022   Component Date Value Ref Range Status    WBC 06/01/2022 4.54 (A) 4.80 - 10.80 1000/mcL Final    RBC 06/01/2022 3.78 (A)  4.20 - 5.40 mill/mcL Final    Hemoglobin 06/01/2022 10.4 (A) 12.0 - 16.0 g/dL Final    Hemoglobin x 3 06/01/2022 31.2 (A) 36.0 - 48.0 % Final    Hematocrit 06/01/2022 35.3 (A) 37.0 - 47.0 % Final    MCV 06/01/2022 93  80 - 100 fl Final    MCH 06/01/2022 27.6  27.0 - 31.0 pg Final    MCHC 06/01/2022 29.6 (A) 30.0 - 36.0 g/dL Final    RDW 06/01/2022 15.2 (A) 11.5 - 14.5 % Final    Neutrophils 06/01/2022 59.6  40.0 - 75.0 % Final    Lymph % 06/01/2022 28.4  19.0 - 48.0 % Final    Mono % 06/01/2022 6.1  3.0 - 10.0 % Final    Eosinophil % 06/01/2022 3.1  0.0 - 7.0 % Final    Basophil % 06/01/2022 0.4  0.0 - 1.5 % Final    ELIAS 06/01/2022 2.5  0.0 - 4.0 % Final    Platelets 06/01/2022 129 (A) 130 - 400 1000/mcL Final   Orders Only on 06/01/2022   Component Date Value Ref Range Status    Hepatitis B Surface Ag 06/01/2022 Negative  Negative Final         Medications  Outpatient Encounter Medications as of 6/21/2022   Medication Sig Dispense Refill    acetaminophen (TYLENOL) 500 MG tablet Take 500 mg by mouth every 6 (six) hours as needed for Pain.      atorvastatin (LIPITOR) 40 MG tablet Take 40 mg by mouth every evening.      blood sugar diagnostic Strp Checks BG ac/hs 200 strip 7    carvediloL (COREG) 3.125 MG tablet Take 1 tablet (3.125 mg total) by mouth every evening. (Patient taking differently: Take 3.125 mg by mouth 2 (two) times daily. TAKE 2 TAB IN THE MORNING, & TAKE 1 TAB IN THE EVENING  (Take 6.25 mg in am, 3.125 mg in pm)) 30 tablet 11    carvediloL (COREG) 6.25 MG tablet Take 1 tablet (6.25 mg total) by mouth every morning. 30 tablet 11    dulaglutide (TRULICITY) 1.5 mg/0.5 mL pen injector Inject 1.5 mg into the skin every 7 days. Pt takes on Wednesday      epoetin edward (EPOGEN) 10,000 unit/mL injection Inject 10,000 Units into the skin once. Home Medication To administer Q2wks      folic acid (FOLVITE) 1 MG tablet Take 1 tablet (1 mg total) by mouth once daily. 30 tablet 11     "gentamicin (GARAMYCIN) 0.3 % ophthalmic solution 1 drop by Other route 3 (three) times daily. 15 mL 0    HUMALOG KWIKPEN INSULIN 100 unit/mL pen Inject 3 Units into the skin 3 (three) times daily with meals. SLIDE SCALE      insulin syringe-needle U-100 (INSULIN SYRINGE) 1/2 mL 30 x 5/16" Syrg Uses 4 daily 200 each 12    lancets (ONETOUCH DELICA LANCETS) 33 gauge Misc 1 lancet by Misc.(Non-Drug; Combo Route) route 4 (four) times daily before meals and nightly. 200 each 7    LANTUS SOLOSTAR U-100 INSULIN glargine 100 units/mL (3mL) SubQ pen Inject 50 Units into the skin once daily. (Patient taking differently: Inject 25 Units into the skin every evening.)  0    magnesium oxide (MAG-OX) 400 mg (241.3 mg magnesium) tablet Take 2 tablets (800 mg total) by mouth 2 (two) times daily. 120 tablet 2    sevelamer carbonate (RENVELA) 800 mg Tab Take 1 tablet (800 mg total) by mouth 3 (three) times daily with meals. (Patient taking differently: Take 800 mg by mouth 3 (three) times daily with meals. Take 1 tab with meals and 1 tab with snacks as directed daily) 90 tablet 3    sodium bicarbonate 650 MG tablet Take 2 tablets by mouth three times a day      SYRINGE & NEEDLE,INSULIN,1 ML (INSULIN SYRINGE-NEEDLE U-100) 1 mL 29 X 7/16" Syrg   11    tacrolimus (PROGRAF) 1 MG Cap 4/26-5/26: Lower prograf to 2 mg po bid for 1 month   5/26-6/26: Then lower prograf to 2/1 for 1 month   6/26-7/26: Then lower prograf to 1 mg po bid 1 month   7/26-8/26: Then 1 mg daily for 1 month   Then d/c prograf 180 capsule 0    timolol maleate 0.5% (TIMOPTIC) 0.5 % Drop Place 2 drops in both eyes twice daily      zolpidem (AMBIEN) 10 mg Tab Take 10 mg by mouth nightly as needed.       No facility-administered encounter medications on file as of 6/21/2022.           Assessment - Diagnosis - Goals:     Impression: The patient is a 58 y/o F with a history of depression and anxiety and a prior kidney transplant which eventually failed after 8 " years.  Currently she is suffering from mild depression and anxiety which can be treated with psychotherapy.  She has had one trial with fluoxetine lasting 2 days with adverse reactions and a trial with cymbalta with modest benefit which unfortunately was discontinued due to her kidney failure.  At this time based on the information available at the time of the evaluation this patient is a low risk with transplant.  Her risk could be further minimized the use of medications which the patient would prefer not to use, or consistent psychotherapy to which she is amenable.        ICD-10-CM ICD-9-CM   1. MDD (major depressive disorder), recurrent episode, mild  F33.0 296.31   2. Pre-transplant evaluation for kidney transplant  Z01.818 V72.83   3. Anxiety disorder, unspecified type  F41.9 300.00       Strengths and Liabilities: Strength: Patient accepts guidance/feedback, Strength: Patient is expressive/articulate., Strength: Patient is motivated for change., Liability: Patient has a limited support network.    Treatment Goals:  Specify outcomes written in observable, behavioral terms:   Anxiety: minimize such that it poses no risk with transplant  Depression: minimize such that it poses no risk with transplant    Treatment Plan/Recommendations:   · Continue treatment with Dr. Asencio and patient to discuss that it would be best to minimize symptoms prior to transplant  · Recommended she attend psychotherapy consistently with Dr. Asencio or another provider since she is not currently interested in psychotropic meds        Return to Clinic: as needed    Counseling time: 55 mins  Total time: 68 mins  Consulting clinician was informed of the encounter and consult note.

## 2022-07-08 LAB
25(OH)D3 SERPL-MCNC: 30.2 NG/ML (ref 30–100)
ALBUMIN SERPL-MCNC: 3.6 G/DL (ref 3.5–5.2)
ALP SERPL-CCNC: 88 U/L (ref 35–104)
ALT SERPL W P-5'-P-CCNC: 12 U/L (ref 7–52)
BASOPHILS NFR BLD: 0.8 % (ref 0–1.5)
BICARBONATE: 21 MEQ/L (ref 20–31)
BSA (DUBOIS): 1.9 SQ. M.
BUN, PRE: 45 MG/DL (ref 6–19)
BUN/CREAT SERPL: 8.6 (ref 10–20)
CALCIUM PHOS PRODUCT, COR: 49 (ref 0–54)
CALCIUM PHOS PRODUCT: 48 (ref 0–54)
CALCIUM SERPL-MCNC: 8.5 MG/DL (ref 8.7–10.4)
CALCIUM, CORRECTED: 8.8 MG/DL (ref 8.7–10.4)
CHLORIDE: 105 MEQ/L (ref 96–108)
CREAT 24H UR-MRATE: 1.2 G/24 HR (ref 0.5–1.6)
CREAT CLEAR, PDF NORM WKLY: 12 L/WK
CREAT CLEAR, PDF WKLY: 14 L/WK
CREAT CLEAR, TOT NORM WKLY: 160 L/WK
CREAT CLEAR, TOT WKLY: 176 L/WK
CREAT CLEAR, URINE NOR WKLY: 148 L/WK
CREAT CLEAR, URINE NORM: 14.7 ML/MIN (ref 77–94)
CREAT CLEAR, URINE WKLY: 162.3 L/WK
CREAT SERPL-MCNC: 5.26 MG/DL (ref 0.6–1.3)
CREATININE CLEAR, PDF NORM: 1.2 ML/MIN
CREATININE CLEAR, PDF: 1.4 ML/MIN
CREATININE, PDF 24 HR: 103.8 MG/24 HR
CREATININE, PDF TIMED COR: 1.7 MG/DL
CREATININE, PDF TIMED UNCOR: 2 MG/DL
CREATININE, TIMED URINE: 59.5 MG/DL
EOSINOPHIL NFR BLD: 5.7 % (ref 0–7)
ERYTHROCYTE [DISTWIDTH] IN BLOOD BY AUTOMATED COUNT: 18.6 % (ref 11.5–14.5)
FERRITIN SERPL-MCNC: 432 NG/ML (ref 10–291)
GLUCOSE SERPL-MCNC: 225 MG/DL (ref 70–100)
GLUCOSE, PDF TIMED: 1319 MG/DL
HBA1C MFR BLD: 7.5 % (ref 4.8–5.9)
HBV SURFACE AG SERPL QL IA: NEGATIVE
HCT VFR BLD AUTO: 36 % (ref 37–47)
HEMOGLOBIN X 3: 32.4 % (ref 36–48)
HGB BLD-MCNC: 10.8 G/DL (ref 12–16)
IRON: 48 MCG/DL (ref 30–160)
KT/V, PERITONEAL: 0.78
KT/V, RESIDUAL: 1.99
KT/V, TOTAL: 2.77
LUC: 3.5 % (ref 0–4)
LYMPH%: 23.4 % (ref 19–48)
MCH RBC QN AUTO: 28.1 PG (ref 27–31)
MCHC RBC AUTO-ENTMCNC: 29.9 G/DL (ref 30–36)
MCV RBC AUTO: 94 FL (ref 80–100)
MONO%: 7 % (ref 3–10)
NEUTROPHILS NFR BLD: 59.6 % (ref 40–75)
PHOSPHATE FLD-MCNC: 5.6 MG/DL (ref 2.6–4.5)
PLATELET # BLD AUTO: 142 1000/MCL (ref 130–400)
PNA, NORMALIZED: 1.01 G/KG/DAY
PNA: 65 G/DAY
POTASSIUM SERPL-SCNC: 4.4 MEQ/L (ref 3.5–5.1)
PTH, INTACT: 763 PG/ML (ref 16–80)
RBC # BLD AUTO: 3.83 MILL/MCL (ref 4.2–5.4)
SODIUM BLD-SCNC: 135 MEQ/L (ref 136–145)
TOTAL IRON BINDING CAPACITY: 225 MCG/DL (ref 185–515)
TRANSFERRIN SATURATION: 21 % (ref 20–55)
UIBC SERPL-MCNC: 177 MCG/DL (ref 155–355)
UREA CLEAR, PDF NORM WKLY: 29 L/WK
UREA CLEAR, TOT NORM WKLY: 103 L/WK
UREA CLEAR, URINE NORM WKLY: 74 L/WK
UREA CLEAR, URINE NORM: 6.7 ML/MIN (ref 64–99)
UREA CLEARANCE, PD FLUID: 2.9 ML/MIN
UREA CLEARANCE, PDF NORM: 2.7 ML/MIN
UREA NITROGEN, BODY FLUID: 31 MG/DL
UREA NITROGEN, PDF 24 HR: 1892.6 MG/24 HR
UREA NITROGEN,TIMED URINE: 231 MG/DL
UREA VOL DIST (HUME): 37.2 L
URINE CREATININE CLEARANCE: 16.1 ML/MIN
URINE UREA CLEARANCE: 7.3 ML/MIN (ref 64–99)
UUN 24H UR-MRATE: 4.7 G/24 HR (ref 12–20)
WBC # BLD AUTO: 7.21 1000/MCL (ref 4.8–10.8)

## 2022-07-09 LAB
CREAT, PDF OVERNIGHT COR: 1.8 MG/DL
CREAT, PDF OVERNIGHT UNCOR: 2.1 MG/DL
GLUCOSE, PDF OVERNIGHT: 1286 MG/DL

## 2022-07-26 LAB
BODY FLUID SOURCE: ABNORMAL
BSA (DUBOIS): 1.9 SQ. M.
BUN, PRE: 37 MG/DL (ref 6–19)
BUN/CREAT SERPL: 5.7 (ref 10–20)
CREAT 24H UR-MRATE: 1.1 G/24 HR (ref 0.5–1.6)
CREAT CLEAR, PDF NORM WKLY: 22 L/WK
CREAT CLEAR, PDF WKLY: 24 L/WK
CREAT CLEAR, TOT WKLY: 140 L/WK
CREAT CLEAR, URINE NOR WKLY: 106 L/WK
CREAT CLEAR, URINE NORM: 10.5 ML/MIN (ref 77–94)
CREAT CLEAR, URINE WKLY: 115.9 L/WK
CREAT SERPL-MCNC: 6.44 MG/DL (ref 0.6–1.3)
CREAT, PDF OVERNIGHT COR: 4.3 MG/DL
CREAT, PDF OVERNIGHT UNCOR: 4.5 MG/DL
CREATININE CLEAR, PDF NORM: 2.2 ML/MIN
CREATININE CLEAR, PDF: 2.4 ML/MIN
CREATININE, PDF 24 HR: 223.4 MG/24 HR
CREATININE, PDF TIMED COR: 4.4 MG/DL
CREATININE, PDF TIMED UNCOR: 4.6 MG/DL
CREATININE, TIMED URINE: 52 MG/DL
FLUID COLOR: COLORLESS
GLUCOSE SERPL-MCNC: 76 MG/DL (ref 70–100)
GLUCOSE, PDF OVERNIGHT: 780 MG/DL
GLUCOSE, PDF TIMED: 775 MG/DL
KT/V, PERITONEAL: 0.8
KT/V, RESIDUAL: 1.79
KT/V, TOTAL: 2.59
Lab: 6 % (ref 75–100)
Lab: 94 % (ref 0–35)
Lab: CLEAR
PNA, NORMALIZED: 0.84 G/KG/DAY
PNA: 54 G/DAY
RBC # BLD AUTO: 26 /MM3 (ref 0–0)
UREA CLEAR, PDF NORM WKLY: 30 L/WK
UREA CLEAR, TOT NORM WKLY: 97 L/WK
UREA CLEAR, URINE NORM WKLY: 67 L/WK
UREA CLEAR, URINE NORM: 6.1 ML/MIN (ref 64–99)
UREA CLEARANCE, PD FLUID: 3 ML/MIN
UREA CLEARANCE, PDF NORM: 2.8 ML/MIN
UREA NITROGEN, BODY FLUID: 32 MG/DL
UREA NITROGEN, PDF 24 HR: 1625 MG/24 HR
UREA NITROGEN,TIMED URINE: 173 MG/DL
UREA VOL DIST (HUME): 37.4 L
URINE CREATININE CLEARANCE: 11.5 ML/MIN
URINE UREA CLEARANCE: 6.7 ML/MIN (ref 64–99)
UUN 24H UR-MRATE: 3.5 G/24 HR (ref 12–20)
WBC, BODY FLUID: 230 /MM3 (ref 0–50)

## 2022-07-29 ENCOUNTER — LAB VISIT (OUTPATIENT)
Dept: LAB | Facility: HOSPITAL | Age: 57
End: 2022-07-29
Attending: INTERNAL MEDICINE
Payer: MEDICARE

## 2022-07-29 DIAGNOSIS — N18.6 ESRD ON PERITONEAL DIALYSIS: ICD-10-CM

## 2022-07-29 DIAGNOSIS — K65.9 PERITONITIS: ICD-10-CM

## 2022-07-29 DIAGNOSIS — Z51.81 ENCOUNTER FOR THERAPEUTIC DRUG MONITORING: ICD-10-CM

## 2022-07-29 DIAGNOSIS — Z99.2 ESRD ON PERITONEAL DIALYSIS: ICD-10-CM

## 2022-07-29 LAB — VANCOMYCIN SERPL-MCNC: 6.8 UG/ML

## 2022-07-29 PROCEDURE — 36415 COLL VENOUS BLD VENIPUNCTURE: CPT | Mod: PO,TXP | Performed by: INTERNAL MEDICINE

## 2022-07-29 PROCEDURE — 80202 ASSAY OF VANCOMYCIN: CPT | Mod: TXP | Performed by: INTERNAL MEDICINE

## 2022-07-31 LAB
COLLECTION TIME: NORMAL
Lab: NORMAL

## 2022-08-03 LAB
ALBUMIN SERPL-MCNC: 3.6 G/DL (ref 3.5–5.2)
ALP SERPL-CCNC: 84 U/L (ref 35–104)
ALT SERPL W P-5'-P-CCNC: 11 U/L (ref 7–52)
BASOPHILS NFR BLD: 0.6 % (ref 0–1.5)
BICARBONATE: 23 MEQ/L (ref 20–31)
BODY FLUID SOURCE: ABNORMAL
BUN, PRE: 52 MG/DL (ref 6–19)
BUN/CREAT SERPL: 8 (ref 10–20)
CALCIUM PHOS PRODUCT, COR: 31 (ref 0–54)
CALCIUM PHOS PRODUCT: 30 (ref 0–54)
CALCIUM SERPL-MCNC: 8.6 MG/DL (ref 8.7–10.4)
CALCIUM, CORRECTED: 8.9 MG/DL (ref 8.7–10.4)
CHLORIDE: 104 MEQ/L (ref 96–108)
CREAT SERPL-MCNC: 6.54 MG/DL (ref 0.6–1.3)
EOSINOPHIL NFR BLD: 9.6 % (ref 0–7)
ERYTHROCYTE [DISTWIDTH] IN BLOOD BY AUTOMATED COUNT: 15.5 % (ref 11.5–14.5)
FERRITIN SERPL-MCNC: 776 NG/ML (ref 10–291)
FLUID COLOR: COLORLESS
GLUCOSE SERPL-MCNC: 303 MG/DL (ref 70–100)
HBV SURFACE AG SERPL QL IA: NEGATIVE
HCT VFR BLD AUTO: 31.6 % (ref 37–47)
HEMOGLOBIN X 3: 30 % (ref 36–48)
HGB BLD-MCNC: 10 G/DL (ref 12–16)
IRON: 64 MCG/DL (ref 30–160)
LUC: 3.3 % (ref 0–4)
LYMPH%: 34.4 % (ref 19–48)
Lab: CLEAR
MCH RBC QN AUTO: 28.8 PG (ref 27–31)
MCHC RBC AUTO-ENTMCNC: 31.7 G/DL (ref 30–36)
MCV RBC AUTO: 91 FL (ref 80–100)
MONO%: 6.4 % (ref 3–10)
NEUTROPHILS NFR BLD: 45.7 % (ref 40–75)
PHOSPHATE FLD-MCNC: 3.5 MG/DL (ref 2.6–4.5)
PLATELET # BLD AUTO: 159 1000/MCL (ref 130–400)
POTASSIUM SERPL-SCNC: 4.2 MEQ/L (ref 3.5–5.1)
RBC # BLD AUTO: 2 /MM3 (ref 0–0)
RBC # BLD AUTO: 3.48 MILL/MCL (ref 4.2–5.4)
SODIUM BLD-SCNC: 136 MEQ/L (ref 136–145)
TOTAL IRON BINDING CAPACITY: 195 MCG/DL (ref 185–515)
TRANSFERRIN SATURATION: 33 % (ref 20–55)
UIBC SERPL-MCNC: 131 MCG/DL (ref 155–355)
WBC # BLD AUTO: 5.52 1000/MCL (ref 4.8–10.8)
WBC, BODY FLUID: 3 /MM3 (ref 0–50)

## 2022-08-04 LAB
COLLECTION TIME: NORMAL
CREAT CLEAR, TOT NORM WKLY: 128 L/WK
GRAM STAIN: NORMAL
Lab: NORMAL

## 2022-08-09 ENCOUNTER — SOCIAL WORK (OUTPATIENT)
Dept: TRANSPLANT | Facility: CLINIC | Age: 57
End: 2022-08-09
Payer: MEDICARE

## 2022-08-09 NOTE — PROGRESS NOTES
Patient evaluated by [facility] psych 6/21/22.  Summary follows.  Please see complete note in Epic.  Pt was determined low risk and recommended regular appointments with [facility], Dr. Asencio or other [facility].       MDD (major depressive disorder), recurrent episode, mild  F33.0 296.31   2. Pre-transplant evaluation for kidney transplant  Z01.818 V72.83   3. Anxiety disorder, unspecified type  F41.9 300.00      Treatment Plan/Recommendations:   · Continue treatment with Dr. Asencio and patient to discuss that it would be best to minimize symptoms prior to transplant  · Recommended she attend psychotherapy consistently with Dr. Asencio or another provider since she is not currently interested in psychotropic meds    Final transplant candidacy decision will be determined by selection committee.

## 2022-08-12 ENCOUNTER — TELEPHONE (OUTPATIENT)
Dept: TRANSPLANT | Facility: CLINIC | Age: 57
End: 2022-08-12
Payer: MEDICARE

## 2022-08-12 ENCOUNTER — COMMITTEE REVIEW (OUTPATIENT)
Dept: TRANSPLANT | Facility: CLINIC | Age: 57
End: 2022-08-12
Payer: MEDICARE

## 2022-08-12 DIAGNOSIS — Z76.82 ORGAN TRANSPLANT CANDIDATE: Primary | ICD-10-CM

## 2022-08-12 NOTE — COMMITTEE REVIEW
Native Organ Dx: Hypertensive Nephrosclerosis      SELECTION COMMITTEE NOTE    Elizabeth Maldonado was presented at selection committee on 8/12/2022.  Patient met selection criteria for kidney transplant related to ESRD due to   Hypertensive Nephrosclerosis.  No absolute contraindications to transplant at this time.  Patient will be placed on the cadaveric wait list pending final financial approval from insurance company.  Patient will return to clinic for routine appointment in 1 year(s). Patient meets criteria for High KDPI kidney offer. Patient meets HCV+ kidney offer. Patient meets criteria for dual/enbloc, due to guidelines.    Attempted call to patient, telephone just rang.       Note written by Lilian Bella RN    ===============================================    I was present at the meeting and attest to the decision of the committee.    Cristi Benitez  08/12/2022

## 2022-08-12 NOTE — LETTER
August 12, 2022    Aaron Bonilla MD  [address]  [facility] [address]  Phone: [phone]  Fax: [phone]     Dear Dr. Bonilla:    Patient: Elizabeth Maldonado   MR Number: [id number]   YOB: 1965     Your patient, Elizabeth Maldonado, was recently discussed at the [facility] Selection Committee meeting on 8/12/2022. I am happy to inform you that Elizabeth has been approved for transplantation.  She has met selection criteria for a kidney transplant related to ESRD secondary to primary diagnosis of Hypertensive Nephrosclerosis. Your patient will be placed on the cadaveric wait list pending final financial approval from insurance company.     We appreciate your confidence in allowing us to participate in your patients care.  If you have any questions or concerns, please do not hesitate to contact me.    Sincerely,      Kalpana Chavarria MD  Medical Director, Kidney & Kidney/Pancreas Transplantation    CC:  Eliazbeth Maldonado (patient)          [facility]

## 2022-08-24 ENCOUNTER — TELEPHONE (OUTPATIENT)
Dept: TRANSPLANT | Facility: CLINIC | Age: 57
End: 2022-08-24
Payer: MEDICARE

## 2022-08-24 DIAGNOSIS — Z76.82 ORGAN TRANSPLANT CANDIDATE: Primary | ICD-10-CM

## 2022-08-24 NOTE — TELEPHONE ENCOUNTER
"  KIDNEY WAIT LISTING NOTE    Date of Financial clearance to list: 22    N/[facility]:     Organ: Kidney    Last Name: Joel  First Name: Elizabeth    : 1965       Gender: female        MRN#: [id number]                                   State of Permanent Residence: [address] [place] [address]  [place] [address]    Ethnicity: Not  or /a   Race:      Black or     CLINICAL INFORMATION   Candidate Medical Urgency Status: Active (1)  Number of Previous Kidney Transplants: 1  Number of Previous Solid Organ Transplants: 1  Did you enter number of previous kidney or other solid organ transplants? yes  Is this Candidate a Prior Living Donor: no  (If yes, please generate letter to [employer] with patient's date of donation, recipient SSN, signed by Surgical Director after patient is listed in order to receive priority points).      ABO  ABO Blood Group:   AB POS     ABO Confirmation: (THESE DATES MUST BE PRIOR TO THE LIST DATE AND SUPPORTED BY SEPARATE LAB REPORTS)    Internal Results    Lab Results   Component Value Date    GROUPTRH AB POS 2022    GROUPTRH AB POS 2022     Lab Results   Component Value Date    ABO AB 2021       External Results    ABO Date 1:    ABO Date 2  Are either of these ABO results based on External Labs? no  (If Yes, STOP and go to source document in Media Tab for verification).    VITALS  Height:  5' 5"  Weight:  182 lbs  (Use height from Transplant clinic visits only).  Did you enter height/weight? Yes    HLA    Class I:  Lab Results   Component Value Date    CECI3QZ 33 2014    YWMQ8JK 68 2014    YACO6MA 41 2014    JZWZ8PZ 53 2014    SPPQP5YH 6 2014    WGAQG2QW 4 2014    XIFCB3KE 4 2014    FYKUG4PK 17 2014       Class II:  Lab Results   Component Value Date    VPEYYQ69CK 1 2014    XUQYPW65KE 13 2014    RUKLMI985KJ 52 2014    ZAYNXV8272 XX 2014    BQFTT8TW 5 2014    FQLRU6US " "6 07/01/2014       Tested for HLA Antibodies: Yes, antibodies detected     If result is "Positive" antibodies are detected     If result is "Negative or questionable" no antibodies detected    Lab Results   Component Value Date    CIPRAS Negative 12/23/2021    CIIPRAS Positive 12/23/2021       DIALYSIS INFORMATION  Is patient Pre-Dialysis: No     GFR Information  Report GFR being used as the criteria for placement on the kidney list. If not, leave blank  GFR < or = 20 ml/min? n/a  If Yes, Specify value  ___   ml/min     Initial date GFR became 20 or less:   Is GFR obtained from an Outside lab Result? n/a  (If YES verify with source document scanned into media)    If patient on Dialysis:    Is candidate currently on dialysis for ESRD? Yes  If Yes,  Date Chronic Dialysis Started:   3/29/2022  (verify with source document in Media Tab)   Dialysis Unit Name: [address] [address]                        Physician Name:  Dr. Kalpana Kaplan  NPI#: [id number]    DIABETES INFORMATION  Primary Native Kidney Diagnosis: Hypertensive Nephrosclerosis  C-Peptide Value - No results found for: CPEPTIDE  Current Diabetes Status: Type II    FOR NON-KIDNEY DEPARTMENT USE ONLY:  Additional Organs Registered? none    Maximum Acceptable Number of HLA Mismatches  ABDR:     6      (0-6)               AB:               (0-4)  ADR:   _____  (0-4)              BDR: _____ (0-4)  A:        _____  (0-2)              B:      _____ (0-2)          DR: ______ (0-2)    Will Recipient Accept?   Accept HBcAB Positive Organ:            Yes  Accept HBV ELE Positive Organ:        no  Accept HCV Antibody Positive Organ: yes   Accept HCV LEE Positive Organ: yes    Dual Kidney and En Bloc Opt In : Yes  Dual  Local:   Yes  Dual Import:   Yes  En Bloc Local:   Yes  En Bloc Import: Yes    Accept KDPI > 85: Single:Yes     Local: Yes     Import: Yes  Accept KDPI > 85: Dual: Yes     Local: Yes     Import: " Yes      ### NURSE TO VERIFY CONSENT AND MAKE ANY NECESSARY CHANGES NEEDED IN UNET AT THE TIME OF VERIFICATION ###    Unacceptible Antigens  If yes, list     Lab Results   Component Value Date    LI4CDAD A*02:03 07/01/2014    CIABCLM WEAK A2 12/23/2021    CIIAB DQ7,DQ8,DQ9,DQ2,DQA1*04:01 12/23/2021       ### DO NOT LIST IF ANTIGEN VALUE WEAK ###    Blood Type x2 was verified by myself and Joseluis Ruiz RN.  Blood type determination and reporting was completed according to the programs protocols and OPTN requirements.

## 2022-08-24 NOTE — LETTER
2022    Elizabeth Maldonado  [address] [place] [address]  [place] [address]        Dear Elizabeth Maldonado:  MRN: [id number]    Congratulations! On 2022, you were placed on  the waiting list for a  donor transplant.    Your candidacy for kidney transplant is based on the following criteria: ESRD due to Retransplant/Graft Failure Kidney.    Your transplant coordinator while on the waiting list is Patricia Tolbert RN. They can be reached at [phone] or [phone] with any questions.      What to do now?    Ask your living donors to call and begin testing  Give your donors our phone number, [phone]  Make sure your donors have your name and date of birth when they call  You will get transplanted much faster if you have a living donor    Have your blood sent to our Transplant Lab every month  If you are on dialysis - our Transplant Lab will work with your dialysis unit to send your blood every month  If you are not on dialysis   If you live near an [facility] lab, we will schedule you to have blood drawn every month  If you do not live near an [facility] lab, you will be sent blood kits in the mail. You will need to take a kit to your local lab or doctor to have your blood drawn every month and mail to the Transplant Lab.     Call us with ANY CHANGES  Phone numbers - we must be able to reach you anytime of the day or night when a kidney is available  Address  Insurance coverage  Dialysis unit or kidney doctor  [place]: if you have surgery, stay in the hospital, have to get blood, or have an infection    Review your Kidney/Kidney-Pancreas Transplant Guide   This will give you detailed information about what happens when  you are on the waiting list   you are called when a kidney is available    The [facility] has a transplant surgeon and physician available 365 days a year, 24 hours a day to coordinate organ acceptance, procurement, surgical placement and to address urgent patient care  issues.  You will be notified in writing of any changes to our Transplant Centers staffing plan that would impact your ability to receive a transplant.    Attached is a letter from the [employer] ([employer]). It describes the services and information offered to patients by [employer] and the Organ Procurement and Transplant Network. We look forward to working with you while on the waiting list.     Congratulations,    Your Transplant Partner  [facility]   [address] [place] [id number]  [phone]  lh/enclosure  CC:  Dr. Anabelle Milan          [facility]                                                                                               The Organ Procurement and Transplantation Network   Toll-free patient services line: Your resource for organ transplant information     If you have a question regarding your own medical care, you always should call your transplant hospital first. However, for general organ transplant-related information, you can call the Organ Procurement and Transplantation Network (OPTN) toll-free patient services line at [phone].     Anyone, including potential transplant candidates, candidates, recipients, family members, friends, living donors, and donor family members, can call this number to:     Talk about organ donation, living donation, the transplant process, the donation process, and transplant policies.   Get a free patient information kit with helpful booklets, waiting list and transplant information, and a list of all transplant hospitals.   Ask questions about the OPTN website (https://optn.transplant.hrsa.gov/), the [employer]s ([employer]) website (https://unos.org/), or the [employer] website for living donors and transplant recipients. (https://www.transplantliving.org/).   Learn how the OPTN can help you.   Talk about any concerns that you may have with a transplant hospital.      The nations transplant system, the OPTN, is managed under federal contract by the [employer] ([employer]), which is a non-profit charitable organization. The OPTN helps create and define organ sharing policies that make the best use of donated organs. This process continuously evaluating new advances and discoveries so policies can be adapted to best serve patients waiting for transplants. To do so, the OPTN works closely with transplant professionals, transplant patients, transplant candidates, donor families, living donors, and the public. All transplant programs and organ procurement organizations throughout the country are OPTN members and are obligated to follow the policies the OPTN creates for allocating organs.     The OPTN also is responsible for:   Providing educational material for patients, the public, and professionals.   Raising awareness of the need for donated organs and tissue.   Coordinating organ procurement, matching, and placement.   Collecting information about every organ transplant and donation that occurs in the [place].     Remember, you should contact your transplant hospital directly if you have questions or concerns about your own medical care including medical records, work-up progress, and test results.     We are not your transplant hospital, and our staff will not be able to answer questions about your case, so please keep your transplant hospitals phone number handy.   However, while you research your transplant needs and learn as much as you can about transplantation and donation, we welcome your call to our toll-free patient services line at [phone].

## 2022-08-30 ENCOUNTER — PATIENT MESSAGE (OUTPATIENT)
Dept: TRANSPLANT | Facility: CLINIC | Age: 57
End: 2022-08-30
Payer: MEDICARE

## 2022-08-30 DIAGNOSIS — Z76.82 PRE-KIDNEY TRANSPLANT, LISTED: Primary | ICD-10-CM

## 2022-08-30 NOTE — PROGRESS NOTES
YEARLY LIST MANAGEMENT NOTE    Elizabeth Joel's kidney transplant listing status reviewed.  Patient is due for follow-up appointments on 12/23/22.  Appointments will be scheduled per protocol.

## 2022-09-08 LAB
ALBUMIN SERPL-MCNC: 3.7 G/DL (ref 3.5–5.2)
ALP SERPL-CCNC: 74 U/L (ref 35–104)
ALT SERPL W P-5'-P-CCNC: 9 U/L (ref 7–52)
BASOPHILS NFR BLD: 0.5 % (ref 0–1.5)
BICARBONATE: 20 MEQ/L (ref 20–31)
BUN, PRE: 61 MG/DL (ref 6–19)
BUN/CREAT SERPL: 7.1 (ref 10–20)
CALCIUM PHOS PRODUCT, COR: 41 (ref 0–54)
CALCIUM PHOS PRODUCT: 40 (ref 0–54)
CALCIUM SERPL-MCNC: 8.6 MG/DL (ref 8.7–10.4)
CALCIUM, CORRECTED: 8.8 MG/DL (ref 8.7–10.4)
CHLORIDE: 103 MEQ/L (ref 96–108)
CREAT SERPL-MCNC: 8.54 MG/DL (ref 0.6–1.3)
EOSINOPHIL NFR BLD: 7.7 % (ref 0–7)
ERYTHROCYTE [DISTWIDTH] IN BLOOD BY AUTOMATED COUNT: 17.1 % (ref 11.5–14.5)
FERRITIN SERPL-MCNC: 1429 NG/ML (ref 10–291)
GLUCOSE SERPL-MCNC: 92 MG/DL (ref 70–100)
HBV SURFACE AG SERPL QL IA: NEGATIVE
HCT VFR BLD AUTO: 31.3 % (ref 37–47)
HEMOGLOBIN X 3: 30.6 % (ref 36–48)
HGB BLD-MCNC: 10.2 G/DL (ref 12–16)
IRON: 64 MCG/DL (ref 30–160)
LUC: 4.8 % (ref 0–4)
LYMPH%: 26.8 % (ref 19–48)
MCH RBC QN AUTO: 29.9 PG (ref 27–31)
MCHC RBC AUTO-ENTMCNC: 32.7 G/DL (ref 30–36)
MCV RBC AUTO: 92 FL (ref 80–100)
MONO%: 7.5 % (ref 3–10)
NEUTROPHILS NFR BLD: 52.7 % (ref 40–75)
PHOSPHATE FLD-MCNC: 4.7 MG/DL (ref 2.6–4.5)
PLATELET # BLD AUTO: 166 1000/MCL (ref 130–400)
POTASSIUM SERPL-SCNC: 4.1 MEQ/L (ref 3.5–5.1)
PTH, INTACT: 468 PG/ML (ref 16–80)
RBC # BLD AUTO: 3.42 MILL/MCL (ref 4.2–5.4)
SODIUM BLD-SCNC: 134 MEQ/L (ref 136–145)
TOTAL IRON BINDING CAPACITY: 199 MCG/DL (ref 185–515)
TRANSFERRIN SATURATION: 32 % (ref 20–55)
UIBC SERPL-MCNC: 135 MCG/DL (ref 155–355)
WBC # BLD AUTO: 5.87 1000/MCL (ref 4.8–10.8)

## 2022-09-28 ENCOUNTER — TELEPHONE (OUTPATIENT)
Dept: TRANSPLANT | Facility: CLINIC | Age: 57
End: 2022-09-28
Payer: MEDICARE

## 2022-09-29 DIAGNOSIS — Z76.82 ORGAN TRANSPLANT CANDIDATE: ICD-10-CM

## 2022-09-29 DIAGNOSIS — E13.9 DIABETES MELLITUS OF OTHER TYPE WITHOUT COMPLICATION, UNSPECIFIED WHETHER LONG TERM INSULIN USE: Primary | ICD-10-CM

## 2022-09-30 ENCOUNTER — LAB VISIT (OUTPATIENT)
Dept: LAB | Facility: HOSPITAL | Age: 57
End: 2022-09-30
Attending: TRANSPLANT SURGERY
Payer: MEDICAID

## 2022-09-30 DIAGNOSIS — Z76.82 ORGAN TRANSPLANT CANDIDATE: ICD-10-CM

## 2022-09-30 DIAGNOSIS — E13.9 DIABETES MELLITUS OF OTHER TYPE WITHOUT COMPLICATION, UNSPECIFIED WHETHER LONG TERM INSULIN USE: ICD-10-CM

## 2022-09-30 LAB — C PEPTIDE SERPL-MCNC: 8.64 NG/ML (ref 0.78–5.19)

## 2022-09-30 PROCEDURE — 84681 ASSAY OF C-PEPTIDE: CPT | Mod: TXP | Performed by: TRANSPLANT SURGERY

## 2022-09-30 PROCEDURE — 36415 COLL VENOUS BLD VENIPUNCTURE: CPT | Mod: PO,TXP | Performed by: TRANSPLANT SURGERY

## 2022-10-06 LAB
ALBUMIN SERPL-MCNC: 3.6 G/DL (ref 3.5–5.2)
ALP SERPL-CCNC: 67 U/L (ref 35–104)
ALT SERPL W P-5'-P-CCNC: 10 U/L (ref 7–52)
BASOPHILS NFR BLD: 0.3 % (ref 0–1.5)
BICARBONATE: 18 MEQ/L (ref 20–31)
BSA (DUBOIS): 1.92 SQ. M.
BUN, PRE: 83 MG/DL (ref 6–19)
BUN/CREAT SERPL: 6.7 (ref 10–20)
CALCIUM PHOS PRODUCT, COR: 60 (ref 0–54)
CALCIUM PHOS PRODUCT: 59 (ref 0–54)
CALCIUM SERPL-MCNC: 9 MG/DL (ref 8.7–10.4)
CALCIUM, CORRECTED: 9.3 MG/DL (ref 8.7–10.4)
CHLORIDE: 104 MEQ/L (ref 96–108)
CREAT CLEAR, PDF NORM WKLY: 16 L/WK
CREAT CLEAR, PDF WKLY: 18 L/WK
CREAT SERPL-MCNC: 12.36 MG/DL (ref 0.6–1.3)
CREAT, PDF OVERNIGHT COR: 5.6 MG/DL
CREAT, PDF OVERNIGHT UNCOR: 5.8 MG/DL
CREATININE CLEAR, PDF NORM: 1.6 ML/MIN
CREATININE CLEAR, PDF: 1.8 ML/MIN
CREATININE, PDF 24 HR: 317.7 MG/24 HR
CREATININE, PDF TIMED COR: 5.6 MG/DL
CREATININE, PDF TIMED UNCOR: 5.8 MG/DL
EOSINOPHIL NFR BLD: 6.4 % (ref 0–7)
ERYTHROCYTE [DISTWIDTH] IN BLOOD BY AUTOMATED COUNT: 16.2 % (ref 11.5–14.5)
FERRITIN SERPL-MCNC: 865 NG/ML (ref 10–291)
GLUCOSE SERPL-MCNC: 120 MG/DL (ref 70–100)
GLUCOSE, PDF OVERNIGHT: 1182 MG/DL
GLUCOSE, PDF TIMED: 1196 MG/DL
HBA1C MFR BLD: 7.9 % (ref 4.8–5.9)
HBV SURFACE AG SERPL QL IA: NEGATIVE
HCT VFR BLD AUTO: 30.4 % (ref 37–47)
HEMOGLOBIN X 3: 29.4 % (ref 36–48)
HGB BLD-MCNC: 9.8 G/DL (ref 12–16)
IRON: 89 MCG/DL (ref 30–160)
KT/V, PERITONEAL: 0.8
LUC: 3.3 % (ref 0–4)
LYMPH%: 33 % (ref 19–48)
Lab: 167.4 CM
Lab: 24
Lab: 5673
Lab: 65.9
Lab: 82.7 KG
MCH RBC QN AUTO: 30 PG (ref 27–31)
MCHC RBC AUTO-ENTMCNC: 32.4 G/DL (ref 30–36)
MCV RBC AUTO: 93 FL (ref 80–100)
MONO%: 7.4 % (ref 3–10)
NEUTROPHILS NFR BLD: 49.6 % (ref 40–75)
PATIENT WEIGHT (LBS): 181.9
PHOSPHATE FLD-MCNC: 6.5 MG/DL (ref 2.6–4.5)
PLATELET # BLD AUTO: 172 1000/MCL (ref 130–400)
POTASSIUM SERPL-SCNC: 5.7 MEQ/L (ref 3.5–5.1)
PTH, INTACT: 333 PG/ML (ref 16–80)
RBC # BLD AUTO: 3.28 MILL/MCL (ref 4.2–5.4)
SODIUM BLD-SCNC: 135 MEQ/L (ref 136–145)
TOTAL IRON BINDING CAPACITY: 206 MCG/DL (ref 185–515)
TRANSFERRIN SATURATION: 43 % (ref 20–55)
UIBC SERPL-MCNC: 117 MCG/DL (ref 155–355)
UREA CLEAR, PDF NORM WKLY: 30 L/WK
UREA CLEARANCE, PD FLUID: 3 ML/MIN
UREA CLEARANCE, PDF NORM: 2.7 ML/MIN
UREA NITROGEN, BODY FLUID: 64 MG/DL
UREA NITROGEN, PDF 24 HR: 3630.7 MG/24 HR
UREA VOL DIST (HUME): 37.6 L
URINE COLLECTION DURATION: 24
VOLUME: 2200
WBC # BLD AUTO: 5.86 1000/MCL (ref 4.8–10.8)

## 2022-10-07 LAB
CREAT 24H UR-MRATE: 0.8 G/24 HR (ref 0.5–1.6)
CREAT CLEAR, TOT NORM WKLY: 55 L/WK
CREAT CLEAR, TOT WKLY: 61 L/WK
CREAT CLEAR, URINE NOR WKLY: 39 L/WK
CREAT CLEAR, URINE NORM: 3.9 ML/MIN (ref 77–94)
CREAT CLEAR, URINE WKLY: 43.3 L/WK
CREATININE, TIMED URINE: 35.1 MG/DL
KT/V, RESIDUAL: 0.93
KT/V, TOTAL: 1.73
PNA, NORMALIZED: 1.14 G/KG/DAY
PNA: 74 G/DAY
UREA CLEAR, TOT NORM WKLY: 65 L/WK
UREA CLEAR, URINE NORM WKLY: 35 L/WK
UREA CLEAR, URINE NORM: 3.2 ML/MIN (ref 64–99)
UREA NITROGEN,TIMED URINE: 191 MG/DL
URINE CREATININE CLEARANCE: 4.3 ML/MIN
URINE UREA CLEARANCE: 3.5 ML/MIN (ref 64–99)
UUN 24H UR-MRATE: 4.2 G/24 HR (ref 12–20)

## 2022-10-08 LAB
HEMOGLOBIN X 3: 28.2 % (ref 36–48)
HGB BLD-MCNC: 9.4 G/DL (ref 12–16)

## 2022-10-09 LAB
ALBUMIN SERPL-MCNC: 3.6 G/DL (ref 3.5–5.2)
BICARBONATE: 19 MEQ/L (ref 20–31)
BUN, PRE: 93 MG/DL (ref 6–19)
BUN/CREAT SERPL: 6.6 (ref 10–20)
CALCIUM PHOS PRODUCT, COR: 91 (ref 0–54)
CALCIUM PHOS PRODUCT: 88 (ref 0–54)
CALCIUM SERPL-MCNC: 8.7 MG/DL (ref 8.7–10.4)
CALCIUM, CORRECTED: 9 MG/DL (ref 8.7–10.4)
CHLORIDE: 105 MEQ/L (ref 96–108)
CREAT SERPL-MCNC: 14.07 MG/DL (ref 0.6–1.3)
GLUCOSE SERPL-MCNC: 196 MG/DL (ref 70–100)
PHOSPHATE FLD-MCNC: 10.1 MG/DL (ref 2.6–4.5)
POTASSIUM SERPL-SCNC: 6.1 MEQ/L (ref 3.5–5.1)
SODIUM BLD-SCNC: 138 MEQ/L (ref 136–145)

## 2022-10-10 ENCOUNTER — HOSPITAL ENCOUNTER (INPATIENT)
Facility: HOSPITAL | Age: 57
LOS: 1 days | Discharge: HOME | DRG: 640 | End: 2022-10-11
Attending: EMERGENCY MEDICINE | Admitting: STUDENT IN AN ORGANIZED HEALTH CARE EDUCATION/TRAINING PROGRAM
Payer: MEDICARE

## 2022-10-10 ENCOUNTER — PATIENT MESSAGE (OUTPATIENT)
Dept: TRANSPLANT | Facility: CLINIC | Age: 57
End: 2022-10-10
Payer: MEDICARE

## 2022-10-10 DIAGNOSIS — R07.9 CHEST PAIN: ICD-10-CM

## 2022-10-10 DIAGNOSIS — T82.41XA HEMODIALYSIS CATHETER DYSFUNCTION, INITIAL ENCOUNTER: Primary | ICD-10-CM

## 2022-10-10 DIAGNOSIS — E87.5 HYPERKALEMIA: ICD-10-CM

## 2022-10-10 LAB
ABO GROUP BLD: NORMAL
ALBUMIN SERPL BCP-MCNC: 2.6 G/DL (ref 3.5–5.2)
ALP SERPL-CCNC: 55 U/L (ref 55–135)
ALT SERPL W/O P-5'-P-CCNC: 8 U/L (ref 10–44)
ANION GAP SERPL CALC-SCNC: 9 MMOL/L (ref 8–16)
APTT BLDCRRT: <21 SEC (ref 21–32)
AST SERPL-CCNC: 10 U/L (ref 10–40)
BASOPHILS # BLD AUTO: 0.03 K/UL (ref 0–0.2)
BASOPHILS NFR BLD: 0.5 % (ref 0–1.9)
BILIRUB SERPL-MCNC: 0.5 MG/DL (ref 0.1–1)
BLD GP AB SCN CELLS X3 SERPL QL: NORMAL
BNP SERPL-MCNC: 24 PG/ML (ref 0–99)
BUN SERPL-MCNC: 88 MG/DL (ref 6–20)
CALCIUM SERPL-MCNC: 8 MG/DL (ref 8.7–10.5)
CHLORIDE SERPL-SCNC: 109 MMOL/L (ref 95–110)
CO2 SERPL-SCNC: 16 MMOL/L (ref 23–29)
CREAT SERPL-MCNC: 16.7 MG/DL (ref 0.5–1.4)
DIFFERENTIAL METHOD: ABNORMAL
EOSINOPHIL # BLD AUTO: 0.3 K/UL (ref 0–0.5)
EOSINOPHIL NFR BLD: 4.1 % (ref 0–8)
ERYTHROCYTE [DISTWIDTH] IN BLOOD BY AUTOMATED COUNT: 15.7 % (ref 11.5–14.5)
EST. GFR  (NO RACE VARIABLE): 2 ML/MIN/1.73 M^2
GLUCOSE SERPL-MCNC: 73 MG/DL (ref 70–110)
HCT VFR BLD AUTO: 27.5 % (ref 37–48.5)
HGB BLD-MCNC: 8.9 G/DL (ref 12–16)
IMM GRANULOCYTES # BLD AUTO: 0.04 K/UL (ref 0–0.04)
IMM GRANULOCYTES NFR BLD AUTO: 0.7 % (ref 0–0.5)
INR PPP: 1 (ref 0.8–1.2)
LYMPHOCYTES # BLD AUTO: 2.2 K/UL (ref 1–4.8)
LYMPHOCYTES NFR BLD: 35.8 % (ref 18–48)
MCH RBC QN AUTO: 29.5 PG (ref 27–31)
MCHC RBC AUTO-ENTMCNC: 32.4 G/DL (ref 32–36)
MCV RBC AUTO: 91 FL (ref 82–98)
MONOCYTES # BLD AUTO: 0.5 K/UL (ref 0.3–1)
MONOCYTES NFR BLD: 8.5 % (ref 4–15)
NEUTROPHILS # BLD AUTO: 3.1 K/UL (ref 1.8–7.7)
NEUTROPHILS NFR BLD: 50.4 % (ref 38–73)
NRBC BLD-RTO: 0 /100 WBC
PLATELET # BLD AUTO: 155 K/UL (ref 150–450)
PMV BLD AUTO: 11.1 FL (ref 9.2–12.9)
POCT GLUCOSE: 64 MG/DL (ref 70–110)
POCT GLUCOSE: 82 MG/DL (ref 70–110)
POTASSIUM SERPL-SCNC: 6.3 MMOL/L (ref 3.5–5.1)
PROT SERPL-MCNC: 8.9 G/DL (ref 6–8.4)
PROTHROMBIN TIME: 10.7 SEC (ref 9–12.5)
RBC # BLD AUTO: 3.02 M/UL (ref 4–5.4)
RH BLD: NORMAL
SODIUM SERPL-SCNC: 134 MMOL/L (ref 136–145)
TROPONIN I SERPL DL<=0.01 NG/ML-MCNC: 0.01 NG/ML (ref 0–0.03)
WBC # BLD AUTO: 6.14 K/UL (ref 3.9–12.7)

## 2022-10-10 PROCEDURE — 25000003 PHARM REV CODE 250: Mod: NTX | Performed by: STUDENT IN AN ORGANIZED HEALTH CARE EDUCATION/TRAINING PROGRAM

## 2022-10-10 PROCEDURE — 99285 EMERGENCY DEPT VISIT HI MDM: CPT | Mod: NTX

## 2022-10-10 PROCEDURE — 80053 COMPREHEN METABOLIC PANEL: CPT | Mod: NTX | Performed by: EMERGENCY MEDICINE

## 2022-10-10 PROCEDURE — 11000001 HC ACUTE MED/SURG PRIVATE ROOM: Mod: NTX

## 2022-10-10 PROCEDURE — 85025 COMPLETE CBC W/AUTO DIFF WBC: CPT | Mod: NTX | Performed by: EMERGENCY MEDICINE

## 2022-10-10 PROCEDURE — 63600175 PHARM REV CODE 636 W HCPCS: Mod: NTX | Performed by: STUDENT IN AN ORGANIZED HEALTH CARE EDUCATION/TRAINING PROGRAM

## 2022-10-10 PROCEDURE — 85730 THROMBOPLASTIN TIME PARTIAL: CPT | Mod: NTX | Performed by: EMERGENCY MEDICINE

## 2022-10-10 PROCEDURE — 84484 ASSAY OF TROPONIN QUANT: CPT | Mod: NTX | Performed by: EMERGENCY MEDICINE

## 2022-10-10 PROCEDURE — 90945 DIALYSIS ONE EVALUATION: CPT | Mod: NTX

## 2022-10-10 PROCEDURE — 93005 ELECTROCARDIOGRAM TRACING: CPT | Mod: NTX

## 2022-10-10 PROCEDURE — 86901 BLOOD TYPING SEROLOGIC RH(D): CPT | Mod: NTX | Performed by: EMERGENCY MEDICINE

## 2022-10-10 PROCEDURE — 85610 PROTHROMBIN TIME: CPT | Mod: NTX | Performed by: EMERGENCY MEDICINE

## 2022-10-10 PROCEDURE — 83880 ASSAY OF NATRIURETIC PEPTIDE: CPT | Mod: NTX | Performed by: EMERGENCY MEDICINE

## 2022-10-10 PROCEDURE — 25000003 PHARM REV CODE 250: Mod: NTX | Performed by: EMERGENCY MEDICINE

## 2022-10-10 PROCEDURE — 93010 ELECTROCARDIOGRAM REPORT: CPT | Mod: NTX,,, | Performed by: INTERNAL MEDICINE

## 2022-10-10 PROCEDURE — 63600175 PHARM REV CODE 636 W HCPCS: Mod: NTX | Performed by: EMERGENCY MEDICINE

## 2022-10-10 PROCEDURE — 86850 RBC ANTIBODY SCREEN: CPT | Mod: NTX | Performed by: EMERGENCY MEDICINE

## 2022-10-10 PROCEDURE — 25000242 PHARM REV CODE 250 ALT 637 W/ HCPCS: Mod: NTX | Performed by: EMERGENCY MEDICINE

## 2022-10-10 PROCEDURE — 93010 EKG 12-LEAD: ICD-10-PCS | Mod: NTX,,, | Performed by: INTERNAL MEDICINE

## 2022-10-10 PROCEDURE — 94640 AIRWAY INHALATION TREATMENT: CPT | Mod: NTX

## 2022-10-10 PROCEDURE — 86900 BLOOD TYPING SEROLOGIC ABO: CPT | Mod: NTX | Performed by: EMERGENCY MEDICINE

## 2022-10-10 RX ORDER — CLONAZEPAM 0.5 MG/1
0.5 TABLET ORAL 2 TIMES DAILY PRN
Status: DISCONTINUED | OUTPATIENT
Start: 2022-10-10 | End: 2022-10-11 | Stop reason: HOSPADM

## 2022-10-10 RX ORDER — SODIUM CHLORIDE 0.9 % (FLUSH) 0.9 %
10 SYRINGE (ML) INJECTION EVERY 12 HOURS PRN
Status: DISCONTINUED | OUTPATIENT
Start: 2022-10-10 | End: 2022-10-11 | Stop reason: HOSPADM

## 2022-10-10 RX ORDER — DEXTROSE MONOHYDRATE 100 MG/ML
INJECTION, SOLUTION INTRAVENOUS
Status: COMPLETED | OUTPATIENT
Start: 2022-10-10 | End: 2022-10-10

## 2022-10-10 RX ORDER — FOLIC ACID 1 MG/1
1 TABLET ORAL DAILY
Status: DISCONTINUED | OUTPATIENT
Start: 2022-10-11 | End: 2022-10-11 | Stop reason: HOSPADM

## 2022-10-10 RX ORDER — GLUCAGON 1 MG
1 KIT INJECTION
Status: DISCONTINUED | OUTPATIENT
Start: 2022-10-10 | End: 2022-10-11 | Stop reason: HOSPADM

## 2022-10-10 RX ORDER — HEPARIN SODIUM 5000 [USP'U]/ML
5000 INJECTION, SOLUTION INTRAVENOUS; SUBCUTANEOUS EVERY 8 HOURS
Status: DISCONTINUED | OUTPATIENT
Start: 2022-10-10 | End: 2022-10-11 | Stop reason: HOSPADM

## 2022-10-10 RX ORDER — ATORVASTATIN CALCIUM 40 MG/1
40 TABLET, FILM COATED ORAL NIGHTLY
Status: DISCONTINUED | OUTPATIENT
Start: 2022-10-10 | End: 2022-10-11 | Stop reason: HOSPADM

## 2022-10-10 RX ORDER — CALCITRIOL 0.25 UG/1
0.25 CAPSULE ORAL DAILY
Status: DISCONTINUED | OUTPATIENT
Start: 2022-10-11 | End: 2022-10-11 | Stop reason: HOSPADM

## 2022-10-10 RX ORDER — IBUPROFEN 200 MG
16 TABLET ORAL
Status: DISCONTINUED | OUTPATIENT
Start: 2022-10-10 | End: 2022-10-11 | Stop reason: HOSPADM

## 2022-10-10 RX ORDER — NALOXONE HCL 0.4 MG/ML
0.02 VIAL (ML) INJECTION
Status: DISCONTINUED | OUTPATIENT
Start: 2022-10-10 | End: 2022-10-11 | Stop reason: HOSPADM

## 2022-10-10 RX ORDER — SEVELAMER CARBONATE 800 MG/1
1600 TABLET, FILM COATED ORAL
Status: DISCONTINUED | OUTPATIENT
Start: 2022-10-11 | End: 2022-10-11 | Stop reason: HOSPADM

## 2022-10-10 RX ORDER — SEVELAMER CARBONATE 800 MG/1
800 TABLET, FILM COATED ORAL
Status: DISCONTINUED | OUTPATIENT
Start: 2022-10-11 | End: 2022-10-10

## 2022-10-10 RX ORDER — CARVEDILOL 3.12 MG/1
3.12 TABLET ORAL 2 TIMES DAILY
Status: DISCONTINUED | OUTPATIENT
Start: 2022-10-10 | End: 2022-10-11 | Stop reason: HOSPADM

## 2022-10-10 RX ORDER — FUROSEMIDE 10 MG/ML
60 INJECTION INTRAMUSCULAR; INTRAVENOUS ONCE
Status: COMPLETED | OUTPATIENT
Start: 2022-10-10 | End: 2022-10-10

## 2022-10-10 RX ORDER — GENTAMICIN SULFATE 1 MG/G
OINTMENT TOPICAL DAILY
Status: DISCONTINUED | OUTPATIENT
Start: 2022-10-11 | End: 2022-10-11 | Stop reason: HOSPADM

## 2022-10-10 RX ORDER — INSULIN ASPART 100 [IU]/ML
0-5 INJECTION, SOLUTION INTRAVENOUS; SUBCUTANEOUS
Status: DISCONTINUED | OUTPATIENT
Start: 2022-10-10 | End: 2022-10-11 | Stop reason: HOSPADM

## 2022-10-10 RX ORDER — ZOLPIDEM TARTRATE 5 MG/1
5 TABLET ORAL NIGHTLY PRN
Status: DISCONTINUED | OUTPATIENT
Start: 2022-10-10 | End: 2022-10-11 | Stop reason: HOSPADM

## 2022-10-10 RX ORDER — ALBUTEROL SULFATE 2.5 MG/.5ML
10 SOLUTION RESPIRATORY (INHALATION)
Status: COMPLETED | OUTPATIENT
Start: 2022-10-10 | End: 2022-10-10

## 2022-10-10 RX ORDER — IBUPROFEN 200 MG
24 TABLET ORAL
Status: DISCONTINUED | OUTPATIENT
Start: 2022-10-10 | End: 2022-10-11 | Stop reason: HOSPADM

## 2022-10-10 RX ORDER — CALCIUM GLUCONATE 20 MG/ML
1 INJECTION, SOLUTION INTRAVENOUS
Status: COMPLETED | OUTPATIENT
Start: 2022-10-10 | End: 2022-10-10

## 2022-10-10 RX ORDER — TIMOLOL MALEATE 5 MG/ML
1 SOLUTION/ DROPS OPHTHALMIC DAILY
Status: DISCONTINUED | OUTPATIENT
Start: 2022-10-11 | End: 2022-10-11 | Stop reason: HOSPADM

## 2022-10-10 RX ORDER — CLONAZEPAM 0.5 MG/1
0.5 TABLET ORAL 2 TIMES DAILY PRN
Status: ON HOLD | COMMUNITY
Start: 2022-09-21 | End: 2023-03-13 | Stop reason: HOSPADM

## 2022-10-10 RX ADMIN — ATORVASTATIN CALCIUM 40 MG: 40 TABLET, FILM COATED ORAL at 08:10

## 2022-10-10 RX ADMIN — SODIUM ZIRCONIUM CYCLOSILICATE 10 G: 10 POWDER, FOR SUSPENSION ORAL at 06:10

## 2022-10-10 RX ADMIN — INSULIN DETEMIR 20 UNITS: 100 INJECTION, SOLUTION SUBCUTANEOUS at 08:10

## 2022-10-10 RX ADMIN — ALBUTEROL SULFATE 10 MG: 2.5 SOLUTION RESPIRATORY (INHALATION) at 05:10

## 2022-10-10 RX ADMIN — SODIUM CHLORIDE, SODIUM LACTATE, POTASSIUM CHLORIDE, AND CALCIUM CHLORIDE 500 ML: .6; .31; .03; .02 INJECTION, SOLUTION INTRAVENOUS at 04:10

## 2022-10-10 RX ADMIN — DEXTROSE: 10 SOLUTION INTRAVENOUS at 07:10

## 2022-10-10 RX ADMIN — INSULIN HUMAN 5 UNITS: 100 INJECTION, SOLUTION PARENTERAL at 07:10

## 2022-10-10 RX ADMIN — CARVEDILOL 3.12 MG: 3.12 TABLET, FILM COATED ORAL at 08:10

## 2022-10-10 RX ADMIN — ZOLPIDEM TARTRATE 5 MG: 5 TABLET ORAL at 10:10

## 2022-10-10 RX ADMIN — FUROSEMIDE 60 MG: 10 INJECTION, SOLUTION INTRAMUSCULAR; INTRAVENOUS at 08:10

## 2022-10-10 RX ADMIN — CALCIUM GLUCONATE 1 G: 20 INJECTION, SOLUTION INTRAVENOUS at 07:10

## 2022-10-10 RX ADMIN — HEPARIN SODIUM 5000 UNITS: 5000 INJECTION INTRAVENOUS; SUBCUTANEOUS at 10:10

## 2022-10-10 NOTE — HPI
This is a 57 year old female with a PMHx of ESRD s/p failed renal transplant (2014), now on PD, T2DM c/b diabetic retinopathy and nephropathy, HTN, HLD, depression, anxiety who presents with  abnormal labs.     Patient had outpatient labs done, showing a potassium of 6.1 which has been uptrending. She was intructed to present to the ER. She feels fatigued but otherwise does not have any symptoms. She has been compliant with her PD nightly exchanges. On arrival to the ED, patient was hypertensive but otherwise HDS. Labs showed hyperkalemia (6.3), negative troponin, and BNP. EKG was without significant changes. She recieved albuterol, calcium gluconate, insuin/D50, 500 cc bolus and Lokelma. The patient was admitted for further management.

## 2022-10-10 NOTE — H&P
[facility] [place] [facility]  [facility] Medicine  History & Physical    Patient Name: Elizabeth Maldonado  MRN: [id number]  Patient Class: IP- Inpatient  Admission Date: 10/10/2022  Attending Physician: Shahriar Guzmán DO   Primary Care Provider: Richy Singleton NP         Patient information was obtained from patient and ER records.     Subjective:     Principal Problem:Hyperkalemia    Chief Complaint:   Chief Complaint   Patient presents with    Abnormal Labs     Pt sent in by dialysis due to abnormal labs, low H&H and high potassium. Peritoneal dialysis done every evening at home.6 days a week. Pt denies cp, sob, n/v/d.        HPI: This is a 57 year old female with a PMHx of ESRD s/p failed renal transplant (2014), now on PD, T2DM c/b diabetic retinopathy and nephropathy, HTN, HLD, depression, anxiety who presents with  abnormal labs.     Patient had outpatient labs done, showing a potassium of 6.1 which has been uptrending. She was intructed to present to the ER. She feels fatigued but otherwise does not have any symptoms. She has been compliant with her PD nightly exchanges. On arrival to the ED, patient was hypertensive but otherwise HDS. Labs showed hyperkalemia (6.3), negative troponin, and BNP. EKG was without significant changes. She recieved albuterol, calcium gluconate, insuin/D50, 500 cc bolus and Lokelma. The patient was admitted for further management.       Past Medical History:   Diagnosis Date    Acidosis 7/1/2014    Allergic rhinitis 7/1/2014    Allergy     Anemia     Anemia in chronic kidney disease 7/1/2014    Anxiety     Cataract     Chronic bilateral low back pain with bilateral sciatica 10/25/2019    Chronic immunosuppression with Prograf and MMF 12/3/2014    CKD (chronic kidney disease) stage 5, GFR less than 15 ml/min 7/1/2014    CKD (chronic kidney disease), stage III 3/2/2015    Degenerative disc disease     Depression 7/1/2014    Diabetes mellitus, type 2 since age 18  2014    ESRD on peritoneal dialysis - 2014 for 9 hours no peritonitis 2014    Hyperlipidemia     Hypertension 2014    Hypomagnesemia 2015    Kidney transplant status, living unrelated donor - 12/2/14 12/3/2014    Neutropenia 2015    NS (nuclear sclerosis) 2016    Obesity     Organ transplant candidate 2014    Pre-op exam 2014    Proliferative diabetic retinopathy of both eyes without macular edema associated with type 2 diabetes mellitus 2016    Renal manifestation of secondary diabetes mellitus     Tendinitis     Trouble in sleeping        Past Surgical History:   Procedure Laterality Date    BIOPSY N/A 2020    Procedure: Biopsy;  Surgeon: Sweta Catalan MD;  Location: [facility] OR 2ND FLR;  Service: Transplant;  Laterality: N/A;    BONE MARROW BIOPSY N/A      SECTION      x 2    COLONOSCOPY N/A 2022    Procedure: COLONOSCOPY;  Surgeon: Franklin Gan MD;  Location: [facility] ENDO (4TH FLR);  Service: Colon and Rectal;  Laterality: N/A;  order created: previous order unusable  fully vaccinated, labs prior, instr mailed / portal -ml    KIDNEY TRANSPLANT  2014    LAPAROSCOPIC REVISION OF PROCEDURE INVOLVING PERITONEAL DIALYSIS CATHETER N/A 2022    Procedure: REVISION OF PROCEDURE INVOLVING PERITONEAL DIALYSIS CATHETER, LAPAROSCOPIC;  Surgeon: Franklin Barber MD;  Location: [facility] OR 2ND FLR;  Service: General;  Laterality: N/A;    MEDIPORT REMOVAL N/A 2019    Procedure: REMOVAL, CATHETER, CENTRAL VENOUS, TUNNELED, WITH PORT;  Surgeon: Eusebia Diagnostic Provider;  Location: [facility] OR;  Service: Radiology;  Laterality: N/A;    RENAL BIOPSY      ROTATOR CUFF REPAIR      TUBAL LIGATION         Review of patient's allergies indicates:   Allergen Reactions    Shrimp Hives, Itching and Rash    Topamax [topiramate] Other (See Comments)     Vision changes    Zoloft [sertraline] Palpitations       No current  "facility-administered medications on file prior to encounter.     Current Outpatient Medications on File Prior to Encounter   Medication Sig    acetaminophen (TYLENOL) 500 MG tablet Take 500 mg by mouth every 6 (six) hours as needed for Pain.    atorvastatin (LIPITOR) 40 MG tablet Take 40 mg by mouth every evening.    calcitRIOL (ROCALTROL) 0.25 MCG Cap Take 0.25 mcg by mouth once daily.    carvediloL (COREG) 3.125 MG tablet Take 3.125 mg by mouth 2 (two) times daily. Hold for SBP <130    clonazePAM (KLONOPIN) 0.5 MG tablet Take 0.5 mg by mouth 2 (two) times daily as needed.    dulaglutide (TRULICITY) 1.5 mg/0.5 mL pen injector Inject 1.5 mg into the skin every 7 days. Pt takes on Wednesday    epoetin edward (EPOGEN) 10,000 unit/mL injection Inject 10,000 Units into the skin once. Home Medication To administer Q2wks    gentamicin (GARAMYCIN) 0.3 % ophthalmic solution 1 drop by Other route 3 (three) times daily.    HUMALOG KWIKPEN INSULIN 100 unit/mL pen Inject 3 Units into the skin 3 (three) times daily with meals. SLIDE SCALE    LANTUS SOLOSTAR U-100 INSULIN glargine 100 units/mL (3mL) SubQ pen Inject 50 Units into the skin once daily. (Patient taking differently: Inject 25 Units into the skin every evening.)    sevelamer carbonate (RENVELA) 800 mg Tab Take 1 tablet (800 mg total) by mouth 3 (three) times daily with meals. (Patient taking differently: Take 800 mg by mouth 3 (three) times daily with meals. Take 1 tab with meals and 1 tab with snacks as directed daily)    timolol maleate 0.5% (TIMOPTIC) 0.5 % Drop Place 2 drops in both eyes twice daily    zolpidem (AMBIEN) 10 mg Tab Take 10 mg by mouth nightly as needed.    blood sugar diagnostic Strp Checks BG ac/hs    folic acid (FOLVITE) 1 MG tablet Take 1 tablet (1 mg total) by mouth once daily.    insulin syringe-needle U-100 (INSULIN SYRINGE) 1/2 mL 30 x 5/16" Syrg Uses 4 daily    lancets (ONETOUCH DELICA LANCETS) 33 gauge Misc 1 lancet by " "Misc.(Non-Drug; Combo Route) route 4 (four) times daily before meals and nightly.    SYRINGE & NEEDLE,INSULIN,1 ML (INSULIN SYRINGE-NEEDLE U-100) 1 mL 29 X 7/16" Syrg      Family History       Problem Relation (Age of Onset)    Cataracts Maternal Grandmother    Diabetes Mother, Father, Sister, Brother, Sister    Heart disease Sister, Maternal Grandmother    Heart failure Sister    Hypertension Mother, Sister    Kidney disease Father, Sister    No Known Problems Maternal Aunt, Maternal Uncle, Paternal Aunt, Paternal Uncle, Maternal Grandfather, Paternal Grandmother, Paternal Grandfather    Stroke Maternal Grandmother          Tobacco Use    Smoking status: Former     Packs/day: 1.00     Years: 20.00     Pack years: 20.00     Types: Cigarettes     Quit date: 2013     Years since quittin.1    Smokeless tobacco: Never    Tobacco comments:     quit smoking cigarettes in 2014   Substance and Sexual Activity    Alcohol use: No    Drug use: No    Sexual activity: Yes     Partners: Male     Birth control/protection: Post-menopausal     Review of Systems   Constitutional:  Positive for fatigue.   HENT: Negative.     Eyes: Negative.    Respiratory: Negative.     Cardiovascular: Negative.    Gastrointestinal: Negative.    Endocrine: Negative.    Genitourinary: Negative.    Musculoskeletal: Negative.    Skin: Negative.    Allergic/Immunologic: Negative.    Neurological: Negative.    Psychiatric/Behavioral: Negative.     Objective:     Vital Signs (Most Recent):  Temp: 98.4 °F (36.9 °C) (10/10/22 1357)  Pulse: 79 (10/10/22 1739)  Resp: 16 (10/10/22 1739)  BP: (!) 170/90 (10/10/22 1632)  SpO2: 99 % (10/10/22 1739)   Vital Signs (24h Range):  Temp:  [98.4 °F (36.9 °C)] 98.4 °F (36.9 °C)  Pulse:  [75-87] 79  Resp:  [13-17] 16  SpO2:  [99 %-100 %] 99 %  BP: (150-175)/(77-90) 170/90     Weight: 81.2 kg (179 lb)  Body mass index is 28.04 kg/m².    Physical Exam  Vitals and nursing note reviewed. "   Constitutional:       General: She is not in acute distress.     Appearance: Normal appearance. She is not ill-appearing.   HENT:      Head: Normocephalic and atraumatic.      Nose: Nose normal.      Mouth/Throat:      Mouth: Mucous membranes are moist.   Eyes:      Extraocular Movements: Extraocular movements intact.   Cardiovascular:      Rate and Rhythm: Normal rate.      Pulses: Normal pulses.      Heart sounds: No murmur heard.  Pulmonary:      Effort: Pulmonary effort is normal. No respiratory distress.   Abdominal:      General: Abdomen is flat.      Palpations: Abdomen is soft.      Tenderness: There is no abdominal tenderness.      Comments: PD catheter in place, c/d/I    Musculoskeletal:      Right lower leg: No edema.      Left lower leg: No edema.   Skin:     General: Skin is warm.      Capillary Refill: Capillary refill takes less than 2 seconds.   Neurological:      General: No focal deficit present.      Mental Status: She is alert.   Psychiatric:         Mood and Affect: Mood normal.           Significant Labs: All pertinent labs within the past 24 hours have been reviewed.    Significant Imaging: I have reviewed all pertinent imaging results/findings within the past 24 hours.      Assessment/Plan:     * Hyperkalemia  - Presented with abnormal labs/hyperkalemia   - Likely related to high potassium diet (patient reported eating more bananas over the last week), possibly also related to inadequate exchanges with PD  - Admitted for monitoring     Plan:   - Medical management: Lasix 60 mg IV (patient makes urine), Insulin/D5, Lokelma, Albuterol   - Nephrology consult   - PD exchange overnight   - Telemetry monitoring     Anemia of chronic kidney failure, stage 5  - Continue to monitor   - Continue EPO per nephrology       End stage kidney disease  - Etiology: DM  - Dialysis type: PD  - Catheter looks clean, dry and intact   - Schedule: 6 nights weekly with a cycler     Plan:  - Nephrology consult for  dialysis management  - Daily weights to guide UF      Proliferative diabetic retinopathy of both eyes without macular edema associated with type 2 diabetes mellitus  - Resume home meds     Hyperlipidemia  - Resume home meds       Type 2 diabetes mellitus, with long-term current use of insulin  Patient's FSGs are uncontrolled due to hyperglycemia on current medication regimen.  Last A1c reviewed-   Lab Results   Component Value Date    HGBA1C 7.9 (H) 10/05/2022     Most recent fingerstick glucose reviewed- No results for input(s): POCTGLUCOSE in the last 24 hours.  Current correctional scale  Low  Maintain anti-hyperglycemic dose as follows-   Antihyperglycemics (From admission, onward)    Start     Stop Route Frequency Ordered    10/10/22 2100  insulin detemir U-100 pen 20 Units         -- SubQ Nightly 10/10/22 1820    10/10/22 1919  insulin aspart U-100 pen 0-5 Units         -- SubQ Before meals & nightly PRN 10/10/22 1820    10/10/22 1745  insulin regular injection 5 Units 0.05 mL  (Hyperkalemia Medications)         10/11 0544 IV ED 1 Time 10/10/22 1734        Hold Oral hypoglycemics while patient is in the hospital.    VTE Risk Mitigation (From admission, onward)         Ordered     heparin (porcine) injection 5,000 Units  Every 8 hours         10/10/22 1820     IP VTE HIGH RISK PATIENT  Once         10/10/22 1820     Place sequential compression device  Until discontinued         10/10/22 1820                   Sohail Rowland MD  Department of Hospital Medicine   [facility] [place] - Emergency Dept

## 2022-10-10 NOTE — ASSESSMENT & PLAN NOTE
Patient's FSGs are uncontrolled due to hyperglycemia on current medication regimen.  Last A1c reviewed-   Lab Results   Component Value Date    HGBA1C 7.9 (H) 10/05/2022     Most recent fingerstick glucose reviewed- No results for input(s): POCTGLUCOSE in the last 24 hours.  Current correctional scale  Low  Maintain anti-hyperglycemic dose as follows-   Antihyperglycemics (From admission, onward)    Start     Stop Route Frequency Ordered    10/10/22 2100  insulin detemir U-100 pen 20 Units         -- SubQ Nightly 10/10/22 1820    10/10/22 1919  insulin aspart U-100 pen 0-5 Units         -- SubQ Before meals & nightly PRN 10/10/22 1820    10/10/22 1745  insulin regular injection 5 Units 0.05 mL  (Hyperkalemia Medications)         10/11 0544 IV ED 1 Time 10/10/22 1734        Hold Oral hypoglycemics while patient is in the hospital.

## 2022-10-10 NOTE — ED PROVIDER NOTES
Encounter Date: 10/10/2022       History     Chief Complaint   Patient presents with    Abnormal Labs     Pt sent in by dialysis due to abnormal labs, low H&H and high potassium. Peritoneal dialysis done every evening at home.6 days a week. Pt denies cp, sob, n/v/d.     The patient is a 57-year-old with the below past medical history who presents to the ED after she was found to have hyperkalemia on outpatient labs three days ago.  She has no acute complaints.  She denies fever, chills, nausea, and vomiting.  She undergo 6 hours of peritoneal dialysis six days a week.  She still makes urine.  There has been no decrease in her daily urine output.  She has a history of failed renal transplantation.    The history is provided by the patient. No  was used.   Review of patient's allergies indicates:   Allergen Reactions    Shrimp Hives, Itching and Rash    Topamax [topiramate] Other (See Comments)     Vision changes    Zoloft [sertraline] Palpitations     Past Medical History:   Diagnosis Date    Acidosis 7/1/2014    Allergic rhinitis 7/1/2014    Allergy     Anemia     Anemia in chronic kidney disease 7/1/2014    Anxiety     Cataract     Chronic bilateral low back pain with bilateral sciatica 10/25/2019    Chronic immunosuppression with Prograf and MMF 12/3/2014    CKD (chronic kidney disease) stage 5, GFR less than 15 ml/min 7/1/2014    CKD (chronic kidney disease), stage III 3/2/2015    Degenerative disc disease     Depression 7/1/2014    Diabetes mellitus, type 2 since age 18 7/1/2014    ESRD on peritoneal dialysis - august 2014 for 9 hours no peritonitis 11/24/2014    Hyperlipidemia     Hypertension 7/1/2014    Hypomagnesemia 1/7/2015    Kidney transplant status, living unrelated donor - 12/2/14 12/3/2014    Neutropenia 1/21/2015    NS (nuclear sclerosis) 9/16/2016    Obesity     Organ transplant candidate 7/1/2014    Pre-op exam 11/24/2014    Proliferative diabetic retinopathy of both eyes  without macular edema associated with type 2 diabetes mellitus 2016    Renal manifestation of secondary diabetes mellitus     Tendinitis     Trouble in sleeping      Past Surgical History:   Procedure Laterality Date    BIOPSY N/A 2020    Procedure: Biopsy;  Surgeon: Sweta Catalan MD;  Location: [facility] OR 2ND FLR;  Service: Transplant;  Laterality: N/A;    BONE MARROW BIOPSY N/A      SECTION      x 2    COLONOSCOPY N/A 2022    Procedure: COLONOSCOPY;  Surgeon: Franklin Gan MD;  Location: [facility] ENDO (4TH FLR);  Service: Colon and Rectal;  Laterality: N/A;  order created: previous order unusable  fully vaccinated, labs prior, instr mailed / portal -ml    KIDNEY TRANSPLANT  2014    LAPAROSCOPIC REVISION OF PROCEDURE INVOLVING PERITONEAL DIALYSIS CATHETER N/A 2022    Procedure: REVISION OF PROCEDURE INVOLVING PERITONEAL DIALYSIS CATHETER, LAPAROSCOPIC;  Surgeon: Franklin Barber MD;  Location: [facility] OR 2ND FLR;  Service: General;  Laterality: N/A;    MEDIPORT REMOVAL N/A 2019    Procedure: REMOVAL, CATHETER, CENTRAL VENOUS, TUNNELED, WITH PORT;  Surgeon: Eusebia Diagnostic Provider;  Location: [facility];  Service: Radiology;  Laterality: N/A;    RENAL BIOPSY      ROTATOR CUFF REPAIR      TUBAL LIGATION       Family History   Problem Relation Age of Onset    Diabetes Mother     Hypertension Mother     Diabetes Father     Kidney disease Father     Diabetes Sister     Hypertension Sister     Kidney disease Sister     Heart disease Sister     Heart failure Sister     Diabetes Brother     Diabetes Sister     Stroke Maternal Grandmother     Heart disease Maternal Grandmother         pacemaker    Cataracts Maternal Grandmother     No Known Problems Maternal Aunt     No Known Problems Maternal Uncle     No Known Problems Paternal Aunt     No Known Problems Paternal Uncle     No Known Problems Maternal Grandfather     No Known Problems Paternal Grandmother     No Known Problems Paternal  Grandfather     Cancer Neg Hx     Amblyopia Neg Hx     Blindness Neg Hx     Glaucoma Neg Hx     Macular degeneration Neg Hx     Retinal detachment Neg Hx     Strabismus Neg Hx     Thyroid disease Neg Hx     Breast cancer Neg Hx     Colon cancer Neg Hx     Ovarian cancer Neg Hx      Social History     Tobacco Use    Smoking status: Former     Packs/day: 1.00     Years: 20.00     Pack years: 20.00     Types: Cigarettes     Quit date: 2013     Years since quittin.1    Smokeless tobacco: Never    Tobacco comments:     quit smoking cigarettes in 2014   Substance Use Topics    Alcohol use: No    Drug use: No     Review of Systems   Constitutional:  Positive for fatigue. Negative for chills and fever.   HENT:  Negative for congestion.    Eyes:  Negative for visual disturbance.   Respiratory:  Negative for shortness of breath.    Cardiovascular:  Negative for chest pain and palpitations.   Gastrointestinal:  Negative for abdominal pain, nausea and vomiting.   Endocrine: Negative for polydipsia and polyuria.   Genitourinary:  Negative for decreased urine volume, difficulty urinating and dysuria.   Musculoskeletal:  Negative for arthralgias and myalgias.   Neurological:  Negative for dizziness and headaches.     Physical Exam     Initial Vitals [10/10/22 1357]   BP Pulse Resp Temp SpO2   (!) 175/80 87 17 98.4 °F (36.9 °C) 100 %      MAP       --         Physical Exam    Nursing note and vitals reviewed.  Constitutional: She is not diaphoretic. No distress.   HENT:   Head: Normocephalic and atraumatic.   Mouth/Throat: Oropharynx is clear and moist.   Eyes: Conjunctivae are normal. No scleral icterus.   Neck: No JVD present.   Cardiovascular:  Normal rate, regular rhythm and normal heart sounds.     Exam reveals no gallop and no friction rub.       No murmur heard.  Pulmonary/Chest: Effort normal and breath sounds normal. No stridor. No respiratory distress. She has no decreased breath sounds. She has no  wheezes. She has no rhonchi. She has no rales.   Abdominal: Abdomen is soft. She exhibits no distension. There is no abdominal tenderness.     Neurological: She is alert and oriented to person, place, and time. GCS score is 15. GCS eye subscore is 4. GCS verbal subscore is 5. GCS motor subscore is 6.   Skin: Skin is warm and dry. No pallor.       ED Course   Procedures  Labs Reviewed   CBC W/ AUTO DIFFERENTIAL - Abnormal; Notable for the following components:       Result Value    RBC 3.02 (*)     Hemoglobin 8.9 (*)     Hematocrit 27.5 (*)     RDW 15.7 (*)     Immature Granulocytes 0.7 (*)     All other components within normal limits   COMPREHENSIVE METABOLIC PANEL - Abnormal; Notable for the following components:    Sodium 134 (*)     Potassium 6.3 (*)     CO2 16 (*)     BUN 88 (*)     Creatinine 16.7 (*)     Calcium 8.0 (*)     Total Protein 8.9 (*)     Albumin 2.6 (*)     ALT 8 (*)     eGFR 2 (*)     All other components within normal limits    Narrative:      Potassium critical result(s) called and verbal readback obtained   from Mis Foster. by XIOMARA 10/10/2022 17:22   APTT   PROTIME-INR   TROPONIN I   B-TYPE NATRIURETIC PEPTIDE   TYPE & SCREEN   GROUP & RH     EKG Readings: (Independently Interpreted)   10/10/2022 14:51   Normal sinus rhythm. Ventricular rate 85 bpm.  Normal axis.  Normal QRS and QT intervals.  No ST segment elevation or depression.  Normal T-wave morphology. Overall impression:  Normal EKG.   ECG Results              EKG 12-lead (Final result)  Result time 10/10/22 15:57:41      Final result by Interface, Lab In [facility] (10/10/22 15:57:41)                   Narrative:    Test Reason : E87.5,    Vent. Rate : 085 BPM     Atrial Rate : 085 BPM     P-R Int : 154 ms          QRS Dur : 076 ms      QT Int : 374 ms       P-R-T Axes : 073 050 046 degrees     QTc Int : 445 ms    Normal sinus rhythm  Normal ECG  When compared with ECG of 08-APR-2022 17:56,  No significant change was found  Confirmed by  Brian SAM, Carlo ([id number]) on 10/10/2022 3:57:27 PM    Referred By: [email or web address]   SELF           Confirmed By:Carlo Torres MD                                  Imaging Results    None          Medications   calcium gluconate 1 g in NS IVPB (premixed) (has no administration in time range)   insulin regular injection 5 Units 0.05 mL (has no administration in time range)   dextrose 50% injection 25 g (has no administration in time range)   dextrose 10% bolus 125 mL (has no administration in time range)   dextrose 10% bolus 250 mL (has no administration in time range)   sodium zirconium cyclosilicate packet 10 g (has no administration in time range)   lactated ringers bolus 500 mL (500 mLs Intravenous New Bag 10/10/22 1638)   albuterol sulfate nebulizer solution 10 mg (10 mg Nebulization Given 10/10/22 7789)     Medical Decision Making:   History:   Old Medical Records: I decided to obtain old medical records.  Old Records Summarized: other records.       <> Summary of Records: PMH reviewed as above.  Independently Interpreted Test(s):   I have ordered and independently interpreted EKG Reading(s) - see prior notes  Clinical Tests:   Lab Tests: Ordered and Reviewed  Medical Tests: Ordered and Reviewed              Attending Attestation:         Attending Critical Care:   Critical Care Times:   Direct Patient Care (initial evaluation, reassessments, and time considering the case)................................................................15 minutes.   Additional History from reviewing old medical records or taking additional history from the family, EMS, PCP, etc.......................5 minutes.   Ordering, Reviewing, and Interpreting Diagnostic Studies...............................................................................................................2 minutes.    Documentation..................................................................................................................................................................................7 minutes.   Consultation with other Physicians. .................................................................................................................................................3 minutes.   ==============================================================  Total Critical Care Time - exclusive of procedural time: 32 minutes.  ==============================================================  Critical care was necessary to treat or prevent imminent or life-threatening deterioration of the following conditions: metabolic crisis.   Critical care was time spent personally by me on the following activities: obtaining history from patient or relative, examination of patient, review of old charts, ordering lab, x-rays, and/or EKG, development of treatment plan with patient or relative, ordering and performing treatments and interventions, discussion with consultants, interpretation of cardiac measurements and re-evaluation of patient's conition.   Critical Care Condition: potentially life-threatening   Critical Care Comments: MDM:  This was an emergent evaluation.  The patient had no concerning acute symptoms but her presenting complaint of hyperkalemia was concerning and warranted close monitoring and aggressive management.  Workup consisted of cardiopulmonary monitoring, EKG, and labs.  EKG was normal.  Labs revealed worsened hyperkalemia from three days ago.  She was treated with albuterol, dextrose/IV insulin, calcium gluconate, and Lokelma.  Nephrology was consulted and recommended admission and potassium clearing medication.  The patient's presentation and workup were discussed with Dr. Guzmán who agreed to admit her to the hospital.         ED Course as of 10/10/22 1758   Mon Oct 10, 2022   9289 Called and discussed  patient's presentation, exam, workup with the on-call nephrologist, Dr. Barron, who recommends admission to hospital medicine.  He will arrange and inpatient peritoneal dialysis. Recommends giving Lokelma. [LP]   1748 Called and discussed the patient's presentation and workup with Dr. Guzmán who agrees to admit the patient. [LP]      ED Course User Index  [LP] Kervin Newberry III, MD                 Clinical Impression:   Final diagnoses:  [E87.5] Hyperkalemia        ED Disposition Condition    Admit Stable                Kervin Newberry III, MD  10/10/22 1755       Kervin Newberry III, MD  10/10/22 1758

## 2022-10-10 NOTE — ASSESSMENT & PLAN NOTE
- Etiology: DM  - Dialysis type: PD  - Catheter looks clean, dry and intact   - Schedule: 6 nights weekly with a cycler     Plan:  - Nephrology consult for dialysis management  - Daily weights to guide UF

## 2022-10-10 NOTE — PHARMACY MED REC
"        Admission Medication History     The home medication history was taken by Paty Ho.    You may go to "Admission" then "Reconcile Home Medications" tabs to review and/or act upon these items.     The home medication list has been updated by the Pharmacy department.   Please read ALL comments highlighted in yellow.   Please address this information as you see fit.    Feel free to contact us if you have any questions or require assistance.          Medications listed below were obtained from: Patient/family, Analytic software- [employer], and Medical records  (Not in a hospital admission)          Paty Ho  [phone]         .        "

## 2022-10-10 NOTE — ASSESSMENT & PLAN NOTE
- Presented with abnormal labs/hyperkalemia   - Likely related to high potassium diet (patient reported eating more bananas over the last week), possibly also related to inadequate exchanges with PD  - Admitted for monitoring     Plan:   - Medical management: Lasix 60 mg IV (patient makes urine), Insulin/D5, Lokelma, Albuterol   - Nephrology consult   - PD exchange overnight   - Telemetry monitoring

## 2022-10-10 NOTE — SUBJECTIVE & OBJECTIVE
Past Medical History:   Diagnosis Date    Acidosis 2014    Allergic rhinitis 2014    Allergy     Anemia     Anemia in chronic kidney disease 2014    Anxiety     Cataract     Chronic bilateral low back pain with bilateral sciatica 10/25/2019    Chronic immunosuppression with Prograf and MMF 12/3/2014    CKD (chronic kidney disease) stage 5, GFR less than 15 ml/min 2014    CKD (chronic kidney disease), stage III 3/2/2015    Degenerative disc disease     Depression 2014    Diabetes mellitus, type 2 since age 18 2014    ESRD on peritoneal dialysis - 2014 for 9 hours no peritonitis 2014    Hyperlipidemia     Hypertension 2014    Hypomagnesemia 2015    Kidney transplant status, living unrelated donor - 12/2/14 12/3/2014    Neutropenia 2015    NS (nuclear sclerosis) 2016    Obesity     Organ transplant candidate 2014    Pre-op exam 2014    Proliferative diabetic retinopathy of both eyes without macular edema associated with type 2 diabetes mellitus 2016    Renal manifestation of secondary diabetes mellitus     Tendinitis     Trouble in sleeping        Past Surgical History:   Procedure Laterality Date    BIOPSY N/A 2020    Procedure: Biopsy;  Surgeon: Sweta Catalan MD;  Location: [facility] OR [address] [place] [address];  Service: Transplant;  Laterality: N/A;    BONE MARROW BIOPSY N/A      SECTION      x 2    COLONOSCOPY N/A 2022    Procedure: COLONOSCOPY;  Surgeon: Franklin Gan MD;  Location: [facility] (4TH FLR);  Service: Colon and Rectal;  Laterality: N/A;  order created: previous order unusable  fully vaccinated, labs prior, instr mailed / portal -ml    KIDNEY TRANSPLANT  2014    LAPAROSCOPIC REVISION OF PROCEDURE INVOLVING PERITONEAL DIALYSIS CATHETER N/A 2022    Procedure: REVISION OF PROCEDURE INVOLVING PERITONEAL DIALYSIS CATHETER, LAPAROSCOPIC;  Surgeon: Franklin Barber MD;  Location: [facility] OR [address] [place] [address];  Service: General;   Laterality: N/A;    MEDIPORT REMOVAL N/A 11/25/2019    Procedure: REMOVAL, CATHETER, CENTRAL VENOUS, TUNNELED, WITH PORT;  Surgeon: Eusebia Diagnostic Provider;  Location: [facility] OR;  Service: Radiology;  Laterality: N/A;    RENAL BIOPSY      ROTATOR CUFF REPAIR      TUBAL LIGATION         Review of patient's allergies indicates:   Allergen Reactions    Shrimp Hives, Itching and Rash    Topamax [topiramate] Other (See Comments)     Vision changes    Zoloft [sertraline] Palpitations       No current facility-administered medications on file prior to encounter.     Current Outpatient Medications on File Prior to Encounter   Medication Sig    acetaminophen (TYLENOL) 500 MG tablet Take 500 mg by mouth every 6 (six) hours as needed for Pain.    atorvastatin (LIPITOR) 40 MG tablet Take 40 mg by mouth every evening.    calcitRIOL (ROCALTROL) 0.25 MCG Cap Take 0.25 mcg by mouth once daily.    carvediloL (COREG) 3.125 MG tablet Take 3.125 mg by mouth 2 (two) times daily. Hold for SBP <130    clonazePAM (KLONOPIN) 0.5 MG tablet Take 0.5 mg by mouth 2 (two) times daily as needed.    dulaglutide (TRULICITY) 1.5 mg/0.5 mL pen injector Inject 1.5 mg into the skin every 7 days. Pt takes on Wednesday    epoetin edward (EPOGEN) 10,000 unit/mL injection Inject 10,000 Units into the skin once. Home Medication To administer Q2wks    gentamicin (GARAMYCIN) 0.3 % ophthalmic solution 1 drop by Other route 3 (three) times daily.    HUMALOG KWIKPEN INSULIN 100 unit/mL pen Inject 3 Units into the skin 3 (three) times daily with meals. SLIDE SCALE    LANTUS SOLOSTAR U-100 INSULIN glargine 100 units/mL (3mL) SubQ pen Inject 50 Units into the skin once daily. (Patient taking differently: Inject 25 Units into the skin every evening.)    sevelamer carbonate (RENVELA) 800 mg Tab Take 1 tablet (800 mg total) by mouth 3 (three) times daily with meals. (Patient taking differently: Take 800 mg by mouth 3 (three) times daily with meals. Take 1 tab with meals  "and 1 tab with snacks as directed daily)    timolol maleate 0.5% (TIMOPTIC) 0.5 % Drop Place 2 drops in both eyes twice daily    zolpidem (AMBIEN) 10 mg Tab Take 10 mg by mouth nightly as needed.    blood sugar diagnostic Strp Checks BG ac/hs    folic acid (FOLVITE) 1 MG tablet Take 1 tablet (1 mg total) by mouth once daily.    insulin syringe-needle U-100 (INSULIN SYRINGE) 1/2 mL 30 x 5/16" Syrg Uses 4 daily    lancets (ONETOUCH DELICA LANCETS) 33 gauge Misc 1 lancet by Misc.(Non-Drug; Combo Route) route 4 (four) times daily before meals and nightly.    SYRINGE & NEEDLE,INSULIN,1 ML (INSULIN SYRINGE-NEEDLE U-100) 1 mL 29 X 7/16" Syrg      Family History       Problem Relation (Age of Onset)    Cataracts Maternal Grandmother    Diabetes Mother, Father, Sister, Brother, Sister    Heart disease Sister, Maternal Grandmother    Heart failure Sister    Hypertension Mother, Sister    Kidney disease Father, Sister    No Known Problems Maternal Aunt, Maternal Uncle, Paternal Aunt, Paternal Uncle, Maternal Grandfather, Paternal Grandmother, Paternal Grandfather    Stroke Maternal Grandmother          Tobacco Use    Smoking status: Former     Packs/day: 1.00     Years: 20.00     Pack years: 20.00     Types: Cigarettes     Quit date: 2013     Years since quittin.1    Smokeless tobacco: Never    Tobacco comments:     quit smoking cigarettes in 2014   Substance and Sexual Activity    Alcohol use: No    Drug use: No    Sexual activity: Yes     Partners: Male     Birth control/protection: Post-menopausal     Review of Systems   Constitutional:  Positive for fatigue.   HENT: Negative.     Eyes: Negative.    Respiratory: Negative.     Cardiovascular: Negative.    Gastrointestinal: Negative.    Endocrine: Negative.    Genitourinary: Negative.    Musculoskeletal: Negative.    Skin: Negative.    Allergic/Immunologic: Negative.    Neurological: Negative.    Psychiatric/Behavioral: Negative.     Objective:     Vital " Signs (Most Recent):  Temp: 98.4 °F (36.9 °C) (10/10/22 1357)  Pulse: 79 (10/10/22 1739)  Resp: 16 (10/10/22 1739)  BP: (!) 170/90 (10/10/22 1632)  SpO2: 99 % (10/10/22 1739)   Vital Signs (24h Range):  Temp:  [98.4 °F (36.9 °C)] 98.4 °F (36.9 °C)  Pulse:  [75-87] 79  Resp:  [13-17] 16  SpO2:  [99 %-100 %] 99 %  BP: (150-175)/(77-90) 170/90     Weight: 81.2 kg (179 lb)  Body mass index is 28.04 kg/m².    Physical Exam  Vitals and nursing note reviewed.   Constitutional:       General: She is not in acute distress.     Appearance: Normal appearance. She is not ill-appearing.   HENT:      Head: Normocephalic and atraumatic.      Nose: Nose normal.      Mouth/Throat:      Mouth: Mucous membranes are moist.   Eyes:      Extraocular Movements: Extraocular movements intact.   Cardiovascular:      Rate and Rhythm: Normal rate.      Pulses: Normal pulses.      Heart sounds: No murmur heard.  Pulmonary:      Effort: Pulmonary effort is normal. No respiratory distress.   Abdominal:      General: Abdomen is flat.      Palpations: Abdomen is soft.      Tenderness: There is no abdominal tenderness.      Comments: PD catheter in place, c/d/I    Musculoskeletal:      Right lower leg: No edema.      Left lower leg: No edema.   Skin:     General: Skin is warm.      Capillary Refill: Capillary refill takes less than 2 seconds.   Neurological:      General: No focal deficit present.      Mental Status: She is alert.   Psychiatric:         Mood and Affect: Mood normal.           Significant Labs: All pertinent labs within the past 24 hours have been reviewed.    Significant Imaging: I have reviewed all pertinent imaging results/findings within the past 24 hours.

## 2022-10-10 NOTE — FIRST PROVIDER EVALUATION
Emergency Department TeleTriage Encounter Note      CHIEF COMPLAINT    Chief Complaint   Patient presents with    Abnormal Labs     Pt sent in by dialysis due to abnormal labs, low H&H and high potassium. Peritoneal dialysis done every evening at home.6 days a week. Pt denies cp, sob, n/v/d.       VITAL SIGNS   Initial Vitals [10/10/22 1357]   BP Pulse Resp Temp SpO2   (!) 175/80 87 17 98.4 °F (36.9 °C) 100 %      MAP       --            ALLERGIES    Review of patient's allergies indicates:   Allergen Reactions    Shrimp Hives, Itching and Rash    Topamax [topiramate] Other (See Comments)     Vision changes    Zoloft [sertraline] Palpitations       PROVIDER TRIAGE NOTE  HPI: Elizabeth Maldonado, a 57 y.o. female presents to the ED for evaluation of elevated K, phos and decreased H&H on Friday.  Last dialysis was last night.      Constitutional: well nourished, well developed, appearing stated age, NAD   HEENT: NCAT, symmetrical lids, No obvious facial deformity.  Normal phonation. Normal Conjunctiva, Gross EOMs intact   Neck: NAROM   Respiratory: Normal effort.  No obvious use of accessory muscles   Musculoskeletal: Moved upper extremities well   Neuro: Alert, answers questions appropriately    Psych: appropriate mood and affect          ORDERS  Labs Reviewed   COMPREHENSIVE METABOLIC PANEL   CBC W/ AUTO DIFFERENTIAL   APTT   PROTIME-INR   TYPE & SCREEN       ED Orders (720h ago, onward)      Start Ordered     Status Ordering Provider    10/10/22 1405 10/10/22 1404  Type & Screen  STAT         Ordered KRISHNA HOOKS    10/10/22 1357 10/10/22 1356  Comprehensive metabolic panel  STAT         Ordered KRISHNA HOOKS    10/10/22 1357 10/10/22 1356  CBC auto differential  STAT         Ordered KRISHNA HOOKS    10/10/22 1357 10/10/22 1356  APTT  STAT         Ordered KRISHNA HOOKS    10/10/22 1357 10/10/22 1356  Protime-INR  STAT         Ordered KRISHNA HOOKS    10/10/22 1357 10/10/22 1356  EKG 12-lead  Once          Ordered KRISHNA HOOKS    10/10/22 1357 10/10/22 1356  Insert Saline lock IV  Once         Ordered KRISHNA HOOKS              Virtual Visit Note: The provider triage portion of this emergency department evaluation and documentation was performed via [employer], a HIPAA-compliant telemedicine application, in concert with a tele-presenter in the room. A face to face patient evaluation with one of my colleagues will occur once the patient is placed in an emergency department room.      DISCLAIMER: This note was prepared with [employer] voice recognition transcription software. Garbled syntax, mangled pronouns, and other bizarre constructions may be attributed to that software system.

## 2022-10-10 NOTE — ED TRIAGE NOTES
"Patient AAOx 4 and reporting generalized malaise and a having a "weird sweet taste" in her mouth. Denies n/v/d, SOB, or chest pain. NAD noted.  "

## 2022-10-11 VITALS
TEMPERATURE: 98 F | HEART RATE: 85 BPM | BODY MASS INDEX: 30.59 KG/M2 | SYSTOLIC BLOOD PRESSURE: 128 MMHG | RESPIRATION RATE: 18 BRPM | HEIGHT: 67 IN | WEIGHT: 194.88 LBS | OXYGEN SATURATION: 99 % | DIASTOLIC BLOOD PRESSURE: 73 MMHG

## 2022-10-11 LAB
ANION GAP SERPL CALC-SCNC: 13 MMOL/L (ref 8–16)
BUN SERPL-MCNC: 84 MG/DL (ref 6–20)
CALCIUM SERPL-MCNC: 8.5 MG/DL (ref 8.7–10.5)
CHLORIDE SERPL-SCNC: 104 MMOL/L (ref 95–110)
CO2 SERPL-SCNC: 17 MMOL/L (ref 23–29)
CREAT SERPL-MCNC: 16.5 MG/DL (ref 0.5–1.4)
EST. GFR  (NO RACE VARIABLE): 2 ML/MIN/1.73 M^2
GLUCOSE SERPL-MCNC: 113 MG/DL (ref 70–110)
POCT GLUCOSE: 112 MG/DL (ref 70–110)
POCT GLUCOSE: 135 MG/DL (ref 70–110)
POCT GLUCOSE: 214 MG/DL (ref 70–110)
POTASSIUM SERPL-SCNC: 4.7 MMOL/L (ref 3.5–5.1)
SODIUM SERPL-SCNC: 134 MMOL/L (ref 136–145)

## 2022-10-11 PROCEDURE — 25000003 PHARM REV CODE 250: Mod: NTX | Performed by: STUDENT IN AN ORGANIZED HEALTH CARE EDUCATION/TRAINING PROGRAM

## 2022-10-11 PROCEDURE — 25000003 PHARM REV CODE 250: Mod: NTX | Performed by: INTERNAL MEDICINE

## 2022-10-11 PROCEDURE — 63600175 PHARM REV CODE 636 W HCPCS: Mod: NTX | Performed by: STUDENT IN AN ORGANIZED HEALTH CARE EDUCATION/TRAINING PROGRAM

## 2022-10-11 PROCEDURE — 80048 BASIC METABOLIC PNL TOTAL CA: CPT | Mod: NTX | Performed by: STUDENT IN AN ORGANIZED HEALTH CARE EDUCATION/TRAINING PROGRAM

## 2022-10-11 PROCEDURE — 36415 COLL VENOUS BLD VENIPUNCTURE: CPT | Mod: NTX | Performed by: STUDENT IN AN ORGANIZED HEALTH CARE EDUCATION/TRAINING PROGRAM

## 2022-10-11 RX ORDER — SODIUM POLYSTYRENE SULFONATE 4.1 MEQ/G
15 POWDER, FOR SUSPENSION ORAL; RECTAL DAILY
Qty: 45 G | Refills: 0 | Status: SHIPPED | OUTPATIENT
Start: 2022-10-12 | End: 2022-10-15

## 2022-10-11 RX ORDER — TRAMADOL HYDROCHLORIDE 50 MG/1
50 TABLET ORAL ONCE
Status: COMPLETED | OUTPATIENT
Start: 2022-10-11 | End: 2022-10-11

## 2022-10-11 RX ORDER — TRAMADOL HYDROCHLORIDE 50 MG/1
50 TABLET ORAL EVERY 12 HOURS PRN
Status: DISCONTINUED | OUTPATIENT
Start: 2022-10-11 | End: 2022-10-11 | Stop reason: HOSPADM

## 2022-10-11 RX ORDER — ACETAMINOPHEN 500 MG
1000 TABLET ORAL EVERY 8 HOURS PRN
Status: DISCONTINUED | OUTPATIENT
Start: 2022-10-11 | End: 2022-10-11 | Stop reason: HOSPADM

## 2022-10-11 RX ORDER — TRAMADOL HYDROCHLORIDE 50 MG/1
50 TABLET ORAL EVERY 12 HOURS PRN
Qty: 10 TABLET | Refills: 0 | Status: SHIPPED | OUTPATIENT
Start: 2022-10-11 | End: 2022-10-18

## 2022-10-11 RX ADMIN — GENTAMICIN SULFATE: 1 OINTMENT TOPICAL at 09:10

## 2022-10-11 RX ADMIN — ACETAMINOPHEN 1000 MG: 500 TABLET ORAL at 09:10

## 2022-10-11 RX ADMIN — FOLIC ACID 1 MG: 1 TABLET ORAL at 08:10

## 2022-10-11 RX ADMIN — CALCITRIOL CAPSULES 0.25 MCG 0.25 MCG: 0.25 CAPSULE ORAL at 08:10

## 2022-10-11 RX ADMIN — SEVELAMER CARBONATE 1600 MG: 800 TABLET, FILM COATED ORAL at 11:10

## 2022-10-11 RX ADMIN — TRAMADOL HYDROCHLORIDE 50 MG: 50 TABLET, COATED ORAL at 11:10

## 2022-10-11 RX ADMIN — SEVELAMER CARBONATE 1600 MG: 800 TABLET, FILM COATED ORAL at 08:10

## 2022-10-11 RX ADMIN — CARVEDILOL 3.12 MG: 3.12 TABLET, FILM COATED ORAL at 08:10

## 2022-10-11 RX ADMIN — HEPARIN SODIUM 5000 UNITS: 5000 INJECTION INTRAVENOUS; SUBCUTANEOUS at 05:10

## 2022-10-11 RX ADMIN — SODIUM ZIRCONIUM CYCLOSILICATE 10 G: 10 POWDER, FOR SUSPENSION ORAL at 08:10

## 2022-10-11 RX ADMIN — TIMOLOL MALEATE 1 DROP: 5 SOLUTION/ DROPS OPHTHALMIC at 09:10

## 2022-10-11 NOTE — PLAN OF CARE
[facility] - Med Surg  Initial Discharge Assessment       Primary Care Provider: Richy Singleton NP    Admission Diagnosis: Hyperkalemia [E87.5]  Chest pain [R07.9]    Admission Date: 10/10/2022  Expected Discharge Date: 10/11/2022         Payor: MEDICAID / Plan: [employer] [facility] ([facility]) / Product Type: Managed Medicaid /     Extended Emergency Contact Information  Primary Emergency Contact: Jesica Bowser   [place]  Mobile Phone: [phone]  Relation: Mother  Secondary Emergency Contact: Cyndy Bautista  Mobile Phone: [phone]  Relation: Friend   needed? No       Discharge Plan B: Home      [id number] - [address] AT [address] [address]  Phone: [phone] Fax: [phone]    [facility] [address] [address]  Phone: [phone] Fax: [phone]      Initial Assessment (most recent)       Adult Discharge Assessment - 10/11/22 1250          Discharge Assessment    Assessment Type Discharge Planning Assessment     Confirmed/corrected address, phone number and insurance Yes     Confirmed Demographics Correct on Facesheet     Source of Information patient     Communicated KRISTEN with patient/caregiver Yes     Lives With alone     Do you expect to return to your current living situation? Yes     Do you have help at home or someone to help you manage your care at home? Yes     Who are your caregiver(s) and their phone number(s)? Jesica Bowser (Mother)   [phone] (Mobile)     Prior to hospitilization cognitive status: Alert/Oriented     Current cognitive status: Alert/Oriented     Walking or Climbing Stairs Difficulty none     Dressing/Bathing Difficulty none     Equipment Currently Used at Home other (see comments)   PD dialysis equipment    Readmission within 30 days? No     Patient currently being followed by outpatient case management? No     Do  you currently have service(s) that help you manage your care at home? Yes     Name and Contact number of agency PD     Do you take prescription medications? Yes     Do you have prescription coverage? Yes     Coverage In [facility] Medicaid  Payor:   MEDICAID - [facility] ([facility]     Who is going to help you get home at discharge? Jesica Bowser (Mother)   [phone] (Mobile)     How do you get to doctors appointments? car, drives self     Are you on dialysis? Yes     Dialysis Name and Scheduled days [facility]     Do you take coumadin? No     Discharge Plan B Home     DME Needed Upon Discharge  none

## 2022-10-11 NOTE — SUBJECTIVE & OBJECTIVE
NAEON. Denies SOB or CP.       Review of Systems   Constitutional:  Positive for fatigue.   HENT: Negative.     Eyes: Negative.    Respiratory: Negative.     Cardiovascular: Negative.    Gastrointestinal: Negative.    Endocrine: Negative.    Genitourinary: Negative.    Musculoskeletal: Negative.    Skin: Negative.    Allergic/Immunologic: Negative.    Neurological: Negative.    Psychiatric/Behavioral: Negative.         Objective:     Vital Signs (Most Recent):  Temp: 98.1 °F (36.7 °C) (10/11/22 0654)  Pulse: 92 (10/11/22 0654)  Resp: 17 (10/11/22 0654)  BP: 134/81 (10/11/22 0654)  SpO2: 96 % (10/11/22 0654)   Vital Signs (24h Range):  Temp:  [96.7 °F (35.9 °C)-98.5 °F (36.9 °C)] 98.1 °F (36.7 °C)  Pulse:  [74-92] 92  Resp:  [13-20] 17  SpO2:  [96 %-100 %] 96 %  BP: (133-177)/(77-90) 134/81     Weight: 84.6 kg (186 lb 8.2 oz)  Body mass index is 29.21 kg/m².    Physical Exam  Vitals and nursing note reviewed.   Constitutional:       General: She is not in acute distress.     Appearance: Normal appearance. She is well-developed. She is not ill-appearing or diaphoretic.   HENT:      Head: Normocephalic and atraumatic.      Nose: Nose normal.      Mouth/Throat:      Mouth: Mucous membranes are moist.   Eyes:      General: No scleral icterus.     Extraocular Movements: Extraocular movements intact.   Neck:      Thyroid: No thyromegaly.   Cardiovascular:      Rate and Rhythm: Normal rate and regular rhythm.      Pulses: Normal pulses.      Heart sounds: No murmur heard.  Pulmonary:      Effort: Pulmonary effort is normal. No respiratory distress.      Breath sounds: Normal breath sounds. No stridor. No wheezing or rales.   Abdominal:      General: Abdomen is flat. There is no distension.      Palpations: Abdomen is soft. There is no mass.      Tenderness: There is no abdominal tenderness. There is no guarding.      Comments: PD catheter in place, c/d/I    Musculoskeletal:         General: Normal range of motion.       Cervical back: Normal range of motion and neck supple.      Right lower leg: No edema.      Left lower leg: No edema.   Skin:     General: Skin is warm and dry.      Capillary Refill: Capillary refill takes less than 2 seconds.   Neurological:      General: No focal deficit present.      Mental Status: She is alert and oriented to person, place, and time.   Psychiatric:         Mood and Affect: Mood normal.         Behavior: Behavior normal.               Recent Results (from the past 24 hour(s))   CBC auto differential    Collection Time: 10/10/22  3:42 PM   Result Value Ref Range    WBC 6.14 3.90 - 12.70 K/uL    RBC 3.02 (L) 4.00 - 5.40 M/uL    Hemoglobin 8.9 (L) 12.0 - 16.0 g/dL    Hematocrit 27.5 (L) 37.0 - 48.5 %    MCV 91 82 - 98 fL    MCH 29.5 27.0 - 31.0 pg    MCHC 32.4 32.0 - 36.0 g/dL    RDW 15.7 (H) 11.5 - 14.5 %    Platelets 155 150 - 450 K/uL    MPV 11.1 9.2 - 12.9 fL    Immature Granulocytes 0.7 (H) 0.0 - 0.5 %    Gran # (ANC) 3.1 1.8 - 7.7 K/uL    Immature Grans (Abs) 0.04 0.00 - 0.04 K/uL    Lymph # 2.2 1.0 - 4.8 K/uL    Mono # 0.5 0.3 - 1.0 K/uL    Eos # 0.3 0.0 - 0.5 K/uL    Baso # 0.03 0.00 - 0.20 K/uL    nRBC 0 0 /100 WBC    Gran % 50.4 38.0 - 73.0 %    Lymph % 35.8 18.0 - 48.0 %    Mono % 8.5 4.0 - 15.0 %    Eosinophil % 4.1 0.0 - 8.0 %    Basophil % 0.5 0.0 - 1.9 %    Differential Method Automated    APTT    Collection Time: 10/10/22  3:42 PM   Result Value Ref Range    aPTT <21.0 21.0 - 32.0 sec   Protime-INR    Collection Time: 10/10/22  3:42 PM   Result Value Ref Range    Prothrombin Time 10.7 9.0 - 12.5 sec    INR 1.0 0.8 - 1.2   Type & Screen    Collection Time: 10/10/22  3:42 PM   Result Value Ref Range    Indirect Lisette NEG    Group & Rh    Collection Time: 10/10/22  3:42 PM   Result Value Ref Range    ABO AB     Rh Type POS    Comprehensive metabolic panel    Collection Time: 10/10/22  4:38 PM   Result Value Ref Range    Sodium 134 (L) 136 - 145 mmol/L    Potassium 6.3 (HH) 3.5 - 5.1  mmol/L    Chloride 109 95 - 110 mmol/L    CO2 16 (L) 23 - 29 mmol/L    Glucose 73 70 - 110 mg/dL    BUN 88 (H) 6 - 20 mg/dL    Creatinine 16.7 (H) 0.5 - 1.4 mg/dL    Calcium 8.0 (L) 8.7 - 10.5 mg/dL    Total Protein 8.9 (H) 6.0 - 8.4 g/dL    Albumin 2.6 (L) 3.5 - 5.2 g/dL    Total Bilirubin 0.5 0.1 - 1.0 mg/dL    Alkaline Phosphatase 55 55 - 135 U/L    AST 10 10 - 40 U/L    ALT 8 (L) 10 - 44 U/L    Anion Gap 9 8 - 16 mmol/L    eGFR 2 (A) >60 mL/min/1.73 m^2   Troponin I    Collection Time: 10/10/22  4:38 PM   Result Value Ref Range    Troponin I 0.007 0.000 - 0.026 ng/mL   Brain natriuretic peptide    Collection Time: 10/10/22  4:38 PM   Result Value Ref Range    BNP 24 0 - 99 pg/mL   POCT glucose    Collection Time: 10/10/22  7:15 PM   Result Value Ref Range    POCT Glucose 82 70 - 110 mg/dL   POCT glucose    Collection Time: 10/10/22  7:51 PM   Result Value Ref Range    POCT Glucose 64 (L) 70 - 110 mg/dL   Basic metabolic panel    Collection Time: 10/11/22  4:31 AM   Result Value Ref Range    Sodium 134 (L) 136 - 145 mmol/L    Potassium 4.7 3.5 - 5.1 mmol/L    Chloride 104 95 - 110 mmol/L    CO2 17 (L) 23 - 29 mmol/L    Glucose 113 (H) 70 - 110 mg/dL    BUN 84 (H) 6 - 20 mg/dL    Creatinine 16.5 (H) 0.5 - 1.4 mg/dL    Calcium 8.5 (L) 8.7 - 10.5 mg/dL    Anion Gap 13 8 - 16 mmol/L    eGFR 2 (A) >60 mL/min/1.73 m^2   POCT glucose    Collection Time: 10/11/22  5:12 AM   Result Value Ref Range    POCT Glucose 112 (H) 70 - 110 mg/dL   POCT glucose    Collection Time: 10/11/22  7:03 AM   Result Value Ref Range    POCT Glucose 135 (H) 70 - 110 mg/dL       Microbiology Results (last 7 days)       ** No results found for the last 168 hours. **             Imaging Results    None

## 2022-10-11 NOTE — HOSPITAL COURSE
Patient presented with HyperK. Shifted and given lokelma and lasix. K 6.3, now 4.7. Nephrology consulted. S/P PD exchanged.

## 2022-10-11 NOTE — DISCHARGE SUMMARY
[facility] Medicine  Discharge Summary      Patient Name: Elizabeth Maldonado  MRN: [id number]  Patient Class: IP- Inpatient  Admission Date: 10/10/2022  Hospital Length of Stay: 1 days  Discharge Date and Time:  10/11/2022 11:37 AM  Attending Physician: Kiana Wallis MD   Discharging Provider: Kiana Wallis MD  Primary Care Provider: Richy Singleton NP      HPI:   This is a 57 year old female with a PMHx of ESRD s/p failed renal transplant (2014), now on PD, T2DM c/b diabetic retinopathy and nephropathy, HTN, HLD, depression, anxiety who presents with  abnormal labs.     Patient had outpatient labs done, showing a potassium of 6.1 which has been uptrending. She was intructed to present to the ER. She feels fatigued but otherwise does not have any symptoms. She has been compliant with her PD nightly exchanges. On arrival to the ED, patient was hypertensive but otherwise HDS. Labs showed hyperkalemia (6.3), negative troponin, and BNP. EKG was without significant changes. She recieved albuterol, calcium gluconate, insuin/D50, 500 cc bolus and Lokelma. The patient was admitted for further management.       * No surgery found *      Hospital Course:   Patient presented with HyperK. Shifted and given lokelma and lasix. K 6.3, now 4.7. Nephrology consulted. S/P PD exchanged.       Take Lokelma (sodium zirconium cyclosilicate packet 10 g) once daily for 3 days, then stop.    Follow with PCP and Nephrology/KTM.    Continue with home Peritoneal Dialysis tonight   Tramadol every 12h as needed for pain, for should pain          Kiana Wallis MD  Internal Medicine Staff  Goals of Care Treatment Preferences:  Code Status: Full Code      Consults:   Consults (From admission, onward)        Status Ordering Provider     Inpatient consult to Nephrology  Once        Provider:  Anabelle Milan MD    Acknowledged KIANA WALLIS new Assessment & Plan notes have been filed under this hospital service since the  last note was generated.  Service: Hospital Medicine    Final Active Diagnoses:    Diagnosis Date Noted POA    PRINCIPAL PROBLEM:  Hyperkalemia [E87.5] 12/02/2021 Yes    Anemia of chronic kidney failure, stage 5 [N18.5, D63.1] 02/09/2022 Yes    End stage kidney disease [N18.6] 12/23/2021 Yes    Proliferative diabetic retinopathy of both eyes without macular edema associated with type 2 diabetes mellitus [E11.3593] 09/16/2016 Yes     Chronic    Type 2 diabetes mellitus, with long-term current use of insulin [E11.9, Z79.4] 07/01/2014 Not Applicable    Hyperlipidemia [E78.5] 07/01/2014 Yes     Chronic      Problems Resolved During this Admission:       Discharged Condition: good    Disposition:     Follow Up:    Patient Instructions:      Ambulatory referral/consult to Internal Medicine   Standing Status: Future   Referral Priority: Routine Referral Type: Consultation   Referral Reason: Specialty Services Required   Requested Specialty: Internal Medicine   Number of Visits Requested: 1     Ambulatory referral/consult to Transplant, Kidney   Standing Status: Future   Referral Priority: Routine Referral Type: Transplants   Number of Visits Requested: 1       Significant Diagnostic Studies:     Recent Results (from the past 100 hour(s))   CBC auto differential    Collection Time: 10/10/22  3:42 PM   Result Value Ref Range    WBC 6.14 3.90 - 12.70 K/uL    RBC 3.02 (L) 4.00 - 5.40 M/uL    Hemoglobin 8.9 (L) 12.0 - 16.0 g/dL    Hematocrit 27.5 (L) 37.0 - 48.5 %    MCV 91 82 - 98 fL    MCH 29.5 27.0 - 31.0 pg    MCHC 32.4 32.0 - 36.0 g/dL    RDW 15.7 (H) 11.5 - 14.5 %    Platelets 155 150 - 450 K/uL    MPV 11.1 9.2 - 12.9 fL    Immature Granulocytes 0.7 (H) 0.0 - 0.5 %    Gran # (ANC) 3.1 1.8 - 7.7 K/uL    Immature Grans (Abs) 0.04 0.00 - 0.04 K/uL    Lymph # 2.2 1.0 - 4.8 K/uL    Mono # 0.5 0.3 - 1.0 K/uL    Eos # 0.3 0.0 - 0.5 K/uL    Baso # 0.03 0.00 - 0.20 K/uL    nRBC 0 0 /100 WBC    Gran % 50.4 38.0 - 73.0 %     Lymph % 35.8 18.0 - 48.0 %    Mono % 8.5 4.0 - 15.0 %    Eosinophil % 4.1 0.0 - 8.0 %    Basophil % 0.5 0.0 - 1.9 %    Differential Method Automated    APTT    Collection Time: 10/10/22  3:42 PM   Result Value Ref Range    aPTT <21.0 21.0 - 32.0 sec   Protime-INR    Collection Time: 10/10/22  3:42 PM   Result Value Ref Range    Prothrombin Time 10.7 9.0 - 12.5 sec    INR 1.0 0.8 - 1.2   Type & Screen    Collection Time: 10/10/22  3:42 PM   Result Value Ref Range    Indirect Lisette NEG    Group & Rh    Collection Time: 10/10/22  3:42 PM   Result Value Ref Range    ABO AB     Rh Type POS    Comprehensive metabolic panel    Collection Time: 10/10/22  4:38 PM   Result Value Ref Range    Sodium 134 (L) 136 - 145 mmol/L    Potassium 6.3 (HH) 3.5 - 5.1 mmol/L    Chloride 109 95 - 110 mmol/L    CO2 16 (L) 23 - 29 mmol/L    Glucose 73 70 - 110 mg/dL    BUN 88 (H) 6 - 20 mg/dL    Creatinine 16.7 (H) 0.5 - 1.4 mg/dL    Calcium 8.0 (L) 8.7 - 10.5 mg/dL    Total Protein 8.9 (H) 6.0 - 8.4 g/dL    Albumin 2.6 (L) 3.5 - 5.2 g/dL    Total Bilirubin 0.5 0.1 - 1.0 mg/dL    Alkaline Phosphatase 55 55 - 135 U/L    AST 10 10 - 40 U/L    ALT 8 (L) 10 - 44 U/L    Anion Gap 9 8 - 16 mmol/L    eGFR 2 (A) >60 mL/min/1.73 m^2   Troponin I    Collection Time: 10/10/22  4:38 PM   Result Value Ref Range    Troponin I 0.007 0.000 - 0.026 ng/mL   Brain natriuretic peptide    Collection Time: 10/10/22  4:38 PM   Result Value Ref Range    BNP 24 0 - 99 pg/mL   POCT glucose    Collection Time: 10/10/22  7:15 PM   Result Value Ref Range    POCT Glucose 82 70 - 110 mg/dL   POCT glucose    Collection Time: 10/10/22  7:51 PM   Result Value Ref Range    POCT Glucose 64 (L) 70 - 110 mg/dL   Basic metabolic panel    Collection Time: 10/11/22  4:31 AM   Result Value Ref Range    Sodium 134 (L) 136 - 145 mmol/L    Potassium 4.7 3.5 - 5.1 mmol/L    Chloride 104 95 - 110 mmol/L    CO2 17 (L) 23 - 29 mmol/L    Glucose 113 (H) 70 - 110 mg/dL    BUN 84 (H) 6 - 20  mg/dL    Creatinine 16.5 (H) 0.5 - 1.4 mg/dL    Calcium 8.5 (L) 8.7 - 10.5 mg/dL    Anion Gap 13 8 - 16 mmol/L    eGFR 2 (A) >60 mL/min/1.73 m^2   POCT glucose    Collection Time: 10/11/22  5:12 AM   Result Value Ref Range    POCT Glucose 112 (H) 70 - 110 mg/dL   POCT glucose    Collection Time: 10/11/22  7:03 AM   Result Value Ref Range    POCT Glucose 135 (H) 70 - 110 mg/dL   POCT glucose    Collection Time: 10/11/22 11:19 AM   Result Value Ref Range    POCT Glucose 214 (H) 70 - 110 mg/dL       Microbiology Results (last 7 days)     ** No results found for the last 168 hours. **          Imaging Results    None             Pending Diagnostic Studies:     None         Medications:  Reconciled Home Medications:      Medication List      START taking these medications    sodium polystyrene powder  Commonly known as: KAYEXALATE  Take 10 g by mouth once daily. for 3 days  Start taking on: October 12, 2022     traMADoL 50 mg tablet  Commonly known as: ULTRAM  Take 1 tablet (50 mg total) by mouth every 12 (twelve) hours as needed for Pain.        CHANGE how you take these medications    LANTUS SOLOSTAR U-100 INSULIN glargine 100 units/mL SubQ pen  Generic drug: insulin  Inject 50 Units into the skin once daily.  What changed:   · how much to take  · when to take this     RENVELA 800 mg Tab  Generic drug: sevelamer carbonate  Take 1 tablet (800 mg total) by mouth 3 (three) times daily with meals.  What changed: additional instructions        CONTINUE taking these medications    acetaminophen 500 MG tablet  Commonly known as: TYLENOL  Take 500 mg by mouth every 6 (six) hours as needed for Pain.     atorvastatin 40 MG tablet  Commonly known as: LIPITOR  Take 40 mg by mouth every evening.     blood sugar diagnostic Strp  Checks BG ac/hs     calcitRIOL 0.25 MCG Cap  Commonly known as: ROCALTROL  Take 0.25 mcg by mouth once daily.     carvediloL 3.125 MG tablet  Commonly known as: COREG  Take 3.125 mg by mouth 2 (two) times  "daily. Hold for SBP <130     clonazePAM 0.5 MG tablet  Commonly known as: KlonoPIN  Take 0.5 mg by mouth 2 (two) times daily as needed.     EPOGEN 10,000 unit/mL injection  Generic drug: epoetin edward  Inject 10,000 Units into the skin once. Home Medication To administer Q2wks     folic acid 1 MG tablet  Commonly known as: FOLVITE  Take 1 tablet (1 mg total) by mouth once daily.     HumaLOG KwikPen Insulin 100 unit/mL pen  Generic drug: insulin lispro  Inject 3 Units into the skin 3 (three) times daily with meals. SLIDE SCALE     insulin syringe-needle U-100 0.5 mL 30 gauge x 5/16" Syrg  Commonly known as: INSULIN SYRINGE  Uses 4 daily     insulin syringe-needle U-100 1 mL 29 gauge x 7/16" Syrg     lancets 33 gauge Misc  Commonly known as: ONETOUCH DELICA LANCETS  1 lancet by Misc.(Non-Drug; Combo Route) route 4 (four) times daily before meals and nightly.     timolol maleate 0.5% 0.5 % Drop  Commonly known as: TIMOPTIC  Place 2 drops in both eyes twice daily     TRULICITY 1.5 mg/0.5 mL pen injector  Generic drug: dulaglutide  Inject 1.5 mg into the skin every 7 days. Pt takes on Wednesday     zolpidem 10 mg Tab  Commonly known as: AMBIEN  Take 10 mg by mouth nightly as needed.        STOP taking these medications    gentamicin 0.3 % ophthalmic solution  Commonly known as: GARAMYCIN            Indwelling Lines/Drains at time of discharge:   Lines/Drains/Airways     None                 Time spent on the discharge of patient: 35 minutes         Reggie Wallis MD  Department of [facility] Medicine  [facility] [place] [facility] S[facility]  "

## 2022-10-11 NOTE — ASSESSMENT & PLAN NOTE
- Presented with abnormal labs/hyperkalemia   - Likely related to high potassium diet (patient reported eating more bananas over the last week), possibly also related to inadequate exchanges with PD  - Admitted for monitoring     Plan:   - Medical management: Lasix 60 mg IV (patient makes urine), Insulin/D5, Lokelma, Albuterol   - Nephrology consult   - PD exchange overnight   - Telemetry monitoring   - Resolved

## 2022-10-11 NOTE — DISCHARGE INSTRUCTIONS
Take Lokelma (sodium zirconium cyclosilicate packet 10 g) once daily for 3 days, then stop.   Follow with PCP and Nephrology/KTM.   Continue with home Peritoneal Dialysis tonight  Tramadol every 12h as needed for pain, for should pain          Reggie Wallis MD  Internal Medicine Staff

## 2022-10-11 NOTE — PROGRESS NOTES
Date of Admission:10/10/2022    SUBJECTIVE:ntoes feeling alright    Current Facility-Administered Medications   Medication    acetaminophen tablet 1,000 mg    atorvastatin tablet 40 mg    calcitRIOL capsule 0.25 mcg    carvediloL tablet 3.125 mg    clonazePAM tablet 0.5 mg    dextrose 10% bolus 125 mL    dextrose 10% bolus 250 mL    folic acid tablet 1 mg    gentamicin 0.1 % ointment    glucagon (human recombinant) injection 1 mg    glucose chewable tablet 16 g    glucose chewable tablet 24 g    heparin (porcine) injection 5,000 Units    insulin aspart U-100 pen 0-5 Units    insulin detemir U-100 pen 18 Units    naloxone 0.4 mg/mL injection 0.02 mg    sevelamer carbonate tablet 1,600 mg    sodium chloride 0.9% flush 10 mL    sodium zirconium cyclosilicate packet 10 g    timolol maleate 0.5% ophthalmic solution 1 drop    traMADoL tablet 50 mg    traMADoL tablet 50 mg    zolpidem tablet 5 mg       Wt Readings from Last 3 Encounters:   10/11/22 88.4 kg (194 lb 14.4 oz)   06/02/22 79.1 kg (174 lb 6.1 oz)   05/26/22 77.1 kg (169 lb 15.6 oz)     Temp Readings from Last 3 Encounters:   10/11/22 98.1 °F (36.7 °C) (Oral)   04/27/22 98.1 °F (36.7 °C) (Temporal)   04/08/22 98.7 °F (37.1 °C) (Oral)     BP Readings from Last 3 Encounters:   10/11/22 134/81   06/02/22 (!) 165/89   05/26/22 136/75     Pulse Readings from Last 3 Encounters:   10/11/22 92   06/02/22 89   05/26/22 91       Intake/Output Summary (Last 24 hours) at 10/11/2022 1103  Last data filed at 10/11/2022 0811  Gross per 24 hour   Intake 120 ml   Output 900 ml   Net -780 ml       PE:  GEN:wd female in nad  HEENT:ncat,eomi,mm  CVS:s1s2 regular  PULM:ctab  ABD:bs, soft, pd cath  EXT:no leg edema  NEURO:awake    Recent Labs   Lab 10/11/22  0431   *   *   K 4.7      CO2 17*   BUN 84*   CREATININE 16.5*   CALCIUM 8.5*       Lab Results   Component Value Date     (H) 10/05/2022    CALCIUM 8.5 (L) 10/11/2022    PHOS 10.1 (H) 10/07/2022        Recent Labs   Lab 10/10/22  1542   WBC 6.14   RBC 3.02*   HGB 8.9*   HCT 27.5*      MCV 91   MCH 29.5   MCHC 32.4           A/P:  1.esrd. resume pd at home.  2.hyperkalemia. k better. Needs to avoid bananas and ice cream for now.  3.anemia 2nd to esrd.tx at her unit.  4.hyperphospatemia. cont binders.  5.htn. cont tx.  6.dm2. following sugars.

## 2022-10-11 NOTE — PLAN OF CARE
[facility] - Med Surg  Discharge Final Note  TN informed med surg nurse Alisson that patient is cleared from case management's viewpoint.  Primary Care Provider: Richy Singleton NP    Expected Discharge Date: 10/11/2022    Final Discharge Note (most recent)       Final Note - 10/11/22 1456          Final Note    Assessment Type Final Discharge Note     Anticipated Discharge Disposition Home or Self Care     What phone number can be called within the next 1-3 days to see how you are doing after discharge? --   See chart    Hospital Resources/Appts/Education Provided Provided patient/caregiver with written discharge plan information;Appointments scheduled and added to AVS;Appointments scheduled by Navigator/Coordinator        Post-Acute Status    Coverage MEDICAID - [facility] ([facility]     Discharge Delays None known at this time                     Important Message from MedicareNA             Contact Info       Richy Singleton NP   Specialty: Family Medicine   Relationship: PCP - General    [address] [address]   Phone: [phone]       Next Steps: Follow up on 10/17/2022    Instructions: 10:00AM hospital follow up at the [place] location.    Veena Pang MD   Specialty: Nephrology    [address] [facility] [address]   Phone: [phone]       Next Steps: Schedule an appointment as soon as possible for a visit

## 2022-10-11 NOTE — PROGRESS NOTES
[facility] Medicine  Progress Note    Patient Name: Elizabeth Maldonado  MRN: [id number]  Patient Class: IP- Inpatient   Admission Date: 10/10/2022  Length of Stay: 1 days  Attending Physician: Reggie Wallis MD  Primary Care Provider: Richy Singleton NP        Subjective:     Principal Problem:Hyperkalemia        HPI:  This is a 57 year old female with a PMHx of ESRD s/p failed renal transplant (2014), now on PD, T2DM c/b diabetic retinopathy and nephropathy, HTN, HLD, depression, anxiety who presents with  abnormal labs.     Patient had outpatient labs done, showing a potassium of 6.1 which has been uptrending. She was intructed to present to the ER. She feels fatigued but otherwise does not have any symptoms. She has been compliant with her PD nightly exchanges. On arrival to the ED, patient was hypertensive but otherwise HDS. Labs showed hyperkalemia (6.3), negative troponin, and BNP. EKG was without significant changes. She recieved albuterol, calcium gluconate, insuin/D50, 500 cc bolus and Lokelma. The patient was admitted for further management.       Overview/Hospital Course:  Patient presented with HyperK. Shifted and given lokelma and lasix. K 6.3, now 4.7. Nephrology consulted. S/P PD exchanged.       NAEON. Denies SOB or CP.       Review of Systems   Constitutional:  Positive for fatigue.   HENT: Negative.     Eyes: Negative.    Respiratory: Negative.     Cardiovascular: Negative.    Gastrointestinal: Negative.    Endocrine: Negative.    Genitourinary: Negative.    Musculoskeletal: Negative.    Skin: Negative.    Allergic/Immunologic: Negative.    Neurological: Negative.    Psychiatric/Behavioral: Negative.         Objective:     Vital Signs (Most Recent):  Temp: 98.1 °F (36.7 °C) (10/11/22 0654)  Pulse: 92 (10/11/22 0654)  Resp: 17 (10/11/22 0654)  BP: 134/81 (10/11/22 0654)  SpO2: 96 % (10/11/22 0654)   Vital Signs (24h Range):  Temp:  [96.7 °F (35.9 °C)-98.5 °F (36.9 °C)] 98.1 °F  (36.7 °C)  Pulse:  [74-92] 92  Resp:  [13-20] 17  SpO2:  [96 %-100 %] 96 %  BP: (133-177)/(77-90) 134/81     Weight: 84.6 kg (186 lb 8.2 oz)  Body mass index is 29.21 kg/m².    Physical Exam  Vitals and nursing note reviewed.   Constitutional:       General: She is not in acute distress.     Appearance: Normal appearance. She is well-developed. She is not ill-appearing or diaphoretic.   HENT:      Head: Normocephalic and atraumatic.      Nose: Nose normal.      Mouth/Throat:      Mouth: Mucous membranes are moist.   Eyes:      General: No scleral icterus.     Extraocular Movements: Extraocular movements intact.   Neck:      Thyroid: No thyromegaly.   Cardiovascular:      Rate and Rhythm: Normal rate and regular rhythm.      Pulses: Normal pulses.      Heart sounds: No murmur heard.  Pulmonary:      Effort: Pulmonary effort is normal. No respiratory distress.      Breath sounds: Normal breath sounds. No stridor. No wheezing or rales.   Abdominal:      General: Abdomen is flat. There is no distension.      Palpations: Abdomen is soft. There is no mass.      Tenderness: There is no abdominal tenderness. There is no guarding.      Comments: PD catheter in place, c/d/I    Musculoskeletal:         General: Normal range of motion.      Cervical back: Normal range of motion and neck supple.      Right lower leg: No edema.      Left lower leg: No edema.   Skin:     General: Skin is warm and dry.      Capillary Refill: Capillary refill takes less than 2 seconds.   Neurological:      General: No focal deficit present.      Mental Status: She is alert and oriented to person, place, and time.   Psychiatric:         Mood and Affect: Mood normal.         Behavior: Behavior normal.               Recent Results (from the past 24 hour(s))   CBC auto differential    Collection Time: 10/10/22  3:42 PM   Result Value Ref Range    WBC 6.14 3.90 - 12.70 K/uL    RBC 3.02 (L) 4.00 - 5.40 M/uL    Hemoglobin 8.9 (L) 12.0 - 16.0 g/dL     Hematocrit 27.5 (L) 37.0 - 48.5 %    MCV 91 82 - 98 fL    MCH 29.5 27.0 - 31.0 pg    MCHC 32.4 32.0 - 36.0 g/dL    RDW 15.7 (H) 11.5 - 14.5 %    Platelets 155 150 - 450 K/uL    MPV 11.1 9.2 - 12.9 fL    Immature Granulocytes 0.7 (H) 0.0 - 0.5 %    Gran # (ANC) 3.1 1.8 - 7.7 K/uL    Immature Grans (Abs) 0.04 0.00 - 0.04 K/uL    Lymph # 2.2 1.0 - 4.8 K/uL    Mono # 0.5 0.3 - 1.0 K/uL    Eos # 0.3 0.0 - 0.5 K/uL    Baso # 0.03 0.00 - 0.20 K/uL    nRBC 0 0 /100 WBC    Gran % 50.4 38.0 - 73.0 %    Lymph % 35.8 18.0 - 48.0 %    Mono % 8.5 4.0 - 15.0 %    Eosinophil % 4.1 0.0 - 8.0 %    Basophil % 0.5 0.0 - 1.9 %    Differential Method Automated    APTT    Collection Time: 10/10/22  3:42 PM   Result Value Ref Range    aPTT <21.0 21.0 - 32.0 sec   Protime-INR    Collection Time: 10/10/22  3:42 PM   Result Value Ref Range    Prothrombin Time 10.7 9.0 - 12.5 sec    INR 1.0 0.8 - 1.2   Type & Screen    Collection Time: 10/10/22  3:42 PM   Result Value Ref Range    Indirect Lisette NEG    Group & Rh    Collection Time: 10/10/22  3:42 PM   Result Value Ref Range    ABO AB     Rh Type POS    Comprehensive metabolic panel    Collection Time: 10/10/22  4:38 PM   Result Value Ref Range    Sodium 134 (L) 136 - 145 mmol/L    Potassium 6.3 (HH) 3.5 - 5.1 mmol/L    Chloride 109 95 - 110 mmol/L    CO2 16 (L) 23 - 29 mmol/L    Glucose 73 70 - 110 mg/dL    BUN 88 (H) 6 - 20 mg/dL    Creatinine 16.7 (H) 0.5 - 1.4 mg/dL    Calcium 8.0 (L) 8.7 - 10.5 mg/dL    Total Protein 8.9 (H) 6.0 - 8.4 g/dL    Albumin 2.6 (L) 3.5 - 5.2 g/dL    Total Bilirubin 0.5 0.1 - 1.0 mg/dL    Alkaline Phosphatase 55 55 - 135 U/L    AST 10 10 - 40 U/L    ALT 8 (L) 10 - 44 U/L    Anion Gap 9 8 - 16 mmol/L    eGFR 2 (A) >60 mL/min/1.73 m^2   Troponin I    Collection Time: 10/10/22  4:38 PM   Result Value Ref Range    Troponin I 0.007 0.000 - 0.026 ng/mL   Brain natriuretic peptide    Collection Time: 10/10/22  4:38 PM   Result Value Ref Range    BNP 24 0 - 99 pg/mL    POCT glucose    Collection Time: 10/10/22  7:15 PM   Result Value Ref Range    POCT Glucose 82 70 - 110 mg/dL   POCT glucose    Collection Time: 10/10/22  7:51 PM   Result Value Ref Range    POCT Glucose 64 (L) 70 - 110 mg/dL   Basic metabolic panel    Collection Time: 10/11/22  4:31 AM   Result Value Ref Range    Sodium 134 (L) 136 - 145 mmol/L    Potassium 4.7 3.5 - 5.1 mmol/L    Chloride 104 95 - 110 mmol/L    CO2 17 (L) 23 - 29 mmol/L    Glucose 113 (H) 70 - 110 mg/dL    BUN 84 (H) 6 - 20 mg/dL    Creatinine 16.5 (H) 0.5 - 1.4 mg/dL    Calcium 8.5 (L) 8.7 - 10.5 mg/dL    Anion Gap 13 8 - 16 mmol/L    eGFR 2 (A) >60 mL/min/1.73 m^2   POCT glucose    Collection Time: 10/11/22  5:12 AM   Result Value Ref Range    POCT Glucose 112 (H) 70 - 110 mg/dL   POCT glucose    Collection Time: 10/11/22  7:03 AM   Result Value Ref Range    POCT Glucose 135 (H) 70 - 110 mg/dL       Microbiology Results (last 7 days)       ** No results found for the last 168 hours. **             Imaging Results    None                 Assessment/Plan:      * Hyperkalemia  - Presented with abnormal labs/hyperkalemia   - Likely related to high potassium diet (patient reported eating more bananas over the last week), possibly also related to inadequate exchanges with PD  - Admitted for monitoring     Plan:   - Medical management: Lasix 60 mg IV (patient makes urine), Insulin/D5, Lokelma, Albuterol   - Nephrology consult   - PD exchange overnight   - Telemetry monitoring   - Resolved    Anemia of chronic kidney failure, stage 5  - Continue to monitor   - Continue EPO per nephrology       End stage kidney disease  - Etiology: DM  - Dialysis type: PD  - Catheter looks clean, dry and intact   - Schedule: 6 nights weekly with a cycler     Plan:  - Nephrology consult for dialysis management  - Daily weights to guide UF      Proliferative diabetic retinopathy of both eyes without macular edema associated with type 2 diabetes mellitus  - Resume home  meds     Hyperlipidemia  - Resume home meds       Type 2 diabetes mellitus, with long-term current use of insulin  Patient's FSGs are uncontrolled due to hyperglycemia on current medication regimen.  Last A1c reviewed-   Lab Results   Component Value Date    HGBA1C 7.9 (H) 10/05/2022     Most recent fingerstick glucose reviewed- No results for input(s): POCTGLUCOSE in the last 24 hours.  Current correctional scale  Low  Maintain anti-hyperglycemic dose as follows-   Antihyperglycemics (From admission, onward)    Start     Stop Route Frequency Ordered    10/10/22 2100  insulin detemir U-100 pen 20 Units         -- SubQ Nightly 10/10/22 1820    10/10/22 1919  insulin aspart U-100 pen 0-5 Units         -- SubQ Before meals & nightly PRN 10/10/22 1820    10/10/22 1745  insulin regular injection 5 Units 0.05 mL  (Hyperkalemia Medications)         10/11 0544 IV ED 1 Time 10/10/22 1734        Hold Oral hypoglycemics while patient is in the hospital.      VTE Risk Mitigation (From admission, onward)         Ordered     heparin (porcine) injection 5,000 Units  Every 8 hours         10/10/22 1820     IP VTE HIGH RISK PATIENT  Once         10/10/22 1820     Place sequential compression device  Until discontinued         10/10/22 1820                Discharge Planning   KRISTEN:      Code Status: Full Code   Is the patient medically ready for discharge?:     Reason for patient still in hospital (select all that apply): Patient trending condition and Treatment                     Reggie Wallis MD  Department of Hospital Medicine   [facility] [place] [facility]

## 2022-10-12 ENCOUNTER — PATIENT OUTREACH (OUTPATIENT)
Dept: ADMINISTRATIVE | Facility: CLINIC | Age: 57
End: 2022-10-12
Payer: MEDICARE

## 2022-10-14 ENCOUNTER — COMMITTEE REVIEW (OUTPATIENT)
Dept: TRANSPLANT | Facility: CLINIC | Age: 57
End: 2022-10-14
Payer: MEDICARE

## 2022-10-14 NOTE — COMMITTEE REVIEW
Native Organ Dx: Hypertensive Nephrosclerosis      Not approved for pancreas transplant due to age, BMI >30, and C peptide of 8 contributing to high surgical risk for a successful pancreas transplant. Patient also has history of MGUS and needs ongoing Hematology follow up.       Note written by Patricia Tolbert RN    ===============================================    I was present at the meeting and attest to the decision of the committee

## 2022-10-15 LAB
ALBUMIN SERPL-MCNC: 3.4 G/DL (ref 3.5–5.2)
BASOPHILS NFR BLD: 0.3 % (ref 0–1.5)
BICARBONATE: 22 MEQ/L (ref 20–31)
BUN, PRE: 80 MG/DL (ref 6–19)
BUN/CREAT SERPL: 5 (ref 10–20)
CALCIUM PHOS PRODUCT, COR: 71 (ref 0–54)
CALCIUM PHOS PRODUCT: 66 (ref 0–54)
CALCIUM SERPL-MCNC: 7.8 MG/DL (ref 8.7–10.4)
CALCIUM, CORRECTED: 8.3 MG/DL (ref 8.7–10.4)
CHLORIDE: 99 MEQ/L (ref 96–108)
CREAT SERPL-MCNC: 15.98 MG/DL (ref 0.6–1.3)
EOSINOPHIL NFR BLD: 4.3 % (ref 0–7)
ERYTHROCYTE [DISTWIDTH] IN BLOOD BY AUTOMATED COUNT: 16.3 % (ref 11.5–14.5)
GLUCOSE SERPL-MCNC: 127 MG/DL (ref 70–100)
HCT VFR BLD AUTO: 24.8 % (ref 37–47)
HEMOGLOBIN X 3: 24.6 % (ref 36–48)
HGB BLD-MCNC: 8.2 G/DL (ref 12–16)
LUC: 2.8 % (ref 0–4)
LYMPH%: 27.2 % (ref 19–48)
MCH RBC QN AUTO: 30.3 PG (ref 27–31)
MCHC RBC AUTO-ENTMCNC: 33.1 G/DL (ref 30–36)
MCV RBC AUTO: 92 FL (ref 80–100)
MONO%: 6.2 % (ref 3–10)
NEUTROPHILS NFR BLD: 59.2 % (ref 40–75)
PHOSPHATE FLD-MCNC: 8.5 MG/DL (ref 2.6–4.5)
POTASSIUM SERPL-SCNC: 4.2 MEQ/L (ref 3.5–5.1)
RBC # BLD AUTO: 2.71 MILL/MCL (ref 4.2–5.4)
WBC # BLD AUTO: 6.88 1000/MCL (ref 4.8–10.8)

## 2022-10-20 LAB
BUN, PRE: 79 MG/DL (ref 6–19)
BUN/CREAT SERPL: 4.8 (ref 10–20)
CREAT SERPL-MCNC: 16.56 MG/DL (ref 0.6–1.3)
GLUCOSE SERPL-MCNC: 141 MG/DL (ref 70–100)
PHOSPHATE FLD-MCNC: 8.9 MG/DL (ref 2.6–4.5)

## 2022-10-21 DIAGNOSIS — Z76.82 ORGAN TRANSPLANT CANDIDATE: Primary | ICD-10-CM

## 2022-10-26 ENCOUNTER — HOSPITAL ENCOUNTER (INPATIENT)
Facility: HOSPITAL | Age: 57
LOS: 4 days | Discharge: HOME | DRG: 919 | End: 2022-10-30
Attending: EMERGENCY MEDICINE | Admitting: HOSPITALIST
Payer: MEDICARE

## 2022-10-26 DIAGNOSIS — R06.02 SOB (SHORTNESS OF BREATH): ICD-10-CM

## 2022-10-26 DIAGNOSIS — J90 PLEURAL EFFUSION, RIGHT: ICD-10-CM

## 2022-10-26 DIAGNOSIS — E83.51 HYPOCALCEMIA: ICD-10-CM

## 2022-10-26 DIAGNOSIS — R07.9 CHEST PAIN: ICD-10-CM

## 2022-10-26 DIAGNOSIS — R06.02 SOB (SHORTNESS OF BREATH) ON EXERTION: ICD-10-CM

## 2022-10-26 DIAGNOSIS — J90 PLEURAL EFFUSION: ICD-10-CM

## 2022-10-26 DIAGNOSIS — N18.6 END STAGE KIDNEY DISEASE: ICD-10-CM

## 2022-10-26 DIAGNOSIS — J90 PLEURAL EFFUSION ON RIGHT: Primary | ICD-10-CM

## 2022-10-26 LAB
ALBUMIN SERPL BCP-MCNC: 2 G/DL (ref 3.5–5.2)
ALP SERPL-CCNC: 40 U/L (ref 55–135)
ALT SERPL W/O P-5'-P-CCNC: 6 U/L (ref 10–44)
ANION GAP SERPL CALC-SCNC: 10 MMOL/L (ref 8–16)
AST SERPL-CCNC: 9 U/L (ref 10–40)
BASOPHILS # BLD AUTO: 0.02 K/UL (ref 0–0.2)
BASOPHILS NFR BLD: 0.4 % (ref 0–1.9)
BILIRUB SERPL-MCNC: 0.4 MG/DL (ref 0.1–1)
BNP SERPL-MCNC: 124 PG/ML (ref 0–99)
BUN SERPL-MCNC: 56 MG/DL (ref 6–20)
CALCIUM SERPL-MCNC: 6.7 MG/DL (ref 8.7–10.5)
CHLORIDE SERPL-SCNC: 111 MMOL/L (ref 95–110)
CO2 SERPL-SCNC: 19 MMOL/L (ref 23–29)
CREAT SERPL-MCNC: 16 MG/DL (ref 0.5–1.4)
DIFFERENTIAL METHOD: ABNORMAL
EOSINOPHIL # BLD AUTO: 0.2 K/UL (ref 0–0.5)
EOSINOPHIL NFR BLD: 2.8 % (ref 0–8)
ERYTHROCYTE [DISTWIDTH] IN BLOOD BY AUTOMATED COUNT: 14.8 % (ref 11.5–14.5)
EST. GFR  (NO RACE VARIABLE): 2 ML/MIN/1.73 M^2
GLUCOSE SERPL-MCNC: 72 MG/DL (ref 70–110)
HCT VFR BLD AUTO: 25.9 % (ref 37–48.5)
HGB BLD-MCNC: 8.3 G/DL (ref 12–16)
IMM GRANULOCYTES # BLD AUTO: 0.02 K/UL (ref 0–0.04)
IMM GRANULOCYTES NFR BLD AUTO: 0.4 % (ref 0–0.5)
LDH SERPL L TO P-CCNC: 295 U/L (ref 110–260)
LYMPHOCYTES # BLD AUTO: 1.5 K/UL (ref 1–4.8)
LYMPHOCYTES NFR BLD: 25.8 % (ref 18–48)
MCH RBC QN AUTO: 29.4 PG (ref 27–31)
MCHC RBC AUTO-ENTMCNC: 32 G/DL (ref 32–36)
MCV RBC AUTO: 92 FL (ref 82–98)
MONOCYTES # BLD AUTO: 0.5 K/UL (ref 0.3–1)
MONOCYTES NFR BLD: 9.1 % (ref 4–15)
NEUTROPHILS # BLD AUTO: 3.5 K/UL (ref 1.8–7.7)
NEUTROPHILS NFR BLD: 61.5 % (ref 38–73)
NRBC BLD-RTO: 0 /100 WBC
PLATELET # BLD AUTO: 160 K/UL (ref 150–450)
PMV BLD AUTO: 10.2 FL (ref 9.2–12.9)
POCT GLUCOSE: 144 MG/DL (ref 70–110)
POCT GLUCOSE: 74 MG/DL (ref 70–110)
POTASSIUM SERPL-SCNC: 3.7 MMOL/L (ref 3.5–5.1)
PROT SERPL-MCNC: 7.5 G/DL (ref 6–8.4)
RBC # BLD AUTO: 2.82 M/UL (ref 4–5.4)
SODIUM SERPL-SCNC: 140 MMOL/L (ref 136–145)
TROPONIN I SERPL DL<=0.01 NG/ML-MCNC: 0.01 NG/ML (ref 0–0.03)
WBC # BLD AUTO: 5.7 K/UL (ref 3.9–12.7)

## 2022-10-26 PROCEDURE — 87070 CULTURE OTHR SPECIMN AEROBIC: CPT | Mod: NTX | Performed by: PHYSICIAN ASSISTANT

## 2022-10-26 PROCEDURE — 82945 GLUCOSE OTHER FLUID: CPT | Mod: NTX | Performed by: STUDENT IN AN ORGANIZED HEALTH CARE EDUCATION/TRAINING PROGRAM

## 2022-10-26 PROCEDURE — 84157 ASSAY OF PROTEIN OTHER: CPT | Mod: NTX | Performed by: PHYSICIAN ASSISTANT

## 2022-10-26 PROCEDURE — 87075 CULTR BACTERIA EXCEPT BLOOD: CPT | Mod: NTX | Performed by: PHYSICIAN ASSISTANT

## 2022-10-26 PROCEDURE — G0378 HOSPITAL OBSERVATION PER HR: HCPCS | Mod: NTX

## 2022-10-26 PROCEDURE — 88112 CYTOPATH CELL ENHANCE TECH: CPT | Mod: 26,NTX,, | Performed by: PATHOLOGY

## 2022-10-26 PROCEDURE — 88305 TISSUE EXAM BY PATHOLOGIST: CPT | Mod: 26,NTX,, | Performed by: PATHOLOGY

## 2022-10-26 PROCEDURE — 93010 ELECTROCARDIOGRAM REPORT: CPT | Mod: NTX,,, | Performed by: INTERNAL MEDICINE

## 2022-10-26 PROCEDURE — 11000001 HC ACUTE MED/SURG PRIVATE ROOM: Mod: NTX

## 2022-10-26 PROCEDURE — 89051 BODY FLUID CELL COUNT: CPT | Mod: NTX | Performed by: PHYSICIAN ASSISTANT

## 2022-10-26 PROCEDURE — 36415 COLL VENOUS BLD VENIPUNCTURE: CPT | Mod: NTX | Performed by: PHYSICIAN ASSISTANT

## 2022-10-26 PROCEDURE — 88112 PR  CYTOPATH, CELL ENHANCE TECH: ICD-10-PCS | Mod: 26,NTX,, | Performed by: PATHOLOGY

## 2022-10-26 PROCEDURE — 83880 ASSAY OF NATRIURETIC PEPTIDE: CPT | Mod: NTX | Performed by: EMERGENCY MEDICINE

## 2022-10-26 PROCEDURE — 88112 CYTOPATH CELL ENHANCE TECH: CPT | Mod: NTX | Performed by: PATHOLOGY

## 2022-10-26 PROCEDURE — 87205 SMEAR GRAM STAIN: CPT | Mod: NTX | Performed by: PHYSICIAN ASSISTANT

## 2022-10-26 PROCEDURE — 93005 ELECTROCARDIOGRAM TRACING: CPT | Mod: NTX

## 2022-10-26 PROCEDURE — 99285 EMERGENCY DEPT VISIT HI MDM: CPT | Mod: 25,NTX

## 2022-10-26 PROCEDURE — 93010 EKG 12-LEAD: ICD-10-PCS | Mod: NTX,,, | Performed by: INTERNAL MEDICINE

## 2022-10-26 PROCEDURE — 88305 TISSUE EXAM BY PATHOLOGIST: CPT | Mod: NTX | Performed by: PATHOLOGY

## 2022-10-26 PROCEDURE — 80053 COMPREHEN METABOLIC PANEL: CPT | Mod: NTX | Performed by: EMERGENCY MEDICINE

## 2022-10-26 PROCEDURE — 88305 TISSUE EXAM BY PATHOLOGIST: ICD-10-PCS | Mod: 26,NTX,, | Performed by: PATHOLOGY

## 2022-10-26 PROCEDURE — 85025 COMPLETE CBC W/AUTO DIFF WBC: CPT | Mod: NTX | Performed by: EMERGENCY MEDICINE

## 2022-10-26 PROCEDURE — 25000003 PHARM REV CODE 250: Mod: NTX | Performed by: PHYSICIAN ASSISTANT

## 2022-10-26 PROCEDURE — 83615 LACTATE (LD) (LDH) ENZYME: CPT | Mod: 91,NTX | Performed by: PHYSICIAN ASSISTANT

## 2022-10-26 PROCEDURE — 84484 ASSAY OF TROPONIN QUANT: CPT | Mod: NTX | Performed by: EMERGENCY MEDICINE

## 2022-10-26 PROCEDURE — 83615 LACTATE (LD) (LDH) ENZYME: CPT | Mod: NTX | Performed by: PHYSICIAN ASSISTANT

## 2022-10-26 RX ORDER — LANOLIN ALCOHOL/MO/W.PET/CERES
800 CREAM (GRAM) TOPICAL
Status: DISCONTINUED | OUTPATIENT
Start: 2022-10-26 | End: 2022-10-30 | Stop reason: HOSPADM

## 2022-10-26 RX ORDER — IBUPROFEN 200 MG
16 TABLET ORAL
Status: DISCONTINUED | OUTPATIENT
Start: 2022-10-26 | End: 2022-10-30 | Stop reason: HOSPADM

## 2022-10-26 RX ORDER — CARVEDILOL 3.12 MG/1
3.12 TABLET ORAL 2 TIMES DAILY
Status: DISCONTINUED | OUTPATIENT
Start: 2022-10-26 | End: 2022-10-30 | Stop reason: HOSPADM

## 2022-10-26 RX ORDER — TALC
6 POWDER (GRAM) TOPICAL NIGHTLY PRN
Status: DISCONTINUED | OUTPATIENT
Start: 2022-10-26 | End: 2022-10-27

## 2022-10-26 RX ORDER — ATORVASTATIN CALCIUM 40 MG/1
40 TABLET, FILM COATED ORAL NIGHTLY
Status: DISCONTINUED | OUTPATIENT
Start: 2022-10-26 | End: 2022-10-30 | Stop reason: HOSPADM

## 2022-10-26 RX ORDER — CALCITRIOL 0.25 UG/1
0.25 CAPSULE ORAL DAILY
Status: DISCONTINUED | OUTPATIENT
Start: 2022-10-27 | End: 2022-10-30 | Stop reason: HOSPADM

## 2022-10-26 RX ORDER — SODIUM CHLORIDE 0.9 % (FLUSH) 0.9 %
10 SYRINGE (ML) INJECTION EVERY 8 HOURS
Status: DISCONTINUED | OUTPATIENT
Start: 2022-10-26 | End: 2022-10-26

## 2022-10-26 RX ORDER — AMOXICILLIN 250 MG
1 CAPSULE ORAL DAILY PRN
Status: DISCONTINUED | OUTPATIENT
Start: 2022-10-26 | End: 2022-10-30 | Stop reason: HOSPADM

## 2022-10-26 RX ORDER — IBUPROFEN 200 MG
24 TABLET ORAL
Status: DISCONTINUED | OUTPATIENT
Start: 2022-10-26 | End: 2022-10-30 | Stop reason: HOSPADM

## 2022-10-26 RX ORDER — INSULIN ASPART 100 [IU]/ML
0-5 INJECTION, SOLUTION INTRAVENOUS; SUBCUTANEOUS
Status: DISCONTINUED | OUTPATIENT
Start: 2022-10-26 | End: 2022-10-30 | Stop reason: HOSPADM

## 2022-10-26 RX ORDER — TIMOLOL MALEATE 5 MG/ML
1 SOLUTION/ DROPS OPHTHALMIC DAILY
Status: DISCONTINUED | OUTPATIENT
Start: 2022-10-27 | End: 2022-10-30 | Stop reason: HOSPADM

## 2022-10-26 RX ORDER — GLUCAGON 1 MG
1 KIT INJECTION
Status: DISCONTINUED | OUTPATIENT
Start: 2022-10-26 | End: 2022-10-30 | Stop reason: HOSPADM

## 2022-10-26 RX ORDER — ACETAMINOPHEN 325 MG/1
650 TABLET ORAL EVERY 4 HOURS PRN
Status: DISCONTINUED | OUTPATIENT
Start: 2022-10-26 | End: 2022-10-30 | Stop reason: HOSPADM

## 2022-10-26 RX ORDER — CARVEDILOL 3.12 MG/1
3.12 TABLET ORAL 2 TIMES DAILY
Status: DISCONTINUED | OUTPATIENT
Start: 2022-10-26 | End: 2022-10-26

## 2022-10-26 RX ORDER — SEVELAMER CARBONATE 800 MG/1
800 TABLET, FILM COATED ORAL
Status: DISCONTINUED | OUTPATIENT
Start: 2022-10-27 | End: 2022-10-30 | Stop reason: HOSPADM

## 2022-10-26 RX ORDER — NALOXONE HCL 0.4 MG/ML
0.02 VIAL (ML) INJECTION
Status: DISCONTINUED | OUTPATIENT
Start: 2022-10-26 | End: 2022-10-30 | Stop reason: HOSPADM

## 2022-10-26 RX ORDER — SODIUM CHLORIDE 0.9 % (FLUSH) 0.9 %
10 SYRINGE (ML) INJECTION
Status: DISCONTINUED | OUTPATIENT
Start: 2022-10-26 | End: 2022-10-30 | Stop reason: HOSPADM

## 2022-10-26 RX ADMIN — CARVEDILOL 3.12 MG: 3.12 TABLET, FILM COATED ORAL at 05:10

## 2022-10-26 RX ADMIN — ATORVASTATIN CALCIUM 40 MG: 40 TABLET, FILM COATED ORAL at 08:10

## 2022-10-26 NOTE — PHARMACY MED REC
"Admission Medication History     The home medication history was taken by Мария Chavez CPhT.      You may go to "Admission" then "Reconcile Home Medications" tabs to review and/or act upon these items.     The home medication list has been updated by the Pharmacy department.   Please read ALL comments highlighted in yellow.   Please address this information as you see fit.    Feel free to contact us if you have any questions or require assistance.      The medications listed below were removed from the home medication list. Please reorder if appropriate:  Patient reports no longer taking the following medication(s):  Tylenol 500 mg tab      Medications listed below were obtained from: Patient/family and Analytic software- [employer]  (Not in a hospital admission)      Мария Chavez CPhT.  [phone]                .        "

## 2022-10-26 NOTE — ED PROVIDER NOTES
"--------------------------------------------------------------------  I, Belle Vela, have reviewed the SORT/triage note.      History     Chief Complaint   Patient presents with    Shortness of Breath     Pt reports SOB that worsens with exertion that started this morning. EMS reports pt was not hypoxic upon their arrival but was tachypneic with exertion. Put on 3 liters of O2 via nasal cannula by EMS for comfort. Pt is on peritoneal dialysis        HPI: 57 y.o. female, PMHx DM with prior DKA, HTN, and ESRD on peritoneal dialysis, presents via EMS with shortness of breath. She notes that her peritoneal dialysis last night drained poorly and her UF was negative. Patient was not feeling unwell prior to this. Her shortness of breath began when she awoke today, along with abdominal discomfort. While the discomfort has subsided, she currently feels short of breath. Other symptoms include a slight dry cough, "like an old smoker's cough." Patient denies any leg swelling. She is not on home oxygen.     Past Medical History:   Diagnosis Date    Acidosis 7/1/2014    Allergic rhinitis 7/1/2014    Allergy     Anemia     Anemia in chronic kidney disease 7/1/2014    Anxiety     Cataract     Chronic bilateral low back pain with bilateral sciatica 10/25/2019    Chronic immunosuppression with Prograf and MMF 12/3/2014    CKD (chronic kidney disease) stage 5, GFR less than 15 ml/min 7/1/2014    CKD (chronic kidney disease), stage III 3/2/2015    Degenerative disc disease     Depression 7/1/2014    Diabetes mellitus, type 2 since age 18 7/1/2014    ESRD on peritoneal dialysis - august 2014 for 9 hours no peritonitis 11/24/2014    Hyperlipidemia     Hypertension 7/1/2014    Hypomagnesemia 1/7/2015    Kidney transplant status, living unrelated donor - 12/2/14 12/3/2014    Neutropenia 1/21/2015    NS (nuclear sclerosis) 9/16/2016    Obesity     Organ transplant candidate 7/1/2014    Pre-op exam 11/24/2014    Proliferative " diabetic retinopathy of both eyes without macular edema associated with type 2 diabetes mellitus 2016    Renal manifestation of secondary diabetes mellitus     Tendinitis     Trouble in sleeping      Past Surgical History:   Procedure Laterality Date    BIOPSY N/A 2020    Procedure: Biopsy;  Surgeon: Sweta Catalan MD;  Location: [facility] OR 2ND FLR;  Service: Transplant;  Laterality: N/A;    BONE MARROW BIOPSY N/A      SECTION      x 2    COLONOSCOPY N/A 2022    Procedure: COLONOSCOPY;  Surgeon: Franklin Gan MD;  Location: [facility] ENDO (4TH FLR);  Service: Colon and Rectal;  Laterality: N/A;  order created: previous order unusable  fully vaccinated, labs prior, instr mailed / portal -ml    KIDNEY TRANSPLANT  2014    LAPAROSCOPIC REVISION OF PROCEDURE INVOLVING PERITONEAL DIALYSIS CATHETER N/A 2022    Procedure: REVISION OF PROCEDURE INVOLVING PERITONEAL DIALYSIS CATHETER, LAPAROSCOPIC;  Surgeon: Franklin Barber MD;  Location: [facility] OR 2ND FLR;  Service: General;  Laterality: N/A;    MEDIPORT REMOVAL N/A 2019    Procedure: REMOVAL, CATHETER, CENTRAL VENOUS, TUNNELED, WITH PORT;  Surgeon: Eusebia Diagnostic Provider;  Location: [facility];  Service: Radiology;  Laterality: N/A;    RENAL BIOPSY      ROTATOR CUFF REPAIR      TUBAL LIGATION       Social History     Tobacco Use    Smoking status: Former     Packs/day: 1.00     Years: 20.00     Pack years: 20.00     Types: Cigarettes     Quit date: 2013     Years since quittin.1    Smokeless tobacco: Never    Tobacco comments:     quit smoking cigarettes in 2014   Substance Use Topics    Alcohol use: No    Drug use: No     Family History   Problem Relation Age of Onset    Diabetes Mother     Hypertension Mother     Diabetes Father     Kidney disease Father     Diabetes Sister     Hypertension Sister     Kidney disease Sister     Heart disease Sister     Heart failure Sister     Diabetes Brother     Diabetes Sister      Stroke Maternal Grandmother     Heart disease Maternal Grandmother         pacemaker    Cataracts Maternal Grandmother     No Known Problems Maternal Aunt     No Known Problems Maternal Uncle     No Known Problems Paternal Aunt     No Known Problems Paternal Uncle     No Known Problems Maternal Grandfather     No Known Problems Paternal Grandmother     No Known Problems Paternal Grandfather     Cancer Neg Hx     Amblyopia Neg Hx     Blindness Neg Hx     Glaucoma Neg Hx     Macular degeneration Neg Hx     Retinal detachment Neg Hx     Strabismus Neg Hx     Thyroid disease Neg Hx     Breast cancer Neg Hx     Colon cancer Neg Hx     Ovarian cancer Neg Hx      Review of patient's allergies indicates:   Allergen Reactions    Shrimp Hives, Itching and Rash    Topamax [topiramate] Other (See Comments)     Vision changes    Zoloft [sertraline] Palpitations         Current Outpatient Medications:     atorvastatin (LIPITOR) 40 MG tablet, Take 40 mg by mouth every evening., Disp: , Rfl:     calcitRIOL (ROCALTROL) 0.25 MCG Cap, Take 0.25 mcg by mouth once daily., Disp: , Rfl:     carvediloL (COREG) 3.125 MG tablet, Take 3.125 mg by mouth 2 (two) times daily. Hold for SBP <130, Disp: , Rfl:     clonazePAM (KLONOPIN) 0.5 MG tablet, Take 0.5 mg by mouth 2 (two) times daily as needed., Disp: , Rfl:     dulaglutide (TRULICITY) 1.5 mg/0.5 mL pen injector, Inject 1.5 mg into the skin every 7 days. Pt takes on Wednesday, Disp: , Rfl:     folic acid (FOLVITE) 1 MG tablet, Take 1 tablet (1 mg total) by mouth once daily., Disp: 30 tablet, Rfl: 11    LANTUS SOLOSTAR U-100 INSULIN glargine 100 units/mL (3mL) SubQ pen, Inject 50 Units into the skin once daily., Disp: , Rfl: 0    acetaminophen (TYLENOL) 500 MG tablet, Take 500 mg by mouth every 6 (six) hours as needed for Pain., Disp: , Rfl:     blood sugar diagnostic Strp, Checks BG ac/hs, Disp: 200 strip, Rfl: 7    HUMALOG KWIKPEN INSULIN 100 unit/mL pen, Inject 3 Units into the skin 3  "(three) times daily with meals. SLIDE SCALE, Disp: , Rfl:     insulin syringe-needle U-100 (INSULIN SYRINGE) 1/2 mL 30 x 5/16" Syrg, Uses 4 daily, Disp: 200 each, Rfl: 12    lancets (ONETOUCH DELICA LANCETS) 33 gauge Misc, 1 lancet by Misc.(Non-Drug; Combo Route) route 4 (four) times daily before meals and nightly., Disp: 200 each, Rfl: 7    sevelamer carbonate (RENVELA) 800 mg Tab, Take 1 tablet (800 mg total) by mouth 3 (three) times daily with meals. (Patient taking differently: Take 800 mg by mouth 3 (three) times daily with meals. Take 2 tab w/ meal & 1 tab w/ snack), Disp: 90 tablet, Rfl: 3    SYRINGE & NEEDLE,INSULIN,1 ML (INSULIN SYRINGE-NEEDLE U-100) 1 mL 29 X 7/16" Syrg, , Disp: , Rfl: 11    timolol maleate 0.5% (TIMOPTIC) 0.5 % Drop, Place 2 drops in both eyes twice daily, Disp: , Rfl:     zolpidem (AMBIEN) 10 mg Tab, Take 10 mg by mouth nightly as needed., Disp: , Rfl:          Review of Systems as per HPI  Review of Systems   Respiratory:  Positive for shortness of breath. Negative for cough, hemoptysis, sputum production and wheezing.    Cardiovascular:  Negative for orthopnea, claudication, leg swelling and PND.   Gastrointestinal:  Positive for abdominal pain (resolved). Negative for blood in stool, constipation and melena.   Skin: Negative.    Neurological:  Negative for focal weakness, seizures, loss of consciousness and weakness.   All other systems reviewed and are negative.      Physical Exam     ED Triage Vitals [10/26/22 1136]   Enc Vitals Group      BP (!) 186/98      Pulse 94      Resp (!) 22      Temp 99.2 °F (37.3 °C)      Temp src Oral      SpO2 100 %      Weight 194 lb      Height 5' 7"      Head Circumference       Peak Flow       Pain Score       Pain Loc       Pain Edu?       Excl. in GC?         PHYSICAL EXAMINATION:  Vital signs and nursing assessment noted: elevated    GEN:   NAD, A & Ox3, atraumatic, well appearing, nontoxic appearing  HEENT:  PERRLA, EOMI, moist membranes, nl " conjunctiva, no scleral icterus, no nystagmus, no nodes/nodules, soft, supple, FROM, no trachial deviation  CV:   2+ radial pulses, <2sec cap refill, no obvious JVD  RESP:  Mild respiratory distress with tachypnea. No wheezes.  ABD:   soft, Nontender, Nondistended, no guarding/rebound  :   Deferred  EXT:   FROM, CHAPPELL x 4, no edema, no calf tenderness, no swelling, no bony tenderness, no warmth or redness, no crepitus, no obvious deformity  LYMPH:  no gross adenopathy  NEURO:  CN II-XII grossly intact, no obvious motor/sensory deficit, no tremor  PSYCH:   no SI/HI, no anxiety, nl mood/affect, nl judgement/thought process  SKIN:  Warm, dry, intact, no rashes/lesions or masses, nl color, no pallor     Tests     Labs Reviewed   CBC W/ AUTO DIFFERENTIAL - Abnormal; Notable for the following components:       Result Value    RBC 2.82 (*)     Hemoglobin 8.3 (*)     Hematocrit 25.9 (*)     RDW 14.8 (*)     All other components within normal limits   COMPREHENSIVE METABOLIC PANEL - Abnormal; Notable for the following components:    Chloride 111 (*)     CO2 19 (*)     BUN 56 (*)     Creatinine 16.0 (*)     Calcium 6.7 (*)     Albumin 2.0 (*)     Alkaline Phosphatase 40 (*)     AST 9 (*)     ALT 6 (*)     eGFR 2 (*)     All other components within normal limits    Narrative:     CA critical result(s) called and verbal readback obtained from   Kallie Hathaway RN by [facility] 10/26/2022 13:09   B-TYPE NATRIURETIC PEPTIDE - Abnormal; Notable for the following components:     (*)     All other components within normal limits   TROPONIN I   FREEZE AND HOLD -    CYTOLOGY SPECIMEN- MEDICAL CYTOLOGY (FLUID/WASH/BRUSH)   POCT GLUCOSE MONITORING CONTINUOUS     X-Ray Chest PA And Lateral   Final Result      See above         Electronically signed by: Scott Guerra MD   Date:    10/28/2022   Time:    10:24      X-Ray Chest AP Portable   Final Result      Small right pleural effusion but significant reduction when compared to the  prior study.  No complication or pneumothorax seen.         Electronically signed by: Dwayne Freed MD   Date:    10/27/2022   Time:    14:28      IR Thoracentesis with Imaging   Final Result      Successful ultrasound-guided right thoracentesis with drainage of 1.2 liters of serous fluid.      Plan:      Resume care by primary team.      _______________________________________________________________         Electronically signed by: Aaron Herrera MD   Date:    10/27/2022   Time:    11:16      X-Ray Chest AP Portable   Final Result      New large right-sided pleural effusion.  Possible trace left pleural fluid.         Electronically signed by: Rubin Craig MD   Date:    10/26/2022   Time:    13:06          EKG   Reviewed and independently interpreted:  No STEMI  NSR with HR 97  Nl conduction, nl intervals  Nl ST and T wave   No ectopy, Nl axis      ECG Results              EKG 12-lead (Final result)  Result time 10/26/22 20:46:28      Final result by Interface, Lab In [facility] (10/26/22 20:46:28)                   Narrative:    Test Reason : R06.02,    Vent. Rate : 097 BPM     Atrial Rate : 097 BPM     P-R Int : 174 ms          QRS Dur : 070 ms      QT Int : 372 ms       P-R-T Axes : 077 037 071 degrees     QTc Int : 472 ms    Normal sinus rhythm  Normal ECG  When compared with ECG of 10-OCT-2022 14:51,  No significant change was found  Confirmed by Joseph Sherman MD ([id number]) on 10/26/2022 8:46:16 PM    Referred By: [email or web address]   SELF           Confirmed By:Joseph Sherman MD                                  PROCEDURE(S):  Patient placed on continuous cardiac monitor, automatic blood pressure cuff and continuous pulse oximeter.  Critical care was necessary to treat or prevent imminent or life-threatening deterioration.   Critical care time: 17 min  Time spent at the bedside, reviewing test results, discussing the case with staff and/or consult(s), documenting the medical record.    The time involved in the  performance of separately reportable procedures was not counted toward critical care time.        MEDICAL DECISION MAKING:  History supplemented by old records which were checked/reviewed in EMR:  January 2022 chest x-ray Impression:  No convincing evidence of acute cardiopulmonary disease.     57 y.o. female, PMHx DM with prior DKA, HTN, and ESRD on peritoneal dialysis, presents via EMS with shortness of breath.  Exam shows tachypnea with mild respiratory distress.    Shortness of breath differential includes but not exclusive to  asthma/COPD exacerbation, deconditioning, myocardial infarction, pneumonia, CHF, pneumothorax.  Unlikely pulmonary embolism, anemia, metabolic disturbances.    Treatment plan includes history, physical exam, cardiac monitoring, labs, imaging studies, and supportive care.      ED Course     MDM  Reviewed: previous chart, nursing note and vitals  Reviewed previous: labs, x-ray and ECG  Interpretation: labs, x-ray and ECG  Total time providing critical care: < 30 minutes. This excludes time spent performing separately reportable procedures and services.  Consults: admitting MD     ED Course as of 10/28/22 1055   Wed Oct 26, 2022   1219 Hemoglobin(!): 8.3  Anemia otherwise relatively unremarkable CBC. Old records reviewed: On October 14 2022 hemoglobin 8.2. [NO]   1220 Old records reviewed:  October 24, 2022  Iron 40 - 160 µg/dL 61   Transferrin 200 - 360 mg/dL 139 Low    Iron Saturation 15.0 - 50.0 % 33.8   Iron Binding Capacity 250.0 - 425.0 ug/dL 180.7 Low       [NO]   1241 X-Ray Chest AP Portable  R pleural effusion [NO]   1245 Troponin I: 0.009  unremarkable [NO]   1246 BNP(!): 124  Mildly elevated [NO]   1310 Calcium(!!): 6.7  Hypocalcemia [NO]   1311 Creatinine(!): 16.0  Elevated [NO]   1311 CO2(!): 19  Low [NO]      ED Course User Index  [NO] Belle Vela MD       REASSESSMENT: Patient is tolerating p.o. and ambulating with steady gait.   Sx improved after treatment with:   Supplemental oxygen      The results of physical exam, lab and imaging findings were reviewed with the patient. This discussion included but not exclusive to the risk to the patient due to the potential underlying pathology, the testing that was required to make the diagnosis, and the treatment administered or prescribed.      Pt agrees with assessment, disposition and treatment plan.  Patient is amenable to admission  Clinical Impression     1. Pleural effusion on right    2. Hypocalcemia    3. Chest pain    4. Pleural effusion    5. SOB (shortness of breath) on exertion    6. Pleural effusion, right    7. SOB (shortness of breath)      Disposition:  Placed in observation  SCRIBE #1 NOTE: IZoran, am scribing for, and in the presence of,  Belle Vela MD. I have scribed the entire note.     I, Dr. Belle Vela, personally performed the services described in this documentation. All medical record entries made by the scribe were at my direction and in my presence.  I have reviewed the chart and agree that the record reflects my personal performance and is accurate and complete. Belle Vela MD.      Belle Vela MD  10/28/22 1234

## 2022-10-26 NOTE — H&P
[facility]  [facility] Medicine  History & Physical    Patient Name: Elizabeth Maldonado  MRN: [id number]  Patient Class: OP- Observation  Admission Date: 10/26/2022  Attending Physician: James Hensley MD   Primary Care Provider: Richy Singleton NP         Patient information was obtained from patient, past medical records and ER records.     Subjective:     Principal Problem:Pleural effusion, right    Chief Complaint:   Chief Complaint   Patient presents with    Shortness of Breath     Pt reports SOB that worsens with exertion that started this morning. EMS reports pt was not hypoxic upon their arrival but was tachypneic with exertion. Put on 3 liters of O2 via nasal cannula by EMS for comfort. Pt is on peritoneal dialysis         HPI: Elizabeth Maldonado 57 y.o. female with DM, on PD, HTN, HLD previous kidney transplant no longer on immunosuppression presents to the hospital with a chief complaint of SOB.  She reports she woke up this she was short of breath.  She reports her shortness of worsens lying flat, it is improved with sitting upright and supplemental oxygen.  She denies any attempted treatment home.  She reports she has been on PD for the last 7 months, and underwent PD last night reports her catheter was slow to drain.  She is no longer on immunosuppression after history of kidney transplant.  She reports her urine output varies day-to-day.  She denies fever chest pain cough nausea vomiting abdominal pain leg swelling syncope dizziness dysuria melena hematuria hematemesis.  She does not take any blood thinners.    In the ED, chest x-ray with large right sided pleural effusion, troponin negative, , potassium 3.7, Albumin 2.0.       Past Medical History:   Diagnosis Date    Acidosis 7/1/2014    Allergic rhinitis 7/1/2014    Allergy     Anemia     Anemia in chronic kidney disease 7/1/2014    Anxiety     Cataract     Chronic bilateral low back pain with bilateral sciatica  10/25/2019    Chronic immunosuppression with Prograf and MMF 12/3/2014    CKD (chronic kidney disease) stage 5, GFR less than 15 ml/min 2014    CKD (chronic kidney disease), stage III 3/2/2015    Degenerative disc disease     Depression 2014    Diabetes mellitus, type 2 since age 18 2014    ESRD on peritoneal dialysis - 2014 for 9 hours no peritonitis 2014    Hyperlipidemia     Hypertension 2014    Hypomagnesemia 2015    Kidney transplant status, living unrelated donor - 12/2/14 12/3/2014    Neutropenia 2015    NS (nuclear sclerosis) 2016    Obesity     Organ transplant candidate 2014    Pre-op exam 2014    Proliferative diabetic retinopathy of both eyes without macular edema associated with type 2 diabetes mellitus 2016    Renal manifestation of secondary diabetes mellitus     Tendinitis     Trouble in sleeping        Past Surgical History:   Procedure Laterality Date    BIOPSY N/A 2020    Procedure: Biopsy;  Surgeon: Sweta Catalan MD;  [facility];  Service: Transplant;  Laterality: N/A;    BONE MARROW BIOPSY N/A      SECTION      x 2    COLONOSCOPY N/A 2022    Procedure: COLONOSCOPY;  Surgeon: Franklin Gan MD;  Location: [facility] ENDO (4TH FLR);  Service: Colon and Rectal;  Laterality: N/A;  order created: previous order unusable  fully vaccinated, labs prior, instr mailed / portal -ml    KIDNEY TRANSPLANT  2014    LAPAROSCOPIC REVISION OF PROCEDURE INVOLVING PERITONEAL DIALYSIS CATHETER N/A 2022    Procedure: REVISION OF PROCEDURE INVOLVING PERITONEAL DIALYSIS CATHETER, LAPAROSCOPIC;  Surgeon: Franklin Barber MD;  Location: [facility] OR 2ND FLR;  Service: General;  Laterality: N/A;    MEDIPORT REMOVAL N/A 2019    Procedure: REMOVAL, CATHETER, CENTRAL VENOUS, TUNNELED, WITH PORT;  Surgeon: Eusebia Diagnostic Provider;  Location: [facility] OR;  Service: Radiology;  Laterality: N/A;    RENAL BIOPSY      ROTATOR CUFF  "REPAIR      TUBAL LIGATION         Review of patient's allergies indicates:   Allergen Reactions    Shrimp Hives, Itching and Rash    Topamax [topiramate] Other (See Comments)     Vision changes    Zoloft [sertraline] Palpitations       No current facility-administered medications on file prior to encounter.     Current Outpatient Medications on File Prior to Encounter   Medication Sig    atorvastatin (LIPITOR) 40 MG tablet Take 40 mg by mouth every evening.    blood sugar diagnostic Strp Checks BG ac/hs    calcitRIOL (ROCALTROL) 0.25 MCG Cap Take 0.25 mcg by mouth once daily.    carvediloL (COREG) 3.125 MG tablet Take 3.125 mg by mouth 2 (two) times daily. Hold for SBP <130    clonazePAM (KLONOPIN) 0.5 MG tablet Take 0.5 mg by mouth 2 (two) times daily as needed.    dulaglutide (TRULICITY) 1.5 mg/0.5 mL pen injector Inject 1.5 mg into the skin every 7 days. Pt takes on Wednesday    folic acid (FOLVITE) 1 MG tablet Take 1 tablet (1 mg total) by mouth once daily.    HUMALOG KWIKPEN INSULIN 100 unit/mL pen Inject 3 Units into the skin 3 (three) times daily with meals. SLIDE SCALE    insulin syringe-needle U-100 (INSULIN SYRINGE) 1/2 mL 30 x 5/16" Syrg Uses 4 daily    lancets (ONETOUCH DELICA LANCETS) 33 gauge Misc 1 lancet by Misc.(Non-Drug; Combo Route) route 4 (four) times daily before meals and nightly.    LANTUS SOLOSTAR U-100 INSULIN glargine 100 units/mL (3mL) SubQ pen Inject 50 Units into the skin once daily.    sevelamer carbonate (RENVELA) 800 mg Tab Take 1 tablet (800 mg total) by mouth 3 (three) times daily with meals. (Patient taking differently: Take 800 mg by mouth 3 (three) times daily with meals. Take 2 tab w/ meal & 1 tab w/ snack)    SYRINGE & NEEDLE,INSULIN,1 ML (INSULIN SYRINGE-NEEDLE U-100) 1 mL 29 X 7/16" Syrg     timolol maleate 0.5% (TIMOPTIC) 0.5 % Drop Place 2 drops in both eyes twice daily    zolpidem (AMBIEN) 10 mg Tab Take 10 mg by mouth nightly as needed.    [DISCONTINUED] acetaminophen " (TYLENOL) 500 MG tablet Take 500 mg by mouth every 6 (six) hours as needed for Pain.     Family History       Problem Relation (Age of Onset)    Cataracts Maternal Grandmother    Diabetes Mother, Father, Sister, Brother, Sister    Heart disease Sister, Maternal Grandmother    Heart failure Sister    Hypertension Mother, Sister    Kidney disease Father, Sister    No Known Problems Maternal Aunt, Maternal Uncle, Paternal Aunt, Paternal Uncle, Maternal Grandfather, Paternal Grandmother, Paternal Grandfather    Stroke Maternal Grandmother          Tobacco Use    Smoking status: Former     Packs/day: 1.00     Years: 20.00     Pack years: 20.00     Types: Cigarettes     Quit date: 2013     Years since quittin.1    Smokeless tobacco: Never    Tobacco comments:     quit smoking cigarettes in 2014   Substance and Sexual Activity    Alcohol use: No    Drug use: No    Sexual activity: Yes     Partners: Male     Birth control/protection: Post-menopausal     Review of Systems   Constitutional:  Negative for chills and fever.   HENT:  Negative for nosebleeds and tinnitus.    Eyes:  Negative for photophobia and visual disturbance.   Respiratory:  Positive for shortness of breath. Negative for cough and wheezing.    Cardiovascular:  Negative for chest pain, palpitations and leg swelling.   Gastrointestinal:  Negative for abdominal distention, nausea and vomiting.   Genitourinary:  Negative for dysuria, flank pain and hematuria.   Musculoskeletal:  Negative for gait problem and joint swelling.   Skin:  Negative for rash and wound.   Neurological:  Negative for seizures and syncope.   Objective:     Vital Signs (Most Recent):  Temp: 99.2 °F (37.3 °C) (10/26/22 1136)  Pulse: 94 (10/26/22 1136)  Resp: (!) 22 (10/26/22 113)  BP: (!) 186/98 (10/26/22 1136)  SpO2: 100 % (10/26/22 1136)   Vital Signs (24h Range):  Temp:  [99.2 °F (37.3 °C)] 99.2 °F (37.3 °C)  Pulse:  [94] 94  Resp:  [22] 22  SpO2:  [100 %] 100 %  BP:  (186)/(98) 186/98     Weight: 88 kg (194 lb)  Body mass index is 30.38 kg/m².    Physical Exam  Vitals and nursing note reviewed.   Constitutional:       General: She is not in acute distress.     Appearance: She is well-developed.   HENT:      Head: Normocephalic and atraumatic.      Right Ear: External ear normal.      Left Ear: External ear normal.   Eyes:      General:         Right eye: No discharge.         Left eye: No discharge.      Conjunctiva/sclera: Conjunctivae normal.   Neck:      Thyroid: No thyromegaly.   Cardiovascular:      Rate and Rhythm: Normal rate and regular rhythm.      Heart sounds: No murmur heard.  Pulmonary:      Effort: Pulmonary effort is normal. No respiratory distress.      Breath sounds: Normal breath sounds to left lung.      Comments: On 3L nasal cannula speaking in full sentences. Decreased breath sounds through right lung  Abdominal:      General: Bowel sounds are normal. There is no distension.      Palpations: Abdomen is soft. There is no mass.      Tenderness: There is no abdominal tenderness.      Comments: PD catheter to left side of abdomen without surrounding erythema   Musculoskeletal:         General: No deformity.      Cervical back: Normal range of motion.      Right lower leg: No edema.      Left lower leg: No edema.   Skin:     General: Skin is warm and dry.   Neurological:      Mental Status: She is alert and oriented to person, place, and time.      Sensory: No sensory deficit.   Psychiatric:         Mood and Affect: Mood normal.         Behavior: Behavior normal.           Significant Labs: CBC:   Recent Labs   Lab 10/26/22  1206   WBC 5.70   HGB 8.3*   HCT 25.9*        CMP:   Recent Labs   Lab 10/26/22  1206      K 3.7   *   CO2 19*   GLU 72   BUN 56*   CREATININE 16.0*   CALCIUM 6.7*   PROT 7.5   ALBUMIN 2.0*   BILITOT 0.4   ALKPHOS 40*   AST 9*   ALT 6*   ANIONGAP 10     Cardiac Markers:   Recent Labs   Lab 10/26/22  1206   *  "    Troponin:   Recent Labs   Lab 10/26/22  1206   TROPONINI 0.009       Significant Imaging:   Imaging Results              X-Ray Chest AP Portable (Final result)  Result time 10/26/22 13:06:40      Final result by Rubin Craig MD (10/26/22 13:06:40)                   Impression:      New large right-sided pleural effusion.  Possible trace left pleural fluid.      Electronically signed by: Rubin Craig MD  Date:    10/26/2022  Time:    13:06               Narrative:    EXAMINATION:  XR CHEST AP PORTABLE    CLINICAL HISTORY:  Provided history is "sob;  ".    TECHNIQUE:  One view of the chest.    COMPARISON:  01/26/2022.    FINDINGS:  Cardiac wires overlie the chest.  Cardiomediastinal silhouette is stable in size.  Atherosclerotic calcifications overlie the aortic arch.  New large right-sided pleural effusion with adjacent passive atelectasis or airspace disease throughout the aerated right lung.  Left basilar subsegmental atelectasis.  Left upper lung field is relatively clear.  Possible trace left pleural fluid.  No distinct pneumothorax.                                        Assessment/Plan:     * Pleural effusion, right  Presents with sudden onset SOB. On supplemental oxygen in the ED, chest x-ray with new large right sided pleural effusion.   IR consulted  Pleural LDH, protein, glucose, Wbc, anaerobic, cytology ordered  Will check serum LDH  Continue supplemental oxygen    Hypocalcemia  Presents with calcium 6.7 that corrects to 8.3 for low albumin.   Continue home calcitriol    Anemia of chronic kidney failure, stage 5  H&H stable and consistent with baseline. No complaints of active bleeding or indication for transfusion at this time. Will continue to monitor    End stage kidney disease  Nephrology consulted for PD, discussed with nephrology recommending glucose of pleural fluid checked    Renovascular hypertension  Poorly controlled. Continue home coreg    Chronic immunosuppression with " Prograf  Per patient no longer on immunosuppresion    Kidney transplant status, living unrelated donor - 12/2/14  Followed by transplant outpt. Reports no longer on immunosuppression   Continue outpt follow up  Continue PD    Hyperlipidemia  continue home statin    Type 2 diabetes mellitus, with long-term current use of insulin  Lab Results   Component Value Date    HGBA1C 7.9 (H) 10/05/2022     Will start sliding scale insulin, diabetic/renal diet, goal -180. Initial BG 72 on arrival. Will resume basal if BG elevates      VTE Risk Mitigation (From admission, onward)           Ordered     Place OR[place]O hose  Until discontinued         10/26/22 1421     IP VTE HIGH RISK PATIENT  Once         10/26/22 1421     Place sequential compression device  Until discontinued         10/26/22 1421                  VTE: heparin held pending thora  Code: full  Diet: diabetic/renal  Dispo: pending thoracentesis  As clarification, on 10/26/2022, patient should be admitted for hospital observation services under my care in collaboration with James Hensley MD. Venkatesh Smith PA-C  Department of Hospital Medicine   [facility] - Emergency Dept

## 2022-10-26 NOTE — ASSESSMENT & PLAN NOTE
Presents with sudden onset SOB. On supplemental oxygen in the ED, chest x-ray with new large right sided pleural effusion.   IR consulted  Pleural LDH, protein, glucose, Wbc, anaerobic, cytology ordered  Will check serum LDH  Continue supplemental oxygen

## 2022-10-26 NOTE — NURSING
10/26/22 1735 Patient arrives to the floor at this time. Patient alert and oriented x 4. Patient on 3 L/NC. Patient denies pain. Patient noted to have incision to back with a dressing that is clean, dry and intact.     10/26/22 1748 Patient blood sugar 76.

## 2022-10-26 NOTE — ASSESSMENT & PLAN NOTE
Lab Results   Component Value Date    HGBA1C 7.9 (H) 10/05/2022     Will start sliding scale insulin, diabetic/renal diet, goal -180. Initial BG 72 on arrival. Will resume basal if BG elevates

## 2022-10-26 NOTE — CONSULTS
Inpatient Radiology Pre-procedure Note    History of Present Illness:  Elizabeth Maldonado is a 57 y.o. female who presents for right thoracentesis.  Admission H&P reviewed.  Past Medical History:   Diagnosis Date    Acidosis 2014    Allergic rhinitis 2014    Allergy     Anemia     Anemia in chronic kidney disease 2014    Anxiety     Cataract     Chronic bilateral low back pain with bilateral sciatica 10/25/2019    Chronic immunosuppression with Prograf and MMF 12/3/2014    CKD (chronic kidney disease) stage 5, GFR less than 15 ml/min 2014    CKD (chronic kidney disease), stage III 3/2/2015    Degenerative disc disease     Depression 2014    Diabetes mellitus, type 2 since age 18 2014    ESRD on peritoneal dialysis - 2014 for 9 hours no peritonitis 2014    Hyperlipidemia     Hypertension 2014    Hypomagnesemia 2015    Kidney transplant status, living unrelated donor - 12/2/14 12/3/2014    Neutropenia 2015    NS (nuclear sclerosis) 2016    Obesity     Organ transplant candidate 2014    Pre-op exam 2014    Proliferative diabetic retinopathy of both eyes without macular edema associated with type 2 diabetes mellitus 2016    Renal manifestation of secondary diabetes mellitus     Tendinitis     Trouble in sleeping      Past Surgical History:   Procedure Laterality Date    BIOPSY N/A 2020    Procedure: Biopsy;  Surgeon: Sweta Catalan MD;  Location: [facility] OR [address] [place] [address];  Service: Transplant;  Laterality: N/A;    BONE MARROW BIOPSY N/A      SECTION      x 2    COLONOSCOPY N/A 2022    Procedure: COLONOSCOPY;  Surgeon: Franklin Gan MD;  Location: [facility] (4TH FLR);  Service: Colon and Rectal;  Laterality: N/A;  order created: previous order unusable  fully vaccinated, labs prior, instr mailed / portal -ml    KIDNEY TRANSPLANT  2014    LAPAROSCOPIC REVISION OF PROCEDURE INVOLVING PERITONEAL DIALYSIS CATHETER N/A 2022     "Procedure: REVISION OF PROCEDURE INVOLVING PERITONEAL DIALYSIS CATHETER, LAPAROSCOPIC;  Surgeon: Franklin Barber MD;  [facility];  Service: General;  Laterality: N/A;    MEDIPORT REMOVAL N/A 11/25/2019    Procedure: REMOVAL, CATHETER, CENTRAL VENOUS, TUNNELED, WITH PORT;  Surgeon: Eusebia Diagnostic Provider;  Location: [facility] OR;  Service: Radiology;  Laterality: N/A;    RENAL BIOPSY      ROTATOR CUFF REPAIR      TUBAL LIGATION         Review of Systems:   As documented in primary team H&P    Home Meds:   Prior to Admission medications    Medication Sig Start Date End Date Taking? Authorizing Provider   atorvastatin (LIPITOR) 40 MG tablet Take 40 mg by mouth every evening.   Yes Historical Provider   blood sugar diagnostic Strp Checks BG ac/hs 6/5/17  Yes Cristy Sampson MD   calcitRIOL (ROCALTROL) 0.25 MCG Cap Take 0.25 mcg by mouth once daily. 7/12/22  Yes Graciela Pineda MD   carvediloL (COREG) 3.125 MG tablet Take 3.125 mg by mouth 2 (two) times daily. Hold for SBP <130 8/11/22  Yes Graciela Pineda MD   clonazePAM (KLONOPIN) 0.5 MG tablet Take 0.5 mg by mouth 2 (two) times daily as needed. 9/21/22  Yes Historical Provider   dulaglutide (TRULICITY) 1.5 mg/0.5 mL pen injector Inject 1.5 mg into the skin every 7 days. Pt takes on Wednesday   Yes Historical Provider   folic acid (FOLVITE) 1 MG tablet Take 1 tablet (1 mg total) by mouth once daily. 4/6/22 4/6/23 Yes Lorna Healy PA-C   HUMALOG KWIKPEN INSULIN 100 unit/mL pen Inject 3 Units into the skin 3 (three) times daily with meals. SLIDE SCALE 10/13/21  Yes Historical Provider   insulin syringe-needle U-100 (INSULIN SYRINGE) 1/2 mL 30 x 5/16" Syrg Uses 4 daily 1/6/15  Yes Salima Acosta, KARO, NP   lancets (ONETOUCH DELICA LANCETS) 33 gauge Misc 1 lancet by Misc.(Non-Drug; Combo Route) route 4 (four) times daily before meals and nightly. 6/5/17  Yes MD ADELITA Wilson U-100 INSULIN glargine 100 units/mL (3mL) " "SubQ pen Inject 50 Units into the skin once daily. 12/21/21  Yes Noemí Bishop NP   sevelamer carbonate (RENVELA) 800 mg Tab Take 1 tablet (800 mg total) by mouth 3 (three) times daily with meals.  Patient taking differently: Take 800 mg by mouth 3 (three) times daily with meals. Take 2 tab w/ meal & 1 tab w/ snack 1/27/22 1/27/23 Yes Noemí Bishop NP   SYRINGE & NEEDLE,INSULIN,1 ML (INSULIN SYRINGE-NEEDLE U-100) 1 mL 29 X 7/16" Syrg  3/25/15  Yes Historical Provider   timolol maleate 0.5% (TIMOPTIC) 0.5 % Drop Place 2 drops in both eyes twice daily   Yes Historical Provider   zolpidem (AMBIEN) 10 mg Tab Take 10 mg by mouth nightly as needed. 8/15/21  Yes Historical Provider   acetaminophen (TYLENOL) 500 MG tablet Take 500 mg by mouth every 6 (six) hours as needed for Pain.  10/26/22  Historical Provider     Scheduled Meds:    atorvastatin  40 mg Oral QHS    [START ON 10/27/2022] calcitRIOL  0.25 mcg Oral Daily    carvediloL  3.125 mg Oral BID    [START ON 10/27/2022] sevelamer carbonate  800 mg Oral TID WM    [START ON 10/27/2022] timolol maleate 0.5%  1 drop Both Eyes Daily     Continuous Infusions:   PRN Meds:acetaminophen, dextrose 50%, dextrose 50%, glucagon (human recombinant), glucose, glucose, insulin aspart U-100, magnesium oxide, magnesium oxide, melatonin, naloxone, senna-docusate 8.6-50 mg, sodium chloride 0.9%  Anticoagulants/Antiplatelets: no anticoagulation    Allergies:   Review of patient's allergies indicates:   Allergen Reactions    Shrimp Hives, Itching and Rash    Topamax [topiramate] Other (See Comments)     Vision changes    Zoloft [sertraline] Palpitations     Sedation Hx: have not been any systemic reactions    Labs:  No results for input(s): INR in the last 168 hours.    Invalid input(s):  PT,  PTT    Recent Labs   Lab 10/26/22  1206   WBC 5.70   HGB 8.3*   HCT 25.9*   MCV 92         Recent Labs   Lab 10/26/22  1206   GLU 72      K 3.7   *   CO2 19*   BUN 56* "   CREATININE 16.0*   CALCIUM 6.7*   ALT 6*   AST 9*   ALBUMIN 2.0*   BILITOT 0.4         Vitals:  Temp: 99.2 °F (37.3 °C) (10/26/22 1136)  Pulse: 95 (10/26/22 1536)  Resp: 18 (10/26/22 1536)  BP: (!) 178/95 (10/26/22 1536)  SpO2: 96 % (10/26/22 1536)     Physical Exam:  ASA: 3  Mallampati: 2    General: no acute distress  Mental Status: alert and oriented to person, place and time  HEENT: normocephalic, atraumatic  Chest: unlabored breathing  Heart: regular heart rate  Abdomen: nondistended  Extremity: moves all extremities    Plan: Right thoracentesis with US  Sedation Plan: Local only    Aaron Herrera M.D.  Interventional Radiology  Department of Radiology  Pager: [phone]

## 2022-10-26 NOTE — ED TRIAGE NOTES
Pt reports SOB that worsens with exertion that started this morning. EMS reports pt was not hypoxic upon their arrival but was tachypneic with exertion. Put on 3 liters of O2 via nasal cannula by EMS for comfort. Pt is on peritoneal dialysis, still makes urine

## 2022-10-26 NOTE — HPI
Elizabeth Maldonado 57 y.o. female with DM, on PD, HTN, HLD previous kidney transplant no longer on immunosuppression presents to the hospital with a chief complaint of SOB.  She reports she woke up this she was short of breath.  She reports her shortness of worsens lying flat, it is improved with sitting upright and supplemental oxygen.  She denies any attempted treatment home.  She reports she has been on PD for the last 7 months, and underwent PD last night reports her catheter was slow to drain.  She is no longer on immunosuppression after history of kidney transplant.  She reports her urine output varies day-to-day.  She denies fever chest pain cough nausea vomiting abdominal pain leg swelling syncope dizziness dysuria melena hematuria hematemesis.  She does not take any blood thinners.    In the ED, chest x-ray with large right sided pleural effusion, troponin negative, , potassium 3.7, Albumin 2.0.

## 2022-10-26 NOTE — ASSESSMENT & PLAN NOTE
Followed by transplant outpt. Reports no longer on immunosuppression   Continue outpt follow up  Continue PD

## 2022-10-26 NOTE — ASSESSMENT & PLAN NOTE
H&H stable and consistent with baseline. No complaints of active bleeding or indication for transfusion at this time. Will continue to monitor

## 2022-10-26 NOTE — ASSESSMENT & PLAN NOTE
Nephrology consulted for PD, discussed with nephrology recommending glucose of pleural fluid checked

## 2022-10-26 NOTE — PROCEDURES
Radiology Post-Procedure Note    Pre Op Diagnosis: Effusion    Post Op Diagnosis: Same    Procedure: US guided thoracentesis.    Procedure performed by: Aaron Herrera MD    Written Informed Consent Obtained: Yes  Specimen Removed: YES clear fluid  Estimated Blood Loss: Minimal    Findings:   Successful right thoracentesis.  No complications.    Patient tolerated procedure well.    Aaron Herrera M.D.  Interventional Radiology  Department of Radiology  Pager: [phone]

## 2022-10-27 LAB
ALBUMIN SERPL BCP-MCNC: 2.2 G/DL (ref 3.5–5.2)
ALP SERPL-CCNC: 47 U/L (ref 55–135)
ALT SERPL W/O P-5'-P-CCNC: 6 U/L (ref 10–44)
ANION GAP SERPL CALC-SCNC: 15 MMOL/L (ref 8–16)
APPEARANCE FLD: CLEAR
AST SERPL-CCNC: 10 U/L (ref 10–40)
BASOPHILS # BLD AUTO: 0.03 K/UL (ref 0–0.2)
BASOPHILS NFR BLD: 0.5 % (ref 0–1.9)
BILIRUB SERPL-MCNC: 0.4 MG/DL (ref 0.1–1)
BODY FLD TYPE: NORMAL
BODY FLUID SOURCE, LDH: NORMAL
BUN SERPL-MCNC: 74 MG/DL (ref 6–20)
CALCIUM SERPL-MCNC: 7.7 MG/DL (ref 8.7–10.5)
CHLORIDE SERPL-SCNC: 102 MMOL/L (ref 95–110)
CO2 SERPL-SCNC: 20 MMOL/L (ref 23–29)
COLOR FLD: COLORLESS
CREAT SERPL-MCNC: 20.5 MG/DL (ref 0.5–1.4)
DIFFERENTIAL METHOD: ABNORMAL
EOSINOPHIL # BLD AUTO: 0.2 K/UL (ref 0–0.5)
EOSINOPHIL NFR BLD: 2.7 % (ref 0–8)
EOSINOPHIL NFR FLD MANUAL: 2 %
ERYTHROCYTE [DISTWIDTH] IN BLOOD BY AUTOMATED COUNT: 14.6 % (ref 11.5–14.5)
EST. GFR  (NO RACE VARIABLE): 2 ML/MIN/1.73 M^2
FOLATE SERPL-MCNC: 17.1 NG/ML (ref 4–24)
GLUCOSE SERPL-MCNC: 102 MG/DL (ref 70–110)
HCT VFR BLD AUTO: 25.5 % (ref 37–48.5)
HGB BLD-MCNC: 8 G/DL (ref 12–16)
IMM GRANULOCYTES # BLD AUTO: 0.02 K/UL (ref 0–0.04)
IMM GRANULOCYTES NFR BLD AUTO: 0.3 % (ref 0–0.5)
LDH FLD L TO P-CCNC: <30 U/L
LYMPHOCYTES # BLD AUTO: 1.8 K/UL (ref 1–4.8)
LYMPHOCYTES NFR BLD: 29.6 % (ref 18–48)
LYMPHOCYTES NFR FLD MANUAL: 22 %
MAGNESIUM SERPL-MCNC: 2.4 MG/DL (ref 1.6–2.6)
MCH RBC QN AUTO: 28.9 PG (ref 27–31)
MCHC RBC AUTO-ENTMCNC: 31.4 G/DL (ref 32–36)
MCV RBC AUTO: 92 FL (ref 82–98)
MONOCYTES # BLD AUTO: 0.6 K/UL (ref 0.3–1)
MONOCYTES NFR BLD: 9.8 % (ref 4–15)
MONOS+MACROS NFR FLD MANUAL: 67 %
NEUTROPHILS # BLD AUTO: 3.4 K/UL (ref 1.8–7.7)
NEUTROPHILS NFR BLD: 57.1 % (ref 38–73)
NEUTROPHILS NFR FLD MANUAL: 9 %
NRBC BLD-RTO: 0 /100 WBC
PHOSPHATE SERPL-MCNC: 9.3 MG/DL (ref 2.7–4.5)
PLATELET # BLD AUTO: 156 K/UL (ref 150–450)
PMV BLD AUTO: 10.1 FL (ref 9.2–12.9)
POCT GLUCOSE: 177 MG/DL (ref 70–110)
POCT GLUCOSE: 198 MG/DL (ref 70–110)
POCT GLUCOSE: 242 MG/DL (ref 70–110)
POCT GLUCOSE: 92 MG/DL (ref 70–110)
POTASSIUM SERPL-SCNC: 4.5 MMOL/L (ref 3.5–5.1)
PROT FLD-MCNC: <1 G/DL
PROT SERPL-MCNC: 8.2 G/DL (ref 6–8.4)
RBC # BLD AUTO: 2.77 M/UL (ref 4–5.4)
SODIUM SERPL-SCNC: 137 MMOL/L (ref 136–145)
SPECIMEN SOURCE: NORMAL
WBC # BLD AUTO: 6.01 K/UL (ref 3.9–12.7)
WBC # FLD: 37 /CU MM

## 2022-10-27 PROCEDURE — 80053 COMPREHEN METABOLIC PANEL: CPT | Mod: NTX | Performed by: PHYSICIAN ASSISTANT

## 2022-10-27 PROCEDURE — G0378 HOSPITAL OBSERVATION PER HR: HCPCS | Mod: NTX

## 2022-10-27 PROCEDURE — 25000003 PHARM REV CODE 250: Mod: NTX | Performed by: NURSE PRACTITIONER

## 2022-10-27 PROCEDURE — 36415 COLL VENOUS BLD VENIPUNCTURE: CPT | Mod: NTX | Performed by: PHYSICIAN ASSISTANT

## 2022-10-27 PROCEDURE — 99900035 HC TECH TIME PER 15 MIN (STAT): Mod: NTX

## 2022-10-27 PROCEDURE — 84100 ASSAY OF PHOSPHORUS: CPT | Mod: NTX | Performed by: PHYSICIAN ASSISTANT

## 2022-10-27 PROCEDURE — 11000001 HC ACUTE MED/SURG PRIVATE ROOM: Mod: NTX

## 2022-10-27 PROCEDURE — 36415 COLL VENOUS BLD VENIPUNCTURE: CPT | Mod: NTX | Performed by: INTERNAL MEDICINE

## 2022-10-27 PROCEDURE — 25000003 PHARM REV CODE 250: Mod: NTX | Performed by: PHYSICIAN ASSISTANT

## 2022-10-27 PROCEDURE — 83735 ASSAY OF MAGNESIUM: CPT | Mod: NTX | Performed by: PHYSICIAN ASSISTANT

## 2022-10-27 PROCEDURE — 94761 N-INVAS EAR/PLS OXIMETRY MLT: CPT | Mod: NTX

## 2022-10-27 PROCEDURE — 85025 COMPLETE CBC W/AUTO DIFF WBC: CPT | Mod: NTX | Performed by: PHYSICIAN ASSISTANT

## 2022-10-27 PROCEDURE — 82746 ASSAY OF FOLIC ACID SERUM: CPT | Mod: NTX | Performed by: INTERNAL MEDICINE

## 2022-10-27 PROCEDURE — 90945 DIALYSIS ONE EVALUATION: CPT | Mod: NTX

## 2022-10-27 RX ORDER — ZOLPIDEM TARTRATE 5 MG/1
5 TABLET ORAL NIGHTLY PRN
Status: DISCONTINUED | OUTPATIENT
Start: 2022-10-27 | End: 2022-10-30 | Stop reason: HOSPADM

## 2022-10-27 RX ORDER — ZOLPIDEM TARTRATE 5 MG/1
5 TABLET ORAL ONCE
Status: COMPLETED | OUTPATIENT
Start: 2022-10-27 | End: 2022-10-27

## 2022-10-27 RX ADMIN — ZOLPIDEM TARTRATE 5 MG: 5 TABLET ORAL at 01:10

## 2022-10-27 RX ADMIN — SEVELAMER CARBONATE 800 MG: 800 TABLET, FILM COATED ORAL at 08:10

## 2022-10-27 RX ADMIN — Medication 6 MG: at 01:10

## 2022-10-27 RX ADMIN — CARVEDILOL 3.12 MG: 3.12 TABLET, FILM COATED ORAL at 08:10

## 2022-10-27 RX ADMIN — CALCITRIOL CAPSULES 0.25 MCG 0.25 MCG: 0.25 CAPSULE ORAL at 08:10

## 2022-10-27 RX ADMIN — ATORVASTATIN CALCIUM 40 MG: 40 TABLET, FILM COATED ORAL at 08:10

## 2022-10-27 RX ADMIN — ZOLPIDEM TARTRATE 5 MG: 5 TABLET ORAL at 09:10

## 2022-10-27 RX ADMIN — SEVELAMER CARBONATE 800 MG: 800 TABLET, FILM COATED ORAL at 05:10

## 2022-10-27 RX ADMIN — SEVELAMER CARBONATE 800 MG: 800 TABLET, FILM COATED ORAL at 12:10

## 2022-10-27 RX ADMIN — TIMOLOL MALEATE 1 DROP: 5 SOLUTION OPHTHALMIC at 08:10

## 2022-10-27 NOTE — PROGRESS NOTES
[facility] Medicine  Progress Note    Patient Name: Elizabeth Maldonado  MRN: [id number]  Patient Class: OP- Observation   Admission Date: 10/26/2022  Length of Stay: 0 days  Attending Physician: Danie Henson MD  Primary Care Provider: Richy Singleton NP        Subjective:     Principal Problem:Pleural effusion, right        HPI:  Elizabeth Maldonado 57 y.o. female with DM, on PD, HTN, HLD previous kidney transplant no longer on immunosuppression presents to the hospital with a chief complaint of SOB.  She reports she woke up this she was short of breath.  She reports her shortness of worsens lying flat, it is improved with sitting upright and supplemental oxygen.  She denies any attempted treatment home.  She reports she has been on PD for the last 7 months, and underwent PD last night reports her catheter was slow to drain.  She is no longer on immunosuppression after history of kidney transplant.  She reports her urine output varies day-to-day.  She denies fever chest pain cough nausea vomiting abdominal pain leg swelling syncope dizziness dysuria melena hematuria hematemesis.  She does not take any blood thinners.    In the ED, chest x-ray with large right sided pleural effusion, troponin negative, , potassium 3.7, Albumin 2.0.       Overview/Hospital Course:  Mrs. Maldonado is a pleasant 57-year-old female with a past medical history of diabetes, ESRD on PD, hypertension, hyperlipidemia, and previous kidney transplant no longer on immunosuppression.  Patient resumed peritoneal dialysis about 7 months ago.  The patient was admitted to the hospital for shortness of breath complaints.  Chest x-ray showed new large right pleural effusion.  Patient had thoracentesis 1.2 L of serous fluid removed by Interventional Radiology.  Patient arose fluid studies suggest transudate as protein less than 1 g, LDH fluid less than 30, serum .  Patient pleural culture shows no growth.  Pleural fluid  suggesting transudate, likely due to renal disease.  Check echo to assess LV function.  Cytology results pending.      No new subjective & objective note has been filed under this hospital service since the last note was generated.    Assessment/Plan:      * Pleural effusion, right  Presents with sudden onset SOB. On supplemental oxygen in the ED, chest x-ray with new large right sided pleural effusion.   IR consulted, input appreciated.  Patient had 1.2 L of serous fluid removed.  Pleural LDH, protein, glucose, results suggest likely transient date.  Pleural fluid LDH less than 30, protein less than 1 g.  Serum .  Pleural fluid culture no growth.  cytology ordered  Will check serum LDH, was 295.  Continue supplemental oxygen ,wean off oxygen as tolerated  Follow-up portable chest x-ray shows small right pleural effusion, but significant reduction compared to the prior study.  No pneumothorax.  Check echo to assess LV function, if not done in the recent past.  -echo January 2022 showed EF 65-70%.    Hypocalcemia  Presents with calcium 6.7 that corrects to 8.3 for low albumin.   Continue home calcitriol    Anemia of chronic kidney failure, stage 5  H&H stable and consistent with baseline. No complaints of active bleeding or indication for transfusion at this time. Will continue to monitor    End stage kidney disease  Nephrology consulted for PD, discussed with nephrology recommending glucose of pleural fluid checked    Renovascular hypertension  Poorly controlled. Continue home coreg    Chronic immunosuppression with Prograf  Per patient no longer on immunosuppresion    Kidney transplant status, living unrelated donor - 12/2/14  Followed by transplant outpt. Reports no longer on immunosuppression   Continue outpt follow up  Continue PD    Hyperlipidemia  continue home statin    Type 2 diabetes mellitus, with long-term current use of insulin  Lab Results   Component Value Date    HGBA1C 7.9 (H) 10/05/2022      Will start sliding scale insulin, diabetic/renal diet, goal -180. Initial BG 72 on arrival. Will resume basal if BG elevates      VTE Risk Mitigation (From admission, onward)           Ordered     Place ORLANDO hose  Until discontinued         10/26/22 1421     IP VTE HIGH RISK PATIENT  Once         10/26/22 1421     Place sequential compression device  Until discontinued         10/26/22 1421                    Discharge Planning   KRISTEN:      Code Status: Full Code   Is the patient medically ready for discharge?:     Reason for patient still in hospital (select all that apply): Patient unstable, Laboratory test and Treatment  Discharge Plan A: Home                  Danie Henson MD  Department of Hospital Medicine   [facility] Surg

## 2022-10-27 NOTE — SUBJECTIVE & OBJECTIVE
Interval History:  Patient was admitted to the hospital for shortness of breath.  Chest x-ray showed new large right pleural effusion.  Patient had right lung thoracentesis by Interventional Radiology with 1.2 L of serous fluid removed.  Fluid analysis suggests likely transudate, culture no growth.  Follow-up chest x-ray shows improvement, only small effusion remaining.  No pneumothorax.  Possible discharge home tomorrow if continues to improve.  Check echo to assess LV function        Review of Systems   Constitutional: Negative.    HENT: Negative.     Eyes: Negative.    Respiratory: Negative.     Cardiovascular: Negative.    Gastrointestinal: Negative.    Endocrine: Negative.    Genitourinary: Negative.    Musculoskeletal: Negative.    Skin: Negative.    Allergic/Immunologic: Negative.    Neurological: Negative.    Hematological: Negative.    Psychiatric/Behavioral: Negative.     Objective:     Vital Signs (Most Recent):  Temp: 98.5 °F (36.9 °C) (10/27/22 1611)  Pulse: 92 (10/27/22 1611)  Resp: 18 (10/27/22 1611)  BP: (!) 159/76 (10/27/22 1611)  SpO2: 98 % (10/27/22 1611)   Vital Signs (24h Range):  Temp:  [97.5 °F (36.4 °C)-98.8 °F (37.1 °C)] 98.5 °F (36.9 °C)  Pulse:  [86-95] 92  Resp:  [17-20] 18  SpO2:  [98 %-100 %] 98 %  BP: (154-186)/(72-91) 159/76     Weight: 86.4 kg (190 lb 7.6 oz)  Body mass index is 29.83 kg/m².    Intake/Output Summary (Last 24 hours) at 10/27/2022 0333  Last data filed at 10/27/2022 1330  Gross per 24 hour   Intake 1200 ml   Output --   Net 1200 ml      Physical Exam  Vitals reviewed.   Constitutional:       Appearance: Normal appearance. She is normal weight.   HENT:      Head: Normocephalic.      Right Ear: External ear normal.      Left Ear: External ear normal.      Nose: Nose normal.      Mouth/Throat:      Mouth: Mucous membranes are moist.      Pharynx: Oropharynx is clear.   Eyes:      Extraocular Movements: Extraocular movements intact.      Conjunctiva/sclera: Conjunctivae  normal.      Pupils: Pupils are equal, round, and reactive to light.   Cardiovascular:      Rate and Rhythm: Normal rate and regular rhythm.      Pulses: Normal pulses.      Heart sounds: Normal heart sounds.   Pulmonary:      Effort: Pulmonary effort is normal.      Breath sounds: Normal breath sounds.   Abdominal:      General: Abdomen is flat. Bowel sounds are normal.      Palpations: Abdomen is soft.   Musculoskeletal:         General: Normal range of motion.      Cervical back: Normal range of motion.   Skin:     General: Skin is warm.      Capillary Refill: Capillary refill takes less than 2 seconds.   Neurological:      General: No focal deficit present.      Mental Status: She is alert and oriented to person, place, and time. Mental status is at baseline.      Cranial Nerves: No cranial nerve deficit.   Psychiatric:         Mood and Affect: Mood normal.         Behavior: Behavior normal.         Thought Content: Thought content normal.         Judgment: Judgment normal.       Significant Labs: All pertinent labs within the past 24 hours have been reviewed.  Blood Culture: No results for input(s): LABBLOO in the last 48 hours.  CBC:   Recent Labs   Lab 10/26/22  1206 10/27/22  0508   WBC 5.70 6.01   HGB 8.3* 8.0*   HCT 25.9* 25.5*    156     CMP:   Recent Labs   Lab 10/26/22  1206 10/27/22  0508    137   K 3.7 4.5   * 102   CO2 19* 20*   GLU 72 102   BUN 56* 74*   CREATININE 16.0* 20.5*   CALCIUM 6.7* 7.7*   PROT 7.5 8.2   ALBUMIN 2.0* 2.2*   BILITOT 0.4 0.4   ALKPHOS 40* 47*   AST 9* 10   ALT 6* 6*   ANIONGAP 10 15     Magnesium:   Recent Labs   Lab 10/27/22  0508   MG 2.4     TSH: No results for input(s): TSH in the last 4320 hours.  Urine Culture: No results for input(s): LABURIN in the last 48 hours.    Significant Imaging: I have reviewed all pertinent imaging results/findings within the past 24 hours.

## 2022-10-27 NOTE — CONSULTS
Reason for consultation:  ESRD, ccPD    HPI:  58 yo lady with h/o HTN, DM type 2, failed transplant, ESRD on ccPD was admitted for new onset of (R)pleural effusion.  She reports having sudden SOB yesterday and going to the ER.  She underwent thoracentesis with 1200cc of fluid removal.  Nephrology is consulted for ESRD, peritoneal dialysis.    PMH:  As above.    Scheduled Meds:   atorvastatin  40 mg Oral QHS    calcitRIOL  0.25 mcg Oral Daily    carvediloL  3.125 mg Oral BID    sevelamer carbonate  800 mg Oral TID WM    timolol maleate 0.5%  1 drop Both Eyes Daily       Review of patient's allergies indicates:   Allergen Reactions    Shrimp Hives, Itching and Rash    Topamax [topiramate] Other (See Comments)     Vision changes    Zoloft [sertraline] Palpitations     Family history:  non contributory    Social history:  remote h/o tobacco, no alcohol    Vital Signs Range (Last 24H):  Temp:  [97.5 °F (36.4 °C)-99.2 °F (37.3 °C)]   Pulse:  [88-96]   Resp:  [17-22]   BP: (154-195)/()   SpO2:  [96 %-100 %]     I & O (Last 24H):  Intake/Output Summary (Last 24 hours) at 10/27/2022 1111  Last data filed at 10/27/2022 0830  Gross per 24 hour   Intake 840 ml   Output 1200 ml   Net -360 ml           Physical Exam:  General appearance: well developed, well nourished, no distress  Lungs:  clear to auscultation bilaterally and normal respiratory effort  Heart: regular rate and rhythm  Abdomen:  soft, normal bowel sounds  Extremities: no cyanosis or edema, or clubbing    Laboratory:  I have reviewed all pertinent lab results within the past 24 hours.  CBC:   Recent Labs   Lab 10/27/22  0508   WBC 6.01   RBC 2.77*   HGB 8.0*   HCT 25.5*      MCV 92   MCH 28.9   MCHC 31.4*     CMP:   Recent Labs   Lab 10/27/22  0508      CALCIUM 7.7*   ALBUMIN 2.2*   PROT 8.2      K 4.5   CO2 20*      BUN 74*   CREATININE 20.5*   ALKPHOS 47*   ALT 6*   AST 10   BILITOT 0.4       Assessment & Plan    ESRD on  ccPD  (R) pleural effusion  HTN  DM type 2  Anemia of CKD    Etiology of pleural effusion is unclear  Suspected leak from peritoneal space  Pleural fluid glucose was not done  Continue with ccPD with low dwell volume for now and observe closely  We'll follow    Thank you for the courtesy of the consultation      María Barron  10/27/2022

## 2022-10-27 NOTE — PLAN OF CARE
Problem: Adult Inpatient Plan of Care  Goal: Plan of Care Review  Outcome: Ongoing, Progressing  Flowsheets (Taken 10/27/2022 1339)  Plan of Care Reviewed With: patient  Goal: Patient-Specific Goal (Individualized)  Outcome: Ongoing, Progressing  Goal: Absence of Hospital-Acquired Illness or Injury  Outcome: Ongoing, Progressing  Intervention: Identify and Manage Fall Risk  Flowsheets (Taken 10/27/2022 1339)  Safety Promotion/Fall Prevention:   assistive device/personal item within reach   Fall Risk reviewed with patient/family   nonskid shoes/socks when out of bed   high risk medications identified   side rails raised x 2   instructed to call staff for mobility   room near unit station   medications reviewed  Intervention: Prevent Skin Injury  Flowsheets (Taken 10/27/2022 1339)  Body Position:   position changed independently   weight shifting  Skin Protection:   adhesive use limited   tubing/devices free from skin contact  Intervention: Prevent and Manage VTE (Venous Thromboembolism) Risk  Flowsheets (Taken 10/27/2022 1339)  Activity Management:   Ankle pumps - L1   Arm raise - L1   Rolling - L1   Straight leg raise - L1  VTE Prevention/Management:   ambulation promoted   bleeding precations maintained   bleeding risk assessed  Range of Motion: active ROM (range of motion) encouraged  Intervention: Prevent Infection  Flowsheets (Taken 10/27/2022 1339)  Infection Prevention: hand hygiene promoted  Goal: Optimal Comfort and Wellbeing  Outcome: Ongoing, Progressing  Goal: Readiness for Transition of Care  Outcome: Ongoing, Progressing     Problem: Fall Injury Risk  Goal: Absence of Fall and Fall-Related Injury  Outcome: Ongoing, Progressing  Intervention: Identify and Manage Contributors  Flowsheets (Taken 10/27/2022 1339)  Self-Care Promotion:   independence encouraged   BADL personal objects within reach   BADL personal routines maintained   meal set-up provided   safe use of adaptive equipment  encouraged  Medication Review/Management:   medications reviewed   high-risk medications identified  Intervention: Promote Injury-Free Environment  Flowsheets (Taken 10/27/2022 0322)  Safety Promotion/Fall Prevention:   assistive device/personal item within reach   Fall Risk reviewed with patient/family   nonskid shoes/socks when out of bed   high risk medications identified   side rails raised x 2   instructed to call staff for mobility   room near unit station   medications reviewed     Problem: Diabetes Comorbidity  Goal: Blood Glucose Level Within Targeted Range  Outcome: Ongoing, Progressing  Intervention: Monitor and Manage Glycemia  Flowsheets (Taken 10/27/2022 6957)  Glycemic Management: blood glucose monitored     Problem: Fluid and Electrolyte Imbalance (Acute Kidney Injury/Impairment)  Goal: Fluid and Electrolyte Balance  Outcome: Ongoing, Progressing     Problem: Oral Intake Inadequate (Acute Kidney Injury/Impairment)  Goal: Optimal Nutrition Intake  Outcome: Ongoing, Progressing     Problem: Renal Function Impairment (Acute Kidney Injury/Impairment)  Goal: Effective Renal Function  Outcome: Ongoing, Progressing

## 2022-10-27 NOTE — ASSESSMENT & PLAN NOTE
Presents with sudden onset SOB. On supplemental oxygen in the ED, chest x-ray with new large right sided pleural effusion.   IR consulted, input appreciated.  Patient had 1.2 L of serous fluid removed.  Pleural LDH, protein, glucose, results suggest likely transient date.  Pleural fluid LDH less than 30, protein less than 1 g.  Serum .  Pleural fluid culture no growth.  cytology ordered  Will check serum LDH, was 295.  Continue supplemental oxygen ,wean off oxygen as tolerated  Follow-up portable chest x-ray shows small right pleural effusion, but significant reduction compared to the prior study.  No pneumothorax.  Check echo to assess LV function, if not done in the recent past.

## 2022-10-27 NOTE — CONSULTS
[facility] - Med Surg  Discharge Assessment    Patient independent from home alone. Pt currently has PD followed by [facility]. No current dme. Pt will need transportation assistance at discharge. [place] to continue to follow for dc needs.     Primary Care Provider: Richy Singleton NP     Discharge Assessment (most recent)       BRIEF DISCHARGE ASSESSMENT - 10/27/22 1046          Discharge Planning    Assessment Type Discharge Planning Brief Assessment     Resource/Environmental Concerns none     Support Systems Family members     Assistance Needed None     Equipment Currently Used at Home other (see comments)   PD dialysis equipment    Current Living Arrangements home/apartment/condo     Patient/Family Anticipates Transition to home     Patient/Family Anticipated Services at Transition none     DME Needed Upon Discharge  none     Discharge Plan A Home     Discharge Plan B Home

## 2022-10-27 NOTE — HOSPITAL COURSE
Mrs. Maldonado is a pleasant 57-year-old female with a past medical history of diabetes, ESRD on PD, hypertension, hyperlipidemia, and previous kidney transplant no longer on immunosuppression.  Patient resumed peritoneal dialysis about 7 months ago.  The patient was admitted to the hospital for shortness of breath complaints.  Chest x-ray showed new large right pleural effusion.  Patient had thoracentesis 1.2 L of serous fluid removed by Interventional Radiology.  Patient arose fluid studies suggest transudate as protein less than 1 g, LDH fluid less than 30, serum .  Patient pleural culture shows no growth.  Pleural fluid suggesting transudate, likely due to renal disease.  Check echo to assess LV function.  Cytology results pending.  Patient had repeat chest x-ray today due to diminished breath sounds at right lung field.  Chest x-ray shows increasing right lung pleural effusion.  Patient followed by Nephrology, suspected to have a PD catheter  fluid leak into her right pleural space.  Patient peritoneal dialysis will be discontinued at this time.  General surgery consult to evaluate for possible right lung pleural leak, and needs HD catheter access.  Patient started on hemodialysis 10/29/2022 and tolerated well.  Patient was able to correlate with her  and her current dialysis unit has high chair set up for Monday morning at 10:00 a.m..  Patient will proceed tomorrow morning for HD.  She will follow-up with her nephrologist Dr. Meehan.  [facility]

## 2022-10-28 LAB
AORTIC ROOT ANNULUS: 1.6 CM
AV PEAK GRADIENT: 13 MMHG
AV VELOCITY RATIO: 0.65
BSA FOR ECHO PROCEDURE: 2.02 M2
CV ECHO LV RWT: 0.52 CM
DOP CALC AO PEAK VEL: 1.81 M/S
DOP CALC LVOT AREA: 2 CM2
DOP CALC LVOT DIAMETER: 1.58 CM
DOP CALC LVOT PEAK VEL: 1.17 M/S
DOP CALC LVOT STROKE VOLUME: 46.05 CM3
DOP CALCLVOT PEAK VEL VTI: 23.5 CM
E WAVE DECELERATION TIME: 240.13 MSEC
E/A RATIO: 0.9
E/E' RATIO: 41.33 M/S
ECHO LV POSTERIOR WALL: 1.01 CM (ref 0.6–1.1)
EJECTION FRACTION: 70 %
FRACTIONAL SHORTENING: 36 % (ref 28–44)
GLUCOSE FLD-MCNC: 394 MG/DL
INTERVENTRICULAR SEPTUM: 1.41 CM (ref 0.6–1.1)
IVRT: 117.65 MSEC
LA MAJOR: 5.92 CM
LA MINOR: 5.63 CM
LA WIDTH: 4.7 CM
LEFT ATRIUM SIZE: 4.37 CM
LEFT ATRIUM VOLUME INDEX: 50.9 ML/M2
LEFT ATRIUM VOLUME: 100.76 CM3
LEFT INTERNAL DIMENSION IN SYSTOLE: 2.5 CM (ref 2.1–4)
LEFT VENTRICLE DIASTOLIC VOLUME INDEX: 33.6 ML/M2
LEFT VENTRICLE DIASTOLIC VOLUME: 66.53 ML
LEFT VENTRICLE MASS INDEX: 82 G/M2
LEFT VENTRICLE SYSTOLIC VOLUME INDEX: 11.2 ML/M2
LEFT VENTRICLE SYSTOLIC VOLUME: 22.27 ML
LEFT VENTRICULAR INTERNAL DIMENSION IN DIASTOLE: 3.92 CM (ref 3.5–6)
LEFT VENTRICULAR MASS: 162.51 G
LV LATERAL E/E' RATIO: 41.33 M/S
LV SEPTAL E/E' RATIO: 41.33 M/S
LVOT MG: 3.52 MMHG
LVOT MV: 0.9 CM/S
MV PEAK A VEL: 1.38 M/S
MV PEAK E VEL: 1.24 M/S
MV STENOSIS PRESSURE HALF TIME: 69.64 MS
MV VALVE AREA P 1/2 METHOD: 3.16 CM2
PISA TR MAX VEL: 2.5 M/S
POCT GLUCOSE: 141 MG/DL (ref 70–110)
POCT GLUCOSE: 194 MG/DL (ref 70–110)
POCT GLUCOSE: 231 MG/DL (ref 70–110)
PULM VEIN S/D RATIO: 1.55
PV PEAK D VEL: 0.4 M/S
PV PEAK S VEL: 0.62 M/S
PV PEAK VELOCITY: 1.14 CM/S
RA MAJOR: 4.74 CM
RA PRESSURE: 3 MMHG
RA WIDTH: 3.8 CM
RIGHT VENTRICULAR END-DIASTOLIC DIMENSION: 3.17 CM
SINUS: 2.9 CM
STJ: 2.16 CM
TDI LATERAL: 0.03 M/S
TDI SEPTAL: 0.03 M/S
TDI: 0.03 M/S
TR MAX PG: 25 MMHG
TRICUSPID ANNULAR PLANE SYSTOLIC EXCURSION: 2 CM
TV REST PULMONARY ARTERY PRESSURE: 28 MMHG

## 2022-10-28 PROCEDURE — 82945 GLUCOSE OTHER FLUID: CPT | Mod: NTX | Performed by: INTERNAL MEDICINE

## 2022-10-28 PROCEDURE — 25000003 PHARM REV CODE 250: Mod: NTX | Performed by: PHYSICIAN ASSISTANT

## 2022-10-28 PROCEDURE — 63600175 PHARM REV CODE 636 W HCPCS: Mod: NTX | Performed by: PHYSICIAN ASSISTANT

## 2022-10-28 PROCEDURE — 25000003 PHARM REV CODE 250: Mod: NTX | Performed by: INTERNAL MEDICINE

## 2022-10-28 PROCEDURE — 11000001 HC ACUTE MED/SURG PRIVATE ROOM: Mod: NTX

## 2022-10-28 PROCEDURE — 63600175 PHARM REV CODE 636 W HCPCS: Mod: NTX | Performed by: RADIOLOGY

## 2022-10-28 PROCEDURE — 63600175 PHARM REV CODE 636 W HCPCS: Mod: NTX | Performed by: NURSE PRACTITIONER

## 2022-10-28 RX ORDER — MUPIROCIN 20 MG/G
OINTMENT TOPICAL 2 TIMES DAILY
Status: DISCONTINUED | OUTPATIENT
Start: 2022-10-28 | End: 2022-10-30 | Stop reason: HOSPADM

## 2022-10-28 RX ORDER — HEPARIN 100 UNIT/ML
SYRINGE INTRAVENOUS
Status: COMPLETED | OUTPATIENT
Start: 2022-10-28 | End: 2022-10-28

## 2022-10-28 RX ORDER — SODIUM CHLORIDE 9 MG/ML
INJECTION, SOLUTION INTRAVENOUS ONCE
Status: DISCONTINUED | OUTPATIENT
Start: 2022-10-28 | End: 2022-10-30 | Stop reason: HOSPADM

## 2022-10-28 RX ORDER — HYDROCODONE BITARTRATE AND ACETAMINOPHEN 5; 325 MG/1; MG/1
1 TABLET ORAL EVERY 6 HOURS PRN
Status: DISCONTINUED | OUTPATIENT
Start: 2022-10-28 | End: 2022-10-30 | Stop reason: HOSPADM

## 2022-10-28 RX ORDER — FENTANYL CITRATE 50 UG/ML
INJECTION, SOLUTION INTRAMUSCULAR; INTRAVENOUS
Status: COMPLETED | OUTPATIENT
Start: 2022-10-28 | End: 2022-10-28

## 2022-10-28 RX ORDER — HYDRALAZINE HYDROCHLORIDE 20 MG/ML
10 INJECTION INTRAMUSCULAR; INTRAVENOUS EVERY 8 HOURS PRN
Status: DISCONTINUED | OUTPATIENT
Start: 2022-10-28 | End: 2022-10-30 | Stop reason: HOSPADM

## 2022-10-28 RX ADMIN — HYDROCODONE BITARTRATE AND ACETAMINOPHEN 1 TABLET: 5; 325 TABLET ORAL at 11:10

## 2022-10-28 RX ADMIN — MUPIROCIN: 20 OINTMENT TOPICAL at 08:10

## 2022-10-28 RX ADMIN — HEPARIN 3.6 ML: 100 SYRINGE at 03:10

## 2022-10-28 RX ADMIN — CALCITRIOL CAPSULES 0.25 MCG 0.25 MCG: 0.25 CAPSULE ORAL at 08:10

## 2022-10-28 RX ADMIN — CARVEDILOL 3.12 MG: 3.12 TABLET, FILM COATED ORAL at 08:10

## 2022-10-28 RX ADMIN — HYDRALAZINE HYDROCHLORIDE 10 MG: 20 INJECTION INTRAMUSCULAR; INTRAVENOUS at 05:10

## 2022-10-28 RX ADMIN — FENTANYL CITRATE 50 MCG: 50 INJECTION INTRAMUSCULAR; INTRAVENOUS at 03:10

## 2022-10-28 RX ADMIN — SEVELAMER CARBONATE 800 MG: 800 TABLET, FILM COATED ORAL at 08:10

## 2022-10-28 RX ADMIN — INSULIN ASPART 1 UNITS: 100 INJECTION, SOLUTION INTRAVENOUS; SUBCUTANEOUS at 10:10

## 2022-10-28 RX ADMIN — SEVELAMER CARBONATE 800 MG: 800 TABLET, FILM COATED ORAL at 05:10

## 2022-10-28 RX ADMIN — TIMOLOL MALEATE 1 DROP: 5 SOLUTION OPHTHALMIC at 08:10

## 2022-10-28 RX ADMIN — HYDROCODONE BITARTRATE AND ACETAMINOPHEN 1 TABLET: 5; 325 TABLET ORAL at 05:10

## 2022-10-28 RX ADMIN — ATORVASTATIN CALCIUM 40 MG: 40 TABLET, FILM COATED ORAL at 08:10

## 2022-10-28 NOTE — H&P
Inpatient Radiology Pre-procedure Note    History of Present Illness:  Elizabeth Maldonado is a 57 y.o. female who presents for right thoracentesis and THDC placement.  Admission H&P reviewed.  Past Medical History:   Diagnosis Date    Acidosis 2014    Allergic rhinitis 2014    Allergy     Anemia     Anemia in chronic kidney disease 2014    Anxiety     Cataract     Chronic bilateral low back pain with bilateral sciatica 10/25/2019    Chronic immunosuppression with Prograf and MMF 12/3/2014    CKD (chronic kidney disease) stage 5, GFR less than 15 ml/min 2014    CKD (chronic kidney disease), stage III 3/2/2015    Degenerative disc disease     Depression 2014    Diabetes mellitus, type 2 since age 18 2014    ESRD on peritoneal dialysis - 2014 for 9 hours no peritonitis 2014    Hyperlipidemia     Hypertension 2014    Hypomagnesemia 2015    Kidney transplant status, living unrelated donor - 12/2/14 12/3/2014    Neutropenia 2015    NS (nuclear sclerosis) 2016    Obesity     Organ transplant candidate 2014    Pre-op exam 2014    Proliferative diabetic retinopathy of both eyes without macular edema associated with type 2 diabetes mellitus 2016    Renal manifestation of secondary diabetes mellitus     Tendinitis     Trouble in sleeping      Past Surgical History:   Procedure Laterality Date    BIOPSY N/A 2020    Procedure: Biopsy;  Surgeon: Sweta Catalan MD;  Location: [address];  Service: Transplant;  Laterality: N/A;    BONE MARROW BIOPSY N/A      SECTION      x 2    COLONOSCOPY N/A 2022    Procedure: COLONOSCOPY;  Surgeon: Franklin Gan MD;  Location: [facility] (4TH FLR);  Service: Colon and Rectal;  Laterality: N/A;  order created: previous order unusable  fully vaccinated, labs prior, instr mailed / portal -ml    KIDNEY TRANSPLANT  2014    LAPAROSCOPIC REVISION OF PROCEDURE INVOLVING PERITONEAL DIALYSIS CATHETER N/A  "4/4/2022    Procedure: REVISION OF PROCEDURE INVOLVING PERITONEAL DIALYSIS CATHETER, LAPAROSCOPIC;  Surgeon: Franklin Barber MD;  Location: [facility] OR 2ND FLR;  Service: General;  Laterality: N/A;    MEDIPORT REMOVAL N/A 11/25/2019    Procedure: REMOVAL, CATHETER, CENTRAL VENOUS, TUNNELED, WITH PORT;  Surgeon: Eusebia Diagnostic Provider;  Location: [facility] OR;  Service: Radiology;  Laterality: N/A;    RENAL BIOPSY      ROTATOR CUFF REPAIR      TUBAL LIGATION         Review of Systems:   As documented in primary team H&P    Home Meds:   Prior to Admission medications    Medication Sig Start Date End Date Taking? Authorizing Provider   atorvastatin (LIPITOR) 40 MG tablet Take 40 mg by mouth every evening.   Yes Historical Provider   blood sugar diagnostic Strp Checks BG ac/hs 6/5/17  Yes Cristy Sampson MD   calcitRIOL (ROCALTROL) 0.25 MCG Cap Take 0.25 mcg by mouth once daily. 7/12/22  Yes Graciela Pineda MD   carvediloL (COREG) 3.125 MG tablet Take 3.125 mg by mouth 2 (two) times daily. Hold for SBP <130 8/11/22  Yes Graciela Pineda MD   clonazePAM (KLONOPIN) 0.5 MG tablet Take 0.5 mg by mouth 2 (two) times daily as needed. 9/21/22  Yes Historical Provider   dulaglutide (TRULICITY) 1.5 mg/0.5 mL pen injector Inject 1.5 mg into the skin every 7 days. Pt takes on Wednesday   Yes Historical Provider   folic acid (FOLVITE) 1 MG tablet Take 1 tablet (1 mg total) by mouth once daily. 4/6/22 4/6/23 Yes Lorna Healy PA-C   HUMALOG KWIKPEN INSULIN 100 unit/mL pen Inject 3 Units into the skin 3 (three) times daily with meals. SLIDE SCALE 10/13/21  Yes Historical Provider   insulin syringe-needle U-100 (INSULIN SYRINGE) 1/2 mL 30 x 5/16" Syrg Uses 4 daily 1/6/15  Yes Salima Acosta, KARO, NP   lancets (ONETOUCH DELICA LANCETS) 33 gauge Misc 1 lancet by Misc.(Non-Drug; Combo Route) route 4 (four) times daily before meals and nightly. 6/5/17  Yes MD ADELITA Wilson U-100 INSULIN glargine 100 " "units/mL (3mL) SubQ pen Inject 50 Units into the skin once daily. 12/21/21  Yes Noemí Bishop NP   sevelamer carbonate (RENVELA) 800 mg Tab Take 1 tablet (800 mg total) by mouth 3 (three) times daily with meals.  Patient taking differently: Take 800 mg by mouth 3 (three) times daily with meals. Take 2 tab w/ meal & 1 tab w/ snack 1/27/22 1/27/23 Yes Noemí Bishop NP   SYRINGE & NEEDLE,INSULIN,1 ML (INSULIN SYRINGE-NEEDLE U-100) 1 mL 29 X 7/16" Syrg  3/25/15  Yes Historical Provider   timolol maleate 0.5% (TIMOPTIC) 0.5 % Drop Place 2 drops in both eyes twice daily   Yes Historical Provider   zolpidem (AMBIEN) 10 mg Tab Take 10 mg by mouth nightly as needed. 8/15/21  Yes Historical Provider     Scheduled Meds:    sodium chloride 0.9%   Intravenous Once    atorvastatin  40 mg Oral QHS    calcitRIOL  0.25 mcg Oral Daily    carvediloL  3.125 mg Oral BID    mupirocin   Nasal BID    sevelamer carbonate  800 mg Oral TID WM    timolol maleate 0.5%  1 drop Both Eyes Daily     Continuous Infusions:   PRN Meds:acetaminophen, dextrose 50%, dextrose 50%, glucagon (human recombinant), glucose, glucose, hydrALAZINE, insulin aspart U-100, magnesium oxide, magnesium oxide, naloxone, senna-docusate 8.6-50 mg, sodium chloride 0.9%, sodium chloride 0.9%, zolpidem  Anticoagulants/Antiplatelets: no anticoagulation    Allergies:   Review of patient's allergies indicates:   Allergen Reactions    Shrimp Hives, Itching and Rash    Topamax [topiramate] Other (See Comments)     Vision changes    Zoloft [sertraline] Palpitations     Sedation Hx: have not been any systemic reactions    Labs:  No results for input(s): INR in the last 168 hours.    Invalid input(s):  PT,  PTT    Recent Labs   Lab 10/27/22  0508   WBC 6.01   HGB 8.0*   HCT 25.5*   MCV 92         Recent Labs   Lab 10/27/22  0508         K 4.5      CO2 20*   BUN 74*   CREATININE 20.5*   CALCIUM 7.7*   MG 2.4   ALT 6*   AST 10   ALBUMIN 2.2* "   BILITOT 0.4         Vitals:  Temp: 98.4 °F (36.9 °C) (10/28/22 1131)  Pulse: 90 (10/28/22 1145)  Resp: 18 (10/28/22 1131)  BP: (!) 158/82 (10/28/22 1145)  SpO2: 99 % (10/28/22 1131)     Physical Exam:  ASA: 2  Mallampati: N/A    General: no acute distress  Mental Status: alert and oriented to person, place and time  HEENT: normocephalic, atraumatic  Chest: unlabored breathing though patient c/o mild SOB  Heart: regular heart rate  Abdomen: nondistended  Extremity: moves all extremities    Plan: proceed with right thoracentesis and THDC placement  Sedation Plan: fentanyl only for pain    Casa Murphy MD  Staff Radiologist  Department of Radiology  Pager: [phone]

## 2022-10-28 NOTE — NURSING
Patient returned to unit via wheelchair  Required assistance x1 for transfer back to bed.  NAD noted.

## 2022-10-28 NOTE — SUBJECTIVE & OBJECTIVE
Interval History:  Patient follow-up chest x-ray shows recurrent right lung pleural effusion, suggesting possible peritoneal dialysis fluid leak into the right lung.  Patient followed by Nephrology, input appreciated.  Recommended to discontinue PD, consult General surgery to evaluate for possible right lung pleural leak related to her PD, and for HD access.        Review of Systems   Constitutional: Negative.    HENT: Negative.     Eyes: Negative.    Respiratory: Negative.     Cardiovascular: Negative.    Gastrointestinal: Negative.    Endocrine: Negative.    Genitourinary: Negative.    Musculoskeletal: Negative.    Skin: Negative.    Allergic/Immunologic: Negative.    Neurological: Negative.    Hematological: Negative.    Psychiatric/Behavioral: Negative.     Objective:     Vital Signs (Most Recent):  Temp: 98.4 °F (36.9 °C) (10/28/22 0737)  Pulse: 91 (10/28/22 0737)  Resp: 20 (10/28/22 0737)  BP: (!) 153/77 (10/28/22 0737)  SpO2: 100 % (10/28/22 0737)   Vital Signs (24h Range):  Temp:  [98.1 °F (36.7 °C)-98.5 °F (36.9 °C)] 98.4 °F (36.9 °C)  Pulse:  [84-92] 91  Resp:  [18-20] 20  SpO2:  [98 %-100 %] 100 %  BP: (153-189)/(72-94) 153/77     Weight: 86.4 kg (190 lb 7.6 oz)  Body mass index is 29.83 kg/m².    Intake/Output Summary (Last 24 hours) at 10/28/2022 1124  Last data filed at 10/28/2022 0638  Gross per 24 hour   Intake 840 ml   Output --   Net 840 ml      Physical Exam  Vitals reviewed.   Constitutional:       Appearance: Normal appearance. She is normal weight.   HENT:      Head: Normocephalic.      Right Ear: External ear normal.      Left Ear: External ear normal.      Nose: Nose normal.      Mouth/Throat:      Mouth: Mucous membranes are moist.      Pharynx: Oropharynx is clear.   Eyes:      Extraocular Movements: Extraocular movements intact.      Conjunctiva/sclera: Conjunctivae normal.      Pupils: Pupils are equal, round, and reactive to light.   Cardiovascular:      Rate and Rhythm: Normal rate and  regular rhythm.      Pulses: Normal pulses.      Heart sounds: Normal heart sounds.   Pulmonary:      Effort: Pulmonary effort is normal.      Breath sounds: Normal breath sounds.      Comments: Diminished breath sounds right lower lobe, positive egophony  Abdominal:      General: Abdomen is flat. Bowel sounds are normal.      Palpations: Abdomen is soft.   Musculoskeletal:         General: Normal range of motion.      Cervical back: Normal range of motion.   Skin:     General: Skin is warm.      Capillary Refill: Capillary refill takes less than 2 seconds.   Neurological:      General: No focal deficit present.      Mental Status: She is alert and oriented to person, place, and time. Mental status is at baseline.      Cranial Nerves: No cranial nerve deficit.   Psychiatric:         Mood and Affect: Mood normal.         Behavior: Behavior normal.         Thought Content: Thought content normal.         Judgment: Judgment normal.       Significant Labs: All pertinent labs within the past 24 hours have been reviewed.  Blood Culture: No results for input(s): LABBLOO in the last 48 hours.  CBC:   Recent Labs   Lab 10/26/22  1206 10/27/22  0508   WBC 5.70 6.01   HGB 8.3* 8.0*   HCT 25.9* 25.5*    156     CMP:   Recent Labs   Lab 10/26/22  1206 10/27/22  0508    137   K 3.7 4.5   * 102   CO2 19* 20*   GLU 72 102   BUN 56* 74*   CREATININE 16.0* 20.5*   CALCIUM 6.7* 7.7*   PROT 7.5 8.2   ALBUMIN 2.0* 2.2*   BILITOT 0.4 0.4   ALKPHOS 40* 47*   AST 9* 10   ALT 6* 6*   ANIONGAP 10 15     Urine Culture: No results for input(s): LABURIN in the last 48 hours.    Significant Imaging: I have reviewed all pertinent imaging results/findings within the past 24 hours.

## 2022-10-28 NOTE — PLAN OF CARE
Problem: Adult Inpatient Plan of Care  Goal: Plan of Care Review  Outcome: Ongoing, Progressing  Flowsheets (Taken 10/28/2022 1407)  Plan of Care Reviewed With: patient  Goal: Patient-Specific Goal (Individualized)  Outcome: Ongoing, Progressing  Goal: Absence of Hospital-Acquired Illness or Injury  Outcome: Ongoing, Progressing  Intervention: Identify and Manage Fall Risk  Flowsheets (Taken 10/28/2022 1407)  Safety Promotion/Fall Prevention:   assistive device/personal item within reach   Fall Risk reviewed with patient/family   high risk medications identified   medications reviewed   side rails raised x 2   nonskid shoes/socks when out of bed   room near unit station   instructed to call staff for mobility   bed alarm set  Intervention: Prevent Skin Injury  Flowsheets (Taken 10/28/2022 1407)  Body Position:   position changed independently   weight shifting  Skin Protection:   adhesive use limited   tubing/devices free from skin contact  Intervention: Prevent and Manage VTE (Venous Thromboembolism) Risk  Flowsheets (Taken 10/28/2022 1407)  Activity Management:   Rolling - L1   Arm raise - L1   Ankle pumps - L1   Ambulated to bathroom - L4  VTE Prevention/Management:   ambulation promoted   bleeding precations maintained   bleeding risk assessed  Range of Motion: active ROM (range of motion) encouraged  Intervention: Prevent Infection  Flowsheets (Taken 10/28/2022 1407)  Infection Prevention: hand hygiene promoted  Goal: Optimal Comfort and Wellbeing  Outcome: Ongoing, Progressing  Goal: Readiness for Transition of Care  Outcome: Ongoing, Progressing     Problem: Fall Injury Risk  Goal: Absence of Fall and Fall-Related Injury  Outcome: Ongoing, Progressing  Intervention: Identify and Manage Contributors  Flowsheets (Taken 10/28/2022 1407)  Self-Care Promotion:   independence encouraged   BADL personal objects within reach   BADL personal routines maintained   meal set-up provided  Medication Review/Management:    medications reviewed   high-risk medications identified  Intervention: Promote Injury-Free Environment  Flowsheets (Taken 10/28/2022 1405)  Safety Promotion/Fall Prevention:   assistive device/personal item within reach   Fall Risk reviewed with patient/family   high risk medications identified   medications reviewed   side rails raised x 2   nonskid shoes/socks when out of bed   room near unit station   instructed to call staff for mobility   bed alarm set     Problem: Diabetes Comorbidity  Goal: Blood Glucose Level Within Targeted Range  Outcome: Ongoing, Progressing  Intervention: Monitor and Manage Glycemia  Flowsheets (Taken 10/28/2022 1407)  Glycemic Management: blood glucose monitored     Problem: Fluid and Electrolyte Imbalance (Acute Kidney Injury/Impairment)  Goal: Fluid and Electrolyte Balance  Outcome: Ongoing, Progressing     Problem: Oral Intake Inadequate (Acute Kidney Injury/Impairment)  Goal: Optimal Nutrition Intake  Outcome: Ongoing, Progressing     Problem: Renal Function Impairment (Acute Kidney Injury/Impairment)  Goal: Effective Renal Function  Outcome: Ongoing, Progressing  Intervention: Monitor and Support Renal Function  Flowsheets (Taken 10/28/2022 1403)  Medication Review/Management:   medications reviewed   high-risk medications identified     Problem: Device-Related Complication Risk (Hemodialysis)  Goal: Safe, Effective Therapy Delivery  Outcome: Ongoing, Progressing     Problem: Hemodynamic Instability (Hemodialysis)  Goal: Effective Tissue Perfusion  Outcome: Ongoing, Progressing     Problem: Infection (Hemodialysis)  Goal: Absence of Infection Signs and Symptoms  Outcome: Ongoing, Progressing

## 2022-10-28 NOTE — NURSING
"Patient states, "It's continuing to beep, I'm going to turn the machine off and let the doctor look at it tomorrow."  "

## 2022-10-28 NOTE — PROGRESS NOTES
Elizabeth Maldonado is a 57 y.o. female patient.    Follow for ESRD, ccPD    ccPD was stopped last night due to low drain volume  Reportedly feeling SOB this am      Scheduled Meds:   atorvastatin  40 mg Oral QHS    calcitRIOL  0.25 mcg Oral Daily    carvediloL  3.125 mg Oral BID    sevelamer carbonate  800 mg Oral TID WM    timolol maleate 0.5%  1 drop Both Eyes Daily       Review of patient's allergies indicates:   Allergen Reactions    Shrimp Hives, Itching and Rash    Topamax [topiramate] Other (See Comments)     Vision changes    Zoloft [sertraline] Palpitations         Vital Signs Range (Last 24H):  Temp:  [98.1 °F (36.7 °C)-98.5 °F (36.9 °C)]   Pulse:  [84-92]   Resp:  [18-20]   BP: (153-189)/(72-94)   SpO2:  [98 %-100 %]     I & O (Last 24H):  Intake/Output Summary (Last 24 hours) at 10/28/2022 0851  Last data filed at 10/28/2022 0638  Gross per 24 hour   Intake 840 ml   Output --   Net 840 ml           Physical Exam:  General appearance: well developed, well nourished, no distress  Lungs:  normal respiratory effort and diminished breath sounds base - right  Heart: regular rate and rhythm  Abdomen:  soft, normal bowel sounds  Extremities: no cyanosis or edema, or clubbing    Laboratory:  I have reviewed all pertinent lab results within the past 24 hours.  CBC:   Recent Labs   Lab 10/27/22  0508   WBC 6.01   RBC 2.77*   HGB 8.0*   HCT 25.5*      MCV 92   MCH 28.9   MCHC 31.4*     CMP:   Recent Labs   Lab 10/27/22  0508      CALCIUM 7.7*   ALBUMIN 2.2*   PROT 8.2      K 4.5   CO2 20*      BUN 74*   CREATININE 20.5*   ALKPHOS 47*   ALT 6*   AST 10   BILITOT 0.4       Imp/Plan    ESRD  New onset (R) pleural effusion - suspected leakage from peritoneal fluid  HTN  DM type 2  Anemia of CKD    Repeat CXR this am  If (R) pleural effusion re-accumulate overnight will consult IR to tap again and placement of tunneled catheter for HD  Discussed with patient    Addendum:    Repeat CXR -  moderate (R) pleural effusion  Etiology most likely from leakage form peritoneal fluid  Consult IR for thoracentesis and placement of tunneled dialysis catheter  Send fluid for glucose  Hold ccPD  HD in am  Discussed with becki Barron  10/28/2022

## 2022-10-28 NOTE — NURSING
Patient returned to unit via bed.  NAD noted.  Rt chest wall tunnel cath noted with clean dry dressing no bleeding noted,dressing noted to rt mid back, no c/o pain,continue monitoring.

## 2022-10-28 NOTE — PROGRESS NOTES
[facility] Medicine  Progress Note    Patient Name: Elizabeth Maldonado  MRN: [id number]  Patient Class: OP- Observation   Admission Date: 10/26/2022  Length of Stay: 0 days  Attending Physician: Danie Henson MD  Primary Care Provider: Richy Singleton NP        Subjective:     Principal Problem:Pleural effusion, right        HPI:  Elizabeth Maldonado 57 y.o. female with DM, on PD, HTN, HLD previous kidney transplant no longer on immunosuppression presents to the hospital with a chief complaint of SOB.  She reports she woke up this she was short of breath.  She reports her shortness of worsens lying flat, it is improved with sitting upright and supplemental oxygen.  She denies any attempted treatment home.  She reports she has been on PD for the last 7 months, and underwent PD last night reports her catheter was slow to drain.  She is no longer on immunosuppression after history of kidney transplant.  She reports her urine output varies day-to-day.  She denies fever chest pain cough nausea vomiting abdominal pain leg swelling syncope dizziness dysuria melena hematuria hematemesis.  She does not take any blood thinners.    In the ED, chest x-ray with large right sided pleural effusion, troponin negative, , potassium 3.7, Albumin 2.0.       Overview/Hospital Course:  Mrs. Maldonado is a pleasant 57-year-old female with a past medical history of diabetes, ESRD on PD, hypertension, hyperlipidemia, and previous kidney transplant no longer on immunosuppression.  Patient resumed peritoneal dialysis about 7 months ago.  The patient was admitted to the hospital for shortness of breath complaints.  Chest x-ray showed new large right pleural effusion.  Patient had thoracentesis 1.2 L of serous fluid removed by Interventional Radiology.  Patient arose fluid studies suggest transudate as protein less than 1 g, LDH fluid less than 30, serum .  Patient pleural culture shows no growth.  Pleural fluid  suggesting transudate, likely due to renal disease.  Check echo to assess LV function.  Cytology results pending.  Patient had repeat chest x-ray today due to diminished breath sounds at right lung field.  Chest x-ray shows increasing right lung pleural effusion.  Patient followed by Nephrology, suspected to have a PD catheter  fluid leak into her right pleural space.  Patient peritoneal dialysis will be discontinued at this time.  General surgery consult to evaluate for possible right lung pleural leak, and needs HD catheter access.      Interval History:  Patient follow-up chest x-ray shows recurrent right lung pleural effusion, suggesting possible peritoneal dialysis fluid leak into the right lung.  Patient followed by Nephrology, input appreciated.  Recommended to discontinue PD, consult General surgery to evaluate for possible right lung pleural leak related to her PD, and for HD access.        Review of Systems   Constitutional: Negative.    HENT: Negative.     Eyes: Negative.    Respiratory: Negative.     Cardiovascular: Negative.    Gastrointestinal: Negative.    Endocrine: Negative.    Genitourinary: Negative.    Musculoskeletal: Negative.    Skin: Negative.    Allergic/Immunologic: Negative.    Neurological: Negative.    Hematological: Negative.    Psychiatric/Behavioral: Negative.     Objective:     Vital Signs (Most Recent):  Temp: 98.4 °F (36.9 °C) (10/28/22 0737)  Pulse: 91 (10/28/22 0737)  Resp: 20 (10/28/22 0737)  BP: (!) 153/77 (10/28/22 0737)  SpO2: 100 % (10/28/22 0737)   Vital Signs (24h Range):  Temp:  [98.1 °F (36.7 °C)-98.5 °F (36.9 °C)] 98.4 °F (36.9 °C)  Pulse:  [84-92] 91  Resp:  [18-20] 20  SpO2:  [98 %-100 %] 100 %  BP: (153-189)/(72-94) 153/77     Weight: 86.4 kg (190 lb 7.6 oz)  Body mass index is 29.83 kg/m².    Intake/Output Summary (Last 24 hours) at 10/28/2022 1124  Last data filed at 10/28/2022 0638  Gross per 24 hour   Intake 840 ml   Output --   Net 840 ml      Physical  Exam  Vitals reviewed.   Constitutional:       Appearance: Normal appearance. She is normal weight.   HENT:      Head: Normocephalic.      Right Ear: External ear normal.      Left Ear: External ear normal.      Nose: Nose normal.      Mouth/Throat:      Mouth: Mucous membranes are moist.      Pharynx: Oropharynx is clear.   Eyes:      Extraocular Movements: Extraocular movements intact.      Conjunctiva/sclera: Conjunctivae normal.      Pupils: Pupils are equal, round, and reactive to light.   Cardiovascular:      Rate and Rhythm: Normal rate and regular rhythm.      Pulses: Normal pulses.      Heart sounds: Normal heart sounds.   Pulmonary:      Effort: Pulmonary effort is normal.      Breath sounds: Normal breath sounds.      Comments: Diminished breath sounds right lower lobe, positive egophony  Abdominal:      General: Abdomen is flat. Bowel sounds are normal.      Palpations: Abdomen is soft.   Musculoskeletal:         General: Normal range of motion.      Cervical back: Normal range of motion.   Skin:     General: Skin is warm.      Capillary Refill: Capillary refill takes less than 2 seconds.   Neurological:      General: No focal deficit present.      Mental Status: She is alert and oriented to person, place, and time. Mental status is at baseline.      Cranial Nerves: No cranial nerve deficit.   Psychiatric:         Mood and Affect: Mood normal.         Behavior: Behavior normal.         Thought Content: Thought content normal.         Judgment: Judgment normal.       Significant Labs: All pertinent labs within the past 24 hours have been reviewed.  Blood Culture: No results for input(s): LABBLOO in the last 48 hours.  CBC:   Recent Labs   Lab 10/26/22  1206 10/27/22  0508   WBC 5.70 6.01   HGB 8.3* 8.0*   HCT 25.9* 25.5*    156     CMP:   Recent Labs   Lab 10/26/22  1206 10/27/22  0508    137   K 3.7 4.5   * 102   CO2 19* 20*   GLU 72 102   BUN 56* 74*   CREATININE 16.0* 20.5*   CALCIUM  6.7* 7.7*   PROT 7.5 8.2   ALBUMIN 2.0* 2.2*   BILITOT 0.4 0.4   ALKPHOS 40* 47*   AST 9* 10   ALT 6* 6*   ANIONGAP 10 15     Urine Culture: No results for input(s): LABURIN in the last 48 hours.    Significant Imaging: I have reviewed all pertinent imaging results/findings within the past 24 hours.      Assessment/Plan:      * Pleural effusion, right  Presents with sudden onset SOB. On supplemental oxygen in the ED, chest x-ray with new large right sided pleural effusion.   IR consulted, input appreciated.  Patient had 1.2 L of serous fluid removed.  Pleural LDH, protein, glucose, results suggest likely transient date.  Pleural fluid LDH less than 30, protein less than 1 g.  Serum .  Pleural fluid culture no growth.  cytology ordered  Will check serum LDH, was 295.  Continue supplemental oxygen ,wean off oxygen as tolerated  Follow-up portable chest x-ray shows small right pleural effusion, but significant reduction compared to the prior study.  No pneumothorax.  Check echo to assess LV function, if not done in the recent past.  -chest x-ray on 10/28/2022 shows recurrent right lung pleural effusion changes on chest x-ray.  Patient has possible right lung pleural leak related to her peritoneal dialysis.  Will discontinue PD, consult General surgery to further evaluate for right lung pleural leak, and HD access.    Hypocalcemia  Presents with calcium 6.7 that corrects to 8.3 for low albumin.   Continue home calcitriol    Anemia of chronic kidney failure, stage 5  H&H stable and consistent with baseline. No complaints of active bleeding or indication for transfusion at this time. Will continue to monitor    End stage kidney disease  Nephrology consulted for PD, discussed with nephrology recommending glucose of pleural fluid checked    Renovascular hypertension  Poorly controlled. Continue home coreg    Chronic immunosuppression with Prograf  Per patient no longer on immunosuppresion    Kidney transplant status,  living unrelated donor - 12/2/14  Followed by transplant outpt. Reports no longer on immunosuppression   Continue outpt follow up  Continue PD    Hyperlipidemia  continue home statin    Type 2 diabetes mellitus, with long-term current use of insulin  Lab Results   Component Value Date    HGBA1C 7.9 (H) 10/05/2022     Will start sliding scale insulin, diabetic/renal diet, goal -180. Initial BG 72 on arrival. Will resume basal if BG elevates      VTE Risk Mitigation (From admission, onward)         Ordered     Place [place] hose  Until discontinued         10/26/22 1421     IP VTE HIGH RISK PATIENT  Once         10/26/22 1421     Place sequential compression device  Until discontinued         10/26/22 1421                Discharge Planning   KRISTEN:      Code Status: Full Code   Is the patient medically ready for discharge?:     Reason for patient still in hospital (select all that apply): Patient unstable, Treatment, Imaging and Consult recommendations  Discharge Plan A: Home                  Danie Henson MD  Department of Hospital Medicine   [facility] S[facility]

## 2022-10-28 NOTE — PROCEDURES
[facility] Surg  Interventional Radiology  Procedure - Inpatient      Date: 10/28/2022 Time: 3:46 PM    Pre-Op Diagnosis: ESRD on PD with leak of PD fluid into right thorax    Post-Op Diagnosis: same    Procedure Performed by: Katherine    Assistant: N/A    Procedure: THDC placement    Specimen/Tissue Removed: N/A    Estimated Blood Loss: less than 50 mL    Procedure Note/Findings: 14.5F 19cm long THDC placed via right IJV approach.  Catheter tip at cavoatrial junction.  Both ports aspirate/flush easily.  Ready to use.            Please refer to dictated report for additional details.

## 2022-10-28 NOTE — PROCEDURES
Radiology Post-Procedure Note    Pre Op Diagnosis: Right pleural effusion, ESRD on PD, r/o leak of PD fluid into chest  Post Op Diagnosis: Same    Procedure:  right thoacentesis    Procedure performed by: Casa Murphy MD    Written Informed Consent Obtained: Yes  Specimen Removed: YES thin, slightly cloudy, colorless fluid  Estimated Blood Loss: Minimal    Findings:   Successful right thoracentesis.     Patient tolerated procedure well.    Casa Murphy MD  Staff Radiologist  Department of Radiology  Pager: [phone]

## 2022-10-28 NOTE — PLAN OF CARE
Plan for patient discharge home once medically clear, Per nephrology, PD to be stopped and HD to be set up. Pending tunnel cath placement.     Patient is current with [facility] ([phone]) Spoke with Nikky with [facility], stated will continue to follow for transition from PD to HD and to confirm chair time. TN to continue to follow for discharge needs.    10/28/22 1145   Discharge Reassessment   Assessment Type Discharge Planning Reassessment   Did the patient's condition or plan change since previous assessment? Yes   Communicated KRISTEN with patient/caregiver Date not available/Unable to determine   Discharge Plan A Home   Discharge Plan B Home   DME Needed Upon Discharge  none   Discharge Barriers Identified None   Why the patient remains in the hospital Requires continued medical care   Post-Acute Status   Post-Acute Authorization Dialysis   Diaylsis Status Pending medical clearance/testing   Discharge Delays None known at this time

## 2022-10-28 NOTE — PLAN OF CARE
Problem: Adult Inpatient Plan of Care  Goal: Plan of Care Review  Outcome: Ongoing, Progressing  Goal: Patient-Specific Goal (Individualized)  Outcome: Ongoing, Progressing  Goal: Absence of Hospital-Acquired Illness or Injury  Outcome: Ongoing, Progressing  Goal: Optimal Comfort and Wellbeing  Outcome: Ongoing, Progressing  Goal: Readiness for Transition of Care  Outcome: Ongoing, Progressing     Problem: Fall Injury Risk  Goal: Absence of Fall and Fall-Related Injury  Outcome: Ongoing, Progressing     Problem: Diabetes Comorbidity  Goal: Blood Glucose Level Within Targeted Range  Outcome: Ongoing, Progressing       Patient remained free from falls/injury throughout shift.  No acute changes in status.  Patient turned off peritoneal dialysis at 0530.  Bed locked in lowest position.  Side rails up x2.  Call bell within reach.  Purposeful rounding maintained throughout shift.

## 2022-10-28 NOTE — ASSESSMENT & PLAN NOTE
Presents with sudden onset SOB. On supplemental oxygen in the ED, chest x-ray with new large right sided pleural effusion.   IR consulted, input appreciated.  Patient had 1.2 L of serous fluid removed.  Pleural LDH, protein, glucose, results suggest likely transient date.  Pleural fluid LDH less than 30, protein less than 1 g.  Serum .  Pleural fluid culture no growth.  cytology ordered  Will check serum LDH, was 295.  Continue supplemental oxygen ,wean off oxygen as tolerated  Follow-up portable chest x-ray shows small right pleural effusion, but significant reduction compared to the prior study.  No pneumothorax.  Check echo to assess LV function, if not done in the recent past.  -chest x-ray on 10/28/2022 shows recurrent right lung pleural effusion changes on chest x-ray.  Patient has possible right lung pleural leak related to her peritoneal dialysis.  Will discontinue PD, consult General surgery to further evaluate for right lung pleural leak, and HD access.

## 2022-10-28 NOTE — NURSING
Patient left unit via wheelchair with transporter going to Cardiology.  Portable O2 tank with patient.  No acute distress noted.

## 2022-10-28 NOTE — NURSING
Patient left unit via bed with transporter for procedure in IR.  Portable O2 tank with patient.  No acute distress noted.

## 2022-10-28 NOTE — NURSING
Notified on call dialysis nurse of the continued beeping of peritoneal dialysis.  She stated that patient should try laying on side of catheter, if that did not work, notify Nephro team in the am due to patient stating she had this problem before she was admitted.

## 2022-10-29 LAB
POCT GLUCOSE: 151 MG/DL (ref 70–110)
POCT GLUCOSE: 182 MG/DL (ref 70–110)
POCT GLUCOSE: 262 MG/DL (ref 70–110)
POCT GLUCOSE: 266 MG/DL (ref 70–110)
POCT GLUCOSE: 275 MG/DL (ref 70–110)

## 2022-10-29 PROCEDURE — 63600175 PHARM REV CODE 636 W HCPCS: Mod: NTX | Performed by: INTERNAL MEDICINE

## 2022-10-29 PROCEDURE — 99900035 HC TECH TIME PER 15 MIN (STAT): Mod: NTX

## 2022-10-29 PROCEDURE — 25000003 PHARM REV CODE 250: Mod: NTX | Performed by: PHYSICIAN ASSISTANT

## 2022-10-29 PROCEDURE — 11000001 HC ACUTE MED/SURG PRIVATE ROOM: Mod: NTX

## 2022-10-29 PROCEDURE — 94761 N-INVAS EAR/PLS OXIMETRY MLT: CPT | Mod: NTX

## 2022-10-29 PROCEDURE — 80100016 HC MAINTENANCE HEMODIALYSIS: Mod: NTX

## 2022-10-29 PROCEDURE — 25000003 PHARM REV CODE 250: Mod: NTX | Performed by: INTERNAL MEDICINE

## 2022-10-29 RX ORDER — HEPARIN SODIUM 1000 [USP'U]/ML
3200 INJECTION, SOLUTION INTRAVENOUS; SUBCUTANEOUS
Status: DISCONTINUED | OUTPATIENT
Start: 2022-10-29 | End: 2022-10-30 | Stop reason: HOSPADM

## 2022-10-29 RX ADMIN — HYDROCODONE BITARTRATE AND ACETAMINOPHEN 1 TABLET: 5; 325 TABLET ORAL at 09:10

## 2022-10-29 RX ADMIN — TIMOLOL MALEATE 1 DROP: 5 SOLUTION OPHTHALMIC at 09:10

## 2022-10-29 RX ADMIN — SEVELAMER CARBONATE 800 MG: 800 TABLET, FILM COATED ORAL at 05:10

## 2022-10-29 RX ADMIN — CALCITRIOL CAPSULES 0.25 MCG 0.25 MCG: 0.25 CAPSULE ORAL at 08:10

## 2022-10-29 RX ADMIN — INSULIN ASPART 1 UNITS: 100 INJECTION, SOLUTION INTRAVENOUS; SUBCUTANEOUS at 08:10

## 2022-10-29 RX ADMIN — MUPIROCIN: 20 OINTMENT TOPICAL at 09:10

## 2022-10-29 RX ADMIN — CARVEDILOL 3.12 MG: 3.12 TABLET, FILM COATED ORAL at 08:10

## 2022-10-29 RX ADMIN — ATORVASTATIN CALCIUM 40 MG: 40 TABLET, FILM COATED ORAL at 08:10

## 2022-10-29 RX ADMIN — HYDROCODONE BITARTRATE AND ACETAMINOPHEN 1 TABLET: 5; 325 TABLET ORAL at 08:10

## 2022-10-29 RX ADMIN — MUPIROCIN: 20 OINTMENT TOPICAL at 08:10

## 2022-10-29 RX ADMIN — HEPARIN SODIUM 3200 UNITS: 1000 INJECTION, SOLUTION INTRAVENOUS; SUBCUTANEOUS at 12:10

## 2022-10-29 RX ADMIN — SEVELAMER CARBONATE 800 MG: 800 TABLET, FILM COATED ORAL at 08:10

## 2022-10-29 NOTE — PLAN OF CARE
Problem: Adult Inpatient Plan of Care  Goal: Plan of Care Review  Outcome: Ongoing, Progressing  Goal: Optimal Comfort and Wellbeing  Outcome: Ongoing, Progressing     Problem: Diabetes Comorbidity  Goal: Blood Glucose Level Within Targeted Range  Outcome: Ongoing, Progressing     Problem: Infection  Goal: Absence of Infection Signs and Symptoms  Outcome: Ongoing, Progressing

## 2022-10-29 NOTE — ASSESSMENT & PLAN NOTE
Followed by transplant outpt. Reports no longer on immunosuppression   Continue outpt follow up  Peritoneal dialysis discontinued due to possible pleural leak from PD   Patient had tunneled catheter inserted by IR  Patient started on hemodialysis since admission   to assist in discharge planning and obtaining chair time for HD

## 2022-10-29 NOTE — PROGRESS NOTES
[facility]  Nephrology  Progress Note     Patient Name: Elizabeth Maldonado  MRN: [id number]  Admission Date: 10/26/2022  Hospital Length of Stay: 1 days  Attending Provider: Danie Henson MD   Primary Care Physician: Richy Singleton NP  Principal Problem: Pleural effusion, right    Subjective:       Interval History: Seen at the bedside no event overnight       Review of patient's allergies indicates:   Allergen Reactions    Shrimp Hives, Itching and Rash    Topamax [topiramate] Other (See Comments)     Vision changes    Zoloft [sertraline] Palpitations      Current Facility-Administered Medications   Medication Frequency    0.9%  NaCl infusion Once    acetaminophen tablet 650 mg Q4H PRN    atorvastatin tablet 40 mg QHS    calcitRIOL capsule 0.25 mcg Daily    carvediloL tablet 3.125 mg BID    dextrose 50% injection 12.5 g PRN    dextrose 50% injection 25 g PRN    glucagon (human recombinant) injection 1 mg PRN    glucose chewable tablet 16 g PRN    glucose chewable tablet 24 g PRN    heparin (porcine) injection 3,200 Units PRN    hydrALAZINE injection 10 mg Q8H PRN    HYDROcodone-acetaminophen 5-325 mg per tablet 1 tablet Q6H PRN    insulin aspart U-100 pen 0-5 Units QID (AC + HS) PRN    magnesium oxide tablet 800 mg PRN    magnesium oxide tablet 800 mg PRN    mupirocin 2 % ointment BID    naloxone 0.4 mg/mL injection 0.02 mg PRN    senna-docusate 8.6-50 mg per tablet 1 tablet Daily PRN    sevelamer carbonate tablet 800 mg TID WM    sodium chloride 0.9% bolus 250 mL PRN    sodium chloride 0.9% flush 10 mL PRN    timolol maleate 0.5% ophthalmic solution 1 drop Daily    zolpidem tablet 5 mg Nightly PRN       Objective:     Vital Signs (Most Recent):  Temp: 98.5 °F (36.9 °C) (10/29/22 0835)  Pulse: 92 (10/29/22 0835)  Resp: 18 (10/29/22 0922)  BP: (!) 167/83 (10/29/22 0835)  SpO2: 100 % (10/29/22 0835)  O2 Device (Oxygen Therapy): nasal cannula (10/29/22 0000)   Vital Signs (24h Range):  Temp:   [98 °F (36.7 °C)-98.5 °F (36.9 °C)] 98.5 °F (36.9 °C)  Pulse:  [79-94] 92  Resp:  [10-22] 18  SpO2:  [96 %-100 %] 100 %  BP: (131-181)/(69-98) 167/83   Weight: 86.4 kg (190 lb 7.6 oz) (10/26/22 1735)  Body mass index is 29.83 kg/m².  Body surface area is 2.02 meters squared.      Intake/Output Summary (Last 24 hours) at 10/29/2022 1025  Last data filed at 10/28/2022 1438  Gross per 24 hour   Intake --   Output 200 ml   Net -200 ml     I/O last 3 completed shifts:  In: 240 [P.O.:240]  Out: 200 [Other:200]  Net IO Since Admission: 280 mL [10/29/22 1025]     Physical Exam  Constitutional:       Appearance: She is obese. She is ill-appearing.   Cardiovascular:      Rate and Rhythm: Normal rate.      Pulses: Normal pulses.   Pulmonary:      Effort: Pulmonary effort is normal.   Abdominal:      General: Abdomen is flat.   Musculoskeletal:         General: Normal range of motion.   Skin:     General: Skin is warm.   Neurological:      Mental Status: She is alert and oriented to person, place, and time.   Psychiatric:         Mood and Affect: Mood normal.       Recent Labs   Lab 10/26/22  1206 10/27/22  0508   WBC 5.70 6.01   HGB 8.3* 8.0*   HCT 25.9* 25.5*    156   MONO 9.1  0.5 9.8  0.6      Recent Labs   Lab 10/26/22  1206 10/27/22  0508    137   K 3.7 4.5   * 102   CO2 19* 20*   BUN 56* 74*   CREATININE 16.0* 20.5*   CALCIUM 6.7* 7.7*   PROT 7.5 8.2   BILITOT 0.4 0.4   ALKPHOS 40* 47*   ALT 6* 6*   AST 9* 10      No results for input(s): PH, PCO2, PO2, HCO3, POCSATURATED, BE in the last 72 hours.    Assessment/Plan:       Pleural effusion, right- suspected leakage from peritoneal fluid    Type 2 diabetes mellitus, with long-term current use of insulin    Kidney transplant status, living unrelated donor - 12/2/14    Chronic immunosuppression with Prograf    Renovascular hypertension    End stage kidney disease    Anemia of chronic kidney failure    Hypocalcemia     CXR show R pleural effusion   S/p  thoracentesis and placement of TC   - Seen and examined today during hemodialysis; tolerating treatment well without issues. Denied headaches, chest pain, abdominal pain, or muscle cramps   - Target ultrafiltration 3l  - Dialysate adjusted to current labs   - Avoid nephrotoxic medications  - Medications to renal parameters  - Strict Input and Output and chart  - Daily standing weights        Thank you for your consult. I will follow-up with patient. Please contact us if you have any additional questions.    Ifeanyi Fermin MD  Nephrology

## 2022-10-29 NOTE — ASSESSMENT & PLAN NOTE
Nephrology consulted, input appreciated.  PD discontinue due to possible pleural leak with right lung effusion  Patient started on HD  Tunneled catheter inserted by IR   to assist with discharge planning, and arranging chair time for HD.

## 2022-10-29 NOTE — ASSESSMENT & PLAN NOTE
Presents with sudden onset SOB. On supplemental oxygen in the ED, chest x-ray with new large right sided pleural effusion.   IR consulted, input appreciated.  Patient had 1.2 L of serous fluid removed.  Pleural LDH, protein, glucose, results suggest likely transient date.  Pleural fluid LDH less than 30, protein less than 1 g.  Serum .  Pleural fluid culture no growth.  cytology ordered  Will check serum LDH, was 295.  Continue supplemental oxygen ,wean off oxygen as tolerated  Follow-up portable chest x-ray shows small right pleural effusion, but significant reduction compared to the prior study.  No pneumothorax.  Check echo to assess LV function, if not done in the recent past.  -chest x-ray on 10/28/2022 shows recurrent right lung pleural effusion changes on chest x-ray.  Patient has possible right lung pleural leak related to her peritoneal dialysis.  Will discontinue PD, consult General surgery to further evaluate for right lung pleural leak, and HD access.  -new tunneled catheter inserted by IR for HD  -patient started on HD on 10/29/2022

## 2022-10-29 NOTE — PROGRESS NOTES
[facility] Medicine  Progress Note    Patient Name: Elizabeth Maldonado  MRN: [id number]  Patient Class: IP- Inpatient   Admission Date: 10/26/2022  Length of Stay: 1 days  Attending Physician: Danie Henson MD  Primary Care Provider: Richy Singleton NP        Subjective:     Principal Problem:Pleural effusion, right        HPI:  Elizabteh Maldonado 57 y.o. female with DM, on PD, HTN, HLD previous kidney transplant no longer on immunosuppression presents to the hospital with a chief complaint of SOB.  She reports she woke up this she was short of breath.  She reports her shortness of worsens lying flat, it is improved with sitting upright and supplemental oxygen.  She denies any attempted treatment home.  She reports she has been on PD for the last 7 months, and underwent PD last night reports her catheter was slow to drain.  She is no longer on immunosuppression after history of kidney transplant.  She reports her urine output varies day-to-day.  She denies fever chest pain cough nausea vomiting abdominal pain leg swelling syncope dizziness dysuria melena hematuria hematemesis.  She does not take any blood thinners.    In the ED, chest x-ray with large right sided pleural effusion, troponin negative, , potassium 3.7, Albumin 2.0.       Overview/Hospital Course:  Mrs. Maldonado is a pleasant 57-year-old female with a past medical history of diabetes, ESRD on PD, hypertension, hyperlipidemia, and previous kidney transplant no longer on immunosuppression.  Patient resumed peritoneal dialysis about 7 months ago.  The patient was admitted to the hospital for shortness of breath complaints.  Chest x-ray showed new large right pleural effusion.  Patient had thoracentesis 1.2 L of serous fluid removed by Interventional Radiology.  Patient arose fluid studies suggest transudate as protein less than 1 g, LDH fluid less than 30, serum .  Patient pleural culture shows no growth.  Pleural fluid  suggesting transudate, likely due to renal disease.  Check echo to assess LV function.  Cytology results pending.  Patient had repeat chest x-ray today due to diminished breath sounds at right lung field.  Chest x-ray shows increasing right lung pleural effusion.  Patient followed by Nephrology, suspected to have a PD catheter  fluid leak into her right pleural space.  Patient peritoneal dialysis will be discontinued at this time.  General surgery consult to evaluate for possible right lung pleural leak, and needs HD catheter access.  Patient started on hemodialysis 10/29/2022 and tolerated well.      Interval History:  Patient seen today for follow-up care.  No new complaints.  Started on hemodialysis today.  Pleural fluid culture no growth.  Analysis consistent with transudate, likely from PD catheter leak.  Nephrology , IR, and General surgery input appreciated.        Review of Systems   Constitutional: Negative.    HENT: Negative.     Eyes: Negative.    Respiratory: Negative.     Cardiovascular: Negative.    Gastrointestinal: Negative.    Endocrine: Negative.    Genitourinary: Negative.    Musculoskeletal: Negative.    Skin: Negative.    Allergic/Immunologic: Negative.    Neurological: Negative.    Hematological: Negative.    Psychiatric/Behavioral: Negative.     Objective:     Vital Signs (Most Recent):  Temp: 96.3 °F (35.7 °C) (10/29/22 1311)  Pulse: 90 (10/29/22 1311)  Resp: 18 (10/29/22 1311)  BP: (!) 148/73 (10/29/22 1311)  SpO2: 98 % (10/29/22 1311)   Vital Signs (24h Range):  Temp:  [96.3 °F (35.7 °C)-98.5 °F (36.9 °C)] 96.3 °F (35.7 °C)  Pulse:  [79-94] 90  Resp:  [10-22] 18  SpO2:  [96 %-100 %] 98 %  BP: (131-181)/(69-98) 148/73     Weight: 86.4 kg (190 lb 7.6 oz)  Body mass index is 29.83 kg/m².    Intake/Output Summary (Last 24 hours) at 10/29/2022 1418  Last data filed at 10/29/2022 1215  Gross per 24 hour   Intake 360 ml   Output 200 ml   Net 160 ml      Physical Exam  Vitals reviewed.    Constitutional:       Appearance: Normal appearance. She is normal weight.   HENT:      Head: Normocephalic and atraumatic.      Right Ear: External ear normal.      Left Ear: External ear normal.      Nose: Nose normal.      Mouth/Throat:      Mouth: Mucous membranes are dry.      Pharynx: Oropharynx is clear.   Eyes:      Extraocular Movements: Extraocular movements intact.      Conjunctiva/sclera: Conjunctivae normal.      Pupils: Pupils are equal, round, and reactive to light.   Cardiovascular:      Rate and Rhythm: Normal rate and regular rhythm.      Pulses: Normal pulses.      Heart sounds: Normal heart sounds.   Pulmonary:      Effort: Pulmonary effort is normal.      Breath sounds: Normal breath sounds.      Comments: S/P right lung thoracentesis, improved air movement.  Upper chest tunneled catheter in place, clean dry dressing.  Abdominal:      General: Abdomen is flat. Bowel sounds are normal.      Palpations: Abdomen is soft.   Musculoskeletal:         General: Normal range of motion.      Cervical back: Normal range of motion.   Skin:     General: Skin is warm.      Capillary Refill: Capillary refill takes less than 2 seconds.   Neurological:      General: No focal deficit present.      Mental Status: She is alert and oriented to person, place, and time. Mental status is at baseline.   Psychiatric:         Mood and Affect: Mood normal.         Behavior: Behavior normal.         Thought Content: Thought content normal.         Judgment: Judgment normal.       Significant Labs: All pertinent labs within the past 24 hours have been reviewed.  Blood Culture: No results for input(s): LABBLOO in the last 48 hours.  CBC: No results for input(s): WBC, HGB, HCT, PLT in the last 48 hours.  CMP: No results for input(s): NA, K, CL, CO2, GLU, BUN, CREATININE, CALCIUM, PROT, ALBUMIN, BILITOT, ALKPHOS, AST, ALT, ANIONGAP, EGFRNONAA in the last 48 hours.    Invalid input(s): ESTGFAFRICA  Urine Culture: No results  for input(s): LABURIN in the last 48 hours.    Significant Imaging: I have reviewed all pertinent imaging results/findings within the past 24 hours.      Assessment/Plan:      * Pleural effusion, right  Presents with sudden onset SOB. On supplemental oxygen in the ED, chest x-ray with new large right sided pleural effusion.   IR consulted, input appreciated.  Patient had 1.2 L of serous fluid removed.  Pleural LDH, protein, glucose, results suggest likely transient date.  Pleural fluid LDH less than 30, protein less than 1 g.  Serum .  Pleural fluid culture no growth.  cytology ordered  Will check serum LDH, was 295.  Continue supplemental oxygen ,wean off oxygen as tolerated  Follow-up portable chest x-ray shows small right pleural effusion, but significant reduction compared to the prior study.  No pneumothorax.  Check echo to assess LV function, if not done in the recent past.  -chest x-ray on 10/28/2022 shows recurrent right lung pleural effusion changes on chest x-ray.  Patient has possible right lung pleural leak related to her peritoneal dialysis.  Will discontinue PD, consult General surgery to further evaluate for right lung pleural leak, and HD access.  -new tunneled catheter inserted by IR for HD  -patient started on HD on 10/29/2022    Hypocalcemia  Presents with calcium 6.7 that corrects to 8.3 for low albumin.   Continue home calcitriol    Anemia of chronic kidney failure, stage 5  H&H stable and consistent with baseline. No complaints of active bleeding or indication for transfusion at this time. Will continue to monitor    End stage kidney disease  Nephrology consulted, input appreciated.  PD discontinue due to possible pleural leak with right lung effusion  Patient started on HD  Tunneled catheter inserted by IR   to assist with discharge planning, and arranging chair time for HD.    Renovascular hypertension  Poorly controlled. Continue home coreg    Chronic immunosuppression  with Prograf  Per patient no longer on immunosuppresion    Kidney transplant status, living unrelated donor - 12/2/14  Followed by transplant outpt. Reports no longer on immunosuppression   Continue outpt follow up  Peritoneal dialysis discontinued due to possible pleural leak from PD   Patient had tunneled catheter inserted by IR  Patient started on hemodialysis since admission   to assist in discharge planning and obtaining chair time for HD    Hyperlipidemia  continue home statin    Type 2 diabetes mellitus, with long-term current use of insulin  Lab Results   Component Value Date    HGBA1C 7.9 (H) 10/05/2022     Will start sliding scale insulin, diabetic/renal diet, goal -180. Initial BG 72 on arrival. Will resume basal if BG elevates      VTE Risk Mitigation (From admission, onward)         Ordered     heparin (porcine) injection 3,200 Units  As needed (PRN)         10/29/22 1018     Place [place] hose  Until discontinued         10/26/22 1421     IP VTE HIGH RISK PATIENT  Once         10/26/22 1421     Place sequential compression device  Until discontinued         10/26/22 1421                Discharge Planning   KRISTEN:      Code Status: Full Code   Is the patient medically ready for discharge?:     Reason for patient still in hospital (select all that apply): Patient unstable, Laboratory test, Treatment and Consult recommendations  Discharge Plan A: Home   Discharge Delays: None known at this time              Danie Henson MD  Department of Hospital Medicine   [facility]

## 2022-10-29 NOTE — SUBJECTIVE & OBJECTIVE
Interval History:  Patient seen today for follow-up care.  No new complaints.  Started on hemodialysis today.  Pleural fluid culture no growth.  Analysis consistent with transudate, likely from PD catheter leak.  Nephrology , IR, and General surgery input appreciated.        Review of Systems   Constitutional: Negative.    HENT: Negative.     Eyes: Negative.    Respiratory: Negative.     Cardiovascular: Negative.    Gastrointestinal: Negative.    Endocrine: Negative.    Genitourinary: Negative.    Musculoskeletal: Negative.    Skin: Negative.    Allergic/Immunologic: Negative.    Neurological: Negative.    Hematological: Negative.    Psychiatric/Behavioral: Negative.     Objective:     Vital Signs (Most Recent):  Temp: 96.3 °F (35.7 °C) (10/29/22 1311)  Pulse: 90 (10/29/22 1311)  Resp: 18 (10/29/22 1311)  BP: (!) 148/73 (10/29/22 1311)  SpO2: 98 % (10/29/22 1311)   Vital Signs (24h Range):  Temp:  [96.3 °F (35.7 °C)-98.5 °F (36.9 °C)] 96.3 °F (35.7 °C)  Pulse:  [79-94] 90  Resp:  [10-22] 18  SpO2:  [96 %-100 %] 98 %  BP: (131-181)/(69-98) 148/73     Weight: 86.4 kg (190 lb 7.6 oz)  Body mass index is 29.83 kg/m².    Intake/Output Summary (Last 24 hours) at 10/29/2022 1418  Last data filed at 10/29/2022 1215  Gross per 24 hour   Intake 360 ml   Output 200 ml   Net 160 ml      Physical Exam  Vitals reviewed.   Constitutional:       Appearance: Normal appearance. She is normal weight.   HENT:      Head: Normocephalic and atraumatic.      Right Ear: External ear normal.      Left Ear: External ear normal.      Nose: Nose normal.      Mouth/Throat:      Mouth: Mucous membranes are dry.      Pharynx: Oropharynx is clear.   Eyes:      Extraocular Movements: Extraocular movements intact.      Conjunctiva/sclera: Conjunctivae normal.      Pupils: Pupils are equal, round, and reactive to light.   Cardiovascular:      Rate and Rhythm: Normal rate and regular rhythm.      Pulses: Normal pulses.      Heart sounds: Normal heart  sounds.   Pulmonary:      Effort: Pulmonary effort is normal.      Breath sounds: Normal breath sounds.      Comments: S/P right lung thoracentesis, improved air movement.  Upper chest tunneled catheter in place, clean dry dressing.  Abdominal:      General: Abdomen is flat. Bowel sounds are normal.      Palpations: Abdomen is soft.   Musculoskeletal:         General: Normal range of motion.      Cervical back: Normal range of motion.   Skin:     General: Skin is warm.      Capillary Refill: Capillary refill takes less than 2 seconds.   Neurological:      General: No focal deficit present.      Mental Status: She is alert and oriented to person, place, and time. Mental status is at baseline.   Psychiatric:         Mood and Affect: Mood normal.         Behavior: Behavior normal.         Thought Content: Thought content normal.         Judgment: Judgment normal.       Significant Labs: All pertinent labs within the past 24 hours have been reviewed.  Blood Culture: No results for input(s): LABBLOO in the last 48 hours.  CBC: No results for input(s): WBC, HGB, HCT, PLT in the last 48 hours.  CMP: No results for input(s): NA, K, CL, CO2, GLU, BUN, CREATININE, CALCIUM, PROT, ALBUMIN, BILITOT, ALKPHOS, AST, ALT, ANIONGAP, EGFRNONAA in the last 48 hours.    Invalid input(s): ESTGFAFR[place]  Urine Culture: No results for input(s): LABURIN in the last 48 hours.    Significant Imaging: I have reviewed all pertinent imaging results/findings within the past 24 hours.

## 2022-10-30 VITALS
RESPIRATION RATE: 18 BRPM | HEART RATE: 86 BPM | WEIGHT: 190.5 LBS | DIASTOLIC BLOOD PRESSURE: 84 MMHG | OXYGEN SATURATION: 96 % | TEMPERATURE: 98 F | BODY MASS INDEX: 29.9 KG/M2 | SYSTOLIC BLOOD PRESSURE: 160 MMHG | HEIGHT: 67 IN

## 2022-10-30 LAB
BACTERIA FLD AEROBE CULT: NO GROWTH
BACTERIA SPEC ANAEROBE CULT: NORMAL
GRAM STN SPEC: NORMAL
GRAM STN SPEC: NORMAL
POCT GLUCOSE: 165 MG/DL (ref 70–110)
POCT GLUCOSE: 185 MG/DL (ref 70–110)

## 2022-10-30 PROCEDURE — 25000003 PHARM REV CODE 250: Mod: NTX | Performed by: PHYSICIAN ASSISTANT

## 2022-10-30 RX ADMIN — TIMOLOL MALEATE 1 DROP: 5 SOLUTION OPHTHALMIC at 08:10

## 2022-10-30 RX ADMIN — MUPIROCIN: 20 OINTMENT TOPICAL at 08:10

## 2022-10-30 RX ADMIN — CARVEDILOL 3.12 MG: 3.12 TABLET, FILM COATED ORAL at 08:10

## 2022-10-30 RX ADMIN — SEVELAMER CARBONATE 800 MG: 800 TABLET, FILM COATED ORAL at 08:10

## 2022-10-30 RX ADMIN — CALCITRIOL CAPSULES 0.25 MCG 0.25 MCG: 0.25 CAPSULE ORAL at 08:10

## 2022-10-30 NOTE — PLAN OF CARE
Problem: Adult Inpatient Plan of Care  Goal: Plan of Care Review  Outcome: Met  Goal: Patient-Specific Goal (Individualized)  Outcome: Met  Goal: Absence of Hospital-Acquired Illness or Injury  Outcome: Met  Goal: Optimal Comfort and Wellbeing  Outcome: Met  Goal: Readiness for Transition of Care  Outcome: Met     Problem: Fall Injury Risk  Goal: Absence of Fall and Fall-Related Injury  Outcome: Met     Problem: Diabetes Comorbidity  Goal: Blood Glucose Level Within Targeted Range  Outcome: Met     Problem: Fluid and Electrolyte Imbalance (Acute Kidney Injury/Impairment)  Goal: Fluid and Electrolyte Balance  Outcome: Met     Problem: Oral Intake Inadequate (Acute Kidney Injury/Impairment)  Goal: Optimal Nutrition Intake  Outcome: Met     Problem: Renal Function Impairment (Acute Kidney Injury/Impairment)  Goal: Effective Renal Function  Outcome: Met     Problem: Device-Related Complication Risk (Hemodialysis)  Goal: Safe, Effective Therapy Delivery  Outcome: Met     Problem: Hemodynamic Instability (Hemodialysis)  Goal: Effective Tissue Perfusion  Outcome: Met     Problem: Infection (Hemodialysis)  Goal: Absence of Infection Signs and Symptoms  Outcome: Met     Problem: Infection  Goal: Absence of Infection Signs and Symptoms  Outcome: Met

## 2022-10-30 NOTE — PLAN OF CARE
Patient alert and oriented x4. Respirations even and unlabored. No distress noted. Skin warm and dry. Iv saline locked. No complications noted. Dialysis catheter noted to right chest wall. Dressing clan, dry, and intact. Pd catheter noted to abdomen. No complications noted. Patient complaining of pain to abdomen and site. Pain medication given as needed. Blood sugar monitored. Insulin given per sliding scale. Patient remains free from falls this shift. Patient has no complaints at this time. Bed in lowest position. Call bell within reach. Instructed to call for any needs.     Problem: Adult Inpatient Plan of Care  Goal: Plan of Care Review  Outcome: Ongoing, Progressing  Goal: Patient-Specific Goal (Individualized)  Outcome: Ongoing, Progressing  Goal: Absence of Hospital-Acquired Illness or Injury  Outcome: Ongoing, Progressing     Problem: Fall Injury Risk  Goal: Absence of Fall and Fall-Related Injury  Outcome: Ongoing, Progressing  Intervention: Identify and Manage Contributors  Flowsheets (Taken 10/30/2022 0447)  Self-Care Promotion:   independence encouraged   BADL personal objects within reach  Medication Review/Management:   medications reviewed   high-risk medications identified     Problem: Diabetes Comorbidity  Goal: Blood Glucose Level Within Targeted Range  Outcome: Ongoing, Progressing  Intervention: Monitor and Manage Glycemia  Flowsheets (Taken 10/30/2022 4887)  Glycemic Management:   blood glucose monitored   supplemental insulin given     Problem: Fluid and Electrolyte Imbalance (Acute Kidney Injury/Impairment)  Goal: Fluid and Electrolyte Balance  Outcome: Ongoing, Progressing

## 2022-10-30 NOTE — NURSING
[facility]  Nurses Note -- 4 Eyes      10/29/22        Skin assessed on: Saturday      [x] No Pressure Injuries Present    []Prevention Measures Documented    [] Yes LDA  for Pressure Injury Previously documented     [] Yes New Pressure Injury Discovered   [] LDA for New Pressure Injury Added      Attending RN:  Kenisha Byers RN     Second RN:  Krista Fraser

## 2022-10-30 NOTE — PLAN OF CARE
[facility] - Med Surg  Discharge Final Note    Primary Care Provider: Richy Singleton NP    Expected Discharge Date: 10/30/2022    Final Discharge Note (most recent)       Final Note - 10/30/22 1109          Final Note    Assessment Type Final Discharge Note     Anticipated Discharge Disposition Home or Self Care     Hospital Resources/Appts/Education Provided Appointments scheduled and added to AVS        Post-Acute Status    Post-Acute Authorization Other     Diaylsis Status Set-up Complete/Auth obtained   Per Nikky barrow at [facility] patient has a chair time set for 10:00am MWF begining tomorrow.    Other Status No Post-Acute Service Needs     Discharge Delays None known at this time                     Important Message from Medicare             Contact Info       Richy Singleton NP   Specialty: Family Medicine   Relationship: PCP - General    [address]  [place] [address]   Phone: [phone]       Next Steps: Schedule an appointment as soon as possible for a visit on 11/3/2022    Instructions: @11:30am with Dr. Singleton for a hospital follow up visit.    [facility] [address] [facility] [id number]  [place] [id number]       Next Steps: Follow up on 10/31/2022    Instructions: @10:00am, Dialysis

## 2022-10-30 NOTE — NURSING
Pt has been discharged, Pt received discharge instructions, Pt verbalized understanding of discharge instructions. Pt will be transported via wheelchair to the ER to a cab. Safety precautions in place. Will continue to monitor.

## 2022-10-30 NOTE — DISCHARGE SUMMARY
[facility] Medicine  Discharge Summary      Patient Name: Elizabeth Maldonado  MRN: [id number]  Patient Class: IP- Inpatient  Admission Date: 10/26/2022  Hospital Length of Stay: 2 days  Discharge Date and Time:  10/30/2022 11:03 AM  Attending Physician: James Hensley MD   Discharging Provider: Rhett Kapadia NP  Primary Care Provider: Richy Singleton NP      HPI:   Elizabeth Maldonado 57 y.o. female with DM, on PD, HTN, HLD previous kidney transplant no longer on immunosuppression presents to the hospital with a chief complaint of SOB.  She reports she woke up this she was short of breath.  She reports her shortness of worsens lying flat, it is improved with sitting upright and supplemental oxygen.  She denies any attempted treatment home.  She reports she has been on PD for the last 7 months, and underwent PD last night reports her catheter was slow to drain.  She is no longer on immunosuppression after history of kidney transplant.  She reports her urine output varies day-to-day.  She denies fever chest pain cough nausea vomiting abdominal pain leg swelling syncope dizziness dysuria melena hematuria hematemesis.  She does not take any blood thinners.    In the ED, chest x-ray with large right sided pleural effusion, troponin negative, , potassium 3.7, Albumin 2.0.       * No surgery found *      Hospital Course:   Mrs. Maldonado is a pleasant 57-year-old female with a past medical history of diabetes, ESRD on PD, hypertension, hyperlipidemia, and previous kidney transplant no longer on immunosuppression.  Patient resumed peritoneal dialysis about 7 months ago.  The patient was admitted to the hospital for shortness of breath complaints.  Chest x-ray showed new large right pleural effusion.  Patient had thoracentesis 1.2 L of serous fluid removed by Interventional Radiology.  Patient arose fluid studies suggest transudate as protein less than 1 g, LDH fluid less than 30, serum  .  Patient pleural culture shows no growth.  Pleural fluid suggesting transudate, likely due to renal disease.  Check echo to assess LV function.  Cytology results pending.  Patient had repeat chest x-ray today due to diminished breath sounds at right lung field.  Chest x-ray shows increasing right lung pleural effusion.  Patient followed by Nephrology, suspected to have a PD catheter  fluid leak into her right pleural space.  Patient peritoneal dialysis will be discontinued at this time.  General surgery consult to evaluate for possible right lung pleural leak, and needs HD catheter access.  Patient started on hemodialysis 10/29/2022 and tolerated well.  Patient was able to correlate with her  and her current dialysis unit has high chair set up for Monday morning at 10:00 a.m..  Patient will proceed tomorrow morning for HD.  She will follow-up with her nephrologist Dr. Meehan.  Resume home meds       Goals of Care Treatment Preferences:  Code Status: Full Code      Consults:   Consults (From admission, onward)        Status Ordering Provider     Inpatient consult to Interventional Radiology  Once        Provider:  (Not yet assigned)    Completed WESLEY LEACH     Inpatient consult to Interventional Radiology  Once        Provider:  (Not yet assigned)    Completed AKI GU     Inpatient consult to General Surgery  Once        Provider:  (Not yet assigned)    Acknowledged AKI GU     Case Management/  Once        Provider:  (Not yet assigned)    Completed NELL TRINIDAD     Inpatient consult to Interventional Radiology  Once        Provider:  Aaron Herrera MD    Completed NELL TRINIDAD     Inpatient consult to Nephrology  Once        Provider:  Wesley Leach MD    Completed NELL TRINIDAD          * Pleural effusion, right  Presents with sudden onset SOB. On supplemental oxygen in the ED, chest x-ray with new large right sided pleural effusion.   IR consulted,  input appreciated.  Patient had 1.2 L of serous fluid removed.  Pleural LDH, protein, glucose, results suggest likely transient date.  Pleural fluid LDH less than 30, protein less than 1 g.  Serum .  Pleural fluid culture no growth.  cytology ordered  Will check serum LDH, was 295.  Continue supplemental oxygen ,wean off oxygen as tolerated  Follow-up portable chest x-ray shows small right pleural effusion, but significant reduction compared to the prior study.  No pneumothorax.  Check echo to assess LV function, if not done in the recent past.  -chest x-ray on 10/28/2022 shows recurrent right lung pleural effusion changes on chest x-ray.  Patient has possible right lung pleural leak related to her peritoneal dialysis.  Will discontinue PD, consult General surgery to further evaluate for right lung pleural leak, and HD access.  -new tunneled catheter inserted by IR for HD  -patient started on HD on 10/29/2022    Hypocalcemia  Presents with calcium 6.7 that corrects to 8.3 for low albumin.   Continue home calcitriol    Anemia of chronic kidney failure, stage 5  H&H stable and consistent with baseline. No complaints of active bleeding or indication for transfusion at this time. Will continue to monitor    End stage kidney disease  Nephrology consulted, input appreciated.  PD discontinue due to possible pleural leak with right lung effusion  Patient started on HD  Tunneled catheter inserted by IR   to assist with discharge planning, and arranging chair time for HD.    Renovascular hypertension  Poorly controlled. Continue home coreg    Chronic immunosuppression with Prograf  Per patient no longer on immunosuppresion    Kidney transplant status, living unrelated donor - 12/2/14  Followed by transplant outpt. Reports no longer on immunosuppression   Continue outpt follow up  Peritoneal dialysis discontinued due to possible pleural leak from PD   Patient had tunneled catheter inserted by IR  Patient  started on hemodialysis since admission   to assist in discharge planning and obtaining chair time for HD    Hyperlipidemia  continue home statin    Type 2 diabetes mellitus, with long-term current use of insulin  Lab Results   Component Value Date    HGBA1C 7.9 (H) 10/05/2022     Will start sliding scale insulin, diabetic/renal diet, goal -180. Initial BG 72 on arrival. Will resume basal if BG elevates      Final Active Diagnoses:    Diagnosis Date Noted POA    PRINCIPAL PROBLEM:  Pleural effusion, right [J90] 10/26/2022 Unknown    Hypocalcemia [E83.51] 10/26/2022 Unknown    Anemia of chronic kidney failure, stage 5 [N18.5, D63.1] 02/09/2022 Yes    End stage kidney disease [N18.6] 12/23/2021 Yes    Renovascular hypertension [I15.0] 05/03/2021 Yes    Kidney transplant status, living unrelated donor - 12/2/14 [Z94.0] 12/03/2014 Not Applicable     Chronic    Chronic immunosuppression with Prograf [Z29.8] 12/03/2014 Not Applicable     Chronic    Type 2 diabetes mellitus, with long-term current use of insulin [E11.9, Z79.4] 07/01/2014 Not Applicable    Hyperlipidemia [E78.5] 07/01/2014 Yes     Chronic      Problems Resolved During this Admission:       Discharged Condition: good    Disposition: Home or Self Care    Follow Up:   Follow-up Information     Richy Singleton NP. Schedule an appointment as soon as possible for a visit on 11/3/2022.    Specialty: Family Medicine  Why: @11:30am with Dr. Singleton for a hospital follow up visit.  Contact information:  [address] [place] [address] [phone]                       Patient Instructions:      Diet renal     Notify your health care provider if you experience any of the following:  temperature >100.4     Notify your health care provider if you experience any of the following:  redness, tenderness, or signs of infection (pain, swelling, redness, odor or green/yellow discharge around incision site)     Notify your health care  provider if you experience any of the following:  difficulty breathing or increased cough     Activity as tolerated       Significant Diagnostic Studies: Labs: All labs within the past 24 hours have been reviewed  Microbiology:   Blood Culture   Lab Results   Component Value Date    LABBLOO No growth after 5 days. 03/31/2022    LABBLOO No growth after 5 days. 03/31/2022   , Sputum Culture No results found for: GSRESP, RESPIRATORYC, Urine Culture    Lab Results   Component Value Date    LABURIN KLEBSIELLA PNEUMONIAE  > 100,000 cfu/ml   (A) 05/05/2022    and Wound Culture: negative    Pending Diagnostic Studies:     Procedure Component Value Units Date/Time    Cytology, Fluid/Wash/Brush [[id number]] Collected: 10/26/22 1659    Order Status: Sent Lab Status: In process Updated: 10/26/22 1718    Specimen: Body Fluid     Freeze and Hold, [facility] [[id number]] Collected: 10/26/22 1700    Order Status: Sent Lab Status: No result     Specimen: Body Fluid from Thoracentesis Fluid     Glucose, Body Fluid (Reference Lab or [facility]) [facility]; Pleural Fluid, Right [[id number]] Collected: 10/28/22 1427    Order Status: Sent Lab Status: In process Updated: 10/28/22 1453    Specimen: Body Fluid          Medications:  Reconciled Home Medications:      Medication List      ASK your doctor about these medications    atorvastatin 40 MG tablet  Commonly known as: LIPITOR  Take 40 mg by mouth every evening.     blood sugar diagnostic Strp  Checks BG ac/hs     calcitRIOL 0.25 MCG Cap  Commonly known as: ROCALTROL  Take 0.25 mcg by mouth once daily.     carvediloL 3.125 MG tablet  Commonly known as: COREG  Take 3.125 mg by mouth 2 (two) times daily. Hold for SBP <130     clonazePAM 0.5 MG tablet  Commonly known as: KlonoPIN  Take 0.5 mg by mouth 2 (two) times daily as needed.     folic acid 1 MG tablet  Commonly known as: FOLVITE  Take 1 tablet (1 mg total) by mouth once daily.     HumaLOG KwikPen Insulin 100 unit/mL pen  Generic drug: insulin  "lispro  Inject 3 Units into the skin 3 (three) times daily with meals. SLIDE SCALE     insulin syringe-needle U-100 0.5 mL 30 gauge x 5/16" Syrg  Commonly known as: INSULIN SYRINGE  Uses 4 daily     insulin syringe-needle U-100 1 mL 29 gauge x 7/16" Syrg     lancets 33 gauge Misc  Commonly known as: ONETOUCH DELICA LANCETS  1 lancet by Misc.(Non-Drug; Combo Route) route 4 (four) times daily before meals and nightly.     LANTUS SOLOSTAR U-100 INSULIN glargine 100 units/mL SubQ pen  Generic drug: insulin  Inject 50 Units into the skin once daily.     RENVELA 800 mg Tab  Generic drug: sevelamer carbonate  Take 1 tablet (800 mg total) by mouth 3 (three) times daily with meals.     timolol maleate 0.5% 0.5 % Drop  Commonly known as: TIMOPTIC  Place 2 drops in both eyes twice daily     TRULICITY 1.5 mg/0.5 mL pen injector  Generic drug: dulaglutide  Inject 1.5 mg into the skin every 7 days. Pt takes on Wednesday     zolpidem 10 mg Tab  Commonly known as: AMBIEN  Take 10 mg by mouth nightly as needed.            Indwelling Lines/Drains at time of discharge:   Lines/Drains/Airways     Central Venous Catheter Line  Duration                Hemodialysis Catheter 10/28/22 1548 right internal jugular 1 day                Time spent on the discharge of patient: 25 minutes         Rhett Kapadia NP  Department of Hospital Medicine  [facility] - Med Surg  "

## 2022-10-31 LAB — GLUCOSE FLD-MCNC: 223 MG/DL

## 2022-11-02 ENCOUNTER — PATIENT OUTREACH (OUTPATIENT)
Dept: ADMINISTRATIVE | Facility: CLINIC | Age: 57
End: 2022-11-02
Payer: MEDICARE

## 2022-11-02 NOTE — PROGRESS NOTES
Second attempt  C3 nurse spoke with Elizabeth Maldonado for a TCC post hospital discharge follow up call. The patient has a scheduled HOSFU appointment with Richy Singleton NP  on 11/3/2022 @ 1130.  Advised patient to discuss feeling down and recs to help get sleep.

## 2022-11-02 NOTE — PROGRESS NOTES
C3 nurse attempted to contact Elizabeth Paul Maldonado for a TCC post hospital discharge follow up call. The patient is unable to conduct the call @ this time. The patient requested a callback - 3-4pm.    The patient has a scheduled HOSFU appointment with Richy Singleton NP on 11/3/2022 @ 1130 Per AVS. PCP not within [place].

## 2022-11-03 ENCOUNTER — TELEPHONE (OUTPATIENT)
Dept: TRANSPLANT | Facility: CLINIC | Age: 57
End: 2022-11-03
Payer: MEDICARE

## 2022-11-03 LAB
ALBUMIN SERPL-MCNC: 3.1 G/DL (ref 3.5–5.2)
ALP SERPL-CCNC: 44 U/L (ref 35–104)
ALT SERPL W P-5'-P-CCNC: 10 U/L (ref 7–52)
BICARBONATE: 32 MEQ/L (ref 20–31)
BUN, POST: 13 MG/DL (ref 6–19)
BUN, PRE: 52 MG/DL (ref 6–19)
BUN/CREAT SERPL: 4.5 (ref 10–20)
CALCIUM PHOS PRODUCT, COR: 44 (ref 0–54)
CALCIUM PHOS PRODUCT: 40 (ref 0–54)
CALCIUM SERPL-MCNC: 7.6 MG/DL (ref 8.7–10.4)
CALCIUM, CORRECTED: 8.3 MG/DL (ref 8.7–10.4)
CHLORIDE: 96 MEQ/L (ref 96–108)
CREAT SERPL-MCNC: 11.65 MG/DL (ref 0.6–1.3)
DIALYSIS START TIME: 1049
DIALYSIS STOP TIME: 1421
FINAL PATHOLOGIC DIAGNOSIS: NORMAL
HBV SURFACE AG SERPL QL IA: NEGATIVE
IRON: 46 MCG/DL (ref 30–160)
Lab: NORMAL
PHOSPHATE FLD-MCNC: 5.3 MG/DL (ref 2.6–4.5)
POST-WEIGHT, KG: 86.3 KG
POST-WEIGHT, LB: 189.9
POTASSIUM SERPL-SCNC: 3.9 MEQ/L (ref 3.5–5.1)
PRE-WEIGHT, KG: 88.2 KG
PRE-WEIGHT, LB: 194
SODIUM BLD-SCNC: 137 MEQ/L (ref 136–145)
SPKT/V, DAUGIRDAS II: 1.58
TOTAL IRON BINDING CAPACITY: 166 MCG/DL (ref 185–515)
TRANSFERRIN SATURATION: 28 % (ref 20–55)
UIBC SERPL-MCNC: 120 MCG/DL (ref 155–355)
UREA REDUCTION RATIO: 75 % (ref 65–80)

## 2022-11-03 NOTE — PHYSICIAN QUERY
PT Name: Elizabeth Maldonado  MR #: [id number]    DOCUMENTATION CLARIFICATION     CDS/: Sherie Cano RN             Contact information:  [email or web address]    This form is a permanent document in the medical record.     Query Date: November 3, 2022    Dear Provider,  By submitting this query, we are merely seeking further clarification of documentation. Please utilize your independent clinical judgment when addressing the question(s) below.    The medical record contains the following:  Supporting Clinical Findings Location in Medical Record   Pleural effusion, right- suspected leakage from peritoneal fluid      Analysis consistent with transudate, likely from PD catheter leak    Pleural effusion, right  chest x-ray on 10/28/2022 shows recurrent right lung pleural effusion changes on chest x-ray.    Patient has possible right lung pleural leak related to her peritoneal dialysis.      Will discontinue PD, consult General surgery to further evaluate for right lung pleural leak, and HD access.  -new tunneled catheter inserted by IR for HD  -patient started on HD on 10/29/2022    Analysis consistent with transudate, likely from PD catheter leak     10/29 Renal MD PN      10/29 Hosp Med MD       10/29 Hosp Med MD PN       Please clarify if R-suspected leakage from peritoneal fluid (as it relates to __PD catheter) is:      [  ] Inherent/Integral to the procedure     [  ] Complication of the procedure     [  ] Present, but not a complication of the procedure     [  ] Incidental finding, not clinically significant     [  ] Other (please specify): __________________     [ {Click F2; select 'X' if Clinically Undetermined:342811} ] Clinically Undetermined       Please document in your progress notes daily for the duration of treatment until resolved and include in your discharge summary.

## 2022-11-04 DIAGNOSIS — N18.6 ESRD (END STAGE RENAL DISEASE) ON DIALYSIS: Primary | ICD-10-CM

## 2022-11-04 DIAGNOSIS — Z99.2 ESRD (END STAGE RENAL DISEASE) ON DIALYSIS: Primary | ICD-10-CM

## 2022-11-12 ENCOUNTER — HOSPITAL ENCOUNTER (OUTPATIENT)
Facility: HOSPITAL | Age: 57
Discharge: HOME | End: 2022-11-13
Attending: EMERGENCY MEDICINE | Admitting: EMERGENCY MEDICINE
Payer: MEDICARE

## 2022-11-12 DIAGNOSIS — R07.9 CHEST PAIN: ICD-10-CM

## 2022-11-12 DIAGNOSIS — N18.6 ESRD (END STAGE RENAL DISEASE) ON DIALYSIS: ICD-10-CM

## 2022-11-12 DIAGNOSIS — R79.89 TROPONIN LEVEL ELEVATED: ICD-10-CM

## 2022-11-12 DIAGNOSIS — R06.02 SOB (SHORTNESS OF BREATH): Primary | ICD-10-CM

## 2022-11-12 DIAGNOSIS — E88.09 HYPOALBUMINEMIA: ICD-10-CM

## 2022-11-12 DIAGNOSIS — Z99.2 ESRD (END STAGE RENAL DISEASE) ON DIALYSIS: ICD-10-CM

## 2022-11-12 DIAGNOSIS — R79.89 ELEVATED TROPONIN: ICD-10-CM

## 2022-11-12 LAB
ALBUMIN SERPL BCP-MCNC: 2.7 G/DL (ref 3.5–5.2)
ALP SERPL-CCNC: 50 U/L (ref 55–135)
ALT SERPL W/O P-5'-P-CCNC: 11 U/L (ref 10–44)
ANION GAP SERPL CALC-SCNC: 9 MMOL/L (ref 8–16)
AORTIC ROOT ANNULUS: 3.03 CM
AORTIC VALVE CUSP SEPERATION: 1.59 CM
ASCENDING AORTA: 2.38 CM
AST SERPL-CCNC: 15 U/L (ref 10–40)
AV INDEX (PROSTH): 0.59
AV MEAN GRADIENT: 10 MMHG
AV PEAK GRADIENT: 18 MMHG
AV VALVE AREA: 1.68 CM2
AV VELOCITY RATIO: 0.57
BASOPHILS # BLD AUTO: 0.02 K/UL (ref 0–0.2)
BASOPHILS NFR BLD: 0.4 % (ref 0–1.9)
BILIRUB SERPL-MCNC: 0.5 MG/DL (ref 0.1–1)
BNP SERPL-MCNC: 581 PG/ML (ref 0–99)
BSA FOR ECHO PROCEDURE: 2.02 M2
BUN SERPL-MCNC: 19 MG/DL (ref 6–20)
CALCIUM SERPL-MCNC: 8.8 MG/DL (ref 8.7–10.5)
CHLORIDE SERPL-SCNC: 96 MMOL/L (ref 95–110)
CO2 SERPL-SCNC: 30 MMOL/L (ref 23–29)
CREAT SERPL-MCNC: 7.4 MG/DL (ref 0.5–1.4)
CTP QC/QA: YES
CTP QC/QA: YES
CV ECHO LV RWT: 0.82 CM
DIFFERENTIAL METHOD: ABNORMAL
DOP CALC AO PEAK VEL: 2.1 M/S
DOP CALC AO VTI: 41.8 CM
DOP CALC LVOT AREA: 2.8 CM2
DOP CALC LVOT DIAMETER: 1.9 CM
DOP CALC LVOT PEAK VEL: 1.2 M/S
DOP CALC LVOT STROKE VOLUME: 70.28 CM3
DOP CALC MV VTI: 40.8 CM
DOP CALCLVOT PEAK VEL VTI: 24.8 CM
E WAVE DECELERATION TIME: 216.41 MSEC
E/A RATIO: 1.22
E/E' RATIO: 21.07 M/S
ECHO LV POSTERIOR WALL: 1.61 CM (ref 0.6–1.1)
EJECTION FRACTION: 55 %
EOSINOPHIL # BLD AUTO: 0.2 K/UL (ref 0–0.5)
EOSINOPHIL NFR BLD: 3.5 % (ref 0–8)
ERYTHROCYTE [DISTWIDTH] IN BLOOD BY AUTOMATED COUNT: 14.3 % (ref 11.5–14.5)
EST. GFR  (NO RACE VARIABLE): 6 ML/MIN/1.73 M^2
FRACTIONAL SHORTENING: 33 % (ref 28–44)
GLUCOSE SERPL-MCNC: 179 MG/DL (ref 70–110)
HCT VFR BLD AUTO: 23.5 % (ref 37–48.5)
HGB BLD-MCNC: 7.5 G/DL (ref 12–16)
IMM GRANULOCYTES # BLD AUTO: 0.01 K/UL (ref 0–0.04)
IMM GRANULOCYTES NFR BLD AUTO: 0.2 % (ref 0–0.5)
INTERVENTRICULAR SEPTUM: 1.55 CM (ref 0.6–1.1)
IVC DIAMETER: 1.81 CM
IVRT: 123.69 MSEC
LA MAJOR: 6.7 CM
LA MINOR: 5.49 CM
LA WIDTH: 5 CM
LEFT ATRIUM SIZE: 4 CM
LEFT ATRIUM VOLUME INDEX: 51.8 ML/M2
LEFT ATRIUM VOLUME: 102.59 CM3
LEFT INTERNAL DIMENSION IN SYSTOLE: 2.64 CM (ref 2.1–4)
LEFT VENTRICLE DIASTOLIC VOLUME INDEX: 33.91 ML/M2
LEFT VENTRICLE DIASTOLIC VOLUME: 67.14 ML
LEFT VENTRICLE MASS INDEX: 125 G/M2
LEFT VENTRICLE SYSTOLIC VOLUME INDEX: 12.9 ML/M2
LEFT VENTRICLE SYSTOLIC VOLUME: 25.55 ML
LEFT VENTRICULAR INTERNAL DIMENSION IN DIASTOLE: 3.93 CM (ref 3.5–6)
LEFT VENTRICULAR MASS: 246.62 G
LV LATERAL E/E' RATIO: 19.75 M/S
LV SEPTAL E/E' RATIO: 22.57 M/S
LVOT MG: 3.2 MMHG
LVOT MV: 0.85 CM/S
LYMPHOCYTES # BLD AUTO: 1.7 K/UL (ref 1–4.8)
LYMPHOCYTES NFR BLD: 30.8 % (ref 18–48)
MAGNESIUM SERPL-MCNC: 1.9 MG/DL (ref 1.6–2.6)
MCH RBC QN AUTO: 29.3 PG (ref 27–31)
MCHC RBC AUTO-ENTMCNC: 31.9 G/DL (ref 32–36)
MCV RBC AUTO: 92 FL (ref 82–98)
MONOCYTES # BLD AUTO: 0.6 K/UL (ref 0.3–1)
MONOCYTES NFR BLD: 10.6 % (ref 4–15)
MV MEAN GRADIENT: 6 MMHG
MV PEAK A VEL: 1.3 M/S
MV PEAK E VEL: 1.58 M/S
MV PEAK GRADIENT: 11 MMHG
MV STENOSIS PRESSURE HALF TIME: 62.76 MS
MV VALVE AREA BY CONTINUITY EQUATION: 1.72 CM2
MV VALVE AREA P 1/2 METHOD: 3.51 CM2
NEUTROPHILS # BLD AUTO: 2.9 K/UL (ref 1.8–7.7)
NEUTROPHILS NFR BLD: 54.5 % (ref 38–73)
NRBC BLD-RTO: 0 /100 WBC
PISA TR MAX VEL: 2.94 M/S
PLATELET # BLD AUTO: 109 K/UL (ref 150–450)
PMV BLD AUTO: 11 FL (ref 9.2–12.9)
POC MOLECULAR INFLUENZA A AGN: NEGATIVE
POC MOLECULAR INFLUENZA B AGN: NEGATIVE
POCT GLUCOSE: 183 MG/DL (ref 70–110)
POCT GLUCOSE: 184 MG/DL (ref 70–110)
POCT GLUCOSE: 189 MG/DL (ref 70–110)
POTASSIUM SERPL-SCNC: 3.5 MMOL/L (ref 3.5–5.1)
PROCALCITONIN SERPL IA-MCNC: 0.38 NG/ML
PROT SERPL-MCNC: 8.7 G/DL (ref 6–8.4)
PULM VEIN S/D RATIO: 1.96
PV PEAK D VEL: 0.49 M/S
PV PEAK S VEL: 0.96 M/S
PV PEAK VELOCITY: 1.1 CM/S
RA MAJOR: 5.36 CM
RA PRESSURE: 3 MMHG
RA WIDTH: 3.8 CM
RBC # BLD AUTO: 2.56 M/UL (ref 4–5.4)
RIGHT VENTRICULAR END-DIASTOLIC DIMENSION: 3.89 CM
RV TISSUE DOPPLER FREE WALL SYSTOLIC VELOCITY 1 (APICAL 4 CHAMBER VIEW): 0.02 CM/S
SARS-COV-2 RDRP RESP QL NAA+PROBE: NEGATIVE
SINUS: 3 CM
SODIUM SERPL-SCNC: 135 MMOL/L (ref 136–145)
STJ: 2.09 CM
TDI LATERAL: 0.08 M/S
TDI SEPTAL: 0.07 M/S
TDI: 0.08 M/S
TR MAX PG: 35 MMHG
TRICUSPID ANNULAR PLANE SYSTOLIC EXCURSION: 2.57 CM
TROPONIN I SERPL DL<=0.01 NG/ML-MCNC: 0.03 NG/ML (ref 0–0.03)
TROPONIN I SERPL DL<=0.01 NG/ML-MCNC: 0.04 NG/ML (ref 0–0.03)
TV REST PULMONARY ARTERY PRESSURE: 38 MMHG
WBC # BLD AUTO: 5.36 K/UL (ref 3.9–12.7)

## 2022-11-12 PROCEDURE — G0378 HOSPITAL OBSERVATION PER HR: HCPCS | Mod: NTX

## 2022-11-12 PROCEDURE — 84484 ASSAY OF TROPONIN QUANT: CPT | Mod: 91,NTX | Performed by: STUDENT IN AN ORGANIZED HEALTH CARE EDUCATION/TRAINING PROGRAM

## 2022-11-12 PROCEDURE — 84145 PROCALCITONIN (PCT): CPT | Mod: NTX | Performed by: STUDENT IN AN ORGANIZED HEALTH CARE EDUCATION/TRAINING PROGRAM

## 2022-11-12 PROCEDURE — 96365 THER/PROPH/DIAG IV INF INIT: CPT | Mod: 59,NTX

## 2022-11-12 PROCEDURE — 25000003 PHARM REV CODE 250: Mod: NTX | Performed by: NURSE PRACTITIONER

## 2022-11-12 PROCEDURE — 93010 ELECTROCARDIOGRAM REPORT: CPT | Mod: NTX,,, | Performed by: INTERNAL MEDICINE

## 2022-11-12 PROCEDURE — 96372 THER/PROPH/DIAG INJ SC/IM: CPT | Mod: 59 | Performed by: STUDENT IN AN ORGANIZED HEALTH CARE EDUCATION/TRAINING PROGRAM

## 2022-11-12 PROCEDURE — 99285 EMERGENCY DEPT VISIT HI MDM: CPT | Mod: 25,NTX

## 2022-11-12 PROCEDURE — 85025 COMPLETE CBC W/AUTO DIFF WBC: CPT | Mod: NTX | Performed by: PHYSICIAN ASSISTANT

## 2022-11-12 PROCEDURE — 83880 ASSAY OF NATRIURETIC PEPTIDE: CPT | Mod: NTX | Performed by: PHYSICIAN ASSISTANT

## 2022-11-12 PROCEDURE — 80053 COMPREHEN METABOLIC PANEL: CPT | Mod: NTX | Performed by: PHYSICIAN ASSISTANT

## 2022-11-12 PROCEDURE — 87635 SARS-COV-2 COVID-19 AMP PRB: CPT | Mod: NTX | Performed by: PHYSICIAN ASSISTANT

## 2022-11-12 PROCEDURE — 87040 BLOOD CULTURE FOR BACTERIA: CPT | Mod: 59,NTX | Performed by: PHYSICIAN ASSISTANT

## 2022-11-12 PROCEDURE — 36415 COLL VENOUS BLD VENIPUNCTURE: CPT | Mod: NTX | Performed by: STUDENT IN AN ORGANIZED HEALTH CARE EDUCATION/TRAINING PROGRAM

## 2022-11-12 PROCEDURE — 83735 ASSAY OF MAGNESIUM: CPT | Mod: NTX | Performed by: PHYSICIAN ASSISTANT

## 2022-11-12 PROCEDURE — 87502 INFLUENZA DNA AMP PROBE: CPT | Mod: NTX

## 2022-11-12 PROCEDURE — 63600175 PHARM REV CODE 636 W HCPCS: Mod: NTX | Performed by: PHYSICIAN ASSISTANT

## 2022-11-12 PROCEDURE — 84484 ASSAY OF TROPONIN QUANT: CPT | Mod: NTX | Performed by: PHYSICIAN ASSISTANT

## 2022-11-12 PROCEDURE — 93005 ELECTROCARDIOGRAM TRACING: CPT | Mod: NTX

## 2022-11-12 PROCEDURE — 63600175 PHARM REV CODE 636 W HCPCS: Mod: NTX | Performed by: STUDENT IN AN ORGANIZED HEALTH CARE EDUCATION/TRAINING PROGRAM

## 2022-11-12 PROCEDURE — 93010 EKG 12-LEAD: ICD-10-PCS | Mod: NTX,,, | Performed by: INTERNAL MEDICINE

## 2022-11-12 PROCEDURE — 25000003 PHARM REV CODE 250: Mod: NTX | Performed by: PHYSICIAN ASSISTANT

## 2022-11-12 PROCEDURE — 25000003 PHARM REV CODE 250: Mod: NTX | Performed by: STUDENT IN AN ORGANIZED HEALTH CARE EDUCATION/TRAINING PROGRAM

## 2022-11-12 RX ORDER — IBUPROFEN 200 MG
16 TABLET ORAL
Status: DISCONTINUED | OUTPATIENT
Start: 2022-11-12 | End: 2022-11-13 | Stop reason: HOSPADM

## 2022-11-12 RX ORDER — CARVEDILOL 3.12 MG/1
3.12 TABLET ORAL 2 TIMES DAILY
Status: DISCONTINUED | OUTPATIENT
Start: 2022-11-12 | End: 2022-11-12

## 2022-11-12 RX ORDER — POLYETHYLENE GLYCOL 3350 17 G/17G
17 POWDER, FOR SOLUTION ORAL 2 TIMES DAILY PRN
Status: DISCONTINUED | OUTPATIENT
Start: 2022-11-12 | End: 2022-11-13 | Stop reason: HOSPADM

## 2022-11-12 RX ORDER — GLUCAGON 1 MG
1 KIT INJECTION
Status: DISCONTINUED | OUTPATIENT
Start: 2022-11-12 | End: 2022-11-13 | Stop reason: HOSPADM

## 2022-11-12 RX ORDER — ATORVASTATIN CALCIUM 40 MG/1
40 TABLET, FILM COATED ORAL NIGHTLY
Status: DISCONTINUED | OUTPATIENT
Start: 2022-11-12 | End: 2022-11-13 | Stop reason: HOSPADM

## 2022-11-12 RX ORDER — IBUPROFEN 200 MG
24 TABLET ORAL
Status: DISCONTINUED | OUTPATIENT
Start: 2022-11-12 | End: 2022-11-13 | Stop reason: HOSPADM

## 2022-11-12 RX ORDER — CARVEDILOL 3.12 MG/1
3.12 TABLET ORAL ONCE
Status: COMPLETED | OUTPATIENT
Start: 2022-11-12 | End: 2022-11-12

## 2022-11-12 RX ORDER — CARVEDILOL 6.25 MG/1
6.25 TABLET ORAL 2 TIMES DAILY
Status: DISCONTINUED | OUTPATIENT
Start: 2022-11-12 | End: 2022-11-13

## 2022-11-12 RX ORDER — ONDANSETRON 2 MG/ML
4 INJECTION INTRAMUSCULAR; INTRAVENOUS EVERY 8 HOURS PRN
Status: DISCONTINUED | OUTPATIENT
Start: 2022-11-12 | End: 2022-11-13 | Stop reason: HOSPADM

## 2022-11-12 RX ORDER — ASPIRIN 81 MG/1
81 TABLET ORAL DAILY
Status: DISCONTINUED | OUTPATIENT
Start: 2022-11-13 | End: 2022-11-13 | Stop reason: HOSPADM

## 2022-11-12 RX ORDER — ACETAMINOPHEN 325 MG/1
650 TABLET ORAL EVERY 8 HOURS PRN
Status: DISCONTINUED | OUTPATIENT
Start: 2022-11-12 | End: 2022-11-13 | Stop reason: HOSPADM

## 2022-11-12 RX ORDER — ASPIRIN 325 MG
325 TABLET ORAL
Status: COMPLETED | OUTPATIENT
Start: 2022-11-12 | End: 2022-11-12

## 2022-11-12 RX ORDER — SEVELAMER CARBONATE 800 MG/1
800 TABLET, FILM COATED ORAL
Status: DISCONTINUED | OUTPATIENT
Start: 2022-11-12 | End: 2022-11-13 | Stop reason: HOSPADM

## 2022-11-12 RX ORDER — NALOXONE HCL 0.4 MG/ML
0.02 VIAL (ML) INJECTION
Status: DISCONTINUED | OUTPATIENT
Start: 2022-11-12 | End: 2022-11-13 | Stop reason: HOSPADM

## 2022-11-12 RX ORDER — FOLIC ACID 1 MG/1
1 TABLET ORAL DAILY
Status: DISCONTINUED | OUTPATIENT
Start: 2022-11-12 | End: 2022-11-13 | Stop reason: HOSPADM

## 2022-11-12 RX ORDER — TALC
6 POWDER (GRAM) TOPICAL NIGHTLY
Status: DISCONTINUED | OUTPATIENT
Start: 2022-11-12 | End: 2022-11-13 | Stop reason: HOSPADM

## 2022-11-12 RX ORDER — ZOLPIDEM TARTRATE 5 MG/1
5 TABLET ORAL NIGHTLY PRN
Status: DISCONTINUED | OUTPATIENT
Start: 2022-11-12 | End: 2022-11-13 | Stop reason: HOSPADM

## 2022-11-12 RX ORDER — TIMOLOL MALEATE 5 MG/ML
1 SOLUTION/ DROPS OPHTHALMIC 2 TIMES DAILY
Status: DISCONTINUED | OUTPATIENT
Start: 2022-11-12 | End: 2022-11-13 | Stop reason: HOSPADM

## 2022-11-12 RX ORDER — SIMETHICONE 80 MG
1 TABLET,CHEWABLE ORAL 4 TIMES DAILY PRN
Status: DISCONTINUED | OUTPATIENT
Start: 2022-11-12 | End: 2022-11-13 | Stop reason: HOSPADM

## 2022-11-12 RX ORDER — CALCITRIOL 0.25 UG/1
0.25 CAPSULE ORAL DAILY
Status: DISCONTINUED | OUTPATIENT
Start: 2022-11-12 | End: 2022-11-13 | Stop reason: HOSPADM

## 2022-11-12 RX ORDER — INSULIN ASPART 100 [IU]/ML
0-5 INJECTION, SOLUTION INTRAVENOUS; SUBCUTANEOUS
Status: DISCONTINUED | OUTPATIENT
Start: 2022-11-12 | End: 2022-11-13 | Stop reason: HOSPADM

## 2022-11-12 RX ORDER — LOSARTAN POTASSIUM 25 MG/1
50 TABLET ORAL DAILY
Status: DISCONTINUED | OUTPATIENT
Start: 2022-11-12 | End: 2022-11-13 | Stop reason: HOSPADM

## 2022-11-12 RX ORDER — CLONAZEPAM 0.5 MG/1
0.5 TABLET ORAL 2 TIMES DAILY PRN
Status: DISCONTINUED | OUTPATIENT
Start: 2022-11-12 | End: 2022-11-13 | Stop reason: HOSPADM

## 2022-11-12 RX ADMIN — TIMOLOL MALEATE 1 DROP: 5 SOLUTION OPHTHALMIC at 08:11

## 2022-11-12 RX ADMIN — CALCITRIOL CAPSULES 0.25 MCG 0.25 MCG: 0.25 CAPSULE ORAL at 08:11

## 2022-11-12 RX ADMIN — ZOLPIDEM TARTRATE 5 MG: 5 TABLET ORAL at 08:11

## 2022-11-12 RX ADMIN — CARVEDILOL 3.12 MG: 3.12 TABLET, FILM COATED ORAL at 10:11

## 2022-11-12 RX ADMIN — TIMOLOL MALEATE 1 DROP: 5 SOLUTION OPHTHALMIC at 10:11

## 2022-11-12 RX ADMIN — SEVELAMER CARBONATE 800 MG: 800 TABLET, FILM COATED ORAL at 12:11

## 2022-11-12 RX ADMIN — CARVEDILOL 6.25 MG: 6.25 TABLET, FILM COATED ORAL at 08:11

## 2022-11-12 RX ADMIN — SEVELAMER CARBONATE 800 MG: 800 TABLET, FILM COATED ORAL at 08:11

## 2022-11-12 RX ADMIN — CARVEDILOL 3.12 MG: 3.12 TABLET, FILM COATED ORAL at 08:11

## 2022-11-12 RX ADMIN — PIPERACILLIN AND TAZOBACTAM 4.5 G: 4; .5 INJECTION, POWDER, LYOPHILIZED, FOR SOLUTION INTRAVENOUS; PARENTERAL at 06:11

## 2022-11-12 RX ADMIN — Medication 6 MG: at 08:11

## 2022-11-12 RX ADMIN — ASPIRIN 325 MG ORAL TABLET 325 MG: 325 PILL ORAL at 05:11

## 2022-11-12 RX ADMIN — LOSARTAN POTASSIUM 50 MG: 25 TABLET, FILM COATED ORAL at 10:11

## 2022-11-12 RX ADMIN — INSULIN ASPART 1 UNITS: 100 INJECTION, SOLUTION INTRAVENOUS; SUBCUTANEOUS at 08:11

## 2022-11-12 RX ADMIN — INSULIN DETEMIR 12 UNITS: 100 INJECTION, SOLUTION SUBCUTANEOUS at 10:11

## 2022-11-12 RX ADMIN — FOLIC ACID 1 MG: 1 TABLET ORAL at 08:11

## 2022-11-12 RX ADMIN — ATORVASTATIN CALCIUM 40 MG: 40 TABLET, FILM COATED ORAL at 08:11

## 2022-11-12 RX ADMIN — SEVELAMER CARBONATE 800 MG: 800 TABLET, FILM COATED ORAL at 05:11

## 2022-11-12 NOTE — ASSESSMENT & PLAN NOTE
· Shortness of breath when lying flat, and - a/c HF suspected, ECHO ordered   · O2 is normal, she is 100% on room air.  · She is afebrile with a normal white count and normal procalcitonin  · Patient just switched from PD to HD, likely adjusting and may not be getting enough fluid off  · Will ask Nephrology to assess, and potentially pull some more fluid off  · Also potentially hypertensive episode causing flash pulmonary edema,  on presentation, unclear what it is at home, will control BP better. But excess fluid may be the cause of the high blood pressure rather than the primary cause.   · Potentially symptomatic anemia, Hb 7.5 with baseline 8-10, but would not explain why feeling more SOB while lying down, this points to excess fluid, but certainly could be combined/multifactorial.

## 2022-11-12 NOTE — ED PROVIDER NOTES
Encounter Date: 11/12/2022       History     Chief Complaint   Patient presents with    Shortness of Breath     Pt with complaints of SOB especially when she is lying flat. Pt also reports easily exerted with simple activities as well. Hx of HD-last treatment on yesterday. EMS reports initial room air sats of 99%.     56yo F presents to ED with chief complaint worsening shortness of breath since Monday, associated orthopnea.    Recently seen in this ED on 10/26/22 due to SOB, abdominal pain--had issue with peritoneal dialysis. Hospitalized due to right-sided pleural effusion, suspected leakage of peritoneal fluid into the right pleural space.  S/p 1.2 L paracentesis and 200 mL paracentesis.  Recently changed from peritoneal dialysis to hemodialysis via right IJ tunneled catheter placement opn 10/28/22. Currently MWF hemodialysis. States no issues with R chest access, no issues with previous peritoneal dialysis site.  Denies new abdominal pain or abdominal distension.  She does admit to new mild, nonproductive cough.  No fever.  No chills.  No myalgias.  No leg swelling or calf pain.  No unilateral leg swelling.  She denies chest pain.    Patient states she has been experiencing worsening shortness of breath over the past 2 weeks, shortness of breath had improved after being in the ED and having paracentesis performed.  She states on Monday shortness of breath began to return, associated dyspnea on exertion.  She states she felt unwell at dialysis, however felt a bit better following dialysis on Monday Wednesday and Friday.  She admits to worsening orthopnea this week.  Patient states she is afraid to lie down due to dyspnea.      PMH:  Anemia of CKD   ESRD   Renovascular hypertension   Renal transplant status--no longer immunosuppressed?  IDDM  Obesity  HLD      TTE 10/28/22:  The left ventricle is normal in size with moderate concentric hypertrophy and normal systolic function.  The estimated ejection fraction is  70%.  Grade II left ventricular diastolic dysfunction.  Normal right ventricular size with normal right ventricular systolic function.  Moderate left atrial enlargement.  Mild mitral regurgitation.  Mild tricuspid regurgitation.  Mild pulmonic regurgitation.  Normal central venous pressure (3 mmHg).  The estimated PA systolic pressure is 28 mmHg.  Trivial posterior pericardial effusion.     Review of patient's allergies indicates:   Allergen Reactions    Shrimp Hives, Itching and Rash    Topamax [topiramate] Other (See Comments)     Vision changes    Zoloft [sertraline] Palpitations     Past Medical History:   Diagnosis Date    Acidosis 7/1/2014    Allergic rhinitis 7/1/2014    Allergy     Anemia     Anemia in chronic kidney disease 7/1/2014    Anxiety     Cataract     Chronic bilateral low back pain with bilateral sciatica 10/25/2019    Chronic immunosuppression with Prograf and MMF 12/3/2014    CKD (chronic kidney disease) stage 5, GFR less than 15 ml/min 7/1/2014    CKD (chronic kidney disease), stage III 3/2/2015    Degenerative disc disease     Depression 7/1/2014    Diabetes mellitus, type 2 since age 18 7/1/2014    ESRD on peritoneal dialysis - august 2014 for 9 hours no peritonitis 11/24/2014    Hyperlipidemia     Hypertension 7/1/2014    Hypomagnesemia 1/7/2015    Kidney transplant status, living unrelated donor - 12/2/14 12/3/2014    Neutropenia 1/21/2015    NS (nuclear sclerosis) 9/16/2016    Obesity     Organ transplant candidate 7/1/2014    Pre-op exam 11/24/2014    Proliferative diabetic retinopathy of both eyes without macular edema associated with type 2 diabetes mellitus 9/16/2016    Renal manifestation of secondary diabetes mellitus     Tendinitis     Trouble in sleeping      Past Surgical History:   Procedure Laterality Date    BIOPSY N/A 2/26/2020    Procedure: Biopsy;  Surgeon: Sweta Catalan MD;  Location: [facility] OR [address] [place] [address];  Service: Transplant;  Laterality: N/A;    BONE MARROW BIOPSY N/A       SECTION      x 2    COLONOSCOPY N/A 2022    Procedure: COLONOSCOPY;  Surgeon: Franklin Gan MD;  Location: [facility] ENDO (4TH FLR);  Service: Colon and Rectal;  Laterality: N/A;  order created: previous order unusable  fully vaccinated, labs prior, instr mailed / portal -ml    KIDNEY TRANSPLANT  2014    LAPAROSCOPIC REVISION OF PROCEDURE INVOLVING PERITONEAL DIALYSIS CATHETER N/A 2022    Procedure: REVISION OF PROCEDURE INVOLVING PERITONEAL DIALYSIS CATHETER, LAPAROSCOPIC;  Surgeon: Franklin Barber MD;  Location: [facility] OR 2ND FLR;  Service: General;  Laterality: N/A;    MEDIPORT REMOVAL N/A 2019    Procedure: REMOVAL, CATHETER, CENTRAL VENOUS, TUNNELED, WITH PORT;  Surgeon: Eusebia Diagnostic Provider;  Location: [facility] OR;  Service: Radiology;  Laterality: N/A;    RENAL BIOPSY      ROTATOR CUFF REPAIR      TUBAL LIGATION       Family History   Problem Relation Age of Onset    Diabetes Mother     Hypertension Mother     Diabetes Father     Kidney disease Father     Diabetes Sister     Hypertension Sister     Kidney disease Sister     Heart disease Sister     Heart failure Sister     Diabetes Brother     Diabetes Sister     Stroke Maternal Grandmother     Heart disease Maternal Grandmother         pacemaker    Cataracts Maternal Grandmother     No Known Problems Maternal Aunt     No Known Problems Maternal Uncle     No Known Problems Paternal Aunt     No Known Problems Paternal Uncle     No Known Problems Maternal Grandfather     No Known Problems Paternal Grandmother     No Known Problems Paternal Grandfather     Cancer Neg Hx     Amblyopia Neg Hx     Blindness Neg Hx     Glaucoma Neg Hx     Macular degeneration Neg Hx     Retinal detachment Neg Hx     Strabismus Neg Hx     Thyroid disease Neg Hx     Breast cancer Neg Hx     Colon cancer Neg Hx     Ovarian cancer Neg Hx      Social History     Tobacco Use    Smoking status: Former     Packs/day: 1.00     Years: 20.00     Pack years:  20.00     Types: Cigarettes     Quit date: 2013     Years since quittin.2    Smokeless tobacco: Never    Tobacco comments:     quit smoking cigarettes in 2014   Substance Use Topics    Alcohol use: No    Drug use: No     Review of Systems   Constitutional:  Negative for chills and fever.   Respiratory:  Positive for cough and shortness of breath.         Orthopnea   Cardiovascular:  Negative for chest pain and leg swelling.   Gastrointestinal:  Negative for abdominal pain.   Musculoskeletal:  Negative for myalgias, neck pain and neck stiffness.   Neurological:  Negative for syncope and weakness.     Physical Exam     Initial Vitals [22 0449]   BP Pulse Resp Temp SpO2   (!) 180/90 99 20 98.5 °F (36.9 °C) 99 %      MAP       --         Physical Exam    Nursing note and vitals reviewed.  Constitutional: She appears well-developed and well-nourished. She is not diaphoretic. No distress.   Well-appearing nontoxic.  Sitting upright on exam table.  Speaking in full sentences without pause or difficulty.   HENT:   Head: Normocephalic and atraumatic.   Neck: Neck supple.   Normal range of motion.  Cardiovascular:  Intact distal pulses.           2/5 holosystolic murmur.  Sinus tachycardia.  No pretibial edema.  No unilateral leg swelling or calf tenderness.   Pulmonary/Chest: Breath sounds normal. No respiratory distress.   No hypoxia on room air.  No tachypnea.  No accessory muscle use.    Right anterior chest infraclavicular access, cdi   Abdominal: There is no abdominal tenderness.   Peritoneal dialysis access site to left lower quadrant, cdi.    Musculoskeletal:         General: No tenderness. Normal range of motion.      Cervical back: Normal range of motion and neck supple.     Neurological: She is alert and oriented to person, place, and time.   Skin: Skin is warm. Capillary refill takes less than 2 seconds.   Psychiatric: She has a normal mood and affect. Thought content normal.       ED  Course   Procedures  Labs Reviewed   CBC W/ AUTO DIFFERENTIAL - Abnormal; Notable for the following components:       Result Value    RBC 2.56 (*)     Hemoglobin 7.5 (*)     Hematocrit 23.5 (*)     MCHC 31.9 (*)     Platelets 109 (*)     All other components within normal limits   COMPREHENSIVE METABOLIC PANEL - Abnormal; Notable for the following components:    Sodium 135 (*)     CO2 30 (*)     Glucose 179 (*)     Creatinine 7.4 (*)     Total Protein 8.7 (*)     Albumin 2.7 (*)     Alkaline Phosphatase 50 (*)     eGFR 6 (*)     All other components within normal limits   TROPONIN I - Abnormal; Notable for the following components:    Troponin I 0.031 (*)     All other components within normal limits   B-TYPE NATRIURETIC PEPTIDE - Abnormal; Notable for the following components:     (*)     All other components within normal limits   CULTURE, BLOOD   CULTURE, BLOOD   MAGNESIUM   PROCALCITONIN   PROCALCITONIN   SARS-COV-2 RDRP GENE   POCT INFLUENZA A/B MOLECULAR     EKG Readings: (Independently Interpreted)   Normal sinus rhythm, ventricular rate 88 beats per minute.  Normal WI.  Prolonged QT. No right axis deviation.  No ST elevation.  Inverted T-wave V1.  No gross change from previous dated 10/26/2022.     Imaging Results              X-Ray Chest 1 View (Final result)  Result time 11/12/22 05:55:58      Final result by Marek March MD (11/12/22 05:55:58)                   Impression:      Suspected mild basilar infiltrate, as discussed above.    Previously identified right-sided pleural effusion appears to have resolved.    Interval placement of right-sided central venous catheter.      Electronically signed by: Marek March  Date:    11/12/2022  Time:    05:55               Narrative:    EXAMINATION:  XR CHEST 1 VIEW    CLINICAL HISTORY:  Shortness of breath    TECHNIQUE:  Single frontal view of the chest was performed.    COMPARISON:  Chest radiograph October 28, 2022    FINDINGS:  Single chest view  is submitted.  There is interval placement of a right-sided central venous catheter, distal tip at the expected location of the SVC.  When accounting for difference in position, technique and depth of inspiration, the appearance of the cardiomediastinal silhouette is stable.  Aortic atherosclerotic change noted.    The previously identified right-sided pleural effusion appears to have resolved in the interim.  There is no evidence for significant pleural effusion at this time.  General pattern of accentuated attenuation greater at the lung bases likely relates to soft tissue attenuation associated with body habitus.  However there is appearance suspicious for mild superimposed basilar infiltrate, left greater than right.  There is no evidence for pneumothorax.    The osseous structures demonstrate chronic change.  Mild curvature of the spine noted.                                       Medications   piperacillin-tazobactam 4.5 g in dextrose 5 % 100 mL IVPB (ready to mix system) (has no administration in time range)   aspirin tablet 325 mg (325 mg Oral Given 11/12/22 0558)     Medical Decision Making:   Differential Diagnosis:   Pleural effusion, pericardial effusion, dehydration, PE, ACS, anxiety  Clinical Tests:   Lab Tests: Ordered  Radiological Study: Ordered and Reviewed  Medical Tests: Ordered and Reviewed  ED Management:  No fever. No culture growth from previous pleural fluid. No large, obvious recurrence of effusion on CXR. Lungs clear. No hypoxia.     Has been tolerating HD without issue, MWF schedule. No evidence of volume overload. No CHF on recent echo.  Baseline anemia. BNP elevated from previous. Now with elevated troponin, normal at last visit. Will admit to  observation service for ACS r/o given worsening SOB, CARPENTER, despite no CP history, reassuring EKG. ASA given in ED.     Possibly bibasilar infiltrate on CXR, resolution of effusion. Pt does admit to mild cough recently. Given Zosyn in ED to cover  for potential HCAP, although felt less likely.                         Clinical Impression:   Final diagnoses:  [R06.02] SOB (shortness of breath) (Primary)  [N18.6, Z99.2] ESRD (end stage renal disease) on dialysis  [E88.09] Hypoalbuminemia  [R77.8] Elevated troponin        ED Disposition Condition    Observation Stable                Aj Rios PA-C  11/12/22 0620

## 2022-11-12 NOTE — PLAN OF CARE
Problem: Adult Inpatient Plan of Care  Goal: Plan of Care Review  Outcome: Ongoing, Progressing  Goal: Patient-Specific Goal (Individualized)  Outcome: Ongoing, Progressing  Goal: Absence of Hospital-Acquired Illness or Injury  Outcome: Ongoing, Progressing  Goal: Optimal Comfort and Wellbeing  Outcome: Ongoing, Progressing  Goal: Readiness for Transition of Care  Outcome: Ongoing, Progressing     Problem: Infection  Goal: Absence of Infection Signs and Symptoms  Outcome: Ongoing, Progressing     Problem: Fluid and Electrolyte Imbalance (Acute Kidney Injury/Impairment)  Goal: Fluid and Electrolyte Balance  Outcome: Ongoing, Progressing     Problem: Oral Intake Inadequate (Acute Kidney Injury/Impairment)  Goal: Optimal Nutrition Intake  Outcome: Ongoing, Progressing     Problem: Renal Function Impairment (Acute Kidney Injury/Impairment)  Goal: Effective Renal Function  Outcome: Ongoing, Progressing     Problem: Diabetes Comorbidity  Goal: Blood Glucose Level Within Targeted Range  Outcome: Ongoing, Progressing

## 2022-11-12 NOTE — SUBJECTIVE & OBJECTIVE
Past Medical History:   Diagnosis Date    Acidosis 2014    Allergic rhinitis 2014    Allergy     Anemia     Anemia in chronic kidney disease 2014    Anxiety     Cataract     Chronic bilateral low back pain with bilateral sciatica 10/25/2019    Chronic immunosuppression with Prograf and MMF 12/3/2014    CKD (chronic kidney disease) stage 5, GFR less than 15 ml/min 2014    CKD (chronic kidney disease), stage III 3/2/2015    Degenerative disc disease     Depression 2014    Diabetes mellitus, type 2 since age 18 2014    ESRD on peritoneal dialysis - 2014 for 9 hours no peritonitis 2014    Hyperlipidemia     Hypertension 2014    Hypomagnesemia 2015    Kidney transplant status, living unrelated donor - 12/2/14 12/3/2014    Neutropenia 2015    NS (nuclear sclerosis) 2016    Obesity     Organ transplant candidate 2014    Pre-op exam 2014    Proliferative diabetic retinopathy of both eyes without macular edema associated with type 2 diabetes mellitus 2016    Renal manifestation of secondary diabetes mellitus     Tendinitis     Trouble in sleeping        Past Surgical History:   Procedure Laterality Date    BIOPSY N/A 2020    Procedure: Biopsy;  Surgeon: Sweta Catalan MD;  Location: [facility] OR [address] [place] [address];  Service: Transplant;  Laterality: N/A;    BONE MARROW BIOPSY N/A      SECTION      x 2    COLONOSCOPY N/A 2022    Procedure: COLONOSCOPY;  Surgeon: Franklin Gan MD;  Location: [facility] (4TH FLR);  Service: Colon and Rectal;  Laterality: N/A;  order created: previous order unusable  fully vaccinated, labs prior, instr mailed / portal -ml    KIDNEY TRANSPLANT  2014    LAPAROSCOPIC REVISION OF PROCEDURE INVOLVING PERITONEAL DIALYSIS CATHETER N/A 2022    Procedure: REVISION OF PROCEDURE INVOLVING PERITONEAL DIALYSIS CATHETER, LAPAROSCOPIC;  Surgeon: Franklin Babrer MD;  Location: [facility] OR [address] [place] [address];  Service: General;   "Laterality: N/A;    MEDIPORT REMOVAL N/A 11/25/2019    Procedure: REMOVAL, CATHETER, CENTRAL VENOUS, TUNNELED, WITH PORT;  Surgeon: Eusebia Diagnostic Provider;  Location: [facility] OR;  Service: Radiology;  Laterality: N/A;    RENAL BIOPSY      ROTATOR CUFF REPAIR      TUBAL LIGATION         Review of patient's allergies indicates:   Allergen Reactions    Shrimp Hives, Itching and Rash    Topamax [topiramate] Other (See Comments)     Vision changes    Zoloft [sertraline] Palpitations       Current Facility-Administered Medications on File Prior to Encounter   Medication    [COMPLETED] epoetin edward injection 6,000 Units    [COMPLETED] heparin (porcine) injection 3,000 Units    [DISCONTINUED] heparin (porcine) injection 1,000 Units     Current Outpatient Medications on File Prior to Encounter   Medication Sig    folic acid (FOLVITE) 1 MG tablet Take 1 tablet (1 mg total) by mouth once daily.    atorvastatin (LIPITOR) 40 MG tablet Take 40 mg by mouth every evening.    blood sugar diagnostic Strp Checks BG ac/hs    calcitRIOL (ROCALTROL) 0.25 MCG Cap Take 0.25 mcg by mouth once daily.    carvediloL (COREG) 3.125 MG tablet Take 3.125 mg by mouth 2 (two) times daily. Hold for SBP <130    clonazePAM (KLONOPIN) 0.5 MG tablet Take 0.5 mg by mouth 2 (two) times daily as needed.    dulaglutide (TRULICITY) 1.5 mg/0.5 mL pen injector Inject 1.5 mg into the skin every 7 days. Pt takes on Wednesday    HUMALOG KWIKPEN INSULIN 100 unit/mL pen Inject 3 Units into the skin 3 (three) times daily with meals. SLIDE SCALE    insulin syringe-needle U-100 (INSULIN SYRINGE) 1/2 mL 30 x 5/16" Syrg Uses 4 daily    lancets (ONETOUCH DELICA LANCETS) 33 gauge Misc 1 lancet by Misc.(Non-Drug; Combo Route) route 4 (four) times daily before meals and nightly.    LANTUS SOLOSTAR U-100 INSULIN glargine 100 units/mL (3mL) SubQ pen Inject 50 Units into the skin once daily.    sevelamer carbonate (RENVELA) 800 mg Tab Take 1 tablet (800 mg total) by mouth 3 " "(three) times daily with meals. (Patient taking differently: Take 800 mg by mouth 3 (three) times daily with meals. Take 2 tab w/ meal & 1 tab w/ snack)    SYRINGE & NEEDLE,INSULIN,1 ML (INSULIN SYRINGE-NEEDLE U-100) 1 mL 29 X 7/16" Syrg     timolol maleate 0.5% (TIMOPTIC) 0.5 % Drop Place 2 drops in both eyes twice daily    zolpidem (AMBIEN) 10 mg Tab Take 10 mg by mouth nightly as needed.     Family History       Problem Relation (Age of Onset)    Cataracts Maternal Grandmother    Diabetes Mother, Father, Sister, Brother, Sister    Heart disease Sister, Maternal Grandmother    Heart failure Sister    Hypertension Mother, Sister    Kidney disease Father, Sister    No Known Problems Maternal Aunt, Maternal Uncle, Paternal Aunt, Paternal Uncle, Maternal Grandfather, Paternal Grandmother, Paternal Grandfather    Stroke Maternal Grandmother          Tobacco Use    Smoking status: Former     Packs/day: 1.00     Years: 20.00     Pack years: 20.00     Types: Cigarettes     Quit date: 2013     Years since quittin.2    Smokeless tobacco: Never    Tobacco comments:     quit smoking cigarettes in 2014   Substance and Sexual Activity    Alcohol use: No    Drug use: No    Sexual activity: Yes     Partners: Male     Birth control/protection: Post-menopausal     Review of Systems   Constitutional:  Negative for activity change, fatigue and fever.   HENT:  Negative for sore throat and trouble swallowing.    Eyes:  Negative for photophobia and visual disturbance.   Respiratory:  Positive for shortness of breath. Negative for chest tightness.    Cardiovascular:  Negative for chest pain, palpitations and leg swelling.   Gastrointestinal:  Negative for abdominal distention, abdominal pain, blood in stool, constipation, diarrhea, nausea and vomiting.   Endocrine: Negative for polydipsia and polyuria.   Genitourinary:  Negative for difficulty urinating, dysuria and hematuria.   Musculoskeletal:  Negative for neck pain " and neck stiffness.   Skin:  Negative for pallor and rash.   Allergic/Immunologic: Negative for immunocompromised state.   Neurological:  Negative for dizziness, seizures, syncope and facial asymmetry.   Psychiatric/Behavioral:  Negative for agitation, behavioral problems and confusion.        Objective:     Vital Signs (Most Recent):  Temp: 98.4 °F (36.9 °C) (11/12/22 0805)  Pulse: 90 (11/12/22 0805)  Resp: 17 (11/12/22 0805)  BP: (!) 169/83 (11/12/22 0819)  SpO2: 99 % (11/12/22 0805)   Vital Signs (24h Range):  Temp:  [98.4 °F (36.9 °C)-99.2 °F (37.3 °C)] 98.4 °F (36.9 °C)  Pulse:  [78-99] 90  Resp:  [17-21] 17  SpO2:  [98 %-100 %] 99 %  BP: (156-185)/(83-97) 169/83     Weight: 86 kg (189 lb 9.5 oz)  Body mass index is 29.69 kg/m².    Physical Exam  Vitals and nursing note reviewed.   Constitutional:       General: She is not in acute distress.     Appearance: She is well-developed. She is not diaphoretic.   HENT:      Head: Normocephalic and atraumatic.      Mouth/Throat:      Mouth: Mucous membranes are moist.      Pharynx: No oropharyngeal exudate.      Comments: Elevated JVD  Eyes:      General: No scleral icterus.     Pupils: Pupils are equal, round, and reactive to light.   Neck:      Thyroid: No thyromegaly.   Cardiovascular:      Rate and Rhythm: Normal rate and regular rhythm.      Heart sounds: Murmur heard.   Pulmonary:      Effort: Pulmonary effort is normal.      Breath sounds: No stridor. Rales present. No wheezing.   Abdominal:      General: There is no distension.      Palpations: Abdomen is soft. There is no mass.      Tenderness: There is no abdominal tenderness. There is no guarding.   Musculoskeletal:         General: No swelling or deformity. Normal range of motion.      Cervical back: Normal range of motion and neck supple. No rigidity.   Lymphadenopathy:      Cervical: No cervical adenopathy.   Skin:     General: Skin is warm and dry.      Capillary Refill: Capillary refill takes less than 2  seconds.      Coloration: Skin is not jaundiced.   Neurological:      Mental Status: She is alert and oriented to person, place, and time.      Cranial Nerves: No cranial nerve deficit.      Motor: No weakness.   Psychiatric:         Mood and Affect: Mood normal.         Behavior: Behavior normal.         CRANIAL NERVES     CN III, IV, VI   Pupils are equal, round, and reactive to light.         Recent Results (from the past 24 hour(s))   CBC auto differential    Collection Time: 11/12/22  5:12 AM   Result Value Ref Range    WBC 5.36 3.90 - 12.70 K/uL    RBC 2.56 (L) 4.00 - 5.40 M/uL    Hemoglobin 7.5 (L) 12.0 - 16.0 g/dL    Hematocrit 23.5 (L) 37.0 - 48.5 %    MCV 92 82 - 98 fL    MCH 29.3 27.0 - 31.0 pg    MCHC 31.9 (L) 32.0 - 36.0 g/dL    RDW 14.3 11.5 - 14.5 %    Platelets 109 (L) 150 - 450 K/uL    MPV 11.0 9.2 - 12.9 fL    Immature Granulocytes 0.2 0.0 - 0.5 %    Gran # (ANC) 2.9 1.8 - 7.7 K/uL    Immature Grans (Abs) 0.01 0.00 - 0.04 K/uL    Lymph # 1.7 1.0 - 4.8 K/uL    Mono # 0.6 0.3 - 1.0 K/uL    Eos # 0.2 0.0 - 0.5 K/uL    Baso # 0.02 0.00 - 0.20 K/uL    nRBC 0 0 /100 WBC    Gran % 54.5 38.0 - 73.0 %    Lymph % 30.8 18.0 - 48.0 %    Mono % 10.6 4.0 - 15.0 %    Eosinophil % 3.5 0.0 - 8.0 %    Basophil % 0.4 0.0 - 1.9 %    Differential Method Automated    Comprehensive metabolic panel    Collection Time: 11/12/22  5:12 AM   Result Value Ref Range    Sodium 135 (L) 136 - 145 mmol/L    Potassium 3.5 3.5 - 5.1 mmol/L    Chloride 96 95 - 110 mmol/L    CO2 30 (H) 23 - 29 mmol/L    Glucose 179 (H) 70 - 110 mg/dL    BUN 19 6 - 20 mg/dL    Creatinine 7.4 (H) 0.5 - 1.4 mg/dL    Calcium 8.8 8.7 - 10.5 mg/dL    Total Protein 8.7 (H) 6.0 - 8.4 g/dL    Albumin 2.7 (L) 3.5 - 5.2 g/dL    Total Bilirubin 0.5 0.1 - 1.0 mg/dL    Alkaline Phosphatase 50 (L) 55 - 135 U/L    AST 15 10 - 40 U/L    ALT 11 10 - 44 U/L    Anion Gap 9 8 - 16 mmol/L    eGFR 6 (A) >60 mL/min/1.73 m^2   Magnesium    Collection Time: 11/12/22  5:12 AM    Result Value Ref Range    Magnesium 1.9 1.6 - 2.6 mg/dL   Troponin I    Collection Time: 11/12/22  5:12 AM   Result Value Ref Range    Troponin I 0.031 (H) 0.000 - 0.026 ng/mL   Brain natriuretic peptide    Collection Time: 11/12/22  5:12 AM   Result Value Ref Range     (H) 0 - 99 pg/mL   POCT COVID-19 Rapid Screening    Collection Time: 11/12/22  6:19 AM   Result Value Ref Range    POC Rapid COVID Negative Negative     Acceptable Yes    POCT Influenza A/B Molecular    Collection Time: 11/12/22  6:19 AM   Result Value Ref Range    POC Molecular Influenza A Ag Negative Negative, Not Reported    POC Molecular Influenza B Ag Negative Negative, Not Reported     Acceptable Yes    Procalcitonin    Collection Time: 11/12/22  6:36 AM   Result Value Ref Range    Procalcitonin 0.38 (H) <0.25 ng/mL   POCT glucose    Collection Time: 11/12/22  8:06 AM   Result Value Ref Range    POCT Glucose 184 (H) 70 - 110 mg/dL       Microbiology Results (last 7 days)       Procedure Component Value Units Date/Time    Blood Culture #1 **CANNOT BE ORDERED STAT** [[id number]] Collected: 11/12/22 0525    Order Status: Sent Specimen: Blood from Peripheral, Wrist, Right Updated: 11/12/22 0543    Blood Culture #2 **CANNOT BE ORDERED STAT** [[id number]] Collected: 11/12/22 0535    Order Status: Sent Specimen: Blood from Peripheral, Forearm, Right Updated: 11/12/22 0543             Imaging Results              X-Ray Chest 1 View (Final result)  Result time 11/12/22 05:55:58      Final result by Marek March MD (11/12/22 05:55:58)                   Impression:      Suspected mild basilar infiltrate, as discussed above.    Previously identified right-sided pleural effusion appears to have resolved.    Interval placement of right-sided central venous catheter.      Electronically signed by: Marek March  Date:    11/12/2022  Time:    05:55               Narrative:    EXAMINATION:  XR CHEST 1 VIEW    CLINICAL  HISTORY:  Shortness of breath    TECHNIQUE:  Single frontal view of the chest was performed.    COMPARISON:  Chest radiograph October 28, 2022    FINDINGS:  Single chest view is submitted.  There is interval placement of a right-sided central venous catheter, distal tip at the expected location of the SVC.  When accounting for difference in position, technique and depth of inspiration, the appearance of the cardiomediastinal silhouette is stable.  Aortic atherosclerotic change noted.    The previously identified right-sided pleural effusion appears to have resolved in the interim.  There is no evidence for significant pleural effusion at this time.  General pattern of accentuated attenuation greater at the lung bases likely relates to soft tissue attenuation associated with body habitus.  However there is appearance suspicious for mild superimposed basilar infiltrate, left greater than right.  There is no evidence for pneumothorax.    The osseous structures demonstrate chronic change.  Mild curvature of the spine noted.

## 2022-11-12 NOTE — HPI
Elizabeth Maldonado is a 57 y.o. female who has a past medical history of Anemia in chronic kidney disease, Anxiety, Cataract, Chronic bilateral low back pain with bilateral sciatica, Kidney transplant status, living unrelated donor - 12/2/14, Chronic immunosuppression with Prograf and MMF, ESRD now on HD, Degenerative disc disease, Depression, Diabetes mellitus, type 2 since age 18, Hyperlipidemia, Hypertension, Obesity,  Proliferative diabetic retinopathy, HFpEF (70%), presented to the ED with CC of SOB.  Patient was recently on peritoneal dialysis, however has transitioned to hemodialysis a couple weeks ago.  Presents to the ED after having a difficult time lying flat without feeling shortness of breath.  She has not missed dialysis sessions her last session was Friday, yesterday.  BNP is elevated at 600, troponin very mildly elevated at 0.031.  Patient is afebrile, with a normal white count of 5, and essentially negative procalcitonin in ESRD at 0.38.  [place] noting, on 10/26/22 due to SOB, abdominal pain--had issue with peritoneal dialysis. Hospitalized due to right-sided pleural effusion, suspected leakage of peritoneal fluid into the right pleural space.  S/p 1.2 L thoracentesis and 200 mL paracentesis.  Patient denies a cough or URI symptoms, no myalgias, fevers, chills.  Denies any chest pain throughout event.  Denies abdominal pain.  Only complaint appears to be SOB, especially when lying flat, and feeling a bit unwell while getting dialysis.

## 2022-11-12 NOTE — PLAN OF CARE
[facility] - Med Surg  Discharge Assessment    Primary Care Provider: Richy Singleton NP     SW completed brief assessment and discussed discharge planning with patient. Patient lives alone, but her friend Cyndy will provide transportation for her to get home when discharge from the hospital.    Discharge Assessment (most recent)       BRIEF DISCHARGE ASSESSMENT - 11/12/22 1018          Discharge Planning    Assessment Type Discharge Planning Brief Assessment     Resource/Environmental Concerns none     Support Systems Family members     Assistance Needed None     Equipment Currently Used at Home other (see comments)   PD dialysis    Current Living Arrangements home/apartment/condo     Patient/Family Anticipates Transition to home     Patient/Family Anticipated Services at Transition none     DME Needed Upon Discharge  none     Discharge Plan A Home     Discharge Plan B Home

## 2022-11-12 NOTE — ASSESSMENT & PLAN NOTE
Potentially symptomatic anemia, Hb 7.5 with baseline 8-10, but would not explain why feeling more SOB while lying down, this points to excess fluid, but certainly could be combined/multifactorial.

## 2022-11-12 NOTE — H&P
[facility] Medicine  History & Physical    Patient Name: Elizabeth Maldonado  MRN: [id number]  Patient Class: OP- Observation  Admission Date: 11/12/2022  Attending Physician: Reggie Wallis MD   Primary Care Provider: Richy Singleton NP         Patient information was obtained from patient, past medical records and ER records.     Subjective:     Principal Problem:SOB (shortness of breath)    Chief Complaint:   Chief Complaint   Patient presents with    Shortness of Breath     Pt with complaints of SOB especially when she is lying flat. Pt also reports easily exerted with simple activities as well. Hx of HD-last treatment on yesterday. EMS reports initial room air sats of 99%.        HPI:   Elizabeth Maldonado is a 57 y.o. female who has a past medical history of Anemia in chronic kidney disease, Anxiety, Cataract, Chronic bilateral low back pain with bilateral sciatica, Kidney transplant status, living unrelated donor - 12/2/14, Chronic immunosuppression with Prograf and MMF, ESRD now on HD, Degenerative disc disease, Depression, Diabetes mellitus, type 2 since age 18, Hyperlipidemia, Hypertension, Obesity,  Proliferative diabetic retinopathy, HFpEF (70%), presented to the ED with CC of SOB.  Patient was recently on peritoneal dialysis, however has transitioned to hemodialysis a couple weeks ago.  Presents to the ED after having a difficult time lying flat without feeling shortness of breath.  She has not missed dialysis sessions her last session was Friday, yesterday.  BNP is elevated at 600, troponin very mildly elevated at 0.031.  Patient is afebrile, with a normal white count of 5, and essentially negative procalcitonin in ESRD at 0.38.  [place] noting, on 10/26/22 due to SOB, abdominal pain--had issue with peritoneal dialysis. Hospitalized due to right-sided pleural effusion, suspected leakage of peritoneal fluid into the right pleural space.  S/p 1.2 L thoracentesis and 200 mL paracentesis.   Patient denies a cough or URI symptoms, no myalgias, fevers, chills.  Denies any chest pain throughout event.  Denies abdominal pain.  Only complaint appears to be SOB, especially when lying flat, and feeling a bit unwell while getting dialysis.      Past Medical History:   Diagnosis Date    Acidosis 2014    Allergic rhinitis 2014    Allergy     Anemia     Anemia in chronic kidney disease 2014    Anxiety     Cataract     Chronic bilateral low back pain with bilateral sciatica 10/25/2019    Chronic immunosuppression with Prograf and MMF 12/3/2014    CKD (chronic kidney disease) stage 5, GFR less than 15 ml/min 2014    CKD (chronic kidney disease), stage III 3/2/2015    Degenerative disc disease     Depression 2014    Diabetes mellitus, type 2 since age 18 2014    ESRD on peritoneal dialysis - 2014 for 9 hours no peritonitis 2014    Hyperlipidemia     Hypertension 2014    Hypomagnesemia 2015    Kidney transplant status, living unrelated donor - 12/2/14 12/3/2014    Neutropenia 2015    NS (nuclear sclerosis) 2016    Obesity     Organ transplant candidate 2014    Pre-op exam 2014    Proliferative diabetic retinopathy of both eyes without macular edema associated with type 2 diabetes mellitus 2016    Renal manifestation of secondary diabetes mellitus     Tendinitis     Trouble in sleeping        Past Surgical History:   Procedure Laterality Date    BIOPSY N/A 2020    Procedure: Biopsy;  Surgeon: Sweta Catalan MD;  Location: [address];  Service: Transplant;  Laterality: N/A;    BONE MARROW BIOPSY N/A      SECTION      x 2    COLONOSCOPY N/A 2022    Procedure: COLONOSCOPY;  Surgeon: Franklin Gan MD;  Location: [facility] (4TH FLR);  Service: Colon and Rectal;  Laterality: N/A;  order created: previous order unusable  fully vaccinated, labs prior, instr mailed / portal -ml    KIDNEY TRANSPLANT   "02/2014    LAPAROSCOPIC REVISION OF PROCEDURE INVOLVING PERITONEAL DIALYSIS CATHETER N/A 4/4/2022    Procedure: REVISION OF PROCEDURE INVOLVING PERITONEAL DIALYSIS CATHETER, LAPAROSCOPIC;  Surgeon: Franklin Barber MD;  Location: [facility] OR [address] [place] [address];  Service: General;  Laterality: N/A;    MEDIPORT REMOVAL N/A 11/25/2019    Procedure: REMOVAL, CATHETER, CENTRAL VENOUS, TUNNELED, WITH PORT;  Surgeon: Eusebia Diagnostic Provider;  Location: [facility] OR;  Service: Radiology;  Laterality: N/A;    RENAL BIOPSY      ROTATOR CUFF REPAIR      TUBAL LIGATION         Review of patient's allergies indicates:   Allergen Reactions    Shrimp Hives, Itching and Rash    Topamax [topiramate] Other (See Comments)     Vision changes    Zoloft [sertraline] Palpitations       Current Facility-Administered Medications on File Prior to Encounter   Medication    [COMPLETED] epoetin edward injection 6,000 Units    [COMPLETED] heparin (porcine) injection 3,000 Units    [DISCONTINUED] heparin (porcine) injection 1,000 Units     Current Outpatient Medications on File Prior to Encounter   Medication Sig    folic acid (FOLVITE) 1 MG tablet Take 1 tablet (1 mg total) by mouth once daily.    atorvastatin (LIPITOR) 40 MG tablet Take 40 mg by mouth every evening.    blood sugar diagnostic Strp Checks BG ac/hs    calcitRIOL (ROCALTROL) 0.25 MCG Cap Take 0.25 mcg by mouth once daily.    carvediloL (COREG) 3.125 MG tablet Take 3.125 mg by mouth 2 (two) times daily. Hold for SBP <130    clonazePAM (KLONOPIN) 0.5 MG tablet Take 0.5 mg by mouth 2 (two) times daily as needed.    dulaglutide (TRULICITY) 1.5 mg/0.5 mL pen injector Inject 1.5 mg into the skin every 7 days. Pt takes on Wednesday    HUMALOG KWIKPEN INSULIN 100 unit/mL pen Inject 3 Units into the skin 3 (three) times daily with meals. SLIDE SCALE    insulin syringe-needle U-100 (INSULIN SYRINGE) 1/2 mL 30 x 5/16" Syrg Uses 4 daily    lancets (ONETOUCH DELICA LANCETS) 33 gauge Misc " "1 lancet by Misc.(Non-Drug; Combo Route) route 4 (four) times daily before meals and nightly.    LANTUS SOLOSTAR U-100 INSULIN glargine 100 units/mL (3mL) SubQ pen Inject 50 Units into the skin once daily.    sevelamer carbonate (RENVELA) 800 mg Tab Take 1 tablet (800 mg total) by mouth 3 (three) times daily with meals. (Patient taking differently: Take 800 mg by mouth 3 (three) times daily with meals. Take 2 tab w/ meal & 1 tab w/ snack)    SYRINGE & NEEDLE,INSULIN,1 ML (INSULIN SYRINGE-NEEDLE U-100) 1 mL 29 X 7/16" Syrg     timolol maleate 0.5% (TIMOPTIC) 0.5 % Drop Place 2 drops in both eyes twice daily    zolpidem (AMBIEN) 10 mg Tab Take 10 mg by mouth nightly as needed.     Family History       Problem Relation (Age of Onset)    Cataracts Maternal Grandmother    Diabetes Mother, Father, Sister, Brother, Sister    Heart disease Sister, Maternal Grandmother    Heart failure Sister    Hypertension Mother, Sister    Kidney disease Father, Sister    No Known Problems Maternal Aunt, Maternal Uncle, Paternal Aunt, Paternal Uncle, Maternal Grandfather, Paternal Grandmother, Paternal Grandfather    Stroke Maternal Grandmother          Tobacco Use    Smoking status: Former     Packs/day: 1.00     Years: 20.00     Pack years: 20.00     Types: Cigarettes     Quit date: 2013     Years since quittin.2    Smokeless tobacco: Never    Tobacco comments:     quit smoking cigarettes in 2014   Substance and Sexual Activity    Alcohol use: No    Drug use: No    Sexual activity: Yes     Partners: Male     Birth control/protection: Post-menopausal     Review of Systems   Constitutional:  Negative for activity change, fatigue and fever.   HENT:  Negative for sore throat and trouble swallowing.    Eyes:  Negative for photophobia and visual disturbance.   Respiratory:  Positive for shortness of breath. Negative for chest tightness.    Cardiovascular:  Negative for chest pain, palpitations and leg swelling. "   Gastrointestinal:  Negative for abdominal distention, abdominal pain, blood in stool, constipation, diarrhea, nausea and vomiting.   Endocrine: Negative for polydipsia and polyuria.   Genitourinary:  Negative for difficulty urinating, dysuria and hematuria.   Musculoskeletal:  Negative for neck pain and neck stiffness.   Skin:  Negative for pallor and rash.   Allergic/Immunologic: Negative for immunocompromised state.   Neurological:  Negative for dizziness, seizures, syncope and facial asymmetry.   Psychiatric/Behavioral:  Negative for agitation, behavioral problems and confusion.        Objective:     Vital Signs (Most Recent):  Temp: 98.4 °F (36.9 °C) (11/12/22 0805)  Pulse: 90 (11/12/22 0805)  Resp: 17 (11/12/22 0805)  BP: (!) 169/83 (11/12/22 0819)  SpO2: 99 % (11/12/22 0805)   Vital Signs (24h Range):  Temp:  [98.4 °F (36.9 °C)-99.2 °F (37.3 °C)] 98.4 °F (36.9 °C)  Pulse:  [78-99] 90  Resp:  [17-21] 17  SpO2:  [98 %-100 %] 99 %  BP: (156-185)/(83-97) 169/83     Weight: 86 kg (189 lb 9.5 oz)  Body mass index is 29.69 kg/m².    Physical Exam  Vitals and nursing note reviewed.   Constitutional:       General: She is not in acute distress.     Appearance: She is well-developed. She is not diaphoretic.   HENT:      Head: Normocephalic and atraumatic.      Mouth/Throat:      Mouth: Mucous membranes are moist.      Pharynx: No oropharyngeal exudate.      Comments: Elevated JVD  Eyes:      General: No scleral icterus.     Pupils: Pupils are equal, round, and reactive to light.   Neck:      Thyroid: No thyromegaly.   Cardiovascular:      Rate and Rhythm: Normal rate and regular rhythm.      Heart sounds: Murmur heard.   Pulmonary:      Effort: Pulmonary effort is normal.      Breath sounds: No stridor. Rales present. No wheezing.   Abdominal:      General: There is no distension.      Palpations: Abdomen is soft. There is no mass.      Tenderness: There is no abdominal tenderness. There is no guarding.    Musculoskeletal:         General: No swelling or deformity. Normal range of motion.      Cervical back: Normal range of motion and neck supple. No rigidity.   Lymphadenopathy:      Cervical: No cervical adenopathy.   Skin:     General: Skin is warm and dry.      Capillary Refill: Capillary refill takes less than 2 seconds.      Coloration: Skin is not jaundiced.   Neurological:      Mental Status: She is alert and oriented to person, place, and time.      Cranial Nerves: No cranial nerve deficit.      Motor: No weakness.   Psychiatric:         Mood and Affect: Mood normal.         Behavior: Behavior normal.         CRANIAL NERVES     CN III, IV, VI   Pupils are equal, round, and reactive to light.         Recent Results (from the past 24 hour(s))   CBC auto differential    Collection Time: 11/12/22  5:12 AM   Result Value Ref Range    WBC 5.36 3.90 - 12.70 K/uL    RBC 2.56 (L) 4.00 - 5.40 M/uL    Hemoglobin 7.5 (L) 12.0 - 16.0 g/dL    Hematocrit 23.5 (L) 37.0 - 48.5 %    MCV 92 82 - 98 fL    MCH 29.3 27.0 - 31.0 pg    MCHC 31.9 (L) 32.0 - 36.0 g/dL    RDW 14.3 11.5 - 14.5 %    Platelets 109 (L) 150 - 450 K/uL    MPV 11.0 9.2 - 12.9 fL    Immature Granulocytes 0.2 0.0 - 0.5 %    Gran # (ANC) 2.9 1.8 - 7.7 K/uL    Immature Grans (Abs) 0.01 0.00 - 0.04 K/uL    Lymph # 1.7 1.0 - 4.8 K/uL    Mono # 0.6 0.3 - 1.0 K/uL    Eos # 0.2 0.0 - 0.5 K/uL    Baso # 0.02 0.00 - 0.20 K/uL    nRBC 0 0 /100 WBC    Gran % 54.5 38.0 - 73.0 %    Lymph % 30.8 18.0 - 48.0 %    Mono % 10.6 4.0 - 15.0 %    Eosinophil % 3.5 0.0 - 8.0 %    Basophil % 0.4 0.0 - 1.9 %    Differential Method Automated    Comprehensive metabolic panel    Collection Time: 11/12/22  5:12 AM   Result Value Ref Range    Sodium 135 (L) 136 - 145 mmol/L    Potassium 3.5 3.5 - 5.1 mmol/L    Chloride 96 95 - 110 mmol/L    CO2 30 (H) 23 - 29 mmol/L    Glucose 179 (H) 70 - 110 mg/dL    BUN 19 6 - 20 mg/dL    Creatinine 7.4 (H) 0.5 - 1.4 mg/dL    Calcium 8.8 8.7 - 10.5  mg/dL    Total Protein 8.7 (H) 6.0 - 8.4 g/dL    Albumin 2.7 (L) 3.5 - 5.2 g/dL    Total Bilirubin 0.5 0.1 - 1.0 mg/dL    Alkaline Phosphatase 50 (L) 55 - 135 U/L    AST 15 10 - 40 U/L    ALT 11 10 - 44 U/L    Anion Gap 9 8 - 16 mmol/L    eGFR 6 (A) >60 mL/min/1.73 m^2   Magnesium    Collection Time: 11/12/22  5:12 AM   Result Value Ref Range    Magnesium 1.9 1.6 - 2.6 mg/dL   Troponin I    Collection Time: 11/12/22  5:12 AM   Result Value Ref Range    Troponin I 0.031 (H) 0.000 - 0.026 ng/mL   Brain natriuretic peptide    Collection Time: 11/12/22  5:12 AM   Result Value Ref Range     (H) 0 - 99 pg/mL   POCT COVID-19 Rapid Screening    Collection Time: 11/12/22  6:19 AM   Result Value Ref Range    POC Rapid COVID Negative Negative     Acceptable Yes    POCT Influenza A/B Molecular    Collection Time: 11/12/22  6:19 AM   Result Value Ref Range    POC Molecular Influenza A Ag Negative Negative, Not Reported    POC Molecular Influenza B Ag Negative Negative, Not Reported     Acceptable Yes    Procalcitonin    Collection Time: 11/12/22  6:36 AM   Result Value Ref Range    Procalcitonin 0.38 (H) <0.25 ng/mL   POCT glucose    Collection Time: 11/12/22  8:06 AM   Result Value Ref Range    POCT Glucose 184 (H) 70 - 110 mg/dL       Microbiology Results (last 7 days)       Procedure Component Value Units Date/Time    Blood Culture #1 **CANNOT BE ORDERED STAT** [[id number]] Collected: 11/12/22 0525    Order Status: Sent Specimen: Blood from Peripheral, Wrist, Right Updated: 11/12/22 0543    Blood Culture #2 **CANNOT BE ORDERED STAT** [[id number]] Collected: 11/12/22 0535    Order Status: Sent Specimen: Blood from Peripheral, Forearm, Right Updated: 11/12/22 0543             Imaging Results              X-Ray Chest 1 View (Final result)  Result time 11/12/22 05:55:58      Final result by Marek March MD (11/12/22 05:55:58)                   Impression:      Suspected mild basilar  infiltrate, as discussed above.    Previously identified right-sided pleural effusion appears to have resolved.    Interval placement of right-sided central venous catheter.      Electronically signed by: Marek March  Date:    11/12/2022  Time:    05:55               Narrative:    EXAMINATION:  XR CHEST 1 VIEW    CLINICAL HISTORY:  Shortness of breath    TECHNIQUE:  Single frontal view of the chest was performed.    COMPARISON:  Chest radiograph October 28, 2022    FINDINGS:  Single chest view is submitted.  There is interval placement of a right-sided central venous catheter, distal tip at the expected location of the SVC.  When accounting for difference in position, technique and depth of inspiration, the appearance of the cardiomediastinal silhouette is stable.  Aortic atherosclerotic change noted.    The previously identified right-sided pleural effusion appears to have resolved in the interim.  There is no evidence for significant pleural effusion at this time.  General pattern of accentuated attenuation greater at the lung bases likely relates to soft tissue attenuation associated with body habitus.  However there is appearance suspicious for mild superimposed basilar infiltrate, left greater than right.  There is no evidence for pneumothorax.    The osseous structures demonstrate chronic change.  Mild curvature of the spine noted.                                          Assessment/Plan:     * SOB (shortness of breath)  · Shortness of breath when lying flat, and - a/c HF suspected, ECHO ordered   · O2 is normal, she is 100% on room air.  · She is afebrile with a normal white count and normal procalcitonin  · Patient just switched from PD to HD, likely adjusting and may not be getting enough fluid off  · Will ask Nephrology to assess, and potentially pull some more fluid off  · Also potentially hypertensive episode causing flash pulmonary edema,  on presentation, unclear what it is at home,  will control BP better. But excess fluid may be the cause of the high blood pressure rather than the primary cause.   · Potentially symptomatic anemia, Hb 7.5 with baseline 8-10, but would not explain why feeling more SOB while lying down, this points to excess fluid, but certainly could be combined/multifactorial.         Anemia of chronic kidney failure, stage 5  Potentially symptomatic anemia, Hb 7.5 with baseline 8-10, but would not explain why feeling more SOB while lying down, this points to excess fluid, but certainly could be combined/multifactorial.       End stage kidney disease  · Just transition from PD to HD   · Last dialysis yesterday, Friday  · Nephrology consult      Proliferative diabetic retinopathy of both eyes without macular edema associated with type 2 diabetes mellitus  · Resume home meds    Chronic immunosuppression with Prograf  · Per patient no longer on immunosuppresion        VTE Risk Mitigation (From admission, onward)         Ordered     IP VTE HIGH RISK PATIENT  Once         11/12/22 0622     Place sequential compression device  Until discontinued         11/12/22 0622                   Reggie Wallis MD  Department of Hospital Medicine   [facility] - Med Surg

## 2022-11-12 NOTE — ED NOTES
Received reported from DEEPAK Rosas patient is AAOx4, VSS with exception of elevated SBP. Denies any SOB, CP, HA. No respiratory distress noted at this time.

## 2022-11-13 VITALS
DIASTOLIC BLOOD PRESSURE: 84 MMHG | HEART RATE: 86 BPM | RESPIRATION RATE: 18 BRPM | WEIGHT: 188 LBS | BODY MASS INDEX: 29.51 KG/M2 | SYSTOLIC BLOOD PRESSURE: 167 MMHG | OXYGEN SATURATION: 96 % | TEMPERATURE: 99 F | HEIGHT: 67 IN

## 2022-11-13 LAB
ABO GROUP BLD: NORMAL
ALBUMIN SERPL BCP-MCNC: 2.5 G/DL (ref 3.5–5.2)
ALP SERPL-CCNC: 51 U/L (ref 55–135)
ALT SERPL W/O P-5'-P-CCNC: 9 U/L (ref 10–44)
ANION GAP SERPL CALC-SCNC: 12 MMOL/L (ref 8–16)
AST SERPL-CCNC: 14 U/L (ref 10–40)
BASOPHILS # BLD AUTO: 0.02 K/UL (ref 0–0.2)
BASOPHILS NFR BLD: 0.4 % (ref 0–1.9)
BILIRUB SERPL-MCNC: 0.4 MG/DL (ref 0.1–1)
BLD GP AB SCN CELLS X3 SERPL QL: NORMAL
BLD PROD TYP BPU: NORMAL
BLOOD UNIT EXPIRATION DATE: NORMAL
BLOOD UNIT TYPE CODE: 6200
BLOOD UNIT TYPE: NORMAL
BUN SERPL-MCNC: 34 MG/DL (ref 6–20)
CALCIUM SERPL-MCNC: 8.5 MG/DL (ref 8.7–10.5)
CHLORIDE SERPL-SCNC: 97 MMOL/L (ref 95–110)
CO2 SERPL-SCNC: 28 MMOL/L (ref 23–29)
CODING SYSTEM: NORMAL
CREAT SERPL-MCNC: 10.2 MG/DL (ref 0.5–1.4)
DIFFERENTIAL METHOD: ABNORMAL
DISPENSE STATUS: NORMAL
EOSINOPHIL # BLD AUTO: 0.2 K/UL (ref 0–0.5)
EOSINOPHIL NFR BLD: 3.3 % (ref 0–8)
ERYTHROCYTE [DISTWIDTH] IN BLOOD BY AUTOMATED COUNT: 14.4 % (ref 11.5–14.5)
EST. GFR  (NO RACE VARIABLE): 4 ML/MIN/1.73 M^2
GLUCOSE SERPL-MCNC: 154 MG/DL (ref 70–110)
HCT VFR BLD AUTO: 22.3 % (ref 37–48.5)
HGB BLD-MCNC: 6.9 G/DL (ref 12–16)
IMM GRANULOCYTES # BLD AUTO: 0.01 K/UL (ref 0–0.04)
IMM GRANULOCYTES NFR BLD AUTO: 0.2 % (ref 0–0.5)
LYMPHOCYTES # BLD AUTO: 1.6 K/UL (ref 1–4.8)
LYMPHOCYTES NFR BLD: 29.7 % (ref 18–48)
MCH RBC QN AUTO: 29.1 PG (ref 27–31)
MCHC RBC AUTO-ENTMCNC: 30.9 G/DL (ref 32–36)
MCV RBC AUTO: 94 FL (ref 82–98)
MONOCYTES # BLD AUTO: 0.6 K/UL (ref 0.3–1)
MONOCYTES NFR BLD: 11 % (ref 4–15)
NEUTROPHILS # BLD AUTO: 3.1 K/UL (ref 1.8–7.7)
NEUTROPHILS NFR BLD: 55.4 % (ref 38–73)
NRBC BLD-RTO: 0 /100 WBC
PLATELET # BLD AUTO: 104 K/UL (ref 150–450)
PMV BLD AUTO: 11.1 FL (ref 9.2–12.9)
POCT GLUCOSE: 143 MG/DL (ref 70–110)
POCT GLUCOSE: 171 MG/DL (ref 70–110)
POCT GLUCOSE: 183 MG/DL (ref 70–110)
POCT GLUCOSE: 225 MG/DL (ref 70–110)
POTASSIUM SERPL-SCNC: 3.9 MMOL/L (ref 3.5–5.1)
PROT SERPL-MCNC: 8.3 G/DL (ref 6–8.4)
RBC # BLD AUTO: 2.37 M/UL (ref 4–5.4)
RH BLD: NORMAL
SODIUM SERPL-SCNC: 137 MMOL/L (ref 136–145)
TRANS ERYTHROCYTES VOL PATIENT: NORMAL ML
WBC # BLD AUTO: 5.53 K/UL (ref 3.9–12.7)

## 2022-11-13 PROCEDURE — 86900 BLOOD TYPING SEROLOGIC ABO: CPT | Mod: NTX | Performed by: STUDENT IN AN ORGANIZED HEALTH CARE EDUCATION/TRAINING PROGRAM

## 2022-11-13 PROCEDURE — 27201040 HC RC 50 FILTER: Mod: NTX | Performed by: STUDENT IN AN ORGANIZED HEALTH CARE EDUCATION/TRAINING PROGRAM

## 2022-11-13 PROCEDURE — 25000003 PHARM REV CODE 250: Mod: NTX | Performed by: INTERNAL MEDICINE

## 2022-11-13 PROCEDURE — 99214 OFFICE O/P EST MOD 30 MIN: CPT | Mod: NTX,,, | Performed by: INTERNAL MEDICINE

## 2022-11-13 PROCEDURE — 80053 COMPREHEN METABOLIC PANEL: CPT | Mod: NTX | Performed by: STUDENT IN AN ORGANIZED HEALTH CARE EDUCATION/TRAINING PROGRAM

## 2022-11-13 PROCEDURE — 99214 PR OFFICE/OUTPT VISIT, EST, LEVL IV, 30-39 MIN: ICD-10-PCS | Mod: NTX,,, | Performed by: INTERNAL MEDICINE

## 2022-11-13 PROCEDURE — P9021 RED BLOOD CELLS UNIT: HCPCS | Mod: NTX | Performed by: STUDENT IN AN ORGANIZED HEALTH CARE EDUCATION/TRAINING PROGRAM

## 2022-11-13 PROCEDURE — 86901 BLOOD TYPING SEROLOGIC RH(D): CPT | Mod: NTX | Performed by: STUDENT IN AN ORGANIZED HEALTH CARE EDUCATION/TRAINING PROGRAM

## 2022-11-13 PROCEDURE — 63600175 PHARM REV CODE 636 W HCPCS: Mod: NTX | Performed by: STUDENT IN AN ORGANIZED HEALTH CARE EDUCATION/TRAINING PROGRAM

## 2022-11-13 PROCEDURE — 25000003 PHARM REV CODE 250: Mod: NTX | Performed by: STUDENT IN AN ORGANIZED HEALTH CARE EDUCATION/TRAINING PROGRAM

## 2022-11-13 PROCEDURE — 36415 COLL VENOUS BLD VENIPUNCTURE: CPT | Mod: NTX | Performed by: STUDENT IN AN ORGANIZED HEALTH CARE EDUCATION/TRAINING PROGRAM

## 2022-11-13 PROCEDURE — 85025 COMPLETE CBC W/AUTO DIFF WBC: CPT | Mod: NTX | Performed by: STUDENT IN AN ORGANIZED HEALTH CARE EDUCATION/TRAINING PROGRAM

## 2022-11-13 PROCEDURE — G0378 HOSPITAL OBSERVATION PER HR: HCPCS | Mod: NTX

## 2022-11-13 PROCEDURE — C9113 INJ PANTOPRAZOLE SODIUM, VIA: HCPCS | Mod: NTX | Performed by: STUDENT IN AN ORGANIZED HEALTH CARE EDUCATION/TRAINING PROGRAM

## 2022-11-13 PROCEDURE — 86850 RBC ANTIBODY SCREEN: CPT | Mod: NTX | Performed by: STUDENT IN AN ORGANIZED HEALTH CARE EDUCATION/TRAINING PROGRAM

## 2022-11-13 PROCEDURE — 36430 TRANSFUSION BLD/BLD COMPNT: CPT

## 2022-11-13 PROCEDURE — 86920 COMPATIBILITY TEST SPIN: CPT | Mod: NTX | Performed by: STUDENT IN AN ORGANIZED HEALTH CARE EDUCATION/TRAINING PROGRAM

## 2022-11-13 RX ORDER — HYDROCODONE BITARTRATE AND ACETAMINOPHEN 500; 5 MG/1; MG/1
TABLET ORAL
Status: DISCONTINUED | OUTPATIENT
Start: 2022-11-13 | End: 2022-11-13 | Stop reason: HOSPADM

## 2022-11-13 RX ORDER — PANTOPRAZOLE SODIUM 40 MG/10ML
40 INJECTION, POWDER, LYOPHILIZED, FOR SOLUTION INTRAVENOUS 2 TIMES DAILY
Status: DISCONTINUED | OUTPATIENT
Start: 2022-11-13 | End: 2022-11-13 | Stop reason: HOSPADM

## 2022-11-13 RX ORDER — MUPIROCIN 20 MG/G
OINTMENT TOPICAL 2 TIMES DAILY
Status: DISCONTINUED | OUTPATIENT
Start: 2022-11-13 | End: 2022-11-13 | Stop reason: HOSPADM

## 2022-11-13 RX ORDER — CARVEDILOL 12.5 MG/1
12.5 TABLET ORAL 2 TIMES DAILY
Status: DISCONTINUED | OUTPATIENT
Start: 2022-11-13 | End: 2022-11-13 | Stop reason: HOSPADM

## 2022-11-13 RX ADMIN — TIMOLOL MALEATE 1 DROP: 5 SOLUTION OPHTHALMIC at 10:11

## 2022-11-13 RX ADMIN — ASPIRIN 81 MG: 81 TABLET, DELAYED RELEASE ORAL at 09:11

## 2022-11-13 RX ADMIN — INSULIN DETEMIR 12 UNITS: 100 INJECTION, SOLUTION SUBCUTANEOUS at 10:11

## 2022-11-13 RX ADMIN — MUPIROCIN: 20 OINTMENT TOPICAL at 10:11

## 2022-11-13 RX ADMIN — PANTOPRAZOLE SODIUM 40 MG: 40 INJECTION, POWDER, FOR SOLUTION INTRAVENOUS at 09:11

## 2022-11-13 RX ADMIN — SEVELAMER CARBONATE 800 MG: 800 TABLET, FILM COATED ORAL at 01:11

## 2022-11-13 RX ADMIN — CALCITRIOL CAPSULES 0.25 MCG 0.25 MCG: 0.25 CAPSULE ORAL at 09:11

## 2022-11-13 RX ADMIN — FOLIC ACID 1 MG: 1 TABLET ORAL at 09:11

## 2022-11-13 RX ADMIN — LOSARTAN POTASSIUM 50 MG: 25 TABLET, FILM COATED ORAL at 09:11

## 2022-11-13 RX ADMIN — SEVELAMER CARBONATE 800 MG: 800 TABLET, FILM COATED ORAL at 05:11

## 2022-11-13 RX ADMIN — CARVEDILOL 12.5 MG: 12.5 TABLET, FILM COATED ORAL at 09:11

## 2022-11-13 NOTE — SUBJECTIVE & OBJECTIVE
Past Medical History:   Diagnosis Date    Acidosis 2014    Allergic rhinitis 2014    Allergy     Anemia     Anemia in chronic kidney disease 2014    Anxiety     Cataract     Chronic bilateral low back pain with bilateral sciatica 10/25/2019    Chronic immunosuppression with Prograf and MMF 12/3/2014    CKD (chronic kidney disease) stage 5, GFR less than 15 ml/min 2014    CKD (chronic kidney disease), stage III 3/2/2015    Degenerative disc disease     Depression 2014    Diabetes mellitus, type 2 since age 18 2014    ESRD on peritoneal dialysis - 2014 for 9 hours no peritonitis 2014    Hyperlipidemia     Hypertension 2014    Hypomagnesemia 2015    Kidney transplant status, living unrelated donor - 12/2/14 12/3/2014    Neutropenia 2015    NS (nuclear sclerosis) 2016    Obesity     Organ transplant candidate 2014    Pre-op exam 2014    Proliferative diabetic retinopathy of both eyes without macular edema associated with type 2 diabetes mellitus 2016    Renal manifestation of secondary diabetes mellitus     Tendinitis     Trouble in sleeping        Past Surgical History:   Procedure Laterality Date    BIOPSY N/A 2020    Procedure: Biopsy;  Surgeon: Sweta Catalan MD;  Location: [facility] OR [address] [place] [address];  Service: Transplant;  Laterality: N/A;    BONE MARROW BIOPSY N/A      SECTION      x 2    COLONOSCOPY N/A 2022    Procedure: COLONOSCOPY;  Surgeon: Franklin Gan MD;  Location: [facility] (4TH FLR);  Service: Colon and Rectal;  Laterality: N/A;  order created: previous order unusable  fully vaccinated, labs prior, instr mailed / portal -ml    KIDNEY TRANSPLANT  2014    LAPAROSCOPIC REVISION OF PROCEDURE INVOLVING PERITONEAL DIALYSIS CATHETER N/A 2022    Procedure: REVISION OF PROCEDURE INVOLVING PERITONEAL DIALYSIS CATHETER, LAPAROSCOPIC;  Surgeon: Franklin Barber MD;  Location: [facility] OR [address] [place] [address];  Service: General;   Laterality: N/A;    MEDIPORT REMOVAL N/A 2019    Procedure: REMOVAL, CATHETER, CENTRAL VENOUS, TUNNELED, WITH PORT;  Surgeon: Eusebia Diagnostic Provider;  Location: [facility] OR;  Service: Radiology;  Laterality: N/A;    RENAL BIOPSY      ROTATOR CUFF REPAIR      TUBAL LIGATION         Review of patient's allergies indicates:   Allergen Reactions    Shrimp Hives, Itching and Rash    Topamax [topiramate] Other (See Comments)     Vision changes    Zoloft [sertraline] Palpitations     Family History       Problem Relation (Age of Onset)    Cataracts Maternal Grandmother    Diabetes Mother, Father, Sister, Brother, Sister    Heart disease Sister, Maternal Grandmother    Heart failure Sister    Hypertension Mother, Sister    Kidney disease Father, Sister    No Known Problems Maternal Aunt, Maternal Uncle, Paternal Aunt, Paternal Uncle, Maternal Grandfather, Paternal Grandmother, Paternal Grandfather    Stroke Maternal Grandmother          Tobacco Use    Smoking status: Former     Packs/day: 1.00     Years: 20.00     Pack years: 20.00     Types: Cigarettes     Quit date: 2013     Years since quittin.2    Smokeless tobacco: Never    Tobacco comments:     quit smoking cigarettes in 2014   Substance and Sexual Activity    Alcohol use: No    Drug use: No    Sexual activity: Yes     Partners: Male     Birth control/protection: Post-menopausal     Review of Systems   Constitutional:  Positive for fatigue. Negative for appetite change, chills, fever and unexpected weight change.   HENT:  Negative for sore throat, trouble swallowing and voice change.    Eyes:  Negative for pain, redness and visual disturbance.   Respiratory:  Negative for cough, chest tightness and shortness of breath.    Cardiovascular:  Negative for chest pain, palpitations and leg swelling.   Gastrointestinal:  Negative for abdominal distention, abdominal pain, blood in stool, constipation, diarrhea, nausea and vomiting.   Genitourinary:   Negative for difficulty urinating, dysuria and hematuria.   Musculoskeletal:  Negative for arthralgias, back pain and joint swelling.   Skin:  Negative for color change, pallor, rash and wound.   Allergic/Immunologic: Negative for immunocompromised state.   Neurological:  Negative for dizziness, seizures, light-headedness and headaches.   Hematological:  Negative for adenopathy.   Psychiatric/Behavioral:  Negative for dysphoric mood. The patient is not nervous/anxious.    Objective:     Vital Signs (Most Recent):  Temp: 98.3 °F (36.8 °C) (11/13/22 1204)  Pulse: 93 (11/13/22 1204)  Resp: 20 (11/13/22 1204)  BP: (!) 182/94 (11/13/22 1204)  SpO2: 98 % (11/13/22 1204)   Vital Signs (24h Range):  Temp:  [98.3 °F (36.8 °C)-98.5 °F (36.9 °C)] 98.3 °F (36.8 °C)  Pulse:  [86-95] 93  Resp:  [16-20] 20  SpO2:  [97 %-100 %] 98 %  BP: (162-182)/(79-94) 182/94     Weight: 85.3 kg (188 lb) (11/13/22 0600)  Body mass index is 29.44 kg/m².      Intake/Output Summary (Last 24 hours) at 11/13/2022 1247  Last data filed at 11/13/2022 0830  Gross per 24 hour   Intake 720 ml   Output --   Net 720 ml       Lines/Drains/Airways       Central Venous Catheter Line  Duration                  Hemodialysis Catheter 10/28/22 1548 right internal jugular 15 days              Peripheral Intravenous Line  Duration                  Peripheral IV - Single Lumen 11/12/22 0500 20 G Anterior;Left Forearm 1 day                    Physical Exam  Constitutional:       General: She is not in acute distress.     Appearance: She is well-developed.   Eyes:      Pupils: Pupils are equal, round, and reactive to light.      Comments: Pallor noted   Neck:      Thyroid: No thyromegaly.   Cardiovascular:      Rate and Rhythm: Normal rate and regular rhythm.      Heart sounds: Normal heart sounds. No murmur heard.  Pulmonary:      Effort: Pulmonary effort is normal.      Breath sounds: Normal breath sounds. No wheezing or rales.   Abdominal:      General: Bowel sounds  are normal. There is no distension.      Palpations: Abdomen is soft. There is no mass.      Tenderness: There is no abdominal tenderness.   Musculoskeletal:         General: No tenderness. Normal range of motion.   Lymphadenopathy:      Cervical: No cervical adenopathy.   Skin:     General: Skin is warm and dry.      Findings: No erythema or rash.   Neurological:      Mental Status: She is alert and oriented to person, place, and time.   Psychiatric:         Behavior: Behavior normal.         Significant Labs:  CBC:   Recent Labs   Lab 11/12/22  0512 11/13/22 0425   WBC 5.36 5.53   HGB 7.5* 6.9*   HCT 23.5* 22.3*   * 104*     CMP:   Recent Labs   Lab 11/13/22 0425   *   CALCIUM 8.5*   ALBUMIN 2.5*   PROT 8.3      K 3.9   CO2 28   CL 97   BUN 34*   CREATININE 10.2*   ALKPHOS 51*   ALT 9*   AST 14   BILITOT 0.4     Iron studies consistent with anemia of chronic disease    Significant Imaging:  Imaging results within the past 24 hours have been reviewed.    Scheduled Meds:   aspirin  81 mg Oral Daily    atorvastatin  40 mg Oral QHS    calcitRIOL  0.25 mcg Oral Daily    carvediloL  12.5 mg Oral BID    folic acid  1 mg Oral Daily    insulin detemir U-100  12 Units Subcutaneous Daily    losartan  50 mg Oral Daily    melatonin  6 mg Oral Nightly    mupirocin   Nasal BID    pantoprazole  40 mg Intravenous BID    sevelamer carbonate  800 mg Oral TID WM    timolol maleate 0.5%  1 drop Both Eyes BID     Continuous Infusions:  PRN Meds:.sodium chloride, acetaminophen, clonazePAM, glucagon (human recombinant), glucagon (human recombinant), glucose, glucose, glucose, glucose, insulin aspart U-100, naloxone, ondansetron, polyethylene glycol, simethicone, sodium chloride 0.9%, zolpidem

## 2022-11-13 NOTE — CONSULTS
"                                         Renal Consult    Date of Admission:  11/12/2022  4:53 AM        Chief Complaint:   Chief Complaint   Patient presents with    Shortness of Breath     Pt with complaints of SOB especially when she is lying flat. Pt also reports easily exerted with simple activities as well. Hx of HD-last treatment on yesterday. EMS reports initial room air sats of 99%.       HPI: 58 y/o female with h/o HTN, IDDM type 2 with end-organ damage,  HLD,  failed Kidney transplant, ESRD now on HD (mwf) followed by Dr. Damien FERRIS (transitioned from [facility] a few weeks ago due to Pleural effusion) anemia of CKD, Sciatica and depression w/ anxiety who presented to [facility] ED with c.o. SOB.  As per H&P: "Presents to the ED after having a difficult time lying flat without feeling shortness of breath.  She has not missed dialysis sessions her last session was Friday, yesterday.  BNP is elevated at 600, troponin very mildly elevated at 0.031.  Patient is afebrile, with a normal white count of 5, and essentially negative procalcitonin in ESRD at 0.38.  Olar noting, on 10/26/22 due to SOB, abdominal pain--had issue with peritoneal dialysis. Hospitalized due to right-sided pleural effusion, suspected leakage of peritoneal fluid into the right pleural space.  S/p 1.2 L thoracentesis and 200 mL paracentesis.  Patient denies a cough or URI symptoms, no myalgias, fevers, chills.  Denies any chest pain throughout event.  Denies abdominal pain.  Only complaint appears to be SOB, especially when lying flat, and feeling a bit unwell while getting dialysis".        PMH:  Past Medical History:   Diagnosis Date    Acidosis 7/1/2014    Allergic rhinitis 7/1/2014    Allergy     Anemia     Anemia in chronic kidney disease 7/1/2014    Anxiety     Cataract     Chronic bilateral low back pain with bilateral sciatica 10/25/2019    Chronic immunosuppression with Prograf and MMF 12/3/2014    CKD (chronic kidney disease) stage 5, GFR " less than 15 ml/min 2014    CKD (chronic kidney disease), stage III 3/2/2015    Degenerative disc disease     Depression 2014    Diabetes mellitus, type 2 since age 18 2014    ESRD on peritoneal dialysis - 2014 for 9 hours no peritonitis 2014    Hyperlipidemia     Hypertension 2014    Hypomagnesemia 2015    Kidney transplant status, living unrelated donor - 12/2/14 12/3/2014    Neutropenia 2015    NS (nuclear sclerosis) 2016    Obesity     Organ transplant candidate 2014    Pre-op exam 2014    Proliferative diabetic retinopathy of both eyes without macular edema associated with type 2 diabetes mellitus 2016    Renal manifestation of secondary diabetes mellitus     Tendinitis     Trouble in sleeping        PSH:  Past Surgical History:   Procedure Laterality Date    BIOPSY N/A 2020    Procedure: Biopsy;  Surgeon: Sweta Catalan MD;  [facility];  Service: Transplant;  Laterality: N/A;    BONE MARROW BIOPSY N/A      SECTION      x 2    COLONOSCOPY N/A 2022    Procedure: COLONOSCOPY;  Surgeon: Franklin Gan MD;  Location: [facility] ENDO (4TH FLR);  Service: Colon and Rectal;  Laterality: N/A;  order created: previous order unusable  fully vaccinated, labs prior, instr mailed / portal -ml    KIDNEY TRANSPLANT  2014    LAPAROSCOPIC REVISION OF PROCEDURE INVOLVING PERITONEAL DIALYSIS CATHETER N/A 2022    Procedure: REVISION OF PROCEDURE INVOLVING PERITONEAL DIALYSIS CATHETER, LAPAROSCOPIC;  Surgeon: Franklin Barber MD;  Location: [facility] OR 2ND FLR;  Service: General;  Laterality: N/A;    MEDIPORT REMOVAL N/A 2019    Procedure: REMOVAL, CATHETER, CENTRAL VENOUS, TUNNELED, WITH PORT;  Surgeon: Eusebia Diagnostic Provider;  Location: [facility] OR;  Service: Radiology;  Laterality: N/A;    RENAL BIOPSY      ROTATOR CUFF REPAIR      TUBAL LIGATION         Allergies:  Review of patient's allergies indicates:   Allergen Reactions     "Shrimp Hives, Itching and Rash    Topamax [topiramate] Other (See Comments)     Vision changes    Zoloft [sertraline] Palpitations       No current facility-administered medications on file prior to encounter.     Current Outpatient Medications on File Prior to Encounter   Medication Sig Dispense Refill    folic acid (FOLVITE) 1 MG tablet Take 1 tablet (1 mg total) by mouth once daily. 30 tablet 11    atorvastatin (LIPITOR) 40 MG tablet Take 40 mg by mouth every evening.      blood sugar diagnostic Strp Checks BG ac/hs 200 strip 7    calcitRIOL (ROCALTROL) 0.25 MCG Cap Take 0.25 mcg by mouth once daily.      carvediloL (COREG) 3.125 MG tablet Take 3.125 mg by mouth 2 (two) times daily. Hold for SBP <130      clonazePAM (KLONOPIN) 0.5 MG tablet Take 0.5 mg by mouth 2 (two) times daily as needed.      dulaglutide (TRULICITY) 1.5 mg/0.5 mL pen injector Inject 1.5 mg into the skin every 7 days. Pt takes on Wednesday      HUMALOG KWIKPEN INSULIN 100 unit/mL pen Inject 3 Units into the skin 3 (three) times daily with meals. SLIDE SCALE      insulin syringe-needle U-100 (INSULIN SYRINGE) 1/2 mL 30 x 5/16" Syrg Uses 4 daily 200 each 12    lancets (ONETOUCH DELICA LANCETS) 33 gauge Misc 1 lancet by Misc.(Non-Drug; Combo Route) route 4 (four) times daily before meals and nightly. 200 each 7    LANTUS SOLOSTAR U-100 INSULIN glargine 100 units/mL (3mL) SubQ pen Inject 50 Units into the skin once daily.  0    sevelamer carbonate (RENVELA) 800 mg Tab Take 1 tablet (800 mg total) by mouth 3 (three) times daily with meals. (Patient taking differently: Take 800 mg by mouth 3 (three) times daily with meals. Take 2 tab w/ meal & 1 tab w/ snack) 90 tablet 3    SYRINGE & NEEDLE,INSULIN,1 ML (INSULIN SYRINGE-NEEDLE U-100) 1 mL 29 X 7/16" Syrg   11    timolol maleate 0.5% (TIMOPTIC) 0.5 % Drop Place 2 drops in both eyes twice daily      zolpidem (AMBIEN) 10 mg Tab Take 10 mg by mouth nightly as needed.         Medications:  Current " Facility-Administered Medications   Medication Dose Route Frequency Provider Last Rate Last Admin    0.9%  NaCl infusion (for blood administration)   Intravenous Q24H PRN Reggie Wallis MD        acetaminophen tablet 650 mg  650 mg Oral Q8H PRN Reggie Wallis MD        aspirin EC tablet 81 mg  81 mg Oral Daily Reggie TARA Wallis MD        atorvastatin tablet 40 mg  40 mg Oral QHS Reggie Wallis MD   40 mg at 11/12/22 2039    calcitRIOL capsule 0.25 mcg  0.25 mcg Oral Daily Reggie Wallis MD   0.25 mcg at 11/12/22 0820    carvediloL tablet 12.5 mg  12.5 mg Oral BID Reggie Wallis MD        clonazePAM tablet 0.5 mg  0.5 mg Oral BID PRN Reggie Wallis MD        folic acid tablet 1 mg  1 mg Oral Daily Reggie Wallis MD   1 mg at 11/12/22 0820    glucagon (human recombinant) injection 1 mg  1 mg Intramuscular PRN Reggie Wallis MD        glucagon (human recombinant) injection 1 mg  1 mg Intramuscular PRN Reggie Wallis MD        glucose chewable tablet 16 g  16 g Oral PRN Reggie Wallis MD        glucose chewable tablet 16 g  16 g Oral PRN Reggie Wallis MD        glucose chewable tablet 24 g  24 g Oral PRN Reggie Wallis MD        glucose chewable tablet 24 g  24 g Oral PRN Reggie Wallis MD        insulin aspart U-100 pen 0-5 Units  0-5 Units Subcutaneous QID (AC + HS) PRN Reggie Wallis MD   1 Units at 11/12/22 2039    insulin detemir U-100 pen 12 Units  12 Units Subcutaneous Daily Reggie Wallis MD   12 Units at 11/12/22 1019    losartan tablet 50 mg  50 mg Oral Daily Reggie Wallis MD   50 mg at 11/12/22 1019    melatonin tablet 6 mg  6 mg Oral Nightly Reggie TARA Wallis MD   6 mg at 11/12/22 2038    naloxone 0.4 mg/mL injection 0.02 mg  0.02 mg Intravenous PRN Reggie Wallis MD        ondansetron injection 4 mg  4 mg Intravenous Q8H PRN Reggie Wallis MD        pantoprazole injection 40 mg  40 mg Intravenous BID Reggie Wallis MD        polyethylene glycol packet 17 g  17 g Oral BID PRN Reggie Wallis MD        sevelamer carbonate tablet 800 mg  800 mg Oral TID WM  Reggie Wallis MD   800 mg at 22 1701    simethicone chewable tablet 80 mg  1 tablet Oral QID PRN Reggie Wallis MD        timolol maleate 0.5% ophthalmic solution 1 drop  1 drop Both Eyes BID Reggie Wallis MD   1 drop at 22    zolpidem tablet 5 mg  5 mg Oral Nightly PRN Roxanne Gordon NP   5 mg at 22       FamHx:  Family History   Problem Relation Age of Onset    Diabetes Mother     Hypertension Mother     Diabetes Father     Kidney disease Father     Diabetes Sister     Hypertension Sister     Kidney disease Sister     Heart disease Sister     Heart failure Sister     Diabetes Brother     Diabetes Sister     Stroke Maternal Grandmother     Heart disease Maternal Grandmother         pacemaker    Cataracts Maternal Grandmother     No Known Problems Maternal Aunt     No Known Problems Maternal Uncle     No Known Problems Paternal Aunt     No Known Problems Paternal Uncle     No Known Problems Maternal Grandfather     No Known Problems Paternal Grandmother     No Known Problems Paternal Grandfather     Cancer Neg Hx     Amblyopia Neg Hx     Blindness Neg Hx     Glaucoma Neg Hx     Macular degeneration Neg Hx     Retinal detachment Neg Hx     Strabismus Neg Hx     Thyroid disease Neg Hx     Breast cancer Neg Hx     Colon cancer Neg Hx     Ovarian cancer Neg Hx        SocHx:  Social History     Socioeconomic History    Marital status:    Occupational History     Employer: DISABLED   Tobacco Use    Smoking status: Former     Packs/day: 1.00     Years: 20.00     Pack years: 20.00     Types: Cigarettes     Quit date: 2013     Years since quittin.2    Smokeless tobacco: Never    Tobacco comments:     quit smoking cigarettes in 2014   Substance and Sexual Activity    Alcohol use: No    Drug use: No    Sexual activity: Yes     Partners: Male     Birth control/protection: Post-menopausal   Social History Narrative        Nutrition manager by education    Disabled    2  children, one living    No blood transfusions    Caregiver Sister           Review of Systems: see H&P       Physical Exam:  Vitals:   Vitals:    11/13/22 0740   BP: (!) 171/87   Pulse: 89   Resp: 18   Temp: 98.5 °F (36.9 °C)       I/O last 3 completed shifts:  In: 720 [P.O.:720]  Out: -   No intake/output data recorded.    General: No apparent distress.   Neck: supple   Lungs: Unlabored breathing  Heart: RRR  Abdomen: n/a  Ext: n/a  Neurologic: Awake and alert, oriented x 3      Laboratories:    Recent Labs   Lab 11/13/22 0425   WBC 5.53   RBC 2.37*   HGB 6.9*   HCT 22.3*   *   MCV 94   MCH 29.1   MCHC 30.9*       Recent Labs   Lab 11/13/22 0425   CALCIUM 8.5*   PROT 8.3      K 3.9   CO2 28   CL 97   BUN 34*   CREATININE 10.2*   ALKPHOS 51*   ALT 9*   AST 14   BILITOT 0.4       No results for input(s): COLORU, CLARITYU, SPECGRAV, PHUR, PROTEINUA, GLUCOSEU, BLOODU, WBCU, RBCU, BACTERIA, MUCUS in the last 24 hours.    Invalid input(s):  BILIRUBINCON    Microbiology Results (last 7 days)       Procedure Component Value Units Date/Time    Blood Culture #1 **CANNOT BE ORDERED STAT** [[id number]] Collected: 11/12/22 0525    Order Status: Completed Specimen: Blood from Peripheral, Wrist, Right Updated: 11/13/22 0703     Blood Culture, Routine No Growth to date      No Growth to date    Blood Culture #2 **CANNOT BE ORDERED STAT** [[id number]] Collected: 11/12/22 0535    Order Status: Completed Specimen: Blood from Peripheral, Forearm, Right Updated: 11/13/22 0703     Blood Culture, Routine No Growth to date      No Growth to date              Diagnostic Tests:     CXR:  Impression:       Suspected mild basilar infiltrate, as discussed above.     Previously identified right-sided pleural effusion appears to have resolved.     Interval placement of right-sided central venous catheter.       Electronically signed by: Marek March   Date: 11/12/2022            Assessment:    56 y/o female with ESRD now on HD  admitted with:    - Dyspnea with normal O2 sat. And no Pulmonary edema et?  - IDDM-2  - HTN  - Anemia of CKD  - Mild hyperphosphatemia  - Hx. Failed kidney transplant still on immunosupression        Plan:    - Dialysis Q MWF  - UF as tolerated  - Transfuse during HD in a.m.  - Epogen 3 x week  - Renal diet + protein supplements  - Check PO4-  - Cont. Home Prograf  - GI consulted for anemia  - Disposition, glycemic control and other problems per admitting

## 2022-11-13 NOTE — PLAN OF CARE
Problem: Adult Inpatient Plan of Care  Goal: Plan of Care Review  Outcome: Ongoing, Progressing  Goal: Patient-Specific Goal (Individualized)  Outcome: Ongoing, Progressing  Goal: Absence of Hospital-Acquired Illness or Injury  Outcome: Ongoing, Progressing  Goal: Optimal Comfort and Wellbeing  Outcome: Ongoing, Progressing  Goal: Readiness for Transition of Care  Outcome: Ongoing, Progressing     Problem: Infection  Goal: Absence of Infection Signs and Symptoms  Outcome: Ongoing, Progressing     Problem: Fluid and Electrolyte Imbalance (Acute Kidney Injury/Impairment)  Goal: Fluid and Electrolyte Balance  Outcome: Ongoing, Progressing       Patient remained free from falls/injury throughout shift.  No acute changes in status.  Bed locked in lowest position.  Side rails up x2.  Call bell within reach.  Purposeful rounding maintained throughout shift.

## 2022-11-13 NOTE — CONSULTS
[facility]  Gastroenterology  Consult Note    Patient Name: Elizabeth Maldonado  MRN: [id number]  Admission Date: 11/12/2022  Hospital Length of Stay: 0 days  Code Status: Full Code   Attending Provider: Reggie Wallis MD   Consulting Provider: Miguel Larson MD  Primary Care Physician: Richy Singleton NP  Principal Problem:SOB (shortness of breath)    Consults  Subjective:     HPI:  Patient admitted with fatigue and found to have worsening anemia.  Denies any melena or hematochezia, no hematemesis.  No nausea, no vomiting.  Appetite has been stable.  No abdominal pain, dysphagia, any other significant gastrointestinal complaints.      Past Medical History:   Diagnosis Date    Acidosis 7/1/2014    Allergic rhinitis 7/1/2014    Allergy     Anemia     Anemia in chronic kidney disease 7/1/2014    Anxiety     Cataract     Chronic bilateral low back pain with bilateral sciatica 10/25/2019    Chronic immunosuppression with Prograf and MMF 12/3/2014    CKD (chronic kidney disease) stage 5, GFR less than 15 ml/min 7/1/2014    CKD (chronic kidney disease), stage III 3/2/2015    Degenerative disc disease     Depression 7/1/2014    Diabetes mellitus, type 2 since age 18 7/1/2014    ESRD on peritoneal dialysis - august 2014 for 9 hours no peritonitis 11/24/2014    Hyperlipidemia     Hypertension 7/1/2014    Hypomagnesemia 1/7/2015    Kidney transplant status, living unrelated donor - 12/2/14 12/3/2014    Neutropenia 1/21/2015    NS (nuclear sclerosis) 9/16/2016    Obesity     Organ transplant candidate 7/1/2014    Pre-op exam 11/24/2014    Proliferative diabetic retinopathy of both eyes without macular edema associated with type 2 diabetes mellitus 9/16/2016    Renal manifestation of secondary diabetes mellitus     Tendinitis     Trouble in sleeping        Past Surgical History:   Procedure Laterality Date    BIOPSY N/A 2/26/2020    Procedure: Biopsy;  Surgeon: Sweta Catalan MD;  Location:  [facility] OR 2ND FLR;  Service: Transplant;  Laterality: N/A;    BONE MARROW BIOPSY N/A      SECTION      x 2    COLONOSCOPY N/A 2022    Procedure: COLONOSCOPY;  Surgeon: Franklin Gan MD;  Location: [facility] ENDO (4TH FLR);  Service: Colon and Rectal;  Laterality: N/A;  order created: previous order unusable  fully vaccinated, labs prior, instr mailed / portal -ml    KIDNEY TRANSPLANT  2014    LAPAROSCOPIC REVISION OF PROCEDURE INVOLVING PERITONEAL DIALYSIS CATHETER N/A 2022    Procedure: REVISION OF PROCEDURE INVOLVING PERITONEAL DIALYSIS CATHETER, LAPAROSCOPIC;  Surgeon: Franklin Barber MD;  Location: [facility] OR 2ND FLR;  Service: General;  Laterality: N/A;    MEDIPORT REMOVAL N/A 2019    Procedure: REMOVAL, CATHETER, CENTRAL VENOUS, TUNNELED, WITH PORT;  Surgeon: Eusebia Diagnostic Provider;  Location: [facility] OR;  Service: Radiology;  Laterality: N/A;    RENAL BIOPSY      ROTATOR CUFF REPAIR      TUBAL LIGATION         Review of patient's allergies indicates:   Allergen Reactions    Shrimp Hives, Itching and Rash    Topamax [topiramate] Other (See Comments)     Vision changes    Zoloft [sertraline] Palpitations     Family History       Problem Relation (Age of Onset)    Cataracts Maternal Grandmother    Diabetes Mother, Father, Sister, Brother, Sister    Heart disease Sister, Maternal Grandmother    Heart failure Sister    Hypertension Mother, Sister    Kidney disease Father, Sister    No Known Problems Maternal Aunt, Maternal Uncle, Paternal Aunt, Paternal Uncle, Maternal Grandfather, Paternal Grandmother, Paternal Grandfather    Stroke Maternal Grandmother          Tobacco Use    Smoking status: Former     Packs/day: 1.00     Years: 20.00     Pack years: 20.00     Types: Cigarettes     Quit date: 2013     Years since quittin.2    Smokeless tobacco: Never    Tobacco comments:     quit smoking cigarettes in 2014   Substance and Sexual Activity    Alcohol  use: No    Drug use: No    Sexual activity: Yes     Partners: Male     Birth control/protection: Post-menopausal     Review of Systems   Constitutional:  Positive for fatigue. Negative for appetite change, chills, fever and unexpected weight change.   HENT:  Negative for sore throat, trouble swallowing and voice change.    Eyes:  Negative for pain, redness and visual disturbance.   Respiratory:  Negative for cough, chest tightness and shortness of breath.    Cardiovascular:  Negative for chest pain, palpitations and leg swelling.   Gastrointestinal:  Negative for abdominal distention, abdominal pain, blood in stool, constipation, diarrhea, nausea and vomiting.   Genitourinary:  Negative for difficulty urinating, dysuria and hematuria.   Musculoskeletal:  Negative for arthralgias, back pain and joint swelling.   Skin:  Negative for color change, pallor, rash and wound.   Allergic/Immunologic: Negative for immunocompromised state.   Neurological:  Negative for dizziness, seizures, light-headedness and headaches.   Hematological:  Negative for adenopathy.   Psychiatric/Behavioral:  Negative for dysphoric mood. The patient is not nervous/anxious.    Objective:     Vital Signs (Most Recent):  Temp: 98.3 °F (36.8 °C) (11/13/22 1204)  Pulse: 93 (11/13/22 1204)  Resp: 20 (11/13/22 1204)  BP: (!) 182/94 (11/13/22 1204)  SpO2: 98 % (11/13/22 1204)   Vital Signs (24h Range):  Temp:  [98.3 °F (36.8 °C)-98.5 °F (36.9 °C)] 98.3 °F (36.8 °C)  Pulse:  [86-95] 93  Resp:  [16-20] 20  SpO2:  [97 %-100 %] 98 %  BP: (162-182)/(79-94) 182/94     Weight: 85.3 kg (188 lb) (11/13/22 0600)  Body mass index is 29.44 kg/m².      Intake/Output Summary (Last 24 hours) at 11/13/2022 1247  Last data filed at 11/13/2022 0830  Gross per 24 hour   Intake 720 ml   Output --   Net 720 ml       Lines/Drains/Airways       Central Venous Catheter Line  Duration                  Hemodialysis Catheter 10/28/22 1548 right internal jugular 15 days               Peripheral Intravenous Line  Duration                  Peripheral IV - Single Lumen 11/12/22 0500 20 G Anterior;Left Forearm 1 day                    Physical Exam  Constitutional:       General: She is not in acute distress.     Appearance: She is well-developed.   Eyes:      Pupils: Pupils are equal, round, and reactive to light.      Comments: Pallor noted   Neck:      Thyroid: No thyromegaly.   Cardiovascular:      Rate and Rhythm: Normal rate and regular rhythm.      Heart sounds: Normal heart sounds. No murmur heard.  Pulmonary:      Effort: Pulmonary effort is normal.      Breath sounds: Normal breath sounds. No wheezing or rales.   Abdominal:      General: Bowel sounds are normal. There is no distension.      Palpations: Abdomen is soft. There is no mass.      Tenderness: There is no abdominal tenderness.   Musculoskeletal:         General: No tenderness. Normal range of motion.   Lymphadenopathy:      Cervical: No cervical adenopathy.   Skin:     General: Skin is warm and dry.      Findings: No erythema or rash.   Neurological:      Mental Status: She is alert and oriented to person, place, and time.   Psychiatric:         Behavior: Behavior normal.         Significant Labs:  CBC:   Recent Labs   Lab 11/12/22  0512 11/13/22  0425   WBC 5.36 5.53   HGB 7.5* 6.9*   HCT 23.5* 22.3*   * 104*     CMP:   Recent Labs   Lab 11/13/22  0425   *   CALCIUM 8.5*   ALBUMIN 2.5*   PROT 8.3      K 3.9   CO2 28   CL 97   BUN 34*   CREATININE 10.2*   ALKPHOS 51*   ALT 9*   AST 14   BILITOT 0.4     Iron studies consistent with anemia of chronic disease    Significant Imaging:  Imaging results within the past 24 hours have been reviewed.    Scheduled Meds:   aspirin  81 mg Oral Daily    atorvastatin  40 mg Oral QHS    calcitRIOL  0.25 mcg Oral Daily    carvediloL  12.5 mg Oral BID    folic acid  1 mg Oral Daily    insulin detemir U-100  12 Units Subcutaneous Daily    losartan  50 mg Oral Daily     melatonin  6 mg Oral Nightly    mupirocin   Nasal BID    pantoprazole  40 mg Intravenous BID    sevelamer carbonate  800 mg Oral TID WM    timolol maleate 0.5%  1 drop Both Eyes BID     Continuous Infusions:  PRN Meds:.sodium chloride, acetaminophen, clonazePAM, glucagon (human recombinant), glucagon (human recombinant), glucose, glucose, glucose, glucose, insulin aspart U-100, naloxone, ondansetron, polyethylene glycol, simethicone, sodium chloride 0.9%, zolpidem      Assessment/Plan:     Anemia of chronic kidney failure, stage 5  Labs are consistent with anemia of chronic disease.  She has no significant symptoms of overt bleeding or any gastrointestinal symptoms that warrant further evaluation.  I suspect that the slight decline from her baseline is just due to worsening anemia of chronic disease.  Agree with transfusion.  No plans for gastrointestinal workup.  Please call if any questions.        Thank you for your consult. I will sign off. Please contact us if you have any additional questions.    Miguel Larson MD  Gastroenterology  [facility]

## 2022-11-13 NOTE — PLAN OF CARE
Problem: Fluid and Electrolyte Imbalance (Acute Kidney Injury/Impairment)  Goal: Fluid and Electrolyte Balance  Outcome: Ongoing, Progressing     Problem: Renal Function Impairment (Acute Kidney Injury/Impairment)  Goal: Effective Renal Function  Outcome: Ongoing, Progressing     Problem: Device-Related Complication Risk (Hemodialysis)  Goal: Safe, Effective Therapy Delivery  Outcome: Ongoing, Progressing

## 2022-11-13 NOTE — SUBJECTIVE & OBJECTIVE
REBEKAH. Denies CP.   Eating okay. BM wnl.   No complaints        Review of Systems   Constitutional:  Negative for activity change, fatigue and fever.   HENT:  Negative for sore throat and trouble swallowing.    Eyes:  Negative for photophobia and visual disturbance.   Respiratory:  Positive for shortness of breath. Negative for chest tightness.    Cardiovascular:  Negative for chest pain, palpitations and leg swelling.   Gastrointestinal:  Negative for abdominal distention, abdominal pain, blood in stool, constipation, diarrhea, nausea and vomiting.   Endocrine: Negative for polydipsia and polyuria.   Genitourinary:  Negative for difficulty urinating, dysuria and hematuria.   Musculoskeletal:  Negative for neck pain and neck stiffness.   Skin:  Negative for pallor and rash.   Allergic/Immunologic: Negative for immunocompromised state.   Neurological:  Negative for dizziness, seizures, syncope and facial asymmetry.   Psychiatric/Behavioral:  Negative for agitation, behavioral problems and confusion.        Objective:     Vital Signs (Most Recent):  Temp: 98.5 °F (36.9 °C) (11/13/22 0740)  Pulse: 89 (11/13/22 0740)  Resp: 18 (11/13/22 0740)  BP: (!) 171/87 (11/13/22 0740)  SpO2: 97 % (11/13/22 0740)   Vital Signs (24h Range):  Temp:  [98.3 °F (36.8 °C)-98.9 °F (37.2 °C)] 98.5 °F (36.9 °C)  Pulse:  [86-95] 89  Resp:  [16-20] 18  SpO2:  [97 %-100 %] 97 %  BP: (162-176)/(77-87) 171/87     Weight: 85.3 kg (188 lb)  Body mass index is 29.44 kg/m².    Physical Exam  Vitals and nursing note reviewed.   Constitutional:       General: She is not in acute distress.     Appearance: She is well-developed. She is not diaphoretic.   HENT:      Head: Normocephalic and atraumatic.      Mouth/Throat:      Mouth: Mucous membranes are moist.      Pharynx: No oropharyngeal exudate.   Eyes:      General: No scleral icterus.     Pupils: Pupils are equal, round, and reactive to light.   Neck:      Thyroid: No thyromegaly.   Cardiovascular:       Rate and Rhythm: Normal rate and regular rhythm.      Heart sounds: Murmur heard.   Pulmonary:      Effort: Pulmonary effort is normal.      Breath sounds: No stridor. No wheezing or rales.   Abdominal:      General: There is no distension.      Palpations: Abdomen is soft. There is no mass.      Tenderness: There is no abdominal tenderness. There is no guarding.   Musculoskeletal:         General: No swelling or deformity. Normal range of motion.      Cervical back: Normal range of motion and neck supple. No rigidity.   Lymphadenopathy:      Cervical: No cervical adenopathy.   Skin:     General: Skin is warm and dry.      Capillary Refill: Capillary refill takes less than 2 seconds.      Coloration: Skin is not jaundiced.   Neurological:      Mental Status: She is alert and oriented to person, place, and time.      Cranial Nerves: No cranial nerve deficit.      Motor: No weakness.   Psychiatric:         Mood and Affect: Mood normal.         Behavior: Behavior normal.         CRANIAL NERVES     CN III, IV, VI   Pupils are equal, round, and reactive to light.         Recent Results (from the past 24 hour(s))   Troponin I    Collection Time: 11/12/22 10:55 AM   Result Value Ref Range    Troponin I 0.041 (H) 0.000 - 0.026 ng/mL   Echo    Collection Time: 11/12/22 12:04 PM   Result Value Ref Range    BSA 2.02 m2    TDI SEPTAL 0.07 m/s    LV LATERAL E/E' RATIO 19.75 m/s    LV SEPTAL E/E' RATIO 22.57 m/s    LA WIDTH 5.00 cm    IVC diameter 1.81 cm    Left Ventricular Outflow Tract Mean Velocity 0.85 cm/s    Left Ventricular Outflow Tract Mean Gradient 3.20 mmHg    AORTIC VALVE CUSP SEPERATION 1.59 cm    TDI LATERAL 0.08 m/s    PV PEAK VELOCITY 1.10 cm/s    LVIDd 3.93 3.5 - 6.0 cm    IVS 1.55 (A) 0.6 - 1.1 cm    Posterior Wall 1.61 (A) 0.6 - 1.1 cm    Ao root annulus 3.03 cm    LVIDs 2.64 2.1 - 4.0 cm    FS 33 28 - 44 %    LA volume 102.59 cm3    Sinus 3.00 cm    STJ 2.09 cm    Ascending aorta 2.38 cm    LV mass  246.62 g    LA size 4.00 cm    RVDD 3.89 cm    TAPSE 2.57 cm    RV S' 0.02 cm/s    Left Ventricle Relative Wall Thickness 0.82 cm    AV mean gradient 10 mmHg    AV valve area 1.68 cm2    AV Velocity Ratio 0.57     AV index (prosthetic) 0.59     MV mean gradient 6 mmHg    MV valve area p 1/2 method 3.51 cm2    MV valve area by continuity eq 1.72 cm2    E/A ratio 1.22     Mean e' 0.08 m/s    E wave deceleration time 216.41 msec    IVRT 123.69 msec    Pulm vein S/D ratio 1.96     LVOT diameter 1.90 cm    LVOT area 2.8 cm2    LVOT peak brando 1.20 m/s    LVOT peak VTI 24.80 cm    Ao peak brando 2.10 m/s    Ao VTI 41.8 cm    LVOT stroke volume 70.28 cm3    AV peak gradient 18 mmHg    MV peak gradient 11 mmHg    E/E' ratio 21.07 m/s    MV Peak E Brando 1.58 m/s    TR Max Brando 2.94 m/s    MV VTI 40.8 cm    MV stenosis pressure 1/2 time 62.76 ms    MV Peak A Brando 1.30 m/s    PV Peak S Brando 0.96 m/s    PV Peak D Brando 0.49 m/s    LV Systolic Volume 25.55 mL    LV Systolic Volume Index 12.9 mL/m2    LV Diastolic Volume 67.14 mL    LV Diastolic Volume Index 33.91 mL/m2    LA Volume Index 51.8 mL/m2    LV Mass Index 125 g/m2    RA Major Axis 5.36 cm    Left Atrium Minor Axis 5.49 cm    Left Atrium Major Axis 6.70 cm    Triscuspid Valve Regurgitation Peak Gradient 35 mmHg    RA Width 3.80 cm    Right Atrial Pressure (from IVC) 3 mmHg    EF 55 %    TV rest pulmonary artery pressure 38 mmHg   POCT glucose    Collection Time: 11/12/22 12:17 PM   Result Value Ref Range    POCT Glucose 183 (H) 70 - 110 mg/dL   POCT glucose    Collection Time: 11/12/22  4:22 PM   Result Value Ref Range    POCT Glucose 189 (H) 70 - 110 mg/dL   POCT glucose    Collection Time: 11/12/22  8:22 PM   Result Value Ref Range    POCT Glucose 225 (H) 70 - 110 mg/dL   CBC Auto Differential    Collection Time: 11/13/22  4:25 AM   Result Value Ref Range    WBC 5.53 3.90 - 12.70 K/uL    RBC 2.37 (L) 4.00 - 5.40 M/uL    Hemoglobin 6.9 (L) 12.0 - 16.0 g/dL    Hematocrit 22.3 (L)  37.0 - 48.5 %    MCV 94 82 - 98 fL    MCH 29.1 27.0 - 31.0 pg    MCHC 30.9 (L) 32.0 - 36.0 g/dL    RDW 14.4 11.5 - 14.5 %    Platelets 104 (L) 150 - 450 K/uL    MPV 11.1 9.2 - 12.9 fL    Immature Granulocytes 0.2 0.0 - 0.5 %    Gran # (ANC) 3.1 1.8 - 7.7 K/uL    Immature Grans (Abs) 0.01 0.00 - 0.04 K/uL    Lymph # 1.6 1.0 - 4.8 K/uL    Mono # 0.6 0.3 - 1.0 K/uL    Eos # 0.2 0.0 - 0.5 K/uL    Baso # 0.02 0.00 - 0.20 K/uL    nRBC 0 0 /100 WBC    Gran % 55.4 38.0 - 73.0 %    Lymph % 29.7 18.0 - 48.0 %    Mono % 11.0 4.0 - 15.0 %    Eosinophil % 3.3 0.0 - 8.0 %    Basophil % 0.4 0.0 - 1.9 %    Differential Method Automated    Comprehensive Metabolic Panel    Collection Time: 11/13/22  4:25 AM   Result Value Ref Range    Sodium 137 136 - 145 mmol/L    Potassium 3.9 3.5 - 5.1 mmol/L    Chloride 97 95 - 110 mmol/L    CO2 28 23 - 29 mmol/L    Glucose 154 (H) 70 - 110 mg/dL    BUN 34 (H) 6 - 20 mg/dL    Creatinine 10.2 (H) 0.5 - 1.4 mg/dL    Calcium 8.5 (L) 8.7 - 10.5 mg/dL    Total Protein 8.3 6.0 - 8.4 g/dL    Albumin 2.5 (L) 3.5 - 5.2 g/dL    Total Bilirubin 0.4 0.1 - 1.0 mg/dL    Alkaline Phosphatase 51 (L) 55 - 135 U/L    AST 14 10 - 40 U/L    ALT 9 (L) 10 - 44 U/L    Anion Gap 12 8 - 16 mmol/L    eGFR 4 (A) >60 mL/min/1.73 m^2   POCT glucose    Collection Time: 11/13/22  7:41 AM   Result Value Ref Range    POCT Glucose 143 (H) 70 - 110 mg/dL       Microbiology Results (last 7 days)       Procedure Component Value Units Date/Time    Blood Culture #1 **CANNOT BE ORDERED STAT** [[id number]] Collected: 11/12/22 0525    Order Status: Completed Specimen: Blood from Peripheral, Wrist, Right Updated: 11/13/22 0703     Blood Culture, Routine No Growth to date      No Growth to date    Blood Culture #2 **CANNOT BE ORDERED STAT** [[id number]] Collected: 11/12/22 0535    Order Status: Completed Specimen: Blood from Peripheral, Forearm, Right Updated: 11/13/22 0703     Blood Culture, Routine No Growth to date      No Growth to date              Imaging Results              X-Ray Chest 1 View (Final result)  Result time 11/12/22 05:55:58      Final result by Marek March MD (11/12/22 05:55:58)                   Impression:      Suspected mild basilar infiltrate, as discussed above.    Previously identified right-sided pleural effusion appears to have resolved.    Interval placement of right-sided central venous catheter.      Electronically signed by: Marek March  Date:    11/12/2022  Time:    05:55               Narrative:    EXAMINATION:  XR CHEST 1 VIEW    CLINICAL HISTORY:  Shortness of breath    TECHNIQUE:  Single frontal view of the chest was performed.    COMPARISON:  Chest radiograph October 28, 2022    FINDINGS:  Single chest view is submitted.  There is interval placement of a right-sided central venous catheter, distal tip at the expected location of the SVC.  When accounting for difference in position, technique and depth of inspiration, the appearance of the cardiomediastinal silhouette is stable.  Aortic atherosclerotic change noted.    The previously identified right-sided pleural effusion appears to have resolved in the interim.  There is no evidence for significant pleural effusion at this time.  General pattern of accentuated attenuation greater at the lung bases likely relates to soft tissue attenuation associated with body habitus.  However there is appearance suspicious for mild superimposed basilar infiltrate, left greater than right.  There is no evidence for pneumothorax.    The osseous structures demonstrate chronic change.  Mild curvature of the spine noted.

## 2022-11-13 NOTE — HPI
Patient admitted with fatigue and found to have worsening anemia.  Denies any melena or hematochezia, no hematemesis.  No nausea, no vomiting.  Appetite has been stable.  No abdominal pain, dysphagia, any other significant gastrointestinal complaints.

## 2022-11-13 NOTE — PROGRESS NOTES
[facility] Medicine  Progress Note    Patient Name: Elizabeth Maldonado  MRN: [id number]  Patient Class: OP- Observation   Admission Date: 11/12/2022  Length of Stay: 0 days  Attending Physician: Reggie Wallis MD  Primary Care Provider: Richy Singleton NP        Subjective:     Principal Problem:SOB (shortness of breath)        HPI:    Elizabeth Maldonado is a 57 y.o. female who has a past medical history of Anemia in chronic kidney disease, Anxiety, Cataract, Chronic bilateral low back pain with bilateral sciatica, Kidney transplant status, living unrelated donor - 12/2/14, Chronic immunosuppression with Prograf and MMF, ESRD now on HD, Degenerative disc disease, Depression, Diabetes mellitus, type 2 since age 18, Hyperlipidemia, Hypertension, Obesity,  Proliferative diabetic retinopathy, HFpEF (70%), presented to the ED with CC of SOB.  Patient was recently on peritoneal dialysis, however has transitioned to hemodialysis a couple weeks ago.  Presents to the ED after having a difficult time lying flat without feeling shortness of breath.  She has not missed dialysis sessions her last session was Friday, yesterday.  BNP is elevated at 600, troponin very mildly elevated at 0.031.  Patient is afebrile, with a normal white count of 5, and essentially negative procalcitonin in ESRD at 0.38.  [place] noting, on 10/26/22 due to SOB, abdominal pain--had issue with peritoneal dialysis. Hospitalized due to right-sided pleural effusion, suspected leakage of peritoneal fluid into the right pleural space.  S/p 1.2 L thoracentesis and 200 mL paracentesis.  Patient denies a cough or URI symptoms, no myalgias, fevers, chills.  Denies any chest pain throughout event.  Denies abdominal pain.  Only complaint appears to be SOB, especially when lying flat, and feeling a bit unwell while getting dialysis.      Overview/Hospital Course:  Patient presented with shortness of breath, BNP was elevated at 600, however CVP only  3 on echo and patient does not look volume overloaded.  Consulted Nephrology for potential volume removal.  Hemoglobin baseline 8-10, 7.5 on admission, potentially symptomatic anemia.  Hemoglobin further dropped this morning to 6.9 without fluids, concern for slow GI bleed.  IV PPI b.i.d. ordered and GI consulted.      REBEKAH. Denies CP.   Eating okay. BM wnl.   No complaints        Review of Systems   Constitutional:  Negative for activity change, fatigue and fever.   HENT:  Negative for sore throat and trouble swallowing.    Eyes:  Negative for photophobia and visual disturbance.   Respiratory:  Positive for shortness of breath. Negative for chest tightness.    Cardiovascular:  Negative for chest pain, palpitations and leg swelling.   Gastrointestinal:  Negative for abdominal distention, abdominal pain, blood in stool, constipation, diarrhea, nausea and vomiting.   Endocrine: Negative for polydipsia and polyuria.   Genitourinary:  Negative for difficulty urinating, dysuria and hematuria.   Musculoskeletal:  Negative for neck pain and neck stiffness.   Skin:  Negative for pallor and rash.   Allergic/Immunologic: Negative for immunocompromised state.   Neurological:  Negative for dizziness, seizures, syncope and facial asymmetry.   Psychiatric/Behavioral:  Negative for agitation, behavioral problems and confusion.        Objective:     Vital Signs (Most Recent):  Temp: 98.5 °F (36.9 °C) (11/13/22 0740)  Pulse: 89 (11/13/22 0740)  Resp: 18 (11/13/22 0740)  BP: (!) 171/87 (11/13/22 0740)  SpO2: 97 % (11/13/22 0740)   Vital Signs (24h Range):  Temp:  [98.3 °F (36.8 °C)-98.9 °F (37.2 °C)] 98.5 °F (36.9 °C)  Pulse:  [86-95] 89  Resp:  [16-20] 18  SpO2:  [97 %-100 %] 97 %  BP: (162-176)/(77-87) 171/87     Weight: 85.3 kg (188 lb)  Body mass index is 29.44 kg/m².    Physical Exam  Vitals and nursing note reviewed.   Constitutional:       General: She is not in acute distress.     Appearance: She is well-developed. She is  not diaphoretic.   HENT:      Head: Normocephalic and atraumatic.      Mouth/Throat:      Mouth: Mucous membranes are moist.      Pharynx: No oropharyngeal exudate.   Eyes:      General: No scleral icterus.     Pupils: Pupils are equal, round, and reactive to light.   Neck:      Thyroid: No thyromegaly.   Cardiovascular:      Rate and Rhythm: Normal rate and regular rhythm.      Heart sounds: Murmur heard.   Pulmonary:      Effort: Pulmonary effort is normal.      Breath sounds: No stridor. No wheezing or rales.   Abdominal:      General: There is no distension.      Palpations: Abdomen is soft. There is no mass.      Tenderness: There is no abdominal tenderness. There is no guarding.   Musculoskeletal:         General: No swelling or deformity. Normal range of motion.      Cervical back: Normal range of motion and neck supple. No rigidity.   Lymphadenopathy:      Cervical: No cervical adenopathy.   Skin:     General: Skin is warm and dry.      Capillary Refill: Capillary refill takes less than 2 seconds.      Coloration: Skin is not jaundiced.   Neurological:      Mental Status: She is alert and oriented to person, place, and time.      Cranial Nerves: No cranial nerve deficit.      Motor: No weakness.   Psychiatric:         Mood and Affect: Mood normal.         Behavior: Behavior normal.         CRANIAL NERVES     CN III, IV, VI   Pupils are equal, round, and reactive to light.         Recent Results (from the past 24 hour(s))   Troponin I    Collection Time: 11/12/22 10:55 AM   Result Value Ref Range    Troponin I 0.041 (H) 0.000 - 0.026 ng/mL   Echo    Collection Time: 11/12/22 12:04 PM   Result Value Ref Range    BSA 2.02 m2    TDI SEPTAL 0.07 m/s    LV LATERAL E/E' RATIO 19.75 m/s    LV SEPTAL E/E' RATIO 22.57 m/s    LA WIDTH 5.00 cm    IVC diameter 1.81 cm    Left Ventricular Outflow Tract Mean Velocity 0.85 cm/s    Left Ventricular Outflow Tract Mean Gradient 3.20 mmHg    AORTIC VALVE CUSP SEPERATION 1.59 cm     TDI LATERAL 0.08 m/s    PV PEAK VELOCITY 1.10 cm/s    LVIDd 3.93 3.5 - 6.0 cm    IVS 1.55 (A) 0.6 - 1.1 cm    Posterior Wall 1.61 (A) 0.6 - 1.1 cm    Ao root annulus 3.03 cm    LVIDs 2.64 2.1 - 4.0 cm    FS 33 28 - 44 %    LA volume 102.59 cm3    Sinus 3.00 cm    STJ 2.09 cm    Ascending aorta 2.38 cm    LV mass 246.62 g    LA size 4.00 cm    RVDD 3.89 cm    TAPSE 2.57 cm    RV S' 0.02 cm/s    Left Ventricle Relative Wall Thickness 0.82 cm    AV mean gradient 10 mmHg    AV valve area 1.68 cm2    AV Velocity Ratio 0.57     AV index (prosthetic) 0.59     MV mean gradient 6 mmHg    MV valve area p 1/2 method 3.51 cm2    MV valve area by continuity eq 1.72 cm2    E/A ratio 1.22     Mean e' 0.08 m/s    E wave deceleration time 216.41 msec    IVRT 123.69 msec    Pulm vein S/D ratio 1.96     LVOT diameter 1.90 cm    LVOT area 2.8 cm2    LVOT peak brando 1.20 m/s    LVOT peak VTI 24.80 cm    Ao peak brando 2.10 m/s    Ao VTI 41.8 cm    LVOT stroke volume 70.28 cm3    AV peak gradient 18 mmHg    MV peak gradient 11 mmHg    E/E' ratio 21.07 m/s    MV Peak E Brando 1.58 m/s    TR Max Brando 2.94 m/s    MV VTI 40.8 cm    MV stenosis pressure 1/2 time 62.76 ms    MV Peak A Brando 1.30 m/s    PV Peak S Brando 0.96 m/s    PV Peak D Brando 0.49 m/s    LV Systolic Volume 25.55 mL    LV Systolic Volume Index 12.9 mL/m2    LV Diastolic Volume 67.14 mL    LV Diastolic Volume Index 33.91 mL/m2    LA Volume Index 51.8 mL/m2    LV Mass Index 125 g/m2    RA Major Axis 5.36 cm    Left Atrium Minor Axis 5.49 cm    Left Atrium Major Axis 6.70 cm    Triscuspid Valve Regurgitation Peak Gradient 35 mmHg    RA Width 3.80 cm    Right Atrial Pressure (from IVC) 3 mmHg    EF 55 %    TV rest pulmonary artery pressure 38 mmHg   POCT glucose    Collection Time: 11/12/22 12:17 PM   Result Value Ref Range    POCT Glucose 183 (H) 70 - 110 mg/dL   POCT glucose    Collection Time: 11/12/22  4:22 PM   Result Value Ref Range    POCT Glucose 189 (H) 70 - 110 mg/dL   POCT  glucose    Collection Time: 11/12/22  8:22 PM   Result Value Ref Range    POCT Glucose 225 (H) 70 - 110 mg/dL   CBC Auto Differential    Collection Time: 11/13/22  4:25 AM   Result Value Ref Range    WBC 5.53 3.90 - 12.70 K/uL    RBC 2.37 (L) 4.00 - 5.40 M/uL    Hemoglobin 6.9 (L) 12.0 - 16.0 g/dL    Hematocrit 22.3 (L) 37.0 - 48.5 %    MCV 94 82 - 98 fL    MCH 29.1 27.0 - 31.0 pg    MCHC 30.9 (L) 32.0 - 36.0 g/dL    RDW 14.4 11.5 - 14.5 %    Platelets 104 (L) 150 - 450 K/uL    MPV 11.1 9.2 - 12.9 fL    Immature Granulocytes 0.2 0.0 - 0.5 %    Gran # (ANC) 3.1 1.8 - 7.7 K/uL    Immature Grans (Abs) 0.01 0.00 - 0.04 K/uL    Lymph # 1.6 1.0 - 4.8 K/uL    Mono # 0.6 0.3 - 1.0 K/uL    Eos # 0.2 0.0 - 0.5 K/uL    Baso # 0.02 0.00 - 0.20 K/uL    nRBC 0 0 /100 WBC    Gran % 55.4 38.0 - 73.0 %    Lymph % 29.7 18.0 - 48.0 %    Mono % 11.0 4.0 - 15.0 %    Eosinophil % 3.3 0.0 - 8.0 %    Basophil % 0.4 0.0 - 1.9 %    Differential Method Automated    Comprehensive Metabolic Panel    Collection Time: 11/13/22  4:25 AM   Result Value Ref Range    Sodium 137 136 - 145 mmol/L    Potassium 3.9 3.5 - 5.1 mmol/L    Chloride 97 95 - 110 mmol/L    CO2 28 23 - 29 mmol/L    Glucose 154 (H) 70 - 110 mg/dL    BUN 34 (H) 6 - 20 mg/dL    Creatinine 10.2 (H) 0.5 - 1.4 mg/dL    Calcium 8.5 (L) 8.7 - 10.5 mg/dL    Total Protein 8.3 6.0 - 8.4 g/dL    Albumin 2.5 (L) 3.5 - 5.2 g/dL    Total Bilirubin 0.4 0.1 - 1.0 mg/dL    Alkaline Phosphatase 51 (L) 55 - 135 U/L    AST 14 10 - 40 U/L    ALT 9 (L) 10 - 44 U/L    Anion Gap 12 8 - 16 mmol/L    eGFR 4 (A) >60 mL/min/1.73 m^2   POCT glucose    Collection Time: 11/13/22  7:41 AM   Result Value Ref Range    POCT Glucose 143 (H) 70 - 110 mg/dL       Microbiology Results (last 7 days)       Procedure Component Value Units Date/Time    Blood Culture #1 **CANNOT BE ORDERED STAT** [[id number]] Collected: 11/12/22 0525    Order Status: Completed Specimen: Blood from Peripheral, Wrist, Right Updated: 11/13/22  0703     Blood Culture, Routine No Growth to date      No Growth to date    Blood Culture #2 **CANNOT BE ORDERED STAT** [[id number]] Collected: 11/12/22 0535    Order Status: Completed Specimen: Blood from Peripheral, Forearm, Right Updated: 11/13/22 0703     Blood Culture, Routine No Growth to date      No Growth to date             Imaging Results              X-Ray Chest 1 View (Final result)  Result time 11/12/22 05:55:58      Final result by Marek March MD (11/12/22 05:55:58)                   Impression:      Suspected mild basilar infiltrate, as discussed above.    Previously identified right-sided pleural effusion appears to have resolved.    Interval placement of right-sided central venous catheter.      Electronically signed by: Marek March  Date:    11/12/2022  Time:    05:55               Narrative:    EXAMINATION:  XR CHEST 1 VIEW    CLINICAL HISTORY:  Shortness of breath    TECHNIQUE:  Single frontal view of the chest was performed.    COMPARISON:  Chest radiograph October 28, 2022    FINDINGS:  Single chest view is submitted.  There is interval placement of a right-sided central venous catheter, distal tip at the expected location of the SVC.  When accounting for difference in position, technique and depth of inspiration, the appearance of the cardiomediastinal silhouette is stable.  Aortic atherosclerotic change noted.    The previously identified right-sided pleural effusion appears to have resolved in the interim.  There is no evidence for significant pleural effusion at this time.  General pattern of accentuated attenuation greater at the lung bases likely relates to soft tissue attenuation associated with body habitus.  However there is appearance suspicious for mild superimposed basilar infiltrate, left greater than right.  There is no evidence for pneumothorax.    The osseous structures demonstrate chronic change.  Mild curvature of the spine noted.                                             Assessment/Plan:      * SOB (shortness of breath)  Shortness of breath when lying flat, and - a/c HF suspected, ECHO ordered   O2 is normal, she is 100% on room air.  She is afebrile with a normal white count and normal procalcitonin  Patient just switched from PD to HD, likely adjusting and may not be getting enough fluid off  Will ask Nephrology to assess, and potentially pull some more fluid off  Also potentially hypertensive episode causing flash pulmonary edema,  on presentation, unclear what it is at home, will control BP better. But excess fluid may be the cause of the high blood pressure rather than the primary cause.   Potentially symptomatic anemia, Hb 7.5 with baseline 8-10, but would not explain why feeling more SOB while lying down, this points to excess fluid, but certainly could be combined/multifactorial.         Anemia of chronic kidney failure, stage 5  Potentially symptomatic anemia, Hb 7.5 with baseline 8-10, but would not explain why feeling more SOB while lying down, this points to excess fluid, but certainly could be combined/multifactorial.   Hemoglobin further dropped this morning to 6.9 without fluids, concern for slow GI bleed.  IV PPI b.i.d. ordered and GI consulted.    End stage kidney disease  Just transition from PD to HD   Last dialysis yesterday, Friday  Nephrology consult      Proliferative diabetic retinopathy of both eyes without macular edema associated with type 2 diabetes mellitus  Resume home meds    Chronic immunosuppression with Prograf  Per patient no longer on immunosuppresion      VTE Risk Mitigation (From admission, onward)           Ordered     IP VTE HIGH RISK PATIENT  Once         11/12/22 0622     Place sequential compression device  Until discontinued         11/12/22 0622                    Discharge Planning   KRISTEN:      Code Status: Full Code   Is the patient medically ready for discharge?:     Reason for patient still in hospital (select  all that apply): Patient trending condition and Treatment  Discharge Plan A: Home                  Reggie Wallis MD  Department of Hospital Medicine   [facility]

## 2022-11-13 NOTE — HOSPITAL COURSE
Patient presented with shortness of breath, BNP was elevated at 600, however CVP only 3 on echo and patient does not look volume overloaded.  Consulted Nephrology for potential volume removal.  Hemoglobin baseline 8-10, 7.5 on admission, potentially symptomatic anemia.  Hemoglobin further dropped this morning to 6.9 without fluids, concern for slow GI bleed.  IV PPI b.i.d. ordered and GI consulted.

## 2022-11-13 NOTE — ASSESSMENT & PLAN NOTE
Labs are consistent with anemia of chronic disease.  She has no significant symptoms of overt bleeding or any gastrointestinal symptoms that warrant further evaluation.  I suspect that the slight decline from her baseline is just due to worsening anemia of chronic disease.  Agree with transfusion.  No plans for gastrointestinal workup.  Please call if any questions.

## 2022-11-13 NOTE — ASSESSMENT & PLAN NOTE
· Potentially symptomatic anemia, Hb 7.5 with baseline 8-10, but would not explain why feeling more SOB while lying down, this points to excess fluid, but certainly could be combined/multifactorial.   · Hemoglobin further dropped this morning to 6.9 without fluids, concern for slow GI bleed.  IV PPI b.i.d. ordered and GI consulted.

## 2022-11-14 NOTE — NURSING
Discharge instructions and follow up appts.given and explained to patient. Pt verbalizes understanding of all. IV removed pt tolerated well. Pt awaiting pt escort at this time.

## 2022-11-14 NOTE — DISCHARGE SUMMARY
[facility] Medicine  Discharge Summary      Patient Name: Elizabeth Maldonado  MRN: [id number]  [facility]: [id number]  Patient Class: OP- Observation  Admission Date: 11/12/2022  Hospital Length of Stay: 0 days  Discharge Date and Time:  11/14/2022 2:58 PM  Attending Physician: No att. providers found   Discharging Provider: Reggie Wallis MD  Primary Care Provider: Richy Singleton NP    Primary Care Team: Networked reference to record PCT     HPI:     Elizabeth Maldonado is a 57 y.o. female who has a past medical history of Anemia in chronic kidney disease, Anxiety, Cataract, Chronic bilateral low back pain with bilateral sciatica, Kidney transplant status, living unrelated donor - 12/2/14, Chronic immunosuppression with Prograf and MMF, ESRD now on HD, Degenerative disc disease, Depression, Diabetes mellitus, type 2 since age 18, Hyperlipidemia, Hypertension, Obesity,  Proliferative diabetic retinopathy, HFpEF (70%), presented to the ED with CC of SOB.  Patient was recently on peritoneal dialysis, however has transitioned to hemodialysis a couple weeks ago.  Presents to the ED after having a difficult time lying flat without feeling shortness of breath.  She has not missed dialysis sessions her last session was Friday, yesterday.  BNP is elevated at 600, troponin very mildly elevated at 0.031.  Patient is afebrile, with a normal white count of 5, and essentially negative procalcitonin in ESRD at 0.38.  Lawrence noting, on 10/26/22 due to SOB, abdominal pain--had issue with peritoneal dialysis. Hospitalized due to right-sided pleural effusion, suspected leakage of peritoneal fluid into the right pleural space.  S/p 1.2 L thoracentesis and 200 mL paracentesis.  Patient denies a cough or URI symptoms, no myalgias, fevers, chills.  Denies any chest pain throughout event.  Denies abdominal pain.  Only complaint appears to be SOB, especially when lying flat, and feeling a bit unwell while getting  dialysis.      * No surgery found *      Hospital Course:   Patient presented with shortness of breath, BNP was elevated at 600, however CVP only 3 on echo and patient does not look volume overloaded.  Consulted Nephrology for potential volume removal.  Hemoglobin baseline 8-10, 7.5 on admission, potentially symptomatic anemia.  Hemoglobin further dropped this morning to 6.9 without fluids, concern for slow GI bleed.  IV PPI b.i.d. ordered and GI consulted.     Patient did not have blood in stool, likely related to chronic anemia and MGUS etc. She felt back to her baseline. Went ahead and gave a unit of blood before discharge and red flags when to come back.        Goals of Care Treatment Preferences:  Code Status: Full Code      Consults:     Anemia of chronic kidney failure, stage 5  · Potentially symptomatic anemia, Hb 7.5 with baseline 8-10, but would not explain why feeling more SOB while lying down, this points to excess fluid, but certainly could be combined/multifactorial.   · Hemoglobin further dropped this morning to 6.9 without fluids, concern for slow GI bleed.  IV PPI b.i.d. ordered and GI consulted.      Final Active Diagnoses:    Diagnosis Date Noted POA    PRINCIPAL PROBLEM:  SOB (shortness of breath) [R06.02] 11/12/2022 Yes    Anemia of chronic kidney failure, stage 5 [N18.5, D63.1] 02/09/2022 Yes    End stage kidney disease [N18.6] 12/23/2021 Yes    Proliferative diabetic retinopathy of both eyes without macular edema associated with type 2 diabetes mellitus [E11.3593] 09/16/2016 Yes     Chronic    Chronic immunosuppression with Prograf [Z29.8] 12/03/2014 Not Applicable     Chronic    Type 2 diabetes mellitus, with long-term current use of insulin [E11.9, Z79.4] 07/01/2014 Not Applicable      Problems Resolved During this Admission:       Discharged Condition: fair    Disposition: Home or Self Care    Follow Up:   Follow-up Information     Richy Singleton NP. Schedule an appointment as soon  as possible for a visit in 1 week(s).    Specialty: Family Medicine  Contact information:  [address] [address] [phone]                       Patient Instructions:   No discharge procedures on file.    Significant Diagnostic Studies:    Recent Results (from the past 100 hour(s))   CBC auto differential    Collection Time: 11/12/22  5:12 AM   Result Value Ref Range    WBC 5.36 3.90 - 12.70 K/uL    RBC 2.56 (L) 4.00 - 5.40 M/uL    Hemoglobin 7.5 (L) 12.0 - 16.0 g/dL    Hematocrit 23.5 (L) 37.0 - 48.5 %    MCV 92 82 - 98 fL    MCH 29.3 27.0 - 31.0 pg    MCHC 31.9 (L) 32.0 - 36.0 g/dL    RDW 14.3 11.5 - 14.5 %    Platelets 109 (L) 150 - 450 K/uL    MPV 11.0 9.2 - 12.9 fL    Immature Granulocytes 0.2 0.0 - 0.5 %    Gran # (ANC) 2.9 1.8 - 7.7 K/uL    Immature Grans (Abs) 0.01 0.00 - 0.04 K/uL    Lymph # 1.7 1.0 - 4.8 K/uL    Mono # 0.6 0.3 - 1.0 K/uL    Eos # 0.2 0.0 - 0.5 K/uL    Baso # 0.02 0.00 - 0.20 K/uL    nRBC 0 0 /100 WBC    Gran % 54.5 38.0 - 73.0 %    Lymph % 30.8 18.0 - 48.0 %    Mono % 10.6 4.0 - 15.0 %    Eosinophil % 3.5 0.0 - 8.0 %    Basophil % 0.4 0.0 - 1.9 %    Differential Method Automated    Comprehensive metabolic panel    Collection Time: 11/12/22  5:12 AM   Result Value Ref Range    Sodium 135 (L) 136 - 145 mmol/L    Potassium 3.5 3.5 - 5.1 mmol/L    Chloride 96 95 - 110 mmol/L    CO2 30 (H) 23 - 29 mmol/L    Glucose 179 (H) 70 - 110 mg/dL    BUN 19 6 - 20 mg/dL    Creatinine 7.4 (H) 0.5 - 1.4 mg/dL    Calcium 8.8 8.7 - 10.5 mg/dL    Total Protein 8.7 (H) 6.0 - 8.4 g/dL    Albumin 2.7 (L) 3.5 - 5.2 g/dL    Total Bilirubin 0.5 0.1 - 1.0 mg/dL    Alkaline Phosphatase 50 (L) 55 - 135 U/L    AST 15 10 - 40 U/L    ALT 11 10 - 44 U/L    Anion Gap 9 8 - 16 mmol/L    eGFR 6 (A) >60 mL/min/1.73 m^2   Magnesium    Collection Time: 11/12/22  5:12 AM   Result Value Ref Range    Magnesium 1.9 1.6 - 2.6 mg/dL   Troponin I    Collection Time: 11/12/22  5:12 AM   Result Value Ref Range     Troponin I 0.031 (H) 0.000 - 0.026 ng/mL   Brain natriuretic peptide    Collection Time: 11/12/22  5:12 AM   Result Value Ref Range     (H) 0 - 99 pg/mL   Blood Culture #1 **CANNOT BE ORDERED STAT**    Collection Time: 11/12/22  5:25 AM    Specimen: Peripheral, Wrist, Right; Blood   Result Value Ref Range    Blood Culture, Routine No Growth to date     Blood Culture, Routine No Growth to date     Blood Culture, Routine No Growth to date    Blood Culture #2 **CANNOT BE ORDERED STAT**    Collection Time: 11/12/22  5:35 AM    Specimen: Peripheral, Forearm, Right; Blood   Result Value Ref Range    Blood Culture, Routine No Growth to date     Blood Culture, Routine No Growth to date     Blood Culture, Routine No Growth to date    POCT COVID-19 Rapid Screening    Collection Time: 11/12/22  6:19 AM   Result Value Ref Range    POC Rapid COVID Negative Negative     Acceptable Yes    POCT Influenza A/B Molecular    Collection Time: 11/12/22  6:19 AM   Result Value Ref Range    POC Molecular Influenza A Ag Negative Negative, Not Reported    POC Molecular Influenza B Ag Negative Negative, Not Reported     Acceptable Yes    Procalcitonin    Collection Time: 11/12/22  6:36 AM   Result Value Ref Range    Procalcitonin 0.38 (H) <0.25 ng/mL   POCT glucose    Collection Time: 11/12/22  8:06 AM   Result Value Ref Range    POCT Glucose 184 (H) 70 - 110 mg/dL   Troponin I    Collection Time: 11/12/22 10:55 AM   Result Value Ref Range    Troponin I 0.041 (H) 0.000 - 0.026 ng/mL   Echo    Collection Time: 11/12/22 12:04 PM   Result Value Ref Range    BSA 2.02 m2    TDI SEPTAL 0.07 m/s    LV LATERAL E/E' RATIO 19.75 m/s    LV SEPTAL E/E' RATIO 22.57 m/s    LA WIDTH 5.00 cm    IVC diameter 1.81 cm    Left Ventricular Outflow Tract Mean Velocity 0.85 cm/s    Left Ventricular Outflow Tract Mean Gradient 3.20 mmHg    AORTIC VALVE CUSP SEPERATION 1.59 cm    TDI LATERAL 0.08 m/s    PV PEAK VELOCITY 1.10  cm/s    LVIDd 3.93 3.5 - 6.0 cm    IVS 1.55 (A) 0.6 - 1.1 cm    Posterior Wall 1.61 (A) 0.6 - 1.1 cm    Ao root annulus 3.03 cm    LVIDs 2.64 2.1 - 4.0 cm    FS 33 28 - 44 %    LA volume 102.59 cm3    Sinus 3.00 cm    STJ 2.09 cm    Ascending aorta 2.38 cm    LV mass 246.62 g    LA size 4.00 cm    RVDD 3.89 cm    TAPSE 2.57 cm    RV S' 0.02 cm/s    Left Ventricle Relative Wall Thickness 0.82 cm    AV mean gradient 10 mmHg    AV valve area 1.68 cm2    AV Velocity Ratio 0.57     AV index (prosthetic) 0.59     MV mean gradient 6 mmHg    MV valve area p 1/2 method 3.51 cm2    MV valve area by continuity eq 1.72 cm2    E/A ratio 1.22     Mean e' 0.08 m/s    E wave deceleration time 216.41 msec    IVRT 123.69 msec    Pulm vein S/D ratio 1.96     LVOT diameter 1.90 cm    LVOT area 2.8 cm2    LVOT peak brando 1.20 m/s    LVOT peak VTI 24.80 cm    Ao peak brando 2.10 m/s    Ao VTI 41.8 cm    LVOT stroke volume 70.28 cm3    AV peak gradient 18 mmHg    MV peak gradient 11 mmHg    E/E' ratio 21.07 m/s    MV Peak E Brando 1.58 m/s    TR Max Brando 2.94 m/s    MV VTI 40.8 cm    MV stenosis pressure 1/2 time 62.76 ms    MV Peak A Brando 1.30 m/s    PV Peak S Brando 0.96 m/s    PV Peak D Brando 0.49 m/s    LV Systolic Volume 25.55 mL    LV Systolic Volume Index 12.9 mL/m2    LV Diastolic Volume 67.14 mL    LV Diastolic Volume Index 33.91 mL/m2    LA Volume Index 51.8 mL/m2    LV Mass Index 125 g/m2    RA Major Axis 5.36 cm    Left Atrium Minor Axis 5.49 cm    Left Atrium Major Axis 6.70 cm    Triscuspid Valve Regurgitation Peak Gradient 35 mmHg    RA Width 3.80 cm    Right Atrial Pressure (from IVC) 3 mmHg    EF 55 %    TV rest pulmonary artery pressure 38 mmHg   POCT glucose    Collection Time: 11/12/22 12:17 PM   Result Value Ref Range    POCT Glucose 183 (H) 70 - 110 mg/dL   POCT glucose    Collection Time: 11/12/22  4:22 PM   Result Value Ref Range    POCT Glucose 189 (H) 70 - 110 mg/dL   POCT glucose    Collection Time: 11/12/22  8:22 PM   Result  Value Ref Range    POCT Glucose 225 (H) 70 - 110 mg/dL   CBC Auto Differential    Collection Time: 11/13/22  4:25 AM   Result Value Ref Range    WBC 5.53 3.90 - 12.70 K/uL    RBC 2.37 (L) 4.00 - 5.40 M/uL    Hemoglobin 6.9 (L) 12.0 - 16.0 g/dL    Hematocrit 22.3 (L) 37.0 - 48.5 %    MCV 94 82 - 98 fL    MCH 29.1 27.0 - 31.0 pg    MCHC 30.9 (L) 32.0 - 36.0 g/dL    RDW 14.4 11.5 - 14.5 %    Platelets 104 (L) 150 - 450 K/uL    MPV 11.1 9.2 - 12.9 fL    Immature Granulocytes 0.2 0.0 - 0.5 %    Gran # (ANC) 3.1 1.8 - 7.7 K/uL    Immature Grans (Abs) 0.01 0.00 - 0.04 K/uL    Lymph # 1.6 1.0 - 4.8 K/uL    Mono # 0.6 0.3 - 1.0 K/uL    Eos # 0.2 0.0 - 0.5 K/uL    Baso # 0.02 0.00 - 0.20 K/uL    nRBC 0 0 /100 WBC    Gran % 55.4 38.0 - 73.0 %    Lymph % 29.7 18.0 - 48.0 %    Mono % 11.0 4.0 - 15.0 %    Eosinophil % 3.3 0.0 - 8.0 %    Basophil % 0.4 0.0 - 1.9 %    Differential Method Automated    Comprehensive Metabolic Panel    Collection Time: 11/13/22  4:25 AM   Result Value Ref Range    Sodium 137 136 - 145 mmol/L    Potassium 3.9 3.5 - 5.1 mmol/L    Chloride 97 95 - 110 mmol/L    CO2 28 23 - 29 mmol/L    Glucose 154 (H) 70 - 110 mg/dL    BUN 34 (H) 6 - 20 mg/dL    Creatinine 10.2 (H) 0.5 - 1.4 mg/dL    Calcium 8.5 (L) 8.7 - 10.5 mg/dL    Total Protein 8.3 6.0 - 8.4 g/dL    Albumin 2.5 (L) 3.5 - 5.2 g/dL    Total Bilirubin 0.4 0.1 - 1.0 mg/dL    Alkaline Phosphatase 51 (L) 55 - 135 U/L    AST 14 10 - 40 U/L    ALT 9 (L) 10 - 44 U/L    Anion Gap 12 8 - 16 mmol/L    eGFR 4 (A) >60 mL/min/1.73 m^2   POCT glucose    Collection Time: 11/13/22  7:41 AM   Result Value Ref Range    POCT Glucose 143 (H) 70 - 110 mg/dL   Prepare RBC 1 Unit    Collection Time: 11/13/22  8:54 AM   Result Value Ref Range    UNIT NUMBER [id number]     Product Code [id number]     DISPENSE STATUS TRANSFUSED     CODING SYSTEM [id number]     Unit Blood Type Code 6200     Unit Blood Type A POS     Unit Expiration [id number]    Type & Screen    Collection  Time: 11/13/22  8:54 AM   Result Value Ref Range    Indirect Lisette NEG    Group & Rh    Collection Time: 11/13/22  8:54 AM   Result Value Ref Range    ABO AB     Rh Type POS    POCT glucose    Collection Time: 11/13/22 12:06 PM   Result Value Ref Range    POCT Glucose 171 (H) 70 - 110 mg/dL   POCT glucose    Collection Time: 11/13/22  4:29 PM   Result Value Ref Range    POCT Glucose 183 (H) 70 - 110 mg/dL       Microbiology Results (last 7 days)     ** No results found for the last 168 hours. **          Imaging Results          X-Ray Chest 1 View (Final result)  Result time 11/12/22 05:55:58    Final result by Marek March MD (11/12/22 05:55:58)                 Impression:      Suspected mild basilar infiltrate, as discussed above.    Previously identified right-sided pleural effusion appears to have resolved.    Interval placement of right-sided central venous catheter.      Electronically signed by: Marek March  Date:    11/12/2022  Time:    05:55             Narrative:    EXAMINATION:  XR CHEST 1 VIEW    CLINICAL HISTORY:  Shortness of breath    TECHNIQUE:  Single frontal view of the chest was performed.    COMPARISON:  Chest radiograph October 28, 2022    FINDINGS:  Single chest view is submitted.  There is interval placement of a right-sided central venous catheter, distal tip at the expected location of the SVC.  When accounting for difference in position, technique and depth of inspiration, the appearance of the cardiomediastinal silhouette is stable.  Aortic atherosclerotic change noted.    The previously identified right-sided pleural effusion appears to have resolved in the interim.  There is no evidence for significant pleural effusion at this time.  General pattern of accentuated attenuation greater at the lung bases likely relates to soft tissue attenuation associated with body habitus.  However there is appearance suspicious for mild superimposed basilar infiltrate, left greater than right.   "There is no evidence for pneumothorax.    The osseous structures demonstrate chronic change.  Mild curvature of the spine noted.                                    Pending Diagnostic Studies:     None         Medications:  Reconciled Home Medications:      Medication List      ASK your doctor about these medications    atorvastatin 40 MG tablet  Commonly known as: LIPITOR  Take 40 mg by mouth every evening.     blood sugar diagnostic Strp  Checks BG ac/hs     calcitRIOL 0.25 MCG Cap  Commonly known as: ROCALTROL  Take 0.25 mcg by mouth once daily.     carvediloL 3.125 MG tablet  Commonly known as: COREG  Take 3.125 mg by mouth 2 (two) times daily. Hold for SBP <130     clonazePAM 0.5 MG tablet  Commonly known as: KlonoPIN  Take 0.5 mg by mouth 2 (two) times daily as needed.     folic acid 1 MG tablet  Commonly known as: FOLVITE  Take 1 tablet (1 mg total) by mouth once daily.     HumaLOG KwikPen Insulin 100 unit/mL pen  Generic drug: insulin lispro  Inject 3 Units into the skin 3 (three) times daily with meals. SLIDE SCALE     insulin syringe-needle U-100 0.5 mL 30 gauge x 5/16" Syrg  Commonly known as: INSULIN SYRINGE  Uses 4 daily     insulin syringe-needle U-100 1 mL 29 gauge x 7/16" Syrg     lancets 33 gauge Misc  Commonly known as: ONETOUCH DELICA LANCETS  1 lancet by Misc.(Non-Drug; Combo Route) route 4 (four) times daily before meals and nightly.     LANTUS SOLOSTAR U-100 INSULIN glargine 100 units/mL SubQ pen  Generic drug: insulin  Inject 50 Units into the skin once daily.     RENVELA 800 mg Tab  Generic drug: sevelamer carbonate  Take 1 tablet (800 mg total) by mouth 3 (three) times daily with meals.     timolol maleate 0.5% 0.5 % Drop  Commonly known as: TIMOPTIC  Place 2 drops in both eyes twice daily     TRULICITY 1.5 mg/0.5 mL pen injector  Generic drug: dulaglutide  Inject 1.5 mg into the skin every 7 days. Pt takes on Wednesday     zolpidem 10 mg Tab  Commonly known as: AMBIEN  Take 10 mg by mouth " nightly as needed.            Indwelling Lines/Drains at time of discharge:   Lines/Drains/Airways     Central Venous Catheter Line  Duration                Hemodialysis Catheter 10/28/22 1548 right internal jugular 17 days                Time spent on the discharge of patient: 35 minutes         Reggie Wallis MD  Department of Hospital Medicine  [facility]

## 2022-11-16 LAB
BACTERIA BLD CULT: NORMAL
BACTERIA BLD CULT: NORMAL

## 2022-11-17 LAB
HCT VFR BLD AUTO: 25.3 % (ref 37–47)
HEMOGLOBIN X 3: 24.9 % (ref 36–48)
HGB BLD-MCNC: 8.3 G/DL (ref 12–16)

## 2022-12-01 LAB
HCT VFR BLD AUTO: 27.3 % (ref 37–47)
HEMOGLOBIN X 3: 25.5 % (ref 36–48)
HGB BLD-MCNC: 8.5 G/DL (ref 12–16)

## 2022-12-08 LAB
ALBUMIN SERPL-MCNC: 3.5 G/DL (ref 3.5–5.2)
ALP SERPL-CCNC: 59 U/L (ref 35–104)
ALT SERPL W P-5'-P-CCNC: <7 U/L (ref 7–52)
BASOPHILS NFR BLD: 0.2 % (ref 0–1.5)
BICARBONATE: 27 MEQ/L (ref 20–31)
BUN, POST: 12 MG/DL (ref 6–19)
BUN, PRE: 45 MG/DL (ref 6–19)
BUN/CREAT SERPL: 4.5 (ref 10–20)
CALCIUM PHOS PRODUCT, COR: 45 (ref 0–54)
CALCIUM PHOS PRODUCT: 43 (ref 0–54)
CALCIUM SERPL-MCNC: 8.4 MG/DL (ref 8.7–10.4)
CALCIUM, CORRECTED: 8.8 MG/DL (ref 8.7–10.4)
CHLORIDE: 100 MEQ/L (ref 96–108)
CREAT SERPL-MCNC: 10.05 MG/DL (ref 0.6–1.3)
DIALYSIS START TIME: 607
DIALYSIS STOP TIME: 939
EOSINOPHIL NFR BLD: 6.3 % (ref 0–7)
ERYTHROCYTE [DISTWIDTH] IN BLOOD BY AUTOMATED COUNT: 16 % (ref 11.5–14.5)
FERRITIN SERPL-MCNC: 928 NG/ML (ref 10–291)
HBV SURFACE AB SER-ACNC: <10 MIU/ML
HBV SURFACE AG SERPL QL IA: NEGATIVE
HCT VFR BLD AUTO: 26.2 % (ref 37–47)
HEMOGLOBIN X 3: 25.8 % (ref 36–48)
HGB BLD-MCNC: 8.6 G/DL (ref 12–16)
IRON: 59 MCG/DL (ref 30–160)
LUC: 1.7 % (ref 0–4)
LYMPH%: 26.7 % (ref 19–48)
MCH RBC QN AUTO: 31 PG (ref 27–31)
MCHC RBC AUTO-ENTMCNC: 32.9 G/DL (ref 30–36)
MCV RBC AUTO: 94 FL (ref 80–100)
MONO%: 5.8 % (ref 3–10)
NEUTROPHILS NFR BLD: 59.3 % (ref 40–75)
PHOSPHATE FLD-MCNC: 5.1 MG/DL (ref 2.6–4.5)
PLATELET # BLD AUTO: 156 1000/MCL (ref 130–400)
POST-WEIGHT, KG: 82.1 KG
POST-WEIGHT, LB: 180.6
POTASSIUM SERPL-SCNC: 4.3 MEQ/L (ref 3.5–5.1)
PRE-WEIGHT, KG: 84.8 KG
PRE-WEIGHT, LB: 186.6
RBC # BLD AUTO: 2.78 MILL/MCL (ref 4.2–5.4)
SODIUM BLD-SCNC: 140 MEQ/L (ref 136–145)
SPKT/V, DAUGIRDAS II: 1.53
TOTAL IRON BINDING CAPACITY: 188 MCG/DL (ref 185–515)
TRANSFERRIN SATURATION: 31 % (ref 20–55)
UIBC SERPL-MCNC: 129 MCG/DL (ref 155–355)
UREA REDUCTION RATIO: 73 % (ref 65–80)
WBC # BLD AUTO: 6.44 1000/MCL (ref 4.8–10.8)

## 2022-12-13 ENCOUNTER — INITIAL CONSULT (OUTPATIENT)
Dept: VASCULAR SURGERY | Facility: CLINIC | Age: 57
End: 2022-12-13
Payer: MEDICAID

## 2022-12-13 ENCOUNTER — HOSPITAL ENCOUNTER (OUTPATIENT)
Dept: VASCULAR SURGERY | Facility: CLINIC | Age: 57
Discharge: HOME | End: 2022-12-13
Attending: SURGERY
Payer: MEDICAID

## 2022-12-13 VITALS
HEIGHT: 67 IN | HEART RATE: 94 BPM | TEMPERATURE: 99 F | WEIGHT: 180.75 LBS | DIASTOLIC BLOOD PRESSURE: 81 MMHG | BODY MASS INDEX: 28.37 KG/M2 | SYSTOLIC BLOOD PRESSURE: 167 MMHG

## 2022-12-13 DIAGNOSIS — N18.6 ESRD (END STAGE RENAL DISEASE) ON DIALYSIS: Primary | ICD-10-CM

## 2022-12-13 DIAGNOSIS — Z01.818 PRE-OP EVALUATION: Primary | ICD-10-CM

## 2022-12-13 DIAGNOSIS — N18.6 ESRD (END STAGE RENAL DISEASE) ON DIALYSIS: ICD-10-CM

## 2022-12-13 DIAGNOSIS — Z99.2 ESRD (END STAGE RENAL DISEASE) ON DIALYSIS: Primary | ICD-10-CM

## 2022-12-13 DIAGNOSIS — Z99.2 ESRD (END STAGE RENAL DISEASE) ON DIALYSIS: ICD-10-CM

## 2022-12-13 PROCEDURE — 99205 PR OFFICE/OUTPT VISIT, NEW, LEVL V, 60-74 MIN: ICD-10-PCS | Mod: S$PBB,NTX,, | Performed by: SURGERY

## 2022-12-13 PROCEDURE — 99213 OFFICE O/P EST LOW 20 MIN: CPT | Mod: PBBFAC,TXP | Performed by: SURGERY

## 2022-12-13 PROCEDURE — 93970 EXTREMITY STUDY: CPT | Mod: 26,S$PBB,NTX, | Performed by: SURGERY

## 2022-12-13 PROCEDURE — 93970 PR US DUPLEX, UPPER OR LOWER EXT VENOUS,COMPLETE BILAT: ICD-10-PCS | Mod: 26,S$PBB,NTX, | Performed by: SURGERY

## 2022-12-13 PROCEDURE — 99999 PR PBB SHADOW E&M-EST. PATIENT-LVL III: CPT | Mod: PBBFAC,TXP,, | Performed by: SURGERY

## 2022-12-13 PROCEDURE — 93970 EXTREMITY STUDY: CPT | Mod: PBBFAC,NTX | Performed by: SURGERY

## 2022-12-13 PROCEDURE — 99999 PR PBB SHADOW E&M-EST. PATIENT-LVL III: ICD-10-PCS | Mod: PBBFAC,TXP,, | Performed by: SURGERY

## 2022-12-13 PROCEDURE — 99205 OFFICE O/P NEW HI 60 MIN: CPT | Mod: S$PBB,NTX,, | Performed by: SURGERY

## 2022-12-13 RX ORDER — SODIUM CHLORIDE 9 MG/ML
INJECTION, SOLUTION INTRAVENOUS CONTINUOUS
Status: CANCELLED | OUTPATIENT
Start: 2022-12-13

## 2022-12-13 NOTE — H&P (VIEW-ONLY)
VASCULAR SURGERY SERVICE    REFERRING DOCTOR: Ehsan Quezada MD    CHIEF COMPLAINT:  End-stage renal disease    HISTORY OF PRESENT ILLNESS: Elizabeth Maldonado is a 57 y.o. female , right-handed, status post kidney transplant  which failed, went on peritoneal dialysis 2022, but had to stop this because of leaking into the pleura.  She is now dialyzing via right IJ PermCath last month.  She is sent for permanent AV access.  She has no history of AICD or pacemaker placement    Past Medical History:   Diagnosis Date    Acidosis 2014    Allergic rhinitis 2014    Allergy     Anemia     Anemia in chronic kidney disease 2014    Anxiety     Cataract     Chronic bilateral low back pain with bilateral sciatica 10/25/2019    Chronic immunosuppression with Prograf and MMF 12/3/2014    CKD (chronic kidney disease) stage 5, GFR less than 15 ml/min 2014    CKD (chronic kidney disease), stage III 3/2/2015    Degenerative disc disease     Depression 2014    Diabetes mellitus, type 2 since age 18 2014    ESRD on peritoneal dialysis - 2014 for 9 hours no peritonitis 2014    Hyperlipidemia     Hypertension 2014    Hypomagnesemia 2015    Kidney transplant status, living unrelated donor - 12/2/14 12/3/2014    Neutropenia 2015    NS (nuclear sclerosis) 2016    Obesity     Organ transplant candidate 2014    Pre-op exam 2014    Proliferative diabetic retinopathy of both eyes without macular edema associated with type 2 diabetes mellitus 2016    Renal manifestation of secondary diabetes mellitus     Tendinitis     Trouble in sleeping        Past Surgical History:   Procedure Laterality Date    BIOPSY N/A 2020    Procedure: Biopsy;  Surgeon: Sweta Catalan MD;  Location: [facility] OR [address] [place] [address];  Service: Transplant;  Laterality: N/A;    BONE MARROW BIOPSY N/A      SECTION      x 2    COLONOSCOPY N/A 2022    Procedure: COLONOSCOPY;  Surgeon: Franklin FOSS  "MD Elvia;  Location: [facility] ENDO (4TH FLR);  Service: Colon and Rectal;  Laterality: N/A;  order created: previous order unusable  fully vaccinated, labs prior, instr mailed / portal -ml    KIDNEY TRANSPLANT  02/2014    LAPAROSCOPIC REVISION OF PROCEDURE INVOLVING PERITONEAL DIALYSIS CATHETER N/A 4/4/2022    Procedure: REVISION OF PROCEDURE INVOLVING PERITONEAL DIALYSIS CATHETER, LAPAROSCOPIC;  Surgeon: Franklin Barber MD;  Location: [facility] OR 2ND FLR;  Service: General;  Laterality: N/A;    MEDIPORT REMOVAL N/A 11/25/2019    Procedure: REMOVAL, CATHETER, CENTRAL VENOUS, TUNNELED, WITH PORT;  Surgeon: Eusebia Diagnostic Provider;  Location: [facility] OR;  Service: Radiology;  Laterality: N/A;    RENAL BIOPSY      ROTATOR CUFF REPAIR      TUBAL LIGATION           Current Outpatient Medications:     atorvastatin (LIPITOR) 40 MG tablet, Take 40 mg by mouth every evening., Disp: , Rfl:     blood sugar diagnostic Strp, Checks BG ac/hs, Disp: 200 strip, Rfl: 7    calcitRIOL (ROCALTROL) 0.25 MCG Cap, Take 0.25 mcg by mouth once daily., Disp: , Rfl:     carvediloL (COREG) 3.125 MG tablet, Take 3.125 mg by mouth 2 (two) times daily. Hold for SBP <130, Disp: , Rfl:     clonazePAM (KLONOPIN) 0.5 MG tablet, Take 0.5 mg by mouth 2 (two) times daily as needed., Disp: , Rfl:     dulaglutide (TRULICITY) 1.5 mg/0.5 mL pen injector, Inject 1.5 mg into the skin every 7 days. Pt takes on Wednesday, Disp: , Rfl:     folic acid (FOLVITE) 1 MG tablet, Take 1 tablet (1 mg total) by mouth once daily., Disp: 30 tablet, Rfl: 11    HUMALOG KWIKPEN INSULIN 100 unit/mL pen, Inject 3 Units into the skin 3 (three) times daily with meals. SLIDE SCALE, Disp: , Rfl:     insulin syringe-needle U-100 (INSULIN SYRINGE) 1/2 mL 30 x 5/16" Syrg, Uses 4 daily, Disp: 200 each, Rfl: 12    lancets (ONETOUCH DELICA LANCETS) 33 gauge Misc, 1 lancet by Misc.(Non-Drug; Combo Route) route 4 (four) times daily before meals and nightly., Disp: 200 each, Rfl: 7    " "LANTUS SOLOSTAR U-100 INSULIN glargine 100 units/mL (3mL) SubQ pen, Inject 50 Units into the skin once daily., Disp: , Rfl: 0    sevelamer carbonate (RENVELA) 800 mg Tab, Take 1 tablet (800 mg total) by mouth 3 (three) times daily with meals. (Patient taking differently: Take 800 mg by mouth 3 (three) times daily with meals. Take 2 tab w/ meal & 1 tab w/ snack), Disp: 90 tablet, Rfl: 3    SYRINGE & NEEDLE,INSULIN,1 ML (INSULIN SYRINGE-NEEDLE U-100) 1 mL 29 X 7/16" Syrg, , Disp: , Rfl: 11    timolol maleate 0.5% (TIMOPTIC) 0.5 % Drop, Place 2 drops in both eyes twice daily, Disp: , Rfl:     zolpidem (AMBIEN) 10 mg Tab, Take 10 mg by mouth nightly as needed., Disp: , Rfl:   No current facility-administered medications for this visit.    Review of patient's allergies indicates:   Allergen Reactions    Shrimp Hives, Itching and Rash    Topamax [topiramate] Other (See Comments)     Vision changes    Zoloft [sertraline] Palpitations       Family History   Problem Relation Age of Onset    Diabetes Mother     Hypertension Mother     Diabetes Father     Kidney disease Father     Diabetes Sister     Hypertension Sister     Kidney disease Sister     Heart disease Sister     Heart failure Sister     Diabetes Brother     Diabetes Sister     Stroke Maternal Grandmother     Heart disease Maternal Grandmother         pacemaker    Cataracts Maternal Grandmother     No Known Problems Maternal Aunt     No Known Problems Maternal Uncle     No Known Problems Paternal Aunt     No Known Problems Paternal Uncle     No Known Problems Maternal Grandfather     No Known Problems Paternal Grandmother     No Known Problems Paternal Grandfather     Cancer Neg Hx     Amblyopia Neg Hx     Blindness Neg Hx     Glaucoma Neg Hx     Macular degeneration Neg Hx     Retinal detachment Neg Hx     Strabismus Neg Hx     Thyroid disease Neg Hx     Breast cancer Neg Hx     Colon cancer Neg Hx     Ovarian cancer Neg Hx        Social History     Tobacco Use    " "Smoking status: Former     Packs/day: 1.00     Years: 20.00     Pack years: 20.00     Types: Cigarettes     Quit date: 2013     Years since quittin.2    Smokeless tobacco: Never    Tobacco comments:     quit smoking cigarettes in 2014   Substance Use Topics    Alcohol use: No    Drug use: No       REVIEW OF SYSTEMS:  General: No chills, fever, malaise, changes in weight  HEENT: No visual changes, difficulty hearing  Cardiovascular: No chest pain, palpitations, claudication  Pulmonary: No dyspnea, cough, wheezing  Gastrointestinal: No nausea, vomiting, diarrhea, constipation  Genitourinary: No dysuria, low urine output, hematuria  Endocrine: No polydipsia, polyphagia  Hematologic: No fatigue with exertion, pica, pallor  Musculoskeletal: No extremity or joint pain, no back pain, no difficulty with ambulation  Neurologic: no seizures, no headaches, no weakness  Psychiatric: no mood disturbance      PHYSICAL EXAM:  Brachial pressures are equivalent bilaterally  BP (!) 167/81 (BP Location: Right arm, Patient Position: Sitting, BP Method: Large (Automatic))   Pulse 94   Temp 98.5 °F (36.9 °C) (Oral)   Ht 5' 7" (1.702 m)   Wt 82 kg (180 lb 12.4 oz)   LMP 2011 (Approximate)   BMI 28.31 kg/m²   Constitutional:  Alert,   Well-appearing  In no distress.   Neurological: Normal speech  no focal findings  CN II - XII grossly intact.    Psychiatric: Mood and affect appropriate and symmetric.   HEENT: Normocephalic / atraumatic  PERRLA  Midline trachea  No scars across the neck   Cardiac: Regular rate and rhythm.   Pulmonary: Normal pulmonary effort.   Abdomen: Soft, not distended.     Skin: Warm and well perfused.    Vascular:  2+ radial brachial pulses bilaterally   Extremities/  Musculoskeletal: No edema.   Left arm:  Forearm cephalic vein not particularly ballotable, antecubital vein ballotable     IMAGING:  Vein mapping suggests adequate left cephalic vein for Megan, 3.0 distal 3.2 mid, 3.9 " proximal forearm, 3.5 antecubital fossa, 3.1 upper arm    IMPRESSION:  Vein mapping suggests adequate cephalic vein for autogenous fistula, although not particularly robust    PLAN:  Left radiocephalic (Megan) vs left brachiocephalic AVF vs left prosthetic AV graft placement January 5, 2023.  She was offered earlier date but shows this  Regional block    I will personally interrogate her left arm veins after block is placed, and preferentially create autogenous fistula if indeed the vein is adequate    I have explained the risks, benefits and alternatives for this procedure in detail.  The patient voices understanding and all questions have be answered, and agrees to proceed with the procedure.     ADEOLA Kamara III, MD, FACS  Professor and Chief, Vascular and Endovascular Surgery      CC:  Ehsan Quezada MD

## 2022-12-13 NOTE — PROGRESS NOTES
VASCULAR SURGERY SERVICE    REFERRING DOCTOR: Ehsan Quezada MD    CHIEF COMPLAINT:  End-stage renal disease    HISTORY OF PRESENT ILLNESS: Elizabeth Maldonado is a 57 y.o. female , right-handed, status post kidney transplant  which failed, went on peritoneal dialysis 2022, but had to stop this because of leaking into the pleura.  She is now dialyzing via right IJ PermCath last month.  She is sent for permanent AV access.  She has no history of AICD or pacemaker placement    Past Medical History:   Diagnosis Date    Acidosis 2014    Allergic rhinitis 2014    Allergy     Anemia     Anemia in chronic kidney disease 2014    Anxiety     Cataract     Chronic bilateral low back pain with bilateral sciatica 10/25/2019    Chronic immunosuppression with Prograf and MMF 12/3/2014    CKD (chronic kidney disease) stage 5, GFR less than 15 ml/min 2014    CKD (chronic kidney disease), stage III 3/2/2015    Degenerative disc disease     Depression 2014    Diabetes mellitus, type 2 since age 18 2014    ESRD on peritoneal dialysis - 2014 for 9 hours no peritonitis 2014    Hyperlipidemia     Hypertension 2014    Hypomagnesemia 2015    Kidney transplant status, living unrelated donor - 12/2/14 12/3/2014    Neutropenia 2015    NS (nuclear sclerosis) 2016    Obesity     Organ transplant candidate 2014    Pre-op exam 2014    Proliferative diabetic retinopathy of both eyes without macular edema associated with type 2 diabetes mellitus 2016    Renal manifestation of secondary diabetes mellitus     Tendinitis     Trouble in sleeping        Past Surgical History:   Procedure Laterality Date    BIOPSY N/A 2020    Procedure: Biopsy;  Surgeon: Sweta Catalan MD;  Location: [facility] OR [address] [place] [address];  Service: Transplant;  Laterality: N/A;    BONE MARROW BIOPSY N/A      SECTION      x 2    COLONOSCOPY N/A 2022    Procedure: COLONOSCOPY;  Surgeon: Franklin FOSS  "MD Elvia;  Location: [facility] ENDO (4TH FLR);  Service: Colon and Rectal;  Laterality: N/A;  order created: previous order unusable  fully vaccinated, labs prior, instr mailed / portal -ml    KIDNEY TRANSPLANT  02/2014    LAPAROSCOPIC REVISION OF PROCEDURE INVOLVING PERITONEAL DIALYSIS CATHETER N/A 4/4/2022    Procedure: REVISION OF PROCEDURE INVOLVING PERITONEAL DIALYSIS CATHETER, LAPAROSCOPIC;  Surgeon: Franklin Barber MD;  Location: [facility] OR 2ND FLR;  Service: General;  Laterality: N/A;    MEDIPORT REMOVAL N/A 11/25/2019    Procedure: REMOVAL, CATHETER, CENTRAL VENOUS, TUNNELED, WITH PORT;  Surgeon: Eusebia Diagnostic Provider;  Location: [facility] OR;  Service: Radiology;  Laterality: N/A;    RENAL BIOPSY      ROTATOR CUFF REPAIR      TUBAL LIGATION           Current Outpatient Medications:     atorvastatin (LIPITOR) 40 MG tablet, Take 40 mg by mouth every evening., Disp: , Rfl:     blood sugar diagnostic Strp, Checks BG ac/hs, Disp: 200 strip, Rfl: 7    calcitRIOL (ROCALTROL) 0.25 MCG Cap, Take 0.25 mcg by mouth once daily., Disp: , Rfl:     carvediloL (COREG) 3.125 MG tablet, Take 3.125 mg by mouth 2 (two) times daily. Hold for SBP <130, Disp: , Rfl:     clonazePAM (KLONOPIN) 0.5 MG tablet, Take 0.5 mg by mouth 2 (two) times daily as needed., Disp: , Rfl:     dulaglutide (TRULICITY) 1.5 mg/0.5 mL pen injector, Inject 1.5 mg into the skin every 7 days. Pt takes on Wednesday, Disp: , Rfl:     folic acid (FOLVITE) 1 MG tablet, Take 1 tablet (1 mg total) by mouth once daily., Disp: 30 tablet, Rfl: 11    HUMALOG KWIKPEN INSULIN 100 unit/mL pen, Inject 3 Units into the skin 3 (three) times daily with meals. SLIDE SCALE, Disp: , Rfl:     insulin syringe-needle U-100 (INSULIN SYRINGE) 1/2 mL 30 x 5/16" Syrg, Uses 4 daily, Disp: 200 each, Rfl: 12    lancets (ONETOUCH DELICA LANCETS) 33 gauge Misc, 1 lancet by Misc.(Non-Drug; Combo Route) route 4 (four) times daily before meals and nightly., Disp: 200 each, Rfl: 7    " "LANTUS SOLOSTAR U-100 INSULIN glargine 100 units/mL (3mL) SubQ pen, Inject 50 Units into the skin once daily., Disp: , Rfl: 0    sevelamer carbonate (RENVELA) 800 mg Tab, Take 1 tablet (800 mg total) by mouth 3 (three) times daily with meals. (Patient taking differently: Take 800 mg by mouth 3 (three) times daily with meals. Take 2 tab w/ meal & 1 tab w/ snack), Disp: 90 tablet, Rfl: 3    SYRINGE & NEEDLE,INSULIN,1 ML (INSULIN SYRINGE-NEEDLE U-100) 1 mL 29 X 7/16" Syrg, , Disp: , Rfl: 11    timolol maleate 0.5% (TIMOPTIC) 0.5 % Drop, Place 2 drops in both eyes twice daily, Disp: , Rfl:     zolpidem (AMBIEN) 10 mg Tab, Take 10 mg by mouth nightly as needed., Disp: , Rfl:   No current facility-administered medications for this visit.    Review of patient's allergies indicates:   Allergen Reactions    Shrimp Hives, Itching and Rash    Topamax [topiramate] Other (See Comments)     Vision changes    Zoloft [sertraline] Palpitations       Family History   Problem Relation Age of Onset    Diabetes Mother     Hypertension Mother     Diabetes Father     Kidney disease Father     Diabetes Sister     Hypertension Sister     Kidney disease Sister     Heart disease Sister     Heart failure Sister     Diabetes Brother     Diabetes Sister     Stroke Maternal Grandmother     Heart disease Maternal Grandmother         pacemaker    Cataracts Maternal Grandmother     No Known Problems Maternal Aunt     No Known Problems Maternal Uncle     No Known Problems Paternal Aunt     No Known Problems Paternal Uncle     No Known Problems Maternal Grandfather     No Known Problems Paternal Grandmother     No Known Problems Paternal Grandfather     Cancer Neg Hx     Amblyopia Neg Hx     Blindness Neg Hx     Glaucoma Neg Hx     Macular degeneration Neg Hx     Retinal detachment Neg Hx     Strabismus Neg Hx     Thyroid disease Neg Hx     Breast cancer Neg Hx     Colon cancer Neg Hx     Ovarian cancer Neg Hx        Social History     Tobacco Use    " "Smoking status: Former     Packs/day: 1.00     Years: 20.00     Pack years: 20.00     Types: Cigarettes     Quit date: 2013     Years since quittin.2    Smokeless tobacco: Never    Tobacco comments:     quit smoking cigarettes in 2014   Substance Use Topics    Alcohol use: No    Drug use: No       REVIEW OF SYSTEMS:  General: No chills, fever, malaise, changes in weight  HEENT: No visual changes, difficulty hearing  Cardiovascular: No chest pain, palpitations, claudication  Pulmonary: No dyspnea, cough, wheezing  Gastrointestinal: No nausea, vomiting, diarrhea, constipation  Genitourinary: No dysuria, low urine output, hematuria  Endocrine: No polydipsia, polyphagia  Hematologic: No fatigue with exertion, pica, pallor  Musculoskeletal: No extremity or joint pain, no back pain, no difficulty with ambulation  Neurologic: no seizures, no headaches, no weakness  Psychiatric: no mood disturbance      PHYSICAL EXAM:  Brachial pressures are equivalent bilaterally  BP (!) 167/81 (BP Location: Right arm, Patient Position: Sitting, BP Method: Large (Automatic))   Pulse 94   Temp 98.5 °F (36.9 °C) (Oral)   Ht 5' 7" (1.702 m)   Wt 82 kg (180 lb 12.4 oz)   LMP 2011 (Approximate)   BMI 28.31 kg/m²   Constitutional:  Alert,   Well-appearing  In no distress.   Neurological: Normal speech  no focal findings  CN II - XII grossly intact.    Psychiatric: Mood and affect appropriate and symmetric.   HEENT: Normocephalic / atraumatic  PERRLA  Midline trachea  No scars across the neck   Cardiac: Regular rate and rhythm.   Pulmonary: Normal pulmonary effort.   Abdomen: Soft, not distended.     Skin: Warm and well perfused.    Vascular:  2+ radial brachial pulses bilaterally   Extremities/  Musculoskeletal: No edema.   Left arm:  Forearm cephalic vein not particularly ballotable, antecubital vein ballotable     IMAGING:  Vein mapping suggests adequate left cephalic vein for Megan, 3.0 distal 3.2 mid, 3.9 " proximal forearm, 3.5 antecubital fossa, 3.1 upper arm    IMPRESSION:  Vein mapping suggests adequate cephalic vein for autogenous fistula, although not particularly robust    PLAN:  Left radiocephalic (Megan) vs left brachiocephalic AVF vs left prosthetic AV graft placement January 5, 2023.  She was offered earlier date but shows this  Regional block    I will personally interrogate her left arm veins after block is placed, and preferentially create autogenous fistula if indeed the vein is adequate    I have explained the risks, benefits and alternatives for this procedure in detail.  The patient voices understanding and all questions have be answered, and agrees to proceed with the procedure.     ADEOLA Kamara III, MD, FACS  Professor and Chief, Vascular and Endovascular Surgery      CC:  Ehsan Quezada MD

## 2022-12-13 NOTE — TELEPHONE ENCOUNTER
12/13/2022    Lauren Weldon PA-C  [address] [facility] [address]    RE: Tierney Pearson       Dear Colleague,     I had the pleasure of seeing Tierney Pearson in the [facility].  Electrophysiology/ Clinic Note         H&P and Plan:     REASON FOR VISIT: Electrophysiology evaluation.      HISTORY OF PRESENT ILLNESS: Patient is a pleasant 60-year-old lady with history hypertension, who is here for evaluation of syncope.    Patient presents with an episode of syncope in October of this year.  At that time, she informs that she did not eat well throughout the day and she thought she was somewhat dehydrated.  She woke up in the middle of the night to go to the bathroom.  She had a bowel movement and admitted after bowel movement she became extremely lightheaded and passed out.  She traumatized her leg, which caused hematoma.  She was then evaluated in the ED.    As part of the work-up, she had an echocardiogram and a monitor which were unrevealing (details below).  She was then referred here for evaluation.    Today, she informs she is doing well.  She denies any symptoms of chest pain, palpitation, shortness of breath, lightheadedness or recurrence of syncope.    Blood pressure was elevated today.  Previously, she was take amlodipine, which was discontinued due to concern related to the recent syncope.    PREVIOUS STUDIES (personally reviewed):  -Echo (90/30/22): EF of 60 to 65%.  No significant valve disease.  -Ziopatch (10/11/2022): 1 run of nonsustained VT (6 beats) and 9 runs of P A. tach (up to 10 seconds)      ASSESSMENT AND PLAN:   1.  Syncope.  Work-up was essentially negative.  Most likely, syncopal episode was related to vasovagal.  However, we cannot completely rule out bradycardia arrhythmias.  Discussed possibly a loop recorder implantation, however she is not interested in pursuing any procedures at this time.  I recommend increase hydration and avoid skipping meals.  2.   Spoke to patient who voiced interest in being evaluated for a KP. Message sent to Dr. Rey and Dr. Ahmadi for review.    Hypertension.  Blood pressure is elevated.  I recommend resuming amlodipine.  She will follow-up here with an TARUN in a couple weeks to reevaluate blood pressure.      Yaya Owen MD    Physical Exam:  Vitals: BP (!) 161/90   Pulse 74   Wt 63 kg (138 lb 12.8 oz)   SpO2 100%   BMI 21.73 kg/m      Constitutional:  AAO x3.  Pt is in NAD.  HEAD: normocephalic.  SKIN: Skin normal color, texture and turgor with no lesions or eruptions.  Eyes: PERRL, EOMI.  ENT:  Supple, normal JVP. No lymphadenopathy or thyroid enlargement.  Chest:  CTAB.  Cardiac:  RRR, normal  S1 and S2.  No murmurs rubs or gallop.   Abdomen:  Normal BS.  Soft, non-tender and non-distended.  No rebound or guarding.    Extremities:  Pedious pulses palpable B/L.  No LE edema noticed.   Neurological: Strength and sensation grossly symmetric and intact throughout.       CURRENT MEDICATIONS:  Current Outpatient Medications   Medication Sig Dispense Refill     acetaminophen (TYLENOL) 325 MG tablet Take 2 tablets (650 mg) by mouth every 8 hours 50 tablet 0     Loratadine-Pseudoephedrine (CLARITIN-D 12 HOUR PO) Take 1 tablet by mouth daily as needed       amLODIPine (NORVASC) 2.5 MG tablet Take 1 tablet (2.5 mg) by mouth daily (Patient not taking: Reported on 12/13/2022) 90 tablet 0     aspirin 81 MG EC tablet Take 81 mg by mouth daily (Patient not taking: Reported on 12/13/2022)       nicotine (NICODERM CQ) 21 MG/24HR 24 hr patch Place 1 patch onto the skin daily as needed for smoking cessation (Patient not taking: Reported on 11/7/2022) 30 patch 0     thiamine 50 MG TABS Take 1 tablet (50 mg) by mouth daily (Patient not taking: Reported on 10/10/2022) 30 tablet 0       ALLERGIES     Allergies   Allergen Reactions     Ciprofloxacin      Rash and hives.     Flu Virus Vaccine Other (See Comments)     Broke out in hives     Seasonal Allergies      Polymyxin B Hives and Rash       PAST MEDICAL HISTORY:  Past Medical History:   Diagnosis Date     Hypertension         PAST SURGICAL HISTORY:  Past Surgical History:   Procedure Laterality Date     NO HISTORY OF SURGERY         FAMILY HISTORY:  The patient's family history was reviewed and is non-contributory for heart disease.    SOCIAL HISTORY:  Social History     Socioeconomic History     Marital status: Single     Spouse name: None     Number of children: 0     Years of education: 14     Highest education level: None   Occupational History     Occupation: Behavioral Coordinator     Employer: [employer]   Tobacco Use     Smoking status: Former     Packs/day: 0.20     Years: 30.00     Pack years: 6.00     Types: Cigarettes     Quit date: 1999     Years since quittin.9     Smokeless tobacco: Never   Substance and Sexual Activity     Alcohol use: Yes     Comment: Occasional on the weekends - 12 pack     Drug use: No     Sexual activity: Yes     Partners: Male   Other Topics Concern     Parent/sibling w/ CABG, MI or angioplasty before 65F 55M? No       Review of Systems:  Skin:        Eyes:       ENT:       Respiratory:  Positive for dyspnea on exertion  Cardiovascular:    Positive for;palpitations;fatigue;lightheadedness;dizziness;syncope or near-syncope  Gastroenterology:      Genitourinary:       Musculoskeletal:       Neurologic:       Psychiatric:       Heme/Lymph/Imm:       Endocrine:           Recent Lab Results:  LIPID RESULTS:  Lab Results   Component Value Date    CHOL 188 10/26/2022    HDL 76 10/26/2022    LDL 95 10/26/2022    TRIG 85 10/26/2022       LIVER ENZYME RESULTS:  Lab Results   Component Value Date    AST 26 10/10/2022    AST 34 2011    ALT 14 10/10/2022    ALT 52 (H) 2011       CBC RESULTS:  Lab Results   Component Value Date    WBC 8.4 10/26/2022    WBC 6.9 2011    RBC 4.32 10/26/2022    RBC 4.35 2011    HGB 13.6 10/26/2022    HGB 13.0 2011    HCT 41.0 10/26/2022    HCT 38.2 2011    MCV 95 10/26/2022    MCV 88 2011    MCH 31.5 10/26/2022    MCH 29.9  03/14/2011    MCHC 33.2 10/26/2022    MCHC 34.0 03/14/2011    RDW 13.2 10/26/2022    RDW 12.9 03/14/2011     10/26/2022     03/14/2011       BMP RESULTS:  Lab Results   Component Value Date     (L) 10/26/2022     03/14/2011    POTASSIUM 4.0 10/26/2022    POTASSIUM 3.9 03/14/2011    CHLORIDE 99 10/26/2022    CHLORIDE 102 03/14/2011    CO2 25 10/26/2022    CO2 31 03/14/2011    ANIONGAP 11 10/26/2022    ANIONGAP 3 (L) 03/14/2011    GLC 91 10/26/2022    GLC 89 03/14/2011    BUN 5.3 (L) 10/26/2022    BUN 10 03/14/2011    CR 0.53 10/26/2022    CR 0.65 03/14/2011    GFRESTIMATED >90 10/26/2022    GFRESTIMATED >90 03/14/2011    GFRESTBLACK >90 03/14/2011    NAEEM 9.5 10/26/2022    NAEEM 8.8 03/14/2011        A1C RESULTS:  No results found for: A1C    INR RESULTS:  Lab Results   Component Value Date    INR 1.11 09/29/2022         ECHOCARDIOGRAM  No results found for this or any previous visit (from the past 8760 hour(s)).      No orders of the defined types were placed in this encounter.    No orders of the defined types were placed in this encounter.    There are no discontinued medications.      Encounter Diagnoses   Name Primary?     Palpitations      Vasovagal syncope      Paroxysmal ventricular tachycardia      Paroxysmal supraventricular tachycardia (H)          CC  Lauren Weldon PA-C  [address] [place] [address]    Thank you for allowing me to participate in the care of your patient.      Sincerely,     Yaya Owen MD     [facility]

## 2022-12-13 NOTE — LETTER
[facility] - Vascular Surg [facility]  [address]  [facility] [address]  Phone: [phone]  Fax: [phone] December 22, 2022      Ehsan Quezada MD  [address]  [facility] [address]    Patient: Elizabeth Maldonado   MR Number: [id number]   YOB: 1965   Date of Visit: 12/13/2022     Dear Dr. Quezada:    Thank you for referring Elizabeth Maldonado to me for evaluation. Attached are the relevant portions of my assessment and plan of care.    If you have questions, please do not hesitate to call me. I look forward to following Elizabeth along with you.    Sincerely,      LIDIA Kamara III, MD   Professor and Chief  Vascular and Endovascular Surgery  Vice-Chair, Department of Surgery  [facility]     WCS/hcr    CC  Richy Singleton NP

## 2022-12-17 LAB
HCT VFR BLD AUTO: 29.3 % (ref 37–47)
HEMOGLOBIN X 3: 28.2 % (ref 36–48)
HGB BLD-MCNC: 9.4 G/DL (ref 12–16)

## 2022-12-22 LAB
HCT VFR BLD AUTO: 27.9 % (ref 37–47)
HEMOGLOBIN X 3: 27.3 % (ref 36–48)
HGB BLD-MCNC: 9.1 G/DL (ref 12–16)

## 2023-01-04 ENCOUNTER — ANESTHESIA EVENT (OUTPATIENT)
Dept: SURGERY | Facility: HOSPITAL | Age: 58
End: 2023-01-04
Payer: MEDICARE

## 2023-01-04 ENCOUNTER — TELEPHONE (OUTPATIENT)
Dept: VASCULAR SURGERY | Facility: CLINIC | Age: 58
End: 2023-01-04
Payer: MEDICARE

## 2023-01-04 NOTE — PRE-PROCEDURE INSTRUCTIONS

## 2023-01-04 NOTE — ANESTHESIA PREPROCEDURE EVALUATION
01/04/2023  [facility]  Anesthesia Pre-Operative Evaluation         Patient Name: Elizabeth Maldonado  YOB: 1965  MRN: [id number]    SUBJECTIVE:     Pre-operative evaluation for Procedure(s) (LRB):  CREATION, AV FISTULA (Left)     01/04/2023    Elizabeth Maldonado is a 57 y.o. female w/ a significant PMHx of ESRD on HD, DM, HTN, anemia.  Patient now presents for the above procedure(s).    TTE:   The estimated ejection fraction is 55%.   The left ventricle is normal in size with concentric hypertrophy and normal systolic function.   Severe left atrial enlargement.   Grade II left ventricular diastolic dysfunction.   Moderate mitral regurgitation.   Small pericardial effusion.   Mild right atrial enlargement.   There is mild aortic valve stenosis.   Aortic valve area is 1.68 cm2; peak velocity is 2.10 m/s; mean gradient is 10 mmHg.   Normal central venous pressure (3 mmHg).   The estimated PA systolic pressure is 38 mmHg.    LDA:        Hemodialysis Catheter 10/28/22 1548 right internal jugular (Active)   Line Necessity Review CRRT/HD 01/04/23 0933   Site Assessment No drainage;No redness;No swelling;No warmth 01/04/23 0933   Line Securement Device Secured with sutureless device 01/04/23 0933   Dressing Type Gauze 01/04/23 0933   Dressing Status Clean;Dry;Intact 01/04/23 0933   Dressing Intervention Sterile dressing change 01/04/23 0933   Date on Dressing 01/04/23 01/04/23 0933   Dressing Due to be Changed 01/06/23 01/04/23 0933   Venous Patency/Care flushed w/o difficulty;heparin locked;intermittent infusion cap applied 01/04/23 0933   Arterial Patency/Care flushed w/o difficulty;heparin locked;intermittent infusion cap applied 01/04/23 0933   Number of days: 67       Prev airway:    06/05/22; 0926 (created via procedure documentation); Direct laryngoscopy, Stylet;  Oral Standard; 7 mm; Cuffed; Auscultation, Capnometry, Symmetrical chest wall movement; Pink tape; Maldonado; 1         Drips: None documented.      Patient Active Problem List   Diagnosis    Type 2 diabetes mellitus, with long-term current use of insulin    Symptomatic anemia    Allergic rhinitis    Hyperlipidemia    Depression    Chronic kidney disease-mineral and bone disorder    Obesity (BMI 30.0-34.9)    Kidney transplant status, living unrelated donor - 12/2/14    Chronic immunosuppression with Prograf    Hypomagnesemia    Acute renal failure superimposed on stage 4 chronic kidney disease    Proliferative diabetic retinopathy of both eyes without macular edema associated with type 2 diabetes mellitus    Calcification of aorta    Bacteremia due to group B Streptococcus    Chronic bilateral low back pain with bilateral sciatica    Pain of left sternoclavicular joint    Kidney dysfunction    Other proteinuria    MGUS (monoclonal gammopathy of unknown significance)    Renovascular hypertension    Acute pyelonephritis    Hyponatremia    Hyperkalemia    Pre-op evaluation    ESRD (end stage renal disease) on dialysis    Anemia of chronic kidney failure, stage 5    Hemodialysis catheter malfunction    Acute cystitis with hematuria    Encounter for colonoscopy due to history of colonic polyp    Pleural effusion, right    Hypocalcemia    SOB (shortness of breath)       Review of patient's allergies indicates:   Allergen Reactions    Shrimp Hives, Itching and Rash    Topamax [topiramate] Other (See Comments)     Vision changes    Zoloft [sertraline] Palpitations       Current Inpatient Medications:      No current facility-administered medications on file prior to encounter.     Current Outpatient Medications on File Prior to Encounter   Medication Sig Dispense Refill    atorvastatin (LIPITOR) 40 MG tablet Take 40 mg by mouth every evening.      calcitRIOL (ROCALTROL) 0.25 MCG Cap Take  "0.25 mcg by mouth once daily.      carvediloL (COREG) 3.125 MG tablet Take 3.125 mg by mouth 2 (two) times daily. Hold for SBP <130      dulaglutide (TRULICITY) 1.5 mg/0.5 mL pen injector Inject 1.5 mg into the skin every 7 days. Pt takes on Wednesday      HUMALOG KWIKPEN INSULIN 100 unit/mL pen Inject 3 Units into the skin 3 (three) times daily with meals. SLIDE SCALE      LANTUS SOLOSTAR U-100 INSULIN glargine 100 units/mL (3mL) SubQ pen Inject 50 Units into the skin once daily.  0    zolpidem (AMBIEN) 10 mg Tab Take 10 mg by mouth nightly as needed.      blood sugar diagnostic Strp Checks BG ac/hs 200 strip 7    clonazePAM (KLONOPIN) 0.5 MG tablet Take 0.5 mg by mouth 2 (two) times daily as needed.      folic acid (FOLVITE) 1 MG tablet Take 1 tablet (1 mg total) by mouth once daily. 30 tablet 11    insulin syringe-needle U-100 (INSULIN SYRINGE) 1/2 mL 30 x 5/16" Syrg Uses 4 daily 200 each 12    lancets (ONETOUCH DELICA LANCETS) 33 gauge Misc 1 lancet by Misc.(Non-Drug; Combo Route) route 4 (four) times daily before meals and nightly. 200 each 7    sevelamer carbonate (RENVELA) 800 mg Tab Take 1 tablet (800 mg total) by mouth 3 (three) times daily with meals. (Patient taking differently: Take 800 mg by mouth 3 (three) times daily with meals. Take 2 tab w/ meal & 1 tab w/ snack) 90 tablet 3    SYRINGE & NEEDLE,INSULIN,1 ML (INSULIN SYRINGE-NEEDLE U-100) 1 mL 29 X 7/16" Syrg   11    timolol maleate 0.5% (TIMOPTIC) 0.5 % Drop Place 2 drops in both eyes twice daily         Past Surgical History:   Procedure Laterality Date    BIOPSY N/A 2020    Procedure: Biopsy;  Surgeon: Sweta Catalan MD;  Location: [address];  Service: Transplant;  Laterality: N/A;    BONE MARROW BIOPSY N/A      SECTION      x 2    COLONOSCOPY N/A 2022    Procedure: COLONOSCOPY;  Surgeon: Franklin Gan MD;  Location: [facility] ENDO (4TH FLR);  Service: Colon and Rectal;  Laterality: N/A;  order created: " previous order unusable  fully vaccinated, labs prior, instr mailed / portal -ml    KIDNEY TRANSPLANT  02/2014    LAPAROSCOPIC REVISION OF PROCEDURE INVOLVING PERITONEAL DIALYSIS CATHETER N/A 4/4/2022    Procedure: REVISION OF PROCEDURE INVOLVING PERITONEAL DIALYSIS CATHETER, LAPAROSCOPIC;  Surgeon: Franklin Barber MD;  Location: [facility] OR [address] [place] [address];  Service: General;  Laterality: N/A;    MEDIPORT REMOVAL N/A 11/25/2019    Procedure: REMOVAL, CATHETER, CENTRAL VENOUS, TUNNELED, WITH PORT;  [facility];  Location: [facility] OR;  Service: Radiology;  Laterality: N/A;    RENAL BIOPSY      ROTATOR CUFF REPAIR      TUBAL LIGATION         OBJECTIVE:     Vital Signs Range (Last 24H):  Pulse:  [82-92]   BP: (130-184)/()       Significant Labs:  Lab Results   Component Value Date    WBC 6.44 12/07/2022    HGB 9.1 (L) 12/28/2022    HCT 28.3 (L) 12/28/2022     12/07/2022    CHOL 144 04/06/2022    TRIG 238 (H) 04/06/2022    HDL 25 04/06/2022    ALT <7 (L) 12/07/2022    AST 14 11/13/2022     12/07/2022    K 4.3 12/07/2022     12/07/2022    CREATININE 10.05 (H) 12/07/2022    BUN 34 (H) 11/13/2022    CO2 28 11/13/2022    TSH 1.079 12/02/2021    INR 1.0 10/10/2022    GLUF 185 (H) 07/28/2004    HGBA1C 7.9 (H) 10/05/2022       Diagnostic Studies: No relevant studies.    EKG:   Results for orders placed or performed during the hospital encounter of 11/12/22   EKG 12-lead    Collection Time: 11/12/22  5:10 AM    Narrative    Test Reason : R06.02,    Vent. Rate : 088 BPM     Atrial Rate : 088 BPM     P-R Int : 162 ms          QRS Dur : 072 ms      QT Int : 418 ms       P-R-T Axes : 062 023 062 degrees     QTc Int : 505 ms    Normal sinus rhythm  Anterior infarct ,age undetermined  Prolonged QT  Abnormal ECG  When compared with ECG of 26-OCT-2022 12:21,  No significant change was found  Confirmed by Denise SAM, Cristian BENÍTEZ ([id number]) on 11/15/2022 10:29:08 PM    Referred By: JOCELIN   SELF            Confirmed By:Cristian Walker MD       ASSESSMENT/PLAN:           Pre-op Assessment    I have reviewed the Patient Summary Reports.       I have reviewed the Medications.     Review of Systems  Anesthesia Hx:  No problems with previous Anesthesia  History of prior surgery of interest to airway management or planning: Previous anesthesia: General   Social:  Former Smoker, No Alcohol Use 20 pack years   Hematology/Oncology:  Hematology Normal   Oncology Normal     EENT/Dental:EENT/Dental Normal   Cardiovascular:   Exercise tolerance: good Hypertension, well controlled    Pulmonary:  Pulmonary Normal    Renal/:   Chronic Renal Disease, CRI, Dialysis, ESRD    Musculoskeletal:   Arthritis     Neurological:   Neuromuscular Disease,    Endocrine:   Diabetes, well controlled, type 2    Dermatological:  Skin Normal    Psych:   Psychiatric History             Anesthesia Plan  Type of Anesthesia, risks & benefits discussed:    Anesthesia Type: Regional, MAC  Intra-op Monitoring Plan: Standard ASA Monitors  Post Op Pain Control Plan: multimodal analgesia and IV/PO Opioids PRN  Induction:  IV  Informed Consent: Informed consent signed with the Patient and all parties understand the risks and agree with anesthesia plan.  All questions answered.   ASA Score: 3  Day of Surgery Review of History & Physical: H&P Update referred to the surgeon/provider.    Ready For Surgery From Anesthesia Perspective.     .

## 2023-01-05 ENCOUNTER — HOSPITAL ENCOUNTER (OUTPATIENT)
Facility: HOSPITAL | Age: 58
Discharge: HOME | End: 2023-01-05
Attending: SURGERY | Admitting: SURGERY
Payer: MEDICARE

## 2023-01-05 ENCOUNTER — ANESTHESIA (OUTPATIENT)
Dept: SURGERY | Facility: HOSPITAL | Age: 58
End: 2023-01-05
Payer: MEDICARE

## 2023-01-05 ENCOUNTER — TELEPHONE (OUTPATIENT)
Dept: VASCULAR SURGERY | Facility: CLINIC | Age: 58
End: 2023-01-05
Payer: MEDICARE

## 2023-01-05 VITALS
DIASTOLIC BLOOD PRESSURE: 77 MMHG | HEART RATE: 92 BPM | BODY MASS INDEX: 28.25 KG/M2 | RESPIRATION RATE: 13 BRPM | OXYGEN SATURATION: 96 % | TEMPERATURE: 98 F | SYSTOLIC BLOOD PRESSURE: 158 MMHG | WEIGHT: 180 LBS | HEIGHT: 67 IN

## 2023-01-05 DIAGNOSIS — N18.6 ESRD (END STAGE RENAL DISEASE) ON DIALYSIS: ICD-10-CM

## 2023-01-05 DIAGNOSIS — Z99.2 ESRD (END STAGE RENAL DISEASE) ON DIALYSIS: ICD-10-CM

## 2023-01-05 LAB
POCT GLUCOSE: 100 MG/DL (ref 70–110)
POCT GLUCOSE: 97 MG/DL (ref 70–110)

## 2023-01-05 PROCEDURE — D9220A PRA ANESTHESIA: ICD-10-PCS | Mod: ANES,NTX,, | Performed by: ANESTHESIOLOGY

## 2023-01-05 PROCEDURE — 82962 GLUCOSE BLOOD TEST: CPT | Mod: TXP | Performed by: SURGERY

## 2023-01-05 PROCEDURE — 36000706: Mod: TXP | Performed by: SURGERY

## 2023-01-05 PROCEDURE — D9220A PRA ANESTHESIA: Mod: CRNA,NTX,, | Performed by: NURSE ANESTHETIST, CERTIFIED REGISTERED

## 2023-01-05 PROCEDURE — 25000003 PHARM REV CODE 250: Mod: TXP | Performed by: STUDENT IN AN ORGANIZED HEALTH CARE EDUCATION/TRAINING PROGRAM

## 2023-01-05 PROCEDURE — 36000707: Mod: NTX | Performed by: SURGERY

## 2023-01-05 PROCEDURE — C1768 GRAFT, VASCULAR: HCPCS | Mod: NTX | Performed by: SURGERY

## 2023-01-05 PROCEDURE — 37000008 HC ANESTHESIA 1ST 15 MINUTES: Mod: NTX | Performed by: SURGERY

## 2023-01-05 PROCEDURE — D9220A PRA ANESTHESIA: Mod: ANES,NTX,, | Performed by: ANESTHESIOLOGY

## 2023-01-05 PROCEDURE — 71000015 HC POSTOP RECOV 1ST HR: Mod: NTX | Performed by: SURGERY

## 2023-01-05 PROCEDURE — 25000003 PHARM REV CODE 250: Mod: NTX | Performed by: NURSE ANESTHETIST, CERTIFIED REGISTERED

## 2023-01-05 PROCEDURE — D9220A PRA ANESTHESIA: ICD-10-PCS | Mod: CRNA,NTX,, | Performed by: NURSE ANESTHETIST, CERTIFIED REGISTERED

## 2023-01-05 PROCEDURE — 36830 ARTERY-VEIN NONAUTOGRAFT: CPT | Mod: GC,NTX,, | Performed by: SURGERY

## 2023-01-05 PROCEDURE — 37000009 HC ANESTHESIA EA ADD 15 MINS: Mod: TXP | Performed by: SURGERY

## 2023-01-05 PROCEDURE — 27201423 OPTIME MED/SURG SUP & DEVICES STERILE SUPPLY: Mod: TXP | Performed by: SURGERY

## 2023-01-05 PROCEDURE — 36830 PR CREAT AV FISTULA,NON-AUTOGENOUS GRAFT: ICD-10-PCS | Mod: GC,NTX,, | Performed by: SURGERY

## 2023-01-05 PROCEDURE — 63600175 PHARM REV CODE 636 W HCPCS: Mod: TXP | Performed by: SURGERY

## 2023-01-05 PROCEDURE — 63600175 PHARM REV CODE 636 W HCPCS: Mod: NTX | Performed by: NURSE ANESTHETIST, CERTIFIED REGISTERED

## 2023-01-05 PROCEDURE — 25000003 PHARM REV CODE 250: Mod: TXP | Performed by: ANESTHESIOLOGY

## 2023-01-05 PROCEDURE — 63600175 PHARM REV CODE 636 W HCPCS: Mod: TXP | Performed by: STUDENT IN AN ORGANIZED HEALTH CARE EDUCATION/TRAINING PROGRAM

## 2023-01-05 PROCEDURE — 71000044 HC DOSC ROUTINE RECOVERY FIRST HOUR: Mod: NTX | Performed by: SURGERY

## 2023-01-05 DEVICE — GRAFT STRETCH TW RING 4-7 45C: Type: IMPLANTABLE DEVICE | Site: ARM | Status: FUNCTIONAL

## 2023-01-05 RX ORDER — LIDOCAINE HYDROCHLORIDE 20 MG/ML
INJECTION, SOLUTION EPIDURAL; INFILTRATION; INTRACAUDAL; PERINEURAL
Status: COMPLETED | OUTPATIENT
Start: 2023-01-05 | End: 2023-01-05

## 2023-01-05 RX ORDER — FENTANYL CITRATE 50 UG/ML
INJECTION, SOLUTION INTRAMUSCULAR; INTRAVENOUS
Status: DISCONTINUED | OUTPATIENT
Start: 2023-01-05 | End: 2023-01-05

## 2023-01-05 RX ORDER — HYDRALAZINE HYDROCHLORIDE 50 MG/1
50 TABLET, FILM COATED ORAL 3 TIMES DAILY
Status: ON HOLD | COMMUNITY
End: 2023-03-13 | Stop reason: SDUPTHER

## 2023-01-05 RX ORDER — HEPARIN SODIUM 1000 [USP'U]/ML
INJECTION, SOLUTION INTRAVENOUS; SUBCUTANEOUS
Status: DISCONTINUED | OUTPATIENT
Start: 2023-01-05 | End: 2023-01-05 | Stop reason: HOSPADM

## 2023-01-05 RX ORDER — MIDAZOLAM HYDROCHLORIDE 1 MG/ML
INJECTION, SOLUTION INTRAMUSCULAR; INTRAVENOUS
Status: DISCONTINUED | OUTPATIENT
Start: 2023-01-05 | End: 2023-01-05

## 2023-01-05 RX ORDER — SODIUM CHLORIDE 0.9 % (FLUSH) 0.9 %
10 SYRINGE (ML) INJECTION
Status: DISCONTINUED | OUTPATIENT
Start: 2023-01-05 | End: 2023-01-05 | Stop reason: HOSPADM

## 2023-01-05 RX ORDER — CEFAZOLIN SODIUM 1 G/3ML
INJECTION, POWDER, FOR SOLUTION INTRAMUSCULAR; INTRAVENOUS
Status: DISCONTINUED | OUTPATIENT
Start: 2023-01-05 | End: 2023-01-05

## 2023-01-05 RX ORDER — PROPOFOL 10 MG/ML
VIAL (ML) INTRAVENOUS CONTINUOUS PRN
Status: DISCONTINUED | OUTPATIENT
Start: 2023-01-05 | End: 2023-01-05

## 2023-01-05 RX ORDER — HALOPERIDOL 5 MG/ML
0.5 INJECTION INTRAMUSCULAR EVERY 10 MIN PRN
Status: DISCONTINUED | OUTPATIENT
Start: 2023-01-05 | End: 2023-01-05 | Stop reason: HOSPADM

## 2023-01-05 RX ORDER — FENTANYL CITRATE 50 UG/ML
25-200 INJECTION, SOLUTION INTRAMUSCULAR; INTRAVENOUS
Status: DISCONTINUED | OUTPATIENT
Start: 2023-01-05 | End: 2023-01-05 | Stop reason: HOSPADM

## 2023-01-05 RX ORDER — OXYCODONE HYDROCHLORIDE 5 MG/1
5 TABLET ORAL EVERY 4 HOURS PRN
Qty: 12 TABLET | Refills: 0 | Status: ON HOLD | OUTPATIENT
Start: 2023-01-05 | End: 2023-03-13 | Stop reason: SDUPTHER

## 2023-01-05 RX ORDER — SODIUM CHLORIDE 9 MG/ML
INJECTION, SOLUTION INTRAVENOUS CONTINUOUS
Status: DISCONTINUED | OUTPATIENT
Start: 2023-01-05 | End: 2023-01-05 | Stop reason: HOSPADM

## 2023-01-05 RX ORDER — PROPOFOL 10 MG/ML
VIAL (ML) INTRAVENOUS
Status: DISCONTINUED | OUTPATIENT
Start: 2023-01-05 | End: 2023-01-05

## 2023-01-05 RX ORDER — MIDAZOLAM HYDROCHLORIDE 1 MG/ML
.5-4 INJECTION INTRAMUSCULAR; INTRAVENOUS
Status: DISCONTINUED | OUTPATIENT
Start: 2023-01-05 | End: 2023-01-05 | Stop reason: HOSPADM

## 2023-01-05 RX ORDER — LIDOCAINE HYDROCHLORIDE 20 MG/ML
INJECTION, SOLUTION EPIDURAL; INFILTRATION; INTRACAUDAL; PERINEURAL
Status: DISCONTINUED | OUTPATIENT
Start: 2023-01-05 | End: 2023-01-05

## 2023-01-05 RX ORDER — PHENYLEPHRINE HCL IN 0.9% NACL 1 MG/10 ML
SYRINGE (ML) INTRAVENOUS
Status: DISCONTINUED | OUTPATIENT
Start: 2023-01-05 | End: 2023-01-05

## 2023-01-05 RX ORDER — HEPARIN SODIUM 1000 [USP'U]/ML
INJECTION, SOLUTION INTRAVENOUS; SUBCUTANEOUS
Status: DISCONTINUED | OUTPATIENT
Start: 2023-01-05 | End: 2023-01-05

## 2023-01-05 RX ADMIN — PROPOFOL 30 MG: 10 INJECTION, EMULSION INTRAVENOUS at 01:01

## 2023-01-05 RX ADMIN — SODIUM CHLORIDE 0.5 MCG/KG/MIN: 9 INJECTION, SOLUTION INTRAVENOUS at 03:01

## 2023-01-05 RX ADMIN — MIDAZOLAM 1 MG: 1 INJECTION INTRAMUSCULAR; INTRAVENOUS at 12:01

## 2023-01-05 RX ADMIN — SODIUM CHLORIDE, SODIUM GLUCONATE, SODIUM ACETATE, POTASSIUM CHLORIDE, MAGNESIUM CHLORIDE, SODIUM PHOSPHATE, DIBASIC, AND POTASSIUM PHOSPHATE: .53; .5; .37; .037; .03; .012; .00082 INJECTION, SOLUTION INTRAVENOUS at 12:01

## 2023-01-05 RX ADMIN — CEFAZOLIN 2 G: 330 INJECTION, POWDER, FOR SOLUTION INTRAMUSCULAR; INTRAVENOUS at 01:01

## 2023-01-05 RX ADMIN — LIDOCAINE HYDROCHLORIDE 60 MG: 20 INJECTION, SOLUTION EPIDURAL; INFILTRATION; INTRACAUDAL; PERINEURAL at 01:01

## 2023-01-05 RX ADMIN — Medication 50 MCG/KG/MIN: at 01:01

## 2023-01-05 RX ADMIN — FENTANYL CITRATE 50 MCG: 50 INJECTION INTRAMUSCULAR; INTRAVENOUS at 01:01

## 2023-01-05 RX ADMIN — SODIUM CHLORIDE, SODIUM GLUCONATE, SODIUM ACETATE, POTASSIUM CHLORIDE, MAGNESIUM CHLORIDE, SODIUM PHOSPHATE, DIBASIC, AND POTASSIUM PHOSPHATE: .53; .5; .37; .037; .03; .012; .00082 INJECTION, SOLUTION INTRAVENOUS at 02:01

## 2023-01-05 RX ADMIN — HEPARIN SODIUM 3000 UNITS: 1000 INJECTION, SOLUTION INTRAVENOUS; SUBCUTANEOUS at 01:01

## 2023-01-05 RX ADMIN — Medication 150 MCG: at 02:01

## 2023-01-05 RX ADMIN — Medication 100 MCG: at 02:01

## 2023-01-05 RX ADMIN — Medication 200 MCG: at 02:01

## 2023-01-05 RX ADMIN — LIDOCAINE HYDROCHLORIDE 30 ML: 20 INJECTION, SOLUTION INTRAVENOUS at 01:01

## 2023-01-05 NOTE — TELEPHONE ENCOUNTER
Contacted pt in response to message. Notified pt she mat take scheduled does of hydralazine. Pt repeated instructions and verbalized understanding.----- Message from Atilio Mills sent at 1/5/2023  8:14 AM CST -----  Regarding: advise; RX questions  Contact: PT @ [phone]  Pt is calling to speak to someone in the office to ask if she is able to take rx: hydralazine, she she has as procedure today. Pt is asking for a return call today to advise. Thanks.

## 2023-01-05 NOTE — PLAN OF CARE
Patient tolerated procedure and anesthesia with no complaints of pain or nausea. Discharge material given and explained to patient. She verbalized understanding. Patient taken to ride via wheelchair in stable condition with no complaints.

## 2023-01-05 NOTE — BRIEF OP NOTE
[facility] - Surgery ([facility])  Brief Operative Note    Surgery Date: 1/5/2023     Surgeon(s) and Role:     * LIDIA Kamara III, MD - Primary     * Vania Perrin MD - Resident - Assisting      Pre-op Diagnosis:  ESRD (end stage renal disease) on dialysis [N18.6, Z99.2]    Post-op Diagnosis:  Post-Op Diagnosis Codes:     * ESRD (end stage renal disease) on dialysis [N18.6, Z99.2]    PROCEDURES:  L upper arm AVG placement (4-7 taper)    Anesthesia: Regional    Operative Findings: Veins inadequate for AVF;  Good thrill, 2+ radial after    Estimated Blood Loss: 20cc         Specimens:   Specimen (24h ago, onward)      None              Discharge Note    OUTCOME: successful outpatient procedure     DISPOSITION: home    FINAL DIAGNOSIS:  ESRD    FOLLOWUP: 2 week f/u with AVG duplex    DISCHARGE INSTRUCTIONS:  No discharge procedures on file.

## 2023-01-05 NOTE — TRANSFER OF CARE
"Anesthesia Transfer of Care Note    Patient: Elizabeth Maldonado    Procedure(s) Performed: Procedure(s) (LRB):  CREATION, AV FISTULA (Left)    Patient location: [facility]    Anesthesia Type: general    Transport from OR: Transported from OR on room air with adequate spontaneous ventilation    Post pain: adequate analgesia    Post assessment: no apparent anesthetic complications and tolerated procedure well    Post vital signs: stable    Level of consciousness: sedated and responds to stimulation    Nausea/Vomiting: no nausea/vomiting    Complications: none    Transfer of care protocol was followed      Last vitals:   Visit Vitals  /74 (BP Location: Right arm, Patient Position: Lying)   Pulse 102   Temp 36.7 °C (98.1 °F) (Temporal)   Resp 20   Ht 5' 7" (1.702 m)   Wt 81.6 kg (180 lb)   LMP 04/29/2011 (Approximate)   SpO2 98%   Breastfeeding No   BMI 28.19 kg/m²     "

## 2023-01-05 NOTE — ANESTHESIA PROCEDURE NOTES
Peripheral Block    Patient location during procedure: OR    Reason for block: primary anesthetic    Diagnosis: ESRD      Staffing  Authorizing Provider: Mateo Diaz MD  Performing Provider: Ba Phillip MD    Preanesthetic Checklist  Completed: patient identified, IV checked, site marked, risks and benefits discussed, surgical consent, monitors and equipment checked, pre-op evaluation and timeout performed  Peripheral Block  Patient position: supine  Prep: ChloraPrep  Patient monitoring: heart rate, cardiac monitor, continuous pulse ox, continuous capnometry and frequent blood pressure checks  Block type: supraclavicular  Laterality: left  Injection technique: single shot  Needle  Needle type: Stimuplex   Needle gauge: 22 G  Needle length: 2 in  Needle localization: anatomical landmarks and ultrasound guidance   -ultrasound image captured on disc.  Assessment  Injection assessment: negative aspiration, negative parasthesia and local visualized surrounding nerve  Paresthesia pain: none  Heart rate change: no  Slow fractionated injection: yes  Pain Tolerance: comfortable throughout block and no complaints  Medications:    Medications: lidocaine (PF) 20 mg/mL (2%) injection - Other   30 mL - 1/5/2023 1:00:00 PM    Additional Notes  Intercostal brachial field block performed with mepivacaine 1.5% 10ml for additional coverage.    VSS.  See anesthesia record.  Patient tolerated procedure well.

## 2023-01-06 ENCOUNTER — TELEPHONE (OUTPATIENT)
Dept: TRANSPLANT | Facility: CLINIC | Age: 58
End: 2023-01-06
Payer: MEDICARE

## 2023-01-06 NOTE — ANESTHESIA POSTPROCEDURE EVALUATION
Anesthesia Post Evaluation    Patient: Elizabeth Maldonado    Procedure(s) Performed: Procedure(s) (LRB):  CREATION, AV FISTULA (Left)    Final Anesthesia Type: general      Patient location during evaluation: PACU  Patient participation: Yes- Able to Participate  Level of consciousness: awake and alert and oriented  Post-procedure vital signs: reviewed and stable  Pain management: adequate  Airway patency: patent    PONV status at discharge: No PONV  Anesthetic complications: no      Cardiovascular status: blood pressure returned to baseline and hemodynamically stable  Respiratory status: unassisted and spontaneous ventilation  Hydration status: euvolemic  Follow-up not needed.          Vitals Value Taken Time   /77 01/05/23 1630   Temp 36.7 °C (98.1 °F) 01/05/23 1545   Pulse 92 01/05/23 1630   Resp 13 01/05/23 1630   SpO2 96 % 01/05/23 1630         No case tracking events are documented in the log.      Pain/Eric Score: Eric Score: 10 (1/5/2023  4:40 PM)

## 2023-01-08 NOTE — OP NOTE
Operative Note     1/5/2023    Surgery Date: 1/5/2023      Surgeon(s) and Role:     * LIDIA Kamara III, MD - Primary     * Vania Perrin MD - Resident - Assisting        Pre-op Diagnosis:  ESRD (end stage renal disease) on dialysis [N18.6, Z99.2]     Post-op Diagnosis:  Post-Op Diagnosis Codes:     * ESRD (end stage renal disease) on dialysis [N18.6, Z99.2]     PROCEDURES:  L upper arm AVG placement (4-7 taper)     Anesthesia: Regional     Operative Findings: Veins inadequate for AVF;  Good thrill, 2+ radial after.     Estimated Blood Loss: 20cc         Specimens:   Specimen (24h ago, onward)      Antibiotic: 1g iv Vancomycin    After an informed consent was obtained the patient was taken to the operating room and placed in the supine position. The patient  underwent a regional block by anesthesia. An Ultrasound was used to assess the upper extremity veins and noted to be inadequate for AVF, the decision was made to proceed with graft placement. The left upper extremity was prepped and draped in a sterile fashion. A skin incision was made just above the AC fossa and dissection carried down to the brachial artery. We then made a second incision in the proximal arm/axilla area, dissected through the subcutaneous tissues and identified the proximal brachial vein. The artery was controlled with vessel loops, opened and flushed with heparinized saline. A 4-7mm gore-gisella stretch vascular graft was tunneled and the 4mm end sew end-to-side to the brachial artery with 6-0 Prolene. The distal, 7mm, end of the graft was similarly sewn end-to-side to the axillary vein with 6-0 Prolene. After completion of the anastomosis, the vessel loops were released. Hemostasis was secured. Good thrill was noted in the graft and the incision was then closed in two layers, subcutaneous tissue with 3-0 Monocryl and skin with 4-0 Monocryl. Steri-Strips were applied.    2+ radial pulse was present after AVG placmenrt    Dr. Kamara  was present for the entirety of the procedure.     ESTIMATED BLOOD LOSS: 20ml    COMPLICATIONS: none    DISPOSITION: PACU - hemodynamically stable.

## 2023-01-16 ENCOUNTER — PATIENT MESSAGE (OUTPATIENT)
Dept: TRANSPLANT | Facility: CLINIC | Age: 58
End: 2023-01-16
Payer: MEDICAID

## 2023-01-17 DIAGNOSIS — Z12.31 ENCOUNTER FOR SCREENING MAMMOGRAM FOR MALIGNANT NEOPLASM OF BREAST: ICD-10-CM

## 2023-01-17 DIAGNOSIS — Z76.82 ORGAN TRANSPLANT CANDIDATE: Primary | ICD-10-CM

## 2023-01-25 ENCOUNTER — PATIENT MESSAGE (OUTPATIENT)
Dept: TRANSPLANT | Facility: CLINIC | Age: 58
End: 2023-01-25
Payer: MEDICAID

## 2023-01-25 ENCOUNTER — TELEPHONE (OUTPATIENT)
Dept: TRANSPLANT | Facility: CLINIC | Age: 58
End: 2023-01-25
Payer: MEDICAID

## 2023-01-27 ENCOUNTER — TELEPHONE (OUTPATIENT)
Dept: VASCULAR SURGERY | Facility: CLINIC | Age: 58
End: 2023-01-27
Payer: MEDICAID

## 2023-01-27 NOTE — TELEPHONE ENCOUNTER
Attempted to contact pt to schedule post-op visit with Dr. Kamara. Voice message left for patient requesting return call to schedule.----- Message from LIDIA Kamara III, MD sent at 1/27/2023  8:26 AM CST -----  F/u appt next week with AVG duplex in this post op pt

## 2023-01-30 ENCOUNTER — TELEPHONE (OUTPATIENT)
Dept: VASCULAR SURGERY | Facility: CLINIC | Age: 58
End: 2023-01-30
Payer: MEDICARE

## 2023-01-30 NOTE — TELEPHONE ENCOUNTER
Contacted pt in reference to message stating pt would like to reschedule appt with Dr. Kamara. Appointments rescheduled, pt verified. Appointment letter placed in mail.

## 2023-02-07 ENCOUNTER — OFFICE VISIT (OUTPATIENT)
Dept: VASCULAR SURGERY | Facility: CLINIC | Age: 58
End: 2023-02-07
Payer: MEDICARE

## 2023-02-07 ENCOUNTER — HOSPITAL ENCOUNTER (OUTPATIENT)
Dept: VASCULAR SURGERY | Facility: CLINIC | Age: 58
Discharge: HOME | End: 2023-02-07
Attending: SURGERY
Payer: MEDICARE

## 2023-02-07 VITALS — OXYGEN SATURATION: 100 % | WEIGHT: 183.19 LBS | HEART RATE: 88 BPM | HEIGHT: 67 IN | BODY MASS INDEX: 28.75 KG/M2

## 2023-02-07 DIAGNOSIS — Z99.2 ESRD (END STAGE RENAL DISEASE) ON DIALYSIS: ICD-10-CM

## 2023-02-07 DIAGNOSIS — N18.6 ESRD (END STAGE RENAL DISEASE) ON DIALYSIS: ICD-10-CM

## 2023-02-07 DIAGNOSIS — N18.6 ESRD (END STAGE RENAL DISEASE) ON DIALYSIS: Primary | ICD-10-CM

## 2023-02-07 DIAGNOSIS — Z99.2 ESRD (END STAGE RENAL DISEASE) ON DIALYSIS: Primary | ICD-10-CM

## 2023-02-07 PROCEDURE — 93990 DOPPLER FLOW TESTING: CPT | Mod: PBBFAC,NTX | Performed by: SURGERY

## 2023-02-07 PROCEDURE — 93990 PR DUPLEX HEMODIALYSIS ACCESS: ICD-10-PCS | Mod: S$GLB,NTX,, | Performed by: SURGERY

## 2023-02-07 PROCEDURE — 99999 PR PBB SHADOW E&M-EST. PATIENT-LVL III: ICD-10-PCS | Mod: PBBFAC,,, | Performed by: SURGERY

## 2023-02-07 PROCEDURE — 99999 PR PBB SHADOW E&M-EST. PATIENT-LVL III: CPT | Mod: PBBFAC,,, | Performed by: SURGERY

## 2023-02-07 PROCEDURE — 99024 PR POST-OP FOLLOW-UP VISIT: ICD-10-PCS | Mod: S$GLB,,, | Performed by: SURGERY

## 2023-02-07 PROCEDURE — 93990 PR DUPLEX HEMODIALYSIS ACCESS: ICD-10-PCS | Mod: 26,S$PBB,TXP, | Performed by: SURGERY

## 2023-02-07 PROCEDURE — 99024 POSTOP FOLLOW-UP VISIT: CPT | Mod: S$GLB,,, | Performed by: SURGERY

## 2023-02-07 PROCEDURE — 93990 DOPPLER FLOW TESTING: CPT | Mod: 26,S$PBB,TXP, | Performed by: SURGERY

## 2023-02-07 PROCEDURE — 93990 DOPPLER FLOW TESTING: CPT | Mod: S$GLB,NTX,, | Performed by: SURGERY

## 2023-02-07 PROCEDURE — 99213 OFFICE O/P EST LOW 20 MIN: CPT | Mod: PBBFAC,TXP | Performed by: SURGERY

## 2023-02-07 NOTE — PROGRESS NOTES
VASCULAR SURGERY SERVICE    REFERRING DOCTOR: Ehsan Quezada MD    CHIEF COMPLAINT:  End-stage renal disease    HISTORY OF PRESENT ILLNESS: Elizabeth Maldonado is a 58 y.o. female , right-handed, status post kidney transplant 2014 which failed, went on peritoneal dialysis March 2022, but had to stop this because of leaking into the pleura.  She is now dialyzing via right IJ PermCath last month.  She is sent for permanent AV access.  She has no history of AICD or pacemaker placement    S/p  Left upper arm AV graft placement 01/05/2023 02/07/2023:  This is her initial follow-up after left upper arm AV graft placement month ago.  Her initial 2 week follow-up evidently was a no-show.  She states she had significant pain postoperatively but did not want to take the prescribed narcotic.  Pain is now resolved using Tylenol.  She denies any hand pain weakness or numbness.    She is quite nervous about using the graft    Past Medical History:   Diagnosis Date    Acidosis 7/1/2014    Allergic rhinitis 7/1/2014    Allergy     Anemia     Anemia in chronic kidney disease 7/1/2014    Anxiety     Cataract     Chronic bilateral low back pain with bilateral sciatica 10/25/2019    Chronic immunosuppression with Prograf and MMF 12/3/2014    CKD (chronic kidney disease) stage 5, GFR less than 15 ml/min 7/1/2014    CKD (chronic kidney disease), stage III 3/2/2015    Degenerative disc disease     Depression 7/1/2014    Diabetes mellitus, type 2 since age 18 7/1/2014    ESRD on peritoneal dialysis - august 2014 for 9 hours no peritonitis 11/24/2014    Hyperlipidemia     Hypertension 7/1/2014    Hypomagnesemia 1/7/2015    Kidney transplant status, living unrelated donor - 12/2/14 12/3/2014    Neutropenia 1/21/2015    NS (nuclear sclerosis) 9/16/2016    Obesity     Organ transplant candidate 7/1/2014    Pre-op exam 11/24/2014    Proliferative diabetic retinopathy of both eyes without macular edema associated with type 2 diabetes  mellitus 2016    Renal manifestation of secondary diabetes mellitus     Tendinitis     Trouble in sleeping        Past Surgical History:   Procedure Laterality Date    AV FISTULA PLACEMENT Left 2023    Procedure: CREATION, AV FISTULA;  Surgeon: LIDIA Kamara III, MD;  Location: [facility] OR 2ND FLR;  Service: Peripheral Vascular;  Laterality: Left;  LUE AV graft creation    BIOPSY N/A 2020    Procedure: Biopsy;  Surgeon: Sweta Catalan MD;  Location: [facility] OR 2ND FLR;  Service: Transplant;  Laterality: N/A;    BONE MARROW BIOPSY N/A      SECTION      x 2    COLONOSCOPY N/A 2022    Procedure: COLONOSCOPY;  Surgeon: Franklin Gan MD;  Location: [facility] ENDO (4TH FLR);  Service: Colon and Rectal;  Laterality: N/A;  order created: previous order unusable  fully vaccinated, labs prior, instr mailed / portal -ml    KIDNEY TRANSPLANT  2014    LAPAROSCOPIC REVISION OF PROCEDURE INVOLVING PERITONEAL DIALYSIS CATHETER N/A 2022    Procedure: REVISION OF PROCEDURE INVOLVING PERITONEAL DIALYSIS CATHETER, LAPAROSCOPIC;  Surgeon: Franklin Barber MD;  Location: [facility] OR 2ND FLR;  Service: General;  Laterality: N/A;    MEDIPORT REMOVAL N/A 2019    Procedure: REMOVAL, CATHETER, CENTRAL VENOUS, TUNNELED, WITH PORT;  Surgeon: [facility] Diagnostic Provider;  Location: [facility] OR;  Service: Radiology;  Laterality: N/A;    RENAL BIOPSY      ROTATOR CUFF REPAIR      TUBAL LIGATION           Current Outpatient Medications:     atorvastatin (LIPITOR) 40 MG tablet, Take 40 mg by mouth every evening., Disp: , Rfl:     blood sugar diagnostic Strp, Checks BG ac/hs, Disp: 200 strip, Rfl: 7    calcitRIOL (ROCALTROL) 0.25 MCG Cap, Take 0.25 mcg by mouth once daily., Disp: , Rfl:     carvediloL (COREG) 3.125 MG tablet, Take 3.125 mg by mouth 2 (two) times daily. Hold for SBP <130, Disp: , Rfl:     clonazePAM (KLONOPIN) 0.5 MG tablet, Take 0.5 mg by mouth 2 (two) times daily as needed., Disp: , Rfl:     dulaglutide  "(TRULICITY) 1.5 mg/0.5 mL pen injector, Inject 1.5 mg into the skin every 7 days. Pt takes on Wednesday, Disp: , Rfl:     folic acid (FOLVITE) 1 MG tablet, Take 1 tablet (1 mg total) by mouth once daily., Disp: 30 tablet, Rfl: 11    HUMALOG KWIKPEN INSULIN 100 unit/mL pen, Inject 3 Units into the skin 3 (three) times daily with meals. SLIDE SCALE, Disp: , Rfl:     hydrALAZINE (APRESOLINE) 50 MG tablet, Take 50 mg by mouth 3 (three) times daily., Disp: , Rfl:     insulin syringe-needle U-100 (INSULIN SYRINGE) 1/2 mL 30 x 5/16" Syrg, Uses 4 daily, Disp: 200 each, Rfl: 12    lancets (ONETOUCH DELICA LANCETS) 33 gauge Misc, 1 lancet by Misc.(Non-Drug; Combo Route) route 4 (four) times daily before meals and nightly., Disp: 200 each, Rfl: 7    LANTUS SOLOSTAR U-100 INSULIN glargine 100 units/mL (3mL) SubQ pen, Inject 50 Units into the skin once daily., Disp: , Rfl: 0    SYRINGE & NEEDLE,INSULIN,1 ML (INSULIN SYRINGE-NEEDLE U-100) 1 mL 29 X 7/16" Syrg, , Disp: , Rfl: 11    timolol maleate 0.5% (TIMOPTIC) 0.5 % Drop, Place 2 drops in both eyes twice daily, Disp: , Rfl:     zolpidem (AMBIEN) 10 mg Tab, Take 10 mg by mouth nightly as needed., Disp: , Rfl:     oxyCODONE (ROXICODONE) 5 MG immediate release tablet, Take 1 tablet (5 mg total) by mouth every 4 (four) hours as needed for Pain. (Patient not taking: Reported on 2/7/2023), Disp: 12 tablet, Rfl: 0    sevelamer carbonate (RENVELA) 800 mg Tab, Take 1 tablet (800 mg total) by mouth 3 (three) times daily with meals. (Patient taking differently: Take 800 mg by mouth 3 (three) times daily with meals. Take 2 tab w/ meal & 1 tab w/ snack), Disp: 90 tablet, Rfl: 3  No current facility-administered medications for this visit.    Review of patient's allergies indicates:   Allergen Reactions    Shrimp Hives, Itching and Rash    Topamax [topiramate] Other (See Comments)     Vision changes    Zoloft [sertraline] Palpitations       Family History   Problem Relation Age of Onset    " "Diabetes Mother     Hypertension Mother     Diabetes Father     Kidney disease Father     Diabetes Sister     Hypertension Sister     Kidney disease Sister     Heart disease Sister     Heart failure Sister     Diabetes Brother     Diabetes Sister     Stroke Maternal Grandmother     Heart disease Maternal Grandmother         pacemaker    Cataracts Maternal Grandmother     No Known Problems Maternal Aunt     No Known Problems Maternal Uncle     No Known Problems Paternal Aunt     No Known Problems Paternal Uncle     No Known Problems Maternal Grandfather     No Known Problems Paternal Grandmother     No Known Problems Paternal Grandfather     Cancer Neg Hx     Amblyopia Neg Hx     Blindness Neg Hx     Glaucoma Neg Hx     Macular degeneration Neg Hx     Retinal detachment Neg Hx     Strabismus Neg Hx     Thyroid disease Neg Hx     Breast cancer Neg Hx     Colon cancer Neg Hx     Ovarian cancer Neg Hx        Social History     Tobacco Use    Smoking status: Former     Packs/day: 1.00     Years: 20.00     Pack years: 20.00     Types: Cigarettes     Quit date: 2013     Years since quittin.4    Smokeless tobacco: Never    Tobacco comments:     quit smoking cigarettes in 2014   Substance Use Topics    Alcohol use: No    Drug use: No       PHYSICAL EXAM:  Brachial pressures are equivalent bilaterally  Pulse 88   Ht 5' 7" (1.702 m)   Wt 83.1 kg (183 lb 3.2 oz)   LMP 2011 (Approximate)   SpO2 100%   BMI 28.69 kg/m²   Constitutional:  Alert,   Well-appearing  In no distress.   Neurological: Normal speech  no focal findings  CN II - XII grossly intact.    Psychiatric: Mood and affect appropriate and symmetric.   HEENT: Normocephalic / atraumatic  PERRLA  Midline trachea  No scars across the neck   Cardiac: Regular rate and rhythm.   Pulmonary: Normal pulmonary effort.   Abdomen: Soft, not distended.     Skin: Warm and well perfused.    Vascular:  2+ radial brachial pulses bilaterally "   Extremities/  Musculoskeletal: Left arm demonstrates well-healed upper arm incisions.  The graft has a soft thrill with absolutely no pulsatility.  Radial pulses strong 2+.  Hand  is 5/5     IMAGING:  Duplex of the graft shows to be patent with a moderate outflow stenosis with a velocity of 624.  However flow volume was quite robust 2.2 liters      IMPRESSION:  Well-functioning new left upper arm prosthetic AV graft, with clinically asymptomatic outflow stenosis    PLAN:     May cannulate.  Will give the patient instructions regarding EMLA cream  Follow-up in 3 weeks for PermCath removal assuming she is using AV graft exclusively    ADEOLA Kamara III, MD, FACS  Professor and Chief, Vascular and Endovascular Surgery      CC:  Ehsan Quezada MD

## 2023-02-13 DIAGNOSIS — Z99.2 ESRD (END STAGE RENAL DISEASE) ON DIALYSIS: Primary | ICD-10-CM

## 2023-02-13 DIAGNOSIS — N18.6 ESRD (END STAGE RENAL DISEASE) ON DIALYSIS: Primary | ICD-10-CM

## 2023-02-13 RX ORDER — LIDOCAINE AND PRILOCAINE 25; 25 MG/G; MG/G
CREAM TOPICAL
Qty: 2 G | Refills: 3 | Status: ON HOLD | OUTPATIENT
Start: 2023-02-13 | End: 2023-03-13 | Stop reason: HOSPADM

## 2023-02-28 ENCOUNTER — PATIENT MESSAGE (OUTPATIENT)
Dept: TRANSPLANT | Facility: CLINIC | Age: 58
End: 2023-02-28
Payer: MEDICARE

## 2023-03-01 PROCEDURE — 86977 RBC SERUM PRETX INCUBJ/INHIB: CPT | Mod: 59,PO,TXP | Performed by: NURSE PRACTITIONER

## 2023-03-01 PROCEDURE — 86833 HLA CLASS II HIGH DEFIN QUAL: CPT | Mod: PO | Performed by: NURSE PRACTITIONER

## 2023-03-01 PROCEDURE — 99001 SPECIMEN HANDLING PT-LAB: CPT | Mod: PO,TXP | Performed by: NURSE PRACTITIONER

## 2023-03-01 PROCEDURE — 86832 HLA CLASS I HIGH DEFIN QUAL: CPT | Mod: PO | Performed by: NURSE PRACTITIONER

## 2023-03-06 ENCOUNTER — LAB VISIT (OUTPATIENT)
Dept: LAB | Facility: HOSPITAL | Age: 58
End: 2023-03-06
Payer: MEDICARE

## 2023-03-06 DIAGNOSIS — Z76.82 PRE-KIDNEY TRANSPLANT, LISTED: ICD-10-CM

## 2023-03-07 ENCOUNTER — OFFICE VISIT (OUTPATIENT)
Dept: VASCULAR SURGERY | Facility: CLINIC | Age: 58
DRG: 650 | End: 2023-03-07
Payer: MEDICARE

## 2023-03-07 VITALS
WEIGHT: 178.56 LBS | HEIGHT: 67 IN | SYSTOLIC BLOOD PRESSURE: 167 MMHG | BODY MASS INDEX: 28.02 KG/M2 | DIASTOLIC BLOOD PRESSURE: 80 MMHG | TEMPERATURE: 98 F | HEART RATE: 84 BPM

## 2023-03-07 DIAGNOSIS — Z99.2 ESRD (END STAGE RENAL DISEASE) ON DIALYSIS: Primary | ICD-10-CM

## 2023-03-07 DIAGNOSIS — N18.6 ESRD (END STAGE RENAL DISEASE) ON DIALYSIS: Primary | ICD-10-CM

## 2023-03-07 PROCEDURE — 3077F SYST BP >= 140 MM HG: CPT | Mod: CPTII,S$GLB,, | Performed by: SURGERY

## 2023-03-07 PROCEDURE — 99999 PR PBB SHADOW E&M-EST. PATIENT-LVL IV: ICD-10-PCS | Mod: PBBFAC,,, | Performed by: SURGERY

## 2023-03-07 PROCEDURE — 3008F PR BODY MASS INDEX (BMI) DOCUMENTED: ICD-10-PCS | Mod: CPTII,S$GLB,, | Performed by: SURGERY

## 2023-03-07 PROCEDURE — 3008F BODY MASS INDEX DOCD: CPT | Mod: CPTII,S$GLB,, | Performed by: SURGERY

## 2023-03-07 PROCEDURE — 3079F PR MOST RECENT DIASTOLIC BLOOD PRESSURE 80-89 MM HG: ICD-10-PCS | Mod: CPTII,S$GLB,, | Performed by: SURGERY

## 2023-03-07 PROCEDURE — 99214 OFFICE O/P EST MOD 30 MIN: CPT | Mod: PBBFAC,TXP,25 | Performed by: SURGERY

## 2023-03-07 PROCEDURE — 36589 PR REMOVAL TUNNELED CV CATH W/O SUBQ PORT OR PUMP: ICD-10-PCS | Mod: S$GLB,,, | Performed by: SURGERY

## 2023-03-07 PROCEDURE — 99999 PR PBB SHADOW E&M-EST. PATIENT-LVL IV: CPT | Mod: PBBFAC,,, | Performed by: SURGERY

## 2023-03-07 PROCEDURE — 36589 REMOVAL TUNNELED CV CATH: CPT | Mod: PBBFAC,NTX | Performed by: SURGERY

## 2023-03-07 PROCEDURE — 99024 PR POST-OP FOLLOW-UP VISIT: ICD-10-PCS | Mod: S$GLB,,, | Performed by: SURGERY

## 2023-03-07 PROCEDURE — 99024 POSTOP FOLLOW-UP VISIT: CPT | Mod: S$GLB,,, | Performed by: SURGERY

## 2023-03-07 PROCEDURE — 1159F PR MEDICATION LIST DOCUMENTED IN MEDICAL RECORD: ICD-10-PCS | Mod: CPTII,S$GLB,, | Performed by: SURGERY

## 2023-03-07 PROCEDURE — 1160F RVW MEDS BY RX/DR IN RCRD: CPT | Mod: CPTII,S$GLB,, | Performed by: SURGERY

## 2023-03-07 PROCEDURE — 3077F PR MOST RECENT SYSTOLIC BLOOD PRESSURE >= 140 MM HG: ICD-10-PCS | Mod: CPTII,S$GLB,, | Performed by: SURGERY

## 2023-03-07 PROCEDURE — 3066F NEPHROPATHY DOC TX: CPT | Mod: CPTII,S$GLB,, | Performed by: SURGERY

## 2023-03-07 PROCEDURE — 3066F PR DOCUMENTATION OF TREATMENT FOR NEPHROPATHY: ICD-10-PCS | Mod: CPTII,S$GLB,, | Performed by: SURGERY

## 2023-03-07 PROCEDURE — 36589 REMOVAL TUNNELED CV CATH: CPT | Mod: S$GLB,,, | Performed by: SURGERY

## 2023-03-07 PROCEDURE — 3079F DIAST BP 80-89 MM HG: CPT | Mod: CPTII,S$GLB,, | Performed by: SURGERY

## 2023-03-07 PROCEDURE — 1160F PR REVIEW ALL MEDS BY PRESCRIBER/CLIN PHARMACIST DOCUMENTED: ICD-10-PCS | Mod: CPTII,S$GLB,, | Performed by: SURGERY

## 2023-03-07 PROCEDURE — 1159F MED LIST DOCD IN RCRD: CPT | Mod: CPTII,S$GLB,, | Performed by: SURGERY

## 2023-03-07 RX ORDER — DULAGLUTIDE 4.5 MG/.5ML
4.5 INJECTION, SOLUTION SUBCUTANEOUS WEEKLY
Status: ON HOLD | COMMUNITY
Start: 2022-12-13 | End: 2023-03-13 | Stop reason: SDUPTHER

## 2023-03-07 NOTE — PROGRESS NOTES
VASCULAR SURGERY SERVICE    REFERRING DOCTOR: Ehsan Quezada MD    CHIEF COMPLAINT:  End-stage renal disease    HISTORY OF PRESENT ILLNESS: Elizabeth Maldonado is a 58 y.o. female , right-handed, status post kidney transplant 2014 which failed, went on peritoneal dialysis March 2022, but had to stop this because of leaking into the pleura.  She is now dialyzing via right IJ PermCath last month.  She is sent for permanent AV access.  She has no history of AICD or pacemaker placement    S/p  Left upper arm AV graft placement 01/05/2023 02/07/2023:  This is her initial follow-up after left upper arm AV graft placement month ago.  Her initial 2 week follow-up evidently was a no-show.  She states she had significant pain postoperatively but did not want to take the prescribed narcotic.  Pain is now resolved using Tylenol.  She denies any hand pain weakness or numbness.    She is quite nervous about using the graft    03/07/2023:  She now returns and is using her prosthetic AV graft exclusively    Past Medical History:   Diagnosis Date    Acidosis 7/1/2014    Allergic rhinitis 7/1/2014    Allergy     Anemia     Anemia in chronic kidney disease 7/1/2014    Anxiety     Cataract     Chronic bilateral low back pain with bilateral sciatica 10/25/2019    Chronic immunosuppression with Prograf and MMF 12/3/2014    CKD (chronic kidney disease) stage 5, GFR less than 15 ml/min 7/1/2014    CKD (chronic kidney disease), stage III 3/2/2015    Degenerative disc disease     Depression 7/1/2014    Diabetes mellitus, type 2 since age 18 7/1/2014    ESRD on peritoneal dialysis - august 2014 for 9 hours no peritonitis 11/24/2014    Hyperlipidemia     Hypertension 7/1/2014    Hypomagnesemia 1/7/2015    Kidney transplant status, living unrelated donor - 12/2/14 12/3/2014    Neutropenia 1/21/2015    NS (nuclear sclerosis) 9/16/2016    Obesity     Organ transplant candidate 7/1/2014    Pre-op exam 11/24/2014    Proliferative diabetic  retinopathy of both eyes without macular edema associated with type 2 diabetes mellitus 2016    Renal manifestation of secondary diabetes mellitus     Tendinitis     Trouble in sleeping        Past Surgical History:   Procedure Laterality Date    AV FISTULA PLACEMENT Left 2023    Procedure: CREATION, AV FISTULA;  Surgeon: LIDIA Kamara III, MD;  Location: [facility] OR 2ND FLR;  Service: Peripheral Vascular;  Laterality: Left;  LUE AV graft creation    BIOPSY N/A 2020    Procedure: Biopsy;  Surgeon: Sweta Catalan MD;  Location: [facility] OR 2ND FLR;  Service: Transplant;  Laterality: N/A;    BONE MARROW BIOPSY N/A      SECTION      x 2    COLONOSCOPY N/A 2022    Procedure: COLONOSCOPY;  Surgeon: Franklin Gan MD;  Location: [facility] ENDO (4TH FLR);  Service: Colon and Rectal;  Laterality: N/A;  order created: previous order unusable  fully vaccinated, labs prior, instr mailed / portal -ml    KIDNEY TRANSPLANT  2014    LAPAROSCOPIC REVISION OF PROCEDURE INVOLVING PERITONEAL DIALYSIS CATHETER N/A 2022    Procedure: REVISION OF PROCEDURE INVOLVING PERITONEAL DIALYSIS CATHETER, LAPAROSCOPIC;  Surgeon: Franklin Barber MD;  Location: [facility] OR 2ND FLR;  Service: General;  Laterality: N/A;    MEDIPORT REMOVAL N/A 2019    Procedure: REMOVAL, CATHETER, CENTRAL VENOUS, TUNNELED, WITH PORT;  Surgeon: [facility];  Location: [facility];  Service: Radiology;  Laterality: N/A;    RENAL BIOPSY      ROTATOR CUFF REPAIR      TUBAL LIGATION           Current Outpatient Medications:     atorvastatin (LIPITOR) 40 MG tablet, Take 40 mg by mouth every evening., Disp: , Rfl:     blood sugar diagnostic Strp, Checks BG ac/hs, Disp: 200 strip, Rfl: 7    calcitRIOL (ROCALTROL) 0.25 MCG Cap, Take 0.25 mcg by mouth once daily., Disp: , Rfl:     carvediloL (COREG) 3.125 MG tablet, Take 3.125 mg by mouth 2 (two) times daily. Hold for SBP <130, Disp: , Rfl:     clonazePAM (KLONOPIN) 0.5 MG tablet,  "Take 0.5 mg by mouth 2 (two) times daily as needed., Disp: , Rfl:     folic acid (FOLVITE) 1 MG tablet, Take 1 tablet (1 mg total) by mouth once daily., Disp: 30 tablet, Rfl: 11    HUMALOG KWIKPEN INSULIN 100 unit/mL pen, Inject 3 Units into the skin 3 (three) times daily with meals. SLIDE SCALE, Disp: , Rfl:     hydrALAZINE (APRESOLINE) 50 MG tablet, Take 50 mg by mouth 3 (three) times daily., Disp: , Rfl:     insulin syringe-needle U-100 (INSULIN SYRINGE) 1/2 mL 30 x 5/16" Syrg, Uses 4 daily, Disp: 200 each, Rfl: 12    lancets (ONETOUCH DELICA LANCETS) 33 gauge Misc, 1 lancet by Misc.(Non-Drug; Combo Route) route 4 (four) times daily before meals and nightly., Disp: 200 each, Rfl: 7    LANTUS SOLOSTAR U-100 INSULIN glargine 100 units/mL (3mL) SubQ pen, Inject 50 Units into the skin once daily., Disp: , Rfl: 0    LIDOcaine-prilocaine (EMLA) cream, This medicine is used to help with pain with the needles before dialysis. Please apply a small amount to the access and wrap with Saran Wrap about 1 hour before dialysis., Disp: 2 g, Rfl: 3    oxyCODONE (ROXICODONE) 5 MG immediate release tablet, Take 1 tablet (5 mg total) by mouth every 4 (four) hours as needed for Pain., Disp: 12 tablet, Rfl: 0    SYRINGE & NEEDLE,INSULIN,1 ML (INSULIN SYRINGE-NEEDLE U-100) 1 mL 29 X 7/16" Syrg, , Disp: , Rfl: 11    timolol maleate 0.5% (TIMOPTIC) 0.5 % Drop, Place 2 drops in both eyes twice daily, Disp: , Rfl:     TRULICITY 4.5 mg/0.5 mL pen injector, Inject 4.5 mg into the skin once a week., Disp: , Rfl:     zolpidem (AMBIEN) 10 mg Tab, Take 10 mg by mouth nightly as needed., Disp: , Rfl:     sevelamer carbonate (RENVELA) 800 mg Tab, Take 1 tablet (800 mg total) by mouth 3 (three) times daily with meals. (Patient taking differently: Take 800 mg by mouth 3 (three) times daily with meals. Take 2 tab w/ meal & 1 tab w/ snack), Disp: 90 tablet, Rfl: 3  No current facility-administered medications for this visit.    Review of patient's " "allergies indicates:   Allergen Reactions    Shrimp Hives, Itching and Rash    Topamax [topiramate] Other (See Comments)     Vision changes    Zoloft [sertraline] Palpitations       Family History   Problem Relation Age of Onset    Diabetes Mother     Hypertension Mother     Diabetes Father     Kidney disease Father     Diabetes Sister     Hypertension Sister     Kidney disease Sister     Heart disease Sister     Heart failure Sister     Diabetes Brother     Diabetes Sister     Stroke Maternal Grandmother     Heart disease Maternal Grandmother         pacemaker    Cataracts Maternal Grandmother     No Known Problems Maternal Aunt     No Known Problems Maternal Uncle     No Known Problems Paternal Aunt     No Known Problems Paternal Uncle     No Known Problems Maternal Grandfather     No Known Problems Paternal Grandmother     No Known Problems Paternal Grandfather     Cancer Neg Hx     Amblyopia Neg Hx     Blindness Neg Hx     Glaucoma Neg Hx     Macular degeneration Neg Hx     Retinal detachment Neg Hx     Strabismus Neg Hx     Thyroid disease Neg Hx     Breast cancer Neg Hx     Colon cancer Neg Hx     Ovarian cancer Neg Hx        Social History     Tobacco Use    Smoking status: Former     Packs/day: 1.00     Years: 20.00     Pack years: 20.00     Types: Cigarettes     Quit date: 2013     Years since quittin.5    Smokeless tobacco: Never    Tobacco comments:     quit smoking cigarettes in 2014   Substance Use Topics    Alcohol use: No    Drug use: No       PHYSICAL EXAM:  Brachial pressures are equivalent bilaterally  BP (!) 167/80 (BP Location: Right arm, Patient Position: Sitting, BP Method: Large (Automatic))   Pulse 84   Temp 98.4 °F (36.9 °C) (Oral)   Ht 5' 7" (1.702 m)   Wt 81 kg (178 lb 9.2 oz)   LMP 2011 (Approximate)   BMI 27.97 kg/m²   Constitutional:  Alert,   Well-appearing  In no distress.   Neurological: Normal speech  no focal findings  CN II - XII grossly intact.  "   Psychiatric: Mood and affect appropriate and symmetric.   HEENT: Normocephalic / atraumatic  PERRLA  Midline trachea  No scars across the neck   Cardiac: Regular rate and rhythm.   Pulmonary: Normal pulmonary effort.   Abdomen: Soft, not distended.     Skin: Warm and well perfused.    Vascular:  2+ radial brachial pulses bilaterally   Extremities/  Musculoskeletal: Left arm demonstrates well-healed upper arm incisions.  The graft has a soft thrill with absolutely no pulsatility.  Radial pulses strong 2+.  Hand  is 5/5.  Stable     IMAGIN2023: Duplex of the graft shows to be patent with a moderate outflow stenosis with a velocity of 624.  However flow volume was quite robust 2.2 liters      IMPRESSION: Well-functioning new left upper arm prosthetic AV graft, with clinically asymptomatic outflow stenosis    PLAN:     DC PermCath today  Follow-up in 3 months with AV graft duplex, sooner if clinically indicated    PROCEDURE NOTE:   After sterile prep and drape and infiltration with lidocaine, the permacath cuff was freed up using blunt and sharp dissection, the permacath removed, and manual compression used for hemostasis.   No complications     ADEOLA Kamara III, MD, FACS  Professor and Chief, Vascular and Endovascular Surgery      CC:  Ehsan Quezada MD

## 2023-03-08 ENCOUNTER — TELEPHONE (OUTPATIENT)
Dept: TRANSPLANT | Facility: CLINIC | Age: 58
End: 2023-03-08
Payer: MEDICARE

## 2023-03-08 NOTE — TELEPHONE ENCOUNTER
----- Message from Monae Loretta sent at 3/8/2023 10:54 AM CST -----  Regarding: needs day/time of appt where she has to bring caregiver  The patient called requesting day/time of appt where she has to bring caregiver?  States has misplaced the letters that were sent to her for her appts and when she had to bring caregiver. She has asked for them to be resent earlier but has not recieved   Please contact at your earliest opportunity -     No further information provided      Patient can be contacted @# [phone] (home)          Tatum,     It was a pleasure providing care for you today.    Here is our Treatment Plan:        Thank you for allowing me to participate in your healthcare. You are the reason we are here, and I hope that we provided you with the excellent service you deserve.     My goal is to partner with you to meet your health care needs. In order to be able to provide quality care for all of my patients, I work very hard to stay on schedule. To assist me in doing this, I ask that you arrive at least 10 minutes prior to any future appointments with me.  Please bring a prioritized list of concerns to your appointments so I can do my best to efficiently help you. I appreciate your assistance and look forward to continue being part of your care team.     You may receive a survey in the mail asking about your experience in our office. I would personally appreciate it if you would take a few minutes to fill this out to let us know what we can improve and what we are doing well.    Take care,    Carlos Aguirre, DO

## 2023-03-09 ENCOUNTER — HOSPITAL ENCOUNTER (OUTPATIENT)
Dept: RADIOLOGY | Facility: HOSPITAL | Age: 58
Discharge: HOME | DRG: 650 | End: 2023-03-09
Attending: NURSE PRACTITIONER
Payer: MEDICARE

## 2023-03-09 DIAGNOSIS — Z76.82 ORGAN TRANSPLANT CANDIDATE: ICD-10-CM

## 2023-03-09 PROCEDURE — 76770 US EXAM ABDO BACK WALL COMP: CPT | Mod: 26,,, | Performed by: RADIOLOGY

## 2023-03-09 PROCEDURE — 71046 XR CHEST PA AND LATERAL: ICD-10-PCS | Mod: 26,,, | Performed by: RADIOLOGY

## 2023-03-09 PROCEDURE — 72170 X-RAY EXAM OF PELVIS: CPT | Mod: 26,TXP,, | Performed by: RADIOLOGY

## 2023-03-09 PROCEDURE — 72170 X-RAY EXAM OF PELVIS: CPT | Mod: TC,TXP

## 2023-03-09 PROCEDURE — 76776 US EXAM K TRANSPL W/DOPPLER: CPT | Mod: TC,TXP

## 2023-03-09 PROCEDURE — 76770 US RETROPERITONEAL COMPLETE: ICD-10-PCS | Mod: 26,,, | Performed by: RADIOLOGY

## 2023-03-09 PROCEDURE — 76776 US TRANSPLANT KIDNEY WITH DOPPLER: ICD-10-PCS | Mod: 26,,, | Performed by: RADIOLOGY

## 2023-03-09 PROCEDURE — 71046 X-RAY EXAM CHEST 2 VIEWS: CPT | Mod: 26,,, | Performed by: RADIOLOGY

## 2023-03-09 PROCEDURE — 76770 US EXAM ABDO BACK WALL COMP: CPT | Mod: TC,TXP

## 2023-03-09 PROCEDURE — 76776 US EXAM K TRANSPL W/DOPPLER: CPT | Mod: 26,,, | Performed by: RADIOLOGY

## 2023-03-09 PROCEDURE — 71046 X-RAY EXAM CHEST 2 VIEWS: CPT | Mod: TC,TXP

## 2023-03-09 PROCEDURE — 72170 XR PELVIS ROUTINE AP: ICD-10-PCS | Mod: 26,TXP,, | Performed by: RADIOLOGY

## 2023-03-10 ENCOUNTER — TELEPHONE (OUTPATIENT)
Dept: TRANSPLANT | Facility: CLINIC | Age: 58
End: 2023-03-10
Payer: MEDICARE

## 2023-03-10 ENCOUNTER — ANESTHESIA EVENT (OUTPATIENT)
Dept: SURGERY | Facility: HOSPITAL | Age: 58
DRG: 650 | End: 2023-03-10
Payer: MEDICARE

## 2023-03-10 ENCOUNTER — HOSPITAL ENCOUNTER (INPATIENT)
Facility: HOSPITAL | Age: 58
LOS: 3 days | Discharge: HOME | DRG: 650 | End: 2023-03-13
Attending: TRANSPLANT SURGERY | Admitting: TRANSPLANT SURGERY
Payer: MEDICARE

## 2023-03-10 ENCOUNTER — TELEPHONE (OUTPATIENT)
Dept: TRANSPLANT | Facility: HOSPITAL | Age: 58
End: 2023-03-10
Payer: MEDICARE

## 2023-03-10 ENCOUNTER — ANESTHESIA (OUTPATIENT)
Dept: SURGERY | Facility: HOSPITAL | Age: 58
DRG: 650 | End: 2023-03-10
Payer: MEDICARE

## 2023-03-10 DIAGNOSIS — N18.6 ESRD (END STAGE RENAL DISEASE) ON DIALYSIS: ICD-10-CM

## 2023-03-10 DIAGNOSIS — Z01.818 PRE-OP EVALUATION: ICD-10-CM

## 2023-03-10 DIAGNOSIS — E11.69 TYPE 2 DIABETES MELLITUS WITH OTHER SPECIFIED COMPLICATION, WITH LONG-TERM CURRENT USE OF INSULIN: ICD-10-CM

## 2023-03-10 DIAGNOSIS — N18.6 ESRD (END STAGE RENAL DISEASE): ICD-10-CM

## 2023-03-10 DIAGNOSIS — Z79.4 TYPE 2 DIABETES MELLITUS WITH OTHER SPECIFIED COMPLICATION, WITH LONG-TERM CURRENT USE OF INSULIN: ICD-10-CM

## 2023-03-10 DIAGNOSIS — Z76.82 AWAITING ORGAN TRANSPLANT STATUS: Primary | ICD-10-CM

## 2023-03-10 DIAGNOSIS — J30.1 NON-SEASONAL ALLERGIC RHINITIS DUE TO POLLEN: ICD-10-CM

## 2023-03-10 DIAGNOSIS — Z79.4 TYPE 2 DIABETES MELLITUS WITH HYPEROSMOLARITY WITHOUT COMA, WITH LONG-TERM CURRENT USE OF INSULIN: ICD-10-CM

## 2023-03-10 DIAGNOSIS — N18.6 ESRD (END STAGE RENAL DISEASE): Primary | ICD-10-CM

## 2023-03-10 DIAGNOSIS — T38.0X5A ADRENAL CORTICOSTEROID CAUSING ADVERSE EFFECT IN THERAPEUTIC USE: ICD-10-CM

## 2023-03-10 DIAGNOSIS — Z79.60 LONG-TERM USE OF IMMUNOSUPPRESSANT MEDICATION: ICD-10-CM

## 2023-03-10 DIAGNOSIS — Z94.0 KIDNEY TRANSPLANT STATUS, LIVING UNRELATED DONOR: Chronic | ICD-10-CM

## 2023-03-10 DIAGNOSIS — E11.3593 PROLIFERATIVE DIABETIC RETINOPATHY OF BOTH EYES WITHOUT MACULAR EDEMA ASSOCIATED WITH TYPE 2 DIABETES MELLITUS: Chronic | ICD-10-CM

## 2023-03-10 DIAGNOSIS — E78.00 PURE HYPERCHOLESTEROLEMIA: Chronic | ICD-10-CM

## 2023-03-10 DIAGNOSIS — E87.5 HYPERKALEMIA: ICD-10-CM

## 2023-03-10 DIAGNOSIS — Z91.89 AT RISK FOR OPPORTUNISTIC INFECTIONS: ICD-10-CM

## 2023-03-10 DIAGNOSIS — D62 ACUTE BLOOD LOSS ANEMIA: ICD-10-CM

## 2023-03-10 DIAGNOSIS — Z94.0 S/P KIDNEY TRANSPLANT: Primary | ICD-10-CM

## 2023-03-10 DIAGNOSIS — D63.8 ANEMIA OF CHRONIC DISEASE: ICD-10-CM

## 2023-03-10 DIAGNOSIS — Z99.2 ESRD (END STAGE RENAL DISEASE) ON DIALYSIS: ICD-10-CM

## 2023-03-10 DIAGNOSIS — Z29.89 PROPHYLACTIC IMMUNOTHERAPY: ICD-10-CM

## 2023-03-10 DIAGNOSIS — E11.00 TYPE 2 DIABETES MELLITUS WITH HYPEROSMOLARITY WITHOUT COMA, WITH LONG-TERM CURRENT USE OF INSULIN: ICD-10-CM

## 2023-03-10 DIAGNOSIS — Z94.0 KIDNEY TRANSPLANT STATUS: ICD-10-CM

## 2023-03-10 LAB
25(OH)D3+25(OH)D2 SERPL-MCNC: 42 NG/ML (ref 30–96)
ABO + RH BLD: NORMAL
ALBUMIN SERPL BCP-MCNC: 2.7 G/DL (ref 3.5–5.2)
ALBUMIN SERPL BCP-MCNC: 3.3 G/DL (ref 3.5–5.2)
ALP SERPL-CCNC: 52 U/L (ref 55–135)
ALT SERPL W/O P-5'-P-CCNC: 8 U/L (ref 10–44)
ANION GAP SERPL CALC-SCNC: 10 MMOL/L (ref 8–16)
ANION GAP SERPL CALC-SCNC: 12 MMOL/L (ref 8–16)
ANION GAP SERPL CALC-SCNC: 14 MMOL/L (ref 8–16)
APTT BLDCRRT: 27.8 SEC (ref 21–32)
AST SERPL-CCNC: 15 U/L (ref 10–40)
BASOPHILS # BLD AUTO: 0.01 K/UL (ref 0–0.2)
BASOPHILS # BLD AUTO: 0.02 K/UL (ref 0–0.2)
BASOPHILS NFR BLD: 0.1 % (ref 0–1.9)
BASOPHILS NFR BLD: 0.4 % (ref 0–1.9)
BILIRUB SERPL-MCNC: 0.5 MG/DL (ref 0.1–1)
BLD GP AB SCN CELLS X3 SERPL QL: NORMAL
BUN SERPL-MCNC: 14 MG/DL (ref 6–20)
BUN SERPL-MCNC: 45 MG/DL (ref 6–20)
BUN SERPL-MCNC: 45 MG/DL (ref 6–20)
CALCIUM SERPL-MCNC: 10.4 MG/DL (ref 8.7–10.5)
CALCIUM SERPL-MCNC: 8.8 MG/DL (ref 8.7–10.5)
CALCIUM SERPL-MCNC: 9.2 MG/DL (ref 8.7–10.5)
CHLORIDE SERPL-SCNC: 101 MMOL/L (ref 95–110)
CHOLEST SERPL-MCNC: 103 MG/DL (ref 120–199)
CHOLEST/HDLC SERPL: 3.1 {RATIO} (ref 2–5)
CO2 SERPL-SCNC: 21 MMOL/L (ref 23–29)
CO2 SERPL-SCNC: 24 MMOL/L (ref 23–29)
CO2 SERPL-SCNC: 29 MMOL/L (ref 23–29)
CREAT SERPL-MCNC: 10.3 MG/DL (ref 0.5–1.4)
CREAT SERPL-MCNC: 3.4 MG/DL (ref 0.5–1.4)
CREAT SERPL-MCNC: 9.7 MG/DL (ref 0.5–1.4)
DIFFERENTIAL METHOD: ABNORMAL
DIFFERENTIAL METHOD: ABNORMAL
EOSINOPHIL # BLD AUTO: 0 K/UL (ref 0–0.5)
EOSINOPHIL # BLD AUTO: 0.2 K/UL (ref 0–0.5)
EOSINOPHIL NFR BLD: 0 % (ref 0–8)
EOSINOPHIL NFR BLD: 3.8 % (ref 0–8)
ERYTHROCYTE [DISTWIDTH] IN BLOOD BY AUTOMATED COUNT: 15.8 % (ref 11.5–14.5)
ERYTHROCYTE [DISTWIDTH] IN BLOOD BY AUTOMATED COUNT: 16.3 % (ref 11.5–14.5)
EST. GFR  (NO RACE VARIABLE): 14.9 ML/MIN/1.73 M^2
EST. GFR  (NO RACE VARIABLE): 3.9 ML/MIN/1.73 M^2
EST. GFR  (NO RACE VARIABLE): 4.2 ML/MIN/1.73 M^2
GLUCOSE SERPL-MCNC: 141 MG/DL (ref 70–110)
GLUCOSE SERPL-MCNC: 171 MG/DL (ref 70–110)
GLUCOSE SERPL-MCNC: 194 MG/DL (ref 70–110)
HBV CORE AB SERPL QL IA: NORMAL
HBV CORE IGM SERPL QL IA: NORMAL
HBV SURFACE AG SERPL QL IA: NORMAL
HCT VFR BLD AUTO: 29.5 % (ref 37–48.5)
HCT VFR BLD AUTO: 31.1 % (ref 37–48.5)
HCT VFR BLD AUTO: 33.4 % (ref 37–48.5)
HCV AB SERPL QL IA: NORMAL
HCV RNA SERPL QL NAA+PROBE: NOT DETECTED
HCV RNA SPEC NAA+PROBE-ACNC: NOT DETECTED IU/ML
HDLC SERPL-MCNC: 33 MG/DL (ref 40–75)
HDLC SERPL: 32 % (ref 20–50)
HGB BLD-MCNC: 10 G/DL (ref 12–16)
HGB BLD-MCNC: 10.1 G/DL (ref 12–16)
HIV 1+2 AB+HIV1 P24 AG SERPL QL IA: NORMAL
IMM GRANULOCYTES # BLD AUTO: 0.01 K/UL (ref 0–0.04)
IMM GRANULOCYTES # BLD AUTO: 0.06 K/UL (ref 0–0.04)
IMM GRANULOCYTES NFR BLD AUTO: 0.2 % (ref 0–0.5)
IMM GRANULOCYTES NFR BLD AUTO: 0.7 % (ref 0–0.5)
INR PPP: 1.1 (ref 0.8–1.2)
LDH SERPL L TO P-CCNC: 239 U/L (ref 110–260)
LDLC SERPL CALC-MCNC: 57.6 MG/DL (ref 63–159)
LYMPHOCYTES # BLD AUTO: 0.1 K/UL (ref 1–4.8)
LYMPHOCYTES # BLD AUTO: 1.7 K/UL (ref 1–4.8)
LYMPHOCYTES NFR BLD: 0.9 % (ref 18–48)
LYMPHOCYTES NFR BLD: 33.3 % (ref 18–48)
MCH RBC QN AUTO: 29.2 PG (ref 27–31)
MCH RBC QN AUTO: 29.9 PG (ref 27–31)
MCHC RBC AUTO-ENTMCNC: 30.2 G/DL (ref 32–36)
MCHC RBC AUTO-ENTMCNC: 32.2 G/DL (ref 32–36)
MCV RBC AUTO: 93 FL (ref 82–98)
MCV RBC AUTO: 97 FL (ref 82–98)
MONOCYTES # BLD AUTO: 0.2 K/UL (ref 0.3–1)
MONOCYTES # BLD AUTO: 0.5 K/UL (ref 0.3–1)
MONOCYTES NFR BLD: 10.2 % (ref 4–15)
MONOCYTES NFR BLD: 2.4 % (ref 4–15)
NEUTROPHILS # BLD AUTO: 2.7 K/UL (ref 1.8–7.7)
NEUTROPHILS # BLD AUTO: 8.7 K/UL (ref 1.8–7.7)
NEUTROPHILS NFR BLD: 52.1 % (ref 38–73)
NEUTROPHILS NFR BLD: 95.9 % (ref 38–73)
NONHDLC SERPL-MCNC: 70 MG/DL
NRBC BLD-RTO: 0 /100 WBC
NRBC BLD-RTO: 0 /100 WBC
PHOSPHATE SERPL-MCNC: 4 MG/DL (ref 2.7–4.5)
PHOSPHATE SERPL-MCNC: 4.3 MG/DL (ref 2.7–4.5)
PLATELET # BLD AUTO: 110 K/UL (ref 150–450)
PLATELET # BLD AUTO: 123 K/UL (ref 150–450)
PMV BLD AUTO: 10.3 FL (ref 9.2–12.9)
PMV BLD AUTO: 11.8 FL (ref 9.2–12.9)
POCT GLUCOSE: 179 MG/DL (ref 70–110)
POCT GLUCOSE: 211 MG/DL (ref 70–110)
POCT GLUCOSE: 251 MG/DL (ref 70–110)
POTASSIUM SERPL-SCNC: 3.2 MMOL/L (ref 3.5–5.1)
POTASSIUM SERPL-SCNC: 4.4 MMOL/L (ref 3.5–5.1)
POTASSIUM SERPL-SCNC: 4.9 MMOL/L (ref 3.5–5.1)
PROT SERPL-MCNC: 9 G/DL (ref 6–8.4)
PROTHROMBIN TIME: 11.4 SEC (ref 9–12.5)
PTH-INTACT SERPL-MCNC: 85 PG/ML (ref 9–77)
RBC # BLD AUTO: 3.35 M/UL (ref 4–5.4)
RBC # BLD AUTO: 3.46 M/UL (ref 4–5.4)
SARS-COV-2 RDRP RESP QL NAA+PROBE: NEGATIVE
SODIUM SERPL-SCNC: 136 MMOL/L (ref 136–145)
SODIUM SERPL-SCNC: 137 MMOL/L (ref 136–145)
SODIUM SERPL-SCNC: 140 MMOL/L (ref 136–145)
TRIGL SERPL-MCNC: 62 MG/DL (ref 30–150)
URATE SERPL-MCNC: 5.7 MG/DL (ref 2.4–5.7)
WBC # BLD AUTO: 5.2 K/UL (ref 3.9–12.7)
WBC # BLD AUTO: 9.03 K/UL (ref 3.9–12.7)

## 2023-03-10 PROCEDURE — D9220A PRA ANESTHESIA: Mod: CRNA,,, | Performed by: STUDENT IN AN ORGANIZED HEALTH CARE EDUCATION/TRAINING PROGRAM

## 2023-03-10 PROCEDURE — 25000003 PHARM REV CODE 250: Performed by: PHYSICIAN ASSISTANT

## 2023-03-10 PROCEDURE — 63600175 PHARM REV CODE 636 W HCPCS: Performed by: SURGERY

## 2023-03-10 PROCEDURE — 82962 GLUCOSE BLOOD TEST: CPT | Performed by: TRANSPLANT SURGERY

## 2023-03-10 PROCEDURE — 25000003 PHARM REV CODE 250: Performed by: SURGERY

## 2023-03-10 PROCEDURE — 85610 PROTHROMBIN TIME: CPT | Performed by: NURSE PRACTITIONER

## 2023-03-10 PROCEDURE — D9220A PRA ANESTHESIA: ICD-10-PCS | Mod: CRNA,,, | Performed by: STUDENT IN AN ORGANIZED HEALTH CARE EDUCATION/TRAINING PROGRAM

## 2023-03-10 PROCEDURE — 50328 PR TRANSPLANT,PREP RENAL GRAFT/ARTERIAL: ICD-10-PCS | Mod: 51,,, | Performed by: TRANSPLANT SURGERY

## 2023-03-10 PROCEDURE — 71000033 HC RECOVERY, INTIAL HOUR: Performed by: TRANSPLANT SURGERY

## 2023-03-10 PROCEDURE — 87389 HIV-1 AG W/HIV-1&-2 AB AG IA: CPT | Performed by: NURSE PRACTITIONER

## 2023-03-10 PROCEDURE — 87340 HEPATITIS B SURFACE AG IA: CPT | Performed by: NURSE PRACTITIONER

## 2023-03-10 PROCEDURE — 27201037 HC PRESSURE MONITORING SET UP: Mod: NTX

## 2023-03-10 PROCEDURE — 50323 PR TRANSPLANT,PREP CADAVER RENAL GRAFT: ICD-10-PCS | Mod: 51,,, | Performed by: TRANSPLANT SURGERY

## 2023-03-10 PROCEDURE — 37000008 HC ANESTHESIA 1ST 15 MINUTES: Performed by: TRANSPLANT SURGERY

## 2023-03-10 PROCEDURE — 71000039 HC RECOVERY, EACH ADD'L HOUR: Performed by: TRANSPLANT SURGERY

## 2023-03-10 PROCEDURE — 25000003 PHARM REV CODE 250: Performed by: ANESTHESIOLOGY

## 2023-03-10 PROCEDURE — 85025 COMPLETE CBC W/AUTO DIFF WBC: CPT | Performed by: NURSE PRACTITIONER

## 2023-03-10 PROCEDURE — U0002 COVID-19 LAB TEST NON-CDC: HCPCS | Performed by: NURSE PRACTITIONER

## 2023-03-10 PROCEDURE — 94761 N-INVAS EAR/PLS OXIMETRY MLT: CPT

## 2023-03-10 PROCEDURE — 63600175 PHARM REV CODE 636 W HCPCS: Performed by: ANESTHESIOLOGY

## 2023-03-10 PROCEDURE — D9220A PRA ANESTHESIA: Mod: ANES,,, | Performed by: ANESTHESIOLOGY

## 2023-03-10 PROCEDURE — 86803 HEPATITIS C AB TEST: CPT | Performed by: NURSE PRACTITIONER

## 2023-03-10 PROCEDURE — 80048 BASIC METABOLIC PNL TOTAL CA: CPT | Mod: XB | Performed by: NURSE PRACTITIONER

## 2023-03-10 PROCEDURE — 37000009 HC ANESTHESIA EA ADD 15 MINS: Performed by: TRANSPLANT SURGERY

## 2023-03-10 PROCEDURE — 80069 RENAL FUNCTION PANEL: CPT | Performed by: SURGERY

## 2023-03-10 PROCEDURE — 85014 HEMATOCRIT: CPT | Performed by: SURGERY

## 2023-03-10 PROCEDURE — 93010 ELECTROCARDIOGRAM REPORT: CPT | Mod: ,,, | Performed by: INTERNAL MEDICINE

## 2023-03-10 PROCEDURE — 80053 COMPREHEN METABOLIC PANEL: CPT | Performed by: NURSE PRACTITIONER

## 2023-03-10 PROCEDURE — 50605 PR URETEROTOMY TO INSERT STENT: ICD-10-PCS | Mod: 51,LT,, | Performed by: TRANSPLANT SURGERY

## 2023-03-10 PROCEDURE — 50360 RNL ALTRNSPLJ W/O RCP NFRCT: CPT | Mod: LT,,, | Performed by: TRANSPLANT SURGERY

## 2023-03-10 PROCEDURE — 86705 HEP B CORE ANTIBODY IGM: CPT | Performed by: NURSE PRACTITIONER

## 2023-03-10 PROCEDURE — 80100014 HC HEMODIALYSIS 1:1

## 2023-03-10 PROCEDURE — 81300002 HC KIDNEY TRANSPORT, GROUND 4-5 HOURS

## 2023-03-10 PROCEDURE — D9220A PRA ANESTHESIA: ICD-10-PCS | Mod: ANES,,, | Performed by: ANESTHESIOLOGY

## 2023-03-10 PROCEDURE — 99223 PR INITIAL HOSPITAL CARE,LEVL III: ICD-10-PCS | Mod: 57,AI,NTX, | Performed by: NURSE PRACTITIONER

## 2023-03-10 PROCEDURE — 50360 PR TRANSPLANTATION OF KIDNEY: ICD-10-PCS | Mod: LT,,, | Performed by: TRANSPLANT SURGERY

## 2023-03-10 PROCEDURE — C2617 STENT, NON-COR, TEM W/O DEL: HCPCS | Performed by: TRANSPLANT SURGERY

## 2023-03-10 PROCEDURE — 20600001 HC STEP DOWN PRIVATE ROOM

## 2023-03-10 PROCEDURE — 80061 LIPID PANEL: CPT | Performed by: NURSE PRACTITIONER

## 2023-03-10 PROCEDURE — 63600175 PHARM REV CODE 636 W HCPCS: Performed by: TRANSPLANT SURGERY

## 2023-03-10 PROCEDURE — 36620 INSERTION CATHETER ARTERY: CPT | Mod: 79,,, | Performed by: ANESTHESIOLOGY

## 2023-03-10 PROCEDURE — 82306 VITAMIN D 25 HYDROXY: CPT | Performed by: NURSE PRACTITIONER

## 2023-03-10 PROCEDURE — 84100 ASSAY OF PHOSPHORUS: CPT | Performed by: NURSE PRACTITIONER

## 2023-03-10 PROCEDURE — 63600175 PHARM REV CODE 636 W HCPCS: Performed by: PHYSICIAN ASSISTANT

## 2023-03-10 PROCEDURE — 50605 INSERT URETERAL SUPPORT: CPT | Mod: 51,LT,, | Performed by: TRANSPLANT SURGERY

## 2023-03-10 PROCEDURE — 81200001 HC KIDNEY ACQUISITION - CADAVER

## 2023-03-10 PROCEDURE — 25000003 PHARM REV CODE 250: Performed by: STUDENT IN AN ORGANIZED HEALTH CARE EDUCATION/TRAINING PROGRAM

## 2023-03-10 PROCEDURE — 63600175 PHARM REV CODE 636 W HCPCS: Mod: NTX | Performed by: NURSE PRACTITIONER

## 2023-03-10 PROCEDURE — 83970 ASSAY OF PARATHORMONE: CPT | Performed by: NURSE PRACTITIONER

## 2023-03-10 PROCEDURE — 83615 LACTATE (LD) (LDH) ENZYME: CPT | Performed by: NURSE PRACTITIONER

## 2023-03-10 PROCEDURE — 36000931 HC OR TIME LEV VII EA ADD 15 MIN: Performed by: TRANSPLANT SURGERY

## 2023-03-10 PROCEDURE — 86706 HEP B SURFACE ANTIBODY: CPT | Performed by: NURSE PRACTITIONER

## 2023-03-10 PROCEDURE — 63600175 PHARM REV CODE 636 W HCPCS: Performed by: STUDENT IN AN ORGANIZED HEALTH CARE EDUCATION/TRAINING PROGRAM

## 2023-03-10 PROCEDURE — 85730 THROMBOPLASTIN TIME PARTIAL: CPT | Performed by: NURSE PRACTITIONER

## 2023-03-10 PROCEDURE — 25000003 PHARM REV CODE 250: Mod: NTX | Performed by: NURSE PRACTITIONER

## 2023-03-10 PROCEDURE — 99222 1ST HOSP IP/OBS MODERATE 55: CPT | Mod: ,,, | Performed by: PHYSICIAN ASSISTANT

## 2023-03-10 PROCEDURE — 84550 ASSAY OF BLOOD/URIC ACID: CPT | Performed by: NURSE PRACTITIONER

## 2023-03-10 PROCEDURE — 86900 BLOOD TYPING SEROLOGIC ABO: CPT | Performed by: NURSE PRACTITIONER

## 2023-03-10 PROCEDURE — S5010 5% DEXTROSE AND 0.45% SALINE: HCPCS | Performed by: SURGERY

## 2023-03-10 PROCEDURE — 99223 1ST HOSP IP/OBS HIGH 75: CPT | Mod: 57,AI,NTX, | Performed by: NURSE PRACTITIONER

## 2023-03-10 PROCEDURE — 36415 COLL VENOUS BLD VENIPUNCTURE: CPT | Performed by: NURSE PRACTITIONER

## 2023-03-10 PROCEDURE — C1729 CATH, DRAINAGE: HCPCS | Performed by: TRANSPLANT SURGERY

## 2023-03-10 PROCEDURE — 93005 ELECTROCARDIOGRAM TRACING: CPT | Mod: NTX

## 2023-03-10 PROCEDURE — 87522 HEPATITIS C REVRS TRNSCRPJ: CPT | Performed by: NURSE PRACTITIONER

## 2023-03-10 PROCEDURE — 36000930 HC OR TIME LEV VII 1ST 15 MIN: Performed by: TRANSPLANT SURGERY

## 2023-03-10 PROCEDURE — 36620 ARTERIAL: ICD-10-PCS | Mod: 79,,, | Performed by: ANESTHESIOLOGY

## 2023-03-10 PROCEDURE — 86704 HEP B CORE ANTIBODY TOTAL: CPT | Performed by: NURSE PRACTITIONER

## 2023-03-10 PROCEDURE — 27201423 OPTIME MED/SURG SUP & DEVICES STERILE SUPPLY: Performed by: TRANSPLANT SURGERY

## 2023-03-10 PROCEDURE — 93010 EKG 12-LEAD: ICD-10-PCS | Mod: ,,, | Performed by: INTERNAL MEDICINE

## 2023-03-10 PROCEDURE — 71000016 HC POSTOP RECOV ADDL HR: Performed by: TRANSPLANT SURGERY

## 2023-03-10 PROCEDURE — 99222 PR INITIAL HOSPITAL CARE,LEVL II: ICD-10-PCS | Mod: ,,, | Performed by: PHYSICIAN ASSISTANT

## 2023-03-10 PROCEDURE — 71000015 HC POSTOP RECOV 1ST HR: Performed by: TRANSPLANT SURGERY

## 2023-03-10 DEVICE — STENT DOUBLE J 7FRX12CM: Type: IMPLANTABLE DEVICE | Site: KIDNEY | Status: FUNCTIONAL

## 2023-03-10 RX ORDER — OXYCODONE HYDROCHLORIDE 5 MG/1
5 TABLET ORAL EVERY 4 HOURS PRN
Status: DISCONTINUED | OUTPATIENT
Start: 2023-03-10 | End: 2023-03-13

## 2023-03-10 RX ORDER — SULFAMETHOXAZOLE AND TRIMETHOPRIM 400; 80 MG/1; MG/1
1 TABLET ORAL EVERY MORNING
Status: DISCONTINUED | OUTPATIENT
Start: 2023-03-20 | End: 2023-03-13 | Stop reason: HOSPADM

## 2023-03-10 RX ORDER — METHYLPREDNISOLONE SOD SUCC 125 MG
250 VIAL (EA) INJECTION ONCE
Status: COMPLETED | OUTPATIENT
Start: 2023-03-11 | End: 2023-03-11

## 2023-03-10 RX ORDER — INSULIN ASPART 100 [IU]/ML
0-5 INJECTION, SOLUTION INTRAVENOUS; SUBCUTANEOUS
Status: DISCONTINUED | OUTPATIENT
Start: 2023-03-10 | End: 2023-03-10

## 2023-03-10 RX ORDER — DEXTROSE MONOHYDRATE AND SODIUM CHLORIDE 5; .45 G/100ML; G/100ML
INJECTION, SOLUTION INTRAVENOUS CONTINUOUS
Status: DISCONTINUED | OUTPATIENT
Start: 2023-03-10 | End: 2023-03-11

## 2023-03-10 RX ORDER — SODIUM CHLORIDE 0.9 % (FLUSH) 0.9 %
3 SYRINGE (ML) INJECTION
Status: DISCONTINUED | OUTPATIENT
Start: 2023-03-10 | End: 2023-03-10

## 2023-03-10 RX ORDER — FENTANYL CITRATE 50 UG/ML
INJECTION, SOLUTION INTRAMUSCULAR; INTRAVENOUS
Status: DISCONTINUED | OUTPATIENT
Start: 2023-03-10 | End: 2023-03-10

## 2023-03-10 RX ORDER — ONDANSETRON 2 MG/ML
INJECTION INTRAMUSCULAR; INTRAVENOUS
Status: DISCONTINUED | OUTPATIENT
Start: 2023-03-10 | End: 2023-03-10

## 2023-03-10 RX ORDER — SODIUM CHLORIDE 9 MG/ML
INJECTION, SOLUTION INTRAVENOUS CONTINUOUS
Status: DISCONTINUED | OUTPATIENT
Start: 2023-03-10 | End: 2023-03-10

## 2023-03-10 RX ORDER — HEPARIN SODIUM 5000 [USP'U]/ML
5000 INJECTION, SOLUTION INTRAVENOUS; SUBCUTANEOUS ONCE
Status: COMPLETED | OUTPATIENT
Start: 2023-03-10 | End: 2023-03-10

## 2023-03-10 RX ORDER — TALC
6 POWDER (GRAM) TOPICAL NIGHTLY PRN
Status: DISCONTINUED | OUTPATIENT
Start: 2023-03-11 | End: 2023-03-13 | Stop reason: HOSPADM

## 2023-03-10 RX ORDER — IBUPROFEN 200 MG
16 TABLET ORAL
Status: DISCONTINUED | OUTPATIENT
Start: 2023-03-10 | End: 2023-03-10

## 2023-03-10 RX ORDER — KETAMINE HCL IN 0.9 % NACL 50 MG/5 ML
SYRINGE (ML) INTRAVENOUS
Status: DISCONTINUED | OUTPATIENT
Start: 2023-03-10 | End: 2023-03-10

## 2023-03-10 RX ORDER — LABETALOL HYDROCHLORIDE 5 MG/ML
10 INJECTION, SOLUTION INTRAVENOUS ONCE
Status: COMPLETED | OUTPATIENT
Start: 2023-03-10 | End: 2023-03-10

## 2023-03-10 RX ORDER — ROCURONIUM BROMIDE 10 MG/ML
INJECTION, SOLUTION INTRAVENOUS
Status: DISCONTINUED | OUTPATIENT
Start: 2023-03-10 | End: 2023-03-10

## 2023-03-10 RX ORDER — DIPHENHYDRAMINE HCL 25 MG
25 CAPSULE ORAL
Status: COMPLETED | OUTPATIENT
Start: 2023-03-11 | End: 2023-03-12

## 2023-03-10 RX ORDER — SODIUM CHLORIDE 9 MG/ML
INJECTION, SOLUTION INTRAVENOUS CONTINUOUS
Status: DISCONTINUED | OUTPATIENT
Start: 2023-03-10 | End: 2023-03-11

## 2023-03-10 RX ORDER — DIPHENHYDRAMINE HYDROCHLORIDE 50 MG/ML
50 INJECTION INTRAMUSCULAR; INTRAVENOUS ONCE
Status: COMPLETED | OUTPATIENT
Start: 2023-03-10 | End: 2023-03-10

## 2023-03-10 RX ORDER — DEXTROSE 40 %
15 GEL (GRAM) ORAL
Status: DISCONTINUED | OUTPATIENT
Start: 2023-03-10 | End: 2023-03-13

## 2023-03-10 RX ORDER — CARVEDILOL 3.12 MG/1
3.12 TABLET ORAL 2 TIMES DAILY
Status: DISCONTINUED | OUTPATIENT
Start: 2023-03-10 | End: 2023-03-13 | Stop reason: HOSPADM

## 2023-03-10 RX ORDER — SODIUM CHLORIDE 9 MG/ML
INJECTION, SOLUTION INTRAVENOUS ONCE
Status: DISCONTINUED | OUTPATIENT
Start: 2023-03-10 | End: 2023-03-13

## 2023-03-10 RX ORDER — DEXTROSE 40 %
30 GEL (GRAM) ORAL
Status: DISCONTINUED | OUTPATIENT
Start: 2023-03-10 | End: 2023-03-13

## 2023-03-10 RX ORDER — HEPARIN SODIUM 5000 [USP'U]/ML
5000 INJECTION, SOLUTION INTRAVENOUS; SUBCUTANEOUS EVERY 8 HOURS
Status: DISCONTINUED | OUTPATIENT
Start: 2023-03-10 | End: 2023-03-13 | Stop reason: HOSPADM

## 2023-03-10 RX ORDER — METHYLPREDNISOLONE SODIUM SUCCINATE 1 G/16ML
INJECTION INTRAMUSCULAR; INTRAVENOUS
Status: DISCONTINUED | OUTPATIENT
Start: 2023-03-10 | End: 2023-03-10

## 2023-03-10 RX ORDER — HYDRALAZINE HYDROCHLORIDE 50 MG/1
50 TABLET, FILM COATED ORAL EVERY 8 HOURS
Status: DISCONTINUED | OUTPATIENT
Start: 2023-03-10 | End: 2023-03-13 | Stop reason: HOSPADM

## 2023-03-10 RX ORDER — CEFAZOLIN SODIUM 1 G/3ML
INJECTION, POWDER, FOR SOLUTION INTRAMUSCULAR; INTRAVENOUS
Status: DISCONTINUED | OUTPATIENT
Start: 2023-03-10 | End: 2023-03-10 | Stop reason: HOSPADM

## 2023-03-10 RX ORDER — SODIUM CHLORIDE 0.9 % (FLUSH) 0.9 %
10 SYRINGE (ML) INJECTION
Status: DISCONTINUED | OUTPATIENT
Start: 2023-03-10 | End: 2023-03-10

## 2023-03-10 RX ORDER — OXYCODONE HYDROCHLORIDE 10 MG/1
10 TABLET ORAL EVERY 6 HOURS PRN
Status: DISCONTINUED | OUTPATIENT
Start: 2023-03-10 | End: 2023-03-10

## 2023-03-10 RX ORDER — FUROSEMIDE 10 MG/ML
120 INJECTION INTRAMUSCULAR; INTRAVENOUS ONCE
Status: COMPLETED | OUTPATIENT
Start: 2023-03-11 | End: 2023-03-11

## 2023-03-10 RX ORDER — HALOPERIDOL 5 MG/ML
0.5 INJECTION INTRAMUSCULAR EVERY 10 MIN PRN
Status: DISCONTINUED | OUTPATIENT
Start: 2023-03-10 | End: 2023-03-10

## 2023-03-10 RX ORDER — PHENYLEPHRINE HCL IN 0.9% NACL 1 MG/10 ML
SYRINGE (ML) INTRAVENOUS
Status: DISCONTINUED | OUTPATIENT
Start: 2023-03-10 | End: 2023-03-10

## 2023-03-10 RX ORDER — CLONIDINE HYDROCHLORIDE 0.1 MG/1
0.1 TABLET ORAL EVERY 6 HOURS PRN
Status: DISCONTINUED | OUTPATIENT
Start: 2023-03-10 | End: 2023-03-13 | Stop reason: HOSPADM

## 2023-03-10 RX ORDER — MUPIROCIN 20 MG/G
OINTMENT TOPICAL
Status: DISCONTINUED | OUTPATIENT
Start: 2023-03-10 | End: 2023-03-10

## 2023-03-10 RX ORDER — TRAMADOL HYDROCHLORIDE 50 MG/1
50 TABLET ORAL EVERY 6 HOURS PRN
Status: DISCONTINUED | OUTPATIENT
Start: 2023-03-10 | End: 2023-03-11

## 2023-03-10 RX ORDER — IBUPROFEN 200 MG
24 TABLET ORAL
Status: DISCONTINUED | OUTPATIENT
Start: 2023-03-10 | End: 2023-03-10

## 2023-03-10 RX ORDER — DIPHENHYDRAMINE HCL 25 MG
50 CAPSULE ORAL ONCE AS NEEDED
Status: DISCONTINUED | OUTPATIENT
Start: 2023-03-10 | End: 2023-03-13 | Stop reason: HOSPADM

## 2023-03-10 RX ORDER — PREDNISONE 20 MG/1
20 TABLET ORAL DAILY
Status: DISCONTINUED | OUTPATIENT
Start: 2023-03-13 | End: 2023-03-13 | Stop reason: HOSPADM

## 2023-03-10 RX ORDER — ACETAMINOPHEN 650 MG/20.3ML
650 LIQUID ORAL ONCE
Status: COMPLETED | OUTPATIENT
Start: 2023-03-10 | End: 2023-03-10

## 2023-03-10 RX ORDER — HEPARIN SODIUM 10000 [USP'U]/ML
INJECTION, SOLUTION INTRAVENOUS; SUBCUTANEOUS
Status: DISCONTINUED | OUTPATIENT
Start: 2023-03-10 | End: 2023-03-10 | Stop reason: HOSPADM

## 2023-03-10 RX ORDER — SODIUM CHLORIDE 0.9 % (FLUSH) 0.9 %
10 SYRINGE (ML) INJECTION
Status: DISCONTINUED | OUTPATIENT
Start: 2023-03-10 | End: 2023-03-13 | Stop reason: HOSPADM

## 2023-03-10 RX ORDER — LIDOCAINE HYDROCHLORIDE 20 MG/ML
INJECTION, SOLUTION EPIDURAL; INFILTRATION; INTRACAUDAL; PERINEURAL
Status: DISCONTINUED | OUTPATIENT
Start: 2023-03-10 | End: 2023-03-10

## 2023-03-10 RX ORDER — ACETAMINOPHEN 325 MG/1
650 TABLET ORAL EVERY 8 HOURS
Status: DISPENSED | OUTPATIENT
Start: 2023-03-10 | End: 2023-03-12

## 2023-03-10 RX ORDER — CEFAZOLIN SODIUM 1 G/3ML
INJECTION, POWDER, FOR SOLUTION INTRAMUSCULAR; INTRAVENOUS
Status: DISCONTINUED | OUTPATIENT
Start: 2023-03-10 | End: 2023-03-10

## 2023-03-10 RX ORDER — HEPARIN SODIUM 1000 [USP'U]/ML
1000 INJECTION, SOLUTION INTRAVENOUS; SUBCUTANEOUS
Status: ACTIVE | OUTPATIENT
Start: 2023-03-10 | End: 2023-03-11

## 2023-03-10 RX ORDER — MUPIROCIN 20 MG/G
1 OINTMENT TOPICAL 2 TIMES DAILY
Status: DISCONTINUED | OUTPATIENT
Start: 2023-03-10 | End: 2023-03-13 | Stop reason: HOSPADM

## 2023-03-10 RX ORDER — HYDROMORPHONE HYDROCHLORIDE 1 MG/ML
0.2 INJECTION, SOLUTION INTRAMUSCULAR; INTRAVENOUS; SUBCUTANEOUS EVERY 5 MIN PRN
Status: DISCONTINUED | OUTPATIENT
Start: 2023-03-10 | End: 2023-03-10 | Stop reason: HOSPADM

## 2023-03-10 RX ORDER — TACROLIMUS 1 MG/1
3 CAPSULE ORAL 2 TIMES DAILY
Status: DISCONTINUED | OUTPATIENT
Start: 2023-03-10 | End: 2023-03-11

## 2023-03-10 RX ORDER — LORAZEPAM 2 MG/ML
0.25 INJECTION INTRAMUSCULAR ONCE AS NEEDED
Status: DISCONTINUED | OUTPATIENT
Start: 2023-03-10 | End: 2023-03-10

## 2023-03-10 RX ORDER — MYCOPHENOLATE MOFETIL 250 MG/1
1000 CAPSULE ORAL 2 TIMES DAILY
Status: DISCONTINUED | OUTPATIENT
Start: 2023-03-10 | End: 2023-03-13 | Stop reason: HOSPADM

## 2023-03-10 RX ORDER — HYDRALAZINE HYDROCHLORIDE 20 MG/ML
20 INJECTION INTRAMUSCULAR; INTRAVENOUS EVERY 6 HOURS PRN
Status: DISCONTINUED | OUTPATIENT
Start: 2023-03-10 | End: 2023-03-10

## 2023-03-10 RX ORDER — ACETAMINOPHEN 325 MG/1
650 TABLET ORAL EVERY 6 HOURS PRN
Status: DISCONTINUED | OUTPATIENT
Start: 2023-03-10 | End: 2023-03-13 | Stop reason: HOSPADM

## 2023-03-10 RX ORDER — FAMOTIDINE 20 MG/1
20 TABLET, FILM COATED ORAL NIGHTLY
Status: DISCONTINUED | OUTPATIENT
Start: 2023-03-10 | End: 2023-03-13 | Stop reason: HOSPADM

## 2023-03-10 RX ORDER — ONDANSETRON 2 MG/ML
4 INJECTION INTRAMUSCULAR; INTRAVENOUS ONCE AS NEEDED
Status: DISCONTINUED | OUTPATIENT
Start: 2023-03-10 | End: 2023-03-10

## 2023-03-10 RX ORDER — LABETALOL HYDROCHLORIDE 5 MG/ML
INJECTION, SOLUTION INTRAVENOUS
Status: DISCONTINUED | OUTPATIENT
Start: 2023-03-10 | End: 2023-03-10

## 2023-03-10 RX ORDER — ONDANSETRON 2 MG/ML
4 INJECTION INTRAMUSCULAR; INTRAVENOUS EVERY 6 HOURS PRN
Status: DISCONTINUED | OUTPATIENT
Start: 2023-03-10 | End: 2023-03-13 | Stop reason: HOSPADM

## 2023-03-10 RX ORDER — OXYCODONE HYDROCHLORIDE 10 MG/1
10 TABLET ORAL EVERY 4 HOURS PRN
Status: DISCONTINUED | OUTPATIENT
Start: 2023-03-10 | End: 2023-03-13

## 2023-03-10 RX ORDER — VALGANCICLOVIR 450 MG/1
450 TABLET, FILM COATED ORAL EVERY MORNING
Status: DISCONTINUED | OUTPATIENT
Start: 2023-03-20 | End: 2023-03-13 | Stop reason: HOSPADM

## 2023-03-10 RX ORDER — MANNITOL 20 G/100ML
INJECTION, SOLUTION INTRAVENOUS
Status: DISCONTINUED | OUTPATIENT
Start: 2023-03-10 | End: 2023-03-10

## 2023-03-10 RX ORDER — FUROSEMIDE 10 MG/ML
INJECTION INTRAMUSCULAR; INTRAVENOUS
Status: DISCONTINUED | OUTPATIENT
Start: 2023-03-10 | End: 2023-03-10

## 2023-03-10 RX ORDER — EPINEPHRINE 1 MG/ML
1 INJECTION, SOLUTION, CONCENTRATE INTRAVENOUS ONCE AS NEEDED
Status: DISCONTINUED | OUTPATIENT
Start: 2023-03-10 | End: 2023-03-13 | Stop reason: HOSPADM

## 2023-03-10 RX ORDER — MIDAZOLAM HYDROCHLORIDE 1 MG/ML
INJECTION, SOLUTION INTRAMUSCULAR; INTRAVENOUS
Status: DISCONTINUED | OUTPATIENT
Start: 2023-03-10 | End: 2023-03-10

## 2023-03-10 RX ORDER — GLUCAGON 1 MG
1 KIT INJECTION CONTINUOUS PRN
Status: DISCONTINUED | OUTPATIENT
Start: 2023-03-10 | End: 2023-03-13 | Stop reason: HOSPADM

## 2023-03-10 RX ORDER — DOCUSATE SODIUM 100 MG/1
100 CAPSULE, LIQUID FILLED ORAL 3 TIMES DAILY
Status: DISCONTINUED | OUTPATIENT
Start: 2023-03-10 | End: 2023-03-13 | Stop reason: HOSPADM

## 2023-03-10 RX ORDER — PROPOFOL 10 MG/ML
VIAL (ML) INTRAVENOUS
Status: DISCONTINUED | OUTPATIENT
Start: 2023-03-10 | End: 2023-03-10

## 2023-03-10 RX ORDER — PREGABALIN 75 MG/1
75 CAPSULE ORAL
Status: DISCONTINUED | OUTPATIENT
Start: 2023-03-10 | End: 2023-03-10

## 2023-03-10 RX ORDER — HYDRALAZINE HYDROCHLORIDE 20 MG/ML
10 INJECTION INTRAMUSCULAR; INTRAVENOUS EVERY 4 HOURS PRN
Status: DISCONTINUED | OUTPATIENT
Start: 2023-03-10 | End: 2023-03-13 | Stop reason: HOSPADM

## 2023-03-10 RX ORDER — BISACODYL 5 MG
10 TABLET, DELAYED RELEASE (ENTERIC COATED) ORAL NIGHTLY
Status: DISCONTINUED | OUTPATIENT
Start: 2023-03-10 | End: 2023-03-13 | Stop reason: HOSPADM

## 2023-03-10 RX ORDER — METHYLPREDNISOLONE SOD SUCC 125 MG
125 VIAL (EA) INJECTION ONCE
Status: COMPLETED | OUTPATIENT
Start: 2023-03-12 | End: 2023-03-12

## 2023-03-10 RX ORDER — ACETAMINOPHEN 325 MG/1
650 TABLET ORAL
Status: DISPENSED | OUTPATIENT
Start: 2023-03-11 | End: 2023-03-13

## 2023-03-10 RX ORDER — ATORVASTATIN CALCIUM 40 MG/1
40 TABLET, FILM COATED ORAL NIGHTLY
Status: DISCONTINUED | OUTPATIENT
Start: 2023-03-10 | End: 2023-03-13 | Stop reason: HOSPADM

## 2023-03-10 RX ORDER — GLUCAGON 1 MG
1 KIT INJECTION
Status: DISCONTINUED | OUTPATIENT
Start: 2023-03-10 | End: 2023-03-13 | Stop reason: HOSPADM

## 2023-03-10 RX ORDER — HALOPERIDOL 5 MG/ML
INJECTION INTRAMUSCULAR
Status: DISCONTINUED | OUTPATIENT
Start: 2023-03-10 | End: 2023-03-10

## 2023-03-10 RX ORDER — INSULIN ASPART 100 [IU]/ML
0-5 INJECTION, SOLUTION INTRAVENOUS; SUBCUTANEOUS
Status: DISCONTINUED | OUTPATIENT
Start: 2023-03-10 | End: 2023-03-11

## 2023-03-10 RX ADMIN — HYDROMORPHONE HYDROCHLORIDE 0.2 MG: 1 INJECTION, SOLUTION INTRAMUSCULAR; INTRAVENOUS; SUBCUTANEOUS at 02:03

## 2023-03-10 RX ADMIN — SODIUM CHLORIDE: 9 INJECTION, SOLUTION INTRAVENOUS at 02:03

## 2023-03-10 RX ADMIN — HEPARIN SODIUM 5000 UNITS: 5000 INJECTION INTRAVENOUS; SUBCUTANEOUS at 08:03

## 2023-03-10 RX ADMIN — HYDRALAZINE HYDROCHLORIDE 50 MG: 50 TABLET ORAL at 07:03

## 2023-03-10 RX ADMIN — Medication 10 MG: at 11:03

## 2023-03-10 RX ADMIN — ACETAMINOPHEN 650 MG: 325 TABLET ORAL at 06:03

## 2023-03-10 RX ADMIN — HYDROMORPHONE HYDROCHLORIDE 0.2 MG: 1 INJECTION, SOLUTION INTRAMUSCULAR; INTRAVENOUS; SUBCUTANEOUS at 03:03

## 2023-03-10 RX ADMIN — HYDROMORPHONE HYDROCHLORIDE 0.2 MG: 1 INJECTION, SOLUTION INTRAMUSCULAR; INTRAVENOUS; SUBCUTANEOUS at 04:03

## 2023-03-10 RX ADMIN — DOCUSATE SODIUM 100 MG: 100 CAPSULE, LIQUID FILLED ORAL at 09:03

## 2023-03-10 RX ADMIN — BISACODYL 10 MG: 5 TABLET, COATED ORAL at 09:03

## 2023-03-10 RX ADMIN — ATORVASTATIN CALCIUM 40 MG: 40 TABLET, FILM COATED ORAL at 07:03

## 2023-03-10 RX ADMIN — DIPHENHYDRAMINE HYDROCHLORIDE 50 MG: 50 INJECTION INTRAMUSCULAR; INTRAVENOUS at 10:03

## 2023-03-10 RX ADMIN — LABETALOL HYDROCHLORIDE 5 MG: 5 INJECTION, SOLUTION INTRAVENOUS at 02:03

## 2023-03-10 RX ADMIN — ROCURONIUM BROMIDE 15 MG: 10 INJECTION, SOLUTION INTRAVENOUS at 11:03

## 2023-03-10 RX ADMIN — MIDAZOLAM 2 MG: 1 INJECTION INTRAMUSCULAR; INTRAVENOUS at 10:03

## 2023-03-10 RX ADMIN — Medication 5 MG: at 02:03

## 2023-03-10 RX ADMIN — SUGAMMADEX 400 MG: 100 INJECTION, SOLUTION INTRAVENOUS at 02:03

## 2023-03-10 RX ADMIN — TRAMADOL HYDROCHLORIDE 50 MG: 50 TABLET, COATED ORAL at 07:03

## 2023-03-10 RX ADMIN — Medication 10 MG: at 01:03

## 2023-03-10 RX ADMIN — Medication 6 MG: at 11:03

## 2023-03-10 RX ADMIN — ROCURONIUM BROMIDE 10 MG: 10 INJECTION, SOLUTION INTRAVENOUS at 01:03

## 2023-03-10 RX ADMIN — FENTANYL CITRATE 25 MCG: 50 INJECTION, SOLUTION INTRAMUSCULAR; INTRAVENOUS at 02:03

## 2023-03-10 RX ADMIN — FUROSEMIDE 100 MG: 10 INJECTION, SOLUTION INTRAMUSCULAR; INTRAVENOUS at 12:03

## 2023-03-10 RX ADMIN — Medication 100 MCG: at 12:03

## 2023-03-10 RX ADMIN — SODIUM CHLORIDE: 9 INJECTION, SOLUTION INTRAVENOUS at 10:03

## 2023-03-10 RX ADMIN — ACETAMINOPHEN 650 MG: 650 SOLUTION ORAL at 10:03

## 2023-03-10 RX ADMIN — ONDANSETRON 4 MG: 2 INJECTION INTRAMUSCULAR; INTRAVENOUS at 02:03

## 2023-03-10 RX ADMIN — METHYLPREDNISOLONE SODIUM SUCCINATE 500 MG: 1 INJECTION, POWDER, LYOPHILIZED, FOR SOLUTION INTRAMUSCULAR; INTRAVENOUS at 10:03

## 2023-03-10 RX ADMIN — HYDRALAZINE HYDROCHLORIDE 10 MG: 20 INJECTION, SOLUTION INTRAMUSCULAR; INTRAVENOUS at 03:03

## 2023-03-10 RX ADMIN — OXYCODONE HYDROCHLORIDE 5 MG: 5 TABLET ORAL at 03:03

## 2023-03-10 RX ADMIN — LABETALOL HYDROCHLORIDE 10 MG: 5 INJECTION, SOLUTION INTRAVENOUS at 03:03

## 2023-03-10 RX ADMIN — INSULIN HUMAN 0.6 UNITS/HR: 1 INJECTION, SOLUTION INTRAVENOUS at 06:03

## 2023-03-10 RX ADMIN — CEFAZOLIN 2 G: 2 INJECTION, POWDER, FOR SOLUTION INTRAMUSCULAR; INTRAVENOUS at 10:03

## 2023-03-10 RX ADMIN — DOCUSATE SODIUM 100 MG: 100 CAPSULE, LIQUID FILLED ORAL at 03:03

## 2023-03-10 RX ADMIN — FAMOTIDINE 20 MG: 20 TABLET ORAL at 09:03

## 2023-03-10 RX ADMIN — ANTI-THYMOCYTE GLOBULIN (RABBIT) 100 MG: 5 INJECTION, POWDER, LYOPHILIZED, FOR SOLUTION INTRAVENOUS at 11:03

## 2023-03-10 RX ADMIN — PROPOFOL 170 MG: 10 INJECTION, EMULSION INTRAVENOUS at 10:03

## 2023-03-10 RX ADMIN — MYCOPHENOLATE MOFETIL 1000 MG: 250 CAPSULE ORAL at 09:03

## 2023-03-10 RX ADMIN — FENTANYL CITRATE 25 MCG: 50 INJECTION, SOLUTION INTRAMUSCULAR; INTRAVENOUS at 11:03

## 2023-03-10 RX ADMIN — CLONIDINE HYDROCHLORIDE 0.1 MG: 0.1 TABLET ORAL at 07:03

## 2023-03-10 RX ADMIN — HEPARIN SODIUM 5000 UNITS: 5000 INJECTION INTRAVENOUS; SUBCUTANEOUS at 09:03

## 2023-03-10 RX ADMIN — DEXTROSE AND SODIUM CHLORIDE: 5; 450 INJECTION, SOLUTION INTRAVENOUS at 02:03

## 2023-03-10 RX ADMIN — ACETAMINOPHEN 650 MG: 325 TABLET ORAL at 09:03

## 2023-03-10 RX ADMIN — ROCURONIUM BROMIDE 35 MG: 10 INJECTION, SOLUTION INTRAVENOUS at 10:03

## 2023-03-10 RX ADMIN — Medication 15 MG: at 10:03

## 2023-03-10 RX ADMIN — SODIUM CHLORIDE 0.3 MCG/KG/MIN: 9 INJECTION, SOLUTION INTRAVENOUS at 10:03

## 2023-03-10 RX ADMIN — LIDOCAINE HYDROCHLORIDE 80 MG: 20 INJECTION, SOLUTION EPIDURAL; INFILTRATION; INTRACAUDAL; PERINEURAL at 10:03

## 2023-03-10 RX ADMIN — HALOPERIDOL LACTATE 0.5 MG: 5 INJECTION, SOLUTION INTRAMUSCULAR at 10:03

## 2023-03-10 RX ADMIN — FENTANYL CITRATE 100 MCG: 50 INJECTION, SOLUTION INTRAMUSCULAR; INTRAVENOUS at 10:03

## 2023-03-10 RX ADMIN — MANNITOL 25 G: 20 INJECTION, SOLUTION INTRAVENOUS at 12:03

## 2023-03-10 RX ADMIN — CEFAZOLIN 2 G: 2 INJECTION, POWDER, FOR SOLUTION INTRAMUSCULAR; INTRAVENOUS at 06:03

## 2023-03-10 RX ADMIN — TACROLIMUS 3 MG: 1 CAPSULE ORAL at 06:03

## 2023-03-10 RX ADMIN — CARVEDILOL 3.12 MG: 3.12 TABLET, FILM COATED ORAL at 07:03

## 2023-03-10 RX ADMIN — INSULIN ASPART 2 UNITS: 100 INJECTION, SOLUTION INTRAVENOUS; SUBCUTANEOUS at 10:03

## 2023-03-10 NOTE — PLAN OF CARE
Bone Marrow Transplant    Ms. Elizabeth Maldonado is a 58 year old female with ESRD secondary to diabetic nephropathy, HTN, HFpEF, and MGUS. BMT consult service was contacted regarding Ms. Maldonado's history of MGUS this morning at the time of her kidney transplant.     Ms. 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 Most recent free light chains: kappa 1,070 mg/L, lambda 308.28 mg/L, ratio 3.47. She had a bone marrow biopsy on 1/6/2022 which showed 5.5% clonal plasma cell involvement. Cytogenetics were normal. Myeloma FISH panel was negative.     - ordered repeat SPEP, ERICK, FLC, and quantitative immunoglobulins  - we will follow up after the above have resulted.     Discussed with Dr. Beatrice Ignacio DO  PGY-V  Hematology/Oncology Fellow

## 2023-03-10 NOTE — H&P
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 PMH includes HTN, HFpEF, HLD, and MGUS (IgG kappa MGUS dx 2013). She has been on the kidney transplant waitlist since 3/29/22. She was started on PD 3/2022 then switched to HD 10/2022. She dialyzes on a MWF schedule. Last HD 3/8/23. DW 81kg. She is being admitted for kidney transplant surgery with Dr. Fuchs. She feels well in her usual state of health. She denies any recent fevers or illnesses. CXR 3/9 with mild LLL opacity. Patient denies fever, SOB, or chest pain. Pre op labs and diagnostic studies pending.     Dialysis History: Ms. Johnson with ESRD, requiring chronic dialysis who is on hemodialysis started on 3/2022. Patient is dialyzing on MWF schedule.  Patient reports that she is tolerating dialysis well.  Date of Last Dialysis: 3/8/23    Native urine output per day: < 180 mL    Previous Transplant: yes; organ: kidney, date: 2014, complications: nephropathy due to CNI and HTN.    PTA Medications   Medication Sig    atorvastatin (LIPITOR) 40 MG tablet Take 40 mg by mouth every evening.    blood sugar diagnostic Strp Checks BG ac/hs    calcitRIOL (ROCALTROL) 0.25 MCG Cap Take 0.25 mcg by mouth once daily.    carvediloL (COREG) 3.125 MG tablet Take 3.125 mg by mouth 2 (two) times daily. Hold for SBP <130    clonazePAM (KLONOPIN) 0.5 MG tablet Take 0.5 mg by mouth 2 (two) times daily as needed.    folic acid (FOLVITE) 1 MG tablet Take 1 tablet (1 mg total) by mouth once daily.    HUMALOG KWIKPEN INSULIN 100 unit/mL pen Inject 3 Units into the skin 3 (three) times daily with meals. SLIDE SCALE    hydrALAZINE (APRESOLINE) 50 MG tablet Take 50 mg by mouth 3 (three) times daily.    insulin syringe-needle U-100 (INSULIN SYRINGE) 1/2 mL 30 x 5/16" Syrg Uses " "4 daily    lancets (ONETOUCH DELICA LANCETS) 33 gauge Misc 1 lancet by Misc.(Non-Drug; Combo Route) route 4 (four) times daily before meals and nightly.    LANTUS SOLOSTAR U-100 INSULIN glargine 100 units/mL (3mL) SubQ pen Inject 50 Units into the skin once daily.    LIDOcaine-prilocaine (EMLA) cream This medicine is used to help with pain with the needles before dialysis. Please apply a small amount to the access and wrap with Saran Wrap about 1 hour before dialysis.    oxyCODONE (ROXICODONE) 5 MG immediate release tablet Take 1 tablet (5 mg total) by mouth every 4 (four) hours as needed for Pain.    sevelamer carbonate (RENVELA) 800 mg Tab Take 1 tablet (800 mg total) by mouth 3 (three) times daily with meals. (Patient taking differently: Take 800 mg by mouth 3 (three) times daily with meals. Take 2 tab w/ meal & 1 tab w/ snack)    SYRINGE & NEEDLE,INSULIN,1 ML (INSULIN SYRINGE-NEEDLE U-100) 1 mL 29 X 7/16" Syrg     timolol maleate 0.5% (TIMOPTIC) 0.5 % Drop Place 2 drops in both eyes twice daily    TRULICITY 4.5 mg/0.5 mL pen injector Inject 4.5 mg into the skin once a week.    zolpidem (AMBIEN) 10 mg Tab Take 10 mg by mouth nightly as needed.       Review of patient's allergies indicates:   Allergen Reactions    Shrimp Hives, Itching and Rash    Topamax [topiramate] Other (See Comments)     Vision changes    Zoloft [sertraline] Palpitations       Past Medical History:   Diagnosis Date    Acidosis 7/1/2014    Allergic rhinitis 7/1/2014    Allergy     Anemia     Anemia in chronic kidney disease 7/1/2014    Anxiety     Cataract     Chronic bilateral low back pain with bilateral sciatica 10/25/2019    Chronic immunosuppression with Prograf and MMF 12/3/2014    CKD (chronic kidney disease) stage 5, GFR less than 15 ml/min 7/1/2014    CKD (chronic kidney disease), stage III 3/2/2015    Degenerative disc disease     Depression 7/1/2014    Diabetes mellitus, type 2 since age 18 7/1/2014    " ESRD on peritoneal dialysis - 2014 for 9 hours no peritonitis 2014    Hyperlipidemia     Hypertension 2014    Hypomagnesemia 2015    Kidney transplant status, living unrelated donor - 12/2/14 12/3/2014    Neutropenia 2015    NS (nuclear sclerosis) 2016    Obesity     Organ transplant candidate 2014    Pre-op exam 2014    Proliferative diabetic retinopathy of both eyes without macular edema associated with type 2 diabetes mellitus 2016    Renal manifestation of secondary diabetes mellitus     Tendinitis     Trouble in sleeping      Past Surgical History:   Procedure Laterality Date    AV FISTULA PLACEMENT Left 2023    Procedure: CREATION, AV FISTULA;  Surgeon: LIDIA Kamara III, MD;  Location: [facility] OR 2ND FLR;  Service: Peripheral Vascular;  Laterality: Left;  LUE AV graft creation    BIOPSY N/A 2020    Procedure: Biopsy;  Surgeon: Sweta Catalan MD;  Location: [facility] OR [facility] FLR;  Service: Transplant;  Laterality: N/A;    BONE MARROW BIOPSY N/A      SECTION      x 2    COLONOSCOPY N/A 2022    Procedure: COLONOSCOPY;  Surgeon: Franklin Gan MD;  Location: [facility] ENDO (4TH FLR);  Service: Colon and Rectal;  Laterality: N/A;  order created: previous order unusable  fully vaccinated, labs prior, instr mailed / portal -ml    KIDNEY TRANSPLANT  2014    LAPAROSCOPIC REVISION OF PROCEDURE INVOLVING PERITONEAL DIALYSIS CATHETER N/A 2022    Procedure: REVISION OF PROCEDURE INVOLVING PERITONEAL DIALYSIS CATHETER, LAPAROSCOPIC;  Surgeon: Franklin Braber MD;  Location: [facility] OR 2ND FLR;  Service: General;  Laterality: N/A;    MEDIPORT REMOVAL N/A 2019    Procedure: REMOVAL, CATHETER, CENTRAL VENOUS, TUNNELED, WITH PORT;  Surgeon: Eusebia Diagnostic Provider;  Location: [facility] OR;  Service: Radiology;  Laterality: N/A;    RENAL BIOPSY      ROTATOR CUFF REPAIR      TUBAL LIGATION       Family History       Problem Relation  (Age of Onset)    Cataracts Maternal Grandmother    Diabetes Mother, Father, Sister, Brother, Sister    Heart disease Sister, Maternal Grandmother    Heart failure Sister    Hypertension Mother, Sister    Kidney disease Father, Sister    No Known Problems Maternal Aunt, Maternal Uncle, Paternal Aunt, Paternal Uncle, Maternal Grandfather, Paternal Grandmother, Paternal Grandfather    Stroke Maternal Grandmother          Tobacco Use    Smoking status: Former     Packs/day: 1.00     Years: 20.00     Pack years: 20.00     Types: Cigarettes     Quit date: 2013     Years since quittin.5    Smokeless tobacco: Never    Tobacco comments:     quit smoking cigarettes in 2014   Substance and Sexual Activity    Alcohol use: No    Drug use: No    Sexual activity: Yes     Partners: Male     Birth control/protection: Post-menopausal        Review of Systems   Constitutional:  Negative for appetite change, chills, fatigue and fever.   Respiratory:  Negative for cough, chest tightness and shortness of breath.    Cardiovascular:  Negative for chest pain, palpitations and leg swelling.   Gastrointestinal:  Negative for abdominal distention, abdominal pain, constipation, diarrhea, nausea and vomiting.   Genitourinary:  Positive for decreased urine volume. Negative for difficulty urinating, dysuria, frequency and urgency.   Musculoskeletal:  Negative for back pain and neck pain.   Skin:  Negative for color change, pallor, rash and wound.   Neurological:  Negative for dizziness, weakness and headaches.   Psychiatric/Behavioral:  Negative for confusion and sleep disturbance.    Objective:     Vital Signs (Most Recent):  Temp: 98 °F (36.7 °C) (03/10/23 0830)  Pulse: 97 (03/10/23 0830)  Resp: 18 (03/10/23 0830)  BP: (!) 178/92 (03/10/23 0830)  SpO2: 99 % (03/10/23 0830)       Weight: 82.2 kg (181 lb 3.5 oz)  Body mass index is 28.38 kg/m².     Physical Exam  Vitals and nursing note reviewed.   Constitutional:        Appearance: Normal appearance.   Cardiovascular:      Rate and Rhythm: Normal rate and regular rhythm.      Pulses: Normal pulses.   Pulmonary:      Effort: Pulmonary effort is normal. No respiratory distress.      Breath sounds: Normal breath sounds. No decreased breath sounds, wheezing or rales.   Abdominal:      General: Bowel sounds are normal. There is no distension.      Palpations: Abdomen is soft.      Tenderness: There is no abdominal tenderness. There is no guarding.   Musculoskeletal:         General: Normal range of motion.   Skin:     General: Skin is warm and dry.   Neurological:      Mental Status: She is alert and oriented to person, place, and time.   Psychiatric:         Behavior: Behavior is cooperative.       Laboratory  CBC: No results for input(s): WBC, RBC, HGB, HCT, PLT, MCV, MCH, MCHC in the last 168 hours.  CMP: No results for input(s): GLU, CALCIUM, ALBUMIN, PROT, NA, K, CO2, CL, BUN, CREATININE, ALKPHOS, ALT, AST in the last 168 hours.    Invalid input(s): BILITO  Coagulation: No results for input(s): PT, APTT in the last 168 hours.  Labs within the past 24 hours have been reviewed.    Diagnostic Results:  US - Kidney: Results for orders placed during the hospital encounter of 03/09/23    US Transplant Kidney With Doppler    Narrative  EXAMINATION:  US TRANSPLANT KIDNEY WITH DOPPLER    CLINICAL HISTORY:  Awaiting organ transplant status    TECHNIQUE:  Real time gray scale and doppler ultrasound was performed over the patient's renal allograft.    COMPARISON:  transplant kidney ultrasound 12/23/2021    FINDINGS:  Patient status-post renal transplant in 2014.  The transplant lies in the right lower quadrant and measures 10.0 cm.    The renal transplant demonstrates no focal parenchymal abnormality. Mild hydronephrosis, improved.  No peritransplant fluid.    Renal arterial resistive indices are as follows:    Interlobar: 0.81, previously 0.81    Segmental Up: 1.0, previously  0.77    Segmental Mid: 1.0, previously 0.85    Segmental Low: 0.0, previously 0.81    There is no evidence of a tardus parvus waveform. The maximum velocity in the main renal artery is 75 cm/sec (previously 130 cm/sec).  The maximum velocity in the iliac artery measures 114 cm/sec.  The RA/iliac ratio measures 0.66.    The renal transplant venous system is unremarkable.    Impression  Persistent elevated arterial resistive indices, as above.    Mild hydronephrosis, improved from the prior exam.      Electronically signed by resident: Alicia Roy  Date:    03/09/2023  Time:    10:54    Electronically signed by: Angel Abreu MD  Date:    03/09/2023  Time:    11:18    Covid test pending. If positive, patient instructed that surgery will be canceled and patient will be discharged home to quarantine. Patient would remain inactive on txp list for 28 days and will be assess at that time to be placed back on waitlist.     Assessment/Plan:     Renal/  ESRD (end stage renal disease) on dialysis  - ESRD 2/2 DM/HTN and failed KTX #1 2014  - Admit for kidney transplant  - Pre op labs and diagnostic studies pending, to be reviewed before surgery    Endocrine  Type 2 diabetes mellitus, with long-term current use of insulin  - h/o DM. Consult endocrine.         The patient presents for kidney transplant.  There are no apparent contraindications to proceeding with the planned transplant.  The patient understands that the transplant could potentially be cancelled pending detailed assessment of the donor organ.  She will receive Thymoglobulin induction.  A complete discussion of the transplant procedure, including risks, complications, and alternatives, as well as any donor-specific risk factors requiring specific disclosure, will be carried out by the responsible staff surgeon prior to the procedure.       Chelle Hines, KARO  Kidney Transplant  [facility] - Transplant Stepdown

## 2023-03-10 NOTE — HPI
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 PMH includes HTN, HFpEF, HLD, and MGUS (IgG kappa MGUS dx 2013). She has been on the kidney transplant waitlist since 3/29/22. She was started on PD 3/2022 then switched to HD 10/2022. She dialyzes on a MWF schedule. Last HD 3/8/23. DW 81kg. She is being admitted for kidney transplant surgery with Dr. Fuchs. She feels well in her usual state of health. She denies any recent fevers or illnesses. CXR 3/9 with mild LLL opacity. Patient denies fever, SOB, or chest pain. Pre op labs and diagnostic studies pending.

## 2023-03-10 NOTE — TELEPHONE ENCOUNTER
Discussed case and recent findings with Dr. Jethro Lomeli who, based on available data, sees no contraindication to kidney transplant at this time.    Vincent Meraz Jr.

## 2023-03-10 NOTE — ANESTHESIA PROCEDURE NOTES
Arterial    Diagnosis: ESRD    Patient location during procedure: done in OR  Procedure start time: 3/10/2023 10:35 AM  Timeout: 3/10/2023 10:35 AM  Procedure end time: 3/10/2023 10:36 AM    Staffing  Authorizing Provider: Jamaal Sandoval MD  Performing Provider: Jamaal Sandoval MD    Anesthesiologist was present at the time of the procedure.    Preanesthetic Checklist  Completed: patient identified, IV checked, site marked, risks and benefits discussed, surgical consent, monitors and equipment checked, pre-op evaluation, timeout performed and anesthesia consent givenArterial  Skin Prep: chlorhexidine gluconate  Local Infiltration: none  Orientation: right  Location: radial    Catheter Size: 20 G  Catheter placement by Anatomical landmarks. Heme positive aspiration all ports. Insertion Attempts: 1  Assessment  Dressing: secured with tape and tegaderm  Patient: Tolerated well

## 2023-03-10 NOTE — OP NOTE
Operative Report    Date of Procedure: 3/10/2023  Date of Transplant (UNOS): 3/10/23    Surgeons:  Surgeon(s) and Role:     * Marquise Fuchs MD - Primary     * Reena Malagon MD - Resident - Assisting    First Assistant Attestation:  The indicated resident served as first assistant for this procedure.    Pre-operative Diagnosis: ESRD, requiring chronic dialysis secondary to Hypertensive Nephrosclerosis  Post-operative Diagnosis: Same    Procedure(s) Performed:   Back Table Preparation of Left Kidney    Donation after Brain Death Kidney transplant    Anesthesia: General endotracheal    Preamble  Indications and Patient Counseling: The patient is a 58 y.o. year-old female with end-stage kidney disease secondary to Hypertensive Nephrosclerosis who has been evaluated for a kidney transplant.  The procedure was thoroughly discussed with the patient, including potential risks, complications, and alternatives.  Specific complications mentioned included death, graft non-function, bleeding, infection, and rejection, as well as the possibility of other complications not specifically mentioned.    Donor Risk Factors: Prior to the operation, the patient was advised of any donor-specific risk factors requiring specific disclosure.  Factors in this case included nothing that required specific disclosure.      Specific PHS donor risk criteria for the organ donor include:  None    All questions were answered, the patient voiced appropriate understanding, and she agreed to proceed with the planned procedure.    ABO Confirmation: Immediately following arrival of the donor organ and prior to implantation, a formal ABO confirmation was done according to hospital and UNOS policies.  I confirmed the UNOS ID number ([id number]) of the donor organ and the donor and recipient ABO types, directly verifying these data by comparison with the UNOS Match Run report ([id number]).  This confirmation was personally done by an attending surgeon and  circulating nurse, and is officially documented elsewhere.    Time-Out: A complete time out was carried out prior to incision, with confirmation of patient identity, correct procedure, correct operative site, appropriate antibiotic prophylaxis, review of any known allergies, and presence of all needed equipment.    Procedure in Detail  Prior to starting the operation, the left kidney  was prepared on the back table. Arterial anatomy was double. Venous anatomy was single. Ureteral anatomy was single. Back table vascular reconstruction was required, consisting of arterial anastomosis.  There was a smaller (~2.5 mm) upper pole artery which was implanted into the superior aspect of the main (~6 mm) renal artery in an end-to-side fashion with 7-0 prolene. Unneeded fat was removed from the kidney, the vessels were cleaned of adherent tissue and tested for leaks, and the kidney was maintained at ice temperature in organ preservation solution until it was brought to the operative field.     The patient was brought into the operating room and placed in a supine position on the OR table.  After the induction of general endotracheal anesthesia, lines were placed by the anesthesiologist.  The urinary bladder was catheterized and irrigated with antibiotic solution.  There was no tension on the axillae and all pressure points were padded.  Sequential compression boots were used as were Leah Huggers.  The abdomen was prepped and draped in the usual sterile fashion.  Skin was incised over the left with a knife and deepened with electrocautery.  The peritoneum and its contents were swept medially, exposing the left external iliac artery and the left external iliac vein.  The Bookwalter retractor was used to provide exposure.  Overlying lymphatics were ligated or cauterized and the vessels were dissected free for a length compatible with anastomosis.  The kidney was brought to the OR table at 3/10/2023 12:20 PM.  Venous control was  obtained with a vascular clamp.  A venotomy was made, the vein irrigated, and an end renal to left external iliac vein anastomosis was created with 5-0 polypropylene.  Arterial control was obtained with a vascular clamp.  Arteriotomy was made, the artery irrigated, and an aortic patch to left external iliac artery anastomosis was created with 5-0 polypropylene.  The kidney was unclamped and reperfused at 3/10/2023 12:47 PM.  Reperfusion quality was good. Intraoperative urine production was not observed.  After hemostasis was obtained, a Lich uretero-neocystostomy was created.  The bladder was filled and identified, opened, and the anastomosis created using 6-0 PDS. The bladder mucosa was very thin and tore easily.  Two inadvertent holes were closed with 6-0 PDS, with no subsequent leakage on re-instillation of the bladder via the 3 way gresham. The bladder muscle was closed over the distal ureter to create an antireflux tunnel.  A ureteral stent was used.  With the kidney well perfused and sitting appropriately without tension on the anastomoses, viscera were replaced in their usual position.  The wound was closed after a final check for hemostasis.  Overall, the graft quality was assessed to be good. At the end of the case the needle, sponge and instrument counts were all correct.  Sterile dressings were applied and the patient was brought to the recovery room/ICU in good condition.    Estimated Blood Loss: 200 mL  Fluids Administered:    Drains:  none  Specimens: none    Findings:    Organ Transplanted: Left Kidney    Arterial Anatomy: double  Number of Arteries: 2  Configuration of Multiple Arteries: end to side  Venous Anatomy: single  Number of Veins: 1  Ureteral Anatomy: single  Number of Ureters: 1  Reperfusion Quality: good  Overall Graft Quality: good  Intraoperative Urine Production: no  Gresham: not to be removed before  7  days.  Ureteral Stent: Yes    Ischemic Times:   Anastomosis (warm ischemia) time: 27  minutes   Cold ischemia time: 1,731 minutes  Total ischemia time: 1758 minutes    Donor Data:  UNOS ID: [id number]   UNOS Match Run: [id number]   Donor Type: Donation after Brain Death   Donor CMV Status: Positive   Donor HBcAB: Negative   Donor HCV Status: Negative

## 2023-03-10 NOTE — ANESTHESIA PROCEDURE NOTES
Intubation    Date/Time: 3/10/2023 10:35 AM  Performed by: Vania Corona CRNA  Authorized by: Jamaal Sandoval MD     Intubation:     Induction:  Intravenous    Intubated:  Postinduction    Mask Ventilation:  Easy mask    Attempts:  1    Attempted By:  CRNA    Method of Intubation:  Direct    Blade:  Maldonado 2    Laryngeal View Grade: Grade I - full view of cords      Difficult Airway Encountered?: No      Complications:  None    Airway Device:  Oral endotracheal tube    Airway Device Size:  7.0    Style/Cuff Inflation:  Cuffed (inflated to minimal occlusive pressure)    Tube secured:  21    Secured at:  The lips    Placement Verified By:  Capnometry    Complicating Factors:  None    Findings Post-Intubation:  BS equal bilateral and atraumatic/condition of teeth unchanged

## 2023-03-10 NOTE — PLAN OF CARE
Chart reviewed. Preop nursing care completed per orders. Safe surgery checklist complete. IV to Right forearm flushed and IV fluids started at KVO. Heel protectors placed on bilateral heels. SCDs and Teds in place. Allergy, fall, and limb alert bands on left arm with ID band. EKG completed on floor prior to DOSC arrival. Anesthesia CRNA and OR RN at bedside to take pt to OR. Unable to complete full assessment due to time constraint.

## 2023-03-10 NOTE — HPI
Reason for Consult: Management of T2DM, Hyperglycemia     Surgical Procedure and Date: KIdney Transplant scheduled for 3/10/2023    Diabetes diagnosis year: DANUTA    Home Diabetes Medications:  Humalog 3-6 units AC , Lantus 22 units daily, and Trulicity 4.5 mg weekly (unsure of current dose, had to decrease bc they ran out of her normal dose)    How often checking glucose at home? Uses dexcom- AM mid 150s.      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 PMH includes HTN, HFpEF, HLD, and MGUS (IgG kappa MGUS dx 2013). She has been on the kidney transplant waitlist since 3/29/22. She was started on PD 3/2022 then switched to HD 10/2022. She dialyzes on a MWF schedule. Last HD 3/8/23. DW 81kg. She is being admitted for kidney transplant surgery with Dr. Fuchs. She feels well in her usual state of health. She denies any recent fevers or illnesses. CXR 3/9 with mild LLL opacity. Patient denies fever, SOB, or chest pain. InOR 3/10/2023 for kidney tx. Endocrine consulted to manage hyperglycemia and T2DM.     Lab Results   Component Value Date    HGBA1C 7.9 (H) 10/05/2022

## 2023-03-10 NOTE — ASSESSMENT & PLAN NOTE
Titrate insulin slowly to avoid hypoglycemia as the risk of hypoglycemia increases with decreased creatinine clearance.    Estimated Creatinine Clearance: 6.4 mL/min (A) (based on SCr of 10.3 mg/dL (H)).

## 2023-03-10 NOTE — SUBJECTIVE & OBJECTIVE
Interval HPI:   Overnight events: No acute events overnight. Patient in room NOM 2ND FLR Periop Pool*. Blood glucose stable. BG at goal on current insulin regimen (SSI ). Steroid use- Methylprednisolone  500 mg perioperatively . Day of Surgery  Renal function- Abnormal - Creatinine 10.3   Vasopressors-  None       Endocrine will continue to follow and manage insulin orders inpatient.         Diet NPO Except for: Sips with Medication     Eating:   NPO  Nausea: No  Hypoglycemia and intervention: No  Fever: No  TPN and/or TF: No      PMH, PSH, FH, SH updated and reviewed     ROS:  Review of Systems   Unable to perform ROS: Acuity of condition (sedated post sx)     Current Medications and/or Treatments Impacting Glycemic Control  Immunotherapy:    Immunosuppressants       None          Steroids:   Hormones (From admission, onward)      Start     Stop Route Frequency Ordered    03/10/23 0845  methylPREDNISolone sodium succinate (SOLU-MEDROL) 500 mg in dextrose 5 % (D5W) 100 mL IVPB  (IP TXP INTRA-OP THYMO - PERIPHERAL THYMO PRECHECKED)         -- IV Once 03/10/23 0831          Pressors:    Autonomic Drugs (From admission, onward)      None          Hyperglycemia/Diabetes Medications:   Antihyperglycemics (From admission, onward)      None             PHYSICAL EXAMINATION:  Vitals:    03/10/23 1004   BP: (!) 184/83   Pulse: 90   Resp: 18   Temp: 97.9 °F (36.6 °C)     Body mass index is 28.38 kg/m².    Physical Exam  HENT:      Head: Normocephalic and atraumatic.   Cardiovascular:      Rate and Rhythm: Normal rate and regular rhythm.   Pulmonary:      Effort: Pulmonary effort is normal.   Abdominal:      General: Abdomen is flat. There is no distension.   Musculoskeletal:      Right lower leg: No edema.      Left lower leg: No edema.   Skin:     Findings: No lesion or rash.   Neurological:      Comments: sedated

## 2023-03-10 NOTE — TELEPHONE ENCOUNTER
ON-CALL NOTE    UNOS# [id number]    Notified by Adalberto Veras, , that Elizabeth Maldonado is eligible for kidney offer.  Spoke with patient and identified no acute medical issues with telephone assessment. Protocol script read to patient regarding N/A, standard donor offer. Patient verbalized understanding, all questions answered, patient accepts organ offer. Notified by Adalberto Veras that virtual crossmatch is  + DSA .  Current sample of blood is available from date 3/1/23 for crossmatch.  Patient reports no sensitizing event since last blood sample for PRA received. Notified Allison Benton in [facility] to perform a prospective  crossmatch per guideline.    Patient was asked if they have had a positive COVID-19 test or if they have any signs or symptoms. Informed patient that they will be tested for COVID-19 upon arrival to the hospital, unless have a previous positive result. If tested and result is positive, the transplant will not be able to occur, they will be inactivated on the wait list for 28 days per protocol and required to quarantine.     Reviewed chest xray and PET ct with Dr Anna and Dr Kaplan. Recommended Chest ct scan on admit.     Patient notified of plan to be npo at 11:23pm on standby for updates and states understanding.  Dialysis unit notified.    0600: Notified by Adalberto Veras that crossmatch is negative. Admit patient as soon as possible. Patient notified by [email or web address] to report to the hospital to be admitted. Dr Fuchs notified of nephrologists's recommendations.

## 2023-03-10 NOTE — ASSESSMENT & PLAN NOTE
- ESRD 2/2 DM/HTN and failed KTX #1 2014  - Admit for kidney transplant  - Pre op labs and diagnostic studies pending, to be reviewed before surgery

## 2023-03-10 NOTE — ANESTHESIA PREPROCEDURE EVALUATION
[facility]  Anesthesia Pre-Operative Evaluation         Patient Name/: Elizabeth Maldonado, 1965  MRN: [id number]    SUBJECTIVE:     Pre-operative evaluation for Procedure(s) (LRB):  TRANSPLANT, KIDNEY (N/A)     03/10/2023    Elizabeth Maldonado is a 58 y.o. female w/ a significant PMHx of:  ESRD on HD (biref dialysis this AM)  DM2  HTN  Anemia.    She is s/p kidney transplant  which failed, went on peritoneal dialysis 2022, but had to stop this because of leaking into the pleura.  She is now dialyzing via right IJ PermCath  Patient now presents for the above procedure(s).    NPO since  at 3/10/23    Prev airway:       Induction:  Intravenous    Intubated:  Postinduction    Mask Ventilation:  Easy mask    Attempts:  1    Attempted By:  Resident anesthesiologist    Method of Intubation:  Direct    Blade:  Maldonado 2    Laryngeal View Grade: Grade I - full view of cords      Difficult Airway Encountered?: No      Complications:  None    Airway Device:  Oral endotracheal tube    Airway Device Size:  7.0    Tube secured:  20    Secured at:  The teeth    Placement Verified By:  Capnometry    Complicating Factors:  None    LDA:        Hemodialysis AV Fistula 23 0800 Left upper arm (Active)   Needle Size 16ga 03/10/23 06   Site Assessment Clean;Dry;Intact;No redness;No swelling 03/10/23 0836   Patency Bruit;Thrill;Present 03/10/23 0836   Status Accessed 03/10/23 0629   Flows Good 23 0606   Dressing Intervention First dressing 03/10/23 06   Dressing Status Clean;Dry;Intact 03/10/23 06   Site Condition No complications 03/10/23 0629   Dressing Gauze 03/10/23 06   Number of days: 64       Drips: None documented.      Patient Active Problem List   Diagnosis    Type 2 diabetes mellitus, with long-term current use of insulin    Symptomatic anemia    Allergic rhinitis    Hyperlipidemia    Depression    Chronic kidney disease-mineral and bone disorder    Obesity (BMI  30.0-34.9)    Kidney transplant status, living unrelated donor - 12/2/14    Chronic immunosuppression with Prograf    Hypomagnesemia    Acute renal failure superimposed on stage 4 chronic kidney disease    Proliferative diabetic retinopathy of both eyes without macular edema associated with type 2 diabetes mellitus    Calcification of aorta    Bacteremia due to group B Streptococcus    Chronic bilateral low back pain with bilateral sciatica    Pain of left sternoclavicular joint    Kidney dysfunction    Other proteinuria    MGUS (monoclonal gammopathy of unknown significance)    Renovascular hypertension    Acute pyelonephritis    Hyponatremia    Hyperkalemia    Pre-op evaluation    ESRD (end stage renal disease) on dialysis    Anemia of chronic kidney failure, stage 5    Hemodialysis catheter malfunction    Acute cystitis with hematuria    Encounter for colonoscopy due to history of colonic polyp    Pleural effusion, right    Hypocalcemia    SOB (shortness of breath)       Review of patient's allergies indicates:   Allergen Reactions    Shrimp Hives, Itching and Rash    Topamax [topiramate] Other (See Comments)     Vision changes    Zoloft [sertraline] Palpitations       Current Inpatient Medications:    acetaminophen  650 mg Per NG tube Once    antithymocyte globulin (rabbit), hydrocortisone 20mg, with optional heparin 1000 units in NS 500ml (FOR PERIPHERAL LINE ADMINISTRATION ONLY)  1.5 mg/kg (Adjusted) Intravenous Once    diphenhydrAMINE  50 mg Intravenous Once    heparin (porcine)  5,000 Units Subcutaneous Once    methylPREDNISolone sodium succinate injection  500 mg Intravenous Once       Current Facility-Administered Medications on File Prior to Encounter   Medication Dose Route Frequency Provider Last Rate Last Admin    heparin (porcine) injection 1,000 Units  1,000 Units Intravenous Continuous Ehsan Quezada MD   Held at 03/10/23 0615    heparin (porcine) injection  "3,000 Units  3,000 Units Intravenous Once Ehsan Quezada MD         Current Outpatient Medications on File Prior to Encounter   Medication Sig Dispense Refill    atorvastatin (LIPITOR) 40 MG tablet Take 40 mg by mouth every evening.      blood sugar diagnostic Strp Checks BG ac/hs 200 strip 7    calcitRIOL (ROCALTROL) 0.25 MCG Cap Take 0.25 mcg by mouth once daily.      carvediloL (COREG) 3.125 MG tablet Take 3.125 mg by mouth 2 (two) times daily. Hold for SBP <130      clonazePAM (KLONOPIN) 0.5 MG tablet Take 0.5 mg by mouth 2 (two) times daily as needed.      folic acid (FOLVITE) 1 MG tablet Take 1 tablet (1 mg total) by mouth once daily. 30 tablet 11    HUMALOG KWIKPEN INSULIN 100 unit/mL pen Inject 3 Units into the skin 3 (three) times daily with meals. SLIDE SCALE      hydrALAZINE (APRESOLINE) 50 MG tablet Take 50 mg by mouth 3 (three) times daily.      insulin syringe-needle U-100 (INSULIN SYRINGE) 1/2 mL 30 x 5/16" Syrg Uses 4 daily 200 each 12    lancets (ONETOUCH DELICA LANCETS) 33 gauge Misc 1 lancet by Misc.(Non-Drug; Combo Route) route 4 (four) times daily before meals and nightly. 200 each 7    LANTUS SOLOSTAR U-100 INSULIN glargine 100 units/mL (3mL) SubQ pen Inject 50 Units into the skin once daily.  0    LIDOcaine-prilocaine (EMLA) cream This medicine is used to help with pain with the needles before dialysis. Please apply a small amount to the access and wrap with Saran Wrap about 1 hour before dialysis. 2 g 3    oxyCODONE (ROXICODONE) 5 MG immediate release tablet Take 1 tablet (5 mg total) by mouth every 4 (four) hours as needed for Pain. 12 tablet 0    sevelamer carbonate (RENVELA) 800 mg Tab Take 1 tablet (800 mg total) by mouth 3 (three) times daily with meals. (Patient taking differently: Take 800 mg by mouth 3 (three) times daily with meals. Take 2 tab w/ meal & 1 tab w/ snack) 90 tablet 3    SYRINGE & NEEDLE,INSULIN,1 ML (INSULIN SYRINGE-NEEDLE U-100) 1 mL 29 X 7/16" Syrg   " 11    timolol maleate 0.5% (TIMOPTIC) 0.5 % Drop Place 2 drops in both eyes twice daily      TRULICITY 4.5 mg/0.5 mL pen injector Inject 4.5 mg into the skin once a week.      zolpidem (AMBIEN) 10 mg Tab Take 10 mg by mouth nightly as needed.         Past Surgical History:   Procedure Laterality Date    AV FISTULA PLACEMENT Left 2023    Procedure: CREATION, AV FISTULA;  Surgeon: LIDIA Kamara III, MD;  Location: [facility] OR 2ND FLR;  Service: Peripheral Vascular;  Laterality: Left;  LUE AV graft creation    BIOPSY N/A 2020    Procedure: Biopsy;  Surgeon: Sweta Catalan MD;  Location: [facility] OR 2ND FLR;  Service: Transplant;  Laterality: N/A;    BONE MARROW BIOPSY N/A      SECTION      x 2    COLONOSCOPY N/A 2022    Procedure: COLONOSCOPY;  Surgeon: Franklin Gan MD;  Location: [facility] ENDO (4TH FLR);  Service: Colon and Rectal;  Laterality: N/A;  order created: previous order unusable  fully vaccinated, labs prior, instr mailed / portal -ml    KIDNEY TRANSPLANT  2014    LAPAROSCOPIC REVISION OF PROCEDURE INVOLVING PERITONEAL DIALYSIS CATHETER N/A 2022    Procedure: REVISION OF PROCEDURE INVOLVING PERITONEAL DIALYSIS CATHETER, LAPAROSCOPIC;  Surgeon: Frankiln Barber MD;  Location: [facility] OR 2ND FLR;  Service: General;  Laterality: N/A;    MEDIPORT REMOVAL N/A 2019    Procedure: REMOVAL, CATHETER, CENTRAL VENOUS, TUNNELED, WITH PORT;  Surgeon: Eusebia Diagnostic Provider;  Location: [facility];  Service: Radiology;  Laterality: N/A;    RENAL BIOPSY      ROTATOR CUFF REPAIR      TUBAL LIGATION         Social History:  Tobacco Use: Medium Risk    Smoking Tobacco Use: Former    Smokeless Tobacco Use: Never    Passive Exposure: Not on file       Alcohol Use: Not on file       OBJECTIVE:     Vital Signs Range:  BMI Readings from Last 1 Encounters:   03/10/23 28.38 kg/m²       Temp:  [36.7 °C (98 °F)]   Pulse:  [85-97]   Resp:  [18]   BP: (169-189)/(90-97)   SpO2:  [99 %]         Significant Labs:        Component Value Date/Time    WBC 5.92 03/01/2023 0000    HGB 10.8 (L) 03/01/2023 0000    HCT 32.0 (L) 03/01/2023 0000    HCT 23 (L) 12/02/2014 1426     03/01/2023 0000     03/01/2023 0000    K 4.6 03/01/2023 0000     03/01/2023 0000    CO2 28 11/13/2022 0425     (H) 11/13/2022 0425    BUN 34 (H) 11/13/2022 0425    CREATININE 10.50 (H) 03/01/2023 0000    MG 1.9 11/12/2022 0512    PHOS 4.4 03/01/2023 0000    CALCIUM 10.4 03/01/2023 0000    ALBUMIN 3.8 03/01/2023 0000    PROT 8.3 11/13/2022 0425    ALKPHOS 48 03/01/2023 0000    BILITOT 0.4 11/13/2022 0425    AST 14 11/13/2022 0425    ALT 10 03/01/2023 0000    INR 1.0 10/10/2022 1542    HGBA1C 7.9 (H) 10/05/2022 0000        Please see Results Review for additional labs.     Diagnostic Studies: No relevant studies.    EKG:   Results for orders placed or performed during the hospital encounter of 11/12/22   EKG 12-lead    Collection Time: 11/12/22  5:10 AM    Narrative    Test Reason : R06.02,    Vent. Rate : 088 BPM     Atrial Rate : 088 BPM     P-R Int : 162 ms          QRS Dur : 072 ms      QT Int : 418 ms       P-R-T Axes : 062 023 062 degrees     QTc Int : 505 ms    Normal sinus rhythm  Anterior infarct ,age undetermined  Prolonged QT  Abnormal ECG  When compared with ECG of 26-OCT-2022 12:21,  No significant change was found  Confirmed by Denies SAM, Cristian BENÍTEZ ([id number]) on 11/15/2022 10:29:08 PM    Referred By: [email or web address]   SELF           Confirmed By:Cristian Walker MD       ECHO:  Results for orders placed during the hospital encounter of 11/12/22    Echo    Interpretation Summary  · The estimated ejection fraction is 55%.  · The left ventricle is normal in size with concentric hypertrophy and normal systolic function.  · Severe left atrial enlargement.  · Grade II left ventricular diastolic dysfunction.  · Moderate mitral regurgitation.  · Small pericardial effusion.  · Mild right atrial enlargement.  · There is mild  aortic valve stenosis.  · Aortic valve area is 1.68 cm2; peak velocity is 2.10 m/s; mean gradient is 10 mmHg.  · Normal central venous pressure (3 mmHg).  · The estimated PA systolic pressure is 38 mmHg.        ASSESSMENT/PLAN:       Pre-op Assessment    I have reviewed the Patient Summary Reports.       I have reviewed the Medications.     Review of Systems  Anesthesia Hx:  No problems with previous Anesthesia  History of prior surgery of interest to airway management or planning: Previous anesthesia: General   Social:  Former Smoker, No Alcohol Use 20 pack years   Hematology/Oncology:  Hematology Normal   Oncology Normal     EENT/Dental:EENT/Dental Normal   Cardiovascular:   Exercise tolerance: good Hypertension, well controlled    Pulmonary:  Pulmonary Normal    Renal/:   Chronic Renal Disease, CRI, Dialysis, ESRD    Musculoskeletal:   Arthritis     Neurological:   Neuromuscular Disease,    Endocrine:   Diabetes, well controlled, type 2    Dermatological:  Skin Normal    Psych:   Psychiatric History          Physical Exam  General: Well nourished and Cooperative    Airway:  Mallampati: II   Mouth Opening: Normal  TM Distance: Normal  Tongue: Normal  Neck ROM: Normal ROM    Dental:  Dentures        Anesthesia Plan  Type of Anesthesia, risks & benefits discussed:    Anesthesia Type: Gen ETT  Intra-op Monitoring Plan: Standard ASA Monitors and Art Line  Post Op Pain Control Plan: multimodal analgesia and IV/PO Opioids PRN  Induction:  IV  Airway Plan: Direct and Video, Post-Induction  Informed Consent: Informed consent signed with the Patient and all parties understand the risks and agree with anesthesia plan.  All questions answered.   ASA Score: 3  Day of Surgery Review of History & Physical: H&P Update referred to the surgeon/provider.    Ready For Surgery From Anesthesia Perspective.     .

## 2023-03-10 NOTE — NURSING TRANSFER
Nursing Transfer Note      3/10/2023     Reason patient is being transferred: post procedure    Transfer To: [id number]    Transfer via stretcher    Transfer with IVF    Transported by PCT    Medicines sent: none    Any special needs or follow-up needed: routine    Chart send with patient: Yes    Notified: family    Patient reassessed at: 1700 3/10/2023

## 2023-03-10 NOTE — PLAN OF CARE
Pre-operative Discussion Note  Kidney Transplant Surgery    Elizabeth Maldonado is a 58 y.o. female with ESRD, requiring chronic dialysis admitted for kidney transplant.  I discussed the planned procedure in detail, including expected hospital course and outcomes, benefits, risks, and potential complications.  Complications discussed included death, graft failure, bleeding, infection, vascular thrombosis, and rejection.  I discussed the risks of anesthesia, as well as the potential need for re-operation.  The possibility of other complications not specifically mentioned was also discussed.  Also, I discussed the need for lifelong immunosuppression and the possibility of serious complications from immunosuppressive drugs.    The discussion included the risks that the patient will incur if she elects to not have the proposed procedure.    Relevant donor-specific risk factors were disclosed and discussed with the patient, including none    Specific PHS donor risk criteria for the organ donor include:  None    HCV: NA    The patient was SARS-CoV-2 /COVID-19 tested with negative results.    COVID-19: I discussed the possibility of COVID-19 transmission with the patient. Although LEE testing is available for the virus using a technique which in theory should be very accurate, there is no data yet regarding the likelihood of a  false-negative test leading to virus transmission. Based on accuracy of testing for other viruses, it is expected that this risk is extremely small.     I also discussed that transplant immunosuppression will increase susceptibility to COVID-19 and other viruses, and that although we use stringent precautions to protect patients from infection, it is possible for a transplant recipient to contract this infection. If COVID-19 infection should occur, it would be a serious matter with a significant risk of death.    All questions were answered.  The patient and available family members voice  understanding and agree to proceed with the transplant.    UNOS Patient Status  Note on scores:  ICU = 10 = total assistance  TSU = 20-30 = partial assistance  Outpatient admitted for transplant requiring medical care in last year = 40-50 = partial assistance  Scores 60 or higher indicate no assistance, meaning no need for medical care in last year. This would be very unusual for a transplant candidate.    UNOS Patient Status  Functional Status: 50% - Requires considerable assistance and frequent medical care  Physical Capacity: No Limitations

## 2023-03-10 NOTE — ASSESSMENT & PLAN NOTE
BG goal: 140-180  T2DM on insulin at home.  Seen in PACU s/p kidney transplant.  Pt still drowsy unable to describe current DM meds.  On steroids  Will start on transition drip at WBD around 0.3 given kidney function and continue sliding scale insulin.    - Start on Transition drip at 0.6 with stepdown parameters.  - Continue sliding scale insulin with ISF of 50 starting at 150  - Will monitor and consider adding transition drip if BG elevated.  - POCT Glucose before meals, at bedtime and at 2 am  - Hypoglycemia protocol in place      ** Please notify Endocrine for any change and/or advance in diet**  ** Please call Endocrine for any BG related issues **     Discharge Planning:   TBD. Please notify endocrinology prior to discharge.

## 2023-03-10 NOTE — TELEPHONE ENCOUNTER
On Call Transplant Social Work Note / Psychosocial Review/Update due to possible organ offer  S  W called patient to assess for potential psychosocial concerns as patient may have an organ offer. SW spoke with patient who reports she is finishing up her dialysis treatment and  now headed [place] [facility]. Patient denies any issues with dialysis compliance.    Caregiver Plan: (Primary/Back-Up):  Fátima Alrae, pts sister- primary [phone]           Cyndy Bautista, pts friend-Back-up-  [phone]    Patient notes she has additional caregivers if needed.  .  Transportation Plan (Now/Post-transplant):   Patient's caregiver will transport.  Lodging Plan: (During/Post-hospitalization):    Patient lives locally and plans to return home post transplant.  Psychiatric or Mental Health Concerns: Patient notes she  is seeing Dr. Patricia Asencio  with [facility] and taking clonazepam prn.  Additional Concerns or Needs:  No other concerns/needs identified or indicated at this time.  Note:   Insurance Changes?   no changes  Able to Afford Costs, including Medications, Food, Travel Expenses (Self/Caregiver)?  yes, no issues reported.  Resources?   None at this time.    If patient is transplanted, transplant social work to follow. Transplant social work team remains available, and contact information provided. Psychosocial education, support, and resources provided.

## 2023-03-10 NOTE — CONSULTS
[facility] - Surgery ([facility])  Endocrinology  Diabetes Consult Note    Consult Requested by: Marquise Fuchs MD   Reason for admit: ESRD (end stage renal disease) on dialysis    HISTORY OF PRESENT ILLNESS:  Reason for Consult: Management of T2DM, Hyperglycemia     Surgical Procedure and Date: KIdney Transplant scheduled for 3/10/2023    Diabetes diagnosis year: DANUTA    Home Diabetes Medications:  Humalog 3 units AC , Lantus 50 units daily, and Trulicity 4.5 mg weekly (per med rec)    How often checking glucose at home? 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 PMH includes HTN, HFpEF, HLD, and MGUS (IgG kappa MGUS dx 2013). She has been on the kidney transplant waitlist since 3/29/22. She was started on PD 3/2022 then switched to HD 10/2022. She dialyzes on a MWF schedule. Last HD 3/8/23. DW 81kg. She is being admitted for kidney transplant surgery with Dr. Fuchs. She feels well in her usual state of health. She denies any recent fevers or illnesses. CXR 3/9 with mild LLL opacity. Patient denies fever, SOB, or chest pain. InOR 3/10/2023 for kidney tx. Endocrine consulted to manage hyperglycemia and T2DM.     Lab Results   Component Value Date    HGBA1C 7.9 (H) 10/05/2022                Interval HPI:   Overnight events: No acute events overnight. [address] Blood glucose stable. BG at goal on current insulin regimen (SSI ). Steroid use- Methylprednisolone  500 mg perioperatively . Day of Surgery  Renal function- Abnormal - Creatinine 10.3   Vasopressors-  None       Endocrine will continue to follow and manage insulin orders inpatient.         Diet NPO Except for: Sips with Medication      Eating:   NPO  Nausea: No  Hypoglycemia and intervention: No  Fever: No  TPN and/or TF: No      PMH, PSH, FH, SH updated and reviewed     ROS:  Review of Systems   Unable to perform ROS: Acuity of condition (sedated post sx)     Current Medications and/or Treatments Impacting Glycemic Control  Immunotherapy:    Immunosuppressants       None          Steroids:   Hormones (From admission, onward)      Start     Stop Route Frequency Ordered    03/10/23 0845  methylPREDNISolone sodium succinate (SOLU-MEDROL) 500 mg in dextrose 5 % (D5W) 100 mL IVPB  (IP TXP INTRA-OP THYMO - PERIPHERAL THYMO PRECHECKED)         -- IV Once 03/10/23 0831          Pressors:    Autonomic Drugs (From admission, onward)      None          Hyperglycemia/Diabetes Medications:   Antihyperglycemics (From admission, onward)      None             PHYSICAL EXAMINATION:  Vitals:    03/10/23 1004   BP: (!) 184/83   Pulse: 90   Resp: 18   Temp: 97.9 °F (36.6 °C)     Body mass index is 28.38 kg/m².    Physical Exam  HENT:      Head: Normocephalic and atraumatic.   Cardiovascular:      Rate and Rhythm: Normal rate and regular rhythm.   Pulmonary:      Effort: Pulmonary effort is normal.   Abdominal:      General: Abdomen is flat. There is no distension.   Musculoskeletal:      Right lower leg: No edema.      Left lower leg: No edema.   Skin:     Findings: No lesion or rash.   Neurological:      Comments: sedated           Labs Reviewed and Include   Recent Labs   Lab 03/10/23  0900   *   CALCIUM 10.4   ALBUMIN 3.3*   PROT 9.0*      K 4.4   CO2 29      BUN 45*   CREATININE 10.3*   ALKPHOS 52*   ALT 8*   AST 15   BILITOT 0.5     Lab Results   Component Value Date    WBC 5.20 03/10/2023    HGB 10.1 (L) 03/10/2023    HCT 29.5 (L) 03/10/2023    MCV 97 03/10/2023     (L) 03/10/2023     No results for input(s): TSH, FREET4 in the last 168 hours.  Lab Results   Component Value Date    HGBA1C 7.9 (H) 10/05/2022       Nutritional  status:   Body mass index is 28.38 kg/m².  Lab Results   Component Value Date    ALBUMIN 3.3 (L) 03/10/2023    ALBUMIN 3.8 03/01/2023    ALBUMIN 3.8 02/06/2023     No results found for: PREALBUMIN    Estimated Creatinine Clearance: 6.4 mL/min (A) (based on SCr of 10.3 mg/dL (H)).    Accu-Checks  Recent Labs     03/10/23  1444   POCTGLUCOSE 179*        ASSESSMENT and PLAN    Renal/  * ESRD (end stage renal disease) on dialysis  Titrate insulin slowly to avoid hypoglycemia as the risk of hypoglycemia increases with decreased creatinine clearance.    Estimated Creatinine Clearance: 6.4 mL/min (A) (based on SCr of 10.3 mg/dL (H)).        Endocrine  Type 2 diabetes mellitus, with long-term current use of insulin  BG goal: 140-180  T2DM on insulin at home.  Seen in PACU s/p kidney transplant.  Pt still drowsy unable to describe current DM meds.  On steroids  Will start on transition drip at WBD around 0.3 given kidney function and continue sliding scale insulin.    - Start on Transition drip at 0.6 with stepdown parameters.  - Continue sliding scale insulin with ISF of 50 starting at 150  - Will monitor and consider adding transition drip if BG elevated.  - POCT Glucose before meals, at bedtime and at 2 am  - Hypoglycemia protocol in place      ** Please notify Endocrine for any change and/or advance in diet**  ** Please call Endocrine for any BG related issues **     Discharge Planning:   TBD. Please notify endocrinology prior to discharge.          Other  Adrenal corticosteroid causing adverse effect in therapeutic use  Glucocorticoids markedly increase glucose levels. Expect the steroid taper will help glucose control.             Plan discussed with patient, family, and RN at bedside.     Bk Summers PA-C  Endocrinology  [facility] - Surgery (2nd Fl)

## 2023-03-10 NOTE — SUBJECTIVE & OBJECTIVE
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 PMH includes HTN, HFpEF, HLD, and MGUS (IgG kappa MGUS dx 2013). She has been on the kidney transplant waitlist since 3/29/22. She was started on PD 3/2022 then switched to HD 10/2022. She dialyzes on a MWF schedule. Last HD 3/8/23. DW 81kg. She is being admitted for kidney transplant surgery with Dr. Fuchs. She feels well in her usual state of health. She denies any recent fevers or illnesses. CXR 3/9 with mild LLL opacity. Patient denies fever, SOB, or chest pain. Pre op labs and diagnostic studies pending.     Dialysis History: Ms. Johnson with ESRD, requiring chronic dialysis who is on hemodialysis started on 3/2022. Patient is dialyzing on MWF schedule.  Patient reports that she is tolerating dialysis well.  Date of Last Dialysis: 3/8/23    Native urine output per day: < 180 mL    Previous Transplant: yes; organ: kidney, date: 2014, complications: nephropathy due to CNI and HTN.    PTA Medications   Medication Sig    atorvastatin (LIPITOR) 40 MG tablet Take 40 mg by mouth every evening.    blood sugar diagnostic Strp Checks BG ac/hs    calcitRIOL (ROCALTROL) 0.25 MCG Cap Take 0.25 mcg by mouth once daily.    carvediloL (COREG) 3.125 MG tablet Take 3.125 mg by mouth 2 (two) times daily. Hold for SBP <130    clonazePAM (KLONOPIN) 0.5 MG tablet Take 0.5 mg by mouth 2 (two) times daily as needed.    folic acid (FOLVITE) 1 MG tablet Take 1 tablet (1 mg total) by mouth once daily.    HUMALOG KWIKPEN INSULIN 100 unit/mL pen Inject 3 Units into the skin 3 (three) times daily with meals. SLIDE SCALE    hydrALAZINE (APRESOLINE) 50 MG tablet Take 50 mg by mouth 3 (three) times daily.    insulin syringe-needle U-100 (INSULIN SYRINGE) 1/2 mL 30 x 5/16" Syrg Uses 4 daily    lancets (ONETOUCH DELICA LANCETS) 33 gauge Misc 1 " "lancet by Misc.(Non-Drug; Combo Route) route 4 (four) times daily before meals and nightly.    LANTUS SOLOSTAR U-100 INSULIN glargine 100 units/mL (3mL) SubQ pen Inject 50 Units into the skin once daily.    LIDOcaine-prilocaine (EMLA) cream This medicine is used to help with pain with the needles before dialysis. Please apply a small amount to the access and wrap with Saran Wrap about 1 hour before dialysis.    oxyCODONE (ROXICODONE) 5 MG immediate release tablet Take 1 tablet (5 mg total) by mouth every 4 (four) hours as needed for Pain.    sevelamer carbonate (RENVELA) 800 mg Tab Take 1 tablet (800 mg total) by mouth 3 (three) times daily with meals. (Patient taking differently: Take 800 mg by mouth 3 (three) times daily with meals. Take 2 tab w/ meal & 1 tab w/ snack)    SYRINGE & NEEDLE,INSULIN,1 ML (INSULIN SYRINGE-NEEDLE U-100) 1 mL 29 X 7/16" Syrg     timolol maleate 0.5% (TIMOPTIC) 0.5 % Drop Place 2 drops in both eyes twice daily    TRULICITY 4.5 mg/0.5 mL pen injector Inject 4.5 mg into the skin once a week.    zolpidem (AMBIEN) 10 mg Tab Take 10 mg by mouth nightly as needed.       Review of patient's allergies indicates:   Allergen Reactions    Shrimp Hives, Itching and Rash    Topamax [topiramate] Other (See Comments)     Vision changes    Zoloft [sertraline] Palpitations       Past Medical History:   Diagnosis Date    Acidosis 7/1/2014    Allergic rhinitis 7/1/2014    Allergy     Anemia     Anemia in chronic kidney disease 7/1/2014    Anxiety     Cataract     Chronic bilateral low back pain with bilateral sciatica 10/25/2019    Chronic immunosuppression with Prograf and MMF 12/3/2014    CKD (chronic kidney disease) stage 5, GFR less than 15 ml/min 7/1/2014    CKD (chronic kidney disease), stage III 3/2/2015    Degenerative disc disease     Depression 7/1/2014    Diabetes mellitus, type 2 since age 18 7/1/2014    ESRD on peritoneal dialysis - august 2014 for 9 hours no peritonitis 11/24/2014    " Hyperlipidemia     Hypertension 2014    Hypomagnesemia 2015    Kidney transplant status, living unrelated donor - 12/2/14 12/3/2014    Neutropenia 2015    NS (nuclear sclerosis) 2016    Obesity     Organ transplant candidate 2014    Pre-op exam 2014    Proliferative diabetic retinopathy of both eyes without macular edema associated with type 2 diabetes mellitus 2016    Renal manifestation of secondary diabetes mellitus     Tendinitis     Trouble in sleeping      Past Surgical History:   Procedure Laterality Date    AV FISTULA PLACEMENT Left 2023    Procedure: CREATION, AV FISTULA;  Surgeon: LIDIA Kamara III, MD;  [facility];  Service: Peripheral Vascular;  Laterality: Left;  LUE AV graft creation    BIOPSY N/A 2020    Procedure: Biopsy;  Surgeon: Sweta Catalan MD;  [facility];  Service: Transplant;  Laterality: N/A;    BONE MARROW BIOPSY N/A      SECTION      x 2    COLONOSCOPY N/A 2022    Procedure: COLONOSCOPY;  Surgeon: Franklin Gan MD;  Location: [facility] ENDO (4TH FLR);  Service: Colon and Rectal;  Laterality: N/A;  order created: previous order unusable  fully vaccinated, labs prior, instr mailed / portal -ml    KIDNEY TRANSPLANT  2014    LAPAROSCOPIC REVISION OF PROCEDURE INVOLVING PERITONEAL DIALYSIS CATHETER N/A 2022    Procedure: REVISION OF PROCEDURE INVOLVING PERITONEAL DIALYSIS CATHETER, LAPAROSCOPIC;  Surgeon: Franklin Barber MD;  [facility];  Service: General;  Laterality: N/A;    MEDIPORT REMOVAL N/A 2019    Procedure: REMOVAL, CATHETER, CENTRAL VENOUS, TUNNELED, WITH PORT;  Surgeon: Eusebia Diagnostic Provider;  Location: [facility];  Service: Radiology;  Laterality: N/A;    RENAL BIOPSY      ROTATOR CUFF REPAIR      TUBAL LIGATION       Family History       Problem Relation (Age of Onset)    Cataracts Maternal Grandmother    Diabetes Mother, Father, Sister, Brother, Sister    Heart  disease Sister, Maternal Grandmother    Heart failure Sister    Hypertension Mother, Sister    Kidney disease Father, Sister    No Known Problems Maternal Aunt, Maternal Uncle, Paternal Aunt, Paternal Uncle, Maternal Grandfather, Paternal Grandmother, Paternal Grandfather    Stroke Maternal Grandmother          Tobacco Use    Smoking status: Former     Packs/day: 1.00     Years: 20.00     Pack years: 20.00     Types: Cigarettes     Quit date: 2013     Years since quittin.5    Smokeless tobacco: Never    Tobacco comments:     quit smoking cigarettes in 2014   Substance and Sexual Activity    Alcohol use: No    Drug use: No    Sexual activity: Yes     Partners: Male     Birth control/protection: Post-menopausal        Review of Systems   Constitutional:  Negative for appetite change, chills, fatigue and fever.   Respiratory:  Negative for cough, chest tightness and shortness of breath.    Cardiovascular:  Negative for chest pain, palpitations and leg swelling.   Gastrointestinal:  Negative for abdominal distention, abdominal pain, constipation, diarrhea, nausea and vomiting.   Genitourinary:  Positive for decreased urine volume. Negative for difficulty urinating, dysuria, frequency and urgency.   Musculoskeletal:  Negative for back pain and neck pain.   Skin:  Negative for color change, pallor, rash and wound.   Neurological:  Negative for dizziness, weakness and headaches.   Psychiatric/Behavioral:  Negative for confusion and sleep disturbance.    Objective:     Vital Signs (Most Recent):  Temp: 98 °F (36.7 °C) (03/10/23 0830)  Pulse: 97 (03/10/23 0830)  Resp: 18 (03/10/23 0830)  BP: (!) 178/92 (03/10/23 0830)  SpO2: 99 % (03/10/23 0830)       Weight: 82.2 kg (181 lb 3.5 oz)  Body mass index is 28.38 kg/m².     Physical Exam  Vitals and nursing note reviewed.   Constitutional:       Appearance: Normal appearance.   Cardiovascular:      Rate and Rhythm: Normal rate and regular rhythm.      Pulses:  Normal pulses.   Pulmonary:      Effort: Pulmonary effort is normal. No respiratory distress.      Breath sounds: Normal breath sounds. No decreased breath sounds, wheezing or rales.   Abdominal:      General: Bowel sounds are normal. There is no distension.      Palpations: Abdomen is soft.      Tenderness: There is no abdominal tenderness. There is no guarding.   Musculoskeletal:         General: Normal range of motion.   Skin:     General: Skin is warm and dry.   Neurological:      Mental Status: She is alert and oriented to person, place, and time.   Psychiatric:         Behavior: Behavior is cooperative.       Laboratory  CBC: No results for input(s): WBC, RBC, HGB, HCT, PLT, MCV, MCH, MCHC in the last 168 hours.  CMP: No results for input(s): GLU, CALCIUM, ALBUMIN, PROT, NA, K, CO2, CL, BUN, CREATININE, ALKPHOS, ALT, AST in the last 168 hours.    Invalid input(s): BILITO  Coagulation: No results for input(s): PT, APTT in the last 168 hours.  Labs within the past 24 hours have been reviewed.    Diagnostic Results:  US - Kidney: Results for orders placed during the hospital encounter of 03/09/23    US Transplant Kidney With Doppler    Narrative  EXAMINATION:  US TRANSPLANT KIDNEY WITH DOPPLER    CLINICAL HISTORY:  Awaiting organ transplant status    TECHNIQUE:  Real time gray scale and doppler ultrasound was performed over the patient's renal allograft.    COMPARISON:  transplant kidney ultrasound 12/23/2021    FINDINGS:  Patient status-post renal transplant in 2014.  The transplant lies in the right lower quadrant and measures 10.0 cm.    The renal transplant demonstrates no focal parenchymal abnormality. Mild hydronephrosis, improved.  No peritransplant fluid.    Renal arterial resistive indices are as follows:    Interlobar: 0.81, previously 0.81    Segmental Up: 1.0, previously 0.77    Segmental Mid: 1.0, previously 0.85    Segmental Low: 0.0, previously 0.81    There is no evidence of a tardus parvus  waveform. The maximum velocity in the main renal artery is 75 cm/sec (previously 130 cm/sec).  The maximum velocity in the iliac artery measures 114 cm/sec.  The RA/iliac ratio measures 0.66.    The renal transplant venous system is unremarkable.    Impression  Persistent elevated arterial resistive indices, as above.    Mild hydronephrosis, improved from the prior exam.      Electronically signed by resident: Alicia Roy  Date:    03/09/2023  Time:    10:54    Electronically signed by: Angel Abreu MD  Date:    03/09/2023  Time:    11:18    Covid test pending. If positive, patient instructed that surgery will be canceled and patient will be discharged home to quarantine. Patient would remain inactive on txp list for 28 days and will be assess at that time to be placed back on waitlist.

## 2023-03-10 NOTE — TRANSFER OF CARE
Anesthesia Transfer of Care Note    Patient: Elizabeth Maldonado    Procedure(s) Performed: Procedure(s) (LRB):  TRANSPLANT, KIDNEY (N/A)    Patient location: PACU    Anesthesia Type: general    Transport from OR: Transported from OR on 6-10 L/min O2 by face mask with adequate spontaneous ventilation    Post pain: adequate analgesia    Post assessment: no apparent anesthetic complications and tolerated procedure well    Post vital signs: stable    Level of consciousness: sedated and responds to stimulation    Nausea/Vomiting: no nausea/vomiting    Complications: none    Transfer of care protocol was followedComments: Bedside report to PACU RN, opportunity for questions given.       Last vitals:   Visit Vitals  /79 (BP Location: Right arm, Patient Position: Lying)   Pulse 78   Temp 36.6 °C (97.9 °F) (Temporal)   Resp 18   Wt 82.2 kg (181 lb 3.5 oz)   LMP 04/29/2011 (Approximate)   SpO2 100%   Breastfeeding No   BMI 28.38 kg/m²

## 2023-03-11 LAB
ALBUMIN SERPL BCP-MCNC: 2.8 G/DL (ref 3.5–5.2)
ANION GAP SERPL CALC-SCNC: 11 MMOL/L (ref 8–16)
BASOPHILS # BLD AUTO: 0.01 K/UL (ref 0–0.2)
BASOPHILS NFR BLD: 0.1 % (ref 0–1.9)
BUN SERPL-MCNC: 31 MG/DL (ref 6–20)
CALCIUM SERPL-MCNC: 8.7 MG/DL (ref 8.7–10.5)
CHLORIDE SERPL-SCNC: 101 MMOL/L (ref 95–110)
CO2 SERPL-SCNC: 21 MMOL/L (ref 23–29)
CREAT SERPL-MCNC: 5.8 MG/DL (ref 0.5–1.4)
DIFFERENTIAL METHOD: ABNORMAL
EOSINOPHIL # BLD AUTO: 0 K/UL (ref 0–0.5)
EOSINOPHIL NFR BLD: 0.1 % (ref 0–8)
ERYTHROCYTE [DISTWIDTH] IN BLOOD BY AUTOMATED COUNT: 16 % (ref 11.5–14.5)
EST. GFR  (NO RACE VARIABLE): 7.8 ML/MIN/1.73 M^2
GLUCOSE SERPL-MCNC: 214 MG/DL (ref 70–110)
HCT VFR BLD AUTO: 29.6 % (ref 37–48.5)
HGB BLD-MCNC: 9 G/DL (ref 12–16)
IMM GRANULOCYTES # BLD AUTO: 0.02 K/UL (ref 0–0.04)
IMM GRANULOCYTES NFR BLD AUTO: 0.3 % (ref 0–0.5)
LYMPHOCYTES # BLD AUTO: 0.2 K/UL (ref 1–4.8)
LYMPHOCYTES NFR BLD: 3.4 % (ref 18–48)
MAGNESIUM SERPL-MCNC: 1.9 MG/DL (ref 1.6–2.6)
MCH RBC QN AUTO: 29.9 PG (ref 27–31)
MCHC RBC AUTO-ENTMCNC: 30.4 G/DL (ref 32–36)
MCV RBC AUTO: 98 FL (ref 82–98)
MONOCYTES # BLD AUTO: 0.6 K/UL (ref 0.3–1)
MONOCYTES NFR BLD: 7.8 % (ref 4–15)
NEUTROPHILS # BLD AUTO: 6.3 K/UL (ref 1.8–7.7)
NEUTROPHILS NFR BLD: 88.3 % (ref 38–73)
NRBC BLD-RTO: 0 /100 WBC
PHOSPHATE SERPL-MCNC: 4.4 MG/DL (ref 2.7–4.5)
PLATELET # BLD AUTO: 99 K/UL (ref 150–450)
PMV BLD AUTO: 12.1 FL (ref 9.2–12.9)
POCT GLUCOSE: 177 MG/DL (ref 70–110)
POCT GLUCOSE: 237 MG/DL (ref 70–110)
POCT GLUCOSE: 315 MG/DL (ref 70–110)
POCT GLUCOSE: 325 MG/DL (ref 70–110)
POCT GLUCOSE: 348 MG/DL (ref 70–110)
POTASSIUM SERPL-SCNC: 4 MMOL/L (ref 3.5–5.1)
RBC # BLD AUTO: 3.01 M/UL (ref 4–5.4)
SODIUM SERPL-SCNC: 133 MMOL/L (ref 136–145)
TACROLIMUS BLD-MCNC: <2 NG/ML (ref 5–15)
WBC # BLD AUTO: 7.09 K/UL (ref 3.9–12.7)

## 2023-03-11 PROCEDURE — 87040 BLOOD CULTURE FOR BACTERIA: CPT | Mod: 59 | Performed by: NURSE PRACTITIONER

## 2023-03-11 PROCEDURE — 63600175 PHARM REV CODE 636 W HCPCS: Performed by: PHYSICIAN ASSISTANT

## 2023-03-11 PROCEDURE — 80069 RENAL FUNCTION PANEL: CPT | Performed by: TRANSPLANT SURGERY

## 2023-03-11 PROCEDURE — 20600001 HC STEP DOWN PRIVATE ROOM

## 2023-03-11 PROCEDURE — 63600175 PHARM REV CODE 636 W HCPCS: Performed by: INTERNAL MEDICINE

## 2023-03-11 PROCEDURE — 80197 ASSAY OF TACROLIMUS: CPT | Performed by: TRANSPLANT SURGERY

## 2023-03-11 PROCEDURE — 63600175 PHARM REV CODE 636 W HCPCS: Performed by: NURSE PRACTITIONER

## 2023-03-11 PROCEDURE — 83735 ASSAY OF MAGNESIUM: CPT | Performed by: TRANSPLANT SURGERY

## 2023-03-11 PROCEDURE — 25000003 PHARM REV CODE 250: Performed by: INTERNAL MEDICINE

## 2023-03-11 PROCEDURE — 63600175 PHARM REV CODE 636 W HCPCS: Performed by: SURGERY

## 2023-03-11 PROCEDURE — 25000003 PHARM REV CODE 250: Performed by: NURSE PRACTITIONER

## 2023-03-11 PROCEDURE — 85025 COMPLETE CBC W/AUTO DIFF WBC: CPT | Performed by: TRANSPLANT SURGERY

## 2023-03-11 PROCEDURE — 25000003 PHARM REV CODE 250: Performed by: SURGERY

## 2023-03-11 PROCEDURE — 99232 PR SUBSEQUENT HOSPITAL CARE,LEVL II: ICD-10-PCS | Mod: ,,, | Performed by: PHYSICIAN ASSISTANT

## 2023-03-11 PROCEDURE — 99232 SBSQ HOSP IP/OBS MODERATE 35: CPT | Mod: ,,, | Performed by: PHYSICIAN ASSISTANT

## 2023-03-11 PROCEDURE — 99900035 HC TECH TIME PER 15 MIN (STAT)

## 2023-03-11 PROCEDURE — 25000003 PHARM REV CODE 250: Performed by: PHYSICIAN ASSISTANT

## 2023-03-11 PROCEDURE — 36415 COLL VENOUS BLD VENIPUNCTURE: CPT | Performed by: NURSE PRACTITIONER

## 2023-03-11 PROCEDURE — 36415 COLL VENOUS BLD VENIPUNCTURE: CPT | Performed by: TRANSPLANT SURGERY

## 2023-03-11 RX ORDER — SIMETHICONE 80 MG
2 TABLET,CHEWABLE ORAL 3 TIMES DAILY PRN
Status: DISCONTINUED | OUTPATIENT
Start: 2023-03-11 | End: 2023-03-13 | Stop reason: HOSPADM

## 2023-03-11 RX ORDER — TACROLIMUS 1 MG/1
4 CAPSULE ORAL 2 TIMES DAILY
Status: DISCONTINUED | OUTPATIENT
Start: 2023-03-11 | End: 2023-03-12

## 2023-03-11 RX ORDER — SODIUM CHLORIDE 9 MG/ML
INJECTION, SOLUTION INTRAVENOUS CONTINUOUS
Status: DISCONTINUED | OUTPATIENT
Start: 2023-03-11 | End: 2023-03-11

## 2023-03-11 RX ORDER — INSULIN ASPART 100 [IU]/ML
3-6 INJECTION, SOLUTION INTRAVENOUS; SUBCUTANEOUS
Status: DISCONTINUED | OUTPATIENT
Start: 2023-03-11 | End: 2023-03-13 | Stop reason: HOSPADM

## 2023-03-11 RX ORDER — TIMOLOL MALEATE 5 MG/ML
1 SOLUTION/ DROPS OPHTHALMIC 2 TIMES DAILY
Status: DISCONTINUED | OUTPATIENT
Start: 2023-03-11 | End: 2023-03-13 | Stop reason: HOSPADM

## 2023-03-11 RX ORDER — INSULIN ASPART 100 [IU]/ML
0-10 INJECTION, SOLUTION INTRAVENOUS; SUBCUTANEOUS
Status: DISCONTINUED | OUTPATIENT
Start: 2023-03-11 | End: 2023-03-13 | Stop reason: HOSPADM

## 2023-03-11 RX ADMIN — DAPTOMYCIN 660 MG: 350 INJECTION, POWDER, LYOPHILIZED, FOR SOLUTION INTRAVENOUS at 09:03

## 2023-03-11 RX ADMIN — HEPARIN SODIUM 5000 UNITS: 5000 INJECTION INTRAVENOUS; SUBCUTANEOUS at 01:03

## 2023-03-11 RX ADMIN — TACROLIMUS 3 MG: 1 CAPSULE ORAL at 08:03

## 2023-03-11 RX ADMIN — THERA TABS 1 TABLET: TAB at 08:03

## 2023-03-11 RX ADMIN — INSULIN ASPART 4 UNITS: 100 INJECTION, SOLUTION INTRAVENOUS; SUBCUTANEOUS at 09:03

## 2023-03-11 RX ADMIN — CEFAZOLIN 2 G: 2 INJECTION, POWDER, FOR SOLUTION INTRAMUSCULAR; INTRAVENOUS at 01:03

## 2023-03-11 RX ADMIN — ACETAMINOPHEN 650 MG: 325 TABLET ORAL at 08:03

## 2023-03-11 RX ADMIN — INSULIN ASPART 6 UNITS: 100 INJECTION, SOLUTION INTRAVENOUS; SUBCUTANEOUS at 06:03

## 2023-03-11 RX ADMIN — HYDRALAZINE HYDROCHLORIDE 50 MG: 50 TABLET ORAL at 06:03

## 2023-03-11 RX ADMIN — HEPARIN SODIUM 5000 UNITS: 5000 INJECTION INTRAVENOUS; SUBCUTANEOUS at 08:03

## 2023-03-11 RX ADMIN — DOCUSATE SODIUM 100 MG: 100 CAPSULE, LIQUID FILLED ORAL at 08:03

## 2023-03-11 RX ADMIN — HYDRALAZINE HYDROCHLORIDE 50 MG: 50 TABLET ORAL at 01:03

## 2023-03-11 RX ADMIN — MUPIROCIN 1 G: 20 OINTMENT TOPICAL at 08:03

## 2023-03-11 RX ADMIN — MYCOPHENOLATE MOFETIL 1000 MG: 250 CAPSULE ORAL at 08:03

## 2023-03-11 RX ADMIN — HEPARIN SODIUM 5000 UNITS: 5000 INJECTION INTRAVENOUS; SUBCUTANEOUS at 06:03

## 2023-03-11 RX ADMIN — Medication 6 MG: at 08:03

## 2023-03-11 RX ADMIN — TRAMADOL HYDROCHLORIDE 50 MG: 50 TABLET, COATED ORAL at 02:03

## 2023-03-11 RX ADMIN — CARVEDILOL 3.12 MG: 3.12 TABLET, FILM COATED ORAL at 08:03

## 2023-03-11 RX ADMIN — TIMOLOL MALEATE 1 DROP: 5 SOLUTION/ DROPS OPHTHALMIC at 08:03

## 2023-03-11 RX ADMIN — INSULIN ASPART 6 UNITS: 100 INJECTION, SOLUTION INTRAVENOUS; SUBCUTANEOUS at 01:03

## 2023-03-11 RX ADMIN — HYDRALAZINE HYDROCHLORIDE 50 MG: 50 TABLET ORAL at 08:03

## 2023-03-11 RX ADMIN — INSULIN ASPART 4 UNITS: 100 INJECTION, SOLUTION INTRAVENOUS; SUBCUTANEOUS at 01:03

## 2023-03-11 RX ADMIN — TACROLIMUS 4 MG: 1 CAPSULE ORAL at 06:03

## 2023-03-11 RX ADMIN — ACETAMINOPHEN 650 MG: 325 TABLET ORAL at 06:03

## 2023-03-11 RX ADMIN — ACETAMINOPHEN 650 MG: 325 TABLET ORAL at 01:03

## 2023-03-11 RX ADMIN — DIPHENHYDRAMINE HYDROCHLORIDE 25 MG: 25 CAPSULE ORAL at 01:03

## 2023-03-11 RX ADMIN — ANTI-THYMOCYTE GLOBULIN (RABBIT) 100 MG: 5 INJECTION, POWDER, LYOPHILIZED, FOR SOLUTION INTRAVENOUS at 02:03

## 2023-03-11 RX ADMIN — SIMETHICONE 160 MG: 80 TABLET, CHEWABLE ORAL at 07:03

## 2023-03-11 RX ADMIN — BISACODYL 10 MG: 5 TABLET, COATED ORAL at 08:03

## 2023-03-11 RX ADMIN — DOCUSATE SODIUM 100 MG: 100 CAPSULE, LIQUID FILLED ORAL at 01:03

## 2023-03-11 RX ADMIN — TIMOLOL MALEATE 1 DROP: 5 SOLUTION/ DROPS OPHTHALMIC at 10:03

## 2023-03-11 RX ADMIN — METHYLPREDNISOLONE SODIUM SUCCINATE 250 MG: 125 INJECTION, POWDER, FOR SOLUTION INTRAMUSCULAR; INTRAVENOUS at 01:03

## 2023-03-11 RX ADMIN — ATORVASTATIN CALCIUM 40 MG: 40 TABLET, FILM COATED ORAL at 08:03

## 2023-03-11 RX ADMIN — INSULIN ASPART 6 UNITS: 100 INJECTION, SOLUTION INTRAVENOUS; SUBCUTANEOUS at 10:03

## 2023-03-11 RX ADMIN — FUROSEMIDE 120 MG: 10 INJECTION, SOLUTION INTRAMUSCULAR; INTRAVENOUS at 12:03

## 2023-03-11 RX ADMIN — FAMOTIDINE 20 MG: 20 TABLET ORAL at 08:03

## 2023-03-11 NOTE — PLAN OF CARE
AAOx4, VSS- BP slightly high during HD; PRN clonidine given along with scheduled BP meds; returned to baseline  AC/HS + 0200; insulin gtt @ 0.6ml/hr; supplemental insulin given overnight  HD completed around 2200; refer to chart for repeat CMP/CBC. JOSE Vigil made aware  I=O q1h with D5 1/2 NS @ 50. 1x 120mg IV lasix given with adequate results; refer to flowsheet for hourly UOP. Meyer remains CDI  LLQ remains CDI with surgical dressing, minimal dry drainage seen on bandage  Pain moderately controlled with scheduled tylenol + PRN tramadol. Pt states that oxycodone does not work on her  Plan for thymo x2 today  Sleeping off and on overnight, non skid socks worn, [place]/SCDs in place, call light within reach, will cont to monitor  Self meds set up for AM

## 2023-03-11 NOTE — PLAN OF CARE
Pt admitted for surgery, received second kidney transplant on 3/10/23. Pt is AAOx4, VSS on bedside monitor, afebrile, on RA. Pt is orthostatic - standing BP preferred. Creatinine 5.8, pt received HD yesterday evening to LUE fistula +/+. Pt currently receiving thymo dose #2, tolerating thus far. Insulin gtt @ 1.1unit/hr. Accuchecks AC/HS and 2am. Renal diet, pt tolerating well. Belching and passing flatus, no BM yet. LLQ incision with surgical dressing still in place, dried drainage in place. NS @ 75cc/hr, pt on pathway earlier today. 7-day gresham in place, urine is pink-tinged. PRN tylenol given for pain. Pt is up with standby-assist, remains free from falls/injuries so far this shift. Nonskid socks on, TEDs on, bed locked/lowest position, call bell within reach. Pt verbalized understanding to call for any needs/assistance.

## 2023-03-11 NOTE — PROGRESS NOTES
03/10/23 1845   Vital Signs   Temp 97.5 °F (36.4 °C)   Temp Source Oral   BP (!) 196/88   MAP (mmHg) 126   BP Location Right leg   BP Method Automatic   Patient Position Lying   Pre-Hemodialysis Assessment   Treatment Status Started   Gross Bleach Negative Yes   Machine Number 20   Alarms Verified Yes   pH 7   Machine Temperature 97.7 °F (36.5 °C)   Dialyzer F-160   Machine Conductivity 14   Meter Conductivity 14   Dialysate Na (mEq/L) 138   Dialysate K (mEq/L) 2   Dialysate CA (mEq/L) 2.5   Dialysate HCO3 (mEq/L) 30   Prime Ordered (mL) 500 mL   Pre-Hemodialysis Comments pt stable for HD TX        Hemodialysis AV Fistula 01/05/23 0800 Left upper arm   Placement Date/Time: 01/05/23 0800   Hand Hygiene: Performed  Barrier Precautions: Performed  Location: Left upper arm   Needle Size 15ga   Site Assessment Clean;Dry;Intact;No redness;No swelling   Patency Present;Thrill;Bruit   Status Accessed   Flows Good   Dressing Intervention Sterile dressing change   Dressing Status Clean;Dry;Intact   Site Condition No complications   Dressing Gauze   During Hemodialysis Assessment   Blood Flow Rate (mL/min) 400 mL/min   Dialysate Flow Rate (mL/min) 800 ml/min   Ultrafiltration Rate (mL/Hr) 520 mL/Hr   Blood Pump Running Yes   Arteriovenous Lines Secure Yes   Arterial Pressure (mmHg) -120 mmHg   Venous Pressure (mmHg) 200   TMP 30   Venous Line in Air Detector Yes   Intake (mL) 250 mL   Intra-Hemodialysis Comments TX strated     Bedside HD TX started; access secured; Flush lines double clamped; orders verified; pt stable at this time: pt hypertensive; primary nurse made aware.

## 2023-03-11 NOTE — ASSESSMENT & PLAN NOTE
Titrate insulin slowly to avoid hypoglycemia as the risk of hypoglycemia increases with decreased creatinine clearance.    Estimated Creatinine Clearance: 11.4 mL/min (A) (based on SCr of 5.8 mg/dL (H)).

## 2023-03-11 NOTE — CONSULTS
[facility] - Transplant Stepdown  Adult Nutrition  Consult Note    SUMMARY     Recommendations    1. Continue current renal diet- add diabetic diet restrictions.   2. If PO intake <50%, add Novasource BID.   3. RD following.    Goals: Continue meeting % EEN/EPN by next RD f/u.  Nutrition Goal Status: new  Communication of RD Recs:  (POC)    Assessment and Plan    Nutrition Problem  Increased nutrient needs (protein, energy)    Related to (etiology):   Increased physiological demands     Signs and Symptoms (as evidenced by):   S/p kidney transplant      Interventions/Recommendations (treatment strategy):  Collaboration of nutrition care with other providers  Adequate meal intake/ONS  Post-transplant diet education has been provided     Nutrition Diagnosis Status:   New    Reason for Assessment    Reason For Assessment: consult (S/p kidney tx)  Diagnosis:  (ESRD on HD)  Relevant Medical History: T2DM, HLD, HTN, HFpEF  Interdisciplinary Rounds: did not attend  General Information Comments: POD 1 Kidney Tx. RD consulted for assess nutritional needs post kidney tx. Spoke with pt at bedside. Endorses fair appetite PTA with 1-2 meals/day on no salt/renal/fluid restricted diet. States she does like to eat out. Current PO intake 100% with good appetite. No issues with n/v/d/c/chewing/swallowing. States she does take a phosphate binder. #. NFPE not warranted 2/2 appearing well nourished. Provided pt with Food Nutrition Therapy and Low-Sodium Nutrition Therapy handouts and discussed reason for want to follow each diet, proper cooking temperatures, how to handle fresh produce, avoiding raw sprouts, meal planning tips, food recommended and not recommended in each food group, avoiding salad bars and buffets, avoiding grapefruit/pomegranate/starfruit, recd amount of sodium/day and per serving, tips for cutting back on sodium, choosing foods at a restaurant, and provided sample menu. LBM 3/10.  Nutrition Discharge  "Planning: Post transplant nutrition education provided. Food safety/drug interactions emphasized. General healthy diet recommended. RD name/contact information, education material left. No other needs identified. Caregiver present.    Nutrition Risk Screen    Nutrition Risk Screen: no indicators present    Nutrition/Diet History    Patient Reported Diet/Restrictions/Preferences: low salt, renal (fluid restricted)  Food Preferences: Fruits, cereal with milk, oatmeal, cheese  Food Allergies: other (see comments) (shrimp)  Factors Affecting Nutritional Intake: None identified at this time    Anthropometrics    Temp: 98.3 °F (36.8 °C)  Height Method: Measured  Height: 5' 7" (170.2 cm)  Height (inches): 67 in  Weight Method: Standard Scale  Weight: 82.2 kg (181 lb 3.5 oz)  Weight (lb): 181.22 lb  Ideal Body Weight (IBW), Female: 135 lb  % Ideal Body Weight, Female (lb): 134.24 %  BMI (Calculated): 28.4  BMI Grade: 25 - 29.9 - overweight    Lab/Procedures/Meds    Pertinent Labs Reviewed: reviewed  Pertinent Labs Comments: Glucose 171, A1C 7.9, Albumin 2.7, Potassium 3.2, ALT 8, Creatinine 3.4, GFR 14.9, Al Phos 52, Cholesterol 103  Pertinent Medications Reviewed: reviewed  Pertinent Medications Comments: atorvastatin, bisacodyl, famotidine, mycophenolate, antithymocyte globulin, insulin, MVI    Estimated/Assessed Needs    Weight Used For Calorie Calculations: 82.2 kg (181 lb 3.5 oz)  Energy Calorie Requirements (kcal): 1780 kcals  Energy Need Method: [place] (MSJ x 1.25 PAL)  Protein Requirements: 92 g (1.5 g/kg IBW)  Weight Used For Protein Calculations: 61.2 kg (135 lb)  Fluid Requirements (mL): 1 mL/kcal or fluid per MD  Estimated Fluid Requirement Method: RDA Method  RDA Method (mL): 1780  CHO Requirement: 225 g    Nutrition Prescription Ordered    Current Diet Order: Renal    Evaluation of Received Nutrient/Fluid Intake    I/O: -528.5 mL since admit  Energy Calories Required: meeting needs  Protein Required: " meeting needs  Fluid Required: meeting needs  Tolerance: tolerating  % Intake of Estimated Energy Needs: 75 - 100 %  % Meal Intake: 75 - 100 %    Nutrition Risk    Level of Risk/Frequency of Follow-up:  (1 time/week)     Monitor and Evaluation    Food and Nutrient Intake: energy intake, food and beverage intake  Food and Nutrient Adminstration: diet order  Knowledge/Beliefs/Attitudes: food and nutrition knowledge/skill, beliefs and attitudes  Physical Activity and Function: nutrition-related ADLs and IADLs  Anthropometric Measurements: body mass index, weight change, weight, height/length  Biochemical Data, Medical Tests and Procedures: lipid profile, inflammatory profile, glucose/endocrine profile, gastrointestinal profile, electrolyte and renal panel  Nutrition-Focused Physical Findings: overall appearance     Nutrition Follow-Up    RD Follow-up?: Yes    Sona Colon RDN,LDN

## 2023-03-11 NOTE — SUBJECTIVE & OBJECTIVE
Interval HPI:   No acute events overnight. Patient in room 35184/48963 A. Blood glucose worsening. BG above goal on current insulin regimen (Transition Insulin Drip). Steroid use- Methylprednisolone  500 mg .   1 Day Post-Op  Renal function- Abnormal - Cr 3.4   Vasopressors-  None     Diet renal     Eatin%  Nausea: No  Hypoglycemia and intervention: No  Fever: No  TPN and/or TF: No    BP (!) 136/57 (BP Location: Right arm, Patient Position: Standing)   Pulse 84   Temp 98.4 °F (36.9 °C) (Oral)   Resp 16   Wt 82.2 kg (181 lb 3.5 oz)   LMP 2011 (Approximate)   SpO2 99%   Breastfeeding No   BMI 28.38 kg/m²     Labs Reviewed and Include    Recent Labs   Lab 03/10/23  0900 03/10/23  1518 03/10/23  2137   * 194* 171*   CALCIUM 10.4 9.2 8.8   ALBUMIN 3.3* 2.7*  --    PROT 9.0*  --   --     137 136   K 4.4 4.9 3.2*   CO2 29 24 21*    101 101   BUN 45* 45* 14   CREATININE 10.3* 9.7* 3.4*   ALKPHOS 52*  --   --    ALT 8*  --   --    AST 15  --   --    BILITOT 0.5  --   --      Lab Results   Component Value Date    WBC 9.03 03/10/2023    HGB 10.0 (L) 03/10/2023    HCT 31.1 (L) 03/10/2023    MCV 93 03/10/2023     (L) 03/10/2023     No results for input(s): TSH, FREET4 in the last 168 hours.  Lab Results   Component Value Date    HGBA1C 7.9 (H) 10/05/2022       Nutritional status:   Body mass index is 28.38 kg/m².  Lab Results   Component Value Date    ALBUMIN 2.7 (L) 03/10/2023    ALBUMIN 3.3 (L) 03/10/2023    ALBUMIN 3.8 2023     No results found for: PREALBUMIN    CrCl cannot be calculated (Unknown ideal weight.).    Accu-Checks  Recent Labs     03/10/23  1444 03/10/23  1813 03/10/23  2205 23  0118   POCTGLUCOSE 179* 251* 211* 348*       Current Medications and/or Treatments Impacting Glycemic Control  Immunotherapy:    Immunosuppressants           Stop Route Frequency     antithymocyte globulin (rabbit) 100 mg, hydrocortisone sodium succinate (SOLU-CORTEF) 20 mg in  sodium chloride 0.9% 500 mL (FOR PERIPHERAL LINE ADMINISTRATION ONLY)         03/13 0859 IV Daily     mycophenolate capsule 1,000 mg         -- Oral 2 times daily     tacrolimus capsule 3 mg         -- Oral 2 times daily          Steroids:   Hormones (From admission, onward)      Start     Stop Route Frequency Ordered    03/13/23 0900  predniSONE tablet 20 mg  (IP TXP KIDNEY POST-OP THYMO WITH PERIPHERAL PRECHECKED)         -- Oral Daily 03/10/23 1515    03/12/23 0800  methylPREDNISolone sodium succinate injection 125 mg  (IP TXP KIDNEY POST-OP THYMO WITH PERIPHERAL PRECHECKED)         -- IV Once 03/10/23 1515    03/11/23 0900  antithymocyte globulin (rabbit) 100 mg, hydrocortisone sodium succinate (SOLU-CORTEF) 20 mg in sodium chloride 0.9% 500 mL (FOR PERIPHERAL LINE ADMINISTRATION ONLY)  (IP TXP KIDNEY POST-OP THYMO WITH PERIPHERAL PRECHECKED)         03/13 0859 IV Daily 03/10/23 1515    03/11/23 0800  methylPREDNISolone sodium succinate injection 250 mg  (IP TXP KIDNEY POST-OP THYMO WITH PERIPHERAL PRECHECKED)         -- IV Once 03/10/23 1515    03/11/23 0012  melatonin tablet 6 mg         -- Oral Nightly PRN 03/10/23 2313    03/10/23 1608  hydrocortisone sodium succinate injection 100 mg  (IP TXP KIDNEY POST-OP THYMO WITH PERIPHERAL PRECHECKED)         08/06 0808 IV Once as needed 03/10/23 1515          Pressors:    Autonomic Drugs (From admission, onward)      Start     Stop Route Frequency Ordered    03/10/23 1608  EPINEPHrine (PF) injection 1 mg  (IP TXP KIDNEY POST-OP THYMO WITH PERIPHERAL PRECHECKED)         08/06 0808 SubQ Once as needed 03/10/23 1515          Hyperglycemia/Diabetes Medications:   Antihyperglycemics (From admission, onward)      Start     Stop Route Frequency Ordered    03/11/23 1130  insulin aspart U-100 pen 3-6 Units         -- SubQ 3 times daily with meals 03/11/23 0825    03/11/23 0924  insulin aspart U-100 pen 0-10 Units         -- SubQ As needed (PRN) 03/11/23 0825    03/11/23 0645   insulin regular in 0.9 % NaCl 100 unit/100 mL (1 unit/mL) infusion        Question:  Enter initial dose (Units/hr):  Answer:  1.1    -- IV Continuous 03/11/23 0637

## 2023-03-11 NOTE — ASSESSMENT & PLAN NOTE
BG goal: 140-180  T2DM on insulin at home.  S/p kidney transplant.   On steroids.  Started on Transition drip post sx.  Sugars elevated overnight.  Diet Advanced.  Will increase transition drip.  Strengthen sliding scale and add meal time at WBD 0.4 (weighted 60% bolus as on steroids) based on intake    - Increase Transition drip to 1.1 with stepdown parameters.  - Start Novolog 3-6 units AC based on intake.  Administer 3 units if patient eats 25% -  50% and 6 units if patient eats 50% or more.  - Adjust sliding scale insulin with ISF of 25 starting at 150  - POCT Glucose before meals, at bedtime and at 2 am  - Hypoglycemia protocol in place      ** Please notify Endocrine for any change and/or advance in diet**  ** Please call Endocrine for any BG related issues **     Discharge Planning:   TBD. Please notify endocrinology prior to discharge.

## 2023-03-11 NOTE — PROGRESS NOTES
Kidney Transplant   Progress Note      HPI: Ms. Maldonado is a 58 y.o. year old female who is s/p Kidney transplant #2 on 3/10/23. ESRD on HD due to hypertensive nephrosclerosis. DBD donor with KDPI 23%, terminal Cr 2.3. Transplant was remarkable for upper pole artery reconstructed to superior aspect of main renal artery in end to side manner with 7-0 prolene, very thin and friable bladder wall. 7 days gresham, no drain. Anastomosis time 27 minutes, cold ischemic time 1731 min.        Interval History: No acute events overnight. Underwent HD postop. Making good amount of urine. 2640 cc urine output since surgery. Pain is well controlled. Denies nausea, tolerating oral intake.          Sub/Obj:   Alert and oriented x3, NAD  Not anemic, anicteric  Unlabored breathing in room air  Regular heart rate and rhythm  Soft abdomen, intact dressing from OR, no swelling, no drainage  Warm and dry skin            Labs:   CBC:   Recent Labs   Lab 03/11/23  0915   WBC 7.09   RBC 3.01*   HGB 9.0*   HCT 29.6*   PLT 99*   MCV 98   MCH 29.9   MCHC 30.4*     BMP:   Recent Labs   Lab 03/11/23  0915   *   *   K 4.0      CO2 21*   BUN 31*   CREATININE 5.8*   CALCIUM 8.7     CMP:   Recent Labs   Lab 03/10/23  0900 03/10/23  1518 03/11/23  0915   *   < > 214*   CALCIUM 10.4   < > 8.7   ALBUMIN 3.3*   < > 2.8*   PROT 9.0*  --   --       < > 133*   K 4.4   < > 4.0   CO2 29   < > 21*      < > 101   BUN 45*   < > 31*   CREATININE 10.3*   < > 5.8*   ALKPHOS 52*  --   --    ALT 8*  --   --    AST 15  --   --    BILITOT 0.5  --   --     < > = values in this interval not displayed.     Coagulation:   Recent Labs   Lab 03/10/23  0900   INR 1.1   APTT 27.8     Labs within the past 24 hours have been reviewed.        Assessment/Plan:     S/p Kidney transplant #2- second kidney transplant. POD#1. 93% PRA. Underwent HD on POD#0. Making good urine. Will transition to maintenance iv fluid with q4 IO check. Renal diet. 7  days gresham.     Long Term Immunosuppression- Cont with tacro. Draw tacro daily and adjust dose as needed to maintain a therapeutic level.     At Risk for Opportunistic Infections- Cont with OI prophylaxis as per protocol.     Anemia of chronic disease- H&H stable. Monitor.     Continue home antihypertensive medications      Discussed with Dr Lilliana Villagomez MD  Fellow, PGY-7, Abdominal Transplant Surgery

## 2023-03-11 NOTE — CARE UPDATE
RAPID RESPONSE NURSE CHART REVIEW       Chart Reviewed: 03/10/2023, 10:39 PM    MRN: [id number]  Bed: 35313/48405 A    Dx: ESRD (end stage renal disease) on dialysis    Elizabeth Maldonado has a past medical history of Acidosis, Allergic rhinitis, Allergy, Anemia, Anemia in chronic kidney disease, Anxiety, Cataract, Chronic bilateral low back pain with bilateral sciatica, Chronic immunosuppression with Prograf and MMF, CKD (chronic kidney disease) stage 5, GFR less than 15 ml/min, CKD (chronic kidney disease), stage III, Degenerative disc disease, Depression, Diabetes mellitus, type 2 since age 18, ESRD on peritoneal dialysis - august 2014 for 9 hours no peritonitis, Hyperlipidemia, Hypertension, Hypomagnesemia, Kidney transplant status, living unrelated donor - 12/2/14, Neutropenia, NS (nuclear sclerosis), Obesity, Organ transplant candidate, Pre-op exam, Proliferative diabetic retinopathy of both eyes without macular edema associated with type 2 diabetes mellitus, Renal manifestation of secondary diabetes mellitus, Tendinitis, and Trouble in sleeping.    Last VS: BP (!) 159/70   Pulse 87   Temp 97.9 °F (36.6 °C)   Resp 18   Wt 82.2 kg (181 lb 3.5 oz)   LMP 04/29/2011 (Approximate)   SpO2 99%   Breastfeeding No   BMI 28.38 kg/m²     24H Vital Sign Range:  Temp:  [97.5 °F (36.4 °C)-98 °F (36.7 °C)]   Pulse:  [82-97]   Resp:  [16-20]   BP: (129-236)/()   SpO2:  [94 %-100 %]   Arterial Line BP: (168-184)/(51-64)     Level of Consciousness (AVPU): responds to voice    Recent Labs     03/10/23  0900 03/10/23  1518 03/10/23  2137   WBC 5.20  --  9.03   HGB 10.1*  --  10.0*   HCT 33.4* 29.5* 31.1*   *  --  110*       Recent Labs     03/10/23  0900 03/10/23  1518 03/10/23  2137    137 136   K 4.4 4.9 3.2*    101 101   CO2 29 24 21*   CREATININE 10.3* 9.7* 3.4*   * 194* 171*   PHOS 4.3 4.0  --         No results for input(s): PH, PCO2, PO2, HCO3, POCSATURATED, BE in the last 72 hours.      OXYGEN:  Flow (L/min): 6          MEWS score: 4    Charge RN, Cecily  contacted. No concerns verbalized at this time. Instructed to call [phone] for further concerns or assistance.    Marquise Bradley RN

## 2023-03-11 NOTE — PROGRESS NOTES
03/10/23 2145   Vital Signs   BP (!) 160/70   MAP (mmHg) 101   BP Location Right leg   BP Method Automatic   Patient Position Lying   Pre-Hemodialysis Assessment   Treatment Status Completed   During Hemodialysis Assessment   Blood Volume Processed (Liters) 62.5 L   UF Removed (mL) 1500 mL   Intake (mL) 250 mL   Intra-Hemodialysis Comments TX compete   Post-Hemodialysis Assessment   Rinseback Volume (mL) 500 mL   Blood Volume Processed (Liters) 62.5 L   Dialyzer Clearance Lightly streaked   Duration of Treatment 180 minutes   Total UF (mL) 1500 mL   Net Fluid Removal 1000   Patient Response to Treatment pt tolerated TX well   Post-Hemodialysis Comments pt stable     Pt tolerated TX well; access secured; total net UF 1000 ml; pt stable at this time; see flow sheet for details.

## 2023-03-11 NOTE — PROGRESS NOTES
[facility] - [facility] Stepdown  Endocrinology  Progress Note    Admit Date: 3/10/2023     Reason for Consult: Management of T2DM, Hyperglycemia     Surgical Procedure and Date: KIdney Transplant scheduled for 3/10/2023    Diabetes diagnosis year: DANUTA    Home Diabetes Medications:  Humalog 3-6 units AC , Lantus 22 units daily, and Trulicity 4.5 mg weekly (unsure of current dose, had to decrease bc they ran out of her normal dose)    How often checking glucose at home? Uses dexcom- AM mid 150s.      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 PMH includes HTN, HFpEF, HLD, and MGUS (IgG kappa MGUS dx ). She has been on the kidney transplant waitlist since 3/29/22. She was started on PD 3/2022 then switched to HD 10/2022. She dialyzes on a MWF schedule. Last HD 3/8/23. DW 81kg. She is being admitted for kidney transplant surgery with Dr. Fuchs. She feels well in her usual state of health. She denies any recent fevers or illnesses. CXR 3/9 with mild LLL opacity. Patient denies fever, SOB, or chest pain. InOR 3/10/2023 for kidney tx. Endocrine consulted to manage hyperglycemia and T2DM.     Lab Results   Component Value Date    HGBA1C 7.9 (H) 10/05/2022                Interval HPI:   No acute events overnight. Patient in room 32976/57917 A. Blood glucose worsening. BG above goal on current insulin regimen (Transition Insulin Drip). Steroid use- Methylprednisolone  500 mg .   1 Day Post-Op  Renal function- Abnormal - Cr 3.4   Vasopressors-  None     Diet renal     Eatin%  Nausea: No  Hypoglycemia and intervention: No  Fever: No  TPN and/or TF: No    BP (!) 136/57 (BP Location: Right arm, Patient Position: Standing)    Pulse 84   Temp 98.4 °F (36.9 °C) (Oral)   Resp 16   Wt 82.2 kg (181 lb 3.5 oz)   LMP 04/29/2011 (Approximate)   SpO2 99%   Breastfeeding No   BMI 28.38 kg/m²     Labs Reviewed and Include    Recent Labs   Lab 03/10/23  0900 03/10/23  1518 03/10/23  2137   * 194* 171*   CALCIUM 10.4 9.2 8.8   ALBUMIN 3.3* 2.7*  --    PROT 9.0*  --   --     137 136   K 4.4 4.9 3.2*   CO2 29 24 21*    101 101   BUN 45* 45* 14   CREATININE 10.3* 9.7* 3.4*   ALKPHOS 52*  --   --    ALT 8*  --   --    AST 15  --   --    BILITOT 0.5  --   --      Lab Results   Component Value Date    WBC 9.03 03/10/2023    HGB 10.0 (L) 03/10/2023    HCT 31.1 (L) 03/10/2023    MCV 93 03/10/2023     (L) 03/10/2023     No results for input(s): TSH, FREET4 in the last 168 hours.  Lab Results   Component Value Date    HGBA1C 7.9 (H) 10/05/2022       Nutritional status:   Body mass index is 28.38 kg/m².  Lab Results   Component Value Date    ALBUMIN 2.7 (L) 03/10/2023    ALBUMIN 3.3 (L) 03/10/2023    ALBUMIN 3.8 03/01/2023     No results found for: PREALBUMIN    CrCl cannot be calculated (Unknown ideal weight.).    Accu-Checks  Recent Labs     03/10/23  1444 03/10/23  1813 03/10/23  2205 03/11/23  0118   POCTGLUCOSE 179* 251* 211* 348*       Current Medications and/or Treatments Impacting Glycemic Control  Immunotherapy:    Immunosuppressants           Stop Route Frequency     antithymocyte globulin (rabbit) 100 mg, hydrocortisone sodium succinate (SOLU-CORTEF) 20 mg in sodium chloride 0.9% 500 mL (FOR PERIPHERAL LINE ADMINISTRATION ONLY)         03/13 0859 IV Daily     mycophenolate capsule 1,000 mg         -- Oral 2 times daily     tacrolimus capsule 3 mg         -- Oral 2 times daily          Steroids:   Hormones (From admission, onward)      Start     Stop Route Frequency Ordered    03/13/23 0900  predniSONE tablet 20 mg  (IP TXP KIDNEY POST-OP THYMO WITH PERIPHERAL PRECHECKED)         -- Oral Daily 03/10/23 1515    03/12/23  0800  methylPREDNISolone sodium succinate injection 125 mg  (IP TXP KIDNEY POST-OP THYMO WITH PERIPHERAL PRECHECKED)         -- IV Once 03/10/23 1515    03/11/23 0900  antithymocyte globulin (rabbit) 100 mg, hydrocortisone sodium succinate (SOLU-CORTEF) 20 mg in sodium chloride 0.9% 500 mL (FOR PERIPHERAL LINE ADMINISTRATION ONLY)  (IP TXP KIDNEY POST-OP THYMO WITH PERIPHERAL PRECHECKED)         03/13 0859 IV Daily 03/10/23 1515    03/11/23 0800  methylPREDNISolone sodium succinate injection 250 mg  (IP TXP KIDNEY POST-OP THYMO WITH PERIPHERAL PRECHECKED)         -- IV Once 03/10/23 1515    03/11/23 0012  melatonin tablet 6 mg         -- Oral Nightly PRN 03/10/23 2313    03/10/23 1608  hydrocortisone sodium succinate injection 100 mg  (IP TXP KIDNEY POST-OP THYMO WITH PERIPHERAL PRECHECKED)         08/06 0808 IV Once as needed 03/10/23 1515          Pressors:    Autonomic Drugs (From admission, onward)      Start     Stop Route Frequency Ordered    03/10/23 1608  EPINEPHrine (PF) injection 1 mg  (IP TXP KIDNEY POST-OP THYMO WITH PERIPHERAL PRECHECKED)         08/06 0808 SubQ Once as needed 03/10/23 1515          Hyperglycemia/Diabetes Medications:   Antihyperglycemics (From admission, onward)      Start     Stop Route Frequency Ordered    03/11/23 1130  insulin aspart U-100 pen 3-6 Units         -- SubQ 3 times daily with meals 03/11/23 0825    03/11/23 0924  insulin aspart U-100 pen 0-10 Units         -- SubQ As needed (PRN) 03/11/23 0825    03/11/23 0645  insulin regular in 0.9 % NaCl 100 unit/100 mL (1 unit/mL) infusion        Question:  Enter initial dose (Units/hr):  Answer:  1.1    -- IV Continuous 03/11/23 0637            ASSESSMENT and PLAN    Renal/  * ESRD (end stage renal disease) on dialysis  Titrate insulin slowly to avoid hypoglycemia as the risk of hypoglycemia increases with decreased creatinine clearance.    Estimated Creatinine Clearance: 11.4 mL/min (A) (based on SCr of 5.8 mg/dL  (H)).        Endocrine  Type 2 diabetes mellitus, with long-term current use of insulin  BG goal: 140-180  T2DM on insulin at home.  S/p kidney transplant.   On steroids.  Started on Transition drip post sx.  Sugars elevated overnight.  Diet Advanced.  Will increase transition drip.  Strengthen sliding scale and add meal time at WBD 0.4 (weighted 60% bolus as on steroids) based on intake    - Increase Transition drip to 1.1 with stepdown parameters.  - Start Novolog 3-6 units AC based on intake.  Administer 3 units if patient eats 25% -  50% and 6 units if patient eats 50% or more.  - Adjust sliding scale insulin with ISF of 25 starting at 150  - POCT Glucose before meals, at bedtime and at 2 am  - Hypoglycemia protocol in place      ** Please notify Endocrine for any change and/or advance in diet**  ** Please call Endocrine for any BG related issues **     Discharge Planning:   TBD. Please notify endocrinology prior to discharge.          Other  Adrenal corticosteroid causing adverse effect in therapeutic use  Glucocorticoids markedly increase glucose levels. Expect the steroid taper will help glucose control.             Bk Summers PA-C  Endocrinology  [facility] - Trans[facility] Stepdown

## 2023-03-11 NOTE — PLAN OF CARE
Recommendations     1. Continue current renal diet- add diabetic diet restrictions.   2. If PO intake <50%, add Novasource BID.   3. RD following.     Goals: Continue meeting % EEN/EPN by next RD f/u.  Nutrition Goal Status: new  Communication of RD Recs:  (POC)    Sona Mooney RDN,LDN

## 2023-03-12 LAB
ALBUMIN SERPL BCP-MCNC: 2.7 G/DL (ref 3.5–5.2)
ALBUMIN SERPL BCP-MCNC: 2.7 G/DL (ref 3.5–5.2)
ANION GAP SERPL CALC-SCNC: 11 MMOL/L (ref 8–16)
ANION GAP SERPL CALC-SCNC: 11 MMOL/L (ref 8–16)
BASOPHILS # BLD AUTO: 0.01 K/UL (ref 0–0.2)
BASOPHILS # BLD AUTO: 0.01 K/UL (ref 0–0.2)
BASOPHILS NFR BLD: 0.1 % (ref 0–1.9)
BASOPHILS NFR BLD: 0.1 % (ref 0–1.9)
BUN SERPL-MCNC: 46 MG/DL (ref 6–20)
BUN SERPL-MCNC: 46 MG/DL (ref 6–20)
CALCIUM SERPL-MCNC: 9.2 MG/DL (ref 8.7–10.5)
CALCIUM SERPL-MCNC: 9.2 MG/DL (ref 8.7–10.5)
CHLORIDE SERPL-SCNC: 102 MMOL/L (ref 95–110)
CHLORIDE SERPL-SCNC: 102 MMOL/L (ref 95–110)
CO2 SERPL-SCNC: 18 MMOL/L (ref 23–29)
CO2 SERPL-SCNC: 18 MMOL/L (ref 23–29)
CREAT SERPL-MCNC: 4.9 MG/DL (ref 0.5–1.4)
CREAT SERPL-MCNC: 4.9 MG/DL (ref 0.5–1.4)
DIFFERENTIAL METHOD: ABNORMAL
DIFFERENTIAL METHOD: ABNORMAL
EOSINOPHIL # BLD AUTO: 0 K/UL (ref 0–0.5)
EOSINOPHIL # BLD AUTO: 0 K/UL (ref 0–0.5)
EOSINOPHIL NFR BLD: 0 % (ref 0–8)
EOSINOPHIL NFR BLD: 0 % (ref 0–8)
ERYTHROCYTE [DISTWIDTH] IN BLOOD BY AUTOMATED COUNT: 15.7 % (ref 11.5–14.5)
ERYTHROCYTE [DISTWIDTH] IN BLOOD BY AUTOMATED COUNT: 15.7 % (ref 11.5–14.5)
EST. GFR  (NO RACE VARIABLE): 9.6 ML/MIN/1.73 M^2
EST. GFR  (NO RACE VARIABLE): 9.6 ML/MIN/1.73 M^2
GLUCOSE SERPL-MCNC: 134 MG/DL (ref 70–110)
GLUCOSE SERPL-MCNC: 134 MG/DL (ref 70–110)
HCT VFR BLD AUTO: 27.8 % (ref 37–48.5)
HCT VFR BLD AUTO: 27.8 % (ref 37–48.5)
HGB BLD-MCNC: 8.6 G/DL (ref 12–16)
HGB BLD-MCNC: 8.6 G/DL (ref 12–16)
IMM GRANULOCYTES # BLD AUTO: 0.04 K/UL (ref 0–0.04)
IMM GRANULOCYTES # BLD AUTO: 0.04 K/UL (ref 0–0.04)
IMM GRANULOCYTES NFR BLD AUTO: 0.5 % (ref 0–0.5)
IMM GRANULOCYTES NFR BLD AUTO: 0.5 % (ref 0–0.5)
LYMPHOCYTES # BLD AUTO: 0.1 K/UL (ref 1–4.8)
LYMPHOCYTES # BLD AUTO: 0.1 K/UL (ref 1–4.8)
LYMPHOCYTES NFR BLD: 1.8 % (ref 18–48)
LYMPHOCYTES NFR BLD: 1.8 % (ref 18–48)
MAGNESIUM SERPL-MCNC: 2.1 MG/DL (ref 1.6–2.6)
MAGNESIUM SERPL-MCNC: 2.1 MG/DL (ref 1.6–2.6)
MCH RBC QN AUTO: 29.5 PG (ref 27–31)
MCH RBC QN AUTO: 29.5 PG (ref 27–31)
MCHC RBC AUTO-ENTMCNC: 30.9 G/DL (ref 32–36)
MCHC RBC AUTO-ENTMCNC: 30.9 G/DL (ref 32–36)
MCV RBC AUTO: 95 FL (ref 82–98)
MCV RBC AUTO: 95 FL (ref 82–98)
MONOCYTES # BLD AUTO: 0.6 K/UL (ref 0.3–1)
MONOCYTES # BLD AUTO: 0.6 K/UL (ref 0.3–1)
MONOCYTES NFR BLD: 7.6 % (ref 4–15)
MONOCYTES NFR BLD: 7.6 % (ref 4–15)
NEUTROPHILS # BLD AUTO: 7 K/UL (ref 1.8–7.7)
NEUTROPHILS # BLD AUTO: 7 K/UL (ref 1.8–7.7)
NEUTROPHILS NFR BLD: 90 % (ref 38–73)
NEUTROPHILS NFR BLD: 90 % (ref 38–73)
NRBC BLD-RTO: 0 /100 WBC
NRBC BLD-RTO: 0 /100 WBC
PHOSPHATE SERPL-MCNC: 4.6 MG/DL (ref 2.7–4.5)
PLATELET # BLD AUTO: 100 K/UL (ref 150–450)
PLATELET # BLD AUTO: 100 K/UL (ref 150–450)
PMV BLD AUTO: 12 FL (ref 9.2–12.9)
PMV BLD AUTO: 12 FL (ref 9.2–12.9)
POCT GLUCOSE: 114 MG/DL (ref 70–110)
POCT GLUCOSE: 131 MG/DL (ref 70–110)
POCT GLUCOSE: 144 MG/DL (ref 70–110)
POCT GLUCOSE: 219 MG/DL (ref 70–110)
POCT GLUCOSE: 325 MG/DL (ref 70–110)
POTASSIUM SERPL-SCNC: 4.1 MMOL/L (ref 3.5–5.1)
POTASSIUM SERPL-SCNC: 4.1 MMOL/L (ref 3.5–5.1)
RBC # BLD AUTO: 2.92 M/UL (ref 4–5.4)
RBC # BLD AUTO: 2.92 M/UL (ref 4–5.4)
SODIUM SERPL-SCNC: 131 MMOL/L (ref 136–145)
SODIUM SERPL-SCNC: 131 MMOL/L (ref 136–145)
TACROLIMUS BLD-MCNC: 3.5 NG/ML (ref 5–15)
TACROLIMUS BLD-MCNC: 3.5 NG/ML (ref 5–15)
WBC # BLD AUTO: 7.78 K/UL (ref 3.9–12.7)
WBC # BLD AUTO: 7.78 K/UL (ref 3.9–12.7)

## 2023-03-12 PROCEDURE — 63600175 PHARM REV CODE 636 W HCPCS: Performed by: TRANSPLANT SURGERY

## 2023-03-12 PROCEDURE — 25000003 PHARM REV CODE 250: Performed by: TRANSPLANT SURGERY

## 2023-03-12 PROCEDURE — 94761 N-INVAS EAR/PLS OXIMETRY MLT: CPT

## 2023-03-12 PROCEDURE — 85025 COMPLETE CBC W/AUTO DIFF WBC: CPT | Performed by: SURGERY

## 2023-03-12 PROCEDURE — 25000003 PHARM REV CODE 250: Performed by: PHYSICIAN ASSISTANT

## 2023-03-12 PROCEDURE — 20600001 HC STEP DOWN PRIVATE ROOM

## 2023-03-12 PROCEDURE — 80069 RENAL FUNCTION PANEL: CPT | Performed by: SURGERY

## 2023-03-12 PROCEDURE — 36415 COLL VENOUS BLD VENIPUNCTURE: CPT | Performed by: SURGERY

## 2023-03-12 PROCEDURE — 99900035 HC TECH TIME PER 15 MIN (STAT)

## 2023-03-12 PROCEDURE — 63600175 PHARM REV CODE 636 W HCPCS: Performed by: SURGERY

## 2023-03-12 PROCEDURE — 83735 ASSAY OF MAGNESIUM: CPT | Performed by: SURGERY

## 2023-03-12 PROCEDURE — 80197 ASSAY OF TACROLIMUS: CPT | Performed by: SURGERY

## 2023-03-12 PROCEDURE — 25000003 PHARM REV CODE 250: Performed by: NURSE PRACTITIONER

## 2023-03-12 PROCEDURE — 99232 SBSQ HOSP IP/OBS MODERATE 35: CPT | Mod: ,,, | Performed by: PHYSICIAN ASSISTANT

## 2023-03-12 PROCEDURE — 63600175 PHARM REV CODE 636 W HCPCS: Performed by: INTERNAL MEDICINE

## 2023-03-12 PROCEDURE — 99232 PR SUBSEQUENT HOSPITAL CARE,LEVL II: ICD-10-PCS | Mod: ,,, | Performed by: PHYSICIAN ASSISTANT

## 2023-03-12 PROCEDURE — 25000003 PHARM REV CODE 250: Performed by: SURGERY

## 2023-03-12 RX ORDER — PREDNISONE 5 MG/1
TABLET ORAL
Qty: 70 TABLET | Refills: 11 | Status: SHIPPED | OUTPATIENT
Start: 2023-03-12 | End: 2023-04-19

## 2023-03-12 RX ORDER — TACROLIMUS 1 MG/1
6 CAPSULE ORAL 2 TIMES DAILY
Status: DISCONTINUED | OUTPATIENT
Start: 2023-03-12 | End: 2023-03-13

## 2023-03-12 RX ORDER — SULFAMETHOXAZOLE AND TRIMETHOPRIM 400; 80 MG/1; MG/1
1 TABLET ORAL DAILY
Qty: 30 TABLET | Refills: 5 | Status: SHIPPED | OUTPATIENT
Start: 2023-03-12 | End: 2023-03-13 | Stop reason: SDUPTHER

## 2023-03-12 RX ORDER — TACROLIMUS 1 MG/1
2 CAPSULE ORAL ONCE
Status: COMPLETED | OUTPATIENT
Start: 2023-03-12 | End: 2023-03-12

## 2023-03-12 RX ORDER — VALGANCICLOVIR 450 MG/1
900 TABLET, FILM COATED ORAL DAILY
Qty: 60 TABLET | Refills: 2 | Status: SHIPPED | OUTPATIENT
Start: 2023-03-12 | End: 2023-03-13 | Stop reason: SDUPTHER

## 2023-03-12 RX ORDER — TACROLIMUS 1 MG/1
6 CAPSULE ORAL EVERY 12 HOURS
Qty: 360 CAPSULE | Refills: 11 | Status: SHIPPED | OUTPATIENT
Start: 2023-03-12 | End: 2023-03-13 | Stop reason: SDUPTHER

## 2023-03-12 RX ORDER — MYCOPHENOLATE MOFETIL 250 MG/1
1000 CAPSULE ORAL 2 TIMES DAILY
Qty: 240 CAPSULE | Refills: 11 | Status: SHIPPED | OUTPATIENT
Start: 2023-03-12 | End: 2023-05-03 | Stop reason: DRUGHIGH

## 2023-03-12 RX ADMIN — HYDRALAZINE HYDROCHLORIDE 50 MG: 50 TABLET ORAL at 08:03

## 2023-03-12 RX ADMIN — INSULIN ASPART 6 UNITS: 100 INJECTION, SOLUTION INTRAVENOUS; SUBCUTANEOUS at 12:03

## 2023-03-12 RX ADMIN — INSULIN ASPART 6 UNITS: 100 INJECTION, SOLUTION INTRAVENOUS; SUBCUTANEOUS at 09:03

## 2023-03-12 RX ADMIN — TACROLIMUS 6 MG: 1 CAPSULE ORAL at 05:03

## 2023-03-12 RX ADMIN — HYDRALAZINE HYDROCHLORIDE 50 MG: 50 TABLET ORAL at 01:03

## 2023-03-12 RX ADMIN — HEPARIN SODIUM 5000 UNITS: 5000 INJECTION INTRAVENOUS; SUBCUTANEOUS at 01:03

## 2023-03-12 RX ADMIN — CARVEDILOL 3.12 MG: 3.12 TABLET, FILM COATED ORAL at 08:03

## 2023-03-12 RX ADMIN — MYCOPHENOLATE MOFETIL 1000 MG: 250 CAPSULE ORAL at 08:03

## 2023-03-12 RX ADMIN — INSULIN ASPART 6 UNITS: 100 INJECTION, SOLUTION INTRAVENOUS; SUBCUTANEOUS at 05:03

## 2023-03-12 RX ADMIN — MUPIROCIN 1 G: 20 OINTMENT TOPICAL at 09:03

## 2023-03-12 RX ADMIN — ONDANSETRON 4 MG: 2 INJECTION INTRAMUSCULAR; INTRAVENOUS at 03:03

## 2023-03-12 RX ADMIN — ANTI-THYMOCYTE GLOBULIN (RABBIT) 100 MG: 5 INJECTION, POWDER, LYOPHILIZED, FOR SOLUTION INTRAVENOUS at 02:03

## 2023-03-12 RX ADMIN — TIMOLOL MALEATE 1 DROP: 5 SOLUTION/ DROPS OPHTHALMIC at 08:03

## 2023-03-12 RX ADMIN — DAPTOMYCIN 660 MG: 350 INJECTION, POWDER, LYOPHILIZED, FOR SOLUTION INTRAVENOUS at 09:03

## 2023-03-12 RX ADMIN — MYCOPHENOLATE MOFETIL 1000 MG: 250 CAPSULE ORAL at 09:03

## 2023-03-12 RX ADMIN — CLONIDINE HYDROCHLORIDE 0.1 MG: 0.1 TABLET ORAL at 06:03

## 2023-03-12 RX ADMIN — TACROLIMUS 2 MG: 1 CAPSULE ORAL at 01:03

## 2023-03-12 RX ADMIN — HEPARIN SODIUM 5000 UNITS: 5000 INJECTION INTRAVENOUS; SUBCUTANEOUS at 08:03

## 2023-03-12 RX ADMIN — HYDRALAZINE HYDROCHLORIDE 50 MG: 50 TABLET ORAL at 05:03

## 2023-03-12 RX ADMIN — OXYCODONE HYDROCHLORIDE 5 MG: 5 TABLET ORAL at 11:03

## 2023-03-12 RX ADMIN — HEPARIN SODIUM 5000 UNITS: 5000 INJECTION INTRAVENOUS; SUBCUTANEOUS at 05:03

## 2023-03-12 RX ADMIN — CLONIDINE HYDROCHLORIDE 0.1 MG: 0.1 TABLET ORAL at 04:03

## 2023-03-12 RX ADMIN — FAMOTIDINE 20 MG: 20 TABLET ORAL at 08:03

## 2023-03-12 RX ADMIN — THERA TABS 1 TABLET: TAB at 08:03

## 2023-03-12 RX ADMIN — DIPHENHYDRAMINE HYDROCHLORIDE 25 MG: 25 CAPSULE ORAL at 01:03

## 2023-03-12 RX ADMIN — METHYLPREDNISOLONE SODIUM SUCCINATE 125 MG: 125 INJECTION, POWDER, FOR SOLUTION INTRAMUSCULAR; INTRAVENOUS at 01:03

## 2023-03-12 RX ADMIN — ACETAMINOPHEN 650 MG: 325 TABLET ORAL at 03:03

## 2023-03-12 RX ADMIN — ACETAMINOPHEN 650 MG: 325 TABLET ORAL at 01:03

## 2023-03-12 RX ADMIN — INSULIN ASPART 2 UNITS: 100 INJECTION, SOLUTION INTRAVENOUS; SUBCUTANEOUS at 04:03

## 2023-03-12 RX ADMIN — ATORVASTATIN CALCIUM 40 MG: 40 TABLET, FILM COATED ORAL at 08:03

## 2023-03-12 RX ADMIN — TACROLIMUS 4 MG: 1 CAPSULE ORAL at 08:03

## 2023-03-12 RX ADMIN — ACETAMINOPHEN 650 MG: 325 TABLET ORAL at 08:03

## 2023-03-12 RX ADMIN — SIMETHICONE 160 MG: 80 TABLET, CHEWABLE ORAL at 08:03

## 2023-03-12 RX ADMIN — Medication 6 MG: at 08:03

## 2023-03-12 RX ADMIN — INSULIN ASPART 6 UNITS: 100 INJECTION, SOLUTION INTRAVENOUS; SUBCUTANEOUS at 08:03

## 2023-03-12 NOTE — ASSESSMENT & PLAN NOTE
BG goal: 140-180  T2DM on insulin at home.  S/p kidney transplant.   On steroids.  Started on Transition drip post sx. Bg elevated improved overnight.  Transition drip decreased.  Will adjust transition drip and monitor on current regimen.  May need further increase of prandial insulin while on steroids.    - Adjust Transition drip to 0.8 with stepdown parameters.  - Continue Novolog 3-6 units AC based on intake.  Administer 3 units if patient eats 25% -  50% and 6 units if patient eats 50% or more.  -Continue sliding scale insulin with ISF of 25 starting at 150  - POCT Glucose before meals, at bedtime and at 2 am  - Hypoglycemia protocol in place      ** Please notify Endocrine for any change and/or advance in diet**  ** Please call Endocrine for any BG related issues **     Discharge Planning:   TBD. Please notify endocrinology prior to discharge.

## 2023-03-12 NOTE — ASSESSMENT & PLAN NOTE
Titrate insulin slowly to avoid hypoglycemia as the risk of hypoglycemia increases with decreased creatinine clearance.    Estimated Creatinine Clearance: 13.8 mL/min (A) (based on SCr of 4.9 mg/dL (H)).

## 2023-03-12 NOTE — PROGRESS NOTES
Kidney Transplant   Progress Note      HPI: Ms. Maldonado is a 58 y.o. year old female who is s/p Kidney transplant #2 on 3/10/23. ESRD on HD due to hypertensive nephrosclerosis. DBD donor with KDPI 23%, terminal Cr 2.3. Transplant was remarkable for upper pole artery reconstructed to superior aspect of main renal artery in end to side manner with 7-0 prolene, very thin and friable bladder wall. 7 days gresham, no drain. Anastomosis time 27 minutes, cold ischemic time 1731 min.        Interval History: No acute events overnight. Pain is well controlled. Tolerating oral intake. Had bowel movement. 24 hr urine output 2780 cc. Clearing creatinine, 4.9 from 5.8.           Sub/Obj:   Alert and oriented x3, NAD  Not anemic, anicteric  Unlabored breathing in room air  Regular heart rate and rhythm  Soft abdomen, clear incision with intact skin staples, no swelling, no drainage  Warm and dry skin            Labs:   CBC:   Recent Labs   Lab 03/12/23  0802   WBC 7.78  7.78   RBC 2.92*  2.92*   HGB 8.6*  8.6*   HCT 27.8*  27.8*   *  100*   MCV 95  95   MCH 29.5  29.5   MCHC 30.9*  30.9*       BMP:   Recent Labs   Lab 03/12/23  0802   *  134*   *  131*   K 4.1  4.1     102   CO2 18*  18*   BUN 46*  46*   CREATININE 4.9*  4.9*   CALCIUM 9.2  9.2       CMP:   Recent Labs   Lab 03/10/23  0900 03/10/23  1518 03/12/23  0802   *   < > 134*  134*   CALCIUM 10.4   < > 9.2  9.2   ALBUMIN 3.3*   < > 2.7*  2.7*   PROT 9.0*  --   --       < > 131*  131*   K 4.4   < > 4.1  4.1   CO2 29   < > 18*  18*      < > 102  102   BUN 45*   < > 46*  46*   CREATININE 10.3*   < > 4.9*  4.9*   ALKPHOS 52*  --   --    ALT 8*  --   --    AST 15  --   --    BILITOT 0.5  --   --     < > = values in this interval not displayed.       Coagulation:   Recent Labs   Lab 03/10/23  0900   INR 1.1   APTT 27.8       Labs within the past 24 hours have been reviewed.        Assessment/Plan:     S/p Kidney  transplant #2- second kidney transplant. POD#2. 93% PRA. Underwent HD on POD#0. Making good urine. IV fluid discontinued. Renal diet. 7 days gresham.     Long Term Immunosuppression- Cont with tacro. Draw tacro daily and adjust dose as needed to maintain a therapeutic level.     At Risk for Opportunistic Infections- Cont with OI prophylaxis as per protocol.     Anemia of chronic disease- H&H stable. Monitor.     Continue home antihypertensive medications      Discussed with Dr Lilliana Villagomez MD  Fellow, PGY-7, Abdominal Transplant Surgery

## 2023-03-12 NOTE — PROGRESS NOTES
AAOx4. VSS. Sitting/Standing BPs monitored throughout shift. Cr 4.9 on AM labs (down from 5.8 prior). Thymo #3 given - patient tolerating well. BS this morning 131 - insulin gtt rate/dose changed to 0.8Units/hr. BS at lunch 114 - insulin gtt rate/dose changed to 0.5Units/hr. Meyer to gravity - light pink/yellow urine noted - see flowsheet for exact UOP amount. BM x1 this shift. LLQ incision w/ dressing - dried SS drainage noted. Patient to start Daptomycin IVPB tonight 3/12 at 2000 for donor w/ (+) blood cultures showing gram+ cocci in clusters. PRN oxy 5mg given x1 for c/o pain. HD scheduled for tomorrow 3/13 in AM. R wrist 16G removed d/t leaking at insertion site - 22G R FA PIV inserted for additional access. Patient up w/ standby assist/independently OOB. Non-skid socks on. Bed in low position. Call light within reach.    1715: R FA 20G w/ thymo infusing infiltrated - PIV removed. Thymo paused temporarily. New R FA 22G inserted for PIV access. Thymo restarted.    1815: /63 standing - PRN clonidine given for SBP>160. Will recheck BP in 1hr.

## 2023-03-12 NOTE — PLAN OF CARE
AAOx4, VSS- lying vs standing BP minimally different   AC/HS + 0200; insulin gtt @ 1.1 ml/hr; supplemental insulin given overnight  Refer to flowsheet with UOP overnight, NS stopped  BC x2 done + 1x doxycycline given for infxn prophylaxis   LLQ remains CDI with surgical dressing, minimal dry drainage seen on bandage  Pain moderately controlled with scheduled tylenol   Sleeping off and on overnight, non skid socks worn,  call light within reach, will cont to monitor  Self meds pulled 100%

## 2023-03-12 NOTE — PROGRESS NOTES
[facility]  Endocrinology  Progress Note    Admit Date: 3/10/2023     Reason for Consult: Management of T2DM, Hyperglycemia     Surgical Procedure and Date: KIdney Transplant scheduled for 3/10/2023    Diabetes diagnosis year: DANUTA    Home Diabetes Medications:  Humalog 3-6 units AC , Lantus 22 units daily, and Trulicity 4.5 mg weekly (unsure of current dose, had to decrease bc they ran out of her normal dose)    How often checking glucose at home? Uses dexcom- AM mid 150s.      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 PMH includes HTN, HFpEF, HLD, and MGUS (IgG kappa MGUS dx ). She has been on the kidney transplant waitlist since 3/29/22. She was started on PD 3/2022 then switched to HD 10/2022. She dialyzes on a MWF schedule. Last HD 3/8/23. DW 81kg. She is being admitted for kidney transplant surgery with Dr. Fuchs. She feels well in her usual state of health. She denies any recent fevers or illnesses. CXR 3/9 with mild LLL opacity. Patient denies fever, SOB, or chest pain. InOR 3/10/2023 for kidney tx. Endocrine consulted to manage hyperglycemia and T2DM.     Lab Results   Component Value Date    HGBA1C 7.9 (H) 10/05/2022                Interval HPI:   No acute events overnight. Patient in room 38100/63198 A. Blood glucose variable. BG at, above, and below goal on current insulin regimen (Transition Insulin Drip). Steroid use- Methylprednisolone  125 mg QD.   2 Days Post-Op  Renal function- Abnormal -   Vasopressors-  None     Diet renal     Eatin%  Nausea: No  Hypoglycemia and intervention: No  Fever: No  TPN and/or TF: No    /60 (BP Location: Right arm, Patient Position:  "Standing)   Pulse 82   Temp 97.5 °F (36.4 °C) (Oral)   Resp 18   Ht 5' 7" (1.702 m)   Wt 86 kg (189 lb 9.5 oz)   LMP 04/29/2011 (Approximate)   SpO2 99%   Breastfeeding No   BMI 29.69 kg/m²     Labs Reviewed and Include    Recent Labs   Lab 03/12/23  0802   *  134*   CALCIUM 9.2  9.2   ALBUMIN 2.7*  2.7*   *  131*   K 4.1  4.1   CO2 18*  18*     102   BUN 46*  46*   CREATININE 4.9*  4.9*     Lab Results   Component Value Date    WBC 7.78 03/12/2023    WBC 7.78 03/12/2023    HGB 8.6 (L) 03/12/2023    HGB 8.6 (L) 03/12/2023    HCT 27.8 (L) 03/12/2023    HCT 27.8 (L) 03/12/2023    MCV 95 03/12/2023    MCV 95 03/12/2023     (L) 03/12/2023     (L) 03/12/2023     No results for input(s): TSH, FREET4 in the last 168 hours.  Lab Results   Component Value Date    HGBA1C 7.9 (H) 10/05/2022       Nutritional status:   Body mass index is 29.69 kg/m².  Lab Results   Component Value Date    ALBUMIN 2.7 (L) 03/12/2023    ALBUMIN 2.7 (L) 03/12/2023    ALBUMIN 2.8 (L) 03/11/2023     No results found for: PREALBUMIN    Estimated Creatinine Clearance: 13.8 mL/min (A) (based on SCr of 4.9 mg/dL (H)).    Accu-Checks  Recent Labs     03/10/23  1444 03/10/23  1813 03/10/23  2205 03/11/23  0118 03/11/23  0826 03/11/23  1300 03/11/23  1805 03/11/23  2253 03/12/23  0405 03/12/23  0838   POCTGLUCOSE 179* 251* 211* 348* 237* 177* 315* 325* 219* 131*       Current Medications and/or Treatments Impacting Glycemic Control  Immunotherapy:    Immunosuppressants           Stop Route Frequency     tacrolimus capsule 4 mg         -- Oral 2 times daily     antithymocyte globulin (rabbit) 100 mg, hydrocortisone sodium succinate (SOLU-CORTEF) 20 mg in sodium chloride 0.9% 500 mL (FOR PERIPHERAL LINE ADMINISTRATION ONLY)         03/13 0859 IV Daily     mycophenolate capsule 1,000 mg         -- Oral 2 times daily          Steroids:   Hormones (From admission, onward)      Start     Stop Route Frequency " Ordered    03/13/23 0900  predniSONE tablet 20 mg  (IP TXP KIDNEY POST-OP THYMO WITH PERIPHERAL PRECHECKED)         -- Oral Daily 03/10/23 1515    03/12/23 0800  methylPREDNISolone sodium succinate injection 125 mg  (IP TXP KIDNEY POST-OP THYMO WITH PERIPHERAL PRECHECKED)         -- IV Once 03/10/23 1515    03/11/23 0900  antithymocyte globulin (rabbit) 100 mg, hydrocortisone sodium succinate (SOLU-CORTEF) 20 mg in sodium chloride 0.9% 500 mL (FOR PERIPHERAL LINE ADMINISTRATION ONLY)  (IP TXP KIDNEY POST-OP THYMO WITH PERIPHERAL PRECHECKED)         03/13 0859 IV Daily 03/10/23 1515    03/11/23 0012  melatonin tablet 6 mg         -- Oral Nightly PRN 03/10/23 2313    03/10/23 1608  hydrocortisone sodium succinate injection 100 mg  (IP TXP KIDNEY POST-OP THYMO WITH PERIPHERAL PRECHECKED)         08/06 0808 IV Once as needed 03/10/23 1515          Pressors:    Autonomic Drugs (From admission, onward)      Start     Stop Route Frequency Ordered    03/10/23 1608  EPINEPHrine (PF) injection 1 mg  (IP TXP KIDNEY POST-OP THYMO WITH PERIPHERAL PRECHECKED)         08/06 0808 SubQ Once as needed 03/10/23 1515          Hyperglycemia/Diabetes Medications:   Antihyperglycemics (From admission, onward)      Start     Stop Route Frequency Ordered    03/12/23 1115  insulin regular in 0.9 % NaCl 100 unit/100 mL (1 unit/mL) infusion        Question:  Enter initial dose (Units/hr):  Answer:  0.8    -- IV Continuous 03/12/23 1111    03/11/23 1130  insulin aspart U-100 pen 3-6 Units         -- SubQ 3 times daily with meals 03/11/23 0825    03/11/23 0924  insulin aspart U-100 pen 0-10 Units         -- SubQ As needed (PRN) 03/11/23 0825            ASSESSMENT and PLAN    Renal/  * ESRD (end stage renal disease) on dialysis  Titrate insulin slowly to avoid hypoglycemia as the risk of hypoglycemia increases with decreased creatinine clearance.    Estimated Creatinine Clearance: 13.8 mL/min (A) (based on SCr of 4.9 mg/dL  (H)).        Endocrine  Type 2 diabetes mellitus, with long-term current use of insulin  BG goal: 140-180  T2DM on insulin at home.  S/p kidney transplant.   On steroids.  Started on Transition drip post sx. Bg elevated improved overnight.  Transition drip decreased.  Will adjust transition drip and monitor on current regimen.  May need further increase of prandial insulin while on steroids.    - Adjust Transition drip to 0.5 with stepdown parameters.  - Continue Novolog 3-6 units AC based on intake.  Administer 3 units if patient eats 25% -  50% and 6 units if patient eats 50% or more.  -Continue sliding scale insulin with ISF of 25 starting at 150  - POCT Glucose before meals, at bedtime and at 2 am  - Hypoglycemia protocol in place      ** Please notify Endocrine for any change and/or advance in diet**  ** Please call Endocrine for any BG related issues **     Discharge Planning:   TBD. Please notify endocrinology prior to discharge.          Other  Adrenal corticosteroid causing adverse effect in therapeutic use  Glucocorticoids markedly increase glucose levels. Expect the steroid taper will help glucose control.             Bk Summers PA-C  Endocrinology  [facility] - [facility]

## 2023-03-12 NOTE — SUBJECTIVE & OBJECTIVE
"Interval HPI:   No acute events overnight. Patient in room 15124/69795 A. Blood glucose variable. BG at, above, and below goal on current insulin regimen (Transition Insulin Drip). Steroid use- Methylprednisolone  125 mg QD.   2 Days Post-Op  Renal function- Abnormal -   Vasopressors-  None     Diet renal     Eatin%  Nausea: No  Hypoglycemia and intervention: No  Fever: No  TPN and/or TF: No    /60 (BP Location: Right arm, Patient Position: Standing)   Pulse 82   Temp 97.5 °F (36.4 °C) (Oral)   Resp 18   Ht 5' 7" (1.702 m)   Wt 86 kg (189 lb 9.5 oz)   LMP 2011 (Approximate)   SpO2 99%   Breastfeeding No   BMI 29.69 kg/m²     Labs Reviewed and Include    Recent Labs   Lab 23  0802   *  134*   CALCIUM 9.2  9.2   ALBUMIN 2.7*  2.7*   *  131*   K 4.1  4.1   CO2 18*  18*     102   BUN 46*  46*   CREATININE 4.9*  4.9*     Lab Results   Component Value Date    WBC 7.78 2023    WBC 7.78 2023    HGB 8.6 (L) 2023    HGB 8.6 (L) 2023    HCT 27.8 (L) 2023    HCT 27.8 (L) 2023    MCV 95 2023    MCV 95 2023     (L) 2023     (L) 2023     No results for input(s): TSH, FREET4 in the last 168 hours.  Lab Results   Component Value Date    HGBA1C 7.9 (H) 10/05/2022       Nutritional status:   Body mass index is 29.69 kg/m².  Lab Results   Component Value Date    ALBUMIN 2.7 (L) 2023    ALBUMIN 2.7 (L) 2023    ALBUMIN 2.8 (L) 2023     No results found for: PREALBUMIN    Estimated Creatinine Clearance: 13.8 mL/min (A) (based on SCr of 4.9 mg/dL (H)).    Accu-Checks  Recent Labs     03/10/23  1444 03/10/23  1813 03/10/23  2205 23  0118 23  0826 23  1300 23  1805 23  2253 23  0405 23  0838   POCTGLUCOSE 179* 251* 211* 348* 237* 177* 315* 325* 219* 131*       Current Medications and/or Treatments Impacting Glycemic Control  Immunotherapy:  "   Immunosuppressants           Stop Route Frequency     tacrolimus capsule 4 mg         -- Oral 2 times daily     antithymocyte globulin (rabbit) 100 mg, hydrocortisone sodium succinate (SOLU-CORTEF) 20 mg in sodium chloride 0.9% 500 mL (FOR PERIPHERAL LINE ADMINISTRATION ONLY)         03/13 0859 IV Daily     mycophenolate capsule 1,000 mg         -- Oral 2 times daily          Steroids:   Hormones (From admission, onward)      Start     Stop Route Frequency Ordered    03/13/23 0900  predniSONE tablet 20 mg  (IP TXP KIDNEY POST-OP THYMO WITH PERIPHERAL PRECHECKED)         -- Oral Daily 03/10/23 1515    03/12/23 0800  methylPREDNISolone sodium succinate injection 125 mg  (IP TXP KIDNEY POST-OP THYMO WITH PERIPHERAL PRECHECKED)         -- IV Once 03/10/23 1515    03/11/23 0900  antithymocyte globulin (rabbit) 100 mg, hydrocortisone sodium succinate (SOLU-CORTEF) 20 mg in sodium chloride 0.9% 500 mL (FOR PERIPHERAL LINE ADMINISTRATION ONLY)  (IP TXP KIDNEY POST-OP THYMO WITH PERIPHERAL PRECHECKED)         03/13 0859 IV Daily 03/10/23 1515    03/11/23 0012  melatonin tablet 6 mg         -- Oral Nightly PRN 03/10/23 2313    03/10/23 1608  hydrocortisone sodium succinate injection 100 mg  (IP TXP KIDNEY POST-OP THYMO WITH PERIPHERAL PRECHECKED)         08/06 0808 IV Once as needed 03/10/23 1515          Pressors:    Autonomic Drugs (From admission, onward)      Start     Stop Route Frequency Ordered    03/10/23 1608  EPINEPHrine (PF) injection 1 mg  (IP TXP KIDNEY POST-OP THYMO WITH PERIPHERAL PRECHECKED)         08/06 0808 SubQ Once as needed 03/10/23 1515          Hyperglycemia/Diabetes Medications:   Antihyperglycemics (From admission, onward)      Start     Stop Route Frequency Ordered    03/12/23 1115  insulin regular in 0.9 % NaCl 100 unit/100 mL (1 unit/mL) infusion        Question:  Enter initial dose (Units/hr):  Answer:  0.8    -- IV Continuous 03/12/23 1111    03/11/23 1130  insulin aspart U-100 pen 3-6 Units          -- SubQ 3 times daily with meals 03/11/23 0825 03/11/23 0924  insulin aspart U-100 pen 0-10 Units         -- SubQ As needed (PRN) 03/11/23 0825

## 2023-03-12 NOTE — PROGRESS NOTES
TRANSPLANT NOTE:      ORGAN: LEFT KIDNEY    Disease Etiology: Hypertensive Nephrosclerosis  Donor Type: Donation after Brain Death    CDC High Risk: No    Donor CMV Status:   Donor HBcAB: Negative    Donor HCV Status: Negative    Peak cPRA % (within 1 year of transplant): 93      Elizabeth Maldonado is a 58 y.o. female s/p  Donation after Brain Death   kidney transplant on 3/10/2023 (Kidney), 12/2/2014 (Kidney) for Hypertensive Nephrosclerosis.   This patient will receive 3 doses of Thymoglobulin for induction.  This patients maintenance immunosuppression will include a steroid taper per protocol to 5mg daily, Prograf, and Cellcept maintenance.  Opportunistic infection prophylaxis will include Valcyte for 3 months (CMV D + , R + ) and Bactrim for 6 months.  Patient is to begin self medications upon transfer to the [facility], and I plan to meet with this patient and his/her support person prior to discharge to review the medication section of the Kidney Transplant Education Manual.  I have reviewed the pre-op medications and have restarted those, as appropriate.

## 2023-03-13 ENCOUNTER — PATIENT MESSAGE (OUTPATIENT)
Dept: ENDOCRINOLOGY | Facility: HOSPITAL | Age: 58
End: 2023-03-13
Payer: MEDICARE

## 2023-03-13 VITALS
SYSTOLIC BLOOD PRESSURE: 150 MMHG | HEIGHT: 67 IN | HEART RATE: 80 BPM | BODY MASS INDEX: 28.88 KG/M2 | TEMPERATURE: 98 F | WEIGHT: 184 LBS | OXYGEN SATURATION: 95 % | RESPIRATION RATE: 14 BRPM | DIASTOLIC BLOOD PRESSURE: 70 MMHG

## 2023-03-13 DIAGNOSIS — Z57.8 EMPLOYEE EXPOSURE TO BLOOD: ICD-10-CM

## 2023-03-13 DIAGNOSIS — Z94.0 KIDNEY REPLACED BY TRANSPLANT: Primary | ICD-10-CM

## 2023-03-13 DIAGNOSIS — E55.9 VITAMIN D DEFICIENCY: ICD-10-CM

## 2023-03-13 DIAGNOSIS — Z11.4 SCREENING FOR HUMAN IMMUNODEFICIENCY VIRUS: ICD-10-CM

## 2023-03-13 DIAGNOSIS — B20 HUMAN IMMUNODEFICIENCY VIRUS (HIV) DISEASE: ICD-10-CM

## 2023-03-13 DIAGNOSIS — Z91.89 PERSONAL HISTORY OF POISONING, PRESENTING HAZARDS TO HEALTH: ICD-10-CM

## 2023-03-13 PROBLEM — Z29.89 PROPHYLACTIC IMMUNOTHERAPY: Status: ACTIVE | Noted: 2023-03-13

## 2023-03-13 PROBLEM — D63.8 ANEMIA OF CHRONIC DISEASE: Status: ACTIVE | Noted: 2023-03-13

## 2023-03-13 PROBLEM — D62 ACUTE BLOOD LOSS ANEMIA: Status: ACTIVE | Noted: 2023-03-13

## 2023-03-13 PROBLEM — Z79.60 LONG-TERM USE OF IMMUNOSUPPRESSANT MEDICATION: Status: ACTIVE | Noted: 2023-03-13

## 2023-03-13 LAB
ALBUMIN SERPL BCP-MCNC: 2.8 G/DL (ref 3.5–5.2)
ANION GAP SERPL CALC-SCNC: 7 MMOL/L (ref 8–16)
BASOPHILS # BLD AUTO: 0 K/UL (ref 0–0.2)
BASOPHILS NFR BLD: 0 % (ref 0–1.9)
BUN SERPL-MCNC: 57 MG/DL (ref 6–20)
CALCIUM SERPL-MCNC: 9.3 MG/DL (ref 8.7–10.5)
CHLORIDE SERPL-SCNC: 105 MMOL/L (ref 95–110)
CO2 SERPL-SCNC: 21 MMOL/L (ref 23–29)
CREAT SERPL-MCNC: 4.1 MG/DL (ref 0.5–1.4)
DIFFERENTIAL METHOD: ABNORMAL
EOSINOPHIL # BLD AUTO: 0 K/UL (ref 0–0.5)
EOSINOPHIL NFR BLD: 0 % (ref 0–8)
ERYTHROCYTE [DISTWIDTH] IN BLOOD BY AUTOMATED COUNT: 15.7 % (ref 11.5–14.5)
EST. GFR  (NO RACE VARIABLE): 11.9 ML/MIN/1.73 M^2
ESTIMATED AVG GLUCOSE: 160 MG/DL (ref 68–131)
GLUCOSE SERPL-MCNC: 202 MG/DL (ref 70–110)
HBA1C MFR BLD: 7.2 % (ref 4–5.6)
HBV SURFACE AB SER QL IA: NEGATIVE
HBV SURFACE AB SERPL IA-ACNC: <3 MIU/ML
HCT VFR BLD AUTO: 26.7 % (ref 37–48.5)
HGB BLD-MCNC: 8.3 G/DL (ref 12–16)
HLA FREEZE AND HOLD INTERPRETATION: NORMAL
HLAFH COLLECTION DATE: NORMAL
HPRA INTERPRETATION: NORMAL
IGA SERPL-MCNC: 109 MG/DL (ref 40–350)
IGG SERPL-MCNC: 2687 MG/DL (ref 650–1600)
IGM SERPL-MCNC: 18 MG/DL (ref 50–300)
IMM GRANULOCYTES # BLD AUTO: 0.01 K/UL (ref 0–0.04)
IMM GRANULOCYTES NFR BLD AUTO: 0.2 % (ref 0–0.5)
LYMPHOCYTES # BLD AUTO: 0.1 K/UL (ref 1–4.8)
LYMPHOCYTES NFR BLD: 2.4 % (ref 18–48)
MAGNESIUM SERPL-MCNC: 2.2 MG/DL (ref 1.6–2.6)
MCH RBC QN AUTO: 29.5 PG (ref 27–31)
MCHC RBC AUTO-ENTMCNC: 31.1 G/DL (ref 32–36)
MCV RBC AUTO: 95 FL (ref 82–98)
MONOCYTES # BLD AUTO: 0.4 K/UL (ref 0.3–1)
MONOCYTES NFR BLD: 7.6 % (ref 4–15)
NEUTROPHILS # BLD AUTO: 4.8 K/UL (ref 1.8–7.7)
NEUTROPHILS NFR BLD: 89.8 % (ref 38–73)
NRBC BLD-RTO: 0 /100 WBC
PHOSPHATE SERPL-MCNC: 3.7 MG/DL (ref 2.7–4.5)
PHOSPHATE SERPL-MCNC: 3.7 MG/DL (ref 2.7–4.5)
PLATELET # BLD AUTO: 110 K/UL (ref 150–450)
PMV BLD AUTO: 12.5 FL (ref 9.2–12.9)
POCT GLUCOSE: 184 MG/DL (ref 70–110)
POCT GLUCOSE: 192 MG/DL (ref 70–110)
POCT GLUCOSE: 324 MG/DL (ref 70–110)
POTASSIUM SERPL-SCNC: 4.5 MMOL/L (ref 3.5–5.1)
RBC # BLD AUTO: 2.81 M/UL (ref 4–5.4)
SODIUM SERPL-SCNC: 133 MMOL/L (ref 136–145)
TACROLIMUS BLD-MCNC: 4.9 NG/ML (ref 5–15)
WBC # BLD AUTO: 5.39 K/UL (ref 3.9–12.7)

## 2023-03-13 PROCEDURE — 63600175 PHARM REV CODE 636 W HCPCS: Performed by: TRANSPLANT SURGERY

## 2023-03-13 PROCEDURE — 63600175 PHARM REV CODE 636 W HCPCS: Performed by: PHYSICIAN ASSISTANT

## 2023-03-13 PROCEDURE — 99232 SBSQ HOSP IP/OBS MODERATE 35: CPT | Mod: ,,, | Performed by: PHYSICIAN ASSISTANT

## 2023-03-13 PROCEDURE — 83735 ASSAY OF MAGNESIUM: CPT | Performed by: SURGERY

## 2023-03-13 PROCEDURE — 84165 PROTEIN E-PHORESIS SERUM: CPT | Performed by: STUDENT IN AN ORGANIZED HEALTH CARE EDUCATION/TRAINING PROGRAM

## 2023-03-13 PROCEDURE — 86334 IMMUNOFIX E-PHORESIS SERUM: CPT | Performed by: STUDENT IN AN ORGANIZED HEALTH CARE EDUCATION/TRAINING PROGRAM

## 2023-03-13 PROCEDURE — 86334 IMMUNOFIX E-PHORESIS SERUM: CPT | Mod: 26,,, | Performed by: PATHOLOGY

## 2023-03-13 PROCEDURE — 83521 IG LIGHT CHAINS FREE EACH: CPT | Mod: 59 | Performed by: STUDENT IN AN ORGANIZED HEALTH CARE EDUCATION/TRAINING PROGRAM

## 2023-03-13 PROCEDURE — 81200001 HC KIDNEY ACQUISITION - CADAVER

## 2023-03-13 PROCEDURE — 99024 PR POST-OP FOLLOW-UP VISIT: ICD-10-PCS | Mod: ,,, | Performed by: PHYSICIAN ASSISTANT

## 2023-03-13 PROCEDURE — 25000003 PHARM REV CODE 250: Performed by: PHYSICIAN ASSISTANT

## 2023-03-13 PROCEDURE — 82784 ASSAY IGA/IGD/IGG/IGM EACH: CPT | Performed by: STUDENT IN AN ORGANIZED HEALTH CARE EDUCATION/TRAINING PROGRAM

## 2023-03-13 PROCEDURE — 80069 RENAL FUNCTION PANEL: CPT | Performed by: SURGERY

## 2023-03-13 PROCEDURE — 84165 PATHOLOGIST INTERPRETATION SPE: ICD-10-PCS | Mod: 26,,, | Performed by: PATHOLOGY

## 2023-03-13 PROCEDURE — 99232 PR SUBSEQUENT HOSPITAL CARE,LEVL II: ICD-10-PCS | Mod: ,,, | Performed by: PHYSICIAN ASSISTANT

## 2023-03-13 PROCEDURE — 84165 PROTEIN E-PHORESIS SERUM: CPT | Mod: 26,,, | Performed by: PATHOLOGY

## 2023-03-13 PROCEDURE — 83036 HEMOGLOBIN GLYCOSYLATED A1C: CPT | Performed by: TRANSPLANT SURGERY

## 2023-03-13 PROCEDURE — 36415 COLL VENOUS BLD VENIPUNCTURE: CPT | Performed by: TRANSPLANT SURGERY

## 2023-03-13 PROCEDURE — 80197 ASSAY OF TACROLIMUS: CPT | Performed by: SURGERY

## 2023-03-13 PROCEDURE — 99024 POSTOP FOLLOW-UP VISIT: CPT | Mod: ,,, | Performed by: PHYSICIAN ASSISTANT

## 2023-03-13 PROCEDURE — 25000003 PHARM REV CODE 250: Performed by: SURGERY

## 2023-03-13 PROCEDURE — 36415 COLL VENOUS BLD VENIPUNCTURE: CPT | Performed by: SURGERY

## 2023-03-13 PROCEDURE — 63600175 PHARM REV CODE 636 W HCPCS: Performed by: SURGERY

## 2023-03-13 PROCEDURE — 86334 PATHOLOGIST INTERPRETATION IFE: ICD-10-PCS | Mod: 26,,, | Performed by: PATHOLOGY

## 2023-03-13 PROCEDURE — 85025 COMPLETE CBC W/AUTO DIFF WBC: CPT | Performed by: SURGERY

## 2023-03-13 PROCEDURE — 36415 COLL VENOUS BLD VENIPUNCTURE: CPT | Performed by: STUDENT IN AN ORGANIZED HEALTH CARE EDUCATION/TRAINING PROGRAM

## 2023-03-13 RX ORDER — INSULIN LISPRO 100 [IU]/ML
5 INJECTION, SOLUTION INTRAVENOUS; SUBCUTANEOUS
Qty: 9 ML | Refills: 5 | Status: SHIPPED | OUTPATIENT
Start: 2023-03-13

## 2023-03-13 RX ORDER — TACROLIMUS 1 MG/1
2 CAPSULE ORAL ONCE
Status: COMPLETED | OUTPATIENT
Start: 2023-03-13 | End: 2023-03-13

## 2023-03-13 RX ORDER — TACROLIMUS 1 MG/1
8 CAPSULE ORAL 2 TIMES DAILY
Status: DISCONTINUED | OUTPATIENT
Start: 2023-03-13 | End: 2023-03-13 | Stop reason: HOSPADM

## 2023-03-13 RX ORDER — DEXTROSE 40 %
30 GEL (GRAM) ORAL
Status: DISCONTINUED | OUTPATIENT
Start: 2023-03-13 | End: 2023-03-13 | Stop reason: HOSPADM

## 2023-03-13 RX ORDER — OXYCODONE HYDROCHLORIDE 5 MG/1
5 TABLET ORAL EVERY 4 HOURS PRN
Status: DISCONTINUED | OUTPATIENT
Start: 2023-03-13 | End: 2023-03-13 | Stop reason: HOSPADM

## 2023-03-13 RX ORDER — LANCETS 33 GAUGE
1 EACH MISCELLANEOUS
Qty: 200 EACH | Refills: 7 | Status: SHIPPED | OUTPATIENT
Start: 2023-03-13 | End: 2023-03-13 | Stop reason: SDUPTHER

## 2023-03-13 RX ORDER — HYDRALAZINE HYDROCHLORIDE 50 MG/1
50 TABLET, FILM COATED ORAL 3 TIMES DAILY
Qty: 90 TABLET | Refills: 5 | Status: SHIPPED | OUTPATIENT
Start: 2023-03-13 | End: 2023-08-29 | Stop reason: SDUPTHER

## 2023-03-13 RX ORDER — VALGANCICLOVIR 450 MG/1
450 TABLET, FILM COATED ORAL
Qty: 12 TABLET | Refills: 2 | Status: SHIPPED | OUTPATIENT
Start: 2023-03-13 | End: 2023-04-28

## 2023-03-13 RX ORDER — TACROLIMUS 1 MG/1
8 CAPSULE ORAL EVERY 12 HOURS
Qty: 480 CAPSULE | Refills: 11 | Status: SHIPPED | OUTPATIENT
Start: 2023-03-13 | End: 2023-03-20 | Stop reason: DRUGHIGH

## 2023-03-13 RX ORDER — ATORVASTATIN CALCIUM 40 MG/1
40 TABLET, FILM COATED ORAL NIGHTLY
Qty: 30 TABLET | Refills: 5 | Status: SHIPPED | OUTPATIENT
Start: 2023-03-13 | End: 2023-08-29 | Stop reason: SDUPTHER

## 2023-03-13 RX ORDER — DULAGLUTIDE 4.5 MG/.5ML
4.5 INJECTION, SOLUTION SUBCUTANEOUS WEEKLY
Qty: 1 PEN | Refills: 5 | Status: SHIPPED | OUTPATIENT
Start: 2023-03-13 | End: 2023-09-18 | Stop reason: ALTCHOICE

## 2023-03-13 RX ORDER — INSULIN GLARGINE 100 [IU]/ML
18 INJECTION, SOLUTION SUBCUTANEOUS DAILY
Qty: 6 ML | Refills: 5
Start: 2023-03-13 | End: 2023-03-13 | Stop reason: SDUPTHER

## 2023-03-13 RX ORDER — LANCETS 33 GAUGE
1 EACH MISCELLANEOUS
Qty: 200 EACH | Refills: 7 | Status: SHIPPED | OUTPATIENT
Start: 2023-03-13

## 2023-03-13 RX ORDER — TRAMADOL HYDROCHLORIDE 50 MG/1
50 TABLET ORAL EVERY 6 HOURS PRN
Status: DISCONTINUED | OUTPATIENT
Start: 2023-03-13 | End: 2023-03-13 | Stop reason: HOSPADM

## 2023-03-13 RX ORDER — TIMOLOL MALEATE 5 MG/ML
1 SOLUTION/ DROPS OPHTHALMIC 2 TIMES DAILY
Qty: 5 ML | Refills: 5 | Status: SHIPPED | OUTPATIENT
Start: 2023-03-13

## 2023-03-13 RX ORDER — GLUCAGON 1 MG
1 KIT INJECTION
Status: DISCONTINUED | OUTPATIENT
Start: 2023-03-13 | End: 2023-03-13 | Stop reason: HOSPADM

## 2023-03-13 RX ORDER — TIMOLOL MALEATE 5 MG/ML
1 SOLUTION/ DROPS OPHTHALMIC 2 TIMES DAILY
Qty: 5 ML | Refills: 5 | Status: SHIPPED | OUTPATIENT
Start: 2023-03-13 | End: 2023-03-13 | Stop reason: SDUPTHER

## 2023-03-13 RX ORDER — CARVEDILOL 3.12 MG/1
3.12 TABLET ORAL 2 TIMES DAILY
Qty: 60 TABLET | Refills: 5 | Status: SHIPPED | OUTPATIENT
Start: 2023-03-13 | End: 2023-08-28 | Stop reason: SDUPTHER

## 2023-03-13 RX ORDER — OXYCODONE HYDROCHLORIDE 5 MG/1
5 TABLET ORAL EVERY 4 HOURS PRN
Qty: 30 TABLET | Refills: 0 | Status: SHIPPED | OUTPATIENT
Start: 2023-03-13 | End: 2023-03-31

## 2023-03-13 RX ORDER — SULFAMETHOXAZOLE AND TRIMETHOPRIM 400; 80 MG/1; MG/1
1 TABLET ORAL
Qty: 12 TABLET | Refills: 5 | Status: SHIPPED | OUTPATIENT
Start: 2023-03-13 | End: 2023-04-28

## 2023-03-13 RX ORDER — INSULIN GLARGINE 100 [IU]/ML
18 INJECTION, SOLUTION SUBCUTANEOUS DAILY
Qty: 6 ML | Refills: 5 | Status: SHIPPED | OUTPATIENT
Start: 2023-03-13

## 2023-03-13 RX ORDER — TRAMADOL HYDROCHLORIDE 50 MG/1
50 TABLET ORAL EVERY 6 HOURS PRN
Qty: 30 TABLET | Refills: 0 | Status: SHIPPED | OUTPATIENT
Start: 2023-03-13 | End: 2023-05-30 | Stop reason: ALTCHOICE

## 2023-03-13 RX ORDER — DEXTROSE 40 %
15 GEL (GRAM) ORAL
Status: DISCONTINUED | OUTPATIENT
Start: 2023-03-13 | End: 2023-03-13 | Stop reason: HOSPADM

## 2023-03-13 RX ORDER — FAMOTIDINE 20 MG/1
20 TABLET, FILM COATED ORAL NIGHTLY
Qty: 30 TABLET | Refills: 11 | Status: SHIPPED | OUTPATIENT
Start: 2023-03-13 | End: 2023-04-19 | Stop reason: ALTCHOICE

## 2023-03-13 RX ADMIN — TIMOLOL MALEATE 1 DROP: 5 SOLUTION/ DROPS OPHTHALMIC at 08:03

## 2023-03-13 RX ADMIN — HYDRALAZINE HYDROCHLORIDE 50 MG: 50 TABLET ORAL at 05:03

## 2023-03-13 RX ADMIN — PREDNISONE 20 MG: 20 TABLET ORAL at 08:03

## 2023-03-13 RX ADMIN — INSULIN ASPART 2 UNITS: 100 INJECTION, SOLUTION INTRAVENOUS; SUBCUTANEOUS at 01:03

## 2023-03-13 RX ADMIN — THERA TABS 1 TABLET: TAB at 08:03

## 2023-03-13 RX ADMIN — TACROLIMUS 2 MG: 1 CAPSULE ORAL at 10:03

## 2023-03-13 RX ADMIN — MYCOPHENOLATE MOFETIL 1000 MG: 250 CAPSULE ORAL at 08:03

## 2023-03-13 RX ADMIN — CARVEDILOL 3.12 MG: 3.12 TABLET, FILM COATED ORAL at 08:03

## 2023-03-13 RX ADMIN — INSULIN ASPART 6 UNITS: 100 INJECTION, SOLUTION INTRAVENOUS; SUBCUTANEOUS at 01:03

## 2023-03-13 RX ADMIN — INSULIN DETEMIR 16 UNITS: 100 INJECTION, SOLUTION SUBCUTANEOUS at 09:03

## 2023-03-13 RX ADMIN — INSULIN ASPART 2 UNITS: 100 INJECTION, SOLUTION INTRAVENOUS; SUBCUTANEOUS at 08:03

## 2023-03-13 RX ADMIN — HYDRALAZINE HYDROCHLORIDE 50 MG: 50 TABLET ORAL at 01:03

## 2023-03-13 RX ADMIN — ACETAMINOPHEN 650 MG: 325 TABLET ORAL at 05:03

## 2023-03-13 RX ADMIN — TRAMADOL HYDROCHLORIDE 50 MG: 50 TABLET, COATED ORAL at 11:03

## 2023-03-13 RX ADMIN — HEPARIN SODIUM 5000 UNITS: 5000 INJECTION INTRAVENOUS; SUBCUTANEOUS at 05:03

## 2023-03-13 RX ADMIN — MUPIROCIN 1 G: 20 OINTMENT TOPICAL at 08:03

## 2023-03-13 RX ADMIN — CLONIDINE HYDROCHLORIDE 0.1 MG: 0.1 TABLET ORAL at 05:03

## 2023-03-13 RX ADMIN — INSULIN ASPART 6 UNITS: 100 INJECTION, SOLUTION INTRAVENOUS; SUBCUTANEOUS at 08:03

## 2023-03-13 RX ADMIN — TACROLIMUS 6 MG: 1 CAPSULE ORAL at 08:03

## 2023-03-13 SDOH — SOCIAL DETERMINANTS OF HEALTH (SDOH): OCCUPATIONAL EXPOSURE TO OTHER RISK FACTORS: Z57.8

## 2023-03-13 NOTE — PROGRESS NOTES
[facility]  Endocrinology  Progress Note    Admit Date: 3/10/2023     Reason for Consult: Management of T2DM, Hyperglycemia     Surgical Procedure and Date: KIdney Transplant scheduled for 3/10/2023    Diabetes diagnosis year: DANUTA    Home Diabetes Medications:  Humalog 3-6 units AC , Lantus 22 units daily, and Trulicity 4.5 mg weekly (unsure of current dose, had to decrease bc they ran out of her normal dose)    How often checking glucose at home? Uses dexcom- AM mid 150s.      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 PMH includes HTN, HFpEF, HLD, and MGUS (IgG kappa MGUS dx ). She has been on the kidney transplant waitlist since 3/29/22. She was started on PD 3/2022 then switched to HD 10/2022. She dialyzes on a MWF schedule. Last HD 3/8/23. DW 81kg. She is being admitted for kidney transplant surgery with Dr. Fuchs. She feels well in her usual state of health. She denies any recent fevers or illnesses. CXR 3/9 with mild LLL opacity. Patient denies fever, SOB, or chest pain. InOR 3/10/2023 for kidney tx. Endocrine consulted to manage hyperglycemia and T2DM.     Lab Results   Component Value Date    HGBA1C 7.9 (H) 10/05/2022                Interval HPI:   No acute events overnight. Patient in room 20089/26775 A. Blood glucose variable. BG at and above goal on current insulin regimen (Transition Insulin Drip). Steroid use- Prednisone 20 mg .   3 Days Post-Op  Renal function- Abnormal -    Vasopressors-  None     Diet renal  Diet diabetic    Eatin%  Nausea: No  Hypoglycemia and intervention: No  Fever: No  TPN and/or TF: No      BP (!) 150/70 (BP Location: Right arm, Patient Position:  "Standing)   Pulse 80   Temp 97.9 °F (36.6 °C) (Oral)   Resp 14   Ht 5' 7" (1.702 m)   Wt 83.4 kg (183 lb 15.6 oz)   LMP 04/29/2011 (Approximate)   SpO2 95%   Breastfeeding No   BMI 28.81 kg/m²     Labs Reviewed and Include    Recent Labs   Lab 03/13/23  0659   *   CALCIUM 9.3   ALBUMIN 2.8*   *   K 4.5   CO2 21*      BUN 57*   CREATININE 4.1*     Lab Results   Component Value Date    WBC 5.39 03/13/2023    HGB 8.3 (L) 03/13/2023    HCT 26.7 (L) 03/13/2023    MCV 95 03/13/2023     (L) 03/13/2023     No results for input(s): TSH, FREET4 in the last 168 hours.  Lab Results   Component Value Date    HGBA1C 7.9 (H) 10/05/2022       Nutritional status:   Body mass index is 28.81 kg/m².  Lab Results   Component Value Date    ALBUMIN 2.8 (L) 03/13/2023    ALBUMIN 2.7 (L) 03/12/2023    ALBUMIN 2.7 (L) 03/12/2023     No results found for: PREALBUMIN    Estimated Creatinine Clearance: 16.2 mL/min (A) (based on SCr of 4.1 mg/dL (H)).    Accu-Checks  Recent Labs     03/11/23  1805 03/11/23  2253 03/12/23  0405 03/12/23  0838 03/12/23  1221 03/12/23  1702 03/12/23  2109 03/13/23  0130 03/13/23  0822 03/13/23  1245   POCTGLUCOSE 315* 325* 219* 131* 114* 144* 325* 324* 192* 184*       Current Medications and/or Treatments Impacting Glycemic Control  Immunotherapy:    Immunosuppressants           Stop Route Frequency     tacrolimus capsule 8 mg         -- Oral 2 times daily     mycophenolate capsule 1,000 mg         -- Oral 2 times daily          Steroids:   Hormones (From admission, onward)      Start     Stop Route Frequency Ordered    03/13/23 0900  predniSONE tablet 20 mg  (IP TXP KIDNEY POST-OP THYMO WITH PERIPHERAL PRECHECKED)         -- Oral Daily 03/10/23 1515    03/11/23 0012  melatonin tablet 6 mg         -- Oral Nightly PRN 03/10/23 2313    03/10/23 1608  hydrocortisone sodium succinate injection 100 mg  (IP TXP KIDNEY POST-OP THYMO WITH PERIPHERAL PRECHECKED)         08/06 0808 IV Once " as needed 03/10/23 1515          Pressors:    Autonomic Drugs (From admission, onward)      Start     Stop Route Frequency Ordered    03/10/23 1608  EPINEPHrine (PF) injection 1 mg  (IP TXP KIDNEY POST-OP THYMO WITH PERIPHERAL PRECHECKED)         08/06 0808 SubQ Once as needed 03/10/23 1515          Hyperglycemia/Diabetes Medications:   Antihyperglycemics (From admission, onward)      Start     Stop Route Frequency Ordered    03/13/23 0915  insulin detemir U-100 pen 16 Units         -- SubQ Daily 03/13/23 0903    03/11/23 1130  insulin aspart U-100 pen 3-6 Units         -- SubQ 3 times daily with meals 03/11/23 0825    03/11/23 0924  insulin aspart U-100 pen 0-10 Units         -- SubQ As needed (PRN) 03/11/23 0825            ASSESSMENT and PLAN    Renal/  ESRD (end stage renal disease) on dialysis  Titrate insulin slowly to avoid hypoglycemia as the risk of hypoglycemia increases with decreased creatinine clearance.    Estimated Creatinine Clearance: 16.2 mL/min (A) (based on SCr of 4.1 mg/dL (H)).        Endocrine  Type 2 diabetes mellitus, with long-term current use of insulin  BG goal: 140-180  T2DM on insulin at home.  S/p kidney transplant.   On steroids.  Started on Transition drip post sx. Bg elevated  overnight.  Will switch patient to basal based on 0.4 TDD WBD and current needs.      - DiscontinueTransition drip   - Start on Levemir 16 units daily  - Continue Novolog 3-6 units AC based on intake.  Administer 3 units if patient eats 25% -  50% and 6 units if patient eats 50% or more.  -Continue sliding scale insulin with ISF of 25 starting at 150  - POCT Glucose before meals, at bedtime and at 2 am  - Hypoglycemia protocol in place      ** Please notify Endocrine for any change and/or advance in diet**  ** Please call Endocrine for any BG related issues **     Discharge Planning:   Notified by primary team plan to discharge pt today.  Doses similar to home but not requiring as much insulin although sugars  variable with steroids.  Will do 20 % reduction from most recent home basal and adjust prandial based on current needs    Lantus 18 units daily  Humalog 5 units + SSI   Add correction scale if needed  Blood sugar 180 to 230 add 1 units  Blood sugar 231 to 280 add 2 units  Blood sugar 281 to 330 add 3 units  Blood sugar 331 to 380 add 4 units  Blood sugar greater than 380 add 5 units    -Continue home trulicity if okay with transplant team.    .          Other  Adrenal corticosteroid causing adverse effect in therapeutic use  Glucocorticoids markedly increase glucose levels. Expect the steroid taper will help glucose control.             Bk Summers PA-C  Endocrinology  [facility]

## 2023-03-13 NOTE — PROGRESS NOTES
[facility] - Transplant Stepdown  Adult Nutrition  Progress Note    SUMMARY       Recommendations    1. Continue current renal diet- add diabetic diet restrictions.   2. If PO intake <50%, add Novasource BID.   3. RD following.    Goals: Continue meeting % EEN/EPN by next RD f/u.  Nutrition Goal Status: progressing towards goal  Communication of RD Recs:  (POC)    Assessment and Plan    Nutrition Problem  Increased nutrient needs (protein, energy)     Related to (etiology):   Increased physiological demands     Signs and Symptoms (as evidenced by):   S/p kidney transplant      Interventions/Recommendations (treatment strategy):  Collaboration of nutrition care with other providers  Adequate meal intake/ONS  Post-transplant diet education has been provided     Nutrition Diagnosis Status:   Continue     Reason for Assessment    Reason For Assessment: RD follow-up  Diagnosis:  (ESRD on HD)  Relevant Medical History: T2DM, HLD, HTN, HFpEF  Interdisciplinary Rounds: did not attend    General Information Comments:   3/13: RD follow up. Pt reports good appetite. Denies N/V, chewing/swallowing difficulties. LBM 3/12. Reiterated post tx diet education, pt with no additional question. No other needs identified at this time.   3/11: POD 1 Kidney Tx. RD consulted for assess nutritional needs post kidney tx. Spoke with pt at bedside. Endorses fair appetite PTA with 1-2 meals/day on no salt/renal/fluid restricted diet. States she does like to eat out. Current PO intake 100% with good appetite. No issues with n/v/d/c/chewing/swallowing. States she does take a phosphate binder. #. NFPE not warranted 2/2 appearing well nourished. Provided pt with Food Nutrition Therapy and Low-Sodium Nutrition Therapy handouts and discussed reason for want to follow each diet, proper cooking temperatures, how to handle fresh produce, avoiding raw sprouts, meal planning tips, food recommended and not recommended in each food group,  "avoiding salad bars and buffets, avoiding grapefruit/pomegranate/starfruit, recd amount of sodium/day and per serving, tips for cutting back on sodium, choosing foods at a restaurant, and provided sample menu. LBM 3/10.    Nutrition Discharge Planning: Post transplant nutrition education provided. Food safety/drug interactions emphasized. General healthy diet recommended. RD name/contact information, education material left. No other needs identified. Caregiver present.    Nutrition Risk Screen    Nutrition Risk Screen: no indicators present    Nutrition/Diet History    Patient Reported Diet/Restrictions/Preferences: low salt, renal (fluid restricted)  Food Preferences: Fruits, cereal with milk, oatmeal, cheese  Food Allergies: other (see comments) (shrimp)  Factors Affecting Nutritional Intake: None identified at this time    Anthropometrics    Temp: 97.9 °F (36.6 °C)  Height Method: Measured  Height: 5' 7" (170.2 cm)  Height (inches): 67 in  Weight Method: Standard Scale  Weight: 83.4 kg (183 lb 15.6 oz)  Weight (lb): 183.98 lb  Ideal Body Weight (IBW), Female: 135 lb  % Ideal Body Weight, Female (lb): 134.24 %  BMI (Calculated): 28.8  BMI Grade: 25 - 29.9 - overweight     Lab/Procedures/Meds    Pertinent Labs Reviewed: reviewed  Pertinent Labs Comments: glucose 202, Na 133, BUN 57, Cr 4.1, eGFR 11.9  Pertinent Medications Reviewed: reviewed  Pertinent Medications Comments: MV, tacrolimus, insulin, prednisone, insulin, mycophenolate, atorvastatin, famotidine    Estimated/Assessed Needs    Weight Used For Calorie Calculations: 82.2 kg (181 lb 3.5 oz)  Energy Calorie Requirements (kcal): 1780 kcals  Energy Need Method: [place] (MSJ x 1.25 PAL)  Protein Requirements: 92 g (1.5 g/kg IBW)  Weight Used For Protein Calculations: 61.2 kg (135 lb)  Fluid Requirements (mL): 1 mL/kcal or fluid per MD  Estimated Fluid Requirement Method: RDA Method  RDA Method (mL): 1780  CHO Requirement: 225 g    Nutrition " Prescription Ordered    Current Diet Order: Renal    Evaluation of Received Nutrient/Fluid Intake    I/O: +4 L since admit  Energy Calories Required: meeting needs  Protein Required: meeting needs  Fluid Required: meeting needs  Tolerance: tolerating    Nutrition Risk    Level of Risk/Frequency of Follow-up:  (1 time/week)     Monitor and Evaluation    Food and Nutrient Intake: energy intake, food and beverage intake  Food and Nutrient Adminstration: diet order  Knowledge/Beliefs/Attitudes: food and nutrition knowledge/skill, beliefs and attitudes  Physical Activity and Function: nutrition-related ADLs and IADLs  Anthropometric Measurements: body mass index, weight change, weight, height/length  Biochemical Data, Medical Tests and Procedures: lipid profile, inflammatory profile, glucose/endocrine profile, gastrointestinal profile, electrolyte and renal panel  Nutrition-Focused Physical Findings: overall appearance     Nutrition Follow-Up    RD Follow-up?: Yes

## 2023-03-13 NOTE — PLAN OF CARE
AAOx4, VSS- standing BPs obtained   AC/HS + 0200; insulin gtt @ 0.5 ml/hr; supplemental insulin given overnight  Refer to flowsheet with UOP overnight, Cr trending down 4.9  Plan for HD in AM   LLQ remains CDI with surgical dressing, minimal dry drainage seen on bandage  Pain moderately controlled with scheduled tylenol   Sleeping off and on overnight, non skid socks worn,  call light within reach, will cont to monitor  Self meds pulled 100%

## 2023-03-13 NOTE — ASSESSMENT & PLAN NOTE
BG goal: 140-180  T2DM on insulin at home.  S/p kidney transplant.   On steroids.  Started on Transition drip post sx. Bg elevated  overnight.  Will switch patient to basal based on 0.4 TDD WBD and current needs.      - DiscontinueTransition drip   - Start on Levemir 16 units daily  - Continue Novolog 3-6 units AC based on intake.  Administer 3 units if patient eats 25% -  50% and 6 units if patient eats 50% or more.  -Continue sliding scale insulin with ISF of 25 starting at 150  - POCT Glucose before meals, at bedtime and at 2 am  - Hypoglycemia protocol in place      ** Please notify Endocrine for any change and/or advance in diet**  ** Please call Endocrine for any BG related issues **     Discharge Planning:   Notified by primary team plan to discharge pt today.  Doses similar to home but not requiring as much insulin although sugars variable with steroids.  Will do 20 % reduction from most recent home basal and adjust prandial based on current needs    Lantus 18 units daily  Humalog 5 units + SSI   Add correction scale if needed  Blood sugar 180 to 230 add 1 units  Blood sugar 231 to 280 add 2 units  Blood sugar 281 to 330 add 3 units  Blood sugar 331 to 380 add 4 units  Blood sugar greater than 380 add 5 units    -Continue home trulicity if okay with transplant team.    .

## 2023-03-13 NOTE — SUBJECTIVE & OBJECTIVE
"Interval HPI:   No acute events overnight. Patient in room 91649/48790 A. Blood glucose variable. BG at and above goal on current insulin regimen (Transition Insulin Drip). Steroid use- Prednisone 20 mg .   3 Days Post-Op  Renal function- Abnormal -    Vasopressors-  None     Diet renal  Diet diabetic    Eatin%  Nausea: No  Hypoglycemia and intervention: No  Fever: No  TPN and/or TF: No      BP (!) 150/70 (BP Location: Right arm, Patient Position: Standing)   Pulse 80   Temp 97.9 °F (36.6 °C) (Oral)   Resp 14   Ht 5' 7" (1.702 m)   Wt 83.4 kg (183 lb 15.6 oz)   LMP 2011 (Approximate)   SpO2 95%   Breastfeeding No   BMI 28.81 kg/m²     Labs Reviewed and Include    Recent Labs   Lab 23  0659   *   CALCIUM 9.3   ALBUMIN 2.8*   *   K 4.5   CO2 21*      BUN 57*   CREATININE 4.1*     Lab Results   Component Value Date    WBC 5.39 2023    HGB 8.3 (L) 2023    HCT 26.7 (L) 2023    MCV 95 2023     (L) 2023     No results for input(s): TSH, FREET4 in the last 168 hours.  Lab Results   Component Value Date    HGBA1C 7.9 (H) 10/05/2022       Nutritional status:   Body mass index is 28.81 kg/m².  Lab Results   Component Value Date    ALBUMIN 2.8 (L) 2023    ALBUMIN 2.7 (L) 2023    ALBUMIN 2.7 (L) 2023     No results found for: PREALBUMIN    Estimated Creatinine Clearance: 16.2 mL/min (A) (based on SCr of 4.1 mg/dL (H)).    Accu-Checks  Recent Labs     23  1805 23  2253 23  0405 23  0838 23  1221 23  1702 23  2109 23  0130 23  0822 23  1245   POCTGLUCOSE 315* 325* 219* 131* 114* 144* 325* 324* 192* 184*       Current Medications and/or Treatments Impacting Glycemic Control  Immunotherapy:    Immunosuppressants           Stop Route Frequency     tacrolimus capsule 8 mg         -- Oral 2 times daily     mycophenolate capsule 1,000 mg         -- Oral 2 times daily      "     Steroids:   Hormones (From admission, onward)      Start     Stop Route Frequency Ordered    03/13/23 0900  predniSONE tablet 20 mg  (IP TXP KIDNEY POST-OP THYMO WITH PERIPHERAL PRECHECKED)         -- Oral Daily 03/10/23 1515    03/11/23 0012  melatonin tablet 6 mg         -- Oral Nightly PRN 03/10/23 2313    03/10/23 1608  hydrocortisone sodium succinate injection 100 mg  (IP TXP KIDNEY POST-OP THYMO WITH PERIPHERAL PRECHECKED)         08/06 0808 IV Once as needed 03/10/23 1515          Pressors:    Autonomic Drugs (From admission, onward)      Start     Stop Route Frequency Ordered    03/10/23 1608  EPINEPHrine (PF) injection 1 mg  (IP TXP KIDNEY POST-OP THYMO WITH PERIPHERAL PRECHECKED)         08/06 0808 SubQ Once as needed 03/10/23 1515          Hyperglycemia/Diabetes Medications:   Antihyperglycemics (From admission, onward)      Start     Stop Route Frequency Ordered    03/13/23 0915  insulin detemir U-100 pen 16 Units         -- SubQ Daily 03/13/23 0903    03/11/23 1130  insulin aspart U-100 pen 3-6 Units         -- SubQ 3 times daily with meals 03/11/23 0825    03/11/23 0924  insulin aspart U-100 pen 0-10 Units         -- SubQ As needed (PRN) 03/11/23 0825

## 2023-03-13 NOTE — PROGRESS NOTES
Transplant Coordinator  3/10-3/13/2023     Pt admitted for a cadaveric kidney transplant. ESRD 2/2 re-transplant (DM/HTN) Kidney #1 in 2014-pt was back on HD. SCD, KDPI 27%, CIT>28hrs but kidney was PUMPED. Thymo induction, CMV ++  Pt with a cPRA 95%, Prospecitive CXM DSA-Dp1 (0268)     Kidney function improved daily. Cr 4 at time of d/c  Good UOP     Incision CHAY with staples  Meyer in place for 7 days. Will not be removed until 3/17/23.     Pt will d/c home to the [facility] 3/14 and RTC to meet with coordinator/pharmD

## 2023-03-13 NOTE — PROGRESS NOTES
Transplant Teaching Book given to patient, Elizabeth Maldonado, on 03/13/2023.  During the course of the hospital stay the patient received information regarding kidney transplant. Teaching and instruction were completed.  Areas that were discussed included: how to contact the Transplant Team, the importance of measuring intake of fluids and urine output, and monitoring vital signs such as blood pressure, temperature, and daily weights.  Parameters for which to report abnormal findings were given.  Appointment were provided along with the rational for the importance of lab work and clinic visits.  A written medication list was provided.  The importance of immunosuppressive medications, their common side effects, and treatment to prevent or minimize side effects has been reviewed.  Signs and symptoms of rejection and infection along with various treatments were reviewed.  The need to avoid infection was discussed.  Wound care and special consideration regarding activities of daily living were explained.  Written and verbal teaching of the above information was given.     Discussed with the patient and caregiver the importance of maintaining COVID-19 precautions; wearing a mask, good handwashing, and social distancing.  Also, to report any signs or symptoms (fever, difficulty breathing, loss of taste/smell, etc.), suspected exposure, or COVID testing, immediately to the transplant program.     Education was provided to the patient.

## 2023-03-13 NOTE — PROGRESS NOTES
EDUCATION NOTE:    Met with Elizabeth Paul Joel and her caregivers to provide teaching re: immunosuppressant medications.  Reviewed medication section of the Kidney Transplant Education book that was provided.  Emphasized the importance of compliance, role of the blue medication card, concerns for drug interactions, and process of obtaining refills.  Counseled regarding Prograf, Cellcept , prednisone, including directions for use, monitoring, how to handle missed doses, and side effects.  Ms. Maldonado verbalized understanding and had the opportunity to ask questions.

## 2023-03-13 NOTE — NURSING
Pt met with transplant pharmacist and transplant coordinator. Iv d/kel with cath tip intact x2. Pt being discharged with gresham. Leg bag instructions reviewed with patient and pt switched to leg bag. Educated pt on importance of using gu bag at night and keeping bag below bladder. Surgical dressing was removed today. Instructed pt on incisional care. She voiced understanding of medications, follow up appts, incisional care, how to switch from leg bag to gu bag, what to call for, who to call, and [facility] on call number. Pt waiting on ride at this time.

## 2023-03-13 NOTE — PROGRESS NOTES
DISCHARGE NOTE:    Elizabeth Maldonado is a 58 y.o. female s/p LEFT KIDNEY   Donation after Brain Death   transplant on 3/10/2023 (Kidney), 12/2/2014 (Kidney) for ESRD secondary to Hypertensive Nephrosclerosis.      Past Medical History:   Diagnosis Date    Acidosis 7/1/2014    Allergic rhinitis 7/1/2014    Allergy     Anemia     Anemia in chronic kidney disease 7/1/2014    Anxiety     Cataract     Chronic bilateral low back pain with bilateral sciatica 10/25/2019    Chronic immunosuppression with Prograf and MMF 12/3/2014    CKD (chronic kidney disease) stage 5, GFR less than 15 ml/min 7/1/2014    CKD (chronic kidney disease), stage III 3/2/2015    Degenerative disc disease     Depression 7/1/2014    Diabetes mellitus, type 2 since age 18 7/1/2014    ESRD on peritoneal dialysis - august 2014 for 9 hours no peritonitis 11/24/2014    Hyperlipidemia     Hypertension 7/1/2014    Hypomagnesemia 1/7/2015    Kidney transplant status, living unrelated donor - 12/2/14 12/3/2014    Neutropenia 1/21/2015    NS (nuclear sclerosis) 9/16/2016    Obesity     Organ transplant candidate 7/1/2014    Pre-op exam 11/24/2014    Proliferative diabetic retinopathy of both eyes without macular edema associated with type 2 diabetes mellitus 9/16/2016    Renal manifestation of secondary diabetes mellitus     Tendinitis     Trouble in sleeping        Hospital Course: Patient's hospital stay complicated by ATN (probable). Patient has HD set up outpatient if needed.    Allergies:   Review of patient's allergies indicates:   Allergen Reactions    Shrimp Hives, Itching and Rash    Topamax [topiramate] Other (See Comments)     Vision changes    Zoloft [sertraline] Palpitations       Patient Pharmacy: [facility]    Discharge Medications:     Medication List        START taking these medications      famotidine 20 MG tablet  Commonly known as: PEPCID  Take 1 tablet (20 mg total) by mouth every evening.     multivitamin Tab  Take 1 tablet by mouth once  daily.  Start taking on: March 14, 2023     mycophenolate 250 mg Cap  Commonly known as: CELLCEPT  Take 4 capsules (1,000 mg total) by mouth 2 (two) times daily.     predniSONE 5 MG tablet  Commonly known as: DELTASONE  Take by mouth daily: 3/13 to 3/19 20 mg, 3/20 to 3/3/26 15 mg, 3/27 to 4/2 10 mg, 4/3/23 start 5 mg daily , do not  discontinue     sulfamethoxazole-trimethoprim 400-80mg 400-80 mg per tablet  Commonly known as: BACTRIM,SEPTRA  Take 1 tablet by mouth every Mon, Wed, Fri. Stop 9/8/2023     tacrolimus 1 MG Cap  Commonly known as: PROGRAF  Take 8 capsules (8 mg total) by mouth every 12 (twelve) hours.     traMADoL 50 mg tablet  Commonly known as: ULTRAM  Take 1 tablet (50 mg total) by mouth every 6 (six) hours as needed for Pain.     valGANciclovir 450 mg Tab  Commonly known as: VALCYTE  Take 1 tablet (450 mg total) by mouth every Mon, Wed, Fri. Stop 6/10/2023            CHANGE how you take these medications      HumaLOG KwikPen Insulin 100 unit/mL pen  Generic drug: insulin lispro  Inject 5 Units into the skin 3 (three) times daily with meals. + SLIDE SCALE, TDD: 30 Units  What changed:   how much to take  additional instructions     insulin glargine 100 units/mL SubQ pen  Commonly known as: LANTUS SOLOSTAR U-100 INSULIN  Inject 18 Units into the skin once daily.  What changed: how much to take     timolol maleate 0.5% 0.5 % Drop  Commonly known as: TIMOPTIC  Place 1 drop into both eyes 2 (two) times daily.  What changed:   how much to take  how to take this  when to take this  additional instructions            CONTINUE taking these medications      atorvastatin 40 MG tablet  Commonly known as: LIPITOR  Take 1 tablet (40 mg total) by mouth every evening.     blood sugar diagnostic Strp  Checks BG ac/hs     carvediloL 3.125 MG tablet  Commonly known as: COREG  Take 1 tablet (3.125 mg total) by mouth 2 (two) times daily. Hold for SBP <130     hydrALAZINE 50 MG tablet  Commonly known as: APRESOLINE  Take  "1 tablet (50 mg total) by mouth 3 (three) times daily.     lancets 33 gauge Misc  Commonly known as: ONETOUCH DELICA LANCETS  1 lancet by Misc.(Non-Drug; Combo Route) route 4 (four) times daily before meals and nightly.     oxyCODONE 5 MG immediate release tablet  Commonly known as: ROXICODONE  Take 1 tablet (5 mg total) by mouth every 4 (four) hours as needed for Pain.     TRULICITY 4.5 mg/0.5 mL pen injector  Generic drug: dulaglutide  Inject 4.5 mg into the skin once a week.            STOP taking these medications      calcitRIOL 0.25 MCG Cap  Commonly known as: ROCALTROL     clonazePAM 0.5 MG tablet  Commonly known as: KlonoPIN     folic acid 1 MG tablet  Commonly known as: FOLVITE     insulin syringe-needle U-100 0.5 mL 30 gauge x 5/16" Syrg  Commonly known as: INSULIN SYRINGE     insulin syringe-needle U-100 1 mL 29 gauge x 7/16" Syrg     LIDOcaine-prilocaine cream  Commonly known as: EMLA     RENVELA 800 mg Tab  Generic drug: sevelamer carbonate     zolpidem 10 mg Tab  Commonly known as: AMBIEN               Where to Get Your Medications        These medications were sent to [facility] [address], [place] [address]      Hours: Mon-Fri 7a-7p, Sat-Sun 10a-4p Phone: [phone]   atorvastatin 40 MG tablet  carvediloL 3.125 MG tablet  famotidine 20 MG tablet  HumaLOG KwikPen Insulin 100 unit/mL pen  hydrALAZINE 50 MG tablet  insulin glargine 100 units/mL SubQ pen  lancets 33 gauge Misc  multivitamin Tab  mycophenolate 250 mg Cap  oxyCODONE 5 MG immediate release tablet  predniSONE 5 MG tablet  sulfamethoxazole-trimethoprim 400-80mg 400-80 mg per tablet  tacrolimus 1 MG Cap  timolol maleate 0.5% 0.5 % Drop  traMADoL 50 mg tablet  TRULICITY 4.5 mg/0.5 mL pen injector  valGANciclovir 450 mg Tab          Pharmacy Interventions/Recommendations:  1) Transplant Immunosuppression: Induction Thymo, and maintenance Prograf/MMF/Pred    2) Opportunistic Infection prophylaxis: Bactrim and " Valcyte    3) Osteoporosis Prevention measures (liver txp): NA    4) Patient Counseling/Education: Demonstrated the use of the BP cuff, thermometer.    5) Follow-Up/Discharge Needs:  None    6) Patient Assistance Information: None    7) The following medications have been placed on HOLD and should be restarted in the outpatient setting (when appropriate): None    Elizabeth and her caregiver verbalized their understanding and had the opportunity to ask questions.

## 2023-03-13 NOTE — ASSESSMENT & PLAN NOTE
Titrate insulin slowly to avoid hypoglycemia as the risk of hypoglycemia increases with decreased creatinine clearance.    Estimated Creatinine Clearance: 16.2 mL/min (A) (based on SCr of 4.1 mg/dL (H)).

## 2023-03-13 NOTE — DISCHARGE SUMMARY
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 PMH includes HTN, HFpEF, HLD, and MGUS (IgG kappa MGUS dx 2013). She has been on the kidney transplant waitlist since 3/29/22. She was started on PD 3/2022 then switched to HD 10/2022. She dialyzes on a MWF schedule. Last HD 3/8/23. DW 81kg. She is being admitted for kidney transplant surgery with Dr. Fuchs. She feels well in her usual state of health. She denies any recent fevers or illnesses. CXR 3/9 with mild LLL opacity. Patient denies fever, SOB, or chest pain. Pre op labs and diagnostic studies pending.       Procedure(s) (LRB):  TRANSPLANT, KIDNEY (N/A)     Hospital Course:    Patient is now s/p DBD KTX 3/10/23 (Thymo, KDPI 27%, CIT 28h 51m pumped, CMV D+/R+). OR significant for 2 arteries (smaller artery implanted into superior aspect of main renal artery in end-to-end fashion) and very thin and friable bladder, 2 inadvertent holes made and repaired with sutures, negative for urine leak following repair. 7 day gresham, will be removed in clinic. No drain. Pt had HD POD1 but has not required it since. Was expected DGF 2/2 prolonged CIT but she is making 200-300ml/hr urine and Cr is clearing. Pt is overall progressing well. She is ambulating without assistance. Pain controlled with PO medication. Tolerating oral intake without n/v. + flatus, +BM. Of note, due to pts hx of MGUS Hem/onc contacted pre-op. Repeat SPEP, ERICK, FLC, and quantitative immunoglobulins ordered, they will follow up.  Pt is now stable and ready for discharge. She has no complaints. She has met  with PharmD and received education. She will follow up with labs and clinic appointment 3/14/23. Pt expressed understanding of discharge instructions and importance of follow up.      Goals of Care Treatment Preferences:  Code Status: Full Code      Final Active Diagnoses:    Diagnosis Date Noted POA    PRINCIPAL PROBLEM:  S/P DBD kidney transplant 3/10/2023 [Z94.0] 03/13/2023 Not Applicable    Adrenal corticosteroid causing adverse effect in therapeutic use [T38.0X5A] 03/10/2023 Yes    ESRD (end stage renal disease) on dialysis [N18.6, Z99.2] 12/23/2021 Not Applicable    Type 2 diabetes mellitus, with long-term current use of insulin [E11.9, Z79.4] 07/01/2014 Not Applicable      Problems Resolved During this Admission:       Consults (From admission, onward)          Status Ordering Provider     Inpatient consult to Registered Dietitian/Nutritionist  Once        Provider:  (Not yet assigned)    Completed CALLIERCECELIAOE     Inpatient consult to Transplant Nephrology (KTM)  Once        Provider:  (Not yet assigned)    Acknowledged CALLDEE BENTON     Inpatient consult to Endocrinology  Once        Provider:  (Not yet assigned)    Completed CALLDEE BENTON            Pending Diagnostic Studies:       Procedure Component Value Units Date/Time    Hepatitis B Surface Antibody, Qual/Quant [[id number]] Collected: 03/10/23 0900    Order Status: Sent Lab Status: In process Updated: 03/10/23 0911    Specimen: Blood     Immunofixation electrophoresis [[id number]] Collected: 03/13/23 0748    Order Status: Sent Lab Status: In process Updated: 03/13/23 0809    Specimen: Blood     Immunoglobulin free LT chains blood [[id number]] Collected: 03/13/23 0748    Order Status: Sent Lab Status: In process Updated: 03/13/23 0809    Specimen: Blood           Significant Diagnostic Studies: Labs:   BMP:   Recent Labs   Lab 03/12/23  0802 03/13/23  0659   *  134* 202*   *  131* 133*   K 4.1  4.1 4.5     102 105   CO2 18*   18* 21*   BUN 46*  46* 57*   CREATININE 4.9*  4.9* 4.1*   CALCIUM 9.2  9.2 9.3   MG 2.1  2.1 2.2   , CBC   Recent Labs   Lab 03/12/23  0802 03/13/23  0659   WBC 7.78  7.78 5.39   HGB 8.6*  8.6* 8.3*   HCT 27.8*  27.8* 26.7*   *  100* 110*    and All labs within the past 24 hours have been reviewed    Discharged Condition: stable    Disposition: Home or Self Care    Patient Instructions:      Diet diabetic     Notify your health care provider if you experience any of the following:  increased confusion or weakness     Notify your health care provider if you experience any of the following:  persistent dizziness, light-headedness, or visual disturbances     Notify your health care provider if you experience any of the following:  worsening rash     Notify your health care provider if you experience any of the following:  severe persistent headache     Notify your health care provider if you experience any of the following:  difficulty breathing or increased cough     Notify your health care provider if you experience any of the following:  redness, tenderness, or signs of infection (pain, swelling, redness, odor or green/yellow discharge around incision site)     Notify your health care provider if you experience any of the following:  severe uncontrolled pain     Notify your health care provider if you experience any of the following:  persistent nausea and vomiting or diarrhea     Notify your health care provider if you experience any of the following:  temperature >100.4     No dressing needed     Activity as tolerated     Medications:  Reconciled Home Medications:      Medication List        START taking these medications      famotidine 20 MG tablet  Commonly known as: PEPCID  Take 1 tablet (20 mg total) by mouth every evening.     multivitamin Tab  Take 1 tablet by mouth once daily.  Start taking on: March 14, 2023     mycophenolate 250 mg Cap  Commonly known as: CELLCEPT  Take 4 capsules (1,000  mg total) by mouth 2 (two) times daily.     predniSONE 5 MG tablet  Commonly known as: DELTASONE  Take by mouth daily: 3/13 to 3/19 20 mg, 3/20 to 3/3/26 15 mg, 3/27 to 4/2 10 mg, 4/3/23 start 5 mg daily , do not  discontinue     sulfamethoxazole-trimethoprim 400-80mg 400-80 mg per tablet  Commonly known as: BACTRIM,SEPTRA  Take 1 tablet by mouth every Mon, Wed, Fri. Stop 9/8/2023     tacrolimus 1 MG Cap  Commonly known as: PROGRAF  Take 8 capsules (8 mg total) by mouth every 12 (twelve) hours.     valGANciclovir 450 mg Tab  Commonly known as: VALCYTE  Take 1 tablet (450 mg total) by mouth every Mon, Wed, Fri. Stop 6/10/2023            CHANGE how you take these medications      HumaLOG KwikPen Insulin 100 unit/mL pen  Generic drug: insulin lispro  Inject 5 Units into the skin 3 (three) times daily with meals. + SLIDE SCALE, TDD: 30 Units  What changed:   how much to take  additional instructions     insulin glargine 100 units/mL SubQ pen  Commonly known as: LANTUS SOLOSTAR U-100 INSULIN  Inject 18 Units into the skin once daily.  What changed: how much to take     timolol maleate 0.5% 0.5 % Drop  Commonly known as: TIMOPTIC  Place 1 drop into both eyes 2 (two) times daily.  What changed:   how much to take  how to take this  when to take this  additional instructions            CONTINUE taking these medications      atorvastatin 40 MG tablet  Commonly known as: LIPITOR  Take 1 tablet (40 mg total) by mouth every evening.     blood sugar diagnostic Strp  Checks BG ac/hs     carvediloL 3.125 MG tablet  Commonly known as: COREG  Take 1 tablet (3.125 mg total) by mouth 2 (two) times daily. Hold for SBP <130     hydrALAZINE 50 MG tablet  Commonly known as: APRESOLINE  Take 1 tablet (50 mg total) by mouth 3 (three) times daily.     lancets 33 gauge Misc  Commonly known as: ONETOUCH DELICA LANCETS  1 lancet by Misc.(Non-Drug; Combo Route) route 4 (four) times daily before meals and nightly.     oxyCODONE 5 MG immediate  "release tablet  Commonly known as: ROXICODONE  Take 1 tablet (5 mg total) by mouth every 4 (four) hours as needed for Pain.     TRULICITY 4.5 mg/0.5 mL pen injector  Generic drug: dulaglutide  Inject 4.5 mg into the skin once a week.            STOP taking these medications      calcitRIOL 0.25 MCG Cap  Commonly known as: ROCALTROL     clonazePAM 0.5 MG tablet  Commonly known as: KlonoPIN     folic acid 1 MG tablet  Commonly known as: FOLVITE     insulin syringe-needle U-100 0.5 mL 30 gauge x 5/16" Syrg  Commonly known as: INSULIN SYRINGE     insulin syringe-needle U-100 1 mL 29 gauge x 7/16" Syrg     LIDOcaine-prilocaine cream  Commonly known as: EMLA     RENVELA 800 mg Tab  Generic drug: sevelamer carbonate     zolpidem 10 mg Tab  Commonly known as: AMBIEN            Time spent caring for patient (Greater than 1/2 spent in direct face-to-face contact): > 30 minutes    Divya Fuchs PA-C  Kidney Transplant  [facility] - Transplant Stepdown  "

## 2023-03-13 NOTE — PROGRESS NOTES
Transplant Social Work Admit/Discharge  Note     Met with patient to assess needs. Patient is a 58 y.o. single female, admitted for:    End stage renal disease [N18.6]  ESRD (end stage renal disease) [N18.6]      Patient admitted from home on 3/10/2023 .  At this time, patient presents as alert and oriented x 4, pleasant, good eye contact, well groomed, recall good, concentration/judgement good, average intelligence, calm, communicative, cooperative, and asking and answering questions appropriately.  At this time, patients caregiver presents as  not present at this time .    Household/Family Systems     Patient resides with patient's  self , at [address] [place] [address]  [place] [id number].  Support system includes her sister Fátima Mann ([phone]), her friend Cyndy Bautista ([phone]), and her friend Gustavo Mills ([phone]).  Patient does not have dependents that are need of being cared for.     Patients primary caregiver is Fátima Mann, patients sister, phone number [phone].  Confirmed patients contact information is [phone] (home);   Telephone Information:   Mobile [phone]   .    During admission, patient's caregiver plans to stay at home.  Confirmed patient and patients caregivers do have access to reliable transportation.    Cognitive Status/Learning     Patient reports reading ability as 12th grade and states patient does not have difficulty with N/A.  Patient reports patient learns best by written material.   Needed: No.   Highest education level: High School (9-12) or GED    Vocation/Disability   .  Working for Income: No  If no, reason not working: Disability  Patient is disabled due to back injury since 2011.  Prior to disability. Patient reported interest in vocational rehabilitation resources. Patient provided  permission to e-mail her at [email or web address].  Adherence     Patient reports a high level of adherence to patients health care regimen.   Adherence counseling and education provided. Patient verbalizes understanding.    Substance Use    Patient reports the following substance usage.    Tobacco: none, patient denies any use, patient reports history of tobacco use with last use in .  Alcohol: none, patient denies any use.  Illicit Drugs/Non-prescribed Medications: none, patient denies any use, patient reports history of marijuana use, and denied current use.   Patient states clear understanding of the potential impact of substance use.  Substance abstinence/cessation counseling, education and resources provided and reviewed.     Services Utilizing/ADLS    Infusion Service: Prior to admission, patient utilizing? no  Home Health: Prior to admission, patient utilizing? no  DME: Prior to admission, yes BPC, Dexcom  Pulmonary/Cardiac Rehab: Prior to admission, no  Dialysis:  Prior to admission, yes Patient reports being on HD prior to transplant.  Transplant Specialty Pharmacy:  Prior to admission, no; patient provides permission to release necessary information to specialty pharmacy for medications post-tranplant. Resources provided and patient is choosing [facility] at this time.    Prior to admission, patient reports patient was independent with ADLS and was driving.  Patient reports patient is not able to care for self at this time due to compromised medical condition (as documented in medical record) and physical weakness..  Patient indicates a willingness to care for self once medically cleared to do so.    Insurance/Medications    Insured by   Payer/Plan Subscr  Sex Relation Sub. Ins. ID Effective Group Num   1. [employer]* FAVIOLA HASSAN* 1965 Female Self [id number] [id number]                                   [address]   2. [place]* FAVIOLA HASSAN* 1965 Female Self [id number]* [id number]                                    [address] [address]      Primary Insurance (for UNOS reporting): Public Insurance -  Medicare & Choice  Secondary Insurance (for UNOS reporting): Public Insurance - Medicaid    Patient reports patient is able to obtain and afford medications at this time and at time of discharge.    Living Will/Healthcare Power of     Patient states patient does not have a LW and/or HCPA.   provided education regarding LW and HCPA and the completion of forms.    Coping/Mental Health    Patient is coping adequately with the aid of  family members, friends, and Dr. Patricia Asencio patients psychiatrist .  Patient denies current mental health difficulties. Patient reports experiencing anxiety which she attributes to incenter hemodialysis. Patient reports she was prescribed Clonazepam to assist with this. Patient reports seeing  has helped her manage her anxiety and depression. Patient denied any current or past thoughts of suicide or homicide. Patient agreed to contact transplant team with needs, questions, or concerns as they arise.     Discharge Planning    At time of discharge, patient plans to return to patient's home under the care of Fátima Mann.  Patients sister will transport patient.  Per rounds today, expected discharge date has not been medically determined at this time. Patient and patients caregiver  verbalize understanding and are involved in treatment planning and discharge process.    Additional Concerns     providing ongoing psychosocial support, education, resources and d/c planning as needed.  SW remains available.  remains available. Patient denies additional needs and/or concerns at this time. Patient verbalizes understanding and agreement with information reviewed, social work availability, and how to access available resources as needed.

## 2023-03-14 ENCOUNTER — CLINICAL SUPPORT (OUTPATIENT)
Dept: TRANSPLANT | Facility: CLINIC | Age: 58
End: 2023-03-14
Payer: MEDICARE

## 2023-03-14 ENCOUNTER — LAB VISIT (OUTPATIENT)
Dept: LAB | Facility: HOSPITAL | Age: 58
End: 2023-03-14
Attending: INTERNAL MEDICINE
Payer: MEDICARE

## 2023-03-14 ENCOUNTER — TELEPHONE (OUTPATIENT)
Dept: TRANSPLANT | Facility: CLINIC | Age: 58
End: 2023-03-14

## 2023-03-14 VITALS
HEART RATE: 89 BPM | WEIGHT: 187.63 LBS | HEIGHT: 67 IN | BODY MASS INDEX: 29.45 KG/M2 | TEMPERATURE: 98 F | OXYGEN SATURATION: 97 % | SYSTOLIC BLOOD PRESSURE: 174 MMHG | DIASTOLIC BLOOD PRESSURE: 78 MMHG | RESPIRATION RATE: 16 BRPM

## 2023-03-14 VITALS
RESPIRATION RATE: 16 BRPM | HEART RATE: 80 BPM | WEIGHT: 187.63 LBS | OXYGEN SATURATION: 97 % | DIASTOLIC BLOOD PRESSURE: 74 MMHG | BODY MASS INDEX: 29.45 KG/M2 | HEIGHT: 67 IN | SYSTOLIC BLOOD PRESSURE: 156 MMHG | TEMPERATURE: 98 F

## 2023-03-14 DIAGNOSIS — D63.1 ANEMIA OF RENAL DISEASE: ICD-10-CM

## 2023-03-14 DIAGNOSIS — Z91.89 PERSONAL HISTORY OF POISONING, PRESENTING HAZARDS TO HEALTH: ICD-10-CM

## 2023-03-14 DIAGNOSIS — Z21 ASYMPTOMATIC HUMAN IMMUNODEFICIENCY VIRUS (HIV) INFECTION STATUS: ICD-10-CM

## 2023-03-14 DIAGNOSIS — E55.9 VITAMIN D DEFICIENCY: ICD-10-CM

## 2023-03-14 DIAGNOSIS — Z57.8 OCCUPATIONAL EXPOSURE TO OTHER RISK FACTORS: ICD-10-CM

## 2023-03-14 DIAGNOSIS — Z94.0 KIDNEY REPLACED BY TRANSPLANT: Primary | ICD-10-CM

## 2023-03-14 DIAGNOSIS — Z94.0 KIDNEY REPLACED BY TRANSPLANT: ICD-10-CM

## 2023-03-14 DIAGNOSIS — N18.9 ANEMIA OF RENAL DISEASE: ICD-10-CM

## 2023-03-14 DIAGNOSIS — R73.09 ABNORMAL GLUCOSE: ICD-10-CM

## 2023-03-14 DIAGNOSIS — Z11.4 SCREENING FOR HUMAN IMMUNODEFICIENCY VIRUS: ICD-10-CM

## 2023-03-14 LAB
ALBUMIN SERPL BCP-MCNC: 3.2 G/DL (ref 3.5–5.2)
ALBUMIN SERPL ELPH-MCNC: 3.17 G/DL (ref 3.35–5.55)
ALPHA1 GLOB SERPL ELPH-MCNC: 0.34 G/DL (ref 0.17–0.41)
ALPHA2 GLOB SERPL ELPH-MCNC: 0.81 G/DL (ref 0.43–0.99)
ANION GAP SERPL CALC-SCNC: 8 MMOL/L (ref 8–16)
B-GLOBULIN SERPL ELPH-MCNC: 0.51 G/DL (ref 0.5–1.1)
BASOPHILS # BLD AUTO: 0 K/UL (ref 0–0.2)
BASOPHILS NFR BLD: 0 % (ref 0–1.9)
BUN SERPL-MCNC: 59 MG/DL (ref 6–20)
CALCIUM SERPL-MCNC: 9.8 MG/DL (ref 8.7–10.5)
CHLORIDE SERPL-SCNC: 106 MMOL/L (ref 95–110)
CLASS I ANTIBODIES - LUMINEX: NORMAL
CLASS I ANTIBODY COMMENTS - LUMINEX: NORMAL
CLASS II ANTIBODIES - LUMINEX: NORMAL
CLASS II ANTIBODY COMMENTS - LUMINEX: NORMAL
CO2 SERPL-SCNC: 22 MMOL/L (ref 23–29)
CPRA %: 99
CREAT SERPL-MCNC: 3.1 MG/DL (ref 0.5–1.4)
DIFFERENTIAL METHOD: ABNORMAL
EOSINOPHIL # BLD AUTO: 0 K/UL (ref 0–0.5)
EOSINOPHIL NFR BLD: 0 % (ref 0–8)
ERYTHROCYTE [DISTWIDTH] IN BLOOD BY AUTOMATED COUNT: 15.9 % (ref 11.5–14.5)
EST. GFR  (NO RACE VARIABLE): 16.6 ML/MIN/1.73 M^2
GAMMA GLOB SERPL ELPH-MCNC: 2.38 G/DL (ref 0.67–1.58)
GLUCOSE SERPL-MCNC: 187 MG/DL (ref 70–110)
HCT VFR BLD AUTO: 29.7 % (ref 37–48.5)
HGB BLD-MCNC: 9.1 G/DL (ref 12–16)
IMM GRANULOCYTES # BLD AUTO: 0.02 K/UL (ref 0–0.04)
IMM GRANULOCYTES NFR BLD AUTO: 0.4 % (ref 0–0.5)
INTERPRETATION SERPL IFE-IMP: NORMAL
KAPPA LC SER QL IA: 16.61 MG/DL (ref 0.33–1.94)
KAPPA LC/LAMBDA SER IA: 2.19 (ref 0.26–1.65)
LAMBDA LC SER QL IA: 7.58 MG/DL (ref 0.57–2.63)
LYMPHOCYTES # BLD AUTO: 0.2 K/UL (ref 1–4.8)
LYMPHOCYTES NFR BLD: 3.8 % (ref 18–48)
MAGNESIUM SERPL-MCNC: 2 MG/DL (ref 1.6–2.6)
MCH RBC QN AUTO: 29.1 PG (ref 27–31)
MCHC RBC AUTO-ENTMCNC: 30.6 G/DL (ref 32–36)
MCV RBC AUTO: 95 FL (ref 82–98)
MONOCYTES # BLD AUTO: 0.4 K/UL (ref 0.3–1)
MONOCYTES NFR BLD: 8 % (ref 4–15)
NEUTROPHILS # BLD AUTO: 4.2 K/UL (ref 1.8–7.7)
NEUTROPHILS NFR BLD: 87.8 % (ref 38–73)
NRBC BLD-RTO: 0 /100 WBC
PATHOLOGIST INTERPRETATION IFE: NORMAL
PATHOLOGIST INTERPRETATION SPE: NORMAL
PHOSPHATE SERPL-MCNC: 3.1 MG/DL (ref 2.7–4.5)
PLATELET # BLD AUTO: 124 K/UL (ref 150–450)
PMV BLD AUTO: 12.3 FL (ref 9.2–12.9)
POTASSIUM SERPL-SCNC: 4.5 MMOL/L (ref 3.5–5.1)
PROT SERPL-MCNC: 7.2 G/DL (ref 6–8.4)
RBC # BLD AUTO: 3.13 M/UL (ref 4–5.4)
SERUM COLLECTION DT - LUMINEX CLASS I: NORMAL
SERUM COLLECTION DT - LUMINEX CLASS II: NORMAL
SODIUM SERPL-SCNC: 136 MMOL/L (ref 136–145)
SPCL1 TESTING DATE: NORMAL
SPCL2 TESTING DATE: NORMAL
SPLUA TESTING DATE: NORMAL
TACROLIMUS BLD-MCNC: 8.9 NG/ML (ref 5–15)
WBC # BLD AUTO: 4.76 K/UL (ref 3.9–12.7)

## 2023-03-14 PROCEDURE — 80197 ASSAY OF TACROLIMUS: CPT | Performed by: INTERNAL MEDICINE

## 2023-03-14 PROCEDURE — 80069 RENAL FUNCTION PANEL: CPT | Performed by: INTERNAL MEDICINE

## 2023-03-14 PROCEDURE — 85025 COMPLETE CBC W/AUTO DIFF WBC: CPT | Performed by: INTERNAL MEDICINE

## 2023-03-14 PROCEDURE — 36415 COLL VENOUS BLD VENIPUNCTURE: CPT | Performed by: INTERNAL MEDICINE

## 2023-03-14 PROCEDURE — 99999 PR PBB SHADOW E&M-EST. PATIENT-LVL III: ICD-10-PCS | Mod: PBBFAC,,,

## 2023-03-14 PROCEDURE — 99999 PR PBB SHADOW E&M-EST. PATIENT-LVL II: CPT | Mod: PBBFAC,,,

## 2023-03-14 PROCEDURE — 99999 PR PBB SHADOW E&M-EST. PATIENT-LVL III: CPT | Mod: PBBFAC,,,

## 2023-03-14 PROCEDURE — 83735 ASSAY OF MAGNESIUM: CPT | Performed by: INTERNAL MEDICINE

## 2023-03-14 PROCEDURE — 99999 PR PBB SHADOW E&M-EST. PATIENT-LVL II: ICD-10-PCS | Mod: PBBFAC,,,

## 2023-03-14 SDOH — SOCIAL DETERMINANTS OF HEALTH (SDOH): OCCUPATIONAL EXPOSURE TO OTHER RISK FACTORS: Z57.8

## 2023-03-14 NOTE — TELEPHONE ENCOUNTER
Results reviewed with patient and will repeat labs on 3/17/2023.  ----- Message from Kalpana Grider MD sent at 3/14/2023  4:38 PM CDT -----  Results reviewed; no changes at present.

## 2023-03-14 NOTE — PROGRESS NOTES
"1ST NURSING VISIT POST DISCHARGE NOTE    1st RN appointment with Elizabeth Maldonado post discharge 3/13/2023 s/p kidney transplant 3/10/23.  Patient's  sister drop her off to her clinic appointment, states her Sister is her caregiver at home.  accompanied her.  Patient reports incisional drainage.  Patient says that she that she is sleeping well.  Incision intact with staples.  Patient has Meyer catheter schedule to be D/C on Friday 3/17/23.  Patient that she is able to explain daily incision care and showering instructions.  Reviewed I&O monitoring, measuring, and recording, and the need for hydration (i.e., at least 2 liters of water daily with minimal caffeine and no grapefruit products).  Medication list and rationale were reviewed.  Patient did bring blue medication card and medication bottles for review.  Patient reports that she has stopped Dulcolax and has not continued Colace.  Patient has had a bowel movement.  Patient expressed understanding of daily care including BID VS, medications, and I&O documentation.  Patient made aware of today's creatinine level: 3.1 trending down.  Patient aware that coordinator will review today's labs with a transplant physician and call the patient with any dose changes indicated.  Next lab appointment scheduled for 3/17/2023.  First post-operative transplant team appointment with labs scheduled for 3/20/2023.    Using the Kidney Transplant Patient Reference Manual, the patient submitted her open book "Self-assessment of Kidney Transplant Patient Knowledge" test, which was completed in the transplant clinic this morning before 1st nursing visit.  This test includes questions regarding critical dose medications commonly used after kidney transplant, medication dosing and side effects, importance of timed lab draws, important signs and symptoms to report 24/7 immediately post-transplant as well as how to contact the transplant team 24/7.    Test Score: 24/24    After completing the " test, the patient was given a copy of the Self-assessment Answer Key to reinforce accurate learning of test content.  Patient expressed her understanding of the value of the information included in the self-assessment test.    Patient instructed on correct procedure for collection of midstream clean catch urine specimen and vebalizes understanding.

## 2023-03-14 NOTE — PROGRESS NOTES
Clinic Note: First Return to Clinic Post-  Kidney Transplant    Elizabeth Maldonado  is a 58 y.o. female  S/p LEFT KIDNEY   transplant on 3/10/2023 (Kidney), 12/2/2014 (Kidney) for Hypertensive Nephrosclerosis.      Discharge Course (Issues/Concerns): DGF received HD POD1 but has not required it since. Patient has HD set up outpatient if needed.    Objective:   Vitals:    03/14/23 1034   BP: (!) 156/74   Pulse: 80   Resp:    Temp:        Met with patient and her caregiver in the clinic to review current medication list.     Current Outpatient Medications   Medication Sig Dispense Refill    atorvastatin (LIPITOR) 40 MG tablet Take 1 tablet (40 mg total) by mouth every evening. 30 tablet 5    blood sugar diagnostic Strp Checks BG ac/hs 200 strip 7    carvediloL (COREG) 3.125 MG tablet Take 1 tablet (3.125 mg total) by mouth 2 (two) times daily. Hold for SBP <130 60 tablet 5    dulaglutide (TRULICITY) 4.5 mg/0.5 mL pen injector Inject 4.5 mg into the skin once a week. 1 pen 5    famotidine (PEPCID) 20 MG tablet Take 1 tablet (20 mg total) by mouth every evening. 30 tablet 11    HUMALOG KWIKPEN INSULIN 100 unit/mL pen Inject 5 Units into the skin 3 (three) times daily with meals. + SLIDE SCALE, TDD: 30 Units 9 mL 5    hydrALAZINE (APRESOLINE) 50 MG tablet Take 1 tablet (50 mg total) by mouth 3 (three) times daily. 90 tablet 5    insulin (LANTUS SOLOSTAR U-100 INSULIN) glargine 100 units/mL SubQ pen Inject 18 Units into the skin once daily. 6 mL 5    lancets (ONETOUCH DELICA LANCETS) 33 gauge Misc 1 lancet by Misc.(Non-Drug; Combo Route) route 4 (four) times daily before meals and nightly. 200 each 7    multivitamin Tab Take 1 tablet by mouth once daily. 30 tablet 5    mycophenolate (CELLCEPT) 250 mg Cap Take 4 capsules (1,000 mg total) by mouth 2 (two) times daily. 240 capsule 11    oxyCODONE (ROXICODONE) 5 MG immediate release tablet Take 1 tablet (5 mg total) by mouth every 4 (four) hours as needed for Pain. 30 tablet 0     predniSONE (DELTASONE) 5 MG tablet Take by mouth daily: 3/13 to 3/19 20 mg, 3/20 to 3/3/26 15 mg, 3/27 to 4/2 10 mg, 4/3/23 start 5 mg daily , do not  discontinue 70 tablet 11    sulfamethoxazole-trimethoprim 400-80mg (BACTRIM,SEPTRA) 400-80 mg per tablet Take 1 tablet by mouth every Mon, Wed, Fri. Stop 9/8/2023 12 tablet 5    tacrolimus (PROGRAF) 1 MG Cap Take 8 capsules (8 mg total) by mouth every 12 (twelve) hours. 480 capsule 11    timolol maleate 0.5% (TIMOPTIC) 0.5 % Drop Place 1 drop into both eyes 2 (two) times daily. 5 mL 5    traMADoL (ULTRAM) 50 mg tablet Take 1 tablet (50 mg total) by mouth every 6 (six) hours as needed for Pain. 30 tablet 0    valGANciclovir (VALCYTE) 450 mg Tab Take 1 tablet (450 mg total) by mouth every Mon, Wed, Fri. Stop 6/10/2023 12 tablet 2     No current facility-administered medications for this visit.       Pharmacy Interventions/Recommendations:     1) Graft Function & Immunosuppression Issues: tacrolimus 8/8 level pending, MMF 1g BID, pred taper    2) Opportunistic Infection prophylaxis:   PCP ppx: Bactrim until 9/8/23  CMV ppx: Valcyte until 6/10/23 CMV +/+  Fungal ppx: none    3) Donor Serologies & Monitoring:     Donor CMV Status: positive  Donor HCV Status: negative  Donor HBcAb: negative  Donor HBV LEE: negative  Donor HCV LEE: negative      4) Pain Management & Bowel Regimen: pain controlled with APAP and has prn tramadol. Has BM, +flatulence so encourage to  simethicone OTC     5) Blood Pressure Management: carvedilol 3.125 BID and hydralazine 50 mg BID. BP elevated last PM and this AM  - would monitor closely and consider dose increase on carvedilol    6) Blood Sugar Management & Follow-up: on insulin glargine and lispro + SSI TID w/ meal. Not controlled per pt reported reading. Has established endocrinology follow up at [facility] but no appointment until June.    7) Electrolyte Management: none needed.    8) Osteoporosis Prevention Strategy (Liver  "Transplant): n/a    9) OTHER medication follow-up (patient assistance, held medications, etc): none.    10) Reinforced medication education conducted in the hospital, including medication indications, dosing, administration, side effects, monitoring-- including timing of immunosuppressant levels.     Patient received their FIRST fill of medications from [facility].  Discussed the process for obtaining refills of medications, including verifying the dose and mailing address to have medications delivered.     Elizabeth and her caregivers verbalized understanding and had the opportunity to ask questions.       Ioana Damon ([place])  PGY2 Solid Organ Transplant Pharmacy Resident      "

## 2023-03-16 DIAGNOSIS — Z94.0 KIDNEY REPLACED BY TRANSPLANT: Primary | ICD-10-CM

## 2023-03-16 LAB
BACTERIA BLD CULT: NORMAL
BACTERIA BLD CULT: NORMAL
POCT GLUCOSE: 129 MG/DL (ref 70–110)

## 2023-03-17 ENCOUNTER — LAB VISIT (OUTPATIENT)
Dept: LAB | Facility: HOSPITAL | Age: 58
End: 2023-03-17
Attending: INTERNAL MEDICINE
Payer: MEDICARE

## 2023-03-17 ENCOUNTER — CLINICAL SUPPORT (OUTPATIENT)
Dept: TRANSPLANT | Facility: CLINIC | Age: 58
End: 2023-03-17
Payer: MEDICARE

## 2023-03-17 VITALS
RESPIRATION RATE: 18 BRPM | BODY MASS INDEX: 27.93 KG/M2 | HEART RATE: 89 BPM | SYSTOLIC BLOOD PRESSURE: 161 MMHG | DIASTOLIC BLOOD PRESSURE: 58 MMHG | TEMPERATURE: 98 F | HEIGHT: 67 IN | OXYGEN SATURATION: 100 % | WEIGHT: 177.94 LBS

## 2023-03-17 DIAGNOSIS — Z94.0 KIDNEY REPLACED BY TRANSPLANT: ICD-10-CM

## 2023-03-17 DIAGNOSIS — Z94.0 KIDNEY REPLACED BY TRANSPLANT: Primary | ICD-10-CM

## 2023-03-17 LAB
ALBUMIN SERPL BCP-MCNC: 3.3 G/DL (ref 3.5–5.2)
ANION GAP SERPL CALC-SCNC: 8 MMOL/L (ref 8–16)
BASOPHILS # BLD AUTO: 0.04 K/UL (ref 0–0.2)
BASOPHILS NFR BLD: 0.7 % (ref 0–1.9)
BUN SERPL-MCNC: 46 MG/DL (ref 6–20)
CALCIUM SERPL-MCNC: 9.8 MG/DL (ref 8.7–10.5)
CHLORIDE SERPL-SCNC: 110 MMOL/L (ref 95–110)
CO2 SERPL-SCNC: 20 MMOL/L (ref 23–29)
CREAT SERPL-MCNC: 2 MG/DL (ref 0.5–1.4)
DIFFERENTIAL METHOD: ABNORMAL
EOSINOPHIL # BLD AUTO: 0.2 K/UL (ref 0–0.5)
EOSINOPHIL NFR BLD: 4.1 % (ref 0–8)
ERYTHROCYTE [DISTWIDTH] IN BLOOD BY AUTOMATED COUNT: 15.9 % (ref 11.5–14.5)
EST. GFR  (NO RACE VARIABLE): 28.1 ML/MIN/1.73 M^2
GLUCOSE SERPL-MCNC: 114 MG/DL (ref 70–110)
HCT VFR BLD AUTO: 31.1 % (ref 37–48.5)
HGB BLD-MCNC: 9.5 G/DL (ref 12–16)
IMM GRANULOCYTES # BLD AUTO: 0.05 K/UL (ref 0–0.04)
IMM GRANULOCYTES NFR BLD AUTO: 0.9 % (ref 0–0.5)
LYMPHOCYTES # BLD AUTO: 0.7 K/UL (ref 1–4.8)
LYMPHOCYTES NFR BLD: 13.2 % (ref 18–48)
MAGNESIUM SERPL-MCNC: 1.7 MG/DL (ref 1.6–2.6)
MCH RBC QN AUTO: 28.8 PG (ref 27–31)
MCHC RBC AUTO-ENTMCNC: 30.5 G/DL (ref 32–36)
MCV RBC AUTO: 94 FL (ref 82–98)
MONOCYTES # BLD AUTO: 0.7 K/UL (ref 0.3–1)
MONOCYTES NFR BLD: 13.4 % (ref 4–15)
NEUTROPHILS # BLD AUTO: 3.6 K/UL (ref 1.8–7.7)
NEUTROPHILS NFR BLD: 67.7 % (ref 38–73)
NRBC BLD-RTO: 0 /100 WBC
PHOSPHATE SERPL-MCNC: 2.4 MG/DL (ref 2.7–4.5)
PLATELET # BLD AUTO: 143 K/UL (ref 150–450)
PMV BLD AUTO: 11.9 FL (ref 9.2–12.9)
POTASSIUM SERPL-SCNC: 4.7 MMOL/L (ref 3.5–5.1)
RBC # BLD AUTO: 3.3 M/UL (ref 4–5.4)
SODIUM SERPL-SCNC: 138 MMOL/L (ref 136–145)
TACROLIMUS BLD-MCNC: 7.1 NG/ML (ref 5–15)
WBC # BLD AUTO: 5.37 K/UL (ref 3.9–12.7)

## 2023-03-17 PROCEDURE — 80197 ASSAY OF TACROLIMUS: CPT | Performed by: INTERNAL MEDICINE

## 2023-03-17 PROCEDURE — 36415 COLL VENOUS BLD VENIPUNCTURE: CPT | Performed by: INTERNAL MEDICINE

## 2023-03-17 PROCEDURE — 99999 PR PBB SHADOW E&M-EST. PATIENT-LVL IV: ICD-10-PCS | Mod: PBBFAC,,,

## 2023-03-17 PROCEDURE — 99999 PR PBB SHADOW E&M-EST. PATIENT-LVL IV: CPT | Mod: PBBFAC,,,

## 2023-03-17 PROCEDURE — 85025 COMPLETE CBC W/AUTO DIFF WBC: CPT | Performed by: INTERNAL MEDICINE

## 2023-03-17 PROCEDURE — 80069 RENAL FUNCTION PANEL: CPT | Performed by: INTERNAL MEDICINE

## 2023-03-17 PROCEDURE — 83735 ASSAY OF MAGNESIUM: CPT | Performed by: INTERNAL MEDICINE

## 2023-03-17 NOTE — PROGRESS NOTES
Removed Meyer catheter as ordered by Dr. Fuchs with 100 ml of serosanguineous (Pink tinged) urine to drainage bag. Patient tolerated well. Patient educated and verbalized understanding. NAD.

## 2023-03-20 ENCOUNTER — LAB VISIT (OUTPATIENT)
Dept: LAB | Facility: HOSPITAL | Age: 58
End: 2023-03-20
Attending: INTERNAL MEDICINE
Payer: MEDICARE

## 2023-03-20 ENCOUNTER — OFFICE VISIT (OUTPATIENT)
Dept: TRANSPLANT | Facility: CLINIC | Age: 58
End: 2023-03-20
Payer: MEDICARE

## 2023-03-20 VITALS
SYSTOLIC BLOOD PRESSURE: 142 MMHG | HEART RATE: 91 BPM | WEIGHT: 173.31 LBS | TEMPERATURE: 97 F | HEIGHT: 67 IN | OXYGEN SATURATION: 99 % | BODY MASS INDEX: 27.2 KG/M2 | DIASTOLIC BLOOD PRESSURE: 67 MMHG | RESPIRATION RATE: 16 BRPM

## 2023-03-20 DIAGNOSIS — Z91.89 AT RISK FOR OPPORTUNISTIC INFECTIONS: ICD-10-CM

## 2023-03-20 DIAGNOSIS — D47.2 MGUS (MONOCLONAL GAMMOPATHY OF UNKNOWN SIGNIFICANCE): Chronic | ICD-10-CM

## 2023-03-20 DIAGNOSIS — D63.1 ANEMIA IN STAGE 4 CHRONIC KIDNEY DISEASE: ICD-10-CM

## 2023-03-20 DIAGNOSIS — Z94.0 KIDNEY REPLACED BY TRANSPLANT: ICD-10-CM

## 2023-03-20 DIAGNOSIS — I15.0 RENOVASCULAR HYPERTENSION: ICD-10-CM

## 2023-03-20 DIAGNOSIS — Z94.0 KIDNEY TRANSPLANT STATUS, LIVING UNRELATED DONOR: Chronic | ICD-10-CM

## 2023-03-20 DIAGNOSIS — E08.22 DIABETES MELLITUS DUE TO UNDERLYING CONDITION WITH STAGE 4 CHRONIC KIDNEY DISEASE, WITH LONG-TERM CURRENT USE OF INSULIN: ICD-10-CM

## 2023-03-20 DIAGNOSIS — Z79.4 DIABETES MELLITUS DUE TO UNDERLYING CONDITION WITH STAGE 4 CHRONIC KIDNEY DISEASE, WITH LONG-TERM CURRENT USE OF INSULIN: ICD-10-CM

## 2023-03-20 DIAGNOSIS — Z79.60 LONG-TERM USE OF IMMUNOSUPPRESSANT MEDICATION: ICD-10-CM

## 2023-03-20 DIAGNOSIS — N18.4 ANEMIA IN STAGE 4 CHRONIC KIDNEY DISEASE: ICD-10-CM

## 2023-03-20 DIAGNOSIS — N18.4 DIABETES MELLITUS DUE TO UNDERLYING CONDITION WITH STAGE 4 CHRONIC KIDNEY DISEASE, WITH LONG-TERM CURRENT USE OF INSULIN: ICD-10-CM

## 2023-03-20 DIAGNOSIS — Z94.0 KIDNEY REPLACED BY TRANSPLANT: Primary | ICD-10-CM

## 2023-03-20 DIAGNOSIS — Z94.0 S/P KIDNEY TRANSPLANT: Primary | ICD-10-CM

## 2023-03-20 PROBLEM — Z86.0100 ENCOUNTER FOR COLONOSCOPY DUE TO HISTORY OF COLONIC POLYP: Status: RESOLVED | Noted: 2022-04-27 | Resolved: 2023-03-20

## 2023-03-20 PROBLEM — E87.5 HYPERKALEMIA: Status: RESOLVED | Noted: 2021-12-02 | Resolved: 2023-03-20

## 2023-03-20 PROBLEM — T82.41XA HEMODIALYSIS CATHETER MALFUNCTION: Status: RESOLVED | Noted: 2022-04-01 | Resolved: 2023-03-20

## 2023-03-20 PROBLEM — N10 ACUTE PYELONEPHRITIS: Status: RESOLVED | Noted: 2021-12-02 | Resolved: 2023-03-20

## 2023-03-20 PROBLEM — R06.02 SOB (SHORTNESS OF BREATH): Status: RESOLVED | Noted: 2022-11-12 | Resolved: 2023-03-20

## 2023-03-20 PROBLEM — E87.1 HYPONATREMIA: Status: RESOLVED | Noted: 2021-12-02 | Resolved: 2023-03-20

## 2023-03-20 PROBLEM — N18.5 ANEMIA OF CHRONIC KIDNEY FAILURE, STAGE 5: Status: RESOLVED | Noted: 2022-02-09 | Resolved: 2023-03-20

## 2023-03-20 PROBLEM — Z86.010 ENCOUNTER FOR COLONOSCOPY DUE TO HISTORY OF COLONIC POLYP: Status: RESOLVED | Noted: 2022-04-27 | Resolved: 2023-03-20

## 2023-03-20 PROBLEM — N28.9 KIDNEY DYSFUNCTION: Status: RESOLVED | Noted: 2020-02-26 | Resolved: 2023-03-20

## 2023-03-20 PROBLEM — N30.01 ACUTE CYSTITIS WITH HEMATURIA: Status: RESOLVED | Noted: 2022-04-01 | Resolved: 2023-03-20

## 2023-03-20 PROBLEM — E83.51 HYPOCALCEMIA: Status: RESOLVED | Noted: 2022-10-26 | Resolved: 2023-03-20

## 2023-03-20 PROBLEM — Z01.818 PRE-OP EVALUATION: Status: RESOLVED | Noted: 2021-12-23 | Resolved: 2023-03-20

## 2023-03-20 PROBLEM — N18.6 ESRD (END STAGE RENAL DISEASE) ON DIALYSIS: Status: RESOLVED | Noted: 2021-12-23 | Resolved: 2023-03-20

## 2023-03-20 PROBLEM — Z99.2 ESRD (END STAGE RENAL DISEASE) ON DIALYSIS: Status: RESOLVED | Noted: 2021-12-23 | Resolved: 2023-03-20

## 2023-03-20 PROBLEM — Z12.11 ENCOUNTER FOR COLONOSCOPY DUE TO HISTORY OF COLONIC POLYP: Status: RESOLVED | Noted: 2022-04-27 | Resolved: 2023-03-20

## 2023-03-20 LAB
ALBUMIN SERPL BCP-MCNC: 3.4 G/DL (ref 3.5–5.2)
ANION GAP SERPL CALC-SCNC: 7 MMOL/L (ref 8–16)
BASOPHILS # BLD AUTO: 0.03 K/UL (ref 0–0.2)
BASOPHILS NFR BLD: 0.5 % (ref 0–1.9)
BUN SERPL-MCNC: 36 MG/DL (ref 6–20)
CALCIUM SERPL-MCNC: 10.5 MG/DL (ref 8.7–10.5)
CHLORIDE SERPL-SCNC: 109 MMOL/L (ref 95–110)
CO2 SERPL-SCNC: 20 MMOL/L (ref 23–29)
CREAT SERPL-MCNC: 1.7 MG/DL (ref 0.5–1.4)
DIFFERENTIAL METHOD: ABNORMAL
EOSINOPHIL # BLD AUTO: 0.3 K/UL (ref 0–0.5)
EOSINOPHIL NFR BLD: 4 % (ref 0–8)
ERYTHROCYTE [DISTWIDTH] IN BLOOD BY AUTOMATED COUNT: 15.9 % (ref 11.5–14.5)
EST. GFR  (NO RACE VARIABLE): 34.1 ML/MIN/1.73 M^2
GLUCOSE SERPL-MCNC: 134 MG/DL (ref 70–110)
HCT VFR BLD AUTO: 35.1 % (ref 37–48.5)
HGB BLD-MCNC: 10.6 G/DL (ref 12–16)
IMM GRANULOCYTES # BLD AUTO: 0.07 K/UL (ref 0–0.04)
IMM GRANULOCYTES NFR BLD AUTO: 1.1 % (ref 0–0.5)
LYMPHOCYTES # BLD AUTO: 1 K/UL (ref 1–4.8)
LYMPHOCYTES NFR BLD: 15.8 % (ref 18–48)
MAGNESIUM SERPL-MCNC: 1.7 MG/DL (ref 1.6–2.6)
MCH RBC QN AUTO: 29 PG (ref 27–31)
MCHC RBC AUTO-ENTMCNC: 30.2 G/DL (ref 32–36)
MCV RBC AUTO: 96 FL (ref 82–98)
MONOCYTES # BLD AUTO: 0.7 K/UL (ref 0.3–1)
MONOCYTES NFR BLD: 10.1 % (ref 4–15)
NEUTROPHILS # BLD AUTO: 4.4 K/UL (ref 1.8–7.7)
NEUTROPHILS NFR BLD: 68.5 % (ref 38–73)
NRBC BLD-RTO: 0 /100 WBC
PHOSPHATE SERPL-MCNC: 2.4 MG/DL (ref 2.7–4.5)
PLATELET # BLD AUTO: 168 K/UL (ref 150–450)
PMV BLD AUTO: 11.6 FL (ref 9.2–12.9)
POTASSIUM SERPL-SCNC: 5.4 MMOL/L (ref 3.5–5.1)
RBC # BLD AUTO: 3.65 M/UL (ref 4–5.4)
SODIUM SERPL-SCNC: 136 MMOL/L (ref 136–145)
TACROLIMUS BLD-MCNC: 10.5 NG/ML (ref 5–15)
WBC # BLD AUTO: 6.46 K/UL (ref 3.9–12.7)

## 2023-03-20 PROCEDURE — 85025 COMPLETE CBC W/AUTO DIFF WBC: CPT | Performed by: INTERNAL MEDICINE

## 2023-03-20 PROCEDURE — 3077F PR MOST RECENT SYSTOLIC BLOOD PRESSURE >= 140 MM HG: ICD-10-PCS | Mod: CPTII,S$GLB,, | Performed by: NURSE PRACTITIONER

## 2023-03-20 PROCEDURE — 99215 OFFICE O/P EST HI 40 MIN: CPT | Mod: S$GLB,,, | Performed by: NURSE PRACTITIONER

## 2023-03-20 PROCEDURE — 3008F PR BODY MASS INDEX (BMI) DOCUMENTED: ICD-10-PCS | Mod: CPTII,S$GLB,, | Performed by: NURSE PRACTITIONER

## 2023-03-20 PROCEDURE — 1111F DSCHRG MED/CURRENT MED MERGE: CPT | Mod: CPTII,S$GLB,, | Performed by: NURSE PRACTITIONER

## 2023-03-20 PROCEDURE — 80197 ASSAY OF TACROLIMUS: CPT | Performed by: INTERNAL MEDICINE

## 2023-03-20 PROCEDURE — 80069 RENAL FUNCTION PANEL: CPT | Performed by: INTERNAL MEDICINE

## 2023-03-20 PROCEDURE — 3078F DIAST BP <80 MM HG: CPT | Mod: CPTII,S$GLB,, | Performed by: NURSE PRACTITIONER

## 2023-03-20 PROCEDURE — 3051F HG A1C>EQUAL 7.0%<8.0%: CPT | Mod: CPTII,S$GLB,, | Performed by: NURSE PRACTITIONER

## 2023-03-20 PROCEDURE — 3066F NEPHROPATHY DOC TX: CPT | Mod: CPTII,S$GLB,, | Performed by: NURSE PRACTITIONER

## 2023-03-20 PROCEDURE — 1159F MED LIST DOCD IN RCRD: CPT | Mod: CPTII,S$GLB,, | Performed by: NURSE PRACTITIONER

## 2023-03-20 PROCEDURE — 1159F PR MEDICATION LIST DOCUMENTED IN MEDICAL RECORD: ICD-10-PCS | Mod: CPTII,S$GLB,, | Performed by: NURSE PRACTITIONER

## 2023-03-20 PROCEDURE — 99999 PR PBB SHADOW E&M-EST. PATIENT-LVL V: CPT | Mod: PBBFAC,,, | Performed by: NURSE PRACTITIONER

## 2023-03-20 PROCEDURE — 3078F PR MOST RECENT DIASTOLIC BLOOD PRESSURE < 80 MM HG: ICD-10-PCS | Mod: CPTII,S$GLB,, | Performed by: NURSE PRACTITIONER

## 2023-03-20 PROCEDURE — 3066F PR DOCUMENTATION OF TREATMENT FOR NEPHROPATHY: ICD-10-PCS | Mod: CPTII,S$GLB,, | Performed by: NURSE PRACTITIONER

## 2023-03-20 PROCEDURE — 1160F PR REVIEW ALL MEDS BY PRESCRIBER/CLIN PHARMACIST DOCUMENTED: ICD-10-PCS | Mod: CPTII,S$GLB,, | Performed by: NURSE PRACTITIONER

## 2023-03-20 PROCEDURE — 36415 COLL VENOUS BLD VENIPUNCTURE: CPT | Performed by: INTERNAL MEDICINE

## 2023-03-20 PROCEDURE — 99215 PR OFFICE/OUTPT VISIT, EST, LEVL V, 40-54 MIN: ICD-10-PCS | Mod: S$GLB,,, | Performed by: NURSE PRACTITIONER

## 2023-03-20 PROCEDURE — 1160F RVW MEDS BY RX/DR IN RCRD: CPT | Mod: CPTII,S$GLB,, | Performed by: NURSE PRACTITIONER

## 2023-03-20 PROCEDURE — 83735 ASSAY OF MAGNESIUM: CPT | Performed by: INTERNAL MEDICINE

## 2023-03-20 PROCEDURE — 3051F PR MOST RECENT HEMOGLOBIN A1C LEVEL 7.0 - < 8.0%: ICD-10-PCS | Mod: CPTII,S$GLB,, | Performed by: NURSE PRACTITIONER

## 2023-03-20 PROCEDURE — 3077F SYST BP >= 140 MM HG: CPT | Mod: CPTII,S$GLB,, | Performed by: NURSE PRACTITIONER

## 2023-03-20 PROCEDURE — 99999 PR PBB SHADOW E&M-EST. PATIENT-LVL V: ICD-10-PCS | Mod: PBBFAC,,, | Performed by: NURSE PRACTITIONER

## 2023-03-20 PROCEDURE — 3008F BODY MASS INDEX DOCD: CPT | Mod: CPTII,S$GLB,, | Performed by: NURSE PRACTITIONER

## 2023-03-20 PROCEDURE — 1111F PR DISCHARGE MEDS RECONCILED W/ CURRENT OUTPATIENT MED LIST: ICD-10-PCS | Mod: CPTII,S$GLB,, | Performed by: NURSE PRACTITIONER

## 2023-03-20 RX ORDER — TACROLIMUS 1 MG/1
7 CAPSULE ORAL EVERY 12 HOURS
Qty: 420 CAPSULE | Refills: 11 | Status: SHIPPED | OUTPATIENT
Start: 2023-03-20 | End: 2023-03-24 | Stop reason: DRUGHIGH

## 2023-03-20 NOTE — PROGRESS NOTES
"   Kidney Post-Transplant Assessment    Referring Physician: Aaron Bonilla  Current Nephrologist: Aaron Bonilla    ORGAN: LEFT KIDNEY  Donor Type: donation after brain death  PHS Increased Risk: no  Cold Ischemia: 1,731 mins  Induction Medications: thymoglobulin    Subjective:     CC:  Reassessment of renal allograft function and management of chronic immunosuppression.    HPI:  Ms. Maldonado is a 58 y.o. year old Black or  female with PMG ESRD 2/2 DM2 and HTN, and failed kidney txp, who received a donation after brain death kidney transplant on 3/10/23. Her most recent creatinine is 2.0 and continues to trend down.  She takes mycophenolate mofetil, prednisone, and tacrolimus for maintenance immunosuppression. Her post transplant course has been uncomplicated to date.      Interval HX:  Intake-Is up to  ~ 1.5 L water daily, and other beverages such as tea    UOP-  BP   BP Readings from Last 3 Encounters:   03/20/23 (!) 142/67   03/17/23 (!) 161/58   03/14/23 (!) 174/78      Peripheral edema--no   Weight--has lost a few pounds since txp   Appetite--medium   Reports having "gas"--reports having normal BMs, denies loose stools ior diarrhea   Wound-staples     Lab /diagnostic results reviewed with patient today.   All questions answered    Review of Systems   Constitutional:  Negative for activity change, appetite change, chills, fatigue, fever and unexpected weight change.   HENT:  Negative for congestion, facial swelling, postnasal drip, rhinorrhea, sinus pressure, sore throat and trouble swallowing.    Eyes:  Positive for visual disturbance. Negative for pain and redness.        Wears glasses   Respiratory:  Negative for cough, chest tightness, shortness of breath and wheezing.    Cardiovascular: Negative.  Negative for chest pain, palpitations and leg swelling.   Gastrointestinal:  Positive for abdominal distention. Negative for abdominal pain, diarrhea, nausea and vomiting.        "Reports  " "feeling gassy."   Genitourinary:  Negative for dysuria, flank pain and urgency.   Musculoskeletal:  Positive for back pain (HX sciatica). Negative for gait problem, neck pain and neck stiffness.   Skin:  Positive for wound. Negative for rash.   Allergic/Immunologic: Negative for environmental allergies, food allergies and immunocompromised state.   Neurological:  Negative for dizziness, weakness, light-headedness and headaches.   Psychiatric/Behavioral:  Negative for agitation and confusion. The patient is not nervous/anxious.      Objective:   Blood pressure (!) 142/67, pulse 91, temperature 97.2 °F (36.2 °C), temperature source Temporal, resp. rate 16, height 5' 7" (1.702 m), weight 78.6 kg (173 lb 4.5 oz), last menstrual period 04/29/2011, SpO2 99 %.body mass index is 27.14 kg/m².    Physical Exam  Vitals reviewed.   Constitutional:       Appearance: Normal appearance. She is well-developed.   HENT:      Head: Normocephalic.   Eyes:      Pupils: Pupils are equal, round, and reactive to light.   Cardiovascular:      Rate and Rhythm: Normal rate and regular rhythm.      Heart sounds: Normal heart sounds.   Pulmonary:      Effort: Pulmonary effort is normal.      Breath sounds: Normal breath sounds.   Abdominal:      General: Bowel sounds are normal.      Palpations: Abdomen is soft.      Comments: Staples intact , No bruit noted over the allograft   No erythema or drainage noted    Musculoskeletal:         General: Normal range of motion.        Arms:       Cervical back: Normal range of motion and neck supple.   Skin:     General: Skin is warm and dry.   Neurological:      Mental Status: She is alert and oriented to person, place, and time.      Motor: No abnormal muscle tone.   Psychiatric:         Behavior: Behavior normal.       Labs:  Lab Results   Component Value Date    WBC 6.46 03/20/2023    HGB 10.6 (L) 03/20/2023    HCT 35.1 (L) 03/20/2023     03/17/2023    K 4.7 03/17/2023     03/17/2023    " CO2 20 (L) 03/17/2023    BUN 46 (H) 03/17/2023    CREATININE 2.0 (H) 03/17/2023    EGFRNORACEVR 28.1 (A) 03/17/2023    CALCIUM 9.8 03/17/2023    PHOS 2.4 (L) 03/17/2023    MG 1.7 03/17/2023    ALBUMIN 3.3 (L) 03/17/2023    AST 15 03/10/2023    ALT 8 (L) 03/10/2023    UTPCR 1.56 (H) 03/07/2022    PTH 85.0 (H) 03/10/2023    TACROLIMUS 7.1 03/17/2023       No results found for: EXTANC, EXTWBC, EXTSEGS, EXTPLATELETS, EXTHEMOGLOBI, EXTHEMATOCRI, EXTCREATININ, EXTSODIUM, EXTPOTASSIUM, EXTBUN, EXTCO2, EXTCALCIUM, EXTPHOSPHORU, EXTGLUCOSE, EXTALBUMIN, EXTAST, EXTALT, EXTBILITOTAL, EXTLIPASE, EXTAMYLASE    No results found for: EXTCYCLOSLVL, EXTSIROLIMUS, EXTTACROLVL, EXTPROTCRE, EXTPTHINTACT, EXTPROTEINUA, EXTWBCUA, EXTRBCUA    Labs were reviewed with the patient    Assessment:     1. S/P DBD kidney transplant 3/10/2023    2. Long-term use of immunosuppressant medication    3. At risk for opportunistic infections    4. Renovascular hypertension    5. MGUS (monoclonal gammopathy of unknown significance)    6. Diabetes mellitus due to underlying condition with stage 4 chronic kidney disease, with long-term current use of insulin    7. Anemia in stage 4 chronic kidney disease        Plan:        1) s/p living related kidney transplant              - Graft function elevated from his baseline graft function of     -FK Trough 7.1-IS     - cont on FK  8/8   - cont on Cellcept 1000 mg BID along with prednisone 5 mg daily   -  Bactrim 400/80 mg MWF for PCP prophylaxis   -Valcyte 450 mg MWF for CMV prophylaxis  -Will continue to monitor for drug toxicities      3/17/2023  POD 7   eGFR >60 mL/min/1.73 m^2 28.1 (A)      Lab Results   Component Value Date    CREATININE 2.0 (H) 03/17/2023         2) Uncontrolled HTN: advise low salt diet and home BP monitoring  - Cont  hydralazine. Coreg as prescribed     3) Anemia:              - no intervention required .   will monitor as per our guidelines   H/H Improving   Lab Results   Component  Value Date    WBC 6.46 03/20/2023    HGB 10.6 (L) 03/20/2023    HCT 35.1 (L) 03/20/2023    MCV 96 03/20/2023     03/20/2023     4) type II DM:   -  MED REGIME   Mgmt deferred to endocrinology    Lab Results   Component Value Date    HGBA1C 7.2 (H) 03/13/2023         5) Hypophosphatemia:Secondary hyperparathyroidism:                - no intervention required.will monitor as per our guidelines  Lab Results   Component Value Date    PTH 85.0 (H) 03/10/2023    CALCIUM 9.8 03/17/2023    PHOS 2.4 (L) 03/17/2023         6) Metabolic acidosis/Electrolyte balance:      - no intervention required. will monitor as per our guidelines  Lab Results   Component Value Date     03/17/2023    K 4.7 03/17/2023     03/17/2023    CO2 20 (L) 03/17/2023         7) hypomagnesemia:    - no intervention required .   will monitor as per our guidelines      3/17/2023  POD 7   Magnesium 1.6 - 2.6 mg/dL 1.7       8) Cytopenias: no significant cytopenias will monitor as per our guidelines. Medicine list reviewed including potential causes of drug-induced cytopenias    9) Proteinuria: continue p/c ratio as per guidelines      .     10) BK virus infection screening:  will continue to monitor/ guidelines     11) Weight education: provided during the clinic visit  There is no height or weight on file to calculate BMI.      Follow-up:   Clinic: return to transplant clinic weekly for the first month after transplant; every 2 weeks during months 2-3; then at 6-, 9-, 12-, 18-, 24-, and 36- months post-transplant to reassess for complications from immunosuppression toxicity and monitor for rejection.  Annually thereafter.    Labs: since patient remains at high risk for rejection and drug-related complications that warrant close monitoring, labs will be ordered as follows: continue twice weekly CBC, renal panel, and drug level for first month; then same labs once weekly through 3rd month post-transplant.  Urine for UA and  protein/creatinine ratio monthly.  Serum BK - PCR at 1-, 3-, 6-, 9-, 12-, 18-, 24-, 36-, 48-, and 60 months post-transplant.  Hepatic panel at 1-, 2-, 3-, 6-, 9-, 12-, 18-, 24-, and 36- months post-transplant.    Daya Leon NP       Education:   Material provided to the patient.  Patient reminded to call with any health changes since these can be early signs of significant complications.  Also, I advised the patient to be sure any new medications or changes of old medications are discussed with either a pharmacist or physician knowledgeable with transplant to avoid rejection/drug toxicity related to significant drug interactions.    Patient advised that it is recommended that all transplanted patients, and their close contacts and household members receive Covid vaccination.

## 2023-03-20 NOTE — LETTER
March 20, 2023        Aaron Bonilla  [address]  [facility] [address]  Phone: [phone]  Fax: [phone]             [facility] 1st Fl  [address]  [facility] [address]  Phone: [phone]   Patient: Elizabeth Maldonado   MR Number: [id number]   YOB: 1965   Date of Visit: 3/20/2023       Dear Dr. Aaron Bonilla    Thank you for referring Elizabeth Maldonado to me for evaluation. Attached you will find relevant portions of my assessment and plan of care.    If you have questions, please do not hesitate to call me. I look forward to following Elizabeth Maldonado along with you.    Sincerely,    Daya Leon, NP    Enclosure    If you would like to receive this communication electronically, please contact [email or web address] or [phone] to request [employer] access.    [employer] is a tool which provides read-only access to select patient information with whom you have a relationship. Its easy to use and provides real time access to review your patients record including encounter summaries, notes, results, and demographic information.    If you feel you have received this communication in error or would no longer like to receive these types of communications, please e-mail [email or web address]

## 2023-03-20 NOTE — TELEPHONE ENCOUNTER
Patient repeated back and voice a understanding of orders.  Next labs on 3/23/2023.  ----- Message from Kalpana Grider MD sent at 3/20/2023  1:27 PM CDT -----  High K and tacro - low potassium diet and lower tacro from 8/8 to 7 mg BID

## 2023-03-23 ENCOUNTER — LAB VISIT (OUTPATIENT)
Dept: LAB | Facility: HOSPITAL | Age: 58
End: 2023-03-23
Attending: INTERNAL MEDICINE
Payer: MEDICARE

## 2023-03-23 DIAGNOSIS — Z94.0 KIDNEY REPLACED BY TRANSPLANT: ICD-10-CM

## 2023-03-23 LAB
ALBUMIN SERPL BCP-MCNC: 3.4 G/DL (ref 3.5–5.2)
ANION GAP SERPL CALC-SCNC: 7 MMOL/L (ref 8–16)
BASOPHILS # BLD AUTO: 0.05 K/UL (ref 0–0.2)
BASOPHILS NFR BLD: 0.7 % (ref 0–1.9)
BUN SERPL-MCNC: 40 MG/DL (ref 6–20)
CALCIUM SERPL-MCNC: 10 MG/DL (ref 8.7–10.5)
CHLORIDE SERPL-SCNC: 111 MMOL/L (ref 95–110)
CO2 SERPL-SCNC: 17 MMOL/L (ref 23–29)
CREAT SERPL-MCNC: 1.8 MG/DL (ref 0.5–1.4)
DIFFERENTIAL METHOD: ABNORMAL
EOSINOPHIL # BLD AUTO: 0.1 K/UL (ref 0–0.5)
EOSINOPHIL NFR BLD: 1.8 % (ref 0–8)
ERYTHROCYTE [DISTWIDTH] IN BLOOD BY AUTOMATED COUNT: 16.1 % (ref 11.5–14.5)
EST. GFR  (NO RACE VARIABLE): 31.9 ML/MIN/1.73 M^2
GLUCOSE SERPL-MCNC: 83 MG/DL (ref 70–110)
HCT VFR BLD AUTO: 34.7 % (ref 37–48.5)
HGB BLD-MCNC: 10.7 G/DL (ref 12–16)
IMM GRANULOCYTES # BLD AUTO: 0.04 K/UL (ref 0–0.04)
IMM GRANULOCYTES NFR BLD AUTO: 0.5 % (ref 0–0.5)
LYMPHOCYTES # BLD AUTO: 1.4 K/UL (ref 1–4.8)
LYMPHOCYTES NFR BLD: 18.2 % (ref 18–48)
MAGNESIUM SERPL-MCNC: 1.6 MG/DL (ref 1.6–2.6)
MCH RBC QN AUTO: 29.4 PG (ref 27–31)
MCHC RBC AUTO-ENTMCNC: 30.8 G/DL (ref 32–36)
MCV RBC AUTO: 95 FL (ref 82–98)
MONOCYTES # BLD AUTO: 0.5 K/UL (ref 0.3–1)
MONOCYTES NFR BLD: 6.6 % (ref 4–15)
NEUTROPHILS # BLD AUTO: 5.5 K/UL (ref 1.8–7.7)
NEUTROPHILS NFR BLD: 72.2 % (ref 38–73)
NRBC BLD-RTO: 0 /100 WBC
PHOSPHATE SERPL-MCNC: 2.3 MG/DL (ref 2.7–4.5)
PLATELET # BLD AUTO: 156 K/UL (ref 150–450)
PMV BLD AUTO: 11.5 FL (ref 9.2–12.9)
POTASSIUM SERPL-SCNC: 4.9 MMOL/L (ref 3.5–5.1)
RBC # BLD AUTO: 3.64 M/UL (ref 4–5.4)
SODIUM SERPL-SCNC: 135 MMOL/L (ref 136–145)
WBC # BLD AUTO: 7.62 K/UL (ref 3.9–12.7)

## 2023-03-23 PROCEDURE — 83735 ASSAY OF MAGNESIUM: CPT | Performed by: INTERNAL MEDICINE

## 2023-03-23 PROCEDURE — 36415 COLL VENOUS BLD VENIPUNCTURE: CPT | Mod: PO | Performed by: INTERNAL MEDICINE

## 2023-03-23 PROCEDURE — 80197 ASSAY OF TACROLIMUS: CPT | Performed by: INTERNAL MEDICINE

## 2023-03-23 PROCEDURE — 85025 COMPLETE CBC W/AUTO DIFF WBC: CPT | Performed by: INTERNAL MEDICINE

## 2023-03-23 PROCEDURE — 80069 RENAL FUNCTION PANEL: CPT | Performed by: INTERNAL MEDICINE

## 2023-03-24 DIAGNOSIS — Z94.0 KIDNEY TRANSPLANT STATUS, LIVING UNRELATED DONOR: Chronic | ICD-10-CM

## 2023-03-24 DIAGNOSIS — Z94.0 KIDNEY REPLACED BY TRANSPLANT: ICD-10-CM

## 2023-03-24 LAB — TACROLIMUS BLD-MCNC: 15.5 NG/ML (ref 5–15)

## 2023-03-24 RX ORDER — TACROLIMUS 1 MG/1
6 CAPSULE ORAL EVERY 12 HOURS
Qty: 360 CAPSULE | Refills: 11 | Status: SHIPPED | OUTPATIENT
Start: 2023-03-24 | End: 2023-03-31

## 2023-03-24 NOTE — TELEPHONE ENCOUNTER
Patient repeated back and voice a understanding of orders and states tacrolimus level is a 12 hour trough.Next labs on 3/27/2023.  ----- Message from Bessie Ahmadi MD sent at 3/24/2023 11:28 AM CDT -----  If it is a true level, please hold tac and resume on Sunday Morning at 6 mg BID. Check lab next week.

## 2023-03-24 NOTE — PROGRESS NOTES
If it is a true level, please hold tac and resume on Sunday Morning at 6 mg BID. Check lab next week.

## 2023-03-27 ENCOUNTER — TELEPHONE (OUTPATIENT)
Dept: TRANSPLANT | Facility: CLINIC | Age: 58
End: 2023-03-27
Payer: MEDICARE

## 2023-03-27 ENCOUNTER — LAB VISIT (OUTPATIENT)
Dept: LAB | Facility: HOSPITAL | Age: 58
End: 2023-03-27
Attending: INTERNAL MEDICINE
Payer: MEDICARE

## 2023-03-27 DIAGNOSIS — Z94.0 KIDNEY REPLACED BY TRANSPLANT: ICD-10-CM

## 2023-03-27 LAB
ALBUMIN SERPL BCP-MCNC: 3.3 G/DL (ref 3.5–5.2)
ANION GAP SERPL CALC-SCNC: 8 MMOL/L (ref 8–16)
BASOPHILS # BLD AUTO: 0.03 K/UL (ref 0–0.2)
BASOPHILS NFR BLD: 0.3 % (ref 0–1.9)
BILIRUB UR QL STRIP: NEGATIVE
BUN SERPL-MCNC: 23 MG/DL (ref 6–20)
CALCIUM SERPL-MCNC: 9.6 MG/DL (ref 8.7–10.5)
CHLORIDE SERPL-SCNC: 106 MMOL/L (ref 95–110)
CLARITY UR REFRACT.AUTO: CLEAR
CO2 SERPL-SCNC: 20 MMOL/L (ref 23–29)
COLOR UR AUTO: COLORLESS
CREAT SERPL-MCNC: 1.3 MG/DL (ref 0.5–1.4)
CREAT UR-MCNC: 54 MG/DL (ref 15–325)
DIFFERENTIAL METHOD: ABNORMAL
EOSINOPHIL # BLD AUTO: 0.1 K/UL (ref 0–0.5)
EOSINOPHIL NFR BLD: 1 % (ref 0–8)
ERYTHROCYTE [DISTWIDTH] IN BLOOD BY AUTOMATED COUNT: 16.1 % (ref 11.5–14.5)
EST. GFR  (NO RACE VARIABLE): 47.1 ML/MIN/1.73 M^2
GLUCOSE SERPL-MCNC: 151 MG/DL (ref 70–110)
GLUCOSE UR QL STRIP: ABNORMAL
HCT VFR BLD AUTO: 33.1 % (ref 37–48.5)
HGB BLD-MCNC: 10.3 G/DL (ref 12–16)
HGB UR QL STRIP: NEGATIVE
IMM GRANULOCYTES # BLD AUTO: 0.04 K/UL (ref 0–0.04)
IMM GRANULOCYTES NFR BLD AUTO: 0.4 % (ref 0–0.5)
KETONES UR QL STRIP: NEGATIVE
LEUKOCYTE ESTERASE UR QL STRIP: NEGATIVE
LYMPHOCYTES # BLD AUTO: 1.2 K/UL (ref 1–4.8)
LYMPHOCYTES NFR BLD: 13.3 % (ref 18–48)
MAGNESIUM SERPL-MCNC: 1.6 MG/DL (ref 1.6–2.6)
MCH RBC QN AUTO: 29.5 PG (ref 27–31)
MCHC RBC AUTO-ENTMCNC: 31.1 G/DL (ref 32–36)
MCV RBC AUTO: 95 FL (ref 82–98)
MONOCYTES # BLD AUTO: 0.6 K/UL (ref 0.3–1)
MONOCYTES NFR BLD: 6.9 % (ref 4–15)
NEUTROPHILS # BLD AUTO: 7 K/UL (ref 1.8–7.7)
NEUTROPHILS NFR BLD: 78.1 % (ref 38–73)
NITRITE UR QL STRIP: NEGATIVE
NRBC BLD-RTO: 0 /100 WBC
PH UR STRIP: 6 [PH] (ref 5–8)
PHOSPHATE SERPL-MCNC: 1.1 MG/DL (ref 2.7–4.5)
PLATELET # BLD AUTO: 114 K/UL (ref 150–450)
PMV BLD AUTO: 12.1 FL (ref 9.2–12.9)
POTASSIUM SERPL-SCNC: 5 MMOL/L (ref 3.5–5.1)
PROT UR QL STRIP: NEGATIVE
PROT UR-MCNC: <7 MG/DL (ref 0–15)
PROT/CREAT UR: NORMAL MG/G{CREAT} (ref 0–0.2)
RBC # BLD AUTO: 3.49 M/UL (ref 4–5.4)
SODIUM SERPL-SCNC: 134 MMOL/L (ref 136–145)
SP GR UR STRIP: 1.01 (ref 1–1.03)
URN SPEC COLLECT METH UR: ABNORMAL
WBC # BLD AUTO: 8.95 K/UL (ref 3.9–12.7)

## 2023-03-27 PROCEDURE — 85025 COMPLETE CBC W/AUTO DIFF WBC: CPT | Performed by: INTERNAL MEDICINE

## 2023-03-27 PROCEDURE — 84156 ASSAY OF PROTEIN URINE: CPT | Performed by: INTERNAL MEDICINE

## 2023-03-27 PROCEDURE — 83735 ASSAY OF MAGNESIUM: CPT | Performed by: INTERNAL MEDICINE

## 2023-03-27 PROCEDURE — 80069 RENAL FUNCTION PANEL: CPT | Performed by: INTERNAL MEDICINE

## 2023-03-27 PROCEDURE — 81003 URINALYSIS AUTO W/O SCOPE: CPT | Performed by: INTERNAL MEDICINE

## 2023-03-27 PROCEDURE — 36415 COLL VENOUS BLD VENIPUNCTURE: CPT | Mod: PO | Performed by: INTERNAL MEDICINE

## 2023-03-27 PROCEDURE — 80197 ASSAY OF TACROLIMUS: CPT | Performed by: INTERNAL MEDICINE

## 2023-03-27 NOTE — TELEPHONE ENCOUNTER
Return call to patient and voice a understanding that she needs to continue taking Tacrolimus 6mg BID until 3/27 level is reviewed.  ----- Message from Emmy Bryson sent at 3/27/2023 11:02 AM CDT -----  Regarding: Questions  Pt wants to speak with coordinator Syd. They just completed their labs. Pt wants to know if they can take their Prograf now.      Elizabeth @ [phone]

## 2023-03-28 ENCOUNTER — NURSE TRIAGE (OUTPATIENT)
Dept: ADMINISTRATIVE | Facility: CLINIC | Age: 58
End: 2023-03-28
Payer: MEDICARE

## 2023-03-28 LAB — TACROLIMUS BLD-MCNC: 2.8 NG/ML (ref 5–15)

## 2023-03-28 NOTE — TELEPHONE ENCOUNTER
Patient repeated back and voice  a understanding of orders.  High Phos diet reviewed.  RX phone into [facility] per patient request.  Next labs on 3/30/2023.  ----- Message from Kalpana Grider MD sent at 3/28/2023  1:27 PM CDT -----  Increase phos in diet and start  mg BID  Await f/u prograf level given no dose taken in 2.5 days and started new lower dose after this blood draw.

## 2023-03-28 NOTE — TELEPHONE ENCOUNTER
Spoke with patient states her insurance is not covering K-PHOS-NEUTRAL 250 mg Tab.  Spoke with Jessica at [facility] who states the insurance will not cover brand or generic. Spoke with on call provider Dr. Torres who states he cannot do anything with insurance and to call on call coordinator.  Spoke with Sara who states she would speak with patient directly.  Patient connected to Sara Adam RN.  No further assistance needed from nurse on call line.      Reason for Disposition   [1] Prescription not at pharmacy AND [2] was prescribed by PCP recently (Exception: triager has access to EMR and prescription is recorded there. Go to Home Care and confirm for pharmacy.)    Protocols used: Medication Question Call-A-

## 2023-03-29 ENCOUNTER — PROCEDURE VISIT (OUTPATIENT)
Dept: UROLOGY | Facility: CLINIC | Age: 58
End: 2023-03-29
Payer: MEDICARE

## 2023-03-29 ENCOUNTER — TELEPHONE (OUTPATIENT)
Dept: TRANSPLANT | Facility: CLINIC | Age: 58
End: 2023-03-29
Payer: MEDICARE

## 2023-03-29 VITALS
TEMPERATURE: 98 F | BODY MASS INDEX: 26.95 KG/M2 | HEIGHT: 67 IN | DIASTOLIC BLOOD PRESSURE: 56 MMHG | RESPIRATION RATE: 17 BRPM | HEART RATE: 91 BPM | WEIGHT: 171.69 LBS | SYSTOLIC BLOOD PRESSURE: 117 MMHG

## 2023-03-29 DIAGNOSIS — Z94.0 KIDNEY REPLACED BY TRANSPLANT: ICD-10-CM

## 2023-03-29 PROCEDURE — 52310 PR CYSTOSCOPY,REMV CALCULUS,SIMPLE: ICD-10-PCS | Mod: S$GLB,,, | Performed by: UROLOGY

## 2023-03-29 PROCEDURE — 52310 CYSTOSCOPY AND TREATMENT: CPT | Mod: S$GLB,,, | Performed by: UROLOGY

## 2023-03-29 RX ORDER — LIDOCAINE HYDROCHLORIDE 20 MG/ML
JELLY TOPICAL
Status: COMPLETED | OUTPATIENT
Start: 2023-03-29 | End: 2023-03-29

## 2023-03-29 RX ADMIN — LIDOCAINE HYDROCHLORIDE: 20 JELLY TOPICAL at 10:03

## 2023-03-29 NOTE — TELEPHONE ENCOUNTER
On call note:  I received call regarding patient unable to  KPN at [facility] b/c she said her insurance would not cover it. I called her pharmacy and was informed that a PA was faxed to center. I reviewed her lab and reviewed high phos foods. If any s/s of hypophosphatemia ( s/s reviewed with patient), patient will report to ER per Dr. Ahmadi.  No s/s at present per patient. Routed to post transplant coordinator for follow-up.  Patient verbalized understanding of all information discussed.

## 2023-03-29 NOTE — PATIENT INSTRUCTIONS
What to Expect After a Cystoscopy with Stent Removal  For the next 24-48 hours, you may feel a mild burning when you urinate. This burning is normal and expected. Drink plenty of water to dilute the urine to help relieve the burning sensation. You may also see a small amount of blood in your urine after the procedure.    Unless you are already taking antibiotics, you may be given an antibiotic after the test to prevent infection.    Signs and Symptoms to Report  Call the [facility] at [phone] if you develop any of the following:  Fever of 101 degrees or higher  Chills or persistent bleeding  Inability to urinate    After hours or on weekends, you may reach a urology resident on call at this number: [phone].

## 2023-03-30 ENCOUNTER — LAB VISIT (OUTPATIENT)
Dept: LAB | Facility: HOSPITAL | Age: 58
End: 2023-03-30
Attending: INTERNAL MEDICINE
Payer: MEDICARE

## 2023-03-30 DIAGNOSIS — Z94.0 KIDNEY REPLACED BY TRANSPLANT: ICD-10-CM

## 2023-03-30 LAB
ALBUMIN SERPL BCP-MCNC: 3.2 G/DL (ref 3.5–5.2)
ANION GAP SERPL CALC-SCNC: 9 MMOL/L (ref 8–16)
BASOPHILS # BLD AUTO: 0.03 K/UL (ref 0–0.2)
BASOPHILS NFR BLD: 0.4 % (ref 0–1.9)
BILIRUB UR QL STRIP: NEGATIVE
BUN SERPL-MCNC: 43 MG/DL (ref 6–20)
CALCIUM SERPL-MCNC: 9.3 MG/DL (ref 8.7–10.5)
CHLORIDE SERPL-SCNC: 110 MMOL/L (ref 95–110)
CLARITY UR REFRACT.AUTO: CLEAR
CO2 SERPL-SCNC: 18 MMOL/L (ref 23–29)
COLOR UR AUTO: YELLOW
CREAT SERPL-MCNC: 1.5 MG/DL (ref 0.5–1.4)
CREAT UR-MCNC: 72 MG/DL (ref 15–325)
DIFFERENTIAL METHOD: ABNORMAL
EOSINOPHIL # BLD AUTO: 0.1 K/UL (ref 0–0.5)
EOSINOPHIL NFR BLD: 1.7 % (ref 0–8)
ERYTHROCYTE [DISTWIDTH] IN BLOOD BY AUTOMATED COUNT: 16 % (ref 11.5–14.5)
EST. GFR  (NO RACE VARIABLE): 39.6 ML/MIN/1.73 M^2
GLUCOSE SERPL-MCNC: 116 MG/DL (ref 70–110)
GLUCOSE UR QL STRIP: ABNORMAL
HCT VFR BLD AUTO: 32.7 % (ref 37–48.5)
HGB BLD-MCNC: 9.8 G/DL (ref 12–16)
HGB UR QL STRIP: NEGATIVE
IMM GRANULOCYTES # BLD AUTO: 0.03 K/UL (ref 0–0.04)
IMM GRANULOCYTES NFR BLD AUTO: 0.4 % (ref 0–0.5)
KETONES UR QL STRIP: NEGATIVE
LEUKOCYTE ESTERASE UR QL STRIP: NEGATIVE
LYMPHOCYTES # BLD AUTO: 1.2 K/UL (ref 1–4.8)
LYMPHOCYTES NFR BLD: 17.9 % (ref 18–48)
MAGNESIUM SERPL-MCNC: 1.9 MG/DL (ref 1.6–2.6)
MCH RBC QN AUTO: 28.7 PG (ref 27–31)
MCHC RBC AUTO-ENTMCNC: 30 G/DL (ref 32–36)
MCV RBC AUTO: 96 FL (ref 82–98)
MONOCYTES # BLD AUTO: 0.4 K/UL (ref 0.3–1)
MONOCYTES NFR BLD: 5.4 % (ref 4–15)
NEUTROPHILS # BLD AUTO: 5.1 K/UL (ref 1.8–7.7)
NEUTROPHILS NFR BLD: 74.2 % (ref 38–73)
NITRITE UR QL STRIP: NEGATIVE
NRBC BLD-RTO: 0 /100 WBC
PH UR STRIP: 6 [PH] (ref 5–8)
PHOSPHATE SERPL-MCNC: 2.2 MG/DL (ref 2.7–4.5)
PLATELET # BLD AUTO: 129 K/UL (ref 150–450)
PMV BLD AUTO: 12.2 FL (ref 9.2–12.9)
POTASSIUM SERPL-SCNC: 4.4 MMOL/L (ref 3.5–5.1)
PROT UR QL STRIP: NEGATIVE
PROT UR-MCNC: <7 MG/DL (ref 0–15)
PROT/CREAT UR: NORMAL MG/G{CREAT} (ref 0–0.2)
RBC # BLD AUTO: 3.41 M/UL (ref 4–5.4)
SODIUM SERPL-SCNC: 137 MMOL/L (ref 136–145)
SP GR UR STRIP: 1.02 (ref 1–1.03)
URN SPEC COLLECT METH UR: ABNORMAL
WBC # BLD AUTO: 6.91 K/UL (ref 3.9–12.7)

## 2023-03-30 PROCEDURE — 82570 ASSAY OF URINE CREATININE: CPT | Performed by: INTERNAL MEDICINE

## 2023-03-30 PROCEDURE — 85025 COMPLETE CBC W/AUTO DIFF WBC: CPT | Performed by: INTERNAL MEDICINE

## 2023-03-30 PROCEDURE — 80069 RENAL FUNCTION PANEL: CPT | Performed by: INTERNAL MEDICINE

## 2023-03-30 PROCEDURE — 36415 COLL VENOUS BLD VENIPUNCTURE: CPT | Mod: PO | Performed by: INTERNAL MEDICINE

## 2023-03-30 PROCEDURE — 80197 ASSAY OF TACROLIMUS: CPT | Performed by: INTERNAL MEDICINE

## 2023-03-30 PROCEDURE — 83735 ASSAY OF MAGNESIUM: CPT | Performed by: INTERNAL MEDICINE

## 2023-03-30 PROCEDURE — 81003 URINALYSIS AUTO W/O SCOPE: CPT | Performed by: INTERNAL MEDICINE

## 2023-03-31 ENCOUNTER — OFFICE VISIT (OUTPATIENT)
Dept: TRANSPLANT | Facility: CLINIC | Age: 58
End: 2023-03-31
Payer: MEDICARE

## 2023-03-31 VITALS
RESPIRATION RATE: 16 BRPM | TEMPERATURE: 97 F | RESPIRATION RATE: 16 BRPM | HEIGHT: 67 IN | DIASTOLIC BLOOD PRESSURE: 50 MMHG | WEIGHT: 173.5 LBS | OXYGEN SATURATION: 99 % | HEART RATE: 91 BPM | SYSTOLIC BLOOD PRESSURE: 121 MMHG | WEIGHT: 173.5 LBS | BODY MASS INDEX: 27.23 KG/M2 | OXYGEN SATURATION: 99 % | BODY MASS INDEX: 27.23 KG/M2 | SYSTOLIC BLOOD PRESSURE: 121 MMHG | TEMPERATURE: 97 F | HEART RATE: 91 BPM | DIASTOLIC BLOOD PRESSURE: 50 MMHG | HEIGHT: 67 IN

## 2023-03-31 DIAGNOSIS — E83.39 HYPOPHOSPHATEMIA: ICD-10-CM

## 2023-03-31 DIAGNOSIS — Z51.81 ENCOUNTER FOR THERAPEUTIC DRUG MONITORING: ICD-10-CM

## 2023-03-31 DIAGNOSIS — Z94.0 KIDNEY REPLACED BY TRANSPLANT: ICD-10-CM

## 2023-03-31 DIAGNOSIS — N18.32 STAGE 3B CHRONIC KIDNEY DISEASE: Primary | ICD-10-CM

## 2023-03-31 DIAGNOSIS — Z29.89 PROPHYLACTIC IMMUNOTHERAPY: ICD-10-CM

## 2023-03-31 DIAGNOSIS — Z94.0 S/P KIDNEY TRANSPLANT: Primary | ICD-10-CM

## 2023-03-31 DIAGNOSIS — Z79.899 ENCOUNTER FOR LONG-TERM (CURRENT) USE OF OTHER MEDICATIONS: ICD-10-CM

## 2023-03-31 DIAGNOSIS — Z94.0 DECEASED-DONOR KIDNEY TRANSPLANT: ICD-10-CM

## 2023-03-31 DIAGNOSIS — Z94.0 IMMUNOSUPPRESSIVE MANAGEMENT ENCOUNTER FOLLOWING KIDNEY TRANSPLANT: ICD-10-CM

## 2023-03-31 DIAGNOSIS — Z79.899 IMMUNOSUPPRESSIVE MANAGEMENT ENCOUNTER FOLLOWING KIDNEY TRANSPLANT: ICD-10-CM

## 2023-03-31 DIAGNOSIS — I12.9 RENAL HYPERTENSION: ICD-10-CM

## 2023-03-31 DIAGNOSIS — Z91.89 AT RISK FOR OPPORTUNISTIC INFECTIONS: ICD-10-CM

## 2023-03-31 DIAGNOSIS — E87.20 METABOLIC ACIDOSIS: ICD-10-CM

## 2023-03-31 LAB — TACROLIMUS BLD-MCNC: 20.4 NG/ML (ref 5–15)

## 2023-03-31 PROCEDURE — 3008F PR BODY MASS INDEX (BMI) DOCUMENTED: ICD-10-PCS | Mod: CPTII,S$GLB,, | Performed by: TRANSPLANT SURGERY

## 2023-03-31 PROCEDURE — 3008F BODY MASS INDEX DOCD: CPT | Mod: CPTII,S$GLB,, | Performed by: TRANSPLANT SURGERY

## 2023-03-31 PROCEDURE — 99999 PR PBB SHADOW E&M-EST. PATIENT-LVL IV: ICD-10-PCS | Mod: PBBFAC,,,

## 2023-03-31 PROCEDURE — 1159F MED LIST DOCD IN RCRD: CPT | Mod: CPTII,S$GLB,, | Performed by: TRANSPLANT SURGERY

## 2023-03-31 PROCEDURE — 99215 PR OFFICE/OUTPT VISIT, EST, LEVL V, 40-54 MIN: ICD-10-PCS | Mod: S$GLB,,, | Performed by: INTERNAL MEDICINE

## 2023-03-31 PROCEDURE — 1111F DSCHRG MED/CURRENT MED MERGE: CPT | Mod: CPTII,S$GLB,, | Performed by: INTERNAL MEDICINE

## 2023-03-31 PROCEDURE — 3051F PR MOST RECENT HEMOGLOBIN A1C LEVEL 7.0 - < 8.0%: ICD-10-PCS | Mod: CPTII,S$GLB,, | Performed by: TRANSPLANT SURGERY

## 2023-03-31 PROCEDURE — 3008F BODY MASS INDEX DOCD: CPT | Mod: CPTII,S$GLB,, | Performed by: INTERNAL MEDICINE

## 2023-03-31 PROCEDURE — 3008F PR BODY MASS INDEX (BMI) DOCUMENTED: ICD-10-PCS | Mod: CPTII,S$GLB,, | Performed by: INTERNAL MEDICINE

## 2023-03-31 PROCEDURE — 3074F SYST BP LT 130 MM HG: CPT | Mod: CPTII,S$GLB,, | Performed by: TRANSPLANT SURGERY

## 2023-03-31 PROCEDURE — 99215 OFFICE O/P EST HI 40 MIN: CPT | Mod: 24,S$GLB,, | Performed by: TRANSPLANT SURGERY

## 2023-03-31 PROCEDURE — 3078F DIAST BP <80 MM HG: CPT | Mod: CPTII,S$GLB,, | Performed by: TRANSPLANT SURGERY

## 2023-03-31 PROCEDURE — 3066F PR DOCUMENTATION OF TREATMENT FOR NEPHROPATHY: ICD-10-PCS | Mod: CPTII,S$GLB,, | Performed by: TRANSPLANT SURGERY

## 2023-03-31 PROCEDURE — 3051F HG A1C>EQUAL 7.0%<8.0%: CPT | Mod: CPTII,S$GLB,, | Performed by: TRANSPLANT SURGERY

## 2023-03-31 PROCEDURE — 3066F PR DOCUMENTATION OF TREATMENT FOR NEPHROPATHY: ICD-10-PCS | Mod: CPTII,S$GLB,, | Performed by: INTERNAL MEDICINE

## 2023-03-31 PROCEDURE — 3074F SYST BP LT 130 MM HG: CPT | Mod: CPTII,S$GLB,, | Performed by: INTERNAL MEDICINE

## 2023-03-31 PROCEDURE — 3051F PR MOST RECENT HEMOGLOBIN A1C LEVEL 7.0 - < 8.0%: ICD-10-PCS | Mod: CPTII,S$GLB,, | Performed by: INTERNAL MEDICINE

## 2023-03-31 PROCEDURE — 3074F PR MOST RECENT SYSTOLIC BLOOD PRESSURE < 130 MM HG: ICD-10-PCS | Mod: CPTII,S$GLB,, | Performed by: TRANSPLANT SURGERY

## 2023-03-31 PROCEDURE — 3078F PR MOST RECENT DIASTOLIC BLOOD PRESSURE < 80 MM HG: ICD-10-PCS | Mod: CPTII,S$GLB,, | Performed by: TRANSPLANT SURGERY

## 2023-03-31 PROCEDURE — 1111F DSCHRG MED/CURRENT MED MERGE: CPT | Mod: CPTII,S$GLB,, | Performed by: TRANSPLANT SURGERY

## 2023-03-31 PROCEDURE — 3074F PR MOST RECENT SYSTOLIC BLOOD PRESSURE < 130 MM HG: ICD-10-PCS | Mod: CPTII,S$GLB,, | Performed by: INTERNAL MEDICINE

## 2023-03-31 PROCEDURE — 1160F RVW MEDS BY RX/DR IN RCRD: CPT | Mod: CPTII,S$GLB,, | Performed by: INTERNAL MEDICINE

## 2023-03-31 PROCEDURE — 99999 PR PBB SHADOW E&M-EST. PATIENT-LVL IV: CPT | Mod: PBBFAC,,,

## 2023-03-31 PROCEDURE — 3078F DIAST BP <80 MM HG: CPT | Mod: CPTII,S$GLB,, | Performed by: INTERNAL MEDICINE

## 2023-03-31 PROCEDURE — 3051F HG A1C>EQUAL 7.0%<8.0%: CPT | Mod: CPTII,S$GLB,, | Performed by: INTERNAL MEDICINE

## 2023-03-31 PROCEDURE — 99215 OFFICE O/P EST HI 40 MIN: CPT | Mod: S$GLB,,, | Performed by: INTERNAL MEDICINE

## 2023-03-31 PROCEDURE — 3066F NEPHROPATHY DOC TX: CPT | Mod: CPTII,S$GLB,, | Performed by: TRANSPLANT SURGERY

## 2023-03-31 PROCEDURE — 99999 PR PBB SHADOW E&M-EST. PATIENT-LVL IV: CPT | Mod: PBBFAC,,, | Performed by: INTERNAL MEDICINE

## 2023-03-31 PROCEDURE — 1111F PR DISCHARGE MEDS RECONCILED W/ CURRENT OUTPATIENT MED LIST: ICD-10-PCS | Mod: CPTII,S$GLB,, | Performed by: TRANSPLANT SURGERY

## 2023-03-31 PROCEDURE — 3078F PR MOST RECENT DIASTOLIC BLOOD PRESSURE < 80 MM HG: ICD-10-PCS | Mod: CPTII,S$GLB,, | Performed by: INTERNAL MEDICINE

## 2023-03-31 PROCEDURE — 1159F PR MEDICATION LIST DOCUMENTED IN MEDICAL RECORD: ICD-10-PCS | Mod: CPTII,S$GLB,, | Performed by: INTERNAL MEDICINE

## 2023-03-31 PROCEDURE — 1159F MED LIST DOCD IN RCRD: CPT | Mod: CPTII,S$GLB,, | Performed by: INTERNAL MEDICINE

## 2023-03-31 PROCEDURE — 1111F PR DISCHARGE MEDS RECONCILED W/ CURRENT OUTPATIENT MED LIST: ICD-10-PCS | Mod: CPTII,S$GLB,, | Performed by: INTERNAL MEDICINE

## 2023-03-31 PROCEDURE — 3066F NEPHROPATHY DOC TX: CPT | Mod: CPTII,S$GLB,, | Performed by: INTERNAL MEDICINE

## 2023-03-31 PROCEDURE — 1160F PR REVIEW ALL MEDS BY PRESCRIBER/CLIN PHARMACIST DOCUMENTED: ICD-10-PCS | Mod: CPTII,S$GLB,, | Performed by: INTERNAL MEDICINE

## 2023-03-31 PROCEDURE — 1159F PR MEDICATION LIST DOCUMENTED IN MEDICAL RECORD: ICD-10-PCS | Mod: CPTII,S$GLB,, | Performed by: TRANSPLANT SURGERY

## 2023-03-31 PROCEDURE — 99999 PR PBB SHADOW E&M-EST. PATIENT-LVL IV: ICD-10-PCS | Mod: PBBFAC,,, | Performed by: INTERNAL MEDICINE

## 2023-03-31 PROCEDURE — 99215 PR OFFICE/OUTPT VISIT, EST, LEVL V, 40-54 MIN: ICD-10-PCS | Mod: 24,S$GLB,, | Performed by: TRANSPLANT SURGERY

## 2023-03-31 RX ORDER — TACROLIMUS 1 MG/1
3 CAPSULE ORAL EVERY 12 HOURS
Qty: 180 CAPSULE | Refills: 11 | Status: SHIPPED | OUTPATIENT
Start: 2023-03-31 | End: 2023-04-06 | Stop reason: DRUGHIGH

## 2023-03-31 RX ORDER — HYDROXYZINE HYDROCHLORIDE 25 MG/1
25 TABLET, FILM COATED ORAL 3 TIMES DAILY PRN
Qty: 60 TABLET | Refills: 0 | Status: SHIPPED | OUTPATIENT
Start: 2023-03-31 | End: 2023-04-19 | Stop reason: ALTCHOICE

## 2023-03-31 NOTE — PROGRESS NOTES
Kidney Post-Transplant Assessment    Referring Physician: Aaron Bonilla  Current Nephrologist: Aaron Bonilla    ORGAN: LEFT KIDNEY  Donor Type: donation after brain death  PHS Increased Risk: no  Cold Ischemia: 1,731 mins  Induction Medications: thymoglobulin    Subjective:     CC:  Reassessment of renal allograft function and management of chronic immunosuppression.    HPI:  Ms. Maldonado is a 58 y.o. year old Black or  female who received a donation after brain death kidney transplant on 3/10/23.  She has CKD stage 3 - GFR 30-59 and her baseline creatinine is around mid 1s. She takes mycophenolate mofetil, prednisone, and tacrolimus for maintenance immunosuppression. She denies any recent hospitalizations or ER visits since her previous clinic visit.    Pertinent Nephrology/Transplant History:   ESRD from DM  failed KT #1 12/2014 -  3/2022. EDW 81kg  DDKT#2  3/10/23. thymo; CIT 28+h    POST TRANSPLANT UPDATE 03/31/2023   Elizabeth is now 21 d post kidney transplant and reports no concerns. She just saw surgery this PM and had staples removed.  She reports good HTN control with reads of 120 sys at home and no sx orthostasis. She reports no incisional pain or urinary sx. She states she is eating well and tolerating meds w/o issues.     Current Outpatient Medications   Medication Sig    atorvastatin (LIPITOR) 40 MG tablet Take 1 tablet (40 mg total) by mouth every evening.    blood sugar diagnostic Strp Checks BG ac/hs    carvediloL (COREG) 3.125 MG tablet Take 1 tablet (3.125 mg total) by mouth 2 (two) times daily. Hold for SBP <130    dulaglutide (TRULICITY) 4.5 mg/0.5 mL pen injector Inject 4.5 mg into the skin once a week.    famotidine (PEPCID) 20 MG tablet Take 1 tablet (20 mg total) by mouth every evening.    HUMALOG KWIKPEN INSULIN 100 unit/mL pen Inject 5 Units into the skin 3 (three) times daily with meals. + SLIDE SCALE, TDD: 30 Units    hydrALAZINE (APRESOLINE) 50 MG tablet Take 1  tablet (50 mg total) by mouth 3 (three) times daily.    hydrOXYzine HCL (ATARAX) 25 MG tablet Take 1 tablet (25 mg total) by mouth 3 (three) times daily as needed for Itching.    insulin (LANTUS SOLOSTAR U-100 INSULIN) glargine 100 units/mL SubQ pen Inject 18 Units into the skin once daily.    k phos di & mono-sod phos mono (K-PHOS-NEUTRAL) 250 mg Tab Take 2 tablets by mouth 2 (two) times a day.    lancets (ONETOUCH DELICA LANCETS) 33 gauge Misc 1 lancet by Misc.(Non-Drug; Combo Route) route 4 (four) times daily before meals and nightly.    multivitamin Tab Take 1 tablet by mouth once daily.    mycophenolate (CELLCEPT) 250 mg Cap Take 4 capsules (1,000 mg total) by mouth 2 (two) times daily.    predniSONE (DELTASONE) 5 MG tablet Take by mouth daily: 3/13 to 3/19 20 mg, 3/20 to 3/3/26 15 mg, 3/27 to 4/2 10 mg, 4/3/23 start 5 mg daily , do not  discontinue    sulfamethoxazole-trimethoprim 400-80mg (BACTRIM,SEPTRA) 400-80 mg per tablet Take 1 tablet by mouth every Mon, Wed, Fri. Stop 9/8/2023    tacrolimus (PROGRAF) 1 MG Cap Take 3 capsules (3 mg total) by mouth every 12 (twelve) hours. Txp Date:3/10/2023 (Kidney) Disch Date:3/13/2023 ICD10:Z94.0 Txp Location:[facility]    timolol maleate 0.5% (TIMOPTIC) 0.5 % Drop Place 1 drop into both eyes 2 (two) times daily.    traMADoL (ULTRAM) 50 mg tablet Take 1 tablet (50 mg total) by mouth every 6 (six) hours as needed for Pain.    valGANciclovir (VALCYTE) 450 mg Tab Take 1 tablet (450 mg total) by mouth every Mon, Wed, Fri. Stop 6/10/2023     No current facility-administered medications for this visit.         Review of Systems   Constitutional:  Negative for fever.   HENT:  Negative for mouth sores.    Eyes:  Negative for visual disturbance.   Respiratory:  Negative for shortness of breath.    Cardiovascular:  Negative for chest pain and leg swelling.   Gastrointestinal: Negative.    Genitourinary:  Negative for decreased urine volume, difficulty urinating, dysuria and hematuria.  "  Musculoskeletal: Negative.    Skin:  Negative for rash.   Allergic/Immunologic: Positive for immunocompromised state.   Neurological:  Negative for tremors.   Incision just had staples removed and has steri strips. NT to touch and no erythema in region.    Objective:     Blood pressure (!) 121/50, pulse 91, temperature 97.3 °F (36.3 °C), temperature source Temporal, resp. rate 16, height 5' 7" (1.702 m), weight 78.7 kg (173 lb 8 oz), last menstrual period 04/29/2011, SpO2 99 %.body mass index is 27.17 kg/m².    Physical Exam  Constitutional:       General: She is not in acute distress.  Cardiovascular:      Rate and Rhythm: Normal rate and regular rhythm.   Pulmonary:      Effort: Pulmonary effort is normal. No respiratory distress.      Breath sounds: Normal breath sounds.   Abdominal:      General: Bowel sounds are normal.      Palpations: Abdomen is soft. There is no mass.      Tenderness: There is no abdominal tenderness.     Labs:  Lab Results   Component Value Date    WBC 6.91 03/30/2023    HGB 9.8 (L) 03/30/2023    HCT 32.7 (L) 03/30/2023     03/30/2023    K 4.4 03/30/2023     03/30/2023    CO2 18 (L) 03/30/2023    BUN 43 (H) 03/30/2023    CREATININE 1.5 (H) 03/30/2023    EGFRNORACEVR 39.6 (A) 03/30/2023    CALCIUM 9.3 03/30/2023    PHOS 2.2 (L) 03/30/2023    MG 1.9 03/30/2023    ALBUMIN 3.2 (L) 03/30/2023    AST 15 03/10/2023    ALT 8 (L) 03/10/2023    UTPCR Unable to calculate 03/30/2023    PTH 85.0 (H) 03/10/2023    TACROLIMUS 20.4 (H) 03/30/2023       No results found for: EXTANC, EXTWBC, EXTSEGS, EXTPLATELETS, EXTHEMOGLOBI, EXTHEMATOCRI, EXTCREATININ, EXTSODIUM, EXTPOTASSIUM, EXTBUN, EXTCO2, EXTCALCIUM, EXTPHOSPHORU, EXTGLUCOSE, EXTALBUMIN, EXTAST, EXTALT, EXTBILITOTAL, EXTLIPASE, EXTAMYLASE    No results found for: EXTCYCLOSLVL, EXTSIROLIMUS, EXTTACROLVL, EXTPROTCRE, EXTPTHINTACT, EXTPROTEINUA, EXTWBCUA, EXTRBCUA    Labs were reviewed with the patient.    Assessment:     1. Kidney " transplant status, living unrelated donor - 12/2/14    2. Kidney replaced by transplant        Plan:   New nursing orders:  -    DD kidney transplant #2 DDKT#2  3/10/23. thymo; CIT 28+h [pumped]; KDPI 27%; cPRA 99%; XM neg  -Negligible proteinuria  -Doing well with excellent allograft function     Immunosuppression Management  - Tacro, MMF, pred  - Tacro goal 7-9  - Decrease tacro today to 3/3 as level too high, after holding 2 doses  -Continue monitoring for toxicities and SE, none presently noted    At risk for opportunistic infections  -Anti-infective prophylaxis against opportunistic infections  - Valcyte for CMV prophy until 6/11/23  - PJP prophy until 9/8/23: bactrim  -Screening for BK infection to prevent allograft dysfunction  - start around POD30  -Continue blood PCR screening as per guidelines to prevent BK viremia and nephropathy leading to allograft dysfunction and potential graft failure    Hypertension, renal  - Controlled, monitor    Metabolic bone disease [Secondary hyperparathyroidism (SPTH), Phosphorus metabolism disorders]  - hypophosphatemia- cont  BID. Will tolerate mild asymptomatic low level d/t pill burden, DDI, and adverse SE      Electrolyte disorders  - Acidosis- watch, may need HCO3      Follow-up:   Clinic: return to transplant clinic weekly for the first month after transplant; every 2 weeks during months 2-3; then at 6-, 9-, 12-, 18-, 24-, and 36- months post-transplant to reassess for complications from immunosuppression toxicity and monitor for rejection.  Annually thereafter.    Labs: since patient remains at high risk for rejection and drug-related complications that warrant close monitoring, labs will be ordered as follows: continue twice weekly CBC, renal panel, and drug level for first month; then same labs once weekly through 3rd month post-transplant.  Urine for UA and protein/creatinine ratio monthly.  Serum BK - PCR at 1-, 3-, 6-, 9-, 12-, 18-, 24-, 36- 48-, and 60  months post-transplant.  Hepatic panel at 1-, 2-, 3-, 6-, 9-, 12-, 18-, 24-, and 36- months post-transplant.    Kalpana Grider MD       Education:   Material provided to the patient.  Patient reminded to call with any health changes since these can be early signs of significant complications.  Also, I advised the patient to be sure any new medications or changes of old medications are discussed with either a pharmacist or physician knowledgeable with transplant to avoid rejection/drug toxicity related to significant drug interactions.    UNOS Patient Status  Functional Status: 80% - Normal activity with effort: some symptoms of disease  Physical Capacity: No Limitations

## 2023-03-31 NOTE — PROGRESS NOTES
Transplant Surgery  Kidney Transplant Recipient Follow-up    Referring Physician: Aaron Bonilla  Current Nephrologist: Aaron Bonilla    Subjective:     Chief Complaint: Elizabeth Maldonado is a 58 y.o. year old Black or  female who is status post Kidney transplant performed on 3/10/2023.    ORGAN: LEFT KIDNEY   Disease Etiology: Hypertensive Nephrosclerosis  Donor Type: Donation after Brain Death   Donor CMV Status: Positive   Donor HBcAB: Negative   Donor HCV Status: Negative     History of Present Illness: She reports no concerns.  From a transplant perspective, she reports normal urination.  Elizabeth is here for management of her immunosuppression medication.  Elizabeth states that her immunosuppression is being well tolerated.  Hypertension is not present.    External provider notes reviewed: Yes    Review of Systems   Constitutional:  Negative for chills and fever.   HENT:  Negative for facial swelling and sore throat.    Eyes:  Negative for redness and itching.   Respiratory:  Negative for cough and shortness of breath.    Cardiovascular:  Negative for chest pain and leg swelling.   Gastrointestinal:  Negative for abdominal distention and abdominal pain.   Endocrine: Negative for polydipsia and polyphagia.   Genitourinary:  Negative for dysuria and hematuria.   Musculoskeletal:  Negative for back pain and myalgias.   Skin:  Negative for pallor and rash.   Allergic/Immunologic: Positive for immunocompromised state. Negative for environmental allergies.   Neurological:  Negative for light-headedness and headaches.   Hematological:  Negative for adenopathy. Does not bruise/bleed easily.   Psychiatric/Behavioral:  Negative for agitation and confusion.      Objective:     Physical Exam  Constitutional:       General: She is not in acute distress.     Appearance: She is not diaphoretic.   HENT:      Head: Normocephalic and atraumatic.   Eyes:      Conjunctiva/sclera: Conjunctivae normal.      Pupils:  Pupils are equal, round, and reactive to light.   Cardiovascular:      Rate and Rhythm: Normal rate and regular rhythm.   Pulmonary:      Effort: Pulmonary effort is normal.      Breath sounds: Normal breath sounds.   Abdominal:      General: Bowel sounds are normal. There is no distension.      Palpations: Abdomen is soft.      Tenderness: There is no abdominal tenderness.   Musculoskeletal:         General: Normal range of motion.      Cervical back: Normal range of motion and neck supple.   Skin:     General: Skin is warm and dry.   Neurological:      Mental Status: She is alert and oriented to person, place, and time.   Psychiatric:         Behavior: Behavior normal.     Lab Results   Component Value Date    CREATININE 1.5 (H) 03/30/2023    BUN 43 (H) 03/30/2023     Lab Results   Component Value Date    WBC 6.91 03/30/2023    HGB 9.8 (L) 03/30/2023    HCT 32.7 (L) 03/30/2023    HCT 23 (L) 12/02/2014     (L) 03/30/2023     Lab Results   Component Value Date    TACROLIMUS 20.4 (H) 03/30/2023       Diagnostics:  The following labs have been reviewed: CBC  BMP  TACROLIMUS LEVEL  The following radiology images have been independently reviewed and interpreted:     Assessment and Plan:        S/P Kidney transplant.  Chronic immunosuppressive medications for rejection prophylaxis supratherapeutic.  Plan:  Discussed with coordinator to review with nephrologist .  Continue monitoring symptoms, labs and drug levels for drug-related toxicity and side effects.  Renal hypertension at target.  Remove staples in clinic today    Additional testing to be completed according to the Kidney: Written Order Guideline for Kidney Transplant Follow-Up (KI-09)    Interpretation of tests and discussion of patient management involves all members of the multidisciplinary transplant team.  Patient advised that it is recommended that all transplant candidates, and their close contacts and household members receive Covid  vaccination.  Follow-up: Patient reminded to call with any health changes, since these can be early signs of significant complications.  Also, I advised the patient to be sure any new medications or changes of old medications are discussed with either a pharmacist, or physician knowledgeable with transplant to avoid rejection/drug toxicity related to significant drug interactions.    Scott Tam MD       [facility] Patient Status  Functional Status: 80% - Normal activity with effort: some symptoms of disease  Physical Capacity: No Limitations

## 2023-03-31 NOTE — PATIENT INSTRUCTIONS
HOLD tacrolimus (Prograf) x 2 doses (Fri night and Sat morning), then start 3 mg twice daily.  Try atarax (hydroxizine) for itching, up to 3 times daily, as close to 8 hours a part as possible

## 2023-03-31 NOTE — LETTER
March 31, 2023        Aaron Bonilla  [address]  [facility] [address]  Phone: [phone]  Fax: [phone]             [facility]- Transplant 1st Fl  [address]  [facility] [address]  Phone: [phone]   Patient: Elizabeth Maldonado   MR Number: [id number]   YOB: 1965   Date of Visit: 3/31/2023       Dear Dr. Aaron Bonilla    Thank you for referring Elizabeth Maldonado to me for evaluation. Attached you will find relevant portions of my assessment and plan of care.    If you have questions, please do not hesitate to call me. I look forward to following Elizabeth Maldonado along with you.    Sincerely,    Kalpana Grider MD    Enclosure    If you would like to receive this communication electronically, please contact [email or web address] or [phone] to request [facility] Link access.    [facility] [facility] [employer] Link is a tool which provides read-only access to select patient information with whom you have a relationship. Its easy to use and provides real time access to review your patients record including encounter summaries, notes, results, and demographic information.    If you feel you have received this communication in error or would no longer like to receive these types of communications, please e-mail [email or web address]

## 2023-03-31 NOTE — PROGRESS NOTES
LLQ kidney incision removed and steristrips applied. Incision CDI. Incisional care reviewed with pt and pt verbalized understanding

## 2023-04-03 ENCOUNTER — LAB VISIT (OUTPATIENT)
Dept: LAB | Facility: HOSPITAL | Age: 58
End: 2023-04-03
Attending: INTERNAL MEDICINE
Payer: MEDICARE

## 2023-04-03 ENCOUNTER — TELEPHONE (OUTPATIENT)
Dept: TRANSPLANT | Facility: CLINIC | Age: 58
End: 2023-04-03
Payer: MEDICARE

## 2023-04-03 DIAGNOSIS — Z94.0 KIDNEY REPLACED BY TRANSPLANT: ICD-10-CM

## 2023-04-03 LAB
ALBUMIN SERPL BCP-MCNC: 3.2 G/DL (ref 3.5–5.2)
ANION GAP SERPL CALC-SCNC: 9 MMOL/L (ref 8–16)
BASOPHILS # BLD AUTO: 0.03 K/UL (ref 0–0.2)
BASOPHILS NFR BLD: 0.6 % (ref 0–1.9)
BUN SERPL-MCNC: 21 MG/DL (ref 6–20)
CALCIUM SERPL-MCNC: 9.7 MG/DL (ref 8.7–10.5)
CHLORIDE SERPL-SCNC: 109 MMOL/L (ref 95–110)
CO2 SERPL-SCNC: 21 MMOL/L (ref 23–29)
CREAT SERPL-MCNC: 1.1 MG/DL (ref 0.5–1.4)
DIFFERENTIAL METHOD: ABNORMAL
EOSINOPHIL # BLD AUTO: 0.1 K/UL (ref 0–0.5)
EOSINOPHIL NFR BLD: 2.4 % (ref 0–8)
ERYTHROCYTE [DISTWIDTH] IN BLOOD BY AUTOMATED COUNT: 15.6 % (ref 11.5–14.5)
EST. GFR  (NO RACE VARIABLE): 57.5 ML/MIN/1.73 M^2
GLUCOSE SERPL-MCNC: 129 MG/DL (ref 70–110)
HCT VFR BLD AUTO: 33.2 % (ref 37–48.5)
HGB BLD-MCNC: 10 G/DL (ref 12–16)
IMM GRANULOCYTES # BLD AUTO: 0.08 K/UL (ref 0–0.04)
IMM GRANULOCYTES NFR BLD AUTO: 1.5 % (ref 0–0.5)
LYMPHOCYTES # BLD AUTO: 1.3 K/UL (ref 1–4.8)
LYMPHOCYTES NFR BLD: 23 % (ref 18–48)
MAGNESIUM SERPL-MCNC: 1.5 MG/DL (ref 1.6–2.6)
MCH RBC QN AUTO: 28.4 PG (ref 27–31)
MCHC RBC AUTO-ENTMCNC: 30.1 G/DL (ref 32–36)
MCV RBC AUTO: 94 FL (ref 82–98)
MONOCYTES # BLD AUTO: 0.4 K/UL (ref 0.3–1)
MONOCYTES NFR BLD: 6.4 % (ref 4–15)
NEUTROPHILS # BLD AUTO: 3.6 K/UL (ref 1.8–7.7)
NEUTROPHILS NFR BLD: 66.1 % (ref 38–73)
NRBC BLD-RTO: 0 /100 WBC
PHOSPHATE SERPL-MCNC: 2.2 MG/DL (ref 2.7–4.5)
PLATELET # BLD AUTO: 151 K/UL (ref 150–450)
PMV BLD AUTO: 11.7 FL (ref 9.2–12.9)
POTASSIUM SERPL-SCNC: 4.4 MMOL/L (ref 3.5–5.1)
RBC # BLD AUTO: 3.52 M/UL (ref 4–5.4)
SODIUM SERPL-SCNC: 139 MMOL/L (ref 136–145)
TACROLIMUS BLD-MCNC: 7.1 NG/ML (ref 5–15)
WBC # BLD AUTO: 5.43 K/UL (ref 3.9–12.7)

## 2023-04-03 PROCEDURE — 80197 ASSAY OF TACROLIMUS: CPT | Performed by: INTERNAL MEDICINE

## 2023-04-03 PROCEDURE — 83735 ASSAY OF MAGNESIUM: CPT | Performed by: INTERNAL MEDICINE

## 2023-04-03 PROCEDURE — 36415 COLL VENOUS BLD VENIPUNCTURE: CPT | Performed by: INTERNAL MEDICINE

## 2023-04-03 PROCEDURE — 80069 RENAL FUNCTION PANEL: CPT | Performed by: INTERNAL MEDICINE

## 2023-04-03 PROCEDURE — 85025 COMPLETE CBC W/AUTO DIFF WBC: CPT | Performed by: INTERNAL MEDICINE

## 2023-04-03 NOTE — TELEPHONE ENCOUNTER
Results reviewed with patient , next labs on 4/6/2023.  ----- Message from Kalpana Grider MD sent at 4/3/2023  1:05 PM CDT -----  Results reviewed; no changes at present.

## 2023-04-06 ENCOUNTER — LAB VISIT (OUTPATIENT)
Dept: LAB | Facility: HOSPITAL | Age: 58
End: 2023-04-06
Attending: INTERNAL MEDICINE
Payer: MEDICARE

## 2023-04-06 DIAGNOSIS — Z94.0 KIDNEY REPLACED BY TRANSPLANT: ICD-10-CM

## 2023-04-06 LAB
ALBUMIN SERPL BCP-MCNC: 3.2 G/DL (ref 3.5–5.2)
ANION GAP SERPL CALC-SCNC: 9 MMOL/L (ref 8–16)
BASOPHILS # BLD AUTO: 0.03 K/UL (ref 0–0.2)
BASOPHILS NFR BLD: 0.7 % (ref 0–1.9)
BUN SERPL-MCNC: 21 MG/DL (ref 6–20)
CALCIUM SERPL-MCNC: 9.6 MG/DL (ref 8.7–10.5)
CHLORIDE SERPL-SCNC: 108 MMOL/L (ref 95–110)
CO2 SERPL-SCNC: 21 MMOL/L (ref 23–29)
CREAT SERPL-MCNC: 1.3 MG/DL (ref 0.5–1.4)
DIFFERENTIAL METHOD: ABNORMAL
EOSINOPHIL # BLD AUTO: 0.1 K/UL (ref 0–0.5)
EOSINOPHIL NFR BLD: 2.1 % (ref 0–8)
ERYTHROCYTE [DISTWIDTH] IN BLOOD BY AUTOMATED COUNT: 15.4 % (ref 11.5–14.5)
EST. GFR  (NO RACE VARIABLE): 47.1 ML/MIN/1.73 M^2
GLUCOSE SERPL-MCNC: 103 MG/DL (ref 70–110)
HCT VFR BLD AUTO: 33 % (ref 37–48.5)
HGB BLD-MCNC: 10.3 G/DL (ref 12–16)
IMM GRANULOCYTES # BLD AUTO: 0.07 K/UL (ref 0–0.04)
IMM GRANULOCYTES NFR BLD AUTO: 1.7 % (ref 0–0.5)
LYMPHOCYTES # BLD AUTO: 1 K/UL (ref 1–4.8)
LYMPHOCYTES NFR BLD: 23.3 % (ref 18–48)
MAGNESIUM SERPL-MCNC: 1.5 MG/DL (ref 1.6–2.6)
MCH RBC QN AUTO: 28.7 PG (ref 27–31)
MCHC RBC AUTO-ENTMCNC: 31.2 G/DL (ref 32–36)
MCV RBC AUTO: 92 FL (ref 82–98)
MONOCYTES # BLD AUTO: 0.3 K/UL (ref 0.3–1)
MONOCYTES NFR BLD: 7.4 % (ref 4–15)
NEUTROPHILS # BLD AUTO: 2.7 K/UL (ref 1.8–7.7)
NEUTROPHILS NFR BLD: 64.8 % (ref 38–73)
NRBC BLD-RTO: 0 /100 WBC
PHOSPHATE SERPL-MCNC: 2.9 MG/DL (ref 2.7–4.5)
PLATELET # BLD AUTO: 141 K/UL (ref 150–450)
PMV BLD AUTO: 11.3 FL (ref 9.2–12.9)
POTASSIUM SERPL-SCNC: 4.2 MMOL/L (ref 3.5–5.1)
RBC # BLD AUTO: 3.59 M/UL (ref 4–5.4)
SODIUM SERPL-SCNC: 138 MMOL/L (ref 136–145)
TACROLIMUS BLD-MCNC: 6.6 NG/ML (ref 5–15)
WBC # BLD AUTO: 4.21 K/UL (ref 3.9–12.7)

## 2023-04-06 PROCEDURE — 80197 ASSAY OF TACROLIMUS: CPT | Performed by: INTERNAL MEDICINE

## 2023-04-06 PROCEDURE — 80069 RENAL FUNCTION PANEL: CPT | Performed by: INTERNAL MEDICINE

## 2023-04-06 PROCEDURE — 83735 ASSAY OF MAGNESIUM: CPT | Performed by: INTERNAL MEDICINE

## 2023-04-06 PROCEDURE — 36415 COLL VENOUS BLD VENIPUNCTURE: CPT | Mod: PO | Performed by: INTERNAL MEDICINE

## 2023-04-06 PROCEDURE — 85025 COMPLETE CBC W/AUTO DIFF WBC: CPT | Performed by: INTERNAL MEDICINE

## 2023-04-06 RX ORDER — TACROLIMUS 1 MG/1
CAPSULE ORAL
Qty: 210 CAPSULE | Refills: 11 | Status: SHIPPED | OUTPATIENT
Start: 2023-04-06 | End: 2023-05-29

## 2023-04-06 NOTE — TELEPHONE ENCOUNTER
Patient repeated back and voice a understanding of orders.  Next labs on 4/10/2023.   ----- Message from Cristi Benitez MD sent at 4/6/2023  1:39 PM CDT -----  Please increase prograf to 4/3 and repeat labs level next week

## 2023-04-10 ENCOUNTER — LAB VISIT (OUTPATIENT)
Dept: LAB | Facility: HOSPITAL | Age: 58
End: 2023-04-10
Attending: INTERNAL MEDICINE
Payer: MEDICARE

## 2023-04-10 DIAGNOSIS — Z91.89 PERSONAL HISTORY OF POISONING, PRESENTING HAZARDS TO HEALTH: ICD-10-CM

## 2023-04-10 DIAGNOSIS — Z94.0 KIDNEY REPLACED BY TRANSPLANT: ICD-10-CM

## 2023-04-10 DIAGNOSIS — Z11.4 SCREENING FOR HUMAN IMMUNODEFICIENCY VIRUS: ICD-10-CM

## 2023-04-10 DIAGNOSIS — Z21 ASYMPTOMATIC HUMAN IMMUNODEFICIENCY VIRUS (HIV) INFECTION STATUS: ICD-10-CM

## 2023-04-10 DIAGNOSIS — Z57.8 OCCUPATIONAL EXPOSURE TO OTHER RISK FACTORS: ICD-10-CM

## 2023-04-10 LAB
ALBUMIN SERPL BCP-MCNC: 3.3 G/DL (ref 3.5–5.2)
ALBUMIN SERPL BCP-MCNC: 3.3 G/DL (ref 3.5–5.2)
ALP SERPL-CCNC: 52 U/L (ref 55–135)
ALT SERPL W/O P-5'-P-CCNC: 18 U/L (ref 10–44)
ANION GAP SERPL CALC-SCNC: 7 MMOL/L (ref 8–16)
AST SERPL-CCNC: 19 U/L (ref 10–40)
BASOPHILS # BLD AUTO: 0.02 K/UL (ref 0–0.2)
BASOPHILS NFR BLD: 0.5 % (ref 0–1.9)
BILIRUB DIRECT SERPL-MCNC: 0.1 MG/DL (ref 0.1–0.3)
BILIRUB SERPL-MCNC: 0.3 MG/DL (ref 0.1–1)
BUN SERPL-MCNC: 19 MG/DL (ref 6–20)
CALCIUM SERPL-MCNC: 10.1 MG/DL (ref 8.7–10.5)
CHLORIDE SERPL-SCNC: 107 MMOL/L (ref 95–110)
CO2 SERPL-SCNC: 23 MMOL/L (ref 23–29)
CREAT SERPL-MCNC: 1.2 MG/DL (ref 0.5–1.4)
DIFFERENTIAL METHOD: ABNORMAL
EOSINOPHIL # BLD AUTO: 0 K/UL (ref 0–0.5)
EOSINOPHIL NFR BLD: 0.8 % (ref 0–8)
ERYTHROCYTE [DISTWIDTH] IN BLOOD BY AUTOMATED COUNT: 15.2 % (ref 11.5–14.5)
EST. GFR  (NO RACE VARIABLE): 51.8 ML/MIN/1.73 M^2
GLUCOSE SERPL-MCNC: 109 MG/DL (ref 70–110)
HCT VFR BLD AUTO: 32.3 % (ref 37–48.5)
HGB BLD-MCNC: 10.1 G/DL (ref 12–16)
IMM GRANULOCYTES # BLD AUTO: 0.02 K/UL (ref 0–0.04)
IMM GRANULOCYTES NFR BLD AUTO: 0.5 % (ref 0–0.5)
LYMPHOCYTES # BLD AUTO: 1 K/UL (ref 1–4.8)
LYMPHOCYTES NFR BLD: 24.6 % (ref 18–48)
MAGNESIUM SERPL-MCNC: 1.5 MG/DL (ref 1.6–2.6)
MCH RBC QN AUTO: 29.2 PG (ref 27–31)
MCHC RBC AUTO-ENTMCNC: 31.3 G/DL (ref 32–36)
MCV RBC AUTO: 93 FL (ref 82–98)
MONOCYTES # BLD AUTO: 0.4 K/UL (ref 0.3–1)
MONOCYTES NFR BLD: 9 % (ref 4–15)
NEUTROPHILS # BLD AUTO: 2.5 K/UL (ref 1.8–7.7)
NEUTROPHILS NFR BLD: 64.6 % (ref 38–73)
NRBC BLD-RTO: 0 /100 WBC
PHOSPHATE SERPL-MCNC: 2.9 MG/DL (ref 2.7–4.5)
PLATELET # BLD AUTO: 127 K/UL (ref 150–450)
PMV BLD AUTO: 12 FL (ref 9.2–12.9)
POTASSIUM SERPL-SCNC: 4.4 MMOL/L (ref 3.5–5.1)
PROT SERPL-MCNC: 7.7 G/DL (ref 6–8.4)
RBC # BLD AUTO: 3.46 M/UL (ref 4–5.4)
SODIUM SERPL-SCNC: 137 MMOL/L (ref 136–145)
URATE SERPL-MCNC: 6.1 MG/DL (ref 2.4–5.7)
WBC # BLD AUTO: 3.9 K/UL (ref 3.9–12.7)

## 2023-04-10 PROCEDURE — 85025 COMPLETE CBC W/AUTO DIFF WBC: CPT | Performed by: INTERNAL MEDICINE

## 2023-04-10 PROCEDURE — 87536 HIV-1 QUANT&REVRSE TRNSCRPJ: CPT | Performed by: INTERNAL MEDICINE

## 2023-04-10 PROCEDURE — 84550 ASSAY OF BLOOD/URIC ACID: CPT | Performed by: INTERNAL MEDICINE

## 2023-04-10 PROCEDURE — 83735 ASSAY OF MAGNESIUM: CPT | Performed by: INTERNAL MEDICINE

## 2023-04-10 PROCEDURE — 84075 ASSAY ALKALINE PHOSPHATASE: CPT | Performed by: INTERNAL MEDICINE

## 2023-04-10 PROCEDURE — 87517 HEPATITIS B DNA QUANT: CPT | Performed by: INTERNAL MEDICINE

## 2023-04-10 PROCEDURE — 36415 COLL VENOUS BLD VENIPUNCTURE: CPT | Mod: PO | Performed by: INTERNAL MEDICINE

## 2023-04-10 PROCEDURE — 80069 RENAL FUNCTION PANEL: CPT | Performed by: INTERNAL MEDICINE

## 2023-04-10 PROCEDURE — 80197 ASSAY OF TACROLIMUS: CPT | Performed by: INTERNAL MEDICINE

## 2023-04-10 PROCEDURE — 87799 DETECT AGENT NOS DNA QUANT: CPT | Performed by: INTERNAL MEDICINE

## 2023-04-10 PROCEDURE — 87522 HEPATITIS C REVRS TRNSCRPJ: CPT | Performed by: INTERNAL MEDICINE

## 2023-04-10 SDOH — SOCIAL DETERMINANTS OF HEALTH (SDOH): OCCUPATIONAL EXPOSURE TO OTHER RISK FACTORS: Z57.8

## 2023-04-11 ENCOUNTER — TELEPHONE (OUTPATIENT)
Dept: TRANSPLANT | Facility: CLINIC | Age: 58
End: 2023-04-11
Payer: MEDICARE

## 2023-04-11 LAB
HCV RNA SERPL QL NAA+PROBE: NOT DETECTED
HCV RNA SPEC NAA+PROBE-ACNC: <12 IU/ML
HIV1 RNA # SERPL NAA+PROBE: NOT DETECTED COPIES/ML
HIV1 RNA SERPL QL NAA+PROBE: NOT DETECTED
TACROLIMUS BLD-MCNC: 9.6 NG/ML (ref 5–15)

## 2023-04-11 NOTE — TELEPHONE ENCOUNTER
Results reviewed with patient and will repeat labs on 4/17/2023.  ----- Message from Kalpana Grider MD sent at 4/11/2023  1:22 PM CDT -----  Repeat tacro within a week  No need to recheck uric acid levels

## 2023-04-12 LAB
BKV DNA SERPL NAA+PROBE-ACNC: <125 COPIES/ML
BKV DNA SERPL NAA+PROBE-LOG#: <2.1 LOG (10) COPIES/ML
BKV DNA SERPL QL NAA+PROBE: NOT DETECTED
HBV DNA SERPL NAA+PROBE-ACNC: <10 IU/ML
HBV DNA SERPL NAA+PROBE-LOG IU: <1 LOG (10) IU/ML
HBV DNA SERPL QL NAA+PROBE: NOT DETECTED

## 2023-04-17 ENCOUNTER — LAB VISIT (OUTPATIENT)
Dept: LAB | Facility: HOSPITAL | Age: 58
End: 2023-04-17
Attending: INTERNAL MEDICINE
Payer: MEDICARE

## 2023-04-17 DIAGNOSIS — Z94.0 KIDNEY REPLACED BY TRANSPLANT: ICD-10-CM

## 2023-04-17 LAB
ALBUMIN SERPL BCP-MCNC: 3.4 G/DL (ref 3.5–5.2)
ANION GAP SERPL CALC-SCNC: 8 MMOL/L (ref 8–16)
BASOPHILS # BLD AUTO: 0.02 K/UL (ref 0–0.2)
BASOPHILS NFR BLD: 0.5 % (ref 0–1.9)
BILIRUB UR QL STRIP: NEGATIVE
BUN SERPL-MCNC: 20 MG/DL (ref 6–20)
CALCIUM SERPL-MCNC: 9.7 MG/DL (ref 8.7–10.5)
CHLORIDE SERPL-SCNC: 108 MMOL/L (ref 95–110)
CLARITY UR REFRACT.AUTO: CLEAR
CO2 SERPL-SCNC: 22 MMOL/L (ref 23–29)
COLOR UR AUTO: COLORLESS
CREAT SERPL-MCNC: 1.1 MG/DL (ref 0.5–1.4)
CREAT UR-MCNC: 35 MG/DL (ref 15–325)
DIFFERENTIAL METHOD: ABNORMAL
EOSINOPHIL # BLD AUTO: 0 K/UL (ref 0–0.5)
EOSINOPHIL NFR BLD: 1 % (ref 0–8)
ERYTHROCYTE [DISTWIDTH] IN BLOOD BY AUTOMATED COUNT: 15.1 % (ref 11.5–14.5)
EST. GFR  (NO RACE VARIABLE): 57.5 ML/MIN/1.73 M^2
GLUCOSE SERPL-MCNC: 125 MG/DL (ref 70–110)
GLUCOSE UR QL STRIP: NEGATIVE
HCT VFR BLD AUTO: 35.1 % (ref 37–48.5)
HGB BLD-MCNC: 10.6 G/DL (ref 12–16)
HGB UR QL STRIP: NEGATIVE
IMM GRANULOCYTES # BLD AUTO: 0.04 K/UL (ref 0–0.04)
IMM GRANULOCYTES NFR BLD AUTO: 1 % (ref 0–0.5)
KETONES UR QL STRIP: NEGATIVE
LEUKOCYTE ESTERASE UR QL STRIP: NEGATIVE
LYMPHOCYTES # BLD AUTO: 1.1 K/UL (ref 1–4.8)
LYMPHOCYTES NFR BLD: 26.6 % (ref 18–48)
MAGNESIUM SERPL-MCNC: 1.5 MG/DL (ref 1.6–2.6)
MCH RBC QN AUTO: 28.8 PG (ref 27–31)
MCHC RBC AUTO-ENTMCNC: 30.2 G/DL (ref 32–36)
MCV RBC AUTO: 95 FL (ref 82–98)
MONOCYTES # BLD AUTO: 0.4 K/UL (ref 0.3–1)
MONOCYTES NFR BLD: 9.3 % (ref 4–15)
NEUTROPHILS # BLD AUTO: 2.5 K/UL (ref 1.8–7.7)
NEUTROPHILS NFR BLD: 61.6 % (ref 38–73)
NITRITE UR QL STRIP: NEGATIVE
NRBC BLD-RTO: 0 /100 WBC
PH UR STRIP: 7 [PH] (ref 5–8)
PHOSPHATE SERPL-MCNC: 2.4 MG/DL (ref 2.7–4.5)
PLATELET # BLD AUTO: 123 K/UL (ref 150–450)
PMV BLD AUTO: 12.2 FL (ref 9.2–12.9)
POTASSIUM SERPL-SCNC: 4.8 MMOL/L (ref 3.5–5.1)
PROT UR QL STRIP: NEGATIVE
PROT UR-MCNC: <7 MG/DL (ref 0–15)
PROT/CREAT UR: NORMAL MG/G{CREAT} (ref 0–0.2)
RBC # BLD AUTO: 3.68 M/UL (ref 4–5.4)
SODIUM SERPL-SCNC: 138 MMOL/L (ref 136–145)
SP GR UR STRIP: 1.01 (ref 1–1.03)
URN SPEC COLLECT METH UR: ABNORMAL
WBC # BLD AUTO: 3.99 K/UL (ref 3.9–12.7)

## 2023-04-17 PROCEDURE — 80197 ASSAY OF TACROLIMUS: CPT | Performed by: INTERNAL MEDICINE

## 2023-04-17 PROCEDURE — 81003 URINALYSIS AUTO W/O SCOPE: CPT | Performed by: INTERNAL MEDICINE

## 2023-04-17 PROCEDURE — 83735 ASSAY OF MAGNESIUM: CPT | Performed by: INTERNAL MEDICINE

## 2023-04-17 PROCEDURE — 85025 COMPLETE CBC W/AUTO DIFF WBC: CPT | Performed by: INTERNAL MEDICINE

## 2023-04-17 PROCEDURE — 80069 RENAL FUNCTION PANEL: CPT | Performed by: INTERNAL MEDICINE

## 2023-04-17 PROCEDURE — 84156 ASSAY OF PROTEIN URINE: CPT | Performed by: INTERNAL MEDICINE

## 2023-04-17 PROCEDURE — 36415 COLL VENOUS BLD VENIPUNCTURE: CPT | Mod: PO | Performed by: INTERNAL MEDICINE

## 2023-04-18 ENCOUNTER — TELEPHONE (OUTPATIENT)
Dept: TRANSPLANT | Facility: CLINIC | Age: 58
End: 2023-04-18
Payer: MEDICARE

## 2023-04-18 LAB — TACROLIMUS BLD-MCNC: 7.1 NG/ML (ref 5–15)

## 2023-04-18 NOTE — TELEPHONE ENCOUNTER
Patient repeated and voice a understanding of orders.  12 hour trough reviewed.Patient to repeat Tacro level on Thursday 4/20. Reminded of 4/19 clinic appointment.  ----- Message from Kalpana Grider MD sent at 4/18/2023  9:14 AM CDT -----  Repeat tacro level this week, please.

## 2023-04-19 ENCOUNTER — OFFICE VISIT (OUTPATIENT)
Dept: TRANSPLANT | Facility: CLINIC | Age: 58
End: 2023-04-19
Payer: MEDICARE

## 2023-04-19 VITALS
SYSTOLIC BLOOD PRESSURE: 141 MMHG | OXYGEN SATURATION: 99 % | BODY MASS INDEX: 27.96 KG/M2 | TEMPERATURE: 97 F | RESPIRATION RATE: 16 BRPM | WEIGHT: 178.13 LBS | HEIGHT: 67 IN | HEART RATE: 87 BPM | DIASTOLIC BLOOD PRESSURE: 65 MMHG

## 2023-04-19 DIAGNOSIS — Z79.899 IMMUNOSUPPRESSIVE MANAGEMENT ENCOUNTER FOLLOWING KIDNEY TRANSPLANT: ICD-10-CM

## 2023-04-19 DIAGNOSIS — Z91.89 AT RISK FOR OPPORTUNISTIC INFECTIONS: ICD-10-CM

## 2023-04-19 DIAGNOSIS — N18.31 STAGE 3A CHRONIC KIDNEY DISEASE: Primary | ICD-10-CM

## 2023-04-19 DIAGNOSIS — Z94.0 KIDNEY TRANSPLANT STATUS, LIVING UNRELATED DONOR: Chronic | ICD-10-CM

## 2023-04-19 DIAGNOSIS — I12.9 RENAL HYPERTENSION: ICD-10-CM

## 2023-04-19 DIAGNOSIS — Z29.89 PROPHYLACTIC IMMUNOTHERAPY: ICD-10-CM

## 2023-04-19 DIAGNOSIS — Z94.0 IMMUNOSUPPRESSIVE MANAGEMENT ENCOUNTER FOLLOWING KIDNEY TRANSPLANT: ICD-10-CM

## 2023-04-19 PROCEDURE — 3077F PR MOST RECENT SYSTOLIC BLOOD PRESSURE >= 140 MM HG: ICD-10-PCS | Mod: CPTII,S$GLB,, | Performed by: INTERNAL MEDICINE

## 2023-04-19 PROCEDURE — 99215 OFFICE O/P EST HI 40 MIN: CPT | Mod: S$GLB,,, | Performed by: INTERNAL MEDICINE

## 2023-04-19 PROCEDURE — 3051F HG A1C>EQUAL 7.0%<8.0%: CPT | Mod: CPTII,S$GLB,, | Performed by: INTERNAL MEDICINE

## 2023-04-19 PROCEDURE — 99999 PR PBB SHADOW E&M-EST. PATIENT-LVL V: ICD-10-PCS | Mod: PBBFAC,,, | Performed by: INTERNAL MEDICINE

## 2023-04-19 PROCEDURE — 1159F MED LIST DOCD IN RCRD: CPT | Mod: CPTII,S$GLB,, | Performed by: INTERNAL MEDICINE

## 2023-04-19 PROCEDURE — 3078F DIAST BP <80 MM HG: CPT | Mod: CPTII,S$GLB,, | Performed by: INTERNAL MEDICINE

## 2023-04-19 PROCEDURE — 3077F SYST BP >= 140 MM HG: CPT | Mod: CPTII,S$GLB,, | Performed by: INTERNAL MEDICINE

## 2023-04-19 PROCEDURE — 1160F RVW MEDS BY RX/DR IN RCRD: CPT | Mod: CPTII,S$GLB,, | Performed by: INTERNAL MEDICINE

## 2023-04-19 PROCEDURE — 3008F BODY MASS INDEX DOCD: CPT | Mod: CPTII,S$GLB,, | Performed by: INTERNAL MEDICINE

## 2023-04-19 PROCEDURE — 3078F PR MOST RECENT DIASTOLIC BLOOD PRESSURE < 80 MM HG: ICD-10-PCS | Mod: CPTII,S$GLB,, | Performed by: INTERNAL MEDICINE

## 2023-04-19 PROCEDURE — 1160F PR REVIEW ALL MEDS BY PRESCRIBER/CLIN PHARMACIST DOCUMENTED: ICD-10-PCS | Mod: CPTII,S$GLB,, | Performed by: INTERNAL MEDICINE

## 2023-04-19 PROCEDURE — 99215 PR OFFICE/OUTPT VISIT, EST, LEVL V, 40-54 MIN: ICD-10-PCS | Mod: S$GLB,,, | Performed by: INTERNAL MEDICINE

## 2023-04-19 PROCEDURE — 1159F PR MEDICATION LIST DOCUMENTED IN MEDICAL RECORD: ICD-10-PCS | Mod: CPTII,S$GLB,, | Performed by: INTERNAL MEDICINE

## 2023-04-19 PROCEDURE — 3008F PR BODY MASS INDEX (BMI) DOCUMENTED: ICD-10-PCS | Mod: CPTII,S$GLB,, | Performed by: INTERNAL MEDICINE

## 2023-04-19 PROCEDURE — 3066F NEPHROPATHY DOC TX: CPT | Mod: CPTII,S$GLB,, | Performed by: INTERNAL MEDICINE

## 2023-04-19 PROCEDURE — 99999 PR PBB SHADOW E&M-EST. PATIENT-LVL V: CPT | Mod: PBBFAC,,, | Performed by: INTERNAL MEDICINE

## 2023-04-19 PROCEDURE — 3066F PR DOCUMENTATION OF TREATMENT FOR NEPHROPATHY: ICD-10-PCS | Mod: CPTII,S$GLB,, | Performed by: INTERNAL MEDICINE

## 2023-04-19 PROCEDURE — 3051F PR MOST RECENT HEMOGLOBIN A1C LEVEL 7.0 - < 8.0%: ICD-10-PCS | Mod: CPTII,S$GLB,, | Performed by: INTERNAL MEDICINE

## 2023-04-19 RX ORDER — PREDNISONE 5 MG/1
5 TABLET ORAL DAILY
Qty: 91 TABLET | Refills: 3 | Status: SHIPPED | OUTPATIENT
Start: 2023-04-19 | End: 2024-03-19 | Stop reason: SDUPTHER

## 2023-04-19 NOTE — LETTER
April 19, 2023        Aaron Bonilla  [address]  [facility] [address]  Phone: [phone]  Fax: [phone]             [facility]- Transplant 1st Fl  [address]  [facility] [address]  Phone: [phone]   Patient: Elizabeth Maldonado   MR Number: [id number]   YOB: 1965   Date of Visit: 4/19/2023       Dear Dr. Aaron Bonilla    Thank you for referring Elizabeth Maldonado to me for evaluation. Attached you will find relevant portions of my assessment and plan of care.    If you have questions, please do not hesitate to call me. I look forward to following Elizabeth Maldonado along with you.    Sincerely,    Kalpana Grider MD    Enclosure    If you would like to receive this communication electronically, please contact [email or web address] or [phone] to request [facility] Link access.    [facility] Link is a tool which provides read-only access to select patient information with whom you have a relationship. Its easy to use and provides real time access to review your patients record including encounter summaries, notes, results, and demographic information.    If you feel you have received this communication in error or would no longer like to receive these types of communications, please e-mail [email or web address]        Routine safety standards initiated.  Routine nursing standards initiated.

## 2023-04-19 NOTE — PROGRESS NOTES
"   Kidney Post-Transplant Assessment    Referring Physician: Aaron Bonilla  Current Nephrologist: Aaron Bonilla    ORGAN: LEFT KIDNEY  Donor Type: donation after brain death  PHS Increased Risk: no  Cold Ischemia: 1,731 mins  Induction Medications: thymoglobulin    Subjective:     CC:  Reassessment of renal allograft function and management of chronic immunosuppression.    HPI:  Ms. Maldonado is a 58 y.o. year old Black or  female who received a donation after brain death kidney transplant on 3/10/23.  She has CKD stage 3 - GFR 30-59 and her baseline creatinine is around mid 1s. She takes mycophenolate mofetil, prednisone, and tacrolimus for maintenance immunosuppression. She denies any recent hospitalizations or ER visits since her previous clinic visit.    Pertinent Nephrology/Transplant History:   ESRD from [facility]  failed KT #1 12/2014 -  3/2022. EDW 81kg  DDKT#2  3/10/23. thymo; CIT 28+h    POST TRANSPLANT UPDATE 03/31/2023   Elizabeth is now 21 d post kidney transplant and reports no concerns. She just saw surgery this PM and had staples removed.  She reports good HTN control with reads of 120 sys at home and no sx orthostasis. She reports no incisional pain or urinary sx. She states she is eating well and tolerating meds w/o issues.     UPDATE 4/19/23 today, Elizabeth feels well. Her main concern is that her old healed incisions "hurt," but they are not red or irritated or swollen. Her txp incision has healed well and is not longer itchy. She stopped taking atarax as it made her sleepy. She denies any kidney-related complaints, such as pain over allograft, flank pain, dysuria, frequency, or other LUTS.  HTN: at home, she is getting great reads: 119/69, 83; 110/65, 89; hisghest recent was 137 systolic.    Current Outpatient Medications   Medication Sig    atorvastatin (LIPITOR) 40 MG tablet Take 1 tablet (40 mg total) by mouth every evening.    blood sugar diagnostic Strp Checks BG ac/hs    carvediloL " (COREG) 3.125 MG tablet Take 1 tablet (3.125 mg total) by mouth 2 (two) times daily. Hold for SBP <130    dulaglutide (TRULICITY) 4.5 mg/0.5 mL pen injector Inject 4.5 mg into the skin once a week.    HUMALOG KWIKPEN INSULIN 100 unit/mL pen Inject 5 Units into the skin 3 (three) times daily with meals. + SLIDE SCALE, TDD: 30 Units    hydrALAZINE (APRESOLINE) 50 MG tablet Take 1 tablet (50 mg total) by mouth 3 (three) times daily.    insulin (LANTUS SOLOSTAR U-100 INSULIN) glargine 100 units/mL SubQ pen Inject 18 Units into the skin once daily. (Patient taking differently: Inject 25 Units into the skin once daily.)    k phos di & mono-sod phos mono (K-PHOS-NEUTRAL) 250 mg Tab Take 2 tablets by mouth 2 (two) times a day.    lancets (ONETOUCH DELICA LANCETS) 33 gauge Misc 1 lancet by Misc.(Non-Drug; Combo Route) route 4 (four) times daily before meals and nightly.    multivitamin Tab Take 1 tablet by mouth once daily.    mycophenolate (CELLCEPT) 250 mg Cap Take 4 capsules (1,000 mg total) by mouth 2 (two) times daily.    sulfamethoxazole-trimethoprim 400-80mg (BACTRIM,SEPTRA) 400-80 mg per tablet Take 1 tablet by mouth every Mon, Wed, Fri. Stop 9/8/2023    tacrolimus (PROGRAF) 1 MG Cap Take 4 capsules (4 mg total) by mouth every morning AND 3 capsules (3 mg total) every evening. Txp Date:3/10/2023 (Kidney) Disch Date:3/13/2023 ICD10:Z94.0 Txp Location:[facility].    timolol maleate 0.5% (TIMOPTIC) 0.5 % Drop Place 1 drop into both eyes 2 (two) times daily.    valGANciclovir (VALCYTE) 450 mg Tab Take 1 tablet (450 mg total) by mouth every Mon, Wed, Fri. Stop 6/10/2023    predniSONE (DELTASONE) 5 MG tablet Take 1 tablet (5 mg total) by mouth once daily.    traMADoL (ULTRAM) 50 mg tablet Take 1 tablet (50 mg total) by mouth every 6 (six) hours as needed for Pain. (Patient not taking: Reported on 4/19/2023)     No current facility-administered medications for this visit.         Review of Systems   Constitutional:  Negative for  "fever.   HENT:  Negative for mouth sores.    Eyes:  Negative for visual disturbance.   Respiratory:  Negative for shortness of breath.    Cardiovascular:  Negative for chest pain and leg swelling.   Gastrointestinal: Negative.    Genitourinary:  Negative for decreased urine volume, difficulty urinating, dysuria and hematuria.   Musculoskeletal: Negative.    Skin:  Negative for rash.   Allergic/Immunologic: Positive for immunocompromised state.   Neurological: Negative.    Psychiatric/Behavioral: Negative.     Incisions look great (old and new). No evidence of inflammation.    Objective:     Blood pressure (!) 141/65, pulse 87, temperature 97.3 °F (36.3 °C), temperature source Tympanic, resp. rate 16, height 5' 7" (1.702 m), weight 80.8 kg (178 lb 2.1 oz), last menstrual period 04/29/2011, SpO2 99 %.body mass index is 27.9 kg/m².    Physical Exam  Constitutional:       General: She is not in acute distress.  Cardiovascular:      Rate and Rhythm: Normal rate and regular rhythm.   Pulmonary:      Effort: Pulmonary effort is normal. No respiratory distress.      Breath sounds: Normal breath sounds.   Abdominal:      General: Bowel sounds are normal.      Palpations: Abdomen is soft. There is no mass.      Tenderness: There is no abdominal tenderness.   Musculoskeletal:      Right lower leg: No edema.      Left lower leg: No edema.     Labs:  Lab Results   Component Value Date    WBC 3.99 04/17/2023    HGB 10.6 (L) 04/17/2023    HCT 35.1 (L) 04/17/2023     04/17/2023    K 4.8 04/17/2023     04/17/2023    CO2 22 (L) 04/17/2023    BUN 20 04/17/2023    CREATININE 1.1 04/17/2023    EGFRNORACEVR 57.5 (A) 04/17/2023    CALCIUM 9.7 04/17/2023    PHOS 2.4 (L) 04/17/2023    MG 1.5 (L) 04/17/2023    ALBUMIN 3.4 (L) 04/17/2023    AST 19 04/10/2023    ALT 18 04/10/2023    UTPCR Unable to calculate 04/17/2023    PTH 85.0 (H) 03/10/2023    TACROLIMUS 7.1 04/17/2023     Labs were reviewed with the patient.    Assessment: "     1. Stage 3a chronic kidney disease    2. Kidney transplant status, living unrelated donor - 12/2/14    3. Prophylactic immunotherapy    4. Immunosuppressive management encounter following kidney transplant    5. At risk for opportunistic infections    6. Renal hypertension        Plan:   New nursing orders:  - routine f/u  - Allosure with next lab    DD kidney transplant #2 DDKT#2  3/10/23. thymo; CIT 28+h [pumped]; KDPI 27%; cPRA 99%; XM neg  -Recovering nicely; wound healing well    Immunosuppression Management  - Tacro, MMF, pred  - Tacro goal 7-9  - Continue monitoring for toxicities and SE, none presently noted    At risk for opportunistic infections  -Anti-infective prophylaxis against opportunistic infections  - Valcyte for CMV prophy until 6/11/23 - dose increased based on improved GFR  - PJP prophy until 9/8/23: bactrim - dose increased based on improved GFR  -Screening for BK infection to prevent allograft dysfunction  - BK ND 4/10  - Continue blood PCR screening as per guidelines to prevent BK viremia and nephropathy leading to allograft dysfunction and potential graft failure    Hypertension, renal  -Great readings at home; congratulated on good control and asked to continue to monitor    Metabolic bone disease [Secondary hyperparathyroidism (SPTH), Phosphorus metabolism disorders]  - hypophosphatemia- cont  BID. Will tolerate mild asymptomatic low level d/t pill burden, DDI, and adverse SE      Electrolyte disorders  - Acidosis- watch, may need HCO3      Follow-up:   Clinic: return to transplant clinic weekly for the first month after transplant; every 2 weeks during months 2-3; then at 6-, 9-, 12-, 18-, 24-, and 36- months post-transplant to reassess for complications from immunosuppression toxicity and monitor for rejection.  Annually thereafter.    Labs: since patient remains at high risk for rejection and drug-related complications that warrant close monitoring, labs will be ordered as  follows: continue twice weekly CBC, renal panel, and drug level for first month; then same labs once weekly through 3rd month post-transplant.  Urine for UA and protein/creatinine ratio monthly.  Serum BK - PCR at 1-, 3-, 6-, 9-, 12-, 18-, 24-, 36- 48-, and 60 months post-transplant.  Hepatic panel at 1-, 2-, 3-, 6-, 9-, 12-, 18-, 24-, and 36- months post-transplant.    Kalpana Grider MD       Education:   Material provided to the patient.  Patient reminded to call with any health changes since these can be early signs of significant complications.  Also, I advised the patient to be sure any new medications or changes of old medications are discussed with either a pharmacist or physician knowledgeable with transplant to avoid rejection/drug toxicity related to significant drug interactions.    [facility] Patient Status  Functional Status: 80% - Normal activity with effort: some symptoms of disease  Physical Capacity: No Limitations

## 2023-04-19 NOTE — PATIENT INSTRUCTIONS
You can walk and slowly increase activity over the next two months. Avoid lifting over 10 pounds.  You can use lidocaine gel, cream or patches over healed incisions.  Let us know if incision keeps draining.

## 2023-04-20 ENCOUNTER — LAB VISIT (OUTPATIENT)
Dept: LAB | Facility: HOSPITAL | Age: 58
End: 2023-04-20
Attending: INTERNAL MEDICINE
Payer: MEDICARE

## 2023-04-20 DIAGNOSIS — Z94.0 KIDNEY REPLACED BY TRANSPLANT: ICD-10-CM

## 2023-04-20 DIAGNOSIS — Z94.0 KIDNEY REPLACED BY TRANSPLANT: Primary | ICD-10-CM

## 2023-04-20 PROCEDURE — 80197 ASSAY OF TACROLIMUS: CPT | Performed by: INTERNAL MEDICINE

## 2023-04-20 PROCEDURE — 36415 COLL VENOUS BLD VENIPUNCTURE: CPT | Mod: PO | Performed by: INTERNAL MEDICINE

## 2023-04-21 LAB — TACROLIMUS BLD-MCNC: 8.5 NG/ML (ref 5–15)

## 2023-04-24 ENCOUNTER — LAB VISIT (OUTPATIENT)
Dept: LAB | Facility: HOSPITAL | Age: 58
End: 2023-04-24
Attending: INTERNAL MEDICINE
Payer: MEDICARE

## 2023-04-24 DIAGNOSIS — Z94.0 KIDNEY REPLACED BY TRANSPLANT: Primary | ICD-10-CM

## 2023-04-24 DIAGNOSIS — Z94.0 KIDNEY REPLACED BY TRANSPLANT: ICD-10-CM

## 2023-04-24 LAB
ALBUMIN SERPL BCP-MCNC: 3.7 G/DL (ref 3.5–5.2)
ANION GAP SERPL CALC-SCNC: 8 MMOL/L (ref 8–16)
BASOPHILS # BLD AUTO: 0.02 K/UL (ref 0–0.2)
BASOPHILS NFR BLD: 0.5 % (ref 0–1.9)
BUN SERPL-MCNC: 24 MG/DL (ref 6–20)
CALCIUM SERPL-MCNC: 9.8 MG/DL (ref 8.7–10.5)
CHLORIDE SERPL-SCNC: 107 MMOL/L (ref 95–110)
CO2 SERPL-SCNC: 23 MMOL/L (ref 23–29)
CREAT SERPL-MCNC: 1.4 MG/DL (ref 0.5–1.4)
DIFFERENTIAL METHOD: ABNORMAL
EOSINOPHIL # BLD AUTO: 0.1 K/UL (ref 0–0.5)
EOSINOPHIL NFR BLD: 1.5 % (ref 0–8)
ERYTHROCYTE [DISTWIDTH] IN BLOOD BY AUTOMATED COUNT: 14.8 % (ref 11.5–14.5)
EST. GFR  (NO RACE VARIABLE): 43.1 ML/MIN/1.73 M^2
GLUCOSE SERPL-MCNC: 178 MG/DL (ref 70–110)
HCT VFR BLD AUTO: 36.7 % (ref 37–48.5)
HGB BLD-MCNC: 11.1 G/DL (ref 12–16)
IMM GRANULOCYTES # BLD AUTO: 0.01 K/UL (ref 0–0.04)
IMM GRANULOCYTES NFR BLD AUTO: 0.3 % (ref 0–0.5)
LYMPHOCYTES # BLD AUTO: 1 K/UL (ref 1–4.8)
LYMPHOCYTES NFR BLD: 26.1 % (ref 18–48)
MAGNESIUM SERPL-MCNC: 1.7 MG/DL (ref 1.6–2.6)
MCH RBC QN AUTO: 28.5 PG (ref 27–31)
MCHC RBC AUTO-ENTMCNC: 30.2 G/DL (ref 32–36)
MCV RBC AUTO: 94 FL (ref 82–98)
MONOCYTES # BLD AUTO: 0.4 K/UL (ref 0.3–1)
MONOCYTES NFR BLD: 10.7 % (ref 4–15)
NEUTROPHILS # BLD AUTO: 2.4 K/UL (ref 1.8–7.7)
NEUTROPHILS NFR BLD: 60.9 % (ref 38–73)
NRBC BLD-RTO: 0 /100 WBC
PHOSPHATE SERPL-MCNC: 2.3 MG/DL (ref 2.7–4.5)
PLATELET # BLD AUTO: 127 K/UL (ref 150–450)
PMV BLD AUTO: 11.6 FL (ref 9.2–12.9)
POTASSIUM SERPL-SCNC: 4.4 MMOL/L (ref 3.5–5.1)
RBC # BLD AUTO: 3.9 M/UL (ref 4–5.4)
SODIUM SERPL-SCNC: 138 MMOL/L (ref 136–145)
WBC # BLD AUTO: 3.91 K/UL (ref 3.9–12.7)

## 2023-04-24 PROCEDURE — 85025 COMPLETE CBC W/AUTO DIFF WBC: CPT | Performed by: INTERNAL MEDICINE

## 2023-04-24 PROCEDURE — 36415 COLL VENOUS BLD VENIPUNCTURE: CPT | Mod: PO | Performed by: INTERNAL MEDICINE

## 2023-04-24 PROCEDURE — 80069 RENAL FUNCTION PANEL: CPT | Performed by: INTERNAL MEDICINE

## 2023-04-24 PROCEDURE — 83735 ASSAY OF MAGNESIUM: CPT | Performed by: INTERNAL MEDICINE

## 2023-04-24 PROCEDURE — 80197 ASSAY OF TACROLIMUS: CPT | Performed by: INTERNAL MEDICINE

## 2023-04-25 ENCOUNTER — TELEPHONE (OUTPATIENT)
Dept: TRANSPLANT | Facility: CLINIC | Age: 58
End: 2023-04-25
Payer: MEDICARE

## 2023-04-25 ENCOUNTER — LAB VISIT (OUTPATIENT)
Dept: LAB | Facility: HOSPITAL | Age: 58
End: 2023-04-25
Attending: INTERNAL MEDICINE
Payer: MEDICARE

## 2023-04-25 DIAGNOSIS — Z94.0 KIDNEY REPLACED BY TRANSPLANT: ICD-10-CM

## 2023-04-25 LAB — TACROLIMUS BLD-MCNC: 9.3 NG/ML (ref 5–15)

## 2023-04-25 PROCEDURE — 36415 COLL VENOUS BLD VENIPUNCTURE: CPT | Performed by: INTERNAL MEDICINE

## 2023-04-25 NOTE — TELEPHONE ENCOUNTER
Patient repeated back and voice a understanding of orders.  States she will schedule a appointment with her Endocrinologist RUDDY Leonard at [facility].   ----- Message from Kalpana Grider MD sent at 4/25/2023  1:20 PM CDT -----  Spilling glu in urine. Remind her to monitor sugars for best glu control

## 2023-04-28 DIAGNOSIS — Z94.0 KIDNEY TRANSPLANT STATUS, LIVING UNRELATED DONOR: Chronic | ICD-10-CM

## 2023-04-28 LAB
ALLOSURE COMMENT: NORMAL
ALLOSURE SCORE KIDNEY: 0.76 %
INFORMATION: NORMAL

## 2023-04-28 RX ORDER — VALGANCICLOVIR 450 MG/1
450 TABLET, FILM COATED ORAL DAILY
Qty: 30 TABLET | Refills: 2 | Status: SHIPPED | OUTPATIENT
Start: 2023-04-28 | End: 2023-05-05

## 2023-04-28 RX ORDER — SULFAMETHOXAZOLE AND TRIMETHOPRIM 400; 80 MG/1; MG/1
1 TABLET ORAL DAILY
Qty: 30 TABLET | Refills: 5 | Status: SHIPPED | OUTPATIENT
Start: 2023-04-28 | End: 2023-09-18 | Stop reason: ALTCHOICE

## 2023-05-02 ENCOUNTER — LAB VISIT (OUTPATIENT)
Dept: LAB | Facility: HOSPITAL | Age: 58
End: 2023-05-02
Attending: INTERNAL MEDICINE
Payer: MEDICARE

## 2023-05-02 DIAGNOSIS — Z94.0 KIDNEY REPLACED BY TRANSPLANT: ICD-10-CM

## 2023-05-02 LAB
ALBUMIN SERPL BCP-MCNC: 3.6 G/DL (ref 3.5–5.2)
ANION GAP SERPL CALC-SCNC: 7 MMOL/L (ref 8–16)
BASOPHILS # BLD AUTO: 0.02 K/UL (ref 0–0.2)
BASOPHILS NFR BLD: 0.6 % (ref 0–1.9)
BUN SERPL-MCNC: 25 MG/DL (ref 6–20)
CALCIUM SERPL-MCNC: 10.2 MG/DL (ref 8.7–10.5)
CHLORIDE SERPL-SCNC: 110 MMOL/L (ref 95–110)
CO2 SERPL-SCNC: 21 MMOL/L (ref 23–29)
CREAT SERPL-MCNC: 1.5 MG/DL (ref 0.5–1.4)
DIFFERENTIAL METHOD: ABNORMAL
EOSINOPHIL # BLD AUTO: 0 K/UL (ref 0–0.5)
EOSINOPHIL NFR BLD: 1.1 % (ref 0–8)
ERYTHROCYTE [DISTWIDTH] IN BLOOD BY AUTOMATED COUNT: 14.7 % (ref 11.5–14.5)
EST. GFR  (NO RACE VARIABLE): 39.6 ML/MIN/1.73 M^2
GLUCOSE SERPL-MCNC: 99 MG/DL (ref 70–110)
HCT VFR BLD AUTO: 33.1 % (ref 37–48.5)
HGB BLD-MCNC: 10.5 G/DL (ref 12–16)
IMM GRANULOCYTES # BLD AUTO: 0.02 K/UL (ref 0–0.04)
IMM GRANULOCYTES NFR BLD AUTO: 0.6 % (ref 0–0.5)
LYMPHOCYTES # BLD AUTO: 1.1 K/UL (ref 1–4.8)
LYMPHOCYTES NFR BLD: 29.7 % (ref 18–48)
MAGNESIUM SERPL-MCNC: 1.8 MG/DL (ref 1.6–2.6)
MCH RBC QN AUTO: 29.1 PG (ref 27–31)
MCHC RBC AUTO-ENTMCNC: 31.7 G/DL (ref 32–36)
MCV RBC AUTO: 92 FL (ref 82–98)
MONOCYTES # BLD AUTO: 0.1 K/UL (ref 0.3–1)
MONOCYTES NFR BLD: 2.3 % (ref 4–15)
NEUTROPHILS # BLD AUTO: 2.3 K/UL (ref 1.8–7.7)
NEUTROPHILS NFR BLD: 65.7 % (ref 38–73)
NRBC BLD-RTO: 0 /100 WBC
PHOSPHATE SERPL-MCNC: 3 MG/DL (ref 2.7–4.5)
PLATELET # BLD AUTO: 116 K/UL (ref 150–450)
PMV BLD AUTO: 11.6 FL (ref 9.2–12.9)
POTASSIUM SERPL-SCNC: 4.6 MMOL/L (ref 3.5–5.1)
RBC # BLD AUTO: 3.61 M/UL (ref 4–5.4)
SODIUM SERPL-SCNC: 138 MMOL/L (ref 136–145)
WBC # BLD AUTO: 3.53 K/UL (ref 3.9–12.7)

## 2023-05-02 PROCEDURE — 80197 ASSAY OF TACROLIMUS: CPT | Performed by: INTERNAL MEDICINE

## 2023-05-02 PROCEDURE — 80069 RENAL FUNCTION PANEL: CPT | Performed by: INTERNAL MEDICINE

## 2023-05-02 PROCEDURE — 85025 COMPLETE CBC W/AUTO DIFF WBC: CPT | Performed by: INTERNAL MEDICINE

## 2023-05-02 PROCEDURE — 83735 ASSAY OF MAGNESIUM: CPT | Performed by: INTERNAL MEDICINE

## 2023-05-02 PROCEDURE — 36415 COLL VENOUS BLD VENIPUNCTURE: CPT | Mod: PO | Performed by: INTERNAL MEDICINE

## 2023-05-03 DIAGNOSIS — Z94.0 KIDNEY TRANSPLANT STATUS, LIVING UNRELATED DONOR: Chronic | ICD-10-CM

## 2023-05-03 DIAGNOSIS — Z94.0 KIDNEY REPLACED BY TRANSPLANT: Primary | ICD-10-CM

## 2023-05-03 LAB — TACROLIMUS BLD-MCNC: 9.4 NG/ML (ref 5–15)

## 2023-05-03 RX ORDER — MYCOPHENOLATE MOFETIL 250 MG/1
750 CAPSULE ORAL 2 TIMES DAILY
Qty: 180 CAPSULE | Refills: 11 | Status: SHIPPED | OUTPATIENT
Start: 2023-05-03 | End: 2023-05-22 | Stop reason: DRUGHIGH

## 2023-05-03 NOTE — PROGRESS NOTES
Proceed with serum CMV pcr, lower MMF to 750 bid'  Encourage hydration to 3 lt daily  Get stool sample for gastrointestinal PCR  Order a kidney US, allosure and DSA's this week , discuss with Dr Kaplan potential need for a kidney biopsy next week.Shantelle is back tomorrow

## 2023-05-03 NOTE — TELEPHONE ENCOUNTER
Patient repeated back and voice a understanding of orders.  Requesting to have labs and U/S schedule for tomorrow 5/4.   ----- Message from Cristi Benitez MD sent at 5/3/2023 10:25 AM CDT -----  Proceed with serum CMV pcr, lower MMF to 750 bid'  Encourage hydration to 3 lt daily  Get stool sample for gastrointestinal PCR  Order a kidney US, allosure and DSA's this week , discuss with Dr Leroyeld potential need for a kidney biopsy next week.Shantelle is back tomorrow

## 2023-05-04 ENCOUNTER — HOSPITAL ENCOUNTER (OUTPATIENT)
Dept: RADIOLOGY | Facility: HOSPITAL | Age: 58
Discharge: HOME | End: 2023-05-04
Attending: INTERNAL MEDICINE
Payer: MEDICARE

## 2023-05-04 DIAGNOSIS — Z94.0 KIDNEY REPLACED BY TRANSPLANT: ICD-10-CM

## 2023-05-04 PROBLEM — N18.32 ANEMIA IN STAGE 3B CHRONIC KIDNEY DISEASE: Status: ACTIVE | Noted: 2023-03-20

## 2023-05-04 PROBLEM — D72.819 LEUKOPENIA: Chronic | Status: ACTIVE | Noted: 2023-05-04

## 2023-05-04 PROCEDURE — 76776 US EXAM K TRANSPL W/DOPPLER: CPT | Mod: TC

## 2023-05-04 PROCEDURE — 76776 US TRANSPLANT KIDNEY WITH DOPPLER: ICD-10-PCS | Mod: 26,,, | Performed by: RADIOLOGY

## 2023-05-04 PROCEDURE — 76776 US EXAM K TRANSPL W/DOPPLER: CPT | Mod: 26,,, | Performed by: RADIOLOGY

## 2023-05-04 NOTE — PROGRESS NOTES
Kidney Post-Transplant Assessment    Referring Physician: Aaron Bonilla  Current Nephrologist: Aaron Bonilla    ORGAN: LEFT KIDNEY  Donor Type: donation after brain death  PHS Increased Risk: no  Cold Ischemia: 1,731 mins  Induction Medications: thymoglobulin    Subjective:     CC:  Reassessment of renal allograft function and management of chronic immunosuppression.    HPI:  Ms. Maldonado is a 58 y.o. year old Black or  female with PMG ESRD 2/2 DM2 and HTN, and failed kidney txp, who received a donation after brain death kidney transplant on 3/10/23. Her most recent creatinine is 2.0 and continues to trend down.  She takes mycophenolate mofetil, prednisone, and tacrolimus for maintenance immunosuppression. Her post transplant course has been uncomplicated to date.     Pertinent Nephrology/Transplant History:   ESRD from DM  failed KT #1 12/2014 -  3/2022. EDW 81kg  DDKT#2  3/10/23. thymo; CIT 28+h      /2023    Interval HX:  5/4/23 Allograft Kidney US  1. Mildly elevated arterial resistive indices, improved compared to prior.  2. New perinephric simple fluid collection.  Findings may reflect urinoma, lymphocele etcetera.  measuring 12.5 x 2.9 x 3.8 cm.    Pt reports diarrhea has resolved  and thought to be directly related to eating peanut butter and apples  She did not collect a stool sample     Intake- 4 L   UOP-no problems reported   BP   BP Readings from Last 3 Encounters:   05/05/23 (!) 118/56   04/19/23 (!) 141/65   03/31/23 (!) 121/50      Peripheral edema--no   Weight-- stable         Lab /diagnostic results reviewed with patient today.   All questions answered    Review of Systems   Constitutional:  Negative for activity change, appetite change, chills, fatigue, fever and unexpected weight change.   HENT:  Negative for congestion, facial swelling, postnasal drip, rhinorrhea, sinus pressure, sore throat and trouble swallowing.    Eyes:  Positive for visual disturbance.  "Negative for pain and redness.        Wears glasses   Respiratory:  Negative for cough, chest tightness, shortness of breath and wheezing.    Cardiovascular: Negative.  Negative for chest pain, palpitations and leg swelling.   Gastrointestinal:  Positive for abdominal distention. Negative for abdominal pain, diarrhea, nausea and vomiting.            Genitourinary:  Negative for dysuria, flank pain and urgency.   Musculoskeletal:  Positive for back pain (HX sciatica). Negative for gait problem, neck pain and neck stiffness.   Skin:  Negative for rash and wound.   Allergic/Immunologic: Negative for environmental allergies, food allergies and immunocompromised state.   Neurological:  Negative for dizziness, weakness, light-headedness and headaches.   Psychiatric/Behavioral:  Negative for agitation and confusion. The patient is not nervous/anxious.      Objective:   Blood pressure (!) 118/56, pulse 97, temperature 97.7 °F (36.5 °C), temperature source Skin, resp. rate 20, height 5' 7" (1.702 m), weight 80 kg (176 lb 5.9 oz), last menstrual period 04/29/2011, SpO2 99 %.body mass index is 27.62 kg/m².    Physical Exam  Vitals reviewed.   Constitutional:       Appearance: Normal appearance. She is well-developed.   HENT:      Head: Normocephalic.   Eyes:      Pupils: Pupils are equal, round, and reactive to light.   Cardiovascular:      Rate and Rhythm: Normal rate and regular rhythm.      Heart sounds: Normal heart sounds.   Pulmonary:      Effort: Pulmonary effort is normal.      Breath sounds: Normal breath sounds.   Abdominal:      General: Bowel sounds are normal.      Palpations: Abdomen is soft.      Comments:   No bruit noted over the allograft     Musculoskeletal:         General: Normal range of motion.        Arms:       Cervical back: Normal range of motion and neck supple.   Skin:     General: Skin is warm and dry.   Neurological:      Mental Status: She is alert and oriented to person, place, and time.      " Motor: No abnormal muscle tone.   Psychiatric:         Behavior: Behavior normal.       Labs:  Lab Results   Component Value Date    WBC 3.53 (L) 05/02/2023    HGB 10.5 (L) 05/02/2023    HCT 33.1 (L) 05/02/2023     (L) 05/04/2023    K 4.6 05/04/2023     05/04/2023    CO2 22 (L) 05/04/2023    BUN 28 (H) 05/04/2023    CREATININE 1.3 05/04/2023    EGFRNORACEVR 47.1 (A) 05/04/2023    CALCIUM 9.9 05/04/2023    PHOS 2.4 (L) 05/04/2023    MG 1.8 05/02/2023    ALBUMIN 3.8 05/04/2023    AST 19 04/10/2023    ALT 18 04/10/2023    UTPCR Unable to calculate 05/02/2023    PTH 85.0 (H) 03/10/2023    TACROLIMUS 9.4 05/02/2023       No results found for: EXTANC, EXTWBC, EXTSEGS, EXTPLATELETS, EXTHEMOGLOBI, EXTHEMATOCRI, EXTCREATININ, EXTSODIUM, EXTPOTASSIUM, EXTBUN, EXTCO2, EXTCALCIUM, EXTPHOSPHORU, EXTGLUCOSE, EXTALBUMIN, EXTAST, EXTALT, EXTBILITOTAL, EXTLIPASE, EXTAMYLASE    No results found for: EXTCYCLOSLVL, EXTSIROLIMUS, EXTTACROLVL, EXTPROTCRE, EXTPTHINTACT, EXTPROTEINUA, EXTWBCUA, EXTRBCUA    Labs were reviewed with the patient    Assessment:     1. S/P DBD kidney transplant 3/10/2023    2. Long-term use of immunosuppressant medication    3. Renovascular hypertension    4. MGUS (monoclonal gammopathy of unknown significance)    5. Diabetes mellitus due to underlying condition with diabetic nephropathy, with long-term current use of insulin    6. Leukopenia, unspecified type    7. Stage 3a chronic kidney disease          Plan:       Dr Russell reviewed kidney US:  Recs: thought to be a lymphocele, scr improved to scr 1.3, no intervention needed at this time, no need to re image as long as she remains asymptomatic       Leukopenia: WBC trending down:   serum CMV pcr, lower MMF to 750 bid  Get stool sample for gastrointestinal PCR-- not collected, diarrhea resolved    Increased hydration to ~ 3L + daily       Scr elevated from baselines ~ scr 1.1-1.2  kidney US, allosure and DSA's this week ,  to evaluate for the  probability of rejection after transplant--results pending   discussed the   potential need for a kidney biopsy if no improvement in scr           1) s/p living related kidney transplant, CKD 3a              - Graft function. Improved slightly , Scr 1.3, CKD 3a,  elevated from baselines ~ scr 1.1-1.2   -FK Trough 9.4, which is therapeutic  (target 8-10)   - cont on FK  4/3   - cont on Cellcept 750 mg BID along with prednisone 5 mg daily   -  Bactrim 400/80 mg QD for PCP prophylaxis   -Valcyte 450 mg QD for CMV prophylaxis  -Will continue to monitor for drug toxicities         Lab Results   Component Value Date    CREATININE 1.3 05/04/2023 5/4/2023  POD 55   eGFR >60 mL/min/1.73 m^2 47.1 (A)         2) Uncontrolled HTN: advise low salt diet and home BP monitoring  - Cont  hydralazine. Coreg as prescribed     3) Anemia:              -   will monitor as per our guidelines   H/H  stable , ANC 2300  Leukopenia: WBC trending down:   serum CMV pcr, lower MMF to 750 bid  Get stool sample for gastrointestinal PCR  Lab Results   Component Value Date    WBC 3.53 (L) 05/02/2023    HGB 10.5 (L) 05/02/2023    HCT 33.1 (L) 05/02/2023    MCV 92 05/02/2023     (L) 05/02/2023     4) type II DM:   -  cont MED REGIME as prescribed . Mgmt deferred to endocrinology    Lab Results   Component Value Date    HGBA1C 7.2 (H) 03/13/2023         5) Hypophosphatemia:Secondary hyperparathyroidism:                cont KPN as prescribed, Phos level stable- no intervention required.will monitor as per our guidelines  Lab Results   Component Value Date    PTH 85.0 (H) 03/10/2023    CALCIUM 9.9 05/04/2023    PHOS 2.4 (L) 05/04/2023         6) Metabolic acidosis/Electrolyte balance:stable      - no intervention required. will monitor as per our guidelines  Lab Results   Component Value Date     (L) 05/04/2023    K 4.6 05/04/2023     05/04/2023    CO2 22 (L) 05/04/2023         7) hypomagnesemia:stable    - no intervention  required . will monitor as per our guidelines       5/2/2023  POD 53   Magnesium 1.6 - 2.6 mg/dL 1.8         8) Cytopenias: no significant cytopenias will monitor as per our guidelines. Medicine list reviewed including potential causes of drug-induced cytopenias    9) Proteinuria: continue p/c ratio as per guidelines          5/2/2023  POD 53   Prot/Creat Ratio, Urine 0.00 - 0.20 Unable to calculate       10) BK virus infection screening:  will continue to monitor/ guidelines     11) Weight education: provided during the clinic visit   Body mass index is 27.62 kg/m².        Follow-up:   Clinic: return to transplant clinic weekly for the first month after transplant; every 2 weeks during months 2-3; then at 6-, 9-, 12-, 18-, 24-, and 36- months post-transplant to reassess for complications from immunosuppression toxicity and monitor for rejection.  Annually thereafter.    Labs: since patient remains at high risk for rejection and drug-related complications that warrant close monitoring, labs will be ordered as follows: continue twice weekly CBC, renal panel, and drug level for first month; then same labs once weekly through 3rd month post-transplant.  Urine for UA and protein/creatinine ratio monthly.  Serum BK - PCR at 1-, 3-, 6-, 9-, 12-, 18-, 24-, 36-, 48-, and 60 months post-transplant.  Hepatic panel at 1-, 2-, 3-, 6-, 9-, 12-, 18-, 24-, and 36- months post-transplant.    Daya Leon NP       Education:   Material provided to the patient.  Patient reminded to call with any health changes since these can be early signs of significant complications.  Also, I advised the patient to be sure any new medications or changes of old medications are discussed with either a pharmacist or physician knowledgeable with transplant to avoid rejection/drug toxicity related to significant drug interactions.    Patient advised that it is recommended that all transplanted patients, and their close contacts and household members  receive Covid vaccination.

## 2023-05-05 ENCOUNTER — OFFICE VISIT (OUTPATIENT)
Dept: TRANSPLANT | Facility: CLINIC | Age: 58
End: 2023-05-05
Payer: MEDICARE

## 2023-05-05 VITALS
WEIGHT: 176.38 LBS | DIASTOLIC BLOOD PRESSURE: 56 MMHG | OXYGEN SATURATION: 99 % | SYSTOLIC BLOOD PRESSURE: 118 MMHG | BODY MASS INDEX: 27.68 KG/M2 | HEIGHT: 67 IN | HEART RATE: 97 BPM | TEMPERATURE: 98 F | RESPIRATION RATE: 20 BRPM

## 2023-05-05 DIAGNOSIS — Z94.0 S/P KIDNEY TRANSPLANT: Primary | ICD-10-CM

## 2023-05-05 DIAGNOSIS — D47.2 MGUS (MONOCLONAL GAMMOPATHY OF UNKNOWN SIGNIFICANCE): Chronic | ICD-10-CM

## 2023-05-05 DIAGNOSIS — Z94.0 KIDNEY TRANSPLANT STATUS, LIVING UNRELATED DONOR: Chronic | ICD-10-CM

## 2023-05-05 DIAGNOSIS — D72.819 LEUKOPENIA, UNSPECIFIED TYPE: Chronic | ICD-10-CM

## 2023-05-05 DIAGNOSIS — I15.0 RENOVASCULAR HYPERTENSION: ICD-10-CM

## 2023-05-05 DIAGNOSIS — N18.31 STAGE 3A CHRONIC KIDNEY DISEASE: ICD-10-CM

## 2023-05-05 DIAGNOSIS — Z79.4 DIABETES MELLITUS DUE TO UNDERLYING CONDITION WITH DIABETIC NEPHROPATHY, WITH LONG-TERM CURRENT USE OF INSULIN: ICD-10-CM

## 2023-05-05 DIAGNOSIS — Z79.60 LONG-TERM USE OF IMMUNOSUPPRESSANT MEDICATION: ICD-10-CM

## 2023-05-05 DIAGNOSIS — E08.21 DIABETES MELLITUS DUE TO UNDERLYING CONDITION WITH DIABETIC NEPHROPATHY, WITH LONG-TERM CURRENT USE OF INSULIN: ICD-10-CM

## 2023-05-05 PROCEDURE — 3008F BODY MASS INDEX DOCD: CPT | Mod: CPTII,S$GLB,, | Performed by: NURSE PRACTITIONER

## 2023-05-05 PROCEDURE — 99999 PR PBB SHADOW E&M-EST. PATIENT-LVL V: CPT | Mod: PBBFAC,,, | Performed by: NURSE PRACTITIONER

## 2023-05-05 PROCEDURE — 3066F PR DOCUMENTATION OF TREATMENT FOR NEPHROPATHY: ICD-10-PCS | Mod: CPTII,S$GLB,, | Performed by: NURSE PRACTITIONER

## 2023-05-05 PROCEDURE — 1159F MED LIST DOCD IN RCRD: CPT | Mod: CPTII,S$GLB,, | Performed by: NURSE PRACTITIONER

## 2023-05-05 PROCEDURE — 3051F PR MOST RECENT HEMOGLOBIN A1C LEVEL 7.0 - < 8.0%: ICD-10-PCS | Mod: CPTII,S$GLB,, | Performed by: NURSE PRACTITIONER

## 2023-05-05 PROCEDURE — 99215 OFFICE O/P EST HI 40 MIN: CPT | Mod: S$GLB,,, | Performed by: NURSE PRACTITIONER

## 2023-05-05 PROCEDURE — 3051F HG A1C>EQUAL 7.0%<8.0%: CPT | Mod: CPTII,S$GLB,, | Performed by: NURSE PRACTITIONER

## 2023-05-05 PROCEDURE — 1159F PR MEDICATION LIST DOCUMENTED IN MEDICAL RECORD: ICD-10-PCS | Mod: CPTII,S$GLB,, | Performed by: NURSE PRACTITIONER

## 2023-05-05 PROCEDURE — 3066F NEPHROPATHY DOC TX: CPT | Mod: CPTII,S$GLB,, | Performed by: NURSE PRACTITIONER

## 2023-05-05 PROCEDURE — 3078F DIAST BP <80 MM HG: CPT | Mod: CPTII,S$GLB,, | Performed by: NURSE PRACTITIONER

## 2023-05-05 PROCEDURE — 3074F SYST BP LT 130 MM HG: CPT | Mod: CPTII,S$GLB,, | Performed by: NURSE PRACTITIONER

## 2023-05-05 PROCEDURE — 3078F PR MOST RECENT DIASTOLIC BLOOD PRESSURE < 80 MM HG: ICD-10-PCS | Mod: CPTII,S$GLB,, | Performed by: NURSE PRACTITIONER

## 2023-05-05 PROCEDURE — 99215 PR OFFICE/OUTPT VISIT, EST, LEVL V, 40-54 MIN: ICD-10-PCS | Mod: S$GLB,,, | Performed by: NURSE PRACTITIONER

## 2023-05-05 PROCEDURE — 99999 PR PBB SHADOW E&M-EST. PATIENT-LVL V: ICD-10-PCS | Mod: PBBFAC,,, | Performed by: NURSE PRACTITIONER

## 2023-05-05 PROCEDURE — 3008F PR BODY MASS INDEX (BMI) DOCUMENTED: ICD-10-PCS | Mod: CPTII,S$GLB,, | Performed by: NURSE PRACTITIONER

## 2023-05-05 PROCEDURE — 3074F PR MOST RECENT SYSTOLIC BLOOD PRESSURE < 130 MM HG: ICD-10-PCS | Mod: CPTII,S$GLB,, | Performed by: NURSE PRACTITIONER

## 2023-05-05 RX ORDER — VALGANCICLOVIR 450 MG/1
450 TABLET, FILM COATED ORAL 2 TIMES DAILY
Qty: 60 TABLET | Refills: 2 | Status: SHIPPED | OUTPATIENT
Start: 2023-05-05 | End: 2023-06-13

## 2023-05-05 NOTE — LETTER
May 5, 2023        Aaron Bonilla  [address]  [facility] [address]  Phone: [phone]  Fax: [phone]             [facility] 1st Fl  [address]  [facility] [address]  Phone: [phone]   Patient: Elizabeth Maldonado   MR Number: [id number]   YOB: 1965   Date of Visit: 5/5/2023       Dear Dr. Aaron Bonilla    Thank you for referring Elizabeth Maldonado to me for evaluation. Attached you will find relevant portions of my assessment and plan of care.    If you have questions, please do not hesitate to call me. I look forward to following Elizabeth Maldonado along with you.    Sincerely,    Daya Leon, NP    Enclosure    If you would like to receive this communication electronically, please contact [email or web address] or [phone] to request [employer] access.    [employer] is a tool which provides read-only access to select patient information with whom you have a relationship. Its easy to use and provides real time access to review your patients record including encounter summaries, notes, results, and demographic information.    If you feel you have received this communication in error or would no longer like to receive these types of communications, please e-mail [email or web address]

## 2023-05-08 ENCOUNTER — LAB VISIT (OUTPATIENT)
Dept: LAB | Facility: HOSPITAL | Age: 58
End: 2023-05-08
Attending: INTERNAL MEDICINE
Payer: MEDICARE

## 2023-05-08 DIAGNOSIS — Z94.0 KIDNEY REPLACED BY TRANSPLANT: ICD-10-CM

## 2023-05-08 LAB
ALBUMIN SERPL BCP-MCNC: 3.6 G/DL (ref 3.5–5.2)
ALBUMIN SERPL BCP-MCNC: 3.6 G/DL (ref 3.5–5.2)
ALP SERPL-CCNC: 52 U/L (ref 55–135)
ALT SERPL W/O P-5'-P-CCNC: 15 U/L (ref 10–44)
ANION GAP SERPL CALC-SCNC: 6 MMOL/L (ref 8–16)
AST SERPL-CCNC: 15 U/L (ref 10–40)
BASOPHILS # BLD AUTO: 0.02 K/UL (ref 0–0.2)
BASOPHILS NFR BLD: 0.5 % (ref 0–1.9)
BILIRUB DIRECT SERPL-MCNC: 0.1 MG/DL (ref 0.1–0.3)
BILIRUB SERPL-MCNC: 0.3 MG/DL (ref 0.1–1)
BUN SERPL-MCNC: 27 MG/DL (ref 6–20)
CALCIUM SERPL-MCNC: 9.5 MG/DL (ref 8.7–10.5)
CHLORIDE SERPL-SCNC: 110 MMOL/L (ref 95–110)
CO2 SERPL-SCNC: 21 MMOL/L (ref 23–29)
CREAT SERPL-MCNC: 1.4 MG/DL (ref 0.5–1.4)
DIFFERENTIAL METHOD: ABNORMAL
EOSINOPHIL # BLD AUTO: 0 K/UL (ref 0–0.5)
EOSINOPHIL NFR BLD: 0.5 % (ref 0–8)
ERYTHROCYTE [DISTWIDTH] IN BLOOD BY AUTOMATED COUNT: 14.6 % (ref 11.5–14.5)
EST. GFR  (NO RACE VARIABLE): 43.1 ML/MIN/1.73 M^2
GLUCOSE SERPL-MCNC: 151 MG/DL (ref 70–110)
HCT VFR BLD AUTO: 35.1 % (ref 37–48.5)
HGB BLD-MCNC: 10.9 G/DL (ref 12–16)
IMM GRANULOCYTES # BLD AUTO: 0.02 K/UL (ref 0–0.04)
IMM GRANULOCYTES NFR BLD AUTO: 0.5 % (ref 0–0.5)
LYMPHOCYTES # BLD AUTO: 1.2 K/UL (ref 1–4.8)
LYMPHOCYTES NFR BLD: 31.6 % (ref 18–48)
MAGNESIUM SERPL-MCNC: 1.7 MG/DL (ref 1.6–2.6)
MCH RBC QN AUTO: 28.6 PG (ref 27–31)
MCHC RBC AUTO-ENTMCNC: 31.1 G/DL (ref 32–36)
MCV RBC AUTO: 92 FL (ref 82–98)
MONOCYTES # BLD AUTO: 0.1 K/UL (ref 0.3–1)
MONOCYTES NFR BLD: 2.4 % (ref 4–15)
NEUTROPHILS # BLD AUTO: 2.4 K/UL (ref 1.8–7.7)
NEUTROPHILS NFR BLD: 64.5 % (ref 38–73)
NRBC BLD-RTO: 0 /100 WBC
PHOSPHATE SERPL-MCNC: 2.5 MG/DL (ref 2.7–4.5)
PLATELET # BLD AUTO: 118 K/UL (ref 150–450)
PMV BLD AUTO: 11.6 FL (ref 9.2–12.9)
POTASSIUM SERPL-SCNC: 4.8 MMOL/L (ref 3.5–5.1)
PROT SERPL-MCNC: 7.5 G/DL (ref 6–8.4)
RBC # BLD AUTO: 3.81 M/UL (ref 4–5.4)
SODIUM SERPL-SCNC: 137 MMOL/L (ref 136–145)
WBC # BLD AUTO: 3.74 K/UL (ref 3.9–12.7)

## 2023-05-08 PROCEDURE — 85025 COMPLETE CBC W/AUTO DIFF WBC: CPT | Performed by: INTERNAL MEDICINE

## 2023-05-08 PROCEDURE — 83735 ASSAY OF MAGNESIUM: CPT | Performed by: INTERNAL MEDICINE

## 2023-05-08 PROCEDURE — 84075 ASSAY ALKALINE PHOSPHATASE: CPT | Performed by: INTERNAL MEDICINE

## 2023-05-08 PROCEDURE — 80069 RENAL FUNCTION PANEL: CPT | Performed by: INTERNAL MEDICINE

## 2023-05-08 PROCEDURE — 80197 ASSAY OF TACROLIMUS: CPT | Performed by: INTERNAL MEDICINE

## 2023-05-08 PROCEDURE — 87799 DETECT AGENT NOS DNA QUANT: CPT | Performed by: INTERNAL MEDICINE

## 2023-05-09 LAB — TACROLIMUS BLD-MCNC: 8.7 NG/ML (ref 5–15)

## 2023-05-11 LAB
BKV DNA SERPL NAA+PROBE-ACNC: <125 COPIES/ML
BKV DNA SERPL NAA+PROBE-LOG#: <2.1 LOG (10) COPIES/ML
BKV DNA SERPL QL NAA+PROBE: DETECTED

## 2023-05-15 ENCOUNTER — LAB VISIT (OUTPATIENT)
Dept: LAB | Facility: HOSPITAL | Age: 58
End: 2023-05-15
Attending: INTERNAL MEDICINE
Payer: MEDICARE

## 2023-05-15 DIAGNOSIS — Z94.0 KIDNEY REPLACED BY TRANSPLANT: ICD-10-CM

## 2023-05-15 LAB
ALBUMIN SERPL BCP-MCNC: 3.6 G/DL (ref 3.5–5.2)
ANION GAP SERPL CALC-SCNC: 7 MMOL/L (ref 8–16)
BASOPHILS # BLD AUTO: 0.01 K/UL (ref 0–0.2)
BASOPHILS NFR BLD: 0.3 % (ref 0–1.9)
BUN SERPL-MCNC: 22 MG/DL (ref 6–20)
CALCIUM SERPL-MCNC: 9.8 MG/DL (ref 8.7–10.5)
CHLORIDE SERPL-SCNC: 111 MMOL/L (ref 95–110)
CO2 SERPL-SCNC: 22 MMOL/L (ref 23–29)
CREAT SERPL-MCNC: 1.3 MG/DL (ref 0.5–1.4)
DIFFERENTIAL METHOD: ABNORMAL
EOSINOPHIL # BLD AUTO: 0 K/UL (ref 0–0.5)
EOSINOPHIL NFR BLD: 0.8 % (ref 0–8)
ERYTHROCYTE [DISTWIDTH] IN BLOOD BY AUTOMATED COUNT: 15.3 % (ref 11.5–14.5)
EST. GFR  (NO RACE VARIABLE): 47.1 ML/MIN/1.73 M^2
GLUCOSE SERPL-MCNC: 98 MG/DL (ref 70–110)
HCT VFR BLD AUTO: 36.8 % (ref 37–48.5)
HGB BLD-MCNC: 11 G/DL (ref 12–16)
IMM GRANULOCYTES # BLD AUTO: 0.03 K/UL (ref 0–0.04)
IMM GRANULOCYTES NFR BLD AUTO: 0.8 % (ref 0–0.5)
LYMPHOCYTES # BLD AUTO: 1.2 K/UL (ref 1–4.8)
LYMPHOCYTES NFR BLD: 31.4 % (ref 18–48)
MAGNESIUM SERPL-MCNC: 1.7 MG/DL (ref 1.6–2.6)
MCH RBC QN AUTO: 28.1 PG (ref 27–31)
MCHC RBC AUTO-ENTMCNC: 29.9 G/DL (ref 32–36)
MCV RBC AUTO: 94 FL (ref 82–98)
MONOCYTES # BLD AUTO: 0.1 K/UL (ref 0.3–1)
MONOCYTES NFR BLD: 2.3 % (ref 4–15)
NEUTROPHILS # BLD AUTO: 2.5 K/UL (ref 1.8–7.7)
NEUTROPHILS NFR BLD: 64.4 % (ref 38–73)
NRBC BLD-RTO: 0 /100 WBC
PHOSPHATE SERPL-MCNC: 2.7 MG/DL (ref 2.7–4.5)
PLATELET # BLD AUTO: 117 K/UL (ref 150–450)
PMV BLD AUTO: 12.2 FL (ref 9.2–12.9)
POTASSIUM SERPL-SCNC: 4.3 MMOL/L (ref 3.5–5.1)
RBC # BLD AUTO: 3.91 M/UL (ref 4–5.4)
SODIUM SERPL-SCNC: 140 MMOL/L (ref 136–145)
WBC # BLD AUTO: 3.89 K/UL (ref 3.9–12.7)

## 2023-05-15 PROCEDURE — 83735 ASSAY OF MAGNESIUM: CPT | Performed by: INTERNAL MEDICINE

## 2023-05-15 PROCEDURE — 80069 RENAL FUNCTION PANEL: CPT | Performed by: INTERNAL MEDICINE

## 2023-05-15 PROCEDURE — 85025 COMPLETE CBC W/AUTO DIFF WBC: CPT | Performed by: INTERNAL MEDICINE

## 2023-05-15 PROCEDURE — 36415 COLL VENOUS BLD VENIPUNCTURE: CPT | Mod: PO | Performed by: INTERNAL MEDICINE

## 2023-05-15 PROCEDURE — 80197 ASSAY OF TACROLIMUS: CPT | Performed by: INTERNAL MEDICINE

## 2023-05-15 NOTE — PROGRESS NOTES
Kidney Post-Transplant Assessment    Referring Physician: Aaron Bonilla  Current Nephrologist: Aaron Bonilla    ORGAN: LEFT KIDNEY  Donor Type: donation after brain death  PHS Increased Risk: no  Cold Ischemia: 1,731 mins  Induction Medications: thymoglobulin    Subjective:     CC:  Reassessment of renal allograft function and management of chronic immunosuppression.    HPI:  Ms. Maldonado is a 58 y.o. year old Black or  female with PMG ESRD 2/2 DM2 and HTN, and failed kidney txp, who received a donation after brain death kidney transplant on 3/10/23. Her most recent creatinine is 2.0 and continues to trend down.  She takes mycophenolate mofetil, prednisone, and tacrolimus for maintenance immunosuppression. Her post transplant course has been uncomplicated to date.     Pertinent Nephrology/Transplant History:   ESRD from DM  failed KT #1 12/2014 -  3/2022. EDW 81kg  DDKT#2  3/10/23. thymo; CIT 28+h    5/4/23 Allograft Kidney US  1. Mildly elevated arterial resistive indices, improved compared to prior.  2. New perinephric simple fluid collection.  Findings may reflect urinoma, lymphocele etcetera.  measuring 12.5 x 2.9 x 3.8 cm.      LOV 5/5/23    Interval HX:     Intake- 4 L   UOP-no problems reported   BP   BP Readings from Last 3 Encounters:   05/17/23 (!) 158/74   05/05/23 (!) 118/56   04/19/23 (!) 141/65      Peripheral edema--no   Weight-- stable         Lab /diagnostic results reviewed with patient today.   All questions answered    Review of Systems   Constitutional:  Negative for activity change, appetite change, chills, fatigue, fever and unexpected weight change.   HENT:  Negative for congestion, facial swelling, postnasal drip, rhinorrhea, sinus pressure, sore throat and trouble swallowing.    Eyes:  Positive for visual disturbance. Negative for pain and redness.        Wears glasses   Respiratory:  Negative for cough, chest tightness, shortness of breath and wheezing.   "  Cardiovascular: Negative.  Negative for chest pain, palpitations and leg swelling.   Gastrointestinal:  Positive for abdominal distention. Negative for abdominal pain, diarrhea, nausea and vomiting.            Genitourinary:  Negative for dysuria, flank pain and urgency.   Musculoskeletal:  Positive for back pain (HX sciatica). Negative for gait problem, neck pain and neck stiffness.   Skin:  Negative for rash and wound.   Allergic/Immunologic: Negative for environmental allergies, food allergies and immunocompromised state.   Neurological:  Negative for dizziness, weakness, light-headedness and headaches.   Psychiatric/Behavioral:  Negative for agitation and confusion. The patient is not nervous/anxious.      Objective:   Blood pressure (!) 158/74, pulse 90, temperature 97.7 °F (36.5 °C), temperature source Skin, resp. rate 16, height 5' 7" (1.702 m), weight 80.6 kg (177 lb 11.1 oz), last menstrual period 04/29/2011, SpO2 98 %.body mass index is 27.83 kg/m².    Physical Exam  Vitals reviewed.   Constitutional:       Appearance: Normal appearance. She is well-developed.   HENT:      Head: Normocephalic.   Eyes:      Pupils: Pupils are equal, round, and reactive to light.   Cardiovascular:      Rate and Rhythm: Normal rate and regular rhythm.      Heart sounds: Normal heart sounds.   Pulmonary:      Effort: Pulmonary effort is normal.      Breath sounds: Normal breath sounds.   Abdominal:      General: Bowel sounds are normal.      Palpations: Abdomen is soft.      Comments:   No bruit noted over the allograft     Musculoskeletal:         General: Normal range of motion.        Arms:       Cervical back: Normal range of motion and neck supple.   Skin:     General: Skin is warm and dry.   Neurological:      Mental Status: She is alert and oriented to person, place, and time.      Motor: No abnormal muscle tone.   Psychiatric:         Behavior: Behavior normal.       Labs:  Lab Results   Component Value Date    WBC " 3.89 (L) 05/15/2023    HGB 11.0 (L) 05/15/2023    HCT 36.8 (L) 05/15/2023     05/15/2023    K 4.3 05/15/2023     (H) 05/15/2023    CO2 22 (L) 05/15/2023    BUN 22 (H) 05/15/2023    CREATININE 1.3 05/15/2023    EGFRNORACEVR 47.1 (A) 05/15/2023    CALCIUM 9.8 05/15/2023    PHOS 2.7 05/15/2023    MG 1.7 05/15/2023    ALBUMIN 3.6 05/15/2023    AST 15 05/08/2023    ALT 15 05/08/2023    UTPCR Unable to calculate 05/15/2023    PTH 85.0 (H) 03/10/2023    TACROLIMUS 10.4 05/15/2023       No results found for: EXTANC, EXTWBC, EXTSEGS, EXTPLATELETS, EXTHEMOGLOBI, EXTHEMATOCRI, EXTCREATININ, EXTSODIUM, EXTPOTASSIUM, EXTBUN, EXTCO2, EXTCALCIUM, EXTPHOSPHORU, EXTGLUCOSE, EXTALBUMIN, EXTAST, EXTALT, EXTBILITOTAL, EXTLIPASE, EXTAMYLASE    No results found for: EXTCYCLOSLVL, EXTSIROLIMUS, EXTTACROLVL, EXTPROTCRE, EXTPTHINTACT, EXTPROTEINUA, EXTWBCUA, EXTRBCUA    Labs were reviewed with the patient    Assessment:     1. S/P DBD kidney transplant 3/10/2023    2. Long-term use of immunosuppressant medication    3. At risk for opportunistic infections    4. Stage 3a chronic kidney disease    5. Renovascular hypertension    6. Type 2 diabetes mellitus with stage 3a chronic kidney disease, with long-term current use of insulin    7. Anemia of chronic disease          Plan:       -Dr Russell reviewed kidney US:  Recs: thought to be a lymphocele, scr improved to scr 1.3, no intervention needed at this time, no need to re image as long as she remains asymptomatic          -BK PCR q 2 weeks / guidelines--due on 5/22/23          1) s/p living related kidney transplant, CKD 3a              - Graft function. Improved slightly , Scr 1.3, CKD 3a,  elevated from baselines ~ scr 1.1-1.2   -FK Trough 10.4, which is supra therapeutic  (target 8-10)--pt reports taking PM dose late --will reassess with next lab   -  cont  FK  4/3, repeat trough 1 week   - cont on Cellcept 750 mg BID along with prednisone 5 mg daily   -  Bactrim 400/80 mg QD  for PCP prophylaxis   -Valcyte 450 mg QD for CMV prophylaxis  -Will continue to monitor for drug toxicities         Lab Results   Component Value Date    CREATININE 1.3 05/15/2023             5/8/2023  POD 59   eGFR >60 mL/min/1.73 m^2 43.1 (A)         5/4/2023  POD 55   AlloSure Score - Kidney % 0.65     5/4/23            2) Uncontrolled HTN: advise low salt diet and home BP monitoring  - Cont  hydralazine. Coreg as prescribed       3) Anemia:              -   will monitor as per our guidelines   H/H  stable , ANC 2300  Leukopenia:  unchanged      Lab Results   Component Value Date    WBC 3.89 (L) 05/15/2023    HGB 11.0 (L) 05/15/2023    HCT 36.8 (L) 05/15/2023    MCV 94 05/15/2023     (L) 05/15/2023     4) type II DM:   -  cont MED REGIME as prescribed . Mgmt deferred to endocrinology    Lab Results   Component Value Date    HGBA1C 7.2 (H) 03/13/2023         5) Hypophosphatemia:Secondary hyperparathyroidism:                cont KPN as prescribed, Phos level stable- no intervention required.will monitor as per our guidelines  Lab Results   Component Value Date    PTH 85.0 (H) 03/10/2023    CALCIUM 9.8 05/15/2023    PHOS 2.7 05/15/2023         6) Metabolic acidosis/Electrolyte balance:stable      - no intervention required. will monitor as per our guidelines  Lab Results   Component Value Date     05/15/2023    K 4.3 05/15/2023     (H) 05/15/2023    CO2 22 (L) 05/15/2023         7) hypomagnesemia:stable    - no intervention required . will monitor as per our guidelines           8) Cytopenias: no significant cytopenias will monitor as per our guidelines. Medicine list reviewed including potential causes of drug-induced cytopenias    9) Proteinuria: continue p/c ratio as per guidelines            5/8/2023  POD 59   Prot/Creat Ratio, Urine 0.00 - 0.20 Unable to calculate         10) BK virus infection screening:  will continue to monitor/ guidelines      BK detected 5/8/23, Cont BK PCR q 2 weeks /  guidelines--due on 5/22/23 5/8/2023  POD 59   BK Virus DNA PCR, Quant, Blood <125 Copies/mL <125      5/8/2023  POD 59   BK Virus DNA, Blood Not detected DETECTED (A)      5/4/2023  POD 55   Cytomegalovirus PCR, Quant <50 IU/mL Not Detected       11) Weight education: provided during the clinic visit   Body mass index is 27.83 kg/m².        Follow-up:   Clinic: return to transplant clinic weekly for the first month after transplant; every 2 weeks during months 2-3; then at 6-, 9-, 12-, 18-, 24-, and 36- months post-transplant to reassess for complications from immunosuppression toxicity and monitor for rejection.  Annually thereafter.    Labs: since patient remains at high risk for rejection and drug-related complications that warrant close monitoring, labs will be ordered as follows: continue twice weekly CBC, renal panel, and drug level for first month; then same labs once weekly through 3rd month post-transplant.  Urine for UA and protein/creatinine ratio monthly.  Serum BK - PCR at 1-, 3-, 6-, 9-, 12-, 18-, 24-, 36-, 48-, and 60 months post-transplant.  Hepatic panel at 1-, 2-, 3-, 6-, 9-, 12-, 18-, 24-, and 36- months post-transplant.    Daya Leon NP       Education:   Material provided to the patient.  Patient reminded to call with any health changes since these can be early signs of significant complications.  Also, I advised the patient to be sure any new medications or changes of old medications are discussed with either a pharmacist or physician knowledgeable with transplant to avoid rejection/drug toxicity related to significant drug interactions.    Patient advised that it is recommended that all transplanted patients, and their close contacts and household members receive Covid vaccination.

## 2023-05-16 LAB — TACROLIMUS BLD-MCNC: 10.4 NG/ML (ref 5–15)

## 2023-05-17 ENCOUNTER — OFFICE VISIT (OUTPATIENT)
Dept: TRANSPLANT | Facility: CLINIC | Age: 58
End: 2023-05-17
Payer: MEDICARE

## 2023-05-17 VITALS
HEART RATE: 90 BPM | WEIGHT: 177.69 LBS | BODY MASS INDEX: 27.89 KG/M2 | HEIGHT: 67 IN | SYSTOLIC BLOOD PRESSURE: 158 MMHG | OXYGEN SATURATION: 98 % | RESPIRATION RATE: 16 BRPM | TEMPERATURE: 98 F | DIASTOLIC BLOOD PRESSURE: 74 MMHG

## 2023-05-17 DIAGNOSIS — D63.8 ANEMIA OF CHRONIC DISEASE: ICD-10-CM

## 2023-05-17 DIAGNOSIS — Z79.60 LONG-TERM USE OF IMMUNOSUPPRESSANT MEDICATION: ICD-10-CM

## 2023-05-17 DIAGNOSIS — Z94.0 S/P KIDNEY TRANSPLANT: Primary | ICD-10-CM

## 2023-05-17 DIAGNOSIS — Z79.4 TYPE 2 DIABETES MELLITUS WITH STAGE 3A CHRONIC KIDNEY DISEASE, WITH LONG-TERM CURRENT USE OF INSULIN: ICD-10-CM

## 2023-05-17 DIAGNOSIS — Z91.89 AT RISK FOR OPPORTUNISTIC INFECTIONS: ICD-10-CM

## 2023-05-17 DIAGNOSIS — N18.31 STAGE 3A CHRONIC KIDNEY DISEASE: ICD-10-CM

## 2023-05-17 DIAGNOSIS — I15.0 RENOVASCULAR HYPERTENSION: ICD-10-CM

## 2023-05-17 DIAGNOSIS — N18.31 TYPE 2 DIABETES MELLITUS WITH STAGE 3A CHRONIC KIDNEY DISEASE, WITH LONG-TERM CURRENT USE OF INSULIN: ICD-10-CM

## 2023-05-17 DIAGNOSIS — E11.22 TYPE 2 DIABETES MELLITUS WITH STAGE 3A CHRONIC KIDNEY DISEASE, WITH LONG-TERM CURRENT USE OF INSULIN: ICD-10-CM

## 2023-05-17 PROCEDURE — 3008F BODY MASS INDEX DOCD: CPT | Mod: CPTII,,, | Performed by: NURSE PRACTITIONER

## 2023-05-17 PROCEDURE — 3078F PR MOST RECENT DIASTOLIC BLOOD PRESSURE < 80 MM HG: ICD-10-PCS | Mod: CPTII,,, | Performed by: NURSE PRACTITIONER

## 2023-05-17 PROCEDURE — 1159F PR MEDICATION LIST DOCUMENTED IN MEDICAL RECORD: ICD-10-PCS | Mod: CPTII,,, | Performed by: NURSE PRACTITIONER

## 2023-05-17 PROCEDURE — 3066F NEPHROPATHY DOC TX: CPT | Mod: CPTII,,, | Performed by: NURSE PRACTITIONER

## 2023-05-17 PROCEDURE — 99999 PR PBB SHADOW E&M-EST. PATIENT-LVL V: CPT | Mod: PBBFAC,,, | Performed by: NURSE PRACTITIONER

## 2023-05-17 PROCEDURE — 3077F SYST BP >= 140 MM HG: CPT | Mod: CPTII,,, | Performed by: NURSE PRACTITIONER

## 2023-05-17 PROCEDURE — 3077F PR MOST RECENT SYSTOLIC BLOOD PRESSURE >= 140 MM HG: ICD-10-PCS | Mod: CPTII,,, | Performed by: NURSE PRACTITIONER

## 2023-05-17 PROCEDURE — 99215 OFFICE O/P EST HI 40 MIN: CPT | Mod: ,,, | Performed by: NURSE PRACTITIONER

## 2023-05-17 PROCEDURE — 3008F PR BODY MASS INDEX (BMI) DOCUMENTED: ICD-10-PCS | Mod: CPTII,,, | Performed by: NURSE PRACTITIONER

## 2023-05-17 PROCEDURE — 3078F DIAST BP <80 MM HG: CPT | Mod: CPTII,,, | Performed by: NURSE PRACTITIONER

## 2023-05-17 PROCEDURE — 99215 PR OFFICE/OUTPT VISIT, EST, LEVL V, 40-54 MIN: ICD-10-PCS | Mod: ,,, | Performed by: NURSE PRACTITIONER

## 2023-05-17 PROCEDURE — 3051F HG A1C>EQUAL 7.0%<8.0%: CPT | Mod: CPTII,,, | Performed by: NURSE PRACTITIONER

## 2023-05-17 PROCEDURE — 3051F PR MOST RECENT HEMOGLOBIN A1C LEVEL 7.0 - < 8.0%: ICD-10-PCS | Mod: CPTII,,, | Performed by: NURSE PRACTITIONER

## 2023-05-17 PROCEDURE — 99999 PR PBB SHADOW E&M-EST. PATIENT-LVL V: ICD-10-PCS | Mod: PBBFAC,,, | Performed by: NURSE PRACTITIONER

## 2023-05-17 PROCEDURE — 3066F PR DOCUMENTATION OF TREATMENT FOR NEPHROPATHY: ICD-10-PCS | Mod: CPTII,,, | Performed by: NURSE PRACTITIONER

## 2023-05-17 PROCEDURE — 1159F MED LIST DOCD IN RCRD: CPT | Mod: CPTII,,, | Performed by: NURSE PRACTITIONER

## 2023-05-17 NOTE — LETTER
May 17, 2023        Aaron Bonilla  [address]  [facility] [address]  Phone: [phone]  Fax: [phone]             [facility] 1st Fl  [address]  [facility] [address]  Phone: [phone]   Patient: Elizabeth Maldonado   MR Number: [id number]   YOB: 1965   Date of Visit: 5/17/2023       Dear Dr. Aaron Bonilla    Thank you for referring Elizabeth Maldonado to me for evaluation. Attached you will find relevant portions of my assessment and plan of care.    If you have questions, please do not hesitate to call me. I look forward to following Elizabeth Maldonado along with you.    Sincerely,    Daya Leon, NP    Enclosure    If you would like to receive this communication electronically, please contact [email or web address] or [phone] to request [employer] access.    [employer] is a tool which provides read-only access to select patient information with whom you have a relationship. Its easy to use and provides real time access to review your patients record including encounter summaries, notes, results, and demographic information.    If you feel you have received this communication in error or would no longer like to receive these types of communications, please e-mail [email or web address]

## 2023-05-22 ENCOUNTER — LAB VISIT (OUTPATIENT)
Dept: LAB | Facility: HOSPITAL | Age: 58
End: 2023-05-22
Attending: INTERNAL MEDICINE
Payer: MEDICARE

## 2023-05-22 ENCOUNTER — TELEPHONE (OUTPATIENT)
Dept: VASCULAR SURGERY | Facility: CLINIC | Age: 58
End: 2023-05-22
Payer: MEDICARE

## 2023-05-22 DIAGNOSIS — Z94.0 KIDNEY TRANSPLANT STATUS, LIVING UNRELATED DONOR: Chronic | ICD-10-CM

## 2023-05-22 DIAGNOSIS — Z94.0 KIDNEY REPLACED BY TRANSPLANT: ICD-10-CM

## 2023-05-22 LAB
ALBUMIN SERPL BCP-MCNC: 3.8 G/DL (ref 3.5–5.2)
ANION GAP SERPL CALC-SCNC: 5 MMOL/L (ref 8–16)
BASOPHILS # BLD AUTO: 0.02 K/UL (ref 0–0.2)
BASOPHILS NFR BLD: 0.7 % (ref 0–1.9)
BUN SERPL-MCNC: 22 MG/DL (ref 6–20)
CALCIUM SERPL-MCNC: 9.7 MG/DL (ref 8.7–10.5)
CHLORIDE SERPL-SCNC: 111 MMOL/L (ref 95–110)
CO2 SERPL-SCNC: 22 MMOL/L (ref 23–29)
CREAT SERPL-MCNC: 1.3 MG/DL (ref 0.5–1.4)
DIFFERENTIAL METHOD: ABNORMAL
EOSINOPHIL # BLD AUTO: 0 K/UL (ref 0–0.5)
EOSINOPHIL NFR BLD: 0.4 % (ref 0–8)
ERYTHROCYTE [DISTWIDTH] IN BLOOD BY AUTOMATED COUNT: 15 % (ref 11.5–14.5)
EST. GFR  (NO RACE VARIABLE): 47.1 ML/MIN/1.73 M^2
GLUCOSE SERPL-MCNC: 134 MG/DL (ref 70–110)
HCT VFR BLD AUTO: 37.9 % (ref 37–48.5)
HGB BLD-MCNC: 11.4 G/DL (ref 12–16)
IMM GRANULOCYTES # BLD AUTO: 0.02 K/UL (ref 0–0.04)
IMM GRANULOCYTES NFR BLD AUTO: 0.7 % (ref 0–0.5)
LYMPHOCYTES # BLD AUTO: 1 K/UL (ref 1–4.8)
LYMPHOCYTES NFR BLD: 36.7 % (ref 18–48)
MAGNESIUM SERPL-MCNC: 1.8 MG/DL (ref 1.6–2.6)
MCH RBC QN AUTO: 28.3 PG (ref 27–31)
MCHC RBC AUTO-ENTMCNC: 30.1 G/DL (ref 32–36)
MCV RBC AUTO: 94 FL (ref 82–98)
MONOCYTES # BLD AUTO: 0.1 K/UL (ref 0.3–1)
MONOCYTES NFR BLD: 3.6 % (ref 4–15)
NEUTROPHILS # BLD AUTO: 1.6 K/UL (ref 1.8–7.7)
NEUTROPHILS NFR BLD: 57.9 % (ref 38–73)
NRBC BLD-RTO: 0 /100 WBC
PHOSPHATE SERPL-MCNC: 2.4 MG/DL (ref 2.7–4.5)
PLATELET # BLD AUTO: 122 K/UL (ref 150–450)
PMV BLD AUTO: 11.9 FL (ref 9.2–12.9)
POTASSIUM SERPL-SCNC: 4.6 MMOL/L (ref 3.5–5.1)
RBC # BLD AUTO: 4.03 M/UL (ref 4–5.4)
SODIUM SERPL-SCNC: 138 MMOL/L (ref 136–145)
WBC # BLD AUTO: 2.75 K/UL (ref 3.9–12.7)

## 2023-05-22 PROCEDURE — 80069 RENAL FUNCTION PANEL: CPT | Performed by: INTERNAL MEDICINE

## 2023-05-22 PROCEDURE — 80197 ASSAY OF TACROLIMUS: CPT | Performed by: INTERNAL MEDICINE

## 2023-05-22 PROCEDURE — 83735 ASSAY OF MAGNESIUM: CPT | Performed by: INTERNAL MEDICINE

## 2023-05-22 PROCEDURE — 87799 DETECT AGENT NOS DNA QUANT: CPT | Performed by: INTERNAL MEDICINE

## 2023-05-22 PROCEDURE — 85025 COMPLETE CBC W/AUTO DIFF WBC: CPT | Performed by: INTERNAL MEDICINE

## 2023-05-22 RX ORDER — MYCOPHENOLATE MOFETIL 250 MG/1
500 CAPSULE ORAL 2 TIMES DAILY
Qty: 120 CAPSULE | Refills: 11 | Status: SHIPPED | OUTPATIENT
Start: 2023-05-22 | End: 2023-05-24 | Stop reason: DRUGHIGH

## 2023-05-22 NOTE — TELEPHONE ENCOUNTER
Contacted pt in response to message. States she must reschedule appt as she had a transplant and needs to be seen by endocrinologist on this date. Appointments rescheduled, pt verified. ----- Message from Ashanti Lozada sent at 5/22/2023  4:47 PM CDT -----  Contact: patient  Type:  Patient Call          Who Called: patient         Does the patient know what this is regarding?: Requesting a call back to have her 6/6  appt rescheduled ;puneet advise           Would the patient rather a call back or a response via [facility]?call           Best Call Back Number:[phone] (              Additional Information:

## 2023-05-22 NOTE — TELEPHONE ENCOUNTER
Patient repeated back and voice a understanding of orders.  CMV added to 5/29 labs.  ----- Message from Kalpana Grider MD sent at 5/22/2023  4:02 PM CDT -----  Lower MMF to 500 mg BID  Add CMV PCR to next lab

## 2023-05-23 ENCOUNTER — TELEPHONE (OUTPATIENT)
Dept: OBSTETRICS AND GYNECOLOGY | Facility: CLINIC | Age: 58
End: 2023-05-23
Payer: MEDICARE

## 2023-05-23 LAB — TACROLIMUS BLD-MCNC: 8.9 NG/ML (ref 5–15)

## 2023-05-23 NOTE — TELEPHONE ENCOUNTER
Pt advised she was unable to make her appt today due to transportation company running late. Appt rescheduled to 6/9/2023    ----- Message from Pallavi Son sent at 5/23/2023  9:42 AM CDT -----  .Type: Patient Call Back    Who called: Xiomara [email or web address]/ [email or web address]    What is the request in detail: Transportation for patient is running late would like to know can patient still come in for appointment    Can the clinic reply by [facility]? No     Would the patient rather a call back or a response via [facility]? Call Back     Best call back number: .[phone] (home)       Additional Information:

## 2023-05-24 DIAGNOSIS — Z94.0 KIDNEY TRANSPLANT STATUS, LIVING UNRELATED DONOR: Chronic | ICD-10-CM

## 2023-05-24 LAB
BKV DNA SERPL NAA+PROBE-ACNC: 305 COPIES/ML
BKV DNA SERPL NAA+PROBE-LOG#: 2.48 LOG (10) COPIES/ML
BKV DNA SERPL QL NAA+PROBE: DETECTED

## 2023-05-24 RX ORDER — MYCOPHENOLATE MOFETIL 250 MG/1
250 CAPSULE ORAL 2 TIMES DAILY
Qty: 60 CAPSULE | Refills: 11 | Status: SHIPPED | OUTPATIENT
Start: 2023-05-24 | End: 2023-06-23 | Stop reason: DRUGHIGH

## 2023-05-24 NOTE — TELEPHONE ENCOUNTER
Patient repeated back and voice a understanding of orders.  ----- Message from Kalpana Grider MD sent at 5/24/2023  2:19 PM CDT -----  Lower MMF to 250 mg BID. Weekly CBCs and q2week BK PCRS needed, please.

## 2023-05-29 ENCOUNTER — LAB VISIT (OUTPATIENT)
Dept: LAB | Facility: HOSPITAL | Age: 58
End: 2023-05-29
Attending: INTERNAL MEDICINE
Payer: MEDICARE

## 2023-05-29 DIAGNOSIS — Z94.0 KIDNEY REPLACED BY TRANSPLANT: ICD-10-CM

## 2023-05-29 LAB
ALBUMIN SERPL BCP-MCNC: 3.9 G/DL (ref 3.5–5.2)
ANION GAP SERPL CALC-SCNC: 5 MMOL/L (ref 8–16)
BASOPHILS # BLD AUTO: 0.02 K/UL (ref 0–0.2)
BASOPHILS NFR BLD: 0.6 % (ref 0–1.9)
BUN SERPL-MCNC: 24 MG/DL (ref 6–20)
CALCIUM SERPL-MCNC: 10 MG/DL (ref 8.7–10.5)
CHLORIDE SERPL-SCNC: 111 MMOL/L (ref 95–110)
CO2 SERPL-SCNC: 23 MMOL/L (ref 23–29)
CREAT SERPL-MCNC: 1.4 MG/DL (ref 0.5–1.4)
DIFFERENTIAL METHOD: ABNORMAL
EOSINOPHIL # BLD AUTO: 0 K/UL (ref 0–0.5)
EOSINOPHIL NFR BLD: 0.3 % (ref 0–8)
ERYTHROCYTE [DISTWIDTH] IN BLOOD BY AUTOMATED COUNT: 14.4 % (ref 11.5–14.5)
EST. GFR  (NO RACE VARIABLE): 43.1 ML/MIN/1.73 M^2
GLUCOSE SERPL-MCNC: 75 MG/DL (ref 70–110)
HCT VFR BLD AUTO: 40.5 % (ref 37–48.5)
HGB BLD-MCNC: 12.6 G/DL (ref 12–16)
IMM GRANULOCYTES # BLD AUTO: 0.13 K/UL (ref 0–0.04)
IMM GRANULOCYTES NFR BLD AUTO: 4 % (ref 0–0.5)
LYMPHOCYTES # BLD AUTO: 1 K/UL (ref 1–4.8)
LYMPHOCYTES NFR BLD: 31.3 % (ref 18–48)
MAGNESIUM SERPL-MCNC: 2 MG/DL (ref 1.6–2.6)
MCH RBC QN AUTO: 28.3 PG (ref 27–31)
MCHC RBC AUTO-ENTMCNC: 31.1 G/DL (ref 32–36)
MCV RBC AUTO: 91 FL (ref 82–98)
MONOCYTES # BLD AUTO: 0.1 K/UL (ref 0.3–1)
MONOCYTES NFR BLD: 4 % (ref 4–15)
NEUTROPHILS # BLD AUTO: 2 K/UL (ref 1.8–7.7)
NEUTROPHILS NFR BLD: 59.8 % (ref 38–73)
NRBC BLD-RTO: 0 /100 WBC
PHOSPHATE SERPL-MCNC: 2.8 MG/DL (ref 2.7–4.5)
PLATELET # BLD AUTO: 122 K/UL (ref 150–450)
PMV BLD AUTO: 11.4 FL (ref 9.2–12.9)
POTASSIUM SERPL-SCNC: 4.5 MMOL/L (ref 3.5–5.1)
RBC # BLD AUTO: 4.45 M/UL (ref 4–5.4)
SODIUM SERPL-SCNC: 139 MMOL/L (ref 136–145)
TACROLIMUS BLD-MCNC: 11.6 NG/ML (ref 5–15)
WBC # BLD AUTO: 3.29 K/UL (ref 3.9–12.7)

## 2023-05-29 PROCEDURE — 83735 ASSAY OF MAGNESIUM: CPT | Performed by: INTERNAL MEDICINE

## 2023-05-29 PROCEDURE — 85025 COMPLETE CBC W/AUTO DIFF WBC: CPT | Performed by: INTERNAL MEDICINE

## 2023-05-29 PROCEDURE — 36415 COLL VENOUS BLD VENIPUNCTURE: CPT | Mod: PO | Performed by: INTERNAL MEDICINE

## 2023-05-29 PROCEDURE — 80069 RENAL FUNCTION PANEL: CPT | Performed by: INTERNAL MEDICINE

## 2023-05-29 PROCEDURE — 80197 ASSAY OF TACROLIMUS: CPT | Performed by: INTERNAL MEDICINE

## 2023-05-29 RX ORDER — TACROLIMUS 1 MG/1
3 CAPSULE ORAL EVERY 12 HOURS
Qty: 180 CAPSULE | Refills: 11 | Status: SHIPPED | OUTPATIENT
Start: 2023-05-29 | End: 2023-06-23 | Stop reason: DRUGHIGH

## 2023-05-29 NOTE — TELEPHONE ENCOUNTER
Called pt and instructed her to decrease prograf dose to 3mg twice a day. Verified 12-hour trough. Pt verbalized understanding.  ----- Message from Kalpana Grider MD sent at 5/29/2023  1:17 PM CDT -----  Lower tacro to 3 mg bid

## 2023-05-30 ENCOUNTER — PATIENT MESSAGE (OUTPATIENT)
Dept: TRANSPLANT | Facility: CLINIC | Age: 58
End: 2023-05-30

## 2023-05-30 ENCOUNTER — OFFICE VISIT (OUTPATIENT)
Dept: TRANSPLANT | Facility: CLINIC | Age: 58
End: 2023-05-30
Payer: MEDICARE

## 2023-05-30 DIAGNOSIS — Z29.89 PROPHYLACTIC IMMUNOTHERAPY: ICD-10-CM

## 2023-05-30 DIAGNOSIS — Z94.0 DECEASED-DONOR KIDNEY TRANSPLANT: ICD-10-CM

## 2023-05-30 DIAGNOSIS — D70.2 DRUG-INDUCED NEUTROPENIA: ICD-10-CM

## 2023-05-30 DIAGNOSIS — Z94.0 IMMUNOSUPPRESSIVE MANAGEMENT ENCOUNTER FOLLOWING KIDNEY TRANSPLANT: ICD-10-CM

## 2023-05-30 DIAGNOSIS — B34.8 BK VIREMIA: ICD-10-CM

## 2023-05-30 DIAGNOSIS — Z79.899 IMMUNOSUPPRESSIVE MANAGEMENT ENCOUNTER FOLLOWING KIDNEY TRANSPLANT: ICD-10-CM

## 2023-05-30 DIAGNOSIS — Z91.89 AT RISK FOR OPPORTUNISTIC INFECTIONS: ICD-10-CM

## 2023-05-30 DIAGNOSIS — N18.32 STAGE 3B CHRONIC KIDNEY DISEASE: Primary | ICD-10-CM

## 2023-05-30 PROCEDURE — 1160F RVW MEDS BY RX/DR IN RCRD: CPT | Mod: CPTII,95,, | Performed by: INTERNAL MEDICINE

## 2023-05-30 PROCEDURE — 3066F PR DOCUMENTATION OF TREATMENT FOR NEPHROPATHY: ICD-10-PCS | Mod: CPTII,95,, | Performed by: INTERNAL MEDICINE

## 2023-05-30 PROCEDURE — 3051F HG A1C>EQUAL 7.0%<8.0%: CPT | Mod: CPTII,95,, | Performed by: INTERNAL MEDICINE

## 2023-05-30 PROCEDURE — 3066F NEPHROPATHY DOC TX: CPT | Mod: CPTII,95,, | Performed by: INTERNAL MEDICINE

## 2023-05-30 PROCEDURE — 1160F PR REVIEW ALL MEDS BY PRESCRIBER/CLIN PHARMACIST DOCUMENTED: ICD-10-PCS | Mod: CPTII,95,, | Performed by: INTERNAL MEDICINE

## 2023-05-30 PROCEDURE — 3051F PR MOST RECENT HEMOGLOBIN A1C LEVEL 7.0 - < 8.0%: ICD-10-PCS | Mod: CPTII,95,, | Performed by: INTERNAL MEDICINE

## 2023-05-30 PROCEDURE — 1159F PR MEDICATION LIST DOCUMENTED IN MEDICAL RECORD: ICD-10-PCS | Mod: CPTII,95,, | Performed by: INTERNAL MEDICINE

## 2023-05-30 PROCEDURE — 99215 OFFICE O/P EST HI 40 MIN: CPT | Mod: 95,,, | Performed by: INTERNAL MEDICINE

## 2023-05-30 PROCEDURE — 1159F MED LIST DOCD IN RCRD: CPT | Mod: CPTII,95,, | Performed by: INTERNAL MEDICINE

## 2023-05-30 PROCEDURE — 99215 PR OFFICE/OUTPT VISIT, EST, LEVL V, 40-54 MIN: ICD-10-PCS | Mod: 95,,, | Performed by: INTERNAL MEDICINE

## 2023-05-30 NOTE — PROGRESS NOTES
The patient location is: home  The chief complaint leading to consultation is: reassess CKD and immunosuppression post kidney tx    Visit type: audiovisual    Face to Face time with patient: 25 on the encounter, which includes face to face time and non-face to face time preparing to see the patient (eg, review of tests), Obtaining and/or reviewing separately obtained history, Documenting clinical information in the electronic or other health record, Independently interpreting results (not separately reported) and communicating results to the patient/family/caregiver, or Care coordination (not separately reported).     Each patient to whom he or she provides medical services by telemedicine is:  (1) informed of the relationship between the physician and patient and the respective role of any other health care provider with respect to management of the patient; and (2) notified that he or she may decline to receive medical services by telemedicine and may withdraw from such care at any time.    Notes:      Kidney Post-Transplant Assessment    Referring Physician: Aaron Bonilla  Current Nephrologist: Aaron Bonilla    ORGAN: LEFT KIDNEY  Donor Type: donation after brain death  PHS Increased Risk: no  Cold Ischemia: 1,731 mins  Induction Medications: thymoglobulin    Subjective:     CC:  Reassessment of renal allograft function and management of chronic immunosuppression.    HPI:  Ms. Maldonado is a 58 y.o. year old Black or  female who received a donation after brain death kidney transplant on 3/10/23.  She has CKD stage 3 - GFR 30-59 and her baseline creatinine is around mid 1s. She takes mycophenolate mofetil, prednisone, and tacrolimus for maintenance immunosuppression. She denies any recent hospitalizations or ER visits since her previous clinic visit.    Pertinent Nephrology/Transplant History:   ESRD from [facility]  failed KT #1 12/2014 -  3/2022. EDW 81kg;    DDKT#2  3/10/23. thymo; CIT 28+h;  "KDPI 27%; cPRA 99%; XM neg/neg  CMV ++  Baseline creat 1.1-1.4     POST TRANSPLANT UPDATE 03/31/2023   Elizabeth is now 21 d post kidney transplant and reports no concerns. She just saw surgery this PM and had staples removed.  She reports good HTN control with reads of 120 sys at home and no sx orthostasis. She reports no incisional pain or urinary sx. She states she is eating well and tolerating meds w/o issues.     UPDATE 4/19/23 today, Elizabeth feels well. Her main concern is that her old healed incisions "hurt," but they are not red or irritated or swollen. Her txp incision has healed well and is not longer itchy. She stopped taking atarax as it made her sleepy. She denies any kidney-related complaints, such as pain over allograft, flank pain, dysuria, frequency, or other LUTS.  HTN: at home, she is getting great reads: 119/69, 83; 110/65, 89; hisghest recent was 137 systolic.    UPDATE 5/30/23  Elizabeth reports, "I feel great." She denies any complaints.  BP noted ot be low at home, 128/72 today but on Sunday was 102/68 standing. Denies pain over transplant or LUTS. Test recently showed +BK viremia; we discussed this result in detail.    Current Outpatient Medications   Medication Sig    atorvastatin (LIPITOR) 40 MG tablet Take 1 tablet (40 mg total) by mouth every evening.    blood sugar diagnostic Strp Checks BG ac/hs    carvediloL (COREG) 3.125 MG tablet Take 1 tablet (3.125 mg total) by mouth 2 (two) times daily. Hold for SBP <130    dulaglutide (TRULICITY) 4.5 mg/0.5 mL pen injector Inject 4.5 mg into the skin once a week.    HUMALOG KWIKPEN INSULIN 100 unit/mL pen Inject 5 Units into the skin 3 (three) times daily with meals. + SLIDE SCALE, TDD: 30 Units    hydrALAZINE (APRESOLINE) 50 MG tablet Take 1 tablet (50 mg total) by mouth 3 (three) times daily.    insulin (LANTUS SOLOSTAR U-100 INSULIN) glargine 100 units/mL SubQ pen Inject 18 Units into the skin once daily. (Patient taking differently: Inject 25 Units into " the skin once daily.)    k phos di & mono-sod phos mono (K-PHOS-NEUTRAL) 250 mg Tab Take 2 tablets by mouth 2 (two) times a day. (Patient taking differently: Take 250 mg by mouth 2 (two) times a day. 1 tab BID)    lancets (ONETOUCH DELICA LANCETS) 33 gauge Misc 1 lancet by Misc.(Non-Drug; Combo Route) route 4 (four) times daily before meals and nightly.    multivitamin Tab Take 1 tablet by mouth once daily.    mycophenolate (CELLCEPT) 250 mg Cap Take 1 capsule (250 mg total) by mouth 2 (two) times daily. Txp Date:3/10/2023 (Kidney) Disch Date:3/13/2023 ICD10:Z94.0 Txp Location:[facility]    predniSONE (DELTASONE) 5 MG tablet Take 1 tablet (5 mg total) by mouth once daily.    sulfamethoxazole-trimethoprim 400-80mg (BACTRIM,SEPTRA) 400-80 mg per tablet Take 1 tablet by mouth once daily. Stop 9/8/2023    tacrolimus (PROGRAF) 1 MG Cap Take 3 capsules (3 mg total) by mouth every 12 (twelve) hours. Txp Date:3/10/2023 (Kidney) Disch Date:3/13/2023 ICD10:Z94.0 Txp Location:[facility]    timolol maleate 0.5% (TIMOPTIC) 0.5 % Drop Place 1 drop into both eyes 2 (two) times daily.    traMADoL (ULTRAM) 50 mg tablet Take 1 tablet (50 mg total) by mouth every 6 (six) hours as needed for Pain. (Patient not taking: Reported on 4/19/2023)    valGANciclovir (VALCYTE) 450 mg Tab Take 1 tablet (450 mg total) by mouth 2 (two) times daily. Stop 6/10/2023     No current facility-administered medications for this visit.       Review of Systems   Constitutional:  Negative for fever.   Respiratory:  Negative for shortness of breath.    Cardiovascular:  Negative for chest pain and leg swelling.   Gastrointestinal: Negative.    Genitourinary:  Negative for difficulty urinating.   Musculoskeletal: Negative.    Skin:  Negative for rash.   Allergic/Immunologic: Positive for immunocompromised state.   Neurological:  Negative for tremors.     Objective:     Last menstrual period 04/29/2011.body mass index is unknown because there is no height or weight on  file.    Physical Exam looks well, NAD, unlabored resp, speech normal    Labs:  Lab Results   Component Value Date    WBC 3.29 (L) 2023    HGB 12.6 2023    HCT 40.5 2023     2023    K 4.5 2023     (H) 2023    CO2 23 2023    BUN 24 (H) 2023    CREATININE 1.4 2023    EGFRNORACEVR 43.1 (A) 2023    CALCIUM 10.0 2023    PHOS 2.8 2023    MG 2.0 2023    ALBUMIN 3.9 2023    AST 15 2023    ALT 15 2023    UTPCR Unable to calculate 2023    PTH 85.0 (H) 03/10/2023    TACROLIMUS 11.6 2023     Labs were reviewed with the patient.    Assessment:     1. Stage 3b chronic kidney disease    2. -donor kidney transplant    3. Prophylactic immunotherapy    4. Immunosuppressive management encounter following kidney transplant    5. At risk for opportunistic infections    6. Drug-induced neutropenia    7. BK viremia          Plan:   New nursing orders:  - continue BK q2 weeks    CKD IIIB s/p DD kidney transplant #2 DDKT#2  3/10/23. thymo; CIT 28+h [pumped]; KDPI 27%; cPRA 99%; XM neg  - Stable allograft function, now with BK viremia but no evidence of injury to graft    Immunosuppression Management  - Tacro, MMF, pred  - Tacro goal 7-9; recent level 11.6 [dose already lowered]  - Continue monitoring for toxicities and SE, none presently noted  - Net IS lowered d/t BK viremia    At risk for opportunistic infections  -Anti-infective prophylaxis against opportunistic infections  - Valcyte for CMV prophy until 23 - dose increased based on improved GFR  - PJP prophy until 23: bactrim - dose increased based on improved GFR  -Screening BK infection to prevent allograft failure ==> BK VIREMIA 23  - BK ND 4/10--> +305 on   - Continue blood PCR per BK protocol, q 2 weeks, as per guidelines to prevent BK viremia and nephropathy leading to allograft dysfunction and potential graft failure    Hypertension,  renal  -Some low readings but w/o symptoms - cont to watch. She is aware to report if feels dizzy etc.    Metabolic bone disease [Secondary hyperparathyroidism (SPTH), Phosphorus metabolism disorders]  - hypophosphatemia- taking KPN 1 bid - stop today.  - Will tolerate mild asymptomatic low level d/t pill burden, DDI, and adverse SE      Drug induced neutropenia  - level acceptable; should improve off Valcyte and on lower MMF.    Most of visit spent on discussing BK viremia and monitoring plan. We discussed close monitoring is warranted along with reduction in IS to prevent allograft failure.    Follow-up:   Clinic: return to transplant clinic weekly for the first month after transplant; every 2 weeks during months 2-3; then at 6-, 9-, 12-, 18-, 24-, and 36- months post-transplant to reassess for complications from immunosuppression toxicity and monitor for rejection.  Annually thereafter.    Labs: since patient remains at high risk for rejection and drug-related complications that warrant close monitoring, labs will be ordered as follows: continue twice weekly CBC, renal panel, and drug level for first month; then same labs once weekly through 3rd month post-transplant.  Urine for UA and protein/creatinine ratio monthly.  Serum BK - PCR at 1-, 3-, 6-, 9-, 12-, 18-, 24-, 36- 48-, and 60 months post-transplant.  Hepatic panel at 1-, 2-, 3-, 6-, 9-, 12-, 18-, 24-, and 36- months post-transplant.    Kalpana Grider MD       Education:   Material provided to the patient.  Patient reminded to call with any health changes since these can be early signs of significant complications.  Also, I advised the patient to be sure any new medications or changes of old medications are discussed with either a pharmacist or physician knowledgeable with transplant to avoid rejection/drug toxicity related to significant drug interactions.    UNOS Patient Status  Functional Status: 90% - Able to carry on normal activity: minor  symptoms of disease  Physical Capacity: No Limitations

## 2023-05-30 NOTE — LETTER
May 30, 2023        Aaron Bonilla  [address]  [facility] [address]  Phone: [phone]  Fax: [phone]             [facility] 1st Fl  [address]  [facility] [address]  Phone: [phone]     Patient: Elizabeth Maldonado   MR Number: [id number]   YOB: 1965   Date of Visit: 5/30/2023       Dear Dr. Aaron Bonilla    Thank you for referring Elizabeth Maldonado to me for evaluation. Attached you will find relevant portions of my assessment and plan of care.    If you have questions, please do not hesitate to call me. I look forward to following Elizabeth Maldonado along with you.    Sincerely,    Kalpana Grider MD    Enclosure    If you would like to receive this communication electronically, please contact [email or web address] or [phone] to request [employer] access.    [employer] is a tool which provides read-only access to select patient information with whom you have a relationship. Its easy to use and provides real time access to review your patients record including encounter summaries, notes, results, and demographic information.    If you feel you have received this communication in error or would no longer like to receive these types of communications, please e-mail [email or web address]

## 2023-05-31 LAB
CMV DNA SPEC QL NAA+PROBE: NOT DETECTED
CYTOMEGALOVIRUS LOG (IU/ML): NOT DETECTED LOGIU/ML
CYTOMEGALOVIRUS PCR, QUANT: NOT DETECTED IU/ML

## 2023-06-05 ENCOUNTER — LAB VISIT (OUTPATIENT)
Dept: LAB | Facility: HOSPITAL | Age: 58
End: 2023-06-05
Attending: INTERNAL MEDICINE
Payer: MEDICARE

## 2023-06-05 DIAGNOSIS — E55.9 VITAMIN D DEFICIENCY: ICD-10-CM

## 2023-06-05 DIAGNOSIS — Z94.0 KIDNEY REPLACED BY TRANSPLANT: ICD-10-CM

## 2023-06-05 LAB
25(OH)D3+25(OH)D2 SERPL-MCNC: 28 NG/ML (ref 30–96)
ALBUMIN SERPL BCP-MCNC: 3.9 G/DL (ref 3.5–5.2)
ALBUMIN SERPL BCP-MCNC: 3.9 G/DL (ref 3.5–5.2)
ALP SERPL-CCNC: 47 U/L (ref 55–135)
ALT SERPL W/O P-5'-P-CCNC: 23 U/L (ref 10–44)
ANION GAP SERPL CALC-SCNC: 7 MMOL/L (ref 8–16)
ANISOCYTOSIS BLD QL SMEAR: SLIGHT
AST SERPL-CCNC: 18 U/L (ref 10–40)
BASO STIPL BLD QL SMEAR: ABNORMAL
BASOPHILS NFR BLD: 0 % (ref 0–1.9)
BILIRUB DIRECT SERPL-MCNC: 0.1 MG/DL (ref 0.1–0.3)
BILIRUB SERPL-MCNC: 0.3 MG/DL (ref 0.1–1)
BUN SERPL-MCNC: 34 MG/DL (ref 6–20)
CALCIUM SERPL-MCNC: 9.6 MG/DL (ref 8.7–10.5)
CHLORIDE SERPL-SCNC: 112 MMOL/L (ref 95–110)
CHOLEST SERPL-MCNC: 138 MG/DL (ref 120–199)
CHOLEST/HDLC SERPL: 3.2 {RATIO} (ref 2–5)
CO2 SERPL-SCNC: 21 MMOL/L (ref 23–29)
CREAT SERPL-MCNC: 1.3 MG/DL (ref 0.5–1.4)
DIFFERENTIAL METHOD: ABNORMAL
EOSINOPHIL NFR BLD: 1 % (ref 0–8)
ERYTHROCYTE [DISTWIDTH] IN BLOOD BY AUTOMATED COUNT: 14.5 % (ref 11.5–14.5)
EST. GFR  (NO RACE VARIABLE): 47.1 ML/MIN/1.73 M^2
GLUCOSE SERPL-MCNC: 119 MG/DL (ref 70–110)
HCT VFR BLD AUTO: 38.5 % (ref 37–48.5)
HDLC SERPL-MCNC: 43 MG/DL (ref 40–75)
HDLC SERPL: 31.2 % (ref 20–50)
HGB BLD-MCNC: 11.9 G/DL (ref 12–16)
IMM GRANULOCYTES # BLD AUTO: ABNORMAL K/UL (ref 0–0.04)
IMM GRANULOCYTES NFR BLD AUTO: ABNORMAL % (ref 0–0.5)
LDLC SERPL CALC-MCNC: 68.2 MG/DL (ref 63–159)
LYMPHOCYTES NFR BLD: 24 % (ref 18–48)
MAGNESIUM SERPL-MCNC: 1.8 MG/DL (ref 1.6–2.6)
MCH RBC QN AUTO: 28.5 PG (ref 27–31)
MCHC RBC AUTO-ENTMCNC: 30.9 G/DL (ref 32–36)
MCV RBC AUTO: 92 FL (ref 82–98)
METAMYELOCYTES NFR BLD MANUAL: 1 %
MONOCYTES NFR BLD: 1 % (ref 4–15)
NEUTROPHILS NFR BLD: 73 % (ref 38–73)
NONHDLC SERPL-MCNC: 95 MG/DL
NRBC BLD-RTO: 0 /100 WBC
PHOSPHATE SERPL-MCNC: 3 MG/DL (ref 2.7–4.5)
PLATELET # BLD AUTO: 110 K/UL (ref 150–450)
PLATELET BLD QL SMEAR: ABNORMAL
PMV BLD AUTO: 12.4 FL (ref 9.2–12.9)
POLYCHROMASIA BLD QL SMEAR: ABNORMAL
POTASSIUM SERPL-SCNC: 4.7 MMOL/L (ref 3.5–5.1)
PROT SERPL-MCNC: 7.9 G/DL (ref 6–8.4)
PTH-INTACT SERPL-MCNC: 135.5 PG/ML (ref 9–77)
RBC # BLD AUTO: 4.18 M/UL (ref 4–5.4)
SODIUM SERPL-SCNC: 140 MMOL/L (ref 136–145)
TRIGL SERPL-MCNC: 134 MG/DL (ref 30–150)
WBC # BLD AUTO: 3.58 K/UL (ref 3.9–12.7)
WBC TOXIC VACUOLES BLD QL SMEAR: PRESENT

## 2023-06-05 PROCEDURE — 85027 COMPLETE CBC AUTOMATED: CPT | Performed by: INTERNAL MEDICINE

## 2023-06-05 PROCEDURE — 83970 ASSAY OF PARATHORMONE: CPT | Performed by: INTERNAL MEDICINE

## 2023-06-05 PROCEDURE — 80069 RENAL FUNCTION PANEL: CPT | Performed by: INTERNAL MEDICINE

## 2023-06-05 PROCEDURE — 84075 ASSAY ALKALINE PHOSPHATASE: CPT | Performed by: INTERNAL MEDICINE

## 2023-06-05 PROCEDURE — 80197 ASSAY OF TACROLIMUS: CPT | Performed by: INTERNAL MEDICINE

## 2023-06-05 PROCEDURE — 85007 BL SMEAR W/DIFF WBC COUNT: CPT | Performed by: INTERNAL MEDICINE

## 2023-06-05 PROCEDURE — 80061 LIPID PANEL: CPT | Performed by: INTERNAL MEDICINE

## 2023-06-05 PROCEDURE — 83735 ASSAY OF MAGNESIUM: CPT | Performed by: INTERNAL MEDICINE

## 2023-06-05 PROCEDURE — 82306 VITAMIN D 25 HYDROXY: CPT | Performed by: INTERNAL MEDICINE

## 2023-06-05 PROCEDURE — 36415 COLL VENOUS BLD VENIPUNCTURE: CPT | Mod: PO | Performed by: INTERNAL MEDICINE

## 2023-06-05 PROCEDURE — 87799 DETECT AGENT NOS DNA QUANT: CPT | Performed by: INTERNAL MEDICINE

## 2023-06-06 LAB — TACROLIMUS BLD-MCNC: 9.7 NG/ML (ref 5–15)

## 2023-06-06 RX ORDER — ACETAMINOPHEN 500 MG
4000 TABLET ORAL DAILY
Qty: 60 CAPSULE | Refills: 11 | Status: SHIPPED | OUTPATIENT
Start: 2023-06-06 | End: 2023-10-24 | Stop reason: ALTCHOICE

## 2023-06-06 NOTE — TELEPHONE ENCOUNTER
Patient repeated back and voice a understanding of orders.  ----- Message from Maryse Chang DO sent at 6/5/2023  3:13 PM CDT -----  Near baseline graft function  Start on vitamin D 4000 units daily  Bk and FK pending

## 2023-06-08 LAB
BKV DNA SERPL NAA+PROBE-ACNC: 294 COPIES/ML
BKV DNA SERPL NAA+PROBE-LOG#: 2.47 LOG (10) COPIES/ML
BKV DNA SERPL QL NAA+PROBE: DETECTED

## 2023-06-09 ENCOUNTER — OFFICE VISIT (OUTPATIENT)
Dept: OBSTETRICS AND GYNECOLOGY | Facility: CLINIC | Age: 58
End: 2023-06-09
Payer: MEDICARE

## 2023-06-09 VITALS
WEIGHT: 178.38 LBS | BODY MASS INDEX: 27.93 KG/M2 | DIASTOLIC BLOOD PRESSURE: 74 MMHG | SYSTOLIC BLOOD PRESSURE: 120 MMHG

## 2023-06-09 DIAGNOSIS — Z91.89 GYN EXAM FOR HIGH-RISK MEDICARE PATIENT: Primary | ICD-10-CM

## 2023-06-09 DIAGNOSIS — Z94.0 HISTORY OF KIDNEY TRANSPLANT: ICD-10-CM

## 2023-06-09 PROCEDURE — 3074F SYST BP LT 130 MM HG: CPT | Mod: CPTII,S$GLB,, | Performed by: OBSTETRICS & GYNECOLOGY

## 2023-06-09 PROCEDURE — 3078F PR MOST RECENT DIASTOLIC BLOOD PRESSURE < 80 MM HG: ICD-10-PCS | Mod: CPTII,S$GLB,, | Performed by: OBSTETRICS & GYNECOLOGY

## 2023-06-09 PROCEDURE — 1159F MED LIST DOCD IN RCRD: CPT | Mod: CPTII,S$GLB,, | Performed by: OBSTETRICS & GYNECOLOGY

## 2023-06-09 PROCEDURE — 3066F NEPHROPATHY DOC TX: CPT | Mod: CPTII,S$GLB,, | Performed by: OBSTETRICS & GYNECOLOGY

## 2023-06-09 PROCEDURE — 88175 CYTOPATH C/V AUTO FLUID REDO: CPT | Performed by: OBSTETRICS & GYNECOLOGY

## 2023-06-09 PROCEDURE — 3066F PR DOCUMENTATION OF TREATMENT FOR NEPHROPATHY: ICD-10-PCS | Mod: CPTII,S$GLB,, | Performed by: OBSTETRICS & GYNECOLOGY

## 2023-06-09 PROCEDURE — 1159F PR MEDICATION LIST DOCUMENTED IN MEDICAL RECORD: ICD-10-PCS | Mod: CPTII,S$GLB,, | Performed by: OBSTETRICS & GYNECOLOGY

## 2023-06-09 PROCEDURE — G0101 CA SCREEN;PELVIC/BREAST EXAM: HCPCS | Mod: S$GLB,,, | Performed by: OBSTETRICS & GYNECOLOGY

## 2023-06-09 PROCEDURE — 3051F PR MOST RECENT HEMOGLOBIN A1C LEVEL 7.0 - < 8.0%: ICD-10-PCS | Mod: CPTII,S$GLB,, | Performed by: OBSTETRICS & GYNECOLOGY

## 2023-06-09 PROCEDURE — 3074F PR MOST RECENT SYSTOLIC BLOOD PRESSURE < 130 MM HG: ICD-10-PCS | Mod: CPTII,S$GLB,, | Performed by: OBSTETRICS & GYNECOLOGY

## 2023-06-09 PROCEDURE — 3051F HG A1C>EQUAL 7.0%<8.0%: CPT | Mod: CPTII,S$GLB,, | Performed by: OBSTETRICS & GYNECOLOGY

## 2023-06-09 PROCEDURE — 3078F DIAST BP <80 MM HG: CPT | Mod: CPTII,S$GLB,, | Performed by: OBSTETRICS & GYNECOLOGY

## 2023-06-09 PROCEDURE — 3008F BODY MASS INDEX DOCD: CPT | Mod: CPTII,S$GLB,, | Performed by: OBSTETRICS & GYNECOLOGY

## 2023-06-09 PROCEDURE — 99999 PR PBB SHADOW E&M-EST. PATIENT-LVL III: ICD-10-PCS | Mod: PBBFAC,,, | Performed by: OBSTETRICS & GYNECOLOGY

## 2023-06-09 PROCEDURE — 87624 HPV HI-RISK TYP POOLED RSLT: CPT | Performed by: OBSTETRICS & GYNECOLOGY

## 2023-06-09 PROCEDURE — 3008F PR BODY MASS INDEX (BMI) DOCUMENTED: ICD-10-PCS | Mod: CPTII,S$GLB,, | Performed by: OBSTETRICS & GYNECOLOGY

## 2023-06-09 PROCEDURE — 99999 PR PBB SHADOW E&M-EST. PATIENT-LVL III: CPT | Mod: PBBFAC,,, | Performed by: OBSTETRICS & GYNECOLOGY

## 2023-06-09 PROCEDURE — G0101 PR CA SCREEN;PELVIC/BREAST EXAM: ICD-10-PCS | Mod: S$GLB,,, | Performed by: OBSTETRICS & GYNECOLOGY

## 2023-06-09 NOTE — PROGRESS NOTES
Subjective:      Patient ID: Elizabeth Maldonado is a 58 y.o. female.    Chief Complaint:  Well Woman      History of Present Illness  HPI  Annual Exam-Postmenopausal  Patient presents for annual exam. The patient has no complaints today. The patient is not sexually active. GYN screening history: last pap: approximate date 1 yr ago and was abnormal: LGSIL with normal colp . The patient is not taking hormone replacement therapy. Patient denies post-menopausal vaginal bleeding. The patient wears seatbelts: yes. The patient participates in regular exercise: no. Has the patient ever been transfused or tattooed?: not asked. The patient reports that there is not domestic violence in her life.    Pt is due for MMG, ordered previously by PCP.  Pt is up to date with colonoscopy.    Pt had recent kidney transplant.    GYN & OB History  Patient's last menstrual period was 2011 (approximate).   Date of Last Pap: No result found    OB History    Para Term  AB Living   3 2 2   1 2   SAB IAB Ectopic Multiple Live Births   1       2      # Outcome Date GA Lbr Kareem/2nd Weight Sex Delivery Anes PTL Lv   3 SAB            2 Term      CS-LTranv   TAYLOR   1 Term      CS-LTranv   TAYLOR       Review of Systems  Review of Systems   Constitutional: Negative.    Respiratory: Negative.     Cardiovascular: Negative.    Gastrointestinal: Negative.    Endocrine: Negative.    Genitourinary: Negative.    Musculoskeletal: Negative.    Neurological:  Negative for vertigo, seizures, syncope, numbness and headaches.   Hematological: Negative.    Psychiatric/Behavioral:  Negative for depression and sleep disturbance. The patient is not nervous/anxious.    Breast: negative.         Objective:     Physical Exam:   Constitutional: She is oriented to person, place, and time. She appears well-developed and well-nourished. No distress.    HENT:   Head: Normocephalic and atraumatic.     Neck: No thyromegaly present.     Pulmonary/Chest: Effort  normal. No respiratory distress. Right breast exhibits no inverted nipple, no mass, no nipple discharge, no skin change, no tenderness, presence, no bleeding, no swelling, no mastectomy, no augmentation and no lumpectomy. Left breast exhibits no inverted nipple, no mass, no nipple discharge, no skin change, no tenderness, presence, no bleeding, no swelling, no mastectomy, no augmentation and no lumpectomy.        Abdominal: Soft. She exhibits no distension and no mass. There is no abdominal tenderness. There is no rebound and no guarding.     Genitourinary:    Urethra, bladder, vagina, uterus, right adnexa and left adnexa normal.      Pelvic exam was performed with patient in the lithotomy position.   The external female genitalia was normal.   No external genitalia lesions identified,Genitalia hair distrobution normal .   Labial bartholins normal.There is no rash, tenderness, lesion or injury on the right labia. There is no rash, tenderness, lesion or injury on the left labia. Cervix is normal. No no adexnal prolapse, no nodularity or no masses or organomegaly. Right adnexum displays no mass, no tenderness and no fullness. Left adnexum displays no mass, no tenderness and no fullness. Vagina exhibits no lesion. No erythema,  no vaginal discharge, tenderness, bleeding, rectocele, cystocele or unspecified prolapse of vaginal walls in the vagina.    No foreign body in the vagina.      No signs of injury in the vagina.   Vagina was moist.Cervix exhibits no motion tenderness, no lesion, no discharge, no friability, no lesion, no tenderness and no polyp.    pap smear completedUterus consistancy normal. and Uerus contour normal  Uterus is not deviated, not enlarged, not fixed, not tender, not hosting fibroids and no uterine prolapse. Normal urethral meatus.Urethral Meatus exhibits: urethral lesion and prolapsedUrethra findings: no urethral mass, no tenderness and no urethral scarringBladder findings: no bladder distention  and no bladder tenderness          Musculoskeletal: Normal range of motion and moves all extremeties. No tenderness.       Neurological: She is alert and oriented to person, place, and time. No cranial nerve deficit. Coordination normal.    Skin: She is not diaphoretic.    Psychiatric: She has a normal mood and affect. Her behavior is normal. Judgment and thought content normal.       Assessment:     1. GYN exam for high-risk Medicare patient    2. History of kidney transplant              Plan:      Pap and HR HPV collected  Stressed need for annual gyn exams with Pap due to use of immunosuppressive medications

## 2023-06-13 ENCOUNTER — OFFICE VISIT (OUTPATIENT)
Dept: VASCULAR SURGERY | Facility: CLINIC | Age: 58
End: 2023-06-13
Payer: MEDICARE

## 2023-06-13 ENCOUNTER — LAB VISIT (OUTPATIENT)
Dept: LAB | Facility: HOSPITAL | Age: 58
End: 2023-06-13
Attending: INTERNAL MEDICINE
Payer: MEDICARE

## 2023-06-13 ENCOUNTER — HOSPITAL ENCOUNTER (OUTPATIENT)
Dept: VASCULAR SURGERY | Facility: CLINIC | Age: 58
Discharge: HOME | End: 2023-06-13
Attending: SURGERY
Payer: MEDICARE

## 2023-06-13 VITALS
BODY MASS INDEX: 28.02 KG/M2 | HEART RATE: 83 BPM | HEIGHT: 67 IN | WEIGHT: 178.56 LBS | SYSTOLIC BLOOD PRESSURE: 122 MMHG | DIASTOLIC BLOOD PRESSURE: 60 MMHG | TEMPERATURE: 98 F

## 2023-06-13 DIAGNOSIS — N18.6 ESRD (END STAGE RENAL DISEASE) ON DIALYSIS: ICD-10-CM

## 2023-06-13 DIAGNOSIS — Z94.0 KIDNEY REPLACED BY TRANSPLANT: ICD-10-CM

## 2023-06-13 DIAGNOSIS — Z99.2 ESRD (END STAGE RENAL DISEASE) ON DIALYSIS: ICD-10-CM

## 2023-06-13 DIAGNOSIS — N18.31 STAGE 3A CHRONIC KIDNEY DISEASE: Primary | ICD-10-CM

## 2023-06-13 DIAGNOSIS — Z94.0 S/P KIDNEY TRANSPLANT: ICD-10-CM

## 2023-06-13 LAB
ALBUMIN SERPL BCP-MCNC: 3.7 G/DL (ref 3.5–5.2)
ANION GAP SERPL CALC-SCNC: 8 MMOL/L (ref 8–16)
BASOPHILS # BLD AUTO: 0.02 K/UL (ref 0–0.2)
BASOPHILS NFR BLD: 0.7 % (ref 0–1.9)
BUN SERPL-MCNC: 18 MG/DL (ref 6–20)
CALCIUM SERPL-MCNC: 10 MG/DL (ref 8.7–10.5)
CHLORIDE SERPL-SCNC: 110 MMOL/L (ref 95–110)
CO2 SERPL-SCNC: 23 MMOL/L (ref 23–29)
CREAT SERPL-MCNC: 1.5 MG/DL (ref 0.5–1.4)
DIFFERENTIAL METHOD: ABNORMAL
EOSINOPHIL # BLD AUTO: 0 K/UL (ref 0–0.5)
EOSINOPHIL NFR BLD: 0.7 % (ref 0–8)
ERYTHROCYTE [DISTWIDTH] IN BLOOD BY AUTOMATED COUNT: 14.7 % (ref 11.5–14.5)
EST. GFR  (NO RACE VARIABLE): 39.6 ML/MIN/1.73 M^2
GLUCOSE SERPL-MCNC: 140 MG/DL (ref 70–110)
HCT VFR BLD AUTO: 39.3 % (ref 37–48.5)
HGB BLD-MCNC: 12.4 G/DL (ref 12–16)
IMM GRANULOCYTES # BLD AUTO: 0.14 K/UL (ref 0–0.04)
IMM GRANULOCYTES NFR BLD AUTO: 4.8 % (ref 0–0.5)
LYMPHOCYTES # BLD AUTO: 0.9 K/UL (ref 1–4.8)
LYMPHOCYTES NFR BLD: 31.5 % (ref 18–48)
MAGNESIUM SERPL-MCNC: 1.6 MG/DL (ref 1.6–2.6)
MCH RBC QN AUTO: 29.5 PG (ref 27–31)
MCHC RBC AUTO-ENTMCNC: 31.6 G/DL (ref 32–36)
MCV RBC AUTO: 93 FL (ref 82–98)
MONOCYTES # BLD AUTO: 0.1 K/UL (ref 0.3–1)
MONOCYTES NFR BLD: 4.8 % (ref 4–15)
NEUTROPHILS # BLD AUTO: 1.7 K/UL (ref 1.8–7.7)
NEUTROPHILS NFR BLD: 57.5 % (ref 38–73)
NRBC BLD-RTO: 0 /100 WBC
PHOSPHATE SERPL-MCNC: 3 MG/DL (ref 2.7–4.5)
PLATELET # BLD AUTO: 104 K/UL (ref 150–450)
PMV BLD AUTO: 11.5 FL (ref 9.2–12.9)
POTASSIUM SERPL-SCNC: 4.8 MMOL/L (ref 3.5–5.1)
RBC # BLD AUTO: 4.21 M/UL (ref 4–5.4)
SODIUM SERPL-SCNC: 141 MMOL/L (ref 136–145)
TACROLIMUS BLD-MCNC: 8 NG/ML (ref 5–15)
WBC # BLD AUTO: 2.89 K/UL (ref 3.9–12.7)

## 2023-06-13 PROCEDURE — 93990 PR DUPLEX HEMODIALYSIS ACCESS: ICD-10-PCS | Mod: S$GLB,,, | Performed by: SURGERY

## 2023-06-13 PROCEDURE — 83735 ASSAY OF MAGNESIUM: CPT | Performed by: INTERNAL MEDICINE

## 2023-06-13 PROCEDURE — 3066F NEPHROPATHY DOC TX: CPT | Mod: CPTII,S$GLB,, | Performed by: SURGERY

## 2023-06-13 PROCEDURE — 1160F PR REVIEW ALL MEDS BY PRESCRIBER/CLIN PHARMACIST DOCUMENTED: ICD-10-PCS | Mod: CPTII,S$GLB,, | Performed by: SURGERY

## 2023-06-13 PROCEDURE — 1159F MED LIST DOCD IN RCRD: CPT | Mod: CPTII,S$GLB,, | Performed by: SURGERY

## 2023-06-13 PROCEDURE — 3051F HG A1C>EQUAL 7.0%<8.0%: CPT | Mod: CPTII,S$GLB,, | Performed by: SURGERY

## 2023-06-13 PROCEDURE — 80069 RENAL FUNCTION PANEL: CPT | Performed by: INTERNAL MEDICINE

## 2023-06-13 PROCEDURE — 3078F DIAST BP <80 MM HG: CPT | Mod: CPTII,S$GLB,, | Performed by: SURGERY

## 2023-06-13 PROCEDURE — 1159F PR MEDICATION LIST DOCUMENTED IN MEDICAL RECORD: ICD-10-PCS | Mod: CPTII,S$GLB,, | Performed by: SURGERY

## 2023-06-13 PROCEDURE — 3051F PR MOST RECENT HEMOGLOBIN A1C LEVEL 7.0 - < 8.0%: ICD-10-PCS | Mod: CPTII,S$GLB,, | Performed by: SURGERY

## 2023-06-13 PROCEDURE — 80197 ASSAY OF TACROLIMUS: CPT | Performed by: INTERNAL MEDICINE

## 2023-06-13 PROCEDURE — 99999 PR PBB SHADOW E&M-EST. PATIENT-LVL IV: CPT | Mod: PBBFAC,,, | Performed by: SURGERY

## 2023-06-13 PROCEDURE — 99999 PR PBB SHADOW E&M-EST. PATIENT-LVL IV: ICD-10-PCS | Mod: PBBFAC,,, | Performed by: SURGERY

## 2023-06-13 PROCEDURE — 3074F PR MOST RECENT SYSTOLIC BLOOD PRESSURE < 130 MM HG: ICD-10-PCS | Mod: CPTII,S$GLB,, | Performed by: SURGERY

## 2023-06-13 PROCEDURE — 3008F BODY MASS INDEX DOCD: CPT | Mod: CPTII,S$GLB,, | Performed by: SURGERY

## 2023-06-13 PROCEDURE — 3008F PR BODY MASS INDEX (BMI) DOCUMENTED: ICD-10-PCS | Mod: CPTII,S$GLB,, | Performed by: SURGERY

## 2023-06-13 PROCEDURE — 3074F SYST BP LT 130 MM HG: CPT | Mod: CPTII,S$GLB,, | Performed by: SURGERY

## 2023-06-13 PROCEDURE — 85025 COMPLETE CBC W/AUTO DIFF WBC: CPT | Performed by: INTERNAL MEDICINE

## 2023-06-13 PROCEDURE — 3066F PR DOCUMENTATION OF TREATMENT FOR NEPHROPATHY: ICD-10-PCS | Mod: CPTII,S$GLB,, | Performed by: SURGERY

## 2023-06-13 PROCEDURE — 1160F RVW MEDS BY RX/DR IN RCRD: CPT | Mod: CPTII,S$GLB,, | Performed by: SURGERY

## 2023-06-13 PROCEDURE — 3078F PR MOST RECENT DIASTOLIC BLOOD PRESSURE < 80 MM HG: ICD-10-PCS | Mod: CPTII,S$GLB,, | Performed by: SURGERY

## 2023-06-13 PROCEDURE — 99214 PR OFFICE/OUTPT VISIT, EST, LEVL IV, 30-39 MIN: ICD-10-PCS | Mod: S$GLB,,, | Performed by: SURGERY

## 2023-06-13 PROCEDURE — 36415 COLL VENOUS BLD VENIPUNCTURE: CPT | Performed by: INTERNAL MEDICINE

## 2023-06-13 PROCEDURE — 93990 DOPPLER FLOW TESTING: CPT | Mod: S$GLB,,, | Performed by: SURGERY

## 2023-06-13 PROCEDURE — 99214 OFFICE O/P EST MOD 30 MIN: CPT | Mod: S$GLB,,, | Performed by: SURGERY

## 2023-06-13 RX ORDER — ZOLPIDEM TARTRATE 10 MG/1
TABLET ORAL
COMMUNITY
Start: 2023-06-02

## 2023-06-13 RX ORDER — PEN NEEDLE, DIABETIC 31 GX5/16"
NEEDLE, DISPOSABLE MISCELLANEOUS
COMMUNITY
Start: 2023-06-02

## 2023-06-13 NOTE — PROGRESS NOTES
VASCULAR SURGERY SERVICE    REFERRING DOCTOR: Ehsan Quezada MD    CHIEF COMPLAINT:  End-stage renal disease    HISTORY OF PRESENT ILLNESS: Elizabeth Maldonado is a 58 y.o. female , right-handed, status post kidney transplant 2014 which failed, went on peritoneal dialysis March 2022, but had to stop this because of leaking into the pleura.  She is now dialyzing via right IJ PermCath last month.  She is sent for permanent AV access.  She has no history of AICD or pacemaker placement    S/p  Left upper arm AV graft placement 01/05/2023  Kidney transplantation 03/10/2023    02/07/2023:  This is her initial follow-up after left upper arm AV graft placement month ago.  Her initial 2 week follow-up evidently was a no-show.  She states she had significant pain postoperatively but did not want to take the prescribed narcotic.  Pain is now resolved using Tylenol.  She denies any hand pain weakness or numbness.    06/13/2023:  Since my last visit she is received a kidney transplantation and has excellent function with a creatinine of 1.3.  Denies any issues with her left hand or left arm    Past Medical History:   Diagnosis Date    Acidosis 7/1/2014    Allergic rhinitis 7/1/2014    Allergy     Anemia     Anemia in chronic kidney disease 7/1/2014    Anxiety     Cataract     Chronic bilateral low back pain with bilateral sciatica 10/25/2019    Chronic immunosuppression with Prograf and MMF 12/3/2014    CKD (chronic kidney disease) stage 5, GFR less than 15 ml/min 7/1/2014    CKD (chronic kidney disease), stage III 3/2/2015    Degenerative disc disease     Depression 7/1/2014    Diabetes mellitus, type 2 since age 18 7/1/2014    ESRD on peritoneal dialysis - august 2014 for 9 hours no peritonitis 11/24/2014    Hyperlipidemia     Hypertension 7/1/2014    Hypomagnesemia 1/7/2015    Kidney transplant status, living unrelated donor - 12/2/14 12/3/2014    Neutropenia 1/21/2015    NS (nuclear sclerosis) 9/16/2016    Obesity      Organ transplant candidate 2014    Pre-op exam 2014    Proliferative diabetic retinopathy of both eyes without macular edema associated with type 2 diabetes mellitus 2016    Renal manifestation of secondary diabetes mellitus     Tendinitis     Trouble in sleeping        Past Surgical History:   Procedure Laterality Date    AV FISTULA PLACEMENT Left 2023    Procedure: CREATION, AV FISTULA;  Surgeon: LIDIA Kamara III, MD;  [facility];  Service: Peripheral Vascular;  Laterality: Left;  LUE AV graft creation    BIOPSY N/A 2020    Procedure: Biopsy;  Surgeon: Sweta Catalan MD;  Location: [facility] OR [facility] FLR;  Service: Transplant;  Laterality: N/A;    BONE MARROW BIOPSY N/A      SECTION      x 2    COLONOSCOPY N/A 2022    Procedure: COLONOSCOPY;  Surgeon: Franklin Gan MD;  Location: [facility] ENDO (4TH FLR);  Service: Colon and Rectal;  Laterality: N/A;  order created: previous order unusable  fully vaccinated, labs prior, instr mailed / portal -ml    KIDNEY TRANSPLANT  2014    KIDNEY TRANSPLANT N/A 3/10/2023    Procedure: TRANSPLANT, KIDNEY;  Surgeon: Marquise Fuchs MD;  [facility];  Service: Transplant;  Laterality: N/A;    LAPAROSCOPIC REVISION OF PROCEDURE INVOLVING PERITONEAL DIALYSIS CATHETER N/A 2022    Procedure: REVISION OF PROCEDURE INVOLVING PERITONEAL DIALYSIS CATHETER, LAPAROSCOPIC;  Surgeon: Franklin Barber MD;  [facility];  Service: General;  Laterality: N/A;    MEDIPORT REMOVAL N/A 2019    Procedure: REMOVAL, CATHETER, CENTRAL VENOUS, TUNNELED, WITH PORT;  Surgeon: Eusebia Diagnostic Provider;  Location: [facility] OR;  Service: Radiology;  Laterality: N/A;    RENAL BIOPSY      ROTATOR CUFF REPAIR      TUBAL LIGATION           Current Outpatient Medications:     atorvastatin (LIPITOR) 40 MG tablet, Take 1 tablet (40 mg total) by mouth every evening., Disp: 30 tablet, Rfl: 5    BD ULTRA-FINE SHORT PEN NEEDLE 31  "gauge x 5/16" Ndle, USE AS DIRECTED FOUR TIMES DAILY, Disp: , Rfl:     blood sugar diagnostic Strp, Checks BG ac/hs, Disp: 200 strip, Rfl: 7    carvediloL (COREG) 3.125 MG tablet, Take 1 tablet (3.125 mg total) by mouth 2 (two) times daily. Hold for SBP <130, Disp: 60 tablet, Rfl: 5    cholecalciferol, vitamin D3, (VITAMIN D3) 50 mcg (2,000 unit) Cap capsule, Take 2 capsules (4,000 Units total) by mouth once daily., Disp: 60 capsule, Rfl: 11    dulaglutide (TRULICITY) 4.5 mg/0.5 mL pen injector, Inject 4.5 mg into the skin once a week., Disp: 1 pen, Rfl: 5    HUMALOG KWIKPEN INSULIN 100 unit/mL pen, Inject 5 Units into the skin 3 (three) times daily with meals. + SLIDE SCALE, TDD: 30 Units, Disp: 9 mL, Rfl: 5    hydrALAZINE (APRESOLINE) 50 MG tablet, Take 1 tablet (50 mg total) by mouth 3 (three) times daily., Disp: 90 tablet, Rfl: 5    insulin (LANTUS SOLOSTAR U-100 INSULIN) glargine 100 units/mL SubQ pen, Inject 18 Units into the skin once daily. (Patient taking differently: Inject 25 Units into the skin once daily.), Disp: 6 mL, Rfl: 5    k phos di & mono-sod phos mono (K-PHOS-NEUTRAL) 250 mg Tab, Take 1 tablet by mouth once daily., Disp: 30 tablet, Rfl: 11    lancets (ONETOUCH DELICA LANCETS) 33 gauge Misc, 1 lancet by Misc.(Non-Drug; Combo Route) route 4 (four) times daily before meals and nightly., Disp: 200 each, Rfl: 7    multivitamin Tab, Take 1 tablet by mouth once daily., Disp: 30 tablet, Rfl: 5    mycophenolate (CELLCEPT) 250 mg Cap, Take 1 capsule (250 mg total) by mouth 2 (two) times daily. Txp Date:3/10/2023 (Kidney) Disch Date:3/13/2023 ICD10:Z94.0 Txp Location:[facility], Disp: 60 capsule, Rfl: 11    predniSONE (DELTASONE) 5 MG tablet, Take 1 tablet (5 mg total) by mouth once daily., Disp: 91 tablet, Rfl: 3    sulfamethoxazole-trimethoprim 400-80mg (BACTRIM,SEPTRA) 400-80 mg per tablet, Take 1 tablet by mouth once daily. Stop 9/8/2023, Disp: 30 tablet, Rfl: 5    tacrolimus (PROGRAF) 1 MG Cap, Take 3 " capsules (3 mg total) by mouth every 12 (twelve) hours. Txp Date:3/10/2023 (Kidney) Disch Date:3/13/2023 ICD10:Z94.0 Txp Location:[facility], Disp: 180 capsule, Rfl: 11    timolol maleate 0.5% (TIMOPTIC) 0.5 % Drop, Place 1 drop into both eyes 2 (two) times daily., Disp: 5 mL, Rfl: 5    zolpidem (AMBIEN) 10 mg Tab, TAKE 1 TABLET BY MOUTH EVERY DAY AT BEDTIME AS NEEDED FOR SLEEP, Disp: , Rfl:     Review of patient's allergies indicates:   Allergen Reactions    Shrimp Hives, Itching and Rash    Topamax [topiramate] Other (See Comments)     Vision changes    Zoloft [sertraline] Palpitations       Family History   Problem Relation Age of Onset    Diabetes Mother     Hypertension Mother     Diabetes Father     Kidney disease Father     Diabetes Sister     Hypertension Sister     Kidney disease Sister     Heart disease Sister     Heart failure Sister     Diabetes Brother     Diabetes Sister     Stroke Maternal Grandmother     Heart disease Maternal Grandmother         pacemaker    Cataracts Maternal Grandmother     No Known Problems Maternal Aunt     No Known Problems Maternal Uncle     No Known Problems Paternal Aunt     No Known Problems Paternal Uncle     No Known Problems Maternal Grandfather     No Known Problems Paternal Grandmother     No Known Problems Paternal Grandfather     Cancer Neg Hx     Amblyopia Neg Hx     Blindness Neg Hx     Glaucoma Neg Hx     Macular degeneration Neg Hx     Retinal detachment Neg Hx     Strabismus Neg Hx     Thyroid disease Neg Hx     Breast cancer Neg Hx     Colon cancer Neg Hx     Ovarian cancer Neg Hx        Social History     Tobacco Use    Smoking status: Former     Packs/day: 1.00     Years: 20.00     Pack years: 20.00     Types: Cigarettes     Quit date: 2013     Years since quittin.7    Smokeless tobacco: Never    Tobacco comments:     quit smoking cigarettes in 2014   Substance Use Topics    Alcohol use: No    Drug use: No       PHYSICAL EXAM:  Brachial pressures  "are equivalent bilaterally  /60 (BP Location: Right arm, Patient Position: Sitting, BP Method: Large (Automatic))   Pulse 83   Temp 98.3 °F (36.8 °C) (Oral)   Ht 5' 7" (1.702 m)   Wt 81 kg (178 lb 9.2 oz)   LMP 04/29/2011 (Approximate)   BMI 27.97 kg/m²   Constitutional:  Alert,   Well-appearing  In no distress.   Neurological: Normal speech  no focal findings  CN II - XII grossly intact.    Psychiatric: Mood and affect appropriate and symmetric.   HEENT: Normocephalic / atraumatic  PERRLA  Midline trachea  No scars across the neck   Cardiac: Regular rate and rhythm.   Pulmonary: Normal pulmonary effort.   Abdomen: Soft, not distended.     Skin: Warm and well perfused.    Vascular:  2+ radial brachial pulses bilaterally   Extremities/  Musculoskeletal: Left arm demonstrates an excellent thrill without pulsatility in the upper arm prosthetic graft.  Radial pulses strong 2+.  Hand  is 5/5     IMAGING:  Duplex of the AV graft showed to be patent with a stable outflow stenosis with a velocity of 644, flow volume robust at 1.6 L    IMPRESSION:    Three months status post renal transplantation with excellent renal function, creatinine 1.3    Would not generally recommend surveillance or prophylactic intervention on her prosthetic AV graft, given her excellent renal function.  She understands that at some point in follow-up this graft l may thrombosed.    PLAN:     P.r.n. follow-up    ADEOLA Kamara III, MD, FACS  Professor and Chief, Vascular and Endovascular Surgery      CC:  Ehsan Quezada MD          "

## 2023-06-15 PROBLEM — B34.8 BK VIREMIA: Chronic | Status: ACTIVE | Noted: 2023-06-15

## 2023-06-15 NOTE — PROGRESS NOTES
Kidney Post-Transplant Assessment    Referring Physician: Aaron Bonilla  Current Nephrologist: Aaron Bonilla    ORGAN: LEFT KIDNEY  Donor Type: donation after brain death  PHS Increased Risk: no  Cold Ischemia: 1,731 mins  Induction Medications: thymoglobulin    Subjective:     CC:  Reassessment of renal allograft function and management of chronic immunosuppression.    HPI:  Ms. Maldonado is a 58 y.o. year old Black or  female with PMG ESRD 2/2 DM2 and HTN, and failed kidney txp, who received a donation after brain death kidney transplant on 3/10/23. Her most recent creatinine is 1.5 and continues to trend down.  She takes mycophenolate mofetil, prednisone, and tacrolimus for maintenance immunosuppression. Her post transplant course has been uncomplicated to date.     Pertinent Nephrology/Transplant History:   ESRD from DM  failed KT #1 12/2014 -  3/2022. EDW 81kg  DDKT#2  3/10/23. thymo; CIT 28+h    5/4/23 Allograft Kidney US  1. Mildly elevated arterial resistive indices, improved compared to prior.  2. New perinephric simple fluid collection.  Findings may reflect urinoma, lymphocele etcetera.  measuring 12.5 x 2.9 x 3.8 cm.      LOV 5/30/23    Interval HX:   Follows with:   endocrinology for DM mgmt LOV 6/6/23  Lab Results   Component Value Date    HGBA1C 7.2 (H) 03/13/2023       GYN f/u 6/9/23  Cards f/u 6/1/23  [facility] -->Armando Robertson MD    Intake-  adequate hydration reported    UOP-no problems reported   BP   BP Readings from Last 3 Encounters:   06/16/23 134/71   06/13/23 122/60   06/09/23 120/74      Peripheral edema--no   Weight-- stable         Lab /diagnostic results reviewed with patient today.   All questions answered    Review of Systems   Constitutional:  Negative for activity change, appetite change, chills, fatigue, fever and unexpected weight change.   HENT:  Negative for congestion, facial swelling, postnasal drip,  "rhinorrhea, sinus pressure, sore throat and trouble swallowing.    Eyes:  Positive for visual disturbance. Negative for pain and redness.        Wears glasses   Respiratory:  Negative for cough, chest tightness, shortness of breath and wheezing.    Cardiovascular: Negative.  Negative for chest pain, palpitations and leg swelling.   Gastrointestinal:  Positive for abdominal distention. Negative for abdominal pain, diarrhea, nausea and vomiting.            Genitourinary:  Negative for dysuria, flank pain and urgency.   Musculoskeletal:  Positive for back pain (HX sciatica). Negative for gait problem, neck pain and neck stiffness.   Skin:  Negative for rash and wound.   Allergic/Immunologic: Negative for environmental allergies, food allergies and immunocompromised state.   Neurological:  Negative for dizziness, weakness, light-headedness and headaches.   Psychiatric/Behavioral:  Negative for agitation and confusion. The patient is not nervous/anxious.      Objective:   Blood pressure 134/71, pulse 86, temperature 97.2 °F (36.2 °C), temperature source Skin, resp. rate 16, height 5' 7" (1.702 m), weight 81.7 kg (180 lb 1.9 oz), last menstrual period 04/29/2011, SpO2 98 %.body mass index is 28.21 kg/m².    Physical Exam  Vitals reviewed.   Constitutional:       Appearance: Normal appearance. She is well-developed.   HENT:      Head: Normocephalic.   Eyes:      Pupils: Pupils are equal, round, and reactive to light.   Cardiovascular:      Rate and Rhythm: Normal rate and regular rhythm.      Heart sounds: Normal heart sounds.   Pulmonary:      Effort: Pulmonary effort is normal.      Breath sounds: Normal breath sounds.   Abdominal:      General: Bowel sounds are normal.      Palpations: Abdomen is soft.      Comments:   No bruit noted over the allograft     Musculoskeletal:         General: Normal range of motion.        Arms:       Cervical back: Normal range of motion and neck supple.   Skin:     General: Skin is warm " and dry.   Neurological:      Mental Status: She is alert and oriented to person, place, and time.      Motor: No abnormal muscle tone.   Psychiatric:         Behavior: Behavior normal.       Labs:  Lab Results   Component Value Date    WBC 2.89 (L) 06/13/2023    HGB 12.4 06/13/2023    HCT 39.3 06/13/2023     06/13/2023    K 4.8 06/13/2023     06/13/2023    CO2 23 06/13/2023    BUN 18 06/13/2023    CREATININE 1.5 (H) 06/13/2023    EGFRNORACEVR 39.6 (A) 06/13/2023    CALCIUM 10.0 06/13/2023    PHOS 3.0 06/13/2023    MG 1.6 06/13/2023    ALBUMIN 3.7 06/13/2023    AST 18 06/05/2023    ALT 23 06/05/2023    UTPCR Unable to calculate 06/13/2023    .5 (H) 06/05/2023    TACROLIMUS 8.0 06/13/2023       No results found for: EXTANC, EXTWBC, EXTSEGS, EXTPLATELETS, EXTHEMOGLOBI, EXTHEMATOCRI, EXTCREATININ, EXTSODIUM, EXTPOTASSIUM, EXTBUN, EXTCO2, EXTCALCIUM, EXTPHOSPHORU, EXTGLUCOSE, EXTALBUMIN, EXTAST, EXTALT, EXTBILITOTAL, EXTLIPASE, EXTAMYLASE    No results found for: EXTCYCLOSLVL, EXTSIROLIMUS, EXTTACROLVL, EXTPROTCRE, EXTPTHINTACT, EXTPROTEINUA, EXTWBCUA, EXTRBCUA    Labs were reviewed with the patient    Assessment:     1. Kidney transplant status, living unrelated donor - 12/2/14    2. Chronic immunosuppression with Prograf    3. Type 2 diabetes mellitus with stage 3b chronic kidney disease, with long-term current use of insulin    4. MGUS (monoclonal gammopathy of unknown significance)    5. Renovascular hypertension    6. Anemia in stage 3b chronic kidney disease    7. At risk for opportunistic infections    8. BK viremia            Plan:    -stop KPN   - stop Valcyte--tx completed   -cont BK PCR q 2 weeks / guidelines --scheduled 6/19/23          1) s/p living related kidney transplant, CKD 3a              - Graft function.   Scr 1.5, CKD 3b   -FK Trough 8.0, which is   therapeutic  (target 8-10)--    -  cont  FK  3/3,    - cont on Cellcept 250 mg BID along with prednisone 5 mg daily   -  Bactrim  400/80 mg QD for PCP prophylaxis  -Will continue to monitor for drug toxicities         Lab Results   Component Value Date    CREATININE 1.5 (H) 06/13/2023 6/13/2023  3mo 0wk   eGFR >60 mL/min/1.73 m^2 39.6 (A)           5/4/2023  POD 55   AlloSure Score - Kidney % 0.65     5/4/23            2) Uncontrolled HTN: advise low salt diet and home BP monitoring  - Cont  hydralazine. Coreg as prescribed       3) Anemia:              -   will monitor as per our guidelines   H/H  stable-no intervention needed,  ANC 1700--Leukopenia:  unchanged      Lab Results   Component Value Date    WBC 2.89 (L) 06/13/2023    HGB 12.4 06/13/2023    HCT 39.3 06/13/2023    MCV 93 06/13/2023     (L) 06/13/2023     4) type II DM:   -  cont MED REGIME as prescribed . Mgmt deferred to endocrinology    Lab Results   Component Value Date    HGBA1C 7.2 (H) 03/13/2023         5) Hypophosphatemia:Secondary hyperparathyroidism:                 stop KPN--no intervention required.will monitor as per our guidelines  Lab Results   Component Value Date    .5 (H) 06/05/2023    CALCIUM 10.0 06/13/2023    PHOS 3.0 06/13/2023         6) Metabolic acidosis/Electrolyte balance:stable      - no intervention required. will monitor as per our guidelines  Lab Results   Component Value Date     06/13/2023    K 4.8 06/13/2023     06/13/2023    CO2 23 06/13/2023         7) hypomagnesemia:stable    - no intervention required . will monitor as per our guidelines           6/13/2023  3mo 0wk   Magnesium 1.6 - 2.6 mg/dL 1.6       8) Cytopenias: no significant cytopenias will monitor as per our guidelines. Medicine list reviewed including potential causes of drug-induced cytopenias    9) Proteinuria: continue p/c ratio as per guidelines              6/13/2023  3mo 0wk   Prot/Creat Ratio, Urine 0.00 - 0.20 Unable to calculate           10) CMV and BK virus infection screening:  will continue to monitor/ guidelines      BK detected  5/8/23, Cont BK PCR q 2 weeks / guidelines--due on 5/22/23 6/5/2023  POD 87   BK Virus DNA PCR, Quant, Blood <125 Copies/mL 294 (A)      6/5/2023  POD 87   BK Virus DNA, Blood Not detected DETECTED (A)       5/29/2023  POD 80   Cytomegalovirus PCR, Quant <50 IU/mL Not Detected     11) Weight education: provided during the clinic visit   Body mass index is 28.21 kg/m².        Follow-up:   Clinic: return to transplant clinic weekly for the first month after transplant; every 2 weeks during months 2-3; then at 6-, 9-, 12-, 18-, 24-, and 36- months post-transplant to reassess for complications from immunosuppression toxicity and monitor for rejection.  Annually thereafter.    Labs: since patient remains at high risk for rejection and drug-related complications that warrant close monitoring, labs will be ordered as follows: continue twice weekly CBC, renal panel, and drug level for first month; then same labs once weekly through 3rd month post-transplant.  Urine for UA and protein/creatinine ratio monthly.  Serum BK - PCR at 1-, 3-, 6-, 9-, 12-, 18-, 24-, 36-, 48-, and 60 months post-transplant.  Hepatic panel at 1-, 2-, 3-, 6-, 9-, 12-, 18-, 24-, and 36- months post-transplant.    Daya Leon NP       Education:   Material provided to the patient.  Patient reminded to call with any health changes since these can be early signs of significant complications.  Also, I advised the patient to be sure any new medications or changes of old medications are discussed with either a pharmacist or physician knowledgeable with transplant to avoid rejection/drug toxicity related to significant drug interactions.    Patient advised that it is recommended that all transplanted patients, and their close contacts and household members receive Covid vaccination.

## 2023-06-16 ENCOUNTER — OFFICE VISIT (OUTPATIENT)
Dept: TRANSPLANT | Facility: CLINIC | Age: 58
End: 2023-06-16
Payer: MEDICARE

## 2023-06-16 VITALS
WEIGHT: 180.13 LBS | RESPIRATION RATE: 16 BRPM | OXYGEN SATURATION: 98 % | TEMPERATURE: 97 F | SYSTOLIC BLOOD PRESSURE: 134 MMHG | BODY MASS INDEX: 28.27 KG/M2 | DIASTOLIC BLOOD PRESSURE: 71 MMHG | HEIGHT: 67 IN | HEART RATE: 86 BPM

## 2023-06-16 DIAGNOSIS — Z29.89 NEED FOR PROPHYLACTIC IMMUNOTHERAPY: Chronic | ICD-10-CM

## 2023-06-16 DIAGNOSIS — I15.0 RENOVASCULAR HYPERTENSION: ICD-10-CM

## 2023-06-16 DIAGNOSIS — D63.1 ANEMIA IN STAGE 3B CHRONIC KIDNEY DISEASE: ICD-10-CM

## 2023-06-16 DIAGNOSIS — Z91.89 AT RISK FOR OPPORTUNISTIC INFECTIONS: ICD-10-CM

## 2023-06-16 DIAGNOSIS — B34.8 BK VIREMIA: Chronic | ICD-10-CM

## 2023-06-16 DIAGNOSIS — Z79.4 TYPE 2 DIABETES MELLITUS WITH STAGE 3B CHRONIC KIDNEY DISEASE, WITH LONG-TERM CURRENT USE OF INSULIN: ICD-10-CM

## 2023-06-16 DIAGNOSIS — D47.2 MGUS (MONOCLONAL GAMMOPATHY OF UNKNOWN SIGNIFICANCE): Chronic | ICD-10-CM

## 2023-06-16 DIAGNOSIS — Z94.0 KIDNEY TRANSPLANT STATUS, LIVING UNRELATED DONOR: Primary | Chronic | ICD-10-CM

## 2023-06-16 DIAGNOSIS — N18.32 TYPE 2 DIABETES MELLITUS WITH STAGE 3B CHRONIC KIDNEY DISEASE, WITH LONG-TERM CURRENT USE OF INSULIN: ICD-10-CM

## 2023-06-16 DIAGNOSIS — E11.22 TYPE 2 DIABETES MELLITUS WITH STAGE 3B CHRONIC KIDNEY DISEASE, WITH LONG-TERM CURRENT USE OF INSULIN: ICD-10-CM

## 2023-06-16 DIAGNOSIS — N18.32 ANEMIA IN STAGE 3B CHRONIC KIDNEY DISEASE: ICD-10-CM

## 2023-06-16 PROCEDURE — 3008F BODY MASS INDEX DOCD: CPT | Mod: CPTII,S$GLB,, | Performed by: NURSE PRACTITIONER

## 2023-06-16 PROCEDURE — 99999 PR PBB SHADOW E&M-EST. PATIENT-LVL V: ICD-10-PCS | Mod: PBBFAC,,, | Performed by: NURSE PRACTITIONER

## 2023-06-16 PROCEDURE — 3075F PR MOST RECENT SYSTOLIC BLOOD PRESS GE 130-139MM HG: ICD-10-PCS | Mod: CPTII,S$GLB,, | Performed by: NURSE PRACTITIONER

## 2023-06-16 PROCEDURE — 1159F PR MEDICATION LIST DOCUMENTED IN MEDICAL RECORD: ICD-10-PCS | Mod: CPTII,S$GLB,, | Performed by: NURSE PRACTITIONER

## 2023-06-16 PROCEDURE — 3075F SYST BP GE 130 - 139MM HG: CPT | Mod: CPTII,S$GLB,, | Performed by: NURSE PRACTITIONER

## 2023-06-16 PROCEDURE — 3066F PR DOCUMENTATION OF TREATMENT FOR NEPHROPATHY: ICD-10-PCS | Mod: CPTII,S$GLB,, | Performed by: NURSE PRACTITIONER

## 2023-06-16 PROCEDURE — 99215 PR OFFICE/OUTPT VISIT, EST, LEVL V, 40-54 MIN: ICD-10-PCS | Mod: S$GLB,,, | Performed by: NURSE PRACTITIONER

## 2023-06-16 PROCEDURE — 3051F PR MOST RECENT HEMOGLOBIN A1C LEVEL 7.0 - < 8.0%: ICD-10-PCS | Mod: CPTII,S$GLB,, | Performed by: NURSE PRACTITIONER

## 2023-06-16 PROCEDURE — 3051F HG A1C>EQUAL 7.0%<8.0%: CPT | Mod: CPTII,S$GLB,, | Performed by: NURSE PRACTITIONER

## 2023-06-16 PROCEDURE — 3078F DIAST BP <80 MM HG: CPT | Mod: CPTII,S$GLB,, | Performed by: NURSE PRACTITIONER

## 2023-06-16 PROCEDURE — 99215 OFFICE O/P EST HI 40 MIN: CPT | Mod: S$GLB,,, | Performed by: NURSE PRACTITIONER

## 2023-06-16 PROCEDURE — 3066F NEPHROPATHY DOC TX: CPT | Mod: CPTII,S$GLB,, | Performed by: NURSE PRACTITIONER

## 2023-06-16 PROCEDURE — 3008F PR BODY MASS INDEX (BMI) DOCUMENTED: ICD-10-PCS | Mod: CPTII,S$GLB,, | Performed by: NURSE PRACTITIONER

## 2023-06-16 PROCEDURE — 99999 PR PBB SHADOW E&M-EST. PATIENT-LVL V: CPT | Mod: PBBFAC,,, | Performed by: NURSE PRACTITIONER

## 2023-06-16 PROCEDURE — 3078F PR MOST RECENT DIASTOLIC BLOOD PRESSURE < 80 MM HG: ICD-10-PCS | Mod: CPTII,S$GLB,, | Performed by: NURSE PRACTITIONER

## 2023-06-16 PROCEDURE — 1159F MED LIST DOCD IN RCRD: CPT | Mod: CPTII,S$GLB,, | Performed by: NURSE PRACTITIONER

## 2023-06-16 NOTE — LETTER
June 16, 2023        Aaron Bonilla  [address]  [facility] [address]  Phone: [phone]  Fax: [phone]             [facility]- Transplant 1st Fl  [address]  [facility] [address]  Phone: [phone]   Patient: Elizabeth Maldonado   MR Number: [id number]   YOB: 1965   Date of Visit: 6/16/2023       Dear Dr. Aaron Bonilla    Thank you for referring Elizabeth Maldonado to me for evaluation. Attached you will find relevant portions of my assessment and plan of care.    If you have questions, please do not hesitate to call me. I look forward to following Elizabeth Maldonado along with you.    Sincerely,    Daya Leon, NP    Enclosure    If you would like to receive this communication electronically, please contact [email or web address] or [phone] to request [employer] access.    [employer] is a tool which provides read-only access to select patient information with whom you have a relationship. Its easy to use and provides real time access to review your patients record including encounter summaries, notes, results, and demographic information.    If you feel you have received this communication in error or would no longer like to receive these types of communications, please e-mail [email or web address]

## 2023-06-19 ENCOUNTER — TELEPHONE (OUTPATIENT)
Dept: TRANSPLANT | Facility: CLINIC | Age: 58
End: 2023-06-19
Payer: MEDICARE

## 2023-06-19 ENCOUNTER — SOCIAL WORK (OUTPATIENT)
Dept: TRANSPLANT | Facility: CLINIC | Age: 58
End: 2023-06-19
Payer: MEDICARE

## 2023-06-19 NOTE — PROGRESS NOTES
LUCHO returned pt's call about transportation.  Pt stated she had arranged transportation for her appointment and it was suddenly changed to tomorrow without notice.  SW attempted to problem solve with pt regarding other potential transportation resources.  Pt responded by saying she would manage a solution on her own.  SW suggested she contact her RN coordinator to discuss a possible alternate solution (ie possibly change appointment to another time>  Pt stated she would try this.  LUCHO informed pt's RN Coordinator who stated he will contact pt to discuss.  LUCHO remains available.

## 2023-06-19 NOTE — TELEPHONE ENCOUNTER
"Return call to patient , due to the lack of transportation.  Patient requesting 6/20 labs moved to 6/21.  ----- Message from Davide Washburn sent at 6/19/2023 11:15 AM CDT -----  Consult/Advisory:          Name Of Caller: Self      Contact Preference?: [phone]       What is the nature of the call?: Calling to speak w/ Syd about 6/20 (tomorrow's) lab appt        Additional Notes: Pt refused to provide additional info    "Thank you for all that you do for our patients"      "

## 2023-06-21 ENCOUNTER — LAB VISIT (OUTPATIENT)
Dept: LAB | Facility: HOSPITAL | Age: 58
End: 2023-06-21
Attending: INTERNAL MEDICINE
Payer: MEDICARE

## 2023-06-21 DIAGNOSIS — Z94.0 KIDNEY REPLACED BY TRANSPLANT: ICD-10-CM

## 2023-06-21 LAB
ALBUMIN SERPL BCP-MCNC: 3.7 G/DL (ref 3.5–5.2)
ANION GAP SERPL CALC-SCNC: 8 MMOL/L (ref 8–16)
BASOPHILS # BLD AUTO: 0.03 K/UL (ref 0–0.2)
BASOPHILS NFR BLD: 0.6 % (ref 0–1.9)
BUN SERPL-MCNC: 32 MG/DL (ref 6–20)
CALCIUM SERPL-MCNC: 10 MG/DL (ref 8.7–10.5)
CHLORIDE SERPL-SCNC: 109 MMOL/L (ref 95–110)
CO2 SERPL-SCNC: 20 MMOL/L (ref 23–29)
CREAT SERPL-MCNC: 1.5 MG/DL (ref 0.5–1.4)
DIFFERENTIAL METHOD: ABNORMAL
EOSINOPHIL # BLD AUTO: 0.1 K/UL (ref 0–0.5)
EOSINOPHIL NFR BLD: 1 % (ref 0–8)
ERYTHROCYTE [DISTWIDTH] IN BLOOD BY AUTOMATED COUNT: 14.5 % (ref 11.5–14.5)
EST. GFR  (NO RACE VARIABLE): 39.6 ML/MIN/1.73 M^2
GLUCOSE SERPL-MCNC: 193 MG/DL (ref 70–110)
HCT VFR BLD AUTO: 39.1 % (ref 37–48.5)
HGB BLD-MCNC: 12.2 G/DL (ref 12–16)
IMM GRANULOCYTES # BLD AUTO: 0.1 K/UL (ref 0–0.04)
IMM GRANULOCYTES NFR BLD AUTO: 2 % (ref 0–0.5)
LYMPHOCYTES # BLD AUTO: 1 K/UL (ref 1–4.8)
LYMPHOCYTES NFR BLD: 19.5 % (ref 18–48)
MAGNESIUM SERPL-MCNC: 1.8 MG/DL (ref 1.6–2.6)
MCH RBC QN AUTO: 29.5 PG (ref 27–31)
MCHC RBC AUTO-ENTMCNC: 31.2 G/DL (ref 32–36)
MCV RBC AUTO: 95 FL (ref 82–98)
MONOCYTES # BLD AUTO: 0.8 K/UL (ref 0.3–1)
MONOCYTES NFR BLD: 16.3 % (ref 4–15)
NEUTROPHILS # BLD AUTO: 3 K/UL (ref 1.8–7.7)
NEUTROPHILS NFR BLD: 60.6 % (ref 38–73)
NRBC BLD-RTO: 0 /100 WBC
PHOSPHATE SERPL-MCNC: 2.7 MG/DL (ref 2.7–4.5)
PLATELET # BLD AUTO: 99 K/UL (ref 150–450)
PMV BLD AUTO: 12.9 FL (ref 9.2–12.9)
POTASSIUM SERPL-SCNC: 4.4 MMOL/L (ref 3.5–5.1)
RBC # BLD AUTO: 4.13 M/UL (ref 4–5.4)
SODIUM SERPL-SCNC: 137 MMOL/L (ref 136–145)
WBC # BLD AUTO: 4.97 K/UL (ref 3.9–12.7)

## 2023-06-21 PROCEDURE — 36415 COLL VENOUS BLD VENIPUNCTURE: CPT | Mod: PO | Performed by: INTERNAL MEDICINE

## 2023-06-21 PROCEDURE — 87799 DETECT AGENT NOS DNA QUANT: CPT | Performed by: INTERNAL MEDICINE

## 2023-06-21 PROCEDURE — 83735 ASSAY OF MAGNESIUM: CPT | Performed by: INTERNAL MEDICINE

## 2023-06-21 PROCEDURE — 80069 RENAL FUNCTION PANEL: CPT | Performed by: INTERNAL MEDICINE

## 2023-06-21 PROCEDURE — 85025 COMPLETE CBC W/AUTO DIFF WBC: CPT | Performed by: INTERNAL MEDICINE

## 2023-06-23 DIAGNOSIS — Z94.0 KIDNEY REPLACED BY TRANSPLANT: ICD-10-CM

## 2023-06-23 LAB
BKV DNA SERPL NAA+PROBE-ACNC: ABNORMAL COPIES/ML
BKV DNA SERPL NAA+PROBE-LOG#: 3.07 LOG (10) COPIES/ML
BKV DNA SERPL QL NAA+PROBE: DETECTED

## 2023-06-23 RX ORDER — TACROLIMUS 1 MG/1
2 CAPSULE ORAL EVERY 12 HOURS
Qty: 120 CAPSULE | Refills: 11 | Status: SHIPPED | OUTPATIENT
Start: 2023-06-23 | End: 2023-07-20 | Stop reason: DRUGHIGH

## 2023-06-23 RX ORDER — TACROLIMUS 0.5 MG/1
0.5 CAPSULE ORAL EVERY 12 HOURS
Qty: 60 CAPSULE | Refills: 11 | Status: SHIPPED | OUTPATIENT
Start: 2023-06-23 | End: 2023-07-20 | Stop reason: DRUGHIGH

## 2023-06-23 NOTE — TELEPHONE ENCOUNTER
----- Message from Kalpana Grider MD sent at 6/23/2023  3:45 PM CDT -----  STOP MMF  Lower tacrolimus to 2.5 mg BID

## 2023-06-23 NOTE — TELEPHONE ENCOUNTER
Called patient regarding orders below. Pt verbalized understanding.   Labs 7/5  ----- Message from Kalpana Grider MD sent at 6/23/2023  3:45 PM CDT -----  STOP MMF  Lower tacrolimus to 2.5 mg BID

## 2023-06-26 ENCOUNTER — TELEPHONE (OUTPATIENT)
Dept: TRANSPLANT | Facility: CLINIC | Age: 58
End: 2023-06-26
Payer: MEDICARE

## 2023-06-26 NOTE — TELEPHONE ENCOUNTER
Return call to patient, 6/21 labs reviewed with patient and states she has decreased her Tacrolimus to 2.5mg BID.  Next labs on 7/5/2023.  ----- Message from Syd Cannon RN sent at 6/26/2023  9:14 AM CDT -----  Regarding: FW: Consult/Advisory  Contact: Elizabeth Maldonado    ----- Message -----  From: Yemi Celis  Sent: 6/23/2023   3:28 PM CDT  To: [facility]  Subject: Consult/Advisory                                 Consult/Advisory    Name Of Caller:Elizabeth Maldonado      Contact Preference: [phone] (home)      Nature of call: Patient calling to speak to coordinator regarding her test result. Requesting a call back.

## 2023-07-05 ENCOUNTER — PATIENT MESSAGE (OUTPATIENT)
Dept: NEPHROLOGY | Facility: CLINIC | Age: 58
End: 2023-07-05
Payer: MEDICARE

## 2023-07-05 ENCOUNTER — LAB VISIT (OUTPATIENT)
Dept: LAB | Facility: HOSPITAL | Age: 58
End: 2023-07-05
Attending: INTERNAL MEDICINE
Payer: MEDICARE

## 2023-07-05 DIAGNOSIS — Z94.0 KIDNEY REPLACED BY TRANSPLANT: ICD-10-CM

## 2023-07-05 LAB
ALBUMIN SERPL BCP-MCNC: 3.8 G/DL (ref 3.5–5.2)
ANION GAP SERPL CALC-SCNC: 8 MMOL/L (ref 8–16)
BASOPHILS # BLD AUTO: 0.03 K/UL (ref 0–0.2)
BASOPHILS NFR BLD: 0.6 % (ref 0–1.9)
BUN SERPL-MCNC: 28 MG/DL (ref 6–20)
CALCIUM SERPL-MCNC: 10.2 MG/DL (ref 8.7–10.5)
CHLORIDE SERPL-SCNC: 109 MMOL/L (ref 95–110)
CO2 SERPL-SCNC: 23 MMOL/L (ref 23–29)
CREAT SERPL-MCNC: 1.5 MG/DL (ref 0.5–1.4)
DIFFERENTIAL METHOD: ABNORMAL
EOSINOPHIL # BLD AUTO: 0.2 K/UL (ref 0–0.5)
EOSINOPHIL NFR BLD: 3.3 % (ref 0–8)
ERYTHROCYTE [DISTWIDTH] IN BLOOD BY AUTOMATED COUNT: 13.8 % (ref 11.5–14.5)
EST. GFR  (NO RACE VARIABLE): 39.6 ML/MIN/1.73 M^2
GLUCOSE SERPL-MCNC: 161 MG/DL (ref 70–110)
HCT VFR BLD AUTO: 42.6 % (ref 37–48.5)
HGB BLD-MCNC: 12.7 G/DL (ref 12–16)
IMM GRANULOCYTES # BLD AUTO: 0.02 K/UL (ref 0–0.04)
IMM GRANULOCYTES NFR BLD AUTO: 0.4 % (ref 0–0.5)
LYMPHOCYTES # BLD AUTO: 1.1 K/UL (ref 1–4.8)
LYMPHOCYTES NFR BLD: 20.9 % (ref 18–48)
MAGNESIUM SERPL-MCNC: 1.9 MG/DL (ref 1.6–2.6)
MCH RBC QN AUTO: 28.2 PG (ref 27–31)
MCHC RBC AUTO-ENTMCNC: 29.8 G/DL (ref 32–36)
MCV RBC AUTO: 95 FL (ref 82–98)
MONOCYTES # BLD AUTO: 0.6 K/UL (ref 0.3–1)
MONOCYTES NFR BLD: 12.4 % (ref 4–15)
NEUTROPHILS # BLD AUTO: 3.2 K/UL (ref 1.8–7.7)
NEUTROPHILS NFR BLD: 62.4 % (ref 38–73)
NRBC BLD-RTO: 0 /100 WBC
PHOSPHATE SERPL-MCNC: 3.3 MG/DL (ref 2.7–4.5)
PLATELET # BLD AUTO: 101 K/UL (ref 150–450)
PMV BLD AUTO: 12.3 FL (ref 9.2–12.9)
POTASSIUM SERPL-SCNC: 4.7 MMOL/L (ref 3.5–5.1)
RBC # BLD AUTO: 4.5 M/UL (ref 4–5.4)
SODIUM SERPL-SCNC: 140 MMOL/L (ref 136–145)
WBC # BLD AUTO: 5.17 K/UL (ref 3.9–12.7)

## 2023-07-05 PROCEDURE — 36415 COLL VENOUS BLD VENIPUNCTURE: CPT | Mod: PO | Performed by: INTERNAL MEDICINE

## 2023-07-05 PROCEDURE — 80197 ASSAY OF TACROLIMUS: CPT | Performed by: INTERNAL MEDICINE

## 2023-07-05 PROCEDURE — 85025 COMPLETE CBC W/AUTO DIFF WBC: CPT | Performed by: INTERNAL MEDICINE

## 2023-07-05 PROCEDURE — 87799 DETECT AGENT NOS DNA QUANT: CPT | Performed by: INTERNAL MEDICINE

## 2023-07-05 PROCEDURE — 83735 ASSAY OF MAGNESIUM: CPT | Performed by: INTERNAL MEDICINE

## 2023-07-05 PROCEDURE — 80069 RENAL FUNCTION PANEL: CPT | Performed by: INTERNAL MEDICINE

## 2023-07-06 LAB — TACROLIMUS BLD-MCNC: 7.6 NG/ML (ref 5–15)

## 2023-07-10 LAB
BKV DNA SERPL NAA+PROBE-ACNC: 244 COPIES/ML
BKV DNA SERPL NAA+PROBE-LOG#: 2.39 LOG (10) COPIES/ML
BKV DNA SERPL QL NAA+PROBE: DETECTED

## 2023-07-18 ENCOUNTER — LAB VISIT (OUTPATIENT)
Dept: LAB | Facility: HOSPITAL | Age: 58
End: 2023-07-18
Attending: INTERNAL MEDICINE
Payer: MEDICARE

## 2023-07-18 ENCOUNTER — TELEPHONE (OUTPATIENT)
Dept: TRANSPLANT | Facility: CLINIC | Age: 58
End: 2023-07-18
Payer: MEDICARE

## 2023-07-18 DIAGNOSIS — Z94.0 KIDNEY REPLACED BY TRANSPLANT: ICD-10-CM

## 2023-07-18 LAB
ALBUMIN SERPL BCP-MCNC: 3.7 G/DL (ref 3.5–5.2)
ANION GAP SERPL CALC-SCNC: 9 MMOL/L (ref 8–16)
BASOPHILS # BLD AUTO: 0.01 K/UL (ref 0–0.2)
BASOPHILS NFR BLD: 0.2 % (ref 0–1.9)
BILIRUB UR QL STRIP: NEGATIVE
BUN SERPL-MCNC: 17 MG/DL (ref 6–20)
CALCIUM SERPL-MCNC: 9.8 MG/DL (ref 8.7–10.5)
CHLORIDE SERPL-SCNC: 111 MMOL/L (ref 95–110)
CLARITY UR REFRACT.AUTO: CLEAR
CO2 SERPL-SCNC: 22 MMOL/L (ref 23–29)
COLOR UR AUTO: COLORLESS
CREAT SERPL-MCNC: 1.4 MG/DL (ref 0.5–1.4)
CREAT UR-MCNC: 39 MG/DL (ref 15–325)
DIFFERENTIAL METHOD: ABNORMAL
EOSINOPHIL # BLD AUTO: 0.1 K/UL (ref 0–0.5)
EOSINOPHIL NFR BLD: 2.3 % (ref 0–8)
ERYTHROCYTE [DISTWIDTH] IN BLOOD BY AUTOMATED COUNT: 13.7 % (ref 11.5–14.5)
EST. GFR  (NO RACE VARIABLE): 43.1 ML/MIN/1.73 M^2
GLUCOSE SERPL-MCNC: 130 MG/DL (ref 70–110)
GLUCOSE UR QL STRIP: NEGATIVE
HCT VFR BLD AUTO: 39.8 % (ref 37–48.5)
HGB BLD-MCNC: 12.2 G/DL (ref 12–16)
HGB UR QL STRIP: NEGATIVE
IMM GRANULOCYTES # BLD AUTO: 0.02 K/UL (ref 0–0.04)
IMM GRANULOCYTES NFR BLD AUTO: 0.5 % (ref 0–0.5)
KETONES UR QL STRIP: NEGATIVE
LEUKOCYTE ESTERASE UR QL STRIP: NEGATIVE
LYMPHOCYTES # BLD AUTO: 1.1 K/UL (ref 1–4.8)
LYMPHOCYTES NFR BLD: 24 % (ref 18–48)
MAGNESIUM SERPL-MCNC: 1.8 MG/DL (ref 1.6–2.6)
MCH RBC QN AUTO: 29.1 PG (ref 27–31)
MCHC RBC AUTO-ENTMCNC: 30.7 G/DL (ref 32–36)
MCV RBC AUTO: 95 FL (ref 82–98)
MONOCYTES # BLD AUTO: 0.5 K/UL (ref 0.3–1)
MONOCYTES NFR BLD: 11.6 % (ref 4–15)
NEUTROPHILS # BLD AUTO: 2.7 K/UL (ref 1.8–7.7)
NEUTROPHILS NFR BLD: 61.4 % (ref 38–73)
NITRITE UR QL STRIP: NEGATIVE
NRBC BLD-RTO: 0 /100 WBC
PH UR STRIP: 6 [PH] (ref 5–8)
PHOSPHATE SERPL-MCNC: 2.8 MG/DL (ref 2.7–4.5)
PLATELET # BLD AUTO: 89 K/UL (ref 150–450)
PMV BLD AUTO: 12.1 FL (ref 9.2–12.9)
POTASSIUM SERPL-SCNC: 4.4 MMOL/L (ref 3.5–5.1)
PROT UR QL STRIP: NEGATIVE
PROT UR-MCNC: <7 MG/DL (ref 0–15)
PROT/CREAT UR: NORMAL MG/G{CREAT} (ref 0–0.2)
RBC # BLD AUTO: 4.19 M/UL (ref 4–5.4)
SODIUM SERPL-SCNC: 142 MMOL/L (ref 136–145)
SP GR UR STRIP: 1 (ref 1–1.03)
URN SPEC COLLECT METH UR: ABNORMAL
WBC # BLD AUTO: 4.41 K/UL (ref 3.9–12.7)

## 2023-07-18 PROCEDURE — 85025 COMPLETE CBC W/AUTO DIFF WBC: CPT | Performed by: INTERNAL MEDICINE

## 2023-07-18 PROCEDURE — 80069 RENAL FUNCTION PANEL: CPT | Performed by: INTERNAL MEDICINE

## 2023-07-18 PROCEDURE — 87799 DETECT AGENT NOS DNA QUANT: CPT | Performed by: INTERNAL MEDICINE

## 2023-07-18 PROCEDURE — 83735 ASSAY OF MAGNESIUM: CPT | Performed by: INTERNAL MEDICINE

## 2023-07-18 PROCEDURE — 36415 COLL VENOUS BLD VENIPUNCTURE: CPT | Mod: PO | Performed by: INTERNAL MEDICINE

## 2023-07-18 PROCEDURE — 81003 URINALYSIS AUTO W/O SCOPE: CPT | Performed by: INTERNAL MEDICINE

## 2023-07-18 PROCEDURE — 82570 ASSAY OF URINE CREATININE: CPT | Performed by: INTERNAL MEDICINE

## 2023-07-18 PROCEDURE — 80197 ASSAY OF TACROLIMUS: CPT | Performed by: INTERNAL MEDICINE

## 2023-07-18 NOTE — TELEPHONE ENCOUNTER
Patient repeated back and voice a understanding of orders and states she will go back to the lab on Friday 7/21 to have the CMV pcr drawn.    ----- Message from Cristi Benitez MD sent at 7/18/2023  1:49 PM CDT -----  Lets get a serum CMV pcr please

## 2023-07-19 LAB — TACROLIMUS BLD-MCNC: 6.7 NG/ML (ref 5–15)

## 2023-07-20 DIAGNOSIS — Z94.0 KIDNEY REPLACED BY TRANSPLANT: ICD-10-CM

## 2023-07-20 RX ORDER — TACROLIMUS 1 MG/1
3 CAPSULE ORAL EVERY 12 HOURS
Qty: 180 CAPSULE | Refills: 11 | Status: SHIPPED | OUTPATIENT
Start: 2023-07-20 | End: 2023-08-15 | Stop reason: DRUGHIGH

## 2023-07-20 NOTE — TELEPHONE ENCOUNTER
Patient repeated back and voice a understanding of orders. Next Prograf level on 8/1.Reminded of 7/21 CMV.   ----- Message from Cristi Benitez MD sent at 7/20/2023 10:22 AM CDT -----  Yes sure  ----- Message -----  From: Syd Cannon RN  Sent: 7/20/2023  10:18 AM CDT  To: Cristi Benitez MD    Patient is  presently taking Prograf  2.5mg BID.  Would you like to increase to 3mg BID ?  ----- Message -----  From: Cristi Benitez MD  Sent: 7/20/2023   8:03 AM CDT  To: [facility] Trans[facility] Clinical    Second transplant tacro below target, lets increase prograf to 1 mg PM and 0.5 mg PM

## 2023-07-21 ENCOUNTER — LAB VISIT (OUTPATIENT)
Dept: LAB | Facility: HOSPITAL | Age: 58
End: 2023-07-21
Attending: INTERNAL MEDICINE
Payer: MEDICAID

## 2023-07-21 DIAGNOSIS — Z94.0 KIDNEY REPLACED BY TRANSPLANT: ICD-10-CM

## 2023-07-21 PROCEDURE — 36415 COLL VENOUS BLD VENIPUNCTURE: CPT | Mod: PO | Performed by: INTERNAL MEDICINE

## 2023-08-01 ENCOUNTER — LAB VISIT (OUTPATIENT)
Dept: LAB | Facility: HOSPITAL | Age: 58
End: 2023-08-01
Attending: INTERNAL MEDICINE
Payer: MEDICARE

## 2023-08-01 DIAGNOSIS — Z94.0 KIDNEY REPLACED BY TRANSPLANT: ICD-10-CM

## 2023-08-01 LAB
ALBUMIN SERPL BCP-MCNC: 3.9 G/DL (ref 3.5–5.2)
ANION GAP SERPL CALC-SCNC: 9 MMOL/L (ref 8–16)
BASOPHILS # BLD AUTO: 0.02 K/UL (ref 0–0.2)
BASOPHILS NFR BLD: 0.4 % (ref 0–1.9)
BUN SERPL-MCNC: 34 MG/DL (ref 6–20)
CALCIUM SERPL-MCNC: 10.5 MG/DL (ref 8.7–10.5)
CHLORIDE SERPL-SCNC: 109 MMOL/L (ref 95–110)
CO2 SERPL-SCNC: 19 MMOL/L (ref 23–29)
CREAT SERPL-MCNC: 1.6 MG/DL (ref 0.5–1.4)
DIFFERENTIAL METHOD: ABNORMAL
EOSINOPHIL # BLD AUTO: 0.1 K/UL (ref 0–0.5)
EOSINOPHIL NFR BLD: 1 % (ref 0–8)
ERYTHROCYTE [DISTWIDTH] IN BLOOD BY AUTOMATED COUNT: 13.3 % (ref 11.5–14.5)
EST. GFR  (NO RACE VARIABLE): 36.7 ML/MIN/1.73 M^2
GLUCOSE SERPL-MCNC: 109 MG/DL (ref 70–110)
HCT VFR BLD AUTO: 40.7 % (ref 37–48.5)
HGB BLD-MCNC: 12.4 G/DL (ref 12–16)
IMM GRANULOCYTES # BLD AUTO: 0.04 K/UL (ref 0–0.04)
IMM GRANULOCYTES NFR BLD AUTO: 0.8 % (ref 0–0.5)
LYMPHOCYTES # BLD AUTO: 1.5 K/UL (ref 1–4.8)
LYMPHOCYTES NFR BLD: 29.3 % (ref 18–48)
MAGNESIUM SERPL-MCNC: 1.9 MG/DL (ref 1.6–2.6)
MCH RBC QN AUTO: 28.4 PG (ref 27–31)
MCHC RBC AUTO-ENTMCNC: 30.5 G/DL (ref 32–36)
MCV RBC AUTO: 93 FL (ref 82–98)
MONOCYTES # BLD AUTO: 0.5 K/UL (ref 0.3–1)
MONOCYTES NFR BLD: 10.6 % (ref 4–15)
NEUTROPHILS # BLD AUTO: 2.9 K/UL (ref 1.8–7.7)
NEUTROPHILS NFR BLD: 57.9 % (ref 38–73)
NRBC BLD-RTO: 0 /100 WBC
PHOSPHATE SERPL-MCNC: 3.1 MG/DL (ref 2.7–4.5)
PLATELET # BLD AUTO: 112 K/UL (ref 150–450)
PMV BLD AUTO: 11.9 FL (ref 9.2–12.9)
POTASSIUM SERPL-SCNC: 4.1 MMOL/L (ref 3.5–5.1)
RBC # BLD AUTO: 4.37 M/UL (ref 4–5.4)
SODIUM SERPL-SCNC: 137 MMOL/L (ref 136–145)
TACROLIMUS BLD-MCNC: 9.2 NG/ML (ref 5–15)
WBC # BLD AUTO: 5.08 K/UL (ref 3.9–12.7)

## 2023-08-01 PROCEDURE — 36415 COLL VENOUS BLD VENIPUNCTURE: CPT | Mod: PO | Performed by: INTERNAL MEDICINE

## 2023-08-01 PROCEDURE — 80069 RENAL FUNCTION PANEL: CPT | Performed by: INTERNAL MEDICINE

## 2023-08-01 PROCEDURE — 83735 ASSAY OF MAGNESIUM: CPT | Performed by: INTERNAL MEDICINE

## 2023-08-01 PROCEDURE — 87799 DETECT AGENT NOS DNA QUANT: CPT | Performed by: INTERNAL MEDICINE

## 2023-08-01 PROCEDURE — 85025 COMPLETE CBC W/AUTO DIFF WBC: CPT | Performed by: INTERNAL MEDICINE

## 2023-08-01 PROCEDURE — 80197 ASSAY OF TACROLIMUS: CPT | Performed by: INTERNAL MEDICINE

## 2023-08-15 ENCOUNTER — LAB VISIT (OUTPATIENT)
Dept: LAB | Facility: HOSPITAL | Age: 58
End: 2023-08-15
Attending: INTERNAL MEDICINE
Payer: MEDICARE

## 2023-08-15 DIAGNOSIS — Z94.0 KIDNEY REPLACED BY TRANSPLANT: ICD-10-CM

## 2023-08-15 LAB
ALBUMIN SERPL BCP-MCNC: 3.5 G/DL (ref 3.5–5.2)
ANION GAP SERPL CALC-SCNC: 8 MMOL/L (ref 8–16)
BASOPHILS # BLD AUTO: 0.01 K/UL (ref 0–0.2)
BASOPHILS NFR BLD: 0.2 % (ref 0–1.9)
BUN SERPL-MCNC: 20 MG/DL (ref 6–20)
CALCIUM SERPL-MCNC: 9.5 MG/DL (ref 8.7–10.5)
CHLORIDE SERPL-SCNC: 109 MMOL/L (ref 95–110)
CO2 SERPL-SCNC: 22 MMOL/L (ref 23–29)
CREAT SERPL-MCNC: 1.1 MG/DL (ref 0.5–1.4)
DIFFERENTIAL METHOD: ABNORMAL
EOSINOPHIL # BLD AUTO: 0.1 K/UL (ref 0–0.5)
EOSINOPHIL NFR BLD: 1 % (ref 0–8)
ERYTHROCYTE [DISTWIDTH] IN BLOOD BY AUTOMATED COUNT: 13.7 % (ref 11.5–14.5)
EST. GFR  (NO RACE VARIABLE): 57.5 ML/MIN/1.73 M^2
GLUCOSE SERPL-MCNC: 125 MG/DL (ref 70–110)
HCT VFR BLD AUTO: 39.9 % (ref 37–48.5)
HGB BLD-MCNC: 12.2 G/DL (ref 12–16)
IMM GRANULOCYTES # BLD AUTO: 0.03 K/UL (ref 0–0.04)
IMM GRANULOCYTES NFR BLD AUTO: 0.6 % (ref 0–0.5)
LYMPHOCYTES # BLD AUTO: 1.3 K/UL (ref 1–4.8)
LYMPHOCYTES NFR BLD: 26 % (ref 18–48)
MAGNESIUM SERPL-MCNC: 1.6 MG/DL (ref 1.6–2.6)
MCH RBC QN AUTO: 29.3 PG (ref 27–31)
MCHC RBC AUTO-ENTMCNC: 30.6 G/DL (ref 32–36)
MCV RBC AUTO: 96 FL (ref 82–98)
MONOCYTES # BLD AUTO: 0.6 K/UL (ref 0.3–1)
MONOCYTES NFR BLD: 11.5 % (ref 4–15)
NEUTROPHILS # BLD AUTO: 3 K/UL (ref 1.8–7.7)
NEUTROPHILS NFR BLD: 60.7 % (ref 38–73)
NRBC BLD-RTO: 0 /100 WBC
PHOSPHATE SERPL-MCNC: 2.8 MG/DL (ref 2.7–4.5)
PLATELET # BLD AUTO: 100 K/UL (ref 150–450)
PMV BLD AUTO: 12.4 FL (ref 9.2–12.9)
POTASSIUM SERPL-SCNC: 4.3 MMOL/L (ref 3.5–5.1)
RBC # BLD AUTO: 4.17 M/UL (ref 4–5.4)
SODIUM SERPL-SCNC: 139 MMOL/L (ref 136–145)
TACROLIMUS BLD-MCNC: 6.1 NG/ML (ref 5–15)
WBC # BLD AUTO: 4.88 K/UL (ref 3.9–12.7)

## 2023-08-15 PROCEDURE — 80069 RENAL FUNCTION PANEL: CPT | Performed by: INTERNAL MEDICINE

## 2023-08-15 PROCEDURE — 83735 ASSAY OF MAGNESIUM: CPT | Performed by: INTERNAL MEDICINE

## 2023-08-15 PROCEDURE — 80197 ASSAY OF TACROLIMUS: CPT | Performed by: INTERNAL MEDICINE

## 2023-08-15 PROCEDURE — 87799 DETECT AGENT NOS DNA QUANT: CPT | Performed by: INTERNAL MEDICINE

## 2023-08-15 PROCEDURE — 85025 COMPLETE CBC W/AUTO DIFF WBC: CPT | Performed by: INTERNAL MEDICINE

## 2023-08-15 RX ORDER — TACROLIMUS 1 MG/1
CAPSULE ORAL
Qty: 210 CAPSULE | Refills: 11 | Status: SHIPPED | OUTPATIENT
Start: 2023-08-15 | End: 2023-09-18 | Stop reason: DRUGHIGH

## 2023-08-15 NOTE — TELEPHONE ENCOUNTER
Patient repeated back and voice a understanding of orders.  States level is a 2 hour trough.  Next Tacrolimus level on 8/22/2023.  ----- Message from Kalpana Grider MD sent at 8/15/2023  1:13 PM CDT -----  If true 12 hr trough, increase to 4 mg QAM and 3 mg nightly  Recheck in 1 week

## 2023-08-17 ENCOUNTER — OFFICE VISIT (OUTPATIENT)
Dept: NEPHROLOGY | Facility: CLINIC | Age: 58
End: 2023-08-17
Payer: MEDICARE

## 2023-08-17 VITALS
BODY MASS INDEX: 29.27 KG/M2 | HEART RATE: 82 BPM | DIASTOLIC BLOOD PRESSURE: 70 MMHG | HEIGHT: 67 IN | SYSTOLIC BLOOD PRESSURE: 119 MMHG | OXYGEN SATURATION: 99 % | WEIGHT: 186.5 LBS

## 2023-08-17 DIAGNOSIS — E11.22 TYPE 2 DIABETES MELLITUS WITH STAGE 3A CHRONIC KIDNEY DISEASE, WITH LONG-TERM CURRENT USE OF INSULIN: ICD-10-CM

## 2023-08-17 DIAGNOSIS — N18.31 TYPE 2 DIABETES MELLITUS WITH STAGE 3A CHRONIC KIDNEY DISEASE, WITH LONG-TERM CURRENT USE OF INSULIN: ICD-10-CM

## 2023-08-17 DIAGNOSIS — B34.8 BK VIREMIA: Chronic | ICD-10-CM

## 2023-08-17 DIAGNOSIS — N18.31 STAGE 3A CHRONIC KIDNEY DISEASE: ICD-10-CM

## 2023-08-17 DIAGNOSIS — Z79.4 TYPE 2 DIABETES MELLITUS WITH STAGE 3A CHRONIC KIDNEY DISEASE, WITH LONG-TERM CURRENT USE OF INSULIN: ICD-10-CM

## 2023-08-17 DIAGNOSIS — Z94.0 S/P KIDNEY TRANSPLANT: Primary | ICD-10-CM

## 2023-08-17 PROCEDURE — 3078F DIAST BP <80 MM HG: CPT | Mod: CPTII,S$GLB,, | Performed by: INTERNAL MEDICINE

## 2023-08-17 PROCEDURE — 3051F PR MOST RECENT HEMOGLOBIN A1C LEVEL 7.0 - < 8.0%: ICD-10-PCS | Mod: CPTII,S$GLB,, | Performed by: INTERNAL MEDICINE

## 2023-08-17 PROCEDURE — 3066F NEPHROPATHY DOC TX: CPT | Mod: CPTII,S$GLB,, | Performed by: INTERNAL MEDICINE

## 2023-08-17 PROCEDURE — 3051F HG A1C>EQUAL 7.0%<8.0%: CPT | Mod: CPTII,S$GLB,, | Performed by: INTERNAL MEDICINE

## 2023-08-17 PROCEDURE — 3074F PR MOST RECENT SYSTOLIC BLOOD PRESSURE < 130 MM HG: ICD-10-PCS | Mod: CPTII,S$GLB,, | Performed by: INTERNAL MEDICINE

## 2023-08-17 PROCEDURE — 3008F PR BODY MASS INDEX (BMI) DOCUMENTED: ICD-10-PCS | Mod: CPTII,S$GLB,, | Performed by: INTERNAL MEDICINE

## 2023-08-17 PROCEDURE — 99213 OFFICE O/P EST LOW 20 MIN: CPT | Mod: S$GLB,,, | Performed by: INTERNAL MEDICINE

## 2023-08-17 PROCEDURE — 3074F SYST BP LT 130 MM HG: CPT | Mod: CPTII,S$GLB,, | Performed by: INTERNAL MEDICINE

## 2023-08-17 PROCEDURE — 99213 PR OFFICE/OUTPT VISIT, EST, LEVL III, 20-29 MIN: ICD-10-PCS | Mod: S$GLB,,, | Performed by: INTERNAL MEDICINE

## 2023-08-17 PROCEDURE — 3078F PR MOST RECENT DIASTOLIC BLOOD PRESSURE < 80 MM HG: ICD-10-PCS | Mod: CPTII,S$GLB,, | Performed by: INTERNAL MEDICINE

## 2023-08-17 PROCEDURE — 1159F PR MEDICATION LIST DOCUMENTED IN MEDICAL RECORD: ICD-10-PCS | Mod: CPTII,S$GLB,, | Performed by: INTERNAL MEDICINE

## 2023-08-17 PROCEDURE — 1159F MED LIST DOCD IN RCRD: CPT | Mod: CPTII,S$GLB,, | Performed by: INTERNAL MEDICINE

## 2023-08-17 PROCEDURE — 99999 PR PBB SHADOW E&M-EST. PATIENT-LVL III: ICD-10-PCS | Mod: PBBFAC,,, | Performed by: INTERNAL MEDICINE

## 2023-08-17 PROCEDURE — 99999 PR PBB SHADOW E&M-EST. PATIENT-LVL III: CPT | Mod: PBBFAC,,, | Performed by: INTERNAL MEDICINE

## 2023-08-17 PROCEDURE — 3008F BODY MASS INDEX DOCD: CPT | Mod: CPTII,S$GLB,, | Performed by: INTERNAL MEDICINE

## 2023-08-17 PROCEDURE — 3066F PR DOCUMENTATION OF TREATMENT FOR NEPHROPATHY: ICD-10-PCS | Mod: CPTII,S$GLB,, | Performed by: INTERNAL MEDICINE

## 2023-08-17 NOTE — PROGRESS NOTES
NEPHROLOGY PROGRESS NOTE    INTERVAL HISTORY  57 yo AAF patient is here today for follow up evaluation of renal allograft. Last seen in renal office by Dr. Baird. She is s/p  donor kidney tx 14. Current immunosuppression; tacrolimus; mycophenolate. Baseline Cr 1.9 - 2.2 mg/dl.  There is a hx of diabetes complicated by nephropathy; retinopathy.  Denies fevers; chills night sweats; chest pains; hemoptysis; orthopnea; PND.      Underwent second kidney transplant 5 mo ago. She does not take NSAIDs. Reports UTI with SGLT2i     MEDICATIONS reviewed    Last Physical Exam  /70 mmHg  Chronically ill appearing woman; no acute distress; oriented x 3  HEENT; (+)retinopathy  CHEST; Clear P&A; no rales or rhonchi  HEART; RR; S1&S2 no murmur rub gallop  ABD; BS(+); non-tender; no organomegaly  EXT; (-)Edema    LABS  Serum Cr 1.1 mg/dL   UPCR < 0.2 g/g  Tacro level 6.1  Hgb 12.2 g/dL    Impression  1. Kidney transplant status / Renal allograft 2023 / CKD stage 3a, eGFR 58 ml/min. On CNi + prednisone. MMF stopped due to BK viremia.   2. Hypertension. Satisfactory control on carvedilol and hydralazine  3. Type 2 diabetes mellitus, on insulin, managed by PCP  4. MGUS. Followed by Hematology-Oncology. No anemia    Plan  Continue IST  F/u with KT   Return Visit in 6 mo

## 2023-08-22 ENCOUNTER — LAB VISIT (OUTPATIENT)
Dept: LAB | Facility: HOSPITAL | Age: 58
End: 2023-08-22
Attending: INTERNAL MEDICINE
Payer: MEDICARE

## 2023-08-22 DIAGNOSIS — Z94.0 KIDNEY REPLACED BY TRANSPLANT: ICD-10-CM

## 2023-08-22 PROCEDURE — 36415 COLL VENOUS BLD VENIPUNCTURE: CPT | Mod: PO | Performed by: INTERNAL MEDICINE

## 2023-08-22 PROCEDURE — 80197 ASSAY OF TACROLIMUS: CPT | Performed by: INTERNAL MEDICINE

## 2023-08-23 LAB — TACROLIMUS BLD-MCNC: 7.5 NG/ML (ref 5–15)

## 2023-08-29 ENCOUNTER — LAB VISIT (OUTPATIENT)
Dept: LAB | Facility: HOSPITAL | Age: 58
End: 2023-08-29
Attending: INTERNAL MEDICINE
Payer: MEDICARE

## 2023-08-29 DIAGNOSIS — E78.00 PURE HYPERCHOLESTEROLEMIA: Chronic | ICD-10-CM

## 2023-08-29 DIAGNOSIS — Z94.0 KIDNEY TRANSPLANT STATUS: ICD-10-CM

## 2023-08-29 DIAGNOSIS — Z94.0 KIDNEY REPLACED BY TRANSPLANT: ICD-10-CM

## 2023-08-29 LAB
ALBUMIN SERPL BCP-MCNC: 3.5 G/DL (ref 3.5–5.2)
ANION GAP SERPL CALC-SCNC: 7 MMOL/L (ref 8–16)
BASOPHILS # BLD AUTO: 0.02 K/UL (ref 0–0.2)
BASOPHILS NFR BLD: 0.3 % (ref 0–1.9)
BUN SERPL-MCNC: 29 MG/DL (ref 6–20)
CALCIUM SERPL-MCNC: 9.7 MG/DL (ref 8.7–10.5)
CHLORIDE SERPL-SCNC: 110 MMOL/L (ref 95–110)
CO2 SERPL-SCNC: 23 MMOL/L (ref 23–29)
CREAT SERPL-MCNC: 1.4 MG/DL (ref 0.5–1.4)
DIFFERENTIAL METHOD: ABNORMAL
EOSINOPHIL # BLD AUTO: 0.1 K/UL (ref 0–0.5)
EOSINOPHIL NFR BLD: 1.4 % (ref 0–8)
ERYTHROCYTE [DISTWIDTH] IN BLOOD BY AUTOMATED COUNT: 13.6 % (ref 11.5–14.5)
EST. GFR  (NO RACE VARIABLE): 43.1 ML/MIN/1.73 M^2
GLUCOSE SERPL-MCNC: 78 MG/DL (ref 70–110)
HCT VFR BLD AUTO: 39.8 % (ref 37–48.5)
HGB BLD-MCNC: 12.3 G/DL (ref 12–16)
IMM GRANULOCYTES # BLD AUTO: 0.04 K/UL (ref 0–0.04)
IMM GRANULOCYTES NFR BLD AUTO: 0.7 % (ref 0–0.5)
LYMPHOCYTES # BLD AUTO: 1.7 K/UL (ref 1–4.8)
LYMPHOCYTES NFR BLD: 29.1 % (ref 18–48)
MAGNESIUM SERPL-MCNC: 1.8 MG/DL (ref 1.6–2.6)
MCH RBC QN AUTO: 29.2 PG (ref 27–31)
MCHC RBC AUTO-ENTMCNC: 30.9 G/DL (ref 32–36)
MCV RBC AUTO: 95 FL (ref 82–98)
MONOCYTES # BLD AUTO: 0.5 K/UL (ref 0.3–1)
MONOCYTES NFR BLD: 9.1 % (ref 4–15)
NEUTROPHILS # BLD AUTO: 3.5 K/UL (ref 1.8–7.7)
NEUTROPHILS NFR BLD: 59.4 % (ref 38–73)
NRBC BLD-RTO: 0 /100 WBC
PHOSPHATE SERPL-MCNC: 2.8 MG/DL (ref 2.7–4.5)
PLATELET # BLD AUTO: 109 K/UL (ref 150–450)
PMV BLD AUTO: 12.2 FL (ref 9.2–12.9)
POTASSIUM SERPL-SCNC: 4.3 MMOL/L (ref 3.5–5.1)
RBC # BLD AUTO: 4.21 M/UL (ref 4–5.4)
SODIUM SERPL-SCNC: 140 MMOL/L (ref 136–145)
WBC # BLD AUTO: 5.91 K/UL (ref 3.9–12.7)

## 2023-08-29 PROCEDURE — 80197 ASSAY OF TACROLIMUS: CPT | Performed by: INTERNAL MEDICINE

## 2023-08-29 PROCEDURE — 80069 RENAL FUNCTION PANEL: CPT | Performed by: INTERNAL MEDICINE

## 2023-08-29 PROCEDURE — 36415 COLL VENOUS BLD VENIPUNCTURE: CPT | Mod: PO | Performed by: INTERNAL MEDICINE

## 2023-08-29 PROCEDURE — 85025 COMPLETE CBC W/AUTO DIFF WBC: CPT | Performed by: INTERNAL MEDICINE

## 2023-08-29 PROCEDURE — 83735 ASSAY OF MAGNESIUM: CPT | Performed by: INTERNAL MEDICINE

## 2023-08-29 PROCEDURE — 87799 DETECT AGENT NOS DNA QUANT: CPT | Performed by: INTERNAL MEDICINE

## 2023-08-29 RX ORDER — ATORVASTATIN CALCIUM 40 MG/1
40 TABLET, FILM COATED ORAL NIGHTLY
Qty: 30 TABLET | Refills: 5 | Status: CANCELLED | OUTPATIENT
Start: 2023-08-29

## 2023-08-29 RX ORDER — CARVEDILOL 3.12 MG/1
3.12 TABLET ORAL 2 TIMES DAILY
Qty: 60 TABLET | Refills: 5 | Status: CANCELLED | OUTPATIENT
Start: 2023-08-28

## 2023-08-29 RX ORDER — HYDRALAZINE HYDROCHLORIDE 50 MG/1
50 TABLET, FILM COATED ORAL 3 TIMES DAILY
Qty: 90 TABLET | Refills: 5 | Status: CANCELLED | OUTPATIENT
Start: 2023-08-29

## 2023-08-30 DIAGNOSIS — E78.00 PURE HYPERCHOLESTEROLEMIA: Chronic | ICD-10-CM

## 2023-08-30 DIAGNOSIS — Z94.0 KIDNEY TRANSPLANT STATUS: ICD-10-CM

## 2023-08-30 LAB — TACROLIMUS BLD-MCNC: 9.3 NG/ML (ref 5–15)

## 2023-08-30 RX ORDER — CARVEDILOL 3.12 MG/1
3.12 TABLET ORAL 2 TIMES DAILY
Qty: 60 TABLET | Refills: 5 | Status: SHIPPED | OUTPATIENT
Start: 2023-08-30 | End: 2024-03-13 | Stop reason: SDUPTHER

## 2023-08-30 RX ORDER — ATORVASTATIN CALCIUM 40 MG/1
40 TABLET, FILM COATED ORAL NIGHTLY
Qty: 30 TABLET | Refills: 5 | Status: SHIPPED | OUTPATIENT
Start: 2023-08-30

## 2023-08-30 RX ORDER — HYDRALAZINE HYDROCHLORIDE 50 MG/1
50 TABLET, FILM COATED ORAL 3 TIMES DAILY
Qty: 90 TABLET | Refills: 5 | Status: SHIPPED | OUTPATIENT
Start: 2023-08-30

## 2023-09-01 ENCOUNTER — TELEPHONE (OUTPATIENT)
Dept: TRANSPLANT | Facility: CLINIC | Age: 58
End: 2023-09-01
Payer: MEDICARE

## 2023-09-01 LAB
BKV DNA SERPL NAA+PROBE-ACNC: ABNORMAL COPIES/ML
BKV DNA SERPL NAA+PROBE-LOG#: ABNORMAL LOG (10) COPIES/ML
BKV DNA SERPL QL NAA+PROBE: ABNORMAL

## 2023-09-01 NOTE — TELEPHONE ENCOUNTER
Results reviewed with patient and will repeat labs on 9/12 and schedule to be seen in clinic on 9/18.  ----- Message from Kalpana Grider MD sent at 9/1/2023  2:59 PM CDT -----  2 weeks since has been very low/undetectable. If it is ND, will return to screening protocol.

## 2023-09-12 ENCOUNTER — LAB VISIT (OUTPATIENT)
Dept: LAB | Facility: HOSPITAL | Age: 58
End: 2023-09-12
Attending: INTERNAL MEDICINE
Payer: MEDICARE

## 2023-09-12 DIAGNOSIS — Z94.0 KIDNEY REPLACED BY TRANSPLANT: ICD-10-CM

## 2023-09-12 LAB
ALBUMIN SERPL BCP-MCNC: 3.7 G/DL (ref 3.5–5.2)
ALBUMIN SERPL BCP-MCNC: 3.7 G/DL (ref 3.5–5.2)
ALP SERPL-CCNC: 56 U/L (ref 55–135)
ALT SERPL W/O P-5'-P-CCNC: 24 U/L (ref 10–44)
ANION GAP SERPL CALC-SCNC: 11 MMOL/L (ref 8–16)
AST SERPL-CCNC: 21 U/L (ref 10–40)
BASOPHILS # BLD AUTO: 0.01 K/UL (ref 0–0.2)
BASOPHILS NFR BLD: 0.2 % (ref 0–1.9)
BILIRUB DIRECT SERPL-MCNC: 0.1 MG/DL (ref 0.1–0.3)
BILIRUB SERPL-MCNC: 0.3 MG/DL (ref 0.1–1)
BUN SERPL-MCNC: 21 MG/DL (ref 6–20)
CALCIUM SERPL-MCNC: 10 MG/DL (ref 8.7–10.5)
CHLORIDE SERPL-SCNC: 111 MMOL/L (ref 95–110)
CO2 SERPL-SCNC: 16 MMOL/L (ref 23–29)
CREAT SERPL-MCNC: 1.2 MG/DL (ref 0.5–1.4)
DIFFERENTIAL METHOD: ABNORMAL
EOSINOPHIL # BLD AUTO: 0.1 K/UL (ref 0–0.5)
EOSINOPHIL NFR BLD: 1.9 % (ref 0–8)
ERYTHROCYTE [DISTWIDTH] IN BLOOD BY AUTOMATED COUNT: 13.3 % (ref 11.5–14.5)
EST. GFR  (NO RACE VARIABLE): 51.8 ML/MIN/1.73 M^2
GLUCOSE SERPL-MCNC: 115 MG/DL (ref 70–110)
HCT VFR BLD AUTO: 43.1 % (ref 37–48.5)
HGB BLD-MCNC: 13.2 G/DL (ref 12–16)
IMM GRANULOCYTES # BLD AUTO: 0.03 K/UL (ref 0–0.04)
IMM GRANULOCYTES NFR BLD AUTO: 0.5 % (ref 0–0.5)
LYMPHOCYTES # BLD AUTO: 1.4 K/UL (ref 1–4.8)
LYMPHOCYTES NFR BLD: 24 % (ref 18–48)
MAGNESIUM SERPL-MCNC: 1.8 MG/DL (ref 1.6–2.6)
MCH RBC QN AUTO: 29.2 PG (ref 27–31)
MCHC RBC AUTO-ENTMCNC: 30.6 G/DL (ref 32–36)
MCV RBC AUTO: 95 FL (ref 82–98)
MONOCYTES # BLD AUTO: 0.6 K/UL (ref 0.3–1)
MONOCYTES NFR BLD: 11.3 % (ref 4–15)
NEUTROPHILS # BLD AUTO: 3.5 K/UL (ref 1.8–7.7)
NEUTROPHILS NFR BLD: 62.1 % (ref 38–73)
NRBC BLD-RTO: 0 /100 WBC
PHOSPHATE SERPL-MCNC: 2.4 MG/DL (ref 2.7–4.5)
PLATELET # BLD AUTO: 118 K/UL (ref 150–450)
PMV BLD AUTO: 12.1 FL (ref 9.2–12.9)
POTASSIUM SERPL-SCNC: 4.4 MMOL/L (ref 3.5–5.1)
PROT SERPL-MCNC: 8 G/DL (ref 6–8.4)
RBC # BLD AUTO: 4.52 M/UL (ref 4–5.4)
SODIUM SERPL-SCNC: 138 MMOL/L (ref 136–145)
WBC # BLD AUTO: 5.66 K/UL (ref 3.9–12.7)

## 2023-09-12 PROCEDURE — 85025 COMPLETE CBC W/AUTO DIFF WBC: CPT | Performed by: INTERNAL MEDICINE

## 2023-09-12 PROCEDURE — 36415 COLL VENOUS BLD VENIPUNCTURE: CPT | Mod: PO | Performed by: INTERNAL MEDICINE

## 2023-09-12 PROCEDURE — 87799 DETECT AGENT NOS DNA QUANT: CPT | Performed by: INTERNAL MEDICINE

## 2023-09-12 PROCEDURE — 80197 ASSAY OF TACROLIMUS: CPT | Performed by: INTERNAL MEDICINE

## 2023-09-12 PROCEDURE — 84075 ASSAY ALKALINE PHOSPHATASE: CPT | Performed by: INTERNAL MEDICINE

## 2023-09-12 PROCEDURE — 83735 ASSAY OF MAGNESIUM: CPT | Performed by: INTERNAL MEDICINE

## 2023-09-12 PROCEDURE — 80069 RENAL FUNCTION PANEL: CPT | Performed by: INTERNAL MEDICINE

## 2023-09-13 ENCOUNTER — TELEPHONE (OUTPATIENT)
Dept: TRANSPLANT | Facility: CLINIC | Age: 58
End: 2023-09-13
Payer: MEDICARE

## 2023-09-13 LAB — TACROLIMUS BLD-MCNC: 7.3 NG/ML (ref 5–15)

## 2023-09-13 NOTE — TELEPHONE ENCOUNTER
Patient repeated back and voice a understanding of orders.  Denies any acute issues.  Patient will repeat labs here on 9/18 prior to clinic appointment.  ----- Message from Kalpana Grider MD sent at 9/13/2023  1:23 PM CDT -----  Acidosis not recently present; assess if having diarrhea.  Await repeat lab and can start supplement if consistently <22.

## 2023-09-18 ENCOUNTER — LAB VISIT (OUTPATIENT)
Dept: LAB | Facility: HOSPITAL | Age: 58
End: 2023-09-18
Attending: INTERNAL MEDICINE
Payer: MEDICARE

## 2023-09-18 ENCOUNTER — OFFICE VISIT (OUTPATIENT)
Dept: TRANSPLANT | Facility: CLINIC | Age: 58
End: 2023-09-18
Payer: MEDICARE

## 2023-09-18 VITALS
WEIGHT: 189.81 LBS | HEIGHT: 67 IN | DIASTOLIC BLOOD PRESSURE: 58 MMHG | SYSTOLIC BLOOD PRESSURE: 123 MMHG | BODY MASS INDEX: 29.79 KG/M2 | OXYGEN SATURATION: 99 % | RESPIRATION RATE: 16 BRPM | HEART RATE: 83 BPM | TEMPERATURE: 97 F

## 2023-09-18 DIAGNOSIS — Z91.89 AT RISK FOR OPPORTUNISTIC INFECTIONS: ICD-10-CM

## 2023-09-18 DIAGNOSIS — D84.9 IMMUNOCOMPROMISED STATE: ICD-10-CM

## 2023-09-18 DIAGNOSIS — I12.9 RENAL HYPERTENSION: ICD-10-CM

## 2023-09-18 DIAGNOSIS — E87.20 METABOLIC ACIDOSIS: ICD-10-CM

## 2023-09-18 DIAGNOSIS — Z94.0 KIDNEY TRANSPLANT STATUS: ICD-10-CM

## 2023-09-18 DIAGNOSIS — Z79.899 IMMUNOSUPPRESSIVE MANAGEMENT ENCOUNTER FOLLOWING KIDNEY TRANSPLANT: ICD-10-CM

## 2023-09-18 DIAGNOSIS — N18.32 STAGE 3B CHRONIC KIDNEY DISEASE: Primary | ICD-10-CM

## 2023-09-18 DIAGNOSIS — E83.39 HYPOPHOSPHATEMIA: ICD-10-CM

## 2023-09-18 DIAGNOSIS — Z94.0 KIDNEY REPLACED BY TRANSPLANT: ICD-10-CM

## 2023-09-18 DIAGNOSIS — N25.81 SECONDARY HYPERPARATHYROIDISM OF RENAL ORIGIN: ICD-10-CM

## 2023-09-18 DIAGNOSIS — Z94.0 IMMUNOSUPPRESSIVE MANAGEMENT ENCOUNTER FOLLOWING KIDNEY TRANSPLANT: ICD-10-CM

## 2023-09-18 LAB
ALBUMIN SERPL BCP-MCNC: 3.6 G/DL (ref 3.5–5.2)
ANION GAP SERPL CALC-SCNC: 9 MMOL/L (ref 8–16)
BASOPHILS # BLD AUTO: 0.02 K/UL (ref 0–0.2)
BASOPHILS NFR BLD: 0.3 % (ref 0–1.9)
BUN SERPL-MCNC: 27 MG/DL (ref 6–20)
CALCIUM SERPL-MCNC: 10 MG/DL (ref 8.7–10.5)
CHLORIDE SERPL-SCNC: 108 MMOL/L (ref 95–110)
CO2 SERPL-SCNC: 20 MMOL/L (ref 23–29)
CREAT SERPL-MCNC: 1.4 MG/DL (ref 0.5–1.4)
DIFFERENTIAL METHOD: ABNORMAL
EOSINOPHIL # BLD AUTO: 0.1 K/UL (ref 0–0.5)
EOSINOPHIL NFR BLD: 1.1 % (ref 0–8)
ERYTHROCYTE [DISTWIDTH] IN BLOOD BY AUTOMATED COUNT: 13.3 % (ref 11.5–14.5)
EST. GFR  (NO RACE VARIABLE): 43.1 ML/MIN/1.73 M^2
GLUCOSE SERPL-MCNC: 143 MG/DL (ref 70–110)
HCT VFR BLD AUTO: 40.3 % (ref 37–48.5)
HGB BLD-MCNC: 12.9 G/DL (ref 12–16)
IMM GRANULOCYTES # BLD AUTO: 0.04 K/UL (ref 0–0.04)
IMM GRANULOCYTES NFR BLD AUTO: 0.6 % (ref 0–0.5)
LYMPHOCYTES # BLD AUTO: 1.6 K/UL (ref 1–4.8)
LYMPHOCYTES NFR BLD: 25.6 % (ref 18–48)
MAGNESIUM SERPL-MCNC: 1.6 MG/DL (ref 1.6–2.6)
MCH RBC QN AUTO: 30 PG (ref 27–31)
MCHC RBC AUTO-ENTMCNC: 32 G/DL (ref 32–36)
MCV RBC AUTO: 94 FL (ref 82–98)
MONOCYTES # BLD AUTO: 0.6 K/UL (ref 0.3–1)
MONOCYTES NFR BLD: 10 % (ref 4–15)
NEUTROPHILS # BLD AUTO: 4 K/UL (ref 1.8–7.7)
NEUTROPHILS NFR BLD: 62.4 % (ref 38–73)
NRBC BLD-RTO: 0 /100 WBC
PHOSPHATE SERPL-MCNC: 3.1 MG/DL (ref 2.7–4.5)
PLATELET # BLD AUTO: 99 K/UL (ref 150–450)
PMV BLD AUTO: 11.7 FL (ref 9.2–12.9)
POTASSIUM SERPL-SCNC: 4.5 MMOL/L (ref 3.5–5.1)
RBC # BLD AUTO: 4.3 M/UL (ref 4–5.4)
SODIUM SERPL-SCNC: 137 MMOL/L (ref 136–145)
TACROLIMUS BLD-MCNC: 11.1 NG/ML (ref 5–15)
WBC # BLD AUTO: 6.4 K/UL (ref 3.9–12.7)

## 2023-09-18 PROCEDURE — 3008F PR BODY MASS INDEX (BMI) DOCUMENTED: ICD-10-PCS | Mod: CPTII,S$GLB,, | Performed by: INTERNAL MEDICINE

## 2023-09-18 PROCEDURE — 3078F PR MOST RECENT DIASTOLIC BLOOD PRESSURE < 80 MM HG: ICD-10-PCS | Mod: CPTII,S$GLB,, | Performed by: INTERNAL MEDICINE

## 2023-09-18 PROCEDURE — 3078F DIAST BP <80 MM HG: CPT | Mod: CPTII,S$GLB,, | Performed by: INTERNAL MEDICINE

## 2023-09-18 PROCEDURE — 80197 ASSAY OF TACROLIMUS: CPT | Performed by: INTERNAL MEDICINE

## 2023-09-18 PROCEDURE — 1159F MED LIST DOCD IN RCRD: CPT | Mod: CPTII,S$GLB,, | Performed by: INTERNAL MEDICINE

## 2023-09-18 PROCEDURE — 3008F BODY MASS INDEX DOCD: CPT | Mod: CPTII,S$GLB,, | Performed by: INTERNAL MEDICINE

## 2023-09-18 PROCEDURE — 99999 PR PBB SHADOW E&M-EST. PATIENT-LVL IV: ICD-10-PCS | Mod: PBBFAC,,, | Performed by: INTERNAL MEDICINE

## 2023-09-18 PROCEDURE — 3051F PR MOST RECENT HEMOGLOBIN A1C LEVEL 7.0 - < 8.0%: ICD-10-PCS | Mod: CPTII,S$GLB,, | Performed by: INTERNAL MEDICINE

## 2023-09-18 PROCEDURE — 1160F PR REVIEW ALL MEDS BY PRESCRIBER/CLIN PHARMACIST DOCUMENTED: ICD-10-PCS | Mod: CPTII,S$GLB,, | Performed by: INTERNAL MEDICINE

## 2023-09-18 PROCEDURE — 3074F SYST BP LT 130 MM HG: CPT | Mod: CPTII,S$GLB,, | Performed by: INTERNAL MEDICINE

## 2023-09-18 PROCEDURE — 99215 PR OFFICE/OUTPT VISIT, EST, LEVL V, 40-54 MIN: ICD-10-PCS | Mod: S$GLB,,, | Performed by: INTERNAL MEDICINE

## 2023-09-18 PROCEDURE — 80069 RENAL FUNCTION PANEL: CPT | Performed by: INTERNAL MEDICINE

## 2023-09-18 PROCEDURE — 3074F PR MOST RECENT SYSTOLIC BLOOD PRESSURE < 130 MM HG: ICD-10-PCS | Mod: CPTII,S$GLB,, | Performed by: INTERNAL MEDICINE

## 2023-09-18 PROCEDURE — 99215 OFFICE O/P EST HI 40 MIN: CPT | Mod: S$GLB,,, | Performed by: INTERNAL MEDICINE

## 2023-09-18 PROCEDURE — 1160F RVW MEDS BY RX/DR IN RCRD: CPT | Mod: CPTII,S$GLB,, | Performed by: INTERNAL MEDICINE

## 2023-09-18 PROCEDURE — 3051F HG A1C>EQUAL 7.0%<8.0%: CPT | Mod: CPTII,S$GLB,, | Performed by: INTERNAL MEDICINE

## 2023-09-18 PROCEDURE — 3066F NEPHROPATHY DOC TX: CPT | Mod: CPTII,S$GLB,, | Performed by: INTERNAL MEDICINE

## 2023-09-18 PROCEDURE — 85025 COMPLETE CBC W/AUTO DIFF WBC: CPT | Performed by: INTERNAL MEDICINE

## 2023-09-18 PROCEDURE — 99999 PR PBB SHADOW E&M-EST. PATIENT-LVL IV: CPT | Mod: PBBFAC,,, | Performed by: INTERNAL MEDICINE

## 2023-09-18 PROCEDURE — 3066F PR DOCUMENTATION OF TREATMENT FOR NEPHROPATHY: ICD-10-PCS | Mod: CPTII,S$GLB,, | Performed by: INTERNAL MEDICINE

## 2023-09-18 PROCEDURE — 83735 ASSAY OF MAGNESIUM: CPT | Performed by: INTERNAL MEDICINE

## 2023-09-18 PROCEDURE — 36415 COLL VENOUS BLD VENIPUNCTURE: CPT | Performed by: INTERNAL MEDICINE

## 2023-09-18 PROCEDURE — 1159F PR MEDICATION LIST DOCUMENTED IN MEDICAL RECORD: ICD-10-PCS | Mod: CPTII,S$GLB,, | Performed by: INTERNAL MEDICINE

## 2023-09-18 RX ORDER — SODIUM BICARBONATE 650 MG/1
650 TABLET ORAL 2 TIMES DAILY
Qty: 60 TABLET | Refills: 11 | Status: SHIPPED | OUTPATIENT
Start: 2023-09-18 | End: 2024-09-17

## 2023-09-18 RX ORDER — MYCOPHENOLATE MOFETIL 250 MG/1
250 CAPSULE ORAL 2 TIMES DAILY
Qty: 60 CAPSULE | Refills: 11 | Status: SHIPPED | OUTPATIENT
Start: 2023-09-18 | End: 2023-12-11 | Stop reason: SDUPTHER

## 2023-09-18 RX ORDER — HYDROXYZINE HYDROCHLORIDE 10 MG/1
10 TABLET, FILM COATED ORAL 3 TIMES DAILY
Qty: 45 TABLET | Refills: 3 | Status: SHIPPED | OUTPATIENT
Start: 2023-09-18

## 2023-09-18 RX ORDER — TIRZEPATIDE 5 MG/.5ML
10 INJECTION, SOLUTION SUBCUTANEOUS WEEKLY
COMMUNITY
Start: 2023-08-28

## 2023-09-18 NOTE — TELEPHONE ENCOUNTER
Patient repeated back and voice a understanding of orders.    ----- Message from Kalpana Grider MD sent at 9/18/2023  1:13 PM CDT -----  Lower tacro to 3 mg BID if this is 12 hr level

## 2023-09-18 NOTE — LETTER
September 18, 2023        Aaron Bonilla  [address]  [facility] [address]  Phone: [phone]  Fax: [phone]             [facility] 1st Fl  [address]  [facility] [address]  Phone: [phone]   Patient: Elizabeth Maldonado   MR Number: [id number]   YOB: 1965   Date of Visit: 9/18/2023       Dear Dr. Aaron Bonilla    Thank you for referring Elizabeth Maldonado to me for evaluation. Attached you will find relevant portions of my assessment and plan of care.    If you have questions, please do not hesitate to call me. I look forward to following Elizabeth Maldonado along with you.    Sincerely,    Kalpana Grider MD    Enclosure    If you would like to receive this communication electronically, please contact [email or web address] or [phone] to request [employer] access.    [employer] is a tool which provides read-only access to select patient information with whom you have a relationship. Its easy to use and provides real time access to review your patients record including encounter summaries, notes, results, and demographic information.    If you feel you have received this communication in error or would no longer like to receive these types of communications, please e-mail [email or web address]

## 2023-09-18 NOTE — PROGRESS NOTES
"      Kidney Post-Transplant Assessment    Referring Physician: Aaron Bonilla  Current Nephrologist: Aaron Bonilla    ORGAN: LEFT KIDNEY  Donor Type: donation after brain death  PHS Increased Risk: no  Cold Ischemia: 1,731 mins  Induction Medications: thymoglobulin    Subjective:     CC:  Reassessment of renal allograft function and management of chronic immunosuppression.    HPI:  Ms. Maldonado is a 58 y.o. year old Black or  female who received a donation after brain death kidney transplant on 3/10/23.  She has CKD stage 3 - GFR 30-59 and her baseline creatinine is around mid 1s. She takes mycophenolate mofetil, prednisone, and tacrolimus for maintenance immunosuppression. She denies any recent hospitalizations or ER visits since her previous clinic visit.    Pertinent Nephrology/Transplant History:   ESRD from DM  failed KT #1 12/2014 -  3/2022. EDW 81kg;    DDKT#2  3/10/23. thymo; CIT 28+h; KDPI 27%; cPRA 99%; XM neg/neg  CMV ++  Baseline creat 1.1-1.4     POST TRANSPLANT UPDATE 03/31/2023   Elizabeth is now 21 d post kidney transplant and reports no concerns. She just saw surgery this PM and had staples removed.  She reports good HTN control with reads of 120 sys at home and no sx orthostasis. She reports no incisional pain or urinary sx. She states she is eating well and tolerating meds w/o issues.     UPDATE 4/19/23 today, Elizabeth feels well. Her main concern is that her old healed incisions "hurt," but they are not red or irritated or swollen. Her txp incision has healed well and is not longer itchy. She stopped taking atarax as it made her sleepy. She denies any kidney-related complaints, such as pain over allograft, flank pain, dysuria, frequency, or other LUTS.  HTN: at home, she is getting great reads: 119/69, 83; 110/65, 89; hisghest recent was 137 systolic.    UPDATE 5/30/23  Elizabeth reports, "I feel great." She denies any complaints.  BP noted ot be low at home, 128/72 today but on Sunday " "was 102/68 standing. Denies pain over transplant or LUTS. Test recently showed +BK viremia; we discussed this result in detail.  No DSA detected in May    Update 9/18/23  She has no concerns. She started Mounjaro two weeks ago and thinks it is helping already. She has noted itching at scars, including some non txp scars, asking for low dose atarax as she found prior rx too strong. Denies any other bothersome symptoms. Reports no LUTS or edema.    Current Outpatient Medications   Medication Sig    atorvastatin (LIPITOR) 40 MG tablet Take 1 tablet (40 mg total) by mouth every evening.    BD ULTRA-FINE SHORT PEN NEEDLE 31 gauge x 5/16" Ndle USE AS DIRECTED FOUR TIMES DAILY    blood sugar diagnostic Strp Checks BG ac/hs    carvediloL (COREG) 3.125 MG tablet Take 1 tablet (3.125 mg total) by mouth 2 (two) times daily. Hold for SBP <130    cholecalciferol, vitamin D3, (VITAMIN D3) 50 mcg (2,000 unit) Cap capsule Take 2 capsules (4,000 Units total) by mouth once daily.    HUMALOG KWIKPEN INSULIN 100 unit/mL pen Inject 5 Units into the skin 3 (three) times daily with meals. + SLIDE SCALE, TDD: 30 Units    hydrALAZINE (APRESOLINE) 50 MG tablet Take 1 tablet (50 mg total) by mouth 3 (three) times daily.    insulin (LANTUS SOLOSTAR U-100 INSULIN) glargine 100 units/mL SubQ pen Inject 18 Units into the skin once daily. (Patient taking differently: 22 Units once daily.)    lancets (ONETOUCH DELICA LANCETS) 33 gauge Misc 1 lancet by Misc.(Non-Drug; Combo Route) route 4 (four) times daily before meals and nightly.    MOUNJARO 5 mg/0.5 mL PnIj Inject 5 mg into the skin once a week.    multivitamin Tab Take 1 tablet by mouth once daily.    predniSONE (DELTASONE) 5 MG tablet Take 1 tablet (5 mg total) by mouth once daily.    timolol maleate 0.5% (TIMOPTIC) 0.5 % Drop Place 1 drop into both eyes 2 (two) times daily.    zolpidem (AMBIEN) 10 mg Tab TAKE 1 TABLET BY MOUTH EVERY DAY AT BEDTIME AS NEEDED FOR SLEEP    hydrOXYzine HCL " "(ATARAX) 10 MG Tab Take 1 tablet (10 mg total) by mouth 3 (three) times daily.    mycophenolate (CELLCEPT) 250 mg Cap Take 1 capsule (250 mg total) by mouth 2 (two) times daily.    sodium bicarbonate 650 MG tablet Take 1 tablet (650 mg total) by mouth 2 (two) times daily.     No current facility-administered medications for this visit.       Review of Systems   Constitutional: Negative.    Respiratory:  Negative for shortness of breath.    Cardiovascular:  Negative for chest pain and leg swelling.   Gastrointestinal: Negative.    Genitourinary:  Negative for difficulty urinating.   Skin:  Negative for rash.   Allergic/Immunologic: Positive for immunocompromised state.   Neurological:  Negative for tremors.   Also see HPI    Objective:     Blood pressure (!) 123/58, pulse 83, temperature 97.3 °F (36.3 °C), temperature source Temporal, resp. rate 16, height 5' 7" (1.702 m), weight 86.1 kg (189 lb 13.1 oz), last menstrual period 04/29/2011, SpO2 99 %.body mass index is 29.73 kg/m².    Physical Exam  Constitutional:       General: She is not in acute distress.  Cardiovascular:      Rate and Rhythm: Normal rate and regular rhythm.   Pulmonary:      Effort: Pulmonary effort is normal. No respiratory distress.      Breath sounds: Normal breath sounds.   Abdominal:      General: Bowel sounds are normal.      Palpations: Abdomen is soft. There is no mass.      Tenderness: There is no abdominal tenderness.   Musculoskeletal:      Right lower leg: No edema.      Left lower leg: No edema.   Skin:     Findings: No rash.      Comments: +keloids at sites of itching     Labs:  Lab Results   Component Value Date    WBC 6.40 09/18/2023    HGB 12.9 09/18/2023    HCT 40.3 09/18/2023     09/18/2023    K 4.5 09/18/2023     09/18/2023    CO2 20 (L) 09/18/2023    BUN 27 (H) 09/18/2023    CREATININE 1.4 09/18/2023    EGFRNORACEVR 43.1 (A) 09/18/2023    GLUCOSE 148 (H) 06/15/2023    CALCIUM 10.0 09/18/2023    PHOS 3.1 09/18/2023 "    MG 1.6 09/18/2023    ALBUMIN 3.6 09/18/2023    AST 21 09/12/2023    ALT 24 09/12/2023    UTPCR Unable to calculate 08/29/2023    .5 (H) 06/05/2023    TACROLIMUS 11.1 09/18/2023     Labs were reviewed with the patient.    Assessment:     1. Stage 3b chronic kidney disease    2. Kidney transplant status    3. At risk for opportunistic infections    4. Renal hypertension    5. Immunocompromised state    6. Immunosuppressive management encounter following kidney transplant    7. Metabolic acidosis      Plan:   New nursing orders:  - continue BK q2 weeks now that restarting MMF  - check DSA with next lab     CKD IIIB s/p DD kidney transplant #2 DDKT#2  3/10/23. thymo; CIT 28+h [pumped]; KDPI 27%; cPRA 99%; XM neg  Recent Labs   Lab 08/15/23  1040 08/29/23  0903 08/29/23  0921 09/12/23  0910 09/18/23  0852   Creatinine  --   --    < > 1.2 1.4   eGFR  --   --    < > 51.8 A 43.1 A   Prot/Creat Ratio, Urine Unable to calculate Unable to calculate  --   --   --     < > = values in this interval not displayed.   Baseline creat 1.1-1.5  - negligible proteinuria  - Stable allograft function    Immunosuppression Management for pt with immunosuppressed state at risk of rejection  - Tacro, MMF, pred  - Net IS lowered [MMF held] prev d/t BK viremia (now resolved)  - REstart low dose MMF  - Tacro goal 7-9; adjust as needed  - Continue monitoring for toxicities and SE, none presently noted    At risk for opportunistic infections  -Anti-infective prophylaxis against opportunistic infections  - Valcyte for CMV prophy until 6/11/23 - dose increased based on improved GFR  - PJP prophy until 9/8/23: bactrim - dose increased based on improved GFR  -Screening BK infection to prevent allograft failure ==> BK VIREMIA 5/22/23  - BK ND 4/10--> +305 on 5/22---> NOT DETECTED 9/12/23  - Continue blood PCR per BK protocol, q 2 weeks, as per guidelines to prevent BK viremia and nephropathy leading to allograft dysfunction and potential  graft failure    Hypertension, renal  -BP low/normal & acceptable, w/o symptoms. Watch     Metabolic bone disease [Secondary hyperparathyroidism (SPTH), Phosphorus metabolism disorders]  - hypophosphatemia- mild, asymptomatic  - Will tolerate mild asymptomatic low level d/t pill burden, DDI, and adverse SE      Drug induced neutropenia resolved  - Better - restart MMF    Acidosis  -Start HCO3    Follow-up:   Clinic: return to transplant clinic weekly for the first month after transplant; every 2 weeks during months 2-3; then at 6-, 9-, 12-, 18-, 24-, and 36- months post-transplant to reassess for complications from immunosuppression toxicity and monitor for rejection.  Annually thereafter.    Labs: since patient remains at high risk for rejection and drug-related complications that warrant close monitoring, labs will be ordered as follows: continue twice weekly CBC, renal panel, and drug level for first month; then same labs once weekly through 3rd month post-transplant.  Urine for UA and protein/creatinine ratio monthly.  Serum BK - PCR at 1-, 3-, 6-, 9-, 12-, 18-, 24-, 36- 48-, and 60 months post-transplant.  Hepatic panel at 1-, 2-, 3-, 6-, 9-, 12-, 18-, 24-, and 36- months post-transplant.    MD CATHERINE Lilly Patient Status  Functional Status: 90% - Able to carry on normal activity: minor symptoms of disease  Physical Capacity: No Limitations

## 2023-09-19 RX ORDER — TACROLIMUS 1 MG/1
CAPSULE ORAL
Qty: 180 CAPSULE | Refills: 11 | OUTPATIENT
Start: 2023-09-19 | End: 2024-09-18

## 2023-09-19 RX ORDER — TACROLIMUS 1 MG/1
3 CAPSULE ORAL EVERY 12 HOURS
Qty: 180 CAPSULE | Refills: 11 | Status: SHIPPED | OUTPATIENT
Start: 2023-09-19 | End: 2024-03-15

## 2023-09-26 ENCOUNTER — LAB VISIT (OUTPATIENT)
Dept: LAB | Facility: HOSPITAL | Age: 58
End: 2023-09-26
Attending: INTERNAL MEDICINE
Payer: MEDICARE

## 2023-09-26 DIAGNOSIS — Z94.0 KIDNEY REPLACED BY TRANSPLANT: ICD-10-CM

## 2023-09-26 LAB
ALBUMIN SERPL BCP-MCNC: 3.7 G/DL (ref 3.5–5.2)
ANION GAP SERPL CALC-SCNC: 5 MMOL/L (ref 8–16)
BASOPHILS # BLD AUTO: 0.02 K/UL (ref 0–0.2)
BASOPHILS NFR BLD: 0.3 % (ref 0–1.9)
BUN SERPL-MCNC: 23 MG/DL (ref 6–20)
CALCIUM SERPL-MCNC: 9.9 MG/DL (ref 8.7–10.5)
CHLORIDE SERPL-SCNC: 109 MMOL/L (ref 95–110)
CO2 SERPL-SCNC: 24 MMOL/L (ref 23–29)
CREAT SERPL-MCNC: 1.4 MG/DL (ref 0.5–1.4)
DIFFERENTIAL METHOD: ABNORMAL
EOSINOPHIL # BLD AUTO: 0.1 K/UL (ref 0–0.5)
EOSINOPHIL NFR BLD: 1.7 % (ref 0–8)
ERYTHROCYTE [DISTWIDTH] IN BLOOD BY AUTOMATED COUNT: 13.2 % (ref 11.5–14.5)
EST. GFR  (NO RACE VARIABLE): 43.1 ML/MIN/1.73 M^2
GLUCOSE SERPL-MCNC: 117 MG/DL (ref 70–110)
HCT VFR BLD AUTO: 43 % (ref 37–48.5)
HGB BLD-MCNC: 13.1 G/DL (ref 12–16)
IMM GRANULOCYTES # BLD AUTO: 0.04 K/UL (ref 0–0.04)
IMM GRANULOCYTES NFR BLD AUTO: 0.7 % (ref 0–0.5)
LYMPHOCYTES # BLD AUTO: 1.6 K/UL (ref 1–4.8)
LYMPHOCYTES NFR BLD: 27.1 % (ref 18–48)
MAGNESIUM SERPL-MCNC: 1.8 MG/DL (ref 1.6–2.6)
MCH RBC QN AUTO: 29.1 PG (ref 27–31)
MCHC RBC AUTO-ENTMCNC: 30.5 G/DL (ref 32–36)
MCV RBC AUTO: 96 FL (ref 82–98)
MONOCYTES # BLD AUTO: 0.7 K/UL (ref 0.3–1)
MONOCYTES NFR BLD: 11.6 % (ref 4–15)
NEUTROPHILS # BLD AUTO: 3.4 K/UL (ref 1.8–7.7)
NEUTROPHILS NFR BLD: 58.6 % (ref 38–73)
NRBC BLD-RTO: 0 /100 WBC
PHOSPHATE SERPL-MCNC: 2.5 MG/DL (ref 2.7–4.5)
PLATELET # BLD AUTO: 93 K/UL (ref 150–450)
PMV BLD AUTO: 12.8 FL (ref 9.2–12.9)
POTASSIUM SERPL-SCNC: 4.3 MMOL/L (ref 3.5–5.1)
RBC # BLD AUTO: 4.5 M/UL (ref 4–5.4)
SODIUM SERPL-SCNC: 138 MMOL/L (ref 136–145)
WBC # BLD AUTO: 5.84 K/UL (ref 3.9–12.7)

## 2023-09-26 PROCEDURE — 87799 DETECT AGENT NOS DNA QUANT: CPT | Performed by: INTERNAL MEDICINE

## 2023-09-26 PROCEDURE — 80197 ASSAY OF TACROLIMUS: CPT | Performed by: INTERNAL MEDICINE

## 2023-09-26 PROCEDURE — 83735 ASSAY OF MAGNESIUM: CPT | Performed by: INTERNAL MEDICINE

## 2023-09-26 PROCEDURE — 85025 COMPLETE CBC W/AUTO DIFF WBC: CPT | Performed by: INTERNAL MEDICINE

## 2023-09-26 PROCEDURE — 80069 RENAL FUNCTION PANEL: CPT | Performed by: INTERNAL MEDICINE

## 2023-09-27 LAB — TACROLIMUS BLD-MCNC: 8 NG/ML (ref 5–15)

## 2023-10-10 ENCOUNTER — LAB VISIT (OUTPATIENT)
Dept: LAB | Facility: HOSPITAL | Age: 58
End: 2023-10-10
Attending: INTERNAL MEDICINE
Payer: MEDICARE

## 2023-10-10 DIAGNOSIS — Z94.0 KIDNEY REPLACED BY TRANSPLANT: ICD-10-CM

## 2023-10-10 LAB
ALBUMIN SERPL BCP-MCNC: 3.6 G/DL (ref 3.5–5.2)
ANION GAP SERPL CALC-SCNC: 13 MMOL/L (ref 8–16)
BASOPHILS # BLD AUTO: 0.01 K/UL (ref 0–0.2)
BASOPHILS NFR BLD: 0.2 % (ref 0–1.9)
BUN SERPL-MCNC: 22 MG/DL (ref 6–20)
CALCIUM SERPL-MCNC: 9.6 MG/DL (ref 8.7–10.5)
CHLORIDE SERPL-SCNC: 104 MMOL/L (ref 95–110)
CO2 SERPL-SCNC: 21 MMOL/L (ref 23–29)
CREAT SERPL-MCNC: 1.2 MG/DL (ref 0.5–1.4)
DIFFERENTIAL METHOD: ABNORMAL
EOSINOPHIL # BLD AUTO: 0.1 K/UL (ref 0–0.5)
EOSINOPHIL NFR BLD: 1.6 % (ref 0–8)
ERYTHROCYTE [DISTWIDTH] IN BLOOD BY AUTOMATED COUNT: 12.7 % (ref 11.5–14.5)
EST. GFR  (NO RACE VARIABLE): 51.8 ML/MIN/1.73 M^2
GLUCOSE SERPL-MCNC: 119 MG/DL (ref 70–110)
HCT VFR BLD AUTO: 43.5 % (ref 37–48.5)
HGB BLD-MCNC: 13.6 G/DL (ref 12–16)
IMM GRANULOCYTES # BLD AUTO: 0.02 K/UL (ref 0–0.04)
IMM GRANULOCYTES NFR BLD AUTO: 0.3 % (ref 0–0.5)
LYMPHOCYTES # BLD AUTO: 1.6 K/UL (ref 1–4.8)
LYMPHOCYTES NFR BLD: 27 % (ref 18–48)
MAGNESIUM SERPL-MCNC: 1.6 MG/DL (ref 1.6–2.6)
MCH RBC QN AUTO: 29.2 PG (ref 27–31)
MCHC RBC AUTO-ENTMCNC: 31.3 G/DL (ref 32–36)
MCV RBC AUTO: 94 FL (ref 82–98)
MONOCYTES # BLD AUTO: 0.6 K/UL (ref 0.3–1)
MONOCYTES NFR BLD: 10.1 % (ref 4–15)
NEUTROPHILS # BLD AUTO: 3.5 K/UL (ref 1.8–7.7)
NEUTROPHILS NFR BLD: 60.8 % (ref 38–73)
NRBC BLD-RTO: 0 /100 WBC
PHOSPHATE SERPL-MCNC: 3.1 MG/DL (ref 2.7–4.5)
PLATELET # BLD AUTO: 93 K/UL (ref 150–450)
PMV BLD AUTO: 14 FL (ref 9.2–12.9)
POTASSIUM SERPL-SCNC: 4 MMOL/L (ref 3.5–5.1)
RBC # BLD AUTO: 4.65 M/UL (ref 4–5.4)
SODIUM SERPL-SCNC: 138 MMOL/L (ref 136–145)
WBC # BLD AUTO: 5.74 K/UL (ref 3.9–12.7)

## 2023-10-10 PROCEDURE — 80069 RENAL FUNCTION PANEL: CPT | Performed by: INTERNAL MEDICINE

## 2023-10-10 PROCEDURE — 85025 COMPLETE CBC W/AUTO DIFF WBC: CPT | Performed by: INTERNAL MEDICINE

## 2023-10-10 PROCEDURE — 83735 ASSAY OF MAGNESIUM: CPT | Performed by: INTERNAL MEDICINE

## 2023-10-10 PROCEDURE — 36415 COLL VENOUS BLD VENIPUNCTURE: CPT | Mod: PO | Performed by: INTERNAL MEDICINE

## 2023-10-10 PROCEDURE — 87799 DETECT AGENT NOS DNA QUANT: CPT | Performed by: INTERNAL MEDICINE

## 2023-10-10 PROCEDURE — 80197 ASSAY OF TACROLIMUS: CPT | Performed by: INTERNAL MEDICINE

## 2023-10-11 LAB — TACROLIMUS BLD-MCNC: 9.1 NG/ML (ref 5–15)

## 2023-10-24 ENCOUNTER — LAB VISIT (OUTPATIENT)
Dept: LAB | Facility: HOSPITAL | Age: 58
End: 2023-10-24
Attending: INTERNAL MEDICINE
Payer: MEDICARE

## 2023-10-24 ENCOUNTER — TELEPHONE (OUTPATIENT)
Dept: TRANSPLANT | Facility: CLINIC | Age: 58
End: 2023-10-24
Payer: MEDICARE

## 2023-10-24 DIAGNOSIS — Z94.0 KIDNEY REPLACED BY TRANSPLANT: ICD-10-CM

## 2023-10-24 LAB
ALBUMIN SERPL BCP-MCNC: 3.6 G/DL (ref 3.5–5.2)
ANION GAP SERPL CALC-SCNC: 8 MMOL/L (ref 8–16)
BASOPHILS # BLD AUTO: 0.01 K/UL (ref 0–0.2)
BASOPHILS NFR BLD: 0.2 % (ref 0–1.9)
BUN SERPL-MCNC: 21 MG/DL (ref 6–20)
CALCIUM SERPL-MCNC: 10.8 MG/DL (ref 8.7–10.5)
CHLORIDE SERPL-SCNC: 107 MMOL/L (ref 95–110)
CO2 SERPL-SCNC: 25 MMOL/L (ref 23–29)
CREAT SERPL-MCNC: 1.5 MG/DL (ref 0.5–1.4)
DIFFERENTIAL METHOD: ABNORMAL
EOSINOPHIL # BLD AUTO: 0.2 K/UL (ref 0–0.5)
EOSINOPHIL NFR BLD: 3.8 % (ref 0–8)
ERYTHROCYTE [DISTWIDTH] IN BLOOD BY AUTOMATED COUNT: 12.4 % (ref 11.5–14.5)
EST. GFR  (NO RACE VARIABLE): 39.6 ML/MIN/1.73 M^2
GLUCOSE SERPL-MCNC: 103 MG/DL (ref 70–110)
HCT VFR BLD AUTO: 42.2 % (ref 37–48.5)
HGB BLD-MCNC: 13.1 G/DL (ref 12–16)
IMM GRANULOCYTES # BLD AUTO: 0.01 K/UL (ref 0–0.04)
IMM GRANULOCYTES NFR BLD AUTO: 0.2 % (ref 0–0.5)
LYMPHOCYTES # BLD AUTO: 1.3 K/UL (ref 1–4.8)
LYMPHOCYTES NFR BLD: 24.1 % (ref 18–48)
MAGNESIUM SERPL-MCNC: 1.9 MG/DL (ref 1.6–2.6)
MCH RBC QN AUTO: 28.9 PG (ref 27–31)
MCHC RBC AUTO-ENTMCNC: 31 G/DL (ref 32–36)
MCV RBC AUTO: 93 FL (ref 82–98)
MONOCYTES # BLD AUTO: 0.5 K/UL (ref 0.3–1)
MONOCYTES NFR BLD: 9.5 % (ref 4–15)
NEUTROPHILS # BLD AUTO: 3.5 K/UL (ref 1.8–7.7)
NEUTROPHILS NFR BLD: 62.2 % (ref 38–73)
NRBC BLD-RTO: 0 /100 WBC
PHOSPHATE SERPL-MCNC: 3.1 MG/DL (ref 2.7–4.5)
PLATELET # BLD AUTO: 127 K/UL (ref 150–450)
PMV BLD AUTO: 12.5 FL (ref 9.2–12.9)
POTASSIUM SERPL-SCNC: 3.9 MMOL/L (ref 3.5–5.1)
RBC # BLD AUTO: 4.54 M/UL (ref 4–5.4)
SODIUM SERPL-SCNC: 140 MMOL/L (ref 136–145)
TACROLIMUS BLD-MCNC: 8.4 NG/ML (ref 5–15)
WBC # BLD AUTO: 5.57 K/UL (ref 3.9–12.7)

## 2023-10-24 PROCEDURE — 83735 ASSAY OF MAGNESIUM: CPT | Performed by: INTERNAL MEDICINE

## 2023-10-24 PROCEDURE — 80069 RENAL FUNCTION PANEL: CPT | Performed by: INTERNAL MEDICINE

## 2023-10-24 PROCEDURE — 85025 COMPLETE CBC W/AUTO DIFF WBC: CPT | Performed by: INTERNAL MEDICINE

## 2023-10-24 PROCEDURE — 36415 COLL VENOUS BLD VENIPUNCTURE: CPT | Mod: PO | Performed by: INTERNAL MEDICINE

## 2023-10-24 PROCEDURE — 80197 ASSAY OF TACROLIMUS: CPT | Performed by: INTERNAL MEDICINE

## 2023-10-24 NOTE — TELEPHONE ENCOUNTER
Patient repeated back and voice a understanding of orders.  Low calcium diet reviewed. Next labs on 10/30.  ----- Message from Kalpana Grider MD sent at 10/24/2023  1:26 PM CDT -----  High calcium noted - avoid calcium containing supplements (calcium carbonate or citrate, such as Tums, Citracal, etc) and supplements with vit D as well.   Repeat PTH level with next lab  Repeat renal panel in a week; encourage hydration.

## 2023-10-30 ENCOUNTER — LAB VISIT (OUTPATIENT)
Dept: LAB | Facility: HOSPITAL | Age: 58
End: 2023-10-30
Attending: INTERNAL MEDICINE
Payer: MEDICARE

## 2023-10-30 DIAGNOSIS — Z94.0 KIDNEY REPLACED BY TRANSPLANT: ICD-10-CM

## 2023-10-30 LAB
ALBUMIN SERPL BCP-MCNC: 3.7 G/DL (ref 3.5–5.2)
ANION GAP SERPL CALC-SCNC: 7 MMOL/L (ref 8–16)
BUN SERPL-MCNC: 23 MG/DL (ref 6–20)
CALCIUM SERPL-MCNC: 10.1 MG/DL (ref 8.7–10.5)
CHLORIDE SERPL-SCNC: 105 MMOL/L (ref 95–110)
CO2 SERPL-SCNC: 26 MMOL/L (ref 23–29)
CREAT SERPL-MCNC: 1.4 MG/DL (ref 0.5–1.4)
EST. GFR  (NO RACE VARIABLE): 43.1 ML/MIN/1.73 M^2
GLUCOSE SERPL-MCNC: 124 MG/DL (ref 70–110)
MAGNESIUM SERPL-MCNC: 1.9 MG/DL (ref 1.6–2.6)
PHOSPHATE SERPL-MCNC: 2.9 MG/DL (ref 2.7–4.5)
POTASSIUM SERPL-SCNC: 4.4 MMOL/L (ref 3.5–5.1)
PTH-INTACT SERPL-MCNC: 147.9 PG/ML (ref 9–77)
SODIUM SERPL-SCNC: 138 MMOL/L (ref 136–145)

## 2023-10-30 PROCEDURE — 83735 ASSAY OF MAGNESIUM: CPT | Performed by: INTERNAL MEDICINE

## 2023-10-30 PROCEDURE — 80069 RENAL FUNCTION PANEL: CPT | Performed by: INTERNAL MEDICINE

## 2023-10-30 PROCEDURE — 36415 COLL VENOUS BLD VENIPUNCTURE: CPT | Mod: PO | Performed by: INTERNAL MEDICINE

## 2023-10-30 PROCEDURE — 83970 ASSAY OF PARATHORMONE: CPT | Performed by: INTERNAL MEDICINE

## 2023-11-07 ENCOUNTER — LAB VISIT (OUTPATIENT)
Dept: LAB | Facility: HOSPITAL | Age: 58
End: 2023-11-07
Attending: INTERNAL MEDICINE
Payer: MEDICARE

## 2023-11-07 DIAGNOSIS — Z94.0 KIDNEY REPLACED BY TRANSPLANT: ICD-10-CM

## 2023-11-07 LAB
ALBUMIN SERPL BCP-MCNC: 3.9 G/DL (ref 3.5–5.2)
ANION GAP SERPL CALC-SCNC: 13 MMOL/L (ref 8–16)
BASOPHILS # BLD AUTO: 0.02 K/UL (ref 0–0.2)
BASOPHILS NFR BLD: 0.4 % (ref 0–1.9)
BUN SERPL-MCNC: 31 MG/DL (ref 6–20)
CALCIUM SERPL-MCNC: 10.4 MG/DL (ref 8.7–10.5)
CHLORIDE SERPL-SCNC: 104 MMOL/L (ref 95–110)
CO2 SERPL-SCNC: 22 MMOL/L (ref 23–29)
CREAT SERPL-MCNC: 1.7 MG/DL (ref 0.5–1.4)
DIFFERENTIAL METHOD: ABNORMAL
EOSINOPHIL # BLD AUTO: 0.1 K/UL (ref 0–0.5)
EOSINOPHIL NFR BLD: 2.2 % (ref 0–8)
ERYTHROCYTE [DISTWIDTH] IN BLOOD BY AUTOMATED COUNT: 12.8 % (ref 11.5–14.5)
EST. GFR  (NO RACE VARIABLE): 34.1 ML/MIN/1.73 M^2
GLUCOSE SERPL-MCNC: 109 MG/DL (ref 70–110)
HCT VFR BLD AUTO: 45.6 % (ref 37–48.5)
HGB BLD-MCNC: 14.8 G/DL (ref 12–16)
IMM GRANULOCYTES # BLD AUTO: 0.02 K/UL (ref 0–0.04)
IMM GRANULOCYTES NFR BLD AUTO: 0.4 % (ref 0–0.5)
LYMPHOCYTES # BLD AUTO: 1.4 K/UL (ref 1–4.8)
LYMPHOCYTES NFR BLD: 28 % (ref 18–48)
MAGNESIUM SERPL-MCNC: 1.9 MG/DL (ref 1.6–2.6)
MCH RBC QN AUTO: 30 PG (ref 27–31)
MCHC RBC AUTO-ENTMCNC: 32.5 G/DL (ref 32–36)
MCV RBC AUTO: 93 FL (ref 82–98)
MONOCYTES # BLD AUTO: 0.4 K/UL (ref 0.3–1)
MONOCYTES NFR BLD: 8.3 % (ref 4–15)
NEUTROPHILS # BLD AUTO: 3.1 K/UL (ref 1.8–7.7)
NEUTROPHILS NFR BLD: 60.7 % (ref 38–73)
NRBC BLD-RTO: 0 /100 WBC
PHOSPHATE SERPL-MCNC: 3 MG/DL (ref 2.7–4.5)
PLATELET # BLD AUTO: 131 K/UL (ref 150–450)
PMV BLD AUTO: 12 FL (ref 9.2–12.9)
POTASSIUM SERPL-SCNC: 4.2 MMOL/L (ref 3.5–5.1)
RBC # BLD AUTO: 4.93 M/UL (ref 4–5.4)
SODIUM SERPL-SCNC: 139 MMOL/L (ref 136–145)
WBC # BLD AUTO: 5.03 K/UL (ref 3.9–12.7)

## 2023-11-07 PROCEDURE — 87799 DETECT AGENT NOS DNA QUANT: CPT | Performed by: INTERNAL MEDICINE

## 2023-11-07 PROCEDURE — 80069 RENAL FUNCTION PANEL: CPT | Performed by: INTERNAL MEDICINE

## 2023-11-07 PROCEDURE — 85025 COMPLETE CBC W/AUTO DIFF WBC: CPT | Performed by: INTERNAL MEDICINE

## 2023-11-07 PROCEDURE — 83735 ASSAY OF MAGNESIUM: CPT | Performed by: INTERNAL MEDICINE

## 2023-11-07 PROCEDURE — 80197 ASSAY OF TACROLIMUS: CPT | Performed by: INTERNAL MEDICINE

## 2023-11-07 PROCEDURE — 36415 COLL VENOUS BLD VENIPUNCTURE: CPT | Mod: PO | Performed by: INTERNAL MEDICINE

## 2023-11-08 ENCOUNTER — TELEPHONE (OUTPATIENT)
Dept: TRANSPLANT | Facility: CLINIC | Age: 58
End: 2023-11-08
Payer: MEDICARE

## 2023-11-08 LAB — TACROLIMUS BLD-MCNC: 7.8 NG/ML (ref 5–15)

## 2023-11-08 NOTE — TELEPHONE ENCOUNTER
Patient repeated back and voice a understanding of orders and will repeat Renal panel on 11/14/2023.  Voice a understanding that she needs to be drinking over 2 liters of H2O daily.   ----- Message from Kalpana Grider MD sent at 11/7/2023  5:15 PM CST -----  PATRICK-  please ask her to hydrate and repeat renal panel in a few days

## 2023-11-14 ENCOUNTER — LAB VISIT (OUTPATIENT)
Dept: LAB | Facility: HOSPITAL | Age: 58
End: 2023-11-14
Attending: INTERNAL MEDICINE
Payer: MEDICARE

## 2023-11-14 DIAGNOSIS — Z94.0 KIDNEY REPLACED BY TRANSPLANT: ICD-10-CM

## 2023-11-14 LAB
ALBUMIN SERPL BCP-MCNC: 3.7 G/DL (ref 3.5–5.2)
ANION GAP SERPL CALC-SCNC: 9 MMOL/L (ref 8–16)
BUN SERPL-MCNC: 22 MG/DL (ref 6–20)
CALCIUM SERPL-MCNC: 9.8 MG/DL (ref 8.7–10.5)
CHLORIDE SERPL-SCNC: 104 MMOL/L (ref 95–110)
CO2 SERPL-SCNC: 23 MMOL/L (ref 23–29)
CREAT SERPL-MCNC: 1.5 MG/DL (ref 0.5–1.4)
EST. GFR  (NO RACE VARIABLE): 39.6 ML/MIN/1.73 M^2
GLUCOSE SERPL-MCNC: 148 MG/DL (ref 70–110)
MAGNESIUM SERPL-MCNC: 1.9 MG/DL (ref 1.6–2.6)
PHOSPHATE SERPL-MCNC: 2.5 MG/DL (ref 2.7–4.5)
POTASSIUM SERPL-SCNC: 4.1 MMOL/L (ref 3.5–5.1)
SODIUM SERPL-SCNC: 136 MMOL/L (ref 136–145)

## 2023-11-14 PROCEDURE — 80069 RENAL FUNCTION PANEL: CPT | Performed by: INTERNAL MEDICINE

## 2023-11-14 PROCEDURE — 83735 ASSAY OF MAGNESIUM: CPT | Performed by: INTERNAL MEDICINE

## 2023-11-14 PROCEDURE — 36415 COLL VENOUS BLD VENIPUNCTURE: CPT | Mod: PO | Performed by: INTERNAL MEDICINE

## 2023-12-04 NOTE — PROGRESS NOTES
Kidney Post-Transplant Assessment    Referring Physician: Aaron Bonilla  Current Nephrologist: Aaron Bonilla    ORGAN: LEFT KIDNEY  Donor Type: donation after brain death  PHS Increased Risk: no  Cold Ischemia: 1,731 mins  Induction Medications: thymoglobulin    Subjective:     CC:  Reassessment of renal allograft function and management of chronic immunosuppression.    HPI:  Ms. Maldonado is a 58 y.o. year old Black or  female with PMG ESRD 2/2 DM2 and HTN, and failed kidney txp, who received a donation after brain death kidney transplant on 3/10/23. Her  BL creatinine is 1.2-1.5 and continues to trend down.  She takes mycophenolate mofetil, prednisone, and tacrolimus for maintenance immunosuppression.       Pertinent Nephrology/Transplant History:   ESRD from DM  failed KT #1 12/2014 -  3/2022. EDW 81kg  DDKT#2  3/10/23. thymo; CIT 28+h    5/4/23 Allograft Kidney US  1. Mildly elevated arterial resistive indices, improved compared to prior.  2. New perinephric simple fluid collection.  Findings may reflect urinoma, lymphocele etcetera.  measuring 12.5 x 2.9 x 3.8 cm.      LOV  9/18/23    Interval HX:   HX MGUS dx 2013; Follows with [facility] LOV 11/20/23 (CE) [facility] -->Armando Robertson MD    Follows with endocrinology for DM mgmt    Has been on Mounjaro since 9/2023  Lab Results   Component Value Date    HGBA1C 7.2 (H) 03/13/2023      Intake-  adequate hydration reported    UOP-no problems reported   BP   BP Readings from Last 3 Encounters:   12/11/23 127/65   09/18/23 (!) 123/58   08/17/23 119/70      Peripheral edema--no   Weight-- stable      Lab /diagnostic results reviewed with patient today.   All questions answered    Review of Systems   Constitutional:  Negative for activity change, appetite change, chills, fatigue, fever and unexpected weight change.   HENT:  Negative for congestion, facial swelling, postnasal drip, rhinorrhea, sinus pressure, sore throat and trouble swallowing.   "  Eyes:  Positive for visual disturbance. Negative for pain and redness.        Wears glasses   Respiratory:  Negative for cough, chest tightness, shortness of breath and wheezing.    Cardiovascular: Negative.  Negative for chest pain, palpitations and leg swelling.   Gastrointestinal:  Positive for abdominal distention. Negative for abdominal pain, diarrhea, nausea and vomiting.            Genitourinary:  Negative for dysuria, flank pain and urgency.   Musculoskeletal:  Positive for back pain (HX sciatica). Negative for gait problem, neck pain and neck stiffness.   Skin:  Negative for rash and wound.   Allergic/Immunologic: Negative for environmental allergies, food allergies and immunocompromised state.   Neurological:  Negative for dizziness, weakness, light-headedness and headaches.   Psychiatric/Behavioral:  Negative for agitation and confusion. The patient is not nervous/anxious.        Objective:   Blood pressure 127/65, pulse 95, temperature 97.3 °F (36.3 °C), temperature source Tympanic, resp. rate 18, height 5' 7" (1.702 m), weight 86.1 kg (189 lb 13.1 oz), last menstrual period 04/29/2011, SpO2 100 %.body mass index is 29.73 kg/m².    Physical Exam  Vitals reviewed.   Constitutional:       Appearance: Normal appearance. She is well-developed.   HENT:      Head: Normocephalic.   Eyes:      Pupils: Pupils are equal, round, and reactive to light.   Cardiovascular:      Rate and Rhythm: Normal rate and regular rhythm.      Heart sounds: Normal heart sounds.   Pulmonary:      Effort: Pulmonary effort is normal.      Breath sounds: Normal breath sounds.   Abdominal:      General: Bowel sounds are normal.      Palpations: Abdomen is soft.      Comments:   No bruit noted over the allograft     Musculoskeletal:         General: Normal range of motion.        Arms:       Cervical back: Normal range of motion and neck supple.   Skin:     General: Skin is warm and dry.   Neurological:      Mental Status: She is alert " "and oriented to person, place, and time.      Motor: No abnormal muscle tone.   Psychiatric:         Behavior: Behavior normal.         Labs:  Lab Results   Component Value Date    WBC 5.28 12/05/2023    HGB 13.0 12/05/2023    HCT 38.6 12/05/2023     12/05/2023    K 3.7 12/05/2023     12/05/2023    CO2 22 (L) 12/05/2023    BUN 18 12/05/2023    CREATININE 1.2 12/05/2023    EGFRNORACEVR 51.8 (A) 12/05/2023    GLUCOSE 148 (H) 06/15/2023    CALCIUM 9.4 12/05/2023    PHOS 3.1 12/05/2023    MG 1.7 12/05/2023    ALBUMIN 3.5 12/05/2023    ALBUMIN 3.5 12/05/2023    AST 15 12/05/2023    ALT 14 12/05/2023    UTPCR 0.05 12/05/2023    .9 (H) 10/30/2023    TACROLIMUS 8.5 12/05/2023       No results found for: "EXTANC", "EXTWBC", "EXTSEGS", "EXTPLATELETS", "EXTHEMOGLOBI", "EXTHEMATOCRI", "EXTCREATININ", "EXTSODIUM", "EXTPOTASSIUM", "EXTBUN", "EXTCO2", "EXTCALCIUM", "EXTPHOSPHORU", "EXTGLUCOSE", "EXTALBUMIN", "EXTAST", "EXTALT", "EXTBILITOTAL", "EXTLIPASE", "EXTAMYLASE"    No results found for: "EXTCYCLOSLVL", "EXTSIROLIMUS", "EXTTACROLVL", "EXTPROTCRE", "EXTPTHINTACT", "EXTPROTEINUA", "EXTWBCUA", "EXTRBCUA"    Labs were reviewed with the patient    Assessment:     1. Kidney transplant status, living unrelated donor - 12/2/14    2. Immunodeficiency due to long term immunosuppressive drug therapy    3. Stage 3a chronic kidney disease    4. Type 2 diabetes mellitus with stage 3a chronic kidney disease, with long-term current use of insulin    5. Type 2 DM with CKD stage 2 and hypertension    6. Anemia of chronic disease    7. MGUS (monoclonal gammopathy of unknown significance)    8. At risk for opportunistic infections              Plan:      Increase  mg BID--recheck CBC ~ 2 weeks  9/2023-->Per Dr Kaplan : Add DSA's next lab draw     1) s/p living related kidney transplant, CKD 3a              - Graft function.   Scr 1.2, CKD 3a   -FK Trough 8.5, which is   therapeutic  (target 7-9)    -  cont  FK  3/3, "    - cont on Cellcept 250 mg BID along with prednisone 5 mg daily   -Will continue to monitor for drug toxicities         Lab Results   Component Value Date    CREATININE 1.2 12/05/2023         Latest Reference Range & Units 8mo 3wk,  Kidney-Post 1 Year  12/05/23   eGFR >60 mL/min/1.73 m^2 51.8 !       5/4/2023  POD 55   AlloSure Score - Kidney % 0.65     5/4/23            2) Uncontrolled HTN: advise low salt diet and home BP monitoring  - Cont  hydralazine. Coreg as prescribed       3) Anemia:              -   will monitor as per our guidelines   H/H  stable-no intervention needed,      Lab Results   Component Value Date    WBC 5.28 12/05/2023    HGB 13.0 12/05/2023    HCT 38.6 12/05/2023    MCV 87 12/05/2023     (L) 12/05/2023     4) type II DM:   -  cont MED REGIME as prescribed . Mgmt deferred to endocrinology    Lab Results   Component Value Date    HGBA1C 7.2 (H) 03/13/2023         5) Hypophosphatemia:Secondary hyperparathyroidism:                 stop KPN--no intervention required.will monitor as per our guidelines  Lab Results   Component Value Date    .9 (H) 10/30/2023    CALCIUM 9.4 12/05/2023    PHOS 3.1 12/05/2023         6) Metabolic acidosis/Electrolyte balance:stable      - no intervention required. will monitor as per our guidelines  Lab Results   Component Value Date     12/05/2023    K 3.7 12/05/2023     12/05/2023    CO2 22 (L) 12/05/2023         7) hypomagnesemia:stable    - no intervention required . will monitor as per our guidelines            Latest Reference Range & Units 8mo 3wk,  Kidney-Post 1 Year  12/05/23   Magnesium  1.6 - 2.6 mg/dL 1.7         8) Cytopenias: no significant cytopenias will monitor as per our guidelines. Medicine list reviewed including potential causes of drug-induced cytopenias    9) Proteinuria: continue p/c ratio as per guidelines          Latest Reference Range & Units 8mo 3wk,  Kidney-Post 1 Year  12/05/23   Prot/Creat Ratio, Urine 0.00 -  0.20  0.05             10) CMV and BK virus infection screening:  will continue to monitor/ guidelines      BK detected 5/8/23 -resolved        Latest Reference Range & Units 7mo 0wk,  Kidney-Post 1 Year  10/10/23   BK Virus DNA, Blood Not detected  Not detected      Latest Reference Range & Units 7mo 0wk,  Kidney-Post 1 Year  10/10/23   BK Virus DNA PCR, Quant, Blood <125 Copies/mL <125            11) Weight education: provided during the clinic visit   Body mass index is 29.73 kg/m².        Follow-up:   Clinic: return to transplant clinic weekly for the first month after transplant; every 2 weeks during months 2-3; then at 6-, 9-, 12-, 18-, 24-, and 36- months post-transplant to reassess for complications from immunosuppression toxicity and monitor for rejection.  Annually thereafter.    Labs: since patient remains at high risk for rejection and drug-related complications that warrant close monitoring, labs will be ordered as follows: continue twice weekly CBC, renal panel, and drug level for first month; then same labs once weekly through 3rd month post-transplant.  Urine for UA and protein/creatinine ratio monthly.  Serum BK - PCR at 1-, 3-, 6-, 9-, 12-, 18-, 24-, 36-, 48-, and 60 months post-transplant.  Hepatic panel at 1-, 2-, 3-, 6-, 9-, 12-, 18-, 24-, and 36- months post-transplant.    Daya Leon NP       Education:   Material provided to the patient.  Patient reminded to call with any health changes since these can be early signs of significant complications.  Also, I advised the patient to be sure any new medications or changes of old medications are discussed with either a pharmacist or physician knowledgeable with transplant to avoid rejection/drug toxicity related to significant drug interactions.    Patient advised that it is recommended that all transplanted patients, and their close contacts and household members receive Covid vaccination.

## 2023-12-05 ENCOUNTER — LAB VISIT (OUTPATIENT)
Dept: LAB | Facility: HOSPITAL | Age: 58
End: 2023-12-05
Attending: INTERNAL MEDICINE
Payer: MEDICARE

## 2023-12-05 DIAGNOSIS — Z94.0 KIDNEY REPLACED BY TRANSPLANT: ICD-10-CM

## 2023-12-05 PROBLEM — Z79.60 LONG-TERM USE OF IMMUNOSUPPRESSANT MEDICATION: Status: RESOLVED | Noted: 2023-03-13 | Resolved: 2023-12-05

## 2023-12-05 PROBLEM — N18.2 TYPE 2 DM WITH CKD STAGE 2 AND HYPERTENSION: Status: ACTIVE | Noted: 2021-05-03

## 2023-12-05 PROBLEM — I12.9 TYPE 2 DM WITH CKD STAGE 2 AND HYPERTENSION: Status: ACTIVE | Noted: 2021-05-03

## 2023-12-05 PROBLEM — E11.22 TYPE 2 DM WITH CKD STAGE 2 AND HYPERTENSION: Status: ACTIVE | Noted: 2021-05-03

## 2023-12-05 LAB
ALBUMIN SERPL BCP-MCNC: 3.5 G/DL (ref 3.5–5.2)
ALBUMIN SERPL BCP-MCNC: 3.5 G/DL (ref 3.5–5.2)
ALP SERPL-CCNC: 70 U/L (ref 55–135)
ALT SERPL W/O P-5'-P-CCNC: 14 U/L (ref 10–44)
ANION GAP SERPL CALC-SCNC: 10 MMOL/L (ref 8–16)
AST SERPL-CCNC: 15 U/L (ref 10–40)
BASOPHILS # BLD AUTO: 0.02 K/UL (ref 0–0.2)
BASOPHILS NFR BLD: 0.4 % (ref 0–1.9)
BILIRUB DIRECT SERPL-MCNC: 0.1 MG/DL (ref 0.1–0.3)
BILIRUB SERPL-MCNC: 0.4 MG/DL (ref 0.1–1)
BUN SERPL-MCNC: 18 MG/DL (ref 6–20)
CALCIUM SERPL-MCNC: 9.4 MG/DL (ref 8.7–10.5)
CHLORIDE SERPL-SCNC: 109 MMOL/L (ref 95–110)
CO2 SERPL-SCNC: 22 MMOL/L (ref 23–29)
CREAT SERPL-MCNC: 1.2 MG/DL (ref 0.5–1.4)
DIFFERENTIAL METHOD: ABNORMAL
EOSINOPHIL # BLD AUTO: 0.1 K/UL (ref 0–0.5)
EOSINOPHIL NFR BLD: 1.3 % (ref 0–8)
ERYTHROCYTE [DISTWIDTH] IN BLOOD BY AUTOMATED COUNT: 13 % (ref 11.5–14.5)
EST. GFR  (NO RACE VARIABLE): 51.8 ML/MIN/1.73 M^2
GLUCOSE SERPL-MCNC: 99 MG/DL (ref 70–110)
HCT VFR BLD AUTO: 38.6 % (ref 37–48.5)
HGB BLD-MCNC: 13 G/DL (ref 12–16)
IMM GRANULOCYTES # BLD AUTO: 0.02 K/UL (ref 0–0.04)
IMM GRANULOCYTES NFR BLD AUTO: 0.4 % (ref 0–0.5)
LYMPHOCYTES # BLD AUTO: 1.4 K/UL (ref 1–4.8)
LYMPHOCYTES NFR BLD: 26.3 % (ref 18–48)
MAGNESIUM SERPL-MCNC: 1.7 MG/DL (ref 1.6–2.6)
MCH RBC QN AUTO: 29.3 PG (ref 27–31)
MCHC RBC AUTO-ENTMCNC: 33.7 G/DL (ref 32–36)
MCV RBC AUTO: 87 FL (ref 82–98)
MONOCYTES # BLD AUTO: 0.6 K/UL (ref 0.3–1)
MONOCYTES NFR BLD: 11 % (ref 4–15)
NEUTROPHILS # BLD AUTO: 3.2 K/UL (ref 1.8–7.7)
NEUTROPHILS NFR BLD: 60.6 % (ref 38–73)
NRBC BLD-RTO: 0 /100 WBC
PHOSPHATE SERPL-MCNC: 3.1 MG/DL (ref 2.7–4.5)
PLATELET # BLD AUTO: 134 K/UL (ref 150–450)
PMV BLD AUTO: 12.5 FL (ref 9.2–12.9)
POTASSIUM SERPL-SCNC: 3.7 MMOL/L (ref 3.5–5.1)
PROT SERPL-MCNC: 7.9 G/DL (ref 6–8.4)
RBC # BLD AUTO: 4.43 M/UL (ref 4–5.4)
SODIUM SERPL-SCNC: 141 MMOL/L (ref 136–145)
WBC # BLD AUTO: 5.28 K/UL (ref 3.9–12.7)

## 2023-12-05 PROCEDURE — 87799 DETECT AGENT NOS DNA QUANT: CPT | Performed by: INTERNAL MEDICINE

## 2023-12-05 PROCEDURE — 84075 ASSAY ALKALINE PHOSPHATASE: CPT | Performed by: INTERNAL MEDICINE

## 2023-12-05 PROCEDURE — 80069 RENAL FUNCTION PANEL: CPT | Performed by: INTERNAL MEDICINE

## 2023-12-05 PROCEDURE — 80197 ASSAY OF TACROLIMUS: CPT | Performed by: INTERNAL MEDICINE

## 2023-12-05 PROCEDURE — 83735 ASSAY OF MAGNESIUM: CPT | Performed by: INTERNAL MEDICINE

## 2023-12-05 PROCEDURE — 85025 COMPLETE CBC W/AUTO DIFF WBC: CPT | Performed by: INTERNAL MEDICINE

## 2023-12-05 PROCEDURE — 36415 COLL VENOUS BLD VENIPUNCTURE: CPT | Mod: PO | Performed by: INTERNAL MEDICINE

## 2023-12-06 LAB — TACROLIMUS BLD-MCNC: 8.5 NG/ML (ref 5–15)

## 2023-12-11 ENCOUNTER — OFFICE VISIT (OUTPATIENT)
Dept: TRANSPLANT | Facility: CLINIC | Age: 58
End: 2023-12-11
Payer: MEDICARE

## 2023-12-11 VITALS
RESPIRATION RATE: 18 BRPM | OXYGEN SATURATION: 100 % | HEART RATE: 95 BPM | BODY MASS INDEX: 29.79 KG/M2 | WEIGHT: 189.81 LBS | SYSTOLIC BLOOD PRESSURE: 127 MMHG | HEIGHT: 67 IN | DIASTOLIC BLOOD PRESSURE: 65 MMHG | TEMPERATURE: 97 F

## 2023-12-11 DIAGNOSIS — Z91.89 AT RISK FOR OPPORTUNISTIC INFECTIONS: ICD-10-CM

## 2023-12-11 DIAGNOSIS — D47.2 MGUS (MONOCLONAL GAMMOPATHY OF UNKNOWN SIGNIFICANCE): Chronic | ICD-10-CM

## 2023-12-11 DIAGNOSIS — I12.9 TYPE 2 DM WITH CKD STAGE 2 AND HYPERTENSION: ICD-10-CM

## 2023-12-11 DIAGNOSIS — E11.22 TYPE 2 DIABETES MELLITUS WITH STAGE 3A CHRONIC KIDNEY DISEASE, WITH LONG-TERM CURRENT USE OF INSULIN: ICD-10-CM

## 2023-12-11 DIAGNOSIS — D63.8 ANEMIA OF CHRONIC DISEASE: ICD-10-CM

## 2023-12-11 DIAGNOSIS — Z94.0 KIDNEY TRANSPLANT STATUS, LIVING UNRELATED DONOR: Primary | Chronic | ICD-10-CM

## 2023-12-11 DIAGNOSIS — N18.31 STAGE 3A CHRONIC KIDNEY DISEASE: ICD-10-CM

## 2023-12-11 DIAGNOSIS — N18.31 TYPE 2 DIABETES MELLITUS WITH STAGE 3A CHRONIC KIDNEY DISEASE, WITH LONG-TERM CURRENT USE OF INSULIN: ICD-10-CM

## 2023-12-11 DIAGNOSIS — D84.821 IMMUNODEFICIENCY DUE TO LONG TERM IMMUNOSUPPRESSIVE DRUG THERAPY: ICD-10-CM

## 2023-12-11 DIAGNOSIS — Z79.899 IMMUNODEFICIENCY DUE TO LONG TERM IMMUNOSUPPRESSIVE DRUG THERAPY: ICD-10-CM

## 2023-12-11 DIAGNOSIS — T45.1X5A IMMUNODEFICIENCY DUE TO LONG TERM IMMUNOSUPPRESSIVE DRUG THERAPY: ICD-10-CM

## 2023-12-11 DIAGNOSIS — Z79.4 TYPE 2 DIABETES MELLITUS WITH STAGE 3A CHRONIC KIDNEY DISEASE, WITH LONG-TERM CURRENT USE OF INSULIN: ICD-10-CM

## 2023-12-11 DIAGNOSIS — E11.22 TYPE 2 DM WITH CKD STAGE 2 AND HYPERTENSION: ICD-10-CM

## 2023-12-11 DIAGNOSIS — N18.2 TYPE 2 DM WITH CKD STAGE 2 AND HYPERTENSION: ICD-10-CM

## 2023-12-11 PROCEDURE — 99215 PR OFFICE/OUTPT VISIT, EST, LEVL V, 40-54 MIN: ICD-10-PCS | Mod: S$GLB,,, | Performed by: NURSE PRACTITIONER

## 2023-12-11 PROCEDURE — 3008F BODY MASS INDEX DOCD: CPT | Mod: CPTII,S$GLB,, | Performed by: NURSE PRACTITIONER

## 2023-12-11 PROCEDURE — 3078F PR MOST RECENT DIASTOLIC BLOOD PRESSURE < 80 MM HG: ICD-10-PCS | Mod: CPTII,S$GLB,, | Performed by: NURSE PRACTITIONER

## 2023-12-11 PROCEDURE — 3074F SYST BP LT 130 MM HG: CPT | Mod: CPTII,S$GLB,, | Performed by: NURSE PRACTITIONER

## 2023-12-11 PROCEDURE — 3051F HG A1C>EQUAL 7.0%<8.0%: CPT | Mod: CPTII,S$GLB,, | Performed by: NURSE PRACTITIONER

## 2023-12-11 PROCEDURE — 3008F PR BODY MASS INDEX (BMI) DOCUMENTED: ICD-10-PCS | Mod: CPTII,S$GLB,, | Performed by: NURSE PRACTITIONER

## 2023-12-11 PROCEDURE — 3066F PR DOCUMENTATION OF TREATMENT FOR NEPHROPATHY: ICD-10-PCS | Mod: CPTII,S$GLB,, | Performed by: NURSE PRACTITIONER

## 2023-12-11 PROCEDURE — 99215 OFFICE O/P EST HI 40 MIN: CPT | Mod: S$GLB,,, | Performed by: NURSE PRACTITIONER

## 2023-12-11 PROCEDURE — 3051F PR MOST RECENT HEMOGLOBIN A1C LEVEL 7.0 - < 8.0%: ICD-10-PCS | Mod: CPTII,S$GLB,, | Performed by: NURSE PRACTITIONER

## 2023-12-11 PROCEDURE — 3066F NEPHROPATHY DOC TX: CPT | Mod: CPTII,S$GLB,, | Performed by: NURSE PRACTITIONER

## 2023-12-11 PROCEDURE — 1159F MED LIST DOCD IN RCRD: CPT | Mod: CPTII,S$GLB,, | Performed by: NURSE PRACTITIONER

## 2023-12-11 PROCEDURE — 3074F PR MOST RECENT SYSTOLIC BLOOD PRESSURE < 130 MM HG: ICD-10-PCS | Mod: CPTII,S$GLB,, | Performed by: NURSE PRACTITIONER

## 2023-12-11 PROCEDURE — 1159F PR MEDICATION LIST DOCUMENTED IN MEDICAL RECORD: ICD-10-PCS | Mod: CPTII,S$GLB,, | Performed by: NURSE PRACTITIONER

## 2023-12-11 PROCEDURE — 99999 PR PBB SHADOW E&M-EST. PATIENT-LVL V: ICD-10-PCS | Mod: PBBFAC,,, | Performed by: NURSE PRACTITIONER

## 2023-12-11 PROCEDURE — 3078F DIAST BP <80 MM HG: CPT | Mod: CPTII,S$GLB,, | Performed by: NURSE PRACTITIONER

## 2023-12-11 PROCEDURE — 99999 PR PBB SHADOW E&M-EST. PATIENT-LVL V: CPT | Mod: PBBFAC,,, | Performed by: NURSE PRACTITIONER

## 2023-12-11 RX ORDER — BUSPIRONE HYDROCHLORIDE 5 MG/1
7.5 TABLET ORAL DAILY
COMMUNITY
Start: 2023-11-09

## 2023-12-11 RX ORDER — MYCOPHENOLATE MOFETIL 250 MG/1
500 CAPSULE ORAL 2 TIMES DAILY
Qty: 120 CAPSULE | Refills: 11 | Status: SHIPPED | OUTPATIENT
Start: 2023-12-11 | End: 2024-03-19 | Stop reason: SDUPTHER

## 2023-12-11 NOTE — LETTER
December 11, 2023        Aaron Bonilla  [address]  [facility] [address]  Phone: [phone]  Fax: [phone]             [facility]- Trans[facility] 1st Fl  [address]  [facility] [address]  Phone: [phone]   Patient: Elizabeth Maldonado   MR Number: [id number]   YOB: 1965   Date of Visit: 12/11/2023       Dear Dr. Aaron Bonilla    Thank you for referring Elizabeth Maldonado to me for evaluation. Attached you will find relevant portions of my assessment and plan of care.    If you have questions, please do not hesitate to call me. I look forward to following Elizabeth Maldonado along with you.    Sincerely,    Daya Leon, NP    Enclosure    If you would like to receive this communication electronically, please contact [email or web address] or [phone] to request [employer] access.    [employer] is a tool which provides read-only access to select patient information with whom you have a relationship. Its easy to use and provides real time access to review your patients record including encounter summaries, notes, results, and demographic information.    If you feel you have received this communication in error or would no longer like to receive these types of communications, please e-mail [email or web address]

## 2023-12-29 ENCOUNTER — LAB VISIT (OUTPATIENT)
Dept: LAB | Facility: HOSPITAL | Age: 58
End: 2023-12-29
Attending: INTERNAL MEDICINE
Payer: MEDICARE

## 2023-12-29 DIAGNOSIS — Z94.0 KIDNEY REPLACED BY TRANSPLANT: ICD-10-CM

## 2023-12-29 LAB
ALBUMIN SERPL BCP-MCNC: 3.6 G/DL (ref 3.5–5.2)
ANION GAP SERPL CALC-SCNC: 8 MMOL/L (ref 8–16)
BASOPHILS # BLD AUTO: 0.02 K/UL (ref 0–0.2)
BASOPHILS NFR BLD: 0.3 % (ref 0–1.9)
BUN SERPL-MCNC: 29 MG/DL (ref 6–20)
CALCIUM SERPL-MCNC: 9.7 MG/DL (ref 8.7–10.5)
CHLORIDE SERPL-SCNC: 107 MMOL/L (ref 95–110)
CO2 SERPL-SCNC: 22 MMOL/L (ref 23–29)
CREAT SERPL-MCNC: 1.3 MG/DL (ref 0.5–1.4)
DIFFERENTIAL METHOD BLD: ABNORMAL
EOSINOPHIL # BLD AUTO: 0.1 K/UL (ref 0–0.5)
EOSINOPHIL NFR BLD: 2.1 % (ref 0–8)
ERYTHROCYTE [DISTWIDTH] IN BLOOD BY AUTOMATED COUNT: 13 % (ref 11.5–14.5)
EST. GFR  (NO RACE VARIABLE): 47.1 ML/MIN/1.73 M^2
GLUCOSE SERPL-MCNC: 145 MG/DL (ref 70–110)
HCT VFR BLD AUTO: 39.5 % (ref 37–48.5)
HGB BLD-MCNC: 12.9 G/DL (ref 12–16)
IMM GRANULOCYTES # BLD AUTO: 0.03 K/UL (ref 0–0.04)
IMM GRANULOCYTES NFR BLD AUTO: 0.5 % (ref 0–0.5)
LYMPHOCYTES # BLD AUTO: 1.6 K/UL (ref 1–4.8)
LYMPHOCYTES NFR BLD: 28 % (ref 18–48)
MAGNESIUM SERPL-MCNC: 1.6 MG/DL (ref 1.6–2.6)
MCH RBC QN AUTO: 28.7 PG (ref 27–31)
MCHC RBC AUTO-ENTMCNC: 32.7 G/DL (ref 32–36)
MCV RBC AUTO: 88 FL (ref 82–98)
MONOCYTES # BLD AUTO: 0.6 K/UL (ref 0.3–1)
MONOCYTES NFR BLD: 10.3 % (ref 4–15)
NEUTROPHILS # BLD AUTO: 3.4 K/UL (ref 1.8–7.7)
NEUTROPHILS NFR BLD: 58.8 % (ref 38–73)
NRBC BLD-RTO: 0 /100 WBC
PHOSPHATE SERPL-MCNC: 2.9 MG/DL (ref 2.7–4.5)
PLATELET # BLD AUTO: 127 K/UL (ref 150–450)
PMV BLD AUTO: 12.3 FL (ref 9.2–12.9)
POTASSIUM SERPL-SCNC: 3.8 MMOL/L (ref 3.5–5.1)
RBC # BLD AUTO: 4.5 M/UL (ref 4–5.4)
SODIUM SERPL-SCNC: 137 MMOL/L (ref 136–145)
WBC # BLD AUTO: 5.82 K/UL (ref 3.9–12.7)

## 2023-12-29 PROCEDURE — 80069 RENAL FUNCTION PANEL: CPT | Performed by: INTERNAL MEDICINE

## 2023-12-29 PROCEDURE — 85025 COMPLETE CBC W/AUTO DIFF WBC: CPT | Performed by: INTERNAL MEDICINE

## 2023-12-29 PROCEDURE — 80197 ASSAY OF TACROLIMUS: CPT | Performed by: INTERNAL MEDICINE

## 2023-12-29 PROCEDURE — 36415 COLL VENOUS BLD VENIPUNCTURE: CPT | Mod: PO | Performed by: INTERNAL MEDICINE

## 2023-12-29 PROCEDURE — 83735 ASSAY OF MAGNESIUM: CPT | Performed by: INTERNAL MEDICINE

## 2023-12-30 LAB — TACROLIMUS BLD-MCNC: 10 NG/ML (ref 5–15)

## 2024-01-02 ENCOUNTER — TELEPHONE (OUTPATIENT)
Dept: TRANSPLANT | Facility: CLINIC | Age: 59
End: 2024-01-02
Payer: MEDICARE

## 2024-01-02 NOTE — TELEPHONE ENCOUNTER
Patient repeated back and voice a understanding of orders.  States level is a 12 hour trough.  Patient to repeat labs on 1/9/2024.  ----- Message from Kalpana Grider MD sent at 12/30/2023 10:50 PM CST -----  Please verify this level is accurate, meds have not changed [ new ones added or removed], and repeat a 12 hr trough within a week.

## 2024-01-02 NOTE — TELEPHONE ENCOUNTER
Left voice message to call coordinator back.  ----- Message from Kalpana Grider MD sent at 12/30/2023 10:50 PM CST -----  Please verify this level is accurate, meds have not changed [ new ones added or removed], and repeat a 12 hr trough within a week.

## 2024-01-09 ENCOUNTER — LAB VISIT (OUTPATIENT)
Dept: LAB | Facility: HOSPITAL | Age: 59
End: 2024-01-09
Attending: INTERNAL MEDICINE
Payer: MEDICARE

## 2024-01-09 DIAGNOSIS — Z94.0 KIDNEY REPLACED BY TRANSPLANT: ICD-10-CM

## 2024-01-09 LAB
ALBUMIN SERPL BCP-MCNC: 3.7 G/DL (ref 3.5–5.2)
ANION GAP SERPL CALC-SCNC: 10 MMOL/L (ref 8–16)
BASOPHILS # BLD AUTO: 0.02 K/UL (ref 0–0.2)
BASOPHILS NFR BLD: 0.4 % (ref 0–1.9)
BUN SERPL-MCNC: 21 MG/DL (ref 6–20)
CALCIUM SERPL-MCNC: 10.1 MG/DL (ref 8.7–10.5)
CHLORIDE SERPL-SCNC: 103 MMOL/L (ref 95–110)
CO2 SERPL-SCNC: 26 MMOL/L (ref 23–29)
CREAT SERPL-MCNC: 1.2 MG/DL (ref 0.5–1.4)
DIFFERENTIAL METHOD BLD: ABNORMAL
EOSINOPHIL # BLD AUTO: 0.1 K/UL (ref 0–0.5)
EOSINOPHIL NFR BLD: 1.7 % (ref 0–8)
ERYTHROCYTE [DISTWIDTH] IN BLOOD BY AUTOMATED COUNT: 13.1 % (ref 11.5–14.5)
EST. GFR  (NO RACE VARIABLE): 51.8 ML/MIN/1.73 M^2
GLUCOSE SERPL-MCNC: 117 MG/DL (ref 70–110)
HCT VFR BLD AUTO: 45.1 % (ref 37–48.5)
HGB BLD-MCNC: 13.9 G/DL (ref 12–16)
IMM GRANULOCYTES # BLD AUTO: 0.03 K/UL (ref 0–0.04)
IMM GRANULOCYTES NFR BLD AUTO: 0.6 % (ref 0–0.5)
LYMPHOCYTES # BLD AUTO: 1.6 K/UL (ref 1–4.8)
LYMPHOCYTES NFR BLD: 32.2 % (ref 18–48)
MAGNESIUM SERPL-MCNC: 1.9 MG/DL (ref 1.6–2.6)
MCH RBC QN AUTO: 29 PG (ref 27–31)
MCHC RBC AUTO-ENTMCNC: 30.8 G/DL (ref 32–36)
MCV RBC AUTO: 94 FL (ref 82–98)
MONOCYTES # BLD AUTO: 0.5 K/UL (ref 0.3–1)
MONOCYTES NFR BLD: 11.2 % (ref 4–15)
NEUTROPHILS # BLD AUTO: 2.6 K/UL (ref 1.8–7.7)
NEUTROPHILS NFR BLD: 53.9 % (ref 38–73)
NRBC BLD-RTO: 0 /100 WBC
PHOSPHATE SERPL-MCNC: 3.2 MG/DL (ref 2.7–4.5)
PLATELET # BLD AUTO: 144 K/UL (ref 150–450)
PMV BLD AUTO: 12.5 FL (ref 9.2–12.9)
POTASSIUM SERPL-SCNC: 4.4 MMOL/L (ref 3.5–5.1)
RBC # BLD AUTO: 4.79 M/UL (ref 4–5.4)
SODIUM SERPL-SCNC: 139 MMOL/L (ref 136–145)
WBC # BLD AUTO: 4.84 K/UL (ref 3.9–12.7)

## 2024-01-09 PROCEDURE — 36415 COLL VENOUS BLD VENIPUNCTURE: CPT | Mod: PO | Performed by: INTERNAL MEDICINE

## 2024-01-09 PROCEDURE — 80069 RENAL FUNCTION PANEL: CPT | Performed by: INTERNAL MEDICINE

## 2024-01-09 PROCEDURE — 85025 COMPLETE CBC W/AUTO DIFF WBC: CPT | Performed by: INTERNAL MEDICINE

## 2024-01-09 PROCEDURE — 80197 ASSAY OF TACROLIMUS: CPT | Performed by: INTERNAL MEDICINE

## 2024-01-09 PROCEDURE — 83735 ASSAY OF MAGNESIUM: CPT | Performed by: INTERNAL MEDICINE

## 2024-01-10 LAB — TACROLIMUS BLD-MCNC: 7.3 NG/ML (ref 5–15)

## 2024-02-06 ENCOUNTER — LAB VISIT (OUTPATIENT)
Dept: LAB | Facility: HOSPITAL | Age: 59
End: 2024-02-06
Attending: INTERNAL MEDICINE
Payer: MEDICARE

## 2024-02-06 DIAGNOSIS — Z94.0 KIDNEY REPLACED BY TRANSPLANT: ICD-10-CM

## 2024-02-06 LAB
ALBUMIN SERPL BCP-MCNC: 3.5 G/DL (ref 3.5–5.2)
ANION GAP SERPL CALC-SCNC: 8 MMOL/L (ref 8–16)
BASOPHILS # BLD AUTO: 0.02 K/UL (ref 0–0.2)
BASOPHILS NFR BLD: 0.4 % (ref 0–1.9)
BUN SERPL-MCNC: 23 MG/DL (ref 8–23)
CALCIUM SERPL-MCNC: 10.1 MG/DL (ref 8.7–10.5)
CHLORIDE SERPL-SCNC: 107 MMOL/L (ref 95–110)
CO2 SERPL-SCNC: 22 MMOL/L (ref 23–29)
CREAT SERPL-MCNC: 1.2 MG/DL (ref 0.5–1.4)
DIFFERENTIAL METHOD BLD: ABNORMAL
EOSINOPHIL # BLD AUTO: 0.1 K/UL (ref 0–0.5)
EOSINOPHIL NFR BLD: 1.2 % (ref 0–8)
ERYTHROCYTE [DISTWIDTH] IN BLOOD BY AUTOMATED COUNT: 13.6 % (ref 11.5–14.5)
EST. GFR  (NO RACE VARIABLE): 51.5 ML/MIN/1.73 M^2
GLUCOSE SERPL-MCNC: 147 MG/DL (ref 70–110)
HCT VFR BLD AUTO: 38.9 % (ref 37–48.5)
HGB BLD-MCNC: 12.5 G/DL (ref 12–16)
IMM GRANULOCYTES # BLD AUTO: 0.03 K/UL (ref 0–0.04)
IMM GRANULOCYTES NFR BLD AUTO: 0.5 % (ref 0–0.5)
LYMPHOCYTES # BLD AUTO: 1.4 K/UL (ref 1–4.8)
LYMPHOCYTES NFR BLD: 25.4 % (ref 18–48)
MAGNESIUM SERPL-MCNC: 1.8 MG/DL (ref 1.6–2.6)
MCH RBC QN AUTO: 29 PG (ref 27–31)
MCHC RBC AUTO-ENTMCNC: 32.1 G/DL (ref 32–36)
MCV RBC AUTO: 90 FL (ref 82–98)
MONOCYTES # BLD AUTO: 0.6 K/UL (ref 0.3–1)
MONOCYTES NFR BLD: 10.1 % (ref 4–15)
NEUTROPHILS # BLD AUTO: 3.5 K/UL (ref 1.8–7.7)
NEUTROPHILS NFR BLD: 62.4 % (ref 38–73)
NRBC BLD-RTO: 0 /100 WBC
PHOSPHATE SERPL-MCNC: 3.2 MG/DL (ref 2.7–4.5)
PLATELET # BLD AUTO: 141 K/UL (ref 150–450)
PMV BLD AUTO: 13.4 FL (ref 9.2–12.9)
POTASSIUM SERPL-SCNC: 4.2 MMOL/L (ref 3.5–5.1)
RBC # BLD AUTO: 4.31 M/UL (ref 4–5.4)
SODIUM SERPL-SCNC: 137 MMOL/L (ref 136–145)
WBC # BLD AUTO: 5.62 K/UL (ref 3.9–12.7)

## 2024-02-06 PROCEDURE — 80069 RENAL FUNCTION PANEL: CPT | Performed by: INTERNAL MEDICINE

## 2024-02-06 PROCEDURE — 36415 COLL VENOUS BLD VENIPUNCTURE: CPT | Mod: PO | Performed by: INTERNAL MEDICINE

## 2024-02-06 PROCEDURE — 80197 ASSAY OF TACROLIMUS: CPT | Performed by: INTERNAL MEDICINE

## 2024-02-06 PROCEDURE — 83735 ASSAY OF MAGNESIUM: CPT | Performed by: INTERNAL MEDICINE

## 2024-02-06 PROCEDURE — 85025 COMPLETE CBC W/AUTO DIFF WBC: CPT | Performed by: INTERNAL MEDICINE

## 2024-02-07 LAB — TACROLIMUS BLD-MCNC: 6.7 NG/ML (ref 5–15)

## 2024-02-12 DIAGNOSIS — Z94.0 KIDNEY TRANSPLANT STATUS: ICD-10-CM

## 2024-02-12 RX ORDER — HYDRALAZINE HYDROCHLORIDE 50 MG/1
50 TABLET, FILM COATED ORAL 3 TIMES DAILY
Qty: 90 TABLET | Refills: 5 | Status: CANCELLED | OUTPATIENT
Start: 2024-02-12

## 2024-03-05 ENCOUNTER — LAB VISIT (OUTPATIENT)
Dept: LAB | Facility: HOSPITAL | Age: 59
End: 2024-03-05
Attending: INTERNAL MEDICINE
Payer: MEDICARE

## 2024-03-05 DIAGNOSIS — Z94.0 KIDNEY REPLACED BY TRANSPLANT: ICD-10-CM

## 2024-03-05 LAB
ALBUMIN SERPL BCP-MCNC: 3.9 G/DL (ref 3.5–5.2)
ALBUMIN SERPL BCP-MCNC: 3.9 G/DL (ref 3.5–5.2)
ALP SERPL-CCNC: 73 U/L (ref 55–135)
ALT SERPL W/O P-5'-P-CCNC: 23 U/L (ref 10–44)
ANION GAP SERPL CALC-SCNC: 13 MMOL/L (ref 8–16)
AST SERPL-CCNC: 19 U/L (ref 10–40)
BASOPHILS # BLD AUTO: 0.02 K/UL (ref 0–0.2)
BASOPHILS NFR BLD: 0.3 % (ref 0–1.9)
BILIRUB DIRECT SERPL-MCNC: 0.1 MG/DL (ref 0.1–0.3)
BILIRUB SERPL-MCNC: 0.3 MG/DL (ref 0.1–1)
BUN SERPL-MCNC: 21 MG/DL (ref 8–23)
CALCIUM SERPL-MCNC: 11.1 MG/DL (ref 8.7–10.5)
CHLORIDE SERPL-SCNC: 102 MMOL/L (ref 95–110)
CO2 SERPL-SCNC: 21 MMOL/L (ref 23–29)
CREAT SERPL-MCNC: 1.4 MG/DL (ref 0.5–1.4)
DIFFERENTIAL METHOD BLD: ABNORMAL
EOSINOPHIL # BLD AUTO: 0.1 K/UL (ref 0–0.5)
EOSINOPHIL NFR BLD: 1.3 % (ref 0–8)
ERYTHROCYTE [DISTWIDTH] IN BLOOD BY AUTOMATED COUNT: 13.5 % (ref 11.5–14.5)
EST. GFR  (NO RACE VARIABLE): 42.8 ML/MIN/1.73 M^2
GLUCOSE SERPL-MCNC: 122 MG/DL (ref 70–110)
HCT VFR BLD AUTO: 45.4 % (ref 37–48.5)
HGB BLD-MCNC: 14.1 G/DL (ref 12–16)
IMM GRANULOCYTES # BLD AUTO: 0.04 K/UL (ref 0–0.04)
IMM GRANULOCYTES NFR BLD AUTO: 0.7 % (ref 0–0.5)
LYMPHOCYTES # BLD AUTO: 1.7 K/UL (ref 1–4.8)
LYMPHOCYTES NFR BLD: 27.2 % (ref 18–48)
MAGNESIUM SERPL-MCNC: 1.6 MG/DL (ref 1.6–2.6)
MCH RBC QN AUTO: 28.6 PG (ref 27–31)
MCHC RBC AUTO-ENTMCNC: 31.1 G/DL (ref 32–36)
MCV RBC AUTO: 92 FL (ref 82–98)
MONOCYTES # BLD AUTO: 0.7 K/UL (ref 0.3–1)
MONOCYTES NFR BLD: 10.9 % (ref 4–15)
NEUTROPHILS # BLD AUTO: 3.7 K/UL (ref 1.8–7.7)
NEUTROPHILS NFR BLD: 59.6 % (ref 38–73)
NRBC BLD-RTO: 0 /100 WBC
PHOSPHATE SERPL-MCNC: 3.6 MG/DL (ref 2.7–4.5)
PLATELET # BLD AUTO: 132 K/UL (ref 150–450)
PMV BLD AUTO: 13.3 FL (ref 9.2–12.9)
POTASSIUM SERPL-SCNC: 4 MMOL/L (ref 3.5–5.1)
PROT SERPL-MCNC: 8.5 G/DL (ref 6–8.4)
RBC # BLD AUTO: 4.93 M/UL (ref 4–5.4)
SODIUM SERPL-SCNC: 136 MMOL/L (ref 136–145)
WBC # BLD AUTO: 6.13 K/UL (ref 3.9–12.7)

## 2024-03-05 PROCEDURE — 83735 ASSAY OF MAGNESIUM: CPT | Performed by: INTERNAL MEDICINE

## 2024-03-05 PROCEDURE — 87799 DETECT AGENT NOS DNA QUANT: CPT | Performed by: INTERNAL MEDICINE

## 2024-03-05 PROCEDURE — 80069 RENAL FUNCTION PANEL: CPT | Performed by: INTERNAL MEDICINE

## 2024-03-05 PROCEDURE — 82247 BILIRUBIN TOTAL: CPT | Performed by: INTERNAL MEDICINE

## 2024-03-05 PROCEDURE — 85025 COMPLETE CBC W/AUTO DIFF WBC: CPT | Performed by: INTERNAL MEDICINE

## 2024-03-05 PROCEDURE — 80197 ASSAY OF TACROLIMUS: CPT | Performed by: INTERNAL MEDICINE

## 2024-03-05 PROCEDURE — 84075 ASSAY ALKALINE PHOSPHATASE: CPT | Performed by: INTERNAL MEDICINE

## 2024-03-05 PROCEDURE — 36415 COLL VENOUS BLD VENIPUNCTURE: CPT | Mod: PO | Performed by: INTERNAL MEDICINE

## 2024-03-06 ENCOUNTER — TELEPHONE (OUTPATIENT)
Dept: TRANSPLANT | Facility: CLINIC | Age: 59
End: 2024-03-06
Payer: MEDICARE

## 2024-03-06 LAB — TACROLIMUS BLD-MCNC: 10.4 NG/ML (ref 5–15)

## 2024-03-06 NOTE — TELEPHONE ENCOUNTER
Patient repeated back and voice a understanding of orders.  12 hour trough reviewed and will repeat [place] level on 3/12.    ----- Message from Kalpana Grider MD sent at 3/6/2024  2:11 PM CST -----  Please repeat a 12 hr trough in the near future.

## 2024-03-06 NOTE — TELEPHONE ENCOUNTER
Patient repeated back and voice a understanding of orders.  Patient agreed to drinking extra H2O daily.  Avoidance of high Ca supplements reviewed with patient.  Agreed to repeat Renal panel. PTH and Vit D OH25 on 3/12.  Reminded of 3/15 clinic appointment.   ----- Message from Kalpana Grider MD sent at 3/6/2024 10:07 AM CST -----  Add PTH to this lab from 3/5 if possible, if not, collect with next blood draw. Also collect 25PH vitD in a week, when repeat renal panel to f/u on calcium (does not have to be fasting).  High calcium noted - avoid calcium containing supplements (calcium carbonate or citrate, such as Tums, Citracal, etc) and supplements with vit D as well. Also encourage extra hydration by 2-3 cups more per next few days to help flush calcium in urine.

## 2024-03-08 NOTE — PROGRESS NOTES
Kidney Post-Transplant Assessment    Referring Physician: Aaron Bonilla  Current Nephrologist: Aaron Bonilla    ORGAN: LEFT KIDNEY  Donor Type: donation after brain death  PHS Increased Risk: no  Cold Ischemia: 1,731 mins  Induction Medications: thymoglobulin    Subjective:     CC:  Reassessment of renal allograft function and management of chronic immunosuppression.    HPI:  Ms. Maldonado is a 59 y.o. year old Black or  female with PMG ESRD 2/2 DM2 and HTN, and failed kidney txp, who received a donation after brain death kidney transplant on 3/10/23. Her  BL creatinine is 1.2-1.5 and continues to trend down.  She takes mycophenolate mofetil, prednisone, and tacrolimus for maintenance immunosuppression.       Pertinent Nephrology/Transplant History:   ESRD from DM  failed KT #1 12/2014 -  3/2022. EDW 81kg  DDKT#2  3/10/23. thymo; CIT 28+h         LOV  12/11/23    Interval HX:   Following with Dr Fisher for gen nephrology LOV 8/17/23  HX MGUS dx 2013; [facility]s [facility] -->Armando Robertson MD --f/u scheduled 3/21/24   Follows with endocrinology for DM mgmt  f/u sched 3/25/24  Has been on Mounjaro since 9/2023  Lab Results   Component Value Date    HGBA1C 7.2 (H) 03/13/2023      Intake-  adequate hydration reported    UOP-no problems reported   BP   BP Readings from Last 3 Encounters:   03/15/24 (!) 116/57   12/11/23 127/65   09/18/23 (!) 123/58      Peripheral edema--no   Weight-- stable      Lab /diagnostic results reviewed with patient today.   All questions answered    Review of Systems   Constitutional:  Negative for activity change, appetite change, chills, fatigue, fever and unexpected weight change.   HENT:  Negative for congestion, facial swelling, postnasal drip, rhinorrhea, sinus pressure, sore throat and trouble swallowing.    Eyes:  Positive for visual disturbance. Negative for pain and redness.        Wears glasses   Respiratory:  Negative for cough, chest tightness,  "shortness of breath and wheezing.    Cardiovascular: Negative.  Negative for chest pain, palpitations and leg swelling.   Gastrointestinal:  Positive for abdominal distention. Negative for abdominal pain, diarrhea, nausea and vomiting.            Genitourinary:  Negative for dysuria, flank pain and urgency.   Musculoskeletal:  Positive for back pain (HX sciatica). Negative for gait problem, neck pain and neck stiffness.   Skin:  Negative for rash and wound.   Allergic/Immunologic: Negative for environmental allergies, food allergies and immunocompromised state.   Neurological:  Negative for dizziness, weakness, light-headedness and headaches.   Psychiatric/Behavioral:  Negative for agitation and confusion. The patient is not nervous/anxious.        Objective:   Blood pressure (!) 116/57, pulse 99, temperature 97.3 °F (36.3 °C), temperature source Tympanic, resp. rate 18, height 5' 7" (1.702 m), weight 85.9 kg (189 lb 6 oz), last menstrual period 04/29/2011, SpO2 99 %.body mass index is 29.66 kg/m².    Physical Exam  Vitals reviewed.   Constitutional:       Appearance: Normal appearance. She is well-developed.   HENT:      Head: Normocephalic.   Eyes:      Pupils: Pupils are equal, round, and reactive to light.   Cardiovascular:      Rate and Rhythm: Normal rate and regular rhythm.      Heart sounds: Normal heart sounds.   Pulmonary:      Effort: Pulmonary effort is normal.      Breath sounds: Normal breath sounds.   Abdominal:      General: Bowel sounds are normal.      Palpations: Abdomen is soft.      Comments:   No bruit noted over the allograft     Musculoskeletal:         General: Normal range of motion.        Arms:       Cervical back: Normal range of motion and neck supple.   Skin:     General: Skin is warm and dry.   Neurological:      Mental Status: She is alert and oriented to person, place, and time.      Motor: No abnormal muscle tone.   Psychiatric:         Behavior: Behavior normal.         Labs:  Lab " "Results   Component Value Date    WBC 6.13 03/05/2024    HGB 14.1 03/05/2024    HCT 45.4 03/05/2024     03/12/2024    K 4.2 03/12/2024     03/12/2024    CO2 24 03/12/2024    BUN 15 03/12/2024    CREATININE 1.3 03/12/2024    EGFRNORACEVR 46.8 (A) 03/12/2024    GLUCOSE 148 (H) 06/15/2023    CALCIUM 9.6 03/12/2024    PHOS 2.6 (L) 03/12/2024    MG 1.6 03/05/2024    ALBUMIN 3.5 03/12/2024    AST 19 03/05/2024    ALT 23 03/05/2024    UTPCR Unable to calculate 03/05/2024    .3 (H) 03/12/2024    TACROLIMUS 8.6 03/12/2024       No results found for: "EXTANC", "EXTWBC", "EXTSEGS", "EXTPLATELETS", "EXTHEMOGLOBI", "EXTHEMATOCRI", "EXTCREATININ", "EXTSODIUM", "EXTPOTASSIUM", "EXTBUN", "EXTCO2", "EXTCALCIUM", "EXTPHOSPHORU", "EXTGLUCOSE", "EXTALBUMIN", "EXTAST", "EXTALT", "EXTBILITOTAL", "EXTLIPASE", "EXTAMYLASE"    No results found for: "EXTCYCLOSLVL", "EXTSIROLIMUS", "EXTTACROLVL", "EXTPROTCRE", "EXTPTHINTACT", "EXTPROTEINUA", "EXTWBCUA", "EXTRBCUA"    Labs were reviewed with the patient    Assessment:     1. Kidney transplant status, living unrelated donor - 12/2/14    2. Immunodeficiency due to long term immunosuppressive drug therapy    3. Diabetes mellitus due to underlying condition with stage 3b chronic kidney disease, with long-term current use of insulin    4. Type 2 DM with CKD stage 2 and hypertension    5. Anemia of chronic disease    6. Stage 3b chronic kidney disease    7. MGUS (monoclonal gammopathy of unknown significance)                Plan:    Decrease prograf 3/2  Repeat trough ~ 2 weeks  Target (4-7)  1) s/p living related kidney transplant, CKD 3a              - Graft function.   Scr 1.2, CKD 3a   -FK Trough 8.6, which is   supra therapeutic  (target 4-7)    -   FK  3/2,    - cont on Cellcept 500 mg BID along with prednisone 5 mg daily   -Will continue to monitor for drug toxicities     -BL creatinine is 1.2-1.5     Lab Results   Component Value Date    CREATININE 1.3 03/12/2024           " Latest Reference Range & Units 1yr 0mo,  Kidney-Post 2 Year  03/12/24 09:10   eGFR >60 mL/min/1.73 m^2 46.8 !   !: Data is abnormal  !: Data is abnormal   5/4/2023  POD 55   AlloSure Score - Kidney % 0.65     5/4/23            2) Uncontrolled HTN: advise low salt diet and home BP monitoring  - Cont  hydralazine. Coreg as prescribed       3) Anemia:              -   will monitor as per our guidelines   H/H  stable-no intervention needed,      Lab Results   Component Value Date    WBC 6.13 03/05/2024    HGB 14.1 03/05/2024    HCT 45.4 03/05/2024    MCV 92 03/05/2024     (L) 03/05/2024     4) type II DM:   -  cont MED REGIME as prescribed . Mgmt deferred to endocrinology    Lab Results   Component Value Date    HGBA1C 7.2 (H) 03/13/2023         5) Hypophosphatemia:Secondary hyperparathyroidism:                 --no intervention required.will monitor as per our guidelines  Lab Results   Component Value Date    .3 (H) 03/12/2024    CALCIUM 9.6 03/12/2024    PHOS 2.6 (L) 03/12/2024         6) Metabolic acidosis/Electrolyte balance:stable      - no intervention required. will monitor as per our guidelines  Lab Results   Component Value Date     03/12/2024    K 4.2 03/12/2024     03/12/2024    CO2 24 03/12/2024         7) hypomagnesemia:stable    - no intervention required . will monitor as per our guidelines             Latest Reference Range & Units 11mo 3wk,  Kidney-Post 1 Year  03/05/24   Magnesium  1.6 - 2.6 mg/dL 1.6           8) Cytopenias: no significant cytopenias will monitor as per our guidelines. Medicine list reviewed including potential causes of drug-induced cytopenias    9) Proteinuria: continue p/c ratio as per guidelines           Latest Reference Range & Units 11mo 3wk,  Kidney-Post 1 Year  03/05/24   Prot/Creat Ratio, Urine 0.00 - 0.20  Unable to calculate             10) CMV and BK virus infection screening:  will continue to monitor/ guidelines      BK detected 5/8/23  -resolved         Latest Reference Range & Units 11mo 3wk,  Kidney-Post 1 Year  03/05/24   BK Virus DNA, Blood Not detected  Not detected      Latest Reference Range & Units 11mo 3wk,  Kidney-Post 1 Year  03/05/24   BK Virus DNA PCR, Quant, Blood <125 Copies/mL <125              11) Weight education: provided during the clinic visit   Body mass index is 29.66 kg/m².        Follow-up:   Clinic: return to transplant clinic weekly for the first month after transplant; every 2 weeks during months 2-3; then at 6-, 9-, 12-, 18-, 24-, and 36- months post-transplant to reassess for complications from immunosuppression toxicity and monitor for rejection.  Annually thereafter.    Labs: since patient remains at high risk for rejection and drug-related complications that warrant close monitoring, labs will be ordered as follows: continue twice weekly CBC, renal panel, and drug level for first month; then same labs once weekly through 3rd month post-transplant.  Urine for UA and protein/creatinine ratio monthly.  Serum BK - PCR at 1-, 3-, 6-, 9-, 12-, 18-, 24-, 36-, 48-, and 60 months post-transplant.  Hepatic panel at 1-, 2-, 3-, 6-, 9-, 12-, 18-, 24-, and 36- months post-transplant.    Daya Leon NP       Education:   Material provided to the patient.  Patient reminded to call with any health changes since these can be early signs of significant complications.  Also, I advised the patient to be sure any new medications or changes of old medications are discussed with either a pharmacist or physician knowledgeable with transplant to avoid rejection/drug toxicity related to significant drug interactions.    Patient advised that it is recommended that all transplanted patients, and their close contacts and household members receive Covid vaccination.

## 2024-03-11 PROBLEM — D63.1 ANEMIA IN STAGE 3B CHRONIC KIDNEY DISEASE: Status: RESOLVED | Noted: 2023-03-20 | Resolved: 2024-03-11

## 2024-03-11 PROBLEM — D72.819 LEUKOPENIA: Chronic | Status: RESOLVED | Noted: 2023-05-04 | Resolved: 2024-03-11

## 2024-03-11 PROBLEM — N18.32 ANEMIA IN STAGE 3B CHRONIC KIDNEY DISEASE: Status: RESOLVED | Noted: 2023-03-20 | Resolved: 2024-03-11

## 2024-03-11 PROBLEM — B34.8 BK VIREMIA: Chronic | Status: RESOLVED | Noted: 2023-06-15 | Resolved: 2024-03-11

## 2024-03-12 ENCOUNTER — LAB VISIT (OUTPATIENT)
Dept: LAB | Facility: HOSPITAL | Age: 59
End: 2024-03-12
Attending: INTERNAL MEDICINE
Payer: MEDICARE

## 2024-03-12 DIAGNOSIS — E55.9 VITAMIN D DEFICIENCY: ICD-10-CM

## 2024-03-12 DIAGNOSIS — Z94.0 KIDNEY REPLACED BY TRANSPLANT: ICD-10-CM

## 2024-03-12 LAB
25(OH)D3+25(OH)D2 SERPL-MCNC: 29 NG/ML (ref 30–96)
ALBUMIN SERPL BCP-MCNC: 3.5 G/DL (ref 3.5–5.2)
ANION GAP SERPL CALC-SCNC: 8 MMOL/L (ref 8–16)
BUN SERPL-MCNC: 15 MG/DL (ref 8–23)
CALCIUM SERPL-MCNC: 9.6 MG/DL (ref 8.7–10.5)
CHLORIDE SERPL-SCNC: 107 MMOL/L (ref 95–110)
CO2 SERPL-SCNC: 24 MMOL/L (ref 23–29)
CREAT SERPL-MCNC: 1.3 MG/DL (ref 0.5–1.4)
EST. GFR  (NO RACE VARIABLE): 46.8 ML/MIN/1.73 M^2
GLUCOSE SERPL-MCNC: 139 MG/DL (ref 70–110)
PHOSPHATE SERPL-MCNC: 2.6 MG/DL (ref 2.7–4.5)
POTASSIUM SERPL-SCNC: 4.2 MMOL/L (ref 3.5–5.1)
PTH-INTACT SERPL-MCNC: 186.3 PG/ML (ref 9–77)
SODIUM SERPL-SCNC: 139 MMOL/L (ref 136–145)

## 2024-03-12 PROCEDURE — 80197 ASSAY OF TACROLIMUS: CPT | Performed by: INTERNAL MEDICINE

## 2024-03-12 PROCEDURE — 36415 COLL VENOUS BLD VENIPUNCTURE: CPT | Mod: PO | Performed by: INTERNAL MEDICINE

## 2024-03-12 PROCEDURE — 82306 VITAMIN D 25 HYDROXY: CPT | Performed by: INTERNAL MEDICINE

## 2024-03-12 PROCEDURE — 83970 ASSAY OF PARATHORMONE: CPT | Performed by: INTERNAL MEDICINE

## 2024-03-12 PROCEDURE — 80069 RENAL FUNCTION PANEL: CPT | Performed by: INTERNAL MEDICINE

## 2024-03-13 DIAGNOSIS — Z94.0 KIDNEY TRANSPLANT STATUS: ICD-10-CM

## 2024-03-13 LAB — TACROLIMUS BLD-MCNC: 8.6 NG/ML (ref 5–15)

## 2024-03-15 ENCOUNTER — OFFICE VISIT (OUTPATIENT)
Dept: TRANSPLANT | Facility: CLINIC | Age: 59
End: 2024-03-15
Payer: MEDICARE

## 2024-03-15 VITALS
HEART RATE: 99 BPM | DIASTOLIC BLOOD PRESSURE: 57 MMHG | BODY MASS INDEX: 29.72 KG/M2 | RESPIRATION RATE: 18 BRPM | WEIGHT: 189.38 LBS | HEIGHT: 67 IN | OXYGEN SATURATION: 99 % | TEMPERATURE: 97 F | SYSTOLIC BLOOD PRESSURE: 116 MMHG

## 2024-03-15 DIAGNOSIS — N18.32 STAGE 3B CHRONIC KIDNEY DISEASE: ICD-10-CM

## 2024-03-15 DIAGNOSIS — Z94.0 KIDNEY TRANSPLANT STATUS, LIVING UNRELATED DONOR: Primary | Chronic | ICD-10-CM

## 2024-03-15 DIAGNOSIS — D47.2 MGUS (MONOCLONAL GAMMOPATHY OF UNKNOWN SIGNIFICANCE): Chronic | ICD-10-CM

## 2024-03-15 DIAGNOSIS — Z79.4 DIABETES MELLITUS DUE TO UNDERLYING CONDITION WITH STAGE 3B CHRONIC KIDNEY DISEASE, WITH LONG-TERM CURRENT USE OF INSULIN: ICD-10-CM

## 2024-03-15 DIAGNOSIS — E11.22 TYPE 2 DM WITH CKD STAGE 2 AND HYPERTENSION: ICD-10-CM

## 2024-03-15 DIAGNOSIS — Z94.0 KIDNEY TRANSPLANT STATUS, LIVING UNRELATED DONOR: Chronic | ICD-10-CM

## 2024-03-15 DIAGNOSIS — D84.821 IMMUNODEFICIENCY DUE TO LONG TERM IMMUNOSUPPRESSIVE DRUG THERAPY: ICD-10-CM

## 2024-03-15 DIAGNOSIS — D63.8 ANEMIA OF CHRONIC DISEASE: ICD-10-CM

## 2024-03-15 DIAGNOSIS — T45.1X5A IMMUNODEFICIENCY DUE TO LONG TERM IMMUNOSUPPRESSIVE DRUG THERAPY: ICD-10-CM

## 2024-03-15 DIAGNOSIS — N18.2 TYPE 2 DM WITH CKD STAGE 2 AND HYPERTENSION: ICD-10-CM

## 2024-03-15 DIAGNOSIS — I12.9 TYPE 2 DM WITH CKD STAGE 2 AND HYPERTENSION: ICD-10-CM

## 2024-03-15 DIAGNOSIS — E08.22 DIABETES MELLITUS DUE TO UNDERLYING CONDITION WITH STAGE 3B CHRONIC KIDNEY DISEASE, WITH LONG-TERM CURRENT USE OF INSULIN: ICD-10-CM

## 2024-03-15 DIAGNOSIS — N18.32 DIABETES MELLITUS DUE TO UNDERLYING CONDITION WITH STAGE 3B CHRONIC KIDNEY DISEASE, WITH LONG-TERM CURRENT USE OF INSULIN: ICD-10-CM

## 2024-03-15 DIAGNOSIS — Z79.899 IMMUNODEFICIENCY DUE TO LONG TERM IMMUNOSUPPRESSIVE DRUG THERAPY: ICD-10-CM

## 2024-03-15 PROCEDURE — 99999 PR PBB SHADOW E&M-EST. PATIENT-LVL V: CPT | Mod: PBBFAC,,, | Performed by: NURSE PRACTITIONER

## 2024-03-15 PROCEDURE — 99215 OFFICE O/P EST HI 40 MIN: CPT | Mod: S$GLB,,, | Performed by: NURSE PRACTITIONER

## 2024-03-15 RX ORDER — TACROLIMUS 1 MG/1
CAPSULE ORAL
Qty: 150 CAPSULE | Refills: 11 | Status: SHIPPED | OUTPATIENT
Start: 2024-03-15 | End: 2024-03-15 | Stop reason: SDUPTHER

## 2024-03-15 RX ORDER — TACROLIMUS 1 MG/1
CAPSULE ORAL
Qty: 150 CAPSULE | Refills: 11 | Status: SHIPPED | OUTPATIENT
Start: 2024-03-15 | End: 2024-03-19 | Stop reason: SDUPTHER

## 2024-03-15 NOTE — LETTER
March 15, 2024        Aaron Bonilla  [address]  [facility] [address]  Phone: [phone]  Fax: [phone]             [facility]- Transplant 1st Fl  [address]  [facility] [address]  Phone: [phone]   Patient: Elizabeth Maldonado   MR Number: [id number]   YOB: 1965   Date of Visit: 3/15/2024       Dear Dr. Aaron Bonilla    Thank you for referring Elizabeth Maldonado to me for evaluation. Attached you will find relevant portions of my assessment and plan of care.    If you have questions, please do not hesitate to call me. I look forward to following Elizabeth Maldonado along with you.    Sincerely,    Daya Leon, NP    Enclosure    If you would like to receive this communication electronically, please contact [email or web address] or [phone] to request [employer] Link access.    [employer] Link is a tool which provides read-only access to select patient information with whom you have a relationship. Its easy to use and provides real time access to review your patients record including encounter summaries, notes, results, and demographic information.    If you feel you have received this communication in error or would no longer like to receive these types of communications, please e-mail [email or web address]

## 2024-03-19 DIAGNOSIS — Z94.0 KIDNEY TRANSPLANT STATUS, LIVING UNRELATED DONOR: Chronic | ICD-10-CM

## 2024-03-19 DIAGNOSIS — T45.1X5A IMMUNODEFICIENCY DUE TO LONG TERM IMMUNOSUPPRESSIVE DRUG THERAPY: ICD-10-CM

## 2024-03-19 DIAGNOSIS — D84.821 IMMUNODEFICIENCY DUE TO LONG TERM IMMUNOSUPPRESSIVE DRUG THERAPY: ICD-10-CM

## 2024-03-19 DIAGNOSIS — Z79.899 IMMUNODEFICIENCY DUE TO LONG TERM IMMUNOSUPPRESSIVE DRUG THERAPY: ICD-10-CM

## 2024-03-19 RX ORDER — TACROLIMUS 1 MG/1
CAPSULE ORAL
Qty: 150 CAPSULE | Refills: 11 | Status: SHIPPED | OUTPATIENT
Start: 2024-03-19 | End: 2024-05-30 | Stop reason: SDUPTHER

## 2024-03-19 RX ORDER — CARVEDILOL 3.12 MG/1
3.12 TABLET ORAL 2 TIMES DAILY
Qty: 60 TABLET | Refills: 5 | Status: SHIPPED | OUTPATIENT
Start: 2024-03-19

## 2024-03-19 RX ORDER — MYCOPHENOLATE MOFETIL 250 MG/1
500 CAPSULE ORAL 2 TIMES DAILY
Qty: 120 CAPSULE | Refills: 11 | Status: SHIPPED | OUTPATIENT
Start: 2024-03-19 | End: 2024-05-30 | Stop reason: SDUPTHER

## 2024-03-19 RX ORDER — PREDNISONE 5 MG/1
5 TABLET ORAL DAILY
Qty: 30 TABLET | Refills: 11 | Status: SHIPPED | OUTPATIENT
Start: 2024-03-19 | End: 2024-05-30 | Stop reason: SDUPTHER

## 2024-03-19 NOTE — TELEPHONE ENCOUNTER
Return call to patient , requesting her IS meds are sent to [facility].   ----- Message from Katy Last sent at 3/19/2024  8:31 AM CDT -----  Regarding: Rx Issue  Contact: [phone]  CONSULT/ADVISORY    Name of Caller:  FAVIOLA HASSAN [[id number]]    Contact Preference:   [phone]    Nature of Call:  Pt is requesting a call back from Syd to discuss her medication.  [facility] is using another distributor and she was told they've been trying to contact Daya Leon and they're only going to try a few more times and then her case will be closed.      Pt is concerned because she needs her medication.

## 2024-04-02 ENCOUNTER — LAB VISIT (OUTPATIENT)
Dept: LAB | Facility: HOSPITAL | Age: 59
End: 2024-04-02
Attending: INTERNAL MEDICINE
Payer: MEDICARE

## 2024-04-02 DIAGNOSIS — Z94.0 KIDNEY REPLACED BY TRANSPLANT: ICD-10-CM

## 2024-04-02 PROCEDURE — 80197 ASSAY OF TACROLIMUS: CPT | Performed by: INTERNAL MEDICINE

## 2024-04-02 PROCEDURE — 36415 COLL VENOUS BLD VENIPUNCTURE: CPT | Mod: PO | Performed by: INTERNAL MEDICINE

## 2024-04-03 LAB — TACROLIMUS BLD-MCNC: 7.1 NG/ML (ref 5–15)

## 2024-04-12 NOTE — PROGRESS NOTES
Discharge paperwork given to pt. IV and tele monitor removed. All questions answered and addressed. Awaiting transportation off unit.   no

## 2024-05-06 NOTE — SUBJECTIVE & OBJECTIVE
-Continue to follow with spine and pain management  -Recommend follow-up with neurosurgery   Past Medical History:   Diagnosis Date    Acidosis 2014    Allergic rhinitis 2014    Allergy     Anemia     Anemia in chronic kidney disease 2014    Anxiety     Cataract     Chronic bilateral low back pain with bilateral sciatica 10/25/2019    Chronic immunosuppression with Prograf and MMF 12/3/2014    CKD (chronic kidney disease) stage 5, GFR less than 15 ml/min 2014    CKD (chronic kidney disease), stage III 3/2/2015    Degenerative disc disease     Depression 2014    Diabetes mellitus, type 2 since age 18 2014    ESRD on peritoneal dialysis - 2014 for 9 hours no peritonitis 2014    Hyperlipidemia     Hypertension 2014    Hypomagnesemia 2015    Kidney transplant status, living unrelated donor - 12/2/14 12/3/2014    Neutropenia 2015    NS (nuclear sclerosis) 2016    Obesity     Organ transplant candidate 2014    Pre-op exam 2014    Proliferative diabetic retinopathy of both eyes without macular edema associated with type 2 diabetes mellitus 2016    Renal manifestation of secondary diabetes mellitus     Tendinitis     Trouble in sleeping        Past Surgical History:   Procedure Laterality Date    BIOPSY N/A 2020    Procedure: Biopsy;  Surgeon: Sweta Catalan MD;  Location: [facility] OR [address] [place] [address];  Service: Transplant;  Laterality: N/A;    BONE MARROW BIOPSY N/A      SECTION      x 2    COLONOSCOPY N/A 2022    Procedure: COLONOSCOPY;  Surgeon: Franklin Gan MD;  Location: [facility] (4TH FLR);  Service: Colon and Rectal;  Laterality: N/A;  order created: previous order unusable  fully vaccinated, labs prior, instr mailed / portal -ml    KIDNEY TRANSPLANT  2014    LAPAROSCOPIC REVISION OF PROCEDURE INVOLVING PERITONEAL DIALYSIS CATHETER N/A 2022    Procedure: REVISION OF PROCEDURE INVOLVING PERITONEAL DIALYSIS CATHETER, LAPAROSCOPIC;  Surgeon: Franklin Barber MD;  Location: [facility] OR [address] [place] [address];  Service: General;   "Laterality: N/A;    MEDIPORT REMOVAL N/A 11/25/2019    Procedure: REMOVAL, CATHETER, CENTRAL VENOUS, TUNNELED, WITH PORT;  Surgeon: Eusebia Diagnostic Provider;  Location: [facility] OR;  Service: Radiology;  Laterality: N/A;    RENAL BIOPSY      ROTATOR CUFF REPAIR      TUBAL LIGATION         Review of patient's allergies indicates:   Allergen Reactions    Shrimp Hives, Itching and Rash    Topamax [topiramate] Other (See Comments)     Vision changes    Zoloft [sertraline] Palpitations       No current facility-administered medications on file prior to encounter.     Current Outpatient Medications on File Prior to Encounter   Medication Sig    atorvastatin (LIPITOR) 40 MG tablet Take 40 mg by mouth every evening.    blood sugar diagnostic Strp Checks BG ac/hs    calcitRIOL (ROCALTROL) 0.25 MCG Cap Take 0.25 mcg by mouth once daily.    carvediloL (COREG) 3.125 MG tablet Take 3.125 mg by mouth 2 (two) times daily. Hold for SBP <130    clonazePAM (KLONOPIN) 0.5 MG tablet Take 0.5 mg by mouth 2 (two) times daily as needed.    dulaglutide (TRULICITY) 1.5 mg/0.5 mL pen injector Inject 1.5 mg into the skin every 7 days. Pt takes on Wednesday    folic acid (FOLVITE) 1 MG tablet Take 1 tablet (1 mg total) by mouth once daily.    HUMALOG KWIKPEN INSULIN 100 unit/mL pen Inject 3 Units into the skin 3 (three) times daily with meals. SLIDE SCALE    insulin syringe-needle U-100 (INSULIN SYRINGE) 1/2 mL 30 x 5/16" Syrg Uses 4 daily    lancets (ONETOUCH DELICA LANCETS) 33 gauge Misc 1 lancet by Misc.(Non-Drug; Combo Route) route 4 (four) times daily before meals and nightly.    LANTUS SOLOSTAR U-100 INSULIN glargine 100 units/mL (3mL) SubQ pen Inject 50 Units into the skin once daily.    sevelamer carbonate (RENVELA) 800 mg Tab Take 1 tablet (800 mg total) by mouth 3 (three) times daily with meals. (Patient taking differently: Take 800 mg by mouth 3 (three) times daily with meals. Take 2 tab w/ meal & 1 tab w/ snack)    SYRINGE & " "NEEDLE,INSULIN,1 ML (INSULIN SYRINGE-NEEDLE U-100) 1 mL 29 X 7/16" Syrg     timolol maleate 0.5% (TIMOPTIC) 0.5 % Drop Place 2 drops in both eyes twice daily    zolpidem (AMBIEN) 10 mg Tab Take 10 mg by mouth nightly as needed.    [DISCONTINUED] acetaminophen (TYLENOL) 500 MG tablet Take 500 mg by mouth every 6 (six) hours as needed for Pain.     Family History       Problem Relation (Age of Onset)    Cataracts Maternal Grandmother    Diabetes Mother, Father, Sister, Brother, Sister    Heart disease Sister, Maternal Grandmother    Heart failure Sister    Hypertension Mother, Sister    Kidney disease Father, Sister    No Known Problems Maternal Aunt, Maternal Uncle, Paternal Aunt, Paternal Uncle, Maternal Grandfather, Paternal Grandmother, Paternal Grandfather    Stroke Maternal Grandmother          Tobacco Use    Smoking status: Former     Packs/day: 1.00     Years: 20.00     Pack years: 20.00     Types: Cigarettes     Quit date: 2013     Years since quittin.1    Smokeless tobacco: Never    Tobacco comments:     quit smoking cigarettes in 2014   Substance and Sexual Activity    Alcohol use: No    Drug use: No    Sexual activity: Yes     Partners: Male     Birth control/protection: Post-menopausal     Review of Systems   Constitutional:  Negative for chills and fever.   HENT:  Negative for nosebleeds and tinnitus.    Eyes:  Negative for photophobia and visual disturbance.   Respiratory:  Positive for shortness of breath. Negative for cough and wheezing.    Cardiovascular:  Negative for chest pain, palpitations and leg swelling.   Gastrointestinal:  Negative for abdominal distention, nausea and vomiting.   Genitourinary:  Negative for dysuria, flank pain and hematuria.   Musculoskeletal:  Negative for gait problem and joint swelling.   Skin:  Negative for rash and wound.   Neurological:  Negative for seizures and syncope.   Objective:     Vital Signs (Most Recent):  Temp: 99.2 °F (37.3 °C) (10/26/22 " 1136)  Pulse: 94 (10/26/22 1136)  Resp: (!) 22 (10/26/22 1136)  BP: (!) 186/98 (10/26/22 1136)  SpO2: 100 % (10/26/22 1136)   Vital Signs (24h Range):  Temp:  [99.2 °F (37.3 °C)] 99.2 °F (37.3 °C)  Pulse:  [94] 94  Resp:  [22] 22  SpO2:  [100 %] 100 %  BP: (186)/(98) 186/98     Weight: 88 kg (194 lb)  Body mass index is 30.38 kg/m².    Physical Exam  Vitals and nursing note reviewed.   Constitutional:       General: She is not in acute distress.     Appearance: She is well-developed.   HENT:      Head: Normocephalic and atraumatic.      Right Ear: External ear normal.      Left Ear: External ear normal.   Eyes:      General:         Right eye: No discharge.         Left eye: No discharge.      Conjunctiva/sclera: Conjunctivae normal.   Neck:      Thyroid: No thyromegaly.   Cardiovascular:      Rate and Rhythm: Normal rate and regular rhythm.      Heart sounds: No murmur heard.  Pulmonary:      Effort: Pulmonary effort is normal. No respiratory distress.      Breath sounds: Normal breath sounds.      Comments: On 3L nasal cannula speaking in full sentences. Decreased breath sounds through right lung  Abdominal:      General: Bowel sounds are normal. There is no distension.      Palpations: Abdomen is soft. There is no mass.      Tenderness: There is no abdominal tenderness.      Comments: PD catheter to left side of abdomen without surrounding erythema   Musculoskeletal:         General: No deformity.      Cervical back: Normal range of motion.      Right lower leg: No edema.      Left lower leg: No edema.   Skin:     General: Skin is warm and dry.   Neurological:      Mental Status: She is alert and oriented to person, place, and time.      Sensory: No sensory deficit.   Psychiatric:         Mood and Affect: Mood normal.         Behavior: Behavior normal.           Significant Labs: CBC:   Recent Labs   Lab 10/26/22  1206   WBC 5.70   HGB 8.3*   HCT 25.9*        CMP:   Recent Labs   Lab 10/26/22  1206   NA  "140   K 3.7   *   CO2 19*   GLU 72   BUN 56*   CREATININE 16.0*   CALCIUM 6.7*   PROT 7.5   ALBUMIN 2.0*   BILITOT 0.4   ALKPHOS 40*   AST 9*   ALT 6*   ANIONGAP 10     Cardiac Markers:   Recent Labs   Lab 10/26/22  1206   *     Troponin:   Recent Labs   Lab 10/26/22  1206   TROPONINI 0.009       Significant Imaging:   Imaging Results              X-Ray Chest AP Portable (Final result)  Result time 10/26/22 13:06:40      Final result by Rubin Craig MD (10/26/22 13:06:40)                   Impression:      New large right-sided pleural effusion.  Possible trace left pleural fluid.      Electronically signed by: Rubin Craig MD  Date:    10/26/2022  Time:    13:06               Narrative:    EXAMINATION:  XR CHEST AP PORTABLE    CLINICAL HISTORY:  Provided history is "sob;  ".    TECHNIQUE:  One view of the chest.    COMPARISON:  01/26/2022.    FINDINGS:  Cardiac wires overlie the chest.  Cardiomediastinal silhouette is stable in size.  Atherosclerotic calcifications overlie the aortic arch.  New large right-sided pleural effusion with adjacent passive atelectasis or airspace disease throughout the aerated right lung.  Left basilar subsegmental atelectasis.  Left upper lung field is relatively clear.  Possible trace left pleural fluid.  No distinct pneumothorax.                                      "

## 2024-05-30 ENCOUNTER — OFFICE VISIT (OUTPATIENT)
Dept: NEPHROLOGY | Facility: CLINIC | Age: 59
End: 2024-05-30
Payer: MEDICARE

## 2024-05-30 VITALS
HEART RATE: 91 BPM | SYSTOLIC BLOOD PRESSURE: 112 MMHG | BODY MASS INDEX: 29.07 KG/M2 | DIASTOLIC BLOOD PRESSURE: 74 MMHG | HEIGHT: 67 IN | RESPIRATION RATE: 18 BRPM | WEIGHT: 185.19 LBS | OXYGEN SATURATION: 99 %

## 2024-05-30 DIAGNOSIS — I50.30 DIASTOLIC HEART FAILURE, UNSPECIFIED HF CHRONICITY: ICD-10-CM

## 2024-05-30 DIAGNOSIS — D84.821 IMMUNODEFICIENCY DUE TO LONG TERM IMMUNOSUPPRESSIVE DRUG THERAPY: ICD-10-CM

## 2024-05-30 DIAGNOSIS — T45.1X5A IMMUNODEFICIENCY DUE TO LONG TERM IMMUNOSUPPRESSIVE DRUG THERAPY: ICD-10-CM

## 2024-05-30 DIAGNOSIS — Z94.0 KIDNEY TRANSPLANT STATUS, LIVING UNRELATED DONOR: Primary | Chronic | ICD-10-CM

## 2024-05-30 DIAGNOSIS — N18.31 STAGE 3A CHRONIC KIDNEY DISEASE: ICD-10-CM

## 2024-05-30 DIAGNOSIS — Z79.899 IMMUNODEFICIENCY DUE TO LONG TERM IMMUNOSUPPRESSIVE DRUG THERAPY: ICD-10-CM

## 2024-05-30 PROCEDURE — 99214 OFFICE O/P EST MOD 30 MIN: CPT | Mod: S$GLB,,, | Performed by: INTERNAL MEDICINE

## 2024-05-30 PROCEDURE — 3074F SYST BP LT 130 MM HG: CPT | Mod: CPTII,S$GLB,, | Performed by: INTERNAL MEDICINE

## 2024-05-30 PROCEDURE — 3078F DIAST BP <80 MM HG: CPT | Mod: CPTII,S$GLB,, | Performed by: INTERNAL MEDICINE

## 2024-05-30 PROCEDURE — 3066F NEPHROPATHY DOC TX: CPT | Mod: CPTII,S$GLB,, | Performed by: INTERNAL MEDICINE

## 2024-05-30 PROCEDURE — 1159F MED LIST DOCD IN RCRD: CPT | Mod: CPTII,S$GLB,, | Performed by: INTERNAL MEDICINE

## 2024-05-30 PROCEDURE — 99999 PR PBB SHADOW E&M-EST. PATIENT-LVL III: CPT | Mod: PBBFAC,,, | Performed by: INTERNAL MEDICINE

## 2024-05-30 PROCEDURE — 3008F BODY MASS INDEX DOCD: CPT | Mod: CPTII,S$GLB,, | Performed by: INTERNAL MEDICINE

## 2024-05-30 PROCEDURE — 1160F RVW MEDS BY RX/DR IN RCRD: CPT | Mod: CPTII,S$GLB,, | Performed by: INTERNAL MEDICINE

## 2024-05-30 RX ORDER — TACROLIMUS 1 MG/1
CAPSULE ORAL
Qty: 450 CAPSULE | Refills: 3 | Status: SHIPPED | OUTPATIENT
Start: 2024-05-30 | End: 2025-05-30

## 2024-05-30 RX ORDER — MYCOPHENOLATE MOFETIL 250 MG/1
500 CAPSULE ORAL 2 TIMES DAILY
Qty: 360 CAPSULE | Refills: 3 | Status: SHIPPED | OUTPATIENT
Start: 2024-05-30 | End: 2025-05-30

## 2024-05-30 RX ORDER — PREDNISONE 5 MG/1
5 TABLET ORAL DAILY
Qty: 90 TABLET | Refills: 3 | Status: SHIPPED | OUTPATIENT
Start: 2024-05-30 | End: 2025-05-30

## 2024-05-30 NOTE — PROGRESS NOTES
NEPHROLOGY PROGRESS NOTE    INTERVAL HISTORY  55 yo AAF patient is here today for follow up evaluation of renal allograft. Last seen in renal office by Dr. Baird. She is s/p  donor kidney tx 14. Current immunosuppression; tacrolimus; mycophenolate. Baseline Cr 1.9 - 2.2 mg/dl.  There is a hx of diabetes complicated by nephropathy; retinopathy.  Denies fevers; chills night sweats; chest pains; hemoptysis; orthopnea; PND.      Underwent second kidney transplant 14 mo ago. She does not take NSAIDs. Reports UTI with SGLT2i. Doing well.     MEDICATIONS reviewed    Last Physical Exam  /74 mmHg  Chronically ill appearing woman; no acute distress; oriented x 3  HEENT; (+)retinopathy  CHEST; Clear P&A; no rales or rhonchi  HEART; RR; S1&S2 no murmur rub gallop  ABD; BS(+); non-tender; no organomegaly  EXT; (-)Edema    LABS  Serum Cr 1.1 - 1.3 mg/dL   CO2 24 mmol/L  UPCR < 0.2 g/g  Tacro level 6.1  Hgb 12.2 g/dL    Impression  1. Kidney transplant status / Renal allograft 2023 / CKD stage 3a, eGFR 46 - 58 ml/min. On CNI + prednisone. MMF reduced dose due to BK viremia.   2. Hypertension. Satisfactory control on carvedilol and hydralazine  3. Type 2 diabetes mellitus, on insulin, managed by PCP  4. MGUS. Followed by Hematology-Oncology. No anemia  5. 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

## 2024-07-29 ENCOUNTER — PATIENT MESSAGE (OUTPATIENT)
Dept: NEPHROLOGY | Facility: CLINIC | Age: 59
End: 2024-07-29
Payer: MEDICARE

## 2024-07-29 DIAGNOSIS — Z94.0 KIDNEY TRANSPLANT STATUS: ICD-10-CM

## 2024-07-29 DIAGNOSIS — Z94.0 KIDNEY TRANSPLANT STATUS, LIVING UNRELATED DONOR: Chronic | ICD-10-CM

## 2024-07-29 RX ORDER — PREDNISONE 5 MG/1
5 TABLET ORAL DAILY
Qty: 90 TABLET | Refills: 3 | Status: SHIPPED | OUTPATIENT
Start: 2024-07-29 | End: 2025-07-29

## 2024-07-29 RX ORDER — HYDRALAZINE HYDROCHLORIDE 50 MG/1
50 TABLET, FILM COATED ORAL 3 TIMES DAILY
Qty: 90 TABLET | Refills: 5 | Status: SHIPPED | OUTPATIENT
Start: 2024-07-29

## 2024-08-01 DIAGNOSIS — Z94.0 KIDNEY REPLACED BY TRANSPLANT: Primary | ICD-10-CM

## 2024-09-09 ENCOUNTER — LAB VISIT (OUTPATIENT)
Dept: LAB | Facility: HOSPITAL | Age: 59
End: 2024-09-09
Attending: INTERNAL MEDICINE
Payer: MEDICARE

## 2024-09-09 DIAGNOSIS — Z94.0 KIDNEY REPLACED BY TRANSPLANT: ICD-10-CM

## 2024-09-09 LAB
ALBUMIN SERPL BCP-MCNC: 3.7 G/DL (ref 3.5–5.2)
ALBUMIN SERPL BCP-MCNC: 3.7 G/DL (ref 3.5–5.2)
ALP SERPL-CCNC: 64 U/L (ref 55–135)
ALT SERPL W/O P-5'-P-CCNC: 21 U/L (ref 10–44)
ANION GAP SERPL CALC-SCNC: 9 MMOL/L (ref 8–16)
AST SERPL-CCNC: 23 U/L (ref 10–40)
BASOPHILS # BLD AUTO: 0.01 K/UL (ref 0–0.2)
BASOPHILS NFR BLD: 0.2 % (ref 0–1.9)
BILIRUB DIRECT SERPL-MCNC: 0.2 MG/DL (ref 0.1–0.3)
BILIRUB SERPL-MCNC: 0.4 MG/DL (ref 0.1–1)
BUN SERPL-MCNC: 19 MG/DL (ref 8–23)
CALCIUM SERPL-MCNC: 9.9 MG/DL (ref 8.7–10.5)
CHLORIDE SERPL-SCNC: 102 MMOL/L (ref 95–110)
CO2 SERPL-SCNC: 26 MMOL/L (ref 23–29)
CREAT SERPL-MCNC: 1.6 MG/DL (ref 0.5–1.4)
DIFFERENTIAL METHOD BLD: ABNORMAL
EOSINOPHIL # BLD AUTO: 0 K/UL (ref 0–0.5)
EOSINOPHIL NFR BLD: 0.3 % (ref 0–8)
ERYTHROCYTE [DISTWIDTH] IN BLOOD BY AUTOMATED COUNT: 13.8 % (ref 11.5–14.5)
EST. GFR  (NO RACE VARIABLE): 36.5 ML/MIN/1.73 M^2
GLUCOSE SERPL-MCNC: 134 MG/DL (ref 70–110)
HCT VFR BLD AUTO: 41.8 % (ref 37–48.5)
HGB BLD-MCNC: 13.3 G/DL (ref 12–16)
IMM GRANULOCYTES # BLD AUTO: 0.01 K/UL (ref 0–0.04)
IMM GRANULOCYTES NFR BLD AUTO: 0.2 % (ref 0–0.5)
LYMPHOCYTES # BLD AUTO: 1.7 K/UL (ref 1–4.8)
LYMPHOCYTES NFR BLD: 28.5 % (ref 18–48)
MAGNESIUM SERPL-MCNC: 1.6 MG/DL (ref 1.6–2.6)
MCH RBC QN AUTO: 28 PG (ref 27–31)
MCHC RBC AUTO-ENTMCNC: 31.8 G/DL (ref 32–36)
MCV RBC AUTO: 88 FL (ref 82–98)
MONOCYTES # BLD AUTO: 0.6 K/UL (ref 0.3–1)
MONOCYTES NFR BLD: 10.6 % (ref 4–15)
NEUTROPHILS # BLD AUTO: 3.5 K/UL (ref 1.8–7.7)
NEUTROPHILS NFR BLD: 60.2 % (ref 38–73)
NRBC BLD-RTO: 0 /100 WBC
PHOSPHATE SERPL-MCNC: 2.7 MG/DL (ref 2.7–4.5)
PLATELET # BLD AUTO: 134 K/UL (ref 150–450)
PMV BLD AUTO: 12.7 FL (ref 9.2–12.9)
POTASSIUM SERPL-SCNC: 3.9 MMOL/L (ref 3.5–5.1)
PROT SERPL-MCNC: 7.8 G/DL (ref 6–8.4)
RBC # BLD AUTO: 4.75 M/UL (ref 4–5.4)
SODIUM SERPL-SCNC: 137 MMOL/L (ref 136–145)
WBC # BLD AUTO: 5.78 K/UL (ref 3.9–12.7)

## 2024-09-09 PROCEDURE — 83735 ASSAY OF MAGNESIUM: CPT | Performed by: INTERNAL MEDICINE

## 2024-09-09 PROCEDURE — 84460 ALANINE AMINO (ALT) (SGPT): CPT | Performed by: INTERNAL MEDICINE

## 2024-09-09 PROCEDURE — 85025 COMPLETE CBC W/AUTO DIFF WBC: CPT | Performed by: INTERNAL MEDICINE

## 2024-09-09 PROCEDURE — 84450 TRANSFERASE (AST) (SGOT): CPT | Performed by: INTERNAL MEDICINE

## 2024-09-09 PROCEDURE — 80069 RENAL FUNCTION PANEL: CPT | Performed by: INTERNAL MEDICINE

## 2024-09-09 PROCEDURE — 84155 ASSAY OF PROTEIN SERUM: CPT | Performed by: INTERNAL MEDICINE

## 2024-09-09 PROCEDURE — 80197 ASSAY OF TACROLIMUS: CPT | Performed by: INTERNAL MEDICINE

## 2024-09-09 PROCEDURE — 36415 COLL VENOUS BLD VENIPUNCTURE: CPT | Mod: PO | Performed by: INTERNAL MEDICINE

## 2024-09-09 PROCEDURE — 87799 DETECT AGENT NOS DNA QUANT: CPT | Performed by: INTERNAL MEDICINE

## 2024-09-10 ENCOUNTER — PATIENT MESSAGE (OUTPATIENT)
Dept: TRANSPLANT | Facility: CLINIC | Age: 59
End: 2024-09-10
Payer: MEDICARE

## 2024-09-10 LAB — TACROLIMUS BLD-MCNC: 14.2 NG/ML (ref 5–15)

## 2024-09-12 ENCOUNTER — TELEPHONE (OUTPATIENT)
Dept: TRANSPLANT | Facility: CLINIC | Age: 59
End: 2024-09-12
Payer: MEDICARE

## 2024-09-12 NOTE — TELEPHONE ENCOUNTER
Patient repeated back and voice a understanding of orders.  States level is a 12 hour trough.  Agreed to repeat level here on Monday 9/16 prior to clinic appointment.  12 hour trough reviewed.  ----- Message from Kalpana Grider MD sent at 9/10/2024  3:40 PM CDT -----  Please verify this level is accurate, meds have not changed [ new ones added or removed], and repeat a 12 hr trough.

## 2024-09-12 NOTE — TELEPHONE ENCOUNTER
Left voice message to call coordinator back.  ----- Message from Kalpana Grider MD sent at 9/10/2024  3:40 PM CDT -----  Please verify this level is accurate, meds have not changed [ new ones added or removed], and repeat a 12 hr trough.

## 2024-09-13 NOTE — PROGRESS NOTES
Kidney Post-Transplant Assessment    Referring Physician: Aaron Bonilla  Current Nephrologist: Aaron Bonilla    ORGAN: LEFT KIDNEY  Donor Type: donation after brain death  PHS Increased Risk: no  Cold Ischemia: 1,731 mins  Induction Medications: thymoglobulin    Subjective:     CC:  Reassessment of renal allograft function and management of chronic immunosuppression.    HPI:  Ms. Maldonado is a 59 y.o. year old Black or  female with PMG ESRD 2/2 DM2 and HTN, and failed kidney txp, who received a donation after brain death kidney transplant on 3/10/23. Her  BL creatinine is 1.2-1.5 and continues to trend down.  She takes mycophenolate mofetil, prednisone, and tacrolimus for maintenance immunosuppression.       Pertinent Nephrology/Transplant History:   ESRD from DM  failed KT #1 12/2014 -  3/2022. EDW 81kg  DDKT#2  3/10/23. thymo; CIT 28+h       LOV   3/15/24    Interval HX:   Following with Dr Fisher for gen nephrology LOV 5/30/24  [facility] dx 2013; [facility]    Follows with endocrinology for DM mgmt    Has been on Mounjaro since 9/2023  Lab Results   Component Value Date    HGBA1C 7.2 (H) 03/13/2023      Intake-  adequate hydration reported    UOP-no problems reported   BP   BP Readings from Last 3 Encounters:   09/16/24 138/74   05/30/24 112/74   03/15/24 (!) 116/57      Peripheral edema--no   Weight-- stable      Lab /diagnostic results reviewed with patient today.   All questions answered    Review of Systems   Constitutional:  Negative for activity change, appetite change, chills, fatigue, fever and unexpected weight change.   HENT:  Negative for congestion, facial swelling, postnasal drip, rhinorrhea, sinus pressure, sore throat and trouble swallowing.    Eyes:  Positive for visual disturbance. Negative for pain and redness.        Wears glasses   Respiratory:  Negative for cough, chest tightness, shortness of breath and wheezing.   "  Cardiovascular: Negative.  Negative for chest pain, palpitations and leg swelling.   Gastrointestinal:  Positive for abdominal distention. Negative for abdominal pain, diarrhea, nausea and vomiting.            Genitourinary:  Negative for dysuria, flank pain and urgency.   Musculoskeletal:  Positive for back pain (HX sciatica). Negative for gait problem, neck pain and neck stiffness.   Skin:  Negative for rash and wound.   Allergic/Immunologic: Negative for environmental allergies, food allergies and immunocompromised state.   Neurological:  Negative for dizziness, weakness, light-headedness and headaches.   Psychiatric/Behavioral:  Negative for agitation and confusion. The patient is not nervous/anxious.        Objective:   Blood pressure 138/74, pulse 92, temperature 97.3 °F (36.3 °C), temperature source Tympanic, resp. rate 18, height 5' 7" (1.702 m), weight 83.7 kg (184 lb 8.4 oz), last menstrual period 04/29/2011, SpO2 100%.body mass index is 28.9 kg/m².    Physical Exam  Vitals reviewed.   Constitutional:       Appearance: Normal appearance. She is well-developed.   HENT:      Head: Normocephalic.   Eyes:      Pupils: Pupils are equal, round, and reactive to light.   Cardiovascular:      Rate and Rhythm: Normal rate and regular rhythm.      Heart sounds: Normal heart sounds.   Pulmonary:      Effort: Pulmonary effort is normal.      Breath sounds: Normal breath sounds.   Abdominal:      General: Bowel sounds are normal.      Palpations: Abdomen is soft.      Comments:   No bruit noted over the allograft     Musculoskeletal:         General: Normal range of motion.        Arms:       Cervical back: Normal range of motion and neck supple.   Skin:     General: Skin is warm and dry.   Neurological:      Mental Status: She is alert and oriented to person, place, and time.      Motor: No abnormal muscle tone.   Psychiatric:         Behavior: Behavior normal.         Labs:  Lab Results   Component Value Date    WBC " "5.78 09/09/2024    HGB 13.3 09/09/2024    HCT 41.8 09/09/2024     09/09/2024    K 3.9 09/09/2024     09/09/2024    CO2 26 09/09/2024    BUN 19 09/09/2024    CREATININE 1.6 (H) 09/09/2024    EGFRNORACEVR 36.5 (A) 09/09/2024    GLUCOSE 148 (H) 06/15/2023    CALCIUM 9.9 09/09/2024    PHOS 2.7 09/09/2024    MG 1.6 09/09/2024    ALBUMIN 3.7 09/09/2024    ALBUMIN 3.7 09/09/2024    AST 23 09/09/2024    ALT 21 09/09/2024    UTPCR Unable to calculate 09/09/2024    .3 (H) 03/12/2024    TACROLIMUS 14.2 09/09/2024       No results found for: "EXTANC", "EXTWBC", "EXTSEGS", "EXTPLATELETS", "EXTHEMOGLOBI", "EXTHEMATOCRI", "EXTCREATININ", "EXTSODIUM", "EXTPOTASSIUM", "EXTBUN", "EXTCO2", "EXTCALCIUM", "EXTPHOSPHORU", "EXTGLUCOSE", "EXTALBUMIN", "EXTAST", "EXTALT", "EXTBILITOTAL", "EXTLIPASE", "EXTAMYLASE"    No results found for: "EXTCYCLOSLVL", "EXTSIROLIMUS", "EXTTACROLVL", "EXTPROTCRE", "EXTPTHINTACT", "EXTPROTEINUA", "EXTWBCUA", "EXTRBCUA"    Labs were reviewed with the patient    Assessment:     1. Kidney transplant status, living unrelated donor - 12/2/14    2. Immunodeficiency due to long term immunosuppressive drug therapy    3. Type 2 diabetes mellitus with stage 3b chronic kidney disease, with long-term current use of insulin    4. Stage 3b chronic kidney disease    5. Prophylactic immunotherapy    6. Type 2 DM with CKD stage 3 and hypertension    7. Anemia of chronic disease    8. At risk for opportunistic infections        Plan:      Creatinine of 1.6 slightly higher than baseline of 1-1.4. -- Repeat trough and renal fx panel this AM--results pending   -  encouraged good hydration        1) s/p living related kidney transplant, CKD 3b              - Graft function.   Scr 1.2, CKD 3a   -FK Trough    -   FK  3/2,    - cont on Cellcept 500 mg BID along with prednisone 5 mg daily   -Will continue to monitor for drug toxicities     -BL creatinine is 1.2-1.5     Lab Results   Component Value Date    " CREATININE 1.6 (H) 09/09/2024             Latest Reference Range & Units 1yr 5mo,  Kidney-Post 2 Year  09/09/24   eGFR >60 mL/min/1.73 m^2 36.5 !   !: Data is abnormal            2) Uncontrolled HTN: advise low salt diet and home BP monitoring  - Cont  hydralazine. Coreg as prescribed       3) Anemia:              -   will monitor as per our guidelines   H/H  stable-no intervention needed,      Lab Results   Component Value Date    WBC 5.78 09/09/2024    HGB 13.3 09/09/2024    HCT 41.8 09/09/2024    MCV 88 09/09/2024     (L) 09/09/2024     4) type II DM:   -  cont MED/insulin  REGIME as prescribed . Mgmt deferred to endocrinology    Lab Results   Component Value Date    HGBA1C 7.2 (H) 03/13/2023         5) Hypophosphatemia:Secondary hyperparathyroidism:                 --no intervention required.will monitor as per our guidelines  Lab Results   Component Value Date    .3 (H) 03/12/2024    CALCIUM 9.9 09/09/2024    PHOS 2.7 09/09/2024         6) Metabolic acidosis/Electrolyte balance:stable      - no intervention required. will monitor as per our guidelines  Lab Results   Component Value Date     09/09/2024    K 3.9 09/09/2024     09/09/2024    CO2 26 09/09/2024         7) hypomagnesemia:stable    - no intervention required . will monitor as per our guidelines              Latest Reference Range & Units 1yr 5mo,  Kidney-Post 2 Year  09/09/24   Magnesium  1.6 - 2.6 mg/dL 1.6           8) Cytopenias: no significant cytopenias will monitor as per our guidelines. Medicine list reviewed including potential causes of drug-induced cytopenias    9) Proteinuria: continue p/c ratio as per guidelines            Latest Reference Range & Units 1yr 5mo,  Kidney-Post 2 Year  09/09/24   Urine Protein/Creatinine Ratio 0.00 - 0.20  Unable to calculate               10) CMV and BK virus infection screening:  will continue to monitor/ guidelines             Latest Reference Range & Units 1yr 5mo,  Kidney-Post 2  Year  09/09/24   BK Virus DNA, Blood Not detected  Not detected      Latest Reference Range & Units 1yr 5mo,  Kidney-Post 2 Year  09/09/24   BK Virus DNA PCR, Quant, Blood <125 Copies/mL <125              11) Weight education: provided during the clinic visit   Body mass index is 28.9 kg/m².        Follow-up:   Clinic: return to transplant clinic weekly for the first month after transplant; every 2 weeks during months 2-3; then at 6-, 9-, 12-, 18-, 24-, and 36- months post-transplant to reassess for complications from immunosuppression toxicity and monitor for rejection.  Annually thereafter.    Labs: since patient remains at high risk for rejection and drug-related complications that warrant close monitoring, labs will be ordered as follows: continue twice weekly CBC, renal panel, and drug level for first month; then same labs once weekly through 3rd month post-transplant.  Urine for UA and protein/creatinine ratio monthly.  Serum BK - PCR at 1-, 3-, 6-, 9-, 12-, 18-, 24-, 36-, 48-, and 60 months post-transplant.  Hepatic panel at 1-, 2-, 3-, 6-, 9-, 12-, 18-, 24-, and 36- months post-transplant.    Daya Leon NP       Education:   Material provided to the patient.  Patient reminded to call with any health changes since these can be early signs of significant complications.  Also, I advised the patient to be sure any new medications or changes of old medications are discussed with either a pharmacist or physician knowledgeable with transplant to avoid rejection/drug toxicity related to significant drug interactions.    Patient advised that it is recommended that all transplanted patients, and their close contacts and household members receive Covid vaccination.

## 2024-09-16 ENCOUNTER — LAB VISIT (OUTPATIENT)
Dept: LAB | Facility: HOSPITAL | Age: 59
End: 2024-09-16
Payer: MEDICARE

## 2024-09-16 ENCOUNTER — OFFICE VISIT (OUTPATIENT)
Dept: TRANSPLANT | Facility: CLINIC | Age: 59
End: 2024-09-16
Payer: MEDICARE

## 2024-09-16 VITALS
DIASTOLIC BLOOD PRESSURE: 74 MMHG | OXYGEN SATURATION: 100 % | SYSTOLIC BLOOD PRESSURE: 138 MMHG | RESPIRATION RATE: 18 BRPM | WEIGHT: 184.5 LBS | HEART RATE: 92 BPM | HEIGHT: 67 IN | BODY MASS INDEX: 28.96 KG/M2 | TEMPERATURE: 97 F

## 2024-09-16 DIAGNOSIS — I12.9 TYPE 2 DM WITH CKD STAGE 3 AND HYPERTENSION: ICD-10-CM

## 2024-09-16 DIAGNOSIS — N18.30 TYPE 2 DM WITH CKD STAGE 3 AND HYPERTENSION: ICD-10-CM

## 2024-09-16 DIAGNOSIS — N18.32 STAGE 3B CHRONIC KIDNEY DISEASE: ICD-10-CM

## 2024-09-16 DIAGNOSIS — Z94.0 KIDNEY TRANSPLANT STATUS, LIVING UNRELATED DONOR: Primary | Chronic | ICD-10-CM

## 2024-09-16 DIAGNOSIS — Z91.89 AT RISK FOR OPPORTUNISTIC INFECTIONS: ICD-10-CM

## 2024-09-16 DIAGNOSIS — Z29.89 PROPHYLACTIC IMMUNOTHERAPY: ICD-10-CM

## 2024-09-16 DIAGNOSIS — Z79.899 IMMUNODEFICIENCY DUE TO LONG TERM IMMUNOSUPPRESSIVE DRUG THERAPY: ICD-10-CM

## 2024-09-16 DIAGNOSIS — E11.22 TYPE 2 DM WITH CKD STAGE 3 AND HYPERTENSION: ICD-10-CM

## 2024-09-16 DIAGNOSIS — T45.1X5A IMMUNODEFICIENCY DUE TO LONG TERM IMMUNOSUPPRESSIVE DRUG THERAPY: ICD-10-CM

## 2024-09-16 DIAGNOSIS — D63.8 ANEMIA OF CHRONIC DISEASE: ICD-10-CM

## 2024-09-16 DIAGNOSIS — D84.821 IMMUNODEFICIENCY DUE TO LONG TERM IMMUNOSUPPRESSIVE DRUG THERAPY: ICD-10-CM

## 2024-09-16 DIAGNOSIS — E11.22 TYPE 2 DIABETES MELLITUS WITH STAGE 3B CHRONIC KIDNEY DISEASE, WITH LONG-TERM CURRENT USE OF INSULIN: ICD-10-CM

## 2024-09-16 DIAGNOSIS — Z94.0 KIDNEY REPLACED BY TRANSPLANT: ICD-10-CM

## 2024-09-16 DIAGNOSIS — Z79.4 TYPE 2 DIABETES MELLITUS WITH STAGE 3B CHRONIC KIDNEY DISEASE, WITH LONG-TERM CURRENT USE OF INSULIN: ICD-10-CM

## 2024-09-16 DIAGNOSIS — N18.32 TYPE 2 DIABETES MELLITUS WITH STAGE 3B CHRONIC KIDNEY DISEASE, WITH LONG-TERM CURRENT USE OF INSULIN: ICD-10-CM

## 2024-09-16 LAB
ALBUMIN SERPL BCP-MCNC: 3.7 G/DL (ref 3.5–5.2)
ANION GAP SERPL CALC-SCNC: 8 MMOL/L (ref 8–16)
BUN SERPL-MCNC: 15 MG/DL (ref 8–23)
CALCIUM SERPL-MCNC: 10.1 MG/DL (ref 8.7–10.5)
CHLORIDE SERPL-SCNC: 107 MMOL/L (ref 95–110)
CO2 SERPL-SCNC: 23 MMOL/L (ref 23–29)
CREAT SERPL-MCNC: 1.4 MG/DL (ref 0.5–1.4)
EST. GFR  (NO RACE VARIABLE): 42.8 ML/MIN/1.73 M^2
GLUCOSE SERPL-MCNC: 128 MG/DL (ref 70–110)
PHOSPHATE SERPL-MCNC: 3.2 MG/DL (ref 2.7–4.5)
POTASSIUM SERPL-SCNC: 4.3 MMOL/L (ref 3.5–5.1)
SODIUM SERPL-SCNC: 138 MMOL/L (ref 136–145)
TACROLIMUS BLD-MCNC: 8.4 NG/ML (ref 5–15)

## 2024-09-16 PROCEDURE — 3008F BODY MASS INDEX DOCD: CPT | Mod: CPTII,S$GLB,, | Performed by: NURSE PRACTITIONER

## 2024-09-16 PROCEDURE — 1159F MED LIST DOCD IN RCRD: CPT | Mod: CPTII,S$GLB,, | Performed by: NURSE PRACTITIONER

## 2024-09-16 PROCEDURE — 80069 RENAL FUNCTION PANEL: CPT | Performed by: INTERNAL MEDICINE

## 2024-09-16 PROCEDURE — 80197 ASSAY OF TACROLIMUS: CPT | Performed by: INTERNAL MEDICINE

## 2024-09-16 PROCEDURE — 99215 OFFICE O/P EST HI 40 MIN: CPT | Mod: S$GLB,,, | Performed by: NURSE PRACTITIONER

## 2024-09-16 PROCEDURE — 3066F NEPHROPATHY DOC TX: CPT | Mod: CPTII,S$GLB,, | Performed by: NURSE PRACTITIONER

## 2024-09-16 PROCEDURE — 36415 COLL VENOUS BLD VENIPUNCTURE: CPT | Performed by: INTERNAL MEDICINE

## 2024-09-16 PROCEDURE — 3078F DIAST BP <80 MM HG: CPT | Mod: CPTII,S$GLB,, | Performed by: NURSE PRACTITIONER

## 2024-09-16 PROCEDURE — 99999 PR PBB SHADOW E&M-EST. PATIENT-LVL V: CPT | Mod: PBBFAC,,, | Performed by: NURSE PRACTITIONER

## 2024-09-16 PROCEDURE — 3075F SYST BP GE 130 - 139MM HG: CPT | Mod: CPTII,S$GLB,, | Performed by: NURSE PRACTITIONER

## 2024-09-16 NOTE — LETTER
September 16, 2024        Aaron Bonilla  [address]  [facility] [address]  Phone: [phone]  Fax: [phone]             [facility]- Trans[facility] 1st Fl  [address]  [facility] [address]  Phone: [phone]   Patient: Elizabeth Maldonado   MR Number: [id number]   YOB: 1965   Date of Visit: 9/16/2024       Dear Dr. Aaron Bonilla    Thank you for referring Elizabeth Maldonado to me for evaluation. Attached you will find relevant portions of my assessment and plan of care.    If you have questions, please do not hesitate to call me. I look forward to following Elizabeth Maldonado along with you.    Sincerely,    Daya Leon, NP    Enclosure    If you would like to receive this communication electronically, please contact [email or web address] or [phone] to request [employer] access.    [employer] is a tool which provides read-only access to select patient information with whom you have a relationship. Its easy to use and provides real time access to review your patients record including encounter summaries, notes, results, and demographic information.    If you feel you have received this communication in error or would no longer like to receive these types of communications, please e-mail [email or web address]

## 2024-09-17 ENCOUNTER — TELEPHONE (OUTPATIENT)
Dept: TRANSPLANT | Facility: CLINIC | Age: 59
End: 2024-09-17
Payer: MEDICARE

## 2024-09-17 NOTE — TELEPHONE ENCOUNTER
Return call to patient, notified 9/16 labs reviewed by MD with no change.   ----- Message from Syd Cannon RN sent at 9/17/2024  3:52 PM CDT -----  Regarding: FW: Consult/Advisory  Contact: [phone]    ----- Message -----  From: Carola Mcdonald  Sent: 9/17/2024   3:50 PM CDT  To: [facility] Post-K[facility] Transplant Clinical  Subject: Consult/Advisory                                 Consult/Advisory     Name Of Caller: pt         Contact Preference:   [phone]     Nature of call: Pt has questions following labs, and dosage of Rx  tacrolimus (PROGRAF) 1 MG Cap

## 2024-10-03 DIAGNOSIS — Z94.0 KIDNEY TRANSPLANT STATUS: ICD-10-CM

## 2024-10-03 DIAGNOSIS — Z94.0 KIDNEY TRANSPLANT STATUS, LIVING UNRELATED DONOR: Chronic | ICD-10-CM

## 2024-10-04 ENCOUNTER — PATIENT MESSAGE (OUTPATIENT)
Dept: NEPHROLOGY | Facility: CLINIC | Age: 59
End: 2024-10-04
Payer: MEDICARE

## 2024-10-04 RX ORDER — CARVEDILOL 3.12 MG/1
3.12 TABLET ORAL 2 TIMES DAILY
Qty: 60 TABLET | Refills: 5 | Status: SHIPPED | OUTPATIENT
Start: 2024-10-04

## 2024-10-04 RX ORDER — PREDNISONE 5 MG/1
5 TABLET ORAL DAILY
Qty: 90 TABLET | Refills: 3 | Status: SHIPPED | OUTPATIENT
Start: 2024-10-04 | End: 2025-10-04

## 2024-10-09 DIAGNOSIS — Z94.0 KIDNEY TRANSPLANT STATUS: ICD-10-CM

## 2024-10-09 RX ORDER — CARVEDILOL 3.12 MG/1
3.12 TABLET ORAL 2 TIMES DAILY
Qty: 60 TABLET | Refills: 5 | Status: SHIPPED | OUTPATIENT
Start: 2024-10-09

## 2024-10-14 DIAGNOSIS — E78.00 PURE HYPERCHOLESTEROLEMIA: Chronic | ICD-10-CM

## 2024-10-15 RX ORDER — ATORVASTATIN CALCIUM 40 MG/1
40 TABLET, FILM COATED ORAL NIGHTLY
Qty: 30 TABLET | Refills: 5 | Status: SHIPPED | OUTPATIENT
Start: 2024-10-15

## 2024-10-18 RX ORDER — SODIUM BICARBONATE 650 MG/1
650 TABLET ORAL 2 TIMES DAILY
Qty: 60 TABLET | Refills: 11 | Status: SHIPPED | OUTPATIENT
Start: 2024-10-18 | End: 2025-10-18

## 2024-11-22 NOTE — PROGRESS NOTES
Subjective:   Patient ID:  Elizabeth Maldonado is a 56 y.o. female who presents for evaluation of No chief complaint on file.      HPI: Very pleasant woman here for pre-transplant evaluation.  She had a renal transplant here at [facility] 7 years ago and that organ has subsequently failed.  She is not yet back on dialysis.    She is doing well with no new symptoms or cardiovascular complaints and no change in exercise capacity.  He denies chest discomfort, CARPENTER, palpitations, PND/orthopnea, lightheadedness and syncope.  She can climb a flight of stairs without stopping.    When her donor kidney was starting to fail, she did have some dyspnea with exertion, but that resolved with a transfusion.      Past Medical History:   Diagnosis Date    Acidosis 7/1/2014    Allergic rhinitis 7/1/2014    Allergy     Anemia     Anemia in chronic kidney disease 7/1/2014    Anxiety     Cataract     Chronic bilateral low back pain with bilateral sciatica 10/25/2019    Chronic immunosuppression with Prograf and MMF 12/3/2014    CKD (chronic kidney disease) stage 5, GFR less than 15 ml/min 7/1/2014    CKD (chronic kidney disease), stage III 3/2/2015    Degenerative disc disease     Depression 7/1/2014    Diabetes mellitus, type 2 since age 18 7/1/2014    ESRD on peritoneal dialysis - august 2014 for 9 hours no peritonitis 11/24/2014    Hyperlipidemia     Hypertension 7/1/2014    Hypomagnesemia 1/7/2015    Kidney transplant status, living unrelated donor - 12/2/14 12/3/2014    Neutropenia 1/21/2015    NS (nuclear sclerosis) 9/16/2016    Obesity     Organ transplant candidate 7/1/2014    Pre-op exam 11/24/2014    Proliferative diabetic retinopathy of both eyes without macular edema associated with type 2 diabetes mellitus 9/16/2016    Renal manifestation of secondary diabetes mellitus     Tendinitis     Trouble in sleeping        Past Surgical History:   Procedure Laterality Date    BIOPSY N/A 2/26/2020     When assessing right groin patient jumped because of the pain.  Took dressing down.  Small lump size of dime felt.  Manual pressure applied of 5 minutes.  Area became soft.  Pt states area is tender but not painful.     Procedure: Biopsy;  Surgeon: Sweta Catalan MD;  Location: [facility] OR 2ND FLR;  Service: Transplant;  Laterality: N/A;    BONE MARROW BIOPSY N/A      SECTION      x 2    KIDNEY TRANSPLANT  2014    MEDIPORT REMOVAL N/A 2019    Procedure: REMOVAL, CATHETER, CENTRAL VENOUS, TUNNELED, WITH PORT;  Surgeon: Eusebia Diagnostic Provider;  Location: [facility] OR;  Service: Radiology;  Laterality: N/A;    RENAL BIOPSY      ROTATOR CUFF REPAIR      TUBAL LIGATION         Social History     Tobacco Use    Smoking status: Former Smoker     Packs/day: 1.00     Years: 20.00     Pack years: 20.00     Types: Cigarettes     Quit date: 2013     Years since quittin.3    Smokeless tobacco: Never Used    Tobacco comment: quit smoking cigarettes in 2014   Substance Use Topics    Alcohol use: No    Drug use: No       Family History   Problem Relation Age of Onset    Diabetes Mother     Hypertension Mother     Diabetes Father     Kidney disease Father     Diabetes Sister     Hypertension Sister     Kidney disease Sister     Heart disease Sister     Heart failure Sister     Diabetes Brother     Diabetes Sister     Stroke Maternal Grandmother     Heart disease Maternal Grandmother         pacemaker    Cataracts Maternal Grandmother     No Known Problems Maternal Aunt     No Known Problems Maternal Uncle     No Known Problems Paternal Aunt     No Known Problems Paternal Uncle     No Known Problems Maternal Grandfather     No Known Problems Paternal Grandmother     No Known Problems Paternal Grandfather     Cancer Neg Hx     Amblyopia Neg Hx     Blindness Neg Hx     Glaucoma Neg Hx     Macular degeneration Neg Hx     Retinal detachment Neg Hx     Strabismus Neg Hx     Thyroid disease Neg Hx     Breast cancer Neg Hx     Colon cancer Neg Hx     Ovarian cancer Neg Hx        Current Outpatient Medications   Medication Sig    acetaminophen (TYLENOL) 500 MG tablet Take 500 mg by mouth  "every 6 (six) hours as needed for Pain.    ALPRAZolam (XANAX) 0.25 MG tablet Take 0.25 mg by mouth 2 (two) times daily as needed.    atorvastatin (LIPITOR) 20 MG tablet Take 20 mg by mouth every evening.    blood sugar diagnostic Strp Checks BG ac/hs    carvediloL (COREG) 3.125 MG tablet Take 3.125 mg by mouth 2 (two) times daily with meals.    HUMALOG KWIKPEN INSULIN 100 unit/mL pen Inject 10 Units into the skin 3 (three) times daily with meals.    insulin syringe-needle U-100 (INSULIN SYRINGE) 1/2 mL 30 x 5/16" Syrg Uses 4 daily    lancets (ONETOUCH DELICA LANCETS) 33 gauge Misc 1 lancet by Misc.(Non-Drug; Combo Route) route 4 (four) times daily before meals and nightly.    LANTUS SOLOSTAR U-100 INSULIN glargine 100 units/mL (3mL) SubQ pen Inject 50 Units into the skin once daily.    mycophenolate (CELLCEPT) 250 mg Cap TAKE 2 CAPSULES ( 500 MG TOTAL) BY MOUTH 2 TIMES DAILY    sodium bicarbonate 650 MG tablet Take 2 tablets (1,300 mg total) by mouth 3 (three) times daily.    SYRINGE & NEEDLE,INSULIN,1 ML (INSULIN SYRINGE-NEEDLE U-100) 1 mL 29 X 7/16" Syrg     tacrolimus (PROGRAF) 1 MG Cap Take 3 capsules (3 mg total) by mouth every morning AND 2 capsules (2 mg total) every evening. Z94.0.    timolol maleate 0.5% (TIMOPTIC) 0.5 % Drop Place 1 drop into both eyes once daily.    TRULICITY 0.75 mg/0.5 mL pen injector Inject 0.75 mg into the skin every 7 days.    zolpidem (AMBIEN) 10 mg Tab Take 10 mg by mouth nightly as needed.     No current facility-administered medications for this visit.       Review of patient's allergies indicates:   Allergen Reactions    Shrimp Hives    Topamax [topiramate] Other (See Comments)     Vision changes    Zoloft [sertraline] Palpitations       Review of Systems   Constitutional: Negative.   HENT: Negative.    Eyes: Negative.    Cardiovascular: Negative.  Negative for chest pain, dyspnea on exertion, near-syncope, orthopnea and palpitations.   Respiratory: Negative.  " Negative for cough and shortness of breath.    Endocrine: Negative.    Hematologic/Lymphatic: Negative.    Skin: Negative.    Musculoskeletal: Negative.    Gastrointestinal: Negative.    Genitourinary: Negative.    Neurological: Negative.    Psychiatric/Behavioral: Negative.      Objective:   Physical Exam  Vitals reviewed.   Constitutional:       Appearance: She is well-developed and well-nourished.   HENT:      Head: Normocephalic and atraumatic.      Mouth/Throat:      Mouth: Oropharynx is clear and moist.   Eyes:      General: No scleral icterus.     Extraocular Movements: EOM normal.      Conjunctiva/sclera: Conjunctivae normal.   Neck:      Vascular: No JVD.   Cardiovascular:      Rate and Rhythm: Normal rate and regular rhythm.      Pulses: Intact distal pulses.      Heart sounds: Normal heart sounds. No murmur heard.  No friction rub. No gallop.    Pulmonary:      Effort: Pulmonary effort is normal.      Breath sounds: Normal breath sounds. No wheezing or rales.   Abdominal:      General: Bowel sounds are normal. There is no distension.      Palpations: Abdomen is soft.      Tenderness: There is no abdominal tenderness.   Musculoskeletal:         General: No edema. Normal range of motion.      Cervical back: Normal range of motion and neck supple.   Skin:     General: Skin is warm and dry.      Findings: No erythema or rash.   Neurological:      Mental Status: She is alert and oriented to person, place, and time.   Psychiatric:         Mood and Affect: Mood and affect normal.         Behavior: Behavior normal.         Thought Content: Thought content normal.         Judgment: Judgment normal.         Lab Results   Component Value Date    WBC 8.53 12/30/2021    HGB 7.3 (L) 12/30/2021    HCT 24.8 (L) 12/30/2021    MCV 91 12/30/2021     12/30/2021         Chemistry        Component Value Date/Time     12/30/2021 1026    K 5.1 12/30/2021 1026     12/30/2021 1026    CO2 18 (L) 12/30/2021 1026     BUN 54 (H) 12/30/2021 1026    CREATININE 4.9 (H) 12/30/2021 1026     (H) 12/30/2021 1026        Component Value Date/Time    CALCIUM 9.0 12/30/2021 1026    ALKPHOS 72 12/23/2021 0750    AST 13 12/23/2021 0750    ALT 11 12/23/2021 0750    BILITOT 0.4 12/23/2021 0750    ESTGFRAFRICA 10.5 (A) 12/30/2021 1026    EGFRNONAA 9.1 (A) 12/30/2021 1026            Lab Results   Component Value Date    CHOL 180 12/23/2021    CHOL 191 12/02/2021    CHOL 114 (L) 10/26/2019     Lab Results   Component Value Date    HDL 33 (L) 12/23/2021    HDL 18 (L) 12/02/2021    HDL 9 (L) 10/26/2019     Lab Results   Component Value Date    LDLCALC 118.4 12/23/2021    LDLCALC 129.0 12/02/2021    LDLCALC 65.8 10/26/2019     Lab Results   Component Value Date    TRIG 143 12/23/2021    TRIG 220 (H) 12/02/2021    TRIG 196 (H) 10/26/2019     Lab Results   Component Value Date    CHOLHDL 18.3 (L) 12/23/2021    CHOLHDL 9.4 (L) 12/02/2021    CHOLHDL 7.9 (L) 10/26/2019       Lab Results   Component Value Date    TSH 1.079 12/02/2021       Lab Results   Component Value Date    HGBA1C 7.2 (H) 12/23/2021         Assessment:     1. Pre-transplant evaluation for kidney transplant    2. CKD (chronic kidney disease) stage 5, GFR less than 15 ml/min    3. Kidney transplant status, living unrelated donor - 12/2/14    4. Type 2 diabetes mellitus, uncontrolled, with renal complications    5. Calcification of aorta    6. Renovascular hypertension    7. Hyperlipidemia, unspecified hyperlipidemia type        Plan:     SPECT stress and echo results pending.  In the absence of alarming and surprising findings, she will be of acceptable risk from a cardiovascular perspective.  Se has no angina, heart failure, or unstable arrhythmia.  No further cardiovascular testing is required prior to proceeding to the operating room.    Continue current medicines.    Diet/exercise goals reinforced.    Hand washing and social distancing stressed.    F/U PRN

## 2024-12-27 ENCOUNTER — PATIENT MESSAGE (OUTPATIENT)
Dept: NEPHROLOGY | Facility: CLINIC | Age: 59
End: 2024-12-27
Payer: MEDICARE

## 2024-12-30 DIAGNOSIS — N18.31 STAGE 3A CHRONIC KIDNEY DISEASE: Primary | ICD-10-CM

## 2025-01-31 ENCOUNTER — LAB VISIT (OUTPATIENT)
Dept: LAB | Facility: HOSPITAL | Age: 60
End: 2025-01-31
Attending: INTERNAL MEDICINE
Payer: MEDICARE

## 2025-01-31 DIAGNOSIS — N18.31 STAGE 3A CHRONIC KIDNEY DISEASE: ICD-10-CM

## 2025-01-31 LAB
BASOPHILS # BLD AUTO: 0.02 K/UL (ref 0–0.2)
BASOPHILS NFR BLD: 0.4 % (ref 0–1.9)
DIFFERENTIAL METHOD BLD: ABNORMAL
EOSINOPHIL # BLD AUTO: 0.1 K/UL (ref 0–0.5)
EOSINOPHIL NFR BLD: 1.3 % (ref 0–8)
ERYTHROCYTE [DISTWIDTH] IN BLOOD BY AUTOMATED COUNT: 13.2 % (ref 11.5–14.5)
HCT VFR BLD AUTO: 40.8 % (ref 37–48.5)
HGB BLD-MCNC: 13 G/DL (ref 12–16)
IMM GRANULOCYTES # BLD AUTO: 0.01 K/UL (ref 0–0.04)
IMM GRANULOCYTES NFR BLD AUTO: 0.2 % (ref 0–0.5)
LYMPHOCYTES # BLD AUTO: 0.9 K/UL (ref 1–4.8)
LYMPHOCYTES NFR BLD: 19 % (ref 18–48)
MCH RBC QN AUTO: 29.1 PG (ref 27–31)
MCHC RBC AUTO-ENTMCNC: 31.9 G/DL (ref 32–36)
MCV RBC AUTO: 91 FL (ref 82–98)
MONOCYTES # BLD AUTO: 0.4 K/UL (ref 0.3–1)
MONOCYTES NFR BLD: 9.2 % (ref 4–15)
NEUTROPHILS # BLD AUTO: 3.4 K/UL (ref 1.8–7.7)
NEUTROPHILS NFR BLD: 69.9 % (ref 38–73)
NRBC BLD-RTO: 0 /100 WBC
PLATELET # BLD AUTO: 117 K/UL (ref 150–450)
PMV BLD AUTO: 13.2 FL (ref 9.2–12.9)
RBC # BLD AUTO: 4.47 M/UL (ref 4–5.4)
WBC # BLD AUTO: 4.8 K/UL (ref 3.9–12.7)

## 2025-01-31 PROCEDURE — 85025 COMPLETE CBC W/AUTO DIFF WBC: CPT | Performed by: INTERNAL MEDICINE

## 2025-01-31 PROCEDURE — 80069 RENAL FUNCTION PANEL: CPT | Performed by: INTERNAL MEDICINE

## 2025-01-31 PROCEDURE — 36415 COLL VENOUS BLD VENIPUNCTURE: CPT | Mod: PO | Performed by: INTERNAL MEDICINE

## 2025-02-01 LAB
ALBUMIN SERPL BCP-MCNC: 3.6 G/DL (ref 3.5–5.2)
ANION GAP SERPL CALC-SCNC: 9 MMOL/L (ref 8–16)
BUN SERPL-MCNC: 18 MG/DL (ref 8–23)
CALCIUM SERPL-MCNC: 9.2 MG/DL (ref 8.7–10.5)
CHLORIDE SERPL-SCNC: 105 MMOL/L (ref 95–110)
CO2 SERPL-SCNC: 25 MMOL/L (ref 23–29)
CREAT SERPL-MCNC: 1 MG/DL (ref 0.5–1.4)
EST. GFR  (NO RACE VARIABLE): >60 ML/MIN/1.73 M^2
GLUCOSE SERPL-MCNC: 115 MG/DL (ref 70–110)
PHOSPHATE SERPL-MCNC: 2 MG/DL (ref 2.7–4.5)
POTASSIUM SERPL-SCNC: 4.1 MMOL/L (ref 3.5–5.1)
SODIUM SERPL-SCNC: 139 MMOL/L (ref 136–145)

## 2025-02-12 ENCOUNTER — OFFICE VISIT (OUTPATIENT)
Dept: NEPHROLOGY | Facility: CLINIC | Age: 60
End: 2025-02-12
Payer: MEDICARE

## 2025-02-12 VITALS
OXYGEN SATURATION: 99 % | WEIGHT: 190.94 LBS | HEIGHT: 67 IN | DIASTOLIC BLOOD PRESSURE: 80 MMHG | HEART RATE: 70 BPM | SYSTOLIC BLOOD PRESSURE: 133 MMHG | RESPIRATION RATE: 18 BRPM | BODY MASS INDEX: 29.97 KG/M2

## 2025-02-12 DIAGNOSIS — D84.821 IMMUNODEFICIENCY DUE TO LONG TERM IMMUNOSUPPRESSIVE DRUG THERAPY: ICD-10-CM

## 2025-02-12 DIAGNOSIS — T45.1X5A IMMUNODEFICIENCY DUE TO LONG TERM IMMUNOSUPPRESSIVE DRUG THERAPY: ICD-10-CM

## 2025-02-12 DIAGNOSIS — E83.39 HYPOPHOSPHATEMIA: ICD-10-CM

## 2025-02-12 DIAGNOSIS — Z94.0 KIDNEY TRANSPLANT STATUS, LIVING UNRELATED DONOR: Primary | Chronic | ICD-10-CM

## 2025-02-12 DIAGNOSIS — E87.20 METABOLIC ACIDOSIS: ICD-10-CM

## 2025-02-12 DIAGNOSIS — N18.32 STAGE 3B CHRONIC KIDNEY DISEASE: ICD-10-CM

## 2025-02-12 DIAGNOSIS — Z79.899 IMMUNODEFICIENCY DUE TO LONG TERM IMMUNOSUPPRESSIVE DRUG THERAPY: ICD-10-CM

## 2025-02-12 PROCEDURE — 1159F MED LIST DOCD IN RCRD: CPT | Mod: CPTII,S$GLB,, | Performed by: INTERNAL MEDICINE

## 2025-02-12 PROCEDURE — 3075F SYST BP GE 130 - 139MM HG: CPT | Mod: CPTII,S$GLB,, | Performed by: INTERNAL MEDICINE

## 2025-02-12 PROCEDURE — 3008F BODY MASS INDEX DOCD: CPT | Mod: CPTII,S$GLB,, | Performed by: INTERNAL MEDICINE

## 2025-02-12 PROCEDURE — 3079F DIAST BP 80-89 MM HG: CPT | Mod: CPTII,S$GLB,, | Performed by: INTERNAL MEDICINE

## 2025-02-12 PROCEDURE — 99213 OFFICE O/P EST LOW 20 MIN: CPT | Mod: S$GLB,,, | Performed by: INTERNAL MEDICINE

## 2025-02-12 PROCEDURE — 3066F NEPHROPATHY DOC TX: CPT | Mod: CPTII,S$GLB,, | Performed by: INTERNAL MEDICINE

## 2025-02-12 PROCEDURE — 99999 PR PBB SHADOW E&M-EST. PATIENT-LVL IV: CPT | Mod: PBBFAC,,, | Performed by: INTERNAL MEDICINE

## 2025-02-12 RX ORDER — PREDNISONE 5 MG/1
5 TABLET ORAL DAILY
Qty: 90 TABLET | Refills: 3 | Status: SHIPPED | OUTPATIENT
Start: 2025-02-12 | End: 2026-02-12

## 2025-02-12 RX ORDER — SODIUM BICARBONATE 650 MG/1
650 TABLET ORAL 2 TIMES DAILY
Qty: 180 TABLET | Refills: 3 | Status: SHIPPED | OUTPATIENT
Start: 2025-02-12 | End: 2026-02-12

## 2025-02-12 RX ORDER — TACROLIMUS 1 MG/1
CAPSULE ORAL
Qty: 450 CAPSULE | Refills: 3 | Status: SHIPPED | OUTPATIENT
Start: 2025-02-12 | End: 2026-02-12

## 2025-02-12 RX ORDER — MYCOPHENOLATE MOFETIL 250 MG/1
500 CAPSULE ORAL 2 TIMES DAILY
Qty: 360 CAPSULE | Refills: 3 | Status: SHIPPED | OUTPATIENT
Start: 2025-02-12 | End: 2026-02-12

## 2025-02-12 NOTE — PROGRESS NOTES
NEPHROLOGY PROGRESS NOTE    INTERVAL HISTORY  61 yo AAF patient is here today for follow up evaluation of renal allograft. She is s/p  donor kidney tx 14. Current immunosuppression; tacrolimus; mycophenolate. Baseline Cr 1.9 - 2.2 mg/dl.  There is a hx of diabetes complicated by nephropathy; retinopathy.  Denies fevers; chills night sweats; chest pains; hemoptysis; orthopnea; PND.      Underwent second kidney transplant 2 years ago. She does not take NSAIDs. Doing well.     MEDICATIONS reviewed    Last Physical Exam  /80 mmHg  Chronically ill appearing woman; no acute distress; oriented x 3  HEENT; (+)retinopathy  CHEST; Clear P&A; no rales or rhonchi  HEART; RR; S1&S2 no murmur rub gallop  ABD; BS(+); non-tender; no organomegaly  EXT; (-)Edema    LABS  Serum Cr 1.1 - 1.3 mg/dL   CO2 24 mmol/L  UPCR < 0.2 g/g  Tacro level 6.1  Hgb 12.2 g/dL    Impression  1. Kidney transplant status / Renal allograft 2023 / CKD stage 3a, eGFR 46 - 58 ml/min. On CNI + prednisone. MMF reduced dose due to BK viremia. Doing well.   2. Hypertension. Satisfactory control on carvedilol and hydralazine  3. Type 2 diabetes mellitus, on insulin, managed by PCP  4. MGUS. Followed by Hematology-Oncology. No anemia  5. 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 Hypophosphatemia, will repeat and recheck PTH    Plan  Continue IST (refilled)  Continue oral alkali  F/u with KT   Repeat renal panel  Return Visit in 6 mo

## 2025-03-17 ENCOUNTER — LAB VISIT (OUTPATIENT)
Dept: LAB | Facility: HOSPITAL | Age: 60
End: 2025-03-17
Attending: INTERNAL MEDICINE
Payer: MEDICARE

## 2025-03-17 DIAGNOSIS — Z94.0 KIDNEY TRANSPLANT STATUS, LIVING UNRELATED DONOR: Chronic | ICD-10-CM

## 2025-03-17 LAB
ANION GAP SERPL CALC-SCNC: 9 MMOL/L (ref 8–16)
BASOPHILS # BLD AUTO: 0.03 K/UL (ref 0–0.2)
BASOPHILS NFR BLD: 0.6 % (ref 0–1.9)
BUN SERPL-MCNC: 15 MG/DL (ref 8–23)
CALCIUM SERPL-MCNC: 9.3 MG/DL (ref 8.7–10.5)
CHLORIDE SERPL-SCNC: 106 MMOL/L (ref 95–110)
CO2 SERPL-SCNC: 22 MMOL/L (ref 23–29)
CREAT SERPL-MCNC: 1 MG/DL (ref 0.5–1.4)
DIFFERENTIAL METHOD BLD: ABNORMAL
EOSINOPHIL # BLD AUTO: 0.1 K/UL (ref 0–0.5)
EOSINOPHIL NFR BLD: 1 % (ref 0–8)
ERYTHROCYTE [DISTWIDTH] IN BLOOD BY AUTOMATED COUNT: 13.5 % (ref 11.5–14.5)
EST. GFR  (NO RACE VARIABLE): >60 ML/MIN/1.73 M^2
GLUCOSE SERPL-MCNC: 138 MG/DL (ref 70–110)
HCT VFR BLD AUTO: 40.4 % (ref 37–48.5)
HGB BLD-MCNC: 12.4 G/DL (ref 12–16)
IMM GRANULOCYTES # BLD AUTO: 0.02 K/UL (ref 0–0.04)
IMM GRANULOCYTES NFR BLD AUTO: 0.4 % (ref 0–0.5)
LYMPHOCYTES # BLD AUTO: 1 K/UL (ref 1–4.8)
LYMPHOCYTES NFR BLD: 20 % (ref 18–48)
MAGNESIUM SERPL-MCNC: 1.7 MG/DL (ref 1.6–2.6)
MCH RBC QN AUTO: 27.6 PG (ref 27–31)
MCHC RBC AUTO-ENTMCNC: 30.7 G/DL (ref 32–36)
MCV RBC AUTO: 90 FL (ref 82–98)
MONOCYTES # BLD AUTO: 0.4 K/UL (ref 0.3–1)
MONOCYTES NFR BLD: 8.7 % (ref 4–15)
NEUTROPHILS # BLD AUTO: 3.4 K/UL (ref 1.8–7.7)
NEUTROPHILS NFR BLD: 69.3 % (ref 38–73)
NRBC BLD-RTO: 0 /100 WBC
PHOSPHATE SERPL-MCNC: 2 MG/DL (ref 2.7–4.5)
PLATELET # BLD AUTO: 132 K/UL (ref 150–450)
PMV BLD AUTO: 12.7 FL (ref 9.2–12.9)
POTASSIUM SERPL-SCNC: 4.1 MMOL/L (ref 3.5–5.1)
RBC # BLD AUTO: 4.49 M/UL (ref 4–5.4)
SODIUM SERPL-SCNC: 137 MMOL/L (ref 136–145)
WBC # BLD AUTO: 4.94 K/UL (ref 3.9–12.7)

## 2025-03-17 PROCEDURE — 83735 ASSAY OF MAGNESIUM: CPT | Performed by: INTERNAL MEDICINE

## 2025-03-17 PROCEDURE — 84100 ASSAY OF PHOSPHORUS: CPT | Performed by: INTERNAL MEDICINE

## 2025-03-17 PROCEDURE — 80197 ASSAY OF TACROLIMUS: CPT | Performed by: INTERNAL MEDICINE

## 2025-03-17 PROCEDURE — 85025 COMPLETE CBC W/AUTO DIFF WBC: CPT | Performed by: INTERNAL MEDICINE

## 2025-03-17 PROCEDURE — 36415 COLL VENOUS BLD VENIPUNCTURE: CPT | Mod: PO | Performed by: INTERNAL MEDICINE

## 2025-03-17 PROCEDURE — 80048 BASIC METABOLIC PNL TOTAL CA: CPT | Performed by: INTERNAL MEDICINE

## 2025-03-17 NOTE — PROGRESS NOTES
Kidney Post-Transplant Assessment    Referring Physician: Aaron Bonilla  Current Nephrologist: Aaron Bonilla    ORGAN: LEFT KIDNEY  Donor Type: donation after brain death  PHS Increased Risk: no  Cold Ischemia: 1,731 mins  Induction Medications: thymoglobulin    Subjective:     CC:  Reassessment of renal allograft function and management of chronic immunosuppression.    HPI:  Mrs. Maldonado is a 60 y.o. year old Black or  female with PMG ESRD 2/2 DM2 and HTN, and failed kidney txp, who received a donation after brain death kidney transplant on 3/10/23. Her  BL creatinine is 1.2-1.5 and continues to trend down.  She takes mycophenolate mofetil, prednisone, and tacrolimus for maintenance immunosuppression.       Pertinent Nephrology/Transplant History:   ESRD from DM  failed KT #1 12/2014 -  3/2022. EDW 81kg  DDKT#2  3/10/23. thymo; CIT 28+h       LOV   9/16/24    Interval HX:   Following with Dr Fisher for gen nephrology LOV 2/12/25  HX MGUS dx 2013; [facility]Armando Robertson MD     Has been doing intermittent fasting and has lost ~ 10 lbs    Follows with endocrinology for DM mgmt    Has been on Mounjaro since 9/2023  Lab Results   Component Value Date    HGBA1C 7.3 (H) 06/01/2023      Intake-  adequate hydration reported    UOP-no problems reported     BP   BP Readings from Last 3 Encounters:   03/24/25 120/67   02/12/25 133/80   09/16/24 138/74      Peripheral edema--no          Lab /diagnostic results reviewed with patient today.   All questions answered    Review of Systems   Constitutional:  Negative for activity change, appetite change, chills, fatigue, fever and unexpected weight change.   HENT:  Negative for congestion, facial swelling, postnasal drip, rhinorrhea, sinus pressure, sore throat and trouble swallowing.    Eyes:  Positive for visual disturbance. Negative for pain and redness.        Wears glasses   Respiratory:  Negative for cough, chest tightness,  "shortness of breath and wheezing.    Cardiovascular: Negative.  Negative for chest pain, palpitations and leg swelling.   Gastrointestinal:  Positive for abdominal distention. Negative for abdominal pain, diarrhea, nausea and vomiting.            Genitourinary:  Negative for dysuria, flank pain and urgency.   Musculoskeletal:  Positive for back pain (HX sciatica). Negative for gait problem, neck pain and neck stiffness.   Skin:  Negative for rash and wound.   Allergic/Immunologic: Negative for environmental allergies, food allergies and immunocompromised state.   Neurological:  Negative for dizziness, weakness, light-headedness and headaches.   Psychiatric/Behavioral:  Negative for agitation and confusion. The patient is not nervous/anxious.        Objective:   Blood pressure 120/67, pulse 93, temperature 97.3 °F (36.3 °C), temperature source Temporal, resp. rate 17, height 5' 7.01" (1.702 m), weight 81.2 kg (179 lb 0.2 oz), last menstrual period 04/29/2011, SpO2 99%.body mass index is 28.03 kg/m².    Physical Exam  Vitals reviewed.   Constitutional:       Appearance: Normal appearance. She is well-developed.   HENT:      Head: Normocephalic.   Eyes:      Pupils: Pupils are equal, round, and reactive to light.   Cardiovascular:      Rate and Rhythm: Normal rate and regular rhythm.      Heart sounds: Normal heart sounds.   Pulmonary:      Effort: Pulmonary effort is normal.      Breath sounds: Normal breath sounds.   Abdominal:      General: Bowel sounds are normal.      Palpations: Abdomen is soft.      Comments:   No bruit noted over the allograft     Musculoskeletal:         General: Normal range of motion.        Arms:       Cervical back: Normal range of motion and neck supple.   Skin:     General: Skin is warm and dry.   Neurological:      Mental Status: She is alert and oriented to person, place, and time.      Motor: No abnormal muscle tone.   Psychiatric:         Behavior: Behavior normal.         Labs:  Lab " "Results   Component Value Date    WBC 4.73 03/18/2025    HGB 13.1 03/18/2025    HCT 42.3 03/18/2025     03/18/2025    K 4.3 03/18/2025     03/18/2025    CO2 23 03/18/2025    BUN 19 03/18/2025    CREATININE 1.1 03/18/2025    EGFRNORACEVR 56.8 (A) 03/18/2025    GLUCOSE 112 (H) 02/18/2025    CALCIUM 9.6 03/18/2025    PHOS 2.5 (L) 03/18/2025    MG 1.7 03/17/2025    ALBUMIN 3.7 03/18/2025    ALBUMIN 3.7 03/18/2025    AST 19 03/18/2025    ALT 14 03/18/2025    UTPCR 0.06 03/18/2025    .3 (H) 03/12/2024    TACROLIMUS 7.4 03/18/2025       No results found for: "EXTANC", "EXTWBC", "EXTSEGS", "EXTPLATELETS", "EXTHEMOGLOBI", "EXTHEMATOCRI", "EXTCREATININ", "EXTSODIUM", "EXTPOTASSIUM", "EXTBUN", "EXTCO2", "EXTCALCIUM", "EXTPHOSPHORU", "EXTGLUCOSE", "EXTALBUMIN", "EXTAST", "EXTALT", "EXTBILITOTAL", "EXTLIPASE", "EXTAMYLASE"    No results found for: "EXTCYCLOSLVL", "EXTSIROLIMUS", "EXTTACROLVL", "EXTPROTCRE", "EXTPTHINTACT", "EXTPROTEINUA", "EXTWBCUA", "EXTRBCUA"    Labs were reviewed with the patient    Assessment:     1. Kidney transplant status, living unrelated donor - 12/2/14    2. Immunodeficiency due to long term immunosuppressive drug therapy    3. Type 2 diabetes mellitus with stage 3a chronic kidney disease, with long-term current use of insulin    4. Benign hypertension with CKD (chronic kidney disease) stage III    5. MGUS (monoclonal gammopathy of unknown significance)    6. Anemia of chronic disease    7. At risk for opportunistic infections          Plan:      Graft function.   Scr 1.1, CKD 3a--stable   -cont IS med regime as prescribed    Cont f/u with general nephrology for CKD care      MGUS--cont to f/u with hemonc as recommended --mgmt deferred         1) s/p living related kidney transplant, CKD 3b              - Graft function.   Scr 1.1, CKD 3a--stable    -FK Trough 7.4   -   FK  3/2,    - cont on Cellcept 500 mg BID along with prednisone 5 mg daily   -Will continue to monitor for drug " toxicities     -BL creatinine is 1.2-1.5     Lab Results   Component Value Date    CREATININE 1.1 03/18/2025              Latest Reference Range & Units 2yr 0mo,  Kidney-Post 3 Year  03/18/25   eGFR >60 mL/min/1.73 m^2 56.8 !   !: Data is abnormal         2) Uncontrolled HTN: advise low salt diet and home BP monitoring  - Cont  hydralazine. Coreg as prescribed       3) Anemia:              -   will monitor as per our guidelines   H/H  stable-no intervention needed,      Lab Results   Component Value Date    WBC 4.73 03/18/2025    HGB 13.1 03/18/2025    HCT 42.3 03/18/2025    MCV 90 03/18/2025     (L) 03/18/2025     4) type II DM:   -  cont MED/insulin  REGIME as prescribed . Mgmt deferred to endocrinology    Lab Results   Component Value Date    HGBA1C 7.3 (H) 06/01/2023         5) Hypophosphatemia:Secondary hyperparathyroidism:                 --no intervention required.will monitor as per our guidelines  Cont high phos diet   Lab Results   Component Value Date    .3 (H) 03/12/2024    CALCIUM 9.6 03/18/2025    PHOS 2.5 (L) 03/18/2025          6) Metabolic acidosis/Electrolyte balance:stable      - no intervention required. will monitor as per our guidelines  Lab Results   Component Value Date     03/18/2025    K 4.3 03/18/2025     03/18/2025    CO2 23 03/18/2025         7) hypomagnesemia:stable    - no intervention required . will monitor as per our guidelines              Latest Reference Range & Units 1yr 5mo,  Kidney-Post 2 Year  09/09/24   Magnesium  1.6 - 2.6 mg/dL 1.6           8) Cytopenias: no significant cytopenias will monitor as per our guidelines. Medicine list reviewed including potential causes of drug-induced cytopenias    9) Proteinuria: continue p/c ratio as per guidelines            Latest Reference Range & Units 2yr 0mo,  Kidney-Post 3 Year  03/18/25   Urine Protein/Creatinine Ratio 0.00 - 0.20  0.06              10) CMV and BK virus infection screening:  will continue  to monitor/ guidelines             Latest Reference Range & Units 1yr 5mo,  Kidney-Post 2 Year  09/09/24   BK Virus DNA, Blood Not detected  Not detected      Latest Reference Range & Units 1yr 5mo,  Kidney-Post 2 Year  09/09/24   BK Virus DNA PCR, Quant, Blood <125 Copies/mL <125              11) Weight education: provided during the clinic visit   Body mass index is 28.03 kg/m².        Follow-up:   Clinic: return to transplant clinic weekly for the first month after transplant; every 2 weeks during months 2-3; then at 6-, 9-, 12-, 18-, 24-, and 36- months post-transplant to reassess for complications from immunosuppression toxicity and monitor for rejection.  Annually thereafter.    Labs: since patient remains at high risk for rejection and drug-related complications that warrant close monitoring, labs will be ordered as follows: continue twice weekly CBC, renal panel, and drug level for first month; then same labs once weekly through 3rd month post-transplant.  Urine for UA and protein/creatinine ratio monthly.  Serum BK - PCR at 1-, 3-, 6-, 9-, 12-, 18-, 24-, 36-, 48-, and 60 months post-transplant.  Hepatic panel at 1-, 2-, 3-, 6-, 9-, 12-, 18-, 24-, and 36- months post-transplant.    Daya Leon NP       Education:   Material provided to the patient.  Patient reminded to call with any health changes since these can be early signs of significant complications.  Also, I advised the patient to be sure any new medications or changes of old medications are discussed with either a pharmacist or physician knowledgeable with transplant to avoid rejection/drug toxicity related to significant drug interactions.    Patient advised that it is recommended that all transplanted patients, and their close contacts and household members receive Covid vaccination.

## 2025-03-18 ENCOUNTER — LAB VISIT (OUTPATIENT)
Dept: LAB | Facility: HOSPITAL | Age: 60
End: 2025-03-18
Attending: INTERNAL MEDICINE
Payer: MEDICARE

## 2025-03-18 DIAGNOSIS — Z94.0 KIDNEY REPLACED BY TRANSPLANT: ICD-10-CM

## 2025-03-18 LAB
ALBUMIN SERPL BCP-MCNC: 3.7 G/DL (ref 3.5–5.2)
ALBUMIN SERPL BCP-MCNC: 3.7 G/DL (ref 3.5–5.2)
ALP SERPL-CCNC: 59 U/L (ref 40–150)
ALT SERPL W/O P-5'-P-CCNC: 14 U/L (ref 10–44)
ANION GAP SERPL CALC-SCNC: 11 MMOL/L (ref 8–16)
AST SERPL-CCNC: 19 U/L (ref 10–40)
BASOPHILS # BLD AUTO: 0.02 K/UL (ref 0–0.2)
BASOPHILS NFR BLD: 0.4 % (ref 0–1.9)
BILIRUB DIRECT SERPL-MCNC: 0.1 MG/DL (ref 0.1–0.3)
BILIRUB SERPL-MCNC: 0.4 MG/DL (ref 0.1–1)
BUN SERPL-MCNC: 19 MG/DL (ref 8–23)
CALCIUM SERPL-MCNC: 9.6 MG/DL (ref 8.7–10.5)
CHLORIDE SERPL-SCNC: 104 MMOL/L (ref 95–110)
CO2 SERPL-SCNC: 23 MMOL/L (ref 23–29)
CREAT SERPL-MCNC: 1.1 MG/DL (ref 0.5–1.4)
DIFFERENTIAL METHOD BLD: ABNORMAL
EOSINOPHIL # BLD AUTO: 0.1 K/UL (ref 0–0.5)
EOSINOPHIL NFR BLD: 1.7 % (ref 0–8)
ERYTHROCYTE [DISTWIDTH] IN BLOOD BY AUTOMATED COUNT: 13.6 % (ref 11.5–14.5)
EST. GFR  (NO RACE VARIABLE): 56.8 ML/MIN/1.73 M^2
GLUCOSE SERPL-MCNC: 131 MG/DL (ref 70–110)
HCT VFR BLD AUTO: 42.3 % (ref 37–48.5)
HGB BLD-MCNC: 13.1 G/DL (ref 12–16)
IMM GRANULOCYTES # BLD AUTO: 0.01 K/UL (ref 0–0.04)
IMM GRANULOCYTES NFR BLD AUTO: 0.2 % (ref 0–0.5)
LYMPHOCYTES # BLD AUTO: 1.4 K/UL (ref 1–4.8)
LYMPHOCYTES NFR BLD: 28.8 % (ref 18–48)
MCH RBC QN AUTO: 27.9 PG (ref 27–31)
MCHC RBC AUTO-ENTMCNC: 31 G/DL (ref 32–36)
MCV RBC AUTO: 90 FL (ref 82–98)
MONOCYTES # BLD AUTO: 0.5 K/UL (ref 0.3–1)
MONOCYTES NFR BLD: 11.2 % (ref 4–15)
NEUTROPHILS # BLD AUTO: 2.7 K/UL (ref 1.8–7.7)
NEUTROPHILS NFR BLD: 57.7 % (ref 38–73)
NRBC BLD-RTO: 0 /100 WBC
PHOSPHATE SERPL-MCNC: 2.5 MG/DL (ref 2.7–4.5)
PLATELET # BLD AUTO: 132 K/UL (ref 150–450)
PMV BLD AUTO: 13 FL (ref 9.2–12.9)
POTASSIUM SERPL-SCNC: 4.3 MMOL/L (ref 3.5–5.1)
PROT SERPL-MCNC: 8.1 G/DL (ref 6–8.4)
RBC # BLD AUTO: 4.7 M/UL (ref 4–5.4)
SODIUM SERPL-SCNC: 138 MMOL/L (ref 136–145)
TACROLIMUS BLD-MCNC: 7.4 NG/ML (ref 5–15)
WBC # BLD AUTO: 4.73 K/UL (ref 3.9–12.7)

## 2025-03-18 PROCEDURE — 80069 RENAL FUNCTION PANEL: CPT | Performed by: INTERNAL MEDICINE

## 2025-03-18 PROCEDURE — 87799 DETECT AGENT NOS DNA QUANT: CPT | Performed by: INTERNAL MEDICINE

## 2025-03-18 PROCEDURE — 84460 ALANINE AMINO (ALT) (SGPT): CPT | Performed by: INTERNAL MEDICINE

## 2025-03-18 PROCEDURE — 80197 ASSAY OF TACROLIMUS: CPT | Performed by: INTERNAL MEDICINE

## 2025-03-18 PROCEDURE — 85025 COMPLETE CBC W/AUTO DIFF WBC: CPT | Performed by: INTERNAL MEDICINE

## 2025-03-19 ENCOUNTER — RESULTS FOLLOW-UP (OUTPATIENT)
Dept: TRANSPLANT | Facility: HOSPITAL | Age: 60
End: 2025-03-19

## 2025-03-19 LAB — TACROLIMUS BLD-MCNC: 7.4 NG/ML (ref 5–15)

## 2025-03-21 PROBLEM — N18.32 STAGE 3B CHRONIC KIDNEY DISEASE: Status: RESOLVED | Noted: 2023-05-05 | Resolved: 2025-03-21

## 2025-03-24 ENCOUNTER — OFFICE VISIT (OUTPATIENT)
Dept: TRANSPLANT | Facility: CLINIC | Age: 60
End: 2025-03-24
Payer: MEDICARE

## 2025-03-24 VITALS
DIASTOLIC BLOOD PRESSURE: 67 MMHG | WEIGHT: 179 LBS | RESPIRATION RATE: 17 BRPM | HEART RATE: 93 BPM | TEMPERATURE: 97 F | HEIGHT: 67 IN | BODY MASS INDEX: 28.09 KG/M2 | SYSTOLIC BLOOD PRESSURE: 120 MMHG | OXYGEN SATURATION: 99 %

## 2025-03-24 DIAGNOSIS — D84.821 IMMUNODEFICIENCY DUE TO LONG TERM IMMUNOSUPPRESSIVE DRUG THERAPY: ICD-10-CM

## 2025-03-24 DIAGNOSIS — Z91.89 AT RISK FOR OPPORTUNISTIC INFECTIONS: ICD-10-CM

## 2025-03-24 DIAGNOSIS — Z79.899 IMMUNODEFICIENCY DUE TO LONG TERM IMMUNOSUPPRESSIVE DRUG THERAPY: ICD-10-CM

## 2025-03-24 DIAGNOSIS — I12.9 BENIGN HYPERTENSION WITH CKD (CHRONIC KIDNEY DISEASE) STAGE III: ICD-10-CM

## 2025-03-24 DIAGNOSIS — T45.1X5A IMMUNODEFICIENCY DUE TO LONG TERM IMMUNOSUPPRESSIVE DRUG THERAPY: ICD-10-CM

## 2025-03-24 DIAGNOSIS — D63.8 ANEMIA OF CHRONIC DISEASE: ICD-10-CM

## 2025-03-24 DIAGNOSIS — Z79.4 TYPE 2 DIABETES MELLITUS WITH STAGE 3A CHRONIC KIDNEY DISEASE, WITH LONG-TERM CURRENT USE OF INSULIN: ICD-10-CM

## 2025-03-24 DIAGNOSIS — Z94.0 KIDNEY TRANSPLANT STATUS, LIVING UNRELATED DONOR: Primary | Chronic | ICD-10-CM

## 2025-03-24 DIAGNOSIS — Z94.0 KIDNEY TRANSPLANT STATUS, LIVING UNRELATED DONOR: ICD-10-CM

## 2025-03-24 DIAGNOSIS — N18.31 TYPE 2 DIABETES MELLITUS WITH STAGE 3A CHRONIC KIDNEY DISEASE, WITH LONG-TERM CURRENT USE OF INSULIN: ICD-10-CM

## 2025-03-24 DIAGNOSIS — E11.22 TYPE 2 DIABETES MELLITUS WITH STAGE 3A CHRONIC KIDNEY DISEASE, WITH LONG-TERM CURRENT USE OF INSULIN: ICD-10-CM

## 2025-03-24 DIAGNOSIS — N18.30 BENIGN HYPERTENSION WITH CKD (CHRONIC KIDNEY DISEASE) STAGE III: ICD-10-CM

## 2025-03-24 DIAGNOSIS — D47.2 MGUS (MONOCLONAL GAMMOPATHY OF UNKNOWN SIGNIFICANCE): Chronic | ICD-10-CM

## 2025-03-24 PROCEDURE — 99999 PR PBB SHADOW E&M-EST. PATIENT-LVL V: CPT | Mod: PBBFAC,,, | Performed by: NURSE PRACTITIONER

## 2025-03-24 RX ORDER — FLUOXETINE HYDROCHLORIDE 20 MG/1
20 CAPSULE ORAL DAILY
COMMUNITY

## 2025-03-24 NOTE — LETTER
March 24, 2025        Aaron Bonilla  [address]  [facility] [address]  Phone: [phone]  Fax: [phone]             [facility]- Transplant 1st Fl  [address]  [facility] [address]  Phone: [phone]   Patient: Elizabeth Maldonado   MR Number: [id number]   YOB: 1965   Date of Visit: 3/24/2025       Dear Dr. Aaron Bonilla    Thank you for referring Elizabeth Maldonado to me for evaluation. Attached you will find relevant portions of my assessment and plan of care.    If you have questions, please do not hesitate to call me. I look forward to following Elizabeth Maldonado along with you.    Sincerely,    Daya Leon, NP    Enclosure    If you would like to receive this communication electronically, please contact [email or web address] or [phone] to request [email or web address] Link access.    anydooR Link is a tool which provides read-only access to select patient information with whom you have a relationship. Its easy to use and provides real time access to review your patients record including encounter summaries, notes, results, and demographic information.    If you feel you have received this communication in error or would no longer like to receive these types of communications, please e-mail [email or web address]

## 2025-03-27 RX ORDER — TACROLIMUS 1 MG/1
CAPSULE ORAL
Qty: 450 CAPSULE | Refills: 3 | Status: SHIPPED | OUTPATIENT
Start: 2025-03-27

## 2025-03-27 RX ORDER — MYCOPHENOLATE MOFETIL 250 MG/1
CAPSULE ORAL
Qty: 360 CAPSULE | Refills: 3 | Status: SHIPPED | OUTPATIENT
Start: 2025-03-27

## 2025-04-15 ENCOUNTER — NURSE TRIAGE (OUTPATIENT)
Dept: ADMINISTRATIVE | Facility: CLINIC | Age: 60
End: 2025-04-15
Payer: MEDICARE

## 2025-04-15 NOTE — TELEPHONE ENCOUNTER
Kidney transplant pt 3/10/23. Pt c/o having sinus issues and wants to know if she is taking correct meds. No chest pain or SOB. Transferred to  for transplant per protocol. Encounter routed to provider.   Reason for Disposition   Non-urgent call redirected to specialist's office because it is open    Protocols used: No Contact or Duplicate Contact Call-A-OH

## 2025-05-18 DIAGNOSIS — Z94.0 KIDNEY TRANSPLANT STATUS: ICD-10-CM

## 2025-05-29 ENCOUNTER — TELEPHONE (OUTPATIENT)
Dept: NEPHROLOGY | Facility: CLINIC | Age: 60
End: 2025-05-29
Payer: MEDICARE

## 2025-05-29 ENCOUNTER — PATIENT MESSAGE (OUTPATIENT)
Dept: NEPHROLOGY | Facility: CLINIC | Age: 60
End: 2025-05-29
Payer: MEDICARE

## 2025-05-30 ENCOUNTER — PATIENT MESSAGE (OUTPATIENT)
Dept: NEPHROLOGY | Facility: CLINIC | Age: 60
End: 2025-05-30
Payer: MEDICARE

## 2025-05-30 DIAGNOSIS — Z94.0 KIDNEY TRANSPLANT STATUS: ICD-10-CM

## 2025-05-30 RX ORDER — HYDRALAZINE HYDROCHLORIDE 50 MG/1
50 TABLET, FILM COATED ORAL 3 TIMES DAILY
Qty: 90 TABLET | Refills: 5 | Status: SHIPPED | OUTPATIENT
Start: 2025-05-30

## 2025-06-02 RX ORDER — HYDRALAZINE HYDROCHLORIDE 50 MG/1
50 TABLET, FILM COATED ORAL 3 TIMES DAILY
Qty: 90 TABLET | Refills: 5 | Status: SHIPPED | OUTPATIENT
Start: 2025-06-02 | End: 2025-06-04 | Stop reason: SDUPTHER

## 2025-06-04 DIAGNOSIS — Z94.0 KIDNEY TRANSPLANT STATUS: ICD-10-CM

## 2025-06-04 RX ORDER — HYDRALAZINE HYDROCHLORIDE 50 MG/1
50 TABLET, FILM COATED ORAL 3 TIMES DAILY
Qty: 90 TABLET | Refills: 5 | Status: SHIPPED | OUTPATIENT
Start: 2025-06-04

## 2025-06-06 DIAGNOSIS — N18.32 STAGE 3B CHRONIC KIDNEY DISEASE: Primary | ICD-10-CM

## 2025-07-11 RX ORDER — HYDROXYZINE HYDROCHLORIDE 10 MG/1
10 TABLET, FILM COATED ORAL 3 TIMES DAILY
Qty: 45 TABLET | Refills: 3 | OUTPATIENT
Start: 2025-07-11

## 2025-07-14 ENCOUNTER — LAB VISIT (OUTPATIENT)
Dept: LAB | Facility: HOSPITAL | Age: 60
End: 2025-07-14
Attending: INTERNAL MEDICINE
Payer: MEDICARE

## 2025-07-14 DIAGNOSIS — N18.32 STAGE 3B CHRONIC KIDNEY DISEASE: ICD-10-CM

## 2025-07-14 LAB
ABSOLUTE EOSINOPHIL (OHS): 0.09 K/UL
ABSOLUTE MONOCYTE (OHS): 0.45 K/UL (ref 0.3–1)
ABSOLUTE NEUTROPHIL COUNT (OHS): 2.08 K/UL (ref 1.8–7.7)
ALBUMIN SERPL BCP-MCNC: 3.7 G/DL (ref 3.5–5.2)
ANION GAP (OHS): 10 MMOL/L (ref 8–16)
BASOPHILS # BLD AUTO: 0.02 K/UL
BASOPHILS NFR BLD AUTO: 0.5 %
BUN SERPL-MCNC: 20 MG/DL (ref 8–23)
CALCIUM SERPL-MCNC: 9.5 MG/DL (ref 8.7–10.5)
CHLORIDE SERPL-SCNC: 104 MMOL/L (ref 95–110)
CO2 SERPL-SCNC: 22 MMOL/L (ref 23–29)
CREAT SERPL-MCNC: 1.4 MG/DL (ref 0.5–1.4)
ERYTHROCYTE [DISTWIDTH] IN BLOOD BY AUTOMATED COUNT: 13.5 % (ref 11.5–14.5)
GFR SERPLBLD CREATININE-BSD FMLA CKD-EPI: 43 ML/MIN/1.73/M2
GLUCOSE SERPL-MCNC: 107 MG/DL (ref 70–110)
HCT VFR BLD AUTO: 42 % (ref 37–48.5)
HGB BLD-MCNC: 13.3 GM/DL (ref 12–16)
IMM GRANULOCYTES # BLD AUTO: 0.01 K/UL (ref 0–0.04)
IMM GRANULOCYTES NFR BLD AUTO: 0.2 % (ref 0–0.5)
LYMPHOCYTES # BLD AUTO: 1.37 K/UL (ref 1–4.8)
MCH RBC QN AUTO: 28 PG (ref 27–31)
MCHC RBC AUTO-ENTMCNC: 31.7 G/DL (ref 32–36)
MCV RBC AUTO: 88 FL (ref 82–98)
NUCLEATED RBC (/100WBC) (OHS): 0 /100 WBC
PHOSPHATE SERPL-MCNC: 2.6 MG/DL (ref 2.7–4.5)
PLATELET # BLD AUTO: 130 K/UL (ref 150–450)
PMV BLD AUTO: 12.6 FL (ref 9.2–12.9)
POTASSIUM SERPL-SCNC: 4.4 MMOL/L (ref 3.5–5.1)
RBC # BLD AUTO: 4.75 M/UL (ref 4–5.4)
RELATIVE EOSINOPHIL (OHS): 2.2 %
RELATIVE LYMPHOCYTE (OHS): 34.1 % (ref 18–48)
RELATIVE MONOCYTE (OHS): 11.2 % (ref 4–15)
RELATIVE NEUTROPHIL (OHS): 51.8 % (ref 38–73)
SODIUM SERPL-SCNC: 136 MMOL/L (ref 136–145)
WBC # BLD AUTO: 4.02 K/UL (ref 3.9–12.7)

## 2025-07-14 PROCEDURE — 85025 COMPLETE CBC W/AUTO DIFF WBC: CPT

## 2025-07-14 PROCEDURE — 80069 RENAL FUNCTION PANEL: CPT

## 2025-07-14 PROCEDURE — 36415 COLL VENOUS BLD VENIPUNCTURE: CPT | Mod: PO

## 2025-08-11 DIAGNOSIS — E78.00 PURE HYPERCHOLESTEROLEMIA: Chronic | ICD-10-CM

## 2025-08-11 RX ORDER — ATORVASTATIN CALCIUM 40 MG/1
40 TABLET, FILM COATED ORAL NIGHTLY
Qty: 30 TABLET | Refills: 5 | Status: SHIPPED | OUTPATIENT
Start: 2025-08-11

## 2025-08-13 ENCOUNTER — OFFICE VISIT (OUTPATIENT)
Dept: NEPHROLOGY | Facility: CLINIC | Age: 60
End: 2025-08-13
Payer: MEDICARE

## 2025-08-13 ENCOUNTER — LAB VISIT (OUTPATIENT)
Dept: LAB | Facility: HOSPITAL | Age: 60
End: 2025-08-13
Payer: MEDICARE

## 2025-08-13 VITALS
SYSTOLIC BLOOD PRESSURE: 116 MMHG | OXYGEN SATURATION: 98 % | HEART RATE: 93 BPM | WEIGHT: 169.75 LBS | BODY MASS INDEX: 26.58 KG/M2 | DIASTOLIC BLOOD PRESSURE: 71 MMHG

## 2025-08-13 DIAGNOSIS — N18.31 TYPE 2 DIABETES MELLITUS WITH STAGE 3A CHRONIC KIDNEY DISEASE, WITH LONG-TERM CURRENT USE OF INSULIN: ICD-10-CM

## 2025-08-13 DIAGNOSIS — Z94.0 KIDNEY REPLACED BY TRANSPLANT: ICD-10-CM

## 2025-08-13 DIAGNOSIS — R82.81 PYURIA: ICD-10-CM

## 2025-08-13 DIAGNOSIS — Z29.89 PROPHYLACTIC IMMUNOTHERAPY: ICD-10-CM

## 2025-08-13 DIAGNOSIS — E87.20 METABOLIC ACIDOSIS: ICD-10-CM

## 2025-08-13 DIAGNOSIS — Z79.4 TYPE 2 DIABETES MELLITUS WITH STAGE 3A CHRONIC KIDNEY DISEASE, WITH LONG-TERM CURRENT USE OF INSULIN: ICD-10-CM

## 2025-08-13 DIAGNOSIS — Z94.0 KIDNEY TRANSPLANT STATUS, LIVING UNRELATED DONOR: Primary | Chronic | ICD-10-CM

## 2025-08-13 DIAGNOSIS — E11.22 TYPE 2 DIABETES MELLITUS WITH STAGE 3A CHRONIC KIDNEY DISEASE, WITH LONG-TERM CURRENT USE OF INSULIN: ICD-10-CM

## 2025-08-13 LAB
BILIRUB UR QL STRIP.AUTO: NEGATIVE
CLARITY UR: CLEAR
COLOR UR AUTO: YELLOW
GLUCOSE UR QL STRIP: NEGATIVE
HGB UR QL STRIP: NEGATIVE
KETONES UR QL STRIP: NEGATIVE
LEUKOCYTE ESTERASE UR QL STRIP: NEGATIVE
NITRITE UR QL STRIP: NEGATIVE
PH UR STRIP: 6 [PH]
PROT UR QL STRIP: NEGATIVE
SP GR UR STRIP: 1.01
UROBILINOGEN UR STRIP-ACNC: NEGATIVE EU/DL

## 2025-08-13 PROCEDURE — G2211 COMPLEX E/M VISIT ADD ON: HCPCS | Mod: S$GLB,,, | Performed by: INTERNAL MEDICINE

## 2025-08-13 PROCEDURE — 99999 PR PBB SHADOW E&M-EST. PATIENT-LVL II: CPT | Mod: PBBFAC,,, | Performed by: INTERNAL MEDICINE

## 2025-08-13 PROCEDURE — 81003 URINALYSIS AUTO W/O SCOPE: CPT

## 2025-08-13 PROCEDURE — 3074F SYST BP LT 130 MM HG: CPT | Mod: CPTII,S$GLB,, | Performed by: INTERNAL MEDICINE

## 2025-08-13 PROCEDURE — 3008F BODY MASS INDEX DOCD: CPT | Mod: CPTII,S$GLB,, | Performed by: INTERNAL MEDICINE

## 2025-08-13 PROCEDURE — 3078F DIAST BP <80 MM HG: CPT | Mod: CPTII,S$GLB,, | Performed by: INTERNAL MEDICINE

## 2025-08-13 PROCEDURE — 3066F NEPHROPATHY DOC TX: CPT | Mod: CPTII,S$GLB,, | Performed by: INTERNAL MEDICINE

## 2025-08-13 PROCEDURE — 99214 OFFICE O/P EST MOD 30 MIN: CPT | Mod: S$GLB,,, | Performed by: INTERNAL MEDICINE

## (undated) DEVICE — TRAY CATH FOL SIL URIMTR 16FR

## (undated) DEVICE — TOWEL OR DISP STRL BLUE 4/PK

## (undated) DEVICE — STAPLER SKIN PROXIMATE WIDE

## (undated) DEVICE — GAUZE FLUFF XXLG 36X36 2 PLY

## (undated) DEVICE — DRAPE CORETEMP FLD WRM 56X62IN

## (undated) DEVICE — BLANKET UPPER BODY 78.7X29.9IN

## (undated) DEVICE — PACK DISP Y TEC

## (undated) DEVICE — EVACUATOR WOUND BULB 100CC

## (undated) DEVICE — SUT SILK 3-0 STRANDS 30IN

## (undated) DEVICE — Device

## (undated) DEVICE — DRESSING ADH ISLAND 2.5 X 3

## (undated) DEVICE — TAPE MEDIPORE 1 X 10YD

## (undated) DEVICE — SUT 2-0 12-18IN SILK

## (undated) DEVICE — DRAIN CHANNEL ROUND 15FR

## (undated) DEVICE — TOWEL OR XRAY WHITE 17X26IN

## (undated) DEVICE — SEE MEDLINE ITEM 153688

## (undated) DEVICE — SYR 10CC LUER LOCK

## (undated) DEVICE — KIT ANTIFOG

## (undated) DEVICE — SYR ONLY LUER LOCK 20CC

## (undated) DEVICE — ELECTRODE REM PLYHSV RETURN 9

## (undated) DEVICE — DRESSING ABSRBNT ISLAND 3.6X8

## (undated) DEVICE — SUT 4/0 27IN PDS II VIO MON

## (undated) DEVICE — SOL NS 1000CC

## (undated) DEVICE — SUT 4-0 12-18IN SILK BLACK

## (undated) DEVICE — TIP YANKAUERS BULB NO VENT

## (undated) DEVICE — DRAPE SLUSH WARMER WITH DISC

## (undated) DEVICE — PACK AV VASCULAR ACCESS OMC

## (undated) DEVICE — DRESSING TEGADERM 4.4X5IN

## (undated) DEVICE — SUT SILK 2-0 STRANDS 30IN

## (undated) DEVICE — HEMOSTAT SURGICEL 4X8IN

## (undated) DEVICE — SUT PROLENE 6-0 BV-1 30IN

## (undated) DEVICE — CLIP MED TICALL

## (undated) DEVICE — BLADE SURG CARBON STEEL SZ11

## (undated) DEVICE — SET IV ADMIN 15DROP 3 CARESITE

## (undated) DEVICE — PUNCH AORTIC 4.0MM 6/CASE

## (undated) DEVICE — SOL MINICAP W/IODINE

## (undated) DEVICE — SUT 3-0 12-18IN SILK

## (undated) DEVICE — GLOVE BIOGEL 7.5

## (undated) DEVICE — DRESSING TRANS 8X12 TEGADERM

## (undated) DEVICE — FOLEY BLLN 20FR 3WAY 5CC

## (undated) DEVICE — CAP STAY-SAFE STERILE

## (undated) DEVICE — SUT PROLENE 6-0 24 BV-1

## (undated) DEVICE — TUBING INSUFFLATION 10

## (undated) DEVICE — SOL 9P NACL IRR PIC IL

## (undated) DEVICE — SUT MCRYL PLUS 4-0 PS2 27IN

## (undated) DEVICE — TROCAR ENDOPATH XCEL 5MM 7.5CM

## (undated) DEVICE — SUT 1 36IN PDS II VIO MONO

## (undated) DEVICE — NDL HYPO REG 25G X 1 1/2

## (undated) DEVICE — SET DECANTER MEDICHOICE

## (undated) DEVICE — DRESSING GAUZE 6PLY 4X4

## (undated) DEVICE — CLOSURE SKIN STERI STRIP 1/2X4

## (undated) DEVICE — NDL INSUF ULTRA VERESS 120MM

## (undated) DEVICE — SUT MONOCRYL 3-0 SH U/D

## (undated) DEVICE — SUT ETHILON 3-0 FS-1 30

## (undated) DEVICE — SUT SILK 2-0 SH 18IN BLACK

## (undated) DEVICE — SUT PDS BV 6-0

## (undated) DEVICE — ADHESIVE DERMABOND ADVANCED

## (undated) DEVICE — PLEDGET TFE POLYMER 3/16

## (undated) DEVICE — TUBING HF INSUFFLATION W/ FLTR

## (undated) DEVICE — TROCAR ENDOPATH XCEL 5X100MM

## (undated) DEVICE — SUT SILK 3-0 SH 18IN BLACK

## (undated) DEVICE — GOWN SMART IMP BREATHABLE XXLG

## (undated) DEVICE — COVER OVERHEAD SURG LT BLUE

## (undated) DEVICE — COVER LIGHT HANDLE 80/CA

## (undated) DEVICE — APPLICATOR CHLORAPREP ORN 26ML

## (undated) DEVICE — LOOP VESSEL BLUE MAXI

## (undated) DEVICE — SUT GUT PL. 4-0 27 FS-2

## (undated) DEVICE — CLIPPER BLADE MOD 4406 (CAREF)

## (undated) DEVICE — SET EXTENSION STAY SAFE LL

## (undated) DEVICE — SUT VICRYL 3-0 27 SH

## (undated) DEVICE — SUT VICRYL PLUS 4-0 P3 18IN

## (undated) DEVICE — SUT PROLENE 3-0 PS-2 BL 18IN

## (undated) DEVICE — DRESSING TRANS 4X4 TEGADERM

## (undated) DEVICE — TRAY MINOR GEN SURG

## (undated) DEVICE — SUT PROLENE 5-0 36IN C-1

## (undated) DEVICE — DRAPE INCISE IOBAN 2 23X17IN

## (undated) DEVICE — PACK KIDNEY TRANSPLANT CUSTOM

## (undated) DEVICE — PLUG CATHETER STERILE FOLEY

## (undated) DEVICE — SUT 2-0 VICRYL / SH (J417)

## (undated) DEVICE — SET IRR URLGY 2LINE UNIV SPIKE

## (undated) DEVICE — GAUZE SPONGE 4X4 12PLY

## (undated) DEVICE — HEMOSTAT SURGICEL NU-KNIT 6X9

## (undated) DEVICE — SUT LIGACLIP SMALL XTRA

## (undated) DEVICE — PACK ENDOSCOPY GENERAL

## (undated) DEVICE — SUT CV-6 30 IN TTC-09NDL